# Patient Record
Sex: MALE | Race: WHITE | NOT HISPANIC OR LATINO | URBAN - METROPOLITAN AREA
[De-identification: names, ages, dates, MRNs, and addresses within clinical notes are randomized per-mention and may not be internally consistent; named-entity substitution may affect disease eponyms.]

---

## 2017-01-14 ENCOUNTER — INPATIENT (INPATIENT)
Facility: HOSPITAL | Age: 47
LOS: 2 days | Discharge: HOME HEALTH | End: 2017-01-17
Attending: FAMILY MEDICINE | Admitting: INTERNAL MEDICINE
Payer: MEDICAID

## 2017-01-14 DIAGNOSIS — L02.419 CUTANEOUS ABSCESS OF LIMB, UNSPECIFIED: Chronic | ICD-10-CM

## 2017-01-14 PROCEDURE — 99285 EMERGENCY DEPT VISIT HI MDM: CPT

## 2017-01-14 PROCEDURE — 71010: CPT | Mod: 26

## 2017-01-15 PROCEDURE — 93010 ELECTROCARDIOGRAM REPORT: CPT | Mod: 76

## 2017-01-15 PROCEDURE — 71275 CT ANGIOGRAPHY CHEST: CPT | Mod: 26

## 2017-01-17 PROCEDURE — 93306 TTE W/DOPPLER COMPLETE: CPT | Mod: 26

## 2017-01-17 PROCEDURE — 93018 CV STRESS TEST I&R ONLY: CPT

## 2017-01-17 PROCEDURE — 93016 CV STRESS TEST SUPVJ ONLY: CPT

## 2017-01-17 PROCEDURE — 78452 HT MUSCLE IMAGE SPECT MULT: CPT | Mod: 26

## 2017-01-23 DIAGNOSIS — M54.9 DORSALGIA, UNSPECIFIED: ICD-10-CM

## 2017-01-23 DIAGNOSIS — M06.9 RHEUMATOID ARTHRITIS, UNSPECIFIED: ICD-10-CM

## 2017-01-23 DIAGNOSIS — R07.9 CHEST PAIN, UNSPECIFIED: ICD-10-CM

## 2017-01-23 DIAGNOSIS — F31.9 BIPOLAR DISORDER, UNSPECIFIED: ICD-10-CM

## 2017-01-23 DIAGNOSIS — I25.10 ATHEROSCLEROTIC HEART DISEASE OF NATIVE CORONARY ARTERY WITHOUT ANGINA PECTORIS: ICD-10-CM

## 2017-01-23 DIAGNOSIS — F17.210 NICOTINE DEPENDENCE, CIGARETTES, UNCOMPLICATED: ICD-10-CM

## 2017-01-23 DIAGNOSIS — G62.9 POLYNEUROPATHY, UNSPECIFIED: ICD-10-CM

## 2017-01-23 DIAGNOSIS — E78.5 HYPERLIPIDEMIA, UNSPECIFIED: ICD-10-CM

## 2017-01-23 DIAGNOSIS — Z95.5 PRESENCE OF CORONARY ANGIOPLASTY IMPLANT AND GRAFT: ICD-10-CM

## 2017-01-24 ENCOUNTER — EMERGENCY (EMERGENCY)
Facility: HOSPITAL | Age: 47
LOS: 1 days | Discharge: HOME | End: 2017-01-24
Attending: EMERGENCY MEDICINE | Admitting: EMERGENCY MEDICINE
Payer: MEDICAID

## 2017-01-24 VITALS
RESPIRATION RATE: 20 BRPM | SYSTOLIC BLOOD PRESSURE: 124 MMHG | HEART RATE: 59 BPM | DIASTOLIC BLOOD PRESSURE: 66 MMHG | OXYGEN SATURATION: 98 % | TEMPERATURE: 98 F

## 2017-01-24 VITALS
WEIGHT: 145.06 LBS | HEART RATE: 74 BPM | OXYGEN SATURATION: 98 % | RESPIRATION RATE: 20 BRPM | HEIGHT: 67 IN | SYSTOLIC BLOOD PRESSURE: 118 MMHG | DIASTOLIC BLOOD PRESSURE: 68 MMHG | TEMPERATURE: 98 F

## 2017-01-24 DIAGNOSIS — L02.419 CUTANEOUS ABSCESS OF LIMB, UNSPECIFIED: Chronic | ICD-10-CM

## 2017-01-24 LAB
ALBUMIN SERPL ELPH-MCNC: 4.1 G/DL — SIGNIFICANT CHANGE UP (ref 3.3–5.2)
ALP SERPL-CCNC: 89 U/L — SIGNIFICANT CHANGE UP (ref 40–120)
ALT FLD-CCNC: 158 U/L — HIGH
ANION GAP SERPL CALC-SCNC: 11 MMOL/L — SIGNIFICANT CHANGE UP (ref 5–17)
AST SERPL-CCNC: 110 U/L — HIGH
BASOPHILS # BLD AUTO: 0 K/UL — SIGNIFICANT CHANGE UP (ref 0–0.2)
BASOPHILS NFR BLD AUTO: 0.1 % — SIGNIFICANT CHANGE UP (ref 0–2)
BILIRUB SERPL-MCNC: 0.6 MG/DL — SIGNIFICANT CHANGE UP (ref 0.4–2)
BUN SERPL-MCNC: 7 MG/DL — LOW (ref 8–20)
CALCIUM SERPL-MCNC: 9.4 MG/DL — SIGNIFICANT CHANGE UP (ref 8.6–10.2)
CHLORIDE SERPL-SCNC: 104 MMOL/L — SIGNIFICANT CHANGE UP (ref 98–107)
CO2 SERPL-SCNC: 26 MMOL/L — SIGNIFICANT CHANGE UP (ref 22–29)
CREAT SERPL-MCNC: 0.7 MG/DL — SIGNIFICANT CHANGE UP (ref 0.5–1.3)
D DIMER BLD IA.RAPID-MCNC: <150 NG/ML DDU — SIGNIFICANT CHANGE UP
EOSINOPHIL # BLD AUTO: 0.1 K/UL — SIGNIFICANT CHANGE UP (ref 0–0.5)
EOSINOPHIL NFR BLD AUTO: 1.7 % — SIGNIFICANT CHANGE UP (ref 0–5)
GLUCOSE SERPL-MCNC: 76 MG/DL — SIGNIFICANT CHANGE UP (ref 70–115)
HCT VFR BLD CALC: 42.4 % — SIGNIFICANT CHANGE UP (ref 42–52)
HGB BLD-MCNC: 14.6 G/DL — SIGNIFICANT CHANGE UP (ref 14–18)
LIDOCAIN IGE QN: 17 U/L — LOW (ref 22–51)
LYMPHOCYTES # BLD AUTO: 2.7 K/UL — SIGNIFICANT CHANGE UP (ref 1–4.8)
LYMPHOCYTES # BLD AUTO: 32.3 % — SIGNIFICANT CHANGE UP (ref 20–55)
MCHC RBC-ENTMCNC: 30.7 PG — SIGNIFICANT CHANGE UP (ref 27–31)
MCHC RBC-ENTMCNC: 34.4 G/DL — SIGNIFICANT CHANGE UP (ref 32–36)
MCV RBC AUTO: 89.1 FL — SIGNIFICANT CHANGE UP (ref 80–94)
MONOCYTES # BLD AUTO: 1.1 K/UL — HIGH (ref 0–0.8)
MONOCYTES NFR BLD AUTO: 12.8 % — HIGH (ref 3–10)
NEUTROPHILS # BLD AUTO: 4.5 K/UL — SIGNIFICANT CHANGE UP (ref 1.8–8)
NEUTROPHILS NFR BLD AUTO: 53 % — SIGNIFICANT CHANGE UP (ref 37–73)
NT-PROBNP SERPL-SCNC: 170 PG/ML — SIGNIFICANT CHANGE UP (ref 0–300)
PLATELET # BLD AUTO: 162 K/UL — SIGNIFICANT CHANGE UP (ref 150–400)
POTASSIUM SERPL-MCNC: 4.3 MMOL/L — SIGNIFICANT CHANGE UP (ref 3.5–5.3)
POTASSIUM SERPL-SCNC: 4.3 MMOL/L — SIGNIFICANT CHANGE UP (ref 3.5–5.3)
PROT SERPL-MCNC: 7.7 G/DL — SIGNIFICANT CHANGE UP (ref 6.6–8.7)
RBC # BLD: 4.76 M/UL — SIGNIFICANT CHANGE UP (ref 4.6–6.2)
RBC # FLD: 13.6 % — SIGNIFICANT CHANGE UP (ref 11–15.6)
SODIUM SERPL-SCNC: 141 MMOL/L — SIGNIFICANT CHANGE UP (ref 135–145)
TROPONIN T SERPL-MCNC: <0.01 NG/ML — SIGNIFICANT CHANGE UP (ref 0–0.06)
TROPONIN T SERPL-MCNC: <0.01 NG/ML — SIGNIFICANT CHANGE UP (ref 0–0.06)
WBC # BLD: 8.43 K/UL — SIGNIFICANT CHANGE UP (ref 4.8–10.8)
WBC # FLD AUTO: 8.43 K/UL — SIGNIFICANT CHANGE UP (ref 4.8–10.8)

## 2017-01-24 PROCEDURE — 36415 COLL VENOUS BLD VENIPUNCTURE: CPT

## 2017-01-24 PROCEDURE — 80053 COMPREHEN METABOLIC PANEL: CPT

## 2017-01-24 PROCEDURE — 85379 FIBRIN DEGRADATION QUANT: CPT

## 2017-01-24 PROCEDURE — 85027 COMPLETE CBC AUTOMATED: CPT

## 2017-01-24 PROCEDURE — 71045 X-RAY EXAM CHEST 1 VIEW: CPT

## 2017-01-24 PROCEDURE — 93005 ELECTROCARDIOGRAM TRACING: CPT

## 2017-01-24 PROCEDURE — 83880 ASSAY OF NATRIURETIC PEPTIDE: CPT

## 2017-01-24 PROCEDURE — 93010 ELECTROCARDIOGRAM REPORT: CPT

## 2017-01-24 PROCEDURE — 83690 ASSAY OF LIPASE: CPT

## 2017-01-24 PROCEDURE — 84484 ASSAY OF TROPONIN QUANT: CPT

## 2017-01-24 PROCEDURE — 96374 THER/PROPH/DIAG INJ IV PUSH: CPT

## 2017-01-24 PROCEDURE — 71010: CPT | Mod: 26

## 2017-01-24 PROCEDURE — 99284 EMERGENCY DEPT VISIT MOD MDM: CPT | Mod: 25

## 2017-01-24 PROCEDURE — 99285 EMERGENCY DEPT VISIT HI MDM: CPT

## 2017-01-24 RX ORDER — SODIUM CHLORIDE 9 MG/ML
3 INJECTION INTRAMUSCULAR; INTRAVENOUS; SUBCUTANEOUS ONCE
Qty: 0 | Refills: 0 | Status: COMPLETED | OUTPATIENT
Start: 2017-01-24 | End: 2017-01-24

## 2017-01-24 RX ORDER — ONDANSETRON 8 MG/1
4 TABLET, FILM COATED ORAL ONCE
Qty: 0 | Refills: 0 | Status: COMPLETED | OUTPATIENT
Start: 2017-01-24 | End: 2017-01-24

## 2017-01-24 RX ADMIN — ONDANSETRON 4 MILLIGRAM(S): 8 TABLET, FILM COATED ORAL at 18:51

## 2017-01-24 RX ADMIN — SODIUM CHLORIDE 3 MILLILITER(S): 9 INJECTION INTRAMUSCULAR; INTRAVENOUS; SUBCUTANEOUS at 17:14

## 2017-01-24 NOTE — ED PROVIDER NOTE - PROGRESS NOTE DETAILS
Cardiology Dr Ferrari in ed and saw pt for cardiology consult Dr Sp Ferrari cardiology in ed and saw pt and  rec if trop and ecg x 2 ok d/c and pt will f/u with him for outpt stress test Pt c/o nausea and pain. Pt takes morphine for chronic pain.  Pt refusing to wait for 2nd set Troponin and refusing to sign out AMA.  Pt requesting to have saline lock removed and wants to leave

## 2017-01-24 NOTE — CONSULT NOTE ADULT - SUBJECTIVE AND OBJECTIVE BOX
[facility], THE HEART CENTER                                   [address] [place] [address]                                                      PHONE: [phone]                                                         FAX: [phone]  [email or web address]  ---------------------------------------------------------------------------------------------------------------------------------    HPI:  STAN VEGA is an 46y Male PMH HTn, HLD, CAD s/p prior PCI, hep c, former smoker, copd, MVP,   who presented to [facility] ED     PAST MEDICAL & SURGICAL HISTORY:  Mitral valve disorder  CAD (coronary artery disease)  Pain management  Chronic pain  MI (myocardial infarction)  Chronic back pain  CAD (coronary artery disease)  HTN (hypertension)  Hep C w/o coma, chronic  COPD (chronic obstructive pulmonary disease)  Mitral valve prolapse  Acid reflux  CAD (coronary artery disease)  Back injury  Anxiety  High cholesterol  No significant past surgical history  Abscess of arm  Cardiac catheterization as cause of abnormal reaction of patient, or of later complication, without mention of misadventure at time of procedure: cardiac cath with one stent      Aleve (Unknown)  Haldol (Anaphylaxis)  Motrin (Anaphylaxis)  NSAIDs (Flushing; Other (Moderate))  Stelazine (Unknown)  Thorazine (Other (Moderate))      MEDICATIONS  (STANDING):  sodium chloride 0.9% lock flush 3milliLiter(s) IV Push once    MEDICATIONS  (PRN):      Family History: Pt denies hx of early cad, SCD, or congenital heart disease.      Social History:  Cigarettes:      former              Alchohol:   no              Illicit Drug Abuse:  no    ROS:  Constitutional: No fever, weight loss or fatigue  Eyes: No eye pain, visual disturbances, or discharge  ENMT:  No difficulty hearing, tinnitus, vertigo; No sinus or throat pain  Neck: No pain or stiffness  Respiratory: No cough, wheezing, chills or hemoptysis  Cardiovascular: No chest pain, palpitations, shortness of breath, dizziness or leg swelling  Gastrointestinal: No abdominal or epigastric pain. No nausea, vomiting or hematemesis; No diarrhea or constipation. No melena or hematochezia.  Genitourinary: No dysuria, frequency, hematuria or incontinence  Rectal: No pain, hemorrhoids or incontinence  Neurological: No headaches, memory loss, loss of strength, numbness or tremors  Skin: No itching, burning, rashes or lesions   Lymph Nodes: No enlarged glands  Endocrine: No heat or cold intolerance; No hair loss  Musculoskeletal: No joint pain or swelling; No muscle, back or extremity pain  Psychiatric: No depression, anxiety, mood swings or difficulty sleeping  Heme/Lymph: No easy bruising or bleeding gums  Allergy and Immunologic: No hives or eczema    Vital Signs Last 24 Hrs  T(C): 36.8, Max: 36.8 (01-24 @ 13:52)  T(F): 98.2, Max: 98.2 (01-24 @ 13:52)  HR: 74 (74 - 74)  BP: 118/68 (118/68 - 118/68)  BP(mean): --  RR: 20 (20 - 20)  SpO2: 98% (98% - 98%)  ICU Vital Signs Last 24 Hrs  STAN VEGA  I&O's Detail    I&O's Summary    Drug Dosing Weight  STAN VEGA      PHYSICAL EXAM:  General: Appears well developed, well nourished alert and cooperative.  HEENT: Head; normocephalic, atraumatic.  Eyes: Pupils reactive, cornea wnl.  Neck: Supple, no nodes adenopathy, no NVD or carotid bruit or thyromegaly.  CARDIOVASCULAR: Normal S1 and S2, No murmur, rub, gallop or lift.   LUNGS: No rales, rhonchi or wheeze. Normal breath sounds bilaterally.  ABDOMEN: Soft, nontender without mass or organomegaly. bowel sounds normoactive.  EXTREMITIES: No clubbing, cyanosis or edema. Distal pulses wnl.   SKIN: warm and dry with normal turgor.  NEURO: Alert/oriented x 3/normal motor exam. No pathologic reflexes.    PSYCH: normal affect.        LABS:          STAN VEGA            RADIOLOGY & ADDITIONAL STUDIES:    INTERPRETATION OF TELEMETRY (personally reviewed):    ECG:      Assessment and Plan:  In summary, STAN VEGA is an 46y Male with past medical history significant for       Thank you for allowing [facility] to participate in the care of this patient.  Please feel free to call with any questions or concerns. [facility], THE [facility]ART [facility]ENTER                                   [address] [place] [address]                                                      PHONE: [phone]                                                         FAX: [phone]  [email or web address]  ---------------------------------------------------------------------------------------------------------------------------------    HPI:  STAN VEGA is an 46y Male PMH HTN, HLD, CAD s/p prior PCI, hep c, former smoker, copd, MVP, who presented to [facility] ED complaining of chest discomfort.  pt states that he had an episode of mid sternal 5/10, non radiating, chest pressure with no alleviating/excerbating factors.  He had a similar episode 5 months ago but did not seek medical.  He states that his pain has improved. He denies active chest pain, palps, sob.     PAST MEDICAL & SURGICAL HISTORY:  Mitral valve disorder  CAD (coronary artery disease)  Pain management  Chronic pain  MI (myocardial infarction)  Chronic back pain  CAD (coronary artery disease)  HTN (hypertension)  Hep C w/o coma, chronic  COPD (chronic obstructive pulmonary disease)  Mitral valve prolapse  Acid reflux  CAD (coronary artery disease)  Back injury  Anxiety  High cholesterol  No significant past surgical history  Abscess of arm  Cardiac catheterization as cause of abnormal reaction of patient, or of later complication, without mention of misadventure at time of procedure: cardiac cath with one stent      Aleve (Unknown)  Haldol (Anaphylaxis)  Motrin (Anaphylaxis)  NSAIDs (Flushing; Other (Moderate))  Stelazine (Unknown)  Thorazine (Other (Moderate))      MEDICATIONS  (STANDING):  sodium chloride 0.9% lock flush 3milliLiter(s) IV Push once    MEDICATIONS  (PRN):      Family History: Pt denies hx of early cad, SCD, or congenital heart disease.      Social History:  Cigarettes:      former              Alchohol:   no              Illicit Drug Abuse:  no    ROS:  Constitutional: No fever, weight loss or fatigue  Eyes: No eye pain, visual disturbances, or discharge  ENMT:  No difficulty hearing, tinnitus, vertigo; No sinus or throat pain  Neck: No pain or stiffness  Respiratory: No cough, wheezing, chills or hemoptysis  Cardiovascular: No chest pain, palpitations, shortness of breath, dizziness or leg swelling  Gastrointestinal: No abdominal or epigastric pain. No nausea, vomiting or hematemesis; No diarrhea or constipation. No melena or hematochezia.  Genitourinary: No dysuria, frequency, hematuria or incontinence  Rectal: No pain, hemorrhoids or incontinence  Neurological: No headaches, memory loss, loss of strength, numbness or tremors  Skin: No itching, burning, rashes or lesions   Lymph Nodes: No enlarged glands  Endocrine: No heat or cold intolerance; No hair loss  Musculoskeletal: No joint pain or swelling; No muscle, back or extremity pain  Psychiatric: No depression, anxiety, mood swings or difficulty sleeping  Heme/Lymph: No easy bruising or bleeding gums  Allergy and Immunologic: No hives or eczema    Vital Signs Last 24 Hrs  T(C): 36.8, Max: 36.8 (01-24 @ 13:52)  T(F): 98.2, Max: 98.2 (01-24 @ 13:52)  HR: 74 (74 - 74)  BP: 118/68 (118/68 - 118/68)  BP(mean): --  RR: 20 (20 - 20)  SpO2: 98% (98% - 98%)  ICU Vital Signs Last 24 Hrs  STAN VEGA  I&O's Detail    I&O's Summary    Drug Dosing Weight  STAN VEGA      PHYSICAL EXAM:  General: Appears well developed, well nourished alert and cooperative.  HEENT: Head; normocephalic, atraumatic.  Eyes: Pupils reactive, cornea wnl.  Neck: Supple, no nodes adenopathy, no NVD or carotid bruit or thyromegaly.  CARDIOVASCULAR: Normal S1 and S2, No murmur, rub, gallop or lift.   LUNGS: No rales, rhonchi or wheeze. Normal breath sounds bilaterally.  ABDOMEN: Soft, nontender without mass or organomegaly. bowel sounds normoactive.  EXTREMITIES: No clubbing, cyanosis or edema. Distal pulses wnl.   SKIN: warm and dry with normal turgor.  NEURO: Alert/oriented x 3/normal motor exam. No pathologic reflexes.    PSYCH: normal affect.        LABS:          STAN VEGA      RADIOLOGY & ADDITIONAL STUDIES:    INTERPRETATION OF TELEMETRY (personally reviewed):    ECG:  nsr, no st elevations/depressions      Assessment and Plan:  In summary, STAN EVGA is an 46y Male PMH HTN, HLD, CAD s/p prior PCI, hep c, former smoker, copd, MVP, who presented to [facility] ED complaining of chest discomfort.  pt states that he had an episode of mid sternal 5/10, non radiating, chest pressure with no alleviating/excerbating factors.  He had a similar episode 5 months ago but did not seek medical.  He states that his pain has improved. He denies active chest pain, palps, sob.     atypical chest pain  -pt to have 2 trops/ekgs  -if negative and pt feeling well then he can be d/c'd home   -will arrange for outpt follow up and stress test.  -pt to continue current home medications/dosages    Thank you for allowing [facility] to participate in the care of this patient.  Please feel free to call with any questions or concerns.

## 2017-01-24 NOTE — ED PROVIDER NOTE - PMH
Acid reflux    Anxiety    Back injury    CAD (coronary artery disease)    CAD (coronary artery disease)    CAD (coronary artery disease)    Chronic back pain    Chronic pain    COPD (chronic obstructive pulmonary disease)    Hep C w/o coma, chronic    High cholesterol    HTN (hypertension)    MI (myocardial infarction)    Mitral valve disorder    Mitral valve prolapse    Pain management

## 2017-01-24 NOTE — ED PROVIDER NOTE - OBJECTIVE STATEMENT
45 y/o male states he has a h/o angina and stents and MVP and he says he had last workup at [facility] and does not know the name of any cardiologists that he has seen and he was well until this morning he was lying down and felt scachy pain and it has continued and he took asa x 4 x 81 mg and nitro under his tongue and his cp has improved and he says it is about a 6 now down from an 8 and he denies any sob or fever or cough 45 y/o male states he has a h/o angina and stents and MVP and he says he had last workup at [facility] and does not know the name of any cardiologists that he has seen and he was well until this morning he was lying down and felt achy pain and it has continued and he took asa x 4 x 81 mg and nitro under his tongue and his cp has improved and he says it is about a 6 now down from an 8 and he denies any sob or fever or cough

## 2017-01-24 NOTE — ED ADULT NURSE REASSESSMENT NOTE - NS ED NURSE REASSESS COMMENT FT1
Assuming care from previous RN, pt AOx3, denies SOB, denies difficulty breathing, resp even and unlabored, LS clear and equal bilaterally, skin warm and dry, patent 20G IV in left AC, NSR on monitor, complaining of nausea and pain 8/10, will medicate as per MD orders, will continue to monitor.

## 2017-01-24 NOTE — ED ADULT NURSE NOTE - OBJECTIVE STATEMENT
Patient A&OX3, c/o chest pain that radiated to his left side of his neck, back pain and nause. CM in place. NSR on monitor. VSS. Pt denies SOB and dizziness. clear BBS. abd soft, nondistended and nontender. moving all extremities well. safety maintained. Patient A&OX3, c/o chest pain that radiated to his left side of his neck, back pain and nausea since this morning. CM in place. NSR on monitor. VSS. Pt denies SOB and dizziness. clear BBS. abd soft, nondistended and nontender. moving all extremities well. safety maintained.

## 2017-01-24 NOTE — ED ADULT NURSE REASSESSMENT NOTE - NS ED NURSE REASSESS COMMENT FT1
Pt states "he doesn't want to be here anymore and wants to leave without signing any papers", MD Israel made aware and spoke with pt about leaving AMA. Pt ripped out his own IV and yelled at staff as he walked out. Pt states "he doesn't want to be here anymore and wants to leave without signing any papers", MD Israel made aware and spoke with pt about leaving AMA. Pt pulled out his own IV and yelled at staff as he walked out.

## 2017-01-24 NOTE — ED PROVIDER NOTE - MEDICAL DECISION MAKING DETAILS
chest pain in pt with cad and stents no cardiologist here Dr Ferrari in ed and will see pt for cardiology consult will get labs and cxr and ecg is nondiagnostic

## 2017-01-25 ENCOUNTER — INPATIENT (INPATIENT)
Facility: HOSPITAL | Age: 47
LOS: 4 days | Discharge: HOME | End: 2017-01-30
Attending: INTERNAL MEDICINE | Admitting: INTERNAL MEDICINE
Payer: MEDICAID

## 2017-01-25 DIAGNOSIS — L02.419 CUTANEOUS ABSCESS OF LIMB, UNSPECIFIED: Chronic | ICD-10-CM

## 2017-01-25 PROCEDURE — 71020: CPT | Mod: 26

## 2017-01-25 PROCEDURE — 99285 EMERGENCY DEPT VISIT HI MDM: CPT

## 2017-01-25 PROCEDURE — 93010 ELECTROCARDIOGRAM REPORT: CPT

## 2017-01-26 PROCEDURE — 93010 ELECTROCARDIOGRAM REPORT: CPT

## 2017-01-27 PROCEDURE — 93010 ELECTROCARDIOGRAM REPORT: CPT

## 2017-01-28 VITALS
OXYGEN SATURATION: 97 % | DIASTOLIC BLOOD PRESSURE: 56 MMHG | RESPIRATION RATE: 16 BRPM | HEART RATE: 62 BPM | TEMPERATURE: 98 F | SYSTOLIC BLOOD PRESSURE: 118 MMHG

## 2017-01-28 PROCEDURE — 93010 ELECTROCARDIOGRAM REPORT: CPT

## 2017-01-28 PROCEDURE — 76705 ECHO EXAM OF ABDOMEN: CPT | Mod: 26

## 2017-01-28 RX ORDER — DIVALPROEX SODIUM 500 MG/1
500 TABLET, DELAYED RELEASE ORAL EVERY 12 HOURS
Qty: 0 | Refills: 0 | Status: DISCONTINUED | OUTPATIENT
Start: 2017-01-28 | End: 2017-01-30

## 2017-01-28 RX ORDER — MORPHINE SULFATE 50 MG/1
2 CAPSULE, EXTENDED RELEASE ORAL
Qty: 0 | Refills: 0 | Status: DISCONTINUED | OUTPATIENT
Start: 2017-01-28 | End: 2017-01-30

## 2017-01-28 RX ORDER — NICOTINE POLACRILEX 2 MG
1 GUM BUCCAL DAILY
Qty: 0 | Refills: 0 | Status: DISCONTINUED | OUTPATIENT
Start: 2017-01-28 | End: 2017-01-30

## 2017-01-28 RX ORDER — ASPIRIN/CALCIUM CARB/MAGNESIUM 324 MG
81 TABLET ORAL DAILY
Qty: 0 | Refills: 0 | Status: DISCONTINUED | OUTPATIENT
Start: 2017-01-28 | End: 2017-01-28

## 2017-01-28 RX ORDER — DOCUSATE SODIUM 100 MG
200 CAPSULE ORAL AT BEDTIME
Qty: 0 | Refills: 0 | Status: DISCONTINUED | OUTPATIENT
Start: 2017-01-28 | End: 2017-01-30

## 2017-01-28 RX ORDER — SODIUM CHLORIDE 9 MG/ML
5 INJECTION INTRAMUSCULAR; INTRAVENOUS; SUBCUTANEOUS EVERY 12 HOURS
Qty: 0 | Refills: 0 | Status: DISCONTINUED | OUTPATIENT
Start: 2017-01-28 | End: 2017-01-30

## 2017-01-28 RX ORDER — NITROGLYCERIN 6.5 MG
0.5 CAPSULE, EXTENDED RELEASE ORAL EVERY 6 HOURS
Qty: 0 | Refills: 0 | Status: DISCONTINUED | OUTPATIENT
Start: 2017-01-28 | End: 2017-01-30

## 2017-01-28 RX ORDER — DIVALPROEX SODIUM 500 MG/1
250 TABLET, DELAYED RELEASE ORAL EVERY 12 HOURS
Qty: 0 | Refills: 0 | Status: DISCONTINUED | OUTPATIENT
Start: 2017-01-28 | End: 2017-01-30

## 2017-01-28 RX ORDER — CLOPIDOGREL BISULFATE 75 MG/1
75 TABLET, FILM COATED ORAL DAILY
Qty: 0 | Refills: 0 | Status: DISCONTINUED | OUTPATIENT
Start: 2017-01-28 | End: 2017-01-30

## 2017-01-28 RX ORDER — FAMOTIDINE 10 MG/ML
20 INJECTION INTRAVENOUS DAILY
Qty: 0 | Refills: 0 | Status: DISCONTINUED | OUTPATIENT
Start: 2017-01-28 | End: 2017-01-30

## 2017-01-28 RX ORDER — METHYLPHENIDATE HCL 5 MG
5 TABLET ORAL DAILY
Qty: 0 | Refills: 0 | Status: DISCONTINUED | OUTPATIENT
Start: 2017-01-28 | End: 2017-01-30

## 2017-01-28 RX ORDER — METOPROLOL TARTRATE 50 MG
12.5 TABLET ORAL DAILY
Qty: 0 | Refills: 0 | Status: DISCONTINUED | OUTPATIENT
Start: 2017-01-28 | End: 2017-01-30

## 2017-01-28 RX ORDER — HEPARIN SODIUM 5000 [USP'U]/ML
5000 INJECTION INTRAVENOUS; SUBCUTANEOUS EVERY 8 HOURS
Qty: 0 | Refills: 0 | Status: DISCONTINUED | OUTPATIENT
Start: 2017-01-28 | End: 2017-01-30

## 2017-01-28 RX ORDER — ATORVASTATIN CALCIUM 80 MG/1
40 TABLET, FILM COATED ORAL AT BEDTIME
Qty: 0 | Refills: 0 | Status: DISCONTINUED | OUTPATIENT
Start: 2017-01-28 | End: 2017-01-30

## 2017-01-28 RX ORDER — METOPROLOL TARTRATE 50 MG
12.5 TABLET ORAL DAILY
Qty: 0 | Refills: 0 | Status: DISCONTINUED | OUTPATIENT
Start: 2017-01-28 | End: 2017-01-28

## 2017-01-28 RX ORDER — MORPHINE SULFATE 50 MG/1
15 CAPSULE, EXTENDED RELEASE ORAL EVERY 12 HOURS
Qty: 0 | Refills: 0 | Status: DISCONTINUED | OUTPATIENT
Start: 2017-01-28 | End: 2017-01-30

## 2017-01-28 RX ORDER — QUETIAPINE FUMARATE 200 MG/1
150 TABLET, FILM COATED ORAL AT BEDTIME
Qty: 0 | Refills: 0 | Status: DISCONTINUED | OUTPATIENT
Start: 2017-01-28 | End: 2017-01-30

## 2017-01-28 RX ORDER — ACETAMINOPHEN 500 MG
650 TABLET ORAL EVERY 6 HOURS
Qty: 0 | Refills: 0 | Status: DISCONTINUED | OUTPATIENT
Start: 2017-01-28 | End: 2017-01-30

## 2017-01-28 RX ORDER — SENNA PLUS 8.6 MG/1
2 TABLET ORAL AT BEDTIME
Qty: 0 | Refills: 0 | Status: DISCONTINUED | OUTPATIENT
Start: 2017-01-28 | End: 2017-01-30

## 2017-01-28 RX ORDER — METOCLOPRAMIDE HCL 10 MG
5 TABLET ORAL EVERY 12 HOURS
Qty: 0 | Refills: 0 | Status: DISCONTINUED | OUTPATIENT
Start: 2017-01-28 | End: 2017-01-30

## 2017-01-28 RX ORDER — GABAPENTIN 400 MG/1
800 CAPSULE ORAL EVERY 8 HOURS
Qty: 0 | Refills: 0 | Status: DISCONTINUED | OUTPATIENT
Start: 2017-01-28 | End: 2017-01-30

## 2017-01-28 RX ORDER — ONDANSETRON 8 MG/1
4 TABLET, FILM COATED ORAL EVERY 6 HOURS
Qty: 0 | Refills: 0 | Status: DISCONTINUED | OUTPATIENT
Start: 2017-01-28 | End: 2017-01-30

## 2017-01-28 RX ORDER — ASPIRIN/CALCIUM CARB/MAGNESIUM 324 MG
81 TABLET ORAL DAILY
Qty: 0 | Refills: 0 | Status: DISCONTINUED | OUTPATIENT
Start: 2017-01-28 | End: 2017-01-30

## 2017-01-28 RX ORDER — HYDROMORPHONE HYDROCHLORIDE 2 MG/ML
1 INJECTION INTRAMUSCULAR; INTRAVENOUS; SUBCUTANEOUS EVERY 4 HOURS
Qty: 0 | Refills: 0 | Status: DISCONTINUED | OUTPATIENT
Start: 2017-01-28 | End: 2017-01-30

## 2017-01-28 RX ADMIN — DIVALPROEX SODIUM 500 MILLIGRAM(S): 500 TABLET, DELAYED RELEASE ORAL at 21:49

## 2017-01-28 RX ADMIN — CLOPIDOGREL BISULFATE 75 MILLIGRAM(S): 75 TABLET, FILM COATED ORAL at 14:16

## 2017-01-28 RX ADMIN — Medication 1 PATCH: at 14:21

## 2017-01-28 RX ADMIN — ATORVASTATIN CALCIUM 40 MILLIGRAM(S): 80 TABLET, FILM COATED ORAL at 22:38

## 2017-01-28 RX ADMIN — MORPHINE SULFATE 15 MILLIGRAM(S): 50 CAPSULE, EXTENDED RELEASE ORAL at 21:48

## 2017-01-28 RX ADMIN — HYDROMORPHONE HYDROCHLORIDE 1 MILLIGRAM(S): 2 INJECTION INTRAMUSCULAR; INTRAVENOUS; SUBCUTANEOUS at 14:20

## 2017-01-28 RX ADMIN — Medication 12.5 MILLIGRAM(S): at 14:20

## 2017-01-28 RX ADMIN — GABAPENTIN 800 MILLIGRAM(S): 400 CAPSULE ORAL at 14:29

## 2017-01-28 RX ADMIN — DIVALPROEX SODIUM 250 MILLIGRAM(S): 500 TABLET, DELAYED RELEASE ORAL at 21:49

## 2017-01-28 RX ADMIN — GABAPENTIN 800 MILLIGRAM(S): 400 CAPSULE ORAL at 21:51

## 2017-01-28 RX ADMIN — HYDROMORPHONE HYDROCHLORIDE 1 MILLIGRAM(S): 2 INJECTION INTRAMUSCULAR; INTRAVENOUS; SUBCUTANEOUS at 14:05

## 2017-01-28 RX ADMIN — HYDROMORPHONE HYDROCHLORIDE 1 MILLIGRAM(S): 2 INJECTION INTRAMUSCULAR; INTRAVENOUS; SUBCUTANEOUS at 22:38

## 2017-01-28 RX ADMIN — Medication 10 MILLIGRAM(S): at 14:23

## 2017-01-28 RX ADMIN — MORPHINE SULFATE 15 MILLIGRAM(S): 50 CAPSULE, EXTENDED RELEASE ORAL at 03:59

## 2017-01-28 RX ADMIN — QUETIAPINE FUMARATE 150 MILLIGRAM(S): 200 TABLET, FILM COATED ORAL at 21:52

## 2017-01-28 RX ADMIN — SODIUM CHLORIDE 5 MILLILITER(S): 9 INJECTION INTRAMUSCULAR; INTRAVENOUS; SUBCUTANEOUS at 17:54

## 2017-01-28 RX ADMIN — HYDROMORPHONE HYDROCHLORIDE 1 MILLIGRAM(S): 2 INJECTION INTRAMUSCULAR; INTRAVENOUS; SUBCUTANEOUS at 18:09

## 2017-01-28 RX ADMIN — Medication 5 MILLIGRAM(S): at 17:51

## 2017-01-28 RX ADMIN — Medication 5 MILLIGRAM(S): at 14:19

## 2017-01-28 RX ADMIN — FAMOTIDINE 20 MILLIGRAM(S): 10 INJECTION INTRAVENOUS at 14:18

## 2017-01-28 RX ADMIN — Medication 81 MILLIGRAM(S): at 14:17

## 2017-01-28 NOTE — PROGRESS NOTE ADULT - SUBJECTIVE AND OBJECTIVE BOX
Subjective:   PCP: :: prior PCP - Dr. Javid Brenner   [phone], fax [phone]   reports no PCP will advise [facility] ;   C C: Chief Complaint:: chest pain x 1 day   HPI: :: 46/M with a PMHx CAD on asa/plavix, MVP, fractured spine, MI, bipolar disorder, neuropathy, emphysema, RA, presents with midsternal chest pain since 5am. Woke pt up from sleep, characterized as ache, nonradiating, constant, radiating to the back. +nausea. Denies sob, cough, diaphoresis, leg swelling.   1/26:   still has some residual chest pain     1/28:  pt better  cath negative  could not be discharged yesterday as has no place to go    PMHx: :: as per hpi   PSHx: :: PCI with stents   Family History: :: NC   Social History: :: Smokes 1 pack per day x 25y   Denies Etoh, drug use   works in construction   Allergies:   Allergies   Haldol: Hives; NSAIDS (Non-Steroidal Anti-Inflammatory Drug): Hives; Stelazine: ; Thorazine: Hives   ROS: :: Chest Pain; All other review of systems negative   Objective:   Physical Exam:    Vital Signs Last 24 Hrs  T(C): 36.9, Max: 36.9 (01-28 @ 07:57)  T(F): 98.4, Max: 98.4 (01-28 @ 07:57)  HR: 62 (62 - 62)  BP: 118/56 (118/56 - 118/56)  BP(mean): --  ABP: --  ABP(mean): --  RR: 16 (16 - 16)  SpO2: 97% (97% - 97%)    General: Appears: Comfortable   Neurological: :: A and O x3, Non-focal   HEENT: :: NC/AT, EOMI, PERRLA   Neck: No thyromegaly   Cardiovascular: :: S1S2 Present, Regular Rhythm   Respiratory: Respiratory Rate: 12; Respiratory Effort: Normal; (R,L) Breath sounds: normal; (R,L) No rales   Breast/Chest: Breast exam deferred   Gastrointestinal: Bowel sounds: normal; No abdominal tenderness   Rectal: Rectal Exam Deferred: Yes   Genitourinary: Genitourinary Exam Deferred: Deferred   Musculoskeletal:   General (R,L): No muscle tenderness   Extremities: Edema: No; Pulses: Normal   Skin (R,L): No skin lesions; No rash   Labs:   LABS   EKG: I have personally visualized the EKG and my interpretation is:; :: NSR at 68   Radiology:   Radiology   	  CHEST (PA & LAT)	  *** Begin Of Inserted Radiology Report ***	  Impression:  No acute disease  No significant interval change as compared to 1/14/2017	  Exam Date: 1/25/2017 8:48 AM  History: Chest pain  Technique: Frontal and lateral views of the chest with comparison to  1/14/2017  Findings:  The heart is normal in size. The lungs are grossly clear. The apices  and hemidiaphragms are unremarkable. Degenerative changes of the  visualized osseous structures. 	  	  Performed: 01/25/2017 10:36	  *** End Of Inserted Radiology Report ***	  -   Medications:   Home Medications   Description	SIG	Indication	Special Instructions	Last Taken	  acetaminophen 325 mg tablet	650 MG Oral EVERY 6 HRS AS NEEDED PRN				  Advair Diskus 250 mcg-50 mcg/dose powder for inhalation	1 puff(s) By Inhalation EVERY 12 HOURS				  aspirin 81 mg chewable tablet	1 tablet(s) Orally DAILY				  atorvastatin 40 mg tablet	1 tablet(s) Orally AT BEDTIME				  divalproex  mg tablet,extended release 24 hr	1,000 MG Oral AT BEDTIME				  duloxetine 60 mg capsule,delayed release	1 capsule(s) Orally DAILY				  famotidine 20 mg tablet	1 tablet(s) Orally DAILY				  gabapentin 400 mg capsule	2 capsule(s) Orally EVERY 8 HOURS				  methylphenidate 5 mg tablet	5 MG Oral EVERY 12 HOURS				  metoprolol tartrate 25 mg tablet	0.5 tablet(s) Orally EVERY 12 HOURS				  morphine ER 15 mg tablet,extended release	1 tablet(s) Orally EVERY 12 HOURS				  nicotine 21 mg/24 hr daily transdermal patch	1 patch(es) Transdermally DAILY				  prednisone 10 mg tablet	1 tablet(s) Orally DAILY				  ProAir HFA 90 mcg/actuation aerosol inhaler	2 puff(s) By Inhalation EVERY 4 HOURS PRN	Emphysema, unspecified	FOR SHORTNESS OF BREATH/WHEEZE		  quetiapine 50 mg tablet	1 tablet(s) Orally AT BEDTIME				  Inpatient Medications   Description	Dose	Route	Freq/Rate	Last Given	  					  Active					  DIVALPROEX-ER (DEPAKOTE ER FORM)	500 MG=1 TB24	ORAL	Q12H	01/26/2017 10:29	  + DIVALPROEX-ER (DEPAKOTE ER FORM)	250 MG=1 TB24	ORAL	Q12H	01/26/2017 10:29	  ACETAMINOPHEN (TYLENOL FORM)	650 MG=(2 x 325 MG TAB)	ORAL	Q6HP		  ALUM - MAG HYDROXIDE-SIMETH (MAALOX PLUS FORM)	30 ML SUSP	ORAL	Q6HP		  ASPIRIN*	81 MG=1 CHEW	ORAL	DAILY	01/26/2017 10:29	  ATORVASTATIN CALCIUM (LIPITOR FORM)	40 MG=1 TAB	ORAL	QHS	01/25/2017 21:07	  CLOPIDOGREL BISULFATE (PLAVIX FORM)	75 MG=1 TAB	ORAL	DAILY	01/26/2017 10:29	  DOCUSATE SODIUM (COLACE FORM)	200 MG=(2 x 100 MG CAP)	ORAL	HSP		  FAMOTIDINE (PEPCID FORM)	20 MG=1 TAB	ORAL	DAILY	01/26/2017 10:29	  GABAPENTIN (NEURONTIN FORM)	800 MG=(2 x 400 MG CAP)	ORAL	Q8H	01/26/2017 13:47	  HEPARIN PORCINE (HEPARIN(PORCINE))	5,000 UNIT=1 ML VIAL	SUBCUT	Q8H	01/26/2017 13:47	  METOCLOPRAMIDE HCL (REGLAN FORM)*	5 MG=(0.5 x 10 MG TAB)	ORAL	Q12H	01/26/2017 10:29	  METOPROLOL- XL(TOPROL-XL) (TOPROL XL FORM)	12.5 MG=(0.5 x 25 MG TB24)	ORAL	DAILY	01/26/2017 10:29	  MORPHINE SULFATE (MORPHINE)	2 MG=0.2 ML SOLN (7 Days)	IV	Q3HP	01/26/2017 13:47	  MORPHINE SULFATE (MS CONTIN FORM)	15 MG=1 TBSR (7 Days)	ORAL	Q12H	01/26/2017 10:24	  NITROGLYCERIN 2% OINTMENT (NITRO-BID FORM)*	0.5 INCH OINT	TOPL	Q6H	01/26/2017 12:46	  Ondansetron IV[ZOFRAN] (ZOFRAN FORM)	4 MG=2 ML SOLN	IV	Q6HP		  predniSONE Tablet (DELTASONE FORM)	10 MG=1 TAB	ORAL	DAILY	01/26/2017 12:46	  QUEtiapine - XR[SEROquel - XR] (SEROQUEL XR FORM)	150 MG=(3 x 50 MG TB24)	ORAL	QHS	01/25/2017 21:07	  SENNA TABLETS (SENOKOT FORM)	2 TAB	ORAL	HSP		  SODIUM CHLORIDE FLUSH SOLUTION (NORMAL SALINE LOCK FLUSH)	5 ML SYRG	IV	PRN		  SODIUM CHLORIDE FLUSH SOLUTION (NORMAL SALINE LOCK FLUSH)	5 ML SYRG	IV	Q12H	01/26/2017 10:29	  Assessment and Plan: :: 46/M admitted with   1) Chest Pain: Angina   MI ruled out   transferred to med floor   serial cardiac enz negative   cont asa, plavix BB, statins, NTG, O2, morphine prn   cardio eval appreciated: cardiac cath negative for ongoing chest pain   quit tobacco abuse     2) Tobacco abuse:   advised quitting   on nicotine patch     3) Chronic back pain:   cont morphine     4) Bipolar:   cont home meds   Dispo: Pt was discharged yesterday but could not go as he has no home. he is saying in a motel. social service would be able to get him into that on Monday.   POC discussed with pt,   total time spent 45 min   DVT Prophylaxis: :: Heparin SQ   Visit Information: Time Spent: 45 min   --: I expect patient to be discharged in: 2 days

## 2017-01-29 RX ADMIN — MORPHINE SULFATE 15 MILLIGRAM(S): 50 CAPSULE, EXTENDED RELEASE ORAL at 15:32

## 2017-01-29 RX ADMIN — HYDROMORPHONE HYDROCHLORIDE 1 MILLIGRAM(S): 2 INJECTION INTRAMUSCULAR; INTRAVENOUS; SUBCUTANEOUS at 02:04

## 2017-01-29 RX ADMIN — MORPHINE SULFATE 15 MILLIGRAM(S): 50 CAPSULE, EXTENDED RELEASE ORAL at 18:00

## 2017-01-29 RX ADMIN — MORPHINE SULFATE 15 MILLIGRAM(S): 50 CAPSULE, EXTENDED RELEASE ORAL at 05:12

## 2017-01-29 RX ADMIN — HYDROMORPHONE HYDROCHLORIDE 1 MILLIGRAM(S): 2 INJECTION INTRAMUSCULAR; INTRAVENOUS; SUBCUTANEOUS at 03:02

## 2017-01-29 RX ADMIN — HYDROMORPHONE HYDROCHLORIDE 1 MILLIGRAM(S): 2 INJECTION INTRAMUSCULAR; INTRAVENOUS; SUBCUTANEOUS at 15:39

## 2017-01-29 RX ADMIN — SODIUM CHLORIDE 5 MILLILITER(S): 9 INJECTION INTRAMUSCULAR; INTRAVENOUS; SUBCUTANEOUS at 05:15

## 2017-01-29 RX ADMIN — GABAPENTIN 800 MILLIGRAM(S): 400 CAPSULE ORAL at 22:06

## 2017-01-29 RX ADMIN — CLOPIDOGREL BISULFATE 75 MILLIGRAM(S): 75 TABLET, FILM COATED ORAL at 11:18

## 2017-01-29 RX ADMIN — Medication 12.5 MILLIGRAM(S): at 05:11

## 2017-01-29 RX ADMIN — Medication 81 MILLIGRAM(S): at 11:18

## 2017-01-29 RX ADMIN — HYDROMORPHONE HYDROCHLORIDE 1 MILLIGRAM(S): 2 INJECTION INTRAMUSCULAR; INTRAVENOUS; SUBCUTANEOUS at 07:58

## 2017-01-29 RX ADMIN — DIVALPROEX SODIUM 500 MILLIGRAM(S): 500 TABLET, DELAYED RELEASE ORAL at 05:08

## 2017-01-29 RX ADMIN — HYDROMORPHONE HYDROCHLORIDE 1 MILLIGRAM(S): 2 INJECTION INTRAMUSCULAR; INTRAVENOUS; SUBCUTANEOUS at 12:14

## 2017-01-29 RX ADMIN — HYDROMORPHONE HYDROCHLORIDE 1 MILLIGRAM(S): 2 INJECTION INTRAMUSCULAR; INTRAVENOUS; SUBCUTANEOUS at 08:10

## 2017-01-29 RX ADMIN — Medication 5 MILLIGRAM(S): at 17:59

## 2017-01-29 RX ADMIN — Medication 5 MILLIGRAM(S): at 11:24

## 2017-01-29 RX ADMIN — HYDROMORPHONE HYDROCHLORIDE 1 MILLIGRAM(S): 2 INJECTION INTRAMUSCULAR; INTRAVENOUS; SUBCUTANEOUS at 20:20

## 2017-01-29 RX ADMIN — Medication 1 PATCH: at 11:18

## 2017-01-29 RX ADMIN — DIVALPROEX SODIUM 500 MILLIGRAM(S): 500 TABLET, DELAYED RELEASE ORAL at 17:59

## 2017-01-29 RX ADMIN — FAMOTIDINE 20 MILLIGRAM(S): 10 INJECTION INTRAVENOUS at 11:19

## 2017-01-29 RX ADMIN — Medication 1 PATCH: at 15:32

## 2017-01-29 RX ADMIN — MORPHINE SULFATE 15 MILLIGRAM(S): 50 CAPSULE, EXTENDED RELEASE ORAL at 19:27

## 2017-01-29 RX ADMIN — HYDROMORPHONE HYDROCHLORIDE 1 MILLIGRAM(S): 2 INJECTION INTRAMUSCULAR; INTRAVENOUS; SUBCUTANEOUS at 16:27

## 2017-01-29 RX ADMIN — GABAPENTIN 800 MILLIGRAM(S): 400 CAPSULE ORAL at 15:35

## 2017-01-29 RX ADMIN — DIVALPROEX SODIUM 250 MILLIGRAM(S): 500 TABLET, DELAYED RELEASE ORAL at 05:08

## 2017-01-29 RX ADMIN — Medication 5 MILLIGRAM(S): at 05:10

## 2017-01-29 RX ADMIN — QUETIAPINE FUMARATE 150 MILLIGRAM(S): 200 TABLET, FILM COATED ORAL at 22:06

## 2017-01-29 RX ADMIN — HYDROMORPHONE HYDROCHLORIDE 1 MILLIGRAM(S): 2 INJECTION INTRAMUSCULAR; INTRAVENOUS; SUBCUTANEOUS at 02:43

## 2017-01-29 RX ADMIN — DIVALPROEX SODIUM 250 MILLIGRAM(S): 500 TABLET, DELAYED RELEASE ORAL at 18:00

## 2017-01-29 RX ADMIN — GABAPENTIN 800 MILLIGRAM(S): 400 CAPSULE ORAL at 05:09

## 2017-01-29 RX ADMIN — Medication 10 MILLIGRAM(S): at 05:14

## 2017-01-29 RX ADMIN — SODIUM CHLORIDE 5 MILLILITER(S): 9 INJECTION INTRAMUSCULAR; INTRAVENOUS; SUBCUTANEOUS at 17:32

## 2017-01-29 NOTE — PROGRESS NOTE ADULT - ASSESSMENT
Assessment and Plan: :: 46/M admitted with   1) Chest Pain: Angina   MI ruled out   transferred to med floor   serial cardiac enz negative   cont asa, plavix BB, statins, NTG, O2, morphine prn   cardio eval appreciated: cardiac cath negative for ongoing chest pain   quit tobacco abuse     2) Tobacco abuse:   advised quitting   on nicotine patch     3) Chronic back pain:   cont morphine     4) Bipolar:   cont home meds   Dispo: Pt was discharged yesterday but could not go as he has no home. he is saying in a motel. social service would be able to get him into that on Monday.   POC discussed with pt,   total time spent 45 min   DVT Prophylaxis: :: Heparin SQ   Visit Information: Time Spent: 45 min   --: I expect patient to be discharged in: 1 days

## 2017-01-29 NOTE — PROGRESS NOTE ADULT - SUBJECTIVE AND OBJECTIVE BOX
feels better       Vital Signs Last 24 Hrs  T(C): 36.9, Max: 37.1 (01-28 @ 15:51)  T(F): 98.5, Max: 98.7 (01-28 @ 15:51)  HR: 62 (62 - 79)  BP: 134/62 (130/77 - 134/62)  BP(mean): --  ABP: --  ABP(mean): --  RR: 16 (16 - 16)  SpO2: 99% (97% - 99%)      MEDICATIONS  (STANDING):  atorvastatin 40milliGRAM(s) Oral at bedtime  clopidogrel Tablet 75milliGRAM(s) Oral daily  diVALproex ER 500milliGRAM(s) Oral every 12 hours  diVALproex ER 250milliGRAM(s) Oral every 12 hours  famotidine    Tablet 20milliGRAM(s) Oral daily  gabapentin 800milliGRAM(s) Oral every 8 hours  heparin  Injectable 5000Unit(s) SubCutaneous every 8 hours  methylphenidate 5milliGRAM(s) Oral daily  metoclopramide 5milliGRAM(s) Oral every 12 hours  morphine ER Tablet 15milliGRAM(s) Oral every 12 hours  nicotine - 21 mG/24Hr(s) Patch 1patch Transdermal daily  nitroglycerin    2% Ointment 0.5Inch(s) Transdermal every 6 hours  predniSONE   Tablet 10milliGRAM(s) Oral daily  QUEtiapine XR 150milliGRAM(s) Oral at bedtime  sodium chloride 0.9% lock flush 5milliLiter(s) IV Push every 12 hours  docusate sodium 200milliGRAM(s) Oral at bedtime  aspirin  chewable 81milliGRAM(s) Oral daily  metoprolol 12.5milliGRAM(s) Oral daily    MEDICATIONS  (PRN):  acetaminophen   Tablet. 650milliGRAM(s) Oral every 6 hours PRN Mild Pain (1 - 3)  aluminum hydroxide/magnesium hydroxide/simethicone Suspension 30milliLiter(s) Oral every 6 hours PRN Dyspepsia  morphine  - Injectable 2milliGRAM(s) IV Push every 3 hours PRN Moderate Pain (4 - 6)  ondansetron Injectable 4milliGRAM(s) IV Push every 6 hours PRN Nausea and/or Vomiting  senna 2Tablet(s) Oral at bedtime PRN Constipation  HYDROmorphone  Injectable 1milliGRAM(s) IV Push every 4 hours PRN Severe Pain (7 - 10)

## 2017-01-30 VITALS — WEIGHT: 144.84 LBS

## 2017-01-30 DIAGNOSIS — Z95.5 PRESENCE OF CORONARY ANGIOPLASTY IMPLANT AND GRAFT: ICD-10-CM

## 2017-01-30 RX ORDER — MORPHINE SULFATE 50 MG/1
15 CAPSULE, EXTENDED RELEASE ORAL
Qty: 0 | Refills: 0 | COMMUNITY

## 2017-01-30 RX ORDER — MORPHINE SULFATE 50 MG/1
0 CAPSULE, EXTENDED RELEASE ORAL
Qty: 0 | Refills: 0 | COMMUNITY

## 2017-01-30 RX ORDER — FAMOTIDINE 10 MG/ML
0 INJECTION INTRAVENOUS
Qty: 0 | Refills: 0 | COMMUNITY

## 2017-01-30 RX ORDER — LIDOCAINE 4 G/100G
1 CREAM TOPICAL
Qty: 5 | Refills: 0 | OUTPATIENT
Start: 2017-01-30 | End: 2017-02-04

## 2017-01-30 RX ORDER — FAMOTIDINE 10 MG/ML
1 INJECTION INTRAVENOUS
Qty: 0 | Refills: 0 | COMMUNITY

## 2017-01-30 RX ORDER — METHYLPHENIDATE HCL 5 MG
1 TABLET ORAL
Qty: 0 | Refills: 0 | COMMUNITY
Start: 2017-01-30

## 2017-01-30 RX ORDER — HYDROMORPHONE HYDROCHLORIDE 2 MG/ML
1 INJECTION INTRAMUSCULAR; INTRAVENOUS; SUBCUTANEOUS
Qty: 10 | Refills: 0 | OUTPATIENT
Start: 2017-01-30 | End: 2017-02-04

## 2017-01-30 RX ORDER — QUETIAPINE FUMARATE 200 MG/1
1 TABLET, FILM COATED ORAL
Qty: 0 | Refills: 0 | COMMUNITY
Start: 2017-01-30

## 2017-01-30 RX ORDER — GABAPENTIN 400 MG/1
400 CAPSULE ORAL
Qty: 0 | Refills: 0 | COMMUNITY

## 2017-01-30 RX ORDER — PREDNISOLONE 5 MG
0 TABLET ORAL
Qty: 0 | Refills: 0 | COMMUNITY

## 2017-01-30 RX ORDER — DIVALPROEX SODIUM 500 MG/1
750 TABLET, DELAYED RELEASE ORAL
Qty: 0 | Refills: 0 | COMMUNITY

## 2017-01-30 RX ADMIN — Medication 1 PATCH: at 11:24

## 2017-01-30 RX ADMIN — DIVALPROEX SODIUM 250 MILLIGRAM(S): 500 TABLET, DELAYED RELEASE ORAL at 05:47

## 2017-01-30 RX ADMIN — MORPHINE SULFATE 15 MILLIGRAM(S): 50 CAPSULE, EXTENDED RELEASE ORAL at 05:47

## 2017-01-30 RX ADMIN — Medication 12.5 MILLIGRAM(S): at 05:50

## 2017-01-30 RX ADMIN — Medication 5 MILLIGRAM(S): at 11:30

## 2017-01-30 RX ADMIN — HYDROMORPHONE HYDROCHLORIDE 1 MILLIGRAM(S): 2 INJECTION INTRAMUSCULAR; INTRAVENOUS; SUBCUTANEOUS at 12:57

## 2017-01-30 RX ADMIN — HYDROMORPHONE HYDROCHLORIDE 1 MILLIGRAM(S): 2 INJECTION INTRAMUSCULAR; INTRAVENOUS; SUBCUTANEOUS at 04:18

## 2017-01-30 RX ADMIN — HYDROMORPHONE HYDROCHLORIDE 1 MILLIGRAM(S): 2 INJECTION INTRAMUSCULAR; INTRAVENOUS; SUBCUTANEOUS at 02:07

## 2017-01-30 RX ADMIN — HYDROMORPHONE HYDROCHLORIDE 1 MILLIGRAM(S): 2 INJECTION INTRAMUSCULAR; INTRAVENOUS; SUBCUTANEOUS at 00:17

## 2017-01-30 RX ADMIN — Medication 81 MILLIGRAM(S): at 11:20

## 2017-01-30 RX ADMIN — HYDROMORPHONE HYDROCHLORIDE 1 MILLIGRAM(S): 2 INJECTION INTRAMUSCULAR; INTRAVENOUS; SUBCUTANEOUS at 12:56

## 2017-01-30 RX ADMIN — HYDROMORPHONE HYDROCHLORIDE 1 MILLIGRAM(S): 2 INJECTION INTRAMUSCULAR; INTRAVENOUS; SUBCUTANEOUS at 08:52

## 2017-01-30 RX ADMIN — HYDROMORPHONE HYDROCHLORIDE 1 MILLIGRAM(S): 2 INJECTION INTRAMUSCULAR; INTRAVENOUS; SUBCUTANEOUS at 05:30

## 2017-01-30 RX ADMIN — Medication 1 PATCH: at 11:30

## 2017-01-30 RX ADMIN — DIVALPROEX SODIUM 500 MILLIGRAM(S): 500 TABLET, DELAYED RELEASE ORAL at 05:47

## 2017-01-30 RX ADMIN — GABAPENTIN 800 MILLIGRAM(S): 400 CAPSULE ORAL at 05:47

## 2017-01-30 RX ADMIN — Medication 5 MILLIGRAM(S): at 05:49

## 2017-01-30 RX ADMIN — HYDROMORPHONE HYDROCHLORIDE 1 MILLIGRAM(S): 2 INJECTION INTRAMUSCULAR; INTRAVENOUS; SUBCUTANEOUS at 12:54

## 2017-01-30 RX ADMIN — Medication 10 MILLIGRAM(S): at 05:47

## 2017-01-30 RX ADMIN — SODIUM CHLORIDE 5 MILLILITER(S): 9 INJECTION INTRAMUSCULAR; INTRAVENOUS; SUBCUTANEOUS at 05:40

## 2017-01-30 NOTE — DISCHARGE NOTE ADULT - CARE PLAN
Principal Discharge DX:	Coronary artery disease with other form of angina pectoris  Goal:	negative cath  Instructions for follow-up, activity and diet:	cardiac diet

## 2017-01-30 NOTE — DISCHARGE NOTE ADULT - PATIENT PORTAL LINK FT
“You can access the [facility] Patient Portal, offered by [facility], by registering with the following website: [email or web address]”

## 2017-01-30 NOTE — DISCHARGE NOTE ADULT - MEDICATION SUMMARY - MEDICATIONS TO TAKE
I will START or STAY ON the medications listed below when I get home from the hospital:    predniSONE 10 mg oral tablet  -- 1 tab(s) by mouth once a day  -- Indication: For epmhy    morphine 15 mg oral capsule  --  by mouth 2 times a day  -- Indication: For pain    Dilaudid 2 mg oral tablet  -- 1 tab(s) by mouth 2 times a day -for severe pain MDD:4 mg/day max  -- Caution federal law prohibits the transfer of this drug to any person other  than the person for whom it was prescribed.  May cause drowsiness.  Alcohol may intensify this effect.  Use care when operating dangerous machinery.  This prescription cannot be refilled.  Using more of this medication than prescribed may cause serious breathing problems.    -- Indication: For pain    acetaminophen 325 mg oral tablet  --  by mouth every 6 hours, As Needed  -- Indication: For pain    aspirin 81 mg oral tablet  -- 1 tab(s) by mouth once a day  -- Indication: For Cad    divalproex sodium extended release  -- 750 milligram(s) by mouth 2 times a day  -- Indication: For bipolar    gabapentin 800 mg oral tablet  -- 1 tab(s) by mouth 3 times a day  -- Indication: For bipolar    atorvastatin 40 mg oral tablet  -- 1 tab(s) by mouth once a day (at bedtime)  -- Indication: For hld    Plavix 75 mg oral tablet  -- 1 tab(s) by mouth once a day  -- Indication: For Cad    QUEtiapine 150 mg oral tablet, extended release  -- 1 tab(s) by mouth once a day (at bedtime)  -- Indication: For bipolar    metoprolol tartrate 25 mg oral tablet  -- 1 tab(s) by mouth 2 times a day  -- Indication: For htn    Advair Diskus 250 mcg-50 mcg inhalation powder  -- 1 puff(s) inhaled 2 times a day  -- Indication: For emphy    ProAir HFA 90 mcg/inh inhalation aerosol  -- 2 puff(s) inhaled 4 times a day  -- Indication: For emphy    methylphenidate 5 mg oral tablet  -- 1 tab(s) by mouth once a day  -- Indication: For bipolar    Lidoderm 5% topical film  -- Apply on skin to affected area once a day, As Needed -for severe pain  -- For external use only.  Remove old patch prior to applying a new patch.    -- Indication: For pain    Pepcid 20 mg oral tablet  -- 1 tab(s) by mouth 2 times a day  -- Indication: For gerd    nicotine 21 mg/24 hr transdermal film, extended release  -- 1 each by transdermal patch once a day  -- Indication: For abuse

## 2017-01-31 ENCOUNTER — EMERGENCY (EMERGENCY)
Facility: HOSPITAL | Age: 47
LOS: 1 days | End: 2017-01-31
Admitting: EMERGENCY MEDICINE
Payer: MEDICAID

## 2017-01-31 DIAGNOSIS — L02.419 CUTANEOUS ABSCESS OF LIMB, UNSPECIFIED: Chronic | ICD-10-CM

## 2017-01-31 LAB
ALBUMIN SERPL ELPH-MCNC: 3.4 G/DL — SIGNIFICANT CHANGE UP (ref 3.3–5)
ALP SERPL-CCNC: 102 U/L — SIGNIFICANT CHANGE UP (ref 30–120)
ALT FLD-CCNC: 604 U/L DA — HIGH (ref 10–60)
ANION GAP SERPL CALC-SCNC: 8 MMOL/L — SIGNIFICANT CHANGE UP (ref 5–17)
APTT BLD: 26.5 SEC — LOW (ref 27.5–37.4)
AST SERPL-CCNC: 346 U/L — HIGH (ref 10–40)
BASOPHILS # BLD AUTO: 0.1 K/UL — SIGNIFICANT CHANGE UP (ref 0–0.2)
BASOPHILS NFR BLD AUTO: 1 % — SIGNIFICANT CHANGE UP (ref 0–2)
BILIRUB SERPL-MCNC: 0.6 MG/DL — SIGNIFICANT CHANGE UP (ref 0.2–1.2)
BUN SERPL-MCNC: 12 MG/DL — SIGNIFICANT CHANGE UP (ref 7–23)
CALCIUM SERPL-MCNC: 9 MG/DL — SIGNIFICANT CHANGE UP (ref 8.4–10.5)
CHLORIDE SERPL-SCNC: 107 MMOL/L — SIGNIFICANT CHANGE UP (ref 96–108)
CK MB CFR SERPL CALC: 1.1 NG/ML — SIGNIFICANT CHANGE UP (ref 0–3.6)
CK SERPL-CCNC: 89 U/L — SIGNIFICANT CHANGE UP (ref 39–308)
CO2 SERPL-SCNC: 29 MMOL/L — SIGNIFICANT CHANGE UP (ref 22–31)
CREAT SERPL-MCNC: 0.76 MG/DL — SIGNIFICANT CHANGE UP (ref 0.5–1.3)
EOSINOPHIL # BLD AUTO: 0.3 K/UL — SIGNIFICANT CHANGE UP (ref 0–0.5)
EOSINOPHIL NFR BLD AUTO: 3.3 % — SIGNIFICANT CHANGE UP (ref 0–6)
GLUCOSE SERPL-MCNC: 93 MG/DL — SIGNIFICANT CHANGE UP (ref 70–99)
HCT VFR BLD CALC: 37.8 % — LOW (ref 39–50)
HGB BLD-MCNC: 12.5 G/DL — LOW (ref 13–17)
INR BLD: 1.27 RATIO — HIGH (ref 0.88–1.16)
LYMPHOCYTES # BLD AUTO: 2.4 K/UL — SIGNIFICANT CHANGE UP (ref 1–3.3)
LYMPHOCYTES # BLD AUTO: 28.8 % — SIGNIFICANT CHANGE UP (ref 13–44)
MCHC RBC-ENTMCNC: 31.2 PG — SIGNIFICANT CHANGE UP (ref 27–34)
MCHC RBC-ENTMCNC: 33 GM/DL — SIGNIFICANT CHANGE UP (ref 32–36)
MCV RBC AUTO: 94.4 FL — SIGNIFICANT CHANGE UP (ref 80–100)
MONOCYTES # BLD AUTO: 1.3 K/UL — HIGH (ref 0–0.9)
MONOCYTES NFR BLD AUTO: 16.2 % — HIGH (ref 2–14)
NEUTROPHILS # BLD AUTO: 4.2 K/UL — SIGNIFICANT CHANGE UP (ref 1.8–7.4)
NEUTROPHILS NFR BLD AUTO: 50.6 % — SIGNIFICANT CHANGE UP (ref 43–77)
PLATELET # BLD AUTO: 127 K/UL — LOW (ref 150–400)
POTASSIUM SERPL-MCNC: 4.1 MMOL/L — SIGNIFICANT CHANGE UP (ref 3.5–5.3)
POTASSIUM SERPL-SCNC: 4.1 MMOL/L — SIGNIFICANT CHANGE UP (ref 3.5–5.3)
PROT SERPL-MCNC: 6.9 G/DL — SIGNIFICANT CHANGE UP (ref 6–8.3)
PROTHROM AB SERPL-ACNC: 14.3 SEC — HIGH (ref 10–13.1)
RBC # BLD: 4 M/UL — LOW (ref 4.2–5.8)
RBC # FLD: 12.8 % — SIGNIFICANT CHANGE UP (ref 10.3–14.5)
SODIUM SERPL-SCNC: 144 MMOL/L — SIGNIFICANT CHANGE UP (ref 135–145)
TROPONIN I SERPL-MCNC: 0 NG/ML — LOW (ref 0.02–0.06)
WBC # BLD: 8.3 K/UL — SIGNIFICANT CHANGE UP (ref 3.8–10.5)
WBC # FLD AUTO: 8.3 K/UL — SIGNIFICANT CHANGE UP (ref 3.8–10.5)

## 2017-01-31 PROCEDURE — 82550 ASSAY OF CK (CPK): CPT

## 2017-01-31 PROCEDURE — 99284 EMERGENCY DEPT VISIT MOD MDM: CPT

## 2017-01-31 PROCEDURE — 71020: CPT | Mod: 26

## 2017-01-31 PROCEDURE — 71046 X-RAY EXAM CHEST 2 VIEWS: CPT

## 2017-01-31 PROCEDURE — 85730 THROMBOPLASTIN TIME PARTIAL: CPT

## 2017-01-31 PROCEDURE — 85027 COMPLETE CBC AUTOMATED: CPT

## 2017-01-31 PROCEDURE — 85610 PROTHROMBIN TIME: CPT

## 2017-01-31 PROCEDURE — 93005 ELECTROCARDIOGRAM TRACING: CPT

## 2017-01-31 PROCEDURE — 82553 CREATINE MB FRACTION: CPT

## 2017-01-31 PROCEDURE — 80053 COMPREHEN METABOLIC PANEL: CPT

## 2017-01-31 PROCEDURE — 84484 ASSAY OF TROPONIN QUANT: CPT

## 2017-01-31 PROCEDURE — 96374 THER/PROPH/DIAG INJ IV PUSH: CPT

## 2017-01-31 PROCEDURE — 99284 EMERGENCY DEPT VISIT MOD MDM: CPT | Mod: 25

## 2017-01-31 PROCEDURE — 93010 ELECTROCARDIOGRAM REPORT: CPT

## 2017-02-02 DIAGNOSIS — F31.9 BIPOLAR DISORDER, UNSPECIFIED: ICD-10-CM

## 2017-02-02 DIAGNOSIS — I25.119 ATHEROSCLEROTIC HEART DISEASE OF NATIVE CORONARY ARTERY WITH UNSPECIFIED ANGINA PECTORIS: ICD-10-CM

## 2017-02-02 DIAGNOSIS — I34.1 NONRHEUMATIC MITRAL (VALVE) PROLAPSE: ICD-10-CM

## 2017-02-02 DIAGNOSIS — Z72.0 TOBACCO USE: ICD-10-CM

## 2017-02-02 DIAGNOSIS — G62.9 POLYNEUROPATHY, UNSPECIFIED: ICD-10-CM

## 2017-02-02 DIAGNOSIS — I47.1 SUPRAVENTRICULAR TACHYCARDIA: ICD-10-CM

## 2017-02-02 DIAGNOSIS — M54.9 DORSALGIA, UNSPECIFIED: ICD-10-CM

## 2017-02-07 ENCOUNTER — INPATIENT (INPATIENT)
Facility: HOSPITAL | Age: 47
LOS: 2 days | Discharge: HOME | End: 2017-02-10
Attending: FAMILY MEDICINE | Admitting: FAMILY MEDICINE
Payer: MEDICAID

## 2017-02-07 VITALS
OXYGEN SATURATION: 100 % | SYSTOLIC BLOOD PRESSURE: 135 MMHG | WEIGHT: 169.98 LBS | HEART RATE: 86 BPM | DIASTOLIC BLOOD PRESSURE: 85 MMHG

## 2017-02-07 DIAGNOSIS — G89.4 CHRONIC PAIN SYNDROME: ICD-10-CM

## 2017-02-07 DIAGNOSIS — F31.9 BIPOLAR DISORDER, UNSPECIFIED: ICD-10-CM

## 2017-02-07 DIAGNOSIS — L02.419 CUTANEOUS ABSCESS OF LIMB, UNSPECIFIED: Chronic | ICD-10-CM

## 2017-02-07 DIAGNOSIS — K92.0 HEMATEMESIS: ICD-10-CM

## 2017-02-07 DIAGNOSIS — I25.10 ATHEROSCLEROTIC HEART DISEASE OF NATIVE CORONARY ARTERY WITHOUT ANGINA PECTORIS: ICD-10-CM

## 2017-02-07 DIAGNOSIS — K75.9 INFLAMMATORY LIVER DISEASE, UNSPECIFIED: ICD-10-CM

## 2017-02-07 DIAGNOSIS — J41.0 SIMPLE CHRONIC BRONCHITIS: ICD-10-CM

## 2017-02-07 LAB
ALBUMIN SERPL ELPH-MCNC: 3.9 G/DL — SIGNIFICANT CHANGE UP (ref 3.3–5)
ALP SERPL-CCNC: 281 U/L — HIGH (ref 40–120)
ALT FLD-CCNC: 940 U/L — HIGH (ref 12–78)
ANION GAP SERPL CALC-SCNC: 8 MMOL/L — SIGNIFICANT CHANGE UP (ref 5–17)
ANISOCYTOSIS BLD QL: SLIGHT — SIGNIFICANT CHANGE UP
APPEARANCE UR: CLEAR — SIGNIFICANT CHANGE UP
APTT BLD: 37.6 SEC — HIGH (ref 27.5–37.4)
AST SERPL-CCNC: 616 U/L — HIGH (ref 15–37)
BASOPHILS # BLD AUTO: 0 K/UL — SIGNIFICANT CHANGE UP (ref 0–0.2)
BILIRUB SERPL-MCNC: 3 MG/DL — HIGH (ref 0.2–1.2)
BILIRUB UR-MCNC: NEGATIVE — SIGNIFICANT CHANGE UP
BUN SERPL-MCNC: 10 MG/DL — SIGNIFICANT CHANGE UP (ref 7–23)
CALCIUM SERPL-MCNC: 9.3 MG/DL — SIGNIFICANT CHANGE UP (ref 8.5–10.1)
CHLORIDE SERPL-SCNC: 105 MMOL/L — SIGNIFICANT CHANGE UP (ref 96–108)
CO2 SERPL-SCNC: 28 MMOL/L — SIGNIFICANT CHANGE UP (ref 22–31)
COLOR SPEC: YELLOW — SIGNIFICANT CHANGE UP
CREAT SERPL-MCNC: 0.8 MG/DL — SIGNIFICANT CHANGE UP (ref 0.5–1.3)
DIFF PNL FLD: NEGATIVE — SIGNIFICANT CHANGE UP
EOSINOPHIL # BLD AUTO: 0 K/UL — SIGNIFICANT CHANGE UP (ref 0–0.5)
GLUCOSE SERPL-MCNC: 107 MG/DL — HIGH (ref 70–99)
GLUCOSE UR QL: NEGATIVE MG/DL — SIGNIFICANT CHANGE UP
HCT VFR BLD CALC: 45.1 % — SIGNIFICANT CHANGE UP (ref 39–50)
HGB BLD-MCNC: 14.6 G/DL — SIGNIFICANT CHANGE UP (ref 13–17)
INR BLD: 1.36 RATIO — HIGH (ref 0.88–1.16)
KETONES UR-MCNC: NEGATIVE — SIGNIFICANT CHANGE UP
LEUKOCYTE ESTERASE UR-ACNC: NEGATIVE — SIGNIFICANT CHANGE UP
LG PLATELETS BLD QL AUTO: SLIGHT — SIGNIFICANT CHANGE UP
LYMPHOCYTES # BLD AUTO: 22 % — SIGNIFICANT CHANGE UP (ref 13–44)
LYMPHOCYTES # BLD AUTO: 3.1 K/UL — SIGNIFICANT CHANGE UP (ref 1–3.3)
MANUAL SMEAR VERIFICATION: SIGNIFICANT CHANGE UP
MCHC RBC-ENTMCNC: 30 PG — SIGNIFICANT CHANGE UP (ref 27–34)
MCHC RBC-ENTMCNC: 32.4 GM/DL — SIGNIFICANT CHANGE UP (ref 32–36)
MCV RBC AUTO: 92.4 FL — SIGNIFICANT CHANGE UP (ref 80–100)
MONOCYTES # BLD AUTO: 1.8 K/UL — HIGH (ref 0–0.9)
MONOCYTES NFR BLD AUTO: 21 % — HIGH (ref 2–14)
NEUTROPHILS # BLD AUTO: 4.9 K/UL — SIGNIFICANT CHANGE UP (ref 1.8–7.4)
NEUTROPHILS NFR BLD AUTO: 48 % — SIGNIFICANT CHANGE UP (ref 43–77)
NEUTS BAND # BLD: 1 % — SIGNIFICANT CHANGE UP (ref 0–8)
NITRITE UR-MCNC: NEGATIVE — SIGNIFICANT CHANGE UP
PH UR: 7 — SIGNIFICANT CHANGE UP (ref 4.8–8)
PLAT MORPH BLD: NORMAL — SIGNIFICANT CHANGE UP
PLATELET # BLD AUTO: 145 K/UL — LOW (ref 150–400)
POTASSIUM SERPL-MCNC: 3.5 MMOL/L — SIGNIFICANT CHANGE UP (ref 3.5–5.3)
POTASSIUM SERPL-SCNC: 3.5 MMOL/L — SIGNIFICANT CHANGE UP (ref 3.5–5.3)
PROT SERPL-MCNC: 8.4 GM/DL — HIGH (ref 6–8.3)
PROT UR-MCNC: NEGATIVE MG/DL — SIGNIFICANT CHANGE UP
PROTHROM AB SERPL-ACNC: 15 SEC — HIGH (ref 10–13.1)
RBC # BLD: 4.88 M/UL — SIGNIFICANT CHANGE UP (ref 4.2–5.8)
RBC # FLD: 14 % — SIGNIFICANT CHANGE UP (ref 10.3–14.5)
RBC BLD AUTO: (no result)
SODIUM SERPL-SCNC: 141 MMOL/L — SIGNIFICANT CHANGE UP (ref 135–145)
SP GR SPEC: 1 — LOW (ref 1.01–1.02)
UROBILINOGEN FLD QL: 4 MG/DL
VARIANT LYMPHS # BLD: 8 % — HIGH (ref 0–6)
WBC # BLD: 9.4 K/UL — SIGNIFICANT CHANGE UP (ref 3.8–10.5)
WBC # FLD AUTO: 9.4 K/UL — SIGNIFICANT CHANGE UP (ref 3.8–10.5)

## 2017-02-07 PROCEDURE — 76705 ECHO EXAM OF ABDOMEN: CPT | Mod: 26

## 2017-02-07 PROCEDURE — 99285 EMERGENCY DEPT VISIT HI MDM: CPT

## 2017-02-07 RX ORDER — QUETIAPINE FUMARATE 200 MG/1
150 TABLET, FILM COATED ORAL AT BEDTIME
Qty: 0 | Refills: 0 | Status: DISCONTINUED | OUTPATIENT
Start: 2017-02-07 | End: 2017-02-10

## 2017-02-07 RX ORDER — DULOXETINE HYDROCHLORIDE 30 MG/1
60 CAPSULE, DELAYED RELEASE ORAL DAILY
Qty: 0 | Refills: 0 | Status: DISCONTINUED | OUTPATIENT
Start: 2017-02-07 | End: 2017-02-10

## 2017-02-07 RX ORDER — HYDROMORPHONE HYDROCHLORIDE 2 MG/ML
2 INJECTION INTRAMUSCULAR; INTRAVENOUS; SUBCUTANEOUS
Qty: 0 | Refills: 0 | Status: DISCONTINUED | OUTPATIENT
Start: 2017-02-07 | End: 2017-02-09

## 2017-02-07 RX ORDER — MORPHINE SULFATE 50 MG/1
4 CAPSULE, EXTENDED RELEASE ORAL ONCE
Qty: 0 | Refills: 0 | Status: DISCONTINUED | OUTPATIENT
Start: 2017-02-07 | End: 2017-02-07

## 2017-02-07 RX ORDER — ACETAMINOPHEN 500 MG
0 TABLET ORAL
Qty: 0 | Refills: 0 | COMMUNITY

## 2017-02-07 RX ORDER — SODIUM CHLORIDE 9 MG/ML
1000 INJECTION INTRAMUSCULAR; INTRAVENOUS; SUBCUTANEOUS ONCE
Qty: 0 | Refills: 0 | Status: COMPLETED | OUTPATIENT
Start: 2017-02-07 | End: 2017-02-07

## 2017-02-07 RX ORDER — ALBUTEROL 90 UG/1
2 AEROSOL, METERED ORAL EVERY 6 HOURS
Qty: 0 | Refills: 0 | Status: DISCONTINUED | OUTPATIENT
Start: 2017-02-07 | End: 2017-02-10

## 2017-02-07 RX ORDER — MORPHINE SULFATE 50 MG/1
15 CAPSULE, EXTENDED RELEASE ORAL EVERY 12 HOURS
Qty: 0 | Refills: 0 | Status: DISCONTINUED | OUTPATIENT
Start: 2017-02-07 | End: 2017-02-09

## 2017-02-07 RX ORDER — PANTOPRAZOLE SODIUM 20 MG/1
80 TABLET, DELAYED RELEASE ORAL ONCE
Qty: 0 | Refills: 0 | Status: COMPLETED | OUTPATIENT
Start: 2017-02-07 | End: 2017-02-07

## 2017-02-07 RX ORDER — ONDANSETRON 8 MG/1
4 TABLET, FILM COATED ORAL ONCE
Qty: 0 | Refills: 0 | Status: COMPLETED | OUTPATIENT
Start: 2017-02-07 | End: 2017-02-07

## 2017-02-07 RX ORDER — PANTOPRAZOLE SODIUM 20 MG/1
40 TABLET, DELAYED RELEASE ORAL
Qty: 0 | Refills: 0 | Status: DISCONTINUED | OUTPATIENT
Start: 2017-02-07 | End: 2017-02-08

## 2017-02-07 RX ORDER — METOPROLOL TARTRATE 50 MG
25 TABLET ORAL
Qty: 0 | Refills: 0 | Status: DISCONTINUED | OUTPATIENT
Start: 2017-02-07 | End: 2017-02-10

## 2017-02-07 RX ORDER — SENNA PLUS 8.6 MG/1
2 TABLET ORAL AT BEDTIME
Qty: 0 | Refills: 0 | Status: DISCONTINUED | OUTPATIENT
Start: 2017-02-07 | End: 2017-02-10

## 2017-02-07 RX ORDER — DOCUSATE SODIUM 100 MG
100 CAPSULE ORAL THREE TIMES A DAY
Qty: 0 | Refills: 0 | Status: DISCONTINUED | OUTPATIENT
Start: 2017-02-07 | End: 2017-02-10

## 2017-02-07 RX ORDER — DIVALPROEX SODIUM 500 MG/1
500 TABLET, DELAYED RELEASE ORAL EVERY 12 HOURS
Qty: 0 | Refills: 0 | Status: DISCONTINUED | OUTPATIENT
Start: 2017-02-07 | End: 2017-02-10

## 2017-02-07 RX ORDER — DEXTROSE MONOHYDRATE, SODIUM CHLORIDE, AND POTASSIUM CHLORIDE 50; .745; 4.5 G/1000ML; G/1000ML; G/1000ML
2500 INJECTION, SOLUTION INTRAVENOUS
Qty: 0 | Refills: 0 | Status: DISCONTINUED | OUTPATIENT
Start: 2017-02-07 | End: 2017-02-08

## 2017-02-07 RX ORDER — GABAPENTIN 400 MG/1
800 CAPSULE ORAL THREE TIMES A DAY
Qty: 0 | Refills: 0 | Status: DISCONTINUED | OUTPATIENT
Start: 2017-02-07 | End: 2017-02-10

## 2017-02-07 RX ORDER — ONDANSETRON 8 MG/1
4 TABLET, FILM COATED ORAL EVERY 6 HOURS
Qty: 0 | Refills: 0 | Status: DISCONTINUED | OUTPATIENT
Start: 2017-02-07 | End: 2017-02-10

## 2017-02-07 RX ORDER — FLUTICASONE PROPIONATE AND SALMETEROL 50; 250 UG/1; UG/1
1 POWDER ORAL; RESPIRATORY (INHALATION)
Qty: 0 | Refills: 0 | Status: DISCONTINUED | OUTPATIENT
Start: 2017-02-07 | End: 2017-02-10

## 2017-02-07 RX ORDER — ATORVASTATIN CALCIUM 80 MG/1
20 TABLET, FILM COATED ORAL AT BEDTIME
Qty: 0 | Refills: 0 | Status: DISCONTINUED | OUTPATIENT
Start: 2017-02-07 | End: 2017-02-10

## 2017-02-07 RX ADMIN — SODIUM CHLORIDE 1000 MILLILITER(S): 9 INJECTION INTRAMUSCULAR; INTRAVENOUS; SUBCUTANEOUS at 18:44

## 2017-02-07 RX ADMIN — PANTOPRAZOLE SODIUM 80 MILLIGRAM(S): 20 TABLET, DELAYED RELEASE ORAL at 18:45

## 2017-02-07 RX ADMIN — MORPHINE SULFATE 4 MILLIGRAM(S): 50 CAPSULE, EXTENDED RELEASE ORAL at 21:30

## 2017-02-07 RX ADMIN — ONDANSETRON 4 MILLIGRAM(S): 8 TABLET, FILM COATED ORAL at 18:44

## 2017-02-07 RX ADMIN — MORPHINE SULFATE 4 MILLIGRAM(S): 50 CAPSULE, EXTENDED RELEASE ORAL at 20:04

## 2017-02-07 NOTE — ED ADULT NURSE REASSESSMENT NOTE - NS ED NURSE REASSESS COMMENT FT1
Report given to Adelaide on 2SW. Pending transport.
Report taken at the change of shift. pt currently at [facility]. Will cont to monitor for safety and comfort.
hospitalist at bedside evaluating the pt.
pt back from [facility]. c/o 8/10 generalized abd pain. MD Made aware. Denies cp,sob,ha,dz,n/v/d or urinary sx. Vitals stable. NS Infusing at this time. Afebrile. Agree with previous RN's assessment. Pending [facility] results. Will cont ot monitor for safety and comfort.

## 2017-02-07 NOTE — H&P ADULT - NSHPPHYSICALEXAM_GEN_ALL_CORE
PHYSICAL EXAM:      Constitutional: NAD, well-groomed, well-developed  HEENT: PERRLA, EOMI, Normal Hearing  Neck: No LAD, No JVD  Back: Normal spine flexure, No CVA tenderness  Respiratory: CTABL  Cardiovascular: S1 and S2, RRR, no M/G/R  Gastrointestinal: BS+, soft, NT/ND  Extremities: No peripheral edema  Vascular: 2+ peripheral pulses  Neurological: A/O x 3, no focal deficits  Psychiatric: Normal mood, normal affect  Musculoskeletal: 5/5 strength b/l upper and lower extremities  Skin: No rashes

## 2017-02-07 NOTE — PATIENT PROFILE ADULT. - PRO ARRIVE FROM
homeless; pt states he lives with his wife when she's not in [place]; wife is currently in [place]/other (specify)

## 2017-02-07 NOTE — ED PROVIDER NOTE - OBJECTIVE STATEMENT
46M hx CAD, MVP, 46M hx CAD s/p balloon angio, chronic hep C with acute Hep B, perforated gastric ulcer, bipolar presents with hematemesis described as bright red blood with dark "chunks".  Vomiting 3-6x per day for approx 6 days, first episodes of hematoemesis.  Associated with diffuse abdominal pain.  No recent melena/hematochezia. Denies fever/chills.  Pt also reports 1 episode hematuria that started 3 days ago.  Told of possible "liver infection" approx 2 weeks and was offered hospitalization for treatment but declined.  Has been taking PO morphine for pain control.    PMD - Jordin Gloria ([facility] [place]).    Hepatology

## 2017-02-07 NOTE — ED PROVIDER NOTE - ATTENDING CONTRIBUTION TO CARE
Dr. Clayton: I have personally performed a face to face bedside history and physical examination of this patient. I have discussed the history, examination, review of systems, assessment and plan of management with the resident. I have reviewed the electronic medical record and amended it to reflect my history, review of systems, physical exam, assessment and plan.    The scribe's documentation has been prepared under my direction and personally reviewed by me in its entirety. I confirm that the note above accurately reflects all work, treatment, procedures, and medical decision making performed by me (Dr. Clayton).

## 2017-02-07 NOTE — PATIENT PROFILE ADULT. - NS PRO CONTRA FLU CONTRAIND
Allergy sensitivity to eggs, thimerosal (if included in the vaccine), or any other component of the vaccine (i.e., aminoglycosides)/pt refused

## 2017-02-07 NOTE — ED PROVIDER NOTE - CONSTITUTIONAL, MLM
normal... Mildly ill appearing, adult male, awake, alert, oriented to person, place, time/situation presenting mild distress.

## 2017-02-07 NOTE — H&P ADULT - NSHPREVIEWOFSYSTEMS_GEN_ALL_CORE
REVIEW OF SYSTEMS:    CONSTITUTIONAL: No weakness, fevers or chills  EYES/ENT: No visual changes;  No vertigo or throat pain   NECK: No pain or stiffness  RESPIRATORY: No cough, wheezing, hemoptysis; No shortness of breath  CARDIOVASCULAR: No chest pain or palpitations  GASTROINTESTINAL: Epigastric, RUQ pain.  nausea, vomiting and 1 episode of hematemesis.    GENITOURINARY: No dysuria, frequency or hematuria  NEUROLOGICAL: No numbness or weakness  SKIN: No itching, burning, rashes, or lesions   All other review of systems is negative unless indicated above.

## 2017-02-07 NOTE — ED PROVIDER NOTE - MEDICAL DECISION MAKING DETAILS
Pt currently calm and cooperative with plan to receive labs and further evaluation. Pt currently calm and cooperative with plan to receive labs, sono RUQ and further evaluation.

## 2017-02-07 NOTE — H&P ADULT - NSHPLABSRESULTS_GEN_ALL_CORE
T(C): 37.2, Max: 37.2 ( @ 19:56)  HR: 72 (72 - 86)  BP: 137/83 (135/85 - 137/83)  RR: 16 (16 - 16)  SpO2: 100% (100% - 100%)  Wt(kg): --                              14.6   9.4   )-----------( 145      ( 2017 17:32 )             45.1     2017 17:32    141    |  105    |  10     ----------------------------<  107    3.5     |  28     |  0.80     Ca    9.3        2017 17:32    TPro  8.4    /  Alb  3.9    /  TBili  3.0    /  DBili  x      /  AST  616    /  ALT  940    /  AlkPhos  281    2017 17:32    PT/INR - ( 2017 17:32 )   PT: 15.0 sec;   INR: 1.36 ratio         PTT - ( 2017 17:32 )  PTT:37.6 sec  CAPILLARY BLOOD GLUCOSE    LIVER FUNCTIONS - ( 2017 17:32 )  Alb: 3.9 g/dL / Pro: 8.4 gm/dL / ALK PHOS: 281 U/L / ALT: 940 U/L / AST: 616 U/L / GGT: x           Urinalysis Basic - ( 2017 17:32 )    Color: Yellow / Appearance: Clear / S.005 / pH: x  Gluc: x / Ketone: Negative  / Bili: Negative / Urobili: 4 mg/dL   Blood: x / Protein: Negative mg/dL / Nitrite: Negative   Leuk Esterase: Negative / RBC: x / WBC x   Sq Epi: x / Non Sq Epi: x / Bacteria: x

## 2017-02-07 NOTE — H&P ADULT - NSHPOUTPATIENTPROVIDERS_GEN_ALL_CORE
PMD: Previously .  Currently: No PCP/pain management  GI appointment awaited with Dr Amato at [facility]

## 2017-02-07 NOTE — H&P ADULT - HISTORY OF PRESENT ILLNESS
46 year old homeless male with history of chronic HCV, thrombocytopenia, anemia, rheumatoid arthritis, spinal fracture and peripheral neuropathy, hyperlipidemia, CAD s/p balloon angioplasty in past and recent negative coronary cath, hypertension, bipolar disorder, COPD, tobacco smoking, opioid abuse who was discharged on 1/31/17 from  (admitted for chest pain and had negative cath) then went to [facility] for a scheduled sleep study but c/o nausea and vomiting after not being given dilaudid and morphine, got admitted at [facility], was seen by GI there and was offered methadone and further testing to r/o other causes of hepatitis but refused all treatment and signed out AMA, ran out of his opioid prescription presents to ER with one episode of blood tinged vomitus with brown particles and RUQ and epigastric abdominal pain.  He also c/o constipation and orange urine.     Pt was seen in ER.  Appears comfortable.  Denies having further vomiting or nausea in ER or blood in stool/urine/vomit.  Pt denies fever/chills/diarrhea/SOB/cough/chest pain or skin rash.

## 2017-02-07 NOTE — H&P ADULT - PROBLEM SELECTOR PLAN 4
s/p normal cath a few days ago  aspirin and plavix stopped for today   resume as per GI and cardiology   metoprolol and statin continued

## 2017-02-07 NOTE — H&P ADULT - PROBLEM SELECTOR PLAN 2
Chronic  LFT higher than last admission  Likely due to nausea and vomiting and dehydration   GI consult   Hepatitis serology ordered

## 2017-02-08 DIAGNOSIS — J44.9 CHRONIC OBSTRUCTIVE PULMONARY DISEASE, UNSPECIFIED: ICD-10-CM

## 2017-02-08 DIAGNOSIS — F11.10 OPIOID ABUSE, UNCOMPLICATED: ICD-10-CM

## 2017-02-08 DIAGNOSIS — F31.9 BIPOLAR DISORDER, UNSPECIFIED: ICD-10-CM

## 2017-02-08 LAB
ABO RH CONFIRMATION: SIGNIFICANT CHANGE UP
ALBUMIN SERPL ELPH-MCNC: 2.9 G/DL — LOW (ref 3.3–5)
ALP SERPL-CCNC: 218 U/L — HIGH (ref 40–120)
ALT FLD-CCNC: 726 U/L — HIGH (ref 12–78)
ANION GAP SERPL CALC-SCNC: 7 MMOL/L — SIGNIFICANT CHANGE UP (ref 5–17)
AST SERPL-CCNC: 510 U/L — HIGH (ref 15–37)
BASOPHILS # BLD AUTO: 0 K/UL — SIGNIFICANT CHANGE UP (ref 0–0.2)
BASOPHILS NFR BLD AUTO: 0.5 % — SIGNIFICANT CHANGE UP (ref 0–2)
BILIRUB SERPL-MCNC: 2.4 MG/DL — HIGH (ref 0.2–1.2)
BLD GP AB SCN SERPL QL: SIGNIFICANT CHANGE UP
BUN SERPL-MCNC: 7 MG/DL — SIGNIFICANT CHANGE UP (ref 7–23)
CALCIUM SERPL-MCNC: 8.4 MG/DL — LOW (ref 8.5–10.1)
CERULOPLASMIN SERPL-MCNC: 26 MG/DL — SIGNIFICANT CHANGE UP (ref 20–60)
CHLORIDE SERPL-SCNC: 112 MMOL/L — HIGH (ref 96–108)
CO2 SERPL-SCNC: 26 MMOL/L — SIGNIFICANT CHANGE UP (ref 22–31)
CREAT SERPL-MCNC: 0.78 MG/DL — SIGNIFICANT CHANGE UP (ref 0.5–1.3)
CULTURE RESULTS: SIGNIFICANT CHANGE UP
EOSINOPHIL # BLD AUTO: 0.1 K/UL — SIGNIFICANT CHANGE UP (ref 0–0.5)
EOSINOPHIL NFR BLD AUTO: 2.1 % — SIGNIFICANT CHANGE UP (ref 0–6)
FERRITIN SERPL-MCNC: 485.9 NG/ML — HIGH (ref 30–400)
GLUCOSE SERPL-MCNC: 93 MG/DL — SIGNIFICANT CHANGE UP (ref 70–99)
HAV IGM SER-ACNC: SIGNIFICANT CHANGE UP
HBA1C BLD-MCNC: 5 % — SIGNIFICANT CHANGE UP (ref 4–5.6)
HBV CORE IGM SER-ACNC: REACTIVE
HBV SURFACE AG SER-ACNC: REACTIVE
HCT VFR BLD CALC: 35.6 % — LOW (ref 39–50)
HCV AB S/CO SERPL IA: 15.86 S/CO — SIGNIFICANT CHANGE UP
HCV AB SERPL-IMP: REACTIVE
HGB BLD-MCNC: 11.4 G/DL — LOW (ref 13–17)
HIV 1+2 AB+HIV1 P24 AG SERPL QL IA: SIGNIFICANT CHANGE UP
IRON SATN MFR SERPL: 256 UG/DL — HIGH (ref 45–165)
IRON SATN MFR SERPL: 71 % — HIGH (ref 16–55)
LYMPHOCYTES # BLD AUTO: 2.5 K/UL — SIGNIFICANT CHANGE UP (ref 1–3.3)
LYMPHOCYTES # BLD AUTO: 47.2 % — HIGH (ref 13–44)
MAGNESIUM SERPL-MCNC: 2 MG/DL — SIGNIFICANT CHANGE UP (ref 1.8–2.4)
MCHC RBC-ENTMCNC: 29.7 PG — SIGNIFICANT CHANGE UP (ref 27–34)
MCHC RBC-ENTMCNC: 32.1 GM/DL — SIGNIFICANT CHANGE UP (ref 32–36)
MCV RBC AUTO: 92.6 FL — SIGNIFICANT CHANGE UP (ref 80–100)
MONOCYTES # BLD AUTO: 0.8 K/UL — SIGNIFICANT CHANGE UP (ref 0–0.9)
MONOCYTES NFR BLD AUTO: 15.3 % — HIGH (ref 2–14)
NEUTROPHILS # BLD AUTO: 1.8 K/UL — SIGNIFICANT CHANGE UP (ref 1.8–7.4)
NEUTROPHILS NFR BLD AUTO: 34.8 % — LOW (ref 43–77)
PLATELET # BLD AUTO: 110 K/UL — LOW (ref 150–400)
POTASSIUM SERPL-MCNC: 4 MMOL/L — SIGNIFICANT CHANGE UP (ref 3.5–5.3)
POTASSIUM SERPL-SCNC: 4 MMOL/L — SIGNIFICANT CHANGE UP (ref 3.5–5.3)
PROT SERPL-MCNC: 6.2 GM/DL — SIGNIFICANT CHANGE UP (ref 6–8.3)
RBC # BLD: 3.85 M/UL — LOW (ref 4.2–5.8)
RBC # FLD: 14.1 % — SIGNIFICANT CHANGE UP (ref 10.3–14.5)
SODIUM SERPL-SCNC: 145 MMOL/L — SIGNIFICANT CHANGE UP (ref 135–145)
SPECIMEN SOURCE: SIGNIFICANT CHANGE UP
TIBC SERPL-MCNC: 362 UG/DL — SIGNIFICANT CHANGE UP (ref 220–430)
TYPE + AB SCN PNL BLD: SIGNIFICANT CHANGE UP
UIBC SERPL-MCNC: 106 UG/DL — LOW (ref 110–370)
WBC # BLD: 5.2 K/UL — SIGNIFICANT CHANGE UP (ref 3.8–10.5)
WBC # FLD AUTO: 5.2 K/UL — SIGNIFICANT CHANGE UP (ref 3.8–10.5)

## 2017-02-08 RX ORDER — PANTOPRAZOLE SODIUM 20 MG/1
40 TABLET, DELAYED RELEASE ORAL
Qty: 0 | Refills: 0 | Status: DISCONTINUED | OUTPATIENT
Start: 2017-02-08 | End: 2017-02-10

## 2017-02-08 RX ORDER — NICOTINE POLACRILEX 2 MG
1 GUM BUCCAL DAILY
Qty: 0 | Refills: 0 | Status: DISCONTINUED | OUTPATIENT
Start: 2017-02-08 | End: 2017-02-10

## 2017-02-08 RX ORDER — PANTOPRAZOLE SODIUM 20 MG/1
40 TABLET, DELAYED RELEASE ORAL
Qty: 0 | Refills: 0 | Status: DISCONTINUED | OUTPATIENT
Start: 2017-02-08 | End: 2017-02-08

## 2017-02-08 RX ADMIN — DIVALPROEX SODIUM 500 MILLIGRAM(S): 500 TABLET, DELAYED RELEASE ORAL at 01:28

## 2017-02-08 RX ADMIN — HYDROMORPHONE HYDROCHLORIDE 2 MILLIGRAM(S): 2 INJECTION INTRAMUSCULAR; INTRAVENOUS; SUBCUTANEOUS at 18:29

## 2017-02-08 RX ADMIN — DIVALPROEX SODIUM 500 MILLIGRAM(S): 500 TABLET, DELAYED RELEASE ORAL at 17:42

## 2017-02-08 RX ADMIN — QUETIAPINE FUMARATE 150 MILLIGRAM(S): 200 TABLET, FILM COATED ORAL at 22:06

## 2017-02-08 RX ADMIN — DEXTROSE MONOHYDRATE, SODIUM CHLORIDE, AND POTASSIUM CHLORIDE 125 MILLILITER(S): 50; .745; 4.5 INJECTION, SOLUTION INTRAVENOUS at 00:19

## 2017-02-08 RX ADMIN — PANTOPRAZOLE SODIUM 40 MILLIGRAM(S): 20 TABLET, DELAYED RELEASE ORAL at 00:18

## 2017-02-08 RX ADMIN — Medication 1 PATCH: at 17:41

## 2017-02-08 RX ADMIN — Medication 100 MILLIGRAM(S): at 22:06

## 2017-02-08 RX ADMIN — GABAPENTIN 800 MILLIGRAM(S): 400 CAPSULE ORAL at 01:26

## 2017-02-08 RX ADMIN — GABAPENTIN 800 MILLIGRAM(S): 400 CAPSULE ORAL at 06:09

## 2017-02-08 RX ADMIN — Medication 10 MILLIGRAM(S): at 06:08

## 2017-02-08 RX ADMIN — GABAPENTIN 800 MILLIGRAM(S): 400 CAPSULE ORAL at 22:06

## 2017-02-08 RX ADMIN — ATORVASTATIN CALCIUM 20 MILLIGRAM(S): 80 TABLET, FILM COATED ORAL at 22:06

## 2017-02-08 RX ADMIN — MORPHINE SULFATE 15 MILLIGRAM(S): 50 CAPSULE, EXTENDED RELEASE ORAL at 00:23

## 2017-02-08 RX ADMIN — PANTOPRAZOLE SODIUM 40 MILLIGRAM(S): 20 TABLET, DELAYED RELEASE ORAL at 06:09

## 2017-02-08 RX ADMIN — ONDANSETRON 4 MILLIGRAM(S): 8 TABLET, FILM COATED ORAL at 00:18

## 2017-02-08 RX ADMIN — QUETIAPINE FUMARATE 150 MILLIGRAM(S): 200 TABLET, FILM COATED ORAL at 01:27

## 2017-02-08 RX ADMIN — HYDROMORPHONE HYDROCHLORIDE 2 MILLIGRAM(S): 2 INJECTION INTRAMUSCULAR; INTRAVENOUS; SUBCUTANEOUS at 17:45

## 2017-02-08 RX ADMIN — HYDROMORPHONE HYDROCHLORIDE 2 MILLIGRAM(S): 2 INJECTION INTRAMUSCULAR; INTRAVENOUS; SUBCUTANEOUS at 06:07

## 2017-02-08 RX ADMIN — DULOXETINE HYDROCHLORIDE 60 MILLIGRAM(S): 30 CAPSULE, DELAYED RELEASE ORAL at 16:41

## 2017-02-08 RX ADMIN — DEXTROSE MONOHYDRATE, SODIUM CHLORIDE, AND POTASSIUM CHLORIDE 125 MILLILITER(S): 50; .745; 4.5 INJECTION, SOLUTION INTRAVENOUS at 08:54

## 2017-02-08 RX ADMIN — MORPHINE SULFATE 15 MILLIGRAM(S): 50 CAPSULE, EXTENDED RELEASE ORAL at 04:44

## 2017-02-08 RX ADMIN — MORPHINE SULFATE 15 MILLIGRAM(S): 50 CAPSULE, EXTENDED RELEASE ORAL at 16:41

## 2017-02-08 RX ADMIN — PANTOPRAZOLE SODIUM 40 MILLIGRAM(S): 20 TABLET, DELAYED RELEASE ORAL at 17:45

## 2017-02-08 NOTE — CONSULT NOTE ADULT - SUBJECTIVE AND OBJECTIVE BOX
HPI:  47 yo man admitted with nausea/vomiting and hematemesis. Patient was recently admitted for chest pain at , cath negative. Has seen GI there, but never treated for HCV and has declined evaluations in the past. He has narcotic dependence and developed n/v over the past 5 days. Yesterday, he had blood in the vomitus with black coffee grounds. He has ongoing nausea, but no further bleeding. +epigastric pain. He is on aspirin and plavix. He was diagnosed with chronic HCV in the early 2000s. Believes virus was contracted from tattoo. Recently liver tests have been elevated. He says they are not sure this is due to HCV and planned to look for other etiologies. He has never had endoscopy. Has had dark stools recently, but reports lighter stools now and dark urine.       PAST MEDICAL & SURGICAL HISTORY:  Mitral valve disorder  CAD (coronary artery disease)  Pain management  Chronic pain  MI (myocardial infarction)  Chronic back pain  CAD (coronary artery disease)  HTN (hypertension)  Hep C w/o coma, chronic  COPD (chronic obstructive pulmonary disease)  Mitral valve prolapse  Acid reflux  CAD (coronary artery disease)  Back injury  Anxiety  High cholesterol  No significant past surgical history  Abscess of arm  Cardiac catheterization as cause of abnormal reaction of patient, or of later complication, without mention of misadventure at time of procedure: cardiac cath with one stent      MEDICATIONS  (STANDING):  predniSONE   Tablet 10milliGRAM(s) Oral daily  HYDROmorphone   Tablet 2milliGRAM(s) Oral two times a day  diVALproex ER 500milliGRAM(s) Oral every 12 hours  gabapentin 800milliGRAM(s) Oral three times a day  DULoxetine 60milliGRAM(s) Oral daily  atorvastatin 20milliGRAM(s) Oral at bedtime  QUEtiapine XR 150milliGRAM(s) Oral at bedtime  metoprolol 25milliGRAM(s) Oral two times a day  fluticasone / salmeterol 250-50 MICROgram(s) Diskus 1Dose(s) Inhalation two times a day  dextrose 5% + sodium chloride 0.45% with potassium chloride 20 mEq/L 2500milliLiter(s) IV Continuous <Continuous>  docusate sodium 100milliGRAM(s) Oral three times a day  multivitamin 1Tablet(s) Oral daily  pantoprazole  Injectable 40milliGRAM(s) IV Push two times a day    MEDICATIONS  (PRN):  morphine ER Tablet 15milliGRAM(s) Oral every 12 hours PRN severe pain  ALBUTerol    90 MICROgram(s) HFA Inhaler 2Puff(s) Inhalation every 6 hours PRN Shortness of Breath  ondansetron Injectable 4milliGRAM(s) IV Push every 6 hours PRN Nausea  senna 2Tablet(s) Oral at bedtime PRN Constipation      Allergies    Aleve (Unknown)  Haldol (Anaphylaxis)  Motrin (Anaphylaxis)  NSAIDs (Flushing; Other (Moderate))  Stelazine (Unknown)  Thorazine (Other (Moderate))    Intolerances        SOCIAL HISTORY:  No smoking, social alcohol use, no recreational drug use    FAMILY HISTORY:  No pertinent family history      REVIEW OF SYSTEMS    General: no unexpected weight loss    HEENT: no icterus    Respiratory and Thorax: no SOB  	  Cardiovascular: no CP    Gastrointestinal: as above    : no dysuria    Musculoskeletal: no myalgias    Skin: no jaundice    Neuro: no headaches or dizziness    Vital Signs Last 24 Hrs  T(C): 36.8, Max: 37.3 (02-07 @ 22:53)  T(F): 98.2, Max: 99.1 (02-07 @ 22:53)  HR: 80 (70 - 86)  BP: 99/61 (99/61 - 137/83)  BP(mean): --  RR: 18 (16 - 18)  SpO2: 95% (95% - 100%)    PHYSICAL EXAM:    Constitutional: NAD    Head: NCAT    HEENT: anicteric    Neck: no abnormal lymphadenopathy    Respiratory: CTA BL    Cardiovascular:  RRR    Gastrointestinal: soft ND +BS NTTP    Extremities: no LE edema    Neuro: no focal deficits    Skin: no jaundice      LABS:                        11.4   5.2   )-----------( 110      ( 08 Feb 2017 07:02 )             35.6     08 Feb 2017 07:02    145    |  112    |  7      ----------------------------<  93     4.0     |  26     |  0.78     Ca    8.4        08 Feb 2017 07:02  Mg     2.0       08 Feb 2017 07:02    TPro  6.2    /  Alb  2.9    /  TBili  2.4    /  DBili  x      /  AST  510    /  ALT  726    /  AlkPhos  218    08 Feb 2017 07:02    PT/INR - ( 07 Feb 2017 17:32 )   PT: 15.0 sec;   INR: 1.36 ratio         PTT - ( 07 Feb 2017 17:32 )  PTT:37.6 sec  LIVER FUNCTIONS - ( 08 Feb 2017 07:02 )  Alb: 2.9 g/dL / Pro: 6.2 gm/dL / ALK PHOS: 218 U/L / ALT: 726 U/L / AST: 510 U/L / GGT: x             RADIOLOGY & ADDITIONAL STUDIES:  EXAM:  US ABDOMEN LIMITED                            PROCEDURE DATE:  02/07/2017        INTERPRETATION:  US ABDOMEN LIMITED    HISTORY:  Right upper quadrant pain    COMPARISON: Ultrasound abdomen 1/28/2017    Multiple transverse and longitudinal sonographic images of the right   upper quadrant abdomen were obtained.    LIVER: The liver is diffusely increased in echogenicity, consistent with   fatty infiltration. No focal lesions are identified. Ultrasound   sensitivity is somewhat limited in this setting.    GALLBLADDER: The gallbladder is contracted limiting evaluation. No   gallstones or pericholecystic fluid. Neff sign is absent. Gallbladder   adenomyomatosis is again noted.    BILE DUCTS: The common bile duct measures 4 mm.  No intrahepatic ductal   dilatation.    PANCREAS:  The head and proximal body are normal. The distal body and   tail are obscured.    RIGHT KIDNEY: 11.9 cm in length.  No hydronephrosis or shadowing stone.    AORTA AND INFERIOR VENA CAVA: Normal in caliber.    No ascites.     IMPRESSION:    Contracted gallbladder without gallstones or pericholecystic fluid.   Absent Neff sign. No biliary dilatation. Mild fatty liver.

## 2017-02-08 NOTE — CONSULT NOTE ADULT - ASSESSMENT
45 yo man admitted with abdominal pain, n/v, hematemesis  -ASA, plavix on hold  -PPI gtt for now  -Monitor CBC  -Patient refuses endoscopic evaluation because he does not want to be NPO  -Clear liquids, can advance diet as tolerated if hb is stable  -Elevated liver tests likely due to chronic HCV  -Check genotype and PCR  -Check liver serologies to rule out concomittant liver disease

## 2017-02-08 NOTE — CONSULT NOTE ADULT - SUBJECTIVE AND OBJECTIVE BOX
Patient is a 46y old  Male who presents with a chief complaint of "vomitting, stomach pain" (07 Feb 2017 23:57)      HPI:  46 year old homeless male with history of chronic HCV, thrombocytopenia, anemia, rheumatoid arthritis, spinal fracture and peripheral neuropathy, hyperlipidemia, CAD s/p balloon angioplasty in past and recent negative coronary cath, hypertension, bipolar disorder, COPD, tobacco smoking, opioid abuse who was discharged on 1/31/17 from  (admitted for chest pain and had negative cath) then went to [facility] for a scheduled sleep study but c/o nausea and vomiting after not being given dilaudid and morphine, got admitted at [facility], was seen by GI there and was offered methadone and further testing to r/o other causes of hepatitis but refused all treatment and signed out AMA, ran out of his opioid prescription presents to ER with one episode of blood tinged vomitus with brown particles and RUQ and epigastric abdominal pain.  He also c/o constipation and orange urine.     Pt was seen in ER.  Appears comfortable.  Denies having further vomiting or nausea in ER or blood in stool/urine/vomit.  Pt denies fever/chills/diarrhea/SOB/cough/chest pain or skin rash. (07 Feb 2017 22:39)      PAST MEDICAL & SURGICAL HISTORY:  Mitral valve disorder  CAD (coronary artery disease)  Pain management  Chronic pain  MI (myocardial infarction)  Chronic back pain  CAD (coronary artery disease)  HTN (hypertension)  Hep C w/o coma, chronic  COPD (chronic obstructive pulmonary disease)  Mitral valve prolapse  Acid reflux  CAD (coronary artery disease)  Back injury  Anxiety  High cholesterol  No significant past surgical history  Abscess of arm  Cardiac catheterization as cause of abnormal reaction of patient, or of later complication, without mention of misadventure at time of procedure: cardiac cath with one stent      MEDICATIONS  (STANDING):  predniSONE   Tablet 10milliGRAM(s) Oral daily  HYDROmorphone   Tablet 2milliGRAM(s) Oral two times a day  diVALproex ER 500milliGRAM(s) Oral every 12 hours  gabapentin 800milliGRAM(s) Oral three times a day  DULoxetine 60milliGRAM(s) Oral daily  atorvastatin 20milliGRAM(s) Oral at bedtime  QUEtiapine XR 150milliGRAM(s) Oral at bedtime  metoprolol 25milliGRAM(s) Oral two times a day  fluticasone / salmeterol 250-50 MICROgram(s) Diskus 1Dose(s) Inhalation two times a day  dextrose 5% + sodium chloride 0.45% with potassium chloride 20 mEq/L 2500milliLiter(s) IV Continuous <Continuous>  docusate sodium 100milliGRAM(s) Oral three times a day  multivitamin 1Tablet(s) Oral daily  pantoprazole  Injectable 40milliGRAM(s) IV Push two times a day    MEDICATIONS  (PRN):  morphine ER Tablet 15milliGRAM(s) Oral every 12 hours PRN severe pain  ALBUTerol    90 MICROgram(s) HFA Inhaler 2Puff(s) Inhalation every 6 hours PRN Shortness of Breath  ondansetron Injectable 4milliGRAM(s) IV Push every 6 hours PRN Nausea  senna 2Tablet(s) Oral at bedtime PRN Constipation      Allergies    Aleve (Unknown)  Haldol (Anaphylaxis)  Motrin (Anaphylaxis)  NSAIDs (Flushing; Other (Moderate))  Stelazine (Unknown)  Thorazine (Other (Moderate))    Intolerances        SOCIAL HISTORY:    FAMILY HISTORY:  No pertinent family history      REVIEW OF SYSTEMS:    CONSTITUTIONAL: No weakness, fevers or chills  EYES/ENT: No visual changes;  No vertigo or throat pain   NECK: No pain or stiffness  RESPIRATORY: No cough, wheezing, hemoptysis; No shortness of breath  CARDIOVASCULAR: No chest pain or palpitations  GASTROINTESTINAL: No abdominal or epigastric pain. No nausea, vomiting, or hematemesis; No diarrhea or constipation. No melena or hematochezia.  GENITOURINARY: No dysuria, frequency or hematuria  NEUROLOGICAL: No numbness or weakness  SKIN: No itching, burning, rashes, or lesions   PSYCH: Normal mood and affect  All other review of systems is negative unless indicated above.    Vital Signs Last 24 Hrs  T(C): 36.8, Max: 37.3 (02-07 @ 22:53)  T(F): 98.2, Max: 99.1 (02-07 @ 22:53)  HR: 80 (70 - 86)  BP: 99/61 (99/61 - 137/83)  BP(mean): --  RR: 18 (16 - 18)  SpO2: 95% (95% - 100%)    PHYSICAL EXAM:    Constitutional: NAD, well-developed  HEENT: MMM  Neck: No LAD  Respiratory: CTAB  Cardiovascular: S1 and S2, RRR, no M/G/R  Gastrointestinal: BS+, soft, NT/ND  Extremities: No peripheral edema  Vascular: 2+ peripheral pulses  Neurological: A/O x 3, no focal deficits  Psychiatric: Normal mood, normal affect  Skin: No rashes    LABS:                        11.4   5.2   )-----------( 110      ( 08 Feb 2017 07:02 )             35.6     08 Feb 2017 07:02    145    |  112    |  7      ----------------------------<  93     4.0     |  26     |  0.78     Ca    8.4        08 Feb 2017 07:02  Mg     2.0       08 Feb 2017 07:02    TPro  6.2    /  Alb  2.9    /  TBili  2.4    /  DBili  x      /  AST  510    /  ALT  726    /  AlkPhos  218    08 Feb 2017 07:02    PT/INR - ( 07 Feb 2017 17:32 )   PT: 15.0 sec;   INR: 1.36 ratio         PTT - ( 07 Feb 2017 17:32 )  PTT:37.6 sec  LIVER FUNCTIONS - ( 08 Feb 2017 07:02 )  Alb: 2.9 g/dL / Pro: 6.2 gm/dL / ALK PHOS: 218 U/L / ALT: 726 U/L / AST: 510 U/L / GGT: x             RADIOLOGY & ADDITIONAL STUDIES:

## 2017-02-08 NOTE — PROGRESS NOTE ADULT - SUBJECTIVE AND OBJECTIVE BOX
TD  02/08:  2W.  sleeping.  arrousable.  appropriate.  agreeable remaining NPO for EGD later this PM if he gets a nicotine patch.    Allergies    Aleve (Unknown)  Haldol (Anaphylaxis)  Motrin (Anaphylaxis)  NSAIDs (Flushing; Other (Moderate))  Stelazine (Unknown)  Thorazine (Other (Moderate))    Intolerances      MEDICATIONS  (STANDING):  predniSONE   Tablet 10milliGRAM(s) Oral daily  HYDROmorphone   Tablet 2milliGRAM(s) Oral two times a day  diVALproex ER 500milliGRAM(s) Oral every 12 hours  gabapentin 800milliGRAM(s) Oral three times a day  DULoxetine 60milliGRAM(s) Oral daily  atorvastatin 20milliGRAM(s) Oral at bedtime  QUEtiapine XR 150milliGRAM(s) Oral at bedtime  metoprolol 25milliGRAM(s) Oral two times a day  fluticasone / salmeterol 250-50 MICROgram(s) Diskus 1Dose(s) Inhalation two times a day  dextrose 5% + sodium chloride 0.45% with potassium chloride 20 mEq/L 2500milliLiter(s) IV Continuous <Continuous>  docusate sodium 100milliGRAM(s) Oral three times a day  multivitamin 1Tablet(s) Oral daily  pantoprazole  Injectable 40milliGRAM(s) IV Push two times a day  nicotine - 21 mG/24Hr(s) Patch 1patch Transdermal daily    MEDICATIONS  (PRN):  morphine ER Tablet 15milliGRAM(s) Oral every 12 hours PRN severe pain  ALBUTerol    90 MICROgram(s) HFA Inhaler 2Puff(s) Inhalation every 6 hours PRN Shortness of Breath  ondansetron Injectable 4milliGRAM(s) IV Push every 6 hours PRN Nausea  senna 2Tablet(s) Oral at bedtime PRN Constipation    Vital Signs Last 24 Hrs  T(C): 36.7, Max: 37.3 (02-07 @ 22:53)  T(F): 98.1, Max: 99.1 (02-07 @ 22:53)  HR: 67 (67 - 86)  BP: 98/53 (98/53 - 137/83)  BP(mean): --  RR: 16 (16 - 18)  SpO2: 97% (95% - 100%)                        11.4   5.2   )-----------( 110      ( 08 Feb 2017 07:02 )             35.6     08 Feb 2017 07:02    145    |  112    |  7      ----------------------------<  93     4.0     |  26     |  0.78     Ca    8.4        08 Feb 2017 07:02  Mg     2.0       08 Feb 2017 07:02    TPro  6.2    /  Alb  2.9    /  TBili  2.4    /  DBili  x      /  AST  510    /  ALT  726    /  AlkPhos  218    08 Feb 2017 07:02

## 2017-02-08 NOTE — PROGRESS NOTE ADULT - PROBLEM SELECTOR PLAN 1
no further episodes.  HH stable.  continue with PPI IV q12h.  keep NPO for EGD anticipated at 16:30H today.  GI following.

## 2017-02-08 NOTE — CONSULT NOTE ADULT - PROBLEM SELECTOR RECOMMENDATION 2
Patient with known Hep C. Transaminases elevated. Patient has refused therapy in the past by report.

## 2017-02-09 LAB
ANA PAT FLD IF-IMP: (no result)
ANA TITR SER: (no result)
HAV IGM SER-ACNC: SIGNIFICANT CHANGE UP
HBV CORE IGM SER-ACNC: REACTIVE
HBV SURFACE AG SER-ACNC: REACTIVE
HBV SURFACE AG SER-ACNC: REACTIVE
HCV AB S/CO SERPL IA: 16.18 S/CO — SIGNIFICANT CHANGE UP
HCV AB SERPL-IMP: REACTIVE
HCV RNA FLD QL NAA+PROBE: SIGNIFICANT CHANGE UP
HCV RNA SERPL NAA DL=5-ACNC: HIGH IU/ML
HCV RNA SPEC NAA+PROBE-LOG IU: 5.26 LOGIU/ML — HIGH
HCV RNA SPEC QL PROBE+SIG AMP: DETECTED

## 2017-02-09 RX ORDER — ASPIRIN/CALCIUM CARB/MAGNESIUM 324 MG
325 TABLET ORAL ONCE
Qty: 0 | Refills: 0 | Status: DISCONTINUED | OUTPATIENT
Start: 2017-02-09 | End: 2017-02-09

## 2017-02-09 RX ORDER — NITROGLYCERIN 6.5 MG
0.3 CAPSULE, EXTENDED RELEASE ORAL ONCE
Qty: 0 | Refills: 0 | Status: DISCONTINUED | OUTPATIENT
Start: 2017-02-09 | End: 2017-02-09

## 2017-02-09 RX ORDER — NITROGLYCERIN 6.5 MG
0.4 CAPSULE, EXTENDED RELEASE ORAL ONCE
Qty: 0 | Refills: 0 | Status: COMPLETED | OUTPATIENT
Start: 2017-02-09 | End: 2017-02-10

## 2017-02-09 RX ORDER — MORPHINE SULFATE 50 MG/1
15 CAPSULE, EXTENDED RELEASE ORAL EVERY 12 HOURS
Qty: 0 | Refills: 0 | Status: DISCONTINUED | OUTPATIENT
Start: 2017-02-09 | End: 2017-02-10

## 2017-02-09 RX ORDER — HYDROMORPHONE HYDROCHLORIDE 2 MG/ML
2 INJECTION INTRAMUSCULAR; INTRAVENOUS; SUBCUTANEOUS
Qty: 0 | Refills: 0 | Status: DISCONTINUED | OUTPATIENT
Start: 2017-02-09 | End: 2017-02-10

## 2017-02-09 RX ADMIN — PANTOPRAZOLE SODIUM 40 MILLIGRAM(S): 20 TABLET, DELAYED RELEASE ORAL at 17:59

## 2017-02-09 RX ADMIN — Medication 10 MILLIGRAM(S): at 05:12

## 2017-02-09 RX ADMIN — MORPHINE SULFATE 15 MILLIGRAM(S): 50 CAPSULE, EXTENDED RELEASE ORAL at 17:59

## 2017-02-09 RX ADMIN — Medication 100 MILLIGRAM(S): at 14:41

## 2017-02-09 RX ADMIN — MORPHINE SULFATE 15 MILLIGRAM(S): 50 CAPSULE, EXTENDED RELEASE ORAL at 15:03

## 2017-02-09 RX ADMIN — HYDROMORPHONE HYDROCHLORIDE 2 MILLIGRAM(S): 2 INJECTION INTRAMUSCULAR; INTRAVENOUS; SUBCUTANEOUS at 05:11

## 2017-02-09 RX ADMIN — DIVALPROEX SODIUM 500 MILLIGRAM(S): 500 TABLET, DELAYED RELEASE ORAL at 05:12

## 2017-02-09 RX ADMIN — MORPHINE SULFATE 15 MILLIGRAM(S): 50 CAPSULE, EXTENDED RELEASE ORAL at 08:15

## 2017-02-09 RX ADMIN — PANTOPRAZOLE SODIUM 40 MILLIGRAM(S): 20 TABLET, DELAYED RELEASE ORAL at 07:08

## 2017-02-09 RX ADMIN — DIVALPROEX SODIUM 500 MILLIGRAM(S): 500 TABLET, DELAYED RELEASE ORAL at 17:59

## 2017-02-09 RX ADMIN — DULOXETINE HYDROCHLORIDE 60 MILLIGRAM(S): 30 CAPSULE, DELAYED RELEASE ORAL at 11:13

## 2017-02-09 RX ADMIN — GABAPENTIN 800 MILLIGRAM(S): 400 CAPSULE ORAL at 14:42

## 2017-02-09 RX ADMIN — HYDROMORPHONE HYDROCHLORIDE 2 MILLIGRAM(S): 2 INJECTION INTRAMUSCULAR; INTRAVENOUS; SUBCUTANEOUS at 15:58

## 2017-02-09 RX ADMIN — Medication 25 MILLIGRAM(S): at 05:12

## 2017-02-09 RX ADMIN — Medication 1 TABLET(S): at 11:13

## 2017-02-09 RX ADMIN — GABAPENTIN 800 MILLIGRAM(S): 400 CAPSULE ORAL at 21:30

## 2017-02-09 RX ADMIN — QUETIAPINE FUMARATE 150 MILLIGRAM(S): 200 TABLET, FILM COATED ORAL at 21:30

## 2017-02-09 RX ADMIN — MORPHINE SULFATE 15 MILLIGRAM(S): 50 CAPSULE, EXTENDED RELEASE ORAL at 07:08

## 2017-02-09 RX ADMIN — Medication 1 PATCH: at 11:13

## 2017-02-09 RX ADMIN — GABAPENTIN 800 MILLIGRAM(S): 400 CAPSULE ORAL at 05:12

## 2017-02-09 RX ADMIN — HYDROMORPHONE HYDROCHLORIDE 2 MILLIGRAM(S): 2 INJECTION INTRAMUSCULAR; INTRAVENOUS; SUBCUTANEOUS at 05:45

## 2017-02-09 RX ADMIN — HYDROMORPHONE HYDROCHLORIDE 2 MILLIGRAM(S): 2 INJECTION INTRAMUSCULAR; INTRAVENOUS; SUBCUTANEOUS at 14:42

## 2017-02-09 RX ADMIN — MORPHINE SULFATE 15 MILLIGRAM(S): 50 CAPSULE, EXTENDED RELEASE ORAL at 18:48

## 2017-02-09 RX ADMIN — ATORVASTATIN CALCIUM 20 MILLIGRAM(S): 80 TABLET, FILM COATED ORAL at 21:30

## 2017-02-09 RX ADMIN — Medication 1 PATCH: at 11:15

## 2017-02-09 NOTE — CHART NOTE - NSCHARTNOTEFT_GEN_A_CORE
S: 50 year old male with hx of CAD and recent admission for chest pain that was worked up with a cath that returned negative found comfortable in bed c/o of a 9/10 sub-sternal chest pain that radiate to left rib cage. Pt reports a dull constant pain that is not associated with sob but is reporting some nausea which is an ongoing issue. No abd pain or vomiting, cough or fever.  0: vss, afebrile  gen: comfortable  cvs: s1, s2, rrr  Pulm: cta b/l  abd: soft, nt,nd x 4 quads  ext: neg for edema  EKG:  A/P: 50 year old male with CAD and recent negative cath    - ACS unlikely in setting of recent negative cath  - trop stat  - nitro 03.            discussed with Dr Pepper and

## 2017-02-09 NOTE — PROGRESS NOTE ADULT - SUBJECTIVE AND OBJECTIVE BOX
comfortable.  no new complaints.    Allergies    Aleve (Unknown)  Haldol (Anaphylaxis)  Motrin (Anaphylaxis)  NSAIDs (Flushing; Other (Moderate))  Stelazine (Unknown)  Thorazine (Other (Moderate))    Intolerances      MEDICATIONS  (STANDING):  predniSONE   Tablet 10milliGRAM(s) Oral daily  diVALproex ER 500milliGRAM(s) Oral every 12 hours  gabapentin 800milliGRAM(s) Oral three times a day  DULoxetine 60milliGRAM(s) Oral daily  atorvastatin 20milliGRAM(s) Oral at bedtime  QUEtiapine XR 150milliGRAM(s) Oral at bedtime  metoprolol 25milliGRAM(s) Oral two times a day  fluticasone / salmeterol 250-50 MICROgram(s) Diskus 1Dose(s) Inhalation two times a day  docusate sodium 100milliGRAM(s) Oral three times a day  multivitamin 1Tablet(s) Oral daily  nicotine - 21 mG/24Hr(s) Patch 1patch Transdermal daily  pantoprazole    Tablet 40milliGRAM(s) Oral two times a day before meals  morphine ER Tablet 15milliGRAM(s) Oral every 12 hours    MEDICATIONS  (PRN):  ALBUTerol    90 MICROgram(s) HFA Inhaler 2Puff(s) Inhalation every 6 hours PRN Shortness of Breath  ondansetron Injectable 4milliGRAM(s) IV Push every 6 hours PRN Nausea  senna 2Tablet(s) Oral at bedtime PRN Constipation  HYDROmorphone   Tablet 2milliGRAM(s) Oral two times a day PRN Severe Pain (7 - 10)    Vital Signs Last 24 Hrs  T(C): 36.8, Max: 36.8 (02-09 @ 11:17)  T(F): 98.2, Max: 98.2 (02-09 @ 11:17)  HR: 59 (59 - 70)  BP: 103/65 (103/65 - 116/67)  BP(mean): --  RR: 16 (16 - 16)  SpO2: 97% (97% - 99%)    comfortable.  (-) jautice.  CTA.  RRR.  soft.  NTND.  no edema.                        11.4   5.2   )-----------( 110      ( 08 Feb 2017 07:02 )             35.6     08 Feb 2017 07:02    145    |  112    |  7      ----------------------------<  93     4.0     |  26     |  0.78     Ca    8.4        08 Feb 2017 07:02  Mg     2.0       08 Feb 2017 07:02    TPro  6.2    /  Alb  2.9    /  TBili  2.4    /  DBili  x      /  AST  510    /  ALT  726    /  AlkPhos  218    08 Feb 2017 07:02

## 2017-02-09 NOTE — PROGRESS NOTE ADULT - SUBJECTIVE AND OBJECTIVE BOX
HPI:  47 yo man admitted with nausea/vomiting and hematemesis. Patient was recently admitted for chest pain at , cath negative. Has seen GI there, but never treated for HCV and has declined evaluations in the past. He has narcotic dependence and developed n/v over the past 5 days. Prior to admission, he had blood in the vomitus with black coffee grounds. He has ongoing nausea, but no further bleeding. +epigastric pain. He is on aspirin and plavix. He was diagnosed with chronic HCV in the early 2000s. Believes virus was contracted from tattoo. Recently liver tests have been elevated. He says they are not sure this is due to HCV and planned to look for other etiologies. He has never had endoscopy. Has had dark stools recently, but reports lighter stools now and dark urine.     S/p EGD notable for distal esophagitis and gastritis. Still complaining of some pain and nausea, but tolerating PO.      PAST MEDICAL & SURGICAL HISTORY:  Mitral valve disorder  CAD (coronary artery disease)  Pain management  Chronic pain  MI (myocardial infarction)  Chronic back pain  CAD (coronary artery disease)  HTN (hypertension)  Hep C w/o coma, chronic  COPD (chronic obstructive pulmonary disease)  Mitral valve prolapseAcid reflux  CAD (coronary artery disease)  Back injury  Anxiety  High cholesterol  No significant past surgical history  Abscess of arm  Cardiac catheterization as cause of abnormal reaction of patient, or of later complication, without mention of misadventure at time of procedure: cardiac cath with one stent      MEDICATIONS  (STANDING):  predniSONE   Tablet 10milliGRAM(s) Oral daily  HYDROmorphone   Tablet 2milliGRAM(s) Oral two times a day  diVALproex ER 500milliGRAM(s) Oral every 12 hours  gabapentin 800milliGRAM(s) Oral three times a day  DULoxetine 60milliGRAM(s) Oral daily  atorvastatin 20milliGRAM(s) Oral at bedtime  QUEtiapine XR 150milliGRAM(s) Oral at bedtime  metoprolol 25milliGRAM(s) Oral two times a day  fluticasone / salmeterol 250-50 MICROgram(s) Diskus 1Dose(s) Inhalation two times a day  docusate sodium 100milliGRAM(s) Oral three times a day  multivitamin 1Tablet(s) Oral daily  nicotine - 21 mG/24Hr(s) Patch 1patch Transdermal daily  pantoprazole    Tablet 40milliGRAM(s) Oral two times a day before meals    MEDICATIONS  (PRN):  morphine ER Tablet 15milliGRAM(s) Oral every 12 hours PRN severe pain  ALBUTerol    90 MICROgram(s) HFA Inhaler 2Puff(s) Inhalation every 6 hours PRN Shortness of Breath  ondansetron Injectable 4milliGRAM(s) IV Push every 6 hours PRN Nausea  senna 2Tablet(s) Oral at bedtime PRN Constipation      Allergies    Aleve (Unknown)  Haldol (Anaphylaxis)  Motrin (Anaphylaxis)  NSAIDs (Flushing; Other (Moderate))  Stelazine (Unknown)  Thorazine (Other (Moderate))    Intolerances        REVIEW OF SYSTEMS:    CONSTITUTIONAL: No weakness, fevers or chills  RESPIRATORY: No cough, wheezing, hemoptysis; No shortness of breath  CARDIOVASCULAR: No chest pain or palpitations  GASTROINTESTINAL: as above  All other review of systems is negative unless indicated above.    Vital Signs Last 24 Hrs  T(C): 36.7, Max: 36.7 (02-08 @ 11:34)  T(F): 98, Max: 98.1 (02-08 @ 11:34)  HR: 65 (64 - 70)  BP: 111/53 (98/53 - 116/67)  BP(mean): --  RR: 16 (16 - 16)  SpO2: 97% (97% - 99%)    PHYSICAL EXAM:    Constitutional: NAD, well-developed  Respiratory: CTAB  Cardiovascular: S1 and S2, RRR, no M/G/R  Gastrointestinal: BS+, soft, NT/ND  Extremities: No peripheral edema  Psychiatric: Normal mood, normal affect  Skin: No rashes    LABS:                        11.4   5.2   )-----------( 110      ( 08 Feb 2017 07:02 )             35.6     08 Feb 2017 07:02    145    |  112    |  7      ----------------------------<  93     4.0     |  26     |  0.78     Ca    8.4        08 Feb 2017 07:02  Mg     2.0       08 Feb 2017 07:02    TPro  6.2    /  Alb  2.9    /  TBili  2.4    /  DBili  x      /  AST  510    /  ALT  726    /  AlkPhos  218    08 Feb 2017 07:02    PT/INR - ( 07 Feb 2017 17:32 )   PT: 15.0 sec;   INR: 1.36 ratio         PTT - ( 07 Feb 2017 17:32 )  PTT:37.6 sec  LIVER FUNCTIONS - ( 08 Feb 2017 07:02 )  Alb: 2.9 g/dL / Pro: 6.2 gm/dL / ALK PHOS: 218 U/L / ALT: 726 U/L / AST: 510 U/L / GGT: x             RADIOLOGY & ADDITIONAL STUDIES:

## 2017-02-09 NOTE — PROGRESS NOTE ADULT - PROBLEM SELECTOR PROBLEM 3
Chronic obstructive pulmonary disease, unspecified COPD type
Chronic obstructive pulmonary disease, unspecified COPD type

## 2017-02-09 NOTE — PROGRESS NOTE ADULT - ASSESSMENT
45 yo man admitted with abdominal pain, n/v, hematemesis    -BID PPI  -Monitor CBC  -Patient refuses endoscopic evaluation because he does not want to be NPO  -Regular diet  -Elevated liver tests likely due to chronic HCV  -F/U HBV and HCV serologies  -F/U remainder of liver serologies to rule out concomittant liver disease; will also check HFE gene

## 2017-02-09 NOTE — PROGRESS NOTE ADULT - PROBLEM SELECTOR PLAN 4
MS contin 15mg po q12h + hydromorphone 2mg po q12h PRN breakthrough pain.  main management f/u outpatient.
pain management follow up outpatient.

## 2017-02-09 NOTE — PROGRESS NOTE ADULT - PROBLEM SELECTOR PLAN 2
(+) HBV + HCV.  order genotypes.  according to hx, has deferred treatment in the past.  repeat CMP for AM.  recommend GI f/u outpatient.
(+) HBV + HCV.  order genotypes.  according to hx, has deferred treatment in the past.  recommend GI f/u outpatient.

## 2017-02-10 VITALS — WEIGHT: 169.98 LBS

## 2017-02-10 LAB
ALBUMIN SERPL ELPH-MCNC: 2.7 G/DL — LOW (ref 3.3–5)
ALP SERPL-CCNC: 222 U/L — HIGH (ref 40–120)
ALT FLD-CCNC: 627 U/L — HIGH (ref 12–78)
ANION GAP SERPL CALC-SCNC: 6 MMOL/L — SIGNIFICANT CHANGE UP (ref 5–17)
AST SERPL-CCNC: 373 U/L — HIGH (ref 15–37)
BILIRUB SERPL-MCNC: 1.9 MG/DL — HIGH (ref 0.2–1.2)
BUN SERPL-MCNC: 9 MG/DL — SIGNIFICANT CHANGE UP (ref 7–23)
CALCIUM SERPL-MCNC: 8.2 MG/DL — LOW (ref 8.5–10.1)
CHLORIDE SERPL-SCNC: 110 MMOL/L — HIGH (ref 96–108)
CO2 SERPL-SCNC: 29 MMOL/L — SIGNIFICANT CHANGE UP (ref 22–31)
CREAT SERPL-MCNC: 0.63 MG/DL — SIGNIFICANT CHANGE UP (ref 0.5–1.3)
GLUCOSE SERPL-MCNC: 91 MG/DL — SIGNIFICANT CHANGE UP (ref 70–99)
HBV CORE AB SER-ACNC: REACTIVE
HBV E AB SER-ACNC: NEGATIVE — SIGNIFICANT CHANGE UP
HBV SURFACE AG SER-ACNC: REACTIVE
HCT VFR BLD CALC: 34.6 % — LOW (ref 39–50)
HCT VFR BLD CALC: 36.1 % — LOW (ref 39–50)
HCV GENTYP BLD NAA+PROBE: (no result)
HGB BLD-MCNC: 11.4 G/DL — LOW (ref 13–17)
HGB BLD-MCNC: 11.6 G/DL — LOW (ref 13–17)
LKM AB SER-ACNC: 0.4 UNITS — SIGNIFICANT CHANGE UP (ref 0–20)
MAGNESIUM SERPL-MCNC: 1.7 MG/DL — LOW (ref 1.8–2.4)
MCHC RBC-ENTMCNC: 29.9 PG — SIGNIFICANT CHANGE UP (ref 27–34)
MCHC RBC-ENTMCNC: 30.5 PG — SIGNIFICANT CHANGE UP (ref 27–34)
MCHC RBC-ENTMCNC: 32.2 GM/DL — SIGNIFICANT CHANGE UP (ref 32–36)
MCHC RBC-ENTMCNC: 32.8 GM/DL — SIGNIFICANT CHANGE UP (ref 32–36)
MCV RBC AUTO: 92.8 FL — SIGNIFICANT CHANGE UP (ref 80–100)
MCV RBC AUTO: 92.9 FL — SIGNIFICANT CHANGE UP (ref 80–100)
MITOCHONDRIA AB SER-ACNC: SIGNIFICANT CHANGE UP
PHOSPHATE SERPL-MCNC: 3.2 MG/DL — SIGNIFICANT CHANGE UP (ref 2.5–4.5)
PLATELET # BLD AUTO: 112 K/UL — LOW (ref 150–400)
PLATELET # BLD AUTO: 99 K/UL — LOW (ref 150–400)
POTASSIUM SERPL-MCNC: 3.5 MMOL/L — SIGNIFICANT CHANGE UP (ref 3.5–5.3)
POTASSIUM SERPL-SCNC: 3.5 MMOL/L — SIGNIFICANT CHANGE UP (ref 3.5–5.3)
PROT SERPL-MCNC: 6.1 GM/DL — SIGNIFICANT CHANGE UP (ref 6–8.3)
RBC # BLD: 3.72 M/UL — LOW (ref 4.2–5.8)
RBC # BLD: 3.89 M/UL — LOW (ref 4.2–5.8)
RBC # FLD: 14.5 % — SIGNIFICANT CHANGE UP (ref 10.3–14.5)
RBC # FLD: 14.5 % — SIGNIFICANT CHANGE UP (ref 10.3–14.5)
SMOOTH MUSCLE AB SER-ACNC: (no result)
SODIUM SERPL-SCNC: 145 MMOL/L — SIGNIFICANT CHANGE UP (ref 135–145)
TROPONIN I SERPL-MCNC: <0.015 NG/ML — SIGNIFICANT CHANGE UP (ref 0.01–0.04)
WBC # BLD: 6 K/UL — SIGNIFICANT CHANGE UP (ref 3.8–10.5)
WBC # BLD: 7.5 K/UL — SIGNIFICANT CHANGE UP (ref 3.8–10.5)
WBC # FLD AUTO: 6 K/UL — SIGNIFICANT CHANGE UP (ref 3.8–10.5)
WBC # FLD AUTO: 7.5 K/UL — SIGNIFICANT CHANGE UP (ref 3.8–10.5)

## 2017-02-10 PROCEDURE — 71010: CPT | Mod: 26

## 2017-02-10 RX ORDER — HYDROMORPHONE HYDROCHLORIDE 2 MG/ML
1 INJECTION INTRAMUSCULAR; INTRAVENOUS; SUBCUTANEOUS
Qty: 0 | Refills: 0 | COMMUNITY
Start: 2017-02-10

## 2017-02-10 RX ORDER — METOPROLOL TARTRATE 50 MG
1 TABLET ORAL
Qty: 0 | Refills: 0 | COMMUNITY

## 2017-02-10 RX ORDER — QUETIAPINE FUMARATE 200 MG/1
1 TABLET, FILM COATED ORAL
Qty: 0 | Refills: 0 | COMMUNITY
Start: 2017-02-10

## 2017-02-10 RX ORDER — DIVALPROEX SODIUM 500 MG/1
1 TABLET, DELAYED RELEASE ORAL
Qty: 0 | Refills: 0 | COMMUNITY
Start: 2017-02-10

## 2017-02-10 RX ORDER — MAGNESIUM OXIDE 400 MG ORAL TABLET 241.3 MG
400 TABLET ORAL ONCE
Qty: 0 | Refills: 0 | Status: COMPLETED | OUTPATIENT
Start: 2017-02-10 | End: 2017-02-10

## 2017-02-10 RX ORDER — DULOXETINE HYDROCHLORIDE 30 MG/1
1 CAPSULE, DELAYED RELEASE ORAL
Qty: 0 | Refills: 0 | COMMUNITY

## 2017-02-10 RX ORDER — GABAPENTIN 400 MG/1
2 CAPSULE ORAL
Qty: 0 | Refills: 0 | COMMUNITY
Start: 2017-02-10

## 2017-02-10 RX ORDER — MORPHINE SULFATE 50 MG/1
0 CAPSULE, EXTENDED RELEASE ORAL
Qty: 0 | Refills: 0 | COMMUNITY

## 2017-02-10 RX ORDER — METOPROLOL TARTRATE 50 MG
1 TABLET ORAL
Qty: 0 | Refills: 0 | COMMUNITY
Start: 2017-02-10

## 2017-02-10 RX ORDER — ATORVASTATIN CALCIUM 80 MG/1
1 TABLET, FILM COATED ORAL
Qty: 0 | Refills: 0 | COMMUNITY

## 2017-02-10 RX ORDER — DIVALPROEX SODIUM 500 MG/1
750 TABLET, DELAYED RELEASE ORAL
Qty: 0 | Refills: 0 | COMMUNITY

## 2017-02-10 RX ORDER — MORPHINE SULFATE 50 MG/1
1 CAPSULE, EXTENDED RELEASE ORAL
Qty: 0 | Refills: 0 | COMMUNITY
Start: 2017-02-10

## 2017-02-10 RX ORDER — MAGNESIUM SULFATE 500 MG/ML
1 VIAL (ML) INJECTION ONCE
Qty: 0 | Refills: 0 | Status: COMPLETED | OUTPATIENT
Start: 2017-02-10 | End: 2017-02-10

## 2017-02-10 RX ORDER — ASPIRIN/CALCIUM CARB/MAGNESIUM 324 MG
1 TABLET ORAL
Qty: 0 | Refills: 0 | COMMUNITY

## 2017-02-10 RX ORDER — ATORVASTATIN CALCIUM 80 MG/1
1 TABLET, FILM COATED ORAL
Qty: 0 | Refills: 0 | COMMUNITY
Start: 2017-02-10

## 2017-02-10 RX ORDER — SUCRALFATE 1 G
1 TABLET ORAL
Qty: 0 | Refills: 0 | Status: DISCONTINUED | OUTPATIENT
Start: 2017-02-10 | End: 2017-02-10

## 2017-02-10 RX ORDER — PANTOPRAZOLE SODIUM 20 MG/1
1 TABLET, DELAYED RELEASE ORAL
Qty: 28 | Refills: 0 | OUTPATIENT
Start: 2017-02-10 | End: 2017-02-24

## 2017-02-10 RX ORDER — DULOXETINE HYDROCHLORIDE 30 MG/1
1 CAPSULE, DELAYED RELEASE ORAL
Qty: 0 | Refills: 0 | COMMUNITY
Start: 2017-02-10

## 2017-02-10 RX ADMIN — HYDROMORPHONE HYDROCHLORIDE 2 MILLIGRAM(S): 2 INJECTION INTRAMUSCULAR; INTRAVENOUS; SUBCUTANEOUS at 02:46

## 2017-02-10 RX ADMIN — PANTOPRAZOLE SODIUM 40 MILLIGRAM(S): 20 TABLET, DELAYED RELEASE ORAL at 06:05

## 2017-02-10 RX ADMIN — Medication 1 PATCH: at 11:12

## 2017-02-10 RX ADMIN — DIVALPROEX SODIUM 500 MILLIGRAM(S): 500 TABLET, DELAYED RELEASE ORAL at 06:08

## 2017-02-10 RX ADMIN — HYDROMORPHONE HYDROCHLORIDE 2 MILLIGRAM(S): 2 INJECTION INTRAMUSCULAR; INTRAVENOUS; SUBCUTANEOUS at 05:21

## 2017-02-10 RX ADMIN — GABAPENTIN 800 MILLIGRAM(S): 400 CAPSULE ORAL at 06:06

## 2017-02-10 RX ADMIN — MAGNESIUM OXIDE 400 MG ORAL TABLET 400 MILLIGRAM(S): 241.3 TABLET ORAL at 02:46

## 2017-02-10 RX ADMIN — GABAPENTIN 800 MILLIGRAM(S): 400 CAPSULE ORAL at 13:30

## 2017-02-10 RX ADMIN — Medication 1 PATCH: at 11:15

## 2017-02-10 RX ADMIN — Medication 25 MILLIGRAM(S): at 06:06

## 2017-02-10 RX ADMIN — Medication 30 MILLILITER(S): at 00:57

## 2017-02-10 RX ADMIN — HYDROMORPHONE HYDROCHLORIDE 2 MILLIGRAM(S): 2 INJECTION INTRAMUSCULAR; INTRAVENOUS; SUBCUTANEOUS at 11:12

## 2017-02-10 RX ADMIN — Medication 1 GRAM(S): at 12:30

## 2017-02-10 RX ADMIN — MORPHINE SULFATE 15 MILLIGRAM(S): 50 CAPSULE, EXTENDED RELEASE ORAL at 08:35

## 2017-02-10 RX ADMIN — DULOXETINE HYDROCHLORIDE 60 MILLIGRAM(S): 30 CAPSULE, DELAYED RELEASE ORAL at 11:12

## 2017-02-10 RX ADMIN — Medication 1 TABLET(S): at 11:12

## 2017-02-10 RX ADMIN — Medication 10 MILLIGRAM(S): at 06:05

## 2017-02-10 RX ADMIN — MORPHINE SULFATE 15 MILLIGRAM(S): 50 CAPSULE, EXTENDED RELEASE ORAL at 06:05

## 2017-02-10 RX ADMIN — Medication 0.4 MILLIGRAM(S): at 00:00

## 2017-02-10 RX ADMIN — HYDROMORPHONE HYDROCHLORIDE 2 MILLIGRAM(S): 2 INJECTION INTRAMUSCULAR; INTRAVENOUS; SUBCUTANEOUS at 12:15

## 2017-02-10 RX ADMIN — Medication 100 MILLIGRAM(S): at 06:06

## 2017-02-10 NOTE — DISCHARGE NOTE ADULT - PLAN OF CARE
see below. (+) LOKI.  esophagitis.  GERD.  c/w Protonix.  GI follow up outpatient. continue with medications and Psychiatric outpatient follow. continue with medical management.  Cardiology follow up outpatient. continue inhalors.  tobacco cessation.  Pulmonary follow up outpatient. prescribed opioids recently, 01/17 and 01/30.  failed to schedule outpatient f/u up appointment after two opportunities.  will not renew MS Contin or Dilaudid at current time.

## 2017-02-10 NOTE — DISCHARGE NOTE ADULT - PATIENT PORTAL LINK FT
“You can access the [facility] Patient Portal, offered by [facility], by registering with the following website: [email or web address]”

## 2017-02-10 NOTE — PROGRESS NOTE ADULT - SUBJECTIVE AND OBJECTIVE BOX
HPI:  47 yo man admitted with nausea/vomiting and hematemesis. Patient was recently admitted for chest pain at , cath negative. Has seen GI there, but never treated for HCV and has declined evaluations in the past. He has narcotic dependence and developed n/v over the past 5 days. Prior to admission, he had blood in the vomitus with black coffee grounds. He has ongoing nausea, but no further bleeding. +epigastric pain. He is on aspirin and plavix. He was diagnosed with chronic HCV in the early 2000s. Believes virus was contracted from tattoo. Recently liver tests have been elevated. He says they are not sure this is due to HCV and planned to look for other etiologies. He has never had endoscopy. Has had dark stools recently, but reports lighter stools now and dark urine.     S/p EGD notable for distal esophagitis and gastritis. Had some chest pain last night. Mild nausea, abdominal discomfort. No problems tolerating PO. Some loose stools yesterday.    MEDICATIONS  (STANDING):  predniSONE   Tablet 10milliGRAM(s) Oral daily  diVALproex ER 500milliGRAM(s) Oral every 12 hours  gabapentin 800milliGRAM(s) Oral three times a day  DULoxetine 60milliGRAM(s) Oral daily  atorvastatin 20milliGRAM(s) Oral at bedtime  QUEtiapine XR 150milliGRAM(s) Oral at bedtime  metoprolol 25milliGRAM(s) Oral two times a day  fluticasone / salmeterol 250-50 MICROgram(s) Diskus 1Dose(s) Inhalation two times a day  docusate sodium 100milliGRAM(s) Oral three times a day  multivitamin 1Tablet(s) Oral daily  nicotine - 21 mG/24Hr(s) Patch 1patch Transdermal daily  pantoprazole    Tablet 40milliGRAM(s) Oral two times a day before meals  morphine ER Tablet 15milliGRAM(s) Oral every 12 hours  magnesium sulfate  IVPB 1Gram(s) IV Intermittent once  sucralfate suspension 1Gram(s) Oral Before meals and at bedtime    MEDICATIONS  (PRN):  ALBUTerol    90 MICROgram(s) HFA Inhaler 2Puff(s) Inhalation every 6 hours PRN Shortness of Breath  ondansetron Injectable 4milliGRAM(s) IV Push every 6 hours PRN Nausea  senna 2Tablet(s) Oral at bedtime PRN Constipation  HYDROmorphone   Tablet 2milliGRAM(s) Oral two times a day PRN Severe Pain (7 - 10)      Allergies    Aleve (Unknown)  Haldol (Anaphylaxis)  Motrin (Anaphylaxis)  NSAIDs (Flushing; Other (Moderate))  Stelazine (Unknown)  Thorazine (Other (Moderate))    Intolerances        REVIEW OF SYSTEMS    General: no unexpected weight loss    HEENT: no icterus    Respiratory and Thorax: no SOB  	  Cardiovascular: CP last night    Gastrointestinal: as above    Skin: no jaundice      Vital Signs Last 24 Hrs  T(C): 36.3, Max: 37 (02-09 @ 23:01)  T(F): 97.3, Max: 98.6 (02-09 @ 23:01)  HR: 68 (52 - 73)  BP: 118/63 (103/65 - 154/88)  BP(mean): --  RR: 17 (16 - 17)  SpO2: 96% (96% - 100%)    PHYSICAL EXAM:    Constitutional: NAD    HEENT: anicteric    Respiratory: CTA BL    Cardiovascular:  RRR    Gastrointestinal: soft ND +BS NTTP    Extremities: no LE edema    Neuro: no focal deficits    Skin: no jaundice      LABS:                        11.4   6.0   )-----------( 99       ( 10 Feb 2017 06:58 )             34.6     10 Feb 2017 06:58    145    |  110    |  9      ----------------------------<  91     3.5     |  29     |  0.63     Ca    8.2        10 Feb 2017 06:58  Phos  3.2       09 Feb 2017 23:44  Mg     1.7       09 Feb 2017 23:44    TPro  6.1    /  Alb  2.7    /  TBili  1.9    /  DBili  x      /  AST  373    /  ALT  627    /  AlkPhos  222    10 Feb 2017 06:58      LIVER FUNCTIONS - ( 10 Feb 2017 06:58 )  Alb: 2.7 g/dL / Pro: 6.1 gm/dL / ALK PHOS: 222 U/L / ALT: 627 U/L / AST: 373 U/L / GGT: x             RADIOLOGY & ADDITIONAL STUDIES:

## 2017-02-10 NOTE — PROGRESS NOTE ADULT - ASSESSMENT
47 yo man admitted with GI bleeding likely due to esophagitis. Elevated liver tests, +HBV, HCV. CP may be related to esophagitis.    -BID PPI  -carafate suspension x 2 weeks  -Monitor CBC  -Regular diet  -Elevated liver tests likely due to chronic HCV, HBV  -F/U remainder of autoimmune; will also check HFE gene  -Patient can follow up as outpt for remainder of work up.

## 2017-02-10 NOTE — DISCHARGE NOTE ADULT - CARE PLAN
Principal Discharge DX:	Hepatitis  Goal:	see below.  Instructions for follow-up, activity and diet:	(+) LOKI.  esophagitis.  GERD.  c/w Protonix.  GI follow up outpatient.  Secondary Diagnosis:	Bipolar affective disorder, remission status unspecified  Goal:	see below.  Instructions for follow-up, activity and diet:	continue with medications and Psychiatric outpatient follow.  Secondary Diagnosis:	Coronary artery disease without angina pectoris, unspecified vessel or lesion type, unspecified whether native or transplanted heart  Goal:	see below.  Instructions for follow-up, activity and diet:	continue with medical management.  Cardiology follow up outpatient.  Secondary Diagnosis:	Chronic obstructive pulmonary disease, unspecified COPD type  Goal:	see below.  Instructions for follow-up, activity and diet:	continue inhalors.  tobacco cessation.  Pulmonary follow up outpatient.  Secondary Diagnosis:	Chronic pain syndrome  Goal:	see below.  Instructions for follow-up, activity and diet:	prescribed opioids recently, 01/17 and 01/30.  failed to schedule outpatient f/u up appointment after two opportunities.  will not renew MS Contin or Dilaudid at current time.

## 2017-02-10 NOTE — DISCHARGE NOTE ADULT - MEDICATION SUMMARY - MEDICATIONS TO TAKE
I will START or STAY ON the medications listed below when I get home from the hospital:    predniSONE 10 mg oral tablet  -- 1 tab(s) by mouth once a day  -- Indication: For rheumatoid arthritis.    HYDROmorphone 2 mg oral tablet  -- 1 tab(s) by mouth 2 times a day, As needed, Severe Pain (7 - 10)  -- Indication: For Chronic pain syndrome    MS Contin 15 mg oral tablet, extended release  -- 1 tab(s) by mouth every 12 hours  -- Indication: For Chronic pain syndrome    divalproex sodium 500 mg oral tablet, extended release  -- 1 tab(s) by mouth every 12 hours  -- Indication: For Bipolar affective disorder, remission status unspecified    gabapentin 400 mg oral capsule  -- 2 cap(s) by mouth 3 times a day  -- Indication: For Bipolar affective disorder, remission status unspecified    DULoxetine 60 mg oral delayed release capsule  -- 1 cap(s) by mouth once a day  -- Indication: For Bipolar affective disorder, remission status unspecified    atorvastatin 20 mg oral tablet  -- 1 tab(s) by mouth once a day (at bedtime)  -- Indication: For Coronary artery disease involving native coronary artery of native heart without angina pectoris    Plavix 75 mg oral tablet  -- 1 tab(s) by mouth once a day  -- Indication: For Coronary artery disease involving native coronary artery of native heart without angina pectoris    QUEtiapine 150 mg oral tablet, extended release  -- 1 tab(s) by mouth once a day (at bedtime)  -- Indication: For Bipolar affective disorder, remission status unspecified    metoprolol tartrate 25 mg oral tablet  -- 1 tab(s) by mouth 2 times a day  -- Indication: For Coronary artery disease involving native coronary artery of native heart without angina pectoris    Advair Diskus 250 mcg-50 mcg inhalation powder  -- 1 puff(s) inhaled 2 times a day  -- Indication: For Chronic obstructive pulmonary disease, unspecified COPD type    ProAir HFA 90 mcg/inh inhalation aerosol  -- 2 puff(s) inhaled 4 times a day  -- Indication: For Chronic obstructive pulmonary disease, unspecified COPD type    pantoprazole 40 mg oral delayed release tablet  -- 1 tab(s) by mouth 2 times a day (before meals)  -- Indication: For Hematemesis.  gastritis.  chronic steroids.    nicotine 21 mg/24 hr transdermal film, extended release  -- 1 each by transdermal patch once a day  -- Indication: For Tobacco dependence.    Multiple Vitamins oral tablet  -- 1 tab(s) by mouth once a day  -- Indication: For Supplement.

## 2017-02-10 NOTE — DISCHARGE NOTE ADULT - SECONDARY DIAGNOSIS.
Bipolar affective disorder, remission status unspecified Coronary artery disease without angina pectoris, unspecified vessel or lesion type, unspecified whether native or transplanted heart Chronic obstructive pulmonary disease, unspecified COPD type Chronic pain syndrome

## 2017-02-10 NOTE — DISCHARGE NOTE ADULT - MEDICATION SUMMARY - MEDICATIONS TO STOP TAKING
I will STOP taking the medications listed below when I get home from the hospital:    Pepcid 20 mg oral tablet  -- 1 tab(s) by mouth 2 times a day

## 2017-02-11 ENCOUNTER — INPATIENT (INPATIENT)
Facility: HOSPITAL | Age: 47
LOS: 3 days | Discharge: HOME | End: 2017-02-15
Attending: FAMILY MEDICINE | Admitting: FAMILY MEDICINE
Payer: MEDICAID

## 2017-02-11 VITALS
DIASTOLIC BLOOD PRESSURE: 77 MMHG | RESPIRATION RATE: 16 BRPM | HEART RATE: 83 BPM | OXYGEN SATURATION: 99 % | HEIGHT: 66 IN | TEMPERATURE: 98 F | SYSTOLIC BLOOD PRESSURE: 125 MMHG | WEIGHT: 139.99 LBS

## 2017-02-11 DIAGNOSIS — L02.419 CUTANEOUS ABSCESS OF LIMB, UNSPECIFIED: Chronic | ICD-10-CM

## 2017-02-11 LAB
ALBUMIN SERPL ELPH-MCNC: 3 G/DL — LOW (ref 3.3–5)
ALP SERPL-CCNC: 227 U/L — HIGH (ref 40–120)
ALT FLD-CCNC: 711 U/L — HIGH (ref 12–78)
ANION GAP SERPL CALC-SCNC: 8 MMOL/L — SIGNIFICANT CHANGE UP (ref 5–17)
APTT BLD: 35 SEC — SIGNIFICANT CHANGE UP (ref 27.5–37.4)
AST SERPL-CCNC: 466 U/L — HIGH (ref 15–37)
BASOPHILS # BLD AUTO: 0 K/UL — SIGNIFICANT CHANGE UP (ref 0–0.2)
BASOPHILS NFR BLD AUTO: 0.6 % — SIGNIFICANT CHANGE UP (ref 0–2)
BILIRUB SERPL-MCNC: 2.3 MG/DL — HIGH (ref 0.2–1.2)
BUN SERPL-MCNC: 11 MG/DL — SIGNIFICANT CHANGE UP (ref 7–23)
CALCIUM SERPL-MCNC: 8.4 MG/DL — LOW (ref 8.5–10.1)
CHLORIDE SERPL-SCNC: 107 MMOL/L — SIGNIFICANT CHANGE UP (ref 96–108)
CK SERPL-CCNC: 26 U/L — SIGNIFICANT CHANGE UP (ref 26–308)
CO2 SERPL-SCNC: 31 MMOL/L — SIGNIFICANT CHANGE UP (ref 22–31)
CREAT SERPL-MCNC: 0.65 MG/DL — SIGNIFICANT CHANGE UP (ref 0.5–1.3)
EOSINOPHIL # BLD AUTO: 0.1 K/UL — SIGNIFICANT CHANGE UP (ref 0–0.5)
EOSINOPHIL NFR BLD AUTO: 1.9 % — SIGNIFICANT CHANGE UP (ref 0–6)
GLUCOSE SERPL-MCNC: 86 MG/DL — SIGNIFICANT CHANGE UP (ref 70–99)
HCT VFR BLD CALC: 34.8 % — LOW (ref 39–50)
HGB BLD-MCNC: 11.2 G/DL — LOW (ref 13–17)
IGG SERPL-MCNC: 1560 MG/DL — SIGNIFICANT CHANGE UP (ref 767–1590)
IGG1 SER-MCNC: 778 MG/DL — SIGNIFICANT CHANGE UP (ref 341–894)
IGG2 SER-MCNC: 371 MG/DL — SIGNIFICANT CHANGE UP (ref 171–632)
IGG3 SER-MCNC: 79.7 MG/DL — SIGNIFICANT CHANGE UP (ref 18.4–106)
IGG4 SER-MCNC: 18.8 MG/DL — SIGNIFICANT CHANGE UP (ref 2.4–121)
INR BLD: 1.38 RATIO — HIGH (ref 0.88–1.16)
LACTATE SERPL-SCNC: 0.8 MMOL/L — SIGNIFICANT CHANGE UP (ref 0.7–2)
LIDOCAIN IGE QN: 201 U/L — SIGNIFICANT CHANGE UP (ref 73–393)
LYMPHOCYTES # BLD AUTO: 2.3 K/UL — SIGNIFICANT CHANGE UP (ref 1–3.3)
LYMPHOCYTES # BLD AUTO: 34.9 % — SIGNIFICANT CHANGE UP (ref 13–44)
MCHC RBC-ENTMCNC: 29.4 PG — SIGNIFICANT CHANGE UP (ref 27–34)
MCHC RBC-ENTMCNC: 32.1 GM/DL — SIGNIFICANT CHANGE UP (ref 32–36)
MCV RBC AUTO: 91.7 FL — SIGNIFICANT CHANGE UP (ref 80–100)
MONOCYTES # BLD AUTO: 0.9 K/UL — SIGNIFICANT CHANGE UP (ref 0–0.9)
MONOCYTES NFR BLD AUTO: 13.8 % — SIGNIFICANT CHANGE UP (ref 2–14)
NEUTROPHILS # BLD AUTO: 3.2 K/UL — SIGNIFICANT CHANGE UP (ref 1.8–7.4)
NEUTROPHILS NFR BLD AUTO: 48.8 % — SIGNIFICANT CHANGE UP (ref 43–77)
PLATELET # BLD AUTO: 108 K/UL — LOW (ref 150–400)
POTASSIUM SERPL-MCNC: 3.6 MMOL/L — SIGNIFICANT CHANGE UP (ref 3.5–5.3)
POTASSIUM SERPL-SCNC: 3.6 MMOL/L — SIGNIFICANT CHANGE UP (ref 3.5–5.3)
PROT SERPL-MCNC: 6.5 GM/DL — SIGNIFICANT CHANGE UP (ref 6–8.3)
PROTHROM AB SERPL-ACNC: 15.2 SEC — HIGH (ref 10–13.1)
RBC # BLD: 3.8 M/UL — LOW (ref 4.2–5.8)
RBC # FLD: 15 % — HIGH (ref 10.3–14.5)
SODIUM SERPL-SCNC: 146 MMOL/L — HIGH (ref 135–145)
TROPONIN I SERPL-MCNC: <0.015 NG/ML — SIGNIFICANT CHANGE UP (ref 0.01–0.04)
WBC # BLD: 6.6 K/UL — SIGNIFICANT CHANGE UP (ref 3.8–10.5)
WBC # FLD AUTO: 6.6 K/UL — SIGNIFICANT CHANGE UP (ref 3.8–10.5)

## 2017-02-11 PROCEDURE — 93010 ELECTROCARDIOGRAM REPORT: CPT

## 2017-02-11 PROCEDURE — 99285 EMERGENCY DEPT VISIT HI MDM: CPT

## 2017-02-11 PROCEDURE — 71020: CPT | Mod: 26

## 2017-02-11 RX ORDER — HYDROMORPHONE HYDROCHLORIDE 2 MG/ML
0.5 INJECTION INTRAMUSCULAR; INTRAVENOUS; SUBCUTANEOUS ONCE
Qty: 0 | Refills: 0 | Status: DISCONTINUED | OUTPATIENT
Start: 2017-02-11 | End: 2017-02-11

## 2017-02-11 RX ORDER — FAMOTIDINE 10 MG/ML
20 INJECTION INTRAVENOUS ONCE
Qty: 0 | Refills: 0 | Status: COMPLETED | OUTPATIENT
Start: 2017-02-11 | End: 2017-02-11

## 2017-02-11 RX ORDER — NICOTINE POLACRILEX 2 MG
1 GUM BUCCAL DAILY
Qty: 0 | Refills: 0 | Status: DISCONTINUED | OUTPATIENT
Start: 2017-02-11 | End: 2017-02-15

## 2017-02-11 RX ORDER — HYDROMORPHONE HYDROCHLORIDE 2 MG/ML
1 INJECTION INTRAMUSCULAR; INTRAVENOUS; SUBCUTANEOUS ONCE
Qty: 0 | Refills: 0 | Status: DISCONTINUED | OUTPATIENT
Start: 2017-02-11 | End: 2017-02-11

## 2017-02-11 RX ORDER — ONDANSETRON 8 MG/1
4 TABLET, FILM COATED ORAL ONCE
Qty: 0 | Refills: 0 | Status: DISCONTINUED | OUTPATIENT
Start: 2017-02-11 | End: 2017-02-15

## 2017-02-11 RX ORDER — ONDANSETRON 8 MG/1
4 TABLET, FILM COATED ORAL ONCE
Qty: 0 | Refills: 0 | Status: COMPLETED | OUTPATIENT
Start: 2017-02-11 | End: 2017-02-11

## 2017-02-11 RX ORDER — MORPHINE SULFATE 50 MG/1
4 CAPSULE, EXTENDED RELEASE ORAL ONCE
Qty: 0 | Refills: 0 | Status: DISCONTINUED | OUTPATIENT
Start: 2017-02-11 | End: 2017-02-11

## 2017-02-11 RX ORDER — SODIUM CHLORIDE 9 MG/ML
3 INJECTION INTRAMUSCULAR; INTRAVENOUS; SUBCUTANEOUS ONCE
Qty: 0 | Refills: 0 | Status: COMPLETED | OUTPATIENT
Start: 2017-02-11 | End: 2017-02-11

## 2017-02-11 RX ADMIN — HYDROMORPHONE HYDROCHLORIDE 1 MILLIGRAM(S): 2 INJECTION INTRAMUSCULAR; INTRAVENOUS; SUBCUTANEOUS at 22:00

## 2017-02-11 RX ADMIN — HYDROMORPHONE HYDROCHLORIDE 0.5 MILLIGRAM(S): 2 INJECTION INTRAMUSCULAR; INTRAVENOUS; SUBCUTANEOUS at 16:28

## 2017-02-11 RX ADMIN — ONDANSETRON 4 MILLIGRAM(S): 8 TABLET, FILM COATED ORAL at 16:29

## 2017-02-11 RX ADMIN — FAMOTIDINE 20 MILLIGRAM(S): 10 INJECTION INTRAVENOUS at 13:06

## 2017-02-11 RX ADMIN — SODIUM CHLORIDE 3 MILLILITER(S): 9 INJECTION INTRAMUSCULAR; INTRAVENOUS; SUBCUTANEOUS at 13:18

## 2017-02-11 RX ADMIN — Medication 1 PATCH: at 16:29

## 2017-02-11 RX ADMIN — HYDROMORPHONE HYDROCHLORIDE 1 MILLIGRAM(S): 2 INJECTION INTRAMUSCULAR; INTRAVENOUS; SUBCUTANEOUS at 23:00

## 2017-02-11 RX ADMIN — MORPHINE SULFATE 4 MILLIGRAM(S): 50 CAPSULE, EXTENDED RELEASE ORAL at 15:05

## 2017-02-11 RX ADMIN — ONDANSETRON 4 MILLIGRAM(S): 8 TABLET, FILM COATED ORAL at 13:06

## 2017-02-11 RX ADMIN — MORPHINE SULFATE 4 MILLIGRAM(S): 50 CAPSULE, EXTENDED RELEASE ORAL at 14:35

## 2017-02-11 RX ADMIN — HYDROMORPHONE HYDROCHLORIDE 0.5 MILLIGRAM(S): 2 INJECTION INTRAMUSCULAR; INTRAVENOUS; SUBCUTANEOUS at 17:00

## 2017-02-11 NOTE — ED ADULT NURSE REASSESSMENT NOTE - NS ED NURSE REASSESS COMMENT FT1
pt c/o of nausea and pain came back to 7/10. Dr. Townsend  made aware, no orders given at this time. Will continue to monitor.

## 2017-02-11 NOTE — ED PROVIDER NOTE - OBJECTIVE STATEMENT
46m PMH CAD, COPD presents for hematemesis, vomiting x1 week. Pt was discharged from  ED a few days ago. Pt is still vomiting and reports CP, abdominal pain, milly, auditory hallucinations due to being unable to hold down his meds.

## 2017-02-11 NOTE — ED ADULT NURSE REASSESSMENT NOTE - NS ED NURSE REASSESS COMMENT FT1
Pt's pain know 4/10 in abd and back. Pt resting comfortable and VSS. Pt to be admitted. Awaiting orders and bed. Will continue to monitor

## 2017-02-11 NOTE — ED PROVIDER NOTE - DETAILS:
I, Patt Gardner, performed the initial face to face bedside interview with this patient regarding history of present illness, review of symptoms and relevant past medical, social and family history.  I completed an independent physical examination.  I was the initial provider who evaluated this patient. The history, relevant review of systems, past medical and surgical history, medical decision making, and physical examination was documented by the scribe in my presence and I attest to the accuracy of the documentation.

## 2017-02-12 LAB
APTT BLD: 38.7 SEC — HIGH (ref 27.5–37.4)
CK SERPL-CCNC: 23 U/L — LOW (ref 26–308)
ETHANOL SERPL-MCNC: <10 MG/DL — SIGNIFICANT CHANGE UP (ref 0–10)
HCT VFR BLD CALC: 36.2 % — LOW (ref 39–50)
HCT VFR BLD CALC: 36.6 % — LOW (ref 39–50)
HCT VFR BLD CALC: 37.5 % — LOW (ref 39–50)
HGB BLD-MCNC: 11.6 G/DL — LOW (ref 13–17)
HGB BLD-MCNC: 12.1 G/DL — LOW (ref 13–17)
HGB BLD-MCNC: 12.4 G/DL — LOW (ref 13–17)
INR BLD: 1.44 RATIO — HIGH (ref 0.88–1.16)
MCHC RBC-ENTMCNC: 29.7 PG — SIGNIFICANT CHANGE UP (ref 27–34)
MCHC RBC-ENTMCNC: 30.4 PG — SIGNIFICANT CHANGE UP (ref 27–34)
MCHC RBC-ENTMCNC: 30.5 PG — SIGNIFICANT CHANGE UP (ref 27–34)
MCHC RBC-ENTMCNC: 32 GM/DL — SIGNIFICANT CHANGE UP (ref 32–36)
MCHC RBC-ENTMCNC: 32.9 GM/DL — SIGNIFICANT CHANGE UP (ref 32–36)
MCHC RBC-ENTMCNC: 33.1 GM/DL — SIGNIFICANT CHANGE UP (ref 32–36)
MCV RBC AUTO: 92.1 FL — SIGNIFICANT CHANGE UP (ref 80–100)
MCV RBC AUTO: 92.3 FL — SIGNIFICANT CHANGE UP (ref 80–100)
MCV RBC AUTO: 92.6 FL — SIGNIFICANT CHANGE UP (ref 80–100)
PLATELET # BLD AUTO: 105 K/UL — LOW (ref 150–400)
PLATELET # BLD AUTO: 114 K/UL — LOW (ref 150–400)
PLATELET # BLD AUTO: 128 K/UL — LOW (ref 150–400)
PROTHROM AB SERPL-ACNC: 15.9 SEC — HIGH (ref 10–13.1)
RBC # BLD: 3.91 M/UL — LOW (ref 4.2–5.8)
RBC # BLD: 3.98 M/UL — LOW (ref 4.2–5.8)
RBC # BLD: 4.06 M/UL — LOW (ref 4.2–5.8)
RBC # FLD: 15.1 % — HIGH (ref 10.3–14.5)
RBC # FLD: 15.2 % — HIGH (ref 10.3–14.5)
RBC # FLD: 15.4 % — HIGH (ref 10.3–14.5)
TROPONIN I SERPL-MCNC: <0.015 NG/ML — SIGNIFICANT CHANGE UP (ref 0.01–0.04)
WBC # BLD: 6 K/UL — SIGNIFICANT CHANGE UP (ref 3.8–10.5)
WBC # BLD: 7 K/UL — SIGNIFICANT CHANGE UP (ref 3.8–10.5)
WBC # BLD: 7.5 K/UL — SIGNIFICANT CHANGE UP (ref 3.8–10.5)
WBC # FLD AUTO: 6 K/UL — SIGNIFICANT CHANGE UP (ref 3.8–10.5)
WBC # FLD AUTO: 7 K/UL — SIGNIFICANT CHANGE UP (ref 3.8–10.5)
WBC # FLD AUTO: 7.5 K/UL — SIGNIFICANT CHANGE UP (ref 3.8–10.5)

## 2017-02-12 PROCEDURE — 76700 US EXAM ABDOM COMPLETE: CPT | Mod: 26

## 2017-02-12 PROCEDURE — 93010 ELECTROCARDIOGRAM REPORT: CPT

## 2017-02-12 RX ORDER — HYDROMORPHONE HYDROCHLORIDE 2 MG/ML
2 INJECTION INTRAMUSCULAR; INTRAVENOUS; SUBCUTANEOUS
Qty: 0 | Refills: 0 | Status: DISCONTINUED | OUTPATIENT
Start: 2017-02-12 | End: 2017-02-13

## 2017-02-12 RX ORDER — SODIUM CHLORIDE 9 MG/ML
1000 INJECTION, SOLUTION INTRAVENOUS
Qty: 0 | Refills: 0 | Status: DISCONTINUED | OUTPATIENT
Start: 2017-02-12 | End: 2017-02-13

## 2017-02-12 RX ORDER — PANTOPRAZOLE SODIUM 20 MG/1
40 TABLET, DELAYED RELEASE ORAL
Qty: 0 | Refills: 0 | Status: DISCONTINUED | OUTPATIENT
Start: 2017-02-12 | End: 2017-02-13

## 2017-02-12 RX ORDER — MORPHINE SULFATE 50 MG/1
15 CAPSULE, EXTENDED RELEASE ORAL EVERY 12 HOURS
Qty: 0 | Refills: 0 | Status: DISCONTINUED | OUTPATIENT
Start: 2017-02-12 | End: 2017-02-15

## 2017-02-12 RX ORDER — QUETIAPINE FUMARATE 200 MG/1
150 TABLET, FILM COATED ORAL AT BEDTIME
Qty: 0 | Refills: 0 | Status: DISCONTINUED | OUTPATIENT
Start: 2017-02-12 | End: 2017-02-15

## 2017-02-12 RX ORDER — CLOPIDOGREL BISULFATE 75 MG/1
75 TABLET, FILM COATED ORAL DAILY
Qty: 0 | Refills: 0 | Status: DISCONTINUED | OUTPATIENT
Start: 2017-02-12 | End: 2017-02-15

## 2017-02-12 RX ORDER — DULOXETINE HYDROCHLORIDE 30 MG/1
60 CAPSULE, DELAYED RELEASE ORAL DAILY
Qty: 0 | Refills: 0 | Status: DISCONTINUED | OUTPATIENT
Start: 2017-02-12 | End: 2017-02-15

## 2017-02-12 RX ORDER — ATORVASTATIN CALCIUM 80 MG/1
20 TABLET, FILM COATED ORAL AT BEDTIME
Qty: 0 | Refills: 0 | Status: DISCONTINUED | OUTPATIENT
Start: 2017-02-12 | End: 2017-02-15

## 2017-02-12 RX ORDER — GABAPENTIN 400 MG/1
800 CAPSULE ORAL THREE TIMES A DAY
Qty: 0 | Refills: 0 | Status: DISCONTINUED | OUTPATIENT
Start: 2017-02-12 | End: 2017-02-15

## 2017-02-12 RX ORDER — DIVALPROEX SODIUM 500 MG/1
500 TABLET, DELAYED RELEASE ORAL EVERY 12 HOURS
Qty: 0 | Refills: 0 | Status: DISCONTINUED | OUTPATIENT
Start: 2017-02-12 | End: 2017-02-15

## 2017-02-12 RX ORDER — INFLUENZA VIRUS VACCINE 15; 15; 15; 15 UG/.5ML; UG/.5ML; UG/.5ML; UG/.5ML
0.5 SUSPENSION INTRAMUSCULAR ONCE
Qty: 0 | Refills: 0 | Status: DISCONTINUED | OUTPATIENT
Start: 2017-02-12 | End: 2017-02-15

## 2017-02-12 RX ORDER — FLUTICASONE PROPIONATE AND SALMETEROL 50; 250 UG/1; UG/1
1 POWDER ORAL; RESPIRATORY (INHALATION)
Qty: 0 | Refills: 0 | Status: DISCONTINUED | OUTPATIENT
Start: 2017-02-12 | End: 2017-02-15

## 2017-02-12 RX ORDER — ALBUTEROL 90 UG/1
2 AEROSOL, METERED ORAL EVERY 6 HOURS
Qty: 0 | Refills: 0 | Status: DISCONTINUED | OUTPATIENT
Start: 2017-02-12 | End: 2017-02-15

## 2017-02-12 RX ORDER — SUCRALFATE 1 G
1 TABLET ORAL
Qty: 0 | Refills: 0 | Status: DISCONTINUED | OUTPATIENT
Start: 2017-02-12 | End: 2017-02-15

## 2017-02-12 RX ORDER — METOPROLOL TARTRATE 50 MG
25 TABLET ORAL
Qty: 0 | Refills: 0 | Status: DISCONTINUED | OUTPATIENT
Start: 2017-02-12 | End: 2017-02-13

## 2017-02-12 RX ORDER — ONDANSETRON 8 MG/1
4 TABLET, FILM COATED ORAL EVERY 8 HOURS
Qty: 0 | Refills: 0 | Status: DISCONTINUED | OUTPATIENT
Start: 2017-02-12 | End: 2017-02-15

## 2017-02-12 RX ADMIN — HYDROMORPHONE HYDROCHLORIDE 2 MILLIGRAM(S): 2 INJECTION INTRAMUSCULAR; INTRAVENOUS; SUBCUTANEOUS at 23:50

## 2017-02-12 RX ADMIN — QUETIAPINE FUMARATE 150 MILLIGRAM(S): 200 TABLET, FILM COATED ORAL at 21:46

## 2017-02-12 RX ADMIN — HYDROMORPHONE HYDROCHLORIDE 2 MILLIGRAM(S): 2 INJECTION INTRAMUSCULAR; INTRAVENOUS; SUBCUTANEOUS at 11:55

## 2017-02-12 RX ADMIN — PANTOPRAZOLE SODIUM 40 MILLIGRAM(S): 20 TABLET, DELAYED RELEASE ORAL at 17:56

## 2017-02-12 RX ADMIN — CLOPIDOGREL BISULFATE 75 MILLIGRAM(S): 75 TABLET, FILM COATED ORAL at 11:52

## 2017-02-12 RX ADMIN — Medication 1 GRAM(S): at 06:42

## 2017-02-12 RX ADMIN — Medication 1 GRAM(S): at 17:54

## 2017-02-12 RX ADMIN — Medication 1 PATCH: at 11:47

## 2017-02-12 RX ADMIN — HYDROMORPHONE HYDROCHLORIDE 2 MILLIGRAM(S): 2 INJECTION INTRAMUSCULAR; INTRAVENOUS; SUBCUTANEOUS at 01:13

## 2017-02-12 RX ADMIN — GABAPENTIN 800 MILLIGRAM(S): 400 CAPSULE ORAL at 21:47

## 2017-02-12 RX ADMIN — Medication 1 GRAM(S): at 23:06

## 2017-02-12 RX ADMIN — DULOXETINE HYDROCHLORIDE 60 MILLIGRAM(S): 30 CAPSULE, DELAYED RELEASE ORAL at 11:52

## 2017-02-12 RX ADMIN — ATORVASTATIN CALCIUM 20 MILLIGRAM(S): 80 TABLET, FILM COATED ORAL at 21:46

## 2017-02-12 RX ADMIN — HYDROMORPHONE HYDROCHLORIDE 2 MILLIGRAM(S): 2 INJECTION INTRAMUSCULAR; INTRAVENOUS; SUBCUTANEOUS at 23:06

## 2017-02-12 RX ADMIN — MORPHINE SULFATE 15 MILLIGRAM(S): 50 CAPSULE, EXTENDED RELEASE ORAL at 08:13

## 2017-02-12 RX ADMIN — Medication 1 GRAM(S): at 11:47

## 2017-02-12 RX ADMIN — GABAPENTIN 800 MILLIGRAM(S): 400 CAPSULE ORAL at 06:40

## 2017-02-12 RX ADMIN — MORPHINE SULFATE 15 MILLIGRAM(S): 50 CAPSULE, EXTENDED RELEASE ORAL at 17:54

## 2017-02-12 RX ADMIN — HYDROMORPHONE HYDROCHLORIDE 2 MILLIGRAM(S): 2 INJECTION INTRAMUSCULAR; INTRAVENOUS; SUBCUTANEOUS at 13:43

## 2017-02-12 RX ADMIN — Medication 10 MILLIGRAM(S): at 06:42

## 2017-02-12 RX ADMIN — MORPHINE SULFATE 15 MILLIGRAM(S): 50 CAPSULE, EXTENDED RELEASE ORAL at 06:40

## 2017-02-12 RX ADMIN — Medication 25 MILLIGRAM(S): at 17:54

## 2017-02-12 RX ADMIN — DIVALPROEX SODIUM 500 MILLIGRAM(S): 500 TABLET, DELAYED RELEASE ORAL at 17:54

## 2017-02-12 RX ADMIN — Medication 1 TABLET(S): at 11:47

## 2017-02-12 RX ADMIN — DIVALPROEX SODIUM 500 MILLIGRAM(S): 500 TABLET, DELAYED RELEASE ORAL at 06:39

## 2017-02-12 RX ADMIN — SODIUM CHLORIDE 60 MILLILITER(S): 9 INJECTION, SOLUTION INTRAVENOUS at 05:08

## 2017-02-12 RX ADMIN — GABAPENTIN 800 MILLIGRAM(S): 400 CAPSULE ORAL at 14:49

## 2017-02-12 RX ADMIN — Medication 25 MILLIGRAM(S): at 06:41

## 2017-02-12 RX ADMIN — PANTOPRAZOLE SODIUM 40 MILLIGRAM(S): 20 TABLET, DELAYED RELEASE ORAL at 02:21

## 2017-02-12 NOTE — PROGRESS NOTE ADULT - ASSESSMENT
A/P  Recurrent nausea/vomiting with few streaks of blood  / and chest pain and epigastric pain likely secondary to esophagitis/ gastritis vs   r/o ACS vs  r/o [facility]oryweiss vs acute on chronic hepatitisC/HepatitisB   admit to tele  PARKER panel   bleeding is mild and likely traumatic  Dr Rodriguez to follow    monitor CBC q6 hrs for blood loss,currently H/H at baseline  IV PPI BID  carafate suspension  stool guaiac UA,ux ,  abd US neg  if symptoms worsen or develops fever would consider Ct abd/pelvis (no acute abd at this time ,afebrile,WBC wnl at this time)    NPO except meds  IVF     hx of CAD,HTN,HLD,bipolar,depression,anxiety,chronic pain syndrome ,COPD stable  continue home meds as per med reconcilliation    DVT prophylaxis   IPC  avoid heparin sc due to risk of bleeding      HBV and HCV DW pt  FU with primary gi

## 2017-02-12 NOTE — H&P ADULT - HISTORY OF PRESENT ILLNESS
History of Present Illness: 	  46 year old homeless male with history of chronic HCV, thrombocytopenia, anemia, rheumatoid arthritis, spinal fracture and peripheral neuropathy, hyperlipidemia, CAD s/p balloon angioplasty in past and recent negative coronary cath, hypertension, bipolar disorder, COPD, tobacco smoking, opioid abuse who was discharged on 1/31/17 from  (admitted for chest pain and had negative cath)     then went to [facility] for a scheduled sleep study  where he developed nausea and vomiting.recommended w/u of hepatitis there and signed out AMA    Patient was readmitted at  2/7/17 and discharged 2/10/17 for nausea and abdominal pain  and chest pain and 1 episode of coffee ground emesis  s/p EGD showed distal esophagitis and gastritis  and HepB+,Hep C+ with elevated LFT,patient was advised to continue PPI BID and sacralfate for 2 weeks by GI and f/u hepatitis w/u as outpatient     After discharge from  ,that was 2/10/17 at home began to have nausea and intermittent vomiting and retching with chest pain ,patient had minimal  few streaks of red blood in his last episode of vomiting at home.No further vomiting in ED    Pt is afebrile ,no diarrhea,LBM was 2/11/17 light brown in color, reports urine color is lighter than last admission,no SOB,no dizziness,no palpitations     patient c/o generalised body aches ,back aches ,joint pains which he says are chronic and attributes it to RA    Patient reports he needs stronger pain meds than MScontin and dilaudid which he uses at home.            Past Medical History:  Acid reflux    Anxiety    Back injury    CAD (coronary artery disease)    Chronic back pain    Chronic pain    COPD (chronic obstructive pulmonary disease)    Hep C w/o coma, chronic    High cholesterol    HTN (hypertension)    MI (myocardial infarction)    Mitral valve disorder    Mitral valve prolapse    Pain management.    Past Surgical History:  Abscess of arm        Family History:  No pertinent family history.    Social History:  Social History (marital status, living situation, occupation, tobacco use, alcohol and drug use, and sexual history): Homeless Smokes 1 pack of cig/day Opioid dependent Denies alcohol use

## 2017-02-12 NOTE — PROVIDER CONTACT NOTE (OTHER) - ASSESSMENT
Patient at risk of mild protein calorie malnutrition due to <50% PO inake of nutrient needs > 5 days, persistent nausea, vomiting and hx o HCV, CAD and HTN and 4.7% weight loss in 18 days.

## 2017-02-12 NOTE — CONSULT NOTE ADULT - SUBJECTIVE AND OBJECTIVE BOX
History of Present Illness: 	  46 year old homeless male with history of chronic HCV, thrombocytopenia, anemia, rheumatoid arthritis, spinal fracture and peripheral neuropathy, hyperlipidemia, CAD s/p balloon angioplasty in past and recent negative coronary cath, hypertension, bipolar disorder, COPD, tobacco smoking, opioid abuse who was discharged on 17 from  (admitted for chest pain and had negative cath)     then went to [facility] for a scheduled sleep study  where he developed nausea and vomiting.recommended w/u of hepatitis there and signed out AMA    Patient was readmitted at  17 and discharged 2/10/17 for nausea and abdominal pain  and chest pain and 1 episode of coffee ground emesis  s/p EGD showed distal esophagitis and gastritis  and HepB+,Hep C+ with elevated LFT,patient was advised to continue PPI BID and sacralfate for 2 weeks by GI and f/u hepatitis w/u as outpatient     After discharge from  ,that was 2/10/17 at home began to have nausea and intermittent vomiting and retching with chest pain ,patient had minimal  few streaks of red blood in his last episode of vomiting at home.No further vomiting in ED    Pt is afebrile ,no diarrhea,LBM was 17 light brown in color, reports urine color is lighter than last admission,no SOB,no dizziness,no palpitations     patient c/o generalised body aches ,back aches ,joint pains which he says are chronic and attributes it to RA    Patient reports he needs stronger pain meds than MScontin and dilaudid which he uses at home.    - no active chest complaints.  no vomiting today.  Last cardiac intervention was in .  hasn't had plavix since he was an inpatient and could keep his medication down.         Past Medical History:  Acid reflux    Anxiety    Back injury    CAD (coronary artery disease)    Chronic back pain    Chronic pain    COPD (chronic obstructive pulmonary disease)    Hep C w/o coma, chronic    High cholesterol    HTN (hypertension)    MI (myocardial infarction)    Mitral valve disorder    Mitral valve prolapse    Pain management.    Past Surgical History:  Abscess of arm        Family History:  No pertinent family history.    Social History:  Social History (marital status, living situation, occupation, tobacco use, alcohol and drug use, and sexual history): Homeless Smokes 1 pack of cig/day Opioid dependent Denies alcohol use (2017 02:07)      PAST MEDICAL & SURGICAL HISTORY:  Mitral valve disorder  CAD (coronary artery disease)  Pain management  Chronic pain  MI (myocardial infarction)  Chronic back pain  CAD (coronary artery disease)  HTN (hypertension)  Hep C w/o coma, chronic  COPD (chronic obstructive pulmonary disease)  Mitral valve prolapse  Acid reflux  CAD (coronary artery disease)  Back injury  Anxiety  High cholesterol  No significant past surgical history  Abscess of arm  Cardiac catheterization as cause of abnormal reaction of patient, or of later complication, without mention of misadventure at time of procedure: cardiac cath with one stent    MEDICATIONS  (STANDING):  nicotine - 21 mG/24Hr(s) Patch 1patch Transdermal daily  ondansetron Injectable 4milliGRAM(s) IV Push once  predniSONE   Tablet 10milliGRAM(s) Oral daily  morphine ER Tablet 15milliGRAM(s) Oral every 12 hours  diVALproex ER 500milliGRAM(s) Oral every 12 hours  gabapentin Oral Tab/Cap - Peds 800milliGRAM(s) Oral three times a day  DULoxetine 60milliGRAM(s) Oral daily  atorvastatin 20milliGRAM(s) Oral at bedtime  clopidogrel Tablet 75milliGRAM(s) Oral daily  QUEtiapine XR 150milliGRAM(s) Oral at bedtime  metoprolol 25milliGRAM(s) Oral two times a day  fluticasone / salmeterol 250-50 MICROgram(s) Diskus 1Dose(s) Inhalation two times a day  multivitamin 1Tablet(s) Oral daily  pantoprazole  Injectable 40milliGRAM(s) IV Push two times a day  sucralfate suspension 1Gram(s) Oral four times a day  dextrose 5% + sodium chloride 0.45%. 1000milliLiter(s) IV Continuous <Continuous>  influenza   Vaccine 0.5milliLiter(s) IntraMuscular once    MEDICATIONS  (PRN):  HYDROmorphone   Tablet 2milliGRAM(s) Oral two times a day PRN Severe Pain (7 - 10)  ALBUTerol    90 MICROgram(s) HFA Inhaler 2Puff(s) Inhalation every 6 hours PRN sob,wheezing  ondansetron Injectable 4milliGRAM(s) IV Push every 8 hours PRN nausea,vomiting    Allergies    Aleve (Unknown)  Haldol (Anaphylaxis)  Motrin (Anaphylaxis)  NSAIDs (Flushing; Other (Moderate))  Stelazine (Unknown)  Thorazine (Other (Moderate))    Intolerances      FAMILY HISTORY:  No pertinent family history        REVIEW OF SYSTEMS:    CONSTITUTIONAL: No weakness, fevers or chills  EYES/ENT: No visual changes;  No vertigo or throat pain   NECK: No pain or stiffness  RESPIRATORY: No cough, wheezing, hemoptysis; No shortness of breath  CARDIOVASCULAR: No chest pain or palpitations  GASTROINTESTINAL: No abdominal or epigastric pain. No nausea, vomiting, or hematemesis; No diarrhea or constipation. No melena or hematochezia.  GENITOURINARY: No dysuria, frequency or hematuria  NEUROLOGICAL: No numbness or weakness  SKIN: No itching, burning, rashes, or lesions   All other review of systems is negative unless indicated above      PHYSICAL EXAM:  Daily Height in cm: 167.64 (2017 12:34)    Daily Weight in k.3 (2017 10:25)  Vital Signs Last 24 Hrs  T(C): 36.7, Max: 36.9 ( @ 12:34)  T(F): 98.1, Max: 98.4 ( @ 12:34)  HR: 56 (56 - 83)  BP: 109/64 (109/64 - 140/75)  BP(mean): --  RR: 13 (13 - 18)  SpO2: 96% (96% - 100%)    Constitutional: NAD, awake and alert, well-developed  HEENT: PERR, EOMI, Normal Hearing, MMM  Neck: Soft and supple, No LAD, No JVD  Respiratory: Breath sounds are clear bilaterally, No wheezing, rales or rhonchi  Cardiovascular: S1 and S2, regular rate and rhythm, no Murmurs, gallops or rubs  Gastrointestinal: Bowel Sounds present, soft, nontender, nondistended, no guarding, no rebound  Extremities: No peripheral edema  Vascular: 2+ peripheral pulses  Neurological: A/O x 3, no focal deficits  Musculoskeletal: 5/5 strength b/l upper and lower extremities  Skin: No rashes    LABS: All Labs Reviewed:                        12.1   7.5   )-----------( 114      ( 2017 08:15 )             36.6     2017 13:42    146    |  107    |  11     ----------------------------<  86     3.6     |  31     |  0.65     Ca    8.4        2017 13:42    TPro  6.5    /  Alb  3.0    /  TBili  2.3    /  DBili  x      /  AST  466    /  ALT  711    /  AlkPhos  227    2017 13:42    PT/INR - ( 2017 02:42 )   PT: 15.9 sec;   INR: 1.44 ratio         PTT - ( 2017 02:42 )  PTT:38.7 sec  CARDIAC MARKERS ( 2017 02:42 )  <0.015 ng/mL / x     / 23 U/L / x     / x      CARDIAC MARKERS ( 2017 17:12 )  <0.015 ng/mL / x     / x     / x     / x      CARDIAC MARKERS ( 2017 13:42 )  <0.015 ng/mL / x     / 26 U/L / x     / x          Blood Culture: Organism --  Gram Stain Blood -- Gram Stain --  Specimen Source .Urine None  Culture-Blood --        RADIOLOGY/EKG: NSR, No ischemic changes.    CARDIOLOGY TESTING:  PATIENT: STAN VEGA  : 1970   AGE: 45 (M)   MR#: [id number]  STUDY DATE: 2016  LOCATION: [address] PHYSICIAN(S):  BARRIE MANRIQUEZ MD  FELLOW: PRAVEENA Guerrero  ------------------------------------------------------------------------    TYPE OF TEST: Rest/Stress Pharmacologic  INDICATION: Chest pain, unspecified (R07.9)  ------------------------------------------------------------------------  HISTORY:  CARDIAC HISTORY: 45 years old male with HLD, smoker (20  pack year) presents for ischemic evlauation of chest pain.  OTHER HISTORY: Hep C, Bipolar Disorder, RA, COPD  RISK FACTORS: Smoker, Elevated Cholesterol  MEDICATIONS: Plavix, Lipitor, Klonopin, Neurontin, Imdur,  Lithium, Nicotine, Nitrostat, [id number]  ------------------------------------------------------------------------    BASELINE ELECTROCARDIOGRAM:  Rhythm: Normal Sinus Rhythm - 68 BPM    ------------------------------------------------------------------------    HEMODYNAMIC PARAMETERS:                          HR      BP  Baseline  Pre-Injection             71  137/82  00:00     Inject Regadenoson         0  00:30     Post Injection             0  01:00     Post Injection           100  116/81  02:00     Post Injection           101 126/75  03:00     Post Injection            99  109/61  04:00     Post Injection            90  116/65  05:00     Recovery                  89  122/73  06:00     Recovery                  91  122/74  07:00     Recovery                  91  121/74  ------------------------------------------------------------------------    Agent: Regadenoson 0.4 mg/5 ml NS. injected over 10 sec.  HR: Baseline HR: 71 bpm   Peak HR: 101 bpm (57% of MPHR)  MPHR: 176 bpm   85% of MPHR: 150 bpm  BP: Baseline BP: 137/82 mmHg   Peak BP: 137/82 mmHg   Peak  RPP: 31082 (Rate Pressure Product)  Last Caffeine intake: 12 hrs  Terminated: Completion of protocol  ------------------------------------------------------------------------    SYMPTOMS/FINDINGS:  Symptoms: No Symptom  Chest pain: No chest pain with administration of  Regadenoson  ------------------------------------------------------------------------    ECG ABNORMALITIES DURING/AFTER STRESS:   Abnormalities: There were no diagnostic changes.  ------------------------------------------------------------------------    NEW ARRHYTHMIAS DEVELOPED DURING/AFTER STRESS:  None  ------------------------------------------------------------------------    STRESS TEST IMPRESSIONS:  Inability to exercise due to lethargy.  Chest Pain: No chest pain with administration of  Regadenoson.  Symptom: No Symptom.  HR Response: Appropriate.  BP Response: Appropriate.  Heart Rhythm: Normal Sinus Rhythm - 68 BPM.  ECG Abnormalities: There were no diagnostic changes.  Arrhythmia: None.    ------------------------------------------------------------------------    PROCEDURE:  9.6 mCi of Tc99m Sestamibi were injected at rest.  Approximately 45 minutes later, tomographic images were  obtained in a 180 degree arc from rightanterior oblique  to left anterior oblique with 64 stops. At a separate  time, 30.5 mCi of Tc99m Sestamibi were injected during  stress protocol. Approximately 45 minute(s) later,  tomographic images were obtained in a 180 degree arc from  right anterior oblique to left anterior oblique with 64  stops. The tomographic slices were reconstructed in 3  orthogonal planes (short axis, horizontal long axis and  vertical long axis).  Interpretation was performed both by  visual and quantitative analysis.    ------------------------------------------------------------------------    NUCLEAR FINDINGS:  Review of raw data shows: Diaphragmatic artifact., Chest  wall attenuation is noted.  The left ventricle was mildly dilated LV at baseline.  There is asmall, mild defect in apical wall that is  predominantly fixed, suggestive of no clear evidence of  ischemia or infarct consistent with a prominent apical  slit..There is a small, mild defect in basal lateral wall  that is predominantly fixed, suggestive of no clear  evidence of ischemia or infarct consistent with  attenuation artifact.  ------------------------------------------------------------------------      GATED ANALYSIS:  Global hypokinesis without regional wall motion  abnormalities suggestive of non-ischemic  cardiomyopathy.  Left ventricular dysfunction: 41%.  ------------------------------------------------------------------------    IMPRESSIONS:Abnormal; Non-ischemic study Study  Inability to exercise due to lethargy.  No Symptom.No diagnostic ECG changes.  The left ventricle was mildly dilated LV at baseline. No  ischemia/infarction.  Global hypokinesis without regional wall motion  abnormalities suggestive of non-ischemic  cardiomyopathy.  Left ventricular dysfunction: 41%.  ------------------------------------------------------------------------      ------------------------------------------------------------------------    Confirmed on  2016 - 14:06:24 by Jose Ramos MD   Cardiology Fellow: PRAVEENA Guerrero    [facility]  [address] [address] [place] [address]  [phone]  Cath Lab Report -- Comprehensive Report  Patient: STAN VEGA  Study date: 2015  Account number: [id number]  MR number: [id number]  : 1970  Gender: Male  Race: Unknown  Case Physician(s):  Adis Moreno M.D.  Fellow:  Ramón Abernathy M.D.  Referring Physician:  Arpan Aburto M.D.  INDICATIONS: Cardiac: chest pain.  HISTORY: The patient has a history of coronary artery disease. The patient  has a family history of coronary artery disease.  PROCEDURE:  --  Left coronary angiography.  --  Right coronary angiography.  --  Left heart catheterization with ventriculography.  TECHNIQUE: The risks and alternatives of the procedures and conscious  sedation were explained to the patient and informed consent was obtained.  Cardiac catheterization performed electively.  Right radial artery access. A 6 Fr. X 7cm Prelude Radial Kit was inserted  in the vessel. Left coronary artery angiography. Thevessel was injected  utilizing a 5fr FL 3.5 Expo catheter. Right coronary artery angiography.  The vessel was injected utilizing a 5fr FR 4 Expo catheter. Left heart  catheterization. Ventriculography was performed. A 5fr FR 4 Expo catheter  was utilized. After recording ascending aortic pressure, the catheter was  advanced across the aortic valve and left ventricular pressure was  recorded. RADIATION EXPOSURE: 5.5 min.  CONTRAST GIVEN: 35 ml Optiray.  MEDICATIONS GIVEN: Verapamil (Isoptin, Calan, Covera), 2.5 mg, IA. Heparin,  3000 units, IV. 2% Lidocaine, 3 ml, subcutaneously. 0.9% Normal saline, 12  ml, IV.  VENTRICLES: Global left ventricular function was mildly depressed (global  hypokinesis). EF estimated was 45 %.  CORONARY VESSELS: The coronary circulation is right dominant.  LM:   --  LM: Normal.  LAD:   --  LAD: Normal.  CX:   --  Circumflex: Normal.  RCA:   --  RCA: Normal.  COMPLICATIONS: There were no complications.  DIAGNOSTIC RECOMMENDATIONS: Medical management is recommended.  Prepared and signed by  Adis Moreno M.D.  Signed 2015 11:19:35  HEMODYNAMIC TABLES  Pressures:  Baseline  Pressures:  - HR: 64  Pressures:  - Rhythm:  Pressures:  -- Aortic Pressure (S/D/M): 109/58/79  Pressures:  -- Left Ventricle (s/edp): 107/9/--  Pressures:  Post Angio  Pressures:  - HR: 63  Pressures:  - Rhythm:  Pressures:  -- Aortic Pressure (S/D/M): 111/68/87  Pressures:  -- Left Ventricle (s/edp): 108/17/--  Outputs:  Baseline  Outputs:  -- CALCULATIONS[age]  Outputs:  -- CALCULATIONS: Body Surface Area: 1.97  Outputs:  -- CALCULATIONS: Height in cm: 185.00  Outputs:  -- CALCULATIONS: Sex: Male  Outputs:  -- CALCULATIONS: Weight in k.90  Outputs:  -- OUTPUTS: O2 consumption: 246.12  Outputs:  -- OUTPUTS: Vo2 Indexed: 125.00  Outputs:  Post Angio  Outputs:  -- OUTPUTS: O2 consumption: 246.12  Outputs:  -- OUTPUTS: Vo2 Indexed: 125.00

## 2017-02-12 NOTE — H&P ADULT - ASSESSMENT
History of Present Illness: 	  46 year old homeless male with history of chronic HCV, thrombocytopenia, anemia, rheumatoid arthritis, spinal fracture and peripheral neuropathy, hyperlipidemia, CAD s/p balloon angioplasty in past and recent negative coronary cath, hypertension, bipolar disorder, COPD, tobacco smoking, opioid abuse who was discharged on 1/31/17 from  (admitted for chest pain and had negative cath)     then went to [facility] for a scheduled sleep study  where he developed nausea and vomiting.recommended w/u of hepatitis there and signed out AMA    Patient was readmitted at  2/7/17 and discharged 2/10/17 for nausea and abdominal pain  and chest pain and 1 episode of coffee ground emesis  s/p EGD showed distal esophagitis and gastritis  and HepB+,Hep C+ with elevated LFT,patient was advised to continue PPI BID and sacralfate for 2 weeks by GI and f/u hepatitis w/u as outpatient     After discharge from  ,that was 2/10/17 at home began to have nausea and intermittent vomiting and retching with chest pain ,patient had minimal  few streaks of red blood in his last episode of vomiting at home.No further vomiting in ED    Pt is afebrile ,no diarrhea,LBM was 2/11/17 light brown in color, reports urine color is lighter than last admission,no SOB,no dizziness,no palpitations     patient c/o generalised body aches ,back aches ,joint pains which he says are chronic and attributes it to RA    Patient reports he needs stronger pain meds than MScontin and dilaudid which he uses at home.    In ED EKG NSR,no significant  ST/T changes   cardiac enzymes x2 negative  lactate neg,no leucocytosis    A/P  Recurrent nausea/vomiting with few streaks of blood  / and chest pain and epigastric pain likely secondary to esophagitis/ gastritis vs   r/o ACS vs  r/o malloryweiss vs acute on chronic hepatitisC/HepatitisB   admit to tele  PARKER panel   hold plavix for now due to risk of GI bleed   resume plavix if no drop in h/h after 24 hours or if no Gi procedure planned   monitor CBC q6 hrs for blood loss,currently H/H at baseline  IV PPI BID  carafate suspension  stool guaiac UA,ux ,  abd US r/o gall stones r/o cholecystitis  if symptoms worsen or develops fever would consider Ct abd/pelvis (no acute abd at this time ,afebrile,WBC wnl at this time)  GI consult   NPO except meds  IVF     hx of CAD,HTN,HLD,bipolar,depression,anxiety,chronic pain syndrome ,COPD stable  continue home meds as per med reconcilliation    DVT prophylaxis   IPC  avoid heparin sc due to risk of bleeding

## 2017-02-12 NOTE — DIETITIAN INITIAL EVALUATION ADULT. - PERTINENT LABORATORY DATA
dextrose 5% + NaCl 0.45% at 60mL/hr, albuterol, lipitor, depakote, cymbalta, neurontin, lopressor, MS contin, MVI, zofran, protonix

## 2017-02-12 NOTE — PROGRESS NOTE ADULT - ASSESSMENT
46y old  Male who presents with a chief complaint of vomiting /chest pain  Problem/Plan - 1:  ·  Problem: Hematemesis with nausea.    Plan: no further episodes.    HH stable.    continue with PPI PO q12h.    GI input appreciated.  Advance Diet to Clears     Problem/Plan - 2:  ·  Problem: Hepatitis.    Plan: (+) HBV + HCV.  order genotypes.    according to hx, has deferred treatment in the past.    recommend GI f/u outpatient.     Problem/Plan - 3:  ·  Problem: Chronic obstructive pulmonary disease, unspecified COPD type.    Plan: stable.    c/w LABA/ICS + SUSANNAH neb PRN.     Problem/Plan - 4:  ·  Problem: Chronic pain syndrome.    Plan: MS contin 15mg po q12h + hydromorphone 2mg po q12h PRN breakthrough pain.    pain management f/u outpatient.     Problem/Plan - 5:  ·  Problem: Bipolar affective disorder, remission status unspecified.    Plan: check VA level.  c/w VA + Cymbalta + Seroquel.

## 2017-02-12 NOTE — DIETITIAN INITIAL EVALUATION ADULT. - NS AS NUTRI DX NUTRIENT
Malnutrition/Inadequate protein-energy intake/Patient at risk of mild protein calorie malnutrition due to <50% PO inake of nutrient needs > 5 days, persistent nasuea, vomtiing and hx o HCV, CAD and HTN. Malnutrition/Inadequate protein-energy intake/Patient at risk of mild protein calorie malnutrition due to <50% PO inake of nutrient needs > 5 days, persistent nasuea, vomtiing and hx o HCV, CAD and HTN and 4.7% weight loss in 18 days.

## 2017-02-12 NOTE — PROGRESS NOTE ADULT - SUBJECTIVE AND OBJECTIVE BOX
Patient is a 46y old  Male who presents with a chief complaint of vomiting /chest pain (12 Feb 2017 02:07)      HPI:  History of Present Illness: 	  46 year old homeless male, seen by Dr Rodriguez recently with history of chronic HCV, thrombocytopenia, anemia, rheumatoid arthritis, spinal fracture and peripheral neuropathy, hyperlipidemia, CAD s/p balloon angioplasty in past and recent negative coronary cath, hypertension, bipolar disorder, COPD, tobacco smoking, opioid abuse who was discharged on 1/31/17 from  (admitted for chest pain and had negative cath)     then went to [facility] for a scheduled sleep study  where he developed nausea and vomiting.recommended w/u of hepatitis there and signed out AMA    Patient was readmitted at  2/7/17 and discharged 2/10/17 for nausea and abdominal pain  and chest pain and 1 episode of coffee ground emesis  s/p EGD showed distal esophagitis and gastritis  and HepB+,Hep C+ with elevated LFT,patient was advised to continue PPI BID and sacralfate for 2 weeks by GI and f/u hepatitis w/u as outpatient     After discharge from  ,that was 2/10/17 at home began to have nausea and intermittent vomiting and retching with chest pain ,patient had minimal  few streaks of red blood in his last episode of vomiting at home.No further vomiting in ED  NV now improved and feels hungry  neg cp or sob now    Pt is afebrile ,no diarrhea,LBM was 2/11/17 light brown in color, reports urine color is lighter than last admission,no SOB,no dizziness,no palpitations     patient c/o generalised body aches ,back aches ,joint pains which he says are chronic and attributes it to RA    Patient reports he needs stronger pain meds than MScontin and dilaudid which he uses at home.            Past Medical History:  Acid reflux    Anxiety    Back injury    CAD (coronary artery disease)    Chronic back pain    Chronic pain    COPD (chronic obstructive pulmonary disease)    Hep C w/o coma, chronic    High cholesterol    HTN (hypertension)    MI (myocardial infarction)    Mitral valve disorder    Mitral valve prolapse    Pain management.    Past Surgical History:  Abscess of arm        Family History:  No pertinent family history.    Social History:  Social History (marital status, living situation, occupation, tobacco use, alcohol and drug use, and sexual history): Homeless Smokes 1 pack of cig/day Opioid dependent Denies alcohol use (12 Feb 2017 02:07)      PAST MEDICAL & SURGICAL HISTORY:  Mitral valve disorder  CAD (coronary artery disease)  Pain management  Chronic pain  MI (myocardial infarction)  Chronic back pain  CAD (coronary artery disease)  HTN (hypertension)  Hep C w/o coma, chronic  COPD (chronic obstructive pulmonary disease)  Mitral valve prolapse  Acid reflux  CAD (coronary artery disease)  Back injury  Anxiety  High cholesterol  No significant past surgical history  Abscess of arm  Cardiac catheterization as cause of abnormal reaction of patient, or of later complication, without mention of misadventure at time of procedure: cardiac cath with one stent      MEDICATIONS  (STANDING):  nicotine - 21 mG/24Hr(s) Patch 1patch Transdermal daily  ondansetron Injectable 4milliGRAM(s) IV Push once  predniSONE   Tablet 10milliGRAM(s) Oral daily  morphine ER Tablet 15milliGRAM(s) Oral every 12 hours  diVALproex ER 500milliGRAM(s) Oral every 12 hours  gabapentin Oral Tab/Cap - Peds 800milliGRAM(s) Oral three times a day  DULoxetine 60milliGRAM(s) Oral daily  atorvastatin 20milliGRAM(s) Oral at bedtime  clopidogrel Tablet 75milliGRAM(s) Oral daily  QUEtiapine XR 150milliGRAM(s) Oral at bedtime  metoprolol 25milliGRAM(s) Oral two times a day  fluticasone / salmeterol 250-50 MICROgram(s) Diskus 1Dose(s) Inhalation two times a day  multivitamin 1Tablet(s) Oral daily  pantoprazole  Injectable 40milliGRAM(s) IV Push two times a day  sucralfate suspension 1Gram(s) Oral four times a day  dextrose 5% + sodium chloride 0.45%. 1000milliLiter(s) IV Continuous <Continuous>  influenza   Vaccine 0.5milliLiter(s) IntraMuscular once    MEDICATIONS  (PRN):  HYDROmorphone   Tablet 2milliGRAM(s) Oral two times a day PRN Severe Pain (7 - 10)  ALBUTerol    90 MICROgram(s) HFA Inhaler 2Puff(s) Inhalation every 6 hours PRN sob,wheezing  ondansetron Injectable 4milliGRAM(s) IV Push every 8 hours PRN nausea,vomiting      Allergies    Aleve (Unknown)  Haldol (Anaphylaxis)  Motrin (Anaphylaxis)  NSAIDs (Flushing; Other (Moderate))  Stelazine (Unknown)  Thorazine (Other (Moderate))    Intolerances        SOCIAL HISTORY: multiple tattoo    FAMILY HISTORY:  No pertinent family history      REVIEW OF SYSTEMS:    CONSTITUTIONAL: No weakness, fevers or chills  EYES/ENT: No visual changes;  No vertigo or throat pain   NECK: No pain or stiffness  RESPIRATORY: No cough, wheezing, hemoptysis; No shortness of breath  CARDIOVASCULAR: No chest pain or palpitations  GENITOURINARY: No dysuria, frequency or hematuria  NEUROLOGICAL: No numbness or weakness  SKIN: No itching, burning, rashes, or lesions   All other review of systems is negative unless indicated above.    Vital Signs Last 24 Hrs  T(C): 36.7, Max: 36.9 (02-11 @ 17:23)  T(F): 98.1, Max: 98.4 (02-11 @ 17:23)  HR: 56 (56 - 78)  BP: 109/64 (109/64 - 140/75)  BP(mean): --  RR: 13 (13 - 18)  SpO2: 96% (96% - 100%)    PHYSICAL EXAM:    Constitutional: NAD, well-developed  HEENT: EOMI, throat clear  Neck: No LAD, supple  Respiratory: CTA and P  Cardiovascular: S1 and S2, RRR, no M  Gastrointestinal: BS+, soft, NT/ND, neg HSM,  Extremities: No peripheral edema, neg clubing, cyanosis  Vascular: 2+ peripheral pulses  Neurological: A/O x 3, no focal deficits  Psychiatric: Normal mood, normal affect  Skin: No rashes    LABS:  CBC Full  -  ( 12 Feb 2017 08:15 )  WBC Count : 7.5 K/uL  Hemoglobin : 12.1 g/dL  Hematocrit : 36.6 %  Platelet Count - Automated : 114 K/uL  Mean Cell Volume : 92.1 fl  Mean Cell Hemoglobin : 30.5 pg  Mean Cell Hemoglobin Concentration : 33.1 gm/dL  Auto Neutrophil # : x  Auto Lymphocyte # : x  Auto Monocyte # : x  Auto Eosinophil # : x  Auto Basophil # : x  Auto Neutrophil % : x  Auto Lymphocyte % : x  Auto Monocyte % : x  Auto Eosinophil % : x  Auto Basophil % : x    11 Feb 2017 13:42    146    |  107    |  11     ----------------------------<  86     3.6     |  31     |  0.65     Ca    8.4        11 Feb 2017 13:42    TPro  6.5    /  Alb  3.0    /  TBili  2.3    /  DBili  x      /  AST  466    /  ALT  711    /  AlkPhos  227    11 Feb 2017 13:42    PT/INR - ( 12 Feb 2017 02:42 )   PT: 15.9 sec;   INR: 1.44 ratio         PTT - ( 12 Feb 2017 02:42 )  PTT:38.7 sec    02-09 @ 11:57  Hep A Igm --  Hep A total ab, IgA and M --  Hep B core Ab total Reactive  Hep B core IgM --  Hep B DNA PCR --  Hep BSAg --  Hep BSAb --  Hep BSAb --  HCV Ab --  HCV RNA Log --  HCV RNA interp --            02-08 @ 07:02  Hep A Igm Nonreact  Hep A total ab, IgA and M --  Hep B core Ab total --  Hep B core IgM Reactive  Hep B DNA PCR --  Hep BSAg --  Hep BSAb --  Hep BSAb --  HCV Ab --  HCV RNA Log 5.26  HCV RNA interp --            02-07 @ 17:32  Hep A Igm Nonreact  Hep A total ab, IgA and M --  Hep B core Ab total --  Hep B core IgM Reactive  Hep B DNA PCR --  Hep BSAg --  Hep BSAb --  Hep BSAb --  HCV Ab --  HCV RNA Log --  HCV RNA interp --                RADIOLOGY & ADDITIONAL STUDIES:XAM:  US COMPLETE ABDOMEN SONOGRAM                            PROCEDURE DATE:  02/12/2017        INTERPRETATION:  Abdominal sonogram dated 2/12/2017.    CLINICAL INFORMATION: Stomach pain.    TECHNIQUE: Grayscale and Doppler images of the upper abdomen are   performed.     The study is correlated with prior sonograms from 2/7/2017 and 1/28/2017.    FINDINGS:    The visualized segments of the aorta and IVC are unremarkable.    The liver is mildly enlarged with mild increased echogenicity relative to   the right kidney likely reflective of mild hepatic steatosis. No discrete   hepatic lesion is identified. There is normal vascular flow within the   main portal vein. The gallbladder is contracted with apparent gallbladder   wall thickening. There are no gallstones or gallbladder sludge.   Sonographic Neff's sign is unreliable in the setting of premedication   with pain meds. The common bile duct is not dilated and measures up to 4   mm. There is no evidence of intrahepatic biliary ductal dilatation.    The visualized portions of the pancreas are unremarkable.    The right kidney measures 13 cm in length and the left kidney measures   13.2 cm in length. There is no right or left hydronephrosis.    The spleen is enlarged measuring up to 17.8 cm. There is normal   echotexture of the spleen.    There is no upper abdominal ascites.    IMPRESSION:    Apparent gallbladder wall thickening likely due to contraction of the   gallbladder. No gallstones or gallbladder sludge. Sonographic Neff's   sign is unreliable in the setting of premedication with pain meds.    Mild hepatosplenomegaly and hepatic steatosis.

## 2017-02-12 NOTE — PROGRESS NOTE ADULT - SUBJECTIVE AND OBJECTIVE BOX
Patient is a 46y old  Male who presents with a chief complaint of vomiting /chest pain (12 Feb 2017 02:07)        HPI:  History of Present Illness: 	  46 year old homeless male with history of chronic HCV, thrombocytopenia, anemia, rheumatoid arthritis, spinal fracture and peripheral neuropathy, hyperlipidemia, CAD s/p balloon angioplasty in past and recent negative coronary cath, hypertension, bipolar disorder, COPD, tobacco smoking, opioid abuse who was discharged on 1/31/17 from  (admitted for chest pain and had negative cath)     then went to [facility] for a scheduled sleep study  where he developed nausea and vomiting.recommended w/u of hepatitis there and signed out AMA    Patient was readmitted at  2/7/17 and discharged 2/10/17 for nausea and abdominal pain  and chest pain and 1 episode of coffee ground emesis  s/p EGD showed distal esophagitis and gastritis  and HepB+,Hep C+ with elevated LFT,patient was advised to continue PPI BID and sacralfate for 2 weeks by GI and f/u hepatitis w/u as outpatient     After discharge from  ,that was 2/10/17 at home began to have nausea and intermittent vomiting and retching with chest pain ,patient had minimal  few streaks of red blood in his last episode of vomiting at home.No further vomiting in ED    Pt is afebrile ,no diarrhea,LBM was 2/11/17 light brown in color, reports urine color is lighter than last admission,no SOB,no dizziness,no palpitations     patient c/o generalised body aches ,back aches ,joint pains which he says are chronic and attributes it to RA    Patient reports he needs stronger pain meds than MScontin and dilaudid which he uses at home.      SUBJECTIVE & OBJECTIVE: Pt seen and examined at bedside.   2/12/17 - Patient seen and examined at bedside.  He complains of generalized pain in his knees, "kidneys" and back.  Is also complaining of hunger and states that he wants to eat.  Complains of other non-specific symptoms like, "feeling hot and cold,"  Denies any acute events overnight, denies episodes of diarrhea or vomiting.     PHYSICAL EXAM:  T(C): 36.7, Max: 36.9 (02-11 @ 17:23)  HR: 71 (56 - 78)  BP: 131/83 (109/64 - 140/75)  RR: 13 (13 - 18)  SpO2: 96% (96% - 100%)  Wt(kg): --     GENERAL: NAD, well-groomed, well-developed  HEAD:  Atraumatic, Normocephalic  EYES: EOMI, PERRLA, conjunctiva and sclera clear  ENMT: Moist mucous membranes  NECK: Supple, No JVD  NERVOUS SYSTEM:  Alert & Oriented X3, Motor Strength 5/5 B/L upper and lower extremities; DTRs 2+ intact and symmetric  CHEST/LUNG: Clear to auscultation bilaterally; No rales, rhonchi, wheezing, or rubs  HEART: Regular rate and rhythm; No murmurs, rubs, or gallops  ABDOMEN: Soft, Nontender, Nondistended; Bowel sounds present  EXTREMITIES:  2+ Peripheral Pulses, No clubbing, cyanosis, or edema        MEDICATIONS  (STANDING):  nicotine - 21 mG/24Hr(s) Patch 1patch Transdermal daily  ondansetron Injectable 4milliGRAM(s) IV Push once  predniSONE   Tablet 10milliGRAM(s) Oral daily  morphine ER Tablet 15milliGRAM(s) Oral every 12 hours  diVALproex ER 500milliGRAM(s) Oral every 12 hours  gabapentin Oral Tab/Cap - Peds 800milliGRAM(s) Oral three times a day  DULoxetine 60milliGRAM(s) Oral daily  atorvastatin 20milliGRAM(s) Oral at bedtime  clopidogrel Tablet 75milliGRAM(s) Oral daily  QUEtiapine XR 150milliGRAM(s) Oral at bedtime  metoprolol 25milliGRAM(s) Oral two times a day  fluticasone / salmeterol 250-50 MICROgram(s) Diskus 1Dose(s) Inhalation two times a day  multivitamin 1Tablet(s) Oral daily  pantoprazole  Injectable 40milliGRAM(s) IV Push two times a day  sucralfate suspension 1Gram(s) Oral four times a day  dextrose 5% + sodium chloride 0.45%. 1000milliLiter(s) IV Continuous <Continuous>  influenza   Vaccine 0.5milliLiter(s) IntraMuscular once    MEDICATIONS  (PRN):  HYDROmorphone   Tablet 2milliGRAM(s) Oral two times a day PRN Severe Pain (7 - 10)  ALBUTerol    90 MICROgram(s) HFA Inhaler 2Puff(s) Inhalation every 6 hours PRN sob,wheezing  ondansetron Injectable 4milliGRAM(s) IV Push every 8 hours PRN nausea,vomiting      LABS:                        12.4   6.0   )-----------( 128      ( 12 Feb 2017 13:41 )             37.5     11 Feb 2017 13:42    146    |  107    |  11     ----------------------------<  86     3.6     |  31     |  0.65     Ca    8.4        11 Feb 2017 13:42    TPro  6.5    /  Alb  3.0    /  TBili  2.3    /  DBili  x      /  AST  466    /  ALT  711    /  AlkPhos  227    11 Feb 2017 13:42    PT/INR - ( 12 Feb 2017 02:42 )   PT: 15.9 sec;   INR: 1.44 ratio         PTT - ( 12 Feb 2017 02:42 )  PTT:38.7 sec    CARDIAC MARKERS ( 12 Feb 2017 02:42 )  <0.015 ng/mL / x     / 23 U/L / x     / x      CARDIAC MARKERS ( 11 Feb 2017 17:12 )  <0.015 ng/mL /  CARDIAC MARKERS ( 11 Feb 2017 13:42 )  <0.015 ng/mL /   / 26 U/L /           RECENT CULTURES:

## 2017-02-12 NOTE — DIETITIAN INITIAL EVALUATION ADULT. - PERTINENT MEDS FT
2/11/17: Na+: 146 (H), Ca+: 8.4 (L), Corrected Ca+: 9.2 (WNL), Mg+: 1.7 (L), 2/8/17: HgbA1c: 5% (WNL)

## 2017-02-12 NOTE — H&P ADULT - GASTROINTESTINAL DETAILS
no guarding/no rigidity/bowel sounds normal/no distention/RUQ tenderness ,epigastric tenderness/soft/no rebound tenderness

## 2017-02-12 NOTE — CONSULT NOTE ADULT - ASSESSMENT
46 Year old Man with history of ASHD presents with vomiting. Had red streaks of blood in the emesis.    His last cardiac intervention was prior to 2015.  I would stop the plavix at this time and hold the aspirin.   When he is cleared from a GI perspective, I would restart Aspirin 325 mg daily.   No further cardiac testing is necessary at this time.

## 2017-02-13 DIAGNOSIS — J44.9 CHRONIC OBSTRUCTIVE PULMONARY DISEASE, UNSPECIFIED: ICD-10-CM

## 2017-02-13 DIAGNOSIS — F60.2 ANTISOCIAL PERSONALITY DISORDER: ICD-10-CM

## 2017-02-13 DIAGNOSIS — I25.10 ATHEROSCLEROTIC HEART DISEASE OF NATIVE CORONARY ARTERY WITHOUT ANGINA PECTORIS: ICD-10-CM

## 2017-02-13 DIAGNOSIS — I34.1 NONRHEUMATIC MITRAL (VALVE) PROLAPSE: ICD-10-CM

## 2017-02-13 DIAGNOSIS — G89.29 OTHER CHRONIC PAIN: ICD-10-CM

## 2017-02-13 DIAGNOSIS — B18.2 CHRONIC VIRAL HEPATITIS C: ICD-10-CM

## 2017-02-13 DIAGNOSIS — K92.0 HEMATEMESIS: ICD-10-CM

## 2017-02-13 DIAGNOSIS — F31.9 BIPOLAR DISORDER, UNSPECIFIED: ICD-10-CM

## 2017-02-13 LAB
ANION GAP SERPL CALC-SCNC: 8 MMOL/L — SIGNIFICANT CHANGE UP (ref 5–17)
APPEARANCE UR: CLEAR — SIGNIFICANT CHANGE UP
BILIRUB UR-MCNC: (no result)
BUN SERPL-MCNC: 13 MG/DL — SIGNIFICANT CHANGE UP (ref 7–23)
CALCIUM SERPL-MCNC: 8.5 MG/DL — SIGNIFICANT CHANGE UP (ref 8.5–10.1)
CHLORIDE SERPL-SCNC: 107 MMOL/L — SIGNIFICANT CHANGE UP (ref 96–108)
CO2 SERPL-SCNC: 28 MMOL/L — SIGNIFICANT CHANGE UP (ref 22–31)
COLOR SPEC: YELLOW — SIGNIFICANT CHANGE UP
CREAT SERPL-MCNC: 0.9 MG/DL — SIGNIFICANT CHANGE UP (ref 0.5–1.3)
DIFF PNL FLD: NEGATIVE — SIGNIFICANT CHANGE UP
GLUCOSE SERPL-MCNC: 95 MG/DL — SIGNIFICANT CHANGE UP (ref 70–99)
GLUCOSE UR QL: NEGATIVE MG/DL — SIGNIFICANT CHANGE UP
HBV DNA # SERPL NAA+PROBE: SIGNIFICANT CHANGE UP IU/ML
HBV DNA SERPL NAA+PROBE-LOG#: 7.05 — SIGNIFICANT CHANGE UP
HBV E AG SER-ACNC: POSITIVE
KETONES UR-MCNC: NEGATIVE — SIGNIFICANT CHANGE UP
LEUKOCYTE ESTERASE UR-ACNC: NEGATIVE — SIGNIFICANT CHANGE UP
MAGNESIUM SERPL-MCNC: 2.3 MG/DL — SIGNIFICANT CHANGE UP (ref 1.8–2.4)
NITRITE UR-MCNC: NEGATIVE — SIGNIFICANT CHANGE UP
PH UR: 7 — SIGNIFICANT CHANGE UP (ref 4.8–8)
PHOSPHATE SERPL-MCNC: 3.5 MG/DL — SIGNIFICANT CHANGE UP (ref 2.5–4.5)
POTASSIUM SERPL-MCNC: 4 MMOL/L — SIGNIFICANT CHANGE UP (ref 3.5–5.3)
POTASSIUM SERPL-SCNC: 4 MMOL/L — SIGNIFICANT CHANGE UP (ref 3.5–5.3)
PROT UR-MCNC: NEGATIVE MG/DL — SIGNIFICANT CHANGE UP
SODIUM SERPL-SCNC: 143 MMOL/L — SIGNIFICANT CHANGE UP (ref 135–145)
SP GR SPEC: 1.01 — SIGNIFICANT CHANGE UP (ref 1.01–1.02)
UROBILINOGEN FLD QL: 12 MG/DL

## 2017-02-13 RX ORDER — METOPROLOL TARTRATE 50 MG
12.5 TABLET ORAL
Qty: 0 | Refills: 0 | Status: DISCONTINUED | OUTPATIENT
Start: 2017-02-13 | End: 2017-02-15

## 2017-02-13 RX ORDER — PANTOPRAZOLE SODIUM 20 MG/1
40 TABLET, DELAYED RELEASE ORAL
Qty: 0 | Refills: 0 | Status: DISCONTINUED | OUTPATIENT
Start: 2017-02-13 | End: 2017-02-15

## 2017-02-13 RX ORDER — HYDROMORPHONE HYDROCHLORIDE 2 MG/ML
2 INJECTION INTRAMUSCULAR; INTRAVENOUS; SUBCUTANEOUS
Qty: 0 | Refills: 0 | Status: DISCONTINUED | OUTPATIENT
Start: 2017-02-13 | End: 2017-02-15

## 2017-02-13 RX ADMIN — HYDROMORPHONE HYDROCHLORIDE 2 MILLIGRAM(S): 2 INJECTION INTRAMUSCULAR; INTRAVENOUS; SUBCUTANEOUS at 20:23

## 2017-02-13 RX ADMIN — Medication 1 GRAM(S): at 05:32

## 2017-02-13 RX ADMIN — DIVALPROEX SODIUM 500 MILLIGRAM(S): 500 TABLET, DELAYED RELEASE ORAL at 05:32

## 2017-02-13 RX ADMIN — PANTOPRAZOLE SODIUM 40 MILLIGRAM(S): 20 TABLET, DELAYED RELEASE ORAL at 17:43

## 2017-02-13 RX ADMIN — Medication 1 TABLET(S): at 11:04

## 2017-02-13 RX ADMIN — HYDROMORPHONE HYDROCHLORIDE 2 MILLIGRAM(S): 2 INJECTION INTRAMUSCULAR; INTRAVENOUS; SUBCUTANEOUS at 21:39

## 2017-02-13 RX ADMIN — ATORVASTATIN CALCIUM 20 MILLIGRAM(S): 80 TABLET, FILM COATED ORAL at 21:41

## 2017-02-13 RX ADMIN — MORPHINE SULFATE 15 MILLIGRAM(S): 50 CAPSULE, EXTENDED RELEASE ORAL at 05:34

## 2017-02-13 RX ADMIN — GABAPENTIN 800 MILLIGRAM(S): 400 CAPSULE ORAL at 05:32

## 2017-02-13 RX ADMIN — Medication 1 GRAM(S): at 21:42

## 2017-02-13 RX ADMIN — Medication 1 GRAM(S): at 11:04

## 2017-02-13 RX ADMIN — Medication 10 MILLIGRAM(S): at 05:32

## 2017-02-13 RX ADMIN — MORPHINE SULFATE 15 MILLIGRAM(S): 50 CAPSULE, EXTENDED RELEASE ORAL at 17:41

## 2017-02-13 RX ADMIN — DIVALPROEX SODIUM 500 MILLIGRAM(S): 500 TABLET, DELAYED RELEASE ORAL at 17:41

## 2017-02-13 RX ADMIN — Medication 1 PATCH: at 11:04

## 2017-02-13 RX ADMIN — Medication 1 GRAM(S): at 17:43

## 2017-02-13 RX ADMIN — Medication 1 PATCH: at 11:03

## 2017-02-13 RX ADMIN — QUETIAPINE FUMARATE 150 MILLIGRAM(S): 200 TABLET, FILM COATED ORAL at 21:41

## 2017-02-13 RX ADMIN — GABAPENTIN 800 MILLIGRAM(S): 400 CAPSULE ORAL at 21:41

## 2017-02-13 RX ADMIN — DULOXETINE HYDROCHLORIDE 60 MILLIGRAM(S): 30 CAPSULE, DELAYED RELEASE ORAL at 11:04

## 2017-02-13 RX ADMIN — MORPHINE SULFATE 15 MILLIGRAM(S): 50 CAPSULE, EXTENDED RELEASE ORAL at 06:15

## 2017-02-13 RX ADMIN — CLOPIDOGREL BISULFATE 75 MILLIGRAM(S): 75 TABLET, FILM COATED ORAL at 11:04

## 2017-02-13 RX ADMIN — PANTOPRAZOLE SODIUM 40 MILLIGRAM(S): 20 TABLET, DELAYED RELEASE ORAL at 05:32

## 2017-02-13 NOTE — PROGRESS NOTE ADULT - ASSESSMENT
47 yo man readmitted with nausea and vomiting. Recent admission for GI bleeding likely due to esophagitis. Elevated liver tests, +HBV, HCV.     -BID PPI  -carafate suspension x 2 weeks  -Monitor CBC  -Diet as tolerated  -Elevated liver tests likely due to chronic HCV, HBV  -Liver serologies otherwise unremarkable  -Patient should pursue treatment for HBV, HCV, which would need to be done as outpt  -Consider pain management eval

## 2017-02-13 NOTE — PROGRESS NOTE ADULT - PROBLEM SELECTOR PLAN 1
S/p EGD on 2/8/17- no active bleeding found  GI f/u appreciated  Cont PPI, Carafate  Advance diet as tolerated

## 2017-02-13 NOTE — PROGRESS NOTE ADULT - PROBLEM SELECTOR PLAN 4
Patient previously been given Suboxone by Dr Díaz and DR Barry, apparently discharged from their practice. Currently does not have neither PMD or pain management. Since then, obtaining pain meds from "hospital [facility]". Pt is unclear where he will get his pain meds upon discharge

## 2017-02-13 NOTE — PROGRESS NOTE ADULT - SUBJECTIVE AND OBJECTIVE BOX
HPI:  47 yo man admitted with nausea/vomiting. Just discharged after admission for similar issues and hematemesis. S/P GD notable for distal esophagitis and gastritis. Patient says he still has nausea and periumbilical pain. Issues seem to be chronic in nature. Tolerating PO.    Past Medical History:  Acid reflux    Anxiety    Back injury    CAD (coronary artery disease)    Chronic back pain    Chronic pain    COPD (chronic obstructive pulmonary disease)    Hep C w/o coma, chronic    High cholesterol    HTN (hypertension)    MI (myocardial infarction)    Mitral valve disorder    Mitral valve prolapse    Pain management.    Past Surgical History:  Abscess of arm        Family History:  No pertinent family history.    Social History:  Social History (marital status, living situation, occupation, tobacco use, alcohol and drug use, and sexual history): Homeless Smokes 1 pack of cig/day Opioid dependent Denies alcohol use (12 Feb 2017 02:07)      MEDICATIONS  (STANDING):  nicotine - 21 mG/24Hr(s) Patch 1patch Transdermal daily  ondansetron Injectable 4milliGRAM(s) IV Push once  predniSONE   Tablet 10milliGRAM(s) Oral daily  morphine ER Tablet 15milliGRAM(s) Oral every 12 hours  diVALproex ER 500milliGRAM(s) Oral every 12 hours  gabapentin Oral Tab/Cap - Peds 800milliGRAM(s) Oral three times a day  DULoxetine 60milliGRAM(s) Oral daily  atorvastatin 20milliGRAM(s) Oral at bedtime  clopidogrel Tablet 75milliGRAM(s) Oral daily  QUEtiapine XR 150milliGRAM(s) Oral at bedtime  metoprolol 25milliGRAM(s) Oral two times a day  fluticasone / salmeterol 250-50 MICROgram(s) Diskus 1Dose(s) Inhalation two times a day  multivitamin 1Tablet(s) Oral daily  pantoprazole  Injectable 40milliGRAM(s) IV Push two times a day  sucralfate suspension 1Gram(s) Oral four times a day  dextrose 5% + sodium chloride 0.45%. 1000milliLiter(s) IV Continuous <Continuous>  influenza   Vaccine 0.5milliLiter(s) IntraMuscular once    MEDICATIONS  (PRN):  HYDROmorphone   Tablet 2milliGRAM(s) Oral two times a day PRN Severe Pain (7 - 10)  ALBUTerol    90 MICROgram(s) HFA Inhaler 2Puff(s) Inhalation every 6 hours PRN sob,wheezing  ondansetron Injectable 4milliGRAM(s) IV Push every 8 hours PRN nausea,vomiting      Allergies    Aleve (Unknown)  Haldol (Anaphylaxis)  Motrin (Anaphylaxis)  NSAIDs (Flushing; Other (Moderate))  Stelazine (Unknown)  Thorazine (Other (Moderate))    Intolerances        REVIEW OF SYSTEMS    General: no unexpected weight loss    HEENT: no icterus    Respiratory and Thorax: no SOB  	  Cardiovascular: no CP    Gastrointestinal: as above    Skin: no jaundice      Vital Signs Last 24 Hrs  T(C): 36.7, Max: 37.1 (02-12 @ 19:59)  T(F): 98, Max: 98.7 (02-12 @ 19:59)  HR: 62 (62 - 71)  BP: 98/53 (98/53 - 143/87)  BP(mean): --  RR: 15 (15 - 15)  SpO2: 98% (96% - 98%)    PHYSICAL EXAM:    Constitutional: NAD    HEENT: anicteric    Respiratory: CTA BL    Cardiovascular:  RRR    Gastrointestinal: soft ND +BS NTTP    Extremities: no LE edema    Neuro: no focal deficits    Skin: no jaundice      LABS:                        12.4   6.0   )-----------( 128      ( 12 Feb 2017 13:41 )             37.5     13 Feb 2017 07:39    143    |  107    |  13     ----------------------------<  95     4.0     |  28     |  0.90     Ca    8.5        13 Feb 2017 07:39  Phos  3.5       13 Feb 2017 07:39  Mg     2.3       13 Feb 2017 07:39    TPro  6.5    /  Alb  3.0    /  TBili  2.3    /  DBili  x      /  AST  466    /  ALT  711    /  AlkPhos  227    11 Feb 2017 13:42    PT/INR - ( 12 Feb 2017 02:42 )   PT: 15.9 sec;   INR: 1.44 ratio         PTT - ( 12 Feb 2017 02:42 )  PTT:38.7 sec  LIVER FUNCTIONS - ( 11 Feb 2017 13:42 )  Alb: 3.0 g/dL / Pro: 6.5 gm/dL / ALK PHOS: 227 U/L / ALT: 711 U/L / AST: 466 U/L / GGT: x             RADIOLOGY & ADDITIONAL STUDIES:

## 2017-02-13 NOTE — PROGRESS NOTE ADULT - SUBJECTIVE AND OBJECTIVE BOX
Patient is a 46y old  Male who presents with a chief complaint of vomiting /chest pain (12 Feb 2017 02:07)    HPI:  History of Present Illness: 	  46 year old homeless male with history of chronic HCV, thrombocytopenia, anemia, rheumatoid arthritis, spinal fracture and peripheral neuropathy, hyperlipidemia, CAD s/p balloon angioplasty in past and recent negative coronary cath, hypertension, bipolar disorder, COPD, tobacco smoking, opioid abuse who was discharged on 1/31/17 from  (admitted for chest pain and had negative cath)     then went to [facility] for a scheduled sleep study  where he developed nausea and vomiting.recommended w/u of hepatitis there and signed out AMA    Patient was readmitted at  2/7/17 and discharged 2/10/17 for nausea and abdominal pain  and chest pain and 1 episode of coffee ground emesis  s/p EGD showed distal esophagitis and gastritis  and HepB+,Hep C+ with elevated LFT,patient was advised to continue PPI BID and sacralfate for 2 weeks by GI and f/u hepatitis w/u as outpatient     After discharge from  ,that was 2/10/17 at home began to have nausea and intermittent vomiting and retching with chest pain ,patient had minimal  few streaks of red blood in his last episode of vomiting at home.No further vomiting in ED    Pt is afebrile ,no diarrhea,LBM was 2/11/17 light brown in color, reports urine color is lighter than last admission,no SOB,no dizziness,no palpitations     patient c/o generalised body aches ,back aches ,joint pains which he says are chronic and attributes it to RA    Patient reports he needs stronger pain meds than MScontin and dilaudid which he uses at home.      SUBJECTIVE & OBJECTIVE: Pt seen and examined at bedside.   2/12/17 - Patient seen and examined at bedside.  He complains of generalized pain in his knees, "kidneys" and back.  Is also complaining of hunger and states that he wants to eat.  Complains of other non-specific symptoms like, "feeling hot and cold,"  Denies any acute events overnight, denies episodes of diarrhea or vomiting.   2/13/17 denies active complaints, asking for pain meds    PHYSICAL EXAM:  Vital Signs Last 24 Hrs  T(C): 36.3, Max: 37.1 (02-12 @ 19:59)  T(F): 97.3, Max: 98.7 (02-12 @ 19:59)  HR: 58 (58 - 71)  BP: 99/52 (98/53 - 143/87)  BP(mean): --  RR: 17 (15 - 17)  SpO2: 97% (96% - 98%)    GENERAL: NAD, well-groomed, well-developed  HEAD:  Atraumatic, Normocephalic  EYES: EOMI, PERRLA, conjunctiva and sclera clear  ENMT: Moist mucous membranes  NECK: Supple, No JVD  NERVOUS SYSTEM:  Alert & Oriented X3, Motor Strength 5/5 B/L upper and lower extremities; DTRs 2+ intact and symmetric  CHEST/LUNG: Clear to auscultation bilaterally; No rales, rhonchi, wheezing, or rubs  HEART: Regular rate and rhythm; No murmurs, rubs, or gallops  ABDOMEN: Soft, Nontender, Nondistended; Bowel sounds present  EXTREMITIES:  2+ Peripheral Pulses, No clubbing, cyanosis, or edema      MEDICATIONS  (STANDING):  nicotine - 21 mG/24Hr(s) Patch 1patch Transdermal daily  ondansetron Injectable 4milliGRAM(s) IV Push once  predniSONE   Tablet 10milliGRAM(s) Oral daily  morphine ER Tablet 15milliGRAM(s) Oral every 12 hours  diVALproex ER 500milliGRAM(s) Oral every 12 hours  gabapentin Oral Tab/Cap - Peds 800milliGRAM(s) Oral three times a day  DULoxetine 60milliGRAM(s) Oral daily  atorvastatin 20milliGRAM(s) Oral at bedtime  clopidogrel Tablet 75milliGRAM(s) Oral daily  QUEtiapine XR 150milliGRAM(s) Oral at bedtime  metoprolol 25milliGRAM(s) Oral two times a day  fluticasone / salmeterol 250-50 MICROgram(s) Diskus 1Dose(s) Inhalation two times a day  multivitamin 1Tablet(s) Oral daily  pantoprazole  Injectable 40milliGRAM(s) IV Push two times a day  sucralfate suspension 1Gram(s) Oral four times a day  dextrose 5% + sodium chloride 0.45%. 1000milliLiter(s) IV Continuous <Continuous>  influenza   Vaccine 0.5milliLiter(s) IntraMuscular once    MEDICATIONS  (PRN):  HYDROmorphone   Tablet 2milliGRAM(s) Oral two times a day PRN Severe Pain (7 - 10)  ALBUTerol    90 MICROgram(s) HFA Inhaler 2Puff(s) Inhalation every 6 hours PRN sob,wheezing  ondansetron Injectable 4milliGRAM(s) IV Push every 8 hours PRN nausea,vomiting      LABS:                                   12.4   6.0   )-----------( 128      ( 12 Feb 2017 13:41 )             37.5     13 Feb 2017 07:39    143    |  107    |  13     ----------------------------<  95     4.0     |  28     |  0.90     Ca    8.5        13 Feb 2017 07:39  Phos  3.5       13 Feb 2017 07:39  Mg     2.3       13 Feb 2017 07:39        PT/INR - ( 12 Feb 2017 02:42 )   PT: 15.9 sec;   INR: 1.44 ratio         PTT - ( 12 Feb 2017 02:42 )  PTT:38.7 sec  CAPILLARY BLOOD GLUCOSE    CARDIAC MARKERS ( 12 Feb 2017 02:42 )  <0.015 ng/mL / x     / 23 U/L / x     / x      CARDIAC MARKERS ( 11 Feb 2017 17:12 )  <0.015 ng/mL / x     / x     / x     / x

## 2017-02-14 DIAGNOSIS — Z79.02 LONG TERM (CURRENT) USE OF ANTITHROMBOTICS/ANTIPLATELETS: ICD-10-CM

## 2017-02-14 DIAGNOSIS — F31.9 BIPOLAR DISORDER, UNSPECIFIED: ICD-10-CM

## 2017-02-14 DIAGNOSIS — I25.2 OLD MYOCARDIAL INFARCTION: ICD-10-CM

## 2017-02-14 DIAGNOSIS — K92.0 HEMATEMESIS: ICD-10-CM

## 2017-02-14 DIAGNOSIS — B16.9 ACUTE HEPATITIS B WITHOUT DELTA-AGENT AND WITHOUT HEPATIC COMA: ICD-10-CM

## 2017-02-14 DIAGNOSIS — D64.9 ANEMIA, UNSPECIFIED: ICD-10-CM

## 2017-02-14 DIAGNOSIS — I10 ESSENTIAL (PRIMARY) HYPERTENSION: ICD-10-CM

## 2017-02-14 DIAGNOSIS — J44.9 CHRONIC OBSTRUCTIVE PULMONARY DISEASE, UNSPECIFIED: ICD-10-CM

## 2017-02-14 DIAGNOSIS — I25.10 ATHEROSCLEROTIC HEART DISEASE OF NATIVE CORONARY ARTERY WITHOUT ANGINA PECTORIS: ICD-10-CM

## 2017-02-14 DIAGNOSIS — I34.1 NONRHEUMATIC MITRAL (VALVE) PROLAPSE: ICD-10-CM

## 2017-02-14 DIAGNOSIS — E78.00 PURE HYPERCHOLESTEROLEMIA, UNSPECIFIED: ICD-10-CM

## 2017-02-14 DIAGNOSIS — G89.4 CHRONIC PAIN SYNDROME: ICD-10-CM

## 2017-02-14 DIAGNOSIS — E78.5 HYPERLIPIDEMIA, UNSPECIFIED: ICD-10-CM

## 2017-02-14 DIAGNOSIS — M06.9 RHEUMATOID ARTHRITIS, UNSPECIFIED: ICD-10-CM

## 2017-02-14 DIAGNOSIS — Z79.82 LONG TERM (CURRENT) USE OF ASPIRIN: ICD-10-CM

## 2017-02-14 DIAGNOSIS — Z72.0 TOBACCO USE: ICD-10-CM

## 2017-02-14 DIAGNOSIS — B18.2 CHRONIC VIRAL HEPATITIS C: ICD-10-CM

## 2017-02-14 DIAGNOSIS — G62.9 POLYNEUROPATHY, UNSPECIFIED: ICD-10-CM

## 2017-02-14 DIAGNOSIS — K21.0 GASTRO-ESOPHAGEAL REFLUX DISEASE WITH ESOPHAGITIS: ICD-10-CM

## 2017-02-14 DIAGNOSIS — F41.9 ANXIETY DISORDER, UNSPECIFIED: ICD-10-CM

## 2017-02-14 DIAGNOSIS — D69.6 THROMBOCYTOPENIA, UNSPECIFIED: ICD-10-CM

## 2017-02-14 DIAGNOSIS — Z95.5 PRESENCE OF CORONARY ANGIOPLASTY IMPLANT AND GRAFT: ICD-10-CM

## 2017-02-14 DIAGNOSIS — K59.00 CONSTIPATION, UNSPECIFIED: ICD-10-CM

## 2017-02-14 DIAGNOSIS — F11.10 OPIOID ABUSE, UNCOMPLICATED: ICD-10-CM

## 2017-02-14 DIAGNOSIS — K29.70 GASTRITIS, UNSPECIFIED, WITHOUT BLEEDING: ICD-10-CM

## 2017-02-14 DIAGNOSIS — Z59.0 HOMELESSNESS: ICD-10-CM

## 2017-02-14 LAB
CULTURE RESULTS: SIGNIFICANT CHANGE UP
SPECIMEN SOURCE: SIGNIFICANT CHANGE UP

## 2017-02-14 RX ORDER — NICOTINE POLACRILEX 2 MG
1 GUM BUCCAL
Qty: 0 | Refills: 0 | COMMUNITY

## 2017-02-14 RX ORDER — PANTOPRAZOLE SODIUM 20 MG/1
1 TABLET, DELAYED RELEASE ORAL
Qty: 28 | Refills: 0 | OUTPATIENT
Start: 2017-02-14 | End: 2017-02-28

## 2017-02-14 RX ORDER — HYDROMORPHONE HYDROCHLORIDE 2 MG/ML
1 INJECTION INTRAMUSCULAR; INTRAVENOUS; SUBCUTANEOUS
Qty: 28 | Refills: 0 | OUTPATIENT
Start: 2017-02-14 | End: 2017-02-21

## 2017-02-14 RX ORDER — SUCRALFATE 1 G
10 TABLET ORAL
Qty: 560 | Refills: 0 | OUTPATIENT
Start: 2017-02-14 | End: 2017-02-28

## 2017-02-14 RX ORDER — MORPHINE SULFATE 50 MG/1
1 CAPSULE, EXTENDED RELEASE ORAL
Qty: 14 | Refills: 0 | OUTPATIENT
Start: 2017-02-14 | End: 2017-02-21

## 2017-02-14 RX ADMIN — MORPHINE SULFATE 15 MILLIGRAM(S): 50 CAPSULE, EXTENDED RELEASE ORAL at 06:30

## 2017-02-14 RX ADMIN — Medication 1 PATCH: at 13:44

## 2017-02-14 RX ADMIN — DIVALPROEX SODIUM 500 MILLIGRAM(S): 500 TABLET, DELAYED RELEASE ORAL at 17:16

## 2017-02-14 RX ADMIN — CLOPIDOGREL BISULFATE 75 MILLIGRAM(S): 75 TABLET, FILM COATED ORAL at 13:46

## 2017-02-14 RX ADMIN — QUETIAPINE FUMARATE 150 MILLIGRAM(S): 200 TABLET, FILM COATED ORAL at 20:57

## 2017-02-14 RX ADMIN — GABAPENTIN 800 MILLIGRAM(S): 400 CAPSULE ORAL at 05:38

## 2017-02-14 RX ADMIN — HYDROMORPHONE HYDROCHLORIDE 2 MILLIGRAM(S): 2 INJECTION INTRAMUSCULAR; INTRAVENOUS; SUBCUTANEOUS at 19:46

## 2017-02-14 RX ADMIN — ATORVASTATIN CALCIUM 20 MILLIGRAM(S): 80 TABLET, FILM COATED ORAL at 20:57

## 2017-02-14 RX ADMIN — Medication 1 GRAM(S): at 13:46

## 2017-02-14 RX ADMIN — MORPHINE SULFATE 15 MILLIGRAM(S): 50 CAPSULE, EXTENDED RELEASE ORAL at 05:36

## 2017-02-14 RX ADMIN — GABAPENTIN 800 MILLIGRAM(S): 400 CAPSULE ORAL at 20:57

## 2017-02-14 RX ADMIN — MORPHINE SULFATE 15 MILLIGRAM(S): 50 CAPSULE, EXTENDED RELEASE ORAL at 19:44

## 2017-02-14 RX ADMIN — Medication 1 TABLET(S): at 13:46

## 2017-02-14 RX ADMIN — GABAPENTIN 800 MILLIGRAM(S): 400 CAPSULE ORAL at 13:46

## 2017-02-14 RX ADMIN — HYDROMORPHONE HYDROCHLORIDE 2 MILLIGRAM(S): 2 INJECTION INTRAMUSCULAR; INTRAVENOUS; SUBCUTANEOUS at 13:46

## 2017-02-14 RX ADMIN — PANTOPRAZOLE SODIUM 40 MILLIGRAM(S): 20 TABLET, DELAYED RELEASE ORAL at 05:39

## 2017-02-14 RX ADMIN — Medication 1 GRAM(S): at 05:39

## 2017-02-14 RX ADMIN — Medication 1 GRAM(S): at 19:45

## 2017-02-14 RX ADMIN — DIVALPROEX SODIUM 500 MILLIGRAM(S): 500 TABLET, DELAYED RELEASE ORAL at 05:38

## 2017-02-14 RX ADMIN — HYDROMORPHONE HYDROCHLORIDE 2 MILLIGRAM(S): 2 INJECTION INTRAMUSCULAR; INTRAVENOUS; SUBCUTANEOUS at 19:45

## 2017-02-14 RX ADMIN — MORPHINE SULFATE 15 MILLIGRAM(S): 50 CAPSULE, EXTENDED RELEASE ORAL at 17:16

## 2017-02-14 RX ADMIN — Medication 1 PATCH: at 13:46

## 2017-02-14 RX ADMIN — DULOXETINE HYDROCHLORIDE 60 MILLIGRAM(S): 30 CAPSULE, DELAYED RELEASE ORAL at 13:46

## 2017-02-14 SDOH — ECONOMIC STABILITY - HOUSING INSECURITY: HOMELESSNESS: Z59.0

## 2017-02-14 NOTE — DISCHARGE NOTE ADULT - HOSPITAL COURSE
Patient is a 46y old  Male who presents with a chief complaint of vomiting blood /chest pain (2017 02:07)  HPI:  History of Present Illness: 	  46 year old homeless male with history of chronic HCV, thrombocytopenia, anemia, rheumatoid arthritis, spinal fracture and peripheral neuropathy, hyperlipidemia, CAD s/p balloon angioplasty in past and recent negative coronary cath, hypertension, bipolar disorder, COPD, tobacco smoking, opioid abuse who was discharged on 17 from  (admitted for chest pain and had negative cath)   then went to [facility] for a scheduled sleep study  where he developed nausea and vomiting.recommended w/u of hepatitis there and signed out AMA  Patient was readmitted at  17 and discharged 2/10/17 for nausea and abdominal pain  and chest pain and 1 episode of coffee ground emesis  s/p EGD showed distal esophagitis and gastritis  and HepB+,Hep C+ with elevated LFT,patient was advised to continue PPI BID and sacralfate for 2 weeks by GI and f/u hepatitis w/u as outpatient   After discharge from  ,that was 2/10/17 at home began to have nausea and intermittent vomiting and retching with chest pain ,patient had minimal  few streaks of red blood in his last episode of vomiting at home.No further vomiting in ED  Hospital course without acute events. Diet advanced, stable for discharge home  Review of system- All 10 systems reviewed and is as per HPI otherwise negative.   T(C): 36.6, Max: 36.9 (-14 @ 05:32)  HR: 84 (74 - 84)  BP: 116/53 (112/56 - 120/63)  RR: 18 (16 - 18)  SpO2: 95% (94% - 97%)  Wt(kg): --  LABS:                        12.4   6.0   )-----------( 128      ( 2017 13:41 )             37.5     2017 07:39    143    |  107    |  13     ----------------------------<  95     4.0     |  28     |  0.90     Ca    8.5        2017 07:39  Phos  3.5       2017 07:39  Mg     2.3       2017 07:39  PHYSICAL EXAM:    GENERAL: NAD, well-groomed, well-developed  HEAD:  Atraumatic, Normocephalic  EYES: EOMI, PERRLA, conjunctiva and sclera clear  HEENT: Moist mucous membranes  NECK: Supple, No JVD  NERVOUS SYSTEM:  Alert & Oriented X3, Motor Strength 5/5 B/L upper and lower extremities; DTRs 2+ intact and symmetric  CHEST/LUNG: Clear to auscultation bilaterally; No rales, rhonchi, wheezing, or rubs  HEART: Regular rate and rhythm; No murmurs, rubs, or gallops  ABDOMEN: Soft, Nontender, Nondistended; Bowel sounds present  GENITOURINARY- Voiding, no palpable bladder  EXTREMITIES:  2+ Peripheral Pulses, No clubbing, cyanosis, or edema  MUSCULOSKELTAL- No muscle tenderness, Muscle tone normal, No joint tenderness, no Joint swelling, Joint range of motion-normal  SKIN-no rash, no lesion  CNS- alert, oriented X3, non focal     Daily Weight in k.5 (2017 10:45)  nicotine - 21 mG/24Hr(s) Patch 1patch Transdermal daily  ondansetron Injectable 4milliGRAM(s) IV Push once  morphine ER Tablet 15milliGRAM(s) Oral every 12 hours  diVALproex ER 500milliGRAM(s) Oral every 12 hours  gabapentin Oral Tab/Cap - Peds 800milliGRAM(s) Oral three times a day  DULoxetine 60milliGRAM(s) Oral daily  atorvastatin 20milliGRAM(s) Oral at bedtime  clopidogrel Tablet 75milliGRAM(s) Oral daily  QUEtiapine XR 150milliGRAM(s) Oral at bedtime  fluticasone / salmeterol 250-50 MICROgram(s) Diskus 1Dose(s) Inhalation two times a day  ALBUTerol    90 MICROgram(s) HFA Inhaler 2Puff(s) Inhalation every 6 hours PRN  multivitamin 1Tablet(s) Oral daily  ondansetron Injectable 4milliGRAM(s) IV Push every 8 hours PRN  sucralfate suspension 1Gram(s) Oral four times a day  influenza   Vaccine 0.5milliLiter(s) IntraMuscular once  pantoprazole    Tablet 40milliGRAM(s) Oral two times a day before meals  metoprolol 12.5milliGRAM(s) Oral two times a day  HYDROmorphone   Tablet 2milliGRAM(s) Oral four times a day PRN    Assessment/Plan  #Vomiting with mild hematemesis likely 2 to gastritis  GI f/u appreciated. Cont on Carafate and PPI  #CAD s/p baloon, no stents  Pt states she was on aspirin, not plavix. Can resume  #Bipolar- cont psych meds  #Chr pain- pt instructed to find pain management MD upon discharge  #Dispo- home today. F/u at [facility]

## 2017-02-14 NOTE — DISCHARGE NOTE ADULT - CARE PROVIDER_API CALL
Kelsey Herrera), Family Medicine  [address] [place] [address]  Phone: [phone]  Fax: [phone]    Mary Beth Rodriguez (), Gastroenterology; Internal Medicine  [address] [place] [address]  Phone: [phone]  Fax: [phone]

## 2017-02-14 NOTE — DISCHARGE NOTE ADULT - CARE PROVIDERS DIRECT ADDRESSES
,DirectAddress_Unknown,[email or web address],DirectAddress_Unknown

## 2017-02-14 NOTE — DISCHARGE NOTE ADULT - MEDICATION SUMMARY - MEDICATIONS TO TAKE
I will START or STAY ON the medications listed below when I get home from the hospital:    HYDROmorphone 2 mg oral tablet  -- 1 tab(s) by mouth every 6 hours as needed for pain MDD:4 tabs  -- Indication: For Chronic pain    MS Contin 15 mg oral tablet, extended release  -- 1 tab(s) by mouth every 12 hours MDD:2 tabs  -- Indication: For Chronic pain    Aspirin Enteric Coated 81 mg oral delayed release tablet  -- 1 tab(s) by mouth once a day  -- Indication: For CAD (coronary artery disease)    divalproex sodium 500 mg oral tablet, extended release  -- 1 tab(s) by mouth every 12 hours  -- Indication: For Bipolar 1 disorder    gabapentin 400 mg oral capsule  -- 2 cap(s) by mouth 3 times a day  -- Indication: For Bipolar 1 disorder    DULoxetine 60 mg oral delayed release capsule  -- 1 cap(s) by mouth once a day  -- Indication: For Bipolar 1 disorder    atorvastatin 20 mg oral tablet  -- 1 tab(s) by mouth once a day (at bedtime)  -- Indication: For CAD (coronary artery disease)    QUEtiapine 150 mg oral tablet, extended release  -- 1 tab(s) by mouth once a day (at bedtime)  -- Indication: For Bipolar 1 disorder    metoprolol tartrate 25 mg oral tablet  -- 1 tab(s) by mouth 2 times a day  -- Indication: For CAD (coronary artery disease)    Advair Diskus 250 mcg-50 mcg inhalation powder  -- 1 puff(s) inhaled 2 times a day  -- Indication: For COPD (chronic obstructive pulmonary disease)    ProAir HFA 90 mcg/inh inhalation aerosol  -- 2 puff(s) inhaled 4 times a day  -- Indication: For COPD (chronic obstructive pulmonary disease)    sucralfate 1 g/10 mL oral suspension  -- 10 milliliter(s) by mouth 4 times a day (before meals and at bedtime)  -- Indication: For Hematemesis    pantoprazole 40 mg oral delayed release tablet  -- 1 tab(s) by mouth 2 times a day (before meals)  -- Indication: For Hematemesis

## 2017-02-14 NOTE — DISCHARGE NOTE ADULT - MEDICATION SUMMARY - MEDICATIONS TO STOP TAKING
I will STOP taking the medications listed below when I get home from the hospital:    Plavix 75 mg oral tablet  -- 1 tab(s) by mouth once a day    predniSONE 10 mg oral tablet  -- 1 tab(s) by mouth once a day

## 2017-02-14 NOTE — DISCHARGE NOTE ADULT - CARE PLAN
Principal Discharge DX:	Hematemesis  Goal:	outpatient f/u  Instructions for follow-up, activity and diet:	outpatient f/u  Secondary Diagnosis:	CAD (coronary artery disease)

## 2017-02-14 NOTE — PROGRESS NOTE ADULT - SUBJECTIVE AND OBJECTIVE BOX
HPI:  47 yo man admitted with nausea/vomiting. Just discharged after admission for similar issues and hematemesis. S/P EGD notable for distal esophagitis and gastritis. Patient says he still has nausea and periumbilical pain. Issues seem to be chronic in nature. Tolerating PO.        Past Medical History:  Acid reflux    Anxiety    Back injury    CAD (coronary artery disease)    Chronic back pain    Chronic pain    COPD (chronic obstructive pulmonary disease)    Hep C w/o coma, chronic    High cholesterol    HTN (hypertension)    MI (myocardial infarction)    Mitral valve disorder    Mitral valve prolapse    Pain management.    Past Surgical History:  Abscess of arm        Family History:  No pertinent family history.    Social History:  Social History (marital status, living situation, occupation, tobacco use, alcohol and drug use, and sexual history): Homeless Smokes 1 pack of cig/day Opioid dependent Denies alcohol use (12 Feb 2017 02:07)      MEDICATIONS  (STANDING):  nicotine - 21 mG/24Hr(s) Patch 1patch Transdermal daily  ondansetron Injectable 4milliGRAM(s) IV Push once  morphine ER Tablet 15milliGRAM(s) Oral every 12 hours  diVALproex ER 500milliGRAM(s) Oral every 12 hours  gabapentin Oral Tab/Cap - Peds 800milliGRAM(s) Oral three times a day  DULoxetine 60milliGRAM(s) Oral daily  atorvastatin 20milliGRAM(s) Oral at bedtime  clopidogrel Tablet 75milliGRAM(s) Oral daily  QUEtiapine XR 150milliGRAM(s) Oral at bedtime  fluticasone / salmeterol 250-50 MICROgram(s) Diskus 1Dose(s) Inhalation two times a day  multivitamin 1Tablet(s) Oral daily  sucralfate suspension 1Gram(s) Oral four times a day  influenza   Vaccine 0.5milliLiter(s) IntraMuscular once  pantoprazole    Tablet 40milliGRAM(s) Oral two times a day before meals  metoprolol 12.5milliGRAM(s) Oral two times a day    MEDICATIONS  (PRN):  ALBUTerol    90 MICROgram(s) HFA Inhaler 2Puff(s) Inhalation every 6 hours PRN sob,wheezing  ondansetron Injectable 4milliGRAM(s) IV Push every 8 hours PRN nausea,vomiting  HYDROmorphone   Tablet 2milliGRAM(s) Oral four times a day PRN Severe Pain (7 - 10)      Allergies    Aleve (Unknown)  Haldol (Anaphylaxis)  Motrin (Anaphylaxis)  NSAIDs (Flushing; Other (Moderate))  Stelazine (Unknown)  Thorazine (Other (Moderate))    Intolerances        REVIEW OF SYSTEMS    General: no unexpected weight loss    HEENT: no icterus    Respiratory and Thorax: no SOB  	  Cardiovascular: no CP    Gastrointestinal: as above    Skin: no jaundice      Vital Signs Last 24 Hrs  T(C): 36.6, Max: 36.9 (02-14 @ 05:32)  T(F): 97.8, Max: 98.4 (02-14 @ 05:32)  HR: 84 (74 - 84)  BP: 116/53 (112/56 - 120/63)  BP(mean): --  RR: 18 (16 - 18)  SpO2: 95% (94% - 97%)    PHYSICAL EXAM:    Constitutional: NAD    HEENT: anicteric    Respiratory: CTA BL    Cardiovascular:  RRR    Gastrointestinal: soft ND +BS NTTP    Extremities: no LE edema    Neuro: no focal deficits    Skin: no jaundice      LABS:    13 Feb 2017 07:39    143    |  107    |  13     ----------------------------<  95     4.0     |  28     |  0.90     Ca    8.5        13 Feb 2017 07:39  Phos  3.5       13 Feb 2017 07:39  Mg     2.3       13 Feb 2017 07:39            RADIOLOGY & ADDITIONAL STUDIES:

## 2017-02-14 NOTE — PROGRESS NOTE ADULT - ASSESSMENT
47 yo man readmitted with nausea and vomiting. Recent admission for GI bleeding likely due to esophagitis. Elevated liver tests, +HBV, HCV.     -BID PPI  -carafate suspension x 2 weeks  -Monitor CBC  -Diet as tolerated  -Elevated liver tests likely due to chronic HCV, HBV  -Liver serologies otherwise unremarkable  -Patient should pursue treatment for HBV, HCV, which would need to be done as outpt  -Consider pain management as outpt  -Planned DC today with follow up at [facility]

## 2017-02-15 VITALS — WEIGHT: 159.17 LBS

## 2017-02-15 RX ADMIN — HYDROMORPHONE HYDROCHLORIDE 2 MILLIGRAM(S): 2 INJECTION INTRAMUSCULAR; INTRAVENOUS; SUBCUTANEOUS at 06:28

## 2017-02-15 RX ADMIN — DIVALPROEX SODIUM 500 MILLIGRAM(S): 500 TABLET, DELAYED RELEASE ORAL at 06:23

## 2017-02-15 RX ADMIN — PANTOPRAZOLE SODIUM 40 MILLIGRAM(S): 20 TABLET, DELAYED RELEASE ORAL at 06:27

## 2017-02-15 RX ADMIN — HYDROMORPHONE HYDROCHLORIDE 2 MILLIGRAM(S): 2 INJECTION INTRAMUSCULAR; INTRAVENOUS; SUBCUTANEOUS at 09:25

## 2017-02-15 RX ADMIN — GABAPENTIN 800 MILLIGRAM(S): 400 CAPSULE ORAL at 06:23

## 2017-02-15 RX ADMIN — MORPHINE SULFATE 15 MILLIGRAM(S): 50 CAPSULE, EXTENDED RELEASE ORAL at 06:23

## 2017-02-15 RX ADMIN — Medication 12.5 MILLIGRAM(S): at 06:23

## 2017-02-15 RX ADMIN — Medication 1 GRAM(S): at 06:24

## 2017-02-16 DIAGNOSIS — F17.210 NICOTINE DEPENDENCE, CIGARETTES, UNCOMPLICATED: ICD-10-CM

## 2017-02-16 DIAGNOSIS — K20.9 ESOPHAGITIS, UNSPECIFIED: ICD-10-CM

## 2017-02-16 DIAGNOSIS — J44.9 CHRONIC OBSTRUCTIVE PULMONARY DISEASE, UNSPECIFIED: ICD-10-CM

## 2017-02-16 DIAGNOSIS — K21.9 GASTRO-ESOPHAGEAL REFLUX DISEASE WITHOUT ESOPHAGITIS: ICD-10-CM

## 2017-02-16 DIAGNOSIS — K92.0 HEMATEMESIS: ICD-10-CM

## 2017-02-16 DIAGNOSIS — I25.10 ATHEROSCLEROTIC HEART DISEASE OF NATIVE CORONARY ARTERY WITHOUT ANGINA PECTORIS: ICD-10-CM

## 2017-02-16 DIAGNOSIS — G89.29 OTHER CHRONIC PAIN: ICD-10-CM

## 2017-02-16 DIAGNOSIS — D69.6 THROMBOCYTOPENIA, UNSPECIFIED: ICD-10-CM

## 2017-02-16 DIAGNOSIS — R10.9 UNSPECIFIED ABDOMINAL PAIN: ICD-10-CM

## 2017-02-16 DIAGNOSIS — G62.9 POLYNEUROPATHY, UNSPECIFIED: ICD-10-CM

## 2017-02-16 DIAGNOSIS — D64.9 ANEMIA, UNSPECIFIED: ICD-10-CM

## 2017-02-16 DIAGNOSIS — Z59.0 HOMELESSNESS: ICD-10-CM

## 2017-02-16 DIAGNOSIS — F31.9 BIPOLAR DISORDER, UNSPECIFIED: ICD-10-CM

## 2017-02-16 DIAGNOSIS — B18.1 CHRONIC VIRAL HEPATITIS B WITHOUT DELTA-AGENT: ICD-10-CM

## 2017-02-16 DIAGNOSIS — Z88.8 ALLERGY STATUS TO OTHER DRUGS, MEDICAMENTS AND BIOLOGICAL SUBSTANCES STATUS: ICD-10-CM

## 2017-02-16 DIAGNOSIS — I34.1 NONRHEUMATIC MITRAL (VALVE) PROLAPSE: ICD-10-CM

## 2017-02-16 DIAGNOSIS — I10 ESSENTIAL (PRIMARY) HYPERTENSION: ICD-10-CM

## 2017-02-16 DIAGNOSIS — R07.9 CHEST PAIN, UNSPECIFIED: ICD-10-CM

## 2017-02-16 DIAGNOSIS — I25.2 OLD MYOCARDIAL INFARCTION: ICD-10-CM

## 2017-02-16 DIAGNOSIS — M06.9 RHEUMATOID ARTHRITIS, UNSPECIFIED: ICD-10-CM

## 2017-02-16 DIAGNOSIS — M54.9 DORSALGIA, UNSPECIFIED: ICD-10-CM

## 2017-02-16 DIAGNOSIS — E78.5 HYPERLIPIDEMIA, UNSPECIFIED: ICD-10-CM

## 2017-02-16 DIAGNOSIS — B18.2 CHRONIC VIRAL HEPATITIS C: ICD-10-CM

## 2017-02-16 DIAGNOSIS — F41.9 ANXIETY DISORDER, UNSPECIFIED: ICD-10-CM

## 2017-02-16 DIAGNOSIS — M84.48XA PATHOLOGICAL FRACTURE, OTHER SITE, INITIAL ENCOUNTER FOR FRACTURE: ICD-10-CM

## 2017-02-16 DIAGNOSIS — F11.10 OPIOID ABUSE, UNCOMPLICATED: ICD-10-CM

## 2017-02-16 DIAGNOSIS — K29.70 GASTRITIS, UNSPECIFIED, WITHOUT BLEEDING: ICD-10-CM

## 2017-02-16 SDOH — ECONOMIC STABILITY - HOUSING INSECURITY: HOMELESSNESS: Z59.0

## 2017-02-21 ENCOUNTER — INPATIENT (INPATIENT)
Facility: HOSPITAL | Age: 47
LOS: 19 days | Discharge: HOME | End: 2017-03-13
Attending: PSYCHIATRY & NEUROLOGY | Admitting: PSYCHIATRY & NEUROLOGY
Payer: MEDICAID

## 2017-02-21 VITALS
SYSTOLIC BLOOD PRESSURE: 135 MMHG | WEIGHT: 160.06 LBS | HEIGHT: 73 IN | TEMPERATURE: 98 F | DIASTOLIC BLOOD PRESSURE: 74 MMHG | HEART RATE: 89 BPM | OXYGEN SATURATION: 100 % | RESPIRATION RATE: 18 BRPM

## 2017-02-21 DIAGNOSIS — L02.419 CUTANEOUS ABSCESS OF LIMB, UNSPECIFIED: Chronic | ICD-10-CM

## 2017-02-21 LAB
ALBUMIN SERPL ELPH-MCNC: 3.1 G/DL — LOW (ref 3.3–5)
ALP SERPL-CCNC: 237 U/L — HIGH (ref 40–120)
ALT FLD-CCNC: 753 U/L — HIGH (ref 12–78)
ANION GAP SERPL CALC-SCNC: 9 MMOL/L — SIGNIFICANT CHANGE UP (ref 5–17)
AST SERPL-CCNC: 464 U/L — HIGH (ref 15–37)
BASOPHILS # BLD AUTO: 0.1 K/UL — SIGNIFICANT CHANGE UP (ref 0–0.2)
BASOPHILS NFR BLD AUTO: 1 % — SIGNIFICANT CHANGE UP (ref 0–2)
BILIRUB SERPL-MCNC: 4 MG/DL — HIGH (ref 0.2–1.2)
BUN SERPL-MCNC: 4 MG/DL — LOW (ref 7–23)
CALCIUM SERPL-MCNC: 8.4 MG/DL — LOW (ref 8.5–10.1)
CHLORIDE SERPL-SCNC: 110 MMOL/L — HIGH (ref 96–108)
CO2 SERPL-SCNC: 27 MMOL/L — SIGNIFICANT CHANGE UP (ref 22–31)
CREAT SERPL-MCNC: 0.66 MG/DL — SIGNIFICANT CHANGE UP (ref 0.5–1.3)
EOSINOPHIL # BLD AUTO: 0.1 K/UL — SIGNIFICANT CHANGE UP (ref 0–0.5)
EOSINOPHIL NFR BLD AUTO: 1.3 % — SIGNIFICANT CHANGE UP (ref 0–6)
GLUCOSE SERPL-MCNC: 86 MG/DL — SIGNIFICANT CHANGE UP (ref 70–99)
HCT VFR BLD CALC: 32.3 % — LOW (ref 39–50)
HGB BLD-MCNC: 10.5 G/DL — LOW (ref 13–17)
LYMPHOCYTES # BLD AUTO: 2.4 K/UL — SIGNIFICANT CHANGE UP (ref 1–3.3)
LYMPHOCYTES # BLD AUTO: 28.2 % — SIGNIFICANT CHANGE UP (ref 13–44)
MCHC RBC-ENTMCNC: 29.7 PG — SIGNIFICANT CHANGE UP (ref 27–34)
MCHC RBC-ENTMCNC: 32.4 GM/DL — SIGNIFICANT CHANGE UP (ref 32–36)
MCV RBC AUTO: 91.6 FL — SIGNIFICANT CHANGE UP (ref 80–100)
MONOCYTES # BLD AUTO: 1.1 K/UL — HIGH (ref 0–0.9)
MONOCYTES NFR BLD AUTO: 13.6 % — SIGNIFICANT CHANGE UP (ref 2–14)
NEUTROPHILS # BLD AUTO: 4.7 K/UL — SIGNIFICANT CHANGE UP (ref 1.8–7.4)
NEUTROPHILS NFR BLD AUTO: 55.8 % — SIGNIFICANT CHANGE UP (ref 43–77)
PLATELET # BLD AUTO: 178 K/UL — SIGNIFICANT CHANGE UP (ref 150–400)
POTASSIUM SERPL-MCNC: 3.5 MMOL/L — SIGNIFICANT CHANGE UP (ref 3.5–5.3)
POTASSIUM SERPL-SCNC: 3.5 MMOL/L — SIGNIFICANT CHANGE UP (ref 3.5–5.3)
PROT SERPL-MCNC: 6.5 GM/DL — SIGNIFICANT CHANGE UP (ref 6–8.3)
RBC # BLD: 3.53 M/UL — LOW (ref 4.2–5.8)
RBC # FLD: 16.3 % — HIGH (ref 10.3–14.5)
SODIUM SERPL-SCNC: 146 MMOL/L — HIGH (ref 135–145)
WBC # BLD: 8.4 K/UL — SIGNIFICANT CHANGE UP (ref 3.8–10.5)
WBC # FLD AUTO: 8.4 K/UL — SIGNIFICANT CHANGE UP (ref 3.8–10.5)

## 2017-02-21 PROCEDURE — 99285 EMERGENCY DEPT VISIT HI MDM: CPT

## 2017-02-21 RX ORDER — DIPHENHYDRAMINE HCL 50 MG
25 CAPSULE ORAL ONCE
Qty: 0 | Refills: 0 | Status: COMPLETED | OUTPATIENT
Start: 2017-02-21 | End: 2017-02-21

## 2017-02-21 RX ORDER — METOCLOPRAMIDE HCL 10 MG
10 TABLET ORAL ONCE
Qty: 0 | Refills: 0 | Status: COMPLETED | OUTPATIENT
Start: 2017-02-21 | End: 2017-02-21

## 2017-02-21 RX ORDER — SODIUM CHLORIDE 9 MG/ML
2500 INJECTION INTRAMUSCULAR; INTRAVENOUS; SUBCUTANEOUS ONCE
Qty: 0 | Refills: 0 | Status: COMPLETED | OUTPATIENT
Start: 2017-02-21 | End: 2017-02-21

## 2017-02-21 RX ORDER — ONDANSETRON 8 MG/1
4 TABLET, FILM COATED ORAL ONCE
Qty: 0 | Refills: 0 | Status: COMPLETED | OUTPATIENT
Start: 2017-02-21 | End: 2017-02-21

## 2017-02-21 RX ORDER — HYDROMORPHONE HYDROCHLORIDE 2 MG/ML
1 INJECTION INTRAMUSCULAR; INTRAVENOUS; SUBCUTANEOUS ONCE
Qty: 0 | Refills: 0 | Status: DISCONTINUED | OUTPATIENT
Start: 2017-02-21 | End: 2017-02-21

## 2017-02-21 RX ORDER — PANTOPRAZOLE SODIUM 20 MG/1
40 TABLET, DELAYED RELEASE ORAL ONCE
Qty: 0 | Refills: 0 | Status: COMPLETED | OUTPATIENT
Start: 2017-02-21 | End: 2017-02-21

## 2017-02-21 RX ADMIN — PANTOPRAZOLE SODIUM 40 MILLIGRAM(S): 20 TABLET, DELAYED RELEASE ORAL at 20:22

## 2017-02-21 RX ADMIN — SODIUM CHLORIDE 1666.67 MILLILITER(S): 9 INJECTION INTRAMUSCULAR; INTRAVENOUS; SUBCUTANEOUS at 20:25

## 2017-02-21 RX ADMIN — Medication 25 MILLIGRAM(S): at 20:20

## 2017-02-21 RX ADMIN — HYDROMORPHONE HYDROCHLORIDE 1 MILLIGRAM(S): 2 INJECTION INTRAMUSCULAR; INTRAVENOUS; SUBCUTANEOUS at 23:10

## 2017-02-21 RX ADMIN — Medication 10 MILLIGRAM(S): at 20:50

## 2017-02-21 RX ADMIN — ONDANSETRON 4 MILLIGRAM(S): 8 TABLET, FILM COATED ORAL at 23:11

## 2017-02-21 NOTE — ED PROVIDER NOTE - PROGRESS NOTE DETAILS
pt with negative ctap, pt has ho elevated lfts, pt now c/o having ausditory and visual hallucinations, easily redirected. pt noted running out of room in response to external stimuli, will get psych consult. pt does take seroquel at night Pt medically cleared for psychiatric admission.  Dr. Rincon recommends admission

## 2017-02-21 NOTE — ED PROVIDER NOTE - NS ED MD SCRIBE ATTENDING SCRIBE SECTIONS
PAST MEDICAL/SURGICAL/SOCIAL HISTORY/HISTORY OF PRESENT ILLNESS/PHYSICAL EXAM/RESULTS/REVIEW OF SYSTEMS/DISPOSITION/PROGRESS NOTE/CONSULTATIONS/SHIFT CHANGE

## 2017-02-21 NOTE — ED PROVIDER NOTE - OBJECTIVE STATEMENT
45 y/o M with a PMHx of hepatitis C, CAD, bipolar disorder, anxiety, mitral valve disorder, esophagitis, gastritis, chronic pain, HTN, COPD, GERD presents to ED c/o 3 days of HA and vomiting. States he is cramping in his arms, abdomen and legs. Denies SI, HI. Pt has no other constitutional complaints at this time.

## 2017-02-21 NOTE — ED PROVIDER NOTE - DETAILS:
I, Bret Serna, performed the initial face to face bedside interview with this patient regarding history of present illness, review of symptoms and relevant past medical, social and family history.  I completed an independent physical examination.  I was the initial provider who evaluated this patient.  The history, relevant review of systems, past medical and surgical history, medical decision making, and physical examination was documented by the scribe in my presence and I attest to the accuracy of the documentation. I, Moy Carl, performed the initial face to face bedside interview with this patient regarding history of present illness and determined that the patient should be seen in the main ED.  The history, was documented by the scribe in my presence and I attest to the accuracy of the documentation.

## 2017-02-21 NOTE — ED ADULT NURSE REASSESSMENT NOTE - NS ED NURSE REASSESS COMMENT FT1
pt presents to ED with complaints of migraine,  pt states he has hx of HA and chronic back pain pt states he takes several pain meds, on arrival to ED s/l placed labs drawn by lab due to pt being a difficult stick, pt medicated for HA, IVF bolus infusing, pt states he had a few episodes of vomiting today and one episode of diarrhea after being constipated for a few days, will cont to monitor

## 2017-02-22 DIAGNOSIS — F39 UNSPECIFIED MOOD [AFFECTIVE] DISORDER: ICD-10-CM

## 2017-02-22 DIAGNOSIS — R69 ILLNESS, UNSPECIFIED: ICD-10-CM

## 2017-02-22 DIAGNOSIS — F11.20 OPIOID DEPENDENCE, UNCOMPLICATED: ICD-10-CM

## 2017-02-22 DIAGNOSIS — F31.4 BIPOLAR DISORDER, CURRENT EPISODE DEPRESSED, SEVERE, WITHOUT PSYCHOTIC FEATURES: ICD-10-CM

## 2017-02-22 LAB
AMMONIA BLD-MCNC: 35 UMOL/L — HIGH (ref 11–32)
AMPHET UR-MCNC: NEGATIVE — SIGNIFICANT CHANGE UP
APPEARANCE UR: CLEAR — SIGNIFICANT CHANGE UP
BACTERIA # UR AUTO: (no result)
BARBITURATES UR SCN-MCNC: NEGATIVE — SIGNIFICANT CHANGE UP
BENZODIAZ UR-MCNC: NEGATIVE — SIGNIFICANT CHANGE UP
BILIRUB UR-MCNC: (no result)
C DIFF BY PCR RESULT: SIGNIFICANT CHANGE UP
C DIFF TOX GENS STL QL NAA+PROBE: SIGNIFICANT CHANGE UP
COCAINE METAB.OTHER UR-MCNC: POSITIVE — SIGNIFICANT CHANGE UP
COLOR SPEC: YELLOW — SIGNIFICANT CHANGE UP
DIFF PNL FLD: NEGATIVE — SIGNIFICANT CHANGE UP
EPI CELLS # UR: SIGNIFICANT CHANGE UP
ETHANOL SERPL-MCNC: <10 MG/DL — SIGNIFICANT CHANGE UP (ref 0–10)
GLUCOSE UR QL: NEGATIVE MG/DL — SIGNIFICANT CHANGE UP
KETONES UR-MCNC: NEGATIVE — SIGNIFICANT CHANGE UP
LEUKOCYTE ESTERASE UR-ACNC: (no result)
METHADONE UR-MCNC: NEGATIVE — SIGNIFICANT CHANGE UP
NITRITE UR-MCNC: NEGATIVE — SIGNIFICANT CHANGE UP
OPIATES UR-MCNC: POSITIVE — SIGNIFICANT CHANGE UP
PCP SPEC-MCNC: SIGNIFICANT CHANGE UP
PCP UR-MCNC: NEGATIVE — SIGNIFICANT CHANGE UP
PH UR: 5 — SIGNIFICANT CHANGE UP (ref 4.8–8)
PROT UR-MCNC: 15 MG/DL
RBC CASTS # UR COMP ASSIST: SIGNIFICANT CHANGE UP /HPF (ref 0–4)
SP GR SPEC: 1 — LOW (ref 1.01–1.02)
THC UR QL: NEGATIVE — SIGNIFICANT CHANGE UP
UROBILINOGEN FLD QL: 4 MG/DL
WBC UR QL: (no result)

## 2017-02-22 PROCEDURE — 74177 CT ABD & PELVIS W/CONTRAST: CPT | Mod: 26

## 2017-02-22 RX ORDER — HYDROMORPHONE HYDROCHLORIDE 2 MG/ML
2 INJECTION INTRAMUSCULAR; INTRAVENOUS; SUBCUTANEOUS ONCE
Qty: 0 | Refills: 0 | Status: DISCONTINUED | OUTPATIENT
Start: 2017-02-22 | End: 2017-02-22

## 2017-02-22 RX ORDER — HYDROMORPHONE HYDROCHLORIDE 2 MG/ML
0.5 INJECTION INTRAMUSCULAR; INTRAVENOUS; SUBCUTANEOUS ONCE
Qty: 0 | Refills: 0 | Status: DISCONTINUED | OUTPATIENT
Start: 2017-02-22 | End: 2017-02-22

## 2017-02-22 RX ORDER — QUETIAPINE FUMARATE 200 MG/1
100 TABLET, FILM COATED ORAL ONCE
Qty: 0 | Refills: 0 | Status: COMPLETED | OUTPATIENT
Start: 2017-02-22 | End: 2017-02-22

## 2017-02-22 RX ORDER — HYDROMORPHONE HYDROCHLORIDE 2 MG/ML
1 INJECTION INTRAMUSCULAR; INTRAVENOUS; SUBCUTANEOUS ONCE
Qty: 0 | Refills: 0 | Status: DISCONTINUED | OUTPATIENT
Start: 2017-02-22 | End: 2017-02-22

## 2017-02-22 RX ORDER — QUETIAPINE FUMARATE 200 MG/1
100 TABLET, FILM COATED ORAL AT BEDTIME
Qty: 0 | Refills: 0 | Status: DISCONTINUED | OUTPATIENT
Start: 2017-02-22 | End: 2017-02-23

## 2017-02-22 RX ORDER — QUETIAPINE FUMARATE 200 MG/1
50 TABLET, FILM COATED ORAL DAILY
Qty: 0 | Refills: 0 | Status: DISCONTINUED | OUTPATIENT
Start: 2017-02-22 | End: 2017-03-02

## 2017-02-22 RX ORDER — NICOTINE POLACRILEX 2 MG
1 GUM BUCCAL ONCE
Qty: 0 | Refills: 0 | Status: COMPLETED | OUTPATIENT
Start: 2017-02-22 | End: 2017-02-22

## 2017-02-22 RX ORDER — QUETIAPINE FUMARATE 200 MG/1
50 TABLET, FILM COATED ORAL ONCE
Qty: 0 | Refills: 0 | Status: COMPLETED | OUTPATIENT
Start: 2017-02-22 | End: 2017-02-22

## 2017-02-22 RX ORDER — HYDROXYZINE HCL 10 MG
25 TABLET ORAL EVERY 6 HOURS
Qty: 0 | Refills: 0 | Status: DISCONTINUED | OUTPATIENT
Start: 2017-02-22 | End: 2017-03-13

## 2017-02-22 RX ORDER — ONDANSETRON 8 MG/1
4 TABLET, FILM COATED ORAL ONCE
Qty: 0 | Refills: 0 | Status: COMPLETED | OUTPATIENT
Start: 2017-02-22 | End: 2017-02-22

## 2017-02-22 RX ORDER — GABAPENTIN 400 MG/1
800 CAPSULE ORAL THREE TIMES A DAY
Qty: 0 | Refills: 0 | Status: DISCONTINUED | OUTPATIENT
Start: 2017-02-22 | End: 2017-03-13

## 2017-02-22 RX ORDER — DIVALPROEX SODIUM 500 MG/1
750 TABLET, DELAYED RELEASE ORAL
Qty: 0 | Refills: 0 | Status: DISCONTINUED | OUTPATIENT
Start: 2017-02-22 | End: 2017-02-27

## 2017-02-22 RX ORDER — DULOXETINE HYDROCHLORIDE 30 MG/1
60 CAPSULE, DELAYED RELEASE ORAL DAILY
Qty: 0 | Refills: 0 | Status: DISCONTINUED | OUTPATIENT
Start: 2017-02-22 | End: 2017-03-13

## 2017-02-22 RX ADMIN — HYDROMORPHONE HYDROCHLORIDE 1 MILLIGRAM(S): 2 INJECTION INTRAMUSCULAR; INTRAVENOUS; SUBCUTANEOUS at 00:13

## 2017-02-22 RX ADMIN — HYDROMORPHONE HYDROCHLORIDE 0.5 MILLIGRAM(S): 2 INJECTION INTRAMUSCULAR; INTRAVENOUS; SUBCUTANEOUS at 09:06

## 2017-02-22 RX ADMIN — HYDROMORPHONE HYDROCHLORIDE 2 MILLIGRAM(S): 2 INJECTION INTRAMUSCULAR; INTRAVENOUS; SUBCUTANEOUS at 04:10

## 2017-02-22 RX ADMIN — HYDROMORPHONE HYDROCHLORIDE 0.5 MILLIGRAM(S): 2 INJECTION INTRAMUSCULAR; INTRAVENOUS; SUBCUTANEOUS at 19:37

## 2017-02-22 RX ADMIN — QUETIAPINE FUMARATE 100 MILLIGRAM(S): 200 TABLET, FILM COATED ORAL at 22:00

## 2017-02-22 RX ADMIN — QUETIAPINE FUMARATE 50 MILLIGRAM(S): 200 TABLET, FILM COATED ORAL at 02:08

## 2017-02-22 RX ADMIN — Medication 1 PATCH: at 02:00

## 2017-02-22 RX ADMIN — HYDROMORPHONE HYDROCHLORIDE 2 MILLIGRAM(S): 2 INJECTION INTRAMUSCULAR; INTRAVENOUS; SUBCUTANEOUS at 03:30

## 2017-02-22 RX ADMIN — QUETIAPINE FUMARATE 100 MILLIGRAM(S): 200 TABLET, FILM COATED ORAL at 02:08

## 2017-02-22 RX ADMIN — HYDROMORPHONE HYDROCHLORIDE 0.5 MILLIGRAM(S): 2 INJECTION INTRAMUSCULAR; INTRAVENOUS; SUBCUTANEOUS at 13:18

## 2017-02-22 RX ADMIN — HYDROMORPHONE HYDROCHLORIDE 1 MILLIGRAM(S): 2 INJECTION INTRAMUSCULAR; INTRAVENOUS; SUBCUTANEOUS at 16:00

## 2017-02-22 RX ADMIN — DIVALPROEX SODIUM 750 MILLIGRAM(S): 500 TABLET, DELAYED RELEASE ORAL at 22:01

## 2017-02-22 RX ADMIN — Medication 1 PATCH: at 01:25

## 2017-02-22 RX ADMIN — GABAPENTIN 800 MILLIGRAM(S): 400 CAPSULE ORAL at 22:00

## 2017-02-22 NOTE — PATIENT PROFILE BEHAVIORAL HEALTH - VISION (WITH CORRECTIVE LENSES IF THE PATIENT USUALLY WEARS THEM):
wearing black framed glasses/Normal vision: sees adequately in most situations; can see medication labels, newsprint

## 2017-02-22 NOTE — ED BEHAVIORAL HEALTH ASSESSMENT NOTE - CURRENT MEDICATION
Per pt.   Depakote 750 mh po BID,Gabapentin 800 mg po TID,Cymbalta 60 mg po daily,Seroquel 50 mg po Am and 100 mg po HS      Asa, Lopressor(when my pressure is high", stomach meds(multiple) and morphine 15 mg po BID and Dilaudid 2 mg po prn QID

## 2017-02-22 NOTE — ED ADULT NURSE REASSESSMENT NOTE - NS ED NURSE REASSESS COMMENT FT1
pt expressed his concerns that he was starting to have hallucinations, and increased anxiety denies SI,HI   states "I Just want to get up and run out of here" pt requesting to see psych pt placed on 1:1 for safety and elopement risk pts belongings and valuables collected and locked with security, pt wanded by security, will cont to monitor

## 2017-02-22 NOTE — ED BEHAVIORAL HEALTH ASSESSMENT NOTE - DESCRIPTION
Maintained control, maintained on CO states hx "fx back, RA, tendonitis, emphysema, HepB, Hep C, neuropathy L foot 46 y.o. homeless man with "lawsuit" with DSS, hopes for SSI

## 2017-02-22 NOTE — ED BEHAVIORAL HEALTH ASSESSMENT NOTE - AXIS IV
Housing problems/Occupational problems/Problems with primary support/Problem related to social environment/Economic problems

## 2017-02-22 NOTE — ED BEHAVIORAL HEALTH ASSESSMENT NOTE - HPI (INCLUDE ILLNESS QUALITY, SEVERITY, DURATION, TIMING, CONTEXT, MODIFYING FACTORS, ASSOCIATED SIGNS AND SYMPTOMS)
BIB ambulance after c/o persistent N/V . Discharged from medical service last week after having been readmitted x 2. States due to vomiting is unable to keep any meds down.   CAlm and in control with fair eye contact as he reports that mobile crisis originally recommended psychiatric meds to a psychiatrist (Taylor) who spoke with his PCP(cannot recall name) who ordered the meds with 6 refills. Reports Taylor recommended IOP "and I wasn't going to do that".   States recently has had difficulty "keeping them down " as well as the pain meds so "I don't feel good".   Reports psych hospitalizations began in , multiple, most recently in [place] in 2014.   Admits breaks objects when angry.   Reports past suicide attempts of OD, cutting and hanging attempt.   States recently did cocaine to "go to sleep because I have ADD, but it didn't work". UTOX was +opiates(morphine and dilaudid prescribed ) and cocaine.   Reports wife is in [place](GM recently ) but hasn't lived with wife since .   Reports impaired sleep and appetite.   Describes mood as "depressed, hopeless and anxious"  Disheveled appearance. BIB ambulance after c/o persistent N/V . Discharged from medical service last week after having been readmitted x 2. States due to vomiting is unable to keep any meds down.   CAlm and in control with fair eye contact as he reports that mobile crisis originally recommended psychiatric meds to a psychiatrist (Taylor) who spoke with his PCP(cannot recall name) who ordered the meds with 6 refills. Reports Taylor recommended IOP "and I wasn't going to do that".     Long hx of suboxone treatment (see ISTOP): of note, opiods prescribed while medically discharged 17 ran out yesterday per ISTOP.     States recently has had difficulty "keeping them down " as well as the pain meds so "I don't feel good".   Reports psych hospitalizations began in , multiple, most recently in [place] in .   Admits breaks objects when angry.   Reports past suicide attempts of OD, cutting and hanging attempt.   States recently did cocaine to "go to sleep because I have ADD, but it didn't work". UTOX was +opiates(morphine and dilaudid prescribed ) and cocaine.   Reports [place] is in [place] recently ) but hasn't lived with wife since 2013.   Reports impaired sleep and appetite.   Describes mood as "depressed, hopeless and anxious"  Disheveled appearance.

## 2017-02-22 NOTE — ED BEHAVIORAL HEALTH ASSESSMENT NOTE - SUMMARY
46 y.o. SWM with hx of multiple somatic c/o and medical hospitalizations recently. Inititially reported N/V and then reported Ah, VH and SI. States had said same since arrival, "but no one listened".   Pt. reports "cannot stand the pain". Despite 2 opiates ordered , the "oain goes from an 8 to a 4, never lower than that".   Difficulty elaborating on Ah, VH. States plan to die is by OD currently.   Presents danger to self.   Consulted and seen by Dr. Rincon who recommends voluntary admission to [facility] when medically cleared (r/o C-diff pending) 46 y.o. SWM with hx of multiple somatic c/o and medical hospitalizations recently. Currently on isolation. Inititially reported N/V and then reported Ah, VH and SI. States had said same since arrival, "but no one listened".   Pt. reports "cannot stand the pain". Despite 2 opiates ordered , the "oain goes from an 8 to a 4, never lower than that".   Difficulty elaborating on Ah, VH. States plan to die is by OD currently.   Presents danger to self.   Consulted and seen by Dr. Rincon who recommends voluntary admission to [facility] when medically cleared (r/o C-diff pending)

## 2017-02-22 NOTE — ED BEHAVIORAL HEALTH ASSESSMENT NOTE - DETAILS
28 y.o. refusal, states has told no one how he feels reports last attempt was OD in 2014 breaks objects by pt report states allergies to Haldol, CPZ, Stelazine, Risperdal and NSAIDS N/V frequency of urination generalized pain discussed with [facility] RN staff and Dr. Rincon Daphne SIMS N/V and pain c/o

## 2017-02-22 NOTE — ED BEHAVIORAL HEALTH ASSESSMENT NOTE - SUBSTANCE ISSUES AND PLAN (INCLUDE STANDING AND PRN MEDICATION)
opiates per hospitalist team. Pt. made aware that same or any opiates may not be ordered. Then opioid withdrawal protocol will be necessary

## 2017-02-22 NOTE — ED BEHAVIORAL HEALTH ASSESSMENT NOTE - SUICIDE RISK FACTORS
Mood episode/Hopelessness/Chronic pain or acute medical issue/Global insomnia/Substance abuse/dependence

## 2017-02-23 DIAGNOSIS — G89.4 CHRONIC PAIN SYNDROME: ICD-10-CM

## 2017-02-23 LAB
CULTURE RESULTS: SIGNIFICANT CHANGE UP
SPECIMEN SOURCE: SIGNIFICANT CHANGE UP
VALPROATE SERPL-MCNC: 58 UG/ML — SIGNIFICANT CHANGE UP (ref 50–100)

## 2017-02-23 PROCEDURE — 93010 ELECTROCARDIOGRAM REPORT: CPT

## 2017-02-23 RX ORDER — ALBUTEROL 90 UG/1
2 AEROSOL, METERED ORAL EVERY 6 HOURS
Qty: 0 | Refills: 0 | Status: DISCONTINUED | OUTPATIENT
Start: 2017-02-23 | End: 2017-03-13

## 2017-02-23 RX ORDER — SUCRALFATE 1 G
1 TABLET ORAL
Qty: 0 | Refills: 0 | Status: COMPLETED | OUTPATIENT
Start: 2017-02-23 | End: 2017-03-05

## 2017-02-23 RX ORDER — PANTOPRAZOLE SODIUM 20 MG/1
40 TABLET, DELAYED RELEASE ORAL
Qty: 0 | Refills: 0 | Status: DISCONTINUED | OUTPATIENT
Start: 2017-02-23 | End: 2017-03-13

## 2017-02-23 RX ORDER — NICOTINE POLACRILEX 2 MG
1 GUM BUCCAL DAILY
Qty: 0 | Refills: 0 | Status: DISCONTINUED | OUTPATIENT
Start: 2017-02-23 | End: 2017-03-13

## 2017-02-23 RX ORDER — METOPROLOL TARTRATE 50 MG
25 TABLET ORAL
Qty: 0 | Refills: 0 | Status: DISCONTINUED | OUTPATIENT
Start: 2017-02-23 | End: 2017-03-13

## 2017-02-23 RX ORDER — ASPIRIN/CALCIUM CARB/MAGNESIUM 324 MG
81 TABLET ORAL DAILY
Qty: 0 | Refills: 0 | Status: DISCONTINUED | OUTPATIENT
Start: 2017-02-23 | End: 2017-03-13

## 2017-02-23 RX ORDER — FLUTICASONE PROPIONATE AND SALMETEROL 50; 250 UG/1; UG/1
1 POWDER ORAL; RESPIRATORY (INHALATION)
Qty: 0 | Refills: 0 | Status: DISCONTINUED | OUTPATIENT
Start: 2017-02-23 | End: 2017-03-13

## 2017-02-23 RX ORDER — QUETIAPINE FUMARATE 200 MG/1
200 TABLET, FILM COATED ORAL AT BEDTIME
Qty: 0 | Refills: 0 | Status: DISCONTINUED | OUTPATIENT
Start: 2017-02-23 | End: 2017-03-02

## 2017-02-23 RX ADMIN — QUETIAPINE FUMARATE 50 MILLIGRAM(S): 200 TABLET, FILM COATED ORAL at 13:12

## 2017-02-23 RX ADMIN — DIVALPROEX SODIUM 750 MILLIGRAM(S): 500 TABLET, DELAYED RELEASE ORAL at 21:08

## 2017-02-23 RX ADMIN — PANTOPRAZOLE SODIUM 40 MILLIGRAM(S): 20 TABLET, DELAYED RELEASE ORAL at 21:13

## 2017-02-23 RX ADMIN — DULOXETINE HYDROCHLORIDE 60 MILLIGRAM(S): 30 CAPSULE, DELAYED RELEASE ORAL at 13:12

## 2017-02-23 RX ADMIN — GABAPENTIN 800 MILLIGRAM(S): 400 CAPSULE ORAL at 06:28

## 2017-02-23 RX ADMIN — Medication 81 MILLIGRAM(S): at 21:21

## 2017-02-23 RX ADMIN — Medication 1 PATCH: at 21:06

## 2017-02-23 RX ADMIN — Medication 1 GRAM(S): at 21:12

## 2017-02-23 RX ADMIN — DIVALPROEX SODIUM 750 MILLIGRAM(S): 500 TABLET, DELAYED RELEASE ORAL at 06:29

## 2017-02-23 RX ADMIN — QUETIAPINE FUMARATE 200 MILLIGRAM(S): 200 TABLET, FILM COATED ORAL at 21:09

## 2017-02-23 RX ADMIN — GABAPENTIN 800 MILLIGRAM(S): 400 CAPSULE ORAL at 13:12

## 2017-02-23 RX ADMIN — GABAPENTIN 800 MILLIGRAM(S): 400 CAPSULE ORAL at 21:07

## 2017-02-23 NOTE — CONSULT NOTE ADULT - ASSESSMENT
Assessment and Plan:   · Assessment		  46y old  Male who presents with a chief complaint of vomiting /chest pain/ body aches      # Nausea / vomiting   no further episodes.    Antiemetics prn    continue with PPI PO q12h.        # Hepatitis.     (+) HBV + HCV.      discussed with Dr. Rodriguez recommending outpt f/u for treatment and thorough workup has been completed      #Chronic obstructive pulmonary disease, unspecified COPD type.    stable.    c/w LABA/ICS + SUSANNAH neb PRN.       # Chronic pain syndrome.     No opioids.  discussed with pt. need to stop opioid use.  previously been given Suboxone by Dr Díaz and DR Barry, apparently discharged from their practice. Currently does not have neither PMD or pain management. Since then, obtaining pain meds from "hospital shopping". Pt is unclear where he will get his pain meds upon discharge.   management of withdrawl deferred to psychiatry      #Bipolar affective disorder  - mgmt per psych    # CAD (coronary artery disease)  - Unclear h/o stent as card cath in 2014- showed non-obstructive disease  Cont aspirin, no plavix

## 2017-02-23 NOTE — PROGRESS NOTE BEHAVIORAL HEALTH - NSBHFUPINTERVALHXFT_PSY_A_CORE
Appears despondent due to ongoing pain and at same time dependence on pain meds as well as current situation in life.  Wife is in [place] and reports good relationship.  Unsure if he is truly forthcoming

## 2017-02-23 NOTE — PROGRESS NOTE BEHAVIORAL HEALTH - NSBHFUPINTERVALCCFT_PSY_A_CORE
Complaining of ongoing pain as well as nausea/vomiting 3-6 times per day since late January.  Also reporting paranoia, new, feeling as if others are after him.    MEDICATIONS  (STANDING):  diVALproex DR 750milliGRAM(s) Oral two times a day  DULoxetine 60milliGRAM(s) Oral daily  gabapentin 800milliGRAM(s) Oral three times a day  QUEtiapine 50milliGRAM(s) Oral daily  QUEtiapine 200milliGRAM(s) Oral at bedtime    MEDICATIONS  (PRN):  hydrOXYzine hydrochloride 25milliGRAM(s) Oral every 6 hours PRN Agitation

## 2017-02-23 NOTE — CONSULT NOTE ADULT - SUBJECTIVE AND OBJECTIVE BOX
History of Present Illness: 	  46 year old homeless male with history of chronic HCV, thrombocytopenia, anemia, rheumatoid arthritis, spinal fracture and peripheral neuropathy, hyperlipidemia, CAD s/p balloon angioplasty in past and recent negative coronary cath, hypertension, bipolar disorder, COPD, tobacco smoking, opioid abuse who was discharged on 1/31/17 from  (admitted for chest pain and had negative cath)     then went to [facility] for a scheduled sleep study  where he developed nausea and vomiting.recommended w/u of hepatitis there and signed out AMA    Patient was readmitted at  2/7/17 and discharged 2/10/17 for nausea and abdominal pain  and chest pain and 1 episode of coffee ground emesis  s/p EGD showed distal esophagitis and gastritis  and HepB+,Hep C+ with elevated LFT,patient was advised to continue PPI BID and sacralfate for 2 weeks by GI and f/u hepatitis w/u as outpatient     After discharge from  ,that was 2/10/17 at home began to have nausea and intermittent vomiting and retching with chest pain ,patient had minimal  few streaks of red blood in his last episode of vomiting at home.No further vomiting in ED    Pt is afebrile ,no diarrhea,LBM was 2/11/17 light brown in color, reports urine color is lighter than last admission,no SOB,no dizziness,no palpitations     patient c/o generalised body aches ,back aches ,joint pains which he says are chronic and attributes it to RA    Patient reports he needs stronger pain meds than MScontin and dilaudid which he uses at home.    SUBJECTIVE & OBJECTIVE: Pt seen and examined at bedside.   2/12/17 - Patient seen and examined at bedside.  He complains of generalized pain in his knees, "kidneys" and back.  Is also complaining of hunger and states that he wants to eat.  Complains of other non-specific symptoms like, "feeling hot and cold,"  Denies any acute events overnight, denies episodes of diarrhea or vomiting.   2/13/17 denies hematemesis. Asking for more pain meds    PHYSICAL EXAM:  Vital Signs Last 24 Hrs  T(C): 36.3, Max: 37.1 (02-12 @ 19:59)  T(F): 97.3, Max: 98.7 (02-12 @ 19:59)  HR: 58 (58 - 71)  BP: 99/52 (98/53 - 143/87)  BP(mean): --  RR: 17 (15 - 17)  SpO2: 97% (96% - 98%)    GENERAL: NAD, well-groomed, well-developed  HEAD:  Atraumatic, Normocephalic  EYES: EOMI, PERRLA, conjunctiva and sclera clear  ENMT: Moist mucous membranes  NECK: Supple, No JVD  NERVOUS SYSTEM:  Alert & Oriented X3, Motor Strength 5/5 B/L upper and lower extremities; DTRs 2+ intact and symmetric  CHEST/LUNG: Clear to auscultation bilaterally; No rales, rhonchi, wheezing, or rubs  HEART: Regular rate and rhythm; No murmurs, rubs, or gallops  ABDOMEN: Soft, Nontender, Nondistended; Bowel sounds present  EXTREMITIES:  2+ Peripheral Pulses, No clubbing, cyanosis, or edema        MEDICATIONS  (STANDING):  nicotine - 21 mG/24Hr(s) Patch 1patch Transdermal daily  ondansetron Injectable 4milliGRAM(s) IV Push once  predniSONE   Tablet 10milliGRAM(s) Oral daily  morphine ER Tablet 15milliGRAM(s) Oral every 12 hours  diVALproex ER 500milliGRAM(s) Oral every 12 hours  gabapentin Oral Tab/Cap - Peds 800milliGRAM(s) Oral three times a day  DULoxetine 60milliGRAM(s) Oral daily  atorvastatin 20milliGRAM(s) Oral at bedtime  clopidogrel Tablet 75milliGRAM(s) Oral daily  QUEtiapine XR 150milliGRAM(s) Oral at bedtime  metoprolol 25milliGRAM(s) Oral two times a day  fluticasone / salmeterol 250-50 MICROgram(s) Diskus 1Dose(s) Inhalation two times a day  multivitamin 1Tablet(s) Oral daily  pantoprazole  Injectable 40milliGRAM(s) IV Push two times a day  sucralfate suspension 1Gram(s) Oral four times a day  dextrose 5% + sodium chloride 0.45%. 1000milliLiter(s) IV Continuous <Continuous>  influenza   Vaccine 0.5milliLiter(s) IntraMuscular once    MEDICATIONS  (PRN):  HYDROmorphone   Tablet 2milliGRAM(s) Oral two times a day PRN Severe Pain (7 - 10)  ALBUTerol    90 MICROgram(s) HFA Inhaler 2Puff(s) Inhalation every 6 hours PRN sob,wheezing  ondansetron Injectable 4milliGRAM(s) IV Push every 8 hours PRN nausea,vomiting      LABS:                                   12.4   6.0   )-----------( 128      ( 12 Feb 2017 13:41 )             37.5     13 Feb 2017 07:39    143    |  107    |  13     ----------------------------<  95     4.0     |  28     |  0.90     Ca    8.5        13 Feb 2017 07:39  Phos  3.5       13 Feb 2017 07:39  Mg     2.3       13 Feb 2017 07:39        PT/INR - ( 12 Feb 2017 02:42 )   PT: 15.9 sec;   INR: 1.44 ratio         PTT - ( 12 Feb 2017 02:42 )  PTT:38.7 sec  CAPILLARY BLOOD GLUCOSE    CARDIAC MARKERS ( 12 Feb 2017 02:42 )  <0.015 ng/mL / x     / 23 U/L / x     / x      CARDIAC MARKERS ( 11 Feb 2017 17:12 )  <0.015 ng/mL / x     / x     / x     / x              Assessment and Plan:   · Assessment		  46y old  Male who presents with a chief complaint of vomiting /chest pain   Problem/Plan - 1:  ·  Problem: Hematemesis with nausea.    Plan: no further episodes.    HH stable.    continue with PPI PO q12h.    GI input appreciated.  Advance Diet to Clears      Problem/Plan - 2:  ·  Problem: Hepatitis.    Plan: (+) HBV + HCV.  order genotypes.    according to hx, has deferred treatment in the past.    recommend GI f/u outpatient.      Problem/Plan - 3:  ·  Problem: Chronic obstructive pulmonary disease, unspecified COPD type.    Plan: stable.    c/w LABA/ICS + SUSANNAH neb PRN.      Problem/Plan - 4:  ·  Problem: Chronic pain syndrome.    Plan: MS contin 15mg po q12h + hydromorphone 2mg po q12h PRN breakthrough pain.    pain management f/u outpatient.      Problem/Plan - 5:  ·  Problem: Bipolar affective disorder, remission status unspecified.    Plan: check VA level.  c/w VA + Cymbalta + Seroquel.     Problem/Plan - 1:  ·  Problem: Hematemesis.  Plan: S/p EGD on 2/8/17- no active bleeding found  GI f/u appreciated  Cont PPI, Carafate  Advance diet as tolerated.     Problem/Plan - 2:  ·  Problem: CAD (coronary artery disease).  Plan: Unclear h/o stent as card cath in 2014- showed non-obstructive disease  Cont Plavix.     Problem/Plan - 3:  ·  Problem: Hep C w/o coma, chronic.  Plan: Outpatient f/u.     Problem/Plan - 4:  ·  Problem: Chronic pain.  Plan: Patient previously been given Suboxone by Dr Díaz and DR Barry, apparently discharged from their practice. Currently does not have neither PMD or pain management. Since then, obtaining pain meds from "hospital shopping". Pt is unclear where he will get his pain meds upon discharge.     Problem/Plan - 5:  ·  Problem: Mitral valve prolapse.  Plan: outpatient f/u.     Problem/Plan - 6:  Problem: COPD (chronic obstructive pulmonary disease). Plan: stable.    Problem/Plan - 7:  ·  Problem: Bipolar 1 disorder.  Plan: cont psych meds.     Problem/Plan - 8:  ·  Problem: Antisocial personality disorder in adult.  Plan: stable for now. 46 year old homeless male with history of chronic HCV, thrombocytopenia, anemia, rheumatoid arthritis, spinal fracture and peripheral neuropathy, hyperlipidemia, CAD s/p balloon angioplasty in past and recent negative coronary cath, hypertension, bipolar disorder, COPD, tobacco smoking, opioid abuse who was discharged on 17 from  (admitted for chest pain and had negative cath).  Patient was readmitted at  17 and discharged 2/10/17 for nausea and abdominal pain  and chest pain and 1 episode of coffee ground emesis  s/p EGD showed distal esophagitis and gastritis  and HepB+,Hep C+ with elevated LFT,patient was advised to continue PPI BID and sacralfate for 2 weeks by GI and f/u hepatitis w/u as outpatient.     History of Present Illness: 	  46 year old homeless male with history of chronic HCV, thrombocytopenia, anemia, rheumatoid arthritis, spinal fracture and peripheral neuropathy, hyperlipidemia, CAD s/p balloon angioplasty in past and recent negative coronary cath, hypertension, bipolar disorder, COPD, tobacco smoking, opioid abuse who was discharged on 17 from  (admitted for chest pain and had negative cath)     Presently patient c/o generalised body aches ,back aches ,joint pains which he says are chronic and attributes it to RA    Patient requesting pain meds MScontin and dilaudid which he uses at home.  He cannot have NSAID d/t allergy.  He feels that he will go into withdrawl if he does nto get the medication.      Review of systems is negative except as mentioned in the HPI    Vital Signs Last 24 Hrs  T(C): 36.7, Max: 36.7 (02-23 @ 08:18)  T(F): 98, Max: 98 (02-23 @ 08:18)  HR: 104 (104 - 104)  BP: 104/60 (104/60 - 104/60)  BP(mean): --  RR: 15 (15 - 15)  SpO2: 98% (98% - 98%)      Physical exam  GENERAL: NAD, well-groomed, well-developed  HEAD:  Atraumatic, Normocephalic  EYES: EOMI, PERRLA, conjunctiva and sclera clear  ENMT: Moist mucous membranes  NECK: Supple, No JVD  NERVOUS SYSTEM:  Alert & Oriented X3, Motor Strength 5/5 B/L upper and lower extremities; DTRs 2+ intact and symmetric  CHEST/LUNG: Clear to auscultation bilaterally; No rales, rhonchi, wheezing, or rubs  HEART: Regular rate and rhythm; No murmurs, rubs, or gallops  ABDOMEN: Soft, Nontender, Nondistended; Bowel sounds present  EXTREMITIES:  2+ Peripheral Pulses, No clubbing, cyanosis, or edema      MEDICATIONS  (STANDING):  diVALproex DR 750milliGRAM(s) Oral two times a day  DULoxetine 60milliGRAM(s) Oral daily  gabapentin 800milliGRAM(s) Oral three times a day  QUEtiapine 50milliGRAM(s) Oral daily  QUEtiapine 200milliGRAM(s) Oral at bedtime    MEDICATIONS  (PRN):  hydrOXYzine hydrochloride 25milliGRAM(s) Oral every 6 hours PRN Agitation                                  10.5   8.4   )-----------( 178      ( 2017 20:44 )             32.3     2017 20:44    146    |  110    |  4      ----------------------------<  86     3.5     |  27     |  0.66     Ca    8.4        2017 20:44    TPro  6.5    /  Alb  3.1    /  TBili  4.0    /  DBili  x      /  AST  464    /  ALT  753    /  AlkPhos  237    2017 20:44        LIVER FUNCTIONS - ( 2017 20:44 )  Alb: 3.1 g/dL / Pro: 6.5 gm/dL / ALK PHOS: 237 U/L / ALT: 753 U/L / AST: 464 U/L / GGT: x             Urinalysis Basic - ( 2017 03:29 )    Color: Yellow / Appearance: Clear / S.005 / pH: x  Gluc: x / Ketone: Negative  / Bili: Small / Urobili: 4 mg/dL   Blood: x / Protein: 15 mg/dL / Nitrite: Negative   Leuk Esterase: Trace / RBC: 0-5 /HPF / WBC 6-10   Sq Epi: x / Non Sq Epi: Few / Bacteria: Few

## 2017-02-23 NOTE — PROVIDER CONTACT NOTE (OTHER) - ASSESSMENT
Pt reports losing 50lbs. Pt has documented weight loss 9.6lbs (5.7% over one month sig. wt loss.) Nutrition Focus Physical Exam performed. Pt shows mild signs of muscle wasting in calf, quads, clavicle and deltoid. Pt showed signs of moderate muscle wasting in temples. Pt has mild to moderate fat wasting in ribs and triceps. Pt meets criteria for moderate protein-calorie malnutrition.

## 2017-02-23 NOTE — PROGRESS NOTE BEHAVIORAL HEALTH - NSBHFUPIPCHARTREVFT_PSY_A_CORE
Chart reviewed and case discussed with NP Eva Cortes  Attempted to review old records in [facility], but system is unavailable

## 2017-02-24 LAB
ALBUMIN SERPL ELPH-MCNC: 2.9 G/DL — LOW (ref 3.3–5)
ALP SERPL-CCNC: 246 U/L — HIGH (ref 40–120)
ALT FLD-CCNC: 850 U/L — HIGH (ref 12–78)
ANION GAP SERPL CALC-SCNC: 8 MMOL/L — SIGNIFICANT CHANGE UP (ref 5–17)
AST SERPL-CCNC: 715 U/L — HIGH (ref 15–37)
BILIRUB SERPL-MCNC: 4.2 MG/DL — HIGH (ref 0.2–1.2)
BUN SERPL-MCNC: 10 MG/DL — SIGNIFICANT CHANGE UP (ref 7–23)
CALCIUM SERPL-MCNC: 8.7 MG/DL — SIGNIFICANT CHANGE UP (ref 8.5–10.1)
CHLORIDE SERPL-SCNC: 116 MMOL/L — HIGH (ref 96–108)
CO2 SERPL-SCNC: 23 MMOL/L — SIGNIFICANT CHANGE UP (ref 22–31)
CREAT SERPL-MCNC: 0.72 MG/DL — SIGNIFICANT CHANGE UP (ref 0.5–1.3)
GLUCOSE SERPL-MCNC: 86 MG/DL — SIGNIFICANT CHANGE UP (ref 70–99)
INR BLD: 1.39 RATIO — HIGH (ref 0.88–1.16)
POTASSIUM SERPL-MCNC: 4.3 MMOL/L — SIGNIFICANT CHANGE UP (ref 3.5–5.3)
POTASSIUM SERPL-SCNC: 4.3 MMOL/L — SIGNIFICANT CHANGE UP (ref 3.5–5.3)
PROT SERPL-MCNC: 6.7 GM/DL — SIGNIFICANT CHANGE UP (ref 6–8.3)
PROTHROM AB SERPL-ACNC: 15.3 SEC — HIGH (ref 10–13.1)
SODIUM SERPL-SCNC: 147 MMOL/L — HIGH (ref 135–145)

## 2017-02-24 RX ORDER — METHADONE HYDROCHLORIDE 40 MG/1
5 TABLET ORAL EVERY 6 HOURS
Qty: 0 | Refills: 0 | Status: DISCONTINUED | OUTPATIENT
Start: 2017-02-24 | End: 2017-03-02

## 2017-02-24 RX ADMIN — Medication 81 MILLIGRAM(S): at 09:02

## 2017-02-24 RX ADMIN — Medication 1 GRAM(S): at 12:07

## 2017-02-24 RX ADMIN — Medication 25 MILLIGRAM(S): at 14:16

## 2017-02-24 RX ADMIN — GABAPENTIN 800 MILLIGRAM(S): 400 CAPSULE ORAL at 13:00

## 2017-02-24 RX ADMIN — Medication 1 GRAM(S): at 21:45

## 2017-02-24 RX ADMIN — QUETIAPINE FUMARATE 50 MILLIGRAM(S): 200 TABLET, FILM COATED ORAL at 09:02

## 2017-02-24 RX ADMIN — GABAPENTIN 800 MILLIGRAM(S): 400 CAPSULE ORAL at 06:27

## 2017-02-24 RX ADMIN — DULOXETINE HYDROCHLORIDE 60 MILLIGRAM(S): 30 CAPSULE, DELAYED RELEASE ORAL at 09:02

## 2017-02-24 RX ADMIN — PANTOPRAZOLE SODIUM 40 MILLIGRAM(S): 20 TABLET, DELAYED RELEASE ORAL at 09:02

## 2017-02-24 RX ADMIN — METHADONE HYDROCHLORIDE 5 MILLIGRAM(S): 40 TABLET ORAL at 19:32

## 2017-02-24 RX ADMIN — Medication 1 GRAM(S): at 18:04

## 2017-02-24 RX ADMIN — Medication 1 PATCH: at 09:03

## 2017-02-24 RX ADMIN — DIVALPROEX SODIUM 750 MILLIGRAM(S): 500 TABLET, DELAYED RELEASE ORAL at 06:27

## 2017-02-24 RX ADMIN — Medication 1 GRAM(S): at 09:02

## 2017-02-24 RX ADMIN — DIVALPROEX SODIUM 750 MILLIGRAM(S): 500 TABLET, DELAYED RELEASE ORAL at 21:45

## 2017-02-24 RX ADMIN — QUETIAPINE FUMARATE 200 MILLIGRAM(S): 200 TABLET, FILM COATED ORAL at 21:46

## 2017-02-24 RX ADMIN — METHADONE HYDROCHLORIDE 5 MILLIGRAM(S): 40 TABLET ORAL at 13:00

## 2017-02-25 LAB
CULTURE RESULTS: SIGNIFICANT CHANGE UP
SPECIMEN SOURCE: SIGNIFICANT CHANGE UP

## 2017-02-25 RX ADMIN — Medication 1 GRAM(S): at 10:18

## 2017-02-25 RX ADMIN — DIVALPROEX SODIUM 750 MILLIGRAM(S): 500 TABLET, DELAYED RELEASE ORAL at 21:01

## 2017-02-25 RX ADMIN — Medication 81 MILLIGRAM(S): at 10:20

## 2017-02-25 RX ADMIN — METHADONE HYDROCHLORIDE 5 MILLIGRAM(S): 40 TABLET ORAL at 21:01

## 2017-02-25 RX ADMIN — GABAPENTIN 800 MILLIGRAM(S): 400 CAPSULE ORAL at 21:01

## 2017-02-25 RX ADMIN — GABAPENTIN 800 MILLIGRAM(S): 400 CAPSULE ORAL at 06:31

## 2017-02-25 RX ADMIN — Medication 1 GRAM(S): at 13:03

## 2017-02-25 RX ADMIN — GABAPENTIN 800 MILLIGRAM(S): 400 CAPSULE ORAL at 13:03

## 2017-02-25 RX ADMIN — Medication 1 GRAM(S): at 19:50

## 2017-02-25 RX ADMIN — METHADONE HYDROCHLORIDE 5 MILLIGRAM(S): 40 TABLET ORAL at 07:29

## 2017-02-25 RX ADMIN — Medication 1 PATCH: at 10:17

## 2017-02-25 RX ADMIN — QUETIAPINE FUMARATE 200 MILLIGRAM(S): 200 TABLET, FILM COATED ORAL at 21:01

## 2017-02-25 RX ADMIN — QUETIAPINE FUMARATE 50 MILLIGRAM(S): 200 TABLET, FILM COATED ORAL at 10:18

## 2017-02-25 RX ADMIN — DIVALPROEX SODIUM 750 MILLIGRAM(S): 500 TABLET, DELAYED RELEASE ORAL at 10:17

## 2017-02-25 RX ADMIN — Medication 1 PATCH: at 10:16

## 2017-02-25 RX ADMIN — Medication 1 GRAM(S): at 21:01

## 2017-02-25 RX ADMIN — DULOXETINE HYDROCHLORIDE 60 MILLIGRAM(S): 30 CAPSULE, DELAYED RELEASE ORAL at 10:18

## 2017-02-25 RX ADMIN — PANTOPRAZOLE SODIUM 40 MILLIGRAM(S): 20 TABLET, DELAYED RELEASE ORAL at 06:32

## 2017-02-25 RX ADMIN — METHADONE HYDROCHLORIDE 5 MILLIGRAM(S): 40 TABLET ORAL at 13:30

## 2017-02-25 NOTE — CONSULT NOTE ADULT - ASSESSMENT
Assessment and Plan:   · Assessment		  46y old  Male who presents with a chief complaint of vomiting /chest pain/ body aches secondary to opioid withdrawal      # Nausea / vomiting secondary to opioid withdrawal   no further episodes.    supportive care  Antiemetics prn    continue with PPI PO q12h.    treatment of withdrawal symptoms per psych - on methadone.      # Hepatitis.     (+) HBV + HCV.      discussed with Dr. Rodriguez recommending outpt f/u for treatment and thorough workup has been completed      #Chronic obstructive pulmonary disease, unspecified COPD type.    stable.    c/w LABA/ICS + SUSANNAH neb PRN.       # Chronic pain syndrome.     No opioids.  discussed with pt. need to stop opioid use.  previously been given Suboxone by Dr Díaz and DR Barry, apparently discharged from their practice. Currently does not have neither PMD or pain management. Since then, obtaining pain meds from "hospital shopping". Pt is unclear where he will get his pain meds upon discharge.   -recommend pain management consult given complex pain regimen in setting of hepatitis.      #Bipolar affective disorder  - mgmt per psych    # CAD (coronary artery disease)  - Unclear h/o stent as card cath in 2014- showed non-obstructive disease  Cont aspirin, no plavix     will sign off case.  Pt medically stable.  Please re-consult PRN.

## 2017-02-25 NOTE — PROGRESS NOTE BEHAVIORAL HEALTH - NSBHFUPINTERVALHXFT_PSY_A_CORE
Covering note    Reviewed the chart and spoke with the RN and read interim note. Pt with repeated demand for more opiate based pain medication . Pt not appearing in pain but more irritable "now I have to wait for hours before I get my next dose. "Pt with preoccupation for more pain medication .Vital Signs Last 24 Hrs  T(C): 36.5, Max: 36.5 (02-25 @ 07:32)  T(F): 97.7, Max: 97.7 (02-25 @ 07:32)  HR: 81 (81 - 81)  BP: 108/66 (108/66 - 108/66)  BP(mean): --  RR: 17 (17 - 17)  SpO2: 99% (99% - 99%)  pt with elevated liver enzymes hx of Hep B and Hep C Covering note    Reviewed the chart and spoke with the RN and read interim note. Pt with repeated demand for more opiate based pain medication . Pt not appearing in pain but more irritable "now I have to wait for hours before I get my next dose. "Pt with preoccupation for more pain medication . He is now denying any suicidal ideation or plan.  Denies any hallucination Pt with goal directed speech .  He is sarcastic and verbally aggressive. No nausea or vomiting. Not appearing to be physically distressed.  Vital Signs Last 24 Hrs  T(C): 36.5, Max: 36.5 (02-25 @ 07:32)  T(F): 97.7, Max: 97.7 (02-25 @ 07:32)  HR: 81 (81 - 81)  BP: 108/66 (108/66 - 108/66)  BP(mean): --  RR: 17 (17 - 17)  SpO2: 99% (99% - 99%)  pt with elevated liver enzymes hx of Hep B and Hep C

## 2017-02-25 NOTE — CONSULT NOTE ADULT - SUBJECTIVE AND OBJECTIVE BOX
46 year old homeless male with history of chronic HCV, thrombocytopenia, anemia, rheumatoid arthritis, spinal fracture and peripheral neuropathy, hyperlipidemia, CAD s/p balloon angioplasty in past and recent negative coronary cath, hypertension, bipolar disorder, COPD, tobacco smoking, opioid abuse who was discharged on 17 from  (admitted for chest pain and had negative cath).  Patient was readmitted at  17 and discharged 2/10/17 for nausea and abdominal pain  and chest pain and 1 episode of coffee ground emesis  s/p EGD showed distal esophagitis and gastritis  and HepB+,Hep C+ with elevated LFT,patient was advised to continue PPI BID and sacralfate for 2 weeks by GI and f/u hepatitis w/u as outpatient.     History of Present Illness: 	  46 year old homeless male with history of chronic HCV, thrombocytopenia, anemia, rheumatoid arthritis, spinal fracture and peripheral neuropathy, hyperlipidemia, CAD s/p balloon angioplasty in past and recent negative coronary cath, hypertension, bipolar disorder, COPD, tobacco smoking, opioid abuse who was discharged on 17 from  (admitted for chest pain and had negative cath)     Presently patient c/o generalised body aches ,back aches ,joint pains which he says are chronic and attributes it to RA    Patient requesting pain meds MScontin and dilaudid which he uses at home.  He cannot have NSAID d/t allergy.  He feels that he will go into withdrawl if he does nto get the medication.    :  Pt found sleeping, lying down in no distress.  Upon interviewing pt states he has aches and pains all over body.  He is very concerned because he wants more opioids for pain and so he does not withdraw.  States he has nausea, abd pain.  Otherwise he offers no other significant complaints.    Review of systems is negative except as mentioned in the HPI    Vital Signs Last 24 Hrs  T(C): 36.5, Max: 36.5 ( @ 07:32)  T(F): 97.7, Max: 97.7 ( @ 07:32)  HR: 81 (81 - 81)  BP: 108/66 (108/66 - 108/66)  BP(mean): --  RR: 17 (17 - 17)  SpO2: 99% (99% - 99%)      Physical exam  GENERAL: NAD, well-groomed, well-developed  HEAD:  Atraumatic, Normocephalic  EYES: EOMI, PERRLA, conjunctiva and sclera clear  ENMT: Moist mucous membranes  NECK: Supple, No JVD  NERVOUS SYSTEM:  Alert & Oriented X3, Motor Strength 5/5 B/L upper and lower extremities; DTRs 2+ intact and symmetric  CHEST/LUNG: Clear to auscultation bilaterally; No rales, rhonchi, wheezing, or rubs  HEART: Regular rate and rhythm; No murmurs, rubs, or gallops  ABDOMEN: Soft, Nontender, Nondistended; Bowel sounds present  EXTREMITIES:  2+ Peripheral Pulses, No clubbing, cyanosis, or edema      MEDICATIONS  (STANDING):  diVALproex DR 750milliGRAM(s) Oral two times a day  DULoxetine 60milliGRAM(s) Oral daily  gabapentin 800milliGRAM(s) Oral three times a day  QUEtiapine 50milliGRAM(s) Oral daily  QUEtiapine 200milliGRAM(s) Oral at bedtime  pantoprazole    Tablet 40milliGRAM(s) Oral two times a day before meals  sucralfate 1Gram(s) Oral four times a day  aspirin enteric coated 81milliGRAM(s) Oral daily  metoprolol 25milliGRAM(s) Oral two times a day  fluticasone / salmeterol 250-50 MICROgram(s) Diskus 1Dose(s) Inhalation two times a day  nicotine - 21 mG/24Hr(s) Patch 1patch Transdermal daily    MEDICATIONS  (PRN):  hydrOXYzine hydrochloride 25milliGRAM(s) Oral every 6 hours PRN Agitation  ALBUTerol    90 MICROgram(s) HFA Inhaler 2Puff(s) Inhalation every 6 hours PRN Shortness of Breath  methadone 5milliGRAM(s) Oral every 6 hours PRN opiate withdrawal                                    10.5   8.4   )-----------( 178      ( 2017 20:44 )             32.3     2017 20:44    146    |  110    |  4      ----------------------------<  86     3.5     |  27     |  0.66     Ca    8.4        2017 20:44    TPro  6.5    /  Alb  3.1    /  TBili  4.0    /  DBili  x      /  AST  464    /  ALT  753    /  AlkPhos  237    2017 20:44        LIVER FUNCTIONS - ( 2017 20:44 )  Alb: 3.1 g/dL / Pro: 6.5 gm/dL / ALK PHOS: 237 U/L / ALT: 753 U/L / AST: 464 U/L / GGT: x             Urinalysis Basic - ( 2017 03:29 )    Color: Yellow / Appearance: Clear / S.005 / pH: x  Gluc: x / Ketone: Negative  / Bili: Small / Urobili: 4 mg/dL   Blood: x / Protein: 15 mg/dL / Nitrite: Negative   Leuk Esterase: Trace / RBC: 0-5 /HPF / WBC 6-10   Sq Epi: x / Non Sq Epi: Few / Bacteria: Few

## 2017-02-26 RX ORDER — LIDOCAINE 4 G/100G
1 CREAM TOPICAL DAILY
Qty: 0 | Refills: 0 | Status: DISCONTINUED | OUTPATIENT
Start: 2017-02-26 | End: 2017-02-27

## 2017-02-26 RX ADMIN — Medication 1 PATCH: at 09:51

## 2017-02-26 RX ADMIN — GABAPENTIN 800 MILLIGRAM(S): 400 CAPSULE ORAL at 13:32

## 2017-02-26 RX ADMIN — GABAPENTIN 800 MILLIGRAM(S): 400 CAPSULE ORAL at 22:00

## 2017-02-26 RX ADMIN — DIVALPROEX SODIUM 750 MILLIGRAM(S): 500 TABLET, DELAYED RELEASE ORAL at 22:00

## 2017-02-26 RX ADMIN — DULOXETINE HYDROCHLORIDE 60 MILLIGRAM(S): 30 CAPSULE, DELAYED RELEASE ORAL at 09:54

## 2017-02-26 RX ADMIN — Medication 81 MILLIGRAM(S): at 09:52

## 2017-02-26 RX ADMIN — PANTOPRAZOLE SODIUM 40 MILLIGRAM(S): 20 TABLET, DELAYED RELEASE ORAL at 09:51

## 2017-02-26 RX ADMIN — DIVALPROEX SODIUM 750 MILLIGRAM(S): 500 TABLET, DELAYED RELEASE ORAL at 09:54

## 2017-02-26 RX ADMIN — Medication 1 GRAM(S): at 13:32

## 2017-02-26 RX ADMIN — GABAPENTIN 800 MILLIGRAM(S): 400 CAPSULE ORAL at 05:02

## 2017-02-26 RX ADMIN — QUETIAPINE FUMARATE 50 MILLIGRAM(S): 200 TABLET, FILM COATED ORAL at 09:52

## 2017-02-26 RX ADMIN — Medication 1 PATCH: at 09:53

## 2017-02-26 RX ADMIN — METHADONE HYDROCHLORIDE 5 MILLIGRAM(S): 40 TABLET ORAL at 22:00

## 2017-02-26 RX ADMIN — PANTOPRAZOLE SODIUM 40 MILLIGRAM(S): 20 TABLET, DELAYED RELEASE ORAL at 05:02

## 2017-02-26 RX ADMIN — QUETIAPINE FUMARATE 200 MILLIGRAM(S): 200 TABLET, FILM COATED ORAL at 22:00

## 2017-02-26 RX ADMIN — Medication 1 GRAM(S): at 09:52

## 2017-02-26 RX ADMIN — METHADONE HYDROCHLORIDE 5 MILLIGRAM(S): 40 TABLET ORAL at 09:51

## 2017-02-26 NOTE — PROGRESS NOTE BEHAVIORAL HEALTH - PROBLEM SELECTOR PROBLEM 5
Bipolar affective disorder, remission status unspecified
Bipolar affective disorder, remission status unspecified

## 2017-02-26 NOTE — PROGRESS NOTE BEHAVIORAL HEALTH - NSBHFUPINTERVALHXFT_PSY_A_CORE
Covering note    Reviewed the chart and spoke with the RN and read interim note. Pt with good eye contact Alert Pt claiming that his back is is hurting. Claims he usually takes the opiate pain medication "for the back."  Pt willing to try lidocaine patch  He denies any suicidal ideation ro plan.  Denies any hallucination.

## 2017-02-26 NOTE — PROGRESS NOTE BEHAVIORAL HEALTH - DETAILS
N/V and pain c/o N/V frequency of urination generalized pain pt with hep and the liver enzymes are increasing inpart to medication,  drug use, and hepatitis b and c

## 2017-02-27 DIAGNOSIS — K75.9 INFLAMMATORY LIVER DISEASE, UNSPECIFIED: ICD-10-CM

## 2017-02-27 LAB
ALBUMIN SERPL ELPH-MCNC: 2.5 G/DL — LOW (ref 3.3–5)
ALP SERPL-CCNC: 198 U/L — HIGH (ref 40–120)
ALT FLD-CCNC: 902 U/L — HIGH (ref 12–78)
ANION GAP SERPL CALC-SCNC: 9 MMOL/L — SIGNIFICANT CHANGE UP (ref 5–17)
AST SERPL-CCNC: 899 U/L — HIGH (ref 15–37)
BILIRUB SERPL-MCNC: 4.1 MG/DL — HIGH (ref 0.2–1.2)
BUN SERPL-MCNC: 9 MG/DL — SIGNIFICANT CHANGE UP (ref 7–23)
CALCIUM SERPL-MCNC: 8.6 MG/DL — SIGNIFICANT CHANGE UP (ref 8.5–10.1)
CHLORIDE SERPL-SCNC: 109 MMOL/L — HIGH (ref 96–108)
CO2 SERPL-SCNC: 28 MMOL/L — SIGNIFICANT CHANGE UP (ref 22–31)
CREAT SERPL-MCNC: 0.63 MG/DL — SIGNIFICANT CHANGE UP (ref 0.5–1.3)
GLUCOSE SERPL-MCNC: 157 MG/DL — HIGH (ref 70–99)
HCT VFR BLD CALC: 31.2 % — LOW (ref 39–50)
HGB BLD-MCNC: 10.1 G/DL — LOW (ref 13–17)
MCHC RBC-ENTMCNC: 30.5 PG — SIGNIFICANT CHANGE UP (ref 27–34)
MCHC RBC-ENTMCNC: 32.2 GM/DL — SIGNIFICANT CHANGE UP (ref 32–36)
MCV RBC AUTO: 94.8 FL — SIGNIFICANT CHANGE UP (ref 80–100)
PLATELET # BLD AUTO: 113 K/UL — LOW (ref 150–400)
POTASSIUM SERPL-MCNC: 3.7 MMOL/L — SIGNIFICANT CHANGE UP (ref 3.5–5.3)
POTASSIUM SERPL-SCNC: 3.7 MMOL/L — SIGNIFICANT CHANGE UP (ref 3.5–5.3)
PROT SERPL-MCNC: 5.8 GM/DL — LOW (ref 6–8.3)
RBC # BLD: 3.29 M/UL — LOW (ref 4.2–5.8)
RBC # FLD: 18.5 % — HIGH (ref 10.3–14.5)
SODIUM SERPL-SCNC: 146 MMOL/L — HIGH (ref 135–145)
VALPROATE SERPL-MCNC: 63 UG/ML — SIGNIFICANT CHANGE UP (ref 50–100)
WBC # BLD: 8.4 K/UL — SIGNIFICANT CHANGE UP (ref 3.8–10.5)
WBC # FLD AUTO: 8.4 K/UL — SIGNIFICANT CHANGE UP (ref 3.8–10.5)

## 2017-02-27 PROCEDURE — 74181 MRI ABDOMEN W/O CONTRAST: CPT | Mod: 26

## 2017-02-27 RX ORDER — LIDOCAINE 4 G/100G
1 CREAM TOPICAL DAILY
Qty: 0 | Refills: 0 | Status: DISCONTINUED | OUTPATIENT
Start: 2017-02-27 | End: 2017-03-13

## 2017-02-27 RX ORDER — LIDOCAINE 4 G/100G
1 CREAM TOPICAL DAILY
Qty: 0 | Refills: 0 | Status: DISCONTINUED | OUTPATIENT
Start: 2017-02-27 | End: 2017-02-27

## 2017-02-27 RX ADMIN — PANTOPRAZOLE SODIUM 40 MILLIGRAM(S): 20 TABLET, DELAYED RELEASE ORAL at 05:54

## 2017-02-27 RX ADMIN — PANTOPRAZOLE SODIUM 40 MILLIGRAM(S): 20 TABLET, DELAYED RELEASE ORAL at 09:00

## 2017-02-27 RX ADMIN — Medication 1 GRAM(S): at 09:00

## 2017-02-27 RX ADMIN — GABAPENTIN 800 MILLIGRAM(S): 400 CAPSULE ORAL at 13:16

## 2017-02-27 RX ADMIN — GABAPENTIN 800 MILLIGRAM(S): 400 CAPSULE ORAL at 21:35

## 2017-02-27 RX ADMIN — GABAPENTIN 800 MILLIGRAM(S): 400 CAPSULE ORAL at 05:54

## 2017-02-27 RX ADMIN — METHADONE HYDROCHLORIDE 5 MILLIGRAM(S): 40 TABLET ORAL at 21:34

## 2017-02-27 RX ADMIN — Medication 1 PATCH: at 09:03

## 2017-02-27 RX ADMIN — Medication 1 GRAM(S): at 21:35

## 2017-02-27 RX ADMIN — QUETIAPINE FUMARATE 50 MILLIGRAM(S): 200 TABLET, FILM COATED ORAL at 09:00

## 2017-02-27 RX ADMIN — LIDOCAINE 1 PATCH: 4 CREAM TOPICAL at 21:36

## 2017-02-27 RX ADMIN — LIDOCAINE 1 PATCH: 4 CREAM TOPICAL at 09:01

## 2017-02-27 RX ADMIN — DIVALPROEX SODIUM 750 MILLIGRAM(S): 500 TABLET, DELAYED RELEASE ORAL at 09:03

## 2017-02-27 RX ADMIN — METHADONE HYDROCHLORIDE 5 MILLIGRAM(S): 40 TABLET ORAL at 10:50

## 2017-02-27 RX ADMIN — Medication 1 GRAM(S): at 13:16

## 2017-02-27 RX ADMIN — Medication 1 PATCH: at 09:00

## 2017-02-27 RX ADMIN — DULOXETINE HYDROCHLORIDE 60 MILLIGRAM(S): 30 CAPSULE, DELAYED RELEASE ORAL at 08:59

## 2017-02-27 RX ADMIN — Medication 81 MILLIGRAM(S): at 09:00

## 2017-02-27 RX ADMIN — QUETIAPINE FUMARATE 200 MILLIGRAM(S): 200 TABLET, FILM COATED ORAL at 21:34

## 2017-02-27 NOTE — PROGRESS NOTE BEHAVIORAL HEALTH - NSBHFUPINTERVALCCFT_PSY_A_CORE
Patient reports feeling very ill   Feels as if he is withdrawing but physical illness may be due form severely elevated bilirubin

## 2017-02-27 NOTE — PROGRESS NOTE BEHAVIORAL HEALTH - NSBHFUPINTERVALHXFT_PSY_A_CORE
Staying isolated due to some feelings of paranoia    MEDICATIONS  (STANDING):  DULoxetine 60milliGRAM(s) Oral daily  gabapentin 800milliGRAM(s) Oral three times a day  QUEtiapine 50milliGRAM(s) Oral daily  QUEtiapine 200milliGRAM(s) Oral at bedtime  pantoprazole    Tablet 40milliGRAM(s) Oral two times a day before meals  sucralfate 1Gram(s) Oral four times a day  aspirin enteric coated 81milliGRAM(s) Oral daily  metoprolol 25milliGRAM(s) Oral two times a day  fluticasone / salmeterol 250-50 MICROgram(s) Diskus 1Dose(s) Inhalation two times a day  nicotine - 21 mG/24Hr(s) Patch 1patch Transdermal daily  lidocaine   Patch 1Patch Transdermal daily  lidocaine   Patch 1Patch Transdermal daily  lidocaine   Patch 1Patch Transdermal daily    MEDICATIONS  (PRN):  hydrOXYzine hydrochloride 25milliGRAM(s) Oral every 6 hours PRN Agitation  ALBUTerol    90 MICROgram(s) HFA Inhaler 2Puff(s) Inhalation every 6 hours PRN Shortness of Breath  methadone 5milliGRAM(s) Oral every 6 hours PRN opiate withdrawal

## 2017-02-28 RX ADMIN — LIDOCAINE 1 PATCH: 4 CREAM TOPICAL at 20:21

## 2017-02-28 RX ADMIN — GABAPENTIN 800 MILLIGRAM(S): 400 CAPSULE ORAL at 20:20

## 2017-02-28 RX ADMIN — PANTOPRAZOLE SODIUM 40 MILLIGRAM(S): 20 TABLET, DELAYED RELEASE ORAL at 06:45

## 2017-02-28 RX ADMIN — DULOXETINE HYDROCHLORIDE 60 MILLIGRAM(S): 30 CAPSULE, DELAYED RELEASE ORAL at 08:55

## 2017-02-28 RX ADMIN — GABAPENTIN 800 MILLIGRAM(S): 400 CAPSULE ORAL at 06:44

## 2017-02-28 RX ADMIN — Medication 1 PATCH: at 08:56

## 2017-02-28 RX ADMIN — METHADONE HYDROCHLORIDE 5 MILLIGRAM(S): 40 TABLET ORAL at 20:21

## 2017-02-28 RX ADMIN — Medication 81 MILLIGRAM(S): at 08:55

## 2017-02-28 RX ADMIN — QUETIAPINE FUMARATE 200 MILLIGRAM(S): 200 TABLET, FILM COATED ORAL at 20:21

## 2017-02-28 RX ADMIN — QUETIAPINE FUMARATE 50 MILLIGRAM(S): 200 TABLET, FILM COATED ORAL at 09:03

## 2017-02-28 RX ADMIN — LIDOCAINE 1 PATCH: 4 CREAM TOPICAL at 08:56

## 2017-02-28 RX ADMIN — GABAPENTIN 800 MILLIGRAM(S): 400 CAPSULE ORAL at 12:25

## 2017-02-28 RX ADMIN — METHADONE HYDROCHLORIDE 5 MILLIGRAM(S): 40 TABLET ORAL at 08:53

## 2017-02-28 RX ADMIN — Medication 1 GRAM(S): at 08:55

## 2017-02-28 NOTE — CONSULT NOTE ADULT - SUBJECTIVE AND OBJECTIVE BOX
HPI:  47 yo man currently admitted on 5N due to history of bipolar distorder with depression, paranoia and opiate withdrawal. He has had multiple recent admissions at  for chest pain, negative cath. Readmitted with n/v, hematemesis. EGD notable for esophagitis. Has history of opioid dependence and evaluations at multiple hospitals. Has history of chronic HBV (E antigen pos, ab neg) and HCV. He was seen as outpt at [facility], but did not follow up to discuss treatment. He had additional work up for underlying liver disease. Iron saturation slightly elevated, LKM ab positive. Patient is complaining of nausea, vomiting and abdominal discomfort. Unclear if these symptoms are related to withdrawal or other etiology. Of note, liver tests are arising during admission. INR stable. MRCP performed and notable for normal biliary system, but gallbladder wall thickening and suggestion of hepatitis.     PAST MEDICAL & SURGICAL HISTORY:  Mitral valve disorder  CAD (coronary artery disease)  Pain management  Chronic pain  MI (myocardial infarction)  Chronic back pain  CAD (coronary artery disease)  HTN (hypertension)  Hep C w/o coma, chronic  COPD (chronic obstructive pulmonary disease)  Mitral valve prolapse  Acid reflux  CAD (coronary artery disease)  Back injury  Anxiety  High cholesterol  No significant past surgical history  Abscess of arm  Cardiac catheterization with one stent      MEDICATIONS  (STANDING):  DULoxetine 60milliGRAM(s) Oral daily  gabapentin 800milliGRAM(s) Oral three times a day  QUEtiapine 50milliGRAM(s) Oral daily  QUEtiapine 200milliGRAM(s) Oral at bedtime  pantoprazole    Tablet 40milliGRAM(s) Oral two times a day before meals  sucralfate 1Gram(s) Oral four times a day  aspirin enteric coated 81milliGRAM(s) Oral daily  metoprolol 25milliGRAM(s) Oral two times a day  fluticasone / salmeterol 250-50 MICROgram(s) Diskus 1Dose(s) Inhalation two times a day  nicotine - 21 mG/24Hr(s) Patch 1patch Transdermal daily  lidocaine   Patch 1Patch Transdermal daily  lidocaine   Patch 1Patch Transdermal daily    MEDICATIONS  (PRN):  hydrOXYzine hydrochloride 25milliGRAM(s) Oral every 6 hours PRN Agitation  ALBUTerol    90 MICROgram(s) HFA Inhaler 2Puff(s) Inhalation every 6 hours PRN Shortness of Breath  methadone 5milliGRAM(s) Oral every 6 hours PRN opiate withdrawal      Allergies    Aleve (Unknown)  Haldol (Anaphylaxis)  Motrin (Anaphylaxis)  NSAIDs (Flushing; Other (Moderate))  Risperdal (Short breath; Rash; Hives)  Stelazine (Unknown)  Thorazine (Other (Moderate))  Zyprexa (Rash; Dystonic RXN; Hives)    Intolerances        SOCIAL HISTORY:    FAMILY HISTORY:  No pertinent family history      REVIEW OF SYSTEMS      General: No weight loss    Respiratory and Thorax: No SOB  	  Cardiovascular: No CP    Gastrointestinal:	 as above    Musculoskeletal:  no weakness    Vital Signs Last 24 Hrs  T(C): 36.6, Max: 36.7 (02-27 @ 19:36)  T(F): 97.9, Max: 98 (02-27 @ 19:36)  HR: 91 (91 - 94)  BP: 121/75 (121/75 - 126/66)  BP(mean): --  RR: 14 (14 - 16)  SpO2: 98% (94% - 98%)    PHYSICAL EXAM:      Constitutional: dissheveled    Respiratory: CTA BL    Cardiovascular: RRR    Gastrointestinal: soft ND +BS generalized tenderness    Extremities: no edema    LABS:                        10.1   8.4   )-----------( 113      ( 27 Feb 2017 07:21 )             31.2     27 Feb 2017 07:21    146    |  109    |  9      ----------------------------<  157    3.7     |  28     |  0.63     Ca    8.6        27 Feb 2017 07:21    TPro  5.8    /  Alb  2.5    /  TBili  4.1    /  DBili  x      /  AST  899    /  ALT  902    /  AlkPhos  198    27 Feb 2017 07:21      LIVER FUNCTIONS - ( 27 Feb 2017 07:21 )  Alb: 2.5 g/dL / Pro: 5.8 gm/dL / ALK PHOS: 198 U/L / ALT: 902 U/L / AST: 899 U/L / GGT: x             RADIOLOGY & ADDITIONAL STUDIES:  EXAM:  MRI ABDOMEN WO CONTRAST                            PROCEDURE DATE:  02/27/2017        INTERPRETATION:  Clinical information: Hepatitis. Severely elevated liver   enzymes. Rule out biliary ductal obstruction.    TECHNIQUE: Magnetic resonance imaging of the abdomen was performed   utilizing multiplanar multisequential imaging, including dedicated MRCP   sequences. Intravenous gadolinium was not administered.    COMPARISON: Abdominal ultrasound February 12, 2017. CTA chest dated   January 15, 2017    FINDINGS:    LIVER: Diffusely bright T2 parenchymal signal. No mass, steatosis or   morphologic evidence of cirrhosis identified.  SPLEEN: Moderately enlarged and elongated in shape, measuring 20.4 cm in   craniocaudad dimension. No focal splenic lesion.  PANCREAS: Within normal limits.  GALLBLADDER: Contracted, with marked gallbladder wall edema.  BILE DUCTS: Normal caliber. No intraductal filling defects.  ADRENALS: Within normal limits.  KIDNEYS/URETERS: Within normal limits.    RETROPERITONEUM: No upper abdominal or retroperitoneal  lymphadenopathy.    VESSELS:  Within normal limits.  VISUALIZED BOWEL: The stomach is markedly distended and filled with   debris.  PERITONEUM: No ascites.    LOWER CHEST: Small right pleural effusion.  ABDOMINAL WALL: Within normal limits.  BONES: No acute abnormality    IMPRESSION: Marked gallbladder wall edema and marked T2 hyperintense   signal throughout the liver, findings compatible with hepatitis.    No biliary ductal dilatation or choledocholithiasis.    Markedly dilated stomach filled with debris. Please correlate clinically   for possible gastric outlet obstruction.    Splenomegaly.

## 2017-02-28 NOTE — PROVIDER CONTACT NOTE (OTHER) - REASON
Hallucination, Increased Anxiety
Malnutrition Screen
Pain Management Consult
SI
Bilirubin elevated 4.1 and inflamed gallbladder

## 2017-02-28 NOTE — CONSULT NOTE ADULT - SUBJECTIVE AND OBJECTIVE BOX
CC:Patient is a 46y old  Male who presents with a chief complaint of     Subjective:  Pt seen and examined at bedside. Pt is AAOx3, pt in no acute distress. Pt has chronic c/o epigastric and right upper quadrant pain x "several weeks". Pt also c/o nausea and non bloody emesis x 2 weeks. Pt denied c/o fever, chills, chest pain, SOB, extremity pain or dysfunction, hemoptysis, hematemesis, hematuria, hematochexia, headache, diplopia, vertigo, dizzyness. Pt states (+) void, (+) ambulation, (+) bowel function    ROS:  Abd pain, nausea, vomitting, otherwise negative relevant ROS    Vital Signs Last 24 Hrs  T(C): 36.6, Max: 36.7 (02-27 @ 19:36)  T(F): 97.9, Max: 98 (02-27 @ 19:36)  HR: 91 (91 - 94)  BP: 121/75 (121/75 - 126/66)  BP(mean): --  RR: 14 (14 - 16)  SpO2: 98% (94% - 98%)    Labs:                        10.1   8.4   )-----------( 113      ( 27 Feb 2017 07:21 )             31.2     CBC Full  -  ( 27 Feb 2017 07:21 )  WBC Count : 8.4 K/uL  Hemoglobin : 10.1 g/dL  Hematocrit : 31.2 %  Platelet Count - Automated : 113 K/uL  Mean Cell Volume : 94.8 fl  Mean Cell Hemoglobin : 30.5 pg  Mean Cell Hemoglobin Concentration : 32.2 gm/dL  Auto Neutrophil # : x  Auto Lymphocyte # : x  Auto Monocyte # : x  Auto Eosinophil # : x  Auto Basophil # : x  Auto Neutrophil % : x  Auto Lymphocyte % : x  Auto Monocyte % : x  Auto Eosinophil % : x  Auto Basophil % : x    27 Feb 2017 07:21    146    |  109    |  9      ----------------------------<  157    3.7     |  28     |  0.63     Ca    8.6        27 Feb 2017 07:21    TPro  5.8    /  Alb  2.5    /  TBili  4.1    /  DBili  x      /  AST  899    /  ALT  902    /  AlkPhos  198    27 Feb 2017 07:21    LIVER FUNCTIONS - ( 27 Feb 2017 07:21 )  Alb: 2.5 g/dL / Pro: 5.8 gm/dL / ALK PHOS: 198 U/L / ALT: 902 U/L / AST: 899 U/L / GGT: x           Meds:  DULoxetine 60milliGRAM(s) Oral daily  gabapentin 800milliGRAM(s) Oral three times a day  QUEtiapine 50milliGRAM(s) Oral daily  hydrOXYzine hydrochloride 25milliGRAM(s) Oral every 6 hours PRN  QUEtiapine 200milliGRAM(s) Oral at bedtime  pantoprazole    Tablet 40milliGRAM(s) Oral two times a day before meals  sucralfate 1Gram(s) Oral four times a day  aspirin enteric coated 81milliGRAM(s) Oral daily  metoprolol 25milliGRAM(s) Oral two times a day  fluticasone / salmeterol 250-50 MICROgram(s) Diskus 1Dose(s) Inhalation two times a day  ALBUTerol    90 MICROgram(s) HFA Inhaler 2Puff(s) Inhalation every 6 hours PRN  nicotine - 21 mG/24Hr(s) Patch 1patch Transdermal daily  methadone 5milliGRAM(s) Oral every 6 hours PRN  lidocaine   Patch 1Patch Transdermal daily  lidocaine   Patch 1Patch Transdermal daily      Radiology:  XAM:  MRI ABDOMEN WO CONTRAST                            PROCEDURE DATE:  02/27/2017    IMPRESSION: Marked gallbladder wall edema and marked T2 hyperintense   signal throughout the liver, findings compatible with hepatitis.    No biliary ductal dilatation or choledocholithiasis.    Markedly dilated stomach filled with debris. Please correlate clinically   for possible gastric outlet obstruction.    Splenomegaly.    EXAM:  CT ABDOMEN AND PELVIS OC IC                            PROCEDURE DATE:  02/22/2017    IMPRESSION:     Nonspecific gallbladder wall thickening with mild intrahepatic biliary   duct dilatation may be due to gallbladder or hepatic parenchymal disease.   Consider HIDA scan to evaluate for cystic duct obstruction/acute   cholecystitis. Correlate with LFTs.    Moderate splenomegaly with enlarged main portal vein and splenorenal   shunting suggest portal hypertension. Correlate clinically.    Physical exam:  Pt is AAOx3  Pt in no acute distress  HEENT: Normocephalic, atraumatic, CAPRICE, EOM wnl  Neck: No crepitus, no ecchymosis, no hematoma, to exam, no JVD, no tracheal deviation  Cardiovascular: S1S2 Present  Chest: no rib pathology or tenderness to exam. No sternal pathology or tenderness to exam. No crepitus, no ecchymosis, no hematoma.   Respiratory: Respiratory Effort normal; no wheezes, rales or rhonchi to exam, CTAB  ABD: bowel sounds (+), soft, (+) mild tenderness to exam of right upper quadrant and epigastric region, non distended, no rebound, no guarding, no rigidity, no skin changes to exam. No pelvic instability to exam, no skin changes, negative ferrari's sign to exam  Musculoskeletal: Pt has palpable b/l radial, femoral, dorsalis pedis pulses. All digits are warm and well perfused. No gross long bone pathology or tenderness to exam. Pt demonstrates grossly intact sensoromotor function. Pt has good capillary refill to digits, no calf edema or tenderness to exam.  Skin: (+) mild jaundice and mild icteric sclera to exam b/l, no skin changes to exam otherwise CC:Patient is a 46y old  Male who presents with a chief complaint of     Subjective:  Pt seen and examined at bedside. Pt is AAOx3, pt in no acute distress. Pt has chronic c/o epigastric and right upper quadrant pain x "several weeks". Pt also c/o nausea and non bloody emesis x 2 weeks. Pt denied c/o fever, chills, chest pain, SOB, extremity pain or dysfunction, hemoptysis, hematemesis, hematuria, hematochexia, headache, diplopia, vertigo, dizzyness. Pt states (+) void, (+) ambulation, (+) bowel function    PMH: Chronic hepatitis C, hepatitis B, thrombocytopenia, anemia, RA, peripheral neuropathy, HLD< CAD, HTN, Bipolar disorder, COPD  PSH: denied  Allergies    Aleve (Unknown)  Haldol (Anaphylaxis)  Motrin (Anaphylaxis)  NSAIDs (Flushing; Other (Moderate))  Risperdal (Short breath; Rash; Hives)  Stelazine (Unknown)  Thorazine (Other (Moderate))  Zyprexa (Rash; Dystonic RXN; Hives)  SH: h/o toabcco use, h/o opiod abuse  FH: non contributory at present        ROS:  Abd pain, nausea, vomitting, otherwise negative relevant ROS    Vital Signs Last 24 Hrs  T(C): 36.6, Max: 36.7 (02-27 @ 19:36)  T(F): 97.9, Max: 98 (02-27 @ 19:36)  HR: 91 (91 - 94)  BP: 121/75 (121/75 - 126/66)  BP(mean): --  RR: 14 (14 - 16)  SpO2: 98% (94% - 98%)    Labs:                        10.1   8.4   )-----------( 113      ( 27 Feb 2017 07:21 )             31.2     CBC Full  -  ( 27 Feb 2017 07:21 )  WBC Count : 8.4 K/uL  Hemoglobin : 10.1 g/dL  Hematocrit : 31.2 %  Platelet Count - Automated : 113 K/uL  Mean Cell Volume : 94.8 fl  Mean Cell Hemoglobin : 30.5 pg  Mean Cell Hemoglobin Concentration : 32.2 gm/dL  Auto Neutrophil # : x  Auto Lymphocyte # : x  Auto Monocyte # : x  Auto Eosinophil # : x  Auto Basophil # : x  Auto Neutrophil % : x  Auto Lymphocyte % : x  Auto Monocyte % : x  Auto Eosinophil % : x  Auto Basophil % : x    27 Feb 2017 07:21    146    |  109    |  9      ----------------------------<  157    3.7     |  28     |  0.63     Ca    8.6        27 Feb 2017 07:21    TPro  5.8    /  Alb  2.5    /  TBili  4.1    /  DBili  x      /  AST  899    /  ALT  902    /  AlkPhos  198    27 Feb 2017 07:21    LIVER FUNCTIONS - ( 27 Feb 2017 07:21 )  Alb: 2.5 g/dL / Pro: 5.8 gm/dL / ALK PHOS: 198 U/L / ALT: 902 U/L / AST: 899 U/L / GGT: x           Meds:  DULoxetine 60milliGRAM(s) Oral daily  gabapentin 800milliGRAM(s) Oral three times a day  QUEtiapine 50milliGRAM(s) Oral daily  hydrOXYzine hydrochloride 25milliGRAM(s) Oral every 6 hours PRN  QUEtiapine 200milliGRAM(s) Oral at bedtime  pantoprazole    Tablet 40milliGRAM(s) Oral two times a day before meals  sucralfate 1Gram(s) Oral four times a day  aspirin enteric coated 81milliGRAM(s) Oral daily  metoprolol 25milliGRAM(s) Oral two times a day  fluticasone / salmeterol 250-50 MICROgram(s) Diskus 1Dose(s) Inhalation two times a day  ALBUTerol    90 MICROgram(s) HFA Inhaler 2Puff(s) Inhalation every 6 hours PRN  nicotine - 21 mG/24Hr(s) Patch 1patch Transdermal daily  methadone 5milliGRAM(s) Oral every 6 hours PRN  lidocaine   Patch 1Patch Transdermal daily  lidocaine   Patch 1Patch Transdermal daily      Radiology:  XAM:  MRI ABDOMEN WO CONTRAST                            PROCEDURE DATE:  02/27/2017    IMPRESSION: Marked gallbladder wall edema and marked T2 hyperintense   signal throughout the liver, findings compatible with hepatitis.    No biliary ductal dilatation or choledocholithiasis.    Markedly dilated stomach filled with debris. Please correlate clinically   for possible gastric outlet obstruction.    Splenomegaly.    EXAM:  CT ABDOMEN AND PELVIS OC IC                            PROCEDURE DATE:  02/22/2017    IMPRESSION:     Nonspecific gallbladder wall thickening with mild intrahepatic biliary   duct dilatation may be due to gallbladder or hepatic parenchymal disease.   Consider HIDA scan to evaluate for cystic duct obstruction/acute   cholecystitis. Correlate with LFTs.    Moderate splenomegaly with enlarged main portal vein and splenorenal   shunting suggest portal hypertension. Correlate clinically.    Physical exam:  Pt is AAOx3  Pt in no acute distress  HEENT: Normocephalic, atraumatic, CAPRICE, EOM wnl  Neck: No crepitus, no ecchymosis, no hematoma, to exam, no JVD, no tracheal deviation  Cardiovascular: S1S2 Present  Chest: no rib pathology or tenderness to exam. No sternal pathology or tenderness to exam. No crepitus, no ecchymosis, no hematoma.   Respiratory: Respiratory Effort normal; no wheezes, rales or rhonchi to exam, CTAB  ABD: bowel sounds (+), soft, (+) mild tenderness to exam of right upper quadrant and epigastric region, non distended, no rebound, no guarding, no rigidity, no skin changes to exam. No pelvic instability to exam, no skin changes, negative ferrari's sign to exam  Musculoskeletal: Pt has palpable b/l radial, femoral, dorsalis pedis pulses. All digits are warm and well perfused. No gross long bone pathology or tenderness to exam. Pt demonstrates grossly intact sensoromotor function. Pt has good capillary refill to digits, no calf edema or tenderness to exam.  Skin: (+) mild jaundice and mild icteric sclera to exam b/l, no skin changes to exam otherwise

## 2017-02-28 NOTE — CONSULT NOTE ADULT - ASSESSMENT
A/P:  Hepatitis C and B, r/o acute on chronic  Jaundice, mild  Unlikely acute choelcystitis, radiologic findings likely reactive secondary to liver pathology/hepatitis  GI/DVT prophylaxis  Pain control  GI on consult, per discussion with Dr Rodriguez, liver biopsy advised  Advise IR evaluation for percutaneous liver biopsy  Serial abd exams  F/U labs, lfts  Avoid hepatotoxic medications  Medical comorbidities of chronic Hepatitis C,B, thrombocytopenia, anemia, RA, peripheral neuropathy, HLD, CAD, HTN, bipolar disorder, COPD  Pt may require IR for percutaneous cholecystostomy if clinical indications for cholecystitis develop  Pt will be monitored for signs of evolution/resolution of pathology and intervention as required and warranted  Will follow

## 2017-02-28 NOTE — CONSULT NOTE ADULT - ASSESSMENT
45 yo man with nausea, vomiting, abdominal pain and progressive worsening of LFTs. MR suggestive of cholecystitis    -BID PPI  -Diet as tolerated  -Avoid hepatotoxic meds  -Continue to trend LFTs and INR daily  -Recommend surgical consult to evaluate for cholecystitis, consider HIDA scan  -F/U HFE gene testing  -+LKM ab, but IgG subsets normal  -HBV and/or HCV treatment would need to be considered as outpt.

## 2017-02-28 NOTE — PROGRESS NOTE BEHAVIORAL HEALTH - NSBHFUPINTERVALCCFT_PSY_A_CORE
Feeling very medically ill today.  Abloe to eat but difficulty to focus on anything other than his physical health.

## 2017-02-28 NOTE — PROVIDER CONTACT NOTE (OTHER) - SITUATION
Left voice mail on #[phone] for patient consult.
Spoke to Eva regarding patient consult.
[facility] left message for pain management consultation.
Spoke with Dr. Vasquez's service, message left.

## 2017-03-01 LAB
ALBUMIN SERPL ELPH-MCNC: 2.4 G/DL — LOW (ref 3.3–5)
ALP SERPL-CCNC: 179 U/L — HIGH (ref 40–120)
ALT FLD-CCNC: 821 U/L — HIGH (ref 12–78)
AST SERPL-CCNC: 848 U/L — HIGH (ref 15–37)
BILIRUB DIRECT SERPL-MCNC: 3.1 MG/DL — HIGH (ref 0–0.2)
BILIRUB INDIRECT FLD-MCNC: 0.8 MG/DL — SIGNIFICANT CHANGE UP (ref 0.2–1)
BILIRUB SERPL-MCNC: 3.9 MG/DL — HIGH (ref 0.2–1.2)
INR BLD: 1.3 RATIO — HIGH (ref 0.88–1.16)
PROT SERPL-MCNC: 5.9 GM/DL — LOW (ref 6–8.3)
PROTHROM AB SERPL-ACNC: 14.3 SEC — HIGH (ref 10–13.1)

## 2017-03-01 RX ORDER — LITHIUM CARBONATE 300 MG/1
300 TABLET, EXTENDED RELEASE ORAL
Qty: 0 | Refills: 0 | Status: DISCONTINUED | OUTPATIENT
Start: 2017-03-01 | End: 2017-03-06

## 2017-03-01 RX ADMIN — LIDOCAINE 1 PATCH: 4 CREAM TOPICAL at 21:17

## 2017-03-01 RX ADMIN — DULOXETINE HYDROCHLORIDE 60 MILLIGRAM(S): 30 CAPSULE, DELAYED RELEASE ORAL at 10:11

## 2017-03-01 RX ADMIN — PANTOPRAZOLE SODIUM 40 MILLIGRAM(S): 20 TABLET, DELAYED RELEASE ORAL at 06:31

## 2017-03-01 RX ADMIN — Medication 1 GRAM(S): at 10:11

## 2017-03-01 RX ADMIN — METHADONE HYDROCHLORIDE 5 MILLIGRAM(S): 40 TABLET ORAL at 10:11

## 2017-03-01 RX ADMIN — LIDOCAINE 1 PATCH: 4 CREAM TOPICAL at 10:12

## 2017-03-01 RX ADMIN — HYDROMORPHONE HYDROCHLORIDE 1 MILLIGRAM(S): 2 INJECTION INTRAMUSCULAR; INTRAVENOUS; SUBCUTANEOUS at 21:18

## 2017-03-01 RX ADMIN — Medication 1 PATCH: at 10:17

## 2017-03-01 RX ADMIN — HYDROMORPHONE HYDROCHLORIDE 0.5 MILLIGRAM(S): 2 INJECTION INTRAMUSCULAR; INTRAVENOUS; SUBCUTANEOUS at 21:18

## 2017-03-01 RX ADMIN — QUETIAPINE FUMARATE 50 MILLIGRAM(S): 200 TABLET, FILM COATED ORAL at 10:11

## 2017-03-01 RX ADMIN — QUETIAPINE FUMARATE 200 MILLIGRAM(S): 200 TABLET, FILM COATED ORAL at 21:04

## 2017-03-01 RX ADMIN — Medication 81 MILLIGRAM(S): at 10:10

## 2017-03-01 RX ADMIN — GABAPENTIN 800 MILLIGRAM(S): 400 CAPSULE ORAL at 21:04

## 2017-03-01 RX ADMIN — GABAPENTIN 800 MILLIGRAM(S): 400 CAPSULE ORAL at 06:35

## 2017-03-01 RX ADMIN — Medication 1 PATCH: at 10:18

## 2017-03-01 RX ADMIN — METHADONE HYDROCHLORIDE 5 MILLIGRAM(S): 40 TABLET ORAL at 21:04

## 2017-03-01 RX ADMIN — LITHIUM CARBONATE 300 MILLIGRAM(S): 300 TABLET, EXTENDED RELEASE ORAL at 21:04

## 2017-03-01 NOTE — PROGRESS NOTE ADULT - SUBJECTIVE AND OBJECTIVE BOX
HPI:  45 yo man currently admitted on 5N due to history of bipolar distorder with depression, paranoia and opiate withdrawal. He has had multiple recent admissions at  for chest pain, negative cath. Readmitted with n/v, hematemesis. EGD notable for esophagitis. Has history of opioid dependence and evaluations at multiple hospitals. Has history of chronic HBV (E antigen pos, ab neg) and HCV. He was seen as outpt at [facility], but did not follow up to discuss treatment. He had additional work up for underlying liver disease. Iron saturation slightly elevated, LKM ab positive. Patient is complaining of nausea, vomiting and abdominal discomfort. Unclear if these symptoms are related to withdrawal or other etiology. Of note, liver tests are arising during admission. INR stable. MRCP performed and notable for normal biliary system, but gallbladder wall thickening and suggestion of hepatitis. Patient has ongoing n/v/ruq pain    MEDICATIONS  (STANDING):  DULoxetine 60milliGRAM(s) Oral daily  gabapentin 800milliGRAM(s) Oral three times a day  QUEtiapine 50milliGRAM(s) Oral daily  QUEtiapine 200milliGRAM(s) Oral at bedtime  pantoprazole    Tablet 40milliGRAM(s) Oral two times a day before meals  sucralfate 1Gram(s) Oral four times a day  aspirin enteric coated 81milliGRAM(s) Oral daily  metoprolol 25milliGRAM(s) Oral two times a day  fluticasone / salmeterol 250-50 MICROgram(s) Diskus 1Dose(s) Inhalation two times a day  nicotine - 21 mG/24Hr(s) Patch 1patch Transdermal daily  lidocaine   Patch 1Patch Transdermal daily  lidocaine   Patch 1Patch Transdermal daily  lithium 300milliGRAM(s) Oral two times a day    MEDICATIONS  (PRN):  hydrOXYzine hydrochloride 25milliGRAM(s) Oral every 6 hours PRN Agitation  ALBUTerol    90 MICROgram(s) HFA Inhaler 2Puff(s) Inhalation every 6 hours PRN Shortness of Breath  methadone 5milliGRAM(s) Oral every 6 hours PRN opiate withdrawal      Allergies    Aleve (Unknown)  Haldol (Anaphylaxis)  Motrin (Anaphylaxis)  NSAIDs (Flushing; Other (Moderate))  Risperdal (Short breath; Rash; Hives)  Stelazine (Unknown)  Thorazine (Other (Moderate))  Zyprexa (Rash; Dystonic RXN; Hives)    Intolerances        REVIEW OF SYSTEMS    General: no unexpected weight loss    HEENT:  icterus    Respiratory and Thorax: no SOB  	  Cardiovascular: no CP    Gastrointestinal: as above    Skin: jaundice      Vital Signs Last 24 Hrs  T(C): 37.1, Max: 37.1 (02-28 @ 19:36)  T(F): 98.8, Max: 98.8 (02-28 @ 19:36)  HR: 95 (83 - 95)  BP: 114/75 (114/75 - 128/70)  BP(mean): --  RR: 16 (16 - 16)  SpO2: 99% (97% - 99%)    PHYSICAL EXAM:    Constitutional: NAD    HEENT: icteric    Gastrointestinal: soft ND +BS NTTP    Extremities: no LE edema    Neuro: no focal deficits    Skin:  jaundice      LABS:        TPro  5.9    /  Alb  2.4    /  TBili  3.9    /  DBili  3.1    /  AST  848    /  ALT  821    /  AlkPhos  179    01 Mar 2017 07:15    PT/INR - ( 01 Mar 2017 07:15 )   PT: 14.3 sec;   INR: 1.30 ratio           LIVER FUNCTIONS - ( 01 Mar 2017 07:15 )  Alb: 2.4 g/dL / Pro: 5.9 gm/dL / ALK PHOS: 179 U/L / ALT: 821 U/L / AST: 848 U/L / GGT: x             RADIOLOGY & ADDITIONAL STUDIES:  EXAM:  MRI ABDOMEN WO CONTRAST                            PROCEDURE DATE:  02/27/2017        INTERPRETATION:  Clinical information: Hepatitis. Severely elevated liver   enzymes. Rule out biliary ductal obstruction.    TECHNIQUE: Magnetic resonance imaging of the abdomen was performed   utilizing multiplanar multisequential imaging, including dedicated MRCP   sequences. Intravenous gadolinium was not administered.    COMPARISON: Abdominal ultrasound February 12, 2017. CTA chest dated   January 15, 2017    FINDINGS:    LIVER: Diffusely bright T2 parenchymal signal. No mass, steatosis or   morphologic evidence of cirrhosis identified.  SPLEEN: Moderately enlarged and elongated in shape, measuring 20.4 cm in   craniocaudad dimension. No focal splenic lesion.  PANCREAS: Within normal limits.  GALLBLADDER: Contracted, with marked gallbladder wall edema.  BILE DUCTS: Normal caliber. No intraductal filling defects.  ADRENALS: Within normal limits.  KIDNEYS/URETERS: Within normal limits.    RETROPERITONEUM: No upper abdominal or retroperitoneal  lymphadenopathy.    VESSELS:  Within normal limits.  VISUALIZED BOWEL: The stomach is markedly distended and filled with   debris.  PERITONEUM: No ascites.    LOWER CHEST: Small right pleural effusion.  ABDOMINAL WALL: Within normal limits.  BONES: No acute abnormality    IMPRESSION: Marked gallbladder wall edema and marked T2 hyperintense   signal throughout the liver, findings compatible with hepatitis.    No biliary ductal dilatation or choledocholithiasis.    Markedly dilated stomach filled with debris. Please correlate clinically   for possible gastric outlet obstruction.    Splenomegaly.

## 2017-03-01 NOTE — PROGRESS NOTE ADULT - ASSESSMENT
47 yo man with nausea, vomiting, abdominal pain and progressive worsening of LFTs. MR suggestive of cholecystitis    -BID PPI  -Diet as tolerated  -Avoid hepatotoxic meds  -Continue to trend LFTs and INR daily  -Surgical consult appreciated. Lower suspicion for acute cholecystitis. HIDA may not be accurate due to hepatocellular disease and elevated liver tests.  -F/U HFE gene testing  -+LKM ab, but IgG subsets normal  -HBV and/or HCV treatment would need to be considered as outpt.   -Recommend IR consult for transjugular liver bx for further evaluation

## 2017-03-01 NOTE — PROGRESS NOTE BEHAVIORAL HEALTH - NSBHFUPINTERVALHXFT_PSY_A_CORE
Has been isolative  Still reports feeling paranoid, but somewhat difficult to describe.  Agreeing to try lithium.  for mood stabilization.

## 2017-03-02 LAB
ALBUMIN SERPL ELPH-MCNC: 2.5 G/DL — LOW (ref 3.3–5)
ALP SERPL-CCNC: 182 U/L — HIGH (ref 40–120)
ALT FLD-CCNC: 766 U/L — HIGH (ref 12–78)
AST SERPL-CCNC: 758 U/L — HIGH (ref 15–37)
BILIRUB DIRECT SERPL-MCNC: 2.8 MG/DL — HIGH (ref 0–0.2)
BILIRUB INDIRECT FLD-MCNC: 0.7 MG/DL — SIGNIFICANT CHANGE UP (ref 0.2–1)
BILIRUB SERPL-MCNC: 3.5 MG/DL — HIGH (ref 0.2–1.2)
INR BLD: 1.31 RATIO — HIGH (ref 0.88–1.16)
PROT SERPL-MCNC: 6.1 GM/DL — SIGNIFICANT CHANGE UP (ref 6–8.3)
PROTHROM AB SERPL-ACNC: 14.5 SEC — HIGH (ref 10–13.1)

## 2017-03-02 PROCEDURE — 76942 ECHO GUIDE FOR BIOPSY: CPT | Mod: 26

## 2017-03-02 PROCEDURE — 88307 TISSUE EXAM BY PATHOLOGIST: CPT | Mod: 26

## 2017-03-02 PROCEDURE — 88313 SPECIAL STAINS GROUP 2: CPT | Mod: 26

## 2017-03-02 PROCEDURE — 47000 NEEDLE BIOPSY OF LIVER PERQ: CPT

## 2017-03-02 RX ORDER — METHADONE HYDROCHLORIDE 40 MG/1
5 TABLET ORAL EVERY 6 HOURS
Qty: 0 | Refills: 0 | Status: DISCONTINUED | OUTPATIENT
Start: 2017-03-02 | End: 2017-03-09

## 2017-03-02 RX ORDER — QUETIAPINE FUMARATE 200 MG/1
300 TABLET, FILM COATED ORAL AT BEDTIME
Qty: 0 | Refills: 0 | Status: DISCONTINUED | OUTPATIENT
Start: 2017-03-02 | End: 2017-03-06

## 2017-03-02 RX ADMIN — QUETIAPINE FUMARATE 50 MILLIGRAM(S): 200 TABLET, FILM COATED ORAL at 10:08

## 2017-03-02 RX ADMIN — LITHIUM CARBONATE 300 MILLIGRAM(S): 300 TABLET, EXTENDED RELEASE ORAL at 21:17

## 2017-03-02 RX ADMIN — Medication 1 PATCH: at 10:17

## 2017-03-02 RX ADMIN — LIDOCAINE 1 PATCH: 4 CREAM TOPICAL at 10:10

## 2017-03-02 RX ADMIN — METHADONE HYDROCHLORIDE 5 MILLIGRAM(S): 40 TABLET ORAL at 21:17

## 2017-03-02 RX ADMIN — DULOXETINE HYDROCHLORIDE 60 MILLIGRAM(S): 30 CAPSULE, DELAYED RELEASE ORAL at 10:08

## 2017-03-02 RX ADMIN — Medication 1 PATCH: at 10:09

## 2017-03-02 RX ADMIN — LIDOCAINE 1 PATCH: 4 CREAM TOPICAL at 21:20

## 2017-03-02 RX ADMIN — Medication 81 MILLIGRAM(S): at 10:08

## 2017-03-02 RX ADMIN — GABAPENTIN 800 MILLIGRAM(S): 400 CAPSULE ORAL at 21:20

## 2017-03-02 RX ADMIN — METHADONE HYDROCHLORIDE 5 MILLIGRAM(S): 40 TABLET ORAL at 10:09

## 2017-03-02 RX ADMIN — QUETIAPINE FUMARATE 300 MILLIGRAM(S): 200 TABLET, FILM COATED ORAL at 21:17

## 2017-03-02 RX ADMIN — LITHIUM CARBONATE 300 MILLIGRAM(S): 300 TABLET, EXTENDED RELEASE ORAL at 10:09

## 2017-03-02 NOTE — PROGRESS NOTE ADULT - SUBJECTIVE AND OBJECTIVE BOX
HPI:  45 yo man currently admitted on 5N due to history of bipolar distorder with depression, paranoia and opiate withdrawal. He has had multiple recent admissions at  for chest pain, negative cath. Readmitted with n/v, hematemesis. EGD notable for esophagitis. Has history of opioid dependence and evaluations at multiple hospitals. Has history of chronic HBV (E antigen pos, ab neg) and HCV. He was seen as outpt at [facility], but did not follow up to discuss treatment. He had additional work up for underlying liver disease. Iron saturation slightly elevated, LKM ab positive. Patient is complaining of nausea, vomiting and abdominal discomfort. Unclear if these symptoms are related to withdrawal or other etiology. Of note, liver tests are arising during admission. INR stable. MRCP performed and notable for normal biliary system, but gallbladder wall thickening and suggestion of hepatitis. Patient has ongoing n/v/ruq pain. Liver tests are starting to improve.    MEDICATIONS  (STANDING):  DULoxetine 60milliGRAM(s) Oral daily  gabapentin 800milliGRAM(s) Oral three times a day  QUEtiapine 50milliGRAM(s) Oral daily  QUEtiapine 200milliGRAM(s) Oral at bedtime  pantoprazole    Tablet 40milliGRAM(s) Oral two times a day before meals  sucralfate 1Gram(s) Oral four times a day  aspirin enteric coated 81milliGRAM(s) Oral daily  metoprolol 25milliGRAM(s) Oral two times a day  fluticasone / salmeterol 250-50 MICROgram(s) Diskus 1Dose(s) Inhalation two times a day  nicotine - 21 mG/24Hr(s) Patch 1patch Transdermal daily  lidocaine   Patch 1Patch Transdermal daily  lidocaine   Patch 1Patch Transdermal daily  lithium 300milliGRAM(s) Oral two times a day    MEDICATIONS  (PRN):  hydrOXYzine hydrochloride 25milliGRAM(s) Oral every 6 hours PRN Agitation  ALBUTerol    90 MICROgram(s) HFA Inhaler 2Puff(s) Inhalation every 6 hours PRN Shortness of Breath  methadone 5milliGRAM(s) Oral every 6 hours PRN opiate withdrawal      Allergies    Aleve (Unknown)  Haldol (Anaphylaxis)  Motrin (Anaphylaxis)  NSAIDs (Flushing; Other (Moderate))  Risperdal (Short breath; Rash; Hives)  Stelazine (Unknown)  Thorazine (Other (Moderate))  Zyprexa (Rash; Dystonic RXN; Hives)    Intolerances        REVIEW OF SYSTEMS    General: no unexpected weight loss    HEENT: icterus    Respiratory and Thorax: no SOB  	  Cardiovascular: no CP    Gastrointestinal: as above    Skin: jaundice      Vital Signs Last 24 Hrs  T(C): 36.1, Max: 36.7 (03-01 @ 20:08)  T(F): 97, Max: 98.1 (03-01 @ 20:08)  HR: 81 (81 - 87)  BP: 122/71 (122/71 - 141/83)  BP(mean): --  RR: 12 (12 - 16)  SpO2: 96% (96% - 98%)    PHYSICAL EXAM:    Constitutional: NAD    HEENT: anicteric    Respiratory: CTA BL    Cardiovascular:  RRR    Gastrointestinal: soft ND +BS NTTP    Extremities: no LE edema    Neuro: no focal deficits    Skin: no jaundice      LABS:        TPro  6.1    /  Alb  2.5    /  TBili  3.5    /  DBili  2.8    /  AST  758    /  ALT  766    /  AlkPhos  182    02 Mar 2017 06:48    PT/INR - ( 02 Mar 2017 06:48 )   PT: 14.5 sec;   INR: 1.31 ratio           LIVER FUNCTIONS - ( 02 Mar 2017 06:48 )  Alb: 2.5 g/dL / Pro: 6.1 gm/dL / ALK PHOS: 182 U/L / ALT: 766 U/L / AST: 758 U/L / GGT: x             RADIOLOGY & ADDITIONAL STUDIES:

## 2017-03-02 NOTE — PROGRESS NOTE ADULT - ASSESSMENT
47 yo man with nausea, vomiting, abdominal pain and progressive worsening of LFTs. MR suggestive of cholecystitis    -BID PPI  -NPO for liver bx today  -Avoid hepatotoxic meds  -Continue to trend LFTs and INR daily  -Surgical consult appreciated. Lower suspicion for acute cholecystitis. HIDA may not be accurate due to hepatocellular disease and elevated liver tests.  -F/U HFE gene testing  -+LKM ab, but IgG subsets normal  -HBV and/or HCV treatment would need to be considered as outpt.   -Recommend IR consult for transjugular liver bx for further evaluation, discussed with Dr. Daigle

## 2017-03-02 NOTE — PROGRESS NOTE BEHAVIORAL HEALTH - NSBHFUPINTERVALCCFT_PSY_A_CORE
Had bad dreams with nightmares last night  Also continuing to experience paranoia.  Feeling nauseated but not vomiting

## 2017-03-02 NOTE — PROGRESS NOTE BEHAVIORAL HEALTH - NSBHFUPINTERVALHXFT_PSY_A_CORE
Remains medically unstable- Having difficulty participating in unit activities due to physical illness.    MEDICATIONS  (STANDING):  DULoxetine 60milliGRAM(s) Oral daily  gabapentin 800milliGRAM(s) Oral three times a day  pantoprazole    Tablet 40milliGRAM(s) Oral two times a day before meals  sucralfate 1Gram(s) Oral four times a day  aspirin enteric coated 81milliGRAM(s) Oral daily  metoprolol 25milliGRAM(s) Oral two times a day  fluticasone / salmeterol 250-50 MICROgram(s) Diskus 1Dose(s) Inhalation two times a day  nicotine - 21 mG/24Hr(s) Patch 1patch Transdermal daily  lidocaine   Patch 1Patch Transdermal daily  lidocaine   Patch 1Patch Transdermal daily  lithium 300milliGRAM(s) Oral two times a day  QUEtiapine 300milliGRAM(s) Oral at bedtime    MEDICATIONS  (PRN):  hydrOXYzine hydrochloride 25milliGRAM(s) Oral every 6 hours PRN Agitation  ALBUTerol    90 MICROgram(s) HFA Inhaler 2Puff(s) Inhalation every 6 hours PRN Shortness of Breath  methadone 5milliGRAM(s) Oral every 6 hours PRN opiate withdrawal

## 2017-03-03 LAB
ALBUMIN SERPL ELPH-MCNC: 2.7 G/DL — LOW (ref 3.3–5)
ALP SERPL-CCNC: 189 U/L — HIGH (ref 40–120)
ALT FLD-CCNC: 756 U/L — HIGH (ref 12–78)
AST SERPL-CCNC: 687 U/L — HIGH (ref 15–37)
BILIRUB DIRECT SERPL-MCNC: 2.9 MG/DL — HIGH (ref 0–0.2)
BILIRUB INDIRECT FLD-MCNC: 0.8 MG/DL — SIGNIFICANT CHANGE UP (ref 0.2–1)
BILIRUB SERPL-MCNC: 3.7 MG/DL — HIGH (ref 0.2–1.2)
INR BLD: 1.28 RATIO — HIGH (ref 0.88–1.16)
PROT SERPL-MCNC: 6.8 GM/DL — SIGNIFICANT CHANGE UP (ref 6–8.3)
PROTHROM AB SERPL-ACNC: 14.1 SEC — HIGH (ref 10–13.1)

## 2017-03-03 RX ORDER — SODIUM CHLORIDE 9 MG/ML
1 INJECTION INTRAMUSCULAR; INTRAVENOUS; SUBCUTANEOUS ONCE
Qty: 0 | Refills: 0 | Status: COMPLETED | OUTPATIENT
Start: 2017-03-03 | End: 2017-03-03

## 2017-03-03 RX ADMIN — LIDOCAINE 1 PATCH: 4 CREAM TOPICAL at 10:06

## 2017-03-03 RX ADMIN — Medication 1 PATCH: at 10:08

## 2017-03-03 RX ADMIN — SODIUM CHLORIDE 1 GRAM(S): 9 INJECTION INTRAMUSCULAR; INTRAVENOUS; SUBCUTANEOUS at 03:58

## 2017-03-03 RX ADMIN — DULOXETINE HYDROCHLORIDE 60 MILLIGRAM(S): 30 CAPSULE, DELAYED RELEASE ORAL at 10:02

## 2017-03-03 RX ADMIN — Medication 81 MILLIGRAM(S): at 09:59

## 2017-03-03 RX ADMIN — Medication 1 GRAM(S): at 16:44

## 2017-03-03 RX ADMIN — PANTOPRAZOLE SODIUM 40 MILLIGRAM(S): 20 TABLET, DELAYED RELEASE ORAL at 09:59

## 2017-03-03 RX ADMIN — LIDOCAINE 1 PATCH: 4 CREAM TOPICAL at 22:00

## 2017-03-03 RX ADMIN — QUETIAPINE FUMARATE 300 MILLIGRAM(S): 200 TABLET, FILM COATED ORAL at 20:30

## 2017-03-03 RX ADMIN — LITHIUM CARBONATE 300 MILLIGRAM(S): 300 TABLET, EXTENDED RELEASE ORAL at 20:31

## 2017-03-03 RX ADMIN — METHADONE HYDROCHLORIDE 5 MILLIGRAM(S): 40 TABLET ORAL at 16:44

## 2017-03-03 RX ADMIN — Medication 1 PATCH: at 10:07

## 2017-03-03 RX ADMIN — Medication 1 GRAM(S): at 20:30

## 2017-03-03 RX ADMIN — GABAPENTIN 800 MILLIGRAM(S): 400 CAPSULE ORAL at 10:00

## 2017-03-03 RX ADMIN — GABAPENTIN 800 MILLIGRAM(S): 400 CAPSULE ORAL at 22:01

## 2017-03-03 RX ADMIN — LITHIUM CARBONATE 300 MILLIGRAM(S): 300 TABLET, EXTENDED RELEASE ORAL at 10:02

## 2017-03-03 NOTE — PROGRESS NOTE BEHAVIORAL HEALTH - NSBHFUPINTERVALHXFT_PSY_A_CORE
Patient was noted to be somewhat confused after liver biopsy yesterday.  Ate this Am.  Still reports feeling paranoid and "antisocial." Patient was noted to be somewhat confused after liver biopsy yesterday.  Ate this Am.  Still reports feeling paranoid and "antisocial."  Still spending most of day in bed physically ill.  At times asking for prn methadone despite appearing sedated.    MEDICATIONS  (STANDING):  DULoxetine 60milliGRAM(s) Oral daily  gabapentin 800milliGRAM(s) Oral three times a day  pantoprazole    Tablet 40milliGRAM(s) Oral two times a day before meals  sucralfate 1Gram(s) Oral four times a day  aspirin enteric coated 81milliGRAM(s) Oral daily  metoprolol 25milliGRAM(s) Oral two times a day  fluticasone / salmeterol 250-50 MICROgram(s) Diskus 1Dose(s) Inhalation two times a day  nicotine - 21 mG/24Hr(s) Patch 1patch Transdermal daily  lidocaine   Patch 1Patch Transdermal daily  lidocaine   Patch 1Patch Transdermal daily  lithium 300milliGRAM(s) Oral two times a day  QUEtiapine 300milliGRAM(s) Oral at bedtime    MEDICATIONS  (PRN):  hydrOXYzine hydrochloride 25milliGRAM(s) Oral every 6 hours PRN Agitation  ALBUTerol    90 MICROgram(s) HFA Inhaler 2Puff(s) Inhalation every 6 hours PRN Shortness of Breath  methadone 5milliGRAM(s) Oral every 6 hours PRN opiate withdrawal

## 2017-03-04 LAB
ALBUMIN SERPL ELPH-MCNC: 2.9 G/DL — LOW (ref 3.3–5)
ALP SERPL-CCNC: 201 U/L — HIGH (ref 40–120)
ALT FLD-CCNC: 641 U/L — HIGH (ref 12–78)
AMMONIA BLD-MCNC: 43 UMOL/L — HIGH (ref 11–32)
AMMONIA BLD-MCNC: 54 UMOL/L — HIGH (ref 11–32)
AST SERPL-CCNC: 503 U/L — HIGH (ref 15–37)
BILIRUB DIRECT SERPL-MCNC: 2.4 MG/DL — HIGH (ref 0–0.2)
BILIRUB INDIRECT FLD-MCNC: 0.7 MG/DL — SIGNIFICANT CHANGE UP (ref 0.2–1)
BILIRUB SERPL-MCNC: 3.1 MG/DL — HIGH (ref 0.2–1.2)
INR BLD: 1.2 RATIO — HIGH (ref 0.88–1.16)
PROT SERPL-MCNC: 7 GM/DL — SIGNIFICANT CHANGE UP (ref 6–8.3)
PROTHROM AB SERPL-ACNC: 13.2 SEC — HIGH (ref 10–13.1)

## 2017-03-04 RX ORDER — LIDOCAINE 4 G/100G
15 CREAM TOPICAL ONCE
Qty: 0 | Refills: 0 | Status: COMPLETED | OUTPATIENT
Start: 2017-03-04 | End: 2017-03-04

## 2017-03-04 RX ORDER — LIDOCAINE 4 G/100G
15 CREAM TOPICAL THREE TIMES A DAY
Qty: 0 | Refills: 0 | Status: DISCONTINUED | OUTPATIENT
Start: 2017-03-04 | End: 2017-03-13

## 2017-03-04 RX ORDER — LIDOCAINE 4 G/100G
CREAM TOPICAL
Qty: 0 | Refills: 0 | Status: DISCONTINUED | OUTPATIENT
Start: 2017-03-04 | End: 2017-03-13

## 2017-03-04 RX ADMIN — Medication 81 MILLIGRAM(S): at 08:56

## 2017-03-04 RX ADMIN — QUETIAPINE FUMARATE 300 MILLIGRAM(S): 200 TABLET, FILM COATED ORAL at 20:59

## 2017-03-04 RX ADMIN — LIDOCAINE 15 MILLILITER(S): 4 CREAM TOPICAL at 14:44

## 2017-03-04 RX ADMIN — Medication 1 GRAM(S): at 21:02

## 2017-03-04 RX ADMIN — LIDOCAINE 1 PATCH: 4 CREAM TOPICAL at 08:58

## 2017-03-04 RX ADMIN — LITHIUM CARBONATE 300 MILLIGRAM(S): 300 TABLET, EXTENDED RELEASE ORAL at 08:55

## 2017-03-04 RX ADMIN — PANTOPRAZOLE SODIUM 40 MILLIGRAM(S): 20 TABLET, DELAYED RELEASE ORAL at 05:55

## 2017-03-04 RX ADMIN — Medication 1 PATCH: at 09:04

## 2017-03-04 RX ADMIN — METHADONE HYDROCHLORIDE 5 MILLIGRAM(S): 40 TABLET ORAL at 08:56

## 2017-03-04 RX ADMIN — LIDOCAINE 1 PATCH: 4 CREAM TOPICAL at 21:01

## 2017-03-04 RX ADMIN — Medication 1 GRAM(S): at 14:41

## 2017-03-04 RX ADMIN — LITHIUM CARBONATE 300 MILLIGRAM(S): 300 TABLET, EXTENDED RELEASE ORAL at 20:59

## 2017-03-04 RX ADMIN — DULOXETINE HYDROCHLORIDE 60 MILLIGRAM(S): 30 CAPSULE, DELAYED RELEASE ORAL at 08:56

## 2017-03-04 RX ADMIN — METHADONE HYDROCHLORIDE 5 MILLIGRAM(S): 40 TABLET ORAL at 14:41

## 2017-03-04 RX ADMIN — GABAPENTIN 800 MILLIGRAM(S): 400 CAPSULE ORAL at 05:54

## 2017-03-04 RX ADMIN — GABAPENTIN 800 MILLIGRAM(S): 400 CAPSULE ORAL at 14:42

## 2017-03-04 RX ADMIN — GABAPENTIN 800 MILLIGRAM(S): 400 CAPSULE ORAL at 20:59

## 2017-03-04 RX ADMIN — Medication 1 GRAM(S): at 17:28

## 2017-03-04 NOTE — PROGRESS NOTE BEHAVIORAL HEALTH - NSBHFUPINTERVALHXFT_PSY_A_CORE
MEDICATIONS  (STANDING):  DULoxetine 60milliGRAM(s) Oral daily  gabapentin 800milliGRAM(s) Oral three times a day  pantoprazole    Tablet 40milliGRAM(s) Oral two times a day before meals  sucralfate 1Gram(s) Oral four times a day  aspirin enteric coated 81milliGRAM(s) Oral daily  metoprolol 25milliGRAM(s) Oral two times a day  fluticasone / salmeterol 250-50 MICROgram(s) Diskus 1Dose(s) Inhalation two times a day  nicotine - 21 mG/24Hr(s) Patch 1patch Transdermal daily  lidocaine   Patch 1Patch Transdermal daily  lidocaine   Patch 1Patch Transdermal daily  lithium 300milliGRAM(s) Oral two times a day  QUEtiapine 300milliGRAM(s) Oral at bedtime    MEDICATIONS  (PRN):  hydrOXYzine hydrochloride 25milliGRAM(s) Oral every 6 hours PRN Agitation  ALBUTerol    90 MICROgram(s) HFA Inhaler 2Puff(s) Inhalation every 6 hours PRN Shortness of Breath  methadone 5milliGRAM(s) Oral every 6 hours PRN opiate withdrawal

## 2017-03-04 NOTE — PROGRESS NOTE BEHAVIORAL HEALTH - NSBHFUPINTERVALCCFT_PSY_A_CORE
Covering MD Note ( 3/4/17)  Pt seen in his room. Now more alert and oriented x 3 after recent post liver biopsy sedation and confusion.   Serum ammonia  mildly elevated = 54 ( 11-32)   LFTs and PT /INR  daily   SGOT= 503 ( trending down)  SGPT= 641  AP= 201  PT/INR== 13.2/1.2    VSS T= 99.1  Pt c/o tooth pain. " I was hoping my methadone could be increased."  Writer reviewed treatment plan including recommendation to not increase methadone further.. Pt tolerating Seroquel and Lithium  without side effects and with evident clinical effect.    Plan  1.Viscous lidocaine applied to left upper and lower gum region due to tooth pain.  2.Recheck serum NH3  ( 3/5/17)  3.Daily LFTs and PT / INR

## 2017-03-05 LAB
ALBUMIN SERPL ELPH-MCNC: 2.8 G/DL — LOW (ref 3.3–5)
ALP SERPL-CCNC: 172 U/L — HIGH (ref 40–120)
ALT FLD-CCNC: 478 U/L — HIGH (ref 12–78)
AST SERPL-CCNC: 306 U/L — HIGH (ref 15–37)
BILIRUB DIRECT SERPL-MCNC: 2 MG/DL — HIGH (ref 0–0.2)
BILIRUB INDIRECT FLD-MCNC: 0.4 MG/DL — SIGNIFICANT CHANGE UP (ref 0.2–1)
BILIRUB SERPL-MCNC: 2.4 MG/DL — HIGH (ref 0.2–1.2)
INR BLD: 1.27 RATIO — HIGH (ref 0.88–1.16)
PROT SERPL-MCNC: 6.7 GM/DL — SIGNIFICANT CHANGE UP (ref 6–8.3)
PROTHROM AB SERPL-ACNC: 14 SEC — HIGH (ref 10–13.1)

## 2017-03-05 RX ADMIN — METHADONE HYDROCHLORIDE 5 MILLIGRAM(S): 40 TABLET ORAL at 06:36

## 2017-03-05 RX ADMIN — METHADONE HYDROCHLORIDE 5 MILLIGRAM(S): 40 TABLET ORAL at 13:44

## 2017-03-05 RX ADMIN — Medication 1 PATCH: at 10:18

## 2017-03-05 RX ADMIN — LITHIUM CARBONATE 300 MILLIGRAM(S): 300 TABLET, EXTENDED RELEASE ORAL at 10:16

## 2017-03-05 RX ADMIN — METHADONE HYDROCHLORIDE 5 MILLIGRAM(S): 40 TABLET ORAL at 20:09

## 2017-03-05 RX ADMIN — LIDOCAINE 15 MILLILITER(S): 4 CREAM TOPICAL at 13:44

## 2017-03-05 RX ADMIN — Medication 81 MILLIGRAM(S): at 10:16

## 2017-03-05 RX ADMIN — LIDOCAINE 1 PATCH: 4 CREAM TOPICAL at 10:17

## 2017-03-05 RX ADMIN — LIDOCAINE 1 PATCH: 4 CREAM TOPICAL at 20:10

## 2017-03-05 RX ADMIN — QUETIAPINE FUMARATE 300 MILLIGRAM(S): 200 TABLET, FILM COATED ORAL at 21:27

## 2017-03-05 RX ADMIN — GABAPENTIN 800 MILLIGRAM(S): 400 CAPSULE ORAL at 21:27

## 2017-03-05 RX ADMIN — PANTOPRAZOLE SODIUM 40 MILLIGRAM(S): 20 TABLET, DELAYED RELEASE ORAL at 06:24

## 2017-03-05 RX ADMIN — Medication 1 GRAM(S): at 10:19

## 2017-03-05 RX ADMIN — GABAPENTIN 800 MILLIGRAM(S): 400 CAPSULE ORAL at 13:44

## 2017-03-05 RX ADMIN — DULOXETINE HYDROCHLORIDE 60 MILLIGRAM(S): 30 CAPSULE, DELAYED RELEASE ORAL at 10:16

## 2017-03-05 RX ADMIN — GABAPENTIN 800 MILLIGRAM(S): 400 CAPSULE ORAL at 06:24

## 2017-03-05 RX ADMIN — LITHIUM CARBONATE 300 MILLIGRAM(S): 300 TABLET, EXTENDED RELEASE ORAL at 21:27

## 2017-03-05 RX ADMIN — LIDOCAINE 15 MILLILITER(S): 4 CREAM TOPICAL at 10:18

## 2017-03-05 NOTE — PROGRESS NOTE BEHAVIORAL HEALTH - NSBHFUPINTERVALCCFT_PSY_A_CORE
Covering MD Note ( 3/5/17)  Pt seen . Pt now more visible on the unit . Pt  more  alert and oriented x 3 after recent post liver biopsy sedation and confusion .Pt remains thin, disheveled , poorly groomed and tired looking though pt better able to engage in conversation with  select staff .   Serum ammonia  mildly elevated = 54 (  nl range 11-32)  rechecked = 43  LFTs and PT /INR  daily   SGOT= 503 ( trending down)    = 306  ( 3/5/17)  SGPT= 641                            =  478   ( 3/5/17)  AP= 201                                = 172  PT/INR==                              =   14. /1.27  ( 3/5/17)    VSS T= 99.1  Pt c/o tooth pain.  Pt continuing to c/o pain and spoke longingly of his h/o methadone use. " I was hoping my methadone could be increased."  Writer reviewed treatment plan including recommendation to not increase methadone further.. Pt tolerating Seroquel and Lithium  without side effects and with evident clinical effect.    Plan  1.Viscous lidocaine applied to left upper and lower gum region due to tooth pain.  2.Recheck serum NH3  ( 3/6/17)  3.Daily LFTs and PT / INR  4.Liver biopsy report pending

## 2017-03-06 LAB
ALBUMIN SERPL ELPH-MCNC: 2.9 G/DL — LOW (ref 3.3–5)
ALP SERPL-CCNC: 166 U/L — HIGH (ref 40–120)
ALT FLD-CCNC: 398 U/L — HIGH (ref 12–78)
AMMONIA BLD-MCNC: 59 UMOL/L — HIGH (ref 11–32)
AST SERPL-CCNC: 205 U/L — HIGH (ref 15–37)
BILIRUB DIRECT SERPL-MCNC: 1.7 MG/DL — HIGH (ref 0–0.2)
BILIRUB INDIRECT FLD-MCNC: 0.5 MG/DL — SIGNIFICANT CHANGE UP (ref 0.2–1)
BILIRUB SERPL-MCNC: 2.2 MG/DL — HIGH (ref 0.2–1.2)
HFE GENE MUT ANL BLD/T: SIGNIFICANT CHANGE UP
INR BLD: 1.18 RATIO — HIGH (ref 0.88–1.16)
LITHIUM SERPL-MCNC: 0.4 MMOL/L — LOW (ref 0.6–1.2)
PROT SERPL-MCNC: 6.9 GM/DL — SIGNIFICANT CHANGE UP (ref 6–8.3)
PROTHROM AB SERPL-ACNC: 13 SEC — SIGNIFICANT CHANGE UP (ref 10–13.1)

## 2017-03-06 RX ORDER — NICOTINE POLACRILEX 2 MG
2 GUM BUCCAL
Qty: 0 | Refills: 0 | Status: DISCONTINUED | OUTPATIENT
Start: 2017-03-06 | End: 2017-03-13

## 2017-03-06 RX ORDER — QUETIAPINE FUMARATE 200 MG/1
400 TABLET, FILM COATED ORAL AT BEDTIME
Qty: 0 | Refills: 0 | Status: DISCONTINUED | OUTPATIENT
Start: 2017-03-06 | End: 2017-03-10

## 2017-03-06 RX ORDER — LITHIUM CARBONATE 300 MG/1
600 TABLET, EXTENDED RELEASE ORAL AT BEDTIME
Qty: 0 | Refills: 0 | Status: DISCONTINUED | OUTPATIENT
Start: 2017-03-06 | End: 2017-03-10

## 2017-03-06 RX ORDER — LITHIUM CARBONATE 300 MG/1
300 TABLET, EXTENDED RELEASE ORAL DAILY
Qty: 0 | Refills: 0 | Status: DISCONTINUED | OUTPATIENT
Start: 2017-03-06 | End: 2017-03-10

## 2017-03-06 RX ADMIN — GABAPENTIN 800 MILLIGRAM(S): 400 CAPSULE ORAL at 21:09

## 2017-03-06 RX ADMIN — LIDOCAINE 15 MILLILITER(S): 4 CREAM TOPICAL at 09:30

## 2017-03-06 RX ADMIN — METHADONE HYDROCHLORIDE 5 MILLIGRAM(S): 40 TABLET ORAL at 14:02

## 2017-03-06 RX ADMIN — LIDOCAINE 1 PATCH: 4 CREAM TOPICAL at 09:29

## 2017-03-06 RX ADMIN — DULOXETINE HYDROCHLORIDE 60 MILLIGRAM(S): 30 CAPSULE, DELAYED RELEASE ORAL at 09:30

## 2017-03-06 RX ADMIN — METHADONE HYDROCHLORIDE 5 MILLIGRAM(S): 40 TABLET ORAL at 06:24

## 2017-03-06 RX ADMIN — GABAPENTIN 800 MILLIGRAM(S): 400 CAPSULE ORAL at 06:23

## 2017-03-06 RX ADMIN — PANTOPRAZOLE SODIUM 40 MILLIGRAM(S): 20 TABLET, DELAYED RELEASE ORAL at 06:22

## 2017-03-06 RX ADMIN — LIDOCAINE 1 PATCH: 4 CREAM TOPICAL at 21:12

## 2017-03-06 RX ADMIN — METHADONE HYDROCHLORIDE 5 MILLIGRAM(S): 40 TABLET ORAL at 21:09

## 2017-03-06 RX ADMIN — Medication 2 MILLIGRAM(S): at 21:03

## 2017-03-06 RX ADMIN — Medication 81 MILLIGRAM(S): at 09:30

## 2017-03-06 RX ADMIN — LITHIUM CARBONATE 600 MILLIGRAM(S): 300 TABLET, EXTENDED RELEASE ORAL at 21:01

## 2017-03-06 RX ADMIN — LITHIUM CARBONATE 300 MILLIGRAM(S): 300 TABLET, EXTENDED RELEASE ORAL at 09:30

## 2017-03-06 RX ADMIN — GABAPENTIN 800 MILLIGRAM(S): 400 CAPSULE ORAL at 12:57

## 2017-03-06 RX ADMIN — LIDOCAINE 1 PATCH: 4 CREAM TOPICAL at 21:13

## 2017-03-06 RX ADMIN — QUETIAPINE FUMARATE 400 MILLIGRAM(S): 200 TABLET, FILM COATED ORAL at 20:58

## 2017-03-06 RX ADMIN — Medication 1 PATCH: at 09:29

## 2017-03-06 RX ADMIN — PANTOPRAZOLE SODIUM 40 MILLIGRAM(S): 20 TABLET, DELAYED RELEASE ORAL at 17:08

## 2017-03-06 RX ADMIN — Medication 1 PATCH: at 09:31

## 2017-03-06 RX ADMIN — Medication 2 MILLIGRAM(S): at 17:05

## 2017-03-06 RX ADMIN — LITHIUM CARBONATE 300 MILLIGRAM(S): 300 TABLET, EXTENDED RELEASE ORAL at 20:59

## 2017-03-06 RX ADMIN — LIDOCAINE 15 MILLILITER(S): 4 CREAM TOPICAL at 21:01

## 2017-03-06 NOTE — PROGRESS NOTE BEHAVIORAL HEALTH - NSBHFUPINTERVALHXFT_PSY_A_CORE
Continues to spend a great deal of time in bed due to physical illness.  Limited participation in unit activities.

## 2017-03-07 RX ADMIN — Medication 1 PATCH: at 13:05

## 2017-03-07 RX ADMIN — Medication 81 MILLIGRAM(S): at 09:36

## 2017-03-07 RX ADMIN — QUETIAPINE FUMARATE 400 MILLIGRAM(S): 200 TABLET, FILM COATED ORAL at 20:54

## 2017-03-07 RX ADMIN — LIDOCAINE 1 PATCH: 4 CREAM TOPICAL at 09:35

## 2017-03-07 RX ADMIN — METHADONE HYDROCHLORIDE 5 MILLIGRAM(S): 40 TABLET ORAL at 19:51

## 2017-03-07 RX ADMIN — METHADONE HYDROCHLORIDE 5 MILLIGRAM(S): 40 TABLET ORAL at 13:04

## 2017-03-07 RX ADMIN — GABAPENTIN 800 MILLIGRAM(S): 400 CAPSULE ORAL at 06:02

## 2017-03-07 RX ADMIN — PANTOPRAZOLE SODIUM 40 MILLIGRAM(S): 20 TABLET, DELAYED RELEASE ORAL at 06:02

## 2017-03-07 RX ADMIN — GABAPENTIN 800 MILLIGRAM(S): 400 CAPSULE ORAL at 20:55

## 2017-03-07 RX ADMIN — LIDOCAINE 1 PATCH: 4 CREAM TOPICAL at 19:52

## 2017-03-07 RX ADMIN — METHADONE HYDROCHLORIDE 5 MILLIGRAM(S): 40 TABLET ORAL at 06:02

## 2017-03-07 RX ADMIN — DULOXETINE HYDROCHLORIDE 60 MILLIGRAM(S): 30 CAPSULE, DELAYED RELEASE ORAL at 09:36

## 2017-03-07 RX ADMIN — LIDOCAINE 15 MILLILITER(S): 4 CREAM TOPICAL at 09:36

## 2017-03-07 RX ADMIN — PANTOPRAZOLE SODIUM 40 MILLIGRAM(S): 20 TABLET, DELAYED RELEASE ORAL at 16:56

## 2017-03-07 RX ADMIN — Medication 2 MILLIGRAM(S): at 13:04

## 2017-03-07 RX ADMIN — LITHIUM CARBONATE 600 MILLIGRAM(S): 300 TABLET, EXTENDED RELEASE ORAL at 20:54

## 2017-03-07 RX ADMIN — GABAPENTIN 800 MILLIGRAM(S): 400 CAPSULE ORAL at 13:26

## 2017-03-07 RX ADMIN — LITHIUM CARBONATE 300 MILLIGRAM(S): 300 TABLET, EXTENDED RELEASE ORAL at 09:36

## 2017-03-07 NOTE — PROGRESS NOTE BEHAVIORAL HEALTH - NSBHFUPINTERVALHXFT_PSY_A_CORE
Was out of room more yesterday.  Attended groups.  Having some difficulty organizing thoughts.  Informed he would not be getting ritalin here- Voicing desire to attend outpatient treatment.    MEDICATIONS  (STANDING):  DULoxetine 60milliGRAM(s) Oral daily  gabapentin 800milliGRAM(s) Oral three times a day  pantoprazole    Tablet 40milliGRAM(s) Oral two times a day before meals  sucralfate 1Gram(s) Oral four times a day  aspirin enteric coated 81milliGRAM(s) Oral daily  metoprolol 25milliGRAM(s) Oral two times a day  fluticasone / salmeterol 250-50 MICROgram(s) Diskus 1Dose(s) Inhalation two times a day  nicotine - 21 mG/24Hr(s) Patch 1patch Transdermal daily  lidocaine   Patch 1Patch Transdermal daily  lidocaine   Patch 1Patch Transdermal daily  lidocaine 2% Viscous     lidocaine 2% Viscous 15milliLiter(s) Swish and Spit three times a day  lithium 300milliGRAM(s) Oral daily  lithium 600milliGRAM(s) Oral at bedtime  QUEtiapine 400milliGRAM(s) Oral at bedtime  nicotine  Polacrilex Gum 2milliGRAM(s) Oral every 2 hours    MEDICATIONS  (PRN):  hydrOXYzine hydrochloride 25milliGRAM(s) Oral every 6 hours PRN Agitation  ALBUTerol    90 MICROgram(s) HFA Inhaler 2Puff(s) Inhalation every 6 hours PRN Shortness of Breath  methadone 5milliGRAM(s) Oral every 6 hours PRN opiate withdrawal

## 2017-03-08 LAB
ALBUMIN SERPL ELPH-MCNC: 2.8 G/DL — LOW (ref 3.3–5)
ALP SERPL-CCNC: 144 U/L — HIGH (ref 40–120)
ALT FLD-CCNC: 240 U/L — HIGH (ref 12–78)
AST SERPL-CCNC: 104 U/L — HIGH (ref 15–37)
BILIRUB DIRECT SERPL-MCNC: 1.3 MG/DL — HIGH (ref 0–0.2)
BILIRUB INDIRECT FLD-MCNC: 0.3 MG/DL — SIGNIFICANT CHANGE UP (ref 0.2–1)
BILIRUB SERPL-MCNC: 1.6 MG/DL — HIGH (ref 0.2–1.2)
INR BLD: 1.13 RATIO — SIGNIFICANT CHANGE UP (ref 0.88–1.16)
PROT SERPL-MCNC: 6.4 GM/DL — SIGNIFICANT CHANGE UP (ref 6–8.3)
PROTHROM AB SERPL-ACNC: 12.4 SEC — SIGNIFICANT CHANGE UP (ref 10–13.1)
SURGICAL PATHOLOGY FINAL REPORT - CH: SIGNIFICANT CHANGE UP

## 2017-03-08 RX ADMIN — QUETIAPINE FUMARATE 400 MILLIGRAM(S): 200 TABLET, FILM COATED ORAL at 20:18

## 2017-03-08 RX ADMIN — Medication 2 MILLIGRAM(S): at 06:21

## 2017-03-08 RX ADMIN — LIDOCAINE 1 PATCH: 4 CREAM TOPICAL at 09:01

## 2017-03-08 RX ADMIN — PANTOPRAZOLE SODIUM 40 MILLIGRAM(S): 20 TABLET, DELAYED RELEASE ORAL at 17:06

## 2017-03-08 RX ADMIN — Medication 1 PATCH: at 09:05

## 2017-03-08 RX ADMIN — GABAPENTIN 800 MILLIGRAM(S): 400 CAPSULE ORAL at 06:21

## 2017-03-08 RX ADMIN — LITHIUM CARBONATE 600 MILLIGRAM(S): 300 TABLET, EXTENDED RELEASE ORAL at 20:18

## 2017-03-08 RX ADMIN — Medication 81 MILLIGRAM(S): at 08:52

## 2017-03-08 RX ADMIN — LIDOCAINE 1 PATCH: 4 CREAM TOPICAL at 20:18

## 2017-03-08 RX ADMIN — GABAPENTIN 800 MILLIGRAM(S): 400 CAPSULE ORAL at 12:36

## 2017-03-08 RX ADMIN — Medication 1 PATCH: at 09:04

## 2017-03-08 RX ADMIN — Medication 2 MILLIGRAM(S): at 08:53

## 2017-03-08 RX ADMIN — METHADONE HYDROCHLORIDE 5 MILLIGRAM(S): 40 TABLET ORAL at 12:48

## 2017-03-08 RX ADMIN — METHADONE HYDROCHLORIDE 5 MILLIGRAM(S): 40 TABLET ORAL at 06:20

## 2017-03-08 RX ADMIN — METHADONE HYDROCHLORIDE 5 MILLIGRAM(S): 40 TABLET ORAL at 21:27

## 2017-03-08 RX ADMIN — DULOXETINE HYDROCHLORIDE 60 MILLIGRAM(S): 30 CAPSULE, DELAYED RELEASE ORAL at 08:52

## 2017-03-08 RX ADMIN — LITHIUM CARBONATE 300 MILLIGRAM(S): 300 TABLET, EXTENDED RELEASE ORAL at 08:53

## 2017-03-08 RX ADMIN — PANTOPRAZOLE SODIUM 40 MILLIGRAM(S): 20 TABLET, DELAYED RELEASE ORAL at 06:21

## 2017-03-08 RX ADMIN — GABAPENTIN 800 MILLIGRAM(S): 400 CAPSULE ORAL at 20:18

## 2017-03-08 NOTE — PROGRESS NOTE BEHAVIORAL HEALTH - NSBHFUPINTERVALCCFT_PSY_A_CORE
Covering MD Note ( 3/8/17)  Pt seen. More visible on the unit and  fully alert and oriented x 3.   Continues to c/o pain " I'm not talking about a 5 or a 6, I'm talking about an 8 or a 9" Covering MD Note ( 3/8/17)  Pt seen. More visible on the unit and  fully alert and oriented x 3. Pt tolerating med regimen without side effects . Seroquel and Lithium both slowly increased with good clinical effect. Pt denies SI HI AH. Better eye contact.  Continues to c/o pain " I'm not talking about pain on  a 5 or a 6, level ,  I'm talking about an 8 or a 9!"  Pt  seen by GI  for pt c/o n/v and abdominal pain on 3/8/17 .  GI  consult note  appreciated. Preliminary report from liver biopsy  suggestive of viral hepatitis and cirrhosis.  PT/ INR = 12.4 / 1.13  LFTs ( trending downward0   AP= 144  XPCA=966  XIRM=701  Plan  1.Pain management reconsult requested 3/8/17  2.Pt encouraged to heed treatment recommendations of GI and Psychiatry

## 2017-03-08 NOTE — PROGRESS NOTE BEHAVIORAL HEALTH - NSBHFUPINTERVALHXFT_PSY_A_CORE
Was out of room more yesterday.  Attended groups.  Having some difficulty organizing thoughts.  Informed he would not be getting ritalin here- Voicing desire to attend outpatient treatment.    MEDICATIONS  (STANDING):  DULoxetine 60milliGRAM(s) Oral daily  gabapentin 800milliGRAM(s) Oral three times a day  pantoprazole    Tablet 40milliGRAM(s) Oral two times a day before meals  sucralfate 1Gram(s) Oral four times a day  aspirin enteric coated 81milliGRAM(s) Oral daily  metoprolol 25milliGRAM(s) Oral two times a day  fluticasone / salmeterol 250-50 MICROgram(s) Diskus 1Dose(s) Inhalation two times a day  nicotine - 21 mG/24Hr(s) Patch 1patch Transdermal daily  lidocaine   Patch 1Patch Transdermal daily  lidocaine   Patch 1Patch Transdermal daily  lidocaine 2% Viscous     lidocaine 2% Viscous 15milliLiter(s) Swish and Spit three times a day  lithium 300milliGRAM(s) Oral daily  lithium 600milliGRAM(s) Oral at bedtime  QUEtiapine 400milliGRAM(s) Oral at bedtime  nicotine  Polacrilex Gum 2milliGRAM(s) Oral every 2 hours    MEDICATIONS  (PRN):  hydrOXYzine hydrochloride 25milliGRAM(s) Oral every 6 hours PRN Agitation  ALBUTerol    90 MICROgram(s) HFA Inhaler 2Puff(s) Inhalation every 6 hours PRN Shortness of Breath  methadone 5milliGRAM(s) Oral every 6 hours PRN opiate withdrawal Was out of room more yesterday.  Attended groups.  Having some difficulty organizing thoughts.  Informed he would not be getting ritalin here- Voicing desire to attend outpatient treatment..    MEDICATIONS  (STANDING):  DULoxetine 60milliGRAM(s) Oral daily  gabapentin 800milliGRAM(s) Oral three times a day  pantoprazole    Tablet 40milliGRAM(s) Oral two times a day before meals  sucralfate 1Gram(s) Oral four times a day  aspirin enteric coated 81milliGRAM(s) Oral daily  metoprolol 25milliGRAM(s) Oral two times a day  fluticasone / salmeterol 250-50 MICROgram(s) Diskus 1Dose(s) Inhalation two times a day  nicotine - 21 mG/24Hr(s) Patch 1patch Transdermal daily  lidocaine   Patch 1Patch Transdermal daily  lidocaine   Patch 1Patch Transdermal daily  lidocaine 2% Viscous     lidocaine 2% Viscous 15milliLiter(s) Swish and Spit three times a day  lithium 300milliGRAM(s) Oral daily  lithium 600milliGRAM(s) Oral at bedtime  QUEtiapine 400milliGRAM(s) Oral at bedtime  nicotine  Polacrilex Gum 2milliGRAM(s) Oral every 2 hours    MEDICATIONS  (PRN):  hydrOXYzine hydrochloride 25milliGRAM(s) Oral every 6 hours PRN Agitation  ALBUTerol    90 MICROgram(s) HFA Inhaler 2Puff(s) Inhalation every 6 hours PRN Shortness of Breath  methadone 5milliGRAM(s) Oral every 6 hours PRN opiate withdrawal

## 2017-03-08 NOTE — PROGRESS NOTE ADULT - SUBJECTIVE AND OBJECTIVE BOX
HPI:  45 yo man currently admitted on 5N due to history of bipolar disorder with depression, paranoia and opiate withdrawal. He has had multiple recent admissions at  for chest pain, negative cath. Readmitted with n/v, hematemesis. EGD notable for esophagitis. Has history of opioid dependence and evaluations at multiple hospitals. Has history of chronic HBV (E antigen pos, ab neg) and HCV. He was seen as outpt at [facility], but did not follow up to discuss treatment. He had additional work up for underlying liver disease. Iron saturation slightly elevated, LKM ab positive. Of note, liver tests yadira during admission. INR stable. MRCP performed demonstrating normal biliary system, but gallbladder wall thickening and suggestion of hepatitis. Liver biopsy performed. Liver tests back to baseline.     MEDICATIONS  (STANDING):  DULoxetine 60milliGRAM(s) Oral daily  gabapentin 800milliGRAM(s) Oral three times a day  pantoprazole    Tablet 40milliGRAM(s) Oral two times a day before meals  sucralfate 1Gram(s) Oral four times a day  aspirin enteric coated 81milliGRAM(s) Oral daily  metoprolol 25milliGRAM(s) Oral two times a day  fluticasone / salmeterol 250-50 MICROgram(s) Diskus 1Dose(s) Inhalation two times a day  nicotine - 21 mG/24Hr(s) Patch 1patch Transdermal daily  lidocaine   Patch 1Patch Transdermal daily  lidocaine   Patch 1Patch Transdermal daily  lidocaine 2% Viscous     lidocaine 2% Viscous 15milliLiter(s) Swish and Spit three times a day  lithium 300milliGRAM(s) Oral daily  lithium 600milliGRAM(s) Oral at bedtime  QUEtiapine 400milliGRAM(s) Oral at bedtime  nicotine  Polacrilex Gum 2milliGRAM(s) Oral every 2 hours    MEDICATIONS  (PRN):  hydrOXYzine hydrochloride 25milliGRAM(s) Oral every 6 hours PRN Agitation  ALBUTerol    90 MICROgram(s) HFA Inhaler 2Puff(s) Inhalation every 6 hours PRN Shortness of Breath  methadone 5milliGRAM(s) Oral every 6 hours PRN opiate withdrawal      Allergies    Aleve (Unknown)  Haldol (Anaphylaxis)  Motrin (Anaphylaxis)  NSAIDs (Flushing; Other (Moderate))  Risperdal (Short breath; Rash; Hives)  Stelazine (Unknown)  Thorazine (Other (Moderate))  Zyprexa (Rash; Dystonic RXN; Hives)    Intolerances        REVIEW OF SYSTEMS    General: no unexpected weight loss    HEENT: no icterus    Respiratory and Thorax: no SOB  	  Cardiovascular: no CP    Gastrointestinal: bloating    Skin: no jaundice      Vital Signs Last 24 Hrs  T(C): 36.6, Max: 37.1 (03-07 @ 20:05)  T(F): 97.9, Max: 98.8 (03-07 @ 20:05)  HR: 99 (82 - 99)  BP: 103/59 (102/59 - 103/59)  BP(mean): --  RR: 16 (15 - 16)  SpO2: 100% (100% - 100%)    PHYSICAL EXAM:    Constitutional: NAD    HEENT: anicteric    Respiratory: CTA BL    Cardiovascular:  RRR    Gastrointestinal: soft ND +BS NTTP    Extremities: no LE edema    Neuro: no focal deficits    Skin: no jaundice      LABS:        TPro  6.4    /  Alb  2.8    /  TBili  1.6    /  DBili  1.3    /  AST  104    /  ALT  240    /  AlkPhos  144    08 Mar 2017 07:51      LIVER FUNCTIONS - ( 08 Mar 2017 07:51 )  Alb: 2.8 g/dL / Pro: 6.4 gm/dL / ALK PHOS: 144 U/L / ALT: 240 U/L / AST: 104 U/L / GGT: x             RADIOLOGY & ADDITIONAL STUDIES:

## 2017-03-08 NOTE — PROGRESS NOTE ADULT - ASSESSMENT
45 yo man with nausea, vomiting, abdominal pain and progressive worsening of LFTs. 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   -Liver biopsy prelim report suggestive of viral hepatitis and cirrhosis. Discussed with patient. Will follow up official results.  -Discussed importance of follow up for treatment and the possibility of decompensated liver disease in the future.   -He should follow up with gastroenterology through the [facility] or at [facility], where he had previously been seen.

## 2017-03-09 RX ORDER — METHADONE HYDROCHLORIDE 40 MG/1
10 TABLET ORAL
Qty: 0 | Refills: 0 | Status: DISCONTINUED | OUTPATIENT
Start: 2017-03-09 | End: 2017-03-13

## 2017-03-09 RX ORDER — METHADONE HYDROCHLORIDE 40 MG/1
10 TABLET ORAL DAILY
Qty: 0 | Refills: 0 | Status: DISCONTINUED | OUTPATIENT
Start: 2017-03-09 | End: 2017-03-13

## 2017-03-09 RX ADMIN — METHADONE HYDROCHLORIDE 10 MILLIGRAM(S): 40 TABLET ORAL at 16:28

## 2017-03-09 RX ADMIN — LIDOCAINE 1 PATCH: 4 CREAM TOPICAL at 21:39

## 2017-03-09 RX ADMIN — Medication 1 PATCH: at 09:27

## 2017-03-09 RX ADMIN — Medication 81 MILLIGRAM(S): at 09:26

## 2017-03-09 RX ADMIN — METHADONE HYDROCHLORIDE 5 MILLIGRAM(S): 40 TABLET ORAL at 12:54

## 2017-03-09 RX ADMIN — Medication 2 MILLIGRAM(S): at 09:22

## 2017-03-09 RX ADMIN — LIDOCAINE 1 PATCH: 4 CREAM TOPICAL at 09:25

## 2017-03-09 RX ADMIN — PANTOPRAZOLE SODIUM 40 MILLIGRAM(S): 20 TABLET, DELAYED RELEASE ORAL at 06:01

## 2017-03-09 RX ADMIN — GABAPENTIN 800 MILLIGRAM(S): 400 CAPSULE ORAL at 21:38

## 2017-03-09 RX ADMIN — GABAPENTIN 800 MILLIGRAM(S): 400 CAPSULE ORAL at 12:55

## 2017-03-09 RX ADMIN — METHADONE HYDROCHLORIDE 5 MILLIGRAM(S): 40 TABLET ORAL at 06:01

## 2017-03-09 RX ADMIN — Medication 1 PATCH: at 09:25

## 2017-03-09 RX ADMIN — QUETIAPINE FUMARATE 400 MILLIGRAM(S): 200 TABLET, FILM COATED ORAL at 21:38

## 2017-03-09 RX ADMIN — Medication 2 MILLIGRAM(S): at 13:53

## 2017-03-09 RX ADMIN — LITHIUM CARBONATE 600 MILLIGRAM(S): 300 TABLET, EXTENDED RELEASE ORAL at 21:38

## 2017-03-09 RX ADMIN — DULOXETINE HYDROCHLORIDE 60 MILLIGRAM(S): 30 CAPSULE, DELAYED RELEASE ORAL at 09:26

## 2017-03-09 RX ADMIN — LIDOCAINE 1 PATCH: 4 CREAM TOPICAL at 16:47

## 2017-03-09 RX ADMIN — Medication 2 MILLIGRAM(S): at 12:56

## 2017-03-09 RX ADMIN — Medication 2 MILLIGRAM(S): at 12:55

## 2017-03-09 RX ADMIN — GABAPENTIN 800 MILLIGRAM(S): 400 CAPSULE ORAL at 06:01

## 2017-03-09 RX ADMIN — PANTOPRAZOLE SODIUM 40 MILLIGRAM(S): 20 TABLET, DELAYED RELEASE ORAL at 16:28

## 2017-03-09 RX ADMIN — LITHIUM CARBONATE 300 MILLIGRAM(S): 300 TABLET, EXTENDED RELEASE ORAL at 09:26

## 2017-03-09 NOTE — PROGRESS NOTE BEHAVIORAL HEALTH - NSBHFUPINTERVALHXFT_PSY_A_CORE
patient has made steady improvements as medications have been adjusted and physical health has improved.    MEDICATIONS  (STANDING):  DULoxetine 60milliGRAM(s) Oral daily  gabapentin 800milliGRAM(s) Oral three times a day  pantoprazole    Tablet 40milliGRAM(s) Oral two times a day before meals  sucralfate 1Gram(s) Oral four times a day  aspirin enteric coated 81milliGRAM(s) Oral daily  metoprolol 25milliGRAM(s) Oral two times a day  fluticasone / salmeterol 250-50 MICROgram(s) Diskus 1Dose(s) Inhalation two times a day  nicotine - 21 mG/24Hr(s) Patch 1patch Transdermal daily  lidocaine   Patch 1Patch Transdermal daily  lidocaine   Patch 1Patch Transdermal daily  lidocaine 2% Viscous     lidocaine 2% Viscous 15milliLiter(s) Swish and Spit three times a day  lithium 300milliGRAM(s) Oral daily  lithium 600milliGRAM(s) Oral at bedtime  QUEtiapine 400milliGRAM(s) Oral at bedtime  nicotine  Polacrilex Gum 2milliGRAM(s) Oral every 2 hours    MEDICATIONS  (PRN):  hydrOXYzine hydrochloride 25milliGRAM(s) Oral every 6 hours PRN Agitation  ALBUTerol    90 MICROgram(s) HFA Inhaler 2Puff(s) Inhalation every 6 hours PRN Shortness of Breath  methadone 5milliGRAM(s) Oral every 6 hours PRN opiate withdrawal

## 2017-03-10 LAB — LITHIUM SERPL-MCNC: 0.6 MMOL/L — SIGNIFICANT CHANGE UP (ref 0.6–1.2)

## 2017-03-10 RX ORDER — QUETIAPINE FUMARATE 200 MG/1
300 TABLET, FILM COATED ORAL AT BEDTIME
Qty: 0 | Refills: 0 | Status: DISCONTINUED | OUTPATIENT
Start: 2017-03-10 | End: 2017-03-13

## 2017-03-10 RX ORDER — LITHIUM CARBONATE 300 MG/1
600 TABLET, EXTENDED RELEASE ORAL
Qty: 0 | Refills: 0 | Status: DISCONTINUED | OUTPATIENT
Start: 2017-03-10 | End: 2017-03-13

## 2017-03-10 RX ADMIN — METHADONE HYDROCHLORIDE 10 MILLIGRAM(S): 40 TABLET ORAL at 21:43

## 2017-03-10 RX ADMIN — Medication 2 MILLIGRAM(S): at 14:00

## 2017-03-10 RX ADMIN — METHADONE HYDROCHLORIDE 10 MILLIGRAM(S): 40 TABLET ORAL at 13:01

## 2017-03-10 RX ADMIN — Medication 81 MILLIGRAM(S): at 13:02

## 2017-03-10 RX ADMIN — GABAPENTIN 800 MILLIGRAM(S): 400 CAPSULE ORAL at 06:06

## 2017-03-10 RX ADMIN — PANTOPRAZOLE SODIUM 40 MILLIGRAM(S): 20 TABLET, DELAYED RELEASE ORAL at 06:06

## 2017-03-10 RX ADMIN — LITHIUM CARBONATE 300 MILLIGRAM(S): 300 TABLET, EXTENDED RELEASE ORAL at 13:01

## 2017-03-10 RX ADMIN — DULOXETINE HYDROCHLORIDE 60 MILLIGRAM(S): 30 CAPSULE, DELAYED RELEASE ORAL at 13:01

## 2017-03-10 RX ADMIN — GABAPENTIN 800 MILLIGRAM(S): 400 CAPSULE ORAL at 14:00

## 2017-03-10 RX ADMIN — PANTOPRAZOLE SODIUM 40 MILLIGRAM(S): 20 TABLET, DELAYED RELEASE ORAL at 15:47

## 2017-03-10 NOTE — PROGRESS NOTE BEHAVIORAL HEALTH - NSBHFUPINTERVALCCFT_PSY_A_CORE
Reports feeling very sedated this Am  Urinated on self.  Had not received any other medication this Am so attributing to hs Seroquel

## 2017-03-10 NOTE — DISCHARGE NOTE BEHAVIORAL HEALTH - NSBHDCTHERAPYFT_PSY_A_CORE
patient attended group therapy after hios physical condition stabilized. patient attended group therapy after his physical condition stabilized.

## 2017-03-10 NOTE — DISCHARGE NOTE BEHAVIORAL HEALTH - NSBHDCSUICFCTRMIT_PSY_A_CORE
Mood was stabilized with a medication that can be taken despite his liver diseases.  Paranoia was also alleviated with Seroquel.  Substance abuse mitigated by providing adequate pain control, Importance of outpatient follow-up was reiterated and psychoeducation provided

## 2017-03-10 NOTE — DISCHARGE NOTE BEHAVIORAL HEALTH - NSBHDCADMRISKREASONS_PSY_A_CORE
Co-occur mental health and subst use/Non-adherence with medications/Poor residential stability/High utilizer of Inpateint/ED services/Poor engagement in outpt treatment/Co-occur mental health and medical/Lack of social supports

## 2017-03-10 NOTE — DISCHARGE NOTE BEHAVIORAL HEALTH - HPI (INCLUDE ILLNESS QUALITY, SEVERITY, DURATION, TIMING, CONTEXT, MODIFYING FACTORS, ASSOCIATED SIGNS AND SYMPTOMS)
Reports impaired sleep and appetite.   Describes mood as "depressed, hopeless and anxious"  BIB ambulance after c/o persistent N/V . Discharged from medical service last week after having been readmitted x 2. States due to vomiting is unable to keep any meds down.   CAlm and in control with fair eye contact as he reports that mobile crisis originally recommended psychiatric meds to a psychiatrist (Taylor) who spoke with his PCP(cannot recall name) who ordered the meds with 6 refills. Reports Taylor recommended IOP "and I wasn't going to do that".     Difficulty elaborating on Ah, VH. States plan to die is by OD currently.     States recently has had difficulty "keeping them down " as well as the pain meds so "I don't feel good".   States recently did cocaine to "go to sleep because I have ADD, but it didn't work". UTOX was +opiates(morphine and dilaudid prescribed ) and cocaine.     Current Medication	Per pt.   Depakote 750 mh po BID, Gabapentin 800 mg po TID,Cymbalta 60 mg po daily,Seroquel 50 mg po Am and 100 mg po HS  Asa, Lopressor(when my pressure is high", stomach meds(multiple) and morphine 15 mg po BID and Dilaudid 2 mg po prn QID

## 2017-03-10 NOTE — DISCHARGE NOTE BEHAVIORAL HEALTH - NSBHDCSUBSTHXFT_PSY_A_CORE
Long hx of suboxone treatment (see ISTOP): of note, opiods prescribed while medically discharged 2/14/17 ran out yesterday per ISTOP. Long hx of suboxone treatment (see ISTOP): of note, opioids prescribed while medically discharged 2/14/17 ran out yesterday per ISTOP.

## 2017-03-10 NOTE — DISCHARGE NOTE BEHAVIORAL HEALTH - NSBHDCCRISISPLAN2FT_PSY_A_CORE
Talk to a trusted friend or family member  Call [facility] and speak to 5N staff/Dr. Rincon at [phone]  Call the Suicide Hotline  Call 911 or go to the nearest Emergency Room

## 2017-03-10 NOTE — DISCHARGE NOTE BEHAVIORAL HEALTH - NSBHDCLABSFT_PSY_A_CORE
lithium level to be done at next setting of care lithium level to be done at next setting of care  F/U Liver studies

## 2017-03-10 NOTE — DISCHARGE NOTE BEHAVIORAL HEALTH - NSBHDCDXVALIDYESFT_PSY_A_CORE
Patient's Sweroquel was increasedf due to paranoia.  He was quite physically ill and his liver enzymes were found to be severely elevated and bilirubin was 4.1.  GI was consulted as they had seen him previously and recommended surgical consult. Biopsty was done that confirmed previous hepatitis.  Liver enzymes decreased when Depakote was changed to lithium Level was 0.6 on 900 mg and dose was increased to 600 mg BID.  At 400 mg Seroquel patient ws noted to have enuresis due to sedation so dose was decrewased to 300 mg.  His suicidality resolved immediately after admission.  His mood improved as meds were adjusted and his physical condition improved.

## 2017-03-10 NOTE — DISCHARGE NOTE BEHAVIORAL HEALTH - MEDICATION SUMMARY - MEDICATIONS TO STOP TAKING
I will STOP taking the medications listed below when I get home from the hospital:    Advair Diskus 250 mcg-50 mcg inhalation powder  -- 1 puff(s) inhaled 2 times a day    ProAir HFA 90 mcg/inh inhalation aerosol  -- 2 puff(s) inhaled 4 times a day    divalproex sodium 500 mg oral tablet, extended release  -- 1 tab(s) by mouth every 12 hours    gabapentin 400 mg oral capsule  -- 2 cap(s) by mouth 3 times a day    DULoxetine 60 mg oral delayed release capsule  -- 1 cap(s) by mouth once a day    atorvastatin 20 mg oral tablet  -- 1 tab(s) by mouth once a day (at bedtime)    QUEtiapine 150 mg oral tablet, extended release  -- 1 tab(s) by mouth once a day (at bedtime)    metoprolol tartrate 25 mg oral tablet  -- 1 tab(s) by mouth 2 times a day    HYDROmorphone 2 mg oral tablet  -- 1 tab(s) by mouth every 6 hours as needed for pain MDD:4 tabs    MS Contin 15 mg oral tablet, extended release  -- 1 tab(s) by mouth every 12 hours MDD:2 tabs    sucralfate 1 g/10 mL oral suspension  -- 10 milliliter(s) by mouth 4 times a day (before meals and at bedtime)    pantoprazole 40 mg oral delayed release tablet  -- 1 tab(s) by mouth 2 times a day (before meals)    Aspirin Enteric Coated 81 mg oral delayed release tablet  -- 1 tab(s) by mouth once a day    Metoprolol Tartrate  -- 12.5 milligram(s) by mouth once a day

## 2017-03-10 NOTE — DISCHARGE NOTE BEHAVIORAL HEALTH - PAST PSYCHIATRIC HISTORY
Past meds include Buspar, Atarax, KlonopinReports psych hospitalizations began in 1988, multiple, most recently in [place] in 2014.   Admits breaks objects when angry.   Reports past suicide attempts of OD, cutting and hanging attempt. Past meds include Buspar, Atarax, Klonopin, Reports psych hospitalizations began in 1988, multiple, most recently in [place] in 2014.   Admits breaks objects when angry.   Reports past suicide attempts of OD, cutting and hanging attempt.

## 2017-03-10 NOTE — DISCHARGE NOTE BEHAVIORAL HEALTH - NSBHDCMEDSFT_PSY_A_CORE
MEDICATIONS  (STANDING):  DULoxetine 60milliGRAM(s) Oral daily for depression and pain  Lithium 600 mg BID  Seroquel 300 mg hs

## 2017-03-10 NOTE — DISCHARGE NOTE BEHAVIORAL HEALTH - NSBHDCMEDICALFT_PSY_A_CORE
gabapentin 800milliGRAM(s) Oral three times a day for neuropathic pain  pantoprazole    Tablet 40milliGRAM(s) Oral two times a day before meals for gastritis  sucralfate 1Gram(s) Oral four times a day  aspirin enteric coated 81milliGRAM(s) Oral daily  metoprolol 25milliGRAM(s) Oral two times a day for HTN  fluticasone / salmeterol 250-50 MICROgram(s) Diskus 1Dose(s) Inhalation two times a day for COPD  nicotine - 21 mG/24Hr(s) Patch 1patch Transdermal daily  lidocaine   Patch 1Patch Transdermal daily for back pain  lidocaine   Patch 1Patch Transdermal daily  lidocaine 2% Viscous   for mouth abrasion  lidocaine 2% Viscous 15milliLiter(s) Swish and Spit three times a day  nicotine  Polacrilex Gum 2milliGRAM(s) Oral every 2 hours  methadone 10milliGRAM(s) Oral 9 AM  methadone 10milliGRAM(s) Oral 5 PM gabapentin 800milliGRAM(s) Oral three times a day for neuropathic pain  pantoprazole    Tablet 40milliGRAM(s) Oral two times a day before meals for gastritis  aspirin enteric coated 81milliGRAM(s) Oral daily  metoprolol 25milliGRAM(s) Oral two times a day for HTN  fluticasone / salmeterol 250-50 MICROgram(s) Diskus 1Dose(s) Inhalation two times a day for COPD  nicotine - 21 mG/24Hr(s) Patch 1patch Transdermal daily  lidocaine   Patch 1Patch Transdermal daily for back pain  lidocaine   Patch 1Patch Transdermal daily  methadone 10milliGRAM(s) Oral 9 AM  methadone 10milliGRAM(s) Oral 5 PM

## 2017-03-10 NOTE — DISCHARGE NOTE BEHAVIORAL HEALTH - MEDICATION SUMMARY - MEDICATIONS TO TAKE
I will START or STAY ON the medications listed below when I get home from the hospital:    Aspirin Enteric Coated 81 mg oral delayed release tablet  -- 1 tab(s) by mouth once a day  -- Indication: For Cardiac    methadone 10 mg oral tablet  -- 1 tab(s) by mouth once a day  -- Indication: For pain    methadone 10 mg oral tablet  -- 1 tab(s) by mouth   -- Indication: For pain    methadone 10 mg oral tablet  -- 1 tab(s) by mouth 2 times a day MDD:2 tabs  -- Indication: For pain    gabapentin 400 mg oral capsule  -- 2 cap(s) by mouth 3 times a day  -- Indication: For neuropathic pain    DULoxetine 60 mg oral delayed release capsule  -- 1 cap(s) by mouth once a day  -- Indication: For neuropathic pain    lithium 600 mg oral capsule  -- 1 cap(s) by mouth 2 times a day  -- Indication: For Mood stabilizer    QUEtiapine 300 mg oral tablet  -- 1 tab(s) by mouth once a day (at bedtime)  -- Indication: For psychosis    lithium 600 mg oral capsule  -- 1 cap(s) by mouth 2 times a day  -- Indication: For Mood stabilizer    QUEtiapine 300 mg oral tablet  -- 1 tab(s) by mouth once a day (at bedtime)  -- Indication: For psychosis    metoprolol tartrate 25 mg oral tablet  -- 1 tab(s) by mouth 2 times a day  -- Indication: For HTN    ProAir HFA 90 mcg/inh inhalation aerosol  -- 2 puff(s) inhaled every 4 hours prn SOB  -- Indication: For COPD    Advair Diskus 250 mcg-50 mcg inhalation powder  -- 1 puff(s) inhaled 2 times a day  -- Indication: For COPD    lidocaine 5% topical film  -- Apply on skin to affected area once a day to both affected areas  -- Indication: For pain    lidocaine 5% topical film  -- Apply on skin to affected area once a day  -- Indication: For pain    pantoprazole 40 mg oral delayed release tablet  -- 1 tab(s) by mouth 2 times a day (before meals)  -- Indication: For gastritits

## 2017-03-10 NOTE — DISCHARGE NOTE BEHAVIORAL HEALTH - FAMILY HISTORY OF PSYCHIATRIC ILLNESS
Reports wife is in [place](GM recently ) but hasn't lived with wife since .   Parents live in [place]  he is currently living in [facility] housing

## 2017-03-10 NOTE — DISCHARGE NOTE BEHAVIORAL HEALTH - NSBHDCADMRISKMITFT_PSY_A_CORE
Patient's medical issues were treated and his liver inflammation greatly decreased.  He is referred to [facility] for medical care where he can follow-up with GI service as per Dr. Rodriguez  Adequate pain managemtn was provided and this will limit his tendency to abuse opiates  He was referred back to [facility] for housing  Family can help financially Patient's medical issues were treated and his liver inflammation greatly decreased.  He is referred to [facility] for medical care where he can follow-up with GI service as per Dr. Rodriguez  Adequate pain management was provided and this will limit his tendency to abuse opiates  He was referred back to his parents home where he will stay temporarily  Family can help financially

## 2017-03-10 NOTE — DISCHARGE NOTE BEHAVIORAL HEALTH - NSBHDCHOUSING_PSY_A_CORE
home/pt staying temporarily with family before going to [facility]. Referral made to [facility] as well.

## 2017-03-10 NOTE — DISCHARGE NOTE BEHAVIORAL HEALTH - NSBHDCTESTSFT_PSY_A_CORE
Lithium Level, Serum: 0.6 mmol/L (03.10.17 @ 06:14)  Surgical Pathology Final Report - [facility]:   LIVER, BIOPSY:  - MARKED PORTAL AND LOBULAR INFLAMMATION, GRADE 3,  CONSISTENT WITH CHRONIC ACTIVE VIRAL HEPATITIS  - A TRICHROME STAIN REVEAL STAGE IV FIBROSIS  - AN IRON STAIN IS NEGATIVE    Comment  There is marked portal and lobular inflammation.  There is  bridging fibrosis with nodule formation.  Hepatocytes exhibit  ballooning degeneration The findings are consistent with the  patient's history of viral hepatitis. Lithium Level, Serum: 0.6 mmol/L (03.10.17 @ 06:14)  Surgical Pathology Final Report - [facility]:   LIVER, BIOPSY:  - MARKED PORTAL AND LOBULAR INFLAMMATION, GRADE 3,  CONSISTENT WITH CHRONIC ACTIVE VIRAL HEPATITIS  - A TRICHROME STAIN REVEAL STAGE IV FIBROSIS  - AN IRON STAIN IS NEGATIVE    Comment  There is marked portal and lobular inflammation.  There is  bridging fibrosis with nodule formation.  Hepatocytes exhibit  ballooning degeneration The findings are consistent with the  patient's history of viral hepatitis.    Hepatic Function Panel in AM (03.11.17 @ 06:45)    Indirect Reacting Bilirubin: 0.2 mg/dL    Protein Total, Serum: 7.1 gm/dL    Albumin, Serum: 3.1 g/dL    Bilirubin Total, Serum: 1.4 mg/dL    Bilirubin Direct, Serum: 1.2 mg/dL    Alkaline Phosphatase, Serum: 133 U/L    Aspartate Aminotransferase (AST/SGOT): 52 U/L    Alanine Aminotransferase (ALT/SGPT): 141 U/L

## 2017-03-11 LAB
ALBUMIN SERPL ELPH-MCNC: 3.1 G/DL — LOW (ref 3.3–5)
ALP SERPL-CCNC: 133 U/L — HIGH (ref 40–120)
ALT FLD-CCNC: 141 U/L — HIGH (ref 12–78)
AST SERPL-CCNC: 52 U/L — HIGH (ref 15–37)
BILIRUB DIRECT SERPL-MCNC: 1.2 MG/DL — HIGH (ref 0–0.2)
BILIRUB INDIRECT FLD-MCNC: 0.2 MG/DL — SIGNIFICANT CHANGE UP (ref 0.2–1)
BILIRUB SERPL-MCNC: 1.4 MG/DL — HIGH (ref 0.2–1.2)
PROT SERPL-MCNC: 7.1 GM/DL — SIGNIFICANT CHANGE UP (ref 6–8.3)

## 2017-03-11 RX ADMIN — PANTOPRAZOLE SODIUM 40 MILLIGRAM(S): 20 TABLET, DELAYED RELEASE ORAL at 06:40

## 2017-03-11 RX ADMIN — GABAPENTIN 800 MILLIGRAM(S): 400 CAPSULE ORAL at 14:20

## 2017-03-11 RX ADMIN — Medication 2 MILLIGRAM(S): at 23:20

## 2017-03-11 RX ADMIN — METHADONE HYDROCHLORIDE 10 MILLIGRAM(S): 40 TABLET ORAL at 10:04

## 2017-03-11 RX ADMIN — LITHIUM CARBONATE 600 MILLIGRAM(S): 300 TABLET, EXTENDED RELEASE ORAL at 20:51

## 2017-03-11 RX ADMIN — Medication 2 MILLIGRAM(S): at 06:40

## 2017-03-11 RX ADMIN — Medication 1 PATCH: at 10:01

## 2017-03-11 RX ADMIN — METHADONE HYDROCHLORIDE 10 MILLIGRAM(S): 40 TABLET ORAL at 17:39

## 2017-03-11 RX ADMIN — Medication 81 MILLIGRAM(S): at 09:59

## 2017-03-11 RX ADMIN — GABAPENTIN 800 MILLIGRAM(S): 400 CAPSULE ORAL at 06:40

## 2017-03-11 RX ADMIN — GABAPENTIN 800 MILLIGRAM(S): 400 CAPSULE ORAL at 21:25

## 2017-03-11 RX ADMIN — PANTOPRAZOLE SODIUM 40 MILLIGRAM(S): 20 TABLET, DELAYED RELEASE ORAL at 09:59

## 2017-03-11 RX ADMIN — LITHIUM CARBONATE 600 MILLIGRAM(S): 300 TABLET, EXTENDED RELEASE ORAL at 09:59

## 2017-03-11 RX ADMIN — Medication 2 MILLIGRAM(S): at 16:06

## 2017-03-11 RX ADMIN — DULOXETINE HYDROCHLORIDE 60 MILLIGRAM(S): 30 CAPSULE, DELAYED RELEASE ORAL at 10:00

## 2017-03-11 NOTE — PROGRESS NOTE BEHAVIORAL HEALTH - NSBHFUPINTERVALCCFT_PSY_A_CORE
Covering MD note  Pt med seeking and overinclusive, with increased energy and irritability. He is able to maintain control however.

## 2017-03-12 RX ORDER — PSEUDOEPHEDRINE HCL 30 MG
30 TABLET ORAL
Qty: 0 | Refills: 0 | Status: DISCONTINUED | OUTPATIENT
Start: 2017-03-12 | End: 2017-03-13

## 2017-03-12 RX ORDER — SODIUM CHLORIDE 0.65 %
1 AEROSOL, SPRAY (ML) NASAL THREE TIMES A DAY
Qty: 0 | Refills: 0 | Status: DISCONTINUED | OUTPATIENT
Start: 2017-03-12 | End: 2017-03-13

## 2017-03-12 RX ADMIN — METHADONE HYDROCHLORIDE 10 MILLIGRAM(S): 40 TABLET ORAL at 09:37

## 2017-03-12 RX ADMIN — GABAPENTIN 800 MILLIGRAM(S): 400 CAPSULE ORAL at 13:10

## 2017-03-12 RX ADMIN — DULOXETINE HYDROCHLORIDE 60 MILLIGRAM(S): 30 CAPSULE, DELAYED RELEASE ORAL at 09:37

## 2017-03-12 RX ADMIN — Medication 81 MILLIGRAM(S): at 09:37

## 2017-03-12 RX ADMIN — Medication 2 MILLIGRAM(S): at 13:25

## 2017-03-12 RX ADMIN — GABAPENTIN 800 MILLIGRAM(S): 400 CAPSULE ORAL at 21:33

## 2017-03-12 RX ADMIN — LITHIUM CARBONATE 600 MILLIGRAM(S): 300 TABLET, EXTENDED RELEASE ORAL at 09:37

## 2017-03-12 RX ADMIN — Medication 1 PATCH: at 09:37

## 2017-03-12 RX ADMIN — LITHIUM CARBONATE 600 MILLIGRAM(S): 300 TABLET, EXTENDED RELEASE ORAL at 21:33

## 2017-03-12 RX ADMIN — METHADONE HYDROCHLORIDE 10 MILLIGRAM(S): 40 TABLET ORAL at 19:03

## 2017-03-12 RX ADMIN — PANTOPRAZOLE SODIUM 40 MILLIGRAM(S): 20 TABLET, DELAYED RELEASE ORAL at 05:20

## 2017-03-12 RX ADMIN — Medication 2 MILLIGRAM(S): at 05:20

## 2017-03-12 RX ADMIN — Medication 30 MILLIGRAM(S): at 19:04

## 2017-03-12 RX ADMIN — Medication 1 PATCH: at 09:43

## 2017-03-12 RX ADMIN — GABAPENTIN 800 MILLIGRAM(S): 400 CAPSULE ORAL at 05:20

## 2017-03-12 RX ADMIN — QUETIAPINE FUMARATE 300 MILLIGRAM(S): 200 TABLET, FILM COATED ORAL at 21:33

## 2017-03-12 RX ADMIN — Medication 1 SPRAY(S): at 19:04

## 2017-03-12 RX ADMIN — Medication 2 MILLIGRAM(S): at 21:36

## 2017-03-12 NOTE — PROGRESS NOTE BEHAVIORAL HEALTH - NSBHFUPINTERVALCCFT_PSY_A_CORE
Covering MD note  Pt slightly more in control today. Still with manic sx of increased energy and mood lability. Pt invested in his opiate treatment.

## 2017-03-13 VITALS
TEMPERATURE: 98 F | HEART RATE: 77 BPM | OXYGEN SATURATION: 100 % | SYSTOLIC BLOOD PRESSURE: 91 MMHG | RESPIRATION RATE: 12 BRPM | DIASTOLIC BLOOD PRESSURE: 66 MMHG

## 2017-03-13 RX ORDER — ALBUTEROL 90 UG/1
2 AEROSOL, METERED ORAL
Qty: 0 | Refills: 0 | COMMUNITY

## 2017-03-13 RX ORDER — GABAPENTIN 400 MG/1
2 CAPSULE ORAL
Qty: 84 | Refills: 0
Start: 2017-03-13 | End: 2017-03-27

## 2017-03-13 RX ORDER — METHADONE HYDROCHLORIDE 40 MG/1
1 TABLET ORAL
Qty: 0 | Refills: 0 | DISCHARGE
Start: 2017-03-13

## 2017-03-13 RX ORDER — LIDOCAINE 4 G/100G
1 CREAM TOPICAL
Qty: 0 | Refills: 0 | COMMUNITY
Start: 2017-03-13

## 2017-03-13 RX ORDER — ASPIRIN/CALCIUM CARB/MAGNESIUM 324 MG
1 TABLET ORAL
Qty: 14 | Refills: 0
Start: 2017-03-13 | End: 2017-03-27

## 2017-03-13 RX ORDER — METOPROLOL TARTRATE 50 MG
1 TABLET ORAL
Qty: 28 | Refills: 0
Start: 2017-03-13 | End: 2017-03-27

## 2017-03-13 RX ORDER — LITHIUM CARBONATE 300 MG/1
1 TABLET, EXTENDED RELEASE ORAL
Qty: 28 | Refills: 1
Start: 2017-03-13 | End: 2017-04-09

## 2017-03-13 RX ORDER — FLUTICASONE PROPIONATE AND SALMETEROL 50; 250 UG/1; UG/1
1 POWDER ORAL; RESPIRATORY (INHALATION)
Qty: 0 | Refills: 0 | COMMUNITY

## 2017-03-13 RX ORDER — METOPROLOL TARTRATE 50 MG
12.5 TABLET ORAL
Qty: 0 | Refills: 0 | COMMUNITY

## 2017-03-13 RX ORDER — LIDOCAINE 4 G/100G
1 CREAM TOPICAL
Qty: 1 | Refills: 0
Start: 2017-03-13 | End: 2017-03-27

## 2017-03-13 RX ORDER — QUETIAPINE FUMARATE 200 MG/1
1 TABLET, FILM COATED ORAL
Qty: 0 | Refills: 0 | DISCHARGE
Start: 2017-03-13

## 2017-03-13 RX ORDER — ASPIRIN/CALCIUM CARB/MAGNESIUM 324 MG
1 TABLET ORAL
Qty: 0 | Refills: 0 | COMMUNITY

## 2017-03-13 RX ORDER — QUETIAPINE FUMARATE 200 MG/1
1 TABLET, FILM COATED ORAL
Qty: 14 | Refills: 1
Start: 2017-03-13 | End: 2017-04-09

## 2017-03-13 RX ORDER — PANTOPRAZOLE SODIUM 20 MG/1
1 TABLET, DELAYED RELEASE ORAL
Qty: 28 | Refills: 0
Start: 2017-03-13 | End: 2017-03-27

## 2017-03-13 RX ORDER — PANTOPRAZOLE SODIUM 20 MG/1
1 TABLET, DELAYED RELEASE ORAL
Qty: 28 | Refills: 0 | DISCHARGE
Start: 2017-03-13 | End: 2017-03-27

## 2017-03-13 RX ORDER — FLUTICASONE PROPIONATE AND SALMETEROL 50; 250 UG/1; UG/1
1 POWDER ORAL; RESPIRATORY (INHALATION)
Qty: 1 | Refills: 0
Start: 2017-03-13 | End: 2017-03-27

## 2017-03-13 RX ORDER — LIDOCAINE 4 G/100G
1 CREAM TOPICAL
Qty: 0 | Refills: 0 | DISCHARGE
Start: 2017-03-13

## 2017-03-13 RX ORDER — LITHIUM CARBONATE 300 MG/1
1 TABLET, EXTENDED RELEASE ORAL
Qty: 0 | Refills: 0 | DISCHARGE
Start: 2017-03-13

## 2017-03-13 RX ORDER — ALBUTEROL 90 UG/1
2 AEROSOL, METERED ORAL
Qty: 1 | Refills: 0
Start: 2017-03-13

## 2017-03-13 RX ORDER — METHADONE HYDROCHLORIDE 40 MG/1
1 TABLET ORAL
Qty: 28 | Refills: 0
Start: 2017-03-13 | End: 2017-03-27

## 2017-03-13 RX ORDER — DULOXETINE HYDROCHLORIDE 30 MG/1
1 CAPSULE, DELAYED RELEASE ORAL
Qty: 14 | Refills: 0
Start: 2017-03-13 | End: 2017-03-27

## 2017-03-13 RX ADMIN — METHADONE HYDROCHLORIDE 10 MILLIGRAM(S): 40 TABLET ORAL at 10:22

## 2017-03-13 RX ADMIN — Medication 1 PATCH: at 10:23

## 2017-03-13 RX ADMIN — GABAPENTIN 800 MILLIGRAM(S): 400 CAPSULE ORAL at 10:37

## 2017-03-13 RX ADMIN — DULOXETINE HYDROCHLORIDE 60 MILLIGRAM(S): 30 CAPSULE, DELAYED RELEASE ORAL at 10:37

## 2017-03-13 RX ADMIN — LIDOCAINE 1 PATCH: 4 CREAM TOPICAL at 10:37

## 2017-03-13 RX ADMIN — PANTOPRAZOLE SODIUM 40 MILLIGRAM(S): 20 TABLET, DELAYED RELEASE ORAL at 10:23

## 2017-03-13 RX ADMIN — Medication 81 MILLIGRAM(S): at 10:37

## 2017-03-13 RX ADMIN — LITHIUM CARBONATE 600 MILLIGRAM(S): 300 TABLET, EXTENDED RELEASE ORAL at 10:25

## 2017-03-13 NOTE — PROGRESS NOTE BEHAVIORAL HEALTH - PROBLEM SELECTOR PLAN 3
Pain management- MD reconsulted today
stable.  c/w LABA/ICS + SUSANNAH neb PRN.
Pain management
Pain management
Pain management- MD reconsulted today
Pain management- MD reconsulted today
METHADONE FOR PAIN.
Pain management
Pain management- MD reconsulted today
Pain management
stable.  c/w LABA/ICS + SUSANNAH neb PRN.
Pain management

## 2017-03-13 NOTE — PROGRESS NOTE BEHAVIORAL HEALTH - NS ED BHA MSE GENERAL APPEARANCE
Well developed

## 2017-03-13 NOTE — PROGRESS NOTE BEHAVIORAL HEALTH - SUMMARY
47 yo man with hx of Bipolar Disorder and chronic pain with repeated medical evaluations for nausea and vomiting which may be due to Hepatitis.  He presents now as depressed and paranoid, hopelessness about current medical issues.  Paranoia persists
45 yo man with hx of Bipolar Disorder and chronic pain with repeated medical evaluations for nausea and vomiting which may be due to Hepatitis.  He presents now as depressed and paranoid, hopelessness about current medical issues.  Paranoia persists
46 yr old with legal issues and substance use of cocaine , opiate pain meds .  Pt is homeless was discharged last week after being readmitted x2 Claims that he is depressed "I cant keep anything down" Pt with complaint of feeling hopeless .  Pt however was able to eat and was resting in bed, appeared calm.
47 yo man with hx of Bipolar Disorder and chronic pain with repeated medical evaluations for nausea and vomiting which may be due to Hepatitis.  He presents now as depressed and paranoid, hopelessness about current medical issues.  Paranoia persists  Seroquel increased to 300 mg and lithium at 300 mg BID
47 yo man with hx of Bipolar Disorder and chronic pain with repeated medical evaluations for nausea and vomiting which may be due to Hepatitis.  He presents now as depressed and paranoid, hopelessness about current medical issues.  Paranoia persists  Seroquel increased to 300 mg and lithium at 300 mg BID
47 yo man with hx of Bipolar Disorder and chronic pain with repeated medical evaluations for nausea and vomiting which may be due to Hepatitis.  He presents now as depressed and paranoid, hopelessness about current medical issues.  Paranoia resolving
45 yo man with hx of Bipolar Disorder and chronic pain with repeated medical evaluations for nausea and vomiting which may be due to Hepatitis.  He presents now as depressed and paranoid, hopelessness about current medical issues.  Paranoia resolved.
47 yo man with hx of Bipolar Disorder and chronic pain with repeated medical evaluations for nausea and vomiting which may be due to Hepatitis.  He presents now as depressed and paranoid, hopelessness about current medical issues.
47 yo man with hx of Bipolar Disorder and chronic pain with repeated medical evaluations for nausea and vomiting which may be due to Hepatitis.  He presents now as depressed and paranoid, hopelessness about current medical issues.  Paranoia persists
47 yo man with hx of Bipolar Disorder and chronic pain with repeated medical evaluations for nausea and vomiting which may be due to Hepatitis.  He presents now as depressed and paranoid, hopelessness about current medical issues.  Paranoia resolved.
47 yo man with hx of Bipolar Disorder and chronic pain with repeated medical evaluations for nausea and vomiting which may be due to Hepatitis.  He presents now as depressed and paranoid, hopelessness about current medical issues.
47 yo man with hx of Bipolar Disorder and chronic pain with repeated medical evaluations for nausea and vomiting which may be due to Hepatitis.  He presents now as depressed and paranoid, hopelessness about current medical issues.  Paranoia persists
45 yo man with hx of Bipolar Disorder and chronic indra with repeated medical evaluations for nausea and vomiting which may be due to Hepatitis.  He presents now as depressed and paranoid, hopelessness about current medical issues.
47 yo man with hx of Bipolar Disorder and chronic pain with repeated medical evaluations for nausea and vomiting which may be due to Hepatitis.  He presents now as depressed and paranoid, hopelessness about current medical issues.
47 yo man with hx of Bipolar Disorder and chronic pain with repeated medical evaluations for nausea and vomiting which may be due to Hepatitis.  He presents now as depressed and paranoid, hopelessness about current medical issues.  Paranoia persists  Seroquel increased to 300 mg and lithium at 300 mg BID
46 yr old with legal issues and substance use of cocaine , opiate pain meds .  Pt is homeless was discharged last week after being readmitted x2 Claims that he is depressed "I cant keep anything down" Pt with complaint of feeling hopeless .  Pt however was able to eat and was resting in bed, appeared calm.
45 yo man with hx of Bipolar Disorder and chronic indra with repeated medical evaluations for nausea and vomiting which may be due to Hepatitis.  He presents now as depressed and paranoid, hopelessness about current medical issues.

## 2017-03-13 NOTE — PROGRESS NOTE BEHAVIORAL HEALTH - ESTIMATED INTELLIGENCE
Average

## 2017-03-13 NOTE — PROGRESS NOTE BEHAVIORAL HEALTH - NS ED BHA AXIS I SECONDARY1 CODE FT
F11.20

## 2017-03-13 NOTE — PROGRESS NOTE BEHAVIORAL HEALTH - NS ED BHA AXIS I PRIMARY CODE FT
F31.4

## 2017-03-13 NOTE — PROGRESS NOTE BEHAVIORAL HEALTH - NS ED BHA MSE SPEECH VOLUME
Normal
Soft

## 2017-03-13 NOTE — PROGRESS NOTE BEHAVIORAL HEALTH - PROBLEM SELECTOR PROBLEM 1
Bipolar affective disorder, depressed, severe
Hematemesis with nausea
Bipolar affective disorder, depressed, severe
Hematemesis with nausea
Bipolar affective disorder, depressed, severe

## 2017-03-13 NOTE — PROGRESS NOTE BEHAVIORAL HEALTH - ABNORMAL MOVEMENTS
No abnormal movements

## 2017-03-13 NOTE — PROGRESS NOTE BEHAVIORAL HEALTH - NSBHATTESTSEENBY_PSY_A_CORE
attending Psychiatrist without NP/Trainee

## 2017-03-13 NOTE — PROGRESS NOTE BEHAVIORAL HEALTH - AFFECT RANGE
Constricted
Blunted
Constricted
Blunted

## 2017-03-13 NOTE — PROGRESS NOTE BEHAVIORAL HEALTH - THOUGHT PROCESS
Linear

## 2017-03-13 NOTE — PROGRESS NOTE BEHAVIORAL HEALTH - NSBHADMITIPOBSFT_PSY_A_CORE
no acute risk to self ot others
pt with no distress , denies any suicidal ideation
no acute risk to self ot others
pt with no distress , denies any suicidal ideation
no acute risk to self ot others

## 2017-03-13 NOTE — PROGRESS NOTE BEHAVIORAL HEALTH - ESTIMATED DISCHARGE DATE
01-Mar-2017
15-Mar-2017
01-Mar-2017
13-Mar-2017
15-Mar-2017
01-Mar-2017
01-Mar-2017
13-Mar-2017
28-Feb-2017
15-Mar-2017
01-Mar-2017
01-Mar-2017
28-Feb-2017
01-Mar-2017

## 2017-03-13 NOTE — PROGRESS NOTE BEHAVIORAL HEALTH - LANGUAGE
No abnormalities noted

## 2017-03-13 NOTE — PROGRESS NOTE BEHAVIORAL HEALTH - NSBHFUPVIOL_PSY_A_CORE
none known

## 2017-03-13 NOTE — PROGRESS NOTE BEHAVIORAL HEALTH - NSBHPTASSESSDT_PSY_A_CORE
13-Mar-2017 09:55
01-Mar-2017 11:43
02-Mar-2017 13:13
03-Mar-2017 14:10
06-Mar-2017 14:01
07-Mar-2017 11:09
10-Mar-2017 13:32
11-Mar-2017 14:49
12-Mar-2017 13:42
24-Feb-2017 14:46
26-Feb-2017 19:34
28-Feb-2017 10:18
23-Feb-2017 11:53
27-Feb-2017 11:45
25-Feb-2017 13:16
08-Mar-2017
04-Mar-2017
05-Mar-2017
09-Mar-2017 10:19

## 2017-03-13 NOTE — PROGRESS NOTE BEHAVIORAL HEALTH - NS ED BHA MED ROS GENITOURINARY
No complaints
Yes

## 2017-03-13 NOTE — PROGRESS NOTE BEHAVIORAL HEALTH - NSBHADMITIPOBS_PSY_A_CORE
Routine observation

## 2017-03-13 NOTE — PROGRESS NOTE BEHAVIORAL HEALTH - NSBHFUPTYPE_PSY_A_CORE
Inpatient
Inpatient-On Service Note
Inpatient

## 2017-03-13 NOTE — PROGRESS NOTE BEHAVIORAL HEALTH - PROBLEM SELECTOR PROBLEM 2
Hepatitis
Opioid dependence, continuous
Hepatitis
Opioid dependence, continuous

## 2017-03-13 NOTE — PROGRESS NOTE BEHAVIORAL HEALTH - NSBHLEGALSTATUS_PSY_A_CORE
9.13 (Voluntary)

## 2017-03-13 NOTE — PROGRESS NOTE BEHAVIORAL HEALTH - PROBLEM SELECTOR PLAN 2
(+) HBV + HCV.  order genotypes.  according to hx, has deferred treatment in the past.  repeat CMP for AM.  recommend GI f/u outpatient.
Consider detox/rehab
Will be maintained on methadone
Consider detox/rehab
Consider detox/rehab
Will be maintained on methadone
Consider detox/rehab
(+) HBV + HCV.  order genotypes.  according to hx, has deferred treatment in the past.  repeat CMP for AM.  recommend GI f/u outpatient.
Consider detox/rehab

## 2017-03-13 NOTE — PROGRESS NOTE BEHAVIORAL HEALTH - PERCEPTIONS
No abnormalities

## 2017-03-13 NOTE — PROGRESS NOTE BEHAVIORAL HEALTH - MUSCLE TONE / STRENGTH
Normal muscle tone/strength

## 2017-03-13 NOTE — PROGRESS NOTE BEHAVIORAL HEALTH - NSBHADMITIPREASON_PSY_A_CORE
Danger to self; mental illness expected to respond to inpatient care

## 2017-03-13 NOTE — PROGRESS NOTE BEHAVIORAL HEALTH - NSBHADMITIPDSM_PSY_A_CORE
see above for Axis I, II, III

## 2017-03-13 NOTE — PROGRESS NOTE BEHAVIORAL HEALTH - AFFECT CONGRUENCE
Congruent

## 2017-03-13 NOTE — PROGRESS NOTE BEHAVIORAL HEALTH - RISK ASSESSMENT
homeless  chronic pain  mood disorder/psychosis  past suicide attempts    Will increase Seroquel for paranoia.  Lithium added for mood stabilization  Pain management consulted
homeless  chronic pain  mood disorder/psychosis  past suicide attempts    Will increase Seroquel for paranoia.  Lithioum added for mood stabilization  Will see hospitalist for pain management and ongoing assessment of nausea and vomiting
homeless  chronic pain  mood disorder/psychosis  past suicide attempts    Will increase Seroquel for paranoia.  Lithioum added for mood stabilization  Will see hospitalist for pain management and ongoing assessment of nausea and vomiting
homeless  chronic pain  mood disorder/psychosis  past suicide attempts    Will increase Seroquel for paranoia.  Lithium added for mood stabilization  Pain management consulted
homeless  chronic pain  mood disorder/psychosis  past suicide attempts    Will increase Seroquel for paranoia.  Lithium added for mood stabilization  Pain management consulted
homeless  chronic pain  mood disorder/psychosis  past suicide attempts    Paranoia resolved  mood stabilizing with lithium
homeless  chronic pain  mood disorder/psychosis  past suicide attempts    Will increase Seroquel for paranoia.  Lithioum added for mood stabilization  Will see hospitalist for pain management and ongoing assessment of nausea and vomiting
homeless  chronic pain  mood disorder/psychosis  past suicide attempts    Will treat mood disorder with Depakote and increase Seroquel for paranoia  Will see hospitalist for pain management and ongoing assessment of nausea and vomiting
homeless  chronic pain  mood disorder/psychosis  past suicide attempts    Will increase Seroquel for paranoia.  Lithioum added for mood stabilization  Will see hospitalist for pain management and ongoing assessment of nausea and vomiting
homeless  chronic pain  mood disorder/psychosis  past suicide attempts    Will treat mood disorder with Depakote and increase Seroquel for paranoia  Will see hospitalist for pain management and ongoing assessment of nausea and vomiting
homeless  chronic pain  mood disorder/psychosis  past suicide attempts    Will increase Seroquel for paranoia.  Lithioum added for mood stabilization  Will see hospitalist for pain management and ongoing assessment of nausea and vomiting
1. Depression and suicidal ideation claims to feel depressed ,claimed vague suidie thought but denies plan   2  substance and or alcohol use  cocaine, opiate based medication  3  Barriers to access of mental health treatment  claims financial problems  4  loss of relationship, social support, or financial support  death of grandmother, live by self  6. Physical illness    has Hep b and c , LFT  7 past sucide attempt claimed past suicde attempt with overdose, cutting and hanging
homeless  chronic pain  mood disorder/psychois  past suicide attempts    Will treat mood disorder with Depakote and increase Seroquel for paranoia  Will see hospitalist for pain management and ongoing assessment of nausea and vomiting
1. Depression and suicidal ideation claims to feel depressed ,claimed vague suidie thought but denies plan   2  substance and or alcohol use  cocaine, opiate based medication  3  Barriers to access of mental health treatment  claims financial problems  4  loss of relationship, social support, or financial support  death of grandmother, live by self  6. Physical illness    has Hep b and c , LFT  7 past sucide attempt claimed past suicde attempt with overdose, cutting and hanging

## 2017-03-13 NOTE — PROGRESS NOTE BEHAVIORAL HEALTH - NSBHFUPINTERVALCCFT_PSY_A_CORE
Did not sleep well over the weekend because stopped Seroquel- took it last night with good effect.  Reporting increased pain and asked for more methadone but was dissuaded due to sedation

## 2017-03-13 NOTE — PROGRESS NOTE BEHAVIORAL HEALTH - NS ED BHA MSE SPEECH SPONTANEITY
Normal

## 2017-03-13 NOTE — PROGRESS NOTE BEHAVIORAL HEALTH - NSBHADMITDANGERSELF_PSY_A_CORE
suicidal ideation with plan and means
unable to care for self/claims to be depressed in mood and suicidal
unable to care for self/claims to be depressed in mood and suicidal
suicidal ideation with plan and means

## 2017-03-13 NOTE — PROGRESS NOTE BEHAVIORAL HEALTH - GAIT / STATION
Normal gait / station

## 2017-03-13 NOTE — PROGRESS NOTE BEHAVIORAL HEALTH - NSBHFUPMEDSE_PSY_A_CORE
None known
Yes
Yes
None known

## 2017-03-13 NOTE — PROGRESS NOTE BEHAVIORAL HEALTH - PROBLEM SELECTOR PROBLEM 4
Chronic pain syndrome
Hepatitis
Chronic pain syndrome
Hepatitis

## 2017-03-13 NOTE — PROGRESS NOTE BEHAVIORAL HEALTH - PRIMARY DX
Bipolar affective disorder, depressed, severe

## 2017-03-13 NOTE — PROGRESS NOTE BEHAVIORAL HEALTH - NS ED BHA MED ROS SKIN
No complaints

## 2017-03-13 NOTE — PROGRESS NOTE BEHAVIORAL HEALTH - PROBLEM SELECTOR PLAN 4
Liver biopsy done by interventional radiology.  Awaiting results.  Liver enzymes improving
MS contin 15mg po q12h + hydromorphone 2mg po q12h PRN breakthrough pain.  main management f/u outpatient.
Liver biopsy done by interventional radiology.  Awaiting final results.  Liver enzymes improving
Liver biopsy done by interventional radiology.  Awaiting results.  Check daily LFTs. INR  Check ammonia level.
Liver biopsy done by interventional radiology.  Awaiting results.  Check daily LFTs. INR  Check ammonia level.
Liver biopsy done by interventional radiology.  Awaiting results.  Liver enzymes improving
Discussed with previous MD Dr. Rodriguez   MRCP done- will likely need cholecystectomy
Discussed with previous MD Dr. Rodriguez   MRCP done.  Liver biopsy to be done by interventional radiology.
Liver biopsy done by interventional radiology.   Liver enzymes much improved
Liver biopsy done by interventional radiology.  Awaiting results.  Liver enzymes improving
Discussed with previous MD Dr. Rodriguez   MRCP done- will likely need cholecystectomy
Liver biopsy done by interventional radiology.  Awaiting results.  Check daily LFTs. INR  Check ammonia level.
MS contin 15mg po q12h + hydromorphone 2mg po q12h PRN breakthrough pain.  main management f/u outpatient.
Discussed with previous MD Dr. Rodriguez   Recommended MRCP and stopping hepatotoxic agents Depakote stopped.
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
## 2017-03-13 NOTE — PROGRESS NOTE BEHAVIORAL HEALTH - NSBHCHARTREVIEWVS_PSY_A_CORE FT
Vital Signs Last 24 Hrs  T(C): 36.3, Max: 36.7 (03-02 @ 15:30)  T(F): 97.3, Max: 98 (03-02 @ 15:30)  HR: 76 (76 - 90)  BP: 134/77 (91/51 - 134/77)  BP(mean): --  RR: 14 (11 - 16)  SpO2: 100% (88% - 100%)
Vital Signs Last 24 Hrs  T(C): 36.3, Max: 36.7 (03-02 @ 15:30)  T(F): 97.3, Max: 98 (03-02 @ 15:30)  HR: 76 (76 - 90)  BP: 134/77 (91/51 - 134/77)  BP(mean): --  RR: 14 (11 - 16)  SpO2: 100% (88% - 100%)
Vital Signs Last 24 Hrs  T(C): 36.6, Max: 37.3 (03-08 @ 20:09)  T(F): 97.9, Max: 99.2 (03-08 @ 20:09)  HR: 76 (76 - 92)  BP: 126/73 (120/62 - 126/73)  BP(mean): --  RR: 16 (16 - 16)  SpO2: 100% (100% - 100%)
Vital Signs Last 24 Hrs  T(C): 36.9, Max: 37.1 (03-12 @ 20:38)  T(F): 98.5, Max: 98.8 (03-12 @ 20:38)  HR: 77 (73 - 77)  BP: 91/66 (91/66 - 129/82)  BP(mean): --  RR: 12 (12 - 16)  SpO2: 100% (99% - 100%)
Vital Signs Last 24 Hrs  T(C): 36.9, Max: 37.3 (03-06 @ 20:55)  T(F): 98.5, Max: 99.2 (03-06 @ 20:55)  HR: 99 (88 - 99)  BP: 109/63 (109/63 - 113/72)  BP(mean): --  RR: 16 (16 - 16)  SpO2: 100% (96% - 100%)
Vital Signs Last 24 Hrs  T(C): 36.1, Max: 36.7 (03-01 @ 20:08)  T(F): 97, Max: 98.1 (03-01 @ 20:08)  HR: 81 (81 - 87)  BP: 122/71 (122/71 - 141/83)  BP(mean): --  RR: 12 (12 - 16)  SpO2: 96% (96% - 98%)
Vital Signs Last 24 Hrs  T(C): 36.3, Max: 36.7 (03-02 @ 15:30)  T(F): 97.3, Max: 98 (03-02 @ 15:30)  HR: 76 (76 - 90)  BP: 134/77 (91/51 - 134/77)  BP(mean): --  RR: 14 (11 - 16)  SpO2: 100% (88% - 100%)
Vital Signs Last 24 Hrs  T(C): 36.7, Max: 36.8 (03-05 @ 20:29)  T(F): 98, Max: 98.3 (03-05 @ 20:29)  HR: 75 (75 - 88)  BP: 117/54 (107/55 - 117/54)  BP(mean): --  RR: 16 (16 - 19)  SpO2: 98% (98% - 98%)
Vital Signs Last 24 Hrs  T(C): 36.8, Max: 37.3 (03-10 @ 20:17)  T(F): 98.2, Max: 99.2 (03-10 @ 20:17)  HR: 73 (73 - 84)  BP: 145/80 (117/67 - 145/80)  BP(mean): --  RR: 14 (14 - 17)  SpO2: 98% (95% - 98%)
Vital Signs Last 24 Hrs  T(C): 36.8, Max: 37.4 (03-11 @ 20:53)  T(F): 98.2, Max: 99.4 (03-11 @ 20:53)  HR: 76 (73 - 76)  BP: 139/88 (139/69 - 139/88)  BP(mean): --  RR: 16 (15 - 16)  SpO2: 100% (98% - 100%)
Vital Signs Last 24 Hrs  T(C): 36.9, Max: 37.3 (03-06 @ 20:55)  T(F): 98.5, Max: 99.2 (03-06 @ 20:55)  HR: 99 (88 - 99)  BP: 109/63 (109/63 - 113/72)  BP(mean): --  RR: 16 (16 - 16)  SpO2: 100% (96% - 100%)
Vital Signs Last 24 Hrs  T(C): 36.9, Max: 37.6 (03-09 @ 20:31)  T(F): 98.5, Max: 99.7 (03-09 @ 20:31)  HR: 75 (75 - 89)  BP: 103/61 (103/61 - 126/68)  BP(mean): 83 (83 - 83)  RR: 18 (18 - 18)  SpO2: 97% (97% - 97%)
Vital Signs Last 24 Hrs  T(C): 37.1, Max: 37.1 (02-28 @ 19:36)  T(F): 98.8, Max: 98.8 (02-28 @ 19:36)  HR: 95 (83 - 95)  BP: 114/75 (114/75 - 128/70)  BP(mean): --  RR: 16 (16 - 16)  SpO2: 99% (97% - 99%)
ICU Vital Signs Last 24 Hrs  T(C): 36.9, Max: 36.9 (02-24 @ 07:23)  T(F): 98.5, Max: 98.5 (02-24 @ 07:23)  HR: 93 (93 - 93)  BP: 113/76 (113/76 - 113/76)  BP(mean): --  ABP: --  ABP(mean): --  RR: 16 (16 - 16)  SpO2: 99% (99% - 99%)
ICU Vital Signs Last 24 Hrs  T(C): 37.2, Max: 37.7 (02-26 @ 21:21)  T(F): 99, Max: 99.8 (02-26 @ 21:21)  HR: 88 (88 - 95)  BP: 120/73 (120/73 - 121/69)  BP(mean): --  ABP: --  ABP(mean): --  RR: 14 (14 - 17)  SpO2: 97% (97% - 99%)
Vital Signs Last 24 Hrs  T(C): 36.5, Max: 36.5 (02-25 @ 07:32)  T(F): 97.7, Max: 97.7 (02-25 @ 07:32)  HR: 81 (81 - 81)  BP: 108/66 (108/66 - 108/66)  BP(mean): --  RR: 17 (17 - 17)  SpO2: 99% (99% - 99%)
Vital Signs Last 24 Hrs  T(C): 36.6, Max: 36.7 (02-27 @ 19:36)  T(F): 97.9, Max: 98 (02-27 @ 19:36)  HR: 91 (91 - 94)  BP: 121/75 (121/75 - 126/66)  BP(mean): --  RR: 14 (14 - 16)  SpO2: 98% (94% - 98%)
Vital Signs Last 24 Hrs  T(C): 36.5, Max: 36.5 (02-25 @ 07:32)  T(F): 97.7, Max: 97.7 (02-25 @ 07:32)  HR: 81 (81 - 81)  BP: 108/66 (108/66 - 108/66)  BP(mean): --  RR: 17 (17 - 17)  SpO2: 99% (99% - 99%)
Vital Signs Last 24 Hrs  T(C): 36.7, Max: 37.2 (02-22 @ 17:01)  T(F): 98, Max: 98.9 (02-22 @ 17:01)  HR: 104 (71 - 104)  BP: 104/60 (104/60 - 129/68)  BP(mean): --  RR: 15 (15 - 17)  SpO2: 98% (98% - 98%)

## 2017-03-13 NOTE — PROGRESS NOTE BEHAVIORAL HEALTH - NS ED B[facility] MED ROS PSYCHIATRIC
See HPI

## 2017-03-13 NOTE — PROGRESS NOTE BEHAVIORAL HEALTH - MOOD
Depressed

## 2017-03-13 NOTE — PROGRESS NOTE BEHAVIORAL HEALTH - NSBHCHARTREVIEWLAB_PSY_A_CORE FT
Hepatic Function Panel in AM (03.03.17 @ 07:55)    Indirect Reacting Bilirubin: 0.8 mg/dL    Protein Total, Serum: 6.8 gm/dL    Albumin, Serum: 2.7 g/dL    Bilirubin Total, Serum: 3.7 mg/dL    Bilirubin Direct, Serum: 2.9 mg/dL    Alkaline Phosphatase, Serum: 189 U/L    Aspartate Aminotransferase (AST/SGOT): 687 U/L    Alanine Aminotransferase (ALT/SGPT): 756 U/L    Prothrombin Time and INR, Plasma in AM (03.03.17 @ 07:55)    Prothrombin Time, Plasma: 14.1 sec    INR: 1.28 ratio
Hepatic Function Panel in AM (03.03.17 @ 07:55)    Indirect Reacting Bilirubin: 0.8 mg/dL    Protein Total, Serum: 6.8 gm/dL    Albumin, Serum: 2.7 g/dL    Bilirubin Total, Serum: 3.7 mg/dL    Bilirubin Direct, Serum: 2.9 mg/dL    Alkaline Phosphatase, Serum: 189 U/L    Aspartate Aminotransferase (AST/SGOT): 687 U/L    Alanine Aminotransferase (ALT/SGPT): 756 U/L    Prothrombin Time and INR, Plasma in AM (03.03.17 @ 07:55)    Prothrombin Time, Plasma: 14.1 sec    INR: 1.28 ratio
Hepatic Function Panel in AM (03.08.17 @ 07:51)    Indirect Reacting Bilirubin: 0.3 mg/dL    Protein Total, Serum: 6.4 gm/dL    Albumin, Serum: 2.8 g/dL    Bilirubin Total, Serum: 1.6 mg/dL    Bilirubin Direct, Serum: 1.3 mg/dL    Alkaline Phosphatase, Serum: 144 U/L    Aspartate Aminotransferase (AST/SGOT): 104 U/L    Alanine Aminotransferase (ALT/SGPT): 240 U/L
Hepatic Function Panel in AM (03.11.17 @ 06:45)    Indirect Reacting Bilirubin: 0.2 mg/dL    Protein Total, Serum: 7.1 gm/dL    Albumin, Serum: 3.1 g/dL    Bilirubin Total, Serum: 1.4 mg/dL    Bilirubin Direct, Serum: 1.2 mg/dL    Alkaline Phosphatase, Serum: 133 U/L    Aspartate Aminotransferase (AST/SGOT): 52 U/L    Alanine Aminotransferase (ALT/SGPT): 141 U/L
Ammonia, Serum (03.06.17 @ 08:02)    Ammonia, Serum: 59 umol/L    Hepatic Function Panel in AM (03.06.17 @ 08:02)    Indirect Reacting Bilirubin: 0.5 mg/dL    Protein Total, Serum: 6.9 gm/dL    Albumin, Serum: 2.9 g/dL    Bilirubin Total, Serum: 2.2 mg/dL    Bilirubin Direct, Serum: 1.7 mg/dL    Alkaline Phosphatase, Serum: 166 U/L    Aspartate Aminotransferase (AST/SGOT): 205 U/L    Alanine Aminotransferase (ALT/SGPT): 398 U/L    Li 0.4
Hepatic Function Panel in AM (03.03.17 @ 07:55)    Indirect Reacting Bilirubin: 0.8 mg/dL    Protein Total, Serum: 6.8 gm/dL    Albumin, Serum: 2.7 g/dL    Bilirubin Total, Serum: 3.7 mg/dL    Bilirubin Direct, Serum: 2.9 mg/dL    Alkaline Phosphatase, Serum: 189 U/L    Aspartate Aminotransferase (AST/SGOT): 687 U/L    Alanine Aminotransferase (ALT/SGPT): 756 U/L    Prothrombin Time and INR, Plasma in AM (03.03.17 @ 07:55)    Prothrombin Time, Plasma: 14.1 sec    INR: 1.28 ratio
Li  0.6
Prothrombin Time and INR, Plasma in AM (03.01.17 @ 07:15)    Prothrombin Time, Plasma: 14.3 sec    INR: 1.30 ratio    Hepatic Function Panel in AM (03.01.17 @ 07:15)    Indirect Reacting Bilirubin: 0.8 mg/dL    Protein Total, Serum: 5.9 gm/dL    Albumin, Serum: 2.4 g/dL    Bilirubin Total, Serum: 3.9 mg/dL    Bilirubin Direct, Serum: 3.1 mg/dL    Alkaline Phosphatase, Serum: 179 U/L    Aspartate Aminotransferase (AST/SGOT): 848 U/L    Alanine Aminotransferase (ALT/SGPT): 821 U/L
Prothrombin Time and INR, Plasma in AM (03.02.17 @ 06:48)    Prothrombin Time, Plasma: 14.5 sec    INR: 1.31 ratio      Hepatic Function Panel in AM (03.02.17 @ 06:48)    Indirect Reacting Bilirubin: 0.7 mg/dL    Protein Total, Serum: 6.1 gm/dL    Albumin, Serum: 2.5 g/dL    Bilirubin Total, Serum: 3.5 mg/dL    Bilirubin Direct, Serum: 2.8 mg/dL    Alkaline Phosphatase, Serum: 182 U/L    Aspartate Aminotransferase (AST/SGOT): 758 U/L    Alanine Aminotransferase (ALT/SGPT): 766 U/L
Comprehensive Metabolic Panel in AM (02.27.17 @ 07:21)    Sodium, Serum: 146 mmol/L    Potassium, Serum: 3.7 mmol/L    Chloride, Serum: 109 mmol/L    Carbon Dioxide, Serum: 28 mmol/L    Anion Gap, Serum: 9 mmol/L    Blood Urea Nitrogen, Serum: 9 mg/dL    Creatinine, Serum: 0.63 mg/dL    Glucose, Serum: 157 mg/dL    Calcium, Total Serum: 8.6 mg/dL    Protein Total, Serum: 5.8 gm/dL    Albumin, Serum: 2.5 g/dL    Bilirubin Total, Serum: 4.1 mg/dL    Alkaline Phosphatase, Serum: 198 U/L    Aspartate Aminotransferase (AST/SGOT): 899 U/L    Alanine Aminotransferase (ALT/SGPT): 902 U/L    eGFR if Non : 119: Interpretative comment    Complete Blood Count (02.27.17 @ 07:21)    WBC Count: 8.4 K/uL    RBC Count: 3.29 M/uL    Hemoglobin: 10.1 g/dL    Hematocrit: 31.2 %    Mean Cell Volume: 94.8 fl    Mean Cell Hemoglobin: 30.5 pg    Mean Cell Hemoglobin Conc: 32.2 gm/dL    Red Cell Distrib Width: 18.5 %    Platelet Count - Automated: 113 K/uL    Valproic Acid Level, Serum (02.27.17 @ 07:21)    Valproic Acid Level, Serum: 63 ug/mL
LIVER FUNCTIONS - ( 24 Feb 2017 06:47 )  Alb: 2.9 g/dL / Pro: 6.7 gm/dL / ALK PHOS: 246 U/L / ALT: 850 U/L / AST: 715 U/L / GGT: x           24 Feb 2017 06:47    147    |  116    |  10     ----------------------------<  86     4.3     |  23     |  0.72     Ca    8.7        24 Feb 2017 06:47    TPro  6.7    /  Alb  2.9    /  TBili  4.2    /  DBili  x      /  AST  715    /  ALT  850    /  AlkPhos  246    24 Feb 2017 06:47
LIVER FUNCTIONS - ( 24 Feb 2017 06:47 )  Alb: 2.9 g/dL / Pro: 6.7 gm/dL / ALK PHOS: 246 U/L / ALT: 850 U/L / AST: 715 U/L / GGT: x           24 Feb 2017 06:47    147    |  116    |  10     ----------------------------<  86     4.3     |  23     |  0.72     Ca    8.7        24 Feb 2017 06:47    TPro  6.7    /  Alb  2.9    /  TBili  4.2    /  DBili  x      /  AST  715    /  ALT  850    /  AlkPhos  246    24 Feb 2017 06:47
Valproic Acid Level, Serum: 58 ug/mL (02.23.17 @ 07:14)  Opiate, Urine: Positive (02.22.17 @ 03:29)  Urine Microscopic-Add On (NC) (02.22.17 @ 03:29)    Bacteria: Few    White Blood Cell - Urine: 6-10    Red Blood Cell - Urine: 0-5 /HPF    Epithelial Cells: Few  Cocaine Metabolite, Urine: Positive (02.22.17 @ 03:29)  Comprehensive Metabolic Panel (02.21.17 @ 20:44)    Sodium, Serum: 146 mmol/L    Potassium, Serum: 3.5 mmol/L    Chloride, Serum: 110 mmol/L    Carbon Dioxide, Serum: 27 mmol/L    Anion Gap, Serum: 9 mmol/L    Blood Urea Nitrogen, Serum: 4 mg/dL    Creatinine, Serum: 0.66 mg/dL    Glucose, Serum: 86 mg/dL    Calcium, Total Serum: 8.4 mg/dL    Protein Total, Serum: 6.5 gm/dL    Albumin, Serum: 3.1 g/dL    Bilirubin Total, Serum: 4.0 mg/dL    Alkaline Phosphatase, Serum: 237 U/L    Aspartate Aminotransferase (AST/SGOT): 464 U/L    Alanine Aminotransferase (ALT/SGPT): 753 U/L    eGFR if Non : 117: I  .73M2    eGFR if African American: 135 mL/min/1.73M2  Acute Hepatitis Panel (02.07.17 @ 17:32)    Hepatitis C Virus S/CO Ratio: 15.86 S/CO    Hepatitis C Virus Interpretation: Reactive: Hepatitis C AB  S/CO Ratio                        Interpretation  < 1.0                                     Non-Reactive  1.0 - 4.9                           Weakly-Reactive  > 5.0                                 Reactive  Non-Reactive: A person withReactive: a non-reactive HCV antibody result is  considered uninfected.  No further action is needed unless recent  infection is suspected.  In these cases, consider repeat testing later to  detect seroconversion..  Weakly-Reactive: HCV antibody test is abnormaReactive: l, HCV RNA Qualitative test  will follow.  Reactive: HCV antibody test is abnormal, HCV RNA Qualitative test will  follow.  Note: HCV antibody testing is performed on the Abbott  system.    Hepatitis B Core IgM Antibody: Reactive    Hepatitis B Surface Antigen: Reactive: Test Repeated, Confirmed Positive    Hepatitis A IgM Antibody: Nonreact

## 2017-03-13 NOTE — PROGRESS NOTE BEHAVIORAL HEALTH - BEHAVIOR
Cooperative

## 2017-03-13 NOTE — PROGRESS NOTE BEHAVIORAL HEALTH - NSBHADMITMEDEDU_PSY_A_CORE
yes...

## 2017-03-13 NOTE — PROGRESS NOTE BEHAVIORAL HEALTH - PROBLEM SELECTOR PROBLEM 3
Chronic obstructive pulmonary disease, unspecified COPD type
Chronic pain syndrome
Chronic obstructive pulmonary disease, unspecified COPD type
Chronic pain syndrome

## 2017-03-13 NOTE — PROGRESS NOTE BEHAVIORAL HEALTH - THOUGHT CONTENT
Unremarkable
Preoccupations/Ruminations
Preoccupations/Ruminations
Ruminations/Preoccupations
Unremarkable

## 2017-03-13 NOTE — PROGRESS NOTE BEHAVIORAL HEALTH - BODY HABITUS
Malnourished

## 2017-03-13 NOTE — PROGRESS NOTE BEHAVIORAL HEALTH - SECONDARY DX1
Opioid dependence, continuous

## 2017-03-13 NOTE — PROGRESS NOTE BEHAVIORAL HEALTH - NSBHLOC_PSY_A_CORE
Alert
Lethargic, arousable to verbal stimulus
Alert
Lethargic, arousable to verbal stimulus
Alert

## 2017-03-13 NOTE — PROGRESS NOTE BEHAVIORAL HEALTH - AFFECT QUALITY
Euthymic
Depressed
Euthymic
Depressed

## 2017-03-13 NOTE — PROGRESS NOTE BEHAVIORAL HEALTH - NS ED B[facility] REVIEW OF ED CHART AVAILABLE LABS REVIEWED
Yes
None available
Yes
None available
Yes
None available
None available
Yes

## 2017-03-15 DIAGNOSIS — R44.0 AUDITORY HALLUCINATIONS: ICD-10-CM

## 2017-03-15 DIAGNOSIS — D69.6 THROMBOCYTOPENIA, UNSPECIFIED: ICD-10-CM

## 2017-03-15 DIAGNOSIS — F41.9 ANXIETY DISORDER, UNSPECIFIED: ICD-10-CM

## 2017-03-15 DIAGNOSIS — K08.89 OTHER SPECIFIED DISORDERS OF TEETH AND SUPPORTING STRUCTURES: ICD-10-CM

## 2017-03-15 DIAGNOSIS — B18.1 CHRONIC VIRAL HEPATITIS B WITHOUT DELTA-AGENT: ICD-10-CM

## 2017-03-15 DIAGNOSIS — K20.9 ESOPHAGITIS, UNSPECIFIED: ICD-10-CM

## 2017-03-15 DIAGNOSIS — G62.9 POLYNEUROPATHY, UNSPECIFIED: ICD-10-CM

## 2017-03-15 DIAGNOSIS — R44.1 VISUAL HALLUCINATIONS: ICD-10-CM

## 2017-03-15 DIAGNOSIS — I25.2 OLD MYOCARDIAL INFARCTION: ICD-10-CM

## 2017-03-15 DIAGNOSIS — R45.851 SUICIDAL IDEATIONS: ICD-10-CM

## 2017-03-15 DIAGNOSIS — F11.23 OPIOID DEPENDENCE WITH WITHDRAWAL: ICD-10-CM

## 2017-03-15 DIAGNOSIS — B18.2 CHRONIC VIRAL HEPATITIS C: ICD-10-CM

## 2017-03-15 DIAGNOSIS — R11.2 NAUSEA WITH VOMITING, UNSPECIFIED: ICD-10-CM

## 2017-03-15 DIAGNOSIS — K74.60 UNSPECIFIED CIRRHOSIS OF LIVER: ICD-10-CM

## 2017-03-15 DIAGNOSIS — T43.505A ADVERSE EFFECT OF UNSPECIFIED ANTIPSYCHOTICS AND NEUROLEPTICS, INITIAL ENCOUNTER: ICD-10-CM

## 2017-03-15 DIAGNOSIS — K81.9 CHOLECYSTITIS, UNSPECIFIED: ICD-10-CM

## 2017-03-15 DIAGNOSIS — I10 ESSENTIAL (PRIMARY) HYPERTENSION: ICD-10-CM

## 2017-03-15 DIAGNOSIS — K21.9 GASTRO-ESOPHAGEAL REFLUX DISEASE WITHOUT ESOPHAGITIS: ICD-10-CM

## 2017-03-15 DIAGNOSIS — G89.4 CHRONIC PAIN SYNDROME: ICD-10-CM

## 2017-03-15 DIAGNOSIS — J44.9 CHRONIC OBSTRUCTIVE PULMONARY DISEASE, UNSPECIFIED: ICD-10-CM

## 2017-03-15 DIAGNOSIS — M06.9 RHEUMATOID ARTHRITIS, UNSPECIFIED: ICD-10-CM

## 2017-03-15 DIAGNOSIS — F14.90 COCAINE USE, UNSPECIFIED, UNCOMPLICATED: ICD-10-CM

## 2017-03-15 DIAGNOSIS — F22 DELUSIONAL DISORDERS: ICD-10-CM

## 2017-03-15 DIAGNOSIS — K29.70 GASTRITIS, UNSPECIFIED, WITHOUT BLEEDING: ICD-10-CM

## 2017-03-15 DIAGNOSIS — R32 UNSPECIFIED URINARY INCONTINENCE: ICD-10-CM

## 2017-03-15 DIAGNOSIS — F31.4 BIPOLAR DISORDER, CURRENT EPISODE DEPRESSED, SEVERE, WITHOUT PSYCHOTIC FEATURES: ICD-10-CM

## 2017-03-15 DIAGNOSIS — Z88.8 ALLERGY STATUS TO OTHER DRUGS, MEDICAMENTS AND BIOLOGICAL SUBSTANCES STATUS: ICD-10-CM

## 2017-03-15 DIAGNOSIS — D64.9 ANEMIA, UNSPECIFIED: ICD-10-CM

## 2017-03-15 DIAGNOSIS — E78.00 PURE HYPERCHOLESTEROLEMIA, UNSPECIFIED: ICD-10-CM

## 2017-03-15 DIAGNOSIS — I25.10 ATHEROSCLEROTIC HEART DISEASE OF NATIVE CORONARY ARTERY WITHOUT ANGINA PECTORIS: ICD-10-CM

## 2017-03-21 DIAGNOSIS — F31.4 BIPOLAR DISORDER, CURRENT EPISODE DEPRESSED, SEVERE, WITHOUT PSYCHOTIC FEATURES: ICD-10-CM

## 2017-04-06 ENCOUNTER — EMERGENCY (EMERGENCY)
Facility: HOSPITAL | Age: 47
LOS: 1 days | Discharge: HOME | End: 2017-04-06
Attending: EMERGENCY MEDICINE | Admitting: EMERGENCY MEDICINE
Payer: MEDICAID

## 2017-04-06 ENCOUNTER — OUTPATIENT (OUTPATIENT)
Dept: OUTPATIENT SERVICES | Facility: HOSPITAL | Age: 47
LOS: 1 days | Discharge: HOME | End: 2017-04-06

## 2017-04-06 VITALS
SYSTOLIC BLOOD PRESSURE: 132 MMHG | OXYGEN SATURATION: 100 % | HEART RATE: 80 BPM | DIASTOLIC BLOOD PRESSURE: 81 MMHG | RESPIRATION RATE: 18 BRPM | HEIGHT: 74 IN | WEIGHT: 175.05 LBS | TEMPERATURE: 98 F

## 2017-04-06 VITALS
TEMPERATURE: 98 F | SYSTOLIC BLOOD PRESSURE: 157 MMHG | DIASTOLIC BLOOD PRESSURE: 88 MMHG | HEART RATE: 78 BPM | RESPIRATION RATE: 16 BRPM | OXYGEN SATURATION: 99 %

## 2017-04-06 DIAGNOSIS — L02.419 CUTANEOUS ABSCESS OF LIMB, UNSPECIFIED: Chronic | ICD-10-CM

## 2017-04-06 PROCEDURE — 99284 EMERGENCY DEPT VISIT MOD MDM: CPT

## 2017-04-06 RX ORDER — ONDANSETRON 8 MG/1
4 TABLET, FILM COATED ORAL ONCE
Qty: 0 | Refills: 0 | Status: COMPLETED | OUTPATIENT
Start: 2017-04-06 | End: 2017-04-06

## 2017-04-06 RX ORDER — METHADONE HYDROCHLORIDE 40 MG/1
5 TABLET ORAL ONCE
Qty: 0 | Refills: 0 | Status: DISCONTINUED | OUTPATIENT
Start: 2017-04-06 | End: 2017-04-06

## 2017-04-06 RX ORDER — ONDANSETRON 8 MG/1
8 TABLET, FILM COATED ORAL ONCE
Qty: 0 | Refills: 0 | Status: DISCONTINUED | OUTPATIENT
Start: 2017-04-06 | End: 2017-04-06

## 2017-04-06 RX ADMIN — ONDANSETRON 4 MILLIGRAM(S): 8 TABLET, FILM COATED ORAL at 11:48

## 2017-04-06 RX ADMIN — METHADONE HYDROCHLORIDE 5 MILLIGRAM(S): 40 TABLET ORAL at 12:02

## 2017-04-06 RX ADMIN — Medication 0.1 MILLIGRAM(S): at 11:48

## 2017-04-06 RX ADMIN — METHADONE HYDROCHLORIDE 5 MILLIGRAM(S): 40 TABLET ORAL at 12:01

## 2017-04-06 NOTE — ED PROVIDER NOTE - ATTENDING CONTRIBUTION TO CARE
DR. Beckett, ATTENDING MD-  I performed a face to face bedside interview with patient regarding history of present illness, review of symptoms and past medical history. I completed an independent physical exam.  I have discussed patient's plan of care with the resident.   Documentation as above in the note.     47yo with hx of opiate abuse on methadone, took too much methadone and ran out, has a prescription which startes in 2 days p/w increased anxiety, diarrhea, abd pain. Exam:  well appearing, mildly dialated pupils nad lungs: CTAB Heart: rrr no murmur Abd: soft, NTND, increased bowel sounds Ext: no pedal edema,  Plan: will tx for opiate withdrawl, pt very well appearing, pt has good follow up with his pmd and is getting possibly enrolled at [facility], pt has safe place to go.

## 2017-04-06 NOTE — ED ADULT NURSE NOTE - OBJECTIVE STATEMENT
pt received spot 4. pt A+Ox3 states he is trying to get into a methadone clinic at [facility] and was prescribed enough methadone for one week until his appointment on Saturday. pt states he has been taking double the dose due to pain abd has ran out of his methadone. pt states he last took methadone last night. abd soft non distended no tenderness on palp. c/o n/v. vss. placed on CM. NSR noted. medicated as ordered. will monitor.

## 2017-04-06 NOTE — ED PROVIDER NOTE - OBJECTIVE STATEMENT
46M w/ hx of opiate addiction currently on methadone tx presents today after running out of his home methadone medication. Pt was being followed at [facility] and was recently transitioned to the [facility] methadone clinic. Pt was prescribed methadone from [facility] that was supposed to last until Saturday of this week, but he took double his dosage of methadone and has now run out of medication. Last methadone was taken yesterday. Today he presents with abdominal bloating, nausea, diarrhea, increased generalized pain and some agitation. 46M w/ hx of opiate addiction currently on methadone tx presents today after running out of his home methadone medication. Pt was being followed at [facility] at [facility] and was recently transitioned to the [facility] methadone clinic. Pt was prescribed methadone from [facility] that was supposed to last until Saturday of this week, but he took double his dosage of methadone and has now run out of medication. Last methadone was taken yesterday. Today he presents with abdominal bloating, nausea, diarrhea, increased generalized pain and some agitation. Pt met with [facility] methadone clinic staff and is likely going to be treated chronically through their program moving forward.

## 2017-04-06 NOTE — ED PROVIDER NOTE - MEDICAL DECISION MAKING DETAILS
46M w/ hx of opiate dependence presents today with sx of opiate withdrawal, not severe. Will give dose of methadone in the ED with zofran and clonidine. Will dc home with instructions to f/u with PMD for more.

## 2017-04-06 NOTE — ED ADULT TRIAGE NOTE - CHIEF COMPLAINT QUOTE
pt c/o abdominal pain, anxiety, dry heaving, n/v, lower back pain since this morning. pt reports he is detoxing off of methadone, last taken yesterday because he ran out of it and is unable to get into a clinic to follow up. PMHX: sciatica, chronic back pain, RA, CAD, COPD, neuropathy, Hep B, Hep C, cholecystitis

## 2017-04-07 DIAGNOSIS — F11.20 OPIOID DEPENDENCE, UNCOMPLICATED: ICD-10-CM

## 2017-04-10 LAB
ALBUMIN SERPL ELPH-MCNC: 3.9 G/DL — SIGNIFICANT CHANGE UP (ref 3.3–5)
ALP SERPL-CCNC: 98 U/L — SIGNIFICANT CHANGE UP (ref 40–120)
ALT FLD-CCNC: 57 U/L — HIGH (ref 4–41)
APPEARANCE UR: CLEAR — SIGNIFICANT CHANGE UP
AST SERPL-CCNC: 40 U/L — SIGNIFICANT CHANGE UP (ref 4–40)
BILIRUB SERPL-MCNC: 0.9 MG/DL — SIGNIFICANT CHANGE UP (ref 0.2–1.2)
BILIRUB UR-MCNC: NEGATIVE — SIGNIFICANT CHANGE UP
BLOOD UR QL VISUAL: NEGATIVE — SIGNIFICANT CHANGE UP
BUN SERPL-MCNC: 8 MG/DL — SIGNIFICANT CHANGE UP (ref 7–23)
CALCIUM SERPL-MCNC: 9.3 MG/DL — SIGNIFICANT CHANGE UP (ref 8.4–10.5)
CHLORIDE SERPL-SCNC: 104 MMOL/L — SIGNIFICANT CHANGE UP (ref 98–107)
CHOLEST SERPL-MCNC: 128 MG/DL — SIGNIFICANT CHANGE UP (ref 120–199)
CO2 SERPL-SCNC: 26 MMOL/L — SIGNIFICANT CHANGE UP (ref 22–31)
COLOR SPEC: YELLOW — SIGNIFICANT CHANGE UP
CREAT SERPL-MCNC: 0.69 MG/DL — SIGNIFICANT CHANGE UP (ref 0.5–1.3)
GLUCOSE SERPL-MCNC: 68 MG/DL — LOW (ref 70–99)
GLUCOSE UR-MCNC: NEGATIVE — SIGNIFICANT CHANGE UP
HCT VFR BLD CALC: 35.4 % — LOW (ref 39–50)
HDLC SERPL-MCNC: 32 MG/DL — LOW (ref 35–55)
HGB BLD-MCNC: 11.9 G/DL — LOW (ref 13–17)
KETONES UR-MCNC: NEGATIVE — SIGNIFICANT CHANGE UP
LEUKOCYTE ESTERASE UR-ACNC: NEGATIVE — SIGNIFICANT CHANGE UP
LIPID PNL WITH DIRECT LDL SERPL: 81 MG/DL — SIGNIFICANT CHANGE UP
MCHC RBC-ENTMCNC: 31.8 PG — SIGNIFICANT CHANGE UP (ref 27–34)
MCHC RBC-ENTMCNC: 33.6 % — SIGNIFICANT CHANGE UP (ref 32–36)
MCV RBC AUTO: 94.7 FL — SIGNIFICANT CHANGE UP (ref 80–100)
MUCOUS THREADS # UR AUTO: SIGNIFICANT CHANGE UP
NITRITE UR-MCNC: NEGATIVE — SIGNIFICANT CHANGE UP
PH UR: 6 — SIGNIFICANT CHANGE UP (ref 4.6–8)
PLATELET # BLD AUTO: 137 K/UL — LOW (ref 150–400)
PMV BLD: 11.5 FL — SIGNIFICANT CHANGE UP (ref 7–13)
POTASSIUM SERPL-MCNC: 3.7 MMOL/L — SIGNIFICANT CHANGE UP (ref 3.5–5.3)
POTASSIUM SERPL-SCNC: 3.7 MMOL/L — SIGNIFICANT CHANGE UP (ref 3.5–5.3)
PROT SERPL-MCNC: 6.7 G/DL — SIGNIFICANT CHANGE UP (ref 6–8.3)
PROT UR-MCNC: NEGATIVE — SIGNIFICANT CHANGE UP
RBC # BLD: 3.74 M/UL — LOW (ref 4.2–5.8)
RBC # FLD: 14.5 % — SIGNIFICANT CHANGE UP (ref 10.3–14.5)
RBC CASTS # UR COMP ASSIST: SIGNIFICANT CHANGE UP (ref 0–?)
SODIUM SERPL-SCNC: 143 MMOL/L — SIGNIFICANT CHANGE UP (ref 135–145)
SP GR SPEC: 1.01 — SIGNIFICANT CHANGE UP (ref 1–1.03)
SQUAMOUS # UR AUTO: SIGNIFICANT CHANGE UP
T PALLIDUM AB TITR SER: NEGATIVE — SIGNIFICANT CHANGE UP
TRIGL SERPL-MCNC: 64 MG/DL — SIGNIFICANT CHANGE UP (ref 10–149)
UROBILINOGEN FLD QL: 1 E.U. — SIGNIFICANT CHANGE UP (ref 0.1–0.2)
WBC # BLD: 11.06 K/UL — HIGH (ref 3.8–10.5)
WBC # FLD AUTO: 11.06 K/UL — HIGH (ref 3.8–10.5)
WBC UR QL: SIGNIFICANT CHANGE UP (ref 0–?)

## 2017-05-03 PROBLEM — Z00.00 ENCOUNTER FOR PREVENTIVE HEALTH EXAMINATION: Status: ACTIVE | Noted: 2017-05-03

## 2017-06-13 ENCOUNTER — APPOINTMENT (OUTPATIENT)
Dept: RHEUMATOLOGY | Facility: CLINIC | Age: 47
End: 2017-06-13

## 2017-08-21 ENCOUNTER — EMERGENCY (EMERGENCY)
Facility: HOSPITAL | Age: 47
LOS: 1 days | Discharge: HOME | End: 2017-08-21
Attending: EMERGENCY MEDICINE | Admitting: EMERGENCY MEDICINE
Payer: MEDICAID

## 2017-08-21 VITALS
SYSTOLIC BLOOD PRESSURE: 129 MMHG | HEART RATE: 54 BPM | OXYGEN SATURATION: 100 % | DIASTOLIC BLOOD PRESSURE: 67 MMHG | TEMPERATURE: 98 F | RESPIRATION RATE: 16 BRPM

## 2017-08-21 VITALS
HEART RATE: 61 BPM | RESPIRATION RATE: 16 BRPM | OXYGEN SATURATION: 99 % | DIASTOLIC BLOOD PRESSURE: 72 MMHG | SYSTOLIC BLOOD PRESSURE: 118 MMHG

## 2017-08-21 DIAGNOSIS — L02.419 CUTANEOUS ABSCESS OF LIMB, UNSPECIFIED: Chronic | ICD-10-CM

## 2017-08-21 DIAGNOSIS — F11.20 OPIOID DEPENDENCE, UNCOMPLICATED: ICD-10-CM

## 2017-08-21 DIAGNOSIS — F14.24 COCAINE DEPENDENCE WITH COCAINE-INDUCED MOOD DISORDER: ICD-10-CM

## 2017-08-21 DIAGNOSIS — F39 UNSPECIFIED MOOD [AFFECTIVE] DISORDER: ICD-10-CM

## 2017-08-21 LAB
ALBUMIN SERPL ELPH-MCNC: 4.3 G/DL — SIGNIFICANT CHANGE UP (ref 3.3–5)
ALP SERPL-CCNC: 60 U/L — SIGNIFICANT CHANGE UP (ref 40–120)
ALT FLD-CCNC: 21 U/L — SIGNIFICANT CHANGE UP (ref 4–41)
AMYLASE P1 CFR SERPL: 165 U/L — HIGH (ref 25–125)
APPEARANCE UR: CLEAR — SIGNIFICANT CHANGE UP
APTT BLD: 29.1 SEC — SIGNIFICANT CHANGE UP (ref 27.5–37.4)
AST SERPL-CCNC: 24 U/L — SIGNIFICANT CHANGE UP (ref 4–40)
BACTERIA # UR AUTO: SIGNIFICANT CHANGE UP
BASE EXCESS BLDV CALC-SCNC: 6.4 MMOL/L — SIGNIFICANT CHANGE UP
BASOPHILS # BLD AUTO: 0.02 K/UL — SIGNIFICANT CHANGE UP (ref 0–0.2)
BASOPHILS NFR BLD AUTO: 0.2 % — SIGNIFICANT CHANGE UP (ref 0–2)
BILIRUB SERPL-MCNC: 0.5 MG/DL — SIGNIFICANT CHANGE UP (ref 0.2–1.2)
BILIRUB UR-MCNC: NEGATIVE — SIGNIFICANT CHANGE UP
BLOOD GAS VENOUS - CREATININE: 1.01 MG/DL — SIGNIFICANT CHANGE UP (ref 0.5–1.3)
BLOOD UR QL VISUAL: NEGATIVE — SIGNIFICANT CHANGE UP
BUN SERPL-MCNC: 17 MG/DL — SIGNIFICANT CHANGE UP (ref 7–23)
CALCIUM SERPL-MCNC: 9.8 MG/DL — SIGNIFICANT CHANGE UP (ref 8.4–10.5)
CHLORIDE BLDV-SCNC: 109 MMOL/L — HIGH (ref 96–108)
CHLORIDE SERPL-SCNC: 104 MMOL/L — SIGNIFICANT CHANGE UP (ref 98–107)
CO2 SERPL-SCNC: 31 MMOL/L — SIGNIFICANT CHANGE UP (ref 22–31)
COLOR SPEC: YELLOW — SIGNIFICANT CHANGE UP
CREAT SERPL-MCNC: 1.02 MG/DL — SIGNIFICANT CHANGE UP (ref 0.5–1.3)
EOSINOPHIL # BLD AUTO: 0.13 K/UL — SIGNIFICANT CHANGE UP (ref 0–0.5)
EOSINOPHIL NFR BLD AUTO: 1.3 % — SIGNIFICANT CHANGE UP (ref 0–6)
GAS PNL BLDV: 141 MMOL/L — SIGNIFICANT CHANGE UP (ref 136–146)
GLUCOSE BLDV-MCNC: 86 — SIGNIFICANT CHANGE UP (ref 70–99)
GLUCOSE SERPL-MCNC: 88 MG/DL — SIGNIFICANT CHANGE UP (ref 70–99)
GLUCOSE UR-MCNC: 50 — SIGNIFICANT CHANGE UP
HCO3 BLDV-SCNC: 29 MMOL/L — HIGH (ref 20–27)
HCT VFR BLD CALC: 37.7 % — LOW (ref 39–50)
HCT VFR BLDV CALC: 40.6 % — SIGNIFICANT CHANGE UP (ref 39–51)
HGB BLD-MCNC: 12.8 G/DL — LOW (ref 13–17)
HGB BLDV-MCNC: 13.2 G/DL — SIGNIFICANT CHANGE UP (ref 13–17)
IMM GRANULOCYTES # BLD AUTO: 0.03 # — SIGNIFICANT CHANGE UP
IMM GRANULOCYTES NFR BLD AUTO: 0.3 % — SIGNIFICANT CHANGE UP (ref 0–1.5)
INR BLD: 1.16 — SIGNIFICANT CHANGE UP (ref 0.88–1.17)
KETONES UR-MCNC: NEGATIVE — SIGNIFICANT CHANGE UP
LACTATE BLDV-MCNC: 1.2 MMOL/L — SIGNIFICANT CHANGE UP (ref 0.5–2)
LEUKOCYTE ESTERASE UR-ACNC: HIGH
LIDOCAIN IGE QN: 82.5 U/L — HIGH (ref 7–60)
LYMPHOCYTES # BLD AUTO: 4.27 K/UL — HIGH (ref 1–3.3)
LYMPHOCYTES # BLD AUTO: 41.7 % — SIGNIFICANT CHANGE UP (ref 13–44)
MCHC RBC-ENTMCNC: 30.1 PG — SIGNIFICANT CHANGE UP (ref 27–34)
MCHC RBC-ENTMCNC: 34 % — SIGNIFICANT CHANGE UP (ref 32–36)
MCV RBC AUTO: 88.7 FL — SIGNIFICANT CHANGE UP (ref 80–100)
MONOCYTES # BLD AUTO: 0.91 K/UL — HIGH (ref 0–0.9)
MONOCYTES NFR BLD AUTO: 8.9 % — SIGNIFICANT CHANGE UP (ref 2–14)
MUCOUS THREADS # UR AUTO: SIGNIFICANT CHANGE UP
NEUTROPHILS # BLD AUTO: 4.88 K/UL — SIGNIFICANT CHANGE UP (ref 1.8–7.4)
NEUTROPHILS NFR BLD AUTO: 47.6 % — SIGNIFICANT CHANGE UP (ref 43–77)
NITRITE UR-MCNC: NEGATIVE — SIGNIFICANT CHANGE UP
NRBC # FLD: 0 — SIGNIFICANT CHANGE UP
PCO2 BLDV: 51 MMHG — SIGNIFICANT CHANGE UP (ref 41–51)
PH BLDV: 7.4 PH — SIGNIFICANT CHANGE UP (ref 7.32–7.43)
PH UR: 6 — SIGNIFICANT CHANGE UP (ref 4.6–8)
PLATELET # BLD AUTO: 173 K/UL — SIGNIFICANT CHANGE UP (ref 150–400)
PMV BLD: 11.3 FL — SIGNIFICANT CHANGE UP (ref 7–13)
PO2 BLDV: 53 MMHG — HIGH (ref 35–40)
POTASSIUM BLDV-SCNC: 3.9 MMOL/L — SIGNIFICANT CHANGE UP (ref 3.4–4.5)
POTASSIUM SERPL-MCNC: 4.1 MMOL/L — SIGNIFICANT CHANGE UP (ref 3.5–5.3)
POTASSIUM SERPL-SCNC: 4.1 MMOL/L — SIGNIFICANT CHANGE UP (ref 3.5–5.3)
PROT SERPL-MCNC: 7.3 G/DL — SIGNIFICANT CHANGE UP (ref 6–8.3)
PROT UR-MCNC: 30 — SIGNIFICANT CHANGE UP
PROTHROM AB SERPL-ACNC: 13 SEC — SIGNIFICANT CHANGE UP (ref 9.8–13.1)
RBC # BLD: 4.25 M/UL — SIGNIFICANT CHANGE UP (ref 4.2–5.8)
RBC # FLD: 13 % — SIGNIFICANT CHANGE UP (ref 10.3–14.5)
RBC CASTS # UR COMP ASSIST: SIGNIFICANT CHANGE UP (ref 0–?)
SAO2 % BLDV: 88.6 % — HIGH (ref 60–85)
SODIUM SERPL-SCNC: 145 MMOL/L — SIGNIFICANT CHANGE UP (ref 135–145)
SP GR SPEC: 1.03 — SIGNIFICANT CHANGE UP (ref 1–1.03)
SQUAMOUS # UR AUTO: SIGNIFICANT CHANGE UP
UROBILINOGEN FLD QL: 1 E.U. — SIGNIFICANT CHANGE UP (ref 0.1–0.2)
WBC # BLD: 10.24 K/UL — SIGNIFICANT CHANGE UP (ref 3.8–10.5)
WBC # FLD AUTO: 10.24 K/UL — SIGNIFICANT CHANGE UP (ref 3.8–10.5)
WBC UR QL: HIGH (ref 0–?)

## 2017-08-21 PROCEDURE — 93010 ELECTROCARDIOGRAM REPORT: CPT

## 2017-08-21 PROCEDURE — 90792 PSYCH DIAG EVAL W/MED SRVCS: CPT

## 2017-08-21 PROCEDURE — 99285 EMERGENCY DEPT VISIT HI MDM: CPT | Mod: 25

## 2017-08-21 RX ORDER — PANTOPRAZOLE SODIUM 20 MG/1
40 TABLET, DELAYED RELEASE ORAL ONCE
Qty: 0 | Refills: 0 | Status: COMPLETED | OUTPATIENT
Start: 2017-08-21 | End: 2017-08-21

## 2017-08-21 RX ORDER — FAMOTIDINE 10 MG/ML
20 INJECTION INTRAVENOUS ONCE
Qty: 0 | Refills: 0 | Status: COMPLETED | OUTPATIENT
Start: 2017-08-21 | End: 2017-08-21

## 2017-08-21 RX ORDER — SODIUM CHLORIDE 9 MG/ML
1000 INJECTION INTRAMUSCULAR; INTRAVENOUS; SUBCUTANEOUS ONCE
Qty: 0 | Refills: 0 | Status: COMPLETED | OUTPATIENT
Start: 2017-08-21 | End: 2017-08-21

## 2017-08-21 RX ORDER — ONDANSETRON 8 MG/1
4 TABLET, FILM COATED ORAL ONCE
Qty: 0 | Refills: 0 | Status: COMPLETED | OUTPATIENT
Start: 2017-08-21 | End: 2017-08-21

## 2017-08-21 RX ORDER — ONDANSETRON 8 MG/1
1 TABLET, FILM COATED ORAL
Qty: 42 | Refills: 0
Start: 2017-08-21 | End: 2017-08-28

## 2017-08-21 RX ORDER — KETOROLAC TROMETHAMINE 30 MG/ML
15 SYRINGE (ML) INJECTION ONCE
Qty: 0 | Refills: 0 | Status: DISCONTINUED | OUTPATIENT
Start: 2017-08-21 | End: 2017-08-21

## 2017-08-21 RX ORDER — NICOTINE POLACRILEX 2 MG
1 GUM BUCCAL ONCE
Qty: 0 | Refills: 0 | Status: COMPLETED | OUTPATIENT
Start: 2017-08-21 | End: 2017-08-21

## 2017-08-21 RX ADMIN — Medication 15 MILLIGRAM(S): at 11:40

## 2017-08-21 RX ADMIN — Medication 1 PATCH: at 10:35

## 2017-08-21 RX ADMIN — SODIUM CHLORIDE 1000 MILLILITER(S): 9 INJECTION INTRAMUSCULAR; INTRAVENOUS; SUBCUTANEOUS at 09:50

## 2017-08-21 RX ADMIN — ONDANSETRON 4 MILLIGRAM(S): 8 TABLET, FILM COATED ORAL at 13:46

## 2017-08-21 RX ADMIN — ONDANSETRON 4 MILLIGRAM(S): 8 TABLET, FILM COATED ORAL at 09:58

## 2017-08-21 RX ADMIN — PANTOPRAZOLE SODIUM 40 MILLIGRAM(S): 20 TABLET, DELAYED RELEASE ORAL at 10:39

## 2017-08-21 RX ADMIN — Medication 30 MILLILITER(S): at 09:58

## 2017-08-21 RX ADMIN — FAMOTIDINE 20 MILLIGRAM(S): 10 INJECTION INTRAVENOUS at 09:55

## 2017-08-21 RX ADMIN — Medication 15 MILLIGRAM(S): at 11:25

## 2017-08-21 NOTE — ED PROVIDER NOTE - PROGRESS NOTE DETAILS
Laboratory investigation non-focal. CBC wnl. Electrolytes wnl. LFTs and markers of synthetic liver function wnl. Lipase slightly elevated, but not high enough to be c/w pancreatitis. UA pending. Pt passed PO challenge with bread and fluids. Pt states he feels better, best he's felt in weeks. D/w psychiatry. Medically cleared for psychiatry. Psychiatry cleared patient for discharge. Pt feeling okay with plan for outpatient follow up. Will dc home with GI referral list for abd discomfort.

## 2017-08-21 NOTE — ED BEHAVIORAL HEALTH ASSESSMENT NOTE - MEDICATIONS (PRESCRIPTIONS, DIRECTIONS)
Continue home meds for now.  We discussed attempts may be needed to streamline his complicated psychiatric med regimen

## 2017-08-21 NOTE — PROVIDER CONTACT NOTE (OTHER) - ASSESSMENT
Medical team referred this case as pt has been requesting [facility] card to get home. Writer provided the pt with [facility] card # [phone]. no further SW intervention needed  for this visit.

## 2017-08-21 NOTE — ED PROVIDER NOTE - ENMT, MLM
Airway patent, Nasal mucosa clear. Mouth with poor dentition. Throat has no vesicles, no oropharyngeal exudates and uvula is midline.

## 2017-08-21 NOTE — ED ADULT TRIAGE NOTE - CHIEF COMPLAINT QUOTE
Pt was sent from out patient [facility] for complaints of nausea vomiting and having abdominal pain for weeks. Pt also expressed having suicidal ideation no plan. Pt on Methadone program  and was given this morning  110mg  .

## 2017-08-21 NOTE — ED BEHAVIORAL HEALTH ASSESSMENT NOTE - CURRENT MEDICATION
Depakote 750 mh po BID,Gabapentin 300 mg po TID, Cymbalta 60 mg po daily, Seroquel 200mg qHS, Lamictal 50mg daily, Latuda 60mg QHS, ritalin 20mg daily, methadone 110mg daily, remeron  15mg QHS, wellbutrin 150mg daily

## 2017-08-21 NOTE — ED PROVIDER NOTE - ATTENDING CONTRIBUTION TO CARE
I performed a face-to-face evaluation of the patient and performed a history and physical examination. I agree with the history and physical examination.    Middle-age man, Hep B and C, ? ulcerative colitis, bi-polar disorder, p/w N/V (originally no blood, now small flecks of blood), and abd pain (no diarrhea). Appears well. NAD. Mild abd tenderness. Consider acute hepatitis, medication side effects (multiple psych meds, Methadone). Check labs, give anti-emetic medications.

## 2017-08-21 NOTE — ED BEHAVIORAL HEALTH ASSESSMENT NOTE - DIFFERENTIAL
Would give provisional diagnosis of unspecified bipolar disorder, opiate use do, and cocaine use do.  There also appear to be underlying antisocial personality traits.

## 2017-08-21 NOTE — ED PROVIDER NOTE - MEDICAL DECISION MAKING DETAILS
47M w/ hx of multiple abdominal issues and psychiatric issues presents with 3 weeks of nausea/vomiting/abd pain w/ relatively benign belly exam. VSS. Will obtain CBC/CMP/coags/Lipase/UA to attempt to identify source of abd discomfort. EKG for QTc. Symptomatic relief with pepcid/maalox/zofran. IVF for rehydration. Reassess. Will hold off on imaging initially and will decide based on laboratory findings.

## 2017-08-21 NOTE — ED BEHAVIORAL HEALTH ASSESSMENT NOTE - SAFETY PLAN DETAILS
He will check in daily with oupt providers. He is amenable to return to ED for SI or other safety concerns.

## 2017-08-21 NOTE — ED BEHAVIORAL HEALTH ASSESSMENT NOTE - OTHER PAST PSYCHIATRIC HISTORY (INCLUDE DETAILS REGARDING ONSET, COURSE OF ILLNESS, INPATIENT/OUTPATIENT TREATMENT)
reports multiple admissions since 1988, last 2016 where it was noted he was drug seeking on the unit, minimally engaged in treatment, and submitted a 3 day letter when housing needs were not met.

## 2017-08-21 NOTE — ED BEHAVIORAL HEALTH ASSESSMENT NOTE - RISK ASSESSMENT
Patient does have chronic risk factors for self-harm including history of past gestures, antisocial traits, social isolation, and substance use. However, he has no current or recent active SI.  He is also currently future-oriented about getting into better treatment and possibly going back to work.  He has housing and does have support from ex-wife whom he speaks with daily.  He is in comprehensive outpt treatment and follow-up on a near daily basis. These mitigating factors are all protective and make it such that his acute risk for self harm is not elevated above his chronically elevated risk.

## 2017-08-21 NOTE — ED ADULT NURSE NOTE - CHIEF COMPLAINT QUOTE
Pt was sent from out patient [facility] for complaints of nausea vomiting and having abdominal pain for weeks. Pt also expressed having suicidal ideation no plan. Pt on Methadone program  and was given this morning  110mg  .

## 2017-08-21 NOTE — ED BEHAVIORAL HEALTH ASSESSMENT NOTE - HPI (INCLUDE ILLNESS QUALITY, SEVERITY, DURATION, TIMING, CONTEXT, MODIFYING FACTORS, ASSOCIATED SIGNS AND SYMPTOMS)
Mr. Stewart is a 47-year-old man, domiciled in government housing w/ a roommate, unemployed on disability due to psychiatric and physical illness, multiple inpatient psychiatric hospitalizations w/ a PPH of bipolar 1 disorder w/ severe depression, PTSD, and panic disorder.  Past diagnoses have also included antisocial personality disorder and suspicion for malingering.  He also has a history of polysubstance use and is currently in treatment with the [facility] program. He has multiple comorbid conditions including chronic back pain after a fracture in 2013, hepatitis B and C, and cardiac issues. He presented to the ED today from [facility] with complaints of N/V and recent refractory depression with passive SI without plan or intent.      Patient last admitted to [facility] in 2016. That hospitalization appeared to be triggered by pt's housing situation. He was noted during inpt hospitalization to be demanding and minimally engaged in treatment. He was DC on a 3 day letter.  He has since connected with [facility] program and has gotten subsidized housing.  However, over the past 2-3 weeks, he reports increase in pain and N/V, with daily morning vomiting.   During this period, he has been having periodic passive suicidal ideation, with no intent or plan, thinking "I just want to give up." He denies feelings of guilt, and has chronic problems with concentration. He said that one week ago, he began feeling a bit “manicky”, but that has not progressed. He has been sleeping poorly. Recent [facility] clinic notes indicate that he has been self-medicating using cocaine for sleep and that tox reports have consistently been adonis positive, which he explains today as some sort of "misread" in the toxicology.  These symptoms have not gotten better or worse and he can’t identify anything that would have led to this episode. He will take a few bites of food and then feel like he isn’t hungry anymore, and during these 3 weeks feels as though he has lost a lot of weight; he feels nauseous and vomits daily, and endorses constipation as well. With depressed symptoms and new physical symptoms, patient is unable to complete daily activities, he stays in bed all day and only gets up to go to the clinic. \    Despite reports of depression and passive SI, he denies these currently.  He is primarily concerned about his pain and repeatedly asks for more meds. He also hopes that he does not have acute hepatitis, which he had in February.  He is frustrated with his psych meds not working but says he has been taking them anyways. He reports that he is happy with treatment at clinic.    Called Dr. Hua, awaiting callback.  [facility] notes from recent visits including visit today with Saul Mccall NP were reviewed. Also called pt's wife, with whom he speaks daily, (Charlene Stewart [phone]) but there was no answer and no voicemail.

## 2017-08-21 NOTE — ED BEHAVIORAL HEALTH ASSESSMENT NOTE - DESCRIPTION (FIRST USE, LAST USE, QUANTITY, FREQUENCY, DURATION)
"on holidays" as teen Denies recent use but last 8-9 utoxes at [facility] have been positive for cocaine prescribed Methadone 110mg daily, which he gets on 6x week Klonopin in past

## 2017-08-21 NOTE — ED ADULT NURSE NOTE - OBJECTIVE STATEMENT
pt c/o chronic nausea worsening today, decreased urination, abd stomach pain with back, flank pain and sciatica.  pt c/o sadness and depression, denies suicidal ideation.. pt on methadone. chronic pain on opiates. Hep B and Hep C positive as per pt.

## 2017-08-21 NOTE — ED BEHAVIORAL HEALTH ASSESSMENT NOTE - SUICIDE PROTECTIVE FACTORS
Positive therapeutic relationships/Responsibility to family and others/Fear of death or dying due to pain/suffering/Future oriented

## 2017-08-21 NOTE — ED BEHAVIORAL HEALTH ASSESSMENT NOTE - DETAILS
reports last attempt was OD in 2014 breaks objects by pt report, none recent states allergies to Haldol, CPZ, Stelazine, Risperdal and NSAIDS N/V and pain c/o N/V generalized pain Left 2 messages with Dr. Hua, awaiting callback

## 2017-08-21 NOTE — ED PROVIDER NOTE - OBJECTIVE STATEMENT
47M w/ hx of active Hep B&C, ulcerative colitis, GERD, MDD (on multiple psychiatric medications; daily outpatient psychiatry patient), chronic pain (on methadone), CAD, HTN, HLD, COPD p/w 3 weeks of nausea, vomiting and generalized abdominal pain. Vomitus was NBNB, but is now flecked with blood. Pt also endorsing some bilateral flank pain different from his normal back pain. Pt has had substantially decreased appetite. Pt endorses consitipation, but denies melena, hematochezia. Denies hematuria, dysuria. No hx of kidney stones. Denies fevers, but endorses chills. Pt has been trying to get on [facility] treatment for hep c but there have been issues with insurance coverage. Denies jaundice, skin, or eye changes.

## 2017-08-21 NOTE — ED BEHAVIORAL HEALTH ASSESSMENT NOTE - SUMMARY
Mr. Stewart is a 47 year old WM with a history of mood and anxiety symptoms. He also has significant history of substance abuse that includes being maintained on high-dose methadone and also ongoing cocaine abuse. He has chronic medical problems that are significant for Hep B/C and reported chronic N/V as well as chronic back pain.  [facility]. Today, he was sent from [facility] for N/V as well as passive SI.  In ED, he denies current SI but admits to recent passive SI without plan or intent.  He is calm, cooperative, engaged in treatment.  Per review of records, he has not significantly benefitted from past inpatient treatment and has been disruptive to the therapeutic milieu.  He has comprehensive outpt treatment and makes contact with MH professionals on a near-daily basis and is able to discuss a comprehensive safety plan.  At this time, he does not require inpatient treatment.

## 2017-08-22 LAB — SPECIMEN SOURCE: SIGNIFICANT CHANGE UP

## 2017-08-23 LAB — BACTERIA UR CULT: SIGNIFICANT CHANGE UP

## 2017-11-13 LAB — HAV IGG+IGM SER QL: REACTIVE — SIGNIFICANT CHANGE UP

## 2017-11-15 LAB — HBV SURFACE AB SER-ACNC: NONREACTIVE — SIGNIFICANT CHANGE UP

## 2018-03-02 LAB
CHOLEST SERPL-MCNC: 145 MG/DL — SIGNIFICANT CHANGE UP (ref 120–199)
HDLC SERPL-MCNC: 39 MG/DL — SIGNIFICANT CHANGE UP (ref 35–55)
HIV1 AG SER QL: SIGNIFICANT CHANGE UP
HIV1+2 AB SPEC QL: SIGNIFICANT CHANGE UP
LIPID PNL WITH DIRECT LDL SERPL: 89 MG/DL — SIGNIFICANT CHANGE UP
TRIGL SERPL-MCNC: 76 MG/DL — SIGNIFICANT CHANGE UP (ref 10–149)

## 2018-03-03 LAB
HCV RNA SERPL NAA DL=5-ACNC: HIGH IU/ML
HCV RNA SPEC NAA+PROBE-LOG IU: 6.74 LOGIU/ML — HIGH

## 2018-03-09 ENCOUNTER — APPOINTMENT (OUTPATIENT)
Age: 48
End: 2018-03-09

## 2018-03-26 LAB
ALBUMIN SERPL ELPH-MCNC: 4.4 G/DL — SIGNIFICANT CHANGE UP (ref 3.3–5)
ALP SERPL-CCNC: 69 U/L — SIGNIFICANT CHANGE UP (ref 40–120)
ALT FLD-CCNC: 17 U/L — SIGNIFICANT CHANGE UP (ref 4–41)
AST SERPL-CCNC: 20 U/L — SIGNIFICANT CHANGE UP (ref 4–40)
BILIRUB SERPL-MCNC: 0.6 MG/DL — SIGNIFICANT CHANGE UP (ref 0.2–1.2)
BUN SERPL-MCNC: 11 MG/DL — SIGNIFICANT CHANGE UP (ref 7–23)
CALCIUM SERPL-MCNC: 9.7 MG/DL — SIGNIFICANT CHANGE UP (ref 8.4–10.5)
CHLORIDE SERPL-SCNC: 103 MMOL/L — SIGNIFICANT CHANGE UP (ref 98–107)
CO2 SERPL-SCNC: 27 MMOL/L — SIGNIFICANT CHANGE UP (ref 22–31)
CREAT SERPL-MCNC: 0.78 MG/DL — SIGNIFICANT CHANGE UP (ref 0.5–1.3)
GLUCOSE SERPL-MCNC: 195 MG/DL — HIGH (ref 70–99)
HBA1C BLD-MCNC: 5.1 % — SIGNIFICANT CHANGE UP (ref 4–5.6)
HCT VFR BLD CALC: 43.6 % — SIGNIFICANT CHANGE UP (ref 39–50)
HGB BLD-MCNC: 14.7 G/DL — SIGNIFICANT CHANGE UP (ref 13–17)
MCHC RBC-ENTMCNC: 30 PG — SIGNIFICANT CHANGE UP (ref 27–34)
MCHC RBC-ENTMCNC: 33.7 % — SIGNIFICANT CHANGE UP (ref 32–36)
MCV RBC AUTO: 89 FL — SIGNIFICANT CHANGE UP (ref 80–100)
NRBC # FLD: 0 — SIGNIFICANT CHANGE UP
PLATELET # BLD AUTO: 221 K/UL — SIGNIFICANT CHANGE UP (ref 150–400)
PMV BLD: 11.2 FL — SIGNIFICANT CHANGE UP (ref 7–13)
POTASSIUM SERPL-MCNC: 4.1 MMOL/L — SIGNIFICANT CHANGE UP (ref 3.5–5.3)
POTASSIUM SERPL-SCNC: 4.1 MMOL/L — SIGNIFICANT CHANGE UP (ref 3.5–5.3)
PROT SERPL-MCNC: 7.2 G/DL — SIGNIFICANT CHANGE UP (ref 6–8.3)
RBC # BLD: 4.9 M/UL — SIGNIFICANT CHANGE UP (ref 4.2–5.8)
RBC # FLD: 13 % — SIGNIFICANT CHANGE UP (ref 10.3–14.5)
SODIUM SERPL-SCNC: 143 MMOL/L — SIGNIFICANT CHANGE UP (ref 135–145)
WBC # BLD: 9.72 K/UL — SIGNIFICANT CHANGE UP (ref 3.8–10.5)
WBC # FLD AUTO: 9.72 K/UL — SIGNIFICANT CHANGE UP (ref 3.8–10.5)

## 2018-04-11 NOTE — ED ADULT NURSE NOTE - CHIEF COMPLAINT
Pt arrives to ED after being a first response call from her MD's office in the [facility]. Pt states he had a syncopal episode at the office. Pt thought she maybe had a low blood sugar because she didn't not eat this morning, but after eating a protein bar, she still felt the episode coming on. Pt states she has had syncopal episodes in the past and has been worked up for them. Pt denies any other complaints at this time.   The patient is a 46y Male complaining of migraine.

## 2018-05-09 NOTE — CONSULT NOTE ADULT - CONSULT REQUESTED DATE/TIME
08-Feb-2017 as above as above as above as above leukocytosis, tachycaric, lactic acidosis  c/w IVF  will send blood culture if patient develops fever  HIDA scan  c/w IV zosyn as above as above as above as above

## 2018-05-11 NOTE — PROGRESS NOTE ADULT - SUBJECTIVE AND OBJECTIVE BOX
Patient is a 46y old  Male who presents with a chief complaint of vomiting /chest pain (12 Feb 2017 02:07)    HPI:  History of Present Illness: 	  46 year old homeless male with history of chronic HCV, thrombocytopenia, anemia, rheumatoid arthritis, spinal fracture and peripheral neuropathy, hyperlipidemia, CAD s/p balloon angioplasty in past and recent negative coronary cath, hypertension, bipolar disorder, COPD, tobacco smoking, opioid abuse who was discharged on 1/31/17 from  (admitted for chest pain and had negative cath)     then went to [facility] for a scheduled sleep study  where he developed nausea and vomiting.recommended w/u of hepatitis there and signed out AMA    Patient was readmitted at  2/7/17 and discharged 2/10/17 for nausea and abdominal pain  and chest pain and 1 episode of coffee ground emesis  s/p EGD showed distal esophagitis and gastritis  and HepB+,Hep C+ with elevated LFT,patient was advised to continue PPI BID and sacralfate for 2 weeks by GI and f/u hepatitis w/u as outpatient     After discharge from  ,that was 2/10/17 at home began to have nausea and intermittent vomiting and retching with chest pain ,patient had minimal  few streaks of red blood in his last episode of vomiting at home.No further vomiting in ED    Pt is afebrile ,no diarrhea,LBM was 2/11/17 light brown in color, reports urine color is lighter than last admission,no SOB,no dizziness,no palpitations     patient c/o generalised body aches ,back aches ,joint pains which he says are chronic and attributes it to RA    Patient reports he needs stronger pain meds than MScontin and dilaudid which he uses at home.    SUBJECTIVE & OBJECTIVE: Pt seen and examined at bedside.   2/12/17 - Patient seen and examined at bedside.  He complains of generalized pain in his knees, "kidneys" and back.  Is also complaining of hunger and states that he wants to eat.  Complains of other non-specific symptoms like, "feeling hot and cold,"  Denies any acute events overnight, denies episodes of diarrhea or vomiting.   2/13/17 denies hematemesis. Asking for more pain meds    PHYSICAL EXAM:  Vital Signs Last 24 Hrs  T(C): 36.3, Max: 37.1 (02-12 @ 19:59)  T(F): 97.3, Max: 98.7 (02-12 @ 19:59)  HR: 58 (58 - 71)  BP: 99/52 (98/53 - 143/87)  BP(mean): --  RR: 17 (15 - 17)  SpO2: 97% (96% - 98%)    GENERAL: NAD, well-groomed, well-developed  HEAD:  Atraumatic, Normocephalic  EYES: EOMI, PERRLA, conjunctiva and sclera clear  ENMT: Moist mucous membranes  NECK: Supple, No JVD  NERVOUS SYSTEM:  Alert & Oriented X3, Motor Strength 5/5 B/L upper and lower extremities; DTRs 2+ intact and symmetric  CHEST/LUNG: Clear to auscultation bilaterally; No rales, rhonchi, wheezing, or rubs  HEART: Regular rate and rhythm; No murmurs, rubs, or gallops  ABDOMEN: Soft, Nontender, Nondistended; Bowel sounds present  EXTREMITIES:  2+ Peripheral Pulses, No clubbing, cyanosis, or edema        MEDICATIONS  (STANDING):  nicotine - 21 mG/24Hr(s) Patch 1patch Transdermal daily  ondansetron Injectable 4milliGRAM(s) IV Push once  predniSONE   Tablet 10milliGRAM(s) Oral daily  morphine ER Tablet 15milliGRAM(s) Oral every 12 hours  diVALproex ER 500milliGRAM(s) Oral every 12 hours  gabapentin Oral Tab/Cap - Peds 800milliGRAM(s) Oral three times a day  DULoxetine 60milliGRAM(s) Oral daily  atorvastatin 20milliGRAM(s) Oral at bedtime  clopidogrel Tablet 75milliGRAM(s) Oral daily  QUEtiapine XR 150milliGRAM(s) Oral at bedtime  metoprolol 25milliGRAM(s) Oral two times a day  fluticasone / salmeterol 250-50 MICROgram(s) Diskus 1Dose(s) Inhalation two times a day  multivitamin 1Tablet(s) Oral daily  pantoprazole  Injectable 40milliGRAM(s) IV Push two times a day  sucralfate suspension 1Gram(s) Oral four times a day  dextrose 5% + sodium chloride 0.45%. 1000milliLiter(s) IV Continuous <Continuous>  influenza   Vaccine 0.5milliLiter(s) IntraMuscular once    MEDICATIONS  (PRN):  HYDROmorphone   Tablet 2milliGRAM(s) Oral two times a day PRN Severe Pain (7 - 10)  ALBUTerol    90 MICROgram(s) HFA Inhaler 2Puff(s) Inhalation every 6 hours PRN sob,wheezing  ondansetron Injectable 4milliGRAM(s) IV Push every 8 hours PRN nausea,vomiting      LABS:                                   12.4   6.0   )-----------( 128      ( 12 Feb 2017 13:41 )             37.5     13 Feb 2017 07:39    143    |  107    |  13     ----------------------------<  95     4.0     |  28     |  0.90     Ca    8.5        13 Feb 2017 07:39  Phos  3.5       13 Feb 2017 07:39  Mg     2.3       13 Feb 2017 07:39        PT/INR - ( 12 Feb 2017 02:42 )   PT: 15.9 sec;   INR: 1.44 ratio         PTT - ( 12 Feb 2017 02:42 )  PTT:38.7 sec  CAPILLARY BLOOD GLUCOSE    CARDIAC MARKERS ( 12 Feb 2017 02:42 )  <0.015 ng/mL / x     / 23 U/L / x     / x      CARDIAC MARKERS ( 11 Feb 2017 17:12 )  <0.015 ng/mL / x     / x     / x     / x (0) understands/communicates without difficulty

## 2018-06-11 ENCOUNTER — APPOINTMENT (OUTPATIENT)
Dept: HEPATOLOGY | Facility: CLINIC | Age: 48
End: 2018-06-11

## 2019-02-21 NOTE — PATIENT PROFILE ADULT. - PATIENT REPRESENTATIVE NAME
Cardiopulmonary Rehab Orientation:  Patient is a 68 year old patient of Jae Layo, who presents to rehab for conditioning and strengthening. .   
Patient has a history of  COPD Past medical history significant for: PHTN, ASHD, HTN and hyperlipidemia Smoking history assessed, pt is a former smoker who quitted 6-. Lungs are decreased but clear . No apparent edema, noted. Patient  does not exercises at home. Patient is retired furniture upholstering. Pt immunizations reviewed and up to date. Patient limitations include, RYAN and inactivity. Pt's VS were as follows: BP on R arm -144/80,    L arm 142/79,    
HR 68,  
oxygen saturation 95%  On 2L     . BMI   . Heart rhythm on monitor showed SR      . Psychosocial: pt is a spiritual man and has good family support Pt reported goals are: 1. Return to singing 2. To start a home exercise program 
 3. To return to intimacy COPD Assessment Tool (CAT)     0-5 I never cough/I cough all the time       Score: 2 I have no phlegm in my chest/ My chest is completely full of phlegm  Score: 3 My chest does not feel tight at all/ My chest feels very tight   Score: 1 When I walk up a hill or stairs I am bot breathless/ When I walk up a hill or stairs I am very breathless    Score: 5 I am not limited doing any activities at home/ 
 I am very limited doing activities at home     Score: 4 I am confident leaving my home despite my lung condition/ 
I am not at all confident leaving my home because of my lung condition Score: 3 I sleep soundly/ I don't sleep because of my lung condition   Score: 3 I have lots of energy/ I have no energy at all     Score: 1            Total: 22 
      
 Charlene Stewart

## 2019-07-29 NOTE — PATIENT PROFILE BEHAVIORAL HEALTH - NS PRO CONTRA FLU CONTRAIND
Anesthesia Post Evaluation    Patient: Ronn Camacho    Procedure(s) Performed: Procedure(s) (LRB):  FESS, USING COMPUTER-ASSISTED NAVIGATION (N/A)  SEPTOPLASTY, NOSE (Bilateral)  CAUTERIZATION, MUCOSA, NASAL TURBINATE (Bilateral)    Final Anesthesia Type: general  Patient location during evaluation: PACU  Patient participation: Yes- Able to Participate  Level of consciousness: awake and alert  Post-procedure vital signs: reviewed and stable  Pain management: adequate  Airway patency: patent  PONV status at discharge: No PONV  Anesthetic complications: no      Cardiovascular status: blood pressure returned to baseline  Respiratory status: unassisted  Hydration status: euvolemic  Follow-up not needed.          Vitals Value Taken Time   /87 7/29/2019  2:01 PM   Temp 36.4 °C (97.6 °F) 7/29/2019  1:05 PM   Pulse 98 7/29/2019  2:03 PM   Resp 26 7/29/2019  2:03 PM   SpO2 96 % 7/29/2019  2:03 PM   Vitals shown include unvalidated device data.      No case tracking events are documented in the log.      Pain/Dulce Score: No data recorded       Patient/surrogate refused vaccine

## 2019-09-27 NOTE — PATIENT PROFILE BEHAVIORAL HEALTH - PACKS PER DAY
Please inform patient ultrasound indicates that she has gallstones in possibly inflammation of the gallbladder  Suggest she sees general surgery to have her gallbladder removed  Please place consultation order 1

## 2020-08-09 NOTE — PROVIDER CONTACT NOTE (OTHER) - RECOMMENDATIONS
- increased home amlodipine from 5 mg to 10 mg daily  - BPs as high as 180s/110s  - Started prn hydralazine - possibly worsened headache  - Decreased IVF rate  - BP normalized   Advance diet as medically feasible

## 2020-10-08 NOTE — ED BEHAVIORAL HEALTH ASSESSMENT NOTE - NS ED BHA AXIS II PRIMARY CODE FT
----- Message from Lawrence Garrett sent at 10/8/2020 12:31 PM PDT -----  Regarding: Prescription Question  Contact: [phone]  I'm ready for a refill of Atorvastatin.    Can you please send a prescription to the [facility] at [phone]    Thanks Ed   [id number]

## 2020-10-20 NOTE — PROGRESS NOTE BEHAVIORAL HEALTH - NSBHCONSORIP_PSY_A_CORE
Last ov 1/28/20  Future appt 1/28/21  
Inpatient Admission...

## 2020-12-23 NOTE — PROGRESS NOTE BEHAVIORAL HEALTH - NSBHADMITMEDEDUDETAILS_A_CORE FT
[facility]OUN[facility]  FAMILY PRACTICE OFFICE VISIT    CHIEF COMPLAINT:    Chief Complaint   Patient presents with   • Cough     follow up       SUBJECTIVE:  Lolly Alvarado is a 65 year old female who presented requesting evaluation for     Had covid test positive on 11/17. Seen in UC on 12/17 for 6 days of cough and shortness of breath with exhaustion. She has a history of asthma. Diagnosed with bilateral pneumonia and treated with doxycycline.  Seen in the ED for continued SOB and had a repeat xray which showed clearing symptoms.Still getting easily winded. Improving since ED visit. Seen in ED on 12/2 and had a chest xray, troponin, and BNP. She is improving since that time. She has no chest pain. And no leg swelling. Her shortness of breath and fatigue are improving. She feels better than she felt earlier this week, and is improving. She still has a cough, which is productive.     Using her inhalers and nebulizer.     Is more sleepy than usual. Is about 50% better since being in the ED. With activity, makes her cough more. Can do it for awhile, not for too long.     No fevers. Not taking tessalon perles. Cough keeping her up at night.     Is having green congestion coming out with sputum, from her nose. And sputum is white. Sometimes coughing so hard. No sinus pressure. Just fatigued. Just tired.     Has congestion when laying down.     Still can't taste. Not eating well. Drinking water. Gatorade. Doesn't want to eat. No sense of taste, so doesn't want to eat.     No nausea. Only when coughing a long period of time.     REVIEW OF SYSTEMS:  See HPI    PROBLEM LIST:   Patient Active Problem List   Diagnosis   • Hypercholesterolemia   • Bronchospasm   • Other seborrheic keratosis   • BCC (basal cell carcinoma of skin)   • Floaters   • De Quervain's tenosynovitis, right   • Moderate major depression (CMS/HCC)   • Cherry angioma   • Peripheral edema   • Chronic kidney disease (CKD), stage III (moderate) 
(CMS/[facility])   • Essential hypertension   • Prediabetes   • Stress incontinence, female       PAST HISTORIES:   Current Outpatient Medications   Medication Sig Dispense Refill   • benzonatate (Tessalon Perles) 100 MG capsule Take 1 capsule by mouth 3 times daily as needed for Cough. 20 capsule 0   • albuterol 108 (90 Base) MCG/ACT inhaler Inhale 2 puffs into the lungs every 4 hours as needed for Wheezing. 8.5 g 0   • gabapentin (NEURONTIN) 300 MG capsule Take 1 capsule by mouth 3 times daily. 270 capsule 1   • ipratropium-albuterol (DUONEB) 0.5-2.5 (3) MG/3ML nebulizer solution Take 3 mLs by nebulization every 4 hours as needed for Wheezing or Shortness of Breath (cough). 360 mL 0   • albuterol (ProAir HFA) 108 (90 Base) MCG/ACT inhaler Inhale 2 puffs into the lungs every 4 hours as needed for Shortness of Breath, Wheezing or Other (cough). 1 Inhaler 0   • losartan (COZAAR) 25 MG tablet Take 1 tablet by mouth daily. 90 tablet 0   • lovastatin (MEVACOR) 40 MG tablet Take 1 tablet by mouth nightly. 90 tablet 1   • buPROPion (WELLBUTRIN SR) 150 MG 12 hr tablet Take 1 tablet by mouth 2 times daily. 180 tablet 0   • cholecalciferol (VITAMIN D3) 1000 UNITS tablet Take 1,000 Units by mouth daily.     • fish oil-omega-3 fatty acids 1000 MG CAPS Take  by mouth.       No current facility-administered medications for this visit.      Past Medical History:   Diagnosis Date   • Acute pharyngitis 5/26/2012   • Acute sinusitis 8/4/2011   • Arthritis    • Basal cell carcinoma    • Blood clot associated with vein wall inflammation 1980    right leg   • Bronchospasm 7/6/2012   • Chronic kidney disease     stage 3   • De Quervain's tenosynovitis, right 1/6/2015   • Depression    • Essential (primary) hypertension    • Floaters 12/23/2013   • Fracture     right knee, right hip, and right ankle   • Fracture     right arm   • Hypercholesterolemia 12/21/2011   • Left eye pain 12/21/2011   • Personal history of colonic polyps 03/15/2019    
hyperplastic   • Plantar wart of left foot 12/21/2011   • Pneumonia    • RAD (reactive airway disease)    • Urinary tract infection      Past Surgical History:   Procedure Laterality Date   • Colonoscopy diagnostic  03/15/2019    Dr. Stone HP removed recall due 3/2029   • Hysterectomy  PARTIAL   • Hysteroscopy  08/24/2020   • Occult blood test tube  7/13/10       OBJECTIVE:     PHYSICAL EXAM:    weight is 124.3 kg. Her tympanic temperature is 97.8 °F (36.6 °C). Her blood pressure is 124/82 and her pulse is 80. Her oxygen saturation is 96%.     Physical Exam  Constitutional:       Appearance: Normal appearance.   HENT:      Head: Normocephalic and atraumatic.      Right Ear: Tympanic membrane normal.      Left Ear: Tympanic membrane normal.      Nose: Congestion and rhinorrhea present.   Neck:      Musculoskeletal: Neck supple.   Cardiovascular:      Rate and Rhythm: Normal rate and regular rhythm.      Pulses: Normal pulses.      Heart sounds: No murmur.   Pulmonary:      Effort: Pulmonary effort is normal.      Breath sounds: No wheezing.      Comments: Normal respiratory rate  Tight lung sounds bilaterally   Chest:      Chest wall: No tenderness.   Abdominal:      General: Abdomen is flat.      Palpations: Abdomen is soft.      Tenderness: There is no right CVA tenderness or left CVA tenderness.   Musculoskeletal:      Right lower leg: No edema.      Left lower leg: No edema.      Comments: No leg swelling   Lymphadenopathy:      Cervical: No cervical adenopathy.   Skin:     General: Skin is warm.      Capillary Refill: Capillary refill takes less than 2 seconds.   Neurological:      General: No focal deficit present.      Mental Status: She is alert.   Psychiatric:         Mood and Affect: Mood normal.       CXR - resolving pneumonia     ASSESSMENT/PLAN:  1. Cough: she had covid in November as well as bilateral pneumonia. She does continue to have a cough and fatigue. Suspect likely post viral, but given the 
duration of her symptoms and that she reports only about 50% improvement, will treat with azithromycin. White blood count pending at time of note. She has no chest pain, no leg swelling, no orthopnea. She has a lot of congestion on exam. Symptoms have improved since ED visit, and there are no new symptoms since ED evaluation.  - XR CHEST PA AND LATERAL; Future  - CBC WITH DIFFERENTIAL; Future  - COMPREHENSIVE METABOLIC PANEL; Future  - THYROID STIMULATING HORMONE REFLEX; Future  - azithromycin and prednisone as prescribed. I will call her tomorrow with results    2. Bronchospasm: see above  - nebulizer every 4-6 hours scheduled  - add prednisone, side effects reviewed. She has required flovent in the past, will prescribe this as well as she may need it for 2-3 weeks     3. Abnormal urine odor  - URINALYSIS & REFLEX MICROSCOPY WITH CULTURE IF INDICATED; Future    Follow up pending results and response to above     Greater than 50% of this 25 minute visit was spent on counseling and coordination of care.     The patient indicated understanding of the diagnosis and agreed with the plan of care.     Isabelle Marroquin DO  12/23/20  4:56 PM      
Discussed rationale for dose adjustments
Discussed rationale for increasing Seroquel  use methadone prn to manage withdrawal  Pain management consult resubmitted.
Discussed rationale for dose adjustments
Discussed rationale for dose adjustments
Discussed rationale for increasing Seroquel  use methadone rpn to manage withdrawal  Pain management consult submitted.
Discussed rationale for starting lithium  use methadone prn for pain
Discussed rationale for starting lithium  use methadone prn for pain
Discussed rationale for dose adjustments
Discussed rationale for starting lithium  use methadone prn for pain
Discussed rationale for starting lithium  use methadone prn to manage withdrawal
pt is aware of the use of medication and of potential side effects
Discussed rationale for dose adjustments
Discussed rationale for increasing Seroquel  use methadone prn to manage withdrawal  Pain management consult resubmitted.
Discussed rationale for starting lithium  use methadone prn for pain
pt is aware of the use of medication and of potential side effects
Discussed rationale for increasing Seroquel

## 2021-01-20 NOTE — PROGRESS NOTE BEHAVIORAL HEALTH - PROBLEM SELECTOR PLAN 1
95
Lithium for mood stabilization and Seroquel for paranoia  Increase lithium to 300 mg am and 600 mg hs  Increase Seroquel to 400 mg
no further episodes.  HH stable.  continue with PPI PO q12h.  GI input appreciated.
Lithium for mood stabilization and Seroquel for paranoia
Lithium for mood stabilization and Seroquel for paranoia
Lithium for mood stabilization and Seroquel for paranoia  Increase lithium to 300 mg am and 600 mg hs  Increase Seroquel to 400 mg
Lithium for mood stabilization and Seroquel for paranoia  Increase lithium to 300 mg am and 600 mg hs  Increase Seroquel to 400 mg  Check lithium level
Depakote for mood stabilization and Seroquel for paranoia
Lithium for mood stabilization and Seroquel for paranoia
Lithium for mood stabilization and Seroquel for paranoia  Increase lithium to 600 mg am and 600 mg hs  Decrease Seroquel to 300 mg due to sedation  Check lithium level
Depakote for mood stabilization and Seroquel for paranoia
Lithium for mood stabilization and Seroquel for paranoia  Increase lithoum to 300 mg am and 600 mg hs  Increase Seroquel to 4oo mg
Depakote for mood stabilization and Seroquel for paranoia
Depakote for mood stabilization and Seroquel for paranoia
Lithium for mood stabilization and Seroquel for paranoia
no further episodes.  HH stable.  continue with PPI PO q12h.  GI input appreciated.
Depakote for mood stabilization and Seroquel for paranoia

## 2021-03-25 NOTE — ED ADULT TRIAGE NOTE - CADM TRG TX PRIOR TO ARRIVAL
Medical Record reviewed.  Patient is being discharged from [facility] Program.  Anthony Rosa has not been readmitted to [facility] in 30 days since his previous discharge.    HRTIC Episode of care closed.  Please feel free to call me with any questions.  Frank Muse RN, MSN  Transitional Care RN  [phone]     none

## 2021-04-21 NOTE — PATIENT PROFILE ADULT. - LONGEST PERIOD TOBACCO-FREE
Carac Pregnancy And Lactation Text: This medication is Pregnancy Category X and contraindicated in pregnancy and in women who may become pregnant. It is unknown if this medication is excreted in breast milk. 6 months

## 2021-07-06 ENCOUNTER — OUTPATIENT (OUTPATIENT)
Dept: OUTPATIENT SERVICES | Facility: HOSPITAL | Age: 51
LOS: 1 days | Discharge: HOME | End: 2021-07-06

## 2021-07-06 DIAGNOSIS — L02.419 CUTANEOUS ABSCESS OF LIMB, UNSPECIFIED: Chronic | ICD-10-CM

## 2021-07-07 DIAGNOSIS — F11.20 OPIOID DEPENDENCE, UNCOMPLICATED: ICD-10-CM

## 2021-09-15 NOTE — H&P ADULT - PROBLEM SELECTOR PLAN 1
declines one episode  likely due to recurrent vomiting  r/o esophageal tear, ulcer etc  NPO  IV fluid  Hold aspirin and plavix  PPI  GI consult

## 2021-09-21 NOTE — PROGRESS NOTE BEHAVIORAL HEALTH - NS ED BHA MSE SPEECH RATE
Alberto Blackman(Attending)
Slowed

## 2021-10-07 NOTE — DISCHARGE NOTE ADULT - NS AS DC FU [facility] LV FUNCTION AFTER DELIVERY
DIABETES EDUCATION PROGRESS NOTE    Miles Callahan was seen from 930 to  for diabetes self-management training in an individual setting. The instruction was given to the patient and daughter.    No group education available within 2 months.    Material was presented using verbal, written, demonstration and return demonstration. Food models and nutrition labels were used to demonstrate portion sizes and carbohydrate values.     Highlights of today's visit:  Patient reports that his BG monitor stopped working a week ago. He has not been monitoring his glucose during that time but has continued to take his insulin. Low BG overnight was noted on his karime. Recommend he decrease lantus to 20 units. Lunch dose of humalog was decreased to 5 units which has improved his post-prandial hypoglycemia. He may require a further decrease in insulin, no recent BG log are available to review.     Will observe patient place sensor today. System check was run on karime reader which passed. Patient thought he had a sensor on today, 13 days left was listed on reader. No sensor was found which is likely suggesting that the sensor placement may be the issue.     He is interested in changing to the karime 2 with alarms due to hypoglycemia. Will investigate if his karime 14 day reader is out of warranty. He is also potentially upgrading his phone and could use his cell phone as a reader which would allow for a louder alarm and BG reading to be spoke when scanning.    Follow-up in 1 month.     Blood glucose:  Date Breakfast Lunch Supper Bedtime    135 167 92 160    134 94 148 201    132 94 124 123    126 194 96 74    89 109 220 145    116 150 183 85    92 155       14 day average 132 mg/dl  30 day average 128 mg/dl    Time in target ( mg/dl)  64%   Above 18%  Below 18%    Diabetes Medication:  Current Diabetes Medications:     Humalo units breakfast, 5 units lunch, 6 units supper  Lantus 22 units --> 20  units every morning      Anthropometric Measurements:     Wt Readings from Last 4 Encounters:   09/30/21 95 kg (209 lb 7 oz)   09/28/21 95.9 kg (211 lb 8 oz)   07/20/21 92.8 kg (204 lb 9.6 oz)   07/14/21 93 kg (205 lb)         A1c   Hemoglobin A1C (%)   Date Value   08/04/2021 11.2 (H)       Recommendations/ Plan: Pt will continue to work on their personalized plan, follow the recommendations for self-management of diabetes, and bring blood sugar readings and HgA1c into an acceptable range. Follow up with the educator as needed.    See Patient Instructions and Diabetes Care Plan for further information    The information trained, information reviewed, patient's goal, and the patient's learning needs that will be addressed at subsequent visits are as follows:    Diabetes Care Plan   Diabetes Care Plan    Type of Instruction: Individual Followup               Type of Education: Post program    Length of visit: 30 minutes    Barriers to self care: Injectable med, Physical   ----------------------------------------------------------------------------------------------------------------  1. Diabetes disease process/overview and treatment options  c.  Importance of diabetes control, ongoing education, and possible treatment changes/options: Instructed/competent  2. Monitoring  c. Action for results outside target range: Instructed/competent  d. A1c define, state goal, test schedule: Instructed/competent  3. Diabetes Medications  a. Teaching medications list: Insulin administration (dose, schedule, action, side effects, site selection), Insulin adjustment     Instructed/competent         b. Medication adjustments/supplements, which may include meals, activity changes, travel, surgery: Instructed/competent  4. Physical Activity  5. Psychosocial Strategies  6. Nutrition  b. Understanding of nutrition labels: Instructed/competent  c. Understanding of personalized meal plan: Instructed/competent  7. Acute/Chronic Complications  (prevention, detection, treatment)  a.  Hypoglycemia (risk, causes, signs, symptoms, treatment and prevention): Instructed/competent  c. Problem solving:  Including when to contact physician/diabetes care team/Need for medical identification use: Instructed/competent  f.  Hyperglycemia (risk, causes, signs, symptoms, treatment, prevention): Instructed/competent  i.  Risk reduction strategies for prevention and treatment of nephropathy, retinopathy, neuropathy, frequent infections and cardiovascular disease: Instructed/competent  8. Strategies to Promote Health/Behavioral Changes  a. Problem solving strategies: Instructed/competent  b. Behavioral changes: Instructed/competent  9. Insulin Pump  Insulin Pump: Infusion set insertion & set change, alerts & alarms, site locations & care, setting basal and bolus rates, operating advanced settings, actions for glucose results outside target range: Not applicable  10. Continuous Glucose Monitoring  Continuous Glucose Monitoring: Purpose, correct sensor location, insertion & care, alerts & alarms, calibration, , actions for glucose results outside target range: Able to perform skill/verbalize  11. Diabetes Self-Management Support Plan  PCP/Endocrinology Appointment, Diabetes Educator Contact Number, Family Support  12. Goals  Monitoring 4=% Remote upload farrah for review. Farrah was provided today. WIll continue with sending BG every 2 weeks                 Please contact me with any questions or concerns:   Dottie James, RD, CDE, MS, CD, BC-ADM  Diabetes Education  [phone]    Report sent to referring provider  MD order is located in the patient's Epic chart      yes

## 2022-01-03 NOTE — ED ADULT NURSE NOTE - PT NEEDS ASSIST
Consent: Written consent obtained. Risks include but not limited to lid/brow ptosis, bruising, swelling, diplopia, temporary effect, incomplete chemical denervation. no

## 2022-01-19 NOTE — PROGRESS NOTE ADULT - PROBLEM/PLAN-5
Price (Do Not Change): 0.00
Instructions: This plan will send the code FBSE to the PM system.  DO NOT or CHANGE the price.
Detail Level: Generalized
DISPLAY PLAN FREE TEXT

## 2022-02-10 NOTE — DIETITIAN INITIAL EVALUATION ADULT. - NS FNS SUPPLEMENTS
Per routing comment:    Ok to change to januvia 100 mg every day .     Renato Armijo MD on 2/10/2022 at 11:14 AM    8oz. TID/Ensure Enlive®

## 2022-05-16 NOTE — ED BEHAVIORAL HEALTH ASSESSMENT NOTE - NS ED BHA HOMICIDALITY PRESENT AGGRESSION PROPERTY PAST MONTH
Yes Sarecycline Counseling: Patient advised regarding possible photosensitivity and discoloration of the teeth, skin, lips, tongue and gums.  Patient instructed to avoid sunlight, if possible.  When exposed to sunlight, patients should wear protective clothing, sunglasses, and sunscreen.  The patient was instructed to call the office immediately if the following severe adverse effects occur:  hearing changes, easy bruising/bleeding, severe headache, or vision changes.  The patient verbalized understanding of the proper use and possible adverse effects of sarecycline.  All of the patient's questions and concerns were addressed.

## 2022-09-13 NOTE — ED BEHAVIORAL HEALTH ASSESSMENT NOTE - DESCRIPTION
Maintained control,cooperative.  He has not been maintained on CO in the ED.  Given several medications for N/V and pain. states hx "fx back, RA, tendonitis, emphysema, HepB, Hep C, neuropathy L foot Recieves SSD, has apartment, wife lives in [place]. Isloated from adult child Cyclophosphamide Counseling:  I discussed with the patient the risks of cyclophosphamide including but not limited to hair loss, hormonal abnormalities, decreased fertility, abdominal pain, diarrhea, nausea and vomiting, bone marrow suppression and infection. The patient understands that monitoring is required while taking this medication.

## 2023-06-03 NOTE — H&P ADULT - ASSESSMENT
46 year old homeless male with history of chronic HCV, thrombocytopenia, anemia, rheumatoid arthritis, spinal fracture and peripheral neuropathy, hyperlipidemia, CAD s/p balloon angioplasty in past and recent negative coronary cath, hypertension, bipolar disorder, COPD, tobacco smoking, opioid abuse who was discharged on 1/31/17 from  (admitted for chest pain and had negative cath) then went to [facility] for a scheduled sleep study but c/o nausea and vomiting after not being given dilaudid and morphine, got admitted at [facility], was seen by GI there and was offered methadone and further testing to r/o other causes of hepatitis but refused all treatment and signed out AMA, ran out of his opioid prescription presents to ER with one episode of blood tinged vomitus with brown particles and RUQ and epigastric abdominal pain.  He also c/o constipation and orange urine.     Pt was seen in ER.  Appears comfortable.  Denies having further vomiting or nausea in ER or blood in stool/urine/vomit.  Pt denies fever/chills/diarrhea/SOB/cough/chest pain or skin rash.
8.13

## 2023-12-04 ENCOUNTER — INPATIENT (INPATIENT)
Facility: HOSPITAL | Age: 53
LOS: 29 days | Discharge: NURSING, REHAB OR LONG-TERM CARE | End: 2024-01-03
Attending: INTERNAL MEDICINE | Admitting: INTERNAL MEDICINE
Payer: MEDICAID

## 2023-12-04 VITALS
HEIGHT: 74 IN | SYSTOLIC BLOOD PRESSURE: 105 MMHG | RESPIRATION RATE: 20 BRPM | DIASTOLIC BLOOD PRESSURE: 59 MMHG | OXYGEN SATURATION: 95 % | TEMPERATURE: 100 F | HEART RATE: 87 BPM | WEIGHT: 199.96 LBS

## 2023-12-04 DIAGNOSIS — L02.419 CUTANEOUS ABSCESS OF LIMB, UNSPECIFIED: Chronic | ICD-10-CM

## 2023-12-04 LAB
ALBUMIN SERPL ELPH-MCNC: 1.8 G/DL — LOW (ref 3.3–5)
ALBUMIN SERPL ELPH-MCNC: 1.8 G/DL — LOW (ref 3.3–5)
ALP SERPL-CCNC: 227 U/L — HIGH (ref 40–120)
ALP SERPL-CCNC: 227 U/L — HIGH (ref 40–120)
ALT FLD-CCNC: 44 U/L — SIGNIFICANT CHANGE UP (ref 12–78)
ALT FLD-CCNC: 44 U/L — SIGNIFICANT CHANGE UP (ref 12–78)
ANION GAP SERPL CALC-SCNC: 2 MMOL/L — LOW (ref 5–17)
ANION GAP SERPL CALC-SCNC: 2 MMOL/L — LOW (ref 5–17)
APPEARANCE UR: ABNORMAL
APPEARANCE UR: ABNORMAL
APTT BLD: 33.4 SEC — SIGNIFICANT CHANGE UP (ref 24.5–35.6)
APTT BLD: 33.4 SEC — SIGNIFICANT CHANGE UP (ref 24.5–35.6)
AST SERPL-CCNC: 33 U/L — SIGNIFICANT CHANGE UP (ref 15–37)
AST SERPL-CCNC: 33 U/L — SIGNIFICANT CHANGE UP (ref 15–37)
BACTERIA # UR AUTO: ABNORMAL /HPF
BACTERIA # UR AUTO: ABNORMAL /HPF
BASOPHILS # BLD AUTO: 0.03 K/UL — SIGNIFICANT CHANGE UP (ref 0–0.2)
BASOPHILS # BLD AUTO: 0.03 K/UL — SIGNIFICANT CHANGE UP (ref 0–0.2)
BASOPHILS NFR BLD AUTO: 0.2 % — SIGNIFICANT CHANGE UP (ref 0–2)
BASOPHILS NFR BLD AUTO: 0.2 % — SIGNIFICANT CHANGE UP (ref 0–2)
BILIRUB SERPL-MCNC: 0.4 MG/DL — SIGNIFICANT CHANGE UP (ref 0.2–1.2)
BILIRUB SERPL-MCNC: 0.4 MG/DL — SIGNIFICANT CHANGE UP (ref 0.2–1.2)
BILIRUB UR-MCNC: NEGATIVE — SIGNIFICANT CHANGE UP
BILIRUB UR-MCNC: NEGATIVE — SIGNIFICANT CHANGE UP
BLD GP AB SCN SERPL QL: SIGNIFICANT CHANGE UP
BLD GP AB SCN SERPL QL: SIGNIFICANT CHANGE UP
BUN SERPL-MCNC: 13 MG/DL — SIGNIFICANT CHANGE UP (ref 7–23)
BUN SERPL-MCNC: 13 MG/DL — SIGNIFICANT CHANGE UP (ref 7–23)
CALCIUM SERPL-MCNC: 9.1 MG/DL — SIGNIFICANT CHANGE UP (ref 8.5–10.1)
CALCIUM SERPL-MCNC: 9.1 MG/DL — SIGNIFICANT CHANGE UP (ref 8.5–10.1)
CHLORIDE SERPL-SCNC: 104 MMOL/L — SIGNIFICANT CHANGE UP (ref 96–108)
CHLORIDE SERPL-SCNC: 104 MMOL/L — SIGNIFICANT CHANGE UP (ref 96–108)
CO2 SERPL-SCNC: 34 MMOL/L — HIGH (ref 22–31)
CO2 SERPL-SCNC: 34 MMOL/L — HIGH (ref 22–31)
COLOR SPEC: YELLOW — SIGNIFICANT CHANGE UP
COLOR SPEC: YELLOW — SIGNIFICANT CHANGE UP
CREAT SERPL-MCNC: 0.67 MG/DL — SIGNIFICANT CHANGE UP (ref 0.5–1.3)
CREAT SERPL-MCNC: 0.67 MG/DL — SIGNIFICANT CHANGE UP (ref 0.5–1.3)
CRP SERPL-MCNC: 84 MG/L — HIGH
CRP SERPL-MCNC: 84 MG/L — HIGH
DIFF PNL FLD: ABNORMAL
DIFF PNL FLD: ABNORMAL
EGFR: 112 ML/MIN/1.73M2 — SIGNIFICANT CHANGE UP
EGFR: 112 ML/MIN/1.73M2 — SIGNIFICANT CHANGE UP
EOSINOPHIL # BLD AUTO: 0.16 K/UL — SIGNIFICANT CHANGE UP (ref 0–0.5)
EOSINOPHIL # BLD AUTO: 0.16 K/UL — SIGNIFICANT CHANGE UP (ref 0–0.5)
EOSINOPHIL NFR BLD AUTO: 1 % — SIGNIFICANT CHANGE UP (ref 0–6)
EOSINOPHIL NFR BLD AUTO: 1 % — SIGNIFICANT CHANGE UP (ref 0–6)
EPI CELLS # UR: PRESENT
EPI CELLS # UR: PRESENT
ERYTHROCYTE [SEDIMENTATION RATE] IN BLOOD: 91 MM/HR — SIGNIFICANT CHANGE UP (ref 0–20)
ERYTHROCYTE [SEDIMENTATION RATE] IN BLOOD: 91 MM/HR — SIGNIFICANT CHANGE UP (ref 0–20)
GLUCOSE SERPL-MCNC: 118 MG/DL — HIGH (ref 70–99)
GLUCOSE SERPL-MCNC: 118 MG/DL — HIGH (ref 70–99)
GLUCOSE UR QL: NEGATIVE MG/DL — SIGNIFICANT CHANGE UP
GLUCOSE UR QL: NEGATIVE MG/DL — SIGNIFICANT CHANGE UP
HCT VFR BLD CALC: 35.2 % — LOW (ref 39–50)
HCT VFR BLD CALC: 35.2 % — LOW (ref 39–50)
HGB BLD-MCNC: 10.5 G/DL — LOW (ref 13–17)
HGB BLD-MCNC: 10.5 G/DL — LOW (ref 13–17)
IMM GRANULOCYTES NFR BLD AUTO: 0.4 % — SIGNIFICANT CHANGE UP (ref 0–0.9)
IMM GRANULOCYTES NFR BLD AUTO: 0.4 % — SIGNIFICANT CHANGE UP (ref 0–0.9)
INR BLD: 1.16 RATIO — SIGNIFICANT CHANGE UP (ref 0.85–1.18)
INR BLD: 1.16 RATIO — SIGNIFICANT CHANGE UP (ref 0.85–1.18)
KETONES UR-MCNC: NEGATIVE MG/DL — SIGNIFICANT CHANGE UP
KETONES UR-MCNC: NEGATIVE MG/DL — SIGNIFICANT CHANGE UP
LACTATE SERPL-SCNC: 1.2 MMOL/L — SIGNIFICANT CHANGE UP (ref 0.7–2)
LACTATE SERPL-SCNC: 1.2 MMOL/L — SIGNIFICANT CHANGE UP (ref 0.7–2)
LEUKOCYTE ESTERASE UR-ACNC: ABNORMAL
LEUKOCYTE ESTERASE UR-ACNC: ABNORMAL
LYMPHOCYTES # BLD AUTO: 1.64 K/UL — SIGNIFICANT CHANGE UP (ref 1–3.3)
LYMPHOCYTES # BLD AUTO: 1.64 K/UL — SIGNIFICANT CHANGE UP (ref 1–3.3)
LYMPHOCYTES # BLD AUTO: 10.8 % — LOW (ref 13–44)
LYMPHOCYTES # BLD AUTO: 10.8 % — LOW (ref 13–44)
MCHC RBC-ENTMCNC: 26.7 PG — LOW (ref 27–34)
MCHC RBC-ENTMCNC: 26.7 PG — LOW (ref 27–34)
MCHC RBC-ENTMCNC: 29.8 G/DL — LOW (ref 32–36)
MCHC RBC-ENTMCNC: 29.8 G/DL — LOW (ref 32–36)
MCV RBC AUTO: 89.6 FL — SIGNIFICANT CHANGE UP (ref 80–100)
MCV RBC AUTO: 89.6 FL — SIGNIFICANT CHANGE UP (ref 80–100)
MONOCYTES # BLD AUTO: 1.71 K/UL — HIGH (ref 0–0.9)
MONOCYTES # BLD AUTO: 1.71 K/UL — HIGH (ref 0–0.9)
MONOCYTES NFR BLD AUTO: 11.2 % — SIGNIFICANT CHANGE UP (ref 2–14)
MONOCYTES NFR BLD AUTO: 11.2 % — SIGNIFICANT CHANGE UP (ref 2–14)
NEUTROPHILS # BLD AUTO: 11.65 K/UL — HIGH (ref 1.8–7.4)
NEUTROPHILS # BLD AUTO: 11.65 K/UL — HIGH (ref 1.8–7.4)
NEUTROPHILS NFR BLD AUTO: 76.4 % — SIGNIFICANT CHANGE UP (ref 43–77)
NEUTROPHILS NFR BLD AUTO: 76.4 % — SIGNIFICANT CHANGE UP (ref 43–77)
NITRITE UR-MCNC: POSITIVE
NITRITE UR-MCNC: POSITIVE
NRBC # BLD: 0 /100 WBCS — SIGNIFICANT CHANGE UP (ref 0–0)
NRBC # BLD: 0 /100 WBCS — SIGNIFICANT CHANGE UP (ref 0–0)
PH UR: 6 — SIGNIFICANT CHANGE UP (ref 5–8)
PH UR: 6 — SIGNIFICANT CHANGE UP (ref 5–8)
PLATELET # BLD AUTO: 206 K/UL — SIGNIFICANT CHANGE UP (ref 150–400)
PLATELET # BLD AUTO: 206 K/UL — SIGNIFICANT CHANGE UP (ref 150–400)
POTASSIUM SERPL-MCNC: 4.2 MMOL/L — SIGNIFICANT CHANGE UP (ref 3.5–5.3)
POTASSIUM SERPL-MCNC: 4.2 MMOL/L — SIGNIFICANT CHANGE UP (ref 3.5–5.3)
POTASSIUM SERPL-SCNC: 4.2 MMOL/L — SIGNIFICANT CHANGE UP (ref 3.5–5.3)
POTASSIUM SERPL-SCNC: 4.2 MMOL/L — SIGNIFICANT CHANGE UP (ref 3.5–5.3)
PROT SERPL-MCNC: 8 GM/DL — SIGNIFICANT CHANGE UP (ref 6–8.3)
PROT SERPL-MCNC: 8 GM/DL — SIGNIFICANT CHANGE UP (ref 6–8.3)
PROT UR-MCNC: 30 MG/DL
PROT UR-MCNC: 30 MG/DL
PROTHROM AB SERPL-ACNC: 13.8 SEC — HIGH (ref 9.5–13)
PROTHROM AB SERPL-ACNC: 13.8 SEC — HIGH (ref 9.5–13)
RAPID RVP RESULT: SIGNIFICANT CHANGE UP
RAPID RVP RESULT: SIGNIFICANT CHANGE UP
RBC # BLD: 3.93 M/UL — LOW (ref 4.2–5.8)
RBC # BLD: 3.93 M/UL — LOW (ref 4.2–5.8)
RBC # FLD: 16.6 % — HIGH (ref 10.3–14.5)
RBC # FLD: 16.6 % — HIGH (ref 10.3–14.5)
RBC CASTS # UR COMP ASSIST: >50 /HPF — SIGNIFICANT CHANGE UP (ref 0–4)
RBC CASTS # UR COMP ASSIST: >50 /HPF — SIGNIFICANT CHANGE UP (ref 0–4)
SARS-COV-2 RNA SPEC QL NAA+PROBE: SIGNIFICANT CHANGE UP
SARS-COV-2 RNA SPEC QL NAA+PROBE: SIGNIFICANT CHANGE UP
SODIUM SERPL-SCNC: 140 MMOL/L — SIGNIFICANT CHANGE UP (ref 135–145)
SODIUM SERPL-SCNC: 140 MMOL/L — SIGNIFICANT CHANGE UP (ref 135–145)
SP GR SPEC: 1.02 — SIGNIFICANT CHANGE UP (ref 1–1.03)
SP GR SPEC: 1.02 — SIGNIFICANT CHANGE UP (ref 1–1.03)
UROBILINOGEN FLD QL: 1 MG/DL — SIGNIFICANT CHANGE UP (ref 0.2–1)
UROBILINOGEN FLD QL: 1 MG/DL — SIGNIFICANT CHANGE UP (ref 0.2–1)
WBC # BLD: 15.25 K/UL — HIGH (ref 3.8–10.5)
WBC # BLD: 15.25 K/UL — HIGH (ref 3.8–10.5)
WBC # FLD AUTO: 15.25 K/UL — HIGH (ref 3.8–10.5)
WBC # FLD AUTO: 15.25 K/UL — HIGH (ref 3.8–10.5)
WBC UR QL: SIGNIFICANT CHANGE UP /HPF (ref 0–5)
WBC UR QL: SIGNIFICANT CHANGE UP /HPF (ref 0–5)

## 2023-12-04 PROCEDURE — 73590 X-RAY EXAM OF LOWER LEG: CPT | Mod: 26,50

## 2023-12-04 PROCEDURE — 73701 CT LOWER EXTREMITY W/DYE: CPT | Mod: 26,50,MA

## 2023-12-04 PROCEDURE — 93010 ELECTROCARDIOGRAM REPORT: CPT

## 2023-12-04 PROCEDURE — 73630 X-RAY EXAM OF FOOT: CPT | Mod: 26,50

## 2023-12-04 PROCEDURE — 71045 X-RAY EXAM CHEST 1 VIEW: CPT | Mod: 26

## 2023-12-04 PROCEDURE — 99285 EMERGENCY DEPT VISIT HI MDM: CPT

## 2023-12-04 RX ORDER — SODIUM CHLORIDE 9 MG/ML
2800 INJECTION, SOLUTION INTRAVENOUS ONCE
Refills: 0 | Status: COMPLETED | OUTPATIENT
Start: 2023-12-04 | End: 2023-12-04

## 2023-12-04 RX ORDER — MORPHINE SULFATE 50 MG/1
4 CAPSULE, EXTENDED RELEASE ORAL ONCE
Refills: 0 | Status: DISCONTINUED | OUTPATIENT
Start: 2023-12-04 | End: 2023-12-04

## 2023-12-04 RX ORDER — IPRATROPIUM/ALBUTEROL SULFATE 18-103MCG
3 AEROSOL WITH ADAPTER (GRAM) INHALATION ONCE
Refills: 0 | Status: COMPLETED | OUTPATIENT
Start: 2023-12-04 | End: 2023-12-04

## 2023-12-04 RX ORDER — VANCOMYCIN HCL 1 G
1000 VIAL (EA) INTRAVENOUS ONCE
Refills: 0 | Status: COMPLETED | OUTPATIENT
Start: 2023-12-04 | End: 2023-12-04

## 2023-12-04 RX ORDER — ACETAMINOPHEN 500 MG
1000 TABLET ORAL ONCE
Refills: 0 | Status: COMPLETED | OUTPATIENT
Start: 2023-12-04 | End: 2023-12-04

## 2023-12-04 RX ORDER — PIPERACILLIN AND TAZOBACTAM 4; .5 G/20ML; G/20ML
3.38 INJECTION, POWDER, LYOPHILIZED, FOR SOLUTION INTRAVENOUS ONCE
Refills: 0 | Status: COMPLETED | OUTPATIENT
Start: 2023-12-04 | End: 2023-12-04

## 2023-12-04 RX ORDER — OXYCODONE HYDROCHLORIDE 5 MG/1
5 TABLET ORAL ONCE
Refills: 0 | Status: DISCONTINUED | OUTPATIENT
Start: 2023-12-04 | End: 2023-12-04

## 2023-12-04 RX ADMIN — MORPHINE SULFATE 4 MILLIGRAM(S): 50 CAPSULE, EXTENDED RELEASE ORAL at 23:49

## 2023-12-04 RX ADMIN — Medication 400 MILLIGRAM(S): at 17:34

## 2023-12-04 RX ADMIN — Medication 1000 MILLIGRAM(S): at 18:18

## 2023-12-04 RX ADMIN — Medication 3 MILLILITER(S): at 22:32

## 2023-12-04 RX ADMIN — PIPERACILLIN AND TAZOBACTAM 200 GRAM(S): 4; .5 INJECTION, POWDER, LYOPHILIZED, FOR SOLUTION INTRAVENOUS at 18:03

## 2023-12-04 RX ADMIN — PIPERACILLIN AND TAZOBACTAM 3.38 GRAM(S): 4; .5 INJECTION, POWDER, LYOPHILIZED, FOR SOLUTION INTRAVENOUS at 19:40

## 2023-12-04 RX ADMIN — SODIUM CHLORIDE 2800 MILLILITER(S): 9 INJECTION, SOLUTION INTRAVENOUS at 17:33

## 2023-12-04 RX ADMIN — Medication 1000 MILLIGRAM(S): at 23:49

## 2023-12-04 RX ADMIN — MORPHINE SULFATE 4 MILLIGRAM(S): 50 CAPSULE, EXTENDED RELEASE ORAL at 23:19

## 2023-12-04 RX ADMIN — MORPHINE SULFATE 4 MILLIGRAM(S): 50 CAPSULE, EXTENDED RELEASE ORAL at 20:38

## 2023-12-04 RX ADMIN — Medication 400 MILLIGRAM(S): at 23:19

## 2023-12-04 RX ADMIN — Medication 250 MILLIGRAM(S): at 20:39

## 2023-12-04 RX ADMIN — MORPHINE SULFATE 4 MILLIGRAM(S): 50 CAPSULE, EXTENDED RELEASE ORAL at 22:32

## 2023-12-04 NOTE — ED PROVIDER NOTE - NSICDXPASTMEDICALHX_GEN_ALL_CORE_FT
PAST MEDICAL HISTORY:  Acid reflux     Anxiety     Back injury     CAD (coronary artery disease)     CAD (coronary artery disease)     CAD (coronary artery disease)     Chronic back pain     Chronic pain     COPD (chronic obstructive pulmonary disease)     Hep C w/o coma, chronic     High cholesterol     HTN (hypertension)     MI (myocardial infarction)     Mitral valve disorder     Mitral valve prolapse     Pain management

## 2023-12-04 NOTE — CONSULT NOTE ADULT - NS ATTEND AMEND GEN_ALL_CORE FT
The patient is seen and examiend. 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 No exposed Bone, some slough, mechanical debriedment is done. The left foot is with severe defromity due to pressure and contractures, Wound Rx by podiatrist. The patient syas that he was offered Amputation in past, but he does not want it.  plan--santyl to [facility] hip wound and foot care as per podiatry. The patient is seen and examiend. 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 No exposed Bone, some slough, mechanical debriedment is done. The left foot is with severe defromity due to pressure and contractures, Wound Rx by podiatrist. The patient syas that he was offered Amputation in past, but he does not want it.  plan--santyl to [facility] hip wound and foot care as per podiatry.

## 2023-12-04 NOTE — ED ADULT NURSE NOTE - OBJECTIVE STATEMENT
patient came here from nursing home for wound care/wound getting worse, patient states he gets wound care every day or every other day but last wound care he had was 5 days ago, noted multiple wounds on bilateral lower legs/foot, noted pressure injury to right buttock. patient is contracted on lower legs, bed bound, alert and oriented x4

## 2023-12-04 NOTE — ED PROVIDER NOTE - CLINICAL SUMMARY MEDICAL DECISION MAKING FREE TEXT BOX
pt came in by ems from [facility] for unhealing wounds to the right hip and bilateral feet for months. pt is bed bound with bilateral legs contracted. rectal temp is 101. vascular sx PA and podiatry resident are notified. sepsis work up sent iv antibiotics are ordered. pt's care is signed out to the next team. pt came in by ems from [facility] for unhealing wounds to the right hip and bilateral feet for months. pt is bed bound with bilateral legs contracted. rectal temp is 101. vascular sx PA and podiatry resident are notified. sepsis work up sent iv antibiotics are ordered. pt's care is signed out to the next team.

## 2023-12-04 NOTE — ED PROVIDER NOTE - OBJECTIVE STATEMENT
53 years old male by ems from [facility] c/o infected wound to the right hip. bilateral feet. Pt however is alert and oriented x 3 sts he has been bed bound with bilateral legs contractured after the cervical surgery years ago. Pt has a hx of diabetes. Pt denies headache, dizziness, blurred visions, light sensitivities, focal/distal weakness or numbness, neck/back pain/ calfs pain, cough, sob, chest pain, nausea vomiting, abd pain. Pt came in with west catheter with straw color of urine in the bag. according to transfer papers pt has had unhealed wounds for months. according to the transfer paper the diabetes box is checked. 53 years old male by ems from [facility] c/o infected wound to the right hip. bilateral feet. Pt however is alert and oriented x 3 sts he has been bed bound with bilateral legs contractured after the cervical surgery years ago. Pt has a hx of diabetes. Pt denies headache, dizziness, blurred visions, light sensitivities, focal/distal weakness or numbness, neck/back pain/ calfs pain, cough, sob, chest pain, nausea vomiting, abd pain. Pt came in with west catheter with straw color of urine in the bag. according to transfer papers pt has had unhealed wounds for months. according to the transfer paper the diabetes box is checked.

## 2023-12-04 NOTE — ED PROVIDER NOTE - PHYSICAL EXAMINATION
Skin - one stage 4 decubitus right hip with gangrene and multiple wounds with gangrene and foul smell to bilateral feet

## 2023-12-04 NOTE — ED PROVIDER NOTE - NEUROLOGICAL NECK
need for outpatient follow-up/return to ED if symptoms worsen, persist or questions arise normal/non-tender/no lymphadenopathy/no nuchal rigidity

## 2023-12-04 NOTE — CONSULT NOTE ADULT - ASSESSMENT
53M with right hip wound and bilateral lower extremity foot wounds. Temp 101. 2+peripheral pedal pulses.    Wounds may benefit from debridement  Dr. Tete perrin in AM  Discussed with Dr. Epstein

## 2023-12-04 NOTE — CONSULT NOTE ADULT - SUBJECTIVE AND OBJECTIVE BOX
Patient seen and examined at bedside. Patient is a 53M hx of CAD, COPD, HTN, HLD. anxiety, is bedbound, paraplegic, with bilateral lower extremity wounds, and right hip wound. Patient sent in from nursing home.  S/P cervical surgery multiple years ago. Complaining of bilateral leg pain.      PMH/PSH:PAST MEDICAL & SURGICAL HISTORY:  High cholesterol      Anxiety      Back injury      CAD (coronary artery disease)      Acid reflux      Mitral valve prolapse      COPD (chronic obstructive pulmonary disease)      Hep C w/o coma, chronic      HTN (hypertension)      CAD (coronary artery disease)      Chronic back pain      MI (myocardial infarction)      Chronic pain      Pain management      CAD (coronary artery disease)      Mitral valve disorder      Abscess of arm      No significant past surgical history          Allergies:  NSAIDs (Flushing; Other (Moderate))  Haldol (Anaphylaxis)  Zyprexa (Rash; Dystonic RXN; Hives)  Motrin (Anaphylaxis)  Thorazine (Other (Moderate))  Aleve (Unknown)  Stelazine (Unknown)  Risperdal (Short breath; Rash; Hives)      Medications:  acetaminophen   IVPB .. 1000 milliGRAM(s) IV Intermittent Once  lactated ringers Bolus 2800 milliLiter(s) IV Bolus once  piperacillin/tazobactam IVPB... 3.375 Gram(s) IV Intermittent once  vancomycin  IVPB. 1000 milliGRAM(s) IV Intermittent once      REVIEW OF SYSTEMS:  All other review of systems is negative unless indicated above.    Relevant Family History:   FAMILY HISTORY:  No pertinent family history        Relevant Social History:  Denies ETOH or tobacco history    Physical Exam:    Vital Signs:  Vital Signs Last 24 Hrs  T(C): 38.3 (04 Dec 2023 16:55), Max: 38.3 (04 Dec 2023 16:55)  T(F): 101 (04 Dec 2023 16:55), Max: 101 (04 Dec 2023 16:55)  HR: 87 (04 Dec 2023 16:18) (87 - 87)  BP: 105/59 (04 Dec 2023 16:18) (105/59 - 105/59)  RR: 20 (04 Dec 2023 16:18) (20 - 20)  SpO2: 95% (04 Dec 2023 16:18) (95% - 95%)    Parameters below as of 04 Dec 2023 16:18  Patient On (Oxygen Delivery Method): nasal cannula  O2 Flow (L/min): 3    Daily Height in cm: 187.96 (04 Dec 2023 16:18)    Daily     Constitutional: NAD  HEENT: NC/AT, PERRL, EOMI, anicteric sclera, no nasal discharge  Neck: supple; no JVD or thyromegaly  Respiratory: Good inspiratory effort, no respiratory distress  Cardiac: +S1/S2  Gastrointestinal: soft, NT/ND; no rebound or guarding, colostomy in place with air and stool in the bag.  Extremities: L foot with wound on left dorsal foot, right posterior ankle wound, and multiple wounds of right foot, stage 4  Musculoskeletal:  no joint swelling, tenderness or erythema  Vascular: 2+ radial, femoral, DP/PT pulses B/L  : Holcomb in place with dark yellow cloudy urine  Skin: right hip wound, stage IV  Neurologic: AAOx3

## 2023-12-04 NOTE — ED ADULT NURSE NOTE - NSFALLRISKINTERV_ED_ALL_ED
Assistance OOB with selected safe patient handling equipment if applicable/Assistance with ambulation/Communicate fall risk and risk factors to all staff, patient, and family/Monitor gait and stability/Provide visual cue: yellow wristband, yellow gown, etc/Reinforce activity limits and safety measures with patient and family/Call bell, personal items and telephone in reach/Instruct patient to call for assistance before getting out of bed/chair/stretcher/Non-slip footwear applied when patient is off stretcher/[place] to call system/Physically safe environment - no spills, clutter or unnecessary equipment/Purposeful Proactive Rounding/Room/bathroom lighting operational, light cord in reach Assistance OOB with selected safe patient handling equipment if applicable/Assistance with ambulation/Communicate fall risk and risk factors to all staff, patient, and family/Monitor gait and stability/Provide visual cue: yellow wristband, yellow gown, etc/Reinforce activity limits and safety measures with patient and family/Call bell, personal items and telephone in reach/Instruct patient to call for assistance before getting out of bed/chair/stretcher/Non-slip footwear applied when patient is off stretcher/[place] to call system/Physically safe environment - no spills, clutter or unnecessary equipment/Purposeful Proactive Rounding/Room/bathroom lighting operational, light cord in reach

## 2023-12-04 NOTE — ED ADULT NURSE NOTE - CHIEF COMPLAINT QUOTE
as per EMS " coming from [facility] with muliple wounds, sent by Wound Specialist for wound check" [ bed bound, HTN, Diabetes, GERD, HDL, Emphysema] as per EMS " coming from [facility] with muliple wounds, sent by Wound Specialist for wound check" [ bed bound, HTN, Diabetes, GERD, HDL, Emphysema]

## 2023-12-04 NOTE — CONSULT NOTE ADULT - ASSESSMENT
54 yo M  with R heel wound to bone, L foot with dorsal eschar with extensive periwound erythema  - Pt seen and evaluated.  - Afebrile, WBC 15.25, ESR 91, CRP pending  - R foot heel wound to bone, necrotic soft, mild malodor, minimal serosanguinous drainage. DTI along the medial malleolus, eschar along distal tibia, periwound erythema, L Foot dorsal eschar, periwound extensive erythema, mild malodor, distal dorsal 5th DTI, b/l +3 pitting edema extend from the toes to the level of ankle  - After obtaining verbal consent, using 11 blade, escharectomy of the left foot dorsal eschar, no further tracking or probing, necrotic soft tissue, no purulence noted.  - Right foot X-ray: no gas, Calcaneal erosion (resident's read)  - Left foot wound culture obtained and sent  - [facility] recs appreciated  - ordered L foot xray  - Pod plan: Local wound care pending [facility] recs  - Discussed with Attending  52 yo M  with R heel wound to bone, L foot with dorsal eschar with extensive periwound erythema  - Pt seen and evaluated.  - Afebrile, WBC 15.25, ESR 91, CRP pending  - R foot heel wound to bone, necrotic soft, mild malodor, minimal serosanguinous drainage. DTI along the medial malleolus, eschar along distal tibia, periwound erythema, L Foot dorsal eschar, periwound extensive erythema, mild malodor, distal dorsal 5th DTI, b/l +3 pitting edema extend from the toes to the level of ankle  - After obtaining verbal consent, using 11 blade, escharectomy of the left foot dorsal eschar, no further tracking or probing, necrotic soft tissue, no purulence noted.  - Right foot X-ray: no gas, Calcaneal erosion (resident's read)  - Left foot wound culture obtained and sent  - [facility] recs appreciated  - ordered L foot xray  - Pod plan: Local wound care pending [facility] recs  - Discussed with Attending  54 yo M  with R heel wound to bone, L foot with dorsal eschar with extensive periwound erythema  - Pt seen and evaluated.  - Afebrile, WBC 15.25, ESR 91, CRP pending  - R foot heel wound to bone, necrotic soft, mild malodor, minimal serosanguinous drainage. DTI along the medial malleolus, eschar along distal tibia, periwound erythema, L Foot dorsal eschar, periwound extensive erythema, mild malodor, distal dorsal 5th DTI, b/l +3 pitting edema extend from the toes to the level of ankle  - After obtaining verbal consent, using 11 blade, escharectomy of the left foot dorsal eschar, no further tracking or probing, necrotic soft tissue, no purulence noted.  - Right foot X-ray: no gas, Calcaneal erosion (resident's read)  - Left foot wound culture obtained and sent  - recommended IV Vanco & Cefepime  - vasc recs appreciated  - ordered L foot xray  - Pod plan: Local wound care pending vasc recs  - Discussed with Attending  52 yo M  with R heel wound to bone, L foot with dorsal eschar with extensive periwound erythema  - Pt seen and evaluated.  - Afebrile, WBC 15.25, ESR 91, CRP pending  - R foot heel wound to bone, necrotic soft, mild malodor, minimal serosanguinous drainage. DTI along the medial malleolus, eschar along distal tibia, periwound erythema, L Foot dorsal eschar, periwound extensive erythema, mild malodor, distal dorsal 5th DTI, b/l +3 pitting edema extend from the toes to the level of ankle  - After obtaining verbal consent, using 11 blade, escharectomy of the left foot dorsal eschar, no further tracking or probing, necrotic soft tissue, no purulence noted.  - Right foot X-ray: no gas, Calcaneal erosion (resident's read)  - Left foot wound culture obtained and sent  - recommended IV Vanco & Cefepime  - vasc recs appreciated  - ordered L foot xray  - Pod plan: Local wound care pending vasc recs  - Discussed with Attending

## 2023-12-04 NOTE — ED PROVIDER NOTE - NONTENDER LOCATION
+ west catheter in placed with straw color urine in the bag, + colostomy bag to the right lower abdomen/left upper quadrant/right upper quadrant/left lower quadrant/right lower quadrant/periumbilical/umbilical/suprapubic/left costovertebral angle/right costovertebral angle

## 2023-12-04 NOTE — ED PROVIDER NOTE - CONTACT TIME
PFT PREP  You have been scheduled for a Pulmonary Function Test on 5/22/18 at 9 AM arrive at 8:30 AM [facility].   Nothing my mouth 1 hour prior to test (water only is OK). No smoking or inhalers 4 hours prior to test unless directed differently by the physician. Please PRE-REGISTER at [phone] option 2, option 2,prior to your test date. Also, please remember to arrive 20 to 30 minutes prior to testing unless otherwise instructed. 04-Dec-2023 17:09

## 2023-12-04 NOTE — ED ADULT TRIAGE NOTE - CHIEF COMPLAINT QUOTE
as per EMS " coming from [facility] with muliple wounds, sent by Wound Specialist for wound check" [ bed bound, HTN, Diabetes, GERD, HDL, Emphysema] as per EMS " coming from [facility] with muliple wounds, sent by Wound Specialist for wound check" [ bed bound, HTN, Diabetes, GERD, HDL, Emphysema]

## 2023-12-04 NOTE — ED PROVIDER NOTE - CONSTITUTIONAL, MLM
normal... Well appearing, awake, alert, oriented to person, place, time/situation and in no apparent distress. Speaking in clear full sentences no nasal flaring no shoulders retractions appears comfortable lying on his left side on the stretcher with his bilateral legs contracted.

## 2023-12-04 NOTE — ED ADULT NURSE REASSESSMENT NOTE - NS ED NURSE REASSESS COMMENT FT1
Pt received from Day RN Zakiya. Pt returned from XRAY. Pt placed on the monitor. Pt AOx3 and reports 8 out of 10 pain. Pt states they were moving him around in Xray. MD aware and plan of care continues.

## 2023-12-05 ENCOUNTER — TRANSCRIPTION ENCOUNTER (OUTPATIENT)
Age: 53
End: 2023-12-05

## 2023-12-05 DIAGNOSIS — Z98.890 OTHER SPECIFIED POSTPROCEDURAL STATES: Chronic | ICD-10-CM

## 2023-12-05 DIAGNOSIS — F11.11 OPIOID ABUSE, IN REMISSION: ICD-10-CM

## 2023-12-05 DIAGNOSIS — L08.9 LOCAL INFECTION OF THE SKIN AND SUBCUTANEOUS TISSUE, UNSPECIFIED: ICD-10-CM

## 2023-12-05 DIAGNOSIS — E78.5 HYPERLIPIDEMIA, UNSPECIFIED: ICD-10-CM

## 2023-12-05 DIAGNOSIS — F31.9 BIPOLAR DISORDER, UNSPECIFIED: ICD-10-CM

## 2023-12-05 DIAGNOSIS — Z97.8 PRESENCE OF OTHER SPECIFIED DEVICES: ICD-10-CM

## 2023-12-05 DIAGNOSIS — Z93.2 ILEOSTOMY STATUS: Chronic | ICD-10-CM

## 2023-12-05 DIAGNOSIS — K21.9 GASTRO-ESOPHAGEAL REFLUX DISEASE WITHOUT ESOPHAGITIS: ICD-10-CM

## 2023-12-05 DIAGNOSIS — G89.29 OTHER CHRONIC PAIN: ICD-10-CM

## 2023-12-05 DIAGNOSIS — Z93.2 ILEOSTOMY STATUS: ICD-10-CM

## 2023-12-05 DIAGNOSIS — Z93.3 COLOSTOMY STATUS: Chronic | ICD-10-CM

## 2023-12-05 DIAGNOSIS — I10 ESSENTIAL (PRIMARY) HYPERTENSION: ICD-10-CM

## 2023-12-05 DIAGNOSIS — N31.9 NEUROMUSCULAR DYSFUNCTION OF BLADDER, UNSPECIFIED: ICD-10-CM

## 2023-12-05 DIAGNOSIS — A41.9 SEPSIS, UNSPECIFIED ORGANISM: ICD-10-CM

## 2023-12-05 LAB
GRAM STN FLD: ABNORMAL
GRAM STN FLD: ABNORMAL
SPECIMEN SOURCE: SIGNIFICANT CHANGE UP
SPECIMEN SOURCE: SIGNIFICANT CHANGE UP

## 2023-12-05 PROCEDURE — 12345: CPT | Mod: NC

## 2023-12-05 PROCEDURE — 99223 1ST HOSP IP/OBS HIGH 75: CPT

## 2023-12-05 RX ORDER — ZINC SULFATE TAB 220 MG (50 MG ZINC EQUIVALENT) 220 (50 ZN) MG
220 TAB ORAL DAILY
Refills: 0 | Status: DISCONTINUED | OUTPATIENT
Start: 2023-12-05 | End: 2024-01-03

## 2023-12-05 RX ORDER — QUETIAPINE FUMARATE 200 MG/1
100 TABLET, FILM COATED ORAL
Refills: 0 | Status: DISCONTINUED | OUTPATIENT
Start: 2023-12-05 | End: 2024-01-03

## 2023-12-05 RX ORDER — LANOLIN ALCOHOL/MO/W.PET/CERES
3 CREAM (GRAM) TOPICAL AT BEDTIME
Refills: 0 | Status: DISCONTINUED | OUTPATIENT
Start: 2023-12-05 | End: 2024-01-03

## 2023-12-05 RX ORDER — VANCOMYCIN HCL 1 G
1000 VIAL (EA) INTRAVENOUS EVERY 12 HOURS
Refills: 0 | Status: DISCONTINUED | OUTPATIENT
Start: 2023-12-05 | End: 2023-12-05

## 2023-12-05 RX ORDER — HYDROMORPHONE HYDROCHLORIDE 2 MG/ML
1 INJECTION INTRAMUSCULAR; INTRAVENOUS; SUBCUTANEOUS EVERY 4 HOURS
Refills: 0 | Status: DISCONTINUED | OUTPATIENT
Start: 2023-12-05 | End: 2023-12-12

## 2023-12-05 RX ORDER — LACTOBACILLUS ACIDOPHILUS 100MM CELL
1 CAPSULE ORAL
Refills: 0 | Status: DISCONTINUED | OUTPATIENT
Start: 2023-12-05 | End: 2024-01-03

## 2023-12-05 RX ORDER — SENNA PLUS 8.6 MG/1
2 TABLET ORAL AT BEDTIME
Refills: 0 | Status: DISCONTINUED | OUTPATIENT
Start: 2023-12-05 | End: 2024-01-03

## 2023-12-05 RX ORDER — METHYLPHENIDATE HCL 5 MG
5 TABLET ORAL
Refills: 0 | Status: DISCONTINUED | OUTPATIENT
Start: 2023-12-05 | End: 2023-12-12

## 2023-12-05 RX ORDER — THIAMINE MONONITRATE (VIT B1) 100 MG
100 TABLET ORAL DAILY
Refills: 0 | Status: DISCONTINUED | OUTPATIENT
Start: 2023-12-05 | End: 2024-01-03

## 2023-12-05 RX ORDER — OXYCODONE HYDROCHLORIDE 5 MG/1
5 TABLET ORAL
Refills: 0 | Status: DISCONTINUED | OUTPATIENT
Start: 2023-12-05 | End: 2023-12-12

## 2023-12-05 RX ORDER — GABAPENTIN 400 MG/1
600 CAPSULE ORAL
Refills: 0 | Status: DISCONTINUED | OUTPATIENT
Start: 2023-12-05 | End: 2023-12-14

## 2023-12-05 RX ORDER — AMLODIPINE BESYLATE 2.5 MG/1
2.5 TABLET ORAL DAILY
Refills: 0 | Status: DISCONTINUED | OUTPATIENT
Start: 2023-12-05 | End: 2024-01-03

## 2023-12-05 RX ORDER — ALBUTEROL 90 UG/1
2 AEROSOL, METERED ORAL EVERY 4 HOURS
Refills: 0 | Status: DISCONTINUED | OUTPATIENT
Start: 2023-12-05 | End: 2023-12-20

## 2023-12-05 RX ORDER — METHADONE HYDROCHLORIDE 40 MG/1
110 TABLET ORAL DAILY
Refills: 0 | Status: DISCONTINUED | OUTPATIENT
Start: 2023-12-05 | End: 2023-12-12

## 2023-12-05 RX ORDER — DIVALPROEX SODIUM 500 MG/1
750 TABLET, DELAYED RELEASE ORAL AT BEDTIME
Refills: 0 | Status: DISCONTINUED | OUTPATIENT
Start: 2023-12-05 | End: 2023-12-11

## 2023-12-05 RX ORDER — DULOXETINE HYDROCHLORIDE 30 MG/1
30 CAPSULE, DELAYED RELEASE ORAL DAILY
Refills: 0 | Status: DISCONTINUED | OUTPATIENT
Start: 2023-12-05 | End: 2023-12-15

## 2023-12-05 RX ORDER — LIDOCAINE 4 G/100G
1 CREAM TOPICAL DAILY
Refills: 0 | Status: DISCONTINUED | OUTPATIENT
Start: 2023-12-05 | End: 2024-01-03

## 2023-12-05 RX ORDER — ACETAMINOPHEN 500 MG
650 TABLET ORAL DAILY
Refills: 0 | Status: DISCONTINUED | OUTPATIENT
Start: 2023-12-05 | End: 2024-01-03

## 2023-12-05 RX ORDER — TAMSULOSIN HYDROCHLORIDE 0.4 MG/1
0.4 CAPSULE ORAL AT BEDTIME
Refills: 0 | Status: DISCONTINUED | OUTPATIENT
Start: 2023-12-05 | End: 2024-01-03

## 2023-12-05 RX ORDER — QUETIAPINE FUMARATE 200 MG/1
400 TABLET, FILM COATED ORAL AT BEDTIME
Refills: 0 | Status: DISCONTINUED | OUTPATIENT
Start: 2023-12-05 | End: 2024-01-03

## 2023-12-05 RX ORDER — CYCLOBENZAPRINE HYDROCHLORIDE 10 MG/1
1 TABLET, FILM COATED ORAL
Refills: 0 | DISCHARGE

## 2023-12-05 RX ORDER — ASCORBIC ACID 60 MG
500 TABLET,CHEWABLE ORAL DAILY
Refills: 0 | Status: DISCONTINUED | OUTPATIENT
Start: 2023-12-05 | End: 2024-01-03

## 2023-12-05 RX ORDER — MORPHINE SULFATE 50 MG/1
2 CAPSULE, EXTENDED RELEASE ORAL ONCE
Refills: 0 | Status: DISCONTINUED | OUTPATIENT
Start: 2023-12-05 | End: 2023-12-05

## 2023-12-05 RX ORDER — CEFEPIME 1 G/1
2000 INJECTION, POWDER, FOR SOLUTION INTRAMUSCULAR; INTRAVENOUS EVERY 8 HOURS
Refills: 0 | Status: DISCONTINUED | OUTPATIENT
Start: 2023-12-05 | End: 2023-12-07

## 2023-12-05 RX ORDER — VANCOMYCIN HCL 1 G
1250 VIAL (EA) INTRAVENOUS EVERY 8 HOURS
Refills: 0 | Status: DISCONTINUED | OUTPATIENT
Start: 2023-12-05 | End: 2023-12-06

## 2023-12-05 RX ORDER — COLLAGENASE CLOSTRIDIUM HIST. 250 UNIT/G
1 OINTMENT (GRAM) TOPICAL DAILY
Refills: 0 | Status: DISCONTINUED | OUTPATIENT
Start: 2023-12-05 | End: 2024-01-03

## 2023-12-05 RX ORDER — ONDANSETRON 8 MG/1
4 TABLET, FILM COATED ORAL EVERY 8 HOURS
Refills: 0 | Status: DISCONTINUED | OUTPATIENT
Start: 2023-12-05 | End: 2023-12-14

## 2023-12-05 RX ORDER — MORPHINE SULFATE 50 MG/1
2 CAPSULE, EXTENDED RELEASE ORAL EVERY 6 HOURS
Refills: 0 | Status: DISCONTINUED | OUTPATIENT
Start: 2023-12-05 | End: 2023-12-05

## 2023-12-05 RX ORDER — FINASTERIDE 5 MG/1
5 TABLET, FILM COATED ORAL DAILY
Refills: 0 | Status: DISCONTINUED | OUTPATIENT
Start: 2023-12-05 | End: 2024-01-03

## 2023-12-05 RX ORDER — ATORVASTATIN CALCIUM 80 MG/1
40 TABLET, FILM COATED ORAL AT BEDTIME
Refills: 0 | Status: DISCONTINUED | OUTPATIENT
Start: 2023-12-05 | End: 2024-01-03

## 2023-12-05 RX ORDER — MORPHINE SULFATE 50 MG/1
4 CAPSULE, EXTENDED RELEASE ORAL ONCE
Refills: 0 | Status: DISCONTINUED | OUTPATIENT
Start: 2023-12-05 | End: 2023-12-05

## 2023-12-05 RX ORDER — CYCLOBENZAPRINE HYDROCHLORIDE 10 MG/1
10 TABLET, FILM COATED ORAL
Refills: 0 | Status: DISCONTINUED | OUTPATIENT
Start: 2023-12-05 | End: 2023-12-14

## 2023-12-05 RX ORDER — POLYETHYLENE GLYCOL 3350 17 G/17G
17 POWDER, FOR SOLUTION ORAL DAILY
Refills: 0 | Status: DISCONTINUED | OUTPATIENT
Start: 2023-12-05 | End: 2024-01-03

## 2023-12-05 RX ORDER — PANTOPRAZOLE SODIUM 20 MG/1
40 TABLET, DELAYED RELEASE ORAL
Refills: 0 | Status: DISCONTINUED | OUTPATIENT
Start: 2023-12-05 | End: 2024-01-03

## 2023-12-05 RX ORDER — METHADONE HYDROCHLORIDE 40 MG/1
110 TABLET ORAL DAILY
Refills: 0 | Status: DISCONTINUED | OUTPATIENT
Start: 2023-12-05 | End: 2023-12-05

## 2023-12-05 RX ORDER — ACETAMINOPHEN 500 MG
650 TABLET ORAL EVERY 6 HOURS
Refills: 0 | Status: DISCONTINUED | OUTPATIENT
Start: 2023-12-05 | End: 2024-01-03

## 2023-12-05 RX ADMIN — MORPHINE SULFATE 4 MILLIGRAM(S): 50 CAPSULE, EXTENDED RELEASE ORAL at 02:37

## 2023-12-05 RX ADMIN — Medication 650 MILLIGRAM(S): at 12:44

## 2023-12-05 RX ADMIN — HYDROMORPHONE HYDROCHLORIDE 1 MILLIGRAM(S): 2 INJECTION INTRAMUSCULAR; INTRAVENOUS; SUBCUTANEOUS at 22:05

## 2023-12-05 RX ADMIN — OXYCODONE HYDROCHLORIDE 5 MILLIGRAM(S): 5 TABLET ORAL at 12:43

## 2023-12-05 RX ADMIN — LIDOCAINE 1 PATCH: 4 CREAM TOPICAL at 20:31

## 2023-12-05 RX ADMIN — OXYCODONE HYDROCHLORIDE 5 MILLIGRAM(S): 5 TABLET ORAL at 17:28

## 2023-12-05 RX ADMIN — ATORVASTATIN CALCIUM 40 MILLIGRAM(S): 80 TABLET, FILM COATED ORAL at 22:06

## 2023-12-05 RX ADMIN — Medication 650 MILLIGRAM(S): at 12:39

## 2023-12-05 RX ADMIN — GABAPENTIN 600 MILLIGRAM(S): 400 CAPSULE ORAL at 22:11

## 2023-12-05 RX ADMIN — GABAPENTIN 600 MILLIGRAM(S): 400 CAPSULE ORAL at 14:34

## 2023-12-05 RX ADMIN — Medication 5 MILLIGRAM(S): at 22:49

## 2023-12-05 RX ADMIN — Medication 1 TABLET(S): at 12:39

## 2023-12-05 RX ADMIN — Medication 166.67 MILLIGRAM(S): at 22:08

## 2023-12-05 RX ADMIN — OXYCODONE HYDROCHLORIDE 5 MILLIGRAM(S): 5 TABLET ORAL at 04:41

## 2023-12-05 RX ADMIN — CYCLOBENZAPRINE HYDROCHLORIDE 10 MILLIGRAM(S): 10 TABLET, FILM COATED ORAL at 05:20

## 2023-12-05 RX ADMIN — FINASTERIDE 5 MILLIGRAM(S): 5 TABLET, FILM COATED ORAL at 12:39

## 2023-12-05 RX ADMIN — QUETIAPINE FUMARATE 400 MILLIGRAM(S): 200 TABLET, FILM COATED ORAL at 22:06

## 2023-12-05 RX ADMIN — QUETIAPINE FUMARATE 100 MILLIGRAM(S): 200 TABLET, FILM COATED ORAL at 10:43

## 2023-12-05 RX ADMIN — Medication 166.67 MILLIGRAM(S): at 14:34

## 2023-12-05 RX ADMIN — GABAPENTIN 600 MILLIGRAM(S): 400 CAPSULE ORAL at 05:19

## 2023-12-05 RX ADMIN — SENNA PLUS 2 TABLET(S): 8.6 TABLET ORAL at 22:06

## 2023-12-05 RX ADMIN — DIVALPROEX SODIUM 750 MILLIGRAM(S): 500 TABLET, DELAYED RELEASE ORAL at 22:06

## 2023-12-05 RX ADMIN — CEFEPIME 100 MILLIGRAM(S): 1 INJECTION, POWDER, FOR SOLUTION INTRAMUSCULAR; INTRAVENOUS at 16:06

## 2023-12-05 RX ADMIN — OXYCODONE HYDROCHLORIDE 5 MILLIGRAM(S): 5 TABLET ORAL at 06:44

## 2023-12-05 RX ADMIN — TAMSULOSIN HYDROCHLORIDE 0.4 MILLIGRAM(S): 0.4 CAPSULE ORAL at 22:07

## 2023-12-05 RX ADMIN — Medication 5 MILLIGRAM(S): at 10:43

## 2023-12-05 RX ADMIN — MORPHINE SULFATE 4 MILLIGRAM(S): 50 CAPSULE, EXTENDED RELEASE ORAL at 01:49

## 2023-12-05 RX ADMIN — Medication 500 MILLIGRAM(S): at 12:38

## 2023-12-05 RX ADMIN — HYDROMORPHONE HYDROCHLORIDE 1 MILLIGRAM(S): 2 INJECTION INTRAMUSCULAR; INTRAVENOUS; SUBCUTANEOUS at 22:20

## 2023-12-05 RX ADMIN — Medication 166.67 MILLIGRAM(S): at 05:19

## 2023-12-05 RX ADMIN — LIDOCAINE 1 PATCH: 4 CREAM TOPICAL at 12:39

## 2023-12-05 RX ADMIN — ZINC SULFATE TAB 220 MG (50 MG ZINC EQUIVALENT) 220 MILLIGRAM(S): 220 (50 ZN) TAB at 22:06

## 2023-12-05 RX ADMIN — AMLODIPINE BESYLATE 2.5 MILLIGRAM(S): 2.5 TABLET ORAL at 05:19

## 2023-12-05 RX ADMIN — CYCLOBENZAPRINE HYDROCHLORIDE 10 MILLIGRAM(S): 10 TABLET, FILM COATED ORAL at 17:28

## 2023-12-05 RX ADMIN — CEFEPIME 100 MILLIGRAM(S): 1 INJECTION, POWDER, FOR SOLUTION INTRAMUSCULAR; INTRAVENOUS at 05:19

## 2023-12-05 RX ADMIN — METHADONE HYDROCHLORIDE 110 MILLIGRAM(S): 40 TABLET ORAL at 12:55

## 2023-12-05 RX ADMIN — OXYCODONE HYDROCHLORIDE 5 MILLIGRAM(S): 5 TABLET ORAL at 17:36

## 2023-12-05 RX ADMIN — QUETIAPINE FUMARATE 100 MILLIGRAM(S): 200 TABLET, FILM COATED ORAL at 17:28

## 2023-12-05 RX ADMIN — PANTOPRAZOLE SODIUM 40 MILLIGRAM(S): 20 TABLET, DELAYED RELEASE ORAL at 05:19

## 2023-12-05 RX ADMIN — LIDOCAINE 1 PATCH: 4 CREAM TOPICAL at 23:15

## 2023-12-05 RX ADMIN — MORPHINE SULFATE 2 MILLIGRAM(S): 50 CAPSULE, EXTENDED RELEASE ORAL at 06:57

## 2023-12-05 RX ADMIN — CEFEPIME 100 MILLIGRAM(S): 1 INJECTION, POWDER, FOR SOLUTION INTRAMUSCULAR; INTRAVENOUS at 22:07

## 2023-12-05 RX ADMIN — Medication 100 MILLIGRAM(S): at 12:39

## 2023-12-05 NOTE — H&P ADULT - TIME BILLING
Reviewing current labs and imaging, notes from specialists (podiatry and vascular surgery), history from chart from facility, examining patient, and medical decision making.

## 2023-12-05 NOTE — PATIENT PROFILE ADULT - FALL HARM RISK - RISK INTERVENTIONS
Assistance OOB with selected safe patient handling equipment/Assistance with ambulation/Communicate Fall Risk and Risk Factors to all staff, patient, and family/Discuss with provider need for PT consult/Monitor gait and stability/Provide patient with walking aids - walker, cane, crutches/Reinforce activity limits and safety measures with patient and family/Visual Cue: Yellow wristband/Bed in lowest position, wheels locked, appropriate side rails in place/Call bell, personal items and telephone in reach/Instruct patient to call for assistance before getting out of bed or chair/Non-slip footwear when patient is out of bed/[place] to call system/Physically safe environment - no spills, clutter or unnecessary equipment/Purposeful Proactive Rounding/Room/bathroom lighting operational, light cord in reach Assistance OOB with selected safe patient handling equipment/Assistance with ambulation/Communicate Fall Risk and Risk Factors to all staff, patient, and family/Discuss with provider need for PT consult/Monitor gait and stability/Provide patient with walking aids - walker, cane, crutches/Reinforce activity limits and safety measures with patient and family/Visual Cue: Yellow wristband/Bed in lowest position, wheels locked, appropriate side rails in place/Call bell, personal items and telephone in reach/Instruct patient to call for assistance before getting out of bed or chair/Non-slip footwear when patient is out of bed/[place] to call system/Physically safe environment - no spills, clutter or unnecessary equipment/Purposeful Proactive Rounding/Room/bathroom lighting operational, light cord in reach

## 2023-12-05 NOTE — PATIENT PROFILE ADULT - NURSING HOMES
[facility], [facility], [facility]

## 2023-12-05 NOTE — H&P ADULT - NSICDXPASTMEDICALHX_GEN_ALL_CORE_FT
PAST MEDICAL HISTORY:  Bipolar disorder     CAD (coronary artery disease)     Chronic hepatitis C virus infection     HLD (hyperlipidemia)     HTN (hypertension)     Indwelling Holcomb catheter present     Neurogenic bladder     S/P ileostomy

## 2023-12-05 NOTE — PROGRESS NOTE ADULT - ASSESSMENT
Patient is a 53y Maler ight hip wound and bilateral lower extremity foot wounds, management per podiatry. 2+peripheral pedal pulses.    - No contraindication to any podiatry surgery  - right hip with minimal slough, recommend Collagenase daily  - Continue current management per medicine and podiatry  - No further vascular intervention indicated at this time.  Discussed and seen with Dr. Epstein

## 2023-12-05 NOTE — H&P ADULT - ASSESSMENT
Gonzalez Stewart is a 53 year old male with PMHx of cervical epidural abscess (s/p decompressive laminectomy 3/2021 c/b b/l leg paralysis), hx of pneumoperitoneum (s/p ex lap, total abdominal colectomy and end ileostomy (on 1/12/23) c/b evisceration and sepsis with MRSA bacteremia postoperatively), hx of hepatitis C, hx of R. buttock abscess, hx of L. foot osteomyelitis, neurogenic bladder (with indwelling Holcomb catheter, last exchanged two days ago), HTN, HLD, bipolar disorder, GERD, hx of opioid dependence, and constipation who presented to the ED on 12/4/23 from [facility] for feet infection and admitted for sepsis secondary to bilateral feet infection.    Sepsis secondary to b/l feet infection  Less likely UTI given urine sample was not clean catch in pt with indwelling Holcomb and no urinary symptoms  Complaints of b/l feet infection intermittently over the past year  Previously treated for L. hallux OM with L. heel culture growing Proteus mirabilis ESBL, completed 6-week course of avycaz  Temp 101 and WBC 15.25K on admission  Lactic WNL, ESR 84  U/A (sample obtained from Holcomb catheter) with positive nitrites, moderate leuks, moderate blood, WBC 26-50, RBC > 50, moderate bacteria, squamous epithelial cells present  CT b/l LE with study is severely limited by contracture. Left lower extremity is only partially visualized with exclusion of the left foot. Skin induration and subcutaneous edema in the leg and ankle.  No soft tissue gas  S/p LR 2800 cc bolus, vancomycin, and zosyn in the ED  S/p bedside escharectomy by podiatry  Wound culture +GPC in pairs  F/u blood cultures, urine culture, CRP  Please examine R. buttock as pt with hx of abscess in that area (unable to turn at the time of my examination due to severe pain)  Monitor WBC trend and fever curve  Pending vascular surgery evaluation, VITALY evaluated  Podiatry recommendations appreciated      Chronic medical conditions:  HTN: PTA amlodipine 2.5 mg   HLD: PTA atorvastatin 40 mg  Bipolar disorder: PTA quetiapine 100 mg BID and 400 mg qhs, valproic acid 750 mg ER qhs  Hx of opioid dependence: PTA methadone 110 mg   Chronic pain: PTA lidocaine 4% patch, duloxetine 30 mg DR, gabapentin 600 mg q8, cyclobenzaprine 10 mg BID, oxycodone 5 mg q6  GERD: PTA pantoprazole 40 mg DR  Neurogenic bladder (with indwelling Holcomb catheter): PTA finasteride 5 mg, tamsulosin 0.4 mg  Constipation: PTA senna 8.6 mg 2 tabs qhs     Medication reconciliation completed using [employer] from [facility].  Gonzalez Stewart is a 53 year old male with PMHx of cervical epidural abscess (s/p decompressive laminectomy 3/2021 c/b b/l leg paralysis), hx of pneumoperitoneum (s/p ex lap, total abdominal colectomy and end ileostomy (on 1/12/23) c/b evisceration and sepsis with MRSA bacteremia postoperatively), hx of hepatitis C, hx of R. buttock abscess, hx of L. foot osteomyelitis, neurogenic bladder (with indwelling Holcomb catheter, last exchanged two days ago), HTN, HLD, bipolar disorder, GERD, hx of opioid dependence, and constipation who presented to the ED on 12/4/23 from [facility] for feet infection and admitted for sepsis secondary to bilateral feet infection.    Sepsis secondary to b/l feet infection  Less likely UTI given urine sample was not clean catch in pt with indwelling Holcomb and no urinary symptoms  Complaints of b/l feet infection intermittently over the past year  Previously treated for L. hallux OM with L. heel culture growing Proteus mirabilis ESBL, completed 6-week course of avycaz  Temp 101 and WBC 15.25K on admission  Lactic WNL, ESR 84  U/A (sample obtained from Holcomb catheter) with positive nitrites, moderate leuks, moderate blood, WBC 26-50, RBC > 50, moderate bacteria, squamous epithelial cells present  CT b/l LE with study is severely limited by contracture. Left lower extremity is only partially visualized with exclusion of the left foot. Skin induration and subcutaneous edema in the leg and ankle.  No soft tissue gas  S/p LR 2800 cc bolus, vancomycin, and zosyn in the ED  S/p bedside escharectomy by podiatry  Wound culture +GPC in pairs  F/u blood cultures, urine culture, CRP  Please examine R. buttock as pt with hx of abscess in that area (unable to turn at the time of my examination due to severe pain)  Monitor WBC trend and fever curve  Pending vascular surgery evaluation, VITALY evaluated  Podiatry recommendations appreciated      Chronic medical conditions:  HTN: PTA amlodipine 2.5 mg   HLD: PTA atorvastatin 40 mg  Bipolar disorder: PTA quetiapine 100 mg BID and 400 mg qhs, valproic acid 750 mg ER qhs  Hx of opioid dependence: PTA methadone 110 mg   Chronic pain: PTA lidocaine 4% patch, duloxetine 30 mg DR, gabapentin 600 mg q8, cyclobenzaprine 10 mg BID, oxycodone 5 mg q6  GERD: PTA pantoprazole 40 mg DR  Neurogenic bladder (with indwelling Holcomb catheter): PTA finasteride 5 mg, tamsulosin 0.4 mg  Constipation: PTA senna 8.6 mg 2 tabs qhs     Medication reconciliation completed using [employer] from [facility].  Gonzalez Stewart is a 53 year old male with PMHx of cervical epidural abscess (s/p decompressive laminectomy 3/2021 c/b b/l leg paralysis), hx of pneumoperitoneum (s/p ex lap, total abdominal colectomy and end ileostomy (on 1/12/23) c/b evisceration and sepsis with MRSA bacteremia postoperatively), hx of hepatitis C, hx of R. buttock abscess, hx of L. foot osteomyelitis, neurogenic bladder (with indwelling Holcomb catheter, last exchanged two days ago), HTN, HLD, bipolar disorder, GERD, hx of opioid dependence, and constipation who presented to the ED on 12/4/23 from [facility] for feet infection and admitted for sepsis secondary to bilateral feet infection.    Sepsis secondary to b/l feet infection  Less likely UTI given urine sample was not clean catch in pt with indwelling Holcomb and no urinary symptoms  Complaints of b/l feet infection intermittently over the past year  Previously treated for L. hallux OM with L. heel culture growing Proteus mirabilis ESBL, completed 6-week course of avycaz  Temp 101 and WBC 15.25K on admission  Lactic WNL, ESR 84  U/A (sample obtained from Holcomb catheter) with positive nitrites, moderate leuks, moderate blood, WBC 26-50, RBC > 50, moderate bacteria, squamous epithelial cells present  CT b/l LE with study is severely limited by contracture. Left lower extremity is only partially visualized with exclusion of the left foot. Skin induration and subcutaneous edema in the leg and ankle.  No soft tissue gas  S/p LR 2800 cc bolus, vancomycin, and zosyn in the ED  S/p bedside escharectomy by podiatry  Empirically started on vancomycin and cefepime; can de-escalate pending final culture data  Wound culture +GPC in pairs  F/u blood cultures, urine culture, CRP, MRSA/MSSA PCR  Please examine R. buttock as pt with hx of abscess in that area (unable to turn at the time of my examination due to severe pain)  Monitor WBC trend and fever curve  Pending vascular surgery evaluation, VITALY evaluated  Podiatry recommendations appreciated      Chronic medical conditions:  HTN: PTA amlodipine 2.5 mg   HLD: PTA atorvastatin 40 mg  Bipolar disorder: PTA quetiapine 100 mg BID and 400 mg qhs, valproic acid 750 mg ER qhs  Hx of opioid dependence: PTA methadone 110 mg   Chronic pain: PTA lidocaine 4% patch, duloxetine 30 mg DR, gabapentin 600 mg q8, cyclobenzaprine 10 mg BID, oxycodone 5 mg q6  GERD: PTA pantoprazole 40 mg DR  Neurogenic bladder (with indwelling Holcomb catheter): PTA finasteride 5 mg, tamsulosin 0.4 mg  Constipation: PTA senna 8.6 mg 2 tabs qhs     Medication reconciliation completed using [employer] from [facility].  Gonzalez Stewart is a 53 year old male with PMHx of cervical epidural abscess (s/p decompressive laminectomy 3/2021 c/b b/l leg paralysis), hx of pneumoperitoneum (s/p ex lap, total abdominal colectomy and end ileostomy (on 1/12/23) c/b evisceration and sepsis with MRSA bacteremia postoperatively), hx of hepatitis C, hx of R. buttock abscess, hx of L. foot osteomyelitis, neurogenic bladder (with indwelling Holcomb catheter, last exchanged two days ago), HTN, HLD, bipolar disorder, GERD, hx of opioid dependence, and constipation who presented to the ED on 12/4/23 from [facility] for feet infection and admitted for sepsis secondary to bilateral feet infection.    Sepsis secondary to b/l feet infection  Less likely UTI given urine sample was not clean catch in pt with indwelling Holcomb and no urinary symptoms  Complaints of b/l feet infection intermittently over the past year  Previously treated for L. hallux OM with L. heel culture growing Proteus mirabilis ESBL, completed 6-week course of avycaz  Temp 101 and WBC 15.25K on admission  Lactic WNL, ESR 84  U/A (sample obtained from Holcomb catheter) with positive nitrites, moderate leuks, moderate blood, WBC 26-50, RBC > 50, moderate bacteria, squamous epithelial cells present  CT b/l LE with study is severely limited by contracture. Left lower extremity is only partially visualized with exclusion of the left foot. Skin induration and subcutaneous edema in the leg and ankle.  No soft tissue gas  S/p LR 2800 cc bolus, vancomycin, and zosyn in the ED  S/p bedside escharectomy by podiatry  Empirically started on vancomycin and cefepime; can de-escalate pending final culture data  Wound culture +GPC in pairs  F/u blood cultures, urine culture, CRP, MRSA/MSSA PCR  Please examine R. buttock as pt with hx of abscess in that area (unable to turn at the time of my examination due to severe pain)  Monitor WBC trend and fever curve  Pending vascular surgery evaluation, VITALY evaluated  Podiatry recommendations appreciated      Chronic medical conditions:  HTN: PTA amlodipine 2.5 mg   HLD: PTA atorvastatin 40 mg  Bipolar disorder: PTA quetiapine 100 mg BID and 400 mg qhs, valproic acid 750 mg ER qhs  Hx of opioid dependence: PTA methadone 110 mg   Chronic pain: PTA lidocaine 4% patch, duloxetine 30 mg DR, gabapentin 600 mg q8, cyclobenzaprine 10 mg BID, oxycodone 5 mg q6  GERD: PTA pantoprazole 40 mg DR  Neurogenic bladder (with indwelling Holcomb catheter): PTA finasteride 5 mg, tamsulosin 0.4 mg  Constipation: PTA senna 8.6 mg 2 tabs qhs     Medication reconciliation completed using [employer] from [facility].

## 2023-12-05 NOTE — DISCHARGE NOTE PROVIDER - NSDCFUADDAPPT_GEN_ALL_CORE_FT
Podiatry Discharge Instructions:  Follow up: Please follow up with Dr. Waterhouse within 1 week of discharge from the hospital, please call [phone] for appointment and discuss that you recently were seen in the hospital.  Wound Care: Please apply betadine, 4x4 gauze, ABD pads, and kerlix to bilateral wounds  Weight bearing: Please wear Z-flow boots at all times to bilateral feet  Antibiotics: Please continue as instructed. Podiatry Discharge Instructions:  Follow up: Please follow up with Dr. Waterhouse within 1 week of discharge from the hospital, please call [phone] for appointment and discuss that you recently were seen in the hospital.  Wound Care: Please apply betadine, 4x4 gauze, ABD pads, and kerlix to bilateral wounds  Weight bearing: Please wear Z-flow boots at all times to bilateral feet  Antibiotics: Please continue as instructed.

## 2023-12-05 NOTE — H&P ADULT - NSHPLABSRESULTS_GEN_ALL_CORE
10.5   15.25 )-----------( 206      ( 04 Dec 2023 17:20 )             35.2   12    140  |  104  |  13  ----------------------------<  118<H>  4.2   |  34<H>  |  0.67    Ca    9.1      04 Dec 2023 17:20    TPro  8.0  /  Alb  1.8<L>  /  TBili  0.4  /  DBili  x   /  AST  33  /  ALT  44  /  AlkPhos  227<H>  12    Urinalysis Basic - ( 04 Dec 2023 22:05 )    Color: Yellow / Appearance: Cloudy / S.021 / pH: x  Gluc: x / Ketone: Negative mg/dL  / Bili: Negative / Urobili: 1.0 mg/dL   Blood: x / Protein: 30 mg/dL / Nitrite: Positive   Leuk Esterase: Moderate / RBC: >50 /HPF / WBC 26-50 /HPF   Sq Epi: x / Non Sq Epi: x / Bacteria: Moderate /HPF    CT b/l LE with IV contrast 23  FINDINGS:  Tubes, catheters and devices: Partially visualized urinary catheter.    Bones/joints: Osteopenia.  Soft tissues: Skin induration and subcutaneous edema in the leg and ankle.  No soft tissue gas.    Reproductive: Scrotal edema and bilateral hydroceles.    Other findings: The study is severely limited by contracture. Left lower extremity is only partially visualized with exclusion of the left foot.    IMPRESSION:  The study is severely limited by contracture. Left lower extremity is only partially visualized with exclusion of the left foot.    Skin induration and subcutaneous edema in the leg and ankle.  No soft tissue gas.

## 2023-12-05 NOTE — DISCHARGE NOTE PROVIDER - HOSPITAL COURSE
Gonzalez Stewart is a 53 year old male with PMHx of cervical epidural abscess (s/p decompressive laminectomy 3/2021 c/b b/l leg paralysis), hx of pneumoperitoneum (s/p ex lap, total abdominal colectomy and end ileostomy (on 1/12/23) c/b evisceration and sepsis with MRSA bacteremia postoperatively), hx of hepatitis C, hx of R. buttock abscess, hx of L. foot osteomyelitis, neurogenic bladder (with indwelling Holcomb catheter, last exchanged two days ago), HTN, HLD, bipolar disorder, GERD, hx of opioid dependence, and constipation who presented to the ED on 12/4/23 from [facility] for feet infection and admitted for sepsis secondary to bilateral feet infection.      sepsis 2/2 to b/l cellulitis         Chronic conditions   HTN  HLD   bipolar disorder  GERD   Gonzalez Stewart is a 53 year old male with PMHx of cervical epidural abscess (s/p decompressive laminectomy 3/2021 c/b b/l leg paralysis), hx of pneumoperitoneum (s/p ex lap, total abdominal colectomy and end ileostomy (on 1/12/23) c/b evisceration and sepsis with MRSA bacteremia postoperatively), hx of hepatitis C, hx of R. buttock abscess, hx of L. foot osteomyelitis, neurogenic bladder (with indwelling Holcomb catheter, last exchanged two days ago), HTN, HLD, bipolar disorder, GERD, hx of opioid dependence, and constipation who presented to the ED on 12/4/23 from [facility] for feet infection and admitted for sepsis secondary to bilateral feet infection.      sepsis 2/2 to b/l cellulitis         Chronic conditions   HTN  HLD   bipolar disorder  GERD   Gonzalez Stewart is a 53 year old male with PMHx of cervical epidural abscess (s/p decompressive laminectomy 3/2021 c/b b/l leg paralysis), hx of pneumoperitoneum (s/p ex lap, total abdominal colectomy and end ileostomy (on 1/12/23) c/b evisceration and sepsis with MRSA bacteremia postoperatively), hx of hepatitis C, hx of R. buttock abscess, hx of L. foot osteomyelitis, neurogenic bladder (with indwelling West catheter, last exchanged two days ago), HTN, HLD, bipolar disorder, GERD, hx of opioid dependence, and constipation who presented to the ED on 12/4/23 from [facility] for feet infection and admitted for sepsis secondary to bilateral feet infection. Patient underwent bedside escharectomy of left foot. Wound culture with +GPC in pairs.    IR will place PICC line place 12/11/23 and will receive 6 weeks of antibiotics per ID. Patient is to f/u OP with ID for weekly labs ( cbc with diff, cmp, esr, crp, vancomycin trough). PICC line is to be removed after the course of antibiotics is finished (1/6/24).   Patient needs close follow up with podiatry as outpatient and since  he lives at group home his outpatient labs need to be followed by the primary or ID doctor.            sepsis 2/2 to b/l cellulitis   OM of right heel wound    Chronic conditions:  HTN  HLD   bipolar disorder  GERD  neurogenic bladder (w/ indwelling west cathether)  Hx of opioid dependence   functional quad      Gonzalez Stewart is a 53 year old male with PMHx of cervical epidural abscess (s/p decompressive laminectomy 3/2021 c/b b/l leg paralysis), hx of pneumoperitoneum (s/p ex lap, total abdominal colectomy and end ileostomy (on 1/12/23) c/b evisceration and sepsis with MRSA bacteremia postoperatively), hx of hepatitis C, hx of R. buttock abscess, hx of L. foot osteomyelitis, neurogenic bladder (with indwelling West catheter, last exchanged two days ago), HTN, HLD, bipolar disorder, GERD, hx of opioid dependence, and constipation who presented to the ED on 12/4/23 from [facility] for feet infection and admitted for sepsis secondary to bilateral feet infection. Patient underwent bedside escharectomy of left foot. Wound culture with +GPC in pairs.    IR will place PICC line place 12/11/23 and will receive 6 weeks of antibiotics per ID. Patient is to f/u OP with ID for weekly labs ( cbc with diff, cmp, esr, crp, vancomycin trough). PICC line is to be removed after the course of antibiotics is finished (1/6/24).   Patient needs close follow up with podiatry as outpatient and since  he lives at group home his outpatient labs need to be followed by the primary or ID doctor.            sepsis 2/2 to b/l cellulitis   OM of right heel wound    Chronic conditions:  HTN  HLD   bipolar disorder  GERD  neurogenic bladder (w/ indwelling west cathether)  Hx of opioid dependence   functional quad      Gonzalez Stewart is a 53 year old male with PMHx of cervical epidural abscess (s/p decompressive laminectomy 3/2021 c/b b/l leg paralysis), hx of pneumoperitoneum (s/p ex lap, total abdominal colectomy and end ileostomy (on 1/12/23) c/b evisceration and sepsis with MRSA bacteremia postoperatively), hx of hepatitis C, hx of R. buttock abscess, hx of L. foot osteomyelitis, neurogenic bladder (with indwelling West catheter, last exchanged two days ago), HTN, HLD, bipolar disorder, GERD, hx of opioid dependence, and constipation who presented to the ED on 12/4/23 from [facility] for feet infection and admitted for sepsis secondary to bilateral feet infection. Patient underwent bedside escharectomy of left foot. Wound culture with +GPC in pairs.    IR will place PICC line place 12/11/23 and will receive 6 weeks of antibiotics per ID. Patient is to f/u OP with ID for weekly labs ( cbc with diff, cmp, esr, crp, vancomycin trough). PICC line is to be removed after the course of antibiotics is finished (1/6/24).   Patient needs close follow up with podiatry as outpatient and since  he lives at group home his outpatient labs need to be followed by the primary or ID doctor.    since carbapenems decrease the level of depakote ( cytochorome P450) depakote was d/c. Pt has no history of seizures  ( as per psych)               sepsis 2/2 to b/l cellulitis   OM of right heel wound    Chronic conditions:  HTN  HLD   bipolar disorder  GERD  neurogenic bladder (w/ indwelling west cathether)  Hx of opioid dependence   functional quad      Gonzalez Stewart is a 53 year old male with PMHx of cervical epidural abscess (s/p decompressive laminectomy 3/2021 c/b b/l leg paralysis), hx of pneumoperitoneum (s/p ex lap, total abdominal colectomy and end ileostomy (on 1/12/23) c/b evisceration and sepsis with MRSA bacteremia postoperatively), hx of hepatitis C, hx of R. buttock abscess, hx of L. foot osteomyelitis, neurogenic bladder (with indwelling West catheter, last exchanged two days ago), HTN, HLD, bipolar disorder, GERD, hx of opioid dependence, and constipation who presented to the ED on 12/4/23 from [facility] for feet infection and admitted for sepsis secondary to bilateral feet infection. Patient underwent bedside escharectomy of left foot. Wound culture with +GPC in pairs.    IR will place PICC line place 12/11/23 and will receive 6 weeks of antibiotics per ID. Patient is to f/u OP with ID for weekly labs ( cbc with diff, cmp, esr, crp, vancomycin trough). PICC line is to be removed after the course of antibiotics is finished (1/6/24).   Patient needs close follow up with podiatry as outpatient and since  he lives at group home his outpatient labs need to be followed by the primary or ID doctor.    since carbapenems decrease the level of depakote ( cytochorome P450) depakote was d/c. Pt has no history of seizures  ( as per psych)               sepsis 2/2 to b/l cellulitis   OM of right heel wound    Chronic conditions:  HTN  HLD   bipolar disorder  GERD  neurogenic bladder (w/ indwelling west cathether)  Hx of opioid dependence   functional quad      Gonzalez Stewart is a 53 year old male with PMHx of cervical epidural abscess (s/p decompressive laminectomy 3/2021 c/b b/l leg paralysis), hx of pneumoperitoneum (s/p ex lap, total abdominal colectomy and end ileostomy (on 1/12/23) c/b evisceration and sepsis with MRSA bacteremia postoperatively), hx of hepatitis C, hx of R. buttock abscess, hx of L. foot osteomyelitis, neurogenic bladder (with indwelling West catheter, last exchanged two days ago), HTN, HLD, bipolar disorder, GERD, hx of opioid dependence, and constipation who presented to the ED on 12/4/23 from [facility] for feet infection and admitted for sepsis secondary to bilateral feet infection. Patient underwent bedside escharectomy of left foot. Wound culture with +GPC in pairs.    IR will place PICC line place 12/11/23 and will receive 6 weeks of antibiotics per ID. Patient is to f/u OP with ID for weekly labs ( cbc with diff, cmp, esr, crp, vancomycin trough). PICC line is to be removed after the course of antibiotics is finished (1/3/24).   continue Vancomycin IV 1250 mg q 12 hrs - last dose on January 3rd, 2024  continue ertapenem 1 gram IV daily - last dose on January 3rd, 2024    Patient needs close follow up with podiatry as outpatient and since  he lives at group home his outpatient labs need to be followed by the primary or ID doctor.    since carbapenems decrease the level of depakote ( cytochorome P450) depakote was d/c. Pt has no history of seizures  ( as per psych)               sepsis 2/2 to b/l cellulitis   OM of right heel wound    Chronic conditions:  HTN  HLD   bipolar disorder  GERD  neurogenic bladder (w/ indwelling west cathether)  Hx of opioid dependence   functional quad      Gonzalez Stewart is a 53 year old male with PMHx of cervical epidural abscess (s/p decompressive laminectomy 3/2021 c/b b/l leg paralysis), hx of pneumoperitoneum (s/p ex lap, total abdominal colectomy and end ileostomy (on 1/12/23) c/b evisceration and sepsis with MRSA bacteremia postoperatively), hx of hepatitis C, hx of R. buttock abscess, hx of L. foot osteomyelitis, neurogenic bladder (with indwelling West catheter, last exchanged two days ago), HTN, HLD, bipolar disorder, GERD, hx of opioid dependence, and constipation who presented to the ED on 12/4/23 from [facility] for feet infection and admitted for sepsis secondary to bilateral feet infection. Patient underwent bedside escharectomy of left foot. Wound culture with +GPC in pairs.    IR will place PICC line place 12/11/23 and will receive 6 weeks of antibiotics per ID. Patient is to f/u OP with ID for weekly labs ( cbc with diff, cmp, esr, crp, vancomycin trough). PICC line is to be removed after the course of antibiotics is finished (1/3/24).   continue Vancomycin IV 1250 mg q 12 hrs - last dose on January 3rd, 2024  continue ertapenem 1 gram IV daily - last dose on January 3rd, 2024    Patient needs close follow up with podiatry as outpatient and since  he lives at group home his outpatient labs need to be followed by the primary or ID doctor.    since carbapenems decrease the level of depakote ( cytochorome P450) depakote was d/c. Pt has no history of seizures  ( as per psych)               sepsis 2/2 to b/l cellulitis   OM of right heel wound    Chronic conditions:  HTN  HLD   bipolar disorder  GERD  neurogenic bladder (w/ indwelling west cathether)  Hx of opioid dependence   functional quad      Gonzalez Stewart is a 53 year old male with PMHx of cervical epidural abscess (s/p decompressive laminectomy 3/2021 c/b b/l leg paralysis), hx of pneumoperitoneum (s/p ex lap, total abdominal colectomy and end ileostomy (on 1/12/23) c/b evisceration and sepsis with MRSA bacteremia postoperatively), hx of hepatitis C, hx of R. buttock abscess, hx of L. foot osteomyelitis, neurogenic bladder (with indwelling Holcomb catheter, last exchanged two days ago), HTN, HLD, bipolar disorder, GERD, hx of opioid dependence, and constipation who presented to the ED on 12/4/23 from [facility] for feet infection and admitted for sepsis secondary to bilateral feet infection. Patient underwent bedside escharectomy of left foot.     Course:   Sepsis secondary to b/l feet MRSA and Pseudomonas  and klebsiella  infection (POA)  Previously treated for L. hallux OM with L. heel culture growing Proteus mirabilis ESBL, completed 6-week course of avycaz  CT b/l LE with study is severely limited by contracture. Left lower extremity is only partially visualized with exclusion of the left foot.  S/p bedside escharectomy by podiatry  right foot wound cx- ESBL proteus - resistant to cefepime and corynebecaterium and alcalegenes  left foot wound cx- MRSA and Pseudomonas  and klebsiella   Blood cx- neg x 2  PICC line in place 12/11/23   Continue meropenem and vancomycin until 1/8 per ID.  ECHO EF 55-60%, normal systolic function, and mild mitral regurgitation       COPD  chronic hypoxic respiratory failure   Baseline patient is on 5L O2 NC at home   Had an episode of resp distress from fluid overload from IVF- Placed on lasix drip for some time with improvement  Continue PO lasix with water restriction to 1.5 liters a day  ECHO reviewed.   Pulm consulted    cont airway clearance w/ aerobika. cont current bronchodilators.     CT chest : Emphysematous disease with likely concurrent interstitial fibrosis.   Suspected  chronic microaspiration        HTN. controlled.   continue current management. Monitor.     HLD  continue  atorvastatin 40 mg daily     Bipolar disorder, stable   : PTA quetiapine 100 mg BID and 400 mg qhs,  12/12/2023 valproic qhs been stopped by psych      Hx of opioid dependence  continue methadone maintenance.      Chronic pain  with spasms. cont methadone. Cont oral dilaudid prn and cont constipation ppx.  Discussed about it with palliative care service.     GERD:   continue with PPI     Neurogenic bladder (with indwelling Holcomb catheter)   PTA finasteride 5 mg, tamsulosin 0.4 mg    Constipation:   continue with bowel regimen      functional quad-   B/L contracted   supportive care  , frequent repositioning      Rt hip pressure wound, seen by vascular ,   wound recs in place     Mod PCM  nutrition recommendations appreciated     Normocytic anemia , SARAH   no e/o bleeding or bruising   H/H stable   supplement       fall, aspiration precautions       palliative following, patient is full code    Received insurance auth for [facility] care.       Seen and examined by me today. Vitals stable.   I have discussed all the inpatient radiographic findings with the patient and stressed that patient follows with the PCP for further outpatient care. I have educated patient to use [employer] patient portal (as instructed on the discharge paperwork) to access medical records outside the hospital.   All questions welcomed and answered appropriately. Patient verbalized understanding of post discharge physician's follows up and discharge instructions.   DC time spent by me face to face excluding billable procedures 38 mins               Gonzalez Stewart is a 53 year old male with PMHx of cervical epidural abscess (s/p decompressive laminectomy 3/2021 c/b b/l leg paralysis), hx of pneumoperitoneum (s/p ex lap, total abdominal colectomy and end ileostomy (on 1/12/23) c/b evisceration and sepsis with MRSA bacteremia postoperatively), hx of hepatitis C, hx of R. buttock abscess, hx of L. foot osteomyelitis, neurogenic bladder (with indwelling Holcomb catheter, last exchanged two days ago), HTN, HLD, bipolar disorder, GERD, hx of opioid dependence, and constipation who presented to the ED on 12/4/23 from [facility] for feet infection and admitted for sepsis secondary to bilateral feet infection. Patient underwent bedside escharectomy of left foot.     Course:   Sepsis secondary to b/l feet MRSA and Pseudomonas  and klebsiella  infection (POA)  Previously treated for L. hallux OM with L. heel culture growing Proteus mirabilis ESBL, completed 6-week course of avycaz  CT b/l LE with study is severely limited by contracture. Left lower extremity is only partially visualized with exclusion of the left foot.  S/p bedside escharectomy by podiatry  right foot wound cx- ESBL proteus - resistant to cefepime and corynebecaterium and alcalegenes  left foot wound cx- MRSA and Pseudomonas  and klebsiella   Blood cx- neg x 2  PICC line in place 12/11/23   Continue meropenem and vancomycin until 1/8 per ID.  ECHO EF 55-60%, normal systolic function, and mild mitral regurgitation       COPD  chronic hypoxic respiratory failure   Baseline patient is on 5L O2 NC at home   Had an episode of resp distress from fluid overload from IVF- Placed on lasix drip for some time with improvement  Continue PO lasix with water restriction to 1.5 liters a day  ECHO reviewed.   Pulm consulted    cont airway clearance w/ aerobika. cont current bronchodilators.     CT chest : Emphysematous disease with likely concurrent interstitial fibrosis.   Suspected  chronic microaspiration        HTN. controlled.   continue current management. Monitor.     HLD  continue  atorvastatin 40 mg daily     Bipolar disorder, stable   : PTA quetiapine 100 mg BID and 400 mg qhs,  12/12/2023 valproic qhs been stopped by psych      Hx of opioid dependence  continue methadone maintenance.      Chronic pain  with spasms. cont methadone. Cont oral dilaudid prn and cont constipation ppx.  Discussed about it with palliative care service.     GERD:   continue with PPI     Neurogenic bladder (with indwelling Holcomb catheter)   PTA finasteride 5 mg, tamsulosin 0.4 mg    Constipation:   continue with bowel regimen      functional quad-   B/L contracted   supportive care  , frequent repositioning      Rt hip pressure wound, seen by vascular ,   wound recs in place     Mod PCM  nutrition recommendations appreciated     Normocytic anemia , SARAH   no e/o bleeding or bruising   H/H stable   supplement       fall, aspiration precautions       palliative following, patient is full code    Received insurance auth for [facility] care.       Seen and examined by me today. Vitals stable.   I have discussed all the inpatient radiographic findings with the patient and stressed that patient follows with the PCP for further outpatient care. I have educated patient to use [email or web address] patient portal (as instructed on the discharge paperwork) to access medical records outside the hospital.   All questions welcomed and answered appropriately. Patient verbalized understanding of post discharge physician's follows up and discharge instructions.   DC time spent by me face to face excluding billable procedures 38 mins

## 2023-12-05 NOTE — DISCHARGE NOTE PROVIDER - PROVIDER TOKENS
FREE:[LAST:[PCP],PHONE:[(   )    -],FAX:[(   )    -],FOLLOWUP:[2 weeks]] FREE:[LAST:[PCP],PHONE:[(   )    -],FAX:[(   )    -],FOLLOWUP:[2 weeks]],PROVIDER:[TOKEN:[[id number]],FOLLOWUP:[1 week]] FREE:[LAST:[PCP],PHONE:[(   )    -],FAX:[(   )    -],FOLLOWUP:[2 weeks]],PROVIDER:[TOKEN:[[id number]],FOLLOWUP:[1 week]] PROVIDER:[TOKEN:[[id number]],FOLLOWUP:[1 week]],FREE:[LAST:[PCP],PHONE:[(   )    -],FAX:[(   )    -],FOLLOWUP:[2 weeks]],PROVIDER:[TOKEN:[[id number]],FOLLOWUP:[1 month]] PROVIDER:[TOKEN:[[id number]],FOLLOWUP:[1 week]],FREE:[LAST:[PCP],PHONE:[(   )    -],FAX:[(   )    -],FOLLOWUP:[2 weeks]],PROVIDER:[TOKEN:[[id number]],FOLLOWUP:[1 month]]

## 2023-12-05 NOTE — DISCHARGE NOTE PROVIDER - DETAILS OF MALNUTRITION DIAGNOSIS/DIAGNOSES
This patient has been assessed with a concern for Malnutrition and was treated during this hospitalization for the following Nutrition diagnosis/diagnoses:     -  12/07/2023: Moderate protein-calorie malnutrition

## 2023-12-05 NOTE — PROGRESS NOTE ADULT - ASSESSMENT
54 yo M  with R heel wound to bone, s/p bedside L foot dorsal escharectomy with extensive periwound erythema  - Pt seen and evaluated.  - Afebrile, WBC 15.25 (12/4)  - R foot heel wound to bone, necrotic soft, mild malodor, minimal serosanguinous drainage. DTI along the medial malleolus, eschar along distal tibia, periwound erythema. s/p L Foot dorsal escharectomy of the left foot dorsal eschar, no further tracking or probing, necrotic soft tissue, no purulence noted, extensive periwound erythema, mild malodor, distal dorsal 5th DTI, b/l +3 pitting edema extend from the toes to the level of ankle  - Right foot X-ray: no gas, Calcaneal erosion (resident's read)  - Left foot xray: pending  - Left foot wound culture: pending  - Vas recs appreciated  - Pod plan: Local wound care pending [facility] final recs  - Discussed with Attending    54 yo M  with R heel wound to bone, s/p bedside L foot dorsal escharectomy with extensive periwound erythema  - Pt seen and evaluated.  - Afebrile, WBC 15.25 (12/4)  - R foot heel wound to bone, necrotic soft, mild malodor, minimal serosanguinous drainage. DTI along the medial malleolus, eschar along distal tibia, periwound erythema. s/p L Foot dorsal escharectomy of the left foot dorsal eschar, no further tracking or probing, necrotic soft tissue, no purulence noted, extensive periwound erythema, mild malodor, distal dorsal 5th DTI, b/l +3 pitting edema extend from the toes to the level of ankle  - Right foot X-ray: no gas, Calcaneal erosion (resident's read)  - Left foot xray: pending  - Left foot wound culture: pending  - Vas recs appreciated  - Pod plan: Local wound care pending [facility] final recs  - Discussed with Attending    54 yo M  with R heel wound to bone, s/p bedside L foot dorsal escharectomy with extensive periwound erythema  - Pt seen and evaluated.  - Afebrile, WBC 15.25 (12/4)  - R foot heel wound to bone, necrotic soft, mild malodor, minimal serosanguinous drainage. DTI along the medial malleolus, eschar along distal tibia, periwound erythema. s/p L Foot dorsal escharectomy of the left foot dorsal eschar, no further tracking or probing, necrotic soft tissue, no purulence noted, extensive periwound erythema, mild malodor, distal dorsal 5th DTI, b/l +3 pitting edema extend from the toes to the level of ankle  - Right foot X-ray: no gas, Calcaneal erosion (resident's read)  - Left foot xray: pending  - Left foot wound culture: pending  - [facility] recs appreciated  - Patient contracted and not a good surgical candidate from podiatry standpoint  - No podiatric surgical intervention during this admission  - Pod plan: Local wound care pending [facility] final recs  - Discussed with Attending    52 yo M  with R heel wound to bone, s/p bedside L foot dorsal escharectomy with extensive periwound erythema  - Pt seen and evaluated.  - Afebrile, WBC 15.25 (12/4)  - R foot heel wound to bone, necrotic soft, mild malodor, minimal serosanguinous drainage. DTI along the medial malleolus, eschar along distal tibia, periwound erythema. s/p L Foot dorsal escharectomy of the left foot dorsal eschar, no further tracking or probing, necrotic soft tissue, no purulence noted, extensive periwound erythema, mild malodor, distal dorsal 5th DTI, b/l +3 pitting edema extend from the toes to the level of ankle  - Right foot X-ray: no gas, Calcaneal erosion (resident's read)  - Left foot xray: pending  - Left foot wound culture: pending  - [facility] recs appreciated  - Patient contracted and not a good surgical candidate from podiatry standpoint  - No podiatric surgical intervention during this admission  - Pod plan: Local wound care pending [facility] final recs  - Discussed with Attending    52 yo M  with R heel wound to bone, s/p bedside L foot dorsal escharectomy with extensive periwound erythema  - Pt seen and evaluated.  - Afebrile, WBC 15.25 (12/4)  - R foot heel wound to bone, necrotic soft, mild malodor, minimal serosanguinous drainage. DTI along the medial malleolus, eschar along distal tibia, periwound erythema. s/p L Foot dorsal escharectomy of the left foot dorsal eschar, no further tracking or probing, necrotic soft tissue, no purulence noted, extensive periwound erythema, mild malodor, distal dorsal 5th DTI, b/l +3 pitting edema extend from the toes to the level of ankle  - Right foot X-ray: no gas, Calcaneal erosion (resident's read)  - Left foot xray: pending  - Left foot wound culture: pending  - [facility] recs appreciated  - Recommend wound care consult  - Patient contracted and not a good surgical candidate from podiatry standpoint  - No podiatric surgical intervention during this admission  - Patient stable from podiatry standpoint  - Wound care and follow up information can be found in discharge provider note  - Discussed with Attending    52 yo M  with R heel wound to bone, s/p bedside L foot dorsal escharectomy with extensive periwound erythema  - Pt seen and evaluated.  - Afebrile, WBC 15.25 (12/4)  - R foot heel wound to bone, necrotic soft, mild malodor, minimal serosanguinous drainage. DTI along the medial malleolus, eschar along distal tibia, periwound erythema. s/p L Foot dorsal escharectomy of the left foot dorsal eschar, no further tracking or probing, necrotic soft tissue, no purulence noted, extensive periwound erythema, mild malodor, distal dorsal 5th DTI, b/l +3 pitting edema extend from the toes to the level of ankle  - Right foot X-ray: no gas, Calcaneal erosion (resident's read)  - Left foot xray: pending  - Left foot wound culture: pending  - [facility] recs appreciated  - Recommend wound care consult  - Patient contracted and not a good surgical candidate from podiatry standpoint  - No podiatric surgical intervention during this admission  - Patient stable from podiatry standpoint  - Wound care and follow up information can be found in discharge provider note  - Discussed with Attending    52 yo M  with R heel wound to bone, s/p bedside L foot dorsal escharectomy with extensive periwound erythema  - Pt seen and evaluated.  - Afebrile, WBC 15.25 (12/4)  - R foot heel wound to bone, necrotic soft, mild malodor, minimal serosanguinous drainage. DTI along the medial malleolus, eschar along distal tibia, periwound erythema. s/p L Foot dorsal escharectomy of the left foot dorsal eschar, no further tracking or probing, necrotic soft tissue, no purulence noted, extensive periwound erythema, mild malodor, distal dorsal 5th DTI, b/l +3 pitting edema extend from the toes to the level of ankle  - Right foot X-ray: no gas, Calcaneal erosion (resident's read)  - Left foot xray: pending  - Left foot wound culture: pending  - [facility] recs appreciated  - Recommend wound care consult  - Patient contracted and not a good surgical candidate from podiatry standpoint  - No podiatric surgical intervention during this admission  - Patient stable from podiatry standpoint  - Wound care and follow up information can be found in discharge provider note  - Please reconsult as necessary  - Discussed with Attending    52 yo M  with R heel wound to bone, s/p bedside L foot dorsal escharectomy with extensive periwound erythema  - Pt seen and evaluated.  - Afebrile, WBC 15.25 (12/4)  - R foot heel wound to bone, necrotic soft, mild malodor, minimal serosanguinous drainage. DTI along the medial malleolus, eschar along distal tibia, periwound erythema. s/p L Foot dorsal escharectomy of the left foot dorsal eschar, no further tracking or probing, necrotic soft tissue, no purulence noted, extensive periwound erythema, mild malodor, distal dorsal 5th DTI, b/l +3 pitting edema extend from the toes to the level of ankle  - Right foot X-ray: no gas, Calcaneal erosion (resident's read)  - Left foot xray: pending  - Left foot wound culture: pending  - [facility] recs appreciated  - Recommend wound care consult  - Patient contracted and not a good surgical candidate from podiatry standpoint  - No podiatric surgical intervention during this admission  - Patient stable from podiatry standpoint  - Wound care and follow up information can be found in discharge provider note  - Please reconsult as necessary  - Discussed with Attending

## 2023-12-05 NOTE — H&P ADULT - HISTORY OF PRESENT ILLNESS
Gonzalez Stewart is a 53 year old male with PMHx of cervical epidural abscess (s/p decompressive laminectomy 3/2021 c/b b/l leg paralysis), hx of pneumoperitoneum (s/p ex lap, total abdominal colectomy and end ileostomy (on 1/12/23) c/b evisceration and sepsis with MRSA bacteremia postoperatively), hx of hepatitis C, hx of R. buttock abscess, hx of L. foot osteomyelitis, neurogenic bladder (with indwelling Holcomb catheter, last exchanged two days ago), HTN, HLD, bipolar disorder, GERD, hx of opioid dependence, and constipation who presented to the ED on 12/4/23 from [facility] for feet infection.    Patient reports he has had bilateral foot infections intermittently for the past year. Completed numerous rounds of antibiotics and even got C. difficile due to all the antibiotics. He is supposed to be getting wound care daily or every two days by wound care nurse at his facility but reports they do not come as they should. States the wound care nurse comes every six days. Wound care physician sees him once a week.    Extensive chart review with paperwork from [facility]. In June 2023, found to have L. hallux osteomyelitis. L. heel wound culture with Proteus mirabilis ESBL. Received PICC line and completed 6-week therapy with ceftazidime/avibactam q8. Found to have detectable viral load of hepatitis C. Previously completed sofosbuvir/velpatasvir so thought to be resistant to that and completed sofosbuvir/velpatasvir/voxilaprevir. Follows with ID.    In the ED, VSS except Tmax 101 and BP as low as 105/59. WBC 15.25K, hgb 10.5, bicarb 34, alkphos 227. ESR 84. U/A (sample obtained from Holcomb catheter) with positive nitrites, moderate leuks, moderate blood, WBC 26-50, RBC > 50, moderate bacteria, squamous epithelial cells present. CT b/l LE with study is severely limited by contracture. Left lower extremity is only partially visualized with exclusion of the left foot. Skin induration and subcutaneous edema in the leg and ankle.  No soft tissue gas. Received acetaminophen 1 g IV, morphine 8 mg IV, oxycodone 5 mg, vancomycin 1 g, zosyn 3.375 g, and LR 2800 cc bolus. Evaluated by vascular surgery PA who states attending to evaluate in AM. Evaluated by podiatry, underwent bedside escharectomy of left foot. Wound culture with +GPC in pairs. Gonzalez Stewart is a 53 year old male with PMHx of cervical epidural abscess (s/p decompressive laminectomy 3/2021 c/b b/l leg paralysis), hx of pneumoperitoneum (s/p ex lap, total abdominal colectomy and end ileostomy (on 1/12/23) c/b evisceration and sepsis with MRSA bacteremia postoperatively), hx of hepatitis C, hx of R. buttock abscess, hx of L. foot osteomyelitis, neurogenic bladder (with indwelling Holcomb catheter, last exchanged two days ago), HTN, HLD, bipolar disorder, GERD, hx of opioid dependence, and constipation who presented to the ED on 12/4/23 from [facility] for feet infection.    Patient reports he has had bilateral foot infections intermittently for the past year. Completed numerous rounds of antibiotics and even got C. difficile due to all the antibiotics. He is supposed to be getting wound care daily or every two days by wound care nurse at his facility but reports they do not come as they should. States the wound care nurse comes every six days. Wound care physician sees him once a week.    Extensive chart review with paperwork from [facility]. In June 2023, found to have L. hallux osteomyelitis. L. heel wound culture with Proteus mirabilis ESBL. Received PICC line and completed 6-week therapy with ceftazidime/avibactam q8. Found to have detectable viral load of hepatitis C. Previously completed sofosbuvir/velpatasvir so thought to be resistant to that and completed sofosbuvir/velpatasvir/voxilaprevir. Follows with ID.    In the ED, VSS except Tmax 101 and BP as low as 105/59. WBC 15.25K, hgb 10.5, bicarb 34, alkphos 227. ESR 84. U/A (sample obtained from Holcomb catheter) with positive nitrites, moderate leuks, moderate blood, WBC 26-50, RBC > 50, moderate bacteria, squamous epithelial cells present. CT b/l LE with study is severely limited by contracture. Left lower extremity is only partially visualized with exclusion of the left foot. Skin induration and subcutaneous edema in the leg and ankle.  No soft tissue gas. Received acetaminophen 1 g IV, morphine 8 mg IV, oxycodone 5 mg, vancomycin 1 g, zosyn 3.375 g, and LR 2800 cc bolus. Evaluated by vascular surgery PA who states attending to evaluate in AM. Evaluated by podiatry, underwent bedside escharectomy of left foot. Wound culture with +GPC in pairs.

## 2023-12-05 NOTE — DISCHARGE NOTE PROVIDER - NSDCCPCAREPLAN_GEN_ALL_CORE_FT
PRINCIPAL DISCHARGE DIAGNOSIS  Diagnosis: Cellulitis  Assessment and Plan of Treatment: IR will place PICC line place 12/11/23 and will receive 6 weeks of antibiotics per ID. Patient is to f/u OP with ID for weekly labs ( cbc with diff, cmp, esr, crp, vancomycin trough). PICC line is to be removed after the course of antibiotics is finished (1/6/24).   Patient needs close follow up with podiatry as outpatient and since  he lives at group home his outpatient labs need to be followed by the primary or ID doctor.     PRINCIPAL DISCHARGE DIAGNOSIS  Diagnosis: Cellulitis  Assessment and Plan of Treatment: IR will place PICC line place 12/11/23 and will receive 6 weeks of antibiotics per ID. Patient is to f/u OP with ID for weekly labs ( cbc with diff, cmp, esr, crp, vancomycin trough). PICC line is to be removed after the course of antibiotics is finished (1/6/24).   Patient needs close follow up with podiatry as outpatient and since  he lives at group home his outpatient labs need to be followed by the primary or ID doctor.  Wound Care: Please apply betadine, 4x4 gauze, ABD pads, and kerlix to bilateral wounds  Weight bearing: Please wear Z-flow boots at all times to bilateral feet  Antibiotics: Please continue as instructed.     PRINCIPAL DISCHARGE DIAGNOSIS  Diagnosis: Cellulitis  Assessment and Plan of Treatment: IR will place PICC line place 12/11/23 and will receive 6 weeks of antibiotics per ID. Patient is to f/u OP with ID for weekly labs ( cbc with diff, cmp, esr, crp, vancomycin trough). PICC line is to be removed after the course of antibiotics is finished .Continue Vancomycin IV 1250 mg q 12 hrs - last dose on January 3rd, 2024  continue ertapenem 1 gram IV daily - last dose on January 3rd, 2024  Patient needs close follow up with podiatry as outpatient and since  he lives at group home his outpatient labs need to be followed by the primary or ID doctor.  Wound Care: Please apply betadine, 4x4 gauze, ABD pads, and kerlix to bilateral wounds  Weight bearing: Please wear Z-flow boots at all times to bilateral feet  Antibiotics: Please continue as instructed.     PRINCIPAL DISCHARGE DIAGNOSIS  Diagnosis: Cellulitis  Assessment and Plan of Treatment:

## 2023-12-05 NOTE — H&P ADULT - NSHPPHYSICALEXAM_GEN_ALL_CORE
T(C): 36.8 (12-05-23 @ 02:37), Max: 38.3 (12-04-23 @ 16:55)  HR: 90 (12-05-23 @ 02:37) (87 - 90)  BP: 114/76 (12-05-23 @ 02:37) (105/59 - 114/76)  RR: 18 (12-05-23 @ 02:37) (16 - 20)  SpO2: 93% (12-05-23 @ 02:37) (93% - 95%)    CONSTITUTIONAL: Well groomed  EYES: PERRLA and symmetric  ENMT: Oral mucosa with moist membranes  RESP: No respiratory distress, no use of accessory muscles  CV: RRR  GI: Soft, ND, RLQ ileostomy in place  : Holcomb catheter in place draining yellow urine, R. buttock abscess? (unable to visualize as pt is unable to turn due to severe pain)  MSK: b/l legs contracted  SKIN: R. foot heel with necrosis, minimal serosanguineous drainage, mild malodor, periwound erythema, L. foot dorsal eschar, periwound erythema

## 2023-12-05 NOTE — DISCHARGE NOTE PROVIDER - CARE PROVIDER_API CALL
PCP,   Phone: (   )    -  Fax: (   )    -  Follow Up Time: 2 weeks   PCP,   Phone: (   )    -  Fax: (   )    -  Follow Up Time: 2 weeks    Juan C Granados  Infectious Disease  [address] [place] [address]  Phone: [phone]  Fax: [phone]  Follow Up Time: 1 week   PCP,   Phone: (   )    -  Fax: (   )    -  Follow Up Time: 2 weeks    Juan C Granados  Infectious Disease  [address] [place] [address]  Phone: [phone]  Fax: [phone]  Follow Up Time: 1 week   Juan C Granados  Infectious Disease  [address] [place] [address]  Phone: [phone]  Fax: [phone]  Follow Up Time: 1 week    PCP,   Phone: (   )    -  Fax: (   )    -  Follow Up Time: 2 weeks    Korey Epstein  Vascular Surgery  [address] [facility]  [place] [address]  Phone: [phone]  Fax: [phone]  Follow Up Time: 1 month   Juan C Granados  Infectious Disease  [address] [place] [address]  Phone: [phone]  Fax: [phone]  Follow Up Time: 1 week    PCP,   Phone: (   )    -  Fax: (   )    -  Follow Up Time: 2 weeks    Korey Epstein  Vascular Surgery  [address] [facility]  [place] [address]  Phone: [phone]  Fax: [phone]  Follow Up Time: 1 month

## 2023-12-05 NOTE — DISCHARGE NOTE PROVIDER - NSDCMRMEDTOKEN_GEN_ALL_CORE_FT
acetaminophen 325 mg oral tablet: 2 tab(s) orally once a day  amLODIPine 2.5 mg oral tablet: 1 tab(s) orally once a day  ascorbic acid 500 mg oral tablet: 1 tab(s) orally once a day  atorvastatin 40 mg oral tablet: 1 tab(s) orally once a day (at bedtime)  cyclobenzaprine 10 mg oral tablet: 1 tab(s) orally 2 times a day  Depakote  mg oral tablet, extended release: 3 tab(s) orally once a day (at bedtime)  DULoxetine 30 mg oral delayed release capsule: 1 cap(s) orally once a day  finasteride 5 mg oral tablet: 1 tab(s) orally once a day  gabapentin 300 mg oral tablet: 2 tab(s) orally every 8 hours  lidocaine 4% topical film: Apply topically to affected area once a day  methadone 10 mg/5 mL oral solution: 55 milliliter(s) orally once a day  methylphenidate 5 mg oral tablet: 1 tab(s) orally 2 times a day  Multiple Vitamins oral tablet: 1 tab(s) orally once a day  oxyCODONE 5 mg oral tablet: 1 tab(s) orally every 6 hours  pantoprazole 40 mg oral delayed release tablet: 1 tab(s) orally once a day  ProAir HFA 90 mcg/inh inhalation aerosol: 2 puff(s) inhaled every 4 hours prn SOB  QUEtiapine 100 mg oral tablet: 1 tab(s) orally 2 times a day  QUEtiapine 400 mg oral tablet: 1 tab(s) orally once a day (at bedtime)  senna (sennosides) 8.6 mg oral tablet: 2 tab(s) orally once a day (at bedtime)  tamsulosin 0.4 mg oral capsule: 1 cap(s) orally once a day  thiamine 100 mg oral tablet: 1 tab(s) orally once a day   acetaminophen 325 mg oral tablet: 2 tab(s) orally once a day  amLODIPine 2.5 mg oral tablet: 1 tab(s) orally once a day  ascorbic acid 500 mg oral tablet: 1 tab(s) orally once a day  atorvastatin 40 mg oral tablet: 1 tab(s) orally once a day (at bedtime)  cyclobenzaprine 10 mg oral tablet: 1 tab(s) orally 2 times a day  Depakote  mg oral tablet, extended release: 3 tab(s) orally once a day (at bedtime)  DULoxetine 30 mg oral delayed release capsule: 1 cap(s) orally once a day  finasteride 5 mg oral tablet: 1 tab(s) orally once a day  gabapentin 300 mg oral tablet: 2 tab(s) orally every 8 hours  lidocaine 4% topical film: Apply topically to affected area once a day  methadone 10 mg/5 mL oral solution: 55 milliliter(s) orally once a day  methylphenidate 5 mg oral tablet: 1 tab(s) orally 2 times a day  Multiple Vitamins oral tablet: 1 tab(s) orally once a day  oxyCODONE 5 mg oral tablet: 1 tab(s) orally every 6 hours  pantoprazole 40 mg oral delayed release tablet: 1 tab(s) orally once a day  ProAir HFA 90 mcg/inh inhalation aerosol: 2 puff(s) inhaled every 4 hours prn SOB  QUEtiapine 100 mg oral tablet: 1 tab(s) orally 2 times a day  QUEtiapine 400 mg oral tablet: 1 tab(s) orally once a day (at bedtime)  senna (sennosides) 8.6 mg oral tablet: 2 tab(s) orally once a day (at bedtime)  tamsulosin 0.4 mg oral capsule: 1 cap(s) orally once a day (at bedtime)  thiamine 100 mg oral tablet: 1 tab(s) orally once a day   acetaminophen 325 mg oral tablet: 2 tab(s) orally once a day  amLODIPine 2.5 mg oral tablet: 1 tab(s) orally once a day  ascorbic acid 500 mg oral tablet: 1 tab(s) orally once a day  atorvastatin 40 mg oral tablet: 1 tab(s) orally once a day (at bedtime)  cyclobenzaprine 10 mg oral tablet: 1 tab(s) orally 2 times a day  DULoxetine 30 mg oral delayed release capsule: 1 cap(s) orally once a day  finasteride 5 mg oral tablet: 1 tab(s) orally once a day  gabapentin 300 mg oral tablet: 2 tab(s) orally every 8 hours  lidocaine 4% topical film: Apply topically to affected area once a day  methadone 10 mg/5 mL oral solution: 55 milliliter(s) orally once a day  methylphenidate 5 mg oral tablet: 1 tab(s) orally 2 times a day  Multiple Vitamins oral tablet: 1 tab(s) orally once a day  oxyCODONE 5 mg oral tablet: 1 tab(s) orally every 6 hours  pantoprazole 40 mg oral delayed release tablet: 1 tab(s) orally once a day  ProAir HFA 90 mcg/inh inhalation aerosol: 2 puff(s) inhaled every 4 hours prn SOB  QUEtiapine 100 mg oral tablet: 1 tab(s) orally 2 times a day  QUEtiapine 400 mg oral tablet: 1 tab(s) orally once a day (at bedtime)  senna (sennosides) 8.6 mg oral tablet: 2 tab(s) orally once a day (at bedtime)  tamsulosin 0.4 mg oral capsule: 1 cap(s) orally once a day (at bedtime)  thiamine 100 mg oral tablet: 1 tab(s) orally once a day   acetaminophen 325 mg oral tablet: 2 tab(s) orally once a day  amLODIPine 2.5 mg oral tablet: 1 tab(s) orally once a day  ascorbic acid 500 mg oral tablet: 1 tab(s) orally once a day  atorvastatin 40 mg oral tablet: 1 tab(s) orally once a day (at bedtime)  budesonide-formoterol 160 mcg-4.5 mcg/inh inhalation aerosol: 1 puff(s) inhaled 2 times a day  cyanocobalamin 1000 mcg oral tablet: 1 tab(s) orally once a day  DULoxetine 30 mg oral delayed release capsule: 1 cap(s) orally once a day  ferrous sulfate 325 mg (65 mg elemental iron) oral tablet: 1 tab(s) orally once a day  finasteride 5 mg oral tablet: 1 tab(s) orally once a day  folic acid 1 mg oral tablet: 1 tab(s) orally once a day  furosemide 20 mg oral tablet: 1 tab(s) orally once a day  HYDROmorphone 2 mg oral tablet: 1 tab(s) orally every 4 hours As needed Severe Pain (7 - 10)  lidocaine 4% topical film: Apply topically to affected area once a day  meropenem 1000 mg intravenous injection: 1000 milligram(s) intravenous every 8 hours  methadone 10 mg/5 mL oral solution: 55 milliliter(s) orally once a day  Multiple Vitamins oral tablet: 1 tab(s) orally once a day  pantoprazole 40 mg oral delayed release tablet: 1 tab(s) orally once a day  ProAir HFA 90 mcg/inh inhalation aerosol: 2 puff(s) inhaled every 4 hours prn SOB  QUEtiapine 100 mg oral tablet: 1 tab(s) orally 2 times a day  QUEtiapine 400 mg oral tablet: 1 tab(s) orally once a day (at bedtime)  senna (sennosides) 8.6 mg oral tablet: 2 tab(s) orally once a day (at bedtime)  tamsulosin 0.4 mg oral capsule: 1 cap(s) orally once a day (at bedtime)  vancomycin 1 g intravenous injection: 1 gram(s) intravenous every 12 hours

## 2023-12-05 NOTE — DISCHARGE NOTE PROVIDER - NSDCFUADDINST_GEN_ALL_CORE_FT
Consent (Near Eyelid Margin)/Introductory Paragraph: The rationale for Mohs was explained to the patient and consent was obtained. The risks, benefits and alternatives to therapy were discussed in detail. Specifically, the risks of ectropion or eyelid deformity, infection, scarring, bleeding, prolonged wound healing, incomplete removal, allergy to anesthesia, nerve injury and recurrence were addressed. Prior to the procedure, the treatment site was clearly identified and confirmed by the patient. All components of Universal Protocol/PAUSE Rule completed. 1) It is important to see your primary physician as well as other necessary consultants within next 2-4 weeks to perform a comprehensive medical review.  Call their offices for an appointment as soon as you leave the hospital.  If you do not have a primary physician or unable to reach your PCP, contact the [facility] Physician Referral Service at [phone].  Your medical issues appear to be stable at this time, but if your symptoms recur or worsen, contact your physicians and/or return to the hospital if necessary.  If you encounter any issues or questions with your medication, call your physicians before stopping the medication.    2) Please access [facility] Patient portal (as instructed on the discharge paperwork) to access your medical records at any time after discharge.  1) It is important to see your primary physician as well as other necessary consultants within next 2-4 weeks to perform a comprehensive medical review.  Call their offices for an appointment as soon as you leave the hospital.  If you do not have a primary physician or unable to reach your PCP, contact the [facility] Physician Referral Service at [phone].  Your medical issues appear to be stable at this time, but if your symptoms recur or worsen, contact your physicians and/or return to the hospital if necessary.  If you encounter any issues or questions with your medication, call your physicians before stopping the medication.    2) Please access [facility] Patient portal (as instructed on the discharge paperwork) to access your medical records at any time after discharge.

## 2023-12-05 NOTE — PROVIDER CONTACT NOTE (CRITICAL VALUE NOTIFICATION) - TEST AND RESULT REPORTED:
Abcess culture left wound ordered 12/4 gram stain no polymorpheo nuclear leukocytes seens, & rare gram + cocci in pairs Abcess culture left wound ordered 12/4 gram stain no polymorphonuclear leukocytes seen & rare gram + cocci in pairs Abscess culture left wound ordered 12/4 gram stain: no polymorphonuclear leukocytes seen & rare gram + cocci in pairs

## 2023-12-05 NOTE — DISCHARGE NOTE PROVIDER - NPI NUMBER (FOR SYSADMIN USE ONLY) :
[UNKNOWN] [UNKNOWN],[[id number]] [UNKNOWN],[[id number]] [[id number]],[UNKNOWN],[[id number]] [[id number]],[UNKNOWN],[[id number]]

## 2023-12-05 NOTE — CHART NOTE - NSCHARTNOTEFT_GEN_A_CORE
Called [facility] Methadone clinic ([phone]), patient ID #[id number]. Spoke to DEEPIKA Nagy, confirmed patient daily dose of 110mg, last dose received 11/5 at nursing home. Called [facility] Methadone clinic ([phone]), patient ID #[id number]. Spoke to DEEPIKA Nagy, confirmed patient daily dose of 110mg, last dose received 11/5 at nursing home. Called [facility] Methadone clinic ([phone]), patient ID #[id number]. Spoke to DEEPIKA Nagy, confirmed patient daily dose of 110mg, last dose received 12/4 at nursing home. Called [facility] Methadone clinic ([phone]), patient ID #[id number]. Spoke to DEEPIKA Nagy, confirmed patient daily dose of 110mg, last dose received 12/4 at nursing home.

## 2023-12-05 NOTE — PROGRESS NOTE ADULT - SUBJECTIVE AND OBJECTIVE BOX
Patient is a 53y old  Male who presents with a chief complaint of Sepsis secondary to b/l foot wounds (05 Dec 2023 03:00)       INTERVAL HPI/OVERNIGHT EVENTS:  Patient seen and evaluated at bedside.  Pt is resting comfortable in NAD. Denies N/V/F/C.    Allergies    NSAIDs (Flushing; Other (Moderate))  Haldol (Anaphylaxis)  Zyprexa (Rash; Dystonic RXN; Hives)  Motrin (Anaphylaxis)  Thorazine (Other (Moderate))  Aleve (Unknown)  Stelazine (Unknown)  Risperdal (Short breath; Rash; Hives)    Intolerances        Vital Signs Last 24 Hrs  T(C): 37 (05 Dec 2023 08:29), Max: 38.3 (04 Dec 2023 16:55)  T(F): 98.6 (05 Dec 2023 08:29), Max: 101 (04 Dec 2023 16:55)  HR: 74 (05 Dec 2023 08:29) (74 - 90)  BP: 119/64 (05 Dec 2023 08:29) (105/59 - 121/66)  BP(mean): --  RR: 17 (05 Dec 2023 08:29) (16 - 20)  SpO2: 91% (05 Dec 2023 08:29) (91% - 95%)    Parameters below as of 05 Dec 2023 05:21  Patient On (Oxygen Delivery Method): nasal cannula  O2 Flow (L/min): 2      LABS:                        10.5   15.25 )-----------( 206      ( 04 Dec 2023 17:20 )             35.2     12-04    140  |  104  |  13  ----------------------------<  118<H>  4.2   |  34<H>  |  0.67    Ca    9.1      04 Dec 2023 17:20    TPro  8.0  /  Alb  1.8<L>  /  TBili  0.4  /  DBili  x   /  AST  33  /  ALT  44  /  AlkPhos  227<H>  12-04    PT/INR - ( 04 Dec 2023 17:20 )   PT: 13.8 sec;   INR: 1.16 ratio         PTT - ( 04 Dec 2023 17:20 )  PTT:33.4 sec  Urinalysis Basic - ( 04 Dec 2023 22:05 )    Color: Yellow / Appearance: Cloudy / S.021 / pH: x  Gluc: x / Ketone: Negative mg/dL  / Bili: Negative / Urobili: 1.0 mg/dL   Blood: x / Protein: 30 mg/dL / Nitrite: Positive   Leuk Esterase: Moderate / RBC: >50 /HPF / WBC 26-50 /HPF   Sq Epi: x / Non Sq Epi: x / Bacteria: Moderate /HPF      CAPILLARY BLOOD GLUCOSE          Lower Extremity Physical Exam:  Vascular: DP/PT 2/4, B/L, CFT <3 seconds B/L, Temperature gradient warm to warm , B/L.   Neuro: Epicritic sensation absent to the level of digits, B/L.  Musculoskeletal/Ortho: severe contracted b/l lower extremities  Skin: R foot heel wound to bone, necrotic soft, mild malodor, minimal serosanguinous drainage. DTI along the medial malleolus, eschar along distal tibia, periwound erythema, L Foot dorsal eschar, periwound extensive erythema, mild malodor, distal dorsal 5th DTI, b/l +3 pitting edema extend from the toes to the level of ankle        RADIOLOGY & ADDITIONAL TESTS:  < from: CT Lower Extremity w/ IV Cont, Bilateral (23 @ 23:00) >  ACC: [id number] EXAM:  CT LWR EXT IC BI   ORDERED BY: HIRA MCMILLAN     PROCEDURE DATE:  2023          INTERPRETATION:  CT LOWER EXTREMITY WITH IV CONTRAST BILATERAL    HISTORY: Bilateral wounds. Pain. Infection. Evaluate for gas. Right hip   wound and bilateral lower extremity foot wounds. Paraplegia.    TECHNIQUE: Contiguous axial imaging was performed through the bilateral   lower extremities with 90 cc administered Omnipaque 350 intravenous   contrast. On the right imaging was performedfrom the femoral greater   trochanteric level to the ankle, foot is not included. On the left   imaging was performed through the proximal tibia to the foot. Coronal and   sagittal reformatting was utilized. Patient's contractured state and   positioning causes some limitation.    COMPARISON:  Right foot x-rays dated 2023. Bilateral tibia and   fibula x-rays dated 2023.    FINDINGS:    Right femoral greater trochanteric level to the ankle, foot is not   included.  OSSEOUS STRUCTURES    Fractures:  None.    RIGHT KNEE JOINTS    Joint Space(s):  Maintained with the knee held in flexion. Superior   margin of the patellofemoral joint is not completely included within the   field-of-view.    Effusion:  No sizable joint effusion is seen although the suprapatellar   recess is not completely included within the field-of-view.    RIGHT ANKLE JOINTS    Joint Space(s):  Maintained.    MYOTENDINOUS STRUCTURES  There is moderate to severe generalized muscle atrophy.    NEUROVASCULAR STRUCTURES  Unremarkable within limits of CT.    OTHER SOFT TISSUES  Posterolateral decubitus wound is seen overlying the gluteus jolly at   the level of the right greater trochanter.  Moderate to severe subcutaneous edema is present. No abscess or gas is   seen.    Left proximal tibia to the foot  OSSEOUS STRUCTURES    Fractures:  None.    LEFT ANKLE/FOOT JOINTS    Joint Space(s):  There is widening of the superolateral ankle mortise.   Erosive deformity of the 1st interphalangeal joint is of indeterminate   age. Hammertoe deformities are present.    MYOTENDINOUS STRUCTURES  Moderate to severe generalized muscle atrophy is present.    NEUROVASCULAR STRUCTURES  Unremarkable within limits of CT.    OTHER SOFT TISSUES  Moderate severe subcutaneous edema is present.  Soft tissue wound is present along the plantar margin of the calcaneal   tuberosity. Soft tissue wounds appear to be present along the dorsum of   the midfoot. No gas or abscess is seen.        IMPRESSION:  Patient contractured state and positioning causes limitation.  Preliminary report was provided by [facility].    Right femoral trochanteric region to the ankle, the foot is not included.  1.  Posterolateral decubitus wound is present at the level of the greater   trochanter without appreciated abscess or gas.    Left proximal tibia to the foot  1.  Soft tissue wounds are present along the calcaneus and midfoot   without appreciated abscess or gas.  2.  There is erosive change of the 1st interphalangeal joint of   indeterminate age. Correlation is suggested for signs of infection in   this location.    < end of copied text >   Patient is a 53y old  Male who presents with a chief complaint of Sepsis secondary to b/l foot wounds (05 Dec 2023 03:00)       INTERVAL HPI/OVERNIGHT EVENTS:  Patient seen and evaluated at bedside.  Pt is resting comfortable in NAD. Denies N/V/F/C.    Allergies    NSAIDs (Flushing; Other (Moderate))  Haldol (Anaphylaxis)  Zyprexa (Rash; Dystonic RXN; Hives)  Motrin (Anaphylaxis)  Thorazine (Other (Moderate))  Aleve (Unknown)  Stelazine (Unknown)  Risperdal (Short breath; Rash; Hives)    Intolerances        Vital Signs Last 24 Hrs  T(C): 37 (05 Dec 2023 08:29), Max: 38.3 (04 Dec 2023 16:55)  T(F): 98.6 (05 Dec 2023 08:29), Max: 101 (04 Dec 2023 16:55)  HR: 74 (05 Dec 2023 08:29) (74 - 90)  BP: 119/64 (05 Dec 2023 08:29) (105/59 - 121/66)  BP(mean): --  RR: 17 (05 Dec 2023 08:29) (16 - 20)  SpO2: 91% (05 Dec 2023 08:29) (91% - 95%)    Parameters below as of 05 Dec 2023 05:21  Patient On (Oxygen Delivery Method): nasal cannula  O2 Flow (L/min): 2      LABS:                        10.5   15.25 )-----------( 206      ( 04 Dec 2023 17:20 )             35.2     12-04    140  |  104  |  13  ----------------------------<  118<H>  4.2   |  34<H>  |  0.67    Ca    9.1      04 Dec 2023 17:20    TPro  8.0  /  Alb  1.8<L>  /  TBili  0.4  /  DBili  x   /  AST  33  /  ALT  44  /  AlkPhos  227<H>  12-04    PT/INR - ( 04 Dec 2023 17:20 )   PT: 13.8 sec;   INR: 1.16 ratio         PTT - ( 04 Dec 2023 17:20 )  PTT:33.4 sec  Urinalysis Basic - ( 04 Dec 2023 22:05 )    Color: Yellow / Appearance: Cloudy / S.021 / pH: x  Gluc: x / Ketone: Negative mg/dL  / Bili: Negative / Urobili: 1.0 mg/dL   Blood: x / Protein: 30 mg/dL / Nitrite: Positive   Leuk Esterase: Moderate / RBC: >50 /HPF / WBC 26-50 /HPF   Sq Epi: x / Non Sq Epi: x / Bacteria: Moderate /HPF      CAPILLARY BLOOD GLUCOSE          Lower Extremity Physical Exam:  Vascular: DP/PT 2/4, B/L, CFT <3 seconds B/L, Temperature gradient warm to warm , B/L.   Neuro: Epicritic sensation absent to the level of digits, B/L.  Musculoskeletal/Ortho: severe contracted b/l lower extremities  Skin: R foot heel wound to bone, necrotic soft, mild malodor, minimal serosanguinous drainage. DTI along the medial malleolus, eschar along distal tibia, periwound erythema, L Foot dorsal eschar, periwound extensive erythema, mild malodor, distal dorsal 5th DTI, b/l +3 pitting edema extend from the toes to the level of ankle        RADIOLOGY & ADDITIONAL TESTS:  < from: CT Lower Extremity w/ IV Cont, Bilateral (23 @ 23:00) >  ACC: [id number] EXAM:  CT LWR EXT IC BI   ORDERED BY: HIRA MCMILLAN     PROCEDURE DATE:  2023          INTERPRETATION:  CT LOWER EXTREMITY WITH IV CONTRAST BILATERAL    HISTORY: Bilateral wounds. Pain. Infection. Evaluate for gas. Right hip   wound and bilateral lower extremity foot wounds. Paraplegia.    TECHNIQUE: Contiguous axial imaging was performed through the bilateral   lower extremities with 90 cc administered Omnipaque 350 intravenous   contrast. On the right imaging was performedfrom the femoral greater   trochanteric level to the ankle, foot is not included. On the left   imaging was performed through the proximal tibia to the foot. Coronal and   sagittal reformatting was utilized. Patient's contractured state and   positioning causes some limitation.    COMPARISON:  Right foot x-rays dated 2023. Bilateral tibia and   fibula x-rays dated 2023.    FINDINGS:    Right femoral greater trochanteric level to the ankle, foot is not   included.  OSSEOUS STRUCTURES    Fractures:  None.    RIGHT KNEE JOINTS    Joint Space(s):  Maintained with the knee held in flexion. Superior   margin of the patellofemoral joint is not completely included within the   field-of-view.    Effusion:  No sizable joint effusion is seen although the suprapatellar   recess is not completely included within the field-of-view.    RIGHT ANKLE JOINTS    Joint Space(s):  Maintained.    MYOTENDINOUS STRUCTURES  There is moderate to severe generalized muscle atrophy.    NEUROVASCULAR STRUCTURES  Unremarkable within limits of CT.    OTHER SOFT TISSUES  Posterolateral decubitus wound is seen overlying the gluteus jolly at   the level of the right greater trochanter.  Moderate to severe subcutaneous edema is present. No abscess or gas is   seen.    Left proximal tibia to the foot  OSSEOUS STRUCTURES    Fractures:  None.    LEFT ANKLE/FOOT JOINTS    Joint Space(s):  There is widening of the superolateral ankle mortise.   Erosive deformity of the 1st interphalangeal joint is of indeterminate   age. Hammertoe deformities are present.    MYOTENDINOUS STRUCTURES  Moderate to severe generalized muscle atrophy is present.    NEUROVASCULAR STRUCTURES  Unremarkable within limits of CT.    OTHER SOFT TISSUES  Moderate severe subcutaneous edema is present.  Soft tissue wound is present along the plantar margin of the calcaneal   tuberosity. Soft tissue wounds appear to be present along the dorsum of   the midfoot. No gas or abscess is seen.        IMPRESSION:  Patient contractured state and positioning causes limitation.  Preliminary report was provided by [facility].    Right femoral trochanteric region to the ankle, the foot is not included.  1.  Posterolateral decubitus wound is present at the level of the greater   trochanter without appreciated abscess or gas.    Left proximal tibia to the foot  1.  Soft tissue wounds are present along the calcaneus and midfoot   without appreciated abscess or gas.  2.  There is erosive change of the 1st interphalangeal joint of   indeterminate age. Correlation is suggested for signs of infection in   this location.    < end of copied text >

## 2023-12-05 NOTE — PROGRESS NOTE ADULT - SUBJECTIVE AND OBJECTIVE BOX
Patient seen and examined at bedside with Dr. Epstein, patient without complaints.         MEDICATIONS  (STANDING):  acetaminophen     Tablet .. 650 milliGRAM(s) Oral daily  amLODIPine   Tablet 2.5 milliGRAM(s) Oral daily  ascorbic acid 500 milliGRAM(s) Oral daily  atorvastatin 40 milliGRAM(s) Oral at bedtime  cefepime   IVPB 2000 milliGRAM(s) IV Intermittent every 8 hours  cyclobenzaprine 10 milliGRAM(s) Oral two times a day  divalproex  milliGRAM(s) Oral at bedtime  DULoxetine 30 milliGRAM(s) Oral daily  finasteride 5 milliGRAM(s) Oral daily  gabapentin 600 milliGRAM(s) Oral <User Schedule>  lidocaine   4% Patch 1 Patch Transdermal daily  methadone  Concentrate 110 milliGRAM(s) Oral daily  methylphenidate 5 milliGRAM(s) Oral <User Schedule>  multivitamin 1 Tablet(s) Oral daily  oxyCODONE    IR 5 milliGRAM(s) Oral <User Schedule>  pantoprazole    Tablet 40 milliGRAM(s) Oral before breakfast  QUEtiapine 400 milliGRAM(s) Oral at bedtime  QUEtiapine 100 milliGRAM(s) Oral <User Schedule>  senna 2 Tablet(s) Oral at bedtime  tamsulosin 0.4 milliGRAM(s) Oral at bedtime  thiamine 100 milliGRAM(s) Oral daily  vancomycin  IVPB 1250 milliGRAM(s) IV Intermittent every 8 hours    MEDICATIONS  (PRN):  acetaminophen     Tablet .. 650 milliGRAM(s) Oral every 6 hours PRN Temp greater or equal to 38C (100.4F), Mild Pain (1 - 3)  albuterol    90 MICROgram(s) HFA Inhaler 2 Puff(s) Inhalation every 4 hours PRN Shortness of Breath and/or Wheezing  aluminum hydroxide/magnesium hydroxide/simethicone Suspension 30 milliLiter(s) Oral every 4 hours PRN Dyspepsia  melatonin 3 milliGRAM(s) Oral at bedtime PRN Insomnia  ondansetron Injectable 4 milliGRAM(s) IV Push every 8 hours PRN Nausea and/or Vomiting      Vital Signs Last 24 Hrs  T(C): 37.7 (05 Dec 2023 11:19), Max: 38.3 (04 Dec 2023 16:55)  T(F): 99.8 (05 Dec 2023 11:19), Max: 101 (04 Dec 2023 16:55)  HR: 83 (05 Dec 2023 11:19) (74 - 90)  BP: 110/68 (05 Dec 2023 11:19) (105/59 - 121/66)  BP(mean): 82 (05 Dec 2023 11:19) (82 - 82)  RR: 16 (05 Dec 2023 11:19) (16 - 20)  SpO2: 92% (05 Dec 2023 11:19) (91% - 95%)    Parameters below as of 05 Dec 2023 11:19  Patient On (Oxygen Delivery Method): nasal cannula  O2 Flow (L/min): 4    PHYSICAL EXAM:  General: NAD  Neuro:  Alert & oriented x 3  HEENT: NCAT, EOMI, conjunctiva clear  CV: +S1+S2  Lung: Respirations nonlabored, good inspiratory effort  Abdomen: soft, NTND. colostomy with air in bag  Extremities: bilateral feet wounds evaluated by podiatry, dressing C/D/I. R hip wound with minimal slough.            LABS:                        10.5   15.25 )-----------( 206      ( 04 Dec 2023 17:20 )             35.2     12-04    140  |  104  |  13  ----------------------------<  118<H>  4.2   |  34<H>  |  0.67    Ca    9.1      04 Dec 2023 17:20    TPro  8.0  /  Alb  1.8<L>  /  TBili  0.4  /  DBili  x   /  AST  33  /  ALT  44  /  AlkPhos  227<H>  12-04    PT/INR - ( 04 Dec 2023 17:20 )   PT: 13.8 sec;   INR: 1.16 ratio         PTT - ( 04 Dec 2023 17:20 )  PTT:33.4 sec  Urinalysis Basic - ( 04 Dec 2023 22:05 )    Color: Yellow / Appearance: Cloudy / S.021 / pH: x  Gluc: x / Ketone: Negative mg/dL  / Bili: Negative / Urobili: 1.0 mg/dL   Blood: x / Protein: 30 mg/dL / Nitrite: Positive   Leuk Esterase: Moderate / RBC: >50 /HPF / WBC 26-50 /HPF   Sq Epi: x / Non Sq Epi: x / Bacteria: Moderate /HPF

## 2023-12-05 NOTE — PROGRESS NOTE ADULT - ASSESSMENT
Gonzalez Stewart is a 53 year old male with PMHx of cervical epidural abscess (s/p decompressive laminectomy 3/2021 c/b b/l leg paralysis), hx of pneumoperitoneum (s/p ex lap, total abdominal colectomy and end ileostomy (on 1/12/23) c/b evisceration and sepsis with MRSA bacteremia postoperatively), hx of hepatitis C, hx of R. buttock abscess, hx of L. foot osteomyelitis, neurogenic bladder (with indwelling Holcomb catheter, last exchanged two days ago), HTN, HLD, bipolar disorder, GERD, hx of opioid dependence, and constipation who presented to the ED on 12/4/23 from [facility] for feet infection and admitted for sepsis secondary to bilateral feet infection.    Sepsis secondary to b/l feet infection  Less likely UTI given urine sample was not clean catch in pt with indwelling Holcomb and no urinary symptoms  Complaints of b/l feet infection intermittently over the past year  Previously treated for L. hallux OM with L. heel culture growing Proteus mirabilis ESBL, completed 6-week course of avycaz  Temp 101 and WBC 15.25K on admission  Lactic WNL, ESR 84  U/A (sample obtained from Holcomb catheter) with positive nitrites, moderate leuks, moderate blood, WBC 26-50, RBC > 50, moderate bacteria, squamous epithelial cells present  CT b/l LE with study is severely limited by contracture. Left lower extremity is only partially visualized with exclusion of the left foot. Skin induration and subcutaneous edema in the leg and ankle.  No soft tissue gas  S/p LR 2800 cc bolus, vancomycin, and zosyn in the ED  S/p bedside escharectomy by podiatry  Empirically started on vancomycin and cefepime; can de-escalate pending final culture data  Wound culture +GPC in pairs  F/u blood cultures, urine culture, CRP, MRSA/MSSA PCR  Please examine R. buttock as pt with hx of abscess in that area (unable to turn at the time of my examination due to severe pain)  Monitor WBC trend and fever curve  Pending vascular surgery evaluation, VITALY evaluated  Podiatry recommendations appreciated      Chronic medical conditions:  HTN: PTA amlodipine 2.5 mg   HLD: PTA atorvastatin 40 mg  Bipolar disorder: PTA quetiapine 100 mg BID and 400 mg qhs, valproic acid 750 mg ER qhs  Hx of opioid dependence: PTA methadone 110 mg   Chronic pain: PTA lidocaine 4% patch, duloxetine 30 mg DR, gabapentin 600 mg q8, cyclobenzaprine 10 mg BID, oxycodone 5 mg q6  GERD: PTA pantoprazole 40 mg DR  Neurogenic bladder (with indwelling Holcomb catheter): PTA finasteride 5 mg, tamsulosin 0.4 mg  Constipation: PTA senna 8.6 mg 2 tabs qhs     Medication reconciliation completed using [facility] from [facility].  Gonzalez Stewart is a 53 year old male with PMHx of cervical epidural abscess (s/p decompressive laminectomy 3/2021 c/b b/l leg paralysis), hx of pneumoperitoneum (s/p ex lap, total abdominal colectomy and end ileostomy (on 1/12/23) c/b evisceration and sepsis with MRSA bacteremia postoperatively), hx of hepatitis C, hx of R. buttock abscess, hx of L. foot osteomyelitis, neurogenic bladder (with indwelling Holcomb catheter, last exchanged two days ago), HTN, HLD, bipolar disorder, GERD, hx of opioid dependence, and constipation who presented to the ED on 12/4/23 from [facility] for feet infection and admitted for sepsis secondary to bilateral feet infection.    Sepsis secondary to b/l feet infection  Less likely UTI given urine sample was not clean catch in pt with indwelling Holcomb and no urinary symptoms  Complaints of b/l feet infection intermittently over the past year  Previously treated for L. hallux OM with L. heel culture growing Proteus mirabilis ESBL, completed 6-week course of avycaz  Temp 101 and WBC 15.25K on admission  Lactic WNL, ESR 84  U/A (sample obtained from Holcomb catheter) with positive nitrites, moderate leuks, moderate blood, WBC 26-50, RBC > 50, moderate bacteria, squamous epithelial cells present  CT b/l LE with study is severely limited by contracture. Left lower extremity is only partially visualized with exclusion of the left foot. Skin induration and subcutaneous edema in the leg and ankle.  No soft tissue gas  S/p LR 2800 cc bolus, vancomycin, and zosyn in the ED  S/p bedside escharectomy by podiatry  Empirically started on vancomycin and cefepime; can de-escalate pending final culture data  Wound culture +GPC in pairs  F/u blood cultures, urine culture, CRP, MRSA/MSSA PCR  Please examine R. buttock as pt with hx of abscess in that area (unable to turn at the time of my examination due to severe pain)  Monitor WBC trend and fever curve  Pending vascular surgery evaluation, VITALY evaluated  Podiatry recommendations appreciated      Chronic medical conditions:  HTN: PTA amlodipine 2.5 mg   HLD: PTA atorvastatin 40 mg  Bipolar disorder: PTA quetiapine 100 mg BID and 400 mg qhs, valproic acid 750 mg ER qhs  Hx of opioid dependence: PTA methadone 110 mg   Chronic pain: PTA lidocaine 4% patch, duloxetine 30 mg DR, gabapentin 600 mg q8, cyclobenzaprine 10 mg BID, oxycodone 5 mg q6  GERD: PTA pantoprazole 40 mg DR  Neurogenic bladder (with indwelling Holcomb catheter): PTA finasteride 5 mg, tamsulosin 0.4 mg  Constipation: PTA senna 8.6 mg 2 tabs qhs     Medication reconciliation completed using [facility] from [facility].  Gonzalez Stewart is a 53 year old male with PMHx of cervical epidural abscess (s/p decompressive laminectomy 3/2021 c/b b/l leg paralysis), hx of pneumoperitoneum (s/p ex lap, total abdominal colectomy and end ileostomy (on 1/12/23) c/b evisceration and sepsis with MRSA bacteremia postoperatively), hx of hepatitis C, hx of R. buttock abscess, hx of L. foot osteomyelitis, neurogenic bladder (with indwelling Holcomb catheter, last exchanged two days ago), HTN, HLD, bipolar disorder, GERD, hx of opioid dependence, and constipation who presented to the ED on 12/4/23 from [facility] for feet infection and admitted for sepsis secondary to bilateral feet infection.    Sepsis secondary to b/l feet infection  Less likely UTI given urine sample was not clean catch in pt with indwelling Holcomb and no urinary symptoms  Complaints of b/l feet infection intermittently over the past year  Previously treated for L. hallux OM with L. heel culture growing Proteus mirabilis ESBL, completed 6-week course of avycaz  Temp 101 and WBC 15.25K on admission  Lactic WNL, ESR 84  U/A (sample obtained from Holcomb catheter) with positive nitrites, moderate leuks, moderate blood, WBC 26-50, RBC > 50, moderate bacteria, squamous epithelial cells present  CT b/l LE with study is severely limited by contracture. Left lower extremity is only partially visualized with exclusion of the left foot. Skin induration and subcutaneous edema in the leg and ankle.  No soft tissue gas  S/p LR 2800 cc bolus, vancomycin, and zosyn in the ED  S/p bedside escharectomy by podiatry  Empirically started on vancomycin and cefepime; can de-escalate pending final culture data  Wound culture +GPC in pairs  F/u blood cultures, urine culture, CRP, MRSA/MSSA PCR  Please examine R. buttock as pt with hx of abscess in that area (unable to turn at the time of my examination due to severe pain)  Monitor WBC trend and fever curve  Pending vascular surgery evaluation, VITALY evaluated  Podiatry recommendations appreciated  12/5/2023 wound cx  done by podiatry -- shows rare gpv   blood cx -- pending   id consult will be obtained given his complex medical history  esbl   his contracted state prevents mri       Chronic medical conditions:  HTN: PTA amlodipine 2.5 mg   HLD: PTA atorvastatin 40 mg  Bipolar disorder: PTA quetiapine 100 mg BID and 400 mg qhs, valproic acid 750 mg ER qhs  Hx of opioid dependence: PTA methadone 110 mg   Chronic pain: PTA lidocaine 4% patch, duloxetine 30 mg DR, gabapentin 600 mg q8, cyclobenzaprine 10 mg BID, oxycodone 5 mg q6  GERD: PTA pantoprazole 40 mg DR  Neurogenic bladder (with indwelling Holcomb catheter): PTA finasteride 5 mg, tamsulosin 0.4 mg  Constipation: PTA senna 8.6 mg 2 tabs qhs   .  Gonzalez Stewart is a 53 year old male with PMHx of cervical epidural abscess (s/p decompressive laminectomy 3/2021 c/b b/l leg paralysis), hx of pneumoperitoneum (s/p ex lap, total abdominal colectomy and end ileostomy (on 1/12/23) c/b evisceration and sepsis with MRSA bacteremia postoperatively), hx of hepatitis C, hx of R. buttock abscess, hx of L. foot osteomyelitis, neurogenic bladder (with indwelling Holcomb catheter, last exchanged two days ago), HTN, HLD, bipolar disorder, GERD, hx of opioid dependence, and constipation who presented to the ED on 12/4/23 from [facility] for feet infection and admitted for sepsis secondary to bilateral feet infection.    Sepsis secondary to b/l feet infection  Less likely UTI given urine sample was not clean catch in pt with indwelling Holcomb and no urinary symptoms  Complaints of b/l feet infection intermittently over the past year  Previously treated for L. hallux OM with L. heel culture growing Proteus mirabilis ESBL, completed 6-week course of avycaz  Temp 101 and WBC 15.25K on admission  Lactic WNL, ESR 84  U/A (sample obtained from Holcomb catheter) with positive nitrites, moderate leuks, moderate blood, WBC 26-50, RBC > 50, moderate bacteria, squamous epithelial cells present  CT b/l LE with study is severely limited by contracture. Left lower extremity is only partially visualized with exclusion of the left foot. Skin induration and subcutaneous edema in the leg and ankle.  No soft tissue gas  S/p LR 2800 cc bolus, vancomycin, and zosyn in the ED  S/p bedside escharectomy by podiatry  Empirically started on vancomycin and cefepime; can de-escalate pending final culture data  Wound culture +GPC in pairs  F/u blood cultures, urine culture, CRP, MRSA/MSSA PCR  Please examine R. buttock as pt with hx of abscess in that area (unable to turn at the time of my examination due to severe pain)  Monitor WBC trend and fever curve  Pending vascular surgery evaluation, VITALY evaluated  Podiatry recommendations appreciated  12/5/2023 wound cx  done by podiatry -- shows rare gpv   blood cx -- pending   id consult will be obtained given his complex medical history  esbl   his contracted state prevents mri       Chronic medical conditions:  HTN: PTA amlodipine 2.5 mg   HLD: PTA atorvastatin 40 mg  Bipolar disorder: PTA quetiapine 100 mg BID and 400 mg qhs, valproic acid 750 mg ER qhs  Hx of opioid dependence: PTA methadone 110 mg   Chronic pain: PTA lidocaine 4% patch, duloxetine 30 mg DR, gabapentin 600 mg q8, cyclobenzaprine 10 mg BID, oxycodone 5 mg q6  GERD: PTA pantoprazole 40 mg DR  Neurogenic bladder (with indwelling Holcomb catheter): PTA finasteride 5 mg, tamsulosin 0.4 mg  Constipation: PTA senna 8.6 mg 2 tabs qhs   .  Gonzalez Stewart is a 53 year old male with PMHx of cervical epidural abscess (s/p decompressive laminectomy 3/2021 c/b b/l leg paralysis), hx of pneumoperitoneum (s/p ex lap, total abdominal colectomy and end ileostomy (on 1/12/23) c/b evisceration and sepsis with MRSA bacteremia postoperatively), hx of hepatitis C, hx of R. buttock abscess, hx of L. foot osteomyelitis, neurogenic bladder (with indwelling Holcomb catheter, last exchanged two days ago), HTN, HLD, bipolar disorder, GERD, hx of opioid dependence, and constipation who presented to the ED on 12/4/23 from [facility] for feet infection and admitted for sepsis secondary to bilateral feet infection.    Sepsis secondary to b/l feet infection  Less likely UTI given urine sample was not clean catch in pt with indwelling Holcomb and no urinary symptoms  Complaints of b/l feet infection intermittently over the past year  Previously treated for L. hallux OM with L. heel culture growing Proteus mirabilis ESBL, completed 6-week course of avycaz  Temp 101 and WBC 15.25K on admission  Lactic WNL, ESR 84  U/A (sample obtained from Holcomb catheter) with positive nitrites, moderate leuks, moderate blood, WBC 26-50, RBC > 50, moderate bacteria, squamous epithelial cells present  CT b/l LE with study is severely limited by contracture. Left lower extremity is only partially visualized with exclusion of the left foot. Skin induration and subcutaneous edema in the leg and ankle.  No soft tissue gas  S/p LR 2800 cc bolus, vancomycin, and zosyn in the ED  S/p bedside escharectomy by podiatry  Empirically started on vancomycin and cefepime; can de-escalate pending final culture data  Wound culture +GPC in pairs  F/u blood cultures, urine culture, CRP, MRSA/MSSA PCR  Please examine R. buttock as pt with hx of abscess in that area (unable to turn at the time of my examination due to severe pain)  Monitor WBC trend and fever curve  Pending vascular surgery evaluation, VITALY evaluated  Podiatry recommendations appreciated  12/5/2023 wound cx  done by podiatry -- shows rare gpv   blood cx -- pending   id consult will be obtained given his complex medical history  esbl   his contracted state prevents mri   will start parenteral pain meds as he chronic  oral oxycodone isnt helping. ( he received 4mg morphine in ed and another 2 mg morphine without relief )    Chronic medical conditions:  HTN: PTA amlodipine 2.5 mg   HLD: PTA atorvastatin 40 mg  Bipolar disorder: PTA quetiapine 100 mg BID and 400 mg qhs, valproic acid 750 mg ER qhs  Hx of opioid dependence: PTA methadone 110 mg   Chronic pain: PTA lidocaine 4% patch, duloxetine 30 mg DR, gabapentin 600 mg q8, cyclobenzaprine 10 mg BID, oxycodone 5 mg q6  GERD: PTA pantoprazole 40 mg DR  Neurogenic bladder (with indwelling Holcomb catheter): PTA finasteride 5 mg, tamsulosin 0.4 mg  Constipation: PTA senna 8.6 mg 2 tabs qhs   .  Gonzalez Stewart is a 53 year old male with PMHx of cervical epidural abscess (s/p decompressive laminectomy 3/2021 c/b b/l leg paralysis), hx of pneumoperitoneum (s/p ex lap, total abdominal colectomy and end ileostomy (on 1/12/23) c/b evisceration and sepsis with MRSA bacteremia postoperatively), hx of hepatitis C, hx of R. buttock abscess, hx of L. foot osteomyelitis, neurogenic bladder (with indwelling Holcomb catheter, last exchanged two days ago), HTN, HLD, bipolar disorder, GERD, hx of opioid dependence, and constipation who presented to the ED on 12/4/23 from [facility] for feet infection and admitted for sepsis secondary to bilateral feet infection.    Sepsis secondary to b/l feet infection  Less likely UTI given urine sample was not clean catch in pt with indwelling Holcomb and no urinary symptoms  Complaints of b/l feet infection intermittently over the past year  Previously treated for L. hallux OM with L. heel culture growing Proteus mirabilis ESBL, completed 6-week course of avycaz  Temp 101 and WBC 15.25K on admission  Lactic WNL, ESR 84  U/A (sample obtained from Holcomb catheter) with positive nitrites, moderate leuks, moderate blood, WBC 26-50, RBC > 50, moderate bacteria, squamous epithelial cells present  CT b/l LE with study is severely limited by contracture. Left lower extremity is only partially visualized with exclusion of the left foot. Skin induration and subcutaneous edema in the leg and ankle.  No soft tissue gas  S/p LR 2800 cc bolus, vancomycin, and zosyn in the ED  S/p bedside escharectomy by podiatry  Empirically started on vancomycin and cefepime; can de-escalate pending final culture data  Wound culture +GPC in pairs  F/u blood cultures, urine culture, CRP, MRSA/MSSA PCR  Please examine R. buttock as pt with hx of abscess in that area (unable to turn at the time of my examination due to severe pain)  Monitor WBC trend and fever curve  Pending vascular surgery evaluation, VITALY evaluated  Podiatry recommendations appreciated  12/5/2023 wound cx  done by podiatry -- shows rare gpv   blood cx -- pending   id consult will be obtained given his complex medical history  esbl   his contracted state prevents mri   will start parenteral pain meds as he chronic  oral oxycodone isnt helping. ( he received 4mg morphine in ed and another 2 mg morphine without relief )    Chronic medical conditions:  HTN: PTA amlodipine 2.5 mg   HLD: PTA atorvastatin 40 mg  Bipolar disorder: PTA quetiapine 100 mg BID and 400 mg qhs, valproic acid 750 mg ER qhs  Hx of opioid dependence: PTA methadone 110 mg   Chronic pain: PTA lidocaine 4% patch, duloxetine 30 mg DR, gabapentin 600 mg q8, cyclobenzaprine 10 mg BID, oxycodone 5 mg q6  GERD: PTA pantoprazole 40 mg DR  Neurogenic bladder (with indwelling Holcomb catheter): PTA finasteride 5 mg, tamsulosin 0.4 mg  Constipation: PTA senna 8.6 mg 2 tabs qhs   .

## 2023-12-05 NOTE — PROGRESS NOTE ADULT - SUBJECTIVE AND OBJECTIVE BOX
Patient is a 53y old  Male who presents with a chief complaint of Sepsis secondary to b/l foot wounds (05 Dec 2023 12:34)      OVERNIGHT EVENTS:  just admitted       MEDICATIONS  (STANDING):  acetaminophen     Tablet .. 650 milliGRAM(s) Oral daily  amLODIPine   Tablet 2.5 milliGRAM(s) Oral daily  ascorbic acid 500 milliGRAM(s) Oral daily  atorvastatin 40 milliGRAM(s) Oral at bedtime  cefepime   IVPB 2000 milliGRAM(s) IV Intermittent every 8 hours  collagenase Ointment 1 Application(s) Topical daily  cyclobenzaprine 10 milliGRAM(s) Oral two times a day  divalproex  milliGRAM(s) Oral at bedtime  DULoxetine 30 milliGRAM(s) Oral daily  finasteride 5 milliGRAM(s) Oral daily  gabapentin 600 milliGRAM(s) Oral <User Schedule>  lidocaine   4% Patch 1 Patch Transdermal daily  methadone  Concentrate 110 milliGRAM(s) Oral daily  methylphenidate 5 milliGRAM(s) Oral <User Schedule>  multivitamin 1 Tablet(s) Oral daily  oxyCODONE    IR 5 milliGRAM(s) Oral <User Schedule>  pantoprazole    Tablet 40 milliGRAM(s) Oral before breakfast  QUEtiapine 400 milliGRAM(s) Oral at bedtime  QUEtiapine 100 milliGRAM(s) Oral <User Schedule>  senna 2 Tablet(s) Oral at bedtime  tamsulosin 0.4 milliGRAM(s) Oral at bedtime  thiamine 100 milliGRAM(s) Oral daily  vancomycin  IVPB 1250 milliGRAM(s) IV Intermittent every 8 hours    MEDICATIONS  (PRN):  acetaminophen     Tablet .. 650 milliGRAM(s) Oral every 6 hours PRN Temp greater or equal to 38C (100.4F), Mild Pain (1 - 3)  albuterol    90 MICROgram(s) HFA Inhaler 2 Puff(s) Inhalation every 4 hours PRN Shortness of Breath and/or Wheezing  aluminum hydroxide/magnesium hydroxide/simethicone Suspension 30 milliLiter(s) Oral every 4 hours PRN Dyspepsia  melatonin 3 milliGRAM(s) Oral at bedtime PRN Insomnia  ondansetron Injectable 4 milliGRAM(s) IV Push every 8 hours PRN Nausea and/or Vomiting      Allergies    NSAIDs (Flushing; Other (Moderate))  Haldol (Anaphylaxis)  Zyprexa (Rash; Dystonic RXN; Hives)  Motrin (Anaphylaxis)  Thorazine (Other (Moderate))  Aleve (Unknown)  Stelazine (Unknown)  Risperdal (Short breath; Rash; Hives)    Intolerances        SUBJECTIVE: in bed in NAD, no acute events overnight     T(F): 99.8 (23 @ 11:19), Max: 101 (23 @ 16:55)  HR: 83 (23 @ 11:19) (74 - 90)  BP: 110/68 (23 @ 11:19) (105/59 - 121/66)  RR: 16 (23 @ 11:19) (16 - 20)  SpO2: 92% (23 @ 11:19) (91% - 95%)  Wt(kg): --    PHYSICAL EXAM:  GENERAL: NAD, well-groomed, well-developed  HEAD:  Atraumatic, Normocephalic  EYES: EOMI, PERRLA, conjunctiva and sclera clear  ENMT: No tonsillar erythema, exudates, or enlargement; Moist mucous membranes, Good dentition, No lesions  NECK: Supple,  CHEST/LUNG: Clear to  auscultation bilaterally; No rales, rhonchi, wheezing, or rubs  bilaterally  HEART: Regular rate and rhythm; No murmurs, rubs, or gallops  ABDOMEN: Soft, Nontender, Nondistended; Bowel sounds present  EXTREMITIES:  2+ Peripheral Pulses, No clubbing, cyanosis, or edema BL LE  SKIN: No rashes or lesions  NERVOUS SYSTEM:  Alert & Oriented X3, Good concentration; Motor Strength 5/5 B/L upper and lower extremities;   DTRs 2+ intact and symmetric, sensation intact BL    LABS:                        10.5   15.25 )-----------( 206      ( 04 Dec 2023 17:20 )             35.2     12-04    140  |  104  |  13  ----------------------------<  118<H>  4.2   |  34<H>  |  0.67    Ca    9.1      04 Dec 2023 17:20    TPro  8.0  /  Alb  1.8<L>  /  TBili  0.4  /  DBili  x   /  AST  33  /  ALT  44  /  AlkPhos  227<H>  12-04    PT/INR - ( 04 Dec 2023 17:20 )   PT: 13.8 sec;   INR: 1.16 ratio         PTT - ( 04 Dec 2023 17:20 )  PTT:33.4 sec  Urinalysis Basic - ( 04 Dec 2023 22:05 )    Color: Yellow / Appearance: Cloudy / S.021 / pH: x  Gluc: x / Ketone: Negative mg/dL  / Bili: Negative / Urobili: 1.0 mg/dL   Blood: x / Protein: 30 mg/dL / Nitrite: Positive   Leuk Esterase: Moderate / RBC: >50 /HPF / WBC 26-50 /HPF   Sq Epi: x / Non Sq Epi: x / Bacteria: Moderate /HPF      Cultures;   CAPILLARY BLOOD GLUCOSE        Lipid panel:       Culture - Abscess with Gram Stain (collected 04 Dec 2023 20:00)  Source: .Abscess right foot wound  Gram Stain (05 Dec 2023 02:45):    No polymorphonuclear leukocytes seen per low power field    Rare Gram positive cocci in pairs seen per oil power field    Culture - Abscess with Gram Stain (collected 04 Dec 2023 20:00)  Source: .Abscess left wound culture  Gram Stain (05 Dec 2023 02:45):    No polymorphonuclear leukocytes seen per low power field    Rare Gram positive cocci in pairs seen per oil power field          RADIOLOGY & ADDITIONAL TESTS:      Imaging Personally Reviewed:  [ x] YES      Consultant(s) Notes Reviewed:  [x ] YES     Care Discussed with [x ] Consultants [X ] Patient [x ] Family  [x ]    [x ]  Other; RN Patient is a 53y old  Male who presents with a chief complaint of Sepsis secondary to b/l foot wounds (05 Dec 2023 12:34)      OVERNIGHT EVENTS:  just admitted       MEDICATIONS  (STANDING):  acetaminophen     Tablet .. 650 milliGRAM(s) Oral daily  amLODIPine   Tablet 2.5 milliGRAM(s) Oral daily  ascorbic acid 500 milliGRAM(s) Oral daily  atorvastatin 40 milliGRAM(s) Oral at bedtime  cefepime   IVPB 2000 milliGRAM(s) IV Intermittent every 8 hours  collagenase Ointment 1 Application(s) Topical daily  cyclobenzaprine 10 milliGRAM(s) Oral two times a day  divalproex  milliGRAM(s) Oral at bedtime  DULoxetine 30 milliGRAM(s) Oral daily  finasteride 5 milliGRAM(s) Oral daily  gabapentin 600 milliGRAM(s) Oral <User Schedule>  lidocaine   4% Patch 1 Patch Transdermal daily  methadone  Concentrate 110 milliGRAM(s) Oral daily  methylphenidate 5 milliGRAM(s) Oral <User Schedule>  multivitamin 1 Tablet(s) Oral daily  oxyCODONE    IR 5 milliGRAM(s) Oral <User Schedule>  pantoprazole    Tablet 40 milliGRAM(s) Oral before breakfast  QUEtiapine 400 milliGRAM(s) Oral at bedtime  QUEtiapine 100 milliGRAM(s) Oral <User Schedule>  senna 2 Tablet(s) Oral at bedtime  tamsulosin 0.4 milliGRAM(s) Oral at bedtime  thiamine 100 milliGRAM(s) Oral daily  vancomycin  IVPB 1250 milliGRAM(s) IV Intermittent every 8 hours    MEDICATIONS  (PRN):  acetaminophen     Tablet .. 650 milliGRAM(s) Oral every 6 hours PRN Temp greater or equal to 38C (100.4F), Mild Pain (1 - 3)  albuterol    90 MICROgram(s) HFA Inhaler 2 Puff(s) Inhalation every 4 hours PRN Shortness of Breath and/or Wheezing  aluminum hydroxide/magnesium hydroxide/simethicone Suspension 30 milliLiter(s) Oral every 4 hours PRN Dyspepsia  melatonin 3 milliGRAM(s) Oral at bedtime PRN Insomnia  ondansetron Injectable 4 milliGRAM(s) IV Push every 8 hours PRN Nausea and/or Vomiting      Allergies    NSAIDs (Flushing; Other (Moderate))  Haldol (Anaphylaxis)  Zyprexa (Rash; Dystonic RXN; Hives)  Motrin (Anaphylaxis)  Thorazine (Other (Moderate))  Aleve (Unknown)  Stelazine (Unknown)  Risperdal (Short breath; Rash; Hives)    Intolerances        SUBJECTIVE: in bed in c/o pain and stated current pain med s not helping     T(F): 99.8 (23 @ 11:19), Max: 101 (23 @ 16:55)  HR: 83 (23 @ 11:19) (74 - 90)  BP: 110/68 (23 @ 11:19) (105/59 - 121/66)  RR: 16 (23 @ 11:19) (16 - 20)  SpO2: 92% (23 @ 11:19) (91% - 95%)  Wt(kg): --    PHYSICAL EXAM:  GENERAL: NAD, well-groomed, well-developed  HEAD:  Atraumatic, Normocephalic  EYES: EOMI, PERRLA, conjunctiva and sclera clear  ENMT: No tonsillar erythema, exudates, or enlargement; Moist mucous membranes, Good dentition, No lesions  NECK: Supple,  CHEST/LUNG: Clear to  auscultation bilaterally; No rales, rhonchi, wheezing, or rubs  bilaterally  HEART: Regular rate and rhythm; No murmurs, rubs, or gallops  ABDOMEN: Soft, Nontender, Nondistended; Bowel sounds present  EXTREMITIES:  2+ Peripheral Pulses, No clubbing, cyanosis, + edema BL LE especially left foot, extremely contracted lower extremity   SKIN: stage 3 right hip ulcer, right foot heel necrotic to bone , left foot dorsum had eschar         NERVOUS SYSTEM:  Alert & Oriented X3, Good concentration; functional quad.  LABS:                        10.5   15.25 )-----------( 206      ( 04 Dec 2023 17:20 )             35.2     12-    140  |  104  |  13  ----------------------------<  118<H>  4.2   |  34<H>  |  0.67    Ca    9.1      04 Dec 2023 17:20    TPro  8.0  /  Alb  1.8<L>  /  TBili  0.4  /  DBili  x   /  AST  33  /  ALT  44  /  AlkPhos  227<H>  12-04    PT/INR - ( 04 Dec 2023 17:20 )   PT: 13.8 sec;   INR: 1.16 ratio         PTT - ( 04 Dec 2023 17:20 )  PTT:33.4 sec  Urinalysis Basic - ( 04 Dec 2023 22:05 )    Color: Yellow / Appearance: Cloudy / S.021 / pH: x  Gluc: x / Ketone: Negative mg/dL  / Bili: Negative / Urobili: 1.0 mg/dL   Blood: x / Protein: 30 mg/dL / Nitrite: Positive   Leuk Esterase: Moderate / RBC: >50 /HPF / WBC 26-50 /HPF   Sq Epi: x / Non Sq Epi: x / Bacteria: Moderate /HPF      Cultures;   CAPILLARY BLOOD GLUCOSE        Lipid panel:       Culture - Abscess with Gram Stain (collected 04 Dec 2023 20:00)  Source: .Abscess right foot wound  Gram Stain (05 Dec 2023 02:45):    No polymorphonuclear leukocytes seen per low power field    Rare Gram positive cocci in pairs seen per oil power field    Culture - Abscess with Gram Stain (collected 04 Dec 2023 20:00)  Source: .Abscess left wound culture  Gram Stain (05 Dec 2023 02:45):    No polymorphonuclear leukocytes seen per low power field    Rare Gram positive cocci in pairs seen per oil power field          RADIOLOGY & ADDITIONAL TESTS:      Imaging Personally Reviewed:  [ x] YES      Consultant(s) Notes Reviewed:  [x ] YES     Care Discussed with [x ] Consultants [X ] Patient [x ] Family  [x ]    [x ]  Other; RN

## 2023-12-06 LAB
A1C WITH ESTIMATED AVERAGE GLUCOSE RESULT: 5.3 % — SIGNIFICANT CHANGE UP (ref 4–5.6)
A1C WITH ESTIMATED AVERAGE GLUCOSE RESULT: 5.3 % — SIGNIFICANT CHANGE UP (ref 4–5.6)
ALBUMIN SERPL ELPH-MCNC: 1.6 G/DL — LOW (ref 3.3–5)
ALBUMIN SERPL ELPH-MCNC: 1.6 G/DL — LOW (ref 3.3–5)
ALP SERPL-CCNC: 156 U/L — HIGH (ref 40–120)
ALP SERPL-CCNC: 156 U/L — HIGH (ref 40–120)
ALT FLD-CCNC: 26 U/L — SIGNIFICANT CHANGE UP (ref 12–78)
ALT FLD-CCNC: 26 U/L — SIGNIFICANT CHANGE UP (ref 12–78)
ANION GAP SERPL CALC-SCNC: 2 MMOL/L — LOW (ref 5–17)
ANION GAP SERPL CALC-SCNC: 2 MMOL/L — LOW (ref 5–17)
AST SERPL-CCNC: 17 U/L — SIGNIFICANT CHANGE UP (ref 15–37)
AST SERPL-CCNC: 17 U/L — SIGNIFICANT CHANGE UP (ref 15–37)
BILIRUB SERPL-MCNC: 0.2 MG/DL — SIGNIFICANT CHANGE UP (ref 0.2–1.2)
BILIRUB SERPL-MCNC: 0.2 MG/DL — SIGNIFICANT CHANGE UP (ref 0.2–1.2)
BUN SERPL-MCNC: 15 MG/DL — SIGNIFICANT CHANGE UP (ref 7–23)
BUN SERPL-MCNC: 15 MG/DL — SIGNIFICANT CHANGE UP (ref 7–23)
CALCIUM SERPL-MCNC: 8.7 MG/DL — SIGNIFICANT CHANGE UP (ref 8.5–10.1)
CALCIUM SERPL-MCNC: 8.7 MG/DL — SIGNIFICANT CHANGE UP (ref 8.5–10.1)
CHLORIDE SERPL-SCNC: 110 MMOL/L — HIGH (ref 96–108)
CHLORIDE SERPL-SCNC: 110 MMOL/L — HIGH (ref 96–108)
CO2 SERPL-SCNC: 31 MMOL/L — SIGNIFICANT CHANGE UP (ref 22–31)
CO2 SERPL-SCNC: 31 MMOL/L — SIGNIFICANT CHANGE UP (ref 22–31)
CREAT SERPL-MCNC: 0.89 MG/DL — SIGNIFICANT CHANGE UP (ref 0.5–1.3)
CREAT SERPL-MCNC: 0.89 MG/DL — SIGNIFICANT CHANGE UP (ref 0.5–1.3)
EGFR: 103 ML/MIN/1.73M2 — SIGNIFICANT CHANGE UP
EGFR: 103 ML/MIN/1.73M2 — SIGNIFICANT CHANGE UP
ESTIMATED AVERAGE GLUCOSE: 105 MG/DL — SIGNIFICANT CHANGE UP (ref 68–114)
ESTIMATED AVERAGE GLUCOSE: 105 MG/DL — SIGNIFICANT CHANGE UP (ref 68–114)
GLUCOSE SERPL-MCNC: 83 MG/DL — SIGNIFICANT CHANGE UP (ref 70–99)
GLUCOSE SERPL-MCNC: 83 MG/DL — SIGNIFICANT CHANGE UP (ref 70–99)
HCT VFR BLD CALC: 30.6 % — LOW (ref 39–50)
HCT VFR BLD CALC: 30.6 % — LOW (ref 39–50)
HGB BLD-MCNC: 9.5 G/DL — LOW (ref 13–17)
HGB BLD-MCNC: 9.5 G/DL — LOW (ref 13–17)
MAGNESIUM SERPL-MCNC: 1.8 MG/DL — SIGNIFICANT CHANGE UP (ref 1.6–2.6)
MAGNESIUM SERPL-MCNC: 1.8 MG/DL — SIGNIFICANT CHANGE UP (ref 1.6–2.6)
MCHC RBC-ENTMCNC: 27.4 PG — SIGNIFICANT CHANGE UP (ref 27–34)
MCHC RBC-ENTMCNC: 27.4 PG — SIGNIFICANT CHANGE UP (ref 27–34)
MCHC RBC-ENTMCNC: 31 G/DL — LOW (ref 32–36)
MCHC RBC-ENTMCNC: 31 G/DL — LOW (ref 32–36)
MCV RBC AUTO: 88.2 FL — SIGNIFICANT CHANGE UP (ref 80–100)
MCV RBC AUTO: 88.2 FL — SIGNIFICANT CHANGE UP (ref 80–100)
MRSA PCR RESULT.: SIGNIFICANT CHANGE UP
MRSA PCR RESULT.: SIGNIFICANT CHANGE UP
NRBC # BLD: 0 /100 WBCS — SIGNIFICANT CHANGE UP (ref 0–0)
NRBC # BLD: 0 /100 WBCS — SIGNIFICANT CHANGE UP (ref 0–0)
PHOSPHATE SERPL-MCNC: 4.1 MG/DL — SIGNIFICANT CHANGE UP (ref 2.5–4.5)
PHOSPHATE SERPL-MCNC: 4.1 MG/DL — SIGNIFICANT CHANGE UP (ref 2.5–4.5)
PLATELET # BLD AUTO: 210 K/UL — SIGNIFICANT CHANGE UP (ref 150–400)
PLATELET # BLD AUTO: 210 K/UL — SIGNIFICANT CHANGE UP (ref 150–400)
POTASSIUM SERPL-MCNC: 3.7 MMOL/L — SIGNIFICANT CHANGE UP (ref 3.5–5.3)
POTASSIUM SERPL-MCNC: 3.7 MMOL/L — SIGNIFICANT CHANGE UP (ref 3.5–5.3)
POTASSIUM SERPL-SCNC: 3.7 MMOL/L — SIGNIFICANT CHANGE UP (ref 3.5–5.3)
POTASSIUM SERPL-SCNC: 3.7 MMOL/L — SIGNIFICANT CHANGE UP (ref 3.5–5.3)
PROT SERPL-MCNC: 7.4 GM/DL — SIGNIFICANT CHANGE UP (ref 6–8.3)
PROT SERPL-MCNC: 7.4 GM/DL — SIGNIFICANT CHANGE UP (ref 6–8.3)
RBC # BLD: 3.47 M/UL — LOW (ref 4.2–5.8)
RBC # BLD: 3.47 M/UL — LOW (ref 4.2–5.8)
RBC # FLD: 17.1 % — HIGH (ref 10.3–14.5)
RBC # FLD: 17.1 % — HIGH (ref 10.3–14.5)
S AUREUS DNA NOSE QL NAA+PROBE: DETECTED
S AUREUS DNA NOSE QL NAA+PROBE: DETECTED
SODIUM SERPL-SCNC: 143 MMOL/L — SIGNIFICANT CHANGE UP (ref 135–145)
SODIUM SERPL-SCNC: 143 MMOL/L — SIGNIFICANT CHANGE UP (ref 135–145)
VANCOMYCIN TROUGH SERPL-MCNC: 19.9 UG/ML — SIGNIFICANT CHANGE UP (ref 10–20)
VANCOMYCIN TROUGH SERPL-MCNC: 19.9 UG/ML — SIGNIFICANT CHANGE UP (ref 10–20)
WBC # BLD: 9.95 K/UL — SIGNIFICANT CHANGE UP (ref 3.8–10.5)
WBC # BLD: 9.95 K/UL — SIGNIFICANT CHANGE UP (ref 3.8–10.5)
WBC # FLD AUTO: 9.95 K/UL — SIGNIFICANT CHANGE UP (ref 3.8–10.5)
WBC # FLD AUTO: 9.95 K/UL — SIGNIFICANT CHANGE UP (ref 3.8–10.5)

## 2023-12-06 PROCEDURE — 99232 SBSQ HOSP IP/OBS MODERATE 35: CPT

## 2023-12-06 PROCEDURE — 99222 1ST HOSP IP/OBS MODERATE 55: CPT

## 2023-12-06 RX ORDER — ENOXAPARIN SODIUM 100 MG/ML
40 INJECTION SUBCUTANEOUS EVERY 24 HOURS
Refills: 0 | Status: DISCONTINUED | OUTPATIENT
Start: 2023-12-06 | End: 2024-01-03

## 2023-12-06 RX ORDER — VANCOMYCIN HCL 1 G
1250 VIAL (EA) INTRAVENOUS EVERY 12 HOURS
Refills: 0 | Status: DISCONTINUED | OUTPATIENT
Start: 2023-12-06 | End: 2023-12-08

## 2023-12-06 RX ADMIN — QUETIAPINE FUMARATE 400 MILLIGRAM(S): 200 TABLET, FILM COATED ORAL at 22:46

## 2023-12-06 RX ADMIN — LIDOCAINE 1 PATCH: 4 CREAM TOPICAL at 20:47

## 2023-12-06 RX ADMIN — PANTOPRAZOLE SODIUM 40 MILLIGRAM(S): 20 TABLET, DELAYED RELEASE ORAL at 08:08

## 2023-12-06 RX ADMIN — CEFEPIME 100 MILLIGRAM(S): 1 INJECTION, POWDER, FOR SOLUTION INTRAMUSCULAR; INTRAVENOUS at 13:58

## 2023-12-06 RX ADMIN — Medication 166.67 MILLIGRAM(S): at 08:05

## 2023-12-06 RX ADMIN — ATORVASTATIN CALCIUM 40 MILLIGRAM(S): 80 TABLET, FILM COATED ORAL at 22:47

## 2023-12-06 RX ADMIN — Medication 650 MILLIGRAM(S): at 12:20

## 2023-12-06 RX ADMIN — ENOXAPARIN SODIUM 40 MILLIGRAM(S): 100 INJECTION SUBCUTANEOUS at 22:53

## 2023-12-06 RX ADMIN — HYDROMORPHONE HYDROCHLORIDE 1 MILLIGRAM(S): 2 INJECTION INTRAMUSCULAR; INTRAVENOUS; SUBCUTANEOUS at 11:30

## 2023-12-06 RX ADMIN — OXYCODONE HYDROCHLORIDE 5 MILLIGRAM(S): 5 TABLET ORAL at 12:27

## 2023-12-06 RX ADMIN — CEFEPIME 100 MILLIGRAM(S): 1 INJECTION, POWDER, FOR SOLUTION INTRAMUSCULAR; INTRAVENOUS at 06:30

## 2023-12-06 RX ADMIN — QUETIAPINE FUMARATE 100 MILLIGRAM(S): 200 TABLET, FILM COATED ORAL at 18:40

## 2023-12-06 RX ADMIN — Medication 1 APPLICATION(S): at 12:21

## 2023-12-06 RX ADMIN — Medication 500 MILLIGRAM(S): at 12:22

## 2023-12-06 RX ADMIN — CYCLOBENZAPRINE HYDROCHLORIDE 10 MILLIGRAM(S): 10 TABLET, FILM COATED ORAL at 06:30

## 2023-12-06 RX ADMIN — GABAPENTIN 600 MILLIGRAM(S): 400 CAPSULE ORAL at 14:00

## 2023-12-06 RX ADMIN — Medication 1 TABLET(S): at 12:22

## 2023-12-06 RX ADMIN — OXYCODONE HYDROCHLORIDE 5 MILLIGRAM(S): 5 TABLET ORAL at 06:29

## 2023-12-06 RX ADMIN — Medication 100 MILLIGRAM(S): at 12:22

## 2023-12-06 RX ADMIN — Medication 1 TABLET(S): at 08:08

## 2023-12-06 RX ADMIN — SENNA PLUS 2 TABLET(S): 8.6 TABLET ORAL at 22:46

## 2023-12-06 RX ADMIN — DULOXETINE HYDROCHLORIDE 30 MILLIGRAM(S): 30 CAPSULE, DELAYED RELEASE ORAL at 12:21

## 2023-12-06 RX ADMIN — Medication 5 MILLIGRAM(S): at 22:47

## 2023-12-06 RX ADMIN — ZINC SULFATE TAB 220 MG (50 MG ZINC EQUIVALENT) 220 MILLIGRAM(S): 220 (50 ZN) TAB at 12:23

## 2023-12-06 RX ADMIN — HYDROMORPHONE HYDROCHLORIDE 1 MILLIGRAM(S): 2 INJECTION INTRAMUSCULAR; INTRAVENOUS; SUBCUTANEOUS at 12:30

## 2023-12-06 RX ADMIN — CEFEPIME 100 MILLIGRAM(S): 1 INJECTION, POWDER, FOR SOLUTION INTRAMUSCULAR; INTRAVENOUS at 23:34

## 2023-12-06 RX ADMIN — LIDOCAINE 1 PATCH: 4 CREAM TOPICAL at 12:19

## 2023-12-06 RX ADMIN — DIVALPROEX SODIUM 750 MILLIGRAM(S): 500 TABLET, DELAYED RELEASE ORAL at 22:46

## 2023-12-06 RX ADMIN — OXYCODONE HYDROCHLORIDE 5 MILLIGRAM(S): 5 TABLET ORAL at 07:29

## 2023-12-06 RX ADMIN — POLYETHYLENE GLYCOL 3350 17 GRAM(S): 17 POWDER, FOR SOLUTION ORAL at 12:20

## 2023-12-06 RX ADMIN — OXYCODONE HYDROCHLORIDE 5 MILLIGRAM(S): 5 TABLET ORAL at 17:56

## 2023-12-06 RX ADMIN — CYCLOBENZAPRINE HYDROCHLORIDE 10 MILLIGRAM(S): 10 TABLET, FILM COATED ORAL at 17:56

## 2023-12-06 RX ADMIN — METHADONE HYDROCHLORIDE 110 MILLIGRAM(S): 40 TABLET ORAL at 12:50

## 2023-12-06 RX ADMIN — AMLODIPINE BESYLATE 2.5 MILLIGRAM(S): 2.5 TABLET ORAL at 06:29

## 2023-12-06 RX ADMIN — GABAPENTIN 600 MILLIGRAM(S): 400 CAPSULE ORAL at 06:29

## 2023-12-06 RX ADMIN — QUETIAPINE FUMARATE 100 MILLIGRAM(S): 200 TABLET, FILM COATED ORAL at 09:18

## 2023-12-06 RX ADMIN — Medication 1 TABLET(S): at 17:56

## 2023-12-06 RX ADMIN — Medication 166.67 MILLIGRAM(S): at 22:39

## 2023-12-06 RX ADMIN — Medication 5 MILLIGRAM(S): at 09:17

## 2023-12-06 RX ADMIN — FINASTERIDE 5 MILLIGRAM(S): 5 TABLET, FILM COATED ORAL at 12:22

## 2023-12-06 RX ADMIN — GABAPENTIN 600 MILLIGRAM(S): 400 CAPSULE ORAL at 23:34

## 2023-12-06 RX ADMIN — TAMSULOSIN HYDROCHLORIDE 0.4 MILLIGRAM(S): 0.4 CAPSULE ORAL at 22:47

## 2023-12-06 RX ADMIN — HYDROMORPHONE HYDROCHLORIDE 1 MILLIGRAM(S): 2 INJECTION INTRAMUSCULAR; INTRAVENOUS; SUBCUTANEOUS at 22:51

## 2023-12-06 NOTE — CONSULT NOTE ADULT - ASSESSMENT
A/P-  53 year old male with PMHx of cervical epidural abscess (s/p decompressive laminectomy 3/2021 c/b b/l leg paralysis), hx of pneumoperitoneum (s/p ex lap, total abdominal colectomy and end ileostomy (on 1/12/23) c/b evisceration and sepsis with MRSA bacteremia postoperatively), hx of hepatitis C, hx of R. buttock abscess, hx of L. foot osteomyelitis, neurogenic bladder (with indwelling Holcomb catheter, last exchanged two days ago), HTN, HLD, bipolar disorder, GERD, hx of opioid dependence, and constipation who presented to the ED on 12/4/23 from [facility] for feet infection.    b/l feet infections and overlying cellulitis  Right heel wound infection to bone and as per xray c/w Om as well.  no report of any gas on either xray or Ct.  patient as per podiatry not a good surgical candidate and they recommend local wound care and antibiotics.  patient himself states he will not be able to do MRI.    b/l foot infection  Om of right heel wound  + leukocytosis  HCV  severe contractures of b/l LE  right foot wound cx- rare proteus and corynebecaterium  left foot wound cx- rare pseudomonas and staph  Blood cx- neg x 2  extensive chart search on [facility] and bsed on his latest HCV VL result from 6/2023 detected vl but <1.08  also based on serology from 2017 was positive for hep b sAG and E ag and core IGm ab ( not sure if he was ever treated)  will recheck all these now.  also advise to check HIV ab/ag 4th gen now.    plan-  agree with empiric antibiotics pending cx results sens   advise vanco and cefepime.  keep vanco trough <15.  aggresive wound care needed.  check HCV Vl  and full hep b panel and check HIV ab/ag.      All labs and imaging and chart notes reviewed.     All above discussed with patient and patient verbalizes full understanding of all above and agrees with above plan of care.    Thank you for this consultation.    Kush King MD  Infectious Disease Attending    for any questions please do not hesitate to contact me either via teams or by calling [phone]   A/P-  53 year old male with PMHx of cervical epidural abscess (s/p decompressive laminectomy 3/2021 c/b b/l leg paralysis), hx of pneumoperitoneum (s/p ex lap, total abdominal colectomy and end ileostomy (on 1/12/23) c/b evisceration and sepsis with MRSA bacteremia postoperatively), hx of hepatitis C, hx of R. buttock abscess, hx of L. foot osteomyelitis, neurogenic bladder (with indwelling Holcomb catheter, last exchanged two days ago), HTN, HLD, bipolar disorder, GERD, hx of opioid dependence, and constipation who presented to the ED on 12/4/23 from [facility] for feet infection.    b/l feet infections and overlying cellulitis  Right heel wound infection to bone and as per xray c/w Om as well.  no report of any gas on either xray or Ct.  patient as per podiatry not a good surgical candidate and they recommend local wound care and antibiotics.  patient himself states he will not be able to do MRI.    b/l foot infection  Om of right heel wound  + leukocytosis  HCV  severe contractures of b/l LE  right foot wound cx- rare proteus and corynebecaterium  left foot wound cx- rare pseudomonas and staph  Blood cx- neg x 2  extensive chart search on [facility] and bsed on his latest HCV VL result from 6/2023 detected vl but <1.08  also based on serology from 2017 was positive for hep b sAG and E ag and core IGm ab ( not sure if he was ever treated)  will recheck all these now.  also advise to check HIV ab/ag 4th gen now.    plan-  agree with empiric antibiotics pending cx results sens   advise vanco and cefepime.  keep vanco trough <15.  aggresive wound care needed.  check HCV Vl  and full hep b panel and check HIV ab/ag.      All labs and imaging and chart notes reviewed.     All above discussed with patient and patient verbalizes full understanding of all above and agrees with above plan of care.    Thank you for this consultation.    Kush King MD  Infectious Disease Attending    for any questions please do not hesitate to contact me either via teams or by calling [phone]

## 2023-12-06 NOTE — PROGRESS NOTE ADULT - ASSESSMENT
Gonzalez Stewart is a 53 year old male with PMHx of cervical epidural abscess (s/p decompressive laminectomy 3/2021 c/b b/l leg paralysis), hx of pneumoperitoneum (s/p ex lap, total abdominal colectomy and end ileostomy (on 1/12/23) c/b evisceration and sepsis with MRSA bacteremia postoperatively), hx of hepatitis C, hx of R. buttock abscess, hx of L. foot osteomyelitis, neurogenic bladder (with indwelling Ohlcomb catheter, last exchanged two days ago), HTN, HLD, bipolar disorder, GERD, hx of opioid dependence, and constipation who presented to the ED on 12/4/23 from [facility] for feet infection and admitted for sepsis secondary to bilateral feet infection.    Sepsis secondary to b/l feet infection  Less likely UTI given urine sample was not clean catch in pt with indwelling Holcomb and no urinary symptoms  Complaints of b/l feet infection intermittently over the past year  Previously treated for L. hallux OM with L. heel culture growing Proteus mirabilis ESBL, completed 6-week course of avycaz  Temp 101 and WBC 15.25K on admission  Lactic WNL, ESR 84  U/A (sample obtained from Holcomb catheter) with positive nitrites, moderate leuks, moderate blood, WBC 26-50, RBC > 50, moderate bacteria, squamous epithelial cells present  CT b/l LE with study is severely limited by contracture. Left lower extremity is only partially visualized with exclusion of the left foot. Skin induration and subcutaneous edema in the leg and ankle.  No soft tissue gas  S/p LR 2800 cc bolus, vancomycin, and zosyn in the ED  S/p bedside escharectomy by podiatry  Empirically started on vancomycin and cefepime; can de-escalate pending final culture data  Wound culture +GPC in pairs  F/u blood cultures, urine culture, CRP, MRSA/MSSA PCR  Please examine R. buttock as pt with hx of abscess in that area (unable to turn at the time of my examination due to severe pain)  Monitor WBC trend and fever curve  Pending vascular surgery evaluation, VITALY evaluated  Podiatry recommendations appreciated  12/5/2023 wound cx  done by podiatry -- shows rare gpv   blood cx -- pending   id consult will be obtained given his complex medical history  esbl   his contracted state prevents mri   will start parenteral pain meds as he chronic  oral oxycodone isnt helping. ( he received 4mg morphine in ed and another 2 mg morphine without relief )  12/6/2023- podiatry has reccs for surgical intervention, vascular also provides no reccs for surgical intrevention    Chronic medical conditions:  HTN: PTA amlodipine 2.5 mg   HLD: PTA atorvastatin 40 mg  Bipolar disorder: PTA quetiapine 100 mg BID and 400 mg qhs, valproic acid 750 mg ER qhs  Hx of opioid dependence: PTA methadone 110 mg   Chronic pain: PTA lidocaine 4% patch, duloxetine 30 mg DR, gabapentin 600 mg q8, cyclobenzaprine 10 mg BID, oxycodone 5 mg q6  GERD: PTA pantoprazole 40 mg DR  Neurogenic bladder (with indwelling Holcomb catheter): PTA finasteride 5 mg, tamsulosin 0.4 mg  Constipation: PTA senna 8.6 mg 2 tabs qhs   .  Gonzalez Stewart is a 53 year old male with PMHx of cervical epidural abscess (s/p decompressive laminectomy 3/2021 c/b b/l leg paralysis), hx of pneumoperitoneum (s/p ex lap, total abdominal colectomy and end ileostomy (on 1/12/23) c/b evisceration and sepsis with MRSA bacteremia postoperatively), hx of hepatitis C, hx of R. buttock abscess, hx of L. foot osteomyelitis, neurogenic bladder (with indwelling Holcomb catheter, last exchanged two days ago), HTN, HLD, bipolar disorder, GERD, hx of opioid dependence, and constipation who presented to the ED on 12/4/23 from [facility] for feet infection and admitted for sepsis secondary to bilateral feet infection.    Sepsis secondary to b/l feet infection  Less likely UTI given urine sample was not clean catch in pt with indwelling Holcomb and no urinary symptoms  Complaints of b/l feet infection intermittently over the past year  Previously treated for L. hallux OM with L. heel culture growing Proteus mirabilis ESBL, completed 6-week course of avycaz  Temp 101 and WBC 15.25K on admission  Lactic WNL, ESR 84  U/A (sample obtained from Holcomb catheter) with positive nitrites, moderate leuks, moderate blood, WBC 26-50, RBC > 50, moderate bacteria, squamous epithelial cells present  CT b/l LE with study is severely limited by contracture. Left lower extremity is only partially visualized with exclusion of the left foot. Skin induration and subcutaneous edema in the leg and ankle.  No soft tissue gas  S/p LR 2800 cc bolus, vancomycin, and zosyn in the ED  S/p bedside escharectomy by podiatry  Empirically started on vancomycin and cefepime; can de-escalate pending final culture data  Wound culture +GPC in pairs  F/u blood cultures, urine culture, CRP, MRSA/MSSA PCR  Please examine R. buttock as pt with hx of abscess in that area (unable to turn at the time of my examination due to severe pain)  Monitor WBC trend and fever curve  Pending vascular surgery evaluation, VITALY evaluated  Podiatry recommendations appreciated  12/5/2023 wound cx  done by podiatry -- shows rare gpv   blood cx -- pending   id consult will be obtained given his complex medical history  esbl   his contracted state prevents mri   will start parenteral pain meds as he chronic  oral oxycodone isnt helping. ( he received 4mg morphine in ed and another 2 mg morphine without relief )  12/6/2023- podiatry has reccs for surgical intervention, vascular also provides no reccs for surgical intrevention    Chronic medical conditions:  HTN: PTA amlodipine 2.5 mg   HLD: PTA atorvastatin 40 mg  Bipolar disorder: PTA quetiapine 100 mg BID and 400 mg qhs, valproic acid 750 mg ER qhs  Hx of opioid dependence: PTA methadone 110 mg   Chronic pain: PTA lidocaine 4% patch, duloxetine 30 mg DR, gabapentin 600 mg q8, cyclobenzaprine 10 mg BID, oxycodone 5 mg q6  GERD: PTA pantoprazole 40 mg DR  Neurogenic bladder (with indwelling Holcomb catheter): PTA finasteride 5 mg, tamsulosin 0.4 mg  Constipation: PTA senna 8.6 mg 2 tabs qhs   .

## 2023-12-06 NOTE — PROGRESS NOTE ADULT - SUBJECTIVE AND OBJECTIVE BOX
Patient is a 53y old  Male who presents with a chief complaint of Sepsis secondary to b/l foot wounds (05 Dec 2023 12:34)      OVERNIGHT EVENTS:  none    MEDICATIONS  (STANDING):  acetaminophen     Tablet .. 650 milliGRAM(s) Oral daily  amLODIPine   Tablet 2.5 milliGRAM(s) Oral daily  ascorbic acid 500 milliGRAM(s) Oral daily  atorvastatin 40 milliGRAM(s) Oral at bedtime  cefepime   IVPB 2000 milliGRAM(s) IV Intermittent every 8 hours  collagenase Ointment 1 Application(s) Topical daily  cyclobenzaprine 10 milliGRAM(s) Oral two times a day  divalproex  milliGRAM(s) Oral at bedtime  DULoxetine 30 milliGRAM(s) Oral daily  enoxaparin Injectable 40 milliGRAM(s) SubCutaneous every 24 hours  finasteride 5 milliGRAM(s) Oral daily  gabapentin 600 milliGRAM(s) Oral <User Schedule>  lactobacillus acidophilus 1 Tablet(s) Oral two times a day with meals  lidocaine   4% Patch 1 Patch Transdermal daily  methadone  Concentrate 110 milliGRAM(s) Oral daily  methylphenidate 5 milliGRAM(s) Oral <User Schedule>  multivitamin 1 Tablet(s) Oral daily  oxyCODONE    IR 5 milliGRAM(s) Oral <User Schedule>  pantoprazole    Tablet 40 milliGRAM(s) Oral before breakfast  polyethylene glycol 3350 17 Gram(s) Oral daily  QUEtiapine 400 milliGRAM(s) Oral at bedtime  QUEtiapine 100 milliGRAM(s) Oral <User Schedule>  senna 2 Tablet(s) Oral at bedtime  tamsulosin 0.4 milliGRAM(s) Oral at bedtime  thiamine 100 milliGRAM(s) Oral daily  vancomycin  IVPB 1250 milliGRAM(s) IV Intermittent every 12 hours  zinc sulfate 220 milliGRAM(s) Oral daily    MEDICATIONS  (PRN):  acetaminophen     Tablet .. 650 milliGRAM(s) Oral every 6 hours PRN Temp greater or equal to 38C (100.4F), Mild Pain (1 - 3)  albuterol    90 MICROgram(s) HFA Inhaler 2 Puff(s) Inhalation every 4 hours PRN Shortness of Breath and/or Wheezing  aluminum hydroxide/magnesium hydroxide/simethicone Suspension 30 milliLiter(s) Oral every 4 hours PRN Dyspepsia  HYDROmorphone  Injectable 1 milliGRAM(s) IV Push every 4 hours PRN Severe Pain (7 - 10)  melatonin 3 milliGRAM(s) Oral at bedtime PRN Insomnia  ondansetron Injectable 4 milliGRAM(s) IV Push every 8 hours PRN Nausea and/or Vomiting      Allergies    NSAIDs (Flushing; Other (Moderate))  Haldol (Anaphylaxis)  Zyprexa (Rash; Dystonic RXN; Hives)  Motrin (Anaphylaxis)  Thorazine (Other (Moderate))  Aleve (Unknown)  Stelazine (Unknown)  Risperdal (Short breath; Rash; Hives)    Intolerances        SUBJECTIVE: in bed in c/o pain     Vital Signs Last 24 Hrs  T(C): 36.6 (06 Dec 2023 11:48), Max: 37.1 (05 Dec 2023 23:52)  T(F): 97.9 (06 Dec 2023 11:48), Max: 98.8 (05 Dec 2023 23:52)  HR: 91 (06 Dec 2023 11:48) (81 - 91)  BP: 100/64 (06 Dec 2023 11:48) (100/64 - 136/78)  BP(mean): --  RR: 17 (06 Dec 2023 11:48) (17 - 19)  SpO2: 92% (06 Dec 2023 11:48) (91% - 95%)    Parameters below as of 06 Dec 2023 11:48  Patient On (Oxygen Delivery Method): nasal cannula      PHYSICAL EXAM:  GENERAL: NAD, well-groomed, well-developed  HEAD:  Atraumatic, Normocephalic  EYES: EOMI, PERRLA, conjunctiva and sclera clear  ENMT: No tonsillar erythema, exudates, or enlargement; Moist mucous membranes, Good dentition, No lesions  NECK: Supple,  CHEST/LUNG: Clear to  auscultation bilaterally; No rales, rhonchi, wheezing, or rubs  bilaterally  HEART: Regular rate and rhythm; No murmurs, rubs, or gallops  ABDOMEN: Soft, Nontender, Nondistended; Bowel sounds present  EXTREMITIES:  2+ Peripheral Pulses, No clubbing, cyanosis, + edema BL LE especially left foot, extremely contracted lower extremity   SKIN: stage 3 right hip ulcer, right foot heel necrotic to bone , left foot dorsum had eschar         NERVOUS SYSTEM:  Alert & Oriented X3, Good concentration; functional quad.    LABS:                                   9.5    9.95  )-----------( 210      ( 06 Dec 2023 06:30 )             30.6     12-06    143  |  110<H>  |  15  ----------------------------<  83  3.7   |  31  |  0.89    Ca    8.7      06 Dec 2023 06:30  Phos  4.1     12-  Mg     1.8     12-    TPro  7.4  /  Alb  1.6<L>  /  TBili  0.2  /  DBili  x   /  AST  17  /  ALT  26  /  AlkPhos  156<H>  12-06     PTT - ( 04 Dec 2023 17:20 )  PTT:33.4 sec  Urinalysis Basic - ( 04 Dec 2023 22:05 )    Color: Yellow / Appearance: Cloudy / S.021 / pH: x  Gluc: x / Ketone: Negative mg/dL  / Bili: Negative / Urobili: 1.0 mg/dL   Blood: x / Protein: 30 mg/dL / Nitrite: Positive   Leuk Esterase: Moderate / RBC: >50 /HPF / WBC 26-50 /HPF   Sq Epi: x / Non Sq Epi: x / Bacteria: Moderate /HPF      Cultures;   CAPILLARY BLOOD GLUCOSE        Lipid panel:     Culture - Urine (collected 04 Dec 2023 22:05)  Source: Clean Catch Clean Catch (Midstream)  Preliminary Report (06 Dec 2023 08:38):    >100,000 CFU/ml Gram Negative Rods    Culture - Abscess with Gram Stain (collected 04 Dec 2023 20:00)  Source: .Abscess right foot wound  Gram Stain (05 Dec 2023 02:45):    No polymorphonuclear leukocytes seen per low power field    Rare Gram positive cocci in pairs seen per oil power field    Culture - Abscess with Gram Stain (collected 04 Dec 2023 20:00)  Source: .Abscess left wound culture  Gram Stain (05 Dec 2023 02:45):    No polymorphonuclear leukocytes seen per low power field    Rare Gram positive cocci in pairs seen per oil power field    Culture - Blood (collected 04 Dec 2023 17:35)  Source: .Blood Blood-Peripheral  Preliminary Report (06 Dec 2023 01:02):    No growth at 24 hours    Culture - Blood (collected 04 Dec 2023 17:20)  Source: .Blood Blood-Peripheral  Preliminary Report (06 Dec 2023 01:02):    No growth at 24 hours        Culture - Abscess with Gram Stain (collected 04 Dec 2023 20:00)  Source: .Abscess right foot wound  Gram Stain (05 Dec 2023 02:45):    No polymorphonuclear leukocytes seen per low power field    Rare Gram positive cocci in pairs seen per oil power field    Culture - Abscess with Gram Stain (collected 04 Dec 2023 20:00)  Source: .Abscess left wound culture  Gram Stain (05 Dec 2023 02:45):    No polymorphonuclear leukocytes seen per low power field    Rare Gram positive cocci in pairs seen per oil power field          RADIOLOGY & ADDITIONAL TESTS:      Imaging Personally Reviewed:  [ x] YES      Consultant(s) Notes Reviewed:  [x ] YES     Care Discussed with [x ] Consultants [X ] Patient [x ] Family  [x ]    [x ]  Other; RN

## 2023-12-06 NOTE — CONSULT NOTE ADULT - SUBJECTIVE AND OBJECTIVE BOX
HPI:  Gonzalez Stewart is a 53 year old male with PMHx of cervical epidural abscess (s/p decompressive laminectomy 3/2021 c/b b/l leg paralysis), hx of pneumoperitoneum (s/p ex lap, total abdominal colectomy and end ileostomy (on 23) c/b evisceration and sepsis with MRSA bacteremia postoperatively), hx of hepatitis C, hx of R. buttock abscess, hx of L. foot osteomyelitis, neurogenic bladder (with indwelling West catheter, last exchanged two days ago), HTN, HLD, bipolar disorder, GERD, hx of opioid dependence, and constipation who presented to the ED on 23 from [facility] for feet infection.    Patient reports he has had bilateral foot infections intermittently for the past year. Completed numerous rounds of antibiotics and even got C. difficile due to all the antibiotics. He is supposed to be getting wound care daily or every two days by wound care nurse at his facility but reports they do not come as they should. States the wound care nurse comes every six days. Wound care physician sees him once a week.    Extensive chart review with paperwork from [facility]. In 2023, found to have L. hallux osteomyelitis. L. heel wound culture with Proteus mirabilis ESBL. Received PICC line and completed 6-week therapy with ceftazidime/avibactam q8. Found to have detectable viral load of hepatitis C. Previously completed sofosbuvir/velpatasvir so thought to be resistant to that and completed sofosbuvir/velpatasvir/voxilaprevir. Follows with ID.    In the ED, VSS except Tmax 101 and BP as low as 105/59. WBC 15.25K, hgb 10.5, bicarb 34, alkphos 227. ESR 84. U/A (sample obtained from West catheter) with positive nitrites, moderate leuks, moderate blood, WBC 26-50, RBC > 50, moderate bacteria, squamous epithelial cells present. CT b/l LE with study is severely limited by contracture. Left lower extremity is only partially visualized with exclusion of the left foot. Skin induration and subcutaneous edema in the leg and ankle.  No soft tissue gas. Received acetaminophen 1 g IV, morphine 8 mg IV, oxycodone 5 mg, vancomycin 1 g, zosyn 3.375 g, and LR 2800 cc bolus. Evaluated by vascular surgery PA who states attending to evaluate in AM. Evaluated by podiatry, underwent bedside escharectomy of left foot. Wound culture with +GPC in pairs. (05 Dec 2023 03:00)      PAST MEDICAL & SURGICAL HISTORY:  CAD (coronary artery disease)      HTN (hypertension)      HLD (hyperlipidemia)      Neurogenic bladder      Bipolar disorder      S/P ileostomy      Indwelling West catheter present      Chronic hepatitis C virus infection      S/P laminectomy      S/P ileostomy          Allergies    NSAIDs (Flushing; Other (Moderate))  Haldol (Anaphylaxis)  Zyprexa (Rash; Dystonic RXN; Hives)  Motrin (Anaphylaxis)  Thorazine (Other (Moderate))  Aleve (Unknown)  Stelazine (Unknown)  Risperdal (Short breath; Rash; Hives)    Intolerances        ANTIMICROBIALS:  cefepime   IVPB 2000 every 8 hours  vancomycin  IVPB 1250 every 8 hours      OTHER MEDS:  acetaminophen     Tablet .. 650 milliGRAM(s) Oral daily  acetaminophen     Tablet .. 650 milliGRAM(s) Oral every 6 hours PRN  albuterol    90 MICROgram(s) HFA Inhaler 2 Puff(s) Inhalation every 4 hours PRN  aluminum hydroxide/magnesium hydroxide/simethicone Suspension 30 milliLiter(s) Oral every 4 hours PRN  amLODIPine   Tablet 2.5 milliGRAM(s) Oral daily  ascorbic acid 500 milliGRAM(s) Oral daily  atorvastatin 40 milliGRAM(s) Oral at bedtime  collagenase Ointment 1 Application(s) Topical daily  cyclobenzaprine 10 milliGRAM(s) Oral two times a day  divalproex  milliGRAM(s) Oral at bedtime  DULoxetine 30 milliGRAM(s) Oral daily  finasteride 5 milliGRAM(s) Oral daily  gabapentin 600 milliGRAM(s) Oral <User Schedule>  HYDROmorphone  Injectable 1 milliGRAM(s) IV Push every 4 hours PRN  lactobacillus acidophilus 1 Tablet(s) Oral two times a day with meals  lidocaine   4% Patch 1 Patch Transdermal daily  melatonin 3 milliGRAM(s) Oral at bedtime PRN  methadone  Concentrate 110 milliGRAM(s) Oral daily  methylphenidate 5 milliGRAM(s) Oral <User Schedule>  multivitamin 1 Tablet(s) Oral daily  ondansetron Injectable 4 milliGRAM(s) IV Push every 8 hours PRN  oxyCODONE    IR 5 milliGRAM(s) Oral <User Schedule>  pantoprazole    Tablet 40 milliGRAM(s) Oral before breakfast  polyethylene glycol 3350 17 Gram(s) Oral daily  QUEtiapine 100 milliGRAM(s) Oral <User Schedule>  QUEtiapine 400 milliGRAM(s) Oral at bedtime  senna 2 Tablet(s) Oral at bedtime  tamsulosin 0.4 milliGRAM(s) Oral at bedtime  thiamine 100 milliGRAM(s) Oral daily  zinc sulfate 220 milliGRAM(s) Oral daily      SOCIAL HISTORY:    Marital Status:    Occupation:   Lives with:     Substance Use (street drugs):   Tobacco Usage:    Alcohol Usage: Social EtOH    FAMILY HISTORY:      ROS:  Unobtainable because:   All other systems negative     Constitutional: no fever, no chills, no weight loss, no night sweats  Eye: no eye pain, no redness, no vision changes  ENT:  no sore throat, no rhinorrhea  Cardiovascular:  no chest pain, no palpitation  Respiratory:  no SOB, no cough  GI:  no abd pain, no vomiting, no diarrhea  urinary: no dysuria, no hematuria, no flank pain  : no  discharge or bleeding  musculoskeletal:  no joint pain, no joint swelling  skin:  no rash  neurology:  no headache, no seizure, no change in mental status  psych: no anxiety, no depression     Physical Exam:    General:    NAD, non toxic  Head: atraumatic, normocephalic  Eyes: normal sclera and conjunctiva  ENT:   no oropharyngeal lesions, no LAD, neck supple  Cardio:    regular S1,S2, no murmur  Respiratory:   clear to auscultation b/l, no wheezing  abd:   soft, BS +, not tender, no hepatosplenomegaly  :     no CVAT, no suprapubic tenderness, no west  Musculoskeletal : no joint swelling, no edema  Skin:    no rash  vascular:  normal pulses  Neurologic:     no focal deficits  psych: normal affect, no suicidal ideation      Drug Dosing Weight  Height (cm): 188 (04 Dec 2023 16:18)  Weight (kg): 90.7 (04 Dec 2023 16:18)  BMI (kg/m2): 25.7 (04 Dec 2023 16:18)  BSA (m2): 2.17 (04 Dec 2023 16:18)    Vital Signs Last 24 Hrs  T(F): 98.6 (23 @ 05:06), Max: 101 (23 @ 16:55)    Vital Signs Last 24 Hrs  HR: 81 (23 @ 05:06) (81 - 87)  BP: 106/67 (23 @ 05:06) (106/67 - 136/78)  RR: 18 (23 @ 05:06)  SpO2: 94% (23 @ 05:06) (91% - 95%)  Wt(kg): --                          10.5   15.25 )-----------( 206      ( 04 Dec 2023 17:20 )             35.2           140  |  104  |  13  ----------------------------<  118<H>  4.2   |  34<H>  |  0.67    Ca    9.1      04 Dec 2023 17:20    TPro  8.0  /  Alb  1.8<L>  /  TBili  0.4  /  DBili  x   /  AST  33  /  ALT  44  /  AlkPhos  227<H>        Urinalysis Basic - ( 04 Dec 2023 22:05 )    Color: Yellow / Appearance: Cloudy / S.021 / pH: x  Gluc: x / Ketone: Negative mg/dL  / Bili: Negative / Urobili: 1.0 mg/dL   Blood: x / Protein: 30 mg/dL / Nitrite: Positive   Leuk Esterase: Moderate / RBC: >50 /HPF / WBC 26-50 /HPF   Sq Epi: x / Non Sq Epi: x / Bacteria: Moderate /HPF        MICROBIOLOGY:  Vancomycin Level, Trough: 19.9 ug/mL (23 @ 06:35)  v  Clean Catch Clean Catch (Midstream)  23   >100,000 CFU/ml Gram Negative Rods  --  --      .Abscess left wound culture  23 --  --    No polymorphonuclear leukocytes seen per low power field  Rare Gram positive cocci in pairs seen per oil power field      .Blood Blood-Peripheral  23   No growth at 24 hours  --  --      .Blood Blood-Peripheral  23   No growth at 24 hours  --  --          Rapid RVP Result: NotDetec ( @ 17:20)          RADIOLOGY:       HPI:  Gonzalez Stewart is a 53 year old male with PMHx of cervical epidural abscess (s/p decompressive laminectomy 3/2021 c/b b/l leg paralysis), hx of pneumoperitoneum (s/p ex lap, total abdominal colectomy and end ileostomy (on 23) c/b evisceration and sepsis with MRSA bacteremia postoperatively), hx of hepatitis C, hx of R. buttock abscess, hx of L. foot osteomyelitis, neurogenic bladder (with indwelling West catheter, last exchanged two days ago), HTN, HLD, bipolar disorder, GERD, hx of opioid dependence, and constipation who presented to the ED on 23 from [facility] for feet infection.    Patient reports he has had bilateral foot infections intermittently for the past year. Completed numerous rounds of antibiotics and even got C. difficile due to all the antibiotics. He is supposed to be getting wound care daily or every two days by wound care nurse at his facility but reports they do not come as they should. States the wound care nurse comes every six days. Wound care physician sees him once a week.    Extensive chart review with paperwork from [facility]. In 2023, found to have L. hallux osteomyelitis. L. heel wound culture with Proteus mirabilis ESBL. Received PICC line and completed 6-week therapy with ceftazidime/avibactam q8. Found to have detectable viral load of hepatitis C. Previously completed sofosbuvir/velpatasvir so thought to be resistant to that and completed sofosbuvir/velpatasvir/voxilaprevir. Follows with ID.    In the ED, VSS except Tmax 101 and BP as low as 105/59. WBC 15.25K, hgb 10.5, bicarb 34, alkphos 227. ESR 84. U/A (sample obtained from West catheter) with positive nitrites, moderate leuks, moderate blood, WBC 26-50, RBC > 50, moderate bacteria, squamous epithelial cells present. CT b/l LE with study is severely limited by contracture. Left lower extremity is only partially visualized with exclusion of the left foot. Skin induration and subcutaneous edema in the leg and ankle.  No soft tissue gas. Received acetaminophen 1 g IV, morphine 8 mg IV, oxycodone 5 mg, vancomycin 1 g, zosyn 3.375 g, and LR 2800 cc bolus. Evaluated by vascular surgery PA who states attending to evaluate in AM. Evaluated by podiatry, underwent bedside escharectomy of left foot. Wound culture with +GPC in pairs. (05 Dec 2023 03:00)      PAST MEDICAL & SURGICAL HISTORY:  CAD (coronary artery disease)      HTN (hypertension)      HLD (hyperlipidemia)      Neurogenic bladder      Bipolar disorder      S/P ileostomy      Indwelling West catheter present      Chronic hepatitis C virus infection      S/P laminectomy      S/P ileostomy          Allergies    NSAIDs (Flushing; Other (Moderate))  Haldol (Anaphylaxis)  Zyprexa (Rash; Dystonic RXN; Hives)  Motrin (Anaphylaxis)  Thorazine (Other (Moderate))  Aleve (Unknown)  Stelazine (Unknown)  Risperdal (Short breath; Rash; Hives)    Intolerances        ANTIMICROBIALS:  cefepime   IVPB 2000 every 8 hours  vancomycin  IVPB 1250 every 8 hours      OTHER MEDS:  acetaminophen     Tablet .. 650 milliGRAM(s) Oral daily  acetaminophen     Tablet .. 650 milliGRAM(s) Oral every 6 hours PRN  albuterol    90 MICROgram(s) HFA Inhaler 2 Puff(s) Inhalation every 4 hours PRN  aluminum hydroxide/magnesium hydroxide/simethicone Suspension 30 milliLiter(s) Oral every 4 hours PRN  amLODIPine   Tablet 2.5 milliGRAM(s) Oral daily  ascorbic acid 500 milliGRAM(s) Oral daily  atorvastatin 40 milliGRAM(s) Oral at bedtime  collagenase Ointment 1 Application(s) Topical daily  cyclobenzaprine 10 milliGRAM(s) Oral two times a day  divalproex  milliGRAM(s) Oral at bedtime  DULoxetine 30 milliGRAM(s) Oral daily  finasteride 5 milliGRAM(s) Oral daily  gabapentin 600 milliGRAM(s) Oral <User Schedule>  HYDROmorphone  Injectable 1 milliGRAM(s) IV Push every 4 hours PRN  lactobacillus acidophilus 1 Tablet(s) Oral two times a day with meals  lidocaine   4% Patch 1 Patch Transdermal daily  melatonin 3 milliGRAM(s) Oral at bedtime PRN  methadone  Concentrate 110 milliGRAM(s) Oral daily  methylphenidate 5 milliGRAM(s) Oral <User Schedule>  multivitamin 1 Tablet(s) Oral daily  ondansetron Injectable 4 milliGRAM(s) IV Push every 8 hours PRN  oxyCODONE    IR 5 milliGRAM(s) Oral <User Schedule>  pantoprazole    Tablet 40 milliGRAM(s) Oral before breakfast  polyethylene glycol 3350 17 Gram(s) Oral daily  QUEtiapine 100 milliGRAM(s) Oral <User Schedule>  QUEtiapine 400 milliGRAM(s) Oral at bedtime  senna 2 Tablet(s) Oral at bedtime  tamsulosin 0.4 milliGRAM(s) Oral at bedtime  thiamine 100 milliGRAM(s) Oral daily  zinc sulfate 220 milliGRAM(s) Oral daily      SOCIAL HISTORY:    Marital Status:    Occupation:   Lives with:     Substance Use (street drugs):   Tobacco Usage:    Alcohol Usage: Social EtOH    FAMILY HISTORY:      ROS:  Unobtainable because:   All other systems negative     Constitutional: no fever, no chills, no weight loss, no night sweats  Eye: no eye pain, no redness, no vision changes  ENT:  no sore throat, no rhinorrhea  Cardiovascular:  no chest pain, no palpitation  Respiratory:  no SOB, no cough  GI:  no abd pain, no vomiting, no diarrhea  urinary: no dysuria, no hematuria, no flank pain  : no  discharge or bleeding  musculoskeletal:  no joint pain, no joint swelling  skin:  no rash  neurology:  no headache, no seizure, no change in mental status  psych: no anxiety, no depression     Physical Exam:    General:    NAD, non toxic  Head: atraumatic, normocephalic  Eyes: normal sclera and conjunctiva  ENT:   no oropharyngeal lesions, no LAD, neck supple  Cardio:    regular S1,S2, no murmur  Respiratory:   clear to auscultation b/l, no wheezing  abd:   soft, BS +, not tender, no hepatosplenomegaly  :     no CVAT, no suprapubic tenderness, no west  Musculoskeletal : no joint swelling, no edema  Skin:    no rash  vascular:  normal pulses  Neurologic:     no focal deficits  psych: normal affect, no suicidal ideation      Drug Dosing Weight  Height (cm): 188 (04 Dec 2023 16:18)  Weight (kg): 90.7 (04 Dec 2023 16:18)  BMI (kg/m2): 25.7 (04 Dec 2023 16:18)  BSA (m2): 2.17 (04 Dec 2023 16:18)    Vital Signs Last 24 Hrs  T(F): 98.6 (23 @ 05:06), Max: 101 (23 @ 16:55)    Vital Signs Last 24 Hrs  HR: 81 (23 @ 05:06) (81 - 87)  BP: 106/67 (23 @ 05:06) (106/67 - 136/78)  RR: 18 (23 @ 05:06)  SpO2: 94% (23 @ 05:06) (91% - 95%)  Wt(kg): --                          10.5   15.25 )-----------( 206      ( 04 Dec 2023 17:20 )             35.2           140  |  104  |  13  ----------------------------<  118<H>  4.2   |  34<H>  |  0.67    Ca    9.1      04 Dec 2023 17:20    TPro  8.0  /  Alb  1.8<L>  /  TBili  0.4  /  DBili  x   /  AST  33  /  ALT  44  /  AlkPhos  227<H>        Urinalysis Basic - ( 04 Dec 2023 22:05 )    Color: Yellow / Appearance: Cloudy / S.021 / pH: x  Gluc: x / Ketone: Negative mg/dL  / Bili: Negative / Urobili: 1.0 mg/dL   Blood: x / Protein: 30 mg/dL / Nitrite: Positive   Leuk Esterase: Moderate / RBC: >50 /HPF / WBC 26-50 /HPF   Sq Epi: x / Non Sq Epi: x / Bacteria: Moderate /HPF        MICROBIOLOGY:  Vancomycin Level, Trough: 19.9 ug/mL (23 @ 06:35)  v  Clean Catch Clean Catch (Midstream)  23   >100,000 CFU/ml Gram Negative Rods  --  --      .Abscess left wound culture  23 --  --    No polymorphonuclear leukocytes seen per low power field  Rare Gram positive cocci in pairs seen per oil power field      .Blood Blood-Peripheral  23   No growth at 24 hours  --  --      .Blood Blood-Peripheral  23   No growth at 24 hours  --  --          Rapid RVP Result: NotDetec ( @ 17:20)          RADIOLOGY:       HPI:  Patient  is a 53 year old male with PMHx of cervical epidural abscess (s/p decompressive laminectomy 3/2021 c/b b/l leg paralysis), hx of pneumoperitoneum (s/p ex lap, total abdominal colectomy and end ileostomy (on 23) c/b evisceration and sepsis with MRSA bacteremia postoperatively), hx of hepatitis C, hx of R. buttock abscess, hx of L. foot osteomyelitis, neurogenic bladder (with indwelling Holcomb catheter, last exchanged two days ago), HTN, HLD, bipolar disorder, GERD, hx of opioid dependence, and constipation who presented to the ED on 23 from [facility] for feet infection.    Patient reports he has had bilateral foot infections intermittently for the past 2  years.  Completed numerous rounds of antibiotics and even got C. difficile due to all the antibiotics. He is supposed to be getting wound care daily or every two days by wound care nurse at his facility but reports they do not come as they should.     as per med records apparently he is followed by ID outpatient and has gotten treatment for his HCV.      In the ED, VSS except Tmax 101 and BP as low as 105/59. WBC 15.25K, hgb 10.5, bicarb 34, alkphos 227. ESR 84. U/A (sample obtained from Holcomb catheter) with positive nitrites, moderate leuks, moderate blood, WBC 26-50, RBC > 50, moderate bacteria, squamous epithelial cells present.     CT b/l LE with study is severely limited by contracture. Left lower extremity is only partially visualized with exclusion of the left foot. Skin induration and subcutaneous edema in the leg and ankle.  No soft tissue gas. Received acetaminophen 1 g IV, morphine 8 mg IV, oxycodone 5 mg, vancomycin 1 g, zosyn 3.375 g, and LR 2800 cc bolus.    Evaluated by podiatry, underwent bedside escharectomy of left foot. Wound culture with +GPC in pairs. (05 Dec 2023 03:00)    patient seen along with the podiatry resident this morning and his nurse and PCA ta bedside to help visualize the foot wounds as patient is severely contracted .      PAST MEDICAL & SURGICAL HISTORY:  CAD (coronary artery disease)      HTN (hypertension)      HLD (hyperlipidemia)      Neurogenic bladder      Bipolar disorder      S/P ileostomy      Indwelling Holcomb catheter present      Chronic hepatitis C virus infection      S/P laminectomy      S/P ileostomy          Allergies    NSAIDs (Flushing; Other (Moderate))  Haldol (Anaphylaxis)  Zyprexa (Rash; Dystonic RXN; Hives)  Motrin (Anaphylaxis)  Thorazine (Other (Moderate))  Aleve (Unknown)  Stelazine (Unknown)  Risperdal (Short breath; Rash; Hives)    Intolerances        ANTIMICROBIALS:  cefepime   IVPB 2000 every 8 hours  vancomycin  IVPB 1250 every 8 hours      OTHER MEDS:  acetaminophen     Tablet .. 650 milliGRAM(s) Oral daily  acetaminophen     Tablet .. 650 milliGRAM(s) Oral every 6 hours PRN  albuterol    90 MICROgram(s) HFA Inhaler 2 Puff(s) Inhalation every 4 hours PRN  aluminum hydroxide/magnesium hydroxide/simethicone Suspension 30 milliLiter(s) Oral every 4 hours PRN  amLODIPine   Tablet 2.5 milliGRAM(s) Oral daily  ascorbic acid 500 milliGRAM(s) Oral daily  atorvastatin 40 milliGRAM(s) Oral at bedtime  collagenase Ointment 1 Application(s) Topical daily  cyclobenzaprine 10 milliGRAM(s) Oral two times a day  divalproex  milliGRAM(s) Oral at bedtime  DULoxetine 30 milliGRAM(s) Oral daily  finasteride 5 milliGRAM(s) Oral daily  gabapentin 600 milliGRAM(s) Oral <User Schedule>  HYDROmorphone  Injectable 1 milliGRAM(s) IV Push every 4 hours PRN  lactobacillus acidophilus 1 Tablet(s) Oral two times a day with meals  lidocaine   4% Patch 1 Patch Transdermal daily  melatonin 3 milliGRAM(s) Oral at bedtime PRN  methadone  Concentrate 110 milliGRAM(s) Oral daily  methylphenidate 5 milliGRAM(s) Oral <User Schedule>  multivitamin 1 Tablet(s) Oral daily  ondansetron Injectable 4 milliGRAM(s) IV Push every 8 hours PRN  oxyCODONE    IR 5 milliGRAM(s) Oral <User Schedule>  pantoprazole    Tablet 40 milliGRAM(s) Oral before breakfast  polyethylene glycol 3350 17 Gram(s) Oral daily  QUEtiapine 100 milliGRAM(s) Oral <User Schedule>  QUEtiapine 400 milliGRAM(s) Oral at bedtime  senna 2 Tablet(s) Oral at bedtime  tamsulosin 0.4 milliGRAM(s) Oral at bedtime  thiamine 100 milliGRAM(s) Oral daily  zinc sulfate 220 milliGRAM(s) Oral daily      SOCIAL HISTORY:      Lives with: [facility]      ROS:    Constitutional: had fever but not now , no chills, no weight loss, no night sweats  Eye: no eye pain, no redness, no vision changes  ENT:  no sore throat, no rhinorrhea  Cardiovascular:  no chest pain, no palpitation  Respiratory:  no SOB, no cough  GI:  no abd pain, no vomiting, no diarrhea  urinary: no dysuria, no hematuria, no flank pain  : no  discharge or bleeding  musculoskeletal:  b/l feet chronic wounds and painful   neurology:  no headache, no seizure, no change in mental status    Physical Exam:    General:    NAD, non toxic  Head: atraumatic, normocephalic  Eyes: normal sclera and conjunctiva  ENT:   no oropharyngeal lesions, no LAD, neck supple  Cardio:    regular S1,S2, no murmur  Respiratory:   clear to auscultation b/l, no wheezing  abd:   soft, BS +, not tender, no distention  :     no CVAT, no suprapubic tenderness,   Musculoskeletal : severely contracted LE b/l  right heel with open wound,  probe to bone but has surrounding erythema and edema and bullous like blisters on the peripheral leg and foot and right posterior lower leg with area of eschar /necrotic skin and surrounding erythema  left foot plantar region with large wound around 5 x 3 cm and has no malodor but surrounding erythema and edema  Skin:    no rash  vascular:  normal pulses  Neurologic:     no focal deficits  psych: normal affect      Drug Dosing Weight  Height (cm): 188 (04 Dec 2023 16:18)  Weight (kg): 90.7 (04 Dec 2023 16:18)  BMI (kg/m2): 25.7 (04 Dec 2023 16:18)  BSA (m2): 2.17 (04 Dec 2023 16:18)    Vital Signs Last 24 Hrs  T(F): 98.6 (23 @ 05:06), Max: 101 (23 @ 16:55)    Vital Signs Last 24 Hrs  HR: 81 (23 @ 05:06) (81 - 87)  BP: 106/67 (23 @ 05:06) (106/67 - 136/78)  RR: 18 (23 @ 05:06)  SpO2: 94% (23 @ 05:06) (91% - 95%)  Wt(kg): --                          10.5   15.25 )-----------( 206      ( 04 Dec 2023 17:20 )             35.2           140  |  104  |  13  ----------------------------<  118<H>  4.2   |  34<H>  |  0.67    Ca    9.1      04 Dec 2023 17:20    TPro  8.0  /  Alb  1.8<L>  /  TBili  0.4  /  DBili  x   /  AST  33  /  ALT  44  /  AlkPhos  227<H>        Urinalysis Basic - ( 04 Dec 2023 22:05 )    Color: Yellow / Appearance: Cloudy / S.021 / pH: x  Gluc: x / Ketone: Negative mg/dL  / Bili: Negative / Urobili: 1.0 mg/dL   Blood: x / Protein: 30 mg/dL / Nitrite: Positive   Leuk Esterase: Moderate / RBC: >50 /HPF / WBC 26-50 /HPF   Sq Epi: x / Non Sq Epi: x / Bacteria: Moderate /HPF        MICROBIOLOGY:  Vancomycin Level, Trough: 19.9 ug/mL (23 @ 06:35)  v  Clean Catch Clean Catch (Midstream)  23   >100,000 CFU/ml Gram Negative Rods  --  --      .Abscess left wound culture  23 --  --    No polymorphonuclear leukocytes seen per low power field  Rare Gram positive cocci in pairs seen per oil power field      .Blood Blood-Peripheral  23   No growth at 24 hours  --  --      .Blood Blood-Peripheral  23   No growth at 24 hours  --  --          Rapid RVP Result: NotDetec ( @ 17:20)      RADIOLOGY:    < from: Xray Foot AP + Lateral + Oblique, Bilat (23 @ 20:34) >    ACC: [id number] EXAM:  XR FOOT COMP MIN 3 VIEWS BI   ORDERED BY: RAINER ECKERT     PROCEDURE DATE:  2023          INTERPRETATION:  Bilateral feet radiographs requested, three views of the   right foot submitted.    HISTORY: Infected wounds     Threeviews of the right foot show skin ulceration along the plantar   surface of the calcaneus with mild underlying bone erosion. There is also   bone loss involving the head of the fifth metatarsal bone. Dorsal soft   tissue swelling is present.    IMPRESSION: Right heel ulcer with possible underlying osteomyelitis of   the calcaneus.    Thank you for this referral.    --- End of Report ---            DAVY OQUENDO MD; Attending Interventional Radiologist  This document has been electronically signed. Dec  6 2023 12:46PM    < end of copied text >      < from: Xray Tibia + Fibula 2 Views, Bilateral (23 @ 20:34) >    ACC: [id number] EXAM:  XR TIB FIB AP LAT 2 VIEWS BI   ORDERED BY: HIRA MCMILLAN     PROCEDURE DATE:  2023          INTERPRETATION:  Bilateral lower extremity wounds.    Bilateral tibia-fibula 2 views each side.    IMPRESSION: No acute fracture or dislocation. No focal bone lysis or   unusual periosteal reaction. Diffuse bilateral soft tissue edema. No soft   tissue gas.    If there is concern for osteomyelitis consider MRI for more sensitive   evaluation    --- End of Report ---            ALLIE WALTON MD; Attending Radiologist  This document has been electronically signed. Dec  5 2023  1:11P    < end of copied text >    < from: CT Lower Extremity w/ IV Cont, Bilateral (23 @ 23:00) >  IMPRESSION:  Patient contractured state and positioning causes limitation.  Preliminary report was provided by [facility].    Right femoral trochanteric region to the ankle, the foot is not included.  1.  Posterolateral decubitus wound is present at the level of the greater   trochanter without appreciated abscess or gas.    Left proximal tibia to the foot  1.  Soft tissue wounds are present along the calcaneus and midfoot   without appreciated abscess or gas.  2.  There is erosive change of the 1st interphalangeal joint of   indeterminate age. Correlation is suggested for signs of infection in   this location.    --- End of Report ---            WINSOME DIAZ MD; Attending Radiologist  This document has been electronically signed. Dec  5 2023  8:53AM    < end of copied text >     HPI:  Patient  is a 53 year old male with PMHx of cervical epidural abscess (s/p decompressive laminectomy 3/2021 c/b b/l leg paralysis), hx of pneumoperitoneum (s/p ex lap, total abdominal colectomy and end ileostomy (on 23) c/b evisceration and sepsis with MRSA bacteremia postoperatively), hx of hepatitis C, hx of R. buttock abscess, hx of L. foot osteomyelitis, neurogenic bladder (with indwelling Holcomb catheter, last exchanged two days ago), HTN, HLD, bipolar disorder, GERD, hx of opioid dependence, and constipation who presented to the ED on 23 from [facility] for feet infection.    Patient reports he has had bilateral foot infections intermittently for the past 2  years.  Completed numerous rounds of antibiotics and even got C. difficile due to all the antibiotics. He is supposed to be getting wound care daily or every two days by wound care nurse at his facility but reports they do not come as they should.     as per med records apparently he is followed by ID outpatient and has gotten treatment for his HCV.      In the ED, VSS except Tmax 101 and BP as low as 105/59. WBC 15.25K, hgb 10.5, bicarb 34, alkphos 227. ESR 84. U/A (sample obtained from Holcomb catheter) with positive nitrites, moderate leuks, moderate blood, WBC 26-50, RBC > 50, moderate bacteria, squamous epithelial cells present.     CT b/l LE with study is severely limited by contracture. Left lower extremity is only partially visualized with exclusion of the left foot. Skin induration and subcutaneous edema in the leg and ankle.  No soft tissue gas. Received acetaminophen 1 g IV, morphine 8 mg IV, oxycodone 5 mg, vancomycin 1 g, zosyn 3.375 g, and LR 2800 cc bolus.    Evaluated by podiatry, underwent bedside escharectomy of left foot. Wound culture with +GPC in pairs. (05 Dec 2023 03:00)    patient seen along with the podiatry resident this morning and his nurse and PCA ta bedside to help visualize the foot wounds as patient is severely contracted .      PAST MEDICAL & SURGICAL HISTORY:  CAD (coronary artery disease)      HTN (hypertension)      HLD (hyperlipidemia)      Neurogenic bladder      Bipolar disorder      S/P ileostomy      Indwelling Holcomb catheter present      Chronic hepatitis C virus infection      S/P laminectomy      S/P ileostomy          Allergies    NSAIDs (Flushing; Other (Moderate))  Haldol (Anaphylaxis)  Zyprexa (Rash; Dystonic RXN; Hives)  Motrin (Anaphylaxis)  Thorazine (Other (Moderate))  Aleve (Unknown)  Stelazine (Unknown)  Risperdal (Short breath; Rash; Hives)    Intolerances        ANTIMICROBIALS:  cefepime   IVPB 2000 every 8 hours  vancomycin  IVPB 1250 every 8 hours      OTHER MEDS:  acetaminophen     Tablet .. 650 milliGRAM(s) Oral daily  acetaminophen     Tablet .. 650 milliGRAM(s) Oral every 6 hours PRN  albuterol    90 MICROgram(s) HFA Inhaler 2 Puff(s) Inhalation every 4 hours PRN  aluminum hydroxide/magnesium hydroxide/simethicone Suspension 30 milliLiter(s) Oral every 4 hours PRN  amLODIPine   Tablet 2.5 milliGRAM(s) Oral daily  ascorbic acid 500 milliGRAM(s) Oral daily  atorvastatin 40 milliGRAM(s) Oral at bedtime  collagenase Ointment 1 Application(s) Topical daily  cyclobenzaprine 10 milliGRAM(s) Oral two times a day  divalproex  milliGRAM(s) Oral at bedtime  DULoxetine 30 milliGRAM(s) Oral daily  finasteride 5 milliGRAM(s) Oral daily  gabapentin 600 milliGRAM(s) Oral <User Schedule>  HYDROmorphone  Injectable 1 milliGRAM(s) IV Push every 4 hours PRN  lactobacillus acidophilus 1 Tablet(s) Oral two times a day with meals  lidocaine   4% Patch 1 Patch Transdermal daily  melatonin 3 milliGRAM(s) Oral at bedtime PRN  methadone  Concentrate 110 milliGRAM(s) Oral daily  methylphenidate 5 milliGRAM(s) Oral <User Schedule>  multivitamin 1 Tablet(s) Oral daily  ondansetron Injectable 4 milliGRAM(s) IV Push every 8 hours PRN  oxyCODONE    IR 5 milliGRAM(s) Oral <User Schedule>  pantoprazole    Tablet 40 milliGRAM(s) Oral before breakfast  polyethylene glycol 3350 17 Gram(s) Oral daily  QUEtiapine 100 milliGRAM(s) Oral <User Schedule>  QUEtiapine 400 milliGRAM(s) Oral at bedtime  senna 2 Tablet(s) Oral at bedtime  tamsulosin 0.4 milliGRAM(s) Oral at bedtime  thiamine 100 milliGRAM(s) Oral daily  zinc sulfate 220 milliGRAM(s) Oral daily      SOCIAL HISTORY:      Lives with: [facility]      ROS:    Constitutional: had fever but not now , no chills, no weight loss, no night sweats  Eye: no eye pain, no redness, no vision changes  ENT:  no sore throat, no rhinorrhea  Cardiovascular:  no chest pain, no palpitation  Respiratory:  no SOB, no cough  GI:  no abd pain, no vomiting, no diarrhea  urinary: no dysuria, no hematuria, no flank pain  : no  discharge or bleeding  musculoskeletal:  b/l feet chronic wounds and painful   neurology:  no headache, no seizure, no change in mental status    Physical Exam:    General:    NAD, non toxic  Head: atraumatic, normocephalic  Eyes: normal sclera and conjunctiva  ENT:   no oropharyngeal lesions, no LAD, neck supple  Cardio:    regular S1,S2, no murmur  Respiratory:   clear to auscultation b/l, no wheezing  abd:   soft, BS +, not tender, no distention  :     no CVAT, no suprapubic tenderness,   Musculoskeletal : severely contracted LE b/l  right heel with open wound,  probe to bone but has surrounding erythema and edema and bullous like blisters on the peripheral leg and foot and right posterior lower leg with area of eschar /necrotic skin and surrounding erythema  left foot plantar region with large wound around 5 x 3 cm and has no malodor but surrounding erythema and edema  Skin:    no rash  vascular:  normal pulses  Neurologic:     no focal deficits  psych: normal affect      Drug Dosing Weight  Height (cm): 188 (04 Dec 2023 16:18)  Weight (kg): 90.7 (04 Dec 2023 16:18)  BMI (kg/m2): 25.7 (04 Dec 2023 16:18)  BSA (m2): 2.17 (04 Dec 2023 16:18)    Vital Signs Last 24 Hrs  T(F): 98.6 (23 @ 05:06), Max: 101 (23 @ 16:55)    Vital Signs Last 24 Hrs  HR: 81 (23 @ 05:06) (81 - 87)  BP: 106/67 (23 @ 05:06) (106/67 - 136/78)  RR: 18 (23 @ 05:06)  SpO2: 94% (23 @ 05:06) (91% - 95%)  Wt(kg): --                          10.5   15.25 )-----------( 206      ( 04 Dec 2023 17:20 )             35.2           140  |  104  |  13  ----------------------------<  118<H>  4.2   |  34<H>  |  0.67    Ca    9.1      04 Dec 2023 17:20    TPro  8.0  /  Alb  1.8<L>  /  TBili  0.4  /  DBili  x   /  AST  33  /  ALT  44  /  AlkPhos  227<H>        Urinalysis Basic - ( 04 Dec 2023 22:05 )    Color: Yellow / Appearance: Cloudy / S.021 / pH: x  Gluc: x / Ketone: Negative mg/dL  / Bili: Negative / Urobili: 1.0 mg/dL   Blood: x / Protein: 30 mg/dL / Nitrite: Positive   Leuk Esterase: Moderate / RBC: >50 /HPF / WBC 26-50 /HPF   Sq Epi: x / Non Sq Epi: x / Bacteria: Moderate /HPF        MICROBIOLOGY:  Vancomycin Level, Trough: 19.9 ug/mL (23 @ 06:35)  v  Clean Catch Clean Catch (Midstream)  23   >100,000 CFU/ml Gram Negative Rods  --  --      .Abscess left wound culture  23 --  --    No polymorphonuclear leukocytes seen per low power field  Rare Gram positive cocci in pairs seen per oil power field      .Blood Blood-Peripheral  23   No growth at 24 hours  --  --      .Blood Blood-Peripheral  23   No growth at 24 hours  --  --          Rapid RVP Result: NotDetec ( @ 17:20)      RADIOLOGY:    < from: Xray Foot AP + Lateral + Oblique, Bilat (23 @ 20:34) >    ACC: [id number] EXAM:  XR FOOT COMP MIN 3 VIEWS BI   ORDERED BY: RAINER ECKERT     PROCEDURE DATE:  2023          INTERPRETATION:  Bilateral feet radiographs requested, three views of the   right foot submitted.    HISTORY: Infected wounds     Threeviews of the right foot show skin ulceration along the plantar   surface of the calcaneus with mild underlying bone erosion. There is also   bone loss involving the head of the fifth metatarsal bone. Dorsal soft   tissue swelling is present.    IMPRESSION: Right heel ulcer with possible underlying osteomyelitis of   the calcaneus.    Thank you for this referral.    --- End of Report ---            DAVY OQUENDO MD; Attending Interventional Radiologist  This document has been electronically signed. Dec  6 2023 12:46PM    < end of copied text >      < from: Xray Tibia + Fibula 2 Views, Bilateral (23 @ 20:34) >    ACC: [id number] EXAM:  XR TIB FIB AP LAT 2 VIEWS BI   ORDERED BY: HIRA MCMILLAN     PROCEDURE DATE:  2023          INTERPRETATION:  Bilateral lower extremity wounds.    Bilateral tibia-fibula 2 views each side.    IMPRESSION: No acute fracture or dislocation. No focal bone lysis or   unusual periosteal reaction. Diffuse bilateral soft tissue edema. No soft   tissue gas.    If there is concern for osteomyelitis consider MRI for more sensitive   evaluation    --- End of Report ---            ALLIE WALTON MD; Attending Radiologist  This document has been electronically signed. Dec  5 2023  1:11P    < end of copied text >    < from: CT Lower Extremity w/ IV Cont, Bilateral (23 @ 23:00) >  IMPRESSION:  Patient contractured state and positioning causes limitation.  Preliminary report was provided by [facility].    Right femoral trochanteric region to the ankle, the foot is not included.  1.  Posterolateral decubitus wound is present at the level of the greater   trochanter without appreciated abscess or gas.    Left proximal tibia to the foot  1.  Soft tissue wounds are present along the calcaneus and midfoot   without appreciated abscess or gas.  2.  There is erosive change of the 1st interphalangeal joint of   indeterminate age. Correlation is suggested for signs of infection in   this location.    --- End of Report ---            WINSOME DIAZ MD; Attending Radiologist  This document has been electronically signed. Dec  5 2023  8:53AM    < end of copied text >

## 2023-12-06 NOTE — PROGRESS NOTE ADULT - ASSESSMENT
53M with right heel wound to bone, s/p bedside left foot dorsal escharectomy with extensive periwound erythema.  - Pt seen and evaluated.  - Afebrile, no leukocytosis.  - Right foot heel wound to bone, necrotic soft, mild malodor, minimal serosanguinous drainage. Right foot DTI along the medial malleolus, eschar along distal tibia, periwound erythema. Left foot dorsal eschar, periwound extensive erythema, mild malodor, distal dorsal 5th DTI. BL +3 pitting edema extend from the toes to the level of ankle.  - Right foot X-ray (resident wet read): no gas, Calcaneal erosion.  - Left foot xray: pending.  - Left foot wound culture: pending.  - Vasc recs, appreciated.  - Recommend wound care consult.  - Patient contracted and not a good surgical candidate from podiatry standpoint.  - No podiatric surgical intervention during this admission.  - Patient stable for discharge from podiatry standpoint.  - Wound care instructions and follow up information can be found in discharge provider note.  - Discussed with attending. 53M with right heel wound to bone, s/p bedside left foot dorsal escharectomy with extensive periwound erythema.  - Pt seen and evaluated.  - Afebrile, no leukocytosis.  - Right foot heel wound to bone, necrotic soft, mild malodor, minimal serosanguinous drainage. Right foot DTI along the medial malleolus, eschar along distal tibia, periwound erythema. Left foot dorsal eschar, periwound extensive erythema, mild malodor, distal dorsal 5th DTI. BL +3 pitting edema extend from the toes to the level of ankle.  - Right foot X-ray (resident wet read): no gas, Calcaneal erosion.  - Left foot xray: pending.  - Left foot wound culture: pending.  - Vasc recs, appreciated.  - Recommend wound care consult.  - Patient contracted and not a good surgical candidate from podiatry standpoint.  - No podiatric surgical intervention during this admission.  - No further podiatric intervention needed. Please re-consult as necessary.  - Wound care instructions and follow up information can be found in discharge provider note.  - Discussed with attending.

## 2023-12-06 NOTE — CHART NOTE - NSCHARTNOTEFT_GEN_A_CORE
Discussed with patient podiatry plan moving forward. Explained to patient that he is not a surgical candidate. Patient to have local wound care and IV antibiotics. Patient showed verbal understanding of plan moving forward.

## 2023-12-06 NOTE — PROGRESS NOTE ADULT - SUBJECTIVE AND OBJECTIVE BOX
Patient is a 53y old  Male who presents with a chief complaint of Sepsis secondary to b/l foot wounds (06 Dec 2023 09:47)       INTERVAL HPI/OVERNIGHT EVENTS:  Patient seen and evaluated at bedside.  Pt is resting comfortable in NAD. Denies N/V/F/C.    Allergies    NSAIDs (Flushing; Other (Moderate))  Haldol (Anaphylaxis)  Zyprexa (Rash; Dystonic RXN; Hives)  Motrin (Anaphylaxis)  Thorazine (Other (Moderate))  Aleve (Unknown)  Stelazine (Unknown)  Risperdal (Short breath; Rash; Hives)    Intolerances        Vital Signs Last 24 Hrs  T(C): 37 (06 Dec 2023 05:06), Max: 37.1 (05 Dec 2023 23:52)  T(F): 98.6 (06 Dec 2023 05:06), Max: 98.8 (05 Dec 2023 23:52)  HR: 81 (06 Dec 2023 05:06) (81 - 87)  BP: 106/67 (06 Dec 2023 05:06) (106/67 - 136/78)  BP(mean): --  RR: 18 (06 Dec 2023 05:06) (18 - 19)  SpO2: 94% (06 Dec 2023 05:06) (91% - 95%)    Parameters below as of 06 Dec 2023 05:06  Patient On (Oxygen Delivery Method): nasal cannula        LABS:                        9.5    9.95  )-----------( 210      ( 06 Dec 2023 06:30 )             30.6     12-06    143  |  110<H>  |  15  ----------------------------<  83  3.7   |  31  |  0.89    Ca    8.7      06 Dec 2023 06:30  Phos  4.1     12-06  Mg     1.8     12-06    TPro  7.4  /  Alb  1.6<L>  /  TBili  0.2  /  DBili  x   /  AST  17  /  ALT  26  /  AlkPhos  156<H>  12-06    PT/INR - ( 04 Dec 2023 17:20 )   PT: 13.8 sec;   INR: 1.16 ratio         PTT - ( 04 Dec 2023 17:20 )  PTT:33.4 sec  Urinalysis Basic - ( 06 Dec 2023 06:30 )    Color: x / Appearance: x / SG: x / pH: x  Gluc: 83 mg/dL / Ketone: x  / Bili: x / Urobili: x   Blood: x / Protein: x / Nitrite: x   Leuk Esterase: x / RBC: x / WBC x   Sq Epi: x / Non Sq Epi: x / Bacteria: x      CAPILLARY BLOOD GLUCOSE          Lower Extremity Physical Exam:  scular: DP/PT 2/4, B/L, CFT <3 seconds B/L, Temperature gradient warm to warm , B/L.   Neuro: Epicritic sensation absent to the level of digits, B/L.  Musculoskeletal/Ortho: severe contracted b/l lower extremities  Skin: Right foot heel wound to bone, necrotic soft, mild malodor, minimal serosanguinous drainage. Right foot DTI along the medial malleolus, eschar along distal tibia, periwound erythema. Left foot dorsal eschar, periwound extensive erythema, mild malodor, distal dorsal 5th DTI. BL +3 pitting edema extend from the toes to the level of ankle.    RADIOLOGY & ADDITIONAL TESTS:

## 2023-12-07 LAB
-  AMOXICILLIN/CLAVULANIC ACID: SIGNIFICANT CHANGE UP
-  AMOXICILLIN/CLAVULANIC ACID: SIGNIFICANT CHANGE UP
-  AMPICILLIN/SULBACTAM: SIGNIFICANT CHANGE UP
-  AMPICILLIN: SIGNIFICANT CHANGE UP
-  AMPICILLIN: SIGNIFICANT CHANGE UP
-  AZTREONAM: SIGNIFICANT CHANGE UP
-  CEFAZOLIN: SIGNIFICANT CHANGE UP
-  CEFEPIME: SIGNIFICANT CHANGE UP
-  CEFOXITIN: SIGNIFICANT CHANGE UP
-  CEFOXITIN: SIGNIFICANT CHANGE UP
-  CEFTAZIDIME: SIGNIFICANT CHANGE UP
-  CEFTAZIDIME: SIGNIFICANT CHANGE UP
-  CEFTRIAXONE: SIGNIFICANT CHANGE UP
-  CEFTRIAXONE: SIGNIFICANT CHANGE UP
-  CEFUROXIME: SIGNIFICANT CHANGE UP
-  CEFUROXIME: SIGNIFICANT CHANGE UP
-  CIPROFLOXACIN: SIGNIFICANT CHANGE UP
-  CLINDAMYCIN: SIGNIFICANT CHANGE UP
-  CLINDAMYCIN: SIGNIFICANT CHANGE UP
-  DAPTOMYCIN: SIGNIFICANT CHANGE UP
-  DAPTOMYCIN: SIGNIFICANT CHANGE UP
-  ERTAPENEM: SIGNIFICANT CHANGE UP
-  ERTAPENEM: SIGNIFICANT CHANGE UP
-  ERYTHROMYCIN: SIGNIFICANT CHANGE UP
-  ERYTHROMYCIN: SIGNIFICANT CHANGE UP
-  GENTAMICIN: SIGNIFICANT CHANGE UP
-  IMIPENEM: SIGNIFICANT CHANGE UP
-  LEVOFLOXACIN: SIGNIFICANT CHANGE UP
-  LINEZOLID: SIGNIFICANT CHANGE UP
-  LINEZOLID: SIGNIFICANT CHANGE UP
-  MEROPENEM: SIGNIFICANT CHANGE UP
-  NITROFURANTOIN: SIGNIFICANT CHANGE UP
-  NITROFURANTOIN: SIGNIFICANT CHANGE UP
-  OXACILLIN: SIGNIFICANT CHANGE UP
-  OXACILLIN: SIGNIFICANT CHANGE UP
-  PENICILLIN: SIGNIFICANT CHANGE UP
-  PENICILLIN: SIGNIFICANT CHANGE UP
-  PIPERACILLIN/TAZOBACTAM: SIGNIFICANT CHANGE UP
-  RIFAMPIN: SIGNIFICANT CHANGE UP
-  RIFAMPIN: SIGNIFICANT CHANGE UP
-  TETRACYCLINE: SIGNIFICANT CHANGE UP
-  TETRACYCLINE: SIGNIFICANT CHANGE UP
-  TOBRAMYCIN: SIGNIFICANT CHANGE UP
-  TOBRAMYCIN: SIGNIFICANT CHANGE UP
-  TRIMETHOPRIM/SULFAMETHOXAZOLE: SIGNIFICANT CHANGE UP
-  VANCOMYCIN: SIGNIFICANT CHANGE UP
-  VANCOMYCIN: SIGNIFICANT CHANGE UP
ANION GAP SERPL CALC-SCNC: 3 MMOL/L — LOW (ref 5–17)
ANION GAP SERPL CALC-SCNC: 3 MMOL/L — LOW (ref 5–17)
BUN SERPL-MCNC: 16 MG/DL — SIGNIFICANT CHANGE UP (ref 7–23)
BUN SERPL-MCNC: 16 MG/DL — SIGNIFICANT CHANGE UP (ref 7–23)
CALCIUM SERPL-MCNC: 8.5 MG/DL — SIGNIFICANT CHANGE UP (ref 8.5–10.1)
CALCIUM SERPL-MCNC: 8.5 MG/DL — SIGNIFICANT CHANGE UP (ref 8.5–10.1)
CHLORIDE SERPL-SCNC: 110 MMOL/L — HIGH (ref 96–108)
CHLORIDE SERPL-SCNC: 110 MMOL/L — HIGH (ref 96–108)
CO2 SERPL-SCNC: 33 MMOL/L — HIGH (ref 22–31)
CO2 SERPL-SCNC: 33 MMOL/L — HIGH (ref 22–31)
CREAT SERPL-MCNC: 0.67 MG/DL — SIGNIFICANT CHANGE UP (ref 0.5–1.3)
CREAT SERPL-MCNC: 0.67 MG/DL — SIGNIFICANT CHANGE UP (ref 0.5–1.3)
EGFR: 112 ML/MIN/1.73M2 — SIGNIFICANT CHANGE UP
EGFR: 112 ML/MIN/1.73M2 — SIGNIFICANT CHANGE UP
GLUCOSE SERPL-MCNC: 81 MG/DL — SIGNIFICANT CHANGE UP (ref 70–99)
GLUCOSE SERPL-MCNC: 81 MG/DL — SIGNIFICANT CHANGE UP (ref 70–99)
HBV DNA # SERPL NAA+PROBE: SIGNIFICANT CHANGE UP
HBV DNA # SERPL NAA+PROBE: SIGNIFICANT CHANGE UP
HBV DNA SERPL NAA+PROBE-LOG#: SIGNIFICANT CHANGE UP LOGIU/ML
HBV DNA SERPL NAA+PROBE-LOG#: SIGNIFICANT CHANGE UP LOGIU/ML
HBV E AB SER-ACNC: REACTIVE
HBV E AB SER-ACNC: REACTIVE
HBV E AG SER-ACNC: SIGNIFICANT CHANGE UP
HBV E AG SER-ACNC: SIGNIFICANT CHANGE UP
HCT VFR BLD CALC: 31 % — LOW (ref 39–50)
HCT VFR BLD CALC: 31 % — LOW (ref 39–50)
HCV RNA SPEC NAA+PROBE-LOG IU: SIGNIFICANT CHANGE UP
HCV RNA SPEC NAA+PROBE-LOG IU: SIGNIFICANT CHANGE UP
HCV RNA SPEC NAA+PROBE-LOG IU: SIGNIFICANT CHANGE UP LOGIU/ML
HCV RNA SPEC NAA+PROBE-LOG IU: SIGNIFICANT CHANGE UP LOGIU/ML
HGB BLD-MCNC: 9.5 G/DL — LOW (ref 13–17)
HGB BLD-MCNC: 9.5 G/DL — LOW (ref 13–17)
HIV 1+2 AB+HIV1 P24 AG SERPL QL IA: SIGNIFICANT CHANGE UP
HIV 1+2 AB+HIV1 P24 AG SERPL QL IA: SIGNIFICANT CHANGE UP
MAGNESIUM SERPL-MCNC: 1.7 MG/DL — SIGNIFICANT CHANGE UP (ref 1.6–2.6)
MAGNESIUM SERPL-MCNC: 1.7 MG/DL — SIGNIFICANT CHANGE UP (ref 1.6–2.6)
MCHC RBC-ENTMCNC: 26.8 PG — LOW (ref 27–34)
MCHC RBC-ENTMCNC: 26.8 PG — LOW (ref 27–34)
MCHC RBC-ENTMCNC: 30.6 G/DL — LOW (ref 32–36)
MCHC RBC-ENTMCNC: 30.6 G/DL — LOW (ref 32–36)
MCV RBC AUTO: 87.3 FL — SIGNIFICANT CHANGE UP (ref 80–100)
MCV RBC AUTO: 87.3 FL — SIGNIFICANT CHANGE UP (ref 80–100)
METHOD TYPE: SIGNIFICANT CHANGE UP
NRBC # BLD: 0 /100 WBCS — SIGNIFICANT CHANGE UP (ref 0–0)
NRBC # BLD: 0 /100 WBCS — SIGNIFICANT CHANGE UP (ref 0–0)
PHOSPHATE SERPL-MCNC: 4 MG/DL — SIGNIFICANT CHANGE UP (ref 2.5–4.5)
PHOSPHATE SERPL-MCNC: 4 MG/DL — SIGNIFICANT CHANGE UP (ref 2.5–4.5)
PLATELET # BLD AUTO: 208 K/UL — SIGNIFICANT CHANGE UP (ref 150–400)
PLATELET # BLD AUTO: 208 K/UL — SIGNIFICANT CHANGE UP (ref 150–400)
POTASSIUM SERPL-MCNC: 3.4 MMOL/L — LOW (ref 3.5–5.3)
POTASSIUM SERPL-MCNC: 3.4 MMOL/L — LOW (ref 3.5–5.3)
POTASSIUM SERPL-SCNC: 3.4 MMOL/L — LOW (ref 3.5–5.3)
POTASSIUM SERPL-SCNC: 3.4 MMOL/L — LOW (ref 3.5–5.3)
RBC # BLD: 3.55 M/UL — LOW (ref 4.2–5.8)
RBC # BLD: 3.55 M/UL — LOW (ref 4.2–5.8)
RBC # FLD: 17.1 % — HIGH (ref 10.3–14.5)
RBC # FLD: 17.1 % — HIGH (ref 10.3–14.5)
SODIUM SERPL-SCNC: 146 MMOL/L — HIGH (ref 135–145)
SODIUM SERPL-SCNC: 146 MMOL/L — HIGH (ref 135–145)
WBC # BLD: 9 K/UL — SIGNIFICANT CHANGE UP (ref 3.8–10.5)
WBC # BLD: 9 K/UL — SIGNIFICANT CHANGE UP (ref 3.8–10.5)
WBC # FLD AUTO: 9 K/UL — SIGNIFICANT CHANGE UP (ref 3.8–10.5)
WBC # FLD AUTO: 9 K/UL — SIGNIFICANT CHANGE UP (ref 3.8–10.5)

## 2023-12-07 PROCEDURE — 99232 SBSQ HOSP IP/OBS MODERATE 35: CPT

## 2023-12-07 RX ORDER — SODIUM CHLORIDE 9 MG/ML
1000 INJECTION, SOLUTION INTRAVENOUS
Refills: 0 | Status: DISCONTINUED | OUTPATIENT
Start: 2023-12-07 | End: 2023-12-11

## 2023-12-07 RX ADMIN — ATORVASTATIN CALCIUM 40 MILLIGRAM(S): 80 TABLET, FILM COATED ORAL at 22:30

## 2023-12-07 RX ADMIN — QUETIAPINE FUMARATE 100 MILLIGRAM(S): 200 TABLET, FILM COATED ORAL at 10:14

## 2023-12-07 RX ADMIN — QUETIAPINE FUMARATE 400 MILLIGRAM(S): 200 TABLET, FILM COATED ORAL at 22:30

## 2023-12-07 RX ADMIN — METHADONE HYDROCHLORIDE 110 MILLIGRAM(S): 40 TABLET ORAL at 14:24

## 2023-12-07 RX ADMIN — CYCLOBENZAPRINE HYDROCHLORIDE 10 MILLIGRAM(S): 10 TABLET, FILM COATED ORAL at 06:28

## 2023-12-07 RX ADMIN — HYDROMORPHONE HYDROCHLORIDE 1 MILLIGRAM(S): 2 INJECTION INTRAMUSCULAR; INTRAVENOUS; SUBCUTANEOUS at 17:18

## 2023-12-07 RX ADMIN — OXYCODONE HYDROCHLORIDE 5 MILLIGRAM(S): 5 TABLET ORAL at 01:27

## 2023-12-07 RX ADMIN — Medication 100 MILLIGRAM(S): at 12:24

## 2023-12-07 RX ADMIN — Medication 1 TABLET(S): at 12:24

## 2023-12-07 RX ADMIN — Medication 500 MILLIGRAM(S): at 12:24

## 2023-12-07 RX ADMIN — DIVALPROEX SODIUM 750 MILLIGRAM(S): 500 TABLET, DELAYED RELEASE ORAL at 22:30

## 2023-12-07 RX ADMIN — QUETIAPINE FUMARATE 100 MILLIGRAM(S): 200 TABLET, FILM COATED ORAL at 18:37

## 2023-12-07 RX ADMIN — HYDROMORPHONE HYDROCHLORIDE 1 MILLIGRAM(S): 2 INJECTION INTRAMUSCULAR; INTRAVENOUS; SUBCUTANEOUS at 22:52

## 2023-12-07 RX ADMIN — ZINC SULFATE TAB 220 MG (50 MG ZINC EQUIVALENT) 220 MILLIGRAM(S): 220 (50 ZN) TAB at 12:24

## 2023-12-07 RX ADMIN — OXYCODONE HYDROCHLORIDE 5 MILLIGRAM(S): 5 TABLET ORAL at 12:23

## 2023-12-07 RX ADMIN — Medication 166.67 MILLIGRAM(S): at 10:15

## 2023-12-07 RX ADMIN — OXYCODONE HYDROCHLORIDE 5 MILLIGRAM(S): 5 TABLET ORAL at 00:22

## 2023-12-07 RX ADMIN — CYCLOBENZAPRINE HYDROCHLORIDE 10 MILLIGRAM(S): 10 TABLET, FILM COATED ORAL at 20:57

## 2023-12-07 RX ADMIN — AMLODIPINE BESYLATE 2.5 MILLIGRAM(S): 2.5 TABLET ORAL at 06:28

## 2023-12-07 RX ADMIN — GABAPENTIN 600 MILLIGRAM(S): 400 CAPSULE ORAL at 22:29

## 2023-12-07 RX ADMIN — SENNA PLUS 2 TABLET(S): 8.6 TABLET ORAL at 22:30

## 2023-12-07 RX ADMIN — LIDOCAINE 1 PATCH: 4 CREAM TOPICAL at 00:42

## 2023-12-07 RX ADMIN — Medication 650 MILLIGRAM(S): at 12:54

## 2023-12-07 RX ADMIN — OXYCODONE HYDROCHLORIDE 5 MILLIGRAM(S): 5 TABLET ORAL at 06:28

## 2023-12-07 RX ADMIN — Medication 1 TABLET(S): at 10:14

## 2023-12-07 RX ADMIN — LIDOCAINE 1 PATCH: 4 CREAM TOPICAL at 12:25

## 2023-12-07 RX ADMIN — ENOXAPARIN SODIUM 40 MILLIGRAM(S): 100 INJECTION SUBCUTANEOUS at 22:30

## 2023-12-07 RX ADMIN — SODIUM CHLORIDE 50 MILLILITER(S): 9 INJECTION, SOLUTION INTRAVENOUS at 18:43

## 2023-12-07 RX ADMIN — Medication 1 TABLET(S): at 16:48

## 2023-12-07 RX ADMIN — FINASTERIDE 5 MILLIGRAM(S): 5 TABLET, FILM COATED ORAL at 12:25

## 2023-12-07 RX ADMIN — CEFEPIME 100 MILLIGRAM(S): 1 INJECTION, POWDER, FOR SOLUTION INTRAMUSCULAR; INTRAVENOUS at 06:29

## 2023-12-07 RX ADMIN — OXYCODONE HYDROCHLORIDE 5 MILLIGRAM(S): 5 TABLET ORAL at 18:37

## 2023-12-07 RX ADMIN — GABAPENTIN 600 MILLIGRAM(S): 400 CAPSULE ORAL at 16:48

## 2023-12-07 RX ADMIN — Medication 5 MILLIGRAM(S): at 10:14

## 2023-12-07 RX ADMIN — GABAPENTIN 600 MILLIGRAM(S): 400 CAPSULE ORAL at 06:28

## 2023-12-07 RX ADMIN — Medication 5 MILLIGRAM(S): at 20:57

## 2023-12-07 RX ADMIN — Medication 650 MILLIGRAM(S): at 12:24

## 2023-12-07 RX ADMIN — OXYCODONE HYDROCHLORIDE 5 MILLIGRAM(S): 5 TABLET ORAL at 12:50

## 2023-12-07 RX ADMIN — HYDROMORPHONE HYDROCHLORIDE 1 MILLIGRAM(S): 2 INJECTION INTRAMUSCULAR; INTRAVENOUS; SUBCUTANEOUS at 00:11

## 2023-12-07 RX ADMIN — HYDROMORPHONE HYDROCHLORIDE 1 MILLIGRAM(S): 2 INJECTION INTRAMUSCULAR; INTRAVENOUS; SUBCUTANEOUS at 16:48

## 2023-12-07 RX ADMIN — DULOXETINE HYDROCHLORIDE 30 MILLIGRAM(S): 30 CAPSULE, DELAYED RELEASE ORAL at 12:25

## 2023-12-07 RX ADMIN — PANTOPRAZOLE SODIUM 40 MILLIGRAM(S): 20 TABLET, DELAYED RELEASE ORAL at 06:28

## 2023-12-07 RX ADMIN — LIDOCAINE 1 PATCH: 4 CREAM TOPICAL at 21:07

## 2023-12-07 RX ADMIN — TAMSULOSIN HYDROCHLORIDE 0.4 MILLIGRAM(S): 0.4 CAPSULE ORAL at 22:51

## 2023-12-07 RX ADMIN — Medication 166.67 MILLIGRAM(S): at 20:57

## 2023-12-07 NOTE — PROGRESS NOTE ADULT - SUBJECTIVE AND OBJECTIVE BOX
STAN VEGA  MRN-[id number]    Follow Up:  OM    Interval History: the pt was seen and examined earlier, not in acute distress, awake and alert, appropriate, no fevers, no leukocytosis, pain in his feet is controlled    PAST MEDICAL & SURGICAL HISTORY:  CAD (coronary artery disease)      HTN (hypertension)      HLD (hyperlipidemia)      Neurogenic bladder      Bipolar disorder      S/P ileostomy      Indwelling Holcomb catheter present      Chronic hepatitis C virus infection      S/P laminectomy      S/P ileostomy          ROS:    [ ] Unobtainable because:  [x ] All other systems negative    Constitutional: no fever, no chills  Head: no trauma  Eyes: no vision changes, no eye pain  ENT:  no sore throat, no rhinorrhea  Cardiovascular:  no chest pain, no palpitation  Respiratory:  no SOB, no cough  GI:  no abd pain, no vomiting, no diarrhea  urinary: no dysuria, no hematuria, no flank pain  musculoskeletal:   wounds on his b/l feet   skin:  small blisters on his left shoulder, pruritic   neurology:  no headache, no seizure, no change in mental status  psych: no anxiety, no depression         Allergies  NSAIDs (Flushing; Other (Moderate))  Haldol (Anaphylaxis)  Zyprexa (Rash; Dystonic RXN; Hives)  Motrin (Anaphylaxis)  Thorazine (Other (Moderate))  Aleve (Unknown)  Stelazine (Unknown)  Risperdal (Short breath; Rash; Hives)        ANTIMICROBIALS:  ceftazidime/avibactam IVPB 2.5 every 8 hours  vancomycin  IVPB 1250 every 12 hours      OTHER MEDS:  acetaminophen     Tablet .. 650 milliGRAM(s) Oral daily  acetaminophen     Tablet .. 650 milliGRAM(s) Oral every 6 hours PRN  albuterol    90 MICROgram(s) HFA Inhaler 2 Puff(s) Inhalation every 4 hours PRN  aluminum hydroxide/magnesium hydroxide/simethicone Suspension 30 milliLiter(s) Oral every 4 hours PRN  amLODIPine   Tablet 2.5 milliGRAM(s) Oral daily  ascorbic acid 500 milliGRAM(s) Oral daily  atorvastatin 40 milliGRAM(s) Oral at bedtime  collagenase Ointment 1 Application(s) Topical daily  cyclobenzaprine 10 milliGRAM(s) Oral two times a day  divalproex  milliGRAM(s) Oral at bedtime  DULoxetine 30 milliGRAM(s) Oral daily  enoxaparin Injectable 40 milliGRAM(s) SubCutaneous every 24 hours  finasteride 5 milliGRAM(s) Oral daily  gabapentin 600 milliGRAM(s) Oral <User Schedule>  HYDROmorphone  Injectable 1 milliGRAM(s) IV Push every 4 hours PRN  lactobacillus acidophilus 1 Tablet(s) Oral two times a day with meals  lidocaine   4% Patch 1 Patch Transdermal daily  melatonin 3 milliGRAM(s) Oral at bedtime PRN  methadone  Concentrate 110 milliGRAM(s) Oral daily  methylphenidate 5 milliGRAM(s) Oral <User Schedule>  multivitamin 1 Tablet(s) Oral daily  ondansetron Injectable 4 milliGRAM(s) IV Push every 8 hours PRN  oxyCODONE    IR 5 milliGRAM(s) Oral <User Schedule>  pantoprazole    Tablet 40 milliGRAM(s) Oral before breakfast  polyethylene glycol 3350 17 Gram(s) Oral daily  QUEtiapine 100 milliGRAM(s) Oral <User Schedule>  QUEtiapine 400 milliGRAM(s) Oral at bedtime  senna 2 Tablet(s) Oral at bedtime  tamsulosin 0.4 milliGRAM(s) Oral at bedtime  thiamine 100 milliGRAM(s) Oral daily  zinc sulfate 220 milliGRAM(s) Oral daily      Vital Signs Last 24 Hrs  T(C): 36.3 (07 Dec 2023 11:51), Max: 37.1 (06 Dec 2023 23:57)  T(F): 97.3 (07 Dec 2023 11:51), Max: 98.8 (06 Dec 2023 23:57)  HR: 86 (07 Dec 2023 11:51) (71 - 95)  BP: 109/66 (07 Dec 2023 11:51) (104/69 - 120/78)  BP(mean): --  RR: 17 (07 Dec 2023 11:51) (17 - 19)  SpO2: 95% (07 Dec 2023 11:51) (90% - 95%)    Parameters below as of 07 Dec 2023 11:51  Patient On (Oxygen Delivery Method): nasal cannula        Physical Exam:  General:    NAD, non toxic, poor hygiene   Head: atraumatic, normocephalic  Eyes: normal sclera and conjunctiva  ENT:   no oropharyngeal lesions, poor dentition, no LAD, neck supple  Cardio:    regular S1,S2, no murmur  Respiratory:   clear to auscultation b/l, no wheezing  abd:   soft, BS +, not tender, no distention, midline scar healed, right sided colostomy in place   :     no CVAT, no suprapubic tenderness,   Musculoskeletal : severely contracted LE b/l  right heel with open wound,  probe to bone but has surrounding erythema and edema and bullous like blisters on the peripheral leg and foot and right posterior lower leg with area of eschar /necrotic skin and surrounding erythema  left foot plantar region with large wound around 5 x 3 cm and has no malodor but surrounding erythema and edema  Skin:    left shoulder unclear if new blisters formed or fluid filled vesicles   vascular:  normal pulses  Neurologic:     no focal deficits  psych: normal affect    WBC Count: 9.00 K/uL (12-07 @ 06:23)  WBC Count: 9.95 K/uL (12-06 @ 06:30)  WBC Count: 15.25 K/uL (12-04 @ 17:20)                            9.5    9.00  )-----------( 208      ( 07 Dec 2023 06:23 )             31.0       12-07    146<H>  |  110<H>  |  16  ----------------------------<  81  3.4<L>   |  33<H>  |  0.67    Ca    8.5      07 Dec 2023 06:23  Phos  4.0     12-07  Mg     1.7     12-07    TPro  7.4  /  Alb  1.6<L>  /  TBili  0.2  /  DBili  x   /  AST  17  /  ALT  26  /  AlkPhos  156<H>  12-06      Urinalysis Basic - ( 07 Dec 2023 06:23 )    Color: x / Appearance: x / SG: x / pH: x  Gluc: 81 mg/dL / Ketone: x  / Bili: x / Urobili: x   Blood: x / Protein: x / Nitrite: x   Leuk Esterase: x / RBC: x / WBC x   Sq Epi: x / Non Sq Epi: x / Bacteria: x        Creatinine Trend: 0.67<--, 0.89<--, 0.67<--      MICROBIOLOGY:  v  Clean Catch Clean Catch (Midstream)  12-04-23   >100,000 CFU/ml Klebsiella pneumoniae  50,000 - 99,000 CFU/mL Gram positive organisms  --  Klebsiella pneumoniae      .Abscess left wound culture  12-04-23   Rare Pseudomonas aeruginosa  Moderate Methicillin Resistant Staphylococcus aureus  Rare Klebsiella pneumoniae  Moderate Bacteroides fragilis "Susceptibilities not performed"  --  Pseudomonas aeruginosa  Methicillin resistant Staphylococcus aureus      .Blood Blood-Peripheral  12-04-23   No growth at 48 Hours  --  --      .Blood Blood-Peripheral  12-04-23   No growth at 48 Hours  --  --          Rapid RVP Result: NotDetec (12-04 @ 17:20)        C-Reactive Protein, Serum: 84 (12-04)            SARS-CoV-2: NotDetec (12-04-23 @ 17:20)  Rapid RVP Result: NotDetec (12-04-23 @ 17:20)    SARS-CoV-2: NotDetec (04 Dec 2023 17:20)    RADIOLOGY:     STAN VEGA  MRN-[id number]    Follow Up:  OM    Interval History: the pt was seen and examined earlier, not in acute distress, awake and alert, appropriate, no fevers, no leukocytosis, pain in his feet is controlled    PAST MEDICAL & SURGICAL HISTORY:  CAD (coronary artery disease)      HTN (hypertension)      HLD (hyperlipidemia)      Neurogenic bladder      Bipolar disorder      S/P ileostomy      Indwelling Holcomb catheter present      Chronic hepatitis C virus infection      S/P laminectomy      S/P ileostomy          ROS:    [ ] Unobtainable because:  [x ] All other systems negative    Constitutional: no fever, no chills  Head: no trauma  Eyes: no vision changes, no eye pain  ENT:  no sore throat, no rhinorrhea  Cardiovascular:  no chest pain, no palpitation  Respiratory:  no SOB, no cough  GI:  no abd pain, no vomiting, no diarrhea  urinary: no dysuria, no hematuria, no flank pain  musculoskeletal:   wounds on his b/l feet   skin:  small blisters on his left shoulder, pruritic   neurology:  no headache, no seizure, no change in mental status  psych: no anxiety, no depression         Allergies  NSAIDs (Flushing; Other (Moderate))  Haldol (Anaphylaxis)  Zyprexa (Rash; Dystonic RXN; Hives)  Motrin (Anaphylaxis)  Thorazine (Other (Moderate))  Aleve (Unknown)  Stelazine (Unknown)  Risperdal (Short breath; Rash; Hives)        ANTIMICROBIALS:  ceftazidime/avibactam IVPB 2.5 every 8 hours  vancomycin  IVPB 1250 every 12 hours      OTHER MEDS:  acetaminophen     Tablet .. 650 milliGRAM(s) Oral daily  acetaminophen     Tablet .. 650 milliGRAM(s) Oral every 6 hours PRN  albuterol    90 MICROgram(s) HFA Inhaler 2 Puff(s) Inhalation every 4 hours PRN  aluminum hydroxide/magnesium hydroxide/simethicone Suspension 30 milliLiter(s) Oral every 4 hours PRN  amLODIPine   Tablet 2.5 milliGRAM(s) Oral daily  ascorbic acid 500 milliGRAM(s) Oral daily  atorvastatin 40 milliGRAM(s) Oral at bedtime  collagenase Ointment 1 Application(s) Topical daily  cyclobenzaprine 10 milliGRAM(s) Oral two times a day  divalproex  milliGRAM(s) Oral at bedtime  DULoxetine 30 milliGRAM(s) Oral daily  enoxaparin Injectable 40 milliGRAM(s) SubCutaneous every 24 hours  finasteride 5 milliGRAM(s) Oral daily  gabapentin 600 milliGRAM(s) Oral <User Schedule>  HYDROmorphone  Injectable 1 milliGRAM(s) IV Push every 4 hours PRN  lactobacillus acidophilus 1 Tablet(s) Oral two times a day with meals  lidocaine   4% Patch 1 Patch Transdermal daily  melatonin 3 milliGRAM(s) Oral at bedtime PRN  methadone  Concentrate 110 milliGRAM(s) Oral daily  methylphenidate 5 milliGRAM(s) Oral <User Schedule>  multivitamin 1 Tablet(s) Oral daily  ondansetron Injectable 4 milliGRAM(s) IV Push every 8 hours PRN  oxyCODONE    IR 5 milliGRAM(s) Oral <User Schedule>  pantoprazole    Tablet 40 milliGRAM(s) Oral before breakfast  polyethylene glycol 3350 17 Gram(s) Oral daily  QUEtiapine 100 milliGRAM(s) Oral <User Schedule>  QUEtiapine 400 milliGRAM(s) Oral at bedtime  senna 2 Tablet(s) Oral at bedtime  tamsulosin 0.4 milliGRAM(s) Oral at bedtime  thiamine 100 milliGRAM(s) Oral daily  zinc sulfate 220 milliGRAM(s) Oral daily      Vital Signs Last 24 Hrs  T(C): 36.3 (07 Dec 2023 11:51), Max: 37.1 (06 Dec 2023 23:57)  T(F): 97.3 (07 Dec 2023 11:51), Max: 98.8 (06 Dec 2023 23:57)  HR: 86 (07 Dec 2023 11:51) (71 - 95)  BP: 109/66 (07 Dec 2023 11:51) (104/69 - 120/78)  BP(mean): --  RR: 17 (07 Dec 2023 11:51) (17 - 19)  SpO2: 95% (07 Dec 2023 11:51) (90% - 95%)    Parameters below as of 07 Dec 2023 11:51  Patient On (Oxygen Delivery Method): nasal cannula        Physical Exam:  General:    NAD, non toxic, poor hygiene   Head: atraumatic, normocephalic  Eyes: normal sclera and conjunctiva  ENT:   no oropharyngeal lesions, poor dentition, no LAD, neck supple  Cardio:    regular S1,S2, no murmur  Respiratory:   clear to auscultation b/l, no wheezing  abd:   soft, BS +, not tender, no distention, midline scar healed, right sided colostomy in place   :     no CVAT, no suprapubic tenderness,   Musculoskeletal : severely contracted LE b/l  right heel with open wound,  probe to bone but has surrounding erythema and edema and bullous like blisters on the peripheral leg and foot and right posterior lower leg with area of eschar /necrotic skin and surrounding erythema  left foot plantar region with large wound around 5 x 3 cm and has no malodor but surrounding erythema and edema  Skin:    left shoulder unclear if new blisters formed or fluid filled vesicles   vascular:  normal pulses  Neurologic:     no focal deficits  psych: normal affect    WBC Count: 9.00 K/uL (12-07 @ 06:23)  WBC Count: 9.95 K/uL (12-06 @ 06:30)  WBC Count: 15.25 K/uL (12-04 @ 17:20)                            9.5    9.00  )-----------( 208      ( 07 Dec 2023 06:23 )             31.0       12-07    146<H>  |  110<H>  |  16  ----------------------------<  81  3.4<L>   |  33<H>  |  0.67    Ca    8.5      07 Dec 2023 06:23  Phos  4.0     12-07  Mg     1.7     12-07    TPro  7.4  /  Alb  1.6<L>  /  TBili  0.2  /  DBili  x   /  AST  17  /  ALT  26  /  AlkPhos  156<H>  12-06      Urinalysis Basic - ( 07 Dec 2023 06:23 )    Color: x / Appearance: x / SG: x / pH: x  Gluc: 81 mg/dL / Ketone: x  / Bili: x / Urobili: x   Blood: x / Protein: x / Nitrite: x   Leuk Esterase: x / RBC: x / WBC x   Sq Epi: x / Non Sq Epi: x / Bacteria: x        Creatinine Trend: 0.67<--, 0.89<--, 0.67<--      MICROBIOLOGY:  v  Clean Catch Clean Catch (Midstream)  12-04-23   >100,000 CFU/ml Klebsiella pneumoniae  50,000 - 99,000 CFU/mL Gram positive organisms  --  Klebsiella pneumoniae      .Abscess left wound culture  12-04-23   Rare Pseudomonas aeruginosa  Moderate Methicillin Resistant Staphylococcus aureus  Rare Klebsiella pneumoniae  Moderate Bacteroides fragilis "Susceptibilities not performed"  --  Pseudomonas aeruginosa  Methicillin resistant Staphylococcus aureus      .Blood Blood-Peripheral  12-04-23   No growth at 48 Hours  --  --      .Blood Blood-Peripheral  12-04-23   No growth at 48 Hours  --  --          Rapid RVP Result: NotDetec (12-04 @ 17:20)        C-Reactive Protein, Serum: 84 (12-04)            SARS-CoV-2: NotDetec (12-04-23 @ 17:20)  Rapid RVP Result: NotDetec (12-04-23 @ 17:20)    SARS-CoV-2: NotDetec (04 Dec 2023 17:20)    RADIOLOGY:

## 2023-12-07 NOTE — DIETITIAN INITIAL EVALUATION ADULT - NSFNSGIIOFT_GEN_A_CORE
12-06-23 @ 07:01  -  12-07-23 @ 07:00  --------------------------------------------------------  OUT:    Colostomy (mL): 1 mL  Total OUT: 1 mL    Total NET: -1 mL

## 2023-12-07 NOTE — PROGRESS NOTE ADULT - SUBJECTIVE AND OBJECTIVE BOX
Patient is a 53y old  Male who presents with a chief complaint of Sepsis secondary to b/l foot wounds (05 Dec 2023 12:34)      OVERNIGHT EVENTS:  none  MEDICATIONS  (STANDING):  acetaminophen     Tablet .. 650 milliGRAM(s) Oral daily  amLODIPine   Tablet 2.5 milliGRAM(s) Oral daily  ascorbic acid 500 milliGRAM(s) Oral daily  atorvastatin 40 milliGRAM(s) Oral at bedtime  ceftazidime/avibactam IVPB 2.5 Gram(s) IV Intermittent every 8 hours  collagenase Ointment 1 Application(s) Topical daily  cyclobenzaprine 10 milliGRAM(s) Oral two times a day  divalproex  milliGRAM(s) Oral at bedtime  DULoxetine 30 milliGRAM(s) Oral daily  enoxaparin Injectable 40 milliGRAM(s) SubCutaneous every 24 hours  finasteride 5 milliGRAM(s) Oral daily  gabapentin 600 milliGRAM(s) Oral <User Schedule>  lactobacillus acidophilus 1 Tablet(s) Oral two times a day with meals  lidocaine   4% Patch 1 Patch Transdermal daily  methadone  Concentrate 110 milliGRAM(s) Oral daily  methylphenidate 5 milliGRAM(s) Oral <User Schedule>  multivitamin 1 Tablet(s) Oral daily  oxyCODONE    IR 5 milliGRAM(s) Oral <User Schedule>  pantoprazole    Tablet 40 milliGRAM(s) Oral before breakfast  polyethylene glycol 3350 17 Gram(s) Oral daily  QUEtiapine 400 milliGRAM(s) Oral at bedtime  QUEtiapine 100 milliGRAM(s) Oral <User Schedule>  senna 2 Tablet(s) Oral at bedtime  tamsulosin 0.4 milliGRAM(s) Oral at bedtime  thiamine 100 milliGRAM(s) Oral daily  vancomycin  IVPB 1250 milliGRAM(s) IV Intermittent every 12 hours  zinc sulfate 220 milliGRAM(s) Oral daily    MEDICATIONS  (PRN):  acetaminophen     Tablet .. 650 milliGRAM(s) Oral every 6 hours PRN Temp greater or equal to 38C (100.4F), Mild Pain (1 - 3)  albuterol    90 MICROgram(s) HFA Inhaler 2 Puff(s) Inhalation every 4 hours PRN Shortness of Breath and/or Wheezing  aluminum hydroxide/magnesium hydroxide/simethicone Suspension 30 milliLiter(s) Oral every 4 hours PRN Dyspepsia  HYDROmorphone  Injectable 1 milliGRAM(s) IV Push every 4 hours PRN Severe Pain (7 - 10)  melatonin 3 milliGRAM(s) Oral at bedtime PRN Insomnia  ondansetron Injectable 4 milliGRAM(s) IV Push every 8 hours PRN Nausea and/or Vomiting      Allergies    NSAIDs (Flushing; Other (Moderate))  Haldol (Anaphylaxis)  Zyprexa (Rash; Dystonic RXN; Hives)  Motrin (Anaphylaxis)  Thorazine (Other (Moderate))  Aleve (Unknown)  Stelazine (Unknown)  Risperdal (Short breath; Rash; Hives)    Intolerances        SUBJECTIVE: in bed in nad     Vital Signs Last 24 Hrs  T(C): 36.3 (23 @ 11:51), Max: 37.1 (23 @ 23:57)  T(F): 97.3 (23 @ 11:51), Max: 98.8 (23 @ 23:57)  HR: 86 (23 @ 11:51) (71 - 95)  BP: 109/66 (23 @ 11:51) (104/69 - 120/78)  BP(mean): --  RR: 17 (23 @ 11:51) (17 - 19)  SpO2: 95% (23 @ 11:51) (90% - 95%)        PHYSICAL EXAM:  GENERAL: NAD, well-groomed, well-developed  HEAD:  Atraumatic, Normocephalic  EYES: EOMI, PERRLA, conjunctiva and sclera clear  ENMT: No tonsillar erythema, exudates, or enlargement; Moist mucous membranes, Good dentition, No lesions  NECK: Supple,  CHEST/LUNG: Clear to  auscultation bilaterally; No rales, rhonchi, wheezing, or rubs  bilaterally  HEART: Regular rate and rhythm; No murmurs, rubs, or gallops  ABDOMEN: Soft, Nontender, Nondistended; Bowel sounds present rlq colostomy bag  EXTREMITIES:  2+ Peripheral Pulses, No clubbing, cyanosis, + edema BL LE especially left foot, extremely contracted lower extremity   SKIN: stage 3 right hip ulcer, right foot heel necrotic to bone , left foot dorsum had eschar         NERVOUS SYSTEM:  Alert & Oriented X3, Good concentration; functional quad.    LABS:                      PTT - ( 04 Dec 2023 17:20 )  PTT:33.4 sec  Urinalysis Basic - ( 04 Dec 2023 22:05 )    Color: Yellow / Appearance: Cloudy / S.021 / pH: x  Gluc: x / Ketone: Negative mg/dL  / Bili: Negative / Urobili: 1.0 mg/dL   Blood: x / Protein: 30 mg/dL / Nitrite: Positive   Leuk Esterase: Moderate / RBC: >50 /HPF / WBC 26-50 /HPF   Sq Epi: x / Non Sq Epi: x / Bacteria: Moderate /HPF      Cultures;   CAPILLARY BLOOD GLUCOSE      Culture - Urine (collected 04 Dec 2023 22:05)  Source: Clean Catch Clean Catch (Midstream)  Preliminary Report (07 Dec 2023 12:19):    >100,000 CFU/ml Klebsiella pneumoniae    50,000 - 99,000 CFU/mL Gram positive organisms  Organism: Klebsiella pneumoniae (07 Dec 2023 12:18)  Organism: Klebsiella pneumoniae (07 Dec 2023 12:18)    Culture - Abscess with Gram Stain (collected 04 Dec 2023 20:00)  Source: .Abscess right foot wound  Gram Stain (prelim) (06 Dec 2023 14:12):    No polymorphonuclear leukocytes seen per low power field    Rare Gram positive cocci in pairs seen per oil power field  Preliminary Report (06 Dec 2023 14:12):    Rare Proteus mirabilis    Few Corynebacterium species "Susceptibilities not performed"  Organism: Proteus mirabilis ESBL (07 Dec 2023 10:29)  Organism: Proteus mirabilis ESBL (07 Dec 2023 10:29)      Culture - Urine (collected 04 Dec 2023 22:05)  Source: Clean Catch Clean Catch (Midstream)  Preliminary Report (07 Dec 2023 12:19):    >100,000 CFU/ml Klebsiella pneumoniae    50,000 - 99,000 CFU/mL Gram positive organisms  Organism: Klebsiella pneumoniae (07 Dec 2023 12:18)  Organism: Klebsiella pneumoniae (07 Dec 2023 12:18)    Culture - Abscess with Gram Stain (collected 04 Dec 2023 20:00)  Source: .Abscess right foot wound  Gram Stain (prelim) (06 Dec 2023 14:12):    No polymorphonuclear leukocytes seen per low power field    Rare Gram positive cocci in pairs seen per oil power field  Preliminary Report (06 Dec 2023 14:12):    Rare Proteus mirabilis    Few Corynebacterium species "Susceptibilities not performed"  Organism: Proteus mirabilis ESBL (07 Dec 2023 10:29)  Organism: Proteus mirabilis ESBL (07 Dec 2023 10:29)    Culture - Abscess with Gram Stain (collected 04 Dec 2023 20:00)  Source: .Abscess left wound culture  Gram Stain (prelim) (06 Dec 2023 14:40):    No polymorphonuclear leukocytes seen per low power field    Rare Gram positive cocci in pairs seen per oil power field  Preliminary Report (07 Dec 2023 13:35):    Rare Pseudomonas aeruginosa    Moderate Methicillin Resistant Staphylococcus aureus    Rare Klebsiella pneumoniae    Moderate Bacteroides fragilis #2 "Susceptibilities not performed"  Organism: Pseudomonas aeruginosa  Methicillin resistant Staphylococcus aureus (07 Dec 2023 10:24)  Organism: Methicillin resistant Staphylococcus aureus (07 Dec 2023 10:24)  Organism: Pseudomonas aeruginosa (07 Dec 2023 10:23)    Culture - Blood (collected 04 Dec 2023 17:35)  Source: .Blood Blood-Peripheral  Preliminary Report (07 Dec 2023 01:02):    No growth at 48 Hours    Culture - Blood (collected 04 Dec 2023 17:20)  Source: .Blood Blood-Peripheral  Preliminary Report (07 Dec 2023 01:02):    No growth at 48 Hours        RADIOLOGY & ADDITIONAL TESTS:      Imaging Personally Reviewed:  [ x] YES      Consultant(s) Notes Reviewed:  [x ] YES     Care Discussed with [x ] Consultants [X ] Patient [x ] Family  [x ]    [x ]  Other; RN

## 2023-12-07 NOTE — PROGRESS NOTE ADULT - NS ATTEND AMEND GEN_ALL_CORE FT
All labs and cultures and imaging and pertinent chart notes reviewed by me.    case d/w Np Naty at length and agree with her assessment and plan.  Kush King MD  Infectious Disease Attending    for any questions please do not hesitate to contact me either via teams or by calling [phone] All labs and cultures and imaging and pertinent chart notes reviewed by me.    case d/w Np Naty at length and agree with her assessment and plan.  Kush King MD  Infectious Disease Attending    for any questions please do not hesitate to contact me either via teams or by calling [phone]

## 2023-12-07 NOTE — DIETITIAN INITIAL EVALUATION ADULT - OTHER INFO
Pt from long-term SNF where he was on regular diet + LPS 30 ml x 3/day (provides 300 kcal & 45 g protein).  Pt states mostly good appetite and good PO intake PTA; no known food allergies or restrictions. Continues with good appetite; consuming mostly 100% of meals during admission. Observed pt ate 100% of lunch today(12/07), and requested double portions and evening snack. Also amenable to nutrition supplement for additional nutrition to promote wound healing. Food preferences communicated to kitchen. Denies any chewing or swallowing difficulty. Denies any nausea or vomiting. Pt with ileostomy; with chronic constipation issues (noted on stool softener/laxative).  States ht 6'2" and  lbs. Pt from long-term SNF where he was on regular diet + LPS 30 ml x 3/day (provides 300 kcal & 45 g protein).  Pt states mostly good appetite and good PO intake PTA; no known food allergies or restrictions. Continues with good appetite; consuming mostly 100% of meals during admission. Observed pt ate 100% of lunch today(12/07); pt requested double portions, evening snack, as well as Ensure Plus High Protein supplement with each meal. Food preferences communicated to kitchen. Denies any chewing or swallowing difficulty. Denies any nausea or vomiting. Pt with ileostomy; with chronic constipation issues (noted on stool softener/laxative).  States ht 6'2" and  lbs.

## 2023-12-07 NOTE — DIETITIAN INITIAL EVALUATION ADULT - NSFNSPHYEXAMSKINFT_GEN_A_CORE
stage IV pressure ulcer; R heel as per flow sheets  stage IV pressure ulcer; R hip as per flow sheets  unstageable pressure ulcer; location not specified as per flow sheets

## 2023-12-07 NOTE — PROGRESS NOTE ADULT - ASSESSMENT
A/P-  53 year old male with PMHx of cervical epidural abscess (s/p decompressive laminectomy 3/2021 c/b b/l leg paralysis), hx of pneumoperitoneum (s/p ex lap, total abdominal colectomy and end ileostomy (on 1/12/23) c/b evisceration and sepsis with MRSA bacteremia postoperatively), hx of hepatitis C, hx of R. buttock abscess, hx of L. foot osteomyelitis, neurogenic bladder (with indwelling Holcomb catheter, last exchanged two days ago), HTN, HLD, bipolar disorder, GERD, hx of opioid dependence, and constipation who presented to the ED on 12/4/23 from [facility] for feet infection.    b/l feet infections and overlying cellulitis  Right heel wound infection to bone and as per xray c/w Om as well.  no report of any gas on either xray or Ct.  patient as per podiatry not a good surgical candidate and they recommend local wound care and antibiotics.  patient himself states he will not be able to do MRI.    b/l foot infection  Om of right heel wound  + leukocytosis  HCV  severe contractures of b/l LE  right foot wound cx- ESBL proteus - resistant to cefepime and corynebecaterium  left foot wound cx- MRSA and Pseudomonas   Blood cx- neg x 2  extensive chart search on HIE by the attending and based on his latest HCV VL result from 6/2023 detected vl but <1.08  also based on serology from 2017 was positive for hep b sAG and E ag and core IGm ab ( not sure if he was ever treated)  workup in progress   HIV ab/ag negative     plan-  Continue Vancomycin IV  monitor vancomycin levels, required  keep vanco trough <15  stop cefepime  start Avycaz IV  aggressive wound care needed.  HCV Vl , full hep b panel - pending  left shoulder vesicles vs blisters swabbed for VZV PCR - gave to RN to send to the lab, pt did not have shingles vaccine   eventually, the pt will need a picc line and six weeks of abx (Vancomycin and Avycaz)  pt has a hx of C. diff, the need for prophylax Vanco po is tbd, by attending     when ready for discharge:  place a picc line  continue Vancomycin IV 1250 mg q 12 hrs - last dose 1/17/2023  continue Avycaz IV 2.5 g q 8 hrs - last dose 1/17/2023  weekly lab work: cbc with diff, cmp, esr, crp, vancomycin trough - fax to Dr. King at [phone]  follow up with ID in the office:  [address] [place] [address] [place] [address] [phone]  remove picc line upon completion of the course of abx     discussed with Dr. Grijalva  discussed with the   discussed with RN A/P-  53 year old male with PMHx of cervical epidural abscess (s/p decompressive laminectomy 3/2021 c/b b/l leg paralysis), hx of pneumoperitoneum (s/p ex lap, total abdominal colectomy and end ileostomy (on 1/12/23) c/b evisceration and sepsis with MRSA bacteremia postoperatively), hx of hepatitis C, hx of R. buttock abscess, hx of L. foot osteomyelitis, neurogenic bladder (with indwelling Holcomb catheter, last exchanged two days ago), HTN, HLD, bipolar disorder, GERD, hx of opioid dependence, and constipation who presented to the ED on 12/4/23 from [facility] for feet infection.    b/l feet infections and overlying cellulitis  Right heel wound infection to bone and as per xray c/w Om as well.  no report of any gas on either xray or Ct.  patient as per podiatry not a good surgical candidate and they recommend local wound care and antibiotics.  patient himself states he will not be able to do MRI.    b/l foot infection  Om of right heel wound  + leukocytosis  HCV  severe contractures of b/l LE  right foot wound cx- ESBL proteus - resistant to cefepime and corynebecaterium  left foot wound cx- MRSA and Pseudomonas   Blood cx- neg x 2  extensive chart search on HIE by the attending and based on his latest HCV VL result from 6/2023 detected vl but <1.08  also based on serology from 2017 was positive for hep b sAG and E ag and core IGm ab ( not sure if he was ever treated)  workup in progress   HIV ab/ag negative     plan-  Continue Vancomycin IV  monitor vancomycin levels, required  keep vanco trough <15  stop cefepime  start Avycaz IV  aggressive wound care needed.  HCV Vl , full hep b panel - pending  left shoulder vesicles vs blisters swabbed for VZV PCR - gave to RN to send to the lab, pt did not have shingles vaccine   eventually, the pt will need a picc line and six weeks of abx (Vancomycin and Avycaz)  pt has a hx of C. diff, the need for prophylax Vanco po is tbd, by attending     when ready for discharge:  place a picc line  continue Vancomycin IV 1250 mg q 12 hrs - last dose 1/17/2023  continue Avycaz IV 2.5 g q 8 hrs - last dose 1/17/2023  weekly lab work: cbc with diff, cmp, esr, crp, vancomycin trough - fax to Dr. King at [phone]  follow up with ID in the office:  [address] [place] [address] [place] [address] [phone]  remove picc line upon completion of the course of abx     discussed with Dr. Grijalva  discussed with the   discussed with RN A/P-  53 year old male with PMHx of cervical epidural abscess (s/p decompressive laminectomy 3/2021 c/b b/l leg paralysis), hx of pneumoperitoneum (s/p ex lap, total abdominal colectomy and end ileostomy (on 1/12/23) c/b evisceration and sepsis with MRSA bacteremia postoperatively), hx of hepatitis C, hx of R. buttock abscess, hx of L. foot osteomyelitis, neurogenic bladder (with indwelling Holcomb catheter, last exchanged two days ago), HTN, HLD, bipolar disorder, GERD, hx of opioid dependence, and constipation who presented to the ED on 12/4/23 from [facility] for feet infection.    b/l feet infections and overlying cellulitis  Right heel wound infection to bone and as per xray c/w Om as well.  no report of any gas on either xray or Ct.  patient as per podiatry not a good surgical candidate and they recommend local wound care and antibiotics.  patient himself states he will not be able to do MRI.    b/l foot infection  Om of right heel wound  + leukocytosis  HCV  severe contractures of b/l LE  right foot wound cx- ESBL proteus - resistant to cefepime and corynebecaterium  left foot wound cx- MRSA and Pseudomonas   Blood cx- neg x 2  extensive chart search on HIE by the attending and based on his latest HCV VL result from 6/2023 detected vl but <1.08  also based on serology from 2017 was positive for hep b sAG and E ag and core IGm ab ( not sure if he was ever treated)  workup in progress   HIV ab/ag negative     plan-  Continue Vancomycin IV  monitor vancomycin levels, required  keep vanco trough <15  stop cefepime  start Avycaz IV ( because can't start carbapenem for ESBL  since patient is on depakote)  aggressive wound care needed.  HCV Vl , full hep b panel - pending  left shoulder vesicles vs blisters swabbed for VZV PCR - gave to RN to send to the lab, pt did not have shingles vaccine   eventually, the pt will need a picc line and six weeks of abx (Vancomycin and Avycaz)  pt has a hx of C. diff, the need for prophylax Vanco po is tbd, by attending     when ready for discharge:  place a picc line  continue Vancomycin IV 1250 mg q 12 hrs - last dose 1/17/2023  continue Avycaz IV 2.5 g q 8 hrs - last dose 1/17/2023  weekly lab work: cbc with diff, cmp, esr, crp, vancomycin trough - fax to Dr. King at [phone]  follow up with ID in the office:  [address] [place] [address] [address] [place], [place] [id number] [phone]  remove picc line upon completion of the course of abx     discussed with Dr. King  discussed with the   discussed with RN A/P-  53 year old male with PMHx of cervical epidural abscess (s/p decompressive laminectomy 3/2021 c/b b/l leg paralysis), hx of pneumoperitoneum (s/p ex lap, total abdominal colectomy and end ileostomy (on 1/12/23) c/b evisceration and sepsis with MRSA bacteremia postoperatively), hx of hepatitis C, hx of R. buttock abscess, hx of L. foot osteomyelitis, neurogenic bladder (with indwelling Holcomb catheter, last exchanged two days ago), HTN, HLD, bipolar disorder, GERD, hx of opioid dependence, and constipation who presented to the ED on 12/4/23 from [facility] for feet infection.    b/l feet infections and overlying cellulitis  Right heel wound infection to bone and as per xray c/w Om as well.  no report of any gas on either xray or Ct.  patient as per podiatry not a good surgical candidate and they recommend local wound care and antibiotics.  patient himself states he will not be able to do MRI.    b/l foot infection  Om of right heel wound  + leukocytosis  HCV  severe contractures of b/l LE  right foot wound cx- ESBL proteus - resistant to cefepime and corynebecaterium  left foot wound cx- MRSA and Pseudomonas   Blood cx- neg x 2  extensive chart search on HIE by the attending and based on his latest HCV VL result from 6/2023 detected vl but <1.08  also based on serology from 2017 was positive for hep b sAG and E ag and core IGm ab ( not sure if he was ever treated)  workup in progress   HIV ab/ag negative     plan-  Continue Vancomycin IV  monitor vancomycin levels, required  keep vanco trough <15  stop cefepime  start Avycaz IV ( because can't start carbapenem for ESBL  since patient is on depakote)  aggressive wound care needed.  HCV Vl , full hep b panel - pending  left shoulder vesicles vs blisters swabbed for VZV PCR - gave to RN to send to the lab, pt did not have shingles vaccine   eventually, the pt will need a picc line and six weeks of abx (Vancomycin and Avycaz)  pt has a hx of C. diff, the need for prophylax Vanco po is tbd, by attending     when ready for discharge:  place a picc line  continue Vancomycin IV 1250 mg q 12 hrs - last dose 1/17/2023  continue Avycaz IV 2.5 g q 8 hrs - last dose 1/17/2023  weekly lab work: cbc with diff, cmp, esr, crp, vancomycin trough - fax to Dr. King at [phone]  follow up with ID in the office:  [address] [place] [address] [address] [place], [place] [id number] [phone]  remove picc line upon completion of the course of abx     discussed with Dr. King  discussed with the   discussed with RN

## 2023-12-07 NOTE — DIETITIAN INITIAL EVALUATION ADULT - PERTINENT MEDS FT
MEDICATIONS  (STANDING):  acetaminophen     Tablet .. 650 milliGRAM(s) Oral daily  amLODIPine   Tablet 2.5 milliGRAM(s) Oral daily  ascorbic acid 500 milliGRAM(s) Oral daily  atorvastatin 40 milliGRAM(s) Oral at bedtime  ceftazidime/avibactam IVPB 2.5 Gram(s) IV Intermittent every 8 hours  collagenase Ointment 1 Application(s) Topical daily  cyclobenzaprine 10 milliGRAM(s) Oral two times a day  divalproex  milliGRAM(s) Oral at bedtime  DULoxetine 30 milliGRAM(s) Oral daily  enoxaparin Injectable 40 milliGRAM(s) SubCutaneous every 24 hours  finasteride 5 milliGRAM(s) Oral daily  gabapentin 600 milliGRAM(s) Oral <User Schedule>  lactobacillus acidophilus 1 Tablet(s) Oral two times a day with meals  lidocaine   4% Patch 1 Patch Transdermal daily  methadone  Concentrate 110 milliGRAM(s) Oral daily  methylphenidate 5 milliGRAM(s) Oral <User Schedule>  multivitamin 1 Tablet(s) Oral daily  oxyCODONE    IR 5 milliGRAM(s) Oral <User Schedule>  pantoprazole    Tablet 40 milliGRAM(s) Oral before breakfast  polyethylene glycol 3350 17 Gram(s) Oral daily  QUEtiapine 100 milliGRAM(s) Oral <User Schedule>  QUEtiapine 400 milliGRAM(s) Oral at bedtime  senna 2 Tablet(s) Oral at bedtime  tamsulosin 0.4 milliGRAM(s) Oral at bedtime  thiamine 100 milliGRAM(s) Oral daily  vancomycin  IVPB 1250 milliGRAM(s) IV Intermittent every 12 hours  zinc sulfate 220 milliGRAM(s) Oral daily    MEDICATIONS  (PRN):  acetaminophen     Tablet .. 650 milliGRAM(s) Oral every 6 hours PRN Temp greater or equal to 38C (100.4F), Mild Pain (1 - 3)  albuterol    90 MICROgram(s) HFA Inhaler 2 Puff(s) Inhalation every 4 hours PRN Shortness of Breath and/or Wheezing  aluminum hydroxide/magnesium hydroxide/simethicone Suspension 30 milliLiter(s) Oral every 4 hours PRN Dyspepsia  HYDROmorphone  Injectable 1 milliGRAM(s) IV Push every 4 hours PRN Severe Pain (7 - 10)  melatonin 3 milliGRAM(s) Oral at bedtime PRN Insomnia  ondansetron Injectable 4 milliGRAM(s) IV Push every 8 hours PRN Nausea and/or Vomiting

## 2023-12-07 NOTE — DIETITIAN NUTRITION RISK NOTIFICATION - TREATMENT: THE FOLLOWING DIET HAS BEEN RECOMMENDED
Diet, Regular:   Liquid Protein Supplement     Qty per Day:  1  Supplement Feeding Modality:  Oral  Ensure Plus High Protein Cans or Servings Per Day:  1       Frequency:  Daily (12-07-23 @ 14:48) [Pending Verification By Attending]  Diet, Regular (12-05-23 @ 02:33) [Active]       Regular diet + Ensure Plus High Protein x 3/day (1050 kcal & 60 g protein)

## 2023-12-07 NOTE — DIETITIAN INITIAL EVALUATION ADULT - CONTINUE CURRENT NUTRITION CARE PLAN
+ add Ensure Plus High Protein x 1/day (350 kcal & 20 g protein) + LPS x 1/day (100 kcal & 15 g protein)/yes + add Ensure Plus High Protein x 3/day (1050 kcal & 60 g protein) per pt request/yes

## 2023-12-07 NOTE — DIETITIAN INITIAL EVALUATION ADULT - PERTINENT LABORATORY DATA
12-07    146<H>  |  110<H>  |  16  ----------------------------<  81  3.4<L>   |  33<H>  |  0.67    Ca    8.5      07 Dec 2023 06:23  Phos  4.0     12-07  Mg     1.7     12-07    TPro  7.4  /  Alb  1.6<L>  /  TBili  0.2  /  DBili  x   /  AST  17  /  ALT  26  /  AlkPhos  156<H>  12-06  A1C with Estimated Average Glucose Result: 5.3 % (12-04-23 @ 17:20)

## 2023-12-07 NOTE — PROGRESS NOTE ADULT - ASSESSMENT
Gonzalez Stewart is a 53 year old male with PMHx of cervical epidural abscess (s/p decompressive laminectomy 3/2021 c/b b/l leg paralysis), hx of pneumoperitoneum (s/p ex lap, total abdominal colectomy and end ileostomy (on 1/12/23) c/b evisceration and sepsis with MRSA bacteremia postoperatively), hx of hepatitis C, hx of R. buttock abscess, hx of L. foot osteomyelitis, neurogenic bladder (with indwelling Holcomb catheter, last exchanged two days ago), HTN, HLD, bipolar disorder, GERD, hx of opioid dependence, and constipation who presented to the ED on 12/4/23 from [facility] for feet infection and admitted for sepsis secondary to bilateral feet infection.    Sepsis secondary to b/l feet infection  Less likely UTI given urine sample was not clean catch in pt with indwelling Holcomb and no urinary symptoms  Complaints of b/l feet infection intermittently over the past year  Previously treated for L. hallux OM with L. heel culture growing Proteus mirabilis ESBL, completed 6-week course of avycaz  Temp 101 and WBC 15.25K on admission  Lactic WNL, ESR 84  U/A (sample obtained from Holcomb catheter) with positive nitrites, moderate leuks, moderate blood, WBC 26-50, RBC > 50, moderate bacteria, squamous epithelial cells present  CT b/l LE with study is severely limited by contracture. Left lower extremity is only partially visualized with exclusion of the left foot. Skin induration and subcutaneous edema in the leg and ankle.  No soft tissue gas  S/p LR 2800 cc bolus, vancomycin, and zosyn in the ED  S/p bedside escharectomy by podiatry  Empirically started on vancomycin and cefepime; can de-escalate pending final culture data  Wound culture +GPC in pairs  F/u blood cultures, urine culture, CRP, MRSA/MSSA PCR  Please examine R. buttock as pt with hx of abscess in that area (unable to turn at the time of my examination due to severe pain)  Monitor WBC trend and fever curve  Pending vascular surgery evaluation, VITALY evaluated  Podiatry recommendations appreciated  12/5/2023 wound cx  done by podiatry -- shows rare gpv   blood cx -- pending   id consult will be obtained given his complex medical history  esbl   his contracted state prevents mri   will start parenteral pain meds as he chronic  oral oxycodone isnt helping. ( he received 4mg morphine in ed and another 2 mg morphine without relief )  12/6/2023- podiatry has reccs for no surgical intervention, vascular also provides no reccs for surgical intervention  12/7/2023 await final ID reccs as there appears to be no recommendations from either vascular or podiatry for surgical intervention    wound on feet growing mrsa, pseudomonas on ne cx and proteus on the other   as well Klebsiella in urine       Chronic medical conditions:  HTN: PTA amlodipine 2.5 mg   HLD: PTA atorvastatin 40 mg  Bipolar disorder: PTA quetiapine 100 mg BID and 400 mg qhs, valproic acid 750 mg ER qhs  Hx of opioid dependence: PTA methadone 110 mg   Chronic pain: PTA lidocaine 4% patch, duloxetine 30 mg DR, gabapentin 600 mg q8, cyclobenzaprine 10 mg BID, oxycodone 5 mg q6  GERD: PTA pantoprazole 40 mg DR  Neurogenic bladder (with indwelling Holcomb catheter): PTA finasteride 5 mg, tamsulosin 0.4 mg  Constipation: PTA senna 8.6 mg 2 tabs qhs   functional quad- supportive care    .  Gonzalez Stewart is a 53 year old male with PMHx of cervical epidural abscess (s/p decompressive laminectomy 3/2021 c/b b/l leg paralysis), hx of pneumoperitoneum (s/p ex lap, total abdominal colectomy and end ileostomy (on 1/12/23) c/b evisceration and sepsis with MRSA bacteremia postoperatively), hx of hepatitis C, hx of R. buttock abscess, hx of L. foot osteomyelitis, neurogenic bladder (with indwelling Holcomb catheter, last exchanged two days ago), HTN, HLD, bipolar disorder, GERD, hx of opioid dependence, and constipation who presented to the ED on 12/4/23 from [facility] for feet infection and admitted for sepsis secondary to bilateral feet infection.    Sepsis secondary to b/l feet infection  Less likely UTI given urine sample was not clean catch in pt with indwelling Holcomb and no urinary symptoms  Complaints of b/l feet infection intermittently over the past year  Previously treated for L. hallux OM with L. heel culture growing Proteus mirabilis ESBL, completed 6-week course of avycaz  Temp 101 and WBC 15.25K on admission  Lactic WNL, ESR 84  U/A (sample obtained from Holcomb catheter) with positive nitrites, moderate leuks, moderate blood, WBC 26-50, RBC > 50, moderate bacteria, squamous epithelial cells present  CT b/l LE with study is severely limited by contracture. Left lower extremity is only partially visualized with exclusion of the left foot. Skin induration and subcutaneous edema in the leg and ankle.  No soft tissue gas  S/p LR 2800 cc bolus, vancomycin, and zosyn in the ED  S/p bedside escharectomy by podiatry  Empirically started on vancomycin and cefepime; can de-escalate pending final culture data  Wound culture +GPC in pairs  F/u blood cultures, urine culture, CRP, MRSA/MSSA PCR  Please examine R. buttock as pt with hx of abscess in that area (unable to turn at the time of my examination due to severe pain)  Monitor WBC trend and fever curve  Pending vascular surgery evaluation, VITALY evaluated  Podiatry recommendations appreciated  12/5/2023 wound cx  done by podiatry -- shows rare gpv   blood cx -- pending   id consult will be obtained given his complex medical history  esbl   his contracted state prevents mri   will start parenteral pain meds as he chronic  oral oxycodone isnt helping. ( he received 4mg morphine in ed and another 2 mg morphine without relief )  12/6/2023- podiatry has reccs for no surgical intervention, vascular also provides no reccs for surgical intervention  12/7/2023 await final ID reccs as there appears to be no recommendations from either vascular or podiatry for surgical intervention    wound on feet growing mrsa, pseudomonas on ne cx and proteus on the other   as well Klebsiella in urine       Chronic medical conditions:  HTN: PTA amlodipine 2.5 mg   HLD: PTA atorvastatin 40 mg  Bipolar disorder: PTA quetiapine 100 mg BID and 400 mg qhs, valproic acid 750 mg ER qhs  Hx of opioid dependence: PTA methadone 110 mg   Chronic pain: PTA lidocaine 4% patch, duloxetine 30 mg DR, gabapentin 600 mg q8, cyclobenzaprine 10 mg BID, oxycodone 5 mg q6  GERD: PTA pantoprazole 40 mg DR  Neurogenic bladder (with indwelling Holcomb catheter): PTA finasteride 5 mg, tamsulosin 0.4 mg  Constipation: PTA senna 8.6 mg 2 tabs qhs   functional quad- supportive care    .

## 2023-12-07 NOTE — DIETITIAN INITIAL EVALUATION ADULT - ETIOLOGY
Inadequate protein-energy intake & increased needs r/t hx of back surgery c/b b/l leg paralysis, hx of pneumoperitoneum (s/p ex lap, colectomy, ileostomy) & PU

## 2023-12-08 LAB
-  AMOXICILLIN/CLAVULANIC ACID: SIGNIFICANT CHANGE UP
-  AMOXICILLIN/CLAVULANIC ACID: SIGNIFICANT CHANGE UP
-  AMPICILLIN/SULBACTAM: SIGNIFICANT CHANGE UP
-  AMPICILLIN/SULBACTAM: SIGNIFICANT CHANGE UP
-  AMPICILLIN: SIGNIFICANT CHANGE UP
-  AZTREONAM: SIGNIFICANT CHANGE UP
-  AZTREONAM: SIGNIFICANT CHANGE UP
-  CEFAZOLIN: SIGNIFICANT CHANGE UP
-  CEFAZOLIN: SIGNIFICANT CHANGE UP
-  CEFEPIME: SIGNIFICANT CHANGE UP
-  CEFEPIME: SIGNIFICANT CHANGE UP
-  CEFOXITIN: SIGNIFICANT CHANGE UP
-  CEFOXITIN: SIGNIFICANT CHANGE UP
-  CEFTRIAXONE: SIGNIFICANT CHANGE UP
-  CEFTRIAXONE: SIGNIFICANT CHANGE UP
-  CIPROFLOXACIN: SIGNIFICANT CHANGE UP
-  DAPTOMYCIN: SIGNIFICANT CHANGE UP
-  DAPTOMYCIN: SIGNIFICANT CHANGE UP
-  ERTAPENEM: SIGNIFICANT CHANGE UP
-  ERTAPENEM: SIGNIFICANT CHANGE UP
-  GENTAMICIN: SIGNIFICANT CHANGE UP
-  GENTAMICIN: SIGNIFICANT CHANGE UP
-  IMIPENEM: SIGNIFICANT CHANGE UP
-  IMIPENEM: SIGNIFICANT CHANGE UP
-  LEVOFLOXACIN: SIGNIFICANT CHANGE UP
-  LINEZOLID: SIGNIFICANT CHANGE UP
-  LINEZOLID: SIGNIFICANT CHANGE UP
-  MEROPENEM: SIGNIFICANT CHANGE UP
-  MEROPENEM: SIGNIFICANT CHANGE UP
-  NITROFURANTOIN: SIGNIFICANT CHANGE UP
-  NITROFURANTOIN: SIGNIFICANT CHANGE UP
-  PIPERACILLIN/TAZOBACTAM: SIGNIFICANT CHANGE UP
-  PIPERACILLIN/TAZOBACTAM: SIGNIFICANT CHANGE UP
-  TETRACYCLINE: SIGNIFICANT CHANGE UP
-  TETRACYCLINE: SIGNIFICANT CHANGE UP
-  TOBRAMYCIN: SIGNIFICANT CHANGE UP
-  TOBRAMYCIN: SIGNIFICANT CHANGE UP
-  TRIMETHOPRIM/SULFAMETHOXAZOLE: SIGNIFICANT CHANGE UP
-  TRIMETHOPRIM/SULFAMETHOXAZOLE: SIGNIFICANT CHANGE UP
-  VANCOMYCIN: SIGNIFICANT CHANGE UP
-  VANCOMYCIN: SIGNIFICANT CHANGE UP
ANION GAP SERPL CALC-SCNC: 2 MMOL/L — LOW (ref 5–17)
ANION GAP SERPL CALC-SCNC: 2 MMOL/L — LOW (ref 5–17)
BUN SERPL-MCNC: 13 MG/DL — SIGNIFICANT CHANGE UP (ref 7–23)
BUN SERPL-MCNC: 13 MG/DL — SIGNIFICANT CHANGE UP (ref 7–23)
CALCIUM SERPL-MCNC: 8.5 MG/DL — SIGNIFICANT CHANGE UP (ref 8.5–10.1)
CALCIUM SERPL-MCNC: 8.5 MG/DL — SIGNIFICANT CHANGE UP (ref 8.5–10.1)
CHLORIDE SERPL-SCNC: 108 MMOL/L — SIGNIFICANT CHANGE UP (ref 96–108)
CHLORIDE SERPL-SCNC: 108 MMOL/L — SIGNIFICANT CHANGE UP (ref 96–108)
CO2 SERPL-SCNC: 34 MMOL/L — HIGH (ref 22–31)
CO2 SERPL-SCNC: 34 MMOL/L — HIGH (ref 22–31)
CREAT SERPL-MCNC: 0.63 MG/DL — SIGNIFICANT CHANGE UP (ref 0.5–1.3)
CREAT SERPL-MCNC: 0.63 MG/DL — SIGNIFICANT CHANGE UP (ref 0.5–1.3)
CULTURE RESULTS: ABNORMAL
EGFR: 114 ML/MIN/1.73M2 — SIGNIFICANT CHANGE UP
EGFR: 114 ML/MIN/1.73M2 — SIGNIFICANT CHANGE UP
GLUCOSE SERPL-MCNC: 87 MG/DL — SIGNIFICANT CHANGE UP (ref 70–99)
GLUCOSE SERPL-MCNC: 87 MG/DL — SIGNIFICANT CHANGE UP (ref 70–99)
GRAM STN FLD: ABNORMAL
GRAM STN FLD: ABNORMAL
HBV CORE AB SER-ACNC: REACTIVE
HBV CORE AB SER-ACNC: REACTIVE
HBV CORE IGM SER-ACNC: SIGNIFICANT CHANGE UP
HBV CORE IGM SER-ACNC: SIGNIFICANT CHANGE UP
HBV SURFACE AB SER-ACNC: SIGNIFICANT CHANGE UP
HBV SURFACE AB SER-ACNC: SIGNIFICANT CHANGE UP
HBV SURFACE AG SER-ACNC: SIGNIFICANT CHANGE UP
HBV SURFACE AG SER-ACNC: SIGNIFICANT CHANGE UP
HCT VFR BLD CALC: 30.6 % — LOW (ref 39–50)
HCT VFR BLD CALC: 30.6 % — LOW (ref 39–50)
HGB BLD-MCNC: 9.1 G/DL — LOW (ref 13–17)
HGB BLD-MCNC: 9.1 G/DL — LOW (ref 13–17)
MAGNESIUM SERPL-MCNC: 1.9 MG/DL — SIGNIFICANT CHANGE UP (ref 1.6–2.6)
MAGNESIUM SERPL-MCNC: 1.9 MG/DL — SIGNIFICANT CHANGE UP (ref 1.6–2.6)
MCHC RBC-ENTMCNC: 26.8 PG — LOW (ref 27–34)
MCHC RBC-ENTMCNC: 26.8 PG — LOW (ref 27–34)
MCHC RBC-ENTMCNC: 29.7 G/DL — LOW (ref 32–36)
MCHC RBC-ENTMCNC: 29.7 G/DL — LOW (ref 32–36)
MCV RBC AUTO: 90 FL — SIGNIFICANT CHANGE UP (ref 80–100)
MCV RBC AUTO: 90 FL — SIGNIFICANT CHANGE UP (ref 80–100)
METHOD TYPE: SIGNIFICANT CHANGE UP
NRBC # BLD: 0 /100 WBCS — SIGNIFICANT CHANGE UP (ref 0–0)
NRBC # BLD: 0 /100 WBCS — SIGNIFICANT CHANGE UP (ref 0–0)
ORGANISM # SPEC MICROSCOPIC CNT: ABNORMAL
ORGANISM # SPEC MICROSCOPIC CNT: SIGNIFICANT CHANGE UP
PHOSPHATE SERPL-MCNC: 3.5 MG/DL — SIGNIFICANT CHANGE UP (ref 2.5–4.5)
PHOSPHATE SERPL-MCNC: 3.5 MG/DL — SIGNIFICANT CHANGE UP (ref 2.5–4.5)
PLATELET # BLD AUTO: 176 K/UL — SIGNIFICANT CHANGE UP (ref 150–400)
PLATELET # BLD AUTO: 176 K/UL — SIGNIFICANT CHANGE UP (ref 150–400)
POTASSIUM SERPL-MCNC: 3.7 MMOL/L — SIGNIFICANT CHANGE UP (ref 3.5–5.3)
POTASSIUM SERPL-MCNC: 3.7 MMOL/L — SIGNIFICANT CHANGE UP (ref 3.5–5.3)
POTASSIUM SERPL-SCNC: 3.7 MMOL/L — SIGNIFICANT CHANGE UP (ref 3.5–5.3)
POTASSIUM SERPL-SCNC: 3.7 MMOL/L — SIGNIFICANT CHANGE UP (ref 3.5–5.3)
RBC # BLD: 3.4 M/UL — LOW (ref 4.2–5.8)
RBC # BLD: 3.4 M/UL — LOW (ref 4.2–5.8)
RBC # FLD: 17 % — HIGH (ref 10.3–14.5)
RBC # FLD: 17 % — HIGH (ref 10.3–14.5)
SODIUM SERPL-SCNC: 144 MMOL/L — SIGNIFICANT CHANGE UP (ref 135–145)
SODIUM SERPL-SCNC: 144 MMOL/L — SIGNIFICANT CHANGE UP (ref 135–145)
SPECIMEN SOURCE: SIGNIFICANT CHANGE UP
VANCOMYCIN TROUGH SERPL-MCNC: 20.3 UG/ML — HIGH (ref 10–20)
VANCOMYCIN TROUGH SERPL-MCNC: 20.3 UG/ML — HIGH (ref 10–20)
WBC # BLD: 7.73 K/UL — SIGNIFICANT CHANGE UP (ref 3.8–10.5)
WBC # BLD: 7.73 K/UL — SIGNIFICANT CHANGE UP (ref 3.8–10.5)
WBC # FLD AUTO: 7.73 K/UL — SIGNIFICANT CHANGE UP (ref 3.8–10.5)
WBC # FLD AUTO: 7.73 K/UL — SIGNIFICANT CHANGE UP (ref 3.8–10.5)

## 2023-12-08 PROCEDURE — 93971 EXTREMITY STUDY: CPT | Mod: 26,LT

## 2023-12-08 PROCEDURE — 99232 SBSQ HOSP IP/OBS MODERATE 35: CPT

## 2023-12-08 PROCEDURE — 99222 1ST HOSP IP/OBS MODERATE 55: CPT

## 2023-12-08 RX ORDER — BACITRACIN ZINC 500 UNIT/G
1 OINTMENT IN PACKET (EA) TOPICAL DAILY
Refills: 0 | Status: DISCONTINUED | OUTPATIENT
Start: 2023-12-08 | End: 2024-01-03

## 2023-12-08 RX ORDER — VANCOMYCIN HCL 1 G
1000 VIAL (EA) INTRAVENOUS ONCE
Refills: 0 | Status: COMPLETED | OUTPATIENT
Start: 2023-12-08 | End: 2023-12-08

## 2023-12-08 RX ORDER — VANCOMYCIN HCL 1 G
1000 VIAL (EA) INTRAVENOUS EVERY 12 HOURS
Refills: 0 | Status: COMPLETED | OUTPATIENT
Start: 2023-12-08 | End: 2023-12-15

## 2023-12-08 RX ORDER — VANCOMYCIN HCL 1 G
VIAL (EA) INTRAVENOUS
Refills: 0 | Status: COMPLETED | OUTPATIENT
Start: 2023-12-08 | End: 2023-12-15

## 2023-12-08 RX ORDER — ERTAPENEM SODIUM 1 G/1
1000 INJECTION, POWDER, LYOPHILIZED, FOR SOLUTION INTRAMUSCULAR; INTRAVENOUS EVERY 24 HOURS
Refills: 0 | Status: DISCONTINUED | OUTPATIENT
Start: 2023-12-09 | End: 2023-12-13

## 2023-12-08 RX ADMIN — HYDROMORPHONE HYDROCHLORIDE 1 MILLIGRAM(S): 2 INJECTION INTRAMUSCULAR; INTRAVENOUS; SUBCUTANEOUS at 15:15

## 2023-12-08 RX ADMIN — Medication 100 MILLIGRAM(S): at 11:56

## 2023-12-08 RX ADMIN — HYDROMORPHONE HYDROCHLORIDE 1 MILLIGRAM(S): 2 INJECTION INTRAMUSCULAR; INTRAVENOUS; SUBCUTANEOUS at 15:45

## 2023-12-08 RX ADMIN — Medication 1 APPLICATION(S): at 11:57

## 2023-12-08 RX ADMIN — QUETIAPINE FUMARATE 100 MILLIGRAM(S): 200 TABLET, FILM COATED ORAL at 10:27

## 2023-12-08 RX ADMIN — Medication 1 TABLET(S): at 11:56

## 2023-12-08 RX ADMIN — OXYCODONE HYDROCHLORIDE 5 MILLIGRAM(S): 5 TABLET ORAL at 06:42

## 2023-12-08 RX ADMIN — DULOXETINE HYDROCHLORIDE 30 MILLIGRAM(S): 30 CAPSULE, DELAYED RELEASE ORAL at 11:56

## 2023-12-08 RX ADMIN — Medication 1 TABLET(S): at 08:30

## 2023-12-08 RX ADMIN — Medication 250 MILLIGRAM(S): at 10:22

## 2023-12-08 RX ADMIN — HYDROMORPHONE HYDROCHLORIDE 1 MILLIGRAM(S): 2 INJECTION INTRAMUSCULAR; INTRAVENOUS; SUBCUTANEOUS at 20:54

## 2023-12-08 RX ADMIN — OXYCODONE HYDROCHLORIDE 5 MILLIGRAM(S): 5 TABLET ORAL at 13:00

## 2023-12-08 RX ADMIN — SENNA PLUS 2 TABLET(S): 8.6 TABLET ORAL at 21:51

## 2023-12-08 RX ADMIN — GABAPENTIN 600 MILLIGRAM(S): 400 CAPSULE ORAL at 06:42

## 2023-12-08 RX ADMIN — ATORVASTATIN CALCIUM 40 MILLIGRAM(S): 80 TABLET, FILM COATED ORAL at 21:52

## 2023-12-08 RX ADMIN — QUETIAPINE FUMARATE 100 MILLIGRAM(S): 200 TABLET, FILM COATED ORAL at 17:20

## 2023-12-08 RX ADMIN — Medication 650 MILLIGRAM(S): at 11:55

## 2023-12-08 RX ADMIN — Medication 500 MILLIGRAM(S): at 11:55

## 2023-12-08 RX ADMIN — CYCLOBENZAPRINE HYDROCHLORIDE 10 MILLIGRAM(S): 10 TABLET, FILM COATED ORAL at 06:42

## 2023-12-08 RX ADMIN — PANTOPRAZOLE SODIUM 40 MILLIGRAM(S): 20 TABLET, DELAYED RELEASE ORAL at 08:31

## 2023-12-08 RX ADMIN — GABAPENTIN 600 MILLIGRAM(S): 400 CAPSULE ORAL at 21:52

## 2023-12-08 RX ADMIN — HYDROMORPHONE HYDROCHLORIDE 1 MILLIGRAM(S): 2 INJECTION INTRAMUSCULAR; INTRAVENOUS; SUBCUTANEOUS at 00:03

## 2023-12-08 RX ADMIN — OXYCODONE HYDROCHLORIDE 5 MILLIGRAM(S): 5 TABLET ORAL at 17:19

## 2023-12-08 RX ADMIN — FINASTERIDE 5 MILLIGRAM(S): 5 TABLET, FILM COATED ORAL at 11:56

## 2023-12-08 RX ADMIN — POLYETHYLENE GLYCOL 3350 17 GRAM(S): 17 POWDER, FOR SOLUTION ORAL at 11:57

## 2023-12-08 RX ADMIN — GABAPENTIN 600 MILLIGRAM(S): 400 CAPSULE ORAL at 15:15

## 2023-12-08 RX ADMIN — AMLODIPINE BESYLATE 2.5 MILLIGRAM(S): 2.5 TABLET ORAL at 06:42

## 2023-12-08 RX ADMIN — TAMSULOSIN HYDROCHLORIDE 0.4 MILLIGRAM(S): 0.4 CAPSULE ORAL at 21:59

## 2023-12-08 RX ADMIN — Medication 5 MILLIGRAM(S): at 10:22

## 2023-12-08 RX ADMIN — LIDOCAINE 1 PATCH: 4 CREAM TOPICAL at 00:15

## 2023-12-08 RX ADMIN — OXYCODONE HYDROCHLORIDE 5 MILLIGRAM(S): 5 TABLET ORAL at 00:09

## 2023-12-08 RX ADMIN — ALBUTEROL 2 PUFF(S): 90 AEROSOL, METERED ORAL at 21:07

## 2023-12-08 RX ADMIN — ZINC SULFATE TAB 220 MG (50 MG ZINC EQUIVALENT) 220 MILLIGRAM(S): 220 (50 ZN) TAB at 11:56

## 2023-12-08 RX ADMIN — OXYCODONE HYDROCHLORIDE 5 MILLIGRAM(S): 5 TABLET ORAL at 18:19

## 2023-12-08 RX ADMIN — LIDOCAINE 1 PATCH: 4 CREAM TOPICAL at 11:56

## 2023-12-08 RX ADMIN — Medication 250 MILLIGRAM(S): at 17:21

## 2023-12-08 RX ADMIN — CYCLOBENZAPRINE HYDROCHLORIDE 10 MILLIGRAM(S): 10 TABLET, FILM COATED ORAL at 17:20

## 2023-12-08 RX ADMIN — Medication 1 TABLET(S): at 17:20

## 2023-12-08 RX ADMIN — METHADONE HYDROCHLORIDE 110 MILLIGRAM(S): 40 TABLET ORAL at 11:54

## 2023-12-08 RX ADMIN — OXYCODONE HYDROCHLORIDE 5 MILLIGRAM(S): 5 TABLET ORAL at 11:56

## 2023-12-08 RX ADMIN — DIVALPROEX SODIUM 750 MILLIGRAM(S): 500 TABLET, DELAYED RELEASE ORAL at 21:52

## 2023-12-08 RX ADMIN — Medication 650 MILLIGRAM(S): at 12:55

## 2023-12-08 RX ADMIN — Medication 5 MILLIGRAM(S): at 21:50

## 2023-12-08 RX ADMIN — QUETIAPINE FUMARATE 400 MILLIGRAM(S): 200 TABLET, FILM COATED ORAL at 22:16

## 2023-12-08 NOTE — PROGRESS NOTE ADULT - SUBJECTIVE AND OBJECTIVE BOX
Patient is a 53y old  Male who presents with a chief complaint of Sepsis secondary to b/l foot wounds (05 Dec 2023 12:34)      OVERNIGHT EVENTS:  none      MEDICATIONS  (STANDING):  acetaminophen     Tablet .. 650 milliGRAM(s) Oral daily  amLODIPine   Tablet 2.5 milliGRAM(s) Oral daily  ascorbic acid 500 milliGRAM(s) Oral daily  atorvastatin 40 milliGRAM(s) Oral at bedtime  ceftazidime/avibactam IVPB 2.5 Gram(s) IV Intermittent every 8 hours  collagenase Ointment 1 Application(s) Topical daily  cyclobenzaprine 10 milliGRAM(s) Oral two times a day  dextrose 5% + sodium chloride 0.45%. 1000 milliLiter(s) (50 mL/Hr) IV Continuous <Continuous>  divalproex  milliGRAM(s) Oral at bedtime  DULoxetine 30 milliGRAM(s) Oral daily  enoxaparin Injectable 40 milliGRAM(s) SubCutaneous every 24 hours  finasteride 5 milliGRAM(s) Oral daily  gabapentin 600 milliGRAM(s) Oral <User Schedule>  lactobacillus acidophilus 1 Tablet(s) Oral two times a day with meals  lidocaine   4% Patch 1 Patch Transdermal daily  methadone  Concentrate 110 milliGRAM(s) Oral daily  methylphenidate 5 milliGRAM(s) Oral <User Schedule>  multivitamin 1 Tablet(s) Oral daily  oxyCODONE    IR 5 milliGRAM(s) Oral <User Schedule>  pantoprazole    Tablet 40 milliGRAM(s) Oral before breakfast  polyethylene glycol 3350 17 Gram(s) Oral daily  QUEtiapine 400 milliGRAM(s) Oral at bedtime  QUEtiapine 100 milliGRAM(s) Oral <User Schedule>  senna 2 Tablet(s) Oral at bedtime  tamsulosin 0.4 milliGRAM(s) Oral at bedtime  thiamine 100 milliGRAM(s) Oral daily  vancomycin  IVPB 1250 milliGRAM(s) IV Intermittent every 12 hours  zinc sulfate 220 milliGRAM(s) Oral daily    MEDICATIONS  (PRN):  acetaminophen     Tablet .. 650 milliGRAM(s) Oral every 6 hours PRN Temp greater or equal to 38C (100.4F), Mild Pain (1 - 3)  albuterol    90 MICROgram(s) HFA Inhaler 2 Puff(s) Inhalation every 4 hours PRN Shortness of Breath and/or Wheezing  aluminum hydroxide/magnesium hydroxide/simethicone Suspension 30 milliLiter(s) Oral every 4 hours PRN Dyspepsia  HYDROmorphone  Injectable 1 milliGRAM(s) IV Push every 4 hours PRN Severe Pain (7 - 10)  melatonin 3 milliGRAM(s) Oral at bedtime PRN Insomnia  ondansetron Injectable 4 milliGRAM(s) IV Push every 8 hours PRN Nausea and/or Vomiting      Allergies    NSAIDs (Flushing; Other (Moderate))  Haldol (Anaphylaxis)  Zyprexa (Rash; Dystonic RXN; Hives)  Motrin (Anaphylaxis)  Thorazine (Other (Moderate))  Aleve (Unknown)  Stelazine (Unknown)  Risperdal (Short breath; Rash; Hives)    Intolerances        SUBJECTIVE: in bed in nad   Vital Signs Last 24 Hrs  T(C): 36.8 (08 Dec 2023 05:31), Max: 37.5 (07 Dec 2023 17:25)  T(F): 98.2 (08 Dec 2023 05:31), Max: 99.5 (07 Dec 2023 17:25)  HR: 73 (08 Dec 2023 05:31) (73 - 86)  BP: 107/65 (08 Dec 2023 05:31) (107/53 - 109/66)  BP(mean): --  RR: 18 (08 Dec 2023 05:31) (17 - 18)  SpO2: 94% (08 Dec 2023 05:31) (94% - 95%)    Parameters below as of 07 Dec 2023 11:51  Patient On (Oxygen Delivery Method): nasal cannula          PHYSICAL EXAM:  GENERAL: NAD, well-groomed, well-developed  HEAD:  Atraumatic, Normocephalic  EYES: EOMI, PERRLA, conjunctiva and sclera clear  ENMT: No tonsillar erythema, exudates, or enlargement; Moist mucous membranes, Good dentition, No lesions  NECK: Supple,  CHEST/LUNG: Clear to  auscultation bilaterally; No rales, rhonchi, wheezing, or rubs  bilaterally  HEART: Regular rate and rhythm; No murmurs, rubs, or gallops  ABDOMEN: Soft, Nontender, Nondistended; Bowel sounds present rlq colostomy bag  EXTREMITIES:  2+ Peripheral Pulses, No clubbing, cyanosis, + edema BL LE especially left foot, extremely contracted lower extremity   SKIN: stage 3 right hip ulcer, right foot heel necrotic to bone , left foot dorsum had eschar         NERVOUS SYSTEM:  Alert & Oriented X3, Good concentration; functional quad.    LABS:                      PTT - ( 04 Dec 2023 17:20 )  PTT:33.4 sec  Urinalysis Basic - ( 04 Dec 2023 22:05 )    Color: Yellow / Appearance: Cloudy / S.021 / pH: x  Gluc: x / Ketone: Negative mg/dL  / Bili: Negative / Urobili: 1.0 mg/dL   Blood: x / Protein: 30 mg/dL / Nitrite: Positive   Leuk Esterase: Moderate / RBC: >50 /HPF / WBC 26-50 /HPF   Sq Epi: x / Non Sq Epi: x / Bacteria: Moderate /HPF      Cultures;   CAPILLARY BLOOD GLUCOSE      Culture - Urine (collected 04 Dec 2023 22:05)  Source: Clean Catch Clean Catch (Midstream)  Preliminary Report (07 Dec 2023 12:19):    >100,000 CFU/ml Klebsiella pneumoniae    50,000 - 99,000 CFU/mL Gram positive organisms  Organism: Klebsiella pneumoniae (07 Dec 2023 12:18)  Organism: Klebsiella pneumoniae (07 Dec 2023 12:18)    Culture - Abscess with Gram Stain (collected 04 Dec 2023 20:00)  Source: .Abscess right foot wound  Gram Stain (prelim) (06 Dec 2023 14:12):    No polymorphonuclear leukocytes seen per low power field    Rare Gram positive cocci in pairs seen per oil power field  Preliminary Report (06 Dec 2023 14:12):    Rare Proteus mirabilis    Few Corynebacterium species "Susceptibilities not performed"  Organism: Proteus mirabilis ESBL (07 Dec 2023 10:29)  Organism: Proteus mirabilis ESBL (07 Dec 2023 10:29)      Culture - Urine (collected 04 Dec 2023 22:05)  Source: Clean Catch Clean Catch (Midstream)  Preliminary Report (07 Dec 2023 12:19):    >100,000 CFU/ml Klebsiella pneumoniae    50,000 - 99,000 CFU/mL Gram positive organisms  Organism: Klebsiella pneumoniae (07 Dec 2023 12:18)  Organism: Klebsiella pneumoniae (07 Dec 2023 12:18)    Culture - Abscess with Gram Stain (collected 04 Dec 2023 20:00)  Source: .Abscess right foot wound  Gram Stain (prelim) (06 Dec 2023 14:12):    No polymorphonuclear leukocytes seen per low power field    Rare Gram positive cocci in pairs seen per oil power field  Preliminary Report (06 Dec 2023 14:12):    Rare Proteus mirabilis    Few Corynebacterium species "Susceptibilities not performed"  Organism: Proteus mirabilis ESBL (07 Dec 2023 10:29)  Organism: Proteus mirabilis ESBL (07 Dec 2023 10:29)    Culture - Abscess with Gram Stain (collected 04 Dec 2023 20:00)  Source: .Abscess left wound culture  Gram Stain (prelim) (06 Dec 2023 14:40):    No polymorphonuclear leukocytes seen per low power field    Rare Gram positive cocci in pairs seen per oil power field  Preliminary Report (07 Dec 2023 13:35):    Rare Pseudomonas aeruginosa    Moderate Methicillin Resistant Staphylococcus aureus    Rare Klebsiella pneumoniae    Moderate Bacteroides fragilis #2 "Susceptibilities not performed"  Organism: Pseudomonas aeruginosa  Methicillin resistant Staphylococcus aureus (07 Dec 2023 10:24)  Organism: Methicillin resistant Staphylococcus aureus (07 Dec 2023 10:24)  Organism: Pseudomonas aeruginosa (07 Dec 2023 10:23)    Culture - Blood (collected 04 Dec 2023 17:35)  Source: .Blood Blood-Peripheral  Preliminary Report (07 Dec 2023 01:02):    No growth at 48 Hours    Culture - Blood (collected 04 Dec 2023 17:20)  Source: .Blood Blood-Peripheral  Preliminary Report (07 Dec 2023 01:02):    No growth at 48 Hours        RADIOLOGY & ADDITIONAL TESTS:      Imaging Personally Reviewed:  [ x] YES      Consultant(s) Notes Reviewed:  [x ] YES     Care Discussed with [x ] Consultants [X ] Patient [x ] Family  [x ]    [x ]  Other; RN

## 2023-12-08 NOTE — BH CONSULTATION LIAISON ASSESSMENT NOTE - OTHER PAST PSYCHIATRIC HISTORY (INCLUDE DETAILS REGARDING ONSET, COURSE OF ILLNESS, INPATIENT/OUTPATIENT TREATMENT)
Past meds include Buspar, Atarax, Klonopin, Reports psych hospitalizations began in 1988, multiple, most recently in [place] in 2014. Admits breaks objects when angry. Unclear past suicide attempts (details changed and unknown - OD, cutting and hanging attempt? unclear if made during states of intox). Relevant Past Substance Use History Long hx of suboxone treatment (see ISTOP).  Past meds include Buspar, Atarax, Klonopin, Reports psych hospitalizations began in 1988, multiple, most recently in [place] in 2014. Admits breaks objects when angry. Unclear past suicide attempts (details changed and unknown - OD, cutting and hanging attempt? unclear if made during states of intox). Relevant Past Substance Use History Long hx of suboxone treatment (see ISTOP).

## 2023-12-08 NOTE — BH CONSULTATION LIAISON ASSESSMENT NOTE - CURRENT MEDICATION
MEDICATIONS  (STANDING):  acetaminophen     Tablet .. 650 milliGRAM(s) Oral daily  amLODIPine   Tablet 2.5 milliGRAM(s) Oral daily  ascorbic acid 500 milliGRAM(s) Oral daily  atorvastatin 40 milliGRAM(s) Oral at bedtime  ceftazidime/avibactam IVPB 2.5 Gram(s) IV Intermittent every 8 hours  collagenase Ointment 1 Application(s) Topical daily  cyclobenzaprine 10 milliGRAM(s) Oral two times a day  dextrose 5% + sodium chloride 0.45%. 1000 milliLiter(s) (50 mL/Hr) IV Continuous <Continuous>  divalproex  milliGRAM(s) Oral at bedtime  DULoxetine 30 milliGRAM(s) Oral daily  enoxaparin Injectable 40 milliGRAM(s) SubCutaneous every 24 hours  finasteride 5 milliGRAM(s) Oral daily  gabapentin 600 milliGRAM(s) Oral <User Schedule>  lactobacillus acidophilus 1 Tablet(s) Oral two times a day with meals  lidocaine   4% Patch 1 Patch Transdermal daily  methadone  Concentrate 110 milliGRAM(s) Oral daily  methylphenidate 5 milliGRAM(s) Oral <User Schedule>  multivitamin 1 Tablet(s) Oral daily  oxyCODONE    IR 5 milliGRAM(s) Oral <User Schedule>  pantoprazole    Tablet 40 milliGRAM(s) Oral before breakfast  polyethylene glycol 3350 17 Gram(s) Oral daily  QUEtiapine 400 milliGRAM(s) Oral at bedtime  QUEtiapine 100 milliGRAM(s) Oral <User Schedule>  senna 2 Tablet(s) Oral at bedtime  tamsulosin 0.4 milliGRAM(s) Oral at bedtime  thiamine 100 milliGRAM(s) Oral daily  vancomycin  IVPB      vancomycin  IVPB 1000 milliGRAM(s) IV Intermittent every 12 hours  zinc sulfate 220 milliGRAM(s) Oral daily    MEDICATIONS  (PRN):  acetaminophen     Tablet .. 650 milliGRAM(s) Oral every 6 hours PRN Temp greater or equal to 38C (100.4F), Mild Pain (1 - 3)  albuterol    90 MICROgram(s) HFA Inhaler 2 Puff(s) Inhalation every 4 hours PRN Shortness of Breath and/or Wheezing  aluminum hydroxide/magnesium hydroxide/simethicone Suspension 30 milliLiter(s) Oral every 4 hours PRN Dyspepsia  HYDROmorphone  Injectable 1 milliGRAM(s) IV Push every 4 hours PRN Severe Pain (7 - 10)  melatonin 3 milliGRAM(s) Oral at bedtime PRN Insomnia  ondansetron Injectable 4 milliGRAM(s) IV Push every 8 hours PRN Nausea and/or Vomiting

## 2023-12-08 NOTE — BH CONSULTATION LIAISON ASSESSMENT NOTE - NSBHCHARTREVIEWVS_PSY_A_CORE FT
Vital Signs Last 24 Hrs  T(C): 37.4 (08 Dec 2023 12:01), Max: 37.5 (07 Dec 2023 17:25)  T(F): 99.4 (08 Dec 2023 12:01), Max: 99.5 (07 Dec 2023 17:25)  HR: 84 (08 Dec 2023 12:01) (73 - 86)  BP: 109/71 (08 Dec 2023 12:01) (107/53 - 109/71)  BP(mean): --  RR: 18 (08 Dec 2023 12:01) (18 - 18)  SpO2: 92% (08 Dec 2023 12:01) (92% - 94%)    Parameters below as of 08 Dec 2023 12:01  Patient On (Oxygen Delivery Method): nasal cannula

## 2023-12-08 NOTE — BH CONSULTATION LIAISON ASSESSMENT NOTE - HPI (INCLUDE ILLNESS QUALITY, SEVERITY, DURATION, TIMING, CONTEXT, MODIFYING FACTORS, ASSOCIATED SIGNS AND SYMPTOMS)
54 yo M, single, used to be  / wife  in , noncaregiver, domiciled in a nursing home/SNF, bed bound with bilateral legs contractured after the cervical surgery years ago, hx of Opiate dependence on current agonist therapy, IVDU / Hep B and C positive; + Holcomb, PMH of sciatica, chronic back pain, RA, CAD with CAD s/p balloon angioplasty, right hip ulcer is stage 4, 3x3x2 cms, hx of MI, COPD, neuropathy, Hep B, Hep C, cholecystitis, admitted as per EMS " coming from [facility] with multiple wounds, sent by Wound Specialist for wound check."      ISTOP Reference #: [id number]  2023	oxycodone hcl (ir) 5 mg tablet	120	30	[place], Eric D	[id number]	Cash	[facility]  A	N	Y	S	2023	methylphenidate 5 mg tablet	60	30	Owen, Eric D	[id number]  A	N	N	O	2023	oxycodone hcl (ir) 5 mg tablet	12	3	Owen, Eric D	[id number]	Cash  A	N	N	S	10/16/2023	10/16/2023	methylphenidate 5 mg tablet	60	30	Owen, Eric D	[id number]	N	N	O	10/16/2023	10/16/2023	oxycodone hcl (ir) 5 mg tablet	120	30	Owen, Eric D	[id number]	Cash  A	N	N	S	2023	methylphenidate 5 mg tablet	60	30	[place]ampion, Eric D	[id number]	  A	N	N	O	2023	oxycodone hcl (ir) 5 mg tablet	120	30	Owen, Eric D	[id number]  A	N	N	S	08/15/2023	08/15/2023	methylphenidate 5 mg tablet	60	30	Owen, Eric D	[id number] 54 yo M, single, used to be  / wife  in , noncaregiver, domiciled in a nursing home/SNF, bed bound with bilateral legs contractured after the cervical surgery years ago, hx of Opiate dependence on current agonist therapy, IVDU / Hep B and C positive; + Holcomb, PMH of sciatica, chronic back pain, RA, CAD with CAD s/p balloon angioplasty, right hip ulcer is stage 4, 3x3x2 cms, hx of MI, COPD, neuropathy, Hep B, Hep C, cholecystitis, admitted as per EMS " coming from [facility] with multiple wounds, sent by Wound Specialist for wound check."      ISTOP Reference #: [id number]  2023	oxycodone hcl (ir) 5 mg tablet	120	30	[place], Eric D	[id number]	Cash	[facility]  A	N	Y	S	2023	methylphenidate 5 mg tablet	60	30	Owen, Eric D	[id number]  A	N	N	O	2023	oxycodone hcl (ir) 5 mg tablet	12	3	Owen, Eric D	[id number]	Cash  A	N	N	S	10/16/2023	10/16/2023	methylphenidate 5 mg tablet	60	30	Owen, Eric D	[id number]	N	N	O	10/16/2023	10/16/2023	oxycodone hcl (ir) 5 mg tablet	120	30	Owen, Eric D	[id number]	Cash  A	N	N	S	2023	methylphenidate 5 mg tablet	60	30	[place], Eric D	[id number]	  A	N	N	O	2023	oxycodone hcl (ir) 5 mg tablet	120	30	Owen, Eric D	[id number]  A	N	N	S	08/15/2023	08/15/2023	methylphenidate 5 mg tablet	60	30	Owen, Eric D	[id number] 54 yo M, single, used to be  / wife  in , noncaregiver, domiciled in a nursing home/SNF, bed bound with bilateral legs contractured after the cervical surgery years ago, hx of Opiate dependence on current agonist therapy, IVDU / Hep B and C positive; + Holcomb, PMH of sciatica, chronic back pain, RA, CAD with CAD s/p balloon angioplasty, right hip ulcer is stage 4, 3x3x2 cms, hx of MI, COPD, neuropathy, Hep B, Hep C, cholecystitis, admitted as per EMS " coming from [facility] with multiple wounds, sent by Wound Specialist for wound check."      EXAM: calm, cooperative, extensive LE contractures noted. Patient states that he was making progress and was learning how ot walk until his insurance coverage for PT ran out, had to wait 6 months for it to reactive during which time he did not receive any PT services and he regressed and developed contractures. At this point, he is seeking amputation as his feet continue to get infected and he is in/out of hospitals. Otherwise, endorses stable mood with baseline demoralized feelings given his health/physical debility. Denies and does not manifest any symptoms of hypomania/milly/psychosis/major depression/ anxiety/panic. Denies any active or passive suicidal or homicidal ideation; had fleeing thoughts but no intent or plan. Names protective factors (kala; hope for future).       ISTOP Reference #: [id number]  2023	oxycodone hcl (ir) 5 mg tablet	120	30	[place], Eric SPARKS	[id number]	Cash	[facility]  A	N	Y	S	2023	methylphenidate 5 mg tablet	60	30	[place], Eric SPARKS	[id number]  A	N	N	O	2023	oxycodone hcl (ir) 5 mg tablet	12	3	Owen, Eric SPARKS	[id number]	Cash  A	N	N	S	10/16/2023	10/16/2023	methylphenidate 5 mg tablet	60	30	Owen, Eric SPARKS	[id number]	N	N	O	10/16/2023	10/16/2023	oxycodone hcl (ir) 5 mg tablet	120	30	[place], Eric SPARKS	[id number]	Cash  A	N	N	S	2023	methylphenidate 5 mg tablet	60	30	OwenEric andre	[id number]	  A	N	N	O	2023	oxycodone hcl (ir) 5 mg tablet	120	30	[place]	[id number]  A	N	N	S	08/15/2023	08/15/2023	methylphenidate 5 mg tablet	60	30	OwenEric mcghee	[id number] 54 yo M, single, used to be  / wife  in , noncaregiver, domiciled in a nursing home/SNF, bed bound with bilateral legs contractured after the cervical surgery years ago, hx of Opiate dependence on current agonist therapy, IVDU / Hep B and C positive; + Holcomb, PMH of sciatica, chronic back pain, RA, CAD with CAD s/p balloon angioplasty, right hip ulcer is stage 4, 3x3x2 cms, hx of MI, COPD, neuropathy, Hep B, Hep C, cholecystitis, admitted as per EMS " coming from [facility] with multiple wounds, sent by Wound Specialist for wound check."      EXAM: calm, cooperative, extensive LE contractures noted. Patient states that he was making progress and was learning how ot walk until his insurance coverage for PT ran out, had to wait 6 months for it to reactive during which time he did not receive any PT services and he regressed and developed contractures. At this point, he is seeking amputation as his feet continue to get infected and he is in/out of hospitals. Otherwise, endorses stable mood with baseline demoralized feelings given his health/physical debility. Denies and does not manifest any symptoms of hypomania/milly/psychosis/major depression/ anxiety/panic. Denies any active or passive suicidal or homicidal ideation; had fleeing thoughts but no intent or plan. Names protective factors (kala; hope for future).       ISTOP Reference #: [id number]  2023	oxycodone hcl (ir) 5 mg tablet	120	30	[place], Eric SPARKS	[id number]	Cash	[facility]  A	N	Y	S	2023	methylphenidate 5 mg tablet	60	30	[place], Eric SPARKS	[id number]  A	N	N	O	2023	oxycodone hcl (ir) 5 mg tablet	12	3	Owen, Eric SPARKS	[id number]	Cash  A	N	N	S	10/16/2023	10/16/2023	methylphenidate 5 mg tablet	60	30	Owen, Eric SPARKS	[id number]	N	N	O	10/16/2023	10/16/2023	oxycodone hcl (ir) 5 mg tablet	120	30	[place], Eric SPARKS	[id number]	Cash  A	N	N	S	2023	methylphenidate 5 mg tablet	60	30	OwenEric anrde	[id number]	  A	N	N	O	2023	oxycodone hcl (ir) 5 mg tablet	120	30	[place]	[id number]  A	N	N	S	08/15/2023	08/15/2023	methylphenidate 5 mg tablet	60	30	OwenEric mcghee	[id number]

## 2023-12-08 NOTE — PROGRESS NOTE ADULT - ASSESSMENT
Gonzalez Stewart is a 53 year old male with PMHx of cervical epidural abscess (s/p decompressive laminectomy 3/2021 c/b b/l leg paralysis), hx of pneumoperitoneum (s/p ex lap, total abdominal colectomy and end ileostomy (on 1/12/23) c/b evisceration and sepsis with MRSA bacteremia postoperatively), hx of hepatitis C, hx of R. buttock abscess, hx of L. foot osteomyelitis, neurogenic bladder (with indwelling Holcomb catheter, last exchanged two days ago), HTN, HLD, bipolar disorder, GERD, hx of opioid dependence, and constipation who presented to the ED on 12/4/23 from [facility] for feet infection and admitted for sepsis secondary to bilateral feet infection.    Sepsis secondary to b/l feet infection  Less likely UTI given urine sample was not clean catch in pt with indwelling Holcomb and no urinary symptoms  Complaints of b/l feet infection intermittently over the past year  Previously treated for L. hallux OM with L. heel culture growing Proteus mirabilis ESBL, completed 6-week course of avycaz  Temp 101 and WBC 15.25K on admission  Lactic WNL, ESR 84  U/A (sample obtained from Holcomb catheter) with positive nitrites, moderate leuks, moderate blood, WBC 26-50, RBC > 50, moderate bacteria, squamous epithelial cells present  CT b/l LE with study is severely limited by contracture. Left lower extremity is only partially visualized with exclusion of the left foot. Skin induration and subcutaneous edema in the leg and ankle.  No soft tissue gas  S/p LR 2800 cc bolus, vancomycin, and zosyn in the ED  S/p bedside escharectomy by podiatry  Empirically started on vancomycin and cefepime; can de-escalate pending final culture data  Wound culture +GPC in pairs  F/u blood cultures, urine culture, CRP, MRSA/MSSA PCR  Please examine R. buttock as pt with hx of abscess in that area (unable to turn at the time of my examination due to severe pain)  Monitor WBC trend and fever curve  Pending vascular surgery evaluation, VITALY evaluated  Podiatry recommendations appreciated  12/5/2023 wound cx  done by podiatry -- shows rare gpv   blood cx -- pending   id consult will be obtained given his complex medical history  esbl   his contracted state prevents mri   will start parenteral pain meds as he chronic  oral oxycodone isnt helping. ( he received 4mg morphine in ed and another 2 mg morphine without relief )  12/6/2023- podiatry has reccs for no surgical intervention, vascular also provides no reccs for surgical intervention  12/7/2023 await final ID reccs as there appears to be no recommendations from either vascular or podiatry for surgical intervention    wound on feet growing mrsa, pseudomonas on ne cx and proteus on the other   as well Klebsiella in urine   12/8/2023 d/w ID on 12/7/2023 and she stated she wants to assess pt on today before deciding on PICC line insertion   I will also d/w SW to investigate if pt can return to facility with the antibx as they  are very expensive       Chronic medical conditions:  HTN: PTA amlodipine 2.5 mg   HLD: PTA atorvastatin 40 mg  Bipolar disorder: PTA quetiapine 100 mg BID and 400 mg qhs, valproic acid 750 mg ER qhs  Hx of opioid dependence: PTA methadone 110 mg   Chronic pain: PTA lidocaine 4% patch, duloxetine 30 mg DR, gabapentin 600 mg q8, cyclobenzaprine 10 mg BID, oxycodone 5 mg q6  GERD: PTA pantoprazole 40 mg DR  Neurogenic bladder (with indwelling Holcomb catheter): PTA finasteride 5 mg, tamsulosin 0.4 mg  Constipation: PTA senna 8.6 mg 2 tabs qhs   functional quad- supportive care    .  Gonzalez Stewart is a 53 year old male with PMHx of cervical epidural abscess (s/p decompressive laminectomy 3/2021 c/b b/l leg paralysis), hx of pneumoperitoneum (s/p ex lap, total abdominal colectomy and end ileostomy (on 1/12/23) c/b evisceration and sepsis with MRSA bacteremia postoperatively), hx of hepatitis C, hx of R. buttock abscess, hx of L. foot osteomyelitis, neurogenic bladder (with indwelling Holcomb catheter, last exchanged two days ago), HTN, HLD, bipolar disorder, GERD, hx of opioid dependence, and constipation who presented to the ED on 12/4/23 from [facility] for feet infection and admitted for sepsis secondary to bilateral feet infection.    Sepsis secondary to b/l feet infection  Less likely UTI given urine sample was not clean catch in pt with indwelling Holcomb and no urinary symptoms  Complaints of b/l feet infection intermittently over the past year  Previously treated for L. hallux OM with L. heel culture growing Proteus mirabilis ESBL, completed 6-week course of avycaz  Temp 101 and WBC 15.25K on admission  Lactic WNL, ESR 84  U/A (sample obtained from Holcomb catheter) with positive nitrites, moderate leuks, moderate blood, WBC 26-50, RBC > 50, moderate bacteria, squamous epithelial cells present  CT b/l LE with study is severely limited by contracture. Left lower extremity is only partially visualized with exclusion of the left foot. Skin induration and subcutaneous edema in the leg and ankle.  No soft tissue gas  S/p LR 2800 cc bolus, vancomycin, and zosyn in the ED  S/p bedside escharectomy by podiatry  Empirically started on vancomycin and cefepime; can de-escalate pending final culture data  Wound culture +GPC in pairs  F/u blood cultures, urine culture, CRP, MRSA/MSSA PCR  Please examine R. buttock as pt with hx of abscess in that area (unable to turn at the time of my examination due to severe pain)  Monitor WBC trend and fever curve  Pending vascular surgery evaluation, VITALY evaluated  Podiatry recommendations appreciated  12/5/2023 wound cx  done by podiatry -- shows rare gpv   blood cx -- pending   id consult will be obtained given his complex medical history  esbl   his contracted state prevents mri   will start parenteral pain meds as he chronic  oral oxycodone isnt helping. ( he received 4mg morphine in ed and another 2 mg morphine without relief )  12/6/2023- podiatry has reccs for no surgical intervention, vascular also provides no reccs for surgical intervention  12/7/2023 await final ID reccs as there appears to be no recommendations from either vascular or podiatry for surgical intervention    wound on feet growing mrsa, pseudomonas on ne cx and proteus on the other   as well Klebsiella in urine   12/8/2023 d/w ID on 12/7/2023 and she stated she wants to assess pt on today before deciding on PICC line insertion   I will also d/w SW to investigate if pt can return to facility with the antibx as they  are very expensive       Chronic medical conditions:  HTN: PTA amlodipine 2.5 mg   HLD: PTA atorvastatin 40 mg  Bipolar disorder: PTA quetiapine 100 mg BID and 400 mg qhs, valproic acid 750 mg ER qhs  Hx of opioid dependence: PTA methadone 110 mg   Chronic pain: PTA lidocaine 4% patch, duloxetine 30 mg DR, gabapentin 600 mg q8, cyclobenzaprine 10 mg BID, oxycodone 5 mg q6  GERD: PTA pantoprazole 40 mg DR  Neurogenic bladder (with indwelling Holcomb catheter): PTA finasteride 5 mg, tamsulosin 0.4 mg  Constipation: PTA senna 8.6 mg 2 tabs qhs   functional quad- supportive care    .

## 2023-12-08 NOTE — PROGRESS NOTE ADULT - SUBJECTIVE AND OBJECTIVE BOX
STAN VEGA  MRN-[id number]    Follow Up:  OM, b/l feet wounds    Interval History: the pt was seen and examined earlier, not in acute distress, awake and alert, appropriate, pt refused exam of his wounds, states that his dressings were changed this morning and does not want his feet to be unwrapped at the moment. Pt is afebrile, no leukocytosis.     PAST MEDICAL & SURGICAL HISTORY:  CAD (coronary artery disease)      HTN (hypertension)      HLD (hyperlipidemia)      Neurogenic bladder      Bipolar disorder      S/P ileostomy      Indwelling Holcomb catheter present      Chronic hepatitis C virus infection      S/P laminectomy      S/P ileostomy          ROS:    [ ] Unobtainable because:  [ x] All other systems negative    Constitutional: no fever, no chills  Head: no trauma  Eyes: no vision changes, no eye pain  ENT:  no sore throat, no rhinorrhea  Cardiovascular:  no chest pain, no palpitation  Respiratory:  no SOB, no cough  GI:  no abd pain, no vomiting, no diarrhea  urinary: no dysuria, no hematuria, no flank pain  musculoskeletal:  contracted   skin:  b/l feet wounds   neurology:  no headache, no seizure, no change in mental status  psych: no anxiety, no depression         Allergies  NSAIDs (Flushing; Other (Moderate))  Haldol (Anaphylaxis)  Zyprexa (Rash; Dystonic RXN; Hives)  Motrin (Anaphylaxis)  Thorazine (Other (Moderate))  Aleve (Unknown)  Stelazine (Unknown)  Risperdal (Short breath; Rash; Hives)        ANTIMICROBIALS:  ceftazidime/avibactam IVPB 2.5 every 8 hours  vancomycin  IVPB    vancomycin  IVPB 1000 every 12 hours      OTHER MEDS:  acetaminophen     Tablet .. 650 milliGRAM(s) Oral daily  acetaminophen     Tablet .. 650 milliGRAM(s) Oral every 6 hours PRN  albuterol    90 MICROgram(s) HFA Inhaler 2 Puff(s) Inhalation every 4 hours PRN  aluminum hydroxide/magnesium hydroxide/simethicone Suspension 30 milliLiter(s) Oral every 4 hours PRN  amLODIPine   Tablet 2.5 milliGRAM(s) Oral daily  ascorbic acid 500 milliGRAM(s) Oral daily  atorvastatin 40 milliGRAM(s) Oral at bedtime  collagenase Ointment 1 Application(s) Topical daily  cyclobenzaprine 10 milliGRAM(s) Oral two times a day  dextrose 5% + sodium chloride 0.45%. 1000 milliLiter(s) IV Continuous <Continuous>  divalproex  milliGRAM(s) Oral at bedtime  DULoxetine 30 milliGRAM(s) Oral daily  enoxaparin Injectable 40 milliGRAM(s) SubCutaneous every 24 hours  finasteride 5 milliGRAM(s) Oral daily  gabapentin 600 milliGRAM(s) Oral <User Schedule>  HYDROmorphone  Injectable 1 milliGRAM(s) IV Push every 4 hours PRN  lactobacillus acidophilus 1 Tablet(s) Oral two times a day with meals  lidocaine   4% Patch 1 Patch Transdermal daily  melatonin 3 milliGRAM(s) Oral at bedtime PRN  methadone  Concentrate 110 milliGRAM(s) Oral daily  methylphenidate 5 milliGRAM(s) Oral <User Schedule>  multivitamin 1 Tablet(s) Oral daily  ondansetron Injectable 4 milliGRAM(s) IV Push every 8 hours PRN  oxyCODONE    IR 5 milliGRAM(s) Oral <User Schedule>  pantoprazole    Tablet 40 milliGRAM(s) Oral before breakfast  polyethylene glycol 3350 17 Gram(s) Oral daily  QUEtiapine 100 milliGRAM(s) Oral <User Schedule>  QUEtiapine 400 milliGRAM(s) Oral at bedtime  senna 2 Tablet(s) Oral at bedtime  tamsulosin 0.4 milliGRAM(s) Oral at bedtime  thiamine 100 milliGRAM(s) Oral daily  zinc sulfate 220 milliGRAM(s) Oral daily      Vital Signs Last 24 Hrs  T(C): 37.4 (08 Dec 2023 12:01), Max: 37.5 (07 Dec 2023 17:25)  T(F): 99.4 (08 Dec 2023 12:01), Max: 99.5 (07 Dec 2023 17:25)  HR: 84 (08 Dec 2023 12:01) (73 - 86)  BP: 109/71 (08 Dec 2023 12:01) (107/53 - 109/71)  BP(mean): --  RR: 18 (08 Dec 2023 12:01) (18 - 18)  SpO2: 92% (08 Dec 2023 12:01) (92% - 94%)    Parameters below as of 08 Dec 2023 12:01  Patient On (Oxygen Delivery Method): nasal cannula        Physical Exam:  General:    NAD, non toxic, RA  Head: atraumatic, normocephalic  Eyes: normal sclera and conjunctiva  ENT:   no oropharyngeal lesions, poor dentition, no LAD, neck supple  Cardio:    regular S1,S2, no murmur  Respiratory:   clear to auscultation b/l, no wheezing  abd:   soft, BS +, not tender, no distention, midline scar healed, right sided colostomy in place   :     no CVAT, no suprapubic tenderness,   Musculoskeletal : severely contracted LE b/l, no noted joint swelling   Skin:   b/l feet dressed, c/d/i - pt refused dressing change, was done earlier today,  left shoulder small blisters vs fluid filled vesicles   vascular:  normal pulses  Neurologic:     no focal deficits  psych: normal affect    WBC Count: 7.73 K/uL (12-08 @ 08:20)  WBC Count: 9.00 K/uL (12-07 @ 06:23)  WBC Count: 9.95 K/uL (12-06 @ 06:30)  WBC Count: 15.25 K/uL (12-04 @ 17:20)                            9.1    7.73  )-----------( 176      ( 08 Dec 2023 08:20 )             30.6       12-08    144  |  108  |  13  ----------------------------<  87  3.7   |  34<H>  |  0.63    Ca    8.5      08 Dec 2023 08:20  Phos  3.5     12-08  Mg     1.9     12-08        Urinalysis Basic - ( 08 Dec 2023 08:20 )    Color: x / Appearance: x / SG: x / pH: x  Gluc: 87 mg/dL / Ketone: x  / Bili: x / Urobili: x   Blood: x / Protein: x / Nitrite: x   Leuk Esterase: x / RBC: x / WBC x   Sq Epi: x / Non Sq Epi: x / Bacteria: x        Creatinine Trend: 0.63<--, 0.67<--, 0.89<--, 0.67<--      MICROBIOLOGY:  Vancomycin Level, Trough: 20.3 ug/mL (12-08-23 @ 08:50)  v  Clean Catch Clean Catch (Midstream)  12-04-23   >100,000 CFU/ml Klebsiella pneumoniae  50,000 - 99,000 CFU/mL Enterococcus faecalis (vancomycin resistant)  --  Klebsiella pneumoniae  Enterococcus faecalis (vancomycin resistant)      .Abscess left wound culture  12-04-23   Rare Pseudomonas aeruginosa  Moderate Methicillin Resistant Staphylococcus aureus  Rare Klebsiella pneumoniae  Moderate Bacteroides fragilis "Susceptibilities not performed"  --  Pseudomonas aeruginosa  Methicillin resistant Staphylococcus aureus  Klebsiella pneumoniae      .Blood Blood-Peripheral  12-04-23   No growth at 72 Hours  --  --      .Blood Blood-Peripheral  12-04-23   No growth at 72 Hours  --  --          Rapid RVP Result: NotDetec (12-04 @ 17:20)        C-Reactive Protein, Serum: 84 (12-04)            SARS-CoV-2: NotDetec (12-04-23 @ 17:20)  Rapid RVP Result: NotDetec (12-04-23 @ 17:20)    SARS-CoV-2: NotDetec (04 Dec 2023 17:20)    RADIOLOGY:     STAN VEGA  MRN-[id number]    Follow Up:  OM, b/l feet wounds    Interval History: the pt was seen and examined earlier, not in acute distress, awake and alert, appropriate, pt refused exam of his wounds, states that his dressings were changed this morning and does not want his feet to be unwrapped at the moment. Pt is afebrile, no leukocytosis.     PAST MEDICAL & SURGICAL HISTORY:  CAD (coronary artery disease)      HTN (hypertension)      HLD (hyperlipidemia)      Neurogenic bladder      Bipolar disorder      S/P ileostomy      Indwelling Holcomb catheter present      Chronic hepatitis C virus infection      S/P laminectomy      S/P ileostomy          ROS:    [ ] Unobtainable because:  [ x] All other systems negative    Constitutional: no fever, no chills  Head: no trauma  Eyes: no vision changes, no eye pain  ENT:  no sore throat, no rhinorrhea  Cardiovascular:  no chest pain, no palpitation  Respiratory:  no SOB, no cough  GI:  no abd pain, no vomiting, no diarrhea  urinary: no dysuria, no hematuria, no flank pain  musculoskeletal:  contracted   skin:  b/l feet wounds   neurology:  no headache, no seizure, no change in mental status  psych: no anxiety, no depression         Allergies  NSAIDs (Flushing; Other (Moderate))  Haldol (Anaphylaxis)  Zyprexa (Rash; Dystonic RXN; Hives)  Motrin (Anaphylaxis)  Thorazine (Other (Moderate))  Aleve (Unknown)  Stelazine (Unknown)  Risperdal (Short breath; Rash; Hives)        ANTIMICROBIALS:  ceftazidime/avibactam IVPB 2.5 every 8 hours  vancomycin  IVPB    vancomycin  IVPB 1000 every 12 hours      OTHER MEDS:  acetaminophen     Tablet .. 650 milliGRAM(s) Oral daily  acetaminophen     Tablet .. 650 milliGRAM(s) Oral every 6 hours PRN  albuterol    90 MICROgram(s) HFA Inhaler 2 Puff(s) Inhalation every 4 hours PRN  aluminum hydroxide/magnesium hydroxide/simethicone Suspension 30 milliLiter(s) Oral every 4 hours PRN  amLODIPine   Tablet 2.5 milliGRAM(s) Oral daily  ascorbic acid 500 milliGRAM(s) Oral daily  atorvastatin 40 milliGRAM(s) Oral at bedtime  collagenase Ointment 1 Application(s) Topical daily  cyclobenzaprine 10 milliGRAM(s) Oral two times a day  dextrose 5% + sodium chloride 0.45%. 1000 milliLiter(s) IV Continuous <Continuous>  divalproex  milliGRAM(s) Oral at bedtime  DULoxetine 30 milliGRAM(s) Oral daily  enoxaparin Injectable 40 milliGRAM(s) SubCutaneous every 24 hours  finasteride 5 milliGRAM(s) Oral daily  gabapentin 600 milliGRAM(s) Oral <User Schedule>  HYDROmorphone  Injectable 1 milliGRAM(s) IV Push every 4 hours PRN  lactobacillus acidophilus 1 Tablet(s) Oral two times a day with meals  lidocaine   4% Patch 1 Patch Transdermal daily  melatonin 3 milliGRAM(s) Oral at bedtime PRN  methadone  Concentrate 110 milliGRAM(s) Oral daily  methylphenidate 5 milliGRAM(s) Oral <User Schedule>  multivitamin 1 Tablet(s) Oral daily  ondansetron Injectable 4 milliGRAM(s) IV Push every 8 hours PRN  oxyCODONE    IR 5 milliGRAM(s) Oral <User Schedule>  pantoprazole    Tablet 40 milliGRAM(s) Oral before breakfast  polyethylene glycol 3350 17 Gram(s) Oral daily  QUEtiapine 100 milliGRAM(s) Oral <User Schedule>  QUEtiapine 400 milliGRAM(s) Oral at bedtime  senna 2 Tablet(s) Oral at bedtime  tamsulosin 0.4 milliGRAM(s) Oral at bedtime  thiamine 100 milliGRAM(s) Oral daily  zinc sulfate 220 milliGRAM(s) Oral daily      Vital Signs Last 24 Hrs  T(C): 37.4 (08 Dec 2023 12:01), Max: 37.5 (07 Dec 2023 17:25)  T(F): 99.4 (08 Dec 2023 12:01), Max: 99.5 (07 Dec 2023 17:25)  HR: 84 (08 Dec 2023 12:01) (73 - 86)  BP: 109/71 (08 Dec 2023 12:01) (107/53 - 109/71)  BP(mean): --  RR: 18 (08 Dec 2023 12:01) (18 - 18)  SpO2: 92% (08 Dec 2023 12:01) (92% - 94%)    Parameters below as of 08 Dec 2023 12:01  Patient On (Oxygen Delivery Method): nasal cannula        Physical Exam:  General:    NAD, non toxic, RA  Head: atraumatic, normocephalic  Eyes: normal sclera and conjunctiva  ENT:   no oropharyngeal lesions, poor dentition, no LAD, neck supple  Cardio:    regular S1,S2, no murmur  Respiratory:   clear to auscultation b/l, no wheezing  abd:   soft, BS +, not tender, no distention, midline scar healed, right sided colostomy in place   :     no CVAT, no suprapubic tenderness,   Musculoskeletal : severely contracted LE b/l, no noted joint swelling   Skin:   b/l feet dressed, c/d/i - pt refused dressing change, was done earlier today,  left shoulder small blisters vs fluid filled vesicles   vascular:  normal pulses  Neurologic:     no focal deficits  psych: normal affect    WBC Count: 7.73 K/uL (12-08 @ 08:20)  WBC Count: 9.00 K/uL (12-07 @ 06:23)  WBC Count: 9.95 K/uL (12-06 @ 06:30)  WBC Count: 15.25 K/uL (12-04 @ 17:20)                            9.1    7.73  )-----------( 176      ( 08 Dec 2023 08:20 )             30.6       12-08    144  |  108  |  13  ----------------------------<  87  3.7   |  34<H>  |  0.63    Ca    8.5      08 Dec 2023 08:20  Phos  3.5     12-08  Mg     1.9     12-08        Urinalysis Basic - ( 08 Dec 2023 08:20 )    Color: x / Appearance: x / SG: x / pH: x  Gluc: 87 mg/dL / Ketone: x  / Bili: x / Urobili: x   Blood: x / Protein: x / Nitrite: x   Leuk Esterase: x / RBC: x / WBC x   Sq Epi: x / Non Sq Epi: x / Bacteria: x        Creatinine Trend: 0.63<--, 0.67<--, 0.89<--, 0.67<--      MICROBIOLOGY:  Vancomycin Level, Trough: 20.3 ug/mL (12-08-23 @ 08:50)  v  Clean Catch Clean Catch (Midstream)  12-04-23   >100,000 CFU/ml Klebsiella pneumoniae  50,000 - 99,000 CFU/mL Enterococcus faecalis (vancomycin resistant)  --  Klebsiella pneumoniae  Enterococcus faecalis (vancomycin resistant)      .Abscess left wound culture  12-04-23   Rare Pseudomonas aeruginosa  Moderate Methicillin Resistant Staphylococcus aureus  Rare Klebsiella pneumoniae  Moderate Bacteroides fragilis "Susceptibilities not performed"  --  Pseudomonas aeruginosa  Methicillin resistant Staphylococcus aureus  Klebsiella pneumoniae      .Blood Blood-Peripheral  12-04-23   No growth at 72 Hours  --  --      .Blood Blood-Peripheral  12-04-23   No growth at 72 Hours  --  --          Rapid RVP Result: NotDetec (12-04 @ 17:20)        C-Reactive Protein, Serum: 84 (12-04)            SARS-CoV-2: NotDetec (12-04-23 @ 17:20)  Rapid RVP Result: NotDetec (12-04-23 @ 17:20)    SARS-CoV-2: NotDetec (04 Dec 2023 17:20)    RADIOLOGY:

## 2023-12-08 NOTE — BH CONSULTATION LIAISON ASSESSMENT NOTE - NSBHCHARTREVIEWINVESTIGATE_PSY_A_CORE FT
CT Lower Extremity w/ IV Cont, Bilateral (12.04.23 @ 23:00) Posterolateral decubitus wound is present at the level of the greater trochanter without appreciated abscess or gas. Left proximal tibia to the foot. Soft tissue wounds are present along the calcaneus and midfoot; erosive change of the 1st interphalangeal joint of   indeterminate age.

## 2023-12-08 NOTE — BH CONSULTATION LIAISON ASSESSMENT NOTE - NSBHCONSULTRECOMMENDOTHER_PSY_A_CORE FT
- as per chart review, Bipolar 2 less likely to be present in this case; more likely to be Substance induced mood disorder + personality traits thus depakote is not a indispensable medication in this case. Can reduce to 500mg PO qhs to adjust for drug-drug interaction/P450 system with IV antibiotics   - continue methadone 110mg Po qd as it is Pt's outpatient agonist maintenance therapy   - Patient has capacity to make his medical decision  - as per chart review, Bipolar 2 less likely to be present in this case; more likely to be Substance induced mood disorder + personality traits thus depakote is not a indispensable medication in this case. Can reduce to 500mg PO qhs with goal to taper down/off as it is not clinically indicated and will ease the general systemic pill burden on Patient who is already on numerous agents   - Patient has NO seizure history; depakote was used exclusively by psychiatry in this case and started years prior for mood lability secondary to substance misuse. DOES NOT need it (or any other AEDs)  at this time.   - continue methadone 110mg Po qd as it is Pt's outpatient agonist maintenance therapy   - Patient has capacity to make his medical decision

## 2023-12-08 NOTE — BH CONSULTATION LIAISON ASSESSMENT NOTE - NSBHCHARTREVIEWLAB_PSY_A_CORE FT
12-08    144  |  108  |  13  ----------------------------<  87  3.7   |  34<H>  |  0.63    Ca    8.5      08 Dec 2023 08:20  Phos  3.5     12-08  Mg     1.9     12-08

## 2023-12-08 NOTE — PROGRESS NOTE ADULT - NUTRITIONAL ASSESSMENT
This patient has been assessed with a concern for Malnutrition and has been determined to have a diagnosis/diagnoses of Moderate protein-calorie malnutrition.    This patient is being managed with:   Diet Regular-  Supplement Feeding Modality:  Oral  Ensure Plus High Protein Cans or Servings Per Day:  1       Frequency:  Three Times a day  Entered: Dec  7 2023  3:20PM    Diet Regular-  Liquid Protein Supplement     Qty per Day:  1  Supplement Feeding Modality:  Oral  Ensure Plus High Protein Cans or Servings Per Day:  1       Frequency:  Daily  Entered: Dec  7 2023  2:48PM    Diet Regular-  Entered: Dec  5 2023  2:33AM    The following pending diet order is being considered for treatment of Moderate protein-calorie malnutrition:null

## 2023-12-08 NOTE — PROGRESS NOTE ADULT - NS ATTEND AMEND GEN_ALL_CORE FT
All labs and cultures and imaging and pertinent chart notes reviewed by me.    case d/w Np Naty and made some ammendments as below.    had discussion with  and Dr. Byers psychistrist seeing patient here as per  patient does not have seizure history and the depakote is for mood stabilization and hence patient can be placed on ertapenem for his ESBL rather than avycaz ( which is not covered by his facility).  advised that since carbapenems decrease the level of depakote ( cytochorome P450) if patient needs the depakote would advise to most likeley have dose increased while on the ertapenem.  will defer dose of depakote adjustment to psychiatrist.    since there is no  history of seizure ( as per psych) can start ertapenem for 4 weeks for ESBL for presumed OM of right foot heel treatment along with 4 weeks of vanco for MRSA in the wound cx as well.  d/c avycaz.      also  as for his Hep B and HEp c both viral loads are undetectable.  his hep B serology c/w chronic infection in past not new and VL undetectable .    hep c VL -undetectable ( was treated for this as per his outpatient docs and seems to have cured )    HIV ab/ag- Negative    patient needs close follow up with podiatry as outpatient and since  he lives at group home his outpatient labs need to be followed by the primary or ID doctor that he has there .  patient has ID doctor from before.    Kush King MD  Infectious Disease Attending    PLease note  is covering ID service this weekend and if any questions he can be reached either via teams or by calling [phone] All labs and cultures and imaging and pertinent chart notes reviewed by me.    case d/w Np Naty and made some ammendments as below.    had discussion with  and Dr. Byers psychistrist seeing patient here as per  patient does not have seizure history and the depakote is for mood stabilization and hence patient can be placed on ertapenem for his ESBL rather than avycaz ( which is not covered by his facility).  advised that since carbapenems decrease the level of depakote ( cytochorome P450) if patient needs the depakote would advise to most likeley have dose increased while on the ertapenem.  will defer dose of depakote adjustment to psychiatrist.    since there is no  history of seizure ( as per psych) can start ertapenem for 4 weeks for ESBL for presumed OM of right foot heel treatment along with 4 weeks of vanco for MRSA in the wound cx as well.  d/c avycaz.      also  as for his Hep B and HEp c both viral loads are undetectable.  his hep B serology c/w chronic infection in past not new and VL undetectable .    hep c VL -undetectable ( was treated for this as per his outpatient docs and seems to have cured )    HIV ab/ag- Negative    patient needs close follow up with podiatry as outpatient and since  he lives at group home his outpatient labs need to be followed by the primary or ID doctor that he has there .  patient has ID doctor from before.    Kush King MD  Infectious Disease Attending    PLease note  is covering ID service this weekend and if any questions he can be reached either via teams or by calling [phone]

## 2023-12-08 NOTE — BH CONSULTATION LIAISON ASSESSMENT NOTE - SUMMARY
no acute mood symptoms. main issue continues to be medical issues causing chronic pain, baseline physical debility / loss of independent functioning, impacted quality to life at an early age.

## 2023-12-08 NOTE — BH CONSULTATION LIAISON ASSESSMENT NOTE - NSBHCONSULTFOLLOWAFTERCARE_PSY_A_CORE FT
set to return to [facility]  set to return to [facility]

## 2023-12-08 NOTE — BH CONSULTATION LIAISON ASSESSMENT NOTE - RISK ASSESSMENT
Chronic risk factors: single, male gender, hx of extensive substance abuse with medical sequela, chronic pain, physical debility / impacted quality of life; suspected personality disorder traits. Protective factors: age < 65yrs, lives in supervised residence where his needs are met; no known hx of suicide attempts; no self-injurious behavior; no hx of aggression/violence; no legal issues; motivated for help; articulate; strong family support; access to health services. No acute risk factors identified

## 2023-12-08 NOTE — PROGRESS NOTE ADULT - ASSESSMENT
A/P-  53 year old male with PMHx of cervical epidural abscess (s/p decompressive laminectomy 3/2021 c/b b/l leg paralysis), hx of pneumoperitoneum (s/p ex lap, total abdominal colectomy and end ileostomy (on 1/12/23) c/b evisceration and sepsis with MRSA bacteremia postoperatively), hx of hepatitis C, hx of R. buttock abscess, hx of L. foot osteomyelitis, neurogenic bladder (with indwelling Holcomb catheter, last exchanged two days ago), HTN, HLD, bipolar disorder, GERD, hx of opioid dependence, and constipation who presented to the ED on 12/4/23 from [facility] for feet infection.    b/l feet infections and overlying cellulitis  Right heel wound infection to bone and as per xray c/w Om as well.  no report of any gas on either xray or Ct.  patient as per podiatry not a good surgical candidate and they recommend local wound care and antibiotics.  patient himself states he will not be able to do MRI.    b/l foot infection  Om of right heel wound  + leukocytosis - improved   HCV  severe contractures of b/l LE  right foot wound cx- ESBL proteus - resistant to cefepime and corynebecaterium  left foot wound cx- MRSA and Pseudomonas   Blood cx- neg x 2  extensive chart search on HIE by the attending and based on his latest HCV VL result from 6/2023 detected vl but <1.08  also based on serology from 2017 was positive for hep b sAG and E ag and core IGm ab ( not sure if he was ever treated)  workup in progress   HIV ab/ag negative   UC - VRE, Klebsiella     plan-  Continue Vancomycin IV  monitor vancomycin levels, required - Vanco level is 20.3 this morning, dose adjusted today  keep vanco trough <15  continue Avycaz IV ( because can't start carbapenem for ESBL  since patient is on depakote)  aggressive wound care needed.  HCV Vl , full hep b panel - reviewed, Hep B immune   swab VZV PCR - pending   eventually, the pt will need a picc line and six weeks of abx (Vancomycin and Avycaz)  pt has a hx of C. diff, the need for prophylax Vanco po is tbd, by attending   ok to place a picc line  NOTE: if Avycaz is not covered by insurance, please obtain neuro consult and discuss medication modification for Depakote. Then will change Avycaz to carbapenem according to cx sensitivity.     when ready for discharge:  place a picc line  continue Vancomycin IV 1250 mg q 12 hrs - date of end of the course is tbd  continue Avycaz IV 2.5 g q 8 hrs - date of end of the course is tbd  weekly lab work: cbc with diff, cmp, esr, crp, vancomycin trough - please fax to pt's ID specialist  follow up with ID in the office - pt has ID specialist   remove picc line upon completion of the course of abx     discussed with Dr. King A/P-  53 year old male with PMHx of cervical epidural abscess (s/p decompressive laminectomy 3/2021 c/b b/l leg paralysis), hx of pneumoperitoneum (s/p ex lap, total abdominal colectomy and end ileostomy (on 1/12/23) c/b evisceration and sepsis with MRSA bacteremia postoperatively), hx of hepatitis C, hx of R. buttock abscess, hx of L. foot osteomyelitis, neurogenic bladder (with indwelling Holcomb catheter, last exchanged two days ago), HTN, HLD, bipolar disorder, GERD, hx of opioid dependence, and constipation who presented to the ED on 12/4/23 from [facility] for feet infection.    b/l feet infections and overlying cellulitis  Right heel wound infection to bone and as per xray c/w Om as well.  no report of any gas on either xray or Ct.  patient as per podiatry not a good surgical candidate and they recommend local wound care and antibiotics.  patient himself states he will not be able to do MRI.    b/l foot infection  Om of right heel wound  + leukocytosis - improved   HCV  severe contractures of b/l LE  right foot wound cx- ESBL proteus - resistant to cefepime and corynebecaterium  left foot wound cx- MRSA and Pseudomonas   Blood cx- neg x 2  extensive chart search on HIE by the attending and based on his latest HCV VL result from 6/2023 detected vl but <1.08  also based on serology from 2017 was positive for hep b sAG and E ag and core IGm ab ( not sure if he was ever treated)  workup in progress   HIV ab/ag negative   UC - VRE, Klebsiella     plan-  Continue Vancomycin IV  monitor vancomycin levels, required - Vanco level is 20.3 this morning, dose adjusted today  keep vanco trough <15  continue Avycaz IV ( because can't start carbapenem for ESBL  since patient is on depakote)  aggressive wound care needed.  HCV Vl , full hep b panel - reviewed, Hep B immune   swab VZV PCR - pending   eventually, the pt will need a picc line and six weeks of abx (Vancomycin and Avycaz)  pt has a hx of C. diff, the need for prophylax Vanco po is tbd, by attending   ok to place a picc line  NOTE: if Avycaz is not covered by insurance, please obtain neuro consult and discuss medication modification for Depakote. Then will change Avycaz to carbapenem according to cx sensitivity.     when ready for discharge:  place a picc line  continue Vancomycin IV 1250 mg q 12 hrs - date of end of the course is tbd  continue Avycaz IV 2.5 g q 8 hrs - date of end of the course is tbd  weekly lab work: cbc with diff, cmp, esr, crp, vancomycin trough - please fax to pt's ID specialist  follow up with ID in the office - pt has ID specialist   remove picc line upon completion of the course of abx     discussed with Dr. King

## 2023-12-08 NOTE — PROVIDER CONTACT NOTE (CRITICAL VALUE NOTIFICATION) - TEST AND RESULT REPORTED:
Abscess culture dec 4th 8pm rare pseudomonas aeruginosa ,moderate MRSA, rare Klebsiella Pneumonae and moderate bacteordes fragilis susceptibility not performed

## 2023-12-09 LAB
VANCOMYCIN TROUGH SERPL-MCNC: 12.9 UG/ML — SIGNIFICANT CHANGE UP (ref 10–20)
VANCOMYCIN TROUGH SERPL-MCNC: 12.9 UG/ML — SIGNIFICANT CHANGE UP (ref 10–20)

## 2023-12-09 PROCEDURE — 99231 SBSQ HOSP IP/OBS SF/LOW 25: CPT

## 2023-12-09 RX ADMIN — ENOXAPARIN SODIUM 40 MILLIGRAM(S): 100 INJECTION SUBCUTANEOUS at 22:30

## 2023-12-09 RX ADMIN — GABAPENTIN 600 MILLIGRAM(S): 400 CAPSULE ORAL at 22:29

## 2023-12-09 RX ADMIN — HYDROMORPHONE HYDROCHLORIDE 1 MILLIGRAM(S): 2 INJECTION INTRAMUSCULAR; INTRAVENOUS; SUBCUTANEOUS at 10:47

## 2023-12-09 RX ADMIN — QUETIAPINE FUMARATE 100 MILLIGRAM(S): 200 TABLET, FILM COATED ORAL at 09:08

## 2023-12-09 RX ADMIN — Medication 1 APPLICATION(S): at 12:34

## 2023-12-09 RX ADMIN — HYDROMORPHONE HYDROCHLORIDE 1 MILLIGRAM(S): 2 INJECTION INTRAMUSCULAR; INTRAVENOUS; SUBCUTANEOUS at 19:00

## 2023-12-09 RX ADMIN — HYDROMORPHONE HYDROCHLORIDE 1 MILLIGRAM(S): 2 INJECTION INTRAMUSCULAR; INTRAVENOUS; SUBCUTANEOUS at 19:15

## 2023-12-09 RX ADMIN — OXYCODONE HYDROCHLORIDE 5 MILLIGRAM(S): 5 TABLET ORAL at 14:33

## 2023-12-09 RX ADMIN — DULOXETINE HYDROCHLORIDE 30 MILLIGRAM(S): 30 CAPSULE, DELAYED RELEASE ORAL at 12:33

## 2023-12-09 RX ADMIN — OXYCODONE HYDROCHLORIDE 5 MILLIGRAM(S): 5 TABLET ORAL at 17:13

## 2023-12-09 RX ADMIN — TAMSULOSIN HYDROCHLORIDE 0.4 MILLIGRAM(S): 0.4 CAPSULE ORAL at 22:28

## 2023-12-09 RX ADMIN — OXYCODONE HYDROCHLORIDE 5 MILLIGRAM(S): 5 TABLET ORAL at 00:34

## 2023-12-09 RX ADMIN — Medication 1 TABLET(S): at 17:13

## 2023-12-09 RX ADMIN — Medication 5 MILLIGRAM(S): at 22:27

## 2023-12-09 RX ADMIN — Medication 100 MILLIGRAM(S): at 12:33

## 2023-12-09 RX ADMIN — Medication 250 MILLIGRAM(S): at 06:21

## 2023-12-09 RX ADMIN — OXYCODONE HYDROCHLORIDE 5 MILLIGRAM(S): 5 TABLET ORAL at 14:47

## 2023-12-09 RX ADMIN — Medication 500 MILLIGRAM(S): at 12:33

## 2023-12-09 RX ADMIN — Medication 5 MILLIGRAM(S): at 09:07

## 2023-12-09 RX ADMIN — ERTAPENEM SODIUM 120 MILLIGRAM(S): 1 INJECTION, POWDER, LYOPHILIZED, FOR SOLUTION INTRAMUSCULAR; INTRAVENOUS at 00:33

## 2023-12-09 RX ADMIN — CYCLOBENZAPRINE HYDROCHLORIDE 10 MILLIGRAM(S): 10 TABLET, FILM COATED ORAL at 17:15

## 2023-12-09 RX ADMIN — LIDOCAINE 1 PATCH: 4 CREAM TOPICAL at 14:42

## 2023-12-09 RX ADMIN — Medication 1 TABLET(S): at 12:33

## 2023-12-09 RX ADMIN — PANTOPRAZOLE SODIUM 40 MILLIGRAM(S): 20 TABLET, DELAYED RELEASE ORAL at 09:07

## 2023-12-09 RX ADMIN — METHADONE HYDROCHLORIDE 110 MILLIGRAM(S): 40 TABLET ORAL at 12:34

## 2023-12-09 RX ADMIN — LIDOCAINE 1 PATCH: 4 CREAM TOPICAL at 14:59

## 2023-12-09 RX ADMIN — POLYETHYLENE GLYCOL 3350 17 GRAM(S): 17 POWDER, FOR SOLUTION ORAL at 12:32

## 2023-12-09 RX ADMIN — Medication 650 MILLIGRAM(S): at 12:32

## 2023-12-09 RX ADMIN — SENNA PLUS 2 TABLET(S): 8.6 TABLET ORAL at 22:29

## 2023-12-09 RX ADMIN — GABAPENTIN 600 MILLIGRAM(S): 400 CAPSULE ORAL at 14:50

## 2023-12-09 RX ADMIN — OXYCODONE HYDROCHLORIDE 5 MILLIGRAM(S): 5 TABLET ORAL at 17:00

## 2023-12-09 RX ADMIN — Medication 250 MILLIGRAM(S): at 17:14

## 2023-12-09 RX ADMIN — ZINC SULFATE TAB 220 MG (50 MG ZINC EQUIVALENT) 220 MILLIGRAM(S): 220 (50 ZN) TAB at 12:33

## 2023-12-09 RX ADMIN — AMLODIPINE BESYLATE 2.5 MILLIGRAM(S): 2.5 TABLET ORAL at 06:14

## 2023-12-09 RX ADMIN — CYCLOBENZAPRINE HYDROCHLORIDE 10 MILLIGRAM(S): 10 TABLET, FILM COATED ORAL at 06:15

## 2023-12-09 RX ADMIN — ATORVASTATIN CALCIUM 40 MILLIGRAM(S): 80 TABLET, FILM COATED ORAL at 22:29

## 2023-12-09 RX ADMIN — Medication 1 TABLET(S): at 09:07

## 2023-12-09 RX ADMIN — DIVALPROEX SODIUM 750 MILLIGRAM(S): 500 TABLET, DELAYED RELEASE ORAL at 22:28

## 2023-12-09 RX ADMIN — OXYCODONE HYDROCHLORIDE 5 MILLIGRAM(S): 5 TABLET ORAL at 06:14

## 2023-12-09 RX ADMIN — FINASTERIDE 5 MILLIGRAM(S): 5 TABLET, FILM COATED ORAL at 12:33

## 2023-12-09 RX ADMIN — Medication 1 APPLICATION(S): at 12:43

## 2023-12-09 RX ADMIN — QUETIAPINE FUMARATE 400 MILLIGRAM(S): 200 TABLET, FILM COATED ORAL at 22:31

## 2023-12-09 RX ADMIN — GABAPENTIN 600 MILLIGRAM(S): 400 CAPSULE ORAL at 06:15

## 2023-12-09 RX ADMIN — QUETIAPINE FUMARATE 100 MILLIGRAM(S): 200 TABLET, FILM COATED ORAL at 17:13

## 2023-12-09 NOTE — PROGRESS NOTE ADULT - SUBJECTIVE AND OBJECTIVE BOX
Patient is a 53y old  Male who presents with a chief complaint of Sepsis secondary to b/l foot wounds (05 Dec 2023 12:34)      OVERNIGHT EVENTS:  none    MEDICATIONS  (STANDING):  acetaminophen     Tablet .. 650 milliGRAM(s) Oral daily  amLODIPine   Tablet 2.5 milliGRAM(s) Oral daily  ascorbic acid 500 milliGRAM(s) Oral daily  atorvastatin 40 milliGRAM(s) Oral at bedtime  bacitracin   Ointment 1 Application(s) Topical daily  collagenase Ointment 1 Application(s) Topical daily  cyclobenzaprine 10 milliGRAM(s) Oral two times a day  dextrose 5% + sodium chloride 0.45%. 1000 milliLiter(s) (50 mL/Hr) IV Continuous <Continuous>  divalproex  milliGRAM(s) Oral at bedtime  DULoxetine 30 milliGRAM(s) Oral daily  enoxaparin Injectable 40 milliGRAM(s) SubCutaneous every 24 hours  ertapenem  IVPB 1000 milliGRAM(s) IV Intermittent every 24 hours  finasteride 5 milliGRAM(s) Oral daily  gabapentin 600 milliGRAM(s) Oral <User Schedule>  lactobacillus acidophilus 1 Tablet(s) Oral two times a day with meals  lidocaine   4% Patch 1 Patch Transdermal daily  methadone  Concentrate 110 milliGRAM(s) Oral daily  methylphenidate 5 milliGRAM(s) Oral <User Schedule>  multivitamin 1 Tablet(s) Oral daily  oxyCODONE    IR 5 milliGRAM(s) Oral <User Schedule>  pantoprazole    Tablet 40 milliGRAM(s) Oral before breakfast  polyethylene glycol 3350 17 Gram(s) Oral daily  QUEtiapine 400 milliGRAM(s) Oral at bedtime  QUEtiapine 100 milliGRAM(s) Oral <User Schedule>  senna 2 Tablet(s) Oral at bedtime  tamsulosin 0.4 milliGRAM(s) Oral at bedtime  thiamine 100 milliGRAM(s) Oral daily  vancomycin  IVPB 1000 milliGRAM(s) IV Intermittent every 12 hours  vancomycin  IVPB      zinc sulfate 220 milliGRAM(s) Oral daily    MEDICATIONS  (PRN):  acetaminophen     Tablet .. 650 milliGRAM(s) Oral every 6 hours PRN Temp greater or equal to 38C (100.4F), Mild Pain (1 - 3)  albuterol    90 MICROgram(s) HFA Inhaler 2 Puff(s) Inhalation every 4 hours PRN Shortness of Breath and/or Wheezing  aluminum hydroxide/magnesium hydroxide/simethicone Suspension 30 milliLiter(s) Oral every 4 hours PRN Dyspepsia  HYDROmorphone  Injectable 1 milliGRAM(s) IV Push every 4 hours PRN Severe Pain (7 - 10)  melatonin 3 milliGRAM(s) Oral at bedtime PRN Insomnia  ondansetron Injectable 4 milliGRAM(s) IV Push every 8 hours PRN Nausea and/or Vomiting      Allergies    NSAIDs (Flushing; Other (Moderate))  Haldol (Anaphylaxis)  Zyprexa (Rash; Dystonic RXN; Hives)  Motrin (Anaphylaxis)  Thorazine (Other (Moderate))  Aleve (Unknown)  Stelazine (Unknown)  Risperdal (Short breath; Rash; Hives)    Intolerances        SUBJECTIVE: in bed in nad     Vital Signs Last 24 Hrs  T(C): 37.1 (09 Dec 2023 06:05), Max: 37.9 (08 Dec 2023 17:24)  T(F): 98.7 (09 Dec 2023 06:05), Max: 100.3 (08 Dec 2023 17:24)  HR: 89 (09 Dec 2023 06:05) (82 - 89)  BP: 120/69 (09 Dec 2023 06:05) (109/71 - 120/69)  BP(mean): --  RR: 18 (09 Dec 2023 06:05) (18 - 18)  SpO2: 95% (09 Dec 2023 06:05) (92% - 95%)    Parameters below as of 09 Dec 2023 06:05  Patient On (Oxygen Delivery Method): nasal cannula          PHYSICAL EXAM:  GENERAL: NAD, well-groomed, well-developed  HEAD:  Atraumatic, Normocephalic  EYES: EOMI, PERRLA, conjunctiva and sclera clear  ENMT: No tonsillar erythema, exudates, or enlargement; Moist mucous membranes, Good dentition, No lesions  NECK: Supple,  CHEST/LUNG: Clear to  auscultation bilaterally; No rales, rhonchi, wheezing, or rubs  bilaterally  HEART: Regular rate and rhythm; No murmurs, rubs, or gallops  ABDOMEN: Soft, Nontender, Nondistended; Bowel sounds present rlq colostomy bag  EXTREMITIES:  2+ Peripheral Pulses, No clubbing, cyanosis, + edema BL LE especially left foot, extremely contracted lower extremity   SKIN: stage 3 right hip ulcer, right foot heel necrotic to bone , left foot dorsum had eschar     NERVOUS SYSTEM:  Alert & Oriented X3, Good concentration; functional quad.    LABS:                                 9.1    7.73  )-----------( 176      ( 08 Dec 2023 08:20 )             30.6   12-08    144  |  108  |  13  ----------------------------<  87  3.7   |  34<H>  |  0.63    Ca    8.5      08 Dec 2023 08:20  Phos  3.5     12-08  Mg     1.9     12-08               PTT - ( 04 Dec 2023 17:20 )  PTT:33.4 sec  Urinalysis Basic - ( 04 Dec 2023 22:05 )    Color: Yellow / Appearance: Cloudy / S.021 / pH: x  Gluc: x / Ketone: Negative mg/dL  / Bili: Negative / Urobili: 1.0 mg/dL   Blood: x / Protein: 30 mg/dL / Nitrite: Positive   Leuk Esterase: Moderate / RBC: >50 /HPF / WBC 26-50 /HPF   Sq Epi: x / Non Sq Epi: x / Bacteria: Moderate /HPF      Cultures;   CAPILLARY BLOOD GLUCOSE      Culture - Urine (collected 04 Dec 2023 22:05)  Source: Clean Catch Clean Catch (Midstream)  Preliminary Report (07 Dec 2023 12:19):    >100,000 CFU/ml Klebsiella pneumoniae    50,000 - 99,000 CFU/mL Gram positive organisms  Organism: Klebsiella pneumoniae (07 Dec 2023 12:18)  Organism: Klebsiella pneumoniae (07 Dec 2023 12:18)    Culture - Abscess with Gram Stain (collected 04 Dec 2023 20:00)  Source: .Abscess right foot wound  Gram Stain (prelim) (06 Dec 2023 14:12):    No polymorphonuclear leukocytes seen per low power field    Rare Gram positive cocci in pairs seen per oil power field  Preliminary Report (06 Dec 2023 14:12):    Rare Proteus mirabilis    Few Corynebacterium species "Susceptibilities not performed"  Organism: Proteus mirabilis ESBL (07 Dec 2023 10:29)  Organism: Proteus mirabilis ESBL (07 Dec 2023 10:29)      Culture - Urine (collected 04 Dec 2023 22:05)  Source: Clean Catch Clean Catch (Midstream)  Preliminary Report (07 Dec 2023 12:19):    >100,000 CFU/ml Klebsiella pneumoniae    50,000 - 99,000 CFU/mL Gram positive organisms  Organism: Klebsiella pneumoniae (07 Dec 2023 12:18)  Organism: Klebsiella pneumoniae (07 Dec 2023 12:18)    Culture - Abscess with Gram Stain (collected 04 Dec 2023 20:00)  Source: .Abscess right foot wound  Gram Stain (prelim) (06 Dec 2023 14:12):    No polymorphonuclear leukocytes seen per low power field    Rare Gram positive cocci in pairs seen per oil power field  Preliminary Report (06 Dec 2023 14:12):    Rare Proteus mirabilis    Few Corynebacterium species "Susceptibilities not performed"  Organism: Proteus mirabilis ESBL (07 Dec 2023 10:29)  Organism: Proteus mirabilis ESBL (07 Dec 2023 10:29)    Culture - Abscess with Gram Stain (collected 04 Dec 2023 20:00)  Source: .Abscess left wound culture  Gram Stain (prelim) (06 Dec 2023 14:40):    No polymorphonuclear leukocytes seen per low power field    Rare Gram positive cocci in pairs seen per oil power field  Preliminary Report (07 Dec 2023 13:35):    Rare Pseudomonas aeruginosa    Moderate Methicillin Resistant Staphylococcus aureus    Rare Klebsiella pneumoniae    Moderate Bacteroides fragilis #2 "Susceptibilities not performed"  Organism: Pseudomonas aeruginosa  Methicillin resistant Staphylococcus aureus (07 Dec 2023 10:24)  Organism: Methicillin resistant Staphylococcus aureus (07 Dec 2023 10:24)  Organism: Pseudomonas aeruginosa (07 Dec 2023 10:23)    Culture - Blood (collected 04 Dec 2023 17:35)  Source: .Blood Blood-Peripheral  Preliminary Report (07 Dec 2023 01:02):    No growth at 48 Hours    Culture - Blood (collected 04 Dec 2023 17:20)  Source: .Blood Blood-Peripheral  Preliminary Report (07 Dec 2023 01:02):    No growth at 48 Hours        RADIOLOGY & ADDITIONAL TESTS:      Imaging Personally Reviewed:  [ x] YES      Consultant(s) Notes Reviewed:  [x ] YES     Care Discussed with [x ] Consultants [X ] Patient [x ] Family  [x ]    [x ]  Other; RN

## 2023-12-09 NOTE — PROGRESS NOTE ADULT - NUTRITIONAL ASSESSMENT
This patient has been assessed with a concern for Malnutrition and has been determined to have a diagnosis/diagnoses of Moderate protein-calorie malnutrition.    This patient is being managed with:   Diet Regular-  Supplement Feeding Modality:  Oral  Ensure Plus High Protein Cans or Servings Per Day:  1       Frequency:  Three Times a day  Entered: Dec  7 2023  3:20PM

## 2023-12-09 NOTE — PROGRESS NOTE ADULT - ASSESSMENT
Gonzalez Stewart is a 53 year old male with PMHx of cervical epidural abscess (s/p decompressive laminectomy 3/2021 c/b b/l leg paralysis), hx of pneumoperitoneum (s/p ex lap, total abdominal colectomy and end ileostomy (on 1/12/23) c/b evisceration and sepsis with MRSA bacteremia postoperatively), hx of hepatitis C, hx of R. buttock abscess, hx of L. foot osteomyelitis, neurogenic bladder (with indwelling Holcomb catheter, last exchanged two days ago), HTN, HLD, bipolar disorder, GERD, hx of opioid dependence, and constipation who presented to the ED on 12/4/23 from [facility] for feet infection and admitted for sepsis secondary to bilateral feet infection.    Sepsis secondary to b/l feet infection  Less likely UTI given urine sample was not clean catch in pt with indwelling Holcomb and no urinary symptoms  Complaints of b/l feet infection intermittently over the past year  Previously treated for L. hallux OM with L. heel culture growing Proteus mirabilis ESBL, completed 6-week course of avycaz  Temp 101 and WBC 15.25K on admission  Lactic WNL, ESR 84  U/A (sample obtained from Holcomb catheter) with positive nitrites, moderate leuks, moderate blood, WBC 26-50, RBC > 50, moderate bacteria, squamous epithelial cells present  CT b/l LE with study is severely limited by contracture. Left lower extremity is only partially visualized with exclusion of the left foot. Skin induration and subcutaneous edema in the leg and ankle.  No soft tissue gas  S/p LR 2800 cc bolus, vancomycin, and zosyn in the ED  S/p bedside escharectomy by podiatry  Empirically started on vancomycin and cefepime; can de-escalate pending final culture data  Wound culture +GPC in pairs  F/u blood cultures, urine culture, CRP, MRSA/MSSA PCR  Please examine R. buttock as pt with hx of abscess in that area (unable to turn at the time of my examination due to severe pain)  Monitor WBC trend and fever curve  Pending vascular surgery evaluation, VITALY evaluated  Podiatry recommendations appreciated  12/5/2023 wound cx  done by podiatry -- shows rare gpv   blood cx -- pending   id consult will be obtained given his complex medical history  esbl   his contracted state prevents mri   will start parenteral pain meds as he chronic  oral oxycodone isnt helping. ( he received 4mg morphine in ed and another 2 mg morphine without relief )  12/6/2023- podiatry has reccs for no surgical intervention, vascular also provides no reccs for surgical intervention  12/7/2023 await final ID reccs as there appears to be no recommendations from either vascular or podiatry for surgical intervention    wound on feet growing mrsa, pseudomonas on ne cx and proteus on the other   as well Klebsiella in urine   12/8/2023 d/w ID on 12/7/2023 and she stated she wants to assess pt on today before deciding on PICC line insertion   I will also d/w SW to investigate if pt can return to facility with the antibx as they  are very expensive   12/9/2023 antibx to changed to Ertapenem and his facility will not pay for ayzac-- i d/w dr. queen and the Depakote is being used for mood and NOT seizure- she will mange this medication   . ID is in agreement and ordered the Ertapenem     now he will need a PICC line and start d./c pallning hopefully for Monday/ tuesday      Chronic medical conditions:  HTN: PTA amlodipine 2.5 mg   HLD: PTA atorvastatin 40 mg  Bipolar disorder: PTA quetiapine 100 mg BID and 400 mg qhs, valproic acid-- per kendell  Hx of opioid dependence: PTA methadone 110 mg   Chronic pain: PTA lidocaine 4% patch, duloxetine 30 mg DR, gabapentin 600 mg q8, cyclobenzaprine 10 mg BID, oxycodone 5 mg q6  GERD: PTA pantoprazole 40 mg DR  Neurogenic bladder (with indwelling Holcomb catheter): PTA finasteride 5 mg, tamsulosin 0.4 mg  Constipation: PTA senna 8.6 mg 2 tabs qhs   functional quad- supportive care    .  Gonzalez Stewart is a 53 year old male with PMHx of cervical epidural abscess (s/p decompressive laminectomy 3/2021 c/b b/l leg paralysis), hx of pneumoperitoneum (s/p ex lap, total abdominal colectomy and end ileostomy (on 1/12/23) c/b evisceration and sepsis with MRSA bacteremia postoperatively), hx of hepatitis C, hx of R. buttock abscess, hx of L. foot osteomyelitis, neurogenic bladder (with indwelling Holcomb catheter, last exchanged two days ago), HTN, HLD, bipolar disorder, GERD, hx of opioid dependence, and constipation who presented to the ED on 12/4/23 from [facility] for feet infection and admitted for sepsis secondary to bilateral feet infection.    Sepsis secondary to b/l feet infection  Less likely UTI given urine sample was not clean catch in pt with indwelling Holcomb and no urinary symptoms  Complaints of b/l feet infection intermittently over the past year  Previously treated for L. hallux OM with L. heel culture growing Proteus mirabilis ESBL, completed 6-week course of avycaz  Temp 101 and WBC 15.25K on admission  Lactic WNL, ESR 84  U/A (sample obtained from Holcomb catheter) with positive nitrites, moderate leuks, moderate blood, WBC 26-50, RBC > 50, moderate bacteria, squamous epithelial cells present  CT b/l LE with study is severely limited by contracture. Left lower extremity is only partially visualized with exclusion of the left foot. Skin induration and subcutaneous edema in the leg and ankle.  No soft tissue gas  S/p LR 2800 cc bolus, vancomycin, and zosyn in the ED  S/p bedside escharectomy by podiatry  Empirically started on vancomycin and cefepime; can de-escalate pending final culture data  Wound culture +GPC in pairs  F/u blood cultures, urine culture, CRP, MRSA/MSSA PCR  Please examine R. buttock as pt with hx of abscess in that area (unable to turn at the time of my examination due to severe pain)  Monitor WBC trend and fever curve  Pending vascular surgery evaluation, VITALY evaluated  Podiatry recommendations appreciated  12/5/2023 wound cx  done by podiatry -- shows rare gpv   blood cx -- pending   id consult will be obtained given his complex medical history  esbl   his contracted state prevents mri   will start parenteral pain meds as he chronic  oral oxycodone isnt helping. ( he received 4mg morphine in ed and another 2 mg morphine without relief )  12/6/2023- podiatry has reccs for no surgical intervention, vascular also provides no reccs for surgical intervention  12/7/2023 await final ID reccs as there appears to be no recommendations from either vascular or podiatry for surgical intervention    wound on feet growing mrsa, pseudomonas on ne cx and proteus on the other   as well Klebsiella in urine   12/8/2023 d/w ID on 12/7/2023 and she stated she wants to assess pt on today before deciding on PICC line insertion   I will also d/w SW to investigate if pt can return to facility with the antibx as they  are very expensive   12/9/2023 antibx to changed to Ertapenem and his facility will not pay for ayzac-- i d/w dr. queen and the Depakote is being used for mood and NOT seizure- she will mange this medication   . ID is in agreement and ordered the Ertapenem     now he will need a PICC line and start d./c pallning hopefully for Monday/ tuesday      Chronic medical conditions:  HTN: PTA amlodipine 2.5 mg   HLD: PTA atorvastatin 40 mg  Bipolar disorder: PTA quetiapine 100 mg BID and 400 mg qhs, valproic acid-- per kendell  Hx of opioid dependence: PTA methadone 110 mg   Chronic pain: PTA lidocaine 4% patch, duloxetine 30 mg DR, gabapentin 600 mg q8, cyclobenzaprine 10 mg BID, oxycodone 5 mg q6  GERD: PTA pantoprazole 40 mg DR  Neurogenic bladder (with indwelling Holcomb catheter): PTA finasteride 5 mg, tamsulosin 0.4 mg  Constipation: PTA senna 8.6 mg 2 tabs qhs   functional quad- supportive care    .

## 2023-12-10 LAB
CULTURE RESULTS: SIGNIFICANT CHANGE UP
SPECIMEN SOURCE: SIGNIFICANT CHANGE UP

## 2023-12-10 PROCEDURE — 99231 SBSQ HOSP IP/OBS SF/LOW 25: CPT

## 2023-12-10 RX ADMIN — ATORVASTATIN CALCIUM 40 MILLIGRAM(S): 80 TABLET, FILM COATED ORAL at 22:48

## 2023-12-10 RX ADMIN — OXYCODONE HYDROCHLORIDE 5 MILLIGRAM(S): 5 TABLET ORAL at 11:50

## 2023-12-10 RX ADMIN — SENNA PLUS 2 TABLET(S): 8.6 TABLET ORAL at 22:48

## 2023-12-10 RX ADMIN — AMLODIPINE BESYLATE 2.5 MILLIGRAM(S): 2.5 TABLET ORAL at 05:48

## 2023-12-10 RX ADMIN — QUETIAPINE FUMARATE 100 MILLIGRAM(S): 200 TABLET, FILM COATED ORAL at 18:17

## 2023-12-10 RX ADMIN — GABAPENTIN 600 MILLIGRAM(S): 400 CAPSULE ORAL at 22:49

## 2023-12-10 RX ADMIN — Medication 100 MILLIGRAM(S): at 11:58

## 2023-12-10 RX ADMIN — Medication 1 TABLET(S): at 09:11

## 2023-12-10 RX ADMIN — Medication 500 MILLIGRAM(S): at 11:55

## 2023-12-10 RX ADMIN — DULOXETINE HYDROCHLORIDE 30 MILLIGRAM(S): 30 CAPSULE, DELAYED RELEASE ORAL at 11:55

## 2023-12-10 RX ADMIN — OXYCODONE HYDROCHLORIDE 5 MILLIGRAM(S): 5 TABLET ORAL at 18:17

## 2023-12-10 RX ADMIN — OXYCODONE HYDROCHLORIDE 5 MILLIGRAM(S): 5 TABLET ORAL at 00:34

## 2023-12-10 RX ADMIN — Medication 1 APPLICATION(S): at 14:25

## 2023-12-10 RX ADMIN — Medication 5 MILLIGRAM(S): at 09:11

## 2023-12-10 RX ADMIN — HYDROMORPHONE HYDROCHLORIDE 1 MILLIGRAM(S): 2 INJECTION INTRAMUSCULAR; INTRAVENOUS; SUBCUTANEOUS at 14:24

## 2023-12-10 RX ADMIN — FINASTERIDE 5 MILLIGRAM(S): 5 TABLET, FILM COATED ORAL at 11:56

## 2023-12-10 RX ADMIN — METHADONE HYDROCHLORIDE 110 MILLIGRAM(S): 40 TABLET ORAL at 11:49

## 2023-12-10 RX ADMIN — Medication 650 MILLIGRAM(S): at 11:55

## 2023-12-10 RX ADMIN — CYCLOBENZAPRINE HYDROCHLORIDE 10 MILLIGRAM(S): 10 TABLET, FILM COATED ORAL at 05:48

## 2023-12-10 RX ADMIN — HYDROMORPHONE HYDROCHLORIDE 1 MILLIGRAM(S): 2 INJECTION INTRAMUSCULAR; INTRAVENOUS; SUBCUTANEOUS at 20:08

## 2023-12-10 RX ADMIN — QUETIAPINE FUMARATE 400 MILLIGRAM(S): 200 TABLET, FILM COATED ORAL at 22:48

## 2023-12-10 RX ADMIN — TAMSULOSIN HYDROCHLORIDE 0.4 MILLIGRAM(S): 0.4 CAPSULE ORAL at 22:50

## 2023-12-10 RX ADMIN — GABAPENTIN 600 MILLIGRAM(S): 400 CAPSULE ORAL at 14:14

## 2023-12-10 RX ADMIN — ZINC SULFATE TAB 220 MG (50 MG ZINC EQUIVALENT) 220 MILLIGRAM(S): 220 (50 ZN) TAB at 11:56

## 2023-12-10 RX ADMIN — DIVALPROEX SODIUM 750 MILLIGRAM(S): 500 TABLET, DELAYED RELEASE ORAL at 22:49

## 2023-12-10 RX ADMIN — Medication 250 MILLIGRAM(S): at 09:15

## 2023-12-10 RX ADMIN — OXYCODONE HYDROCHLORIDE 5 MILLIGRAM(S): 5 TABLET ORAL at 05:51

## 2023-12-10 RX ADMIN — GABAPENTIN 600 MILLIGRAM(S): 400 CAPSULE ORAL at 05:47

## 2023-12-10 RX ADMIN — Medication 5 MILLIGRAM(S): at 22:49

## 2023-12-10 RX ADMIN — PANTOPRAZOLE SODIUM 40 MILLIGRAM(S): 20 TABLET, DELAYED RELEASE ORAL at 09:11

## 2023-12-10 RX ADMIN — Medication 1 TABLET(S): at 11:57

## 2023-12-10 RX ADMIN — HYDROMORPHONE HYDROCHLORIDE 1 MILLIGRAM(S): 2 INJECTION INTRAMUSCULAR; INTRAVENOUS; SUBCUTANEOUS at 15:30

## 2023-12-10 RX ADMIN — HYDROMORPHONE HYDROCHLORIDE 1 MILLIGRAM(S): 2 INJECTION INTRAMUSCULAR; INTRAVENOUS; SUBCUTANEOUS at 09:11

## 2023-12-10 RX ADMIN — Medication 650 MILLIGRAM(S): at 13:00

## 2023-12-10 RX ADMIN — LIDOCAINE 1 PATCH: 4 CREAM TOPICAL at 11:52

## 2023-12-10 RX ADMIN — Medication 1 TABLET(S): at 18:17

## 2023-12-10 RX ADMIN — CYCLOBENZAPRINE HYDROCHLORIDE 10 MILLIGRAM(S): 10 TABLET, FILM COATED ORAL at 18:17

## 2023-12-10 RX ADMIN — QUETIAPINE FUMARATE 100 MILLIGRAM(S): 200 TABLET, FILM COATED ORAL at 09:11

## 2023-12-10 RX ADMIN — ERTAPENEM SODIUM 120 MILLIGRAM(S): 1 INJECTION, POWDER, LYOPHILIZED, FOR SOLUTION INTRAMUSCULAR; INTRAVENOUS at 00:34

## 2023-12-10 RX ADMIN — HYDROMORPHONE HYDROCHLORIDE 1 MILLIGRAM(S): 2 INJECTION INTRAMUSCULAR; INTRAVENOUS; SUBCUTANEOUS at 01:00

## 2023-12-10 NOTE — PROGRESS NOTE ADULT - SUBJECTIVE AND OBJECTIVE BOX
Patient is a 53y old  Male who presents with a chief complaint of Sepsis secondary to b/l foot wounds (05 Dec 2023 12:34)      OVERNIGHT EVENTS:  none    MEDICATIONS  (STANDING):  acetaminophen     Tablet .. 650 milliGRAM(s) Oral daily  amLODIPine   Tablet 2.5 milliGRAM(s) Oral daily  ascorbic acid 500 milliGRAM(s) Oral daily  atorvastatin 40 milliGRAM(s) Oral at bedtime  bacitracin   Ointment 1 Application(s) Topical daily  collagenase Ointment 1 Application(s) Topical daily  cyclobenzaprine 10 milliGRAM(s) Oral two times a day  dextrose 5% + sodium chloride 0.45%. 1000 milliLiter(s) (50 mL/Hr) IV Continuous <Continuous>  divalproex  milliGRAM(s) Oral at bedtime  DULoxetine 30 milliGRAM(s) Oral daily  enoxaparin Injectable 40 milliGRAM(s) SubCutaneous every 24 hours  ertapenem  IVPB 1000 milliGRAM(s) IV Intermittent every 24 hours  finasteride 5 milliGRAM(s) Oral daily  gabapentin 600 milliGRAM(s) Oral <User Schedule>  lactobacillus acidophilus 1 Tablet(s) Oral two times a day with meals  lidocaine   4% Patch 1 Patch Transdermal daily  methadone  Concentrate 110 milliGRAM(s) Oral daily  methylphenidate 5 milliGRAM(s) Oral <User Schedule>  multivitamin 1 Tablet(s) Oral daily  oxyCODONE    IR 5 milliGRAM(s) Oral <User Schedule>  pantoprazole    Tablet 40 milliGRAM(s) Oral before breakfast  polyethylene glycol 3350 17 Gram(s) Oral daily  QUEtiapine 100 milliGRAM(s) Oral <User Schedule>  QUEtiapine 400 milliGRAM(s) Oral at bedtime  senna 2 Tablet(s) Oral at bedtime  tamsulosin 0.4 milliGRAM(s) Oral at bedtime  thiamine 100 milliGRAM(s) Oral daily  vancomycin  IVPB      vancomycin  IVPB 1000 milliGRAM(s) IV Intermittent every 12 hours  zinc sulfate 220 milliGRAM(s) Oral daily    MEDICATIONS  (PRN):  acetaminophen     Tablet .. 650 milliGRAM(s) Oral every 6 hours PRN Temp greater or equal to 38C (100.4F), Mild Pain (1 - 3)  albuterol    90 MICROgram(s) HFA Inhaler 2 Puff(s) Inhalation every 4 hours PRN Shortness of Breath and/or Wheezing  aluminum hydroxide/magnesium hydroxide/simethicone Suspension 30 milliLiter(s) Oral every 4 hours PRN Dyspepsia  HYDROmorphone  Injectable 1 milliGRAM(s) IV Push every 4 hours PRN Severe Pain (7 - 10)  melatonin 3 milliGRAM(s) Oral at bedtime PRN Insomnia  ondansetron Injectable 4 milliGRAM(s) IV Push every 8 hours PRN Nausea and/or Vomiting        Allergies    NSAIDs (Flushing; Other (Moderate))  Haldol (Anaphylaxis)  Zyprexa (Rash; Dystonic RXN; Hives)  Motrin (Anaphylaxis)  Thorazine (Other (Moderate))  Aleve (Unknown)  Stelazine (Unknown)  Risperdal (Short breath; Rash; Hives)    Intolerances        SUBJECTIVE: in bed in nad     Vital Signs Last 24 Hrs  T(C): 36.7 (09 Dec 2023 23:49), Max: 36.8 (09 Dec 2023 16:55)  T(F): 98 (09 Dec 2023 23:49), Max: 98.3 (09 Dec 2023 16:55)  HR: 78 (09 Dec 2023 23:49) (73 - 88)  BP: 109/63 (09 Dec 2023 23:49) (97/59 - 112/74)  BP(mean): --  RR: 18 (09 Dec 2023 23:49) (18 - 18)  SpO2: 94% (09 Dec 2023 23:49) (93% - 94%)    Parameters below as of 09 Dec 2023 23:49  Patient On (Oxygen Delivery Method): room air        PHYSICAL EXAM:  GENERAL: NAD, well-groomed, well-developed  HEAD:  Atraumatic, Normocephalic  EYES: EOMI, PERRLA, conjunctiva and sclera clear  ENMT: No tonsillar erythema, exudates, or enlargement; Moist mucous membranes, Good dentition, No lesions  NECK: Supple,  CHEST/LUNG: Clear to  auscultation bilaterally; No rales, rhonchi, wheezing, or rubs  bilaterally  HEART: Regular rate and rhythm; No murmurs, rubs, or gallops  ABDOMEN: Soft, Nontender, Nondistended; Bowel sounds present rlq colostomy bag  EXTREMITIES:  2+ Peripheral Pulses, No clubbing, cyanosis, + edema BL LE especially left foot, extremely contracted lower extremity   SKIN: stage 3 right hip ulcer, right foot heel necrotic to bone , left foot dorsum had eschar     NERVOUS SYSTEM:  Alert & Oriented X3, Good concentration; functional quad.    LABS:                                            PTT - ( 04 Dec 2023 17:20 )  PTT:33.4 sec  Urinalysis Basic - ( 04 Dec 2023 22:05 )    Color: Yellow / Appearance: Cloudy / S.021 / pH: x  Gluc: x / Ketone: Negative mg/dL  / Bili: Negative / Urobili: 1.0 mg/dL   Blood: x / Protein: 30 mg/dL / Nitrite: Positive   Leuk Esterase: Moderate / RBC: >50 /HPF / WBC 26-50 /HPF   Sq Epi: x / Non Sq Epi: x / Bacteria: Moderate /HPF      Cultures;   CAPILLARY BLOOD GLUCOSE      Culture - Urine (collected 04 Dec 2023 22:05)  Source: Clean Catch Clean Catch (Midstream)  Preliminary Report (07 Dec 2023 12:19):    >100,000 CFU/ml Klebsiella pneumoniae    50,000 - 99,000 CFU/mL Gram positive organisms  Organism: Klebsiella pneumoniae (07 Dec 2023 12:18)  Organism: Klebsiella pneumoniae (07 Dec 2023 12:18)    Culture - Abscess with Gram Stain (collected 04 Dec 2023 20:00)  Source: .Abscess right foot wound  Gram Stain (prelim) (06 Dec 2023 14:12):    No polymorphonuclear leukocytes seen per low power field    Rare Gram positive cocci in pairs seen per oil power field  Preliminary Report (06 Dec 2023 14:12):    Rare Proteus mirabilis    Few Corynebacterium species "Susceptibilities not performed"  Organism: Proteus mirabilis ESBL (07 Dec 2023 10:29)  Organism: Proteus mirabilis ESBL (07 Dec 2023 10:29)      Culture - Urine (collected 04 Dec 2023 22:05)  Source: Clean Catch Clean Catch (Midstream)  Preliminary Report (07 Dec 2023 12:19):    >100,000 CFU/ml Klebsiella pneumoniae    50,000 - 99,000 CFU/mL Gram positive organisms  Organism: Klebsiella pneumoniae (07 Dec 2023 12:18)  Organism: Klebsiella pneumoniae (07 Dec 2023 12:18)    Culture - Abscess with Gram Stain (collected 04 Dec 2023 20:00)  Source: .Abscess right foot wound  Gram Stain (prelim) (06 Dec 2023 14:12):    No polymorphonuclear leukocytes seen per low power field    Rare Gram positive cocci in pairs seen per oil power field  Preliminary Report (06 Dec 2023 14:12):    Rare Proteus mirabilis    Few Corynebacterium species "Susceptibilities not performed"  Organism: Proteus mirabilis ESBL (07 Dec 2023 10:29)  Organism: Proteus mirabilis ESBL (07 Dec 2023 10:29)    Culture - Abscess with Gram Stain (collected 04 Dec 2023 20:00)  Source: .Abscess left wound culture  Gram Stain (prelim) (06 Dec 2023 14:40):    No polymorphonuclear leukocytes seen per low power field    Rare Gram positive cocci in pairs seen per oil power field  Preliminary Report (07 Dec 2023 13:35):    Rare Pseudomonas aeruginosa    Moderate Methicillin Resistant Staphylococcus aureus    Rare Klebsiella pneumoniae    Moderate Bacteroides fragilis #2 "Susceptibilities not performed"  Organism: Pseudomonas aeruginosa  Methicillin resistant Staphylococcus aureus (07 Dec 2023 10:24)  Organism: Methicillin resistant Staphylococcus aureus (07 Dec 2023 10:24)  Organism: Pseudomonas aeruginosa (07 Dec 2023 10:23)    Culture - Blood (collected 04 Dec 2023 17:35)  Source: .Blood Blood-Peripheral  Preliminary Report (07 Dec 2023 01:02):    No growth at 48 Hours    Culture - Blood (collected 04 Dec 2023 17:20)  Source: .Blood Blood-Peripheral  Preliminary Report (07 Dec 2023 01:02):    No growth at 48 Hours        RADIOLOGY & ADDITIONAL TESTS:      Imaging Personally Reviewed:  [ x] YES      Consultant(s) Notes Reviewed:  [x ] YES     Care Discussed with [x ] Consultants [X ] Patient [x ] Family  [x ]    [x ]  Other; RN

## 2023-12-10 NOTE — PROGRESS NOTE ADULT - ASSESSMENT
Gonzalez Stewart is a 53 year old male with PMHx of cervical epidural abscess (s/p decompressive laminectomy 3/2021 c/b b/l leg paralysis), hx of pneumoperitoneum (s/p ex lap, total abdominal colectomy and end ileostomy (on 1/12/23) c/b evisceration and sepsis with MRSA bacteremia postoperatively), hx of hepatitis C, hx of R. buttock abscess, hx of L. foot osteomyelitis, neurogenic bladder (with indwelling Holcomb catheter, last exchanged two days ago), HTN, HLD, bipolar disorder, GERD, hx of opioid dependence, and constipation who presented to the ED on 12/4/23 from [facility] for feet infection and admitted for sepsis secondary to bilateral feet infection.    Sepsis secondary to b/l feet infection  Less likely UTI given urine sample was not clean catch in pt with indwelling Holcomb and no urinary symptoms  Complaints of b/l feet infection intermittently over the past year  Previously treated for L. hallux OM with L. heel culture growing Proteus mirabilis ESBL, completed 6-week course of avycaz  Temp 101 and WBC 15.25K on admission  Lactic WNL, ESR 84  U/A (sample obtained from Holcomb catheter) with positive nitrites, moderate leuks, moderate blood, WBC 26-50, RBC > 50, moderate bacteria, squamous epithelial cells present  CT b/l LE with study is severely limited by contracture. Left lower extremity is only partially visualized with exclusion of the left foot. Skin induration and subcutaneous edema in the leg and ankle.  No soft tissue gas  S/p LR 2800 cc bolus, vancomycin, and zosyn in the ED  S/p bedside escharectomy by podiatry  Empirically started on vancomycin and cefepime; can de-escalate pending final culture data  Wound culture +GPC in pairs  F/u blood cultures, urine culture, CRP, MRSA/MSSA PCR  Please examine R. buttock as pt with hx of abscess in that area (unable to turn at the time of my examination due to severe pain)  Monitor WBC trend and fever curve  Pending vascular surgery evaluation, VITALY evaluated  Podiatry recommendations appreciated  12/5/2023 wound cx  done by podiatry -- shows rare gpv   blood cx -- pending   id consult will be obtained given his complex medical history  esbl   his contracted state prevents mri   will start parenteral pain meds as he chronic  oral oxycodone isnt helping. ( he received 4mg morphine in ed and another 2 mg morphine without relief )  12/6/2023- podiatry has reccs for no surgical intervention, vascular also provides no reccs for surgical intervention  12/7/2023 await final ID reccs as there appears to be no recommendations from either vascular or podiatry for surgical intervention    wound on feet growing mrsa, pseudomonas on ne cx and proteus on the other   as well Klebsiella in urine   12/8/2023 d/w ID on 12/7/2023 and she stated she wants to assess pt on today before deciding on PICC line insertion   I will also d/w SW to investigate if pt can return to facility with the antibx as they  are very expensive   12/9/2023 antibx to changed to Ertapenem and his facility will not pay for ayzac-- i d/w dr. queen and the Depakote is being used for mood and NOT seizure- she will mange this medication   . ID is in agreement and ordered the Ertapenem     now he will need a PICC line and start d./c planning hopefully for Monday/ Tuesday      12/10/2023 start d/c planning     Chronic medical conditions:  HTN: PTA amlodipine 2.5 mg   HLD: PTA atorvastatin 40 mg  Bipolar disorder: PTA quetiapine 100 mg BID and 400 mg qhs, valproic acid-- per psych  Hx of opioid dependence: PTA methadone 110 mg   Chronic pain: PTA lidocaine 4% patch, duloxetine 30 mg DR, gabapentin 600 mg q8, cyclobenzaprine 10 mg BID, oxycodone 5 mg q6  GERD: PTA pantoprazole 40 mg DR  Neurogenic bladder (with indwelling Holcomb catheter): PTA finasteride 5 mg, tamsulosin 0.4 mg  Constipation: PTA senna 8.6 mg 2 tabs qhs   functional quad- supportive care    .  Gonzalez Stewart is a 53 year old male with PMHx of cervical epidural abscess (s/p decompressive laminectomy 3/2021 c/b b/l leg paralysis), hx of pneumoperitoneum (s/p ex lap, total abdominal colectomy and end ileostomy (on 1/12/23) c/b evisceration and sepsis with MRSA bacteremia postoperatively), hx of hepatitis C, hx of R. buttock abscess, hx of L. foot osteomyelitis, neurogenic bladder (with indwelling Holcomb catheter, last exchanged two days ago), HTN, HLD, bipolar disorder, GERD, hx of opioid dependence, and constipation who presented to the ED on 12/4/23 from [facility] for feet infection and admitted for sepsis secondary to bilateral feet infection.    Sepsis secondary to b/l feet infection  Less likely UTI given urine sample was not clean catch in pt with indwelling Holcomb and no urinary symptoms  Complaints of b/l feet infection intermittently over the past year  Previously treated for L. hallux OM with L. heel culture growing Proteus mirabilis ESBL, completed 6-week course of avycaz  Temp 101 and WBC 15.25K on admission  Lactic WNL, ESR 84  U/A (sample obtained from Holcomb catheter) with positive nitrites, moderate leuks, moderate blood, WBC 26-50, RBC > 50, moderate bacteria, squamous epithelial cells present  CT b/l LE with study is severely limited by contracture. Left lower extremity is only partially visualized with exclusion of the left foot. Skin induration and subcutaneous edema in the leg and ankle.  No soft tissue gas  S/p LR 2800 cc bolus, vancomycin, and zosyn in the ED  S/p bedside escharectomy by podiatry  Empirically started on vancomycin and cefepime; can de-escalate pending final culture data  Wound culture +GPC in pairs  F/u blood cultures, urine culture, CRP, MRSA/MSSA PCR  Please examine R. buttock as pt with hx of abscess in that area (unable to turn at the time of my examination due to severe pain)  Monitor WBC trend and fever curve  Pending vascular surgery evaluation, VITALY evaluated  Podiatry recommendations appreciated  12/5/2023 wound cx  done by podiatry -- shows rare gpv   blood cx -- pending   id consult will be obtained given his complex medical history  esbl   his contracted state prevents mri   will start parenteral pain meds as he chronic  oral oxycodone isnt helping. ( he received 4mg morphine in ed and another 2 mg morphine without relief )  12/6/2023- podiatry has reccs for no surgical intervention, vascular also provides no reccs for surgical intervention  12/7/2023 await final ID reccs as there appears to be no recommendations from either vascular or podiatry for surgical intervention    wound on feet growing mrsa, pseudomonas on ne cx and proteus on the other   as well Klebsiella in urine   12/8/2023 d/w ID on 12/7/2023 and she stated she wants to assess pt on today before deciding on PICC line insertion   I will also d/w SW to investigate if pt can return to facility with the antibx as they  are very expensive   12/9/2023 antibx to changed to Ertapenem and his facility will not pay for ayzac-- i d/w dr. queen and the Depakote is being used for mood and NOT seizure- she will mange this medication   . ID is in agreement and ordered the Ertapenem     now he will need a PICC line and start d./c planning hopefully for Monday/ Tuesday      12/10/2023 start d/c planning     Chronic medical conditions:  HTN: PTA amlodipine 2.5 mg   HLD: PTA atorvastatin 40 mg  Bipolar disorder: PTA quetiapine 100 mg BID and 400 mg qhs, valproic acid-- per psych  Hx of opioid dependence: PTA methadone 110 mg   Chronic pain: PTA lidocaine 4% patch, duloxetine 30 mg DR, gabapentin 600 mg q8, cyclobenzaprine 10 mg BID, oxycodone 5 mg q6  GERD: PTA pantoprazole 40 mg DR  Neurogenic bladder (with indwelling Holcomb catheter): PTA finasteride 5 mg, tamsulosin 0.4 mg  Constipation: PTA senna 8.6 mg 2 tabs qhs   functional quad- supportive care    .

## 2023-12-11 ENCOUNTER — TRANSCRIPTION ENCOUNTER (OUTPATIENT)
Age: 53
End: 2023-12-11

## 2023-12-11 LAB
ANION GAP SERPL CALC-SCNC: 0 MMOL/L — LOW (ref 5–17)
ANION GAP SERPL CALC-SCNC: 0 MMOL/L — LOW (ref 5–17)
BUN SERPL-MCNC: 16 MG/DL — SIGNIFICANT CHANGE UP (ref 7–23)
BUN SERPL-MCNC: 16 MG/DL — SIGNIFICANT CHANGE UP (ref 7–23)
CALCIUM SERPL-MCNC: 8.7 MG/DL — SIGNIFICANT CHANGE UP (ref 8.5–10.1)
CALCIUM SERPL-MCNC: 8.7 MG/DL — SIGNIFICANT CHANGE UP (ref 8.5–10.1)
CHLORIDE SERPL-SCNC: 107 MMOL/L — SIGNIFICANT CHANGE UP (ref 96–108)
CHLORIDE SERPL-SCNC: 107 MMOL/L — SIGNIFICANT CHANGE UP (ref 96–108)
CO2 SERPL-SCNC: 34 MMOL/L — HIGH (ref 22–31)
CO2 SERPL-SCNC: 34 MMOL/L — HIGH (ref 22–31)
CREAT SERPL-MCNC: 0.6 MG/DL — SIGNIFICANT CHANGE UP (ref 0.5–1.3)
CREAT SERPL-MCNC: 0.6 MG/DL — SIGNIFICANT CHANGE UP (ref 0.5–1.3)
EGFR: 116 ML/MIN/1.73M2 — SIGNIFICANT CHANGE UP
EGFR: 116 ML/MIN/1.73M2 — SIGNIFICANT CHANGE UP
GLUCOSE SERPL-MCNC: 107 MG/DL — HIGH (ref 70–99)
GLUCOSE SERPL-MCNC: 107 MG/DL — HIGH (ref 70–99)
HCT VFR BLD CALC: 32.7 % — LOW (ref 39–50)
HCT VFR BLD CALC: 32.7 % — LOW (ref 39–50)
HGB BLD-MCNC: 9.7 G/DL — LOW (ref 13–17)
HGB BLD-MCNC: 9.7 G/DL — LOW (ref 13–17)
MAGNESIUM SERPL-MCNC: 2 MG/DL — SIGNIFICANT CHANGE UP (ref 1.6–2.6)
MAGNESIUM SERPL-MCNC: 2 MG/DL — SIGNIFICANT CHANGE UP (ref 1.6–2.6)
MCHC RBC-ENTMCNC: 26.9 PG — LOW (ref 27–34)
MCHC RBC-ENTMCNC: 26.9 PG — LOW (ref 27–34)
MCHC RBC-ENTMCNC: 29.7 G/DL — LOW (ref 32–36)
MCHC RBC-ENTMCNC: 29.7 G/DL — LOW (ref 32–36)
MCV RBC AUTO: 90.6 FL — SIGNIFICANT CHANGE UP (ref 80–100)
MCV RBC AUTO: 90.6 FL — SIGNIFICANT CHANGE UP (ref 80–100)
NRBC # BLD: 0 /100 WBCS — SIGNIFICANT CHANGE UP (ref 0–0)
NRBC # BLD: 0 /100 WBCS — SIGNIFICANT CHANGE UP (ref 0–0)
NT-PROBNP SERPL-SCNC: 832 PG/ML — HIGH (ref 0–125)
NT-PROBNP SERPL-SCNC: 832 PG/ML — HIGH (ref 0–125)
PHOSPHATE SERPL-MCNC: 4.1 MG/DL — SIGNIFICANT CHANGE UP (ref 2.5–4.5)
PHOSPHATE SERPL-MCNC: 4.1 MG/DL — SIGNIFICANT CHANGE UP (ref 2.5–4.5)
PLATELET # BLD AUTO: 189 K/UL — SIGNIFICANT CHANGE UP (ref 150–400)
PLATELET # BLD AUTO: 189 K/UL — SIGNIFICANT CHANGE UP (ref 150–400)
POTASSIUM SERPL-MCNC: 4 MMOL/L — SIGNIFICANT CHANGE UP (ref 3.5–5.3)
POTASSIUM SERPL-MCNC: 4 MMOL/L — SIGNIFICANT CHANGE UP (ref 3.5–5.3)
POTASSIUM SERPL-SCNC: 4 MMOL/L — SIGNIFICANT CHANGE UP (ref 3.5–5.3)
POTASSIUM SERPL-SCNC: 4 MMOL/L — SIGNIFICANT CHANGE UP (ref 3.5–5.3)
RBC # BLD: 3.61 M/UL — LOW (ref 4.2–5.8)
RBC # BLD: 3.61 M/UL — LOW (ref 4.2–5.8)
RBC # FLD: 17.4 % — HIGH (ref 10.3–14.5)
RBC # FLD: 17.4 % — HIGH (ref 10.3–14.5)
SODIUM SERPL-SCNC: 141 MMOL/L — SIGNIFICANT CHANGE UP (ref 135–145)
SODIUM SERPL-SCNC: 141 MMOL/L — SIGNIFICANT CHANGE UP (ref 135–145)
VANCOMYCIN TROUGH SERPL-MCNC: 10.6 UG/ML — SIGNIFICANT CHANGE UP (ref 10–20)
VANCOMYCIN TROUGH SERPL-MCNC: 10.6 UG/ML — SIGNIFICANT CHANGE UP (ref 10–20)
WBC # BLD: 9.12 K/UL — SIGNIFICANT CHANGE UP (ref 3.8–10.5)
WBC # BLD: 9.12 K/UL — SIGNIFICANT CHANGE UP (ref 3.8–10.5)
WBC # FLD AUTO: 9.12 K/UL — SIGNIFICANT CHANGE UP (ref 3.8–10.5)
WBC # FLD AUTO: 9.12 K/UL — SIGNIFICANT CHANGE UP (ref 3.8–10.5)

## 2023-12-11 PROCEDURE — 36573 INSJ PICC RS&I 5 YR+: CPT | Mod: LT

## 2023-12-11 PROCEDURE — 71045 X-RAY EXAM CHEST 1 VIEW: CPT | Mod: 26

## 2023-12-11 PROCEDURE — 76937 US GUIDE VASCULAR ACCESS: CPT | Mod: 26,AS

## 2023-12-11 PROCEDURE — 36000 PLACE NEEDLE IN VEIN: CPT | Mod: AS

## 2023-12-11 PROCEDURE — 99231 SBSQ HOSP IP/OBS SF/LOW 25: CPT

## 2023-12-11 PROCEDURE — 99232 SBSQ HOSP IP/OBS MODERATE 35: CPT

## 2023-12-11 RX ORDER — CHLORHEXIDINE GLUCONATE 213 G/1000ML
1 SOLUTION TOPICAL
Refills: 0 | Status: DISCONTINUED | OUTPATIENT
Start: 2023-12-11 | End: 2024-01-03

## 2023-12-11 RX ORDER — AMLODIPINE BESYLATE 2.5 MG/1
1 TABLET ORAL
Refills: 0 | DISCHARGE

## 2023-12-11 RX ORDER — IPRATROPIUM/ALBUTEROL SULFATE 18-103MCG
3 AEROSOL WITH ADAPTER (GRAM) INHALATION EVERY 6 HOURS
Refills: 0 | Status: DISCONTINUED | OUTPATIENT
Start: 2023-12-11 | End: 2023-12-20

## 2023-12-11 RX ORDER — AMLODIPINE BESYLATE 2.5 MG/1
1 TABLET ORAL
Qty: 0 | Refills: 0 | DISCHARGE
Start: 2023-12-11

## 2023-12-11 RX ORDER — SODIUM CHLORIDE 9 MG/ML
4 INJECTION INTRAMUSCULAR; INTRAVENOUS; SUBCUTANEOUS EVERY 6 HOURS
Refills: 0 | Status: DISCONTINUED | OUTPATIENT
Start: 2023-12-11 | End: 2023-12-11

## 2023-12-11 RX ORDER — FINASTERIDE 5 MG/1
1 TABLET, FILM COATED ORAL
Refills: 0 | DISCHARGE

## 2023-12-11 RX ORDER — NYSTATIN CREAM 100000 [USP'U]/G
1 CREAM TOPICAL
Refills: 0 | Status: DISCONTINUED | OUTPATIENT
Start: 2023-12-11 | End: 2024-01-03

## 2023-12-11 RX ORDER — FINASTERIDE 5 MG/1
1 TABLET, FILM COATED ORAL
Qty: 0 | Refills: 0 | DISCHARGE
Start: 2023-12-11

## 2023-12-11 RX ORDER — CHLORHEXIDINE GLUCONATE 213 G/1000ML
1 SOLUTION TOPICAL
Refills: 0 | Status: DISCONTINUED | OUTPATIENT
Start: 2023-12-11 | End: 2023-12-11

## 2023-12-11 RX ORDER — SODIUM CHLORIDE 9 MG/ML
4 INJECTION INTRAMUSCULAR; INTRAVENOUS; SUBCUTANEOUS EVERY 6 HOURS
Refills: 0 | Status: COMPLETED | OUTPATIENT
Start: 2023-12-11 | End: 2023-12-12

## 2023-12-11 RX ORDER — DIVALPROEX SODIUM 500 MG/1
500 TABLET, DELAYED RELEASE ORAL AT BEDTIME
Refills: 0 | Status: COMPLETED | OUTPATIENT
Start: 2023-12-11 | End: 2023-12-11

## 2023-12-11 RX ORDER — ACETYLCYSTEINE 200 MG/ML
4 VIAL (ML) MISCELLANEOUS ONCE
Refills: 0 | Status: COMPLETED | OUTPATIENT
Start: 2023-12-11 | End: 2023-12-11

## 2023-12-11 RX ORDER — TAMSULOSIN HYDROCHLORIDE 0.4 MG/1
1 CAPSULE ORAL
Refills: 0 | DISCHARGE

## 2023-12-11 RX ORDER — DIVALPROEX SODIUM 500 MG/1
3 TABLET, DELAYED RELEASE ORAL
Refills: 0 | DISCHARGE

## 2023-12-11 RX ORDER — SODIUM CHLORIDE 9 MG/ML
10 INJECTION INTRAMUSCULAR; INTRAVENOUS; SUBCUTANEOUS
Refills: 0 | Status: DISCONTINUED | OUTPATIENT
Start: 2023-12-11 | End: 2024-01-03

## 2023-12-11 RX ORDER — TAMSULOSIN HYDROCHLORIDE 0.4 MG/1
1 CAPSULE ORAL
Qty: 0 | Refills: 0 | DISCHARGE
Start: 2023-12-11

## 2023-12-11 RX ADMIN — OXYCODONE HYDROCHLORIDE 5 MILLIGRAM(S): 5 TABLET ORAL at 00:43

## 2023-12-11 RX ADMIN — Medication 1 APPLICATION(S): at 15:27

## 2023-12-11 RX ADMIN — CHLORHEXIDINE GLUCONATE 1 APPLICATION(S): 213 SOLUTION TOPICAL at 13:48

## 2023-12-11 RX ADMIN — Medication 1 TABLET(S): at 09:04

## 2023-12-11 RX ADMIN — Medication 100 MILLIGRAM(S): at 13:45

## 2023-12-11 RX ADMIN — HYDROMORPHONE HYDROCHLORIDE 1 MILLIGRAM(S): 2 INJECTION INTRAMUSCULAR; INTRAVENOUS; SUBCUTANEOUS at 04:01

## 2023-12-11 RX ADMIN — CYCLOBENZAPRINE HYDROCHLORIDE 10 MILLIGRAM(S): 10 TABLET, FILM COATED ORAL at 17:24

## 2023-12-11 RX ADMIN — ZINC SULFATE TAB 220 MG (50 MG ZINC EQUIVALENT) 220 MILLIGRAM(S): 220 (50 ZN) TAB at 13:47

## 2023-12-11 RX ADMIN — Medication 250 MILLIGRAM(S): at 13:26

## 2023-12-11 RX ADMIN — Medication 5 MILLIGRAM(S): at 09:05

## 2023-12-11 RX ADMIN — FINASTERIDE 5 MILLIGRAM(S): 5 TABLET, FILM COATED ORAL at 13:45

## 2023-12-11 RX ADMIN — LIDOCAINE 1 PATCH: 4 CREAM TOPICAL at 13:46

## 2023-12-11 RX ADMIN — ENOXAPARIN SODIUM 40 MILLIGRAM(S): 100 INJECTION SUBCUTANEOUS at 22:01

## 2023-12-11 RX ADMIN — TAMSULOSIN HYDROCHLORIDE 0.4 MILLIGRAM(S): 0.4 CAPSULE ORAL at 21:08

## 2023-12-11 RX ADMIN — Medication 250 MILLIGRAM(S): at 17:24

## 2023-12-11 RX ADMIN — Medication 500 MILLIGRAM(S): at 13:45

## 2023-12-11 RX ADMIN — HYDROMORPHONE HYDROCHLORIDE 1 MILLIGRAM(S): 2 INJECTION INTRAMUSCULAR; INTRAVENOUS; SUBCUTANEOUS at 13:25

## 2023-12-11 RX ADMIN — Medication 1 TABLET(S): at 13:45

## 2023-12-11 RX ADMIN — GABAPENTIN 600 MILLIGRAM(S): 400 CAPSULE ORAL at 15:27

## 2023-12-11 RX ADMIN — QUETIAPINE FUMARATE 100 MILLIGRAM(S): 200 TABLET, FILM COATED ORAL at 09:06

## 2023-12-11 RX ADMIN — Medication 600 MILLIGRAM(S): at 17:25

## 2023-12-11 RX ADMIN — OXYCODONE HYDROCHLORIDE 5 MILLIGRAM(S): 5 TABLET ORAL at 05:48

## 2023-12-11 RX ADMIN — AMLODIPINE BESYLATE 2.5 MILLIGRAM(S): 2.5 TABLET ORAL at 05:49

## 2023-12-11 RX ADMIN — ATORVASTATIN CALCIUM 40 MILLIGRAM(S): 80 TABLET, FILM COATED ORAL at 21:09

## 2023-12-11 RX ADMIN — ERTAPENEM SODIUM 120 MILLIGRAM(S): 1 INJECTION, POWDER, LYOPHILIZED, FOR SOLUTION INTRAMUSCULAR; INTRAVENOUS at 00:42

## 2023-12-11 RX ADMIN — DULOXETINE HYDROCHLORIDE 30 MILLIGRAM(S): 30 CAPSULE, DELAYED RELEASE ORAL at 13:47

## 2023-12-11 RX ADMIN — Medication 1 TABLET(S): at 17:32

## 2023-12-11 RX ADMIN — NYSTATIN CREAM 1 APPLICATION(S): 100000 CREAM TOPICAL at 17:30

## 2023-12-11 RX ADMIN — GABAPENTIN 600 MILLIGRAM(S): 400 CAPSULE ORAL at 21:34

## 2023-12-11 RX ADMIN — HYDROMORPHONE HYDROCHLORIDE 1 MILLIGRAM(S): 2 INJECTION INTRAMUSCULAR; INTRAVENOUS; SUBCUTANEOUS at 15:28

## 2023-12-11 RX ADMIN — CYCLOBENZAPRINE HYDROCHLORIDE 10 MILLIGRAM(S): 10 TABLET, FILM COATED ORAL at 05:49

## 2023-12-11 RX ADMIN — PANTOPRAZOLE SODIUM 40 MILLIGRAM(S): 20 TABLET, DELAYED RELEASE ORAL at 09:04

## 2023-12-11 RX ADMIN — LIDOCAINE 1 PATCH: 4 CREAM TOPICAL at 20:05

## 2023-12-11 RX ADMIN — Medication 250 MILLIGRAM(S): at 00:43

## 2023-12-11 RX ADMIN — QUETIAPINE FUMARATE 400 MILLIGRAM(S): 200 TABLET, FILM COATED ORAL at 21:08

## 2023-12-11 RX ADMIN — HYDROMORPHONE HYDROCHLORIDE 1 MILLIGRAM(S): 2 INJECTION INTRAMUSCULAR; INTRAVENOUS; SUBCUTANEOUS at 22:08

## 2023-12-11 RX ADMIN — POLYETHYLENE GLYCOL 3350 17 GRAM(S): 17 POWDER, FOR SOLUTION ORAL at 13:46

## 2023-12-11 RX ADMIN — QUETIAPINE FUMARATE 100 MILLIGRAM(S): 200 TABLET, FILM COATED ORAL at 17:29

## 2023-12-11 RX ADMIN — Medication 5 MILLIGRAM(S): at 21:34

## 2023-12-11 RX ADMIN — GABAPENTIN 600 MILLIGRAM(S): 400 CAPSULE ORAL at 05:49

## 2023-12-11 RX ADMIN — DIVALPROEX SODIUM 500 MILLIGRAM(S): 500 TABLET, DELAYED RELEASE ORAL at 21:09

## 2023-12-11 RX ADMIN — METHADONE HYDROCHLORIDE 110 MILLIGRAM(S): 40 TABLET ORAL at 13:26

## 2023-12-11 RX ADMIN — Medication 3 MILLILITER(S): at 17:10

## 2023-12-11 RX ADMIN — Medication 650 MILLIGRAM(S): at 13:44

## 2023-12-11 RX ADMIN — HYDROMORPHONE HYDROCHLORIDE 1 MILLIGRAM(S): 2 INJECTION INTRAMUSCULAR; INTRAVENOUS; SUBCUTANEOUS at 21:08

## 2023-12-11 RX ADMIN — SENNA PLUS 2 TABLET(S): 8.6 TABLET ORAL at 21:33

## 2023-12-11 RX ADMIN — Medication 4 MILLILITER(S): at 17:12

## 2023-12-11 RX ADMIN — Medication 650 MILLIGRAM(S): at 16:40

## 2023-12-11 NOTE — PROVIDER CONTACT NOTE (CRITICAL VALUE NOTIFICATION) - TEST AND RESULT REPORTED:
Abcess culture from 12/04 positive, rare proteus mirabitis ESBL, Rare Alcaligene Faecalis, few Corgnebaterium species susesptibilities not performed

## 2023-12-11 NOTE — DISCHARGE NOTE NURSING/CASE MANAGEMENT/SOCIAL WORK - PATIENT PORTAL LINK FT
You can access the FollowMyHealth Patient Portal offered by [facility] by registering at the following website: [email or web address]. By joining [employer]’s FollowMyHealth portal, you will also be able to view your health information using other applications (apps) compatible with our system. You can access the FollowMyHealth Patient Portal offered by [facility] by registering at the following website: [email or web address]. By joining [employer]’s FollowMyHealth portal, you will also be able to view your health information using other applications (apps) compatible with our system.

## 2023-12-11 NOTE — BH CONSULTATION LIAISON PROGRESS NOTE - OTHER
significant LE contractures   intact  concerned  high end of fair  deferred  appropriate to context  weakened (expected)  gaunt / deconditioned

## 2023-12-11 NOTE — CHART NOTE - NSCHARTNOTEFT_GEN_A_CORE
Gonzalez Stewart is a 53 year old male with PMHx of cervical epidural abscess (s/p decompressive laminectomy 3/2021 c/b b/l leg paralysis), hx of pneumoperitoneum (s/p ex lap, total abdominal colectomy and end ileostomy (on 1/12/23) c/b evisceration and sepsis with MRSA bacteremia postoperatively), hx of hepatitis C, hx of R. buttock abscess, hx of L. foot osteomyelitis, neurogenic bladder (with indwelling Holcomb catheter, last exchanged two days ago), HTN, HLD, bipolar disorder, GERD, hx of opioid dependence, and constipation who presented to the ED on 12/4/23 from [facility] for feet infection and admitted for sepsis secondary to bilateral feet infection.    Notified by RN in regards to critical result: positive right and left foot wound cultures from 12/4/23.   Right foot abscess positive for rare pseudomonas aeruginosa, moderate MRSA, rare Klebsiella pneumoniae, and moderate bacteroides fragilis.   Left foot abscess positive for rare proteus mirabilis ESBL, rare alcaligenes faecalis and few corybacterium species.   Patient on Vancomycin and Ertapenem  ID on board   Discussed case with Dr. Jean Herrera - continue with ID recs (continue with current antibiotics) Gonzalez Stewart is a 53 year old male with PMHx of cervical epidural abscess (s/p decompressive laminectomy 3/2021 c/b b/l leg paralysis), hx of pneumoperitoneum (s/p ex lap, total abdominal colectomy and end ileostomy (on 1/12/23) c/b evisceration and sepsis with MRSA bacteremia postoperatively), hx of hepatitis C, hx of R. buttock abscess, hx of L. foot osteomyelitis, neurogenic bladder (with indwelling Holcomb catheter, last exchanged two days ago), HTN, HLD, bipolar disorder, GERD, hx of opioid dependence, and constipation who presented to the ED on 12/4/23 from [facility] for feet infection and admitted for sepsis secondary to bilateral feet infection.    Notified by RN in regards to critical result: positive right and left foot wound cultures from 12/4/23.   Right foot abscess positive for rare pseudomonas aeruginosa, moderate MRSA, rare Klebsiella pneumoniae, and moderate bacteroides fragilis.   Left foot abscess positive for rare proteus mirabilis ESBL, rare alcaligenes faecalis and few corybacterium species.   Patient on Vancomycin and Ertapenem  ID on board   Discussed case with Dr. Jean Herrera - continue with ID recs (continue with current antibiotics) Gonzalez Stewart is a 53 year old male with PMHx of cervical epidural abscess (s/p decompressive laminectomy 3/2021 c/b b/l leg paralysis), hx of pneumoperitoneum (s/p ex lap, total abdominal colectomy and end ileostomy (on 1/12/23) c/b evisceration and sepsis with MRSA bacteremia postoperatively), hx of hepatitis C, hx of R. buttock abscess, hx of L. foot osteomyelitis, neurogenic bladder (with indwelling Holcomb catheter, last exchanged two days ago), HTN, HLD, bipolar disorder, GERD, hx of opioid dependence, and constipation who presented to the ED on 12/4/23 from [facility] for feet infection and admitted for sepsis secondary to bilateral feet infection.    Notified by RN in regards to critical result: positive right and left foot wound cultures from 12/4/23.   Right foot abscess positive for rare pseudomonas aeruginosa, moderate MRSA, rare Klebsiella pneumoniae, and moderate bacteroides fragilis.   Left foot abscess positive for rare proteus mirabilis ESBL, rare alcaligenes faecalis and few corynebacterium species.   Patient on Vancomycin and Ertapenem  ID on board   Discussed case with Dr. Jean Herrera - continue with ID recs (continue with current antibiotics) Gonzalez Stewart is a 53 year old male with PMHx of cervical epidural abscess (s/p decompressive laminectomy 3/2021 c/b b/l leg paralysis), hx of pneumoperitoneum (s/p ex lap, total abdominal colectomy and end ileostomy (on 1/12/23) c/b evisceration and sepsis with MRSA bacteremia postoperatively), hx of hepatitis C, hx of R. buttock abscess, hx of L. foot osteomyelitis, neurogenic bladder (with indwelling Holcomb catheter, last exchanged two days ago), HTN, HLD, bipolar disorder, GERD, hx of opioid dependence, and constipation who presented to the ED on 12/4/23 from [facility] for feet infection and admitted for sepsis secondary to bilateral feet infection.    Notified by RN in regards to critical result: positive right and left foot wound cultures from 12/4/23.   Right foot abscess positive for rare pseudomonas aeruginosa, moderate MRSA, rare Klebsiella pneumoniae, and moderate bacteroides fragilis.   Left foot abscess positive for rare proteus mirabilis ESBL, rare alcaligenes faecalis and few corynebacterium species.   Patient on Vancomycin and Ertapenem  ID on board   Discussed case with Dr. Jean Herrera - continue with ID recs (continue with current antibiotics)

## 2023-12-11 NOTE — PROGRESS NOTE ADULT - ASSESSMENT
Gonzalez Stewart is a 53 year old male with PMHx of cervical epidural abscess (s/p decompressive laminectomy 3/2021 c/b b/l leg paralysis), hx of pneumoperitoneum (s/p ex lap, total abdominal colectomy and end ileostomy (on 1/12/23) c/b evisceration and sepsis with MRSA bacteremia postoperatively), hx of hepatitis C, hx of R. buttock abscess, hx of L. foot osteomyelitis, neurogenic bladder (with indwelling Holcomb catheter, last exchanged two days ago), HTN, HLD, bipolar disorder, GERD, hx of opioid dependence, and constipation who presented to the ED on 12/4/23 from [facility] for feet infection and admitted for sepsis secondary to bilateral feet infection.    Sepsis secondary to b/l feet infection  Less likely UTI given urine sample was not clean catch in pt with indwelling Holcomb and no urinary symptoms  Complaints of b/l feet infection intermittently over the past year  Previously treated for L. hallux OM with L. heel culture growing Proteus mirabilis ESBL, completed 6-week course of avycaz  Temp 101 and WBC 15.25K on admission  Lactic WNL, ESR 84  U/A (sample obtained from Holcomb catheter) with positive nitrites, moderate leuks, moderate blood, WBC 26-50, RBC > 50, moderate bacteria, squamous epithelial cells present  CT b/l LE with study is severely limited by contracture. Left lower extremity is only partially visualized with exclusion of the left foot. Skin induration and subcutaneous edema in the leg and ankle.  No soft tissue gas  S/p LR 2800 cc bolus, vancomycin, and zosyn in the ED  S/p bedside escharectomy by podiatry  Empirically started on vancomycin and cefepime; can de-escalate pending final culture data  Wound culture +GPC in pairs  F/u blood cultures, urine culture, CRP, MRSA/MSSA PCR  Please examine R. buttock as pt with hx of abscess in that area (unable to turn at the time of my examination due to severe pain)  Monitor WBC trend and fever curve  Pending vascular surgery evaluation, VITALY evaluated  Podiatry recommendations appreciated  12/5/2023 wound cx  done by podiatry -- shows rare gpv   blood cx -- pending   id consult will be obtained given his complex medical history  esbl   his contracted state prevents mri   will start parenteral pain meds as he chronic  oral oxycodone isnt helping. ( he received 4mg morphine in ed and another 2 mg morphine without relief )  12/6/2023- podiatry has reccs for no surgical intervention, vascular also provides no reccs for surgical intervention  12/7/2023 await final ID reccs as there appears to be no recommendations from either vascular or podiatry for surgical intervention    wound on feet growing mrsa, pseudomonas on ne cx and proteus on the other   as well Klebsiella in urine   12/8/2023 d/w ID on 12/7/2023 and she stated she wants to assess pt on today before deciding on PICC line insertion   I will also d/w SW to investigate if pt can return to facility with the antibx as they  are very expensive   12/9/2023 antibx to changed to Ertapenem and his facility will not pay for ayzac-- i d/w dr. queen and the Depakote is being used for mood and NOT seizure- she will mange this medication   . ID is in agreement and ordered the Ertapenem     now he will need a PICC line and start d./c planning hopefully for Monday/ Tuesday      12/10/2023 start d/c planning   12/11/2023 ready for dc    Chronic medical conditions:  HTN: PTA amlodipine 2.5 mg   HLD: PTA atorvastatin 40 mg  Bipolar disorder: PTA quetiapine 100 mg BID and 400 mg qhs, valproic acid-- per psych  Hx of opioid dependence: PTA methadone 110 mg   Chronic pain: PTA lidocaine 4% patch, duloxetine 30 mg DR, gabapentin 600 mg q8, cyclobenzaprine 10 mg BID, oxycodone 5 mg q6  GERD: PTA pantoprazole 40 mg DR  Neurogenic bladder (with indwelling Holcomb catheter): PTA finasteride 5 mg, tamsulosin 0.4 mg  Constipation: PTA senna 8.6 mg 2 tabs qhs   functional quad- supportive care    .  Gonzalez Stewart is a 53 year old male with PMHx of cervical epidural abscess (s/p decompressive laminectomy 3/2021 c/b b/l leg paralysis), hx of pneumoperitoneum (s/p ex lap, total abdominal colectomy and end ileostomy (on 1/12/23) c/b evisceration and sepsis with MRSA bacteremia postoperatively), hx of hepatitis C, hx of R. buttock abscess, hx of L. foot osteomyelitis, neurogenic bladder (with indwelling Holcomb catheter, last exchanged two days ago), HTN, HLD, bipolar disorder, GERD, hx of opioid dependence, and constipation who presented to the ED on 12/4/23 from [facility] for feet infection and admitted for sepsis secondary to bilateral feet infection.    Sepsis secondary to b/l feet infection  Less likely UTI given urine sample was not clean catch in pt with indwelling Holcomb and no urinary symptoms  Complaints of b/l feet infection intermittently over the past year  Previously treated for L. hallux OM with L. heel culture growing Proteus mirabilis ESBL, completed 6-week course of avycaz  Temp 101 and WBC 15.25K on admission  Lactic WNL, ESR 84  U/A (sample obtained from Holcomb catheter) with positive nitrites, moderate leuks, moderate blood, WBC 26-50, RBC > 50, moderate bacteria, squamous epithelial cells present  CT b/l LE with study is severely limited by contracture. Left lower extremity is only partially visualized with exclusion of the left foot. Skin induration and subcutaneous edema in the leg and ankle.  No soft tissue gas  S/p LR 2800 cc bolus, vancomycin, and zosyn in the ED  S/p bedside escharectomy by podiatry  Empirically started on vancomycin and cefepime; can de-escalate pending final culture data  Wound culture +GPC in pairs  F/u blood cultures, urine culture, CRP, MRSA/MSSA PCR  Please examine R. buttock as pt with hx of abscess in that area (unable to turn at the time of my examination due to severe pain)  Monitor WBC trend and fever curve  Pending vascular surgery evaluation, VITALY evaluated  Podiatry recommendations appreciated  12/5/2023 wound cx  done by podiatry -- shows rare gpv   blood cx -- pending   id consult will be obtained given his complex medical history  esbl   his contracted state prevents mri   will start parenteral pain meds as he chronic  oral oxycodone isnt helping. ( he received 4mg morphine in ed and another 2 mg morphine without relief )  12/6/2023- podiatry has reccs for no surgical intervention, vascular also provides no reccs for surgical intervention  12/7/2023 await final ID reccs as there appears to be no recommendations from either vascular or podiatry for surgical intervention    wound on feet growing mrsa, pseudomonas on ne cx and proteus on the other   as well Klebsiella in urine   12/8/2023 d/w ID on 12/7/2023 and she stated she wants to assess pt on today before deciding on PICC line insertion   I will also d/w SW to investigate if pt can return to facility with the antibx as they  are very expensive   12/9/2023 antibx to changed to Ertapenem and his facility will not pay for ayzac-- i d/w dr. queen and the Depakote is being used for mood and NOT seizure- she will mange this medication   . ID is in agreement and ordered the Ertapenem     now he will need a PICC line and start d./c planning hopefully for Monday/ Tuesday      12/10/2023 start d/c planning   12/11/2023 ready for dc    Chronic medical conditions:  HTN: PTA amlodipine 2.5 mg   HLD: PTA atorvastatin 40 mg  Bipolar disorder: PTA quetiapine 100 mg BID and 400 mg qhs, valproic acid-- per psych  Hx of opioid dependence: PTA methadone 110 mg   Chronic pain: PTA lidocaine 4% patch, duloxetine 30 mg DR, gabapentin 600 mg q8, cyclobenzaprine 10 mg BID, oxycodone 5 mg q6  GERD: PTA pantoprazole 40 mg DR  Neurogenic bladder (with indwelling Holcomb catheter): PTA finasteride 5 mg, tamsulosin 0.4 mg  Constipation: PTA senna 8.6 mg 2 tabs qhs   functional quad- supportive care    .  Gonzalez Stewart is a 53 year old male with PMHx of cervical epidural abscess (s/p decompressive laminectomy 3/2021 c/b b/l leg paralysis), hx of pneumoperitoneum (s/p ex lap, total abdominal colectomy and end ileostomy (on 1/12/23) c/b evisceration and sepsis with MRSA bacteremia postoperatively), hx of hepatitis C, hx of R. buttock abscess, hx of L. foot osteomyelitis, neurogenic bladder (with indwelling Holcomb catheter, last exchanged two days ago), HTN, HLD, bipolar disorder, GERD, hx of opioid dependence, and constipation who presented to the ED on 12/4/23 from [facility] for feet infection and admitted for sepsis secondary to bilateral feet infection.    Sepsis secondary to b/l feet infection  Less likely UTI given urine sample was not clean catch in pt with indwelling Holcomb and no urinary symptoms  Complaints of b/l feet infection intermittently over the past year  Previously treated for L. hallux OM with L. heel culture growing Proteus mirabilis ESBL, completed 6-week course of avycaz  Temp 101 and WBC 15.25K on admission  Lactic WNL, ESR 84  U/A (sample obtained from Holcomb catheter) with positive nitrites, moderate leuks, moderate blood, WBC 26-50, RBC > 50, moderate bacteria, squamous epithelial cells present  CT b/l LE with study is severely limited by contracture. Left lower extremity is only partially visualized with exclusion of the left foot. Skin induration and subcutaneous edema in the leg and ankle.  No soft tissue gas  S/p LR 2800 cc bolus, vancomycin, and zosyn in the ED  S/p bedside escharectomy by podiatry  Empirically started on vancomycin and cefepime; can de-escalate pending final culture data  Wound culture +GPC in pairs  F/u blood cultures, urine culture, CRP, MRSA/MSSA PCR  Please examine R. buttock as pt with hx of abscess in that area (unable to turn at the time of my examination due to severe pain)  Monitor WBC trend and fever curve  Pending vascular surgery evaluation, VITALY evaluated  Podiatry recommendations appreciated  12/5/2023 wound cx  done by podiatry -- shows rare gpv   blood cx -- pending   id consult will be obtained given his complex medical history  esbl   his contracted state prevents mri   will start parenteral pain meds as he chronic  oral oxycodone isnt helping. ( he received 4mg morphine in ed and another 2 mg morphine without relief )  12/6/2023- podiatry has reccs for no surgical intervention, vascular also provides no reccs for surgical intervention  12/7/2023 await final ID reccs as there appears to be no recommendations from either vascular or podiatry for surgical intervention    wound on feet growing mrsa, pseudomonas on ne cx and proteus on the other   as well Klebsiella in urine   12/8/2023 d/w ID on 12/7/2023 and she stated she wants to assess pt on today before deciding on PICC line insertion   I will also d/w SW to investigate if pt can return to facility with the antibx as they  are very expensive   12/9/2023 antibx to changed to Ertapenem and his facility will not pay for ayzac-- i d/w dr. queen and the Depakote is being used for mood and NOT seizure- she will mange this medication   . ID is in agreement and ordered the Ertapenem     now he will need a PICC line and start d./c planning hopefully for Monday/ Tuesday      12/10/2023 start d/c planning   12/11/2023 ready for dc. end date for ertapenem and vancomycin will be january 6, 2024   remove picc line after completion of antibx    Chronic medical conditions:  HTN: PTA amlodipine 2.5 mg   HLD: PTA atorvastatin 40 mg  Bipolar disorder: PTA quetiapine 100 mg BID and 400 mg qhs, valproic acid-- per psych  Hx of opioid dependence: PTA methadone 110 mg   Chronic pain: PTA lidocaine 4% patch, duloxetine 30 mg DR, gabapentin 600 mg q8, cyclobenzaprine 10 mg BID, oxycodone 5 mg q6  GERD: PTA pantoprazole 40 mg DR  Neurogenic bladder (with indwelling Holcomb catheter): PTA finasteride 5 mg, tamsulosin 0.4 mg  Constipation: PTA senna 8.6 mg 2 tabs qhs   functional quad- supportive care    .  Gonzalez Stewart is a 53 year old male with PMHx of cervical epidural abscess (s/p decompressive laminectomy 3/2021 c/b b/l leg paralysis), hx of pneumoperitoneum (s/p ex lap, total abdominal colectomy and end ileostomy (on 1/12/23) c/b evisceration and sepsis with MRSA bacteremia postoperatively), hx of hepatitis C, hx of R. buttock abscess, hx of L. foot osteomyelitis, neurogenic bladder (with indwelling Holcomb catheter, last exchanged two days ago), HTN, HLD, bipolar disorder, GERD, hx of opioid dependence, and constipation who presented to the ED on 12/4/23 from [facility] for feet infection and admitted for sepsis secondary to bilateral feet infection.    Sepsis secondary to b/l feet infection  Less likely UTI given urine sample was not clean catch in pt with indwelling Holcomb and no urinary symptoms  Complaints of b/l feet infection intermittently over the past year  Previously treated for L. hallux OM with L. heel culture growing Proteus mirabilis ESBL, completed 6-week course of avycaz  Temp 101 and WBC 15.25K on admission  Lactic WNL, ESR 84  U/A (sample obtained from Holcomb catheter) with positive nitrites, moderate leuks, moderate blood, WBC 26-50, RBC > 50, moderate bacteria, squamous epithelial cells present  CT b/l LE with study is severely limited by contracture. Left lower extremity is only partially visualized with exclusion of the left foot. Skin induration and subcutaneous edema in the leg and ankle.  No soft tissue gas  S/p LR 2800 cc bolus, vancomycin, and zosyn in the ED  S/p bedside escharectomy by podiatry  Empirically started on vancomycin and cefepime; can de-escalate pending final culture data  Wound culture +GPC in pairs  F/u blood cultures, urine culture, CRP, MRSA/MSSA PCR  Please examine R. buttock as pt with hx of abscess in that area (unable to turn at the time of my examination due to severe pain)  Monitor WBC trend and fever curve  Pending vascular surgery evaluation, VITALY evaluated  Podiatry recommendations appreciated  12/5/2023 wound cx  done by podiatry -- shows rare gpv   blood cx -- pending   id consult will be obtained given his complex medical history  esbl   his contracted state prevents mri   will start parenteral pain meds as he chronic  oral oxycodone isnt helping. ( he received 4mg morphine in ed and another 2 mg morphine without relief )  12/6/2023- podiatry has reccs for no surgical intervention, vascular also provides no reccs for surgical intervention  12/7/2023 await final ID reccs as there appears to be no recommendations from either vascular or podiatry for surgical intervention    wound on feet growing mrsa, pseudomonas on ne cx and proteus on the other   as well Klebsiella in urine   12/8/2023 d/w ID on 12/7/2023 and she stated she wants to assess pt on today before deciding on PICC line insertion   I will also d/w SW to investigate if pt can return to facility with the antibx as they  are very expensive   12/9/2023 antibx to changed to Ertapenem and his facility will not pay for ayzac-- i d/w dr. queen and the Depakote is being used for mood and NOT seizure- she will mange this medication   . ID is in agreement and ordered the Ertapenem     now he will need a PICC line and start d./c planning hopefully for Monday/ Tuesday      12/10/2023 start d/c planning   12/11/2023 ready for dc. end date for ertapenem and vancomycin will be january 6, 2024   remove picc line after completion of antibx    Chronic medical conditions:  HTN: PTA amlodipine 2.5 mg   HLD: PTA atorvastatin 40 mg  Bipolar disorder: PTA quetiapine 100 mg BID and 400 mg qhs, valproic acid-- per psych  Hx of opioid dependence: PTA methadone 110 mg   Chronic pain: PTA lidocaine 4% patch, duloxetine 30 mg DR, gabapentin 600 mg q8, cyclobenzaprine 10 mg BID, oxycodone 5 mg q6  GERD: PTA pantoprazole 40 mg DR  Neurogenic bladder (with indwelling Holcomb catheter): PTA finasteride 5 mg, tamsulosin 0.4 mg  Constipation: PTA senna 8.6 mg 2 tabs qhs   functional quad- supportive care    .

## 2023-12-11 NOTE — BH CONSULTATION LIAISON PROGRESS NOTE - NSBHFUPINTERVALHXFT_PSY_A_CORE
No significant interval events over the weekend. Pt s/p PICC line placement today. Patient has remained calm, cooperative, medication and treatment compliant. Maintaining the same psychiatric clinical presentation consistent with his baseline.

## 2023-12-11 NOTE — PROGRESS NOTE ADULT - SUBJECTIVE AND OBJECTIVE BOX
Patient is a 53y old  Male who presents with a chief complaint of Sepsis secondary to b/l foot wounds (05 Dec 2023 12:34)      OVERNIGHT EVENTS:  none      MEDICATIONS  (STANDING):  acetaminophen     Tablet .. 650 milliGRAM(s) Oral daily  amLODIPine   Tablet 2.5 milliGRAM(s) Oral daily  ascorbic acid 500 milliGRAM(s) Oral daily  atorvastatin 40 milliGRAM(s) Oral at bedtime  bacitracin   Ointment 1 Application(s) Topical daily  collagenase Ointment 1 Application(s) Topical daily  cyclobenzaprine 10 milliGRAM(s) Oral two times a day  dextrose 5% + sodium chloride 0.45%. 1000 milliLiter(s) (50 mL/Hr) IV Continuous <Continuous>  divalproex  milliGRAM(s) Oral at bedtime  DULoxetine 30 milliGRAM(s) Oral daily  enoxaparin Injectable 40 milliGRAM(s) SubCutaneous every 24 hours  ertapenem  IVPB 1000 milliGRAM(s) IV Intermittent every 24 hours  finasteride 5 milliGRAM(s) Oral daily  gabapentin 600 milliGRAM(s) Oral <User Schedule>  lactobacillus acidophilus 1 Tablet(s) Oral two times a day with meals  lidocaine   4% Patch 1 Patch Transdermal daily  methadone  Concentrate 110 milliGRAM(s) Oral daily  methylphenidate 5 milliGRAM(s) Oral <User Schedule>  multivitamin 1 Tablet(s) Oral daily  oxyCODONE    IR 5 milliGRAM(s) Oral <User Schedule>  pantoprazole    Tablet 40 milliGRAM(s) Oral before breakfast  polyethylene glycol 3350 17 Gram(s) Oral daily  QUEtiapine 100 milliGRAM(s) Oral <User Schedule>  QUEtiapine 400 milliGRAM(s) Oral at bedtime  senna 2 Tablet(s) Oral at bedtime  tamsulosin 0.4 milliGRAM(s) Oral at bedtime  thiamine 100 milliGRAM(s) Oral daily  vancomycin  IVPB 1000 milliGRAM(s) IV Intermittent every 12 hours  vancomycin  IVPB      zinc sulfate 220 milliGRAM(s) Oral daily    MEDICATIONS  (PRN):  acetaminophen     Tablet .. 650 milliGRAM(s) Oral every 6 hours PRN Temp greater or equal to 38C (100.4F), Mild Pain (1 - 3)  albuterol    90 MICROgram(s) HFA Inhaler 2 Puff(s) Inhalation every 4 hours PRN Shortness of Breath and/or Wheezing  aluminum hydroxide/magnesium hydroxide/simethicone Suspension 30 milliLiter(s) Oral every 4 hours PRN Dyspepsia  HYDROmorphone  Injectable 1 milliGRAM(s) IV Push every 4 hours PRN Severe Pain (7 - 10)  melatonin 3 milliGRAM(s) Oral at bedtime PRN Insomnia  ondansetron Injectable 4 milliGRAM(s) IV Push every 8 hours PRN Nausea and/or Vomiting    Allergies    NSAIDs (Flushing; Other (Moderate))  Haldol (Anaphylaxis)  Zyprexa (Rash; Dystonic RXN; Hives)  Motrin (Anaphylaxis)  Thorazine (Other (Moderate))  Aleve (Unknown)  Stelazine (Unknown)  Risperdal (Short breath; Rash; Hives)    Intolerances        SUBJECTIVE: in bed in nad     Vital Signs Last 24 Hrs  T(C): 37.1 (11 Dec 2023 06:20), Max: 37.3 (10 Dec 2023 17:36)  T(F): 98.8 (11 Dec 2023 06:20), Max: 99.1 (10 Dec 2023 17:36)  HR: 90 (11 Dec 2023 06:20) (89 - 94)  BP: 136/75 (11 Dec 2023 06:20) (104/73 - 136/75)  BP(mean): --  RR: 18 (11 Dec 2023 06:20) (17 - 18)  SpO2: 97% (11 Dec 2023 06:20) (94% - 97%)    Parameters below as of 11 Dec 2023 06:20  Patient On (Oxygen Delivery Method): room air          PHYSICAL EXAM:  GENERAL: NAD, well-groomed, well-developed  HEAD:  Atraumatic, Normocephalic  EYES: EOMI, PERRLA, conjunctiva and sclera clear  ENMT: No tonsillar erythema, exudates, or enlargement; Moist mucous membranes, Good dentition, No lesions  NECK: Supple,  CHEST/LUNG: Clear to  auscultation bilaterally; No rales, rhonchi, wheezing, or rubs  bilaterally  HEART: Regular rate and rhythm; No murmurs, rubs, or gallops  ABDOMEN: Soft, Nontender, Nondistended; Bowel sounds present rlq colostomy bag  EXTREMITIES:  2+ Peripheral Pulses, No clubbing, cyanosis, + edema BL LE especially left foot, extremely contracted lower extremity   SKIN: stage 3 right hip ulcer, right foot heel necrotic to bone , left foot dorsum had eschar     NERVOUS SYSTEM:  Alert & Oriented X3, Good concentration; functional quad.    LABS:                                            PTT - ( 04 Dec 2023 17:20 )  PTT:33.4 sec  Urinalysis Basic - ( 04 Dec 2023 22:05 )    Color: Yellow / Appearance: Cloudy / S.021 / pH: x  Gluc: x / Ketone: Negative mg/dL  / Bili: Negative / Urobili: 1.0 mg/dL   Blood: x / Protein: 30 mg/dL / Nitrite: Positive   Leuk Esterase: Moderate / RBC: >50 /HPF / WBC 26-50 /HPF   Sq Epi: x / Non Sq Epi: x / Bacteria: Moderate /HPF      Cultures;   CAPILLARY BLOOD GLUCOSE      Culture - Urine (collected 04 Dec 2023 22:05)  Source: Clean Catch Clean Catch (Midstream)  Preliminary Report (07 Dec 2023 12:19):    >100,000 CFU/ml Klebsiella pneumoniae    50,000 - 99,000 CFU/mL Gram positive organisms  Organism: Klebsiella pneumoniae (07 Dec 2023 12:18)  Organism: Klebsiella pneumoniae (07 Dec 2023 12:18)    Culture - Abscess with Gram Stain (collected 04 Dec 2023 20:00)  Source: .Abscess right foot wound  Gram Stain (prelim) (06 Dec 2023 14:12):    No polymorphonuclear leukocytes seen per low power field    Rare Gram positive cocci in pairs seen per oil power field  Preliminary Report (06 Dec 2023 14:12):    Rare Proteus mirabilis    Few Corynebacterium species "Susceptibilities not performed"  Organism: Proteus mirabilis ESBL (07 Dec 2023 10:29)  Organism: Proteus mirabilis ESBL (07 Dec 2023 10:29)      Culture - Urine (collected 04 Dec 2023 22:05)  Source: Clean Catch Clean Catch (Midstream)  Preliminary Report (07 Dec 2023 12:19):    >100,000 CFU/ml Klebsiella pneumoniae    50,000 - 99,000 CFU/mL Gram positive organisms  Organism: Klebsiella pneumoniae (07 Dec 2023 12:18)  Organism: Klebsiella pneumoniae (07 Dec 2023 12:18)    Culture - Abscess with Gram Stain (collected 04 Dec 2023 20:00)  Source: .Abscess right foot wound  Gram Stain (prelim) (06 Dec 2023 14:12):    No polymorphonuclear leukocytes seen per low power field    Rare Gram positive cocci in pairs seen per oil power field  Preliminary Report (06 Dec 2023 14:12):    Rare Proteus mirabilis    Few Corynebacterium species "Susceptibilities not performed"  Organism: Proteus mirabilis ESBL (07 Dec 2023 10:29)  Organism: Proteus mirabilis ESBL (07 Dec 2023 10:29)    Culture - Abscess with Gram Stain (collected 04 Dec 2023 20:00)  Source: .Abscess left wound culture  Gram Stain (prelim) (06 Dec 2023 14:40):    No polymorphonuclear leukocytes seen per low power field    Rare Gram positive cocci in pairs seen per oil power field  Preliminary Report (07 Dec 2023 13:35):    Rare Pseudomonas aeruginosa    Moderate Methicillin Resistant Staphylococcus aureus    Rare Klebsiella pneumoniae    Moderate Bacteroides fragilis #2 "Susceptibilities not performed"  Organism: Pseudomonas aeruginosa  Methicillin resistant Staphylococcus aureus (07 Dec 2023 10:24)  Organism: Methicillin resistant Staphylococcus aureus (07 Dec 2023 10:24)  Organism: Pseudomonas aeruginosa (07 Dec 2023 10:23)    Culture - Blood (collected 04 Dec 2023 17:35)  Source: .Blood Blood-Peripheral  Preliminary Report (07 Dec 2023 01:02):    No growth at 48 Hours    Culture - Blood (collected 04 Dec 2023 17:20)  Source: .Blood Blood-Peripheral  Preliminary Report (07 Dec 2023 01:02):    No growth at 48 Hours        RADIOLOGY & ADDITIONAL TESTS:      Imaging Personally Reviewed:  [ x] YES      Consultant(s) Notes Reviewed:  [x ] YES     Care Discussed with [x ] Consultants [X ] Patient [x ] Family  [x ]    [x ]  Other; RN

## 2023-12-11 NOTE — BH CONSULTATION LIAISON PROGRESS NOTE - NSBHATTESTBILLING_PSY_A_CORE
14104-[id number] OBS or IP - low complexity OR 25-34 mins [id number]-Favfstfwnv OBS or IP - low complexity OR 25-34 mins

## 2023-12-11 NOTE — CHART NOTE - NSCHARTNOTEFT_GEN_A_CORE
Called to evaluate patient for hypoxia 88%. Transport was going to transfer the patient back to the nursing home when he stated he felt SOB and desat to 88 on 2L. Patient is normally 2L at home. Patient was A/O x3 and not in acute distressed. Patient was examined at bedside and was suctioned. Thick secretions was noted upon oral suctioning.         HPI:  Gonzalez Stewart is a 53 year old male with PMHx of cervical epidural abscess (s/p decompressive laminectomy 3/2021 c/b b/l leg paralysis), hx of pneumoperitoneum (s/p ex lap, total abdominal colectomy and end ileostomy (on 1/12/23) c/b evisceration and sepsis with MRSA bacteremia postoperatively), hx of hepatitis C, hx of R. buttock abscess, hx of L. foot osteomyelitis, neurogenic bladder (with indwelling Holcomb catheter, last exchanged two days ago), HTN, HLD, bipolar disorder, GERD, hx of opioid dependence, and constipation who presented to the ED on 12/4/23 from [facility] for feet infection.    Patient reports he has had bilateral foot infections intermittently for the past year. Completed numerous rounds of antibiotics and even got C. difficile due to all the antibiotics. He is supposed to be getting wound care daily or every two days by wound care nurse at his facility but reports they do not come as they should. States the wound care nurse comes every six days. Wound care physician sees him once a week.    Extensive chart review with paperwork from [facility]. In June 2023, found to have L. hallux osteomyelitis. L. heel wound culture with Proteus mirabilis ESBL. Received PICC line and completed 6-week therapy with ceftazidime/avibactam q8. Found to have detectable viral load of hepatitis C. Previously completed sofosbuvir/velpatasvir so thought to be resistant to that and completed sofosbuvir/velpatasvir/voxilaprevir. Follows with ID.    In the ED, VSS except Tmax 101 and BP as low as 105/59. WBC 15.25K, hgb 10.5, bicarb 34, alkphos 227. ESR 84. U/A (sample obtained from Holcomb catheter) with positive nitrites, moderate leuks, moderate blood, WBC 26-50, RBC > 50, moderate bacteria, squamous epithelial cells present. CT b/l LE with study is severely limited by contracture. Left lower extremity is only partially visualized with exclusion of the left foot. Skin induration and subcutaneous edema in the leg and ankle.  No soft tissue gas. Received acetaminophen 1 g IV, morphine 8 mg IV, oxycodone 5 mg, vancomycin 1 g, zosyn 3.375 g, and LR 2800 cc bolus. Evaluated by vascular surgery PA who states attending to evaluate in AM. Evaluated by podiatry, underwent bedside escharectomy of left foot. Wound culture with +GPC in pairs. (05 Dec 2023 03:00)      acetaminophen     Tablet .. 650 milliGRAM(s) Oral daily  acetaminophen     Tablet .. 650 milliGRAM(s) Oral every 6 hours PRN  albuterol    90 MICROgram(s) HFA Inhaler 2 Puff(s) Inhalation every 4 hours PRN  albuterol/ipratropium for Nebulization 3 milliLiter(s) Nebulizer every 6 hours  aluminum hydroxide/magnesium hydroxide/simethicone Suspension 30 milliLiter(s) Oral every 4 hours PRN  amLODIPine   Tablet 2.5 milliGRAM(s) Oral daily  ascorbic acid 500 milliGRAM(s) Oral daily  atorvastatin 40 milliGRAM(s) Oral at bedtime  bacitracin   Ointment 1 Application(s) Topical daily  chlorhexidine 2% Cloths 1 Application(s) Topical <User Schedule>  collagenase Ointment 1 Application(s) Topical daily  cyclobenzaprine 10 milliGRAM(s) Oral two times a day  divalproex  milliGRAM(s) Oral at bedtime  DULoxetine 30 milliGRAM(s) Oral daily  enoxaparin Injectable 40 milliGRAM(s) SubCutaneous every 24 hours  ertapenem  IVPB 1000 milliGRAM(s) IV Intermittent every 24 hours  finasteride 5 milliGRAM(s) Oral daily  gabapentin 600 milliGRAM(s) Oral <User Schedule>  guaiFENesin  milliGRAM(s) Oral every 12 hours  HYDROmorphone  Injectable 1 milliGRAM(s) IV Push every 4 hours PRN  lactobacillus acidophilus 1 Tablet(s) Oral two times a day with meals  lidocaine   4% Patch 1 Patch Transdermal daily  melatonin 3 milliGRAM(s) Oral at bedtime PRN  methadone  Concentrate 110 milliGRAM(s) Oral daily  methylphenidate 5 milliGRAM(s) Oral <User Schedule>  multivitamin 1 Tablet(s) Oral daily  nystatin Powder 1 Application(s) Topical two times a day  ondansetron Injectable 4 milliGRAM(s) IV Push every 8 hours PRN  oxyCODONE    IR 5 milliGRAM(s) Oral <User Schedule>  pantoprazole    Tablet 40 milliGRAM(s) Oral before breakfast  polyethylene glycol 3350 17 Gram(s) Oral daily  QUEtiapine 400 milliGRAM(s) Oral at bedtime  QUEtiapine 100 milliGRAM(s) Oral <User Schedule>  senna 2 Tablet(s) Oral at bedtime  sodium chloride 0.9% lock flush 10 milliLiter(s) IV Push every 1 hour PRN  sodium chloride 3%  Inhalation 4 milliLiter(s) Inhalation every 6 hours  tamsulosin 0.4 milliGRAM(s) Oral at bedtime  thiamine 100 milliGRAM(s) Oral daily  vancomycin  IVPB      vancomycin  IVPB 1000 milliGRAM(s) IV Intermittent every 12 hours  zinc sulfate 220 milliGRAM(s) Oral daily      T(F): 98.8 (12-11-23 @ 06:20), Max: 99.1 (12-10-23 @ 17:36)  HR: 90 (12-11-23 @ 06:20) (90 - 94)  BP: 136/75 (12-11-23 @ 06:20) (128/60 - 136/75)  RR: 18 (12-11-23 @ 06:20) (17 - 18)  SpO2: 90% (12-11-23 @ 06:20) (94% - 97%) on 2L    PHYSICAL EXAM:  General: NAD, WDWN.   Neuro:  Alert & oriented x 3  HEENT: NCAT, EOMI, conjunctiva clear  CV: +S1+S2 regular rate and rhythm  Lung: clear to ausculation bilaterally, respirations nonlabored, good inspiratory effort  Abdomen: soft, NTND. Normactive BS  Extremities: no pedal edema or calf tenderness noted     LABS:                I&O's Detail    10 Dec 2023 07:01  -  11 Dec 2023 07:00  --------------------------------------------------------  IN:    Oral Fluid: 600 mL  Total IN: 600 mL    OUT:    Indwelling Catheter - Urethral (mL): 900 mL  Total OUT: 900 mL    Total NET: -300 mL      11 Dec 2023 07:01  -  11 Dec 2023 16:54  --------------------------------------------------------  IN:  Total IN: 0 mL    OUT:    Colostomy (mL): 250 mL    Voided (mL): 800 mL  Total OUT: 1050 mL    Total NET: -1050 mL            Assessment:  HPI:  Gonzalez Stewart is a 53 year old male with PMHx of cervical epidural abscess (s/p decompressive laminectomy 3/2021 c/b b/l leg paralysis), hx of pneumoperitoneum (s/p ex lap, total abdominal colectomy and end ileostomy (on 1/12/23) c/b evisceration and sepsis with MRSA bacteremia postoperatively), hx of hepatitis C, hx of R. buttock abscess, hx of L. foot osteomyelitis, neurogenic bladder (with indwelling Holcomb catheter, last exchanged two days ago), HTN, HLD, bipolar disorder, GERD, hx of opioid dependence, and constipation who presented to the ED on 12/4/23 from [facility] for feet infection.      Called to evaluate patient for hypoxia 88%. Transport was going to transfer the patient back to the nursing home when he stated he felt SOB and desat to 88 on 2L. Patient is normally 2L at home. Patient was suction orally at bed side. Respiratory was called for stat dubonebs. Patient oxygen was increased to 5L and now is stats at 92%-93%.  Patient is not in acute distress      Plan:  -Stat chest X-ray  -labs: BMP. BNP   -stat duonebs q6  -mucinex  - Sodium chloride 3% inhalation  -Hypersal inhalation  -d/w Attending   -continue to monitor Called to evaluate patient for hypoxia 88%. Transport was going to transfer the patient back to the nursing home when he stated he felt SOB and desat to 88 on 2L. Patient is normally 2L at home. Patient was A/O x3 and not in acute distressed. Patient was examined at bedside and was suctioned. Thick secretions was noted upon oral suctioning.         HPI:  Gonzalez Stewart is a 53 year old male with PMHx of cervical epidural abscess (s/p decompressive laminectomy 3/2021 c/b b/l leg paralysis), hx of pneumoperitoneum (s/p ex lap, total abdominal colectomy and end ileostomy (on 1/12/23) c/b evisceration and sepsis with MRSA bacteremia postoperatively), hx of hepatitis C, hx of R. buttock abscess, hx of L. foot osteomyelitis, neurogenic bladder (with indwelling Holcomb catheter, last exchanged two days ago), HTN, HLD, bipolar disorder, GERD, hx of opioid dependence, and constipation who presented to the ED on 12/4/23 from [facility] for feet infection.    Patient reports he has had bilateral foot infections intermittently for the past year. Completed numerous rounds of antibiotics and even got C. difficile due to all the antibiotics. He is supposed to be getting wound care daily or every two days by wound care nurse at his facility but reports they do not come as they should. States the wound care nurse comes every six days. Wound care physician sees him once a week.    Extensive chart review with paperwork from [facility]. In June 2023, found to have L. hallux osteomyelitis. L. heel wound culture with Proteus mirabilis ESBL. Received PICC line and completed 6-week therapy with ceftazidime/avibactam q8. Found to have detectable viral load of hepatitis C. Previously completed sofosbuvir/velpatasvir so thought to be resistant to that and completed sofosbuvir/velpatasvir/voxilaprevir. Follows with ID.    In the ED, VSS except Tmax 101 and BP as low as 105/59. WBC 15.25K, hgb 10.5, bicarb 34, alkphos 227. ESR 84. U/A (sample obtained from Holcomb catheter) with positive nitrites, moderate leuks, moderate blood, WBC 26-50, RBC > 50, moderate bacteria, squamous epithelial cells present. CT b/l LE with study is severely limited by contracture. Left lower extremity is only partially visualized with exclusion of the left foot. Skin induration and subcutaneous edema in the leg and ankle.  No soft tissue gas. Received acetaminophen 1 g IV, morphine 8 mg IV, oxycodone 5 mg, vancomycin 1 g, zosyn 3.375 g, and LR 2800 cc bolus. Evaluated by vascular surgery PA who states attending to evaluate in AM. Evaluated by podiatry, underwent bedside escharectomy of left foot. Wound culture with +GPC in pairs. (05 Dec 2023 03:00)      acetaminophen     Tablet .. 650 milliGRAM(s) Oral daily  acetaminophen     Tablet .. 650 milliGRAM(s) Oral every 6 hours PRN  albuterol    90 MICROgram(s) HFA Inhaler 2 Puff(s) Inhalation every 4 hours PRN  albuterol/ipratropium for Nebulization 3 milliLiter(s) Nebulizer every 6 hours  aluminum hydroxide/magnesium hydroxide/simethicone Suspension 30 milliLiter(s) Oral every 4 hours PRN  amLODIPine   Tablet 2.5 milliGRAM(s) Oral daily  ascorbic acid 500 milliGRAM(s) Oral daily  atorvastatin 40 milliGRAM(s) Oral at bedtime  bacitracin   Ointment 1 Application(s) Topical daily  chlorhexidine 2% Cloths 1 Application(s) Topical <User Schedule>  collagenase Ointment 1 Application(s) Topical daily  cyclobenzaprine 10 milliGRAM(s) Oral two times a day  divalproex  milliGRAM(s) Oral at bedtime  DULoxetine 30 milliGRAM(s) Oral daily  enoxaparin Injectable 40 milliGRAM(s) SubCutaneous every 24 hours  ertapenem  IVPB 1000 milliGRAM(s) IV Intermittent every 24 hours  finasteride 5 milliGRAM(s) Oral daily  gabapentin 600 milliGRAM(s) Oral <User Schedule>  guaiFENesin  milliGRAM(s) Oral every 12 hours  HYDROmorphone  Injectable 1 milliGRAM(s) IV Push every 4 hours PRN  lactobacillus acidophilus 1 Tablet(s) Oral two times a day with meals  lidocaine   4% Patch 1 Patch Transdermal daily  melatonin 3 milliGRAM(s) Oral at bedtime PRN  methadone  Concentrate 110 milliGRAM(s) Oral daily  methylphenidate 5 milliGRAM(s) Oral <User Schedule>  multivitamin 1 Tablet(s) Oral daily  nystatin Powder 1 Application(s) Topical two times a day  ondansetron Injectable 4 milliGRAM(s) IV Push every 8 hours PRN  oxyCODONE    IR 5 milliGRAM(s) Oral <User Schedule>  pantoprazole    Tablet 40 milliGRAM(s) Oral before breakfast  polyethylene glycol 3350 17 Gram(s) Oral daily  QUEtiapine 400 milliGRAM(s) Oral at bedtime  QUEtiapine 100 milliGRAM(s) Oral <User Schedule>  senna 2 Tablet(s) Oral at bedtime  sodium chloride 0.9% lock flush 10 milliLiter(s) IV Push every 1 hour PRN  sodium chloride 3%  Inhalation 4 milliLiter(s) Inhalation every 6 hours  tamsulosin 0.4 milliGRAM(s) Oral at bedtime  thiamine 100 milliGRAM(s) Oral daily  vancomycin  IVPB      vancomycin  IVPB 1000 milliGRAM(s) IV Intermittent every 12 hours  zinc sulfate 220 milliGRAM(s) Oral daily      T(F): 98.8 (12-11-23 @ 06:20), Max: 99.1 (12-10-23 @ 17:36)  HR: 90 (12-11-23 @ 06:20) (90 - 94)  BP: 136/75 (12-11-23 @ 06:20) (128/60 - 136/75)  RR: 18 (12-11-23 @ 06:20) (17 - 18)  SpO2: 90% (12-11-23 @ 06:20) (94% - 97%) on 2L    PHYSICAL EXAM:  General: NAD, WDWN.   Neuro:  Alert & oriented x 3  HEENT: NCAT, EOMI, conjunctiva clear  CV: +S1+S2 regular rate and rhythm  Lung: clear to ausculation bilaterally, respirations nonlabored, good inspiratory effort  Abdomen: soft, NTND. Normactive BS  Extremities: no pedal edema or calf tenderness noted     LABS:                I&O's Detail    10 Dec 2023 07:01  -  11 Dec 2023 07:00  --------------------------------------------------------  IN:    Oral Fluid: 600 mL  Total IN: 600 mL    OUT:    Indwelling Catheter - Urethral (mL): 900 mL  Total OUT: 900 mL    Total NET: -300 mL      11 Dec 2023 07:01  -  11 Dec 2023 16:54  --------------------------------------------------------  IN:  Total IN: 0 mL    OUT:    Colostomy (mL): 250 mL    Voided (mL): 800 mL  Total OUT: 1050 mL    Total NET: -1050 mL            Assessment:  HPI:  Gonzalez Stewart is a 53 year old male with PMHx of cervical epidural abscess (s/p decompressive laminectomy 3/2021 c/b b/l leg paralysis), hx of pneumoperitoneum (s/p ex lap, total abdominal colectomy and end ileostomy (on 1/12/23) c/b evisceration and sepsis with MRSA bacteremia postoperatively), hx of hepatitis C, hx of R. buttock abscess, hx of L. foot osteomyelitis, neurogenic bladder (with indwelling Holcomb catheter, last exchanged two days ago), HTN, HLD, bipolar disorder, GERD, hx of opioid dependence, and constipation who presented to the ED on 12/4/23 from [facility] for feet infection.      Called to evaluate patient for hypoxia 88%. Transport was going to transfer the patient back to the nursing home when he stated he felt SOB and desat to 88 on 2L. Patient is normally 2L at home. Patient was suction orally at bed side. Respiratory was called for stat dubonebs. Patient oxygen was increased to 5L and now is stats at 92%-93%.  Patient is not in acute distress      Plan:  -Stat chest X-ray  -labs: BMP. BNP   -stat duonebs q6  -mucinex  - Sodium chloride 3% inhalation  -Hypersal inhalation  -d/w Attending   -continue to monitor Called to evaluate patient for hypoxia 88%. Transport was going to discharged the patient w/ PICC IV abx until 1/3/24 back to the nursing home when he stated he felt SOB and desat to 88 on 2L. Patient is normally 2L at home. Patient was A/O x3 and not in acute distressed. Patient was examined at bedside and was suctioned. Thick secretions was noted upon oral suctioning.         HPI:  Gonzalez Stewart is a 53 year old male with PMHx of cervical epidural abscess (s/p decompressive laminectomy 3/2021 c/b b/l leg paralysis), hx of pneumoperitoneum (s/p ex lap, total abdominal colectomy and end ileostomy (on 1/12/23) c/b evisceration and sepsis with MRSA bacteremia postoperatively), hx of hepatitis C, hx of R. buttock abscess, hx of L. foot osteomyelitis, neurogenic bladder (with indwelling Holcomb catheter, last exchanged two days ago), HTN, HLD, bipolar disorder, GERD, hx of opioid dependence, and constipation who presented to the ED on 12/4/23 from [facility] for feet infection.    Patient reports he has had bilateral foot infections intermittently for the past year. Completed numerous rounds of antibiotics and even got C. difficile due to all the antibiotics. He is supposed to be getting wound care daily or every two days by wound care nurse at his facility but reports they do not come as they should. States the wound care nurse comes every six days. Wound care physician sees him once a week.    Extensive chart review with paperwork from [facility]. In June 2023, found to have L. hallux osteomyelitis. L. heel wound culture with Proteus mirabilis ESBL. Received PICC line and completed 6-week therapy with ceftazidime/avibactam q8. Found to have detectable viral load of hepatitis C. Previously completed sofosbuvir/velpatasvir so thought to be resistant to that and completed sofosbuvir/velpatasvir/voxilaprevir. Follows with ID.    In the ED, VSS except Tmax 101 and BP as low as 105/59. WBC 15.25K, hgb 10.5, bicarb 34, alkphos 227. ESR 84. U/A (sample obtained from Holcomb catheter) with positive nitrites, moderate leuks, moderate blood, WBC 26-50, RBC > 50, moderate bacteria, squamous epithelial cells present. CT b/l LE with study is severely limited by contracture. Left lower extremity is only partially visualized with exclusion of the left foot. Skin induration and subcutaneous edema in the leg and ankle.  No soft tissue gas. Received acetaminophen 1 g IV, morphine 8 mg IV, oxycodone 5 mg, vancomycin 1 g, zosyn 3.375 g, and LR 2800 cc bolus. Evaluated by vascular surgery PA who states attending to evaluate in AM. Evaluated by podiatry, underwent bedside escharectomy of left foot. Wound culture with +GPC in pairs. (05 Dec 2023 03:00)      acetaminophen     Tablet .. 650 milliGRAM(s) Oral daily  acetaminophen     Tablet .. 650 milliGRAM(s) Oral every 6 hours PRN  albuterol    90 MICROgram(s) HFA Inhaler 2 Puff(s) Inhalation every 4 hours PRN  albuterol/ipratropium for Nebulization 3 milliLiter(s) Nebulizer every 6 hours  aluminum hydroxide/magnesium hydroxide/simethicone Suspension 30 milliLiter(s) Oral every 4 hours PRN  amLODIPine   Tablet 2.5 milliGRAM(s) Oral daily  ascorbic acid 500 milliGRAM(s) Oral daily  atorvastatin 40 milliGRAM(s) Oral at bedtime  bacitracin   Ointment 1 Application(s) Topical daily  chlorhexidine 2% Cloths 1 Application(s) Topical <User Schedule>  collagenase Ointment 1 Application(s) Topical daily  cyclobenzaprine 10 milliGRAM(s) Oral two times a day  divalproex  milliGRAM(s) Oral at bedtime  DULoxetine 30 milliGRAM(s) Oral daily  enoxaparin Injectable 40 milliGRAM(s) SubCutaneous every 24 hours  ertapenem  IVPB 1000 milliGRAM(s) IV Intermittent every 24 hours  finasteride 5 milliGRAM(s) Oral daily  gabapentin 600 milliGRAM(s) Oral <User Schedule>  guaiFENesin  milliGRAM(s) Oral every 12 hours  HYDROmorphone  Injectable 1 milliGRAM(s) IV Push every 4 hours PRN  lactobacillus acidophilus 1 Tablet(s) Oral two times a day with meals  lidocaine   4% Patch 1 Patch Transdermal daily  melatonin 3 milliGRAM(s) Oral at bedtime PRN  methadone  Concentrate 110 milliGRAM(s) Oral daily  methylphenidate 5 milliGRAM(s) Oral <User Schedule>  multivitamin 1 Tablet(s) Oral daily  nystatin Powder 1 Application(s) Topical two times a day  ondansetron Injectable 4 milliGRAM(s) IV Push every 8 hours PRN  oxyCODONE    IR 5 milliGRAM(s) Oral <User Schedule>  pantoprazole    Tablet 40 milliGRAM(s) Oral before breakfast  polyethylene glycol 3350 17 Gram(s) Oral daily  QUEtiapine 400 milliGRAM(s) Oral at bedtime  QUEtiapine 100 milliGRAM(s) Oral <User Schedule>  senna 2 Tablet(s) Oral at bedtime  sodium chloride 0.9% lock flush 10 milliLiter(s) IV Push every 1 hour PRN  sodium chloride 3%  Inhalation 4 milliLiter(s) Inhalation every 6 hours  tamsulosin 0.4 milliGRAM(s) Oral at bedtime  thiamine 100 milliGRAM(s) Oral daily  vancomycin  IVPB      vancomycin  IVPB 1000 milliGRAM(s) IV Intermittent every 12 hours  zinc sulfate 220 milliGRAM(s) Oral daily      T(F): 98.8 (12-11-23 @ 06:20), Max: 99.1 (12-10-23 @ 17:36)  HR: 90 (12-11-23 @ 06:20) (90 - 94)  BP: 136/75 (12-11-23 @ 06:20) (128/60 - 136/75)  RR: 18 (12-11-23 @ 06:20) (17 - 18)  SpO2: 90% (12-11-23 @ 06:20) (94% - 97%) on 2L    PHYSICAL EXAM:  General: NAD, WDWN.   Neuro:  Alert & oriented x 3  HEENT: NCAT, EOMI, conjunctiva clear  CV: +S1+S2 regular rate and rhythm  Lung: clear to ausculation bilaterally, respirations nonlabored, good inspiratory effort  Abdomen: soft, NTND. Normactive BS  Extremities: no pedal edema or calf tenderness noted     LABS:                I&O's Detail    10 Dec 2023 07:01  -  11 Dec 2023 07:00  --------------------------------------------------------  IN:    Oral Fluid: 600 mL  Total IN: 600 mL    OUT:    Indwelling Catheter - Urethral (mL): 900 mL  Total OUT: 900 mL    Total NET: -300 mL      11 Dec 2023 07:01  -  11 Dec 2023 16:54  --------------------------------------------------------  IN:  Total IN: 0 mL    OUT:    Colostomy (mL): 250 mL    Voided (mL): 800 mL  Total OUT: 1050 mL    Total NET: -1050 mL            Assessment:  HPI:  Gonzalez Stewart is a 53 year old male with PMHx of cervical epidural abscess (s/p decompressive laminectomy 3/2021 c/b b/l leg paralysis), hx of pneumoperitoneum (s/p ex lap, total abdominal colectomy and end ileostomy (on 1/12/23) c/b evisceration and sepsis with MRSA bacteremia postoperatively), hx of hepatitis C, hx of R. buttock abscess, hx of L. foot osteomyelitis, neurogenic bladder (with indwelling Holcomb catheter, last exchanged two days ago), HTN, HLD, bipolar disorder, GERD, hx of opioid dependence, and constipation who presented to the ED on 12/4/23 from [facility] for feet infection.      Called to evaluate patient for hypoxia 88%. Transport was going to discharged the patient w/ PICC IV abx until 1/3/24 back to the nursing home when he stated he felt SOB and desat to 88 on 2L. Patient is normally 2L at home. Patient was suction orally at bed side. Respiratory was called for stat dubonebs. Patient oxygen was increased to 5L and now is stats at 92%-93%.  Patient is not in acute distress.    addendum 17:50 12/11/23:  Patient stated that he is normally on 5L of oxygen at the nursing home.     Plan:  -Stat chest X-ray  -labs: BMP. BNP   -stat duonebs q6  -mucinex  - Sodium chloride 3% inhalation  -Hypersal inhalation  -d/w Attending   -continue to monitor Called to evaluate patient for hypoxia 88%. Transport was going to discharged the patient w/ PICC IV abx until 1/3/24 back to the nursing home when he stated he felt SOB and desat to 88 on 2L. Patient is normally 2L at home. Patient was A/O x3 and not in acute distressed. Patient was examined at bedside and was suctioned. Thick secretions was noted upon oral suctioning.         HPI:  Gonzalez Stewart is a 53 year old male with PMHx of cervical epidural abscess (s/p decompressive laminectomy 3/2021 c/b b/l leg paralysis), hx of pneumoperitoneum (s/p ex lap, total abdominal colectomy and end ileostomy (on 1/12/23) c/b evisceration and sepsis with MRSA bacteremia postoperatively), hx of hepatitis C, hx of R. buttock abscess, hx of L. foot osteomyelitis, neurogenic bladder (with indwelling Holcomb catheter, last exchanged two days ago), HTN, HLD, bipolar disorder, GERD, hx of opioid dependence, and constipation who presented to the ED on 12/4/23 from [facility] for feet infection.    Patient reports he has had bilateral foot infections intermittently for the past year. Completed numerous rounds of antibiotics and even got C. difficile due to all the antibiotics. He is supposed to be getting wound care daily or every two days by wound care nurse at his facility but reports they do not come as they should. States the wound care nurse comes every six days. Wound care physician sees him once a week.    Extensive chart review with paperwork from [facility]. In June 2023, found to have L. hallux osteomyelitis. L. heel wound culture with Proteus mirabilis ESBL. Received PICC line and completed 6-week therapy with ceftazidime/avibactam q8. Found to have detectable viral load of hepatitis C. Previously completed sofosbuvir/velpatasvir so thought to be resistant to that and completed sofosbuvir/velpatasvir/voxilaprevir. Follows with ID.    In the ED, VSS except Tmax 101 and BP as low as 105/59. WBC 15.25K, hgb 10.5, bicarb 34, alkphos 227. ESR 84. U/A (sample obtained from Holocmb catheter) with positive nitrites, moderate leuks, moderate blood, WBC 26-50, RBC > 50, moderate bacteria, squamous epithelial cells present. CT b/l LE with study is severely limited by contracture. Left lower extremity is only partially visualized with exclusion of the left foot. Skin induration and subcutaneous edema in the leg and ankle.  No soft tissue gas. Received acetaminophen 1 g IV, morphine 8 mg IV, oxycodone 5 mg, vancomycin 1 g, zosyn 3.375 g, and LR 2800 cc bolus. Evaluated by vascular surgery PA who states attending to evaluate in AM. Evaluated by podiatry, underwent bedside escharectomy of left foot. Wound culture with +GPC in pairs. (05 Dec 2023 03:00)      acetaminophen     Tablet .. 650 milliGRAM(s) Oral daily  acetaminophen     Tablet .. 650 milliGRAM(s) Oral every 6 hours PRN  albuterol    90 MICROgram(s) HFA Inhaler 2 Puff(s) Inhalation every 4 hours PRN  albuterol/ipratropium for Nebulization 3 milliLiter(s) Nebulizer every 6 hours  aluminum hydroxide/magnesium hydroxide/simethicone Suspension 30 milliLiter(s) Oral every 4 hours PRN  amLODIPine   Tablet 2.5 milliGRAM(s) Oral daily  ascorbic acid 500 milliGRAM(s) Oral daily  atorvastatin 40 milliGRAM(s) Oral at bedtime  bacitracin   Ointment 1 Application(s) Topical daily  chlorhexidine 2% Cloths 1 Application(s) Topical <User Schedule>  collagenase Ointment 1 Application(s) Topical daily  cyclobenzaprine 10 milliGRAM(s) Oral two times a day  divalproex  milliGRAM(s) Oral at bedtime  DULoxetine 30 milliGRAM(s) Oral daily  enoxaparin Injectable 40 milliGRAM(s) SubCutaneous every 24 hours  ertapenem  IVPB 1000 milliGRAM(s) IV Intermittent every 24 hours  finasteride 5 milliGRAM(s) Oral daily  gabapentin 600 milliGRAM(s) Oral <User Schedule>  guaiFENesin  milliGRAM(s) Oral every 12 hours  HYDROmorphone  Injectable 1 milliGRAM(s) IV Push every 4 hours PRN  lactobacillus acidophilus 1 Tablet(s) Oral two times a day with meals  lidocaine   4% Patch 1 Patch Transdermal daily  melatonin 3 milliGRAM(s) Oral at bedtime PRN  methadone  Concentrate 110 milliGRAM(s) Oral daily  methylphenidate 5 milliGRAM(s) Oral <User Schedule>  multivitamin 1 Tablet(s) Oral daily  nystatin Powder 1 Application(s) Topical two times a day  ondansetron Injectable 4 milliGRAM(s) IV Push every 8 hours PRN  oxyCODONE    IR 5 milliGRAM(s) Oral <User Schedule>  pantoprazole    Tablet 40 milliGRAM(s) Oral before breakfast  polyethylene glycol 3350 17 Gram(s) Oral daily  QUEtiapine 400 milliGRAM(s) Oral at bedtime  QUEtiapine 100 milliGRAM(s) Oral <User Schedule>  senna 2 Tablet(s) Oral at bedtime  sodium chloride 0.9% lock flush 10 milliLiter(s) IV Push every 1 hour PRN  sodium chloride 3%  Inhalation 4 milliLiter(s) Inhalation every 6 hours  tamsulosin 0.4 milliGRAM(s) Oral at bedtime  thiamine 100 milliGRAM(s) Oral daily  vancomycin  IVPB      vancomycin  IVPB 1000 milliGRAM(s) IV Intermittent every 12 hours  zinc sulfate 220 milliGRAM(s) Oral daily      T(F): 98.8 (12-11-23 @ 06:20), Max: 99.1 (12-10-23 @ 17:36)  HR: 90 (12-11-23 @ 06:20) (90 - 94)  BP: 136/75 (12-11-23 @ 06:20) (128/60 - 136/75)  RR: 18 (12-11-23 @ 06:20) (17 - 18)  SpO2: 90% (12-11-23 @ 06:20) (94% - 97%) on 2L    PHYSICAL EXAM:  General: NAD, WDWN.   Neuro:  Alert & oriented x 3  HEENT: NCAT, EOMI, conjunctiva clear  CV: +S1+S2 regular rate and rhythm  Lung: clear to ausculation bilaterally, respirations nonlabored, good inspiratory effort  Abdomen: soft, NTND. Normactive BS  Extremities: no pedal edema or calf tenderness noted     LABS:                I&O's Detail    10 Dec 2023 07:01  -  11 Dec 2023 07:00  --------------------------------------------------------  IN:    Oral Fluid: 600 mL  Total IN: 600 mL    OUT:    Indwelling Catheter - Urethral (mL): 900 mL  Total OUT: 900 mL    Total NET: -300 mL      11 Dec 2023 07:01  -  11 Dec 2023 16:54  --------------------------------------------------------  IN:  Total IN: 0 mL    OUT:    Colostomy (mL): 250 mL    Voided (mL): 800 mL  Total OUT: 1050 mL    Total NET: -1050 mL            Assessment:  HPI:  Gonzalez Stewart is a 53 year old male with PMHx of cervical epidural abscess (s/p decompressive laminectomy 3/2021 c/b b/l leg paralysis), hx of pneumoperitoneum (s/p ex lap, total abdominal colectomy and end ileostomy (on 1/12/23) c/b evisceration and sepsis with MRSA bacteremia postoperatively), hx of hepatitis C, hx of R. buttock abscess, hx of L. foot osteomyelitis, neurogenic bladder (with indwelling Holcomb catheter, last exchanged two days ago), HTN, HLD, bipolar disorder, GERD, hx of opioid dependence, and constipation who presented to the ED on 12/4/23 from [facility] for feet infection.      Called to evaluate patient for hypoxia 88%. Transport was going to discharged the patient w/ PICC IV abx until 1/3/24 back to the nursing home when he stated he felt SOB and desat to 88 on 2L. Patient is normally 2L at home. Patient was suction orally at bed side. Respiratory was called for stat dubonebs. Patient oxygen was increased to 5L and now is stats at 92%-93%.  Patient is not in acute distress.    addendum 17:50 12/11/23:  Patient stated that he is normally on 5L of oxygen at the nursing home.     Plan:  -Stat chest X-ray  -labs: BMP. BNP   -stat duonebs q6  -mucinex  - Sodium chloride 3% inhalation  -Hypersal inhalation  -d/w Attending   -continue to monitor Called to evaluate patient for hypoxia 88%. Transport was going to discharged the patient w/ PICC IV abx until 1/3/24 back to the nursing home when he stated he felt SOB and desat to 88 on 2L. Patient is normally 2L at home. Patient was A/O x3 and not in acute distressed. Patient was examined at bedside and was suctioned. Thick secretions was noted upon oral suctioning.         HPI:  Gonzalez Stewart is a 53 year old male with PMHx of cervical epidural abscess (s/p decompressive laminectomy 3/2021 c/b b/l leg paralysis), hx of pneumoperitoneum (s/p ex lap, total abdominal colectomy and end ileostomy (on 1/12/23) c/b evisceration and sepsis with MRSA bacteremia postoperatively), hx of hepatitis C, hx of R. buttock abscess, hx of L. foot osteomyelitis, neurogenic bladder (with indwelling Holcomb catheter, last exchanged two days ago), HTN, HLD, bipolar disorder, GERD, hx of opioid dependence, and constipation who presented to the ED on 12/4/23 from [facility] for feet infection.    Patient reports he has had bilateral foot infections intermittently for the past year. Completed numerous rounds of antibiotics and even got C. difficile due to all the antibiotics. He is supposed to be getting wound care daily or every two days by wound care nurse at his facility but reports they do not come as they should. States the wound care nurse comes every six days. Wound care physician sees him once a week.    Extensive chart review with paperwork from [facility]. In June 2023, found to have L. hallux osteomyelitis. L. heel wound culture with Proteus mirabilis ESBL. Received PICC line and completed 6-week therapy with ceftazidime/avibactam q8. Found to have detectable viral load of hepatitis C. Previously completed sofosbuvir/velpatasvir so thought to be resistant to that and completed sofosbuvir/velpatasvir/voxilaprevir. Follows with ID.    In the ED, VSS except Tmax 101 and BP as low as 105/59. WBC 15.25K, hgb 10.5, bicarb 34, alkphos 227. ESR 84. U/A (sample obtained from Holcomb catheter) with positive nitrites, moderate leuks, moderate blood, WBC 26-50, RBC > 50, moderate bacteria, squamous epithelial cells present. CT b/l LE with study is severely limited by contracture. Left lower extremity is only partially visualized with exclusion of the left foot. Skin induration and subcutaneous edema in the leg and ankle.  No soft tissue gas. Received acetaminophen 1 g IV, morphine 8 mg IV, oxycodone 5 mg, vancomycin 1 g, zosyn 3.375 g, and LR 2800 cc bolus. Evaluated by vascular surgery PA who states attending to evaluate in AM. Evaluated by podiatry, underwent bedside escharectomy of left foot. Wound culture with +GPC in pairs. (05 Dec 2023 03:00)      acetaminophen     Tablet .. 650 milliGRAM(s) Oral daily  acetaminophen     Tablet .. 650 milliGRAM(s) Oral every 6 hours PRN  albuterol    90 MICROgram(s) HFA Inhaler 2 Puff(s) Inhalation every 4 hours PRN  albuterol/ipratropium for Nebulization 3 milliLiter(s) Nebulizer every 6 hours  aluminum hydroxide/magnesium hydroxide/simethicone Suspension 30 milliLiter(s) Oral every 4 hours PRN  amLODIPine   Tablet 2.5 milliGRAM(s) Oral daily  ascorbic acid 500 milliGRAM(s) Oral daily  atorvastatin 40 milliGRAM(s) Oral at bedtime  bacitracin   Ointment 1 Application(s) Topical daily  chlorhexidine 2% Cloths 1 Application(s) Topical <User Schedule>  collagenase Ointment 1 Application(s) Topical daily  cyclobenzaprine 10 milliGRAM(s) Oral two times a day  divalproex  milliGRAM(s) Oral at bedtime  DULoxetine 30 milliGRAM(s) Oral daily  enoxaparin Injectable 40 milliGRAM(s) SubCutaneous every 24 hours  ertapenem  IVPB 1000 milliGRAM(s) IV Intermittent every 24 hours  finasteride 5 milliGRAM(s) Oral daily  gabapentin 600 milliGRAM(s) Oral <User Schedule>  guaiFENesin  milliGRAM(s) Oral every 12 hours  HYDROmorphone  Injectable 1 milliGRAM(s) IV Push every 4 hours PRN  lactobacillus acidophilus 1 Tablet(s) Oral two times a day with meals  lidocaine   4% Patch 1 Patch Transdermal daily  melatonin 3 milliGRAM(s) Oral at bedtime PRN  methadone  Concentrate 110 milliGRAM(s) Oral daily  methylphenidate 5 milliGRAM(s) Oral <User Schedule>  multivitamin 1 Tablet(s) Oral daily  nystatin Powder 1 Application(s) Topical two times a day  ondansetron Injectable 4 milliGRAM(s) IV Push every 8 hours PRN  oxyCODONE    IR 5 milliGRAM(s) Oral <User Schedule>  pantoprazole    Tablet 40 milliGRAM(s) Oral before breakfast  polyethylene glycol 3350 17 Gram(s) Oral daily  QUEtiapine 400 milliGRAM(s) Oral at bedtime  QUEtiapine 100 milliGRAM(s) Oral <User Schedule>  senna 2 Tablet(s) Oral at bedtime  sodium chloride 0.9% lock flush 10 milliLiter(s) IV Push every 1 hour PRN  sodium chloride 3%  Inhalation 4 milliLiter(s) Inhalation every 6 hours  tamsulosin 0.4 milliGRAM(s) Oral at bedtime  thiamine 100 milliGRAM(s) Oral daily  vancomycin  IVPB      vancomycin  IVPB 1000 milliGRAM(s) IV Intermittent every 12 hours  zinc sulfate 220 milliGRAM(s) Oral daily      T(F): 98.8 (12-11-23 @ 06:20), Max: 99.1 (12-10-23 @ 17:36)  HR: 90 (12-11-23 @ 06:20) (90 - 94)  BP: 136/75 (12-11-23 @ 06:20) (128/60 - 136/75)  RR: 18 (12-11-23 @ 06:20) (17 - 18)  SpO2: 90% (12-11-23 @ 06:20) (94% - 97%) on 2L    PHYSICAL EXAM:  General: NAD, WDWN.   Neuro:  Alert & oriented x 3  HEENT: NCAT, EOMI, conjunctiva clear  CV: +S1+S2 regular rate and rhythm  Lung: clear to ausculation bilaterally, respirations nonlabored, good inspiratory effort  Abdomen: soft, NTND. Normactive BS  Extremities: no pedal edema or calf tenderness noted     LABS:                I&O's Detail    10 Dec 2023 07:01  -  11 Dec 2023 07:00  --------------------------------------------------------  IN:    Oral Fluid: 600 mL  Total IN: 600 mL    OUT:    Indwelling Catheter - Urethral (mL): 900 mL  Total OUT: 900 mL    Total NET: -300 mL      11 Dec 2023 07:01  -  11 Dec 2023 16:54  --------------------------------------------------------  IN:  Total IN: 0 mL    OUT:    Colostomy (mL): 250 mL    Voided (mL): 800 mL  Total OUT: 1050 mL    Total NET: -1050 mL            Assessment:  HPI:  Gonzalez Stewart is a 53 year old male with PMHx of cervical epidural abscess (s/p decompressive laminectomy 3/2021 c/b b/l leg paralysis), hx of pneumoperitoneum (s/p ex lap, total abdominal colectomy and end ileostomy (on 1/12/23) c/b evisceration and sepsis with MRSA bacteremia postoperatively), hx of hepatitis C, hx of R. buttock abscess, hx of L. foot osteomyelitis, neurogenic bladder (with indwelling Holcomb catheter, last exchanged two days ago), HTN, HLD, bipolar disorder, GERD, hx of opioid dependence, and constipation who presented to the ED on 12/4/23 from [facility] for feet infection.      Called to evaluate patient for hypoxia 88%. Transport was going to discharged the patient w/ PICC IV abx until 1/3/24 back to the nursing home when he stated he felt SOB and desat to 88 on 2L. Patient is normally 2L at home. Patient was suction orally at bed side. Respiratory was called for stat dubonebs. Patient oxygen was increased to 5L and now is stats at 92%-93%.  Patient is not in acute distress.    addendum 17:50 12/11/23:  Patient stated that he is normally on 5L of oxygen at the nursing home.     Plan:  -Stat chest X-ray  -labs: BMP. BNP   -stat duonebs q6  -mucinex  - Sodium chloride 3% inhalation  -Hypersal inhalation  -mucomyst   -d/w Attending   -continue to monitor Called to evaluate patient for hypoxia 88%. Transport was going to discharged the patient w/ PICC IV abx until 1/3/24 back to the nursing home when he stated he felt SOB and desat to 88 on 2L. Patient is normally 2L at home. Patient was A/O x3 and not in acute distressed. Patient was examined at bedside and was suctioned. Thick secretions was noted upon oral suctioning.         HPI:  Gonzalez Stewart is a 53 year old male with PMHx of cervical epidural abscess (s/p decompressive laminectomy 3/2021 c/b b/l leg paralysis), hx of pneumoperitoneum (s/p ex lap, total abdominal colectomy and end ileostomy (on 1/12/23) c/b evisceration and sepsis with MRSA bacteremia postoperatively), hx of hepatitis C, hx of R. buttock abscess, hx of L. foot osteomyelitis, neurogenic bladder (with indwelling Holcomb catheter, last exchanged two days ago), HTN, HLD, bipolar disorder, GERD, hx of opioid dependence, and constipation who presented to the ED on 12/4/23 from [facility] for feet infection.    Patient reports he has had bilateral foot infections intermittently for the past year. Completed numerous rounds of antibiotics and even got C. difficile due to all the antibiotics. He is supposed to be getting wound care daily or every two days by wound care nurse at his facility but reports they do not come as they should. States the wound care nurse comes every six days. Wound care physician sees him once a week.    Extensive chart review with paperwork from [facility]. In June 2023, found to have L. hallux osteomyelitis. L. heel wound culture with Proteus mirabilis ESBL. Received PICC line and completed 6-week therapy with ceftazidime/avibactam q8. Found to have detectable viral load of hepatitis C. Previously completed sofosbuvir/velpatasvir so thought to be resistant to that and completed sofosbuvir/velpatasvir/voxilaprevir. Follows with ID.    In the ED, VSS except Tmax 101 and BP as low as 105/59. WBC 15.25K, hgb 10.5, bicarb 34, alkphos 227. ESR 84. U/A (sample obtained from Holcomb catheter) with positive nitrites, moderate leuks, moderate blood, WBC 26-50, RBC > 50, moderate bacteria, squamous epithelial cells present. CT b/l LE with study is severely limited by contracture. Left lower extremity is only partially visualized with exclusion of the left foot. Skin induration and subcutaneous edema in the leg and ankle.  No soft tissue gas. Received acetaminophen 1 g IV, morphine 8 mg IV, oxycodone 5 mg, vancomycin 1 g, zosyn 3.375 g, and LR 2800 cc bolus. Evaluated by vascular surgery PA who states attending to evaluate in AM. Evaluated by podiatry, underwent bedside escharectomy of left foot. Wound culture with +GPC in pairs. (05 Dec 2023 03:00)      acetaminophen     Tablet .. 650 milliGRAM(s) Oral daily  acetaminophen     Tablet .. 650 milliGRAM(s) Oral every 6 hours PRN  albuterol    90 MICROgram(s) HFA Inhaler 2 Puff(s) Inhalation every 4 hours PRN  albuterol/ipratropium for Nebulization 3 milliLiter(s) Nebulizer every 6 hours  aluminum hydroxide/magnesium hydroxide/simethicone Suspension 30 milliLiter(s) Oral every 4 hours PRN  amLODIPine   Tablet 2.5 milliGRAM(s) Oral daily  ascorbic acid 500 milliGRAM(s) Oral daily  atorvastatin 40 milliGRAM(s) Oral at bedtime  bacitracin   Ointment 1 Application(s) Topical daily  chlorhexidine 2% Cloths 1 Application(s) Topical <User Schedule>  collagenase Ointment 1 Application(s) Topical daily  cyclobenzaprine 10 milliGRAM(s) Oral two times a day  divalproex  milliGRAM(s) Oral at bedtime  DULoxetine 30 milliGRAM(s) Oral daily  enoxaparin Injectable 40 milliGRAM(s) SubCutaneous every 24 hours  ertapenem  IVPB 1000 milliGRAM(s) IV Intermittent every 24 hours  finasteride 5 milliGRAM(s) Oral daily  gabapentin 600 milliGRAM(s) Oral <User Schedule>  guaiFENesin  milliGRAM(s) Oral every 12 hours  HYDROmorphone  Injectable 1 milliGRAM(s) IV Push every 4 hours PRN  lactobacillus acidophilus 1 Tablet(s) Oral two times a day with meals  lidocaine   4% Patch 1 Patch Transdermal daily  melatonin 3 milliGRAM(s) Oral at bedtime PRN  methadone  Concentrate 110 milliGRAM(s) Oral daily  methylphenidate 5 milliGRAM(s) Oral <User Schedule>  multivitamin 1 Tablet(s) Oral daily  nystatin Powder 1 Application(s) Topical two times a day  ondansetron Injectable 4 milliGRAM(s) IV Push every 8 hours PRN  oxyCODONE    IR 5 milliGRAM(s) Oral <User Schedule>  pantoprazole    Tablet 40 milliGRAM(s) Oral before breakfast  polyethylene glycol 3350 17 Gram(s) Oral daily  QUEtiapine 400 milliGRAM(s) Oral at bedtime  QUEtiapine 100 milliGRAM(s) Oral <User Schedule>  senna 2 Tablet(s) Oral at bedtime  sodium chloride 0.9% lock flush 10 milliLiter(s) IV Push every 1 hour PRN  sodium chloride 3%  Inhalation 4 milliLiter(s) Inhalation every 6 hours  tamsulosin 0.4 milliGRAM(s) Oral at bedtime  thiamine 100 milliGRAM(s) Oral daily  vancomycin  IVPB      vancomycin  IVPB 1000 milliGRAM(s) IV Intermittent every 12 hours  zinc sulfate 220 milliGRAM(s) Oral daily      T(F): 98.8 (12-11-23 @ 06:20), Max: 99.1 (12-10-23 @ 17:36)  HR: 90 (12-11-23 @ 06:20) (90 - 94)  BP: 136/75 (12-11-23 @ 06:20) (128/60 - 136/75)  RR: 18 (12-11-23 @ 06:20) (17 - 18)  SpO2: 90% (12-11-23 @ 06:20) (94% - 97%) on 2L    PHYSICAL EXAM:  General: NAD, WDWN.   Neuro:  Alert & oriented x 3  HEENT: NCAT, EOMI, conjunctiva clear  CV: +S1+S2 regular rate and rhythm  Lung: clear to ausculation bilaterally, respirations nonlabored, good inspiratory effort  Abdomen: soft, NTND. Normactive BS  Extremities: no pedal edema or calf tenderness noted     LABS:                I&O's Detail    10 Dec 2023 07:01  -  11 Dec 2023 07:00  --------------------------------------------------------  IN:    Oral Fluid: 600 mL  Total IN: 600 mL    OUT:    Indwelling Catheter - Urethral (mL): 900 mL  Total OUT: 900 mL    Total NET: -300 mL      11 Dec 2023 07:01  -  11 Dec 2023 16:54  --------------------------------------------------------  IN:  Total IN: 0 mL    OUT:    Colostomy (mL): 250 mL    Voided (mL): 800 mL  Total OUT: 1050 mL    Total NET: -1050 mL            Assessment:  HPI:  Gonzalez Stewart is a 53 year old male with PMHx of cervical epidural abscess (s/p decompressive laminectomy 3/2021 c/b b/l leg paralysis), hx of pneumoperitoneum (s/p ex lap, total abdominal colectomy and end ileostomy (on 1/12/23) c/b evisceration and sepsis with MRSA bacteremia postoperatively), hx of hepatitis C, hx of R. buttock abscess, hx of L. foot osteomyelitis, neurogenic bladder (with indwelling Holcomb catheter, last exchanged two days ago), HTN, HLD, bipolar disorder, GERD, hx of opioid dependence, and constipation who presented to the ED on 12/4/23 from [facility] for feet infection.      Called to evaluate patient for hypoxia 88%. Transport was going to discharged the patient w/ PICC IV abx until 1/3/24 back to the nursing home when he stated he felt SOB and desat to 88 on 2L. Patient is normally 2L at home. Patient was suction orally at bed side. Respiratory was called for stat dubonebs. Patient oxygen was increased to 5L and now is stats at 92%-93%.  Patient is not in acute distress.    addendum 17:50 12/11/23:  Patient stated that he is normally on 5L of oxygen at the nursing home.     Plan:  -Stat chest X-ray  -labs: BMP. BNP   -stat duonebs q6  -mucinex  - Sodium chloride 3% inhalation  -Hypersal inhalation  -mucomyst   -d/w Attending   -continue to monitor Called to evaluate patient for hypoxia 88%. Transport was going to discharged the patient w/ PICC IV abx until 1/3/24 back to the nursing home when he stated he felt SOB and desat to 88 on 2L. Patient is normally 2L at home. Patient was A/O x3 and not in acute distressed. Patient was examined at bedside and was suctioned. Thick secretions was noted upon oral suctioning.         HPI:  Gonzalez Stewart is a 53 year old male with PMHx of cervical epidural abscess (s/p decompressive laminectomy 3/2021 c/b b/l leg paralysis), hx of pneumoperitoneum (s/p ex lap, total abdominal colectomy and end ileostomy (on 1/12/23) c/b evisceration and sepsis with MRSA bacteremia postoperatively), hx of hepatitis C, hx of R. buttock abscess, hx of L. foot osteomyelitis, neurogenic bladder (with indwelling Holcomb catheter, last exchanged two days ago), HTN, HLD, bipolar disorder, GERD, hx of opioid dependence, and constipation who presented to the ED on 12/4/23 from [facility] for feet infection.    Patient reports he has had bilateral foot infections intermittently for the past year. Completed numerous rounds of antibiotics and even got C. difficile due to all the antibiotics. He is supposed to be getting wound care daily or every two days by wound care nurse at his facility but reports they do not come as they should. States the wound care nurse comes every six days. Wound care physician sees him once a week.    Extensive chart review with paperwork from [facility]. In June 2023, found to have L. hallux osteomyelitis. L. heel wound culture with Proteus mirabilis ESBL. Received PICC line and completed 6-week therapy with ceftazidime/avibactam q8. Found to have detectable viral load of hepatitis C. Previously completed sofosbuvir/velpatasvir so thought to be resistant to that and completed sofosbuvir/velpatasvir/voxilaprevir. Follows with ID.    In the ED, VSS except Tmax 101 and BP as low as 105/59. WBC 15.25K, hgb 10.5, bicarb 34, alkphos 227. ESR 84. U/A (sample obtained from Holcomb catheter) with positive nitrites, moderate leuks, moderate blood, WBC 26-50, RBC > 50, moderate bacteria, squamous epithelial cells present. CT b/l LE with study is severely limited by contracture. Left lower extremity is only partially visualized with exclusion of the left foot. Skin induration and subcutaneous edema in the leg and ankle.  No soft tissue gas. Received acetaminophen 1 g IV, morphine 8 mg IV, oxycodone 5 mg, vancomycin 1 g, zosyn 3.375 g, and LR 2800 cc bolus. Evaluated by vascular surgery PA who states attending to evaluate in AM. Evaluated by podiatry, underwent bedside escharectomy of left foot. Wound culture with +GPC in pairs. (05 Dec 2023 03:00)      acetaminophen     Tablet .. 650 milliGRAM(s) Oral daily  acetaminophen     Tablet .. 650 milliGRAM(s) Oral every 6 hours PRN  albuterol    90 MICROgram(s) HFA Inhaler 2 Puff(s) Inhalation every 4 hours PRN  albuterol/ipratropium for Nebulization 3 milliLiter(s) Nebulizer every 6 hours  aluminum hydroxide/magnesium hydroxide/simethicone Suspension 30 milliLiter(s) Oral every 4 hours PRN  amLODIPine   Tablet 2.5 milliGRAM(s) Oral daily  ascorbic acid 500 milliGRAM(s) Oral daily  atorvastatin 40 milliGRAM(s) Oral at bedtime  bacitracin   Ointment 1 Application(s) Topical daily  chlorhexidine 2% Cloths 1 Application(s) Topical <User Schedule>  collagenase Ointment 1 Application(s) Topical daily  cyclobenzaprine 10 milliGRAM(s) Oral two times a day  divalproex  milliGRAM(s) Oral at bedtime  DULoxetine 30 milliGRAM(s) Oral daily  enoxaparin Injectable 40 milliGRAM(s) SubCutaneous every 24 hours  ertapenem  IVPB 1000 milliGRAM(s) IV Intermittent every 24 hours  finasteride 5 milliGRAM(s) Oral daily  gabapentin 600 milliGRAM(s) Oral <User Schedule>  guaiFENesin  milliGRAM(s) Oral every 12 hours  HYDROmorphone  Injectable 1 milliGRAM(s) IV Push every 4 hours PRN  lactobacillus acidophilus 1 Tablet(s) Oral two times a day with meals  lidocaine   4% Patch 1 Patch Transdermal daily  melatonin 3 milliGRAM(s) Oral at bedtime PRN  methadone  Concentrate 110 milliGRAM(s) Oral daily  methylphenidate 5 milliGRAM(s) Oral <User Schedule>  multivitamin 1 Tablet(s) Oral daily  nystatin Powder 1 Application(s) Topical two times a day  ondansetron Injectable 4 milliGRAM(s) IV Push every 8 hours PRN  oxyCODONE    IR 5 milliGRAM(s) Oral <User Schedule>  pantoprazole    Tablet 40 milliGRAM(s) Oral before breakfast  polyethylene glycol 3350 17 Gram(s) Oral daily  QUEtiapine 400 milliGRAM(s) Oral at bedtime  QUEtiapine 100 milliGRAM(s) Oral <User Schedule>  senna 2 Tablet(s) Oral at bedtime  sodium chloride 0.9% lock flush 10 milliLiter(s) IV Push every 1 hour PRN  sodium chloride 3%  Inhalation 4 milliLiter(s) Inhalation every 6 hours  tamsulosin 0.4 milliGRAM(s) Oral at bedtime  thiamine 100 milliGRAM(s) Oral daily  vancomycin  IVPB      vancomycin  IVPB 1000 milliGRAM(s) IV Intermittent every 12 hours  zinc sulfate 220 milliGRAM(s) Oral daily      T(F): 98.8 (12-11-23 @ 06:20), Max: 99.1 (12-10-23 @ 17:36)  HR: 90 (12-11-23 @ 06:20) (90 - 94)  BP: 136/75 (12-11-23 @ 06:20) (128/60 - 136/75)  RR: 18 (12-11-23 @ 06:20) (17 - 18)  SpO2: 90% (12-11-23 @ 06:20) (94% - 97%) on 2L    PHYSICAL EXAM:  General: NAD,   Neuro:  Alert & oriented x 3  HEENT: NCAT, EOMI, conjunctiva clear  CV: +S1+S2 regular rate and rhythm  Lung: congested to ausculation bilaterally, respirations nonlabored, good inspiratory effort  Abdomen: soft, NTND. Normactive BS  Extremities: no pedal edema or calf tenderness noted     LABS:                I&O's Detail    10 Dec 2023 07:01  -  11 Dec 2023 07:00  --------------------------------------------------------  IN:    Oral Fluid: 600 mL  Total IN: 600 mL    OUT:    Indwelling Catheter - Urethral (mL): 900 mL  Total OUT: 900 mL    Total NET: -300 mL      11 Dec 2023 07:01  -  11 Dec 2023 16:54  --------------------------------------------------------  IN:  Total IN: 0 mL    OUT:    Colostomy (mL): 250 mL    Voided (mL): 800 mL  Total OUT: 1050 mL    Total NET: -1050 mL            Assessment:  HPI:  Gonzalez Stewart is a 53 year old male with PMHx of cervical epidural abscess (s/p decompressive laminectomy 3/2021 c/b b/l leg paralysis), hx of pneumoperitoneum (s/p ex lap, total abdominal colectomy and end ileostomy (on 1/12/23) c/b evisceration and sepsis with MRSA bacteremia postoperatively), hx of hepatitis C, hx of R. buttock abscess, hx of L. foot osteomyelitis, neurogenic bladder (with indwelling Holcomb catheter, last exchanged two days ago), HTN, HLD, bipolar disorder, GERD, hx of opioid dependence, and constipation who presented to the ED on 12/4/23 from [facility] for feet infection.      Called to evaluate patient for hypoxia 88%. Transport was going to discharged the patient w/ PICC IV abx until 1/3/24 back to the nursing home when he stated he felt SOB and desat to 88 on 2L. Patient is normally 2L at home. Patient was suction orally at bed side. Respiratory was called for stat dubonebs. Patient oxygen was increased to 5L and now is stats at 92%-93%.  Patient is not in acute distress.    addendum 17:50 12/11/23:  Patient stated that he is normally on 5L of oxygen at the nursing home.     Plan:  -Stat chest X-ray  -labs: BMP. BNP   -stat duonebs q6  -mucinex  - Sodium chloride 3% inhalation  -Hypersal inhalation  -mucomyst   -d/w Attending   -continue to monitor Called to evaluate patient for hypoxia 88%. Transport was going to discharged the patient w/ PICC IV abx until 1/3/24 back to the nursing home when he stated he felt SOB and desat to 88 on 2L. Patient is normally 2L at home. Patient was A/O x3 and not in acute distressed. Patient was examined at bedside and was suctioned. Thick secretions was noted upon oral suctioning.         HPI:  Gonzalez Stewart is a 53 year old male with PMHx of cervical epidural abscess (s/p decompressive laminectomy 3/2021 c/b b/l leg paralysis), hx of pneumoperitoneum (s/p ex lap, total abdominal colectomy and end ileostomy (on 1/12/23) c/b evisceration and sepsis with MRSA bacteremia postoperatively), hx of hepatitis C, hx of R. buttock abscess, hx of L. foot osteomyelitis, neurogenic bladder (with indwelling Holcomb catheter, last exchanged two days ago), HTN, HLD, bipolar disorder, GERD, hx of opioid dependence, and constipation who presented to the ED on 12/4/23 from [facility] for feet infection.    Patient reports he has had bilateral foot infections intermittently for the past year. Completed numerous rounds of antibiotics and even got C. difficile due to all the antibiotics. He is supposed to be getting wound care daily or every two days by wound care nurse at his facility but reports they do not come as they should. States the wound care nurse comes every six days. Wound care physician sees him once a week.    Extensive chart review with paperwork from [facility]. In June 2023, found to have L. hallux osteomyelitis. L. heel wound culture with Proteus mirabilis ESBL. Received PICC line and completed 6-week therapy with ceftazidime/avibactam q8. Found to have detectable viral load of hepatitis C. Previously completed sofosbuvir/velpatasvir so thought to be resistant to that and completed sofosbuvir/velpatasvir/voxilaprevir. Follows with ID.    In the ED, VSS except Tmax 101 and BP as low as 105/59. WBC 15.25K, hgb 10.5, bicarb 34, alkphos 227. ESR 84. U/A (sample obtained from Holcomb catheter) with positive nitrites, moderate leuks, moderate blood, WBC 26-50, RBC > 50, moderate bacteria, squamous epithelial cells present. CT b/l LE with study is severely limited by contracture. Left lower extremity is only partially visualized with exclusion of the left foot. Skin induration and subcutaneous edema in the leg and ankle.  No soft tissue gas. Received acetaminophen 1 g IV, morphine 8 mg IV, oxycodone 5 mg, vancomycin 1 g, zosyn 3.375 g, and LR 2800 cc bolus. Evaluated by vascular surgery PA who states attending to evaluate in AM. Evaluated by podiatry, underwent bedside escharectomy of left foot. Wound culture with +GPC in pairs. (05 Dec 2023 03:00)      acetaminophen     Tablet .. 650 milliGRAM(s) Oral daily  acetaminophen     Tablet .. 650 milliGRAM(s) Oral every 6 hours PRN  albuterol    90 MICROgram(s) HFA Inhaler 2 Puff(s) Inhalation every 4 hours PRN  albuterol/ipratropium for Nebulization 3 milliLiter(s) Nebulizer every 6 hours  aluminum hydroxide/magnesium hydroxide/simethicone Suspension 30 milliLiter(s) Oral every 4 hours PRN  amLODIPine   Tablet 2.5 milliGRAM(s) Oral daily  ascorbic acid 500 milliGRAM(s) Oral daily  atorvastatin 40 milliGRAM(s) Oral at bedtime  bacitracin   Ointment 1 Application(s) Topical daily  chlorhexidine 2% Cloths 1 Application(s) Topical <User Schedule>  collagenase Ointment 1 Application(s) Topical daily  cyclobenzaprine 10 milliGRAM(s) Oral two times a day  divalproex  milliGRAM(s) Oral at bedtime  DULoxetine 30 milliGRAM(s) Oral daily  enoxaparin Injectable 40 milliGRAM(s) SubCutaneous every 24 hours  ertapenem  IVPB 1000 milliGRAM(s) IV Intermittent every 24 hours  finasteride 5 milliGRAM(s) Oral daily  gabapentin 600 milliGRAM(s) Oral <User Schedule>  guaiFENesin  milliGRAM(s) Oral every 12 hours  HYDROmorphone  Injectable 1 milliGRAM(s) IV Push every 4 hours PRN  lactobacillus acidophilus 1 Tablet(s) Oral two times a day with meals  lidocaine   4% Patch 1 Patch Transdermal daily  melatonin 3 milliGRAM(s) Oral at bedtime PRN  methadone  Concentrate 110 milliGRAM(s) Oral daily  methylphenidate 5 milliGRAM(s) Oral <User Schedule>  multivitamin 1 Tablet(s) Oral daily  nystatin Powder 1 Application(s) Topical two times a day  ondansetron Injectable 4 milliGRAM(s) IV Push every 8 hours PRN  oxyCODONE    IR 5 milliGRAM(s) Oral <User Schedule>  pantoprazole    Tablet 40 milliGRAM(s) Oral before breakfast  polyethylene glycol 3350 17 Gram(s) Oral daily  QUEtiapine 400 milliGRAM(s) Oral at bedtime  QUEtiapine 100 milliGRAM(s) Oral <User Schedule>  senna 2 Tablet(s) Oral at bedtime  sodium chloride 0.9% lock flush 10 milliLiter(s) IV Push every 1 hour PRN  sodium chloride 3%  Inhalation 4 milliLiter(s) Inhalation every 6 hours  tamsulosin 0.4 milliGRAM(s) Oral at bedtime  thiamine 100 milliGRAM(s) Oral daily  vancomycin  IVPB      vancomycin  IVPB 1000 milliGRAM(s) IV Intermittent every 12 hours  zinc sulfate 220 milliGRAM(s) Oral daily      T(F): 98.8 (12-11-23 @ 06:20), Max: 99.1 (12-10-23 @ 17:36)  HR: 90 (12-11-23 @ 06:20) (90 - 94)  BP: 136/75 (12-11-23 @ 06:20) (128/60 - 136/75)  RR: 18 (12-11-23 @ 06:20) (17 - 18)  SpO2: 90% (12-11-23 @ 06:20) (94% - 97%) on 2L    PHYSICAL EXAM:  General: NAD,   Neuro:  Alert & oriented x 3  HEENT: NCAT, EOMI, conjunctiva clear  CV: +S1+S2 regular rate and rhythm  Lung: congested to ausculation bilaterally, respirations nonlabored, good inspiratory effort  Abdomen: soft, NTND. Normactive BS  Extremities: no pedal edema or calf tenderness noted     LABS:                I&O's Detail    10 Dec 2023 07:01  -  11 Dec 2023 07:00  --------------------------------------------------------  IN:    Oral Fluid: 600 mL  Total IN: 600 mL    OUT:    Indwelling Catheter - Urethral (mL): 900 mL  Total OUT: 900 mL    Total NET: -300 mL      11 Dec 2023 07:01  -  11 Dec 2023 16:54  --------------------------------------------------------  IN:  Total IN: 0 mL    OUT:    Colostomy (mL): 250 mL    Voided (mL): 800 mL  Total OUT: 1050 mL    Total NET: -1050 mL            Assessment:  HPI:  Gonzalez Stewart is a 53 year old male with PMHx of cervical epidural abscess (s/p decompressive laminectomy 3/2021 c/b b/l leg paralysis), hx of pneumoperitoneum (s/p ex lap, total abdominal colectomy and end ileostomy (on 1/12/23) c/b evisceration and sepsis with MRSA bacteremia postoperatively), hx of hepatitis C, hx of R. buttock abscess, hx of L. foot osteomyelitis, neurogenic bladder (with indwelling Holcomb catheter, last exchanged two days ago), HTN, HLD, bipolar disorder, GERD, hx of opioid dependence, and constipation who presented to the ED on 12/4/23 from [facility] for feet infection.      Called to evaluate patient for hypoxia 88%. Transport was going to discharged the patient w/ PICC IV abx until 1/3/24 back to the nursing home when he stated he felt SOB and desat to 88 on 2L. Patient is normally 2L at home. Patient was suction orally at bed side. Respiratory was called for stat dubonebs. Patient oxygen was increased to 5L and now is stats at 92%-93%.  Patient is not in acute distress.    addendum 17:50 12/11/23:  Patient stated that he is normally on 5L of oxygen at the nursing home.     Plan:  -Stat chest X-ray  -labs: BMP. BNP   -stat duonebs q6  -mucinex  - Sodium chloride 3% inhalation  -Hypersal inhalation  -mucomyst   -d/w Attending   -continue to monitor Called to evaluate patient for hypoxia 88%. Transport was going to discharged the patient w/ PICC IV abx until 1/3/24 back to the nursing home when he stated he felt SOB and desat to 88 on 2L. Patient is normally 2L at home. Patient was A/O x3 and not in acute distressed. Patient was examined at bedside and was suctioned. Thick secretions was noted upon oral suctioning.         HPI:  Gonzalez Stewart is a 53 year old male with PMHx of cervical epidural abscess (s/p decompressive laminectomy 3/2021 c/b b/l leg paralysis), hx of pneumoperitoneum (s/p ex lap, total abdominal colectomy and end ileostomy (on 1/12/23) c/b evisceration and sepsis with MRSA bacteremia postoperatively), hx of hepatitis C, hx of R. buttock abscess, hx of L. foot osteomyelitis, neurogenic bladder (with indwelling Holcomb catheter, last exchanged two days ago), HTN, HLD, bipolar disorder, GERD, hx of opioid dependence, and constipation who presented to the ED on 12/4/23 from [facility] for feet infection.    Patient reports he has had bilateral foot infections intermittently for the past year. Completed numerous rounds of antibiotics and even got C. difficile due to all the antibiotics. He is supposed to be getting wound care daily or every two days by wound care nurse at his facility but reports they do not come as they should. States the wound care nurse comes every six days. Wound care physician sees him once a week.    Extensive chart review with paperwork from [facility]. In June 2023, found to have L. hallux osteomyelitis. L. heel wound culture with Proteus mirabilis ESBL. Received PICC line and completed 6-week therapy with ceftazidime/avibactam q8. Found to have detectable viral load of hepatitis C. Previously completed sofosbuvir/velpatasvir so thought to be resistant to that and completed sofosbuvir/velpatasvir/voxilaprevir. Follows with ID.    In the ED, VSS except Tmax 101 and BP as low as 105/59. WBC 15.25K, hgb 10.5, bicarb 34, alkphos 227. ESR 84. U/A (sample obtained from Holocmb catheter) with positive nitrites, moderate leuks, moderate blood, WBC 26-50, RBC > 50, moderate bacteria, squamous epithelial cells present. CT b/l LE with study is severely limited by contracture. Left lower extremity is only partially visualized with exclusion of the left foot. Skin induration and subcutaneous edema in the leg and ankle.  No soft tissue gas. Received acetaminophen 1 g IV, morphine 8 mg IV, oxycodone 5 mg, vancomycin 1 g, zosyn 3.375 g, and LR 2800 cc bolus. Evaluated by vascular surgery PA who states attending to evaluate in AM. Evaluated by podiatry, underwent bedside escharectomy of left foot. Wound culture with +GPC in pairs. (05 Dec 2023 03:00)      acetaminophen     Tablet .. 650 milliGRAM(s) Oral daily  acetaminophen     Tablet .. 650 milliGRAM(s) Oral every 6 hours PRN  albuterol    90 MICROgram(s) HFA Inhaler 2 Puff(s) Inhalation every 4 hours PRN  albuterol/ipratropium for Nebulization 3 milliLiter(s) Nebulizer every 6 hours  aluminum hydroxide/magnesium hydroxide/simethicone Suspension 30 milliLiter(s) Oral every 4 hours PRN  amLODIPine   Tablet 2.5 milliGRAM(s) Oral daily  ascorbic acid 500 milliGRAM(s) Oral daily  atorvastatin 40 milliGRAM(s) Oral at bedtime  bacitracin   Ointment 1 Application(s) Topical daily  chlorhexidine 2% Cloths 1 Application(s) Topical <User Schedule>  collagenase Ointment 1 Application(s) Topical daily  cyclobenzaprine 10 milliGRAM(s) Oral two times a day  divalproex  milliGRAM(s) Oral at bedtime  DULoxetine 30 milliGRAM(s) Oral daily  enoxaparin Injectable 40 milliGRAM(s) SubCutaneous every 24 hours  ertapenem  IVPB 1000 milliGRAM(s) IV Intermittent every 24 hours  finasteride 5 milliGRAM(s) Oral daily  gabapentin 600 milliGRAM(s) Oral <User Schedule>  guaiFENesin  milliGRAM(s) Oral every 12 hours  HYDROmorphone  Injectable 1 milliGRAM(s) IV Push every 4 hours PRN  lactobacillus acidophilus 1 Tablet(s) Oral two times a day with meals  lidocaine   4% Patch 1 Patch Transdermal daily  melatonin 3 milliGRAM(s) Oral at bedtime PRN  methadone  Concentrate 110 milliGRAM(s) Oral daily  methylphenidate 5 milliGRAM(s) Oral <User Schedule>  multivitamin 1 Tablet(s) Oral daily  nystatin Powder 1 Application(s) Topical two times a day  ondansetron Injectable 4 milliGRAM(s) IV Push every 8 hours PRN  oxyCODONE    IR 5 milliGRAM(s) Oral <User Schedule>  pantoprazole    Tablet 40 milliGRAM(s) Oral before breakfast  polyethylene glycol 3350 17 Gram(s) Oral daily  QUEtiapine 400 milliGRAM(s) Oral at bedtime  QUEtiapine 100 milliGRAM(s) Oral <User Schedule>  senna 2 Tablet(s) Oral at bedtime  sodium chloride 0.9% lock flush 10 milliLiter(s) IV Push every 1 hour PRN  sodium chloride 3%  Inhalation 4 milliLiter(s) Inhalation every 6 hours  tamsulosin 0.4 milliGRAM(s) Oral at bedtime  thiamine 100 milliGRAM(s) Oral daily  vancomycin  IVPB      vancomycin  IVPB 1000 milliGRAM(s) IV Intermittent every 12 hours  zinc sulfate 220 milliGRAM(s) Oral daily      T(F): 98.8 (12-11-23 @ 06:20), Max: 99.1 (12-10-23 @ 17:36)  HR: 90 (12-11-23 @ 06:20) (90 - 94)  BP: 136/75 (12-11-23 @ 06:20) (128/60 - 136/75)  RR: 18 (12-11-23 @ 06:20) (17 - 18)  SpO2: 90% (12-11-23 @ 06:20) (94% - 97%) on 2L    PHYSICAL EXAM:  General: NAD,   Neuro:  Alert & oriented x 3  HEENT: NCAT, EOMI, conjunctiva clear  CV: +S1+S2 regular rate and rhythm  Lung: +ronchi R>L b/l, respirations nonlabored,   Abdomen: soft, NTND. Normactive BS  Extremities: no pedal edema or calf tenderness noted     LABS:                I&O's Detail    10 Dec 2023 07:01  -  11 Dec 2023 07:00  --------------------------------------------------------  IN:    Oral Fluid: 600 mL  Total IN: 600 mL    OUT:    Indwelling Catheter - Urethral (mL): 900 mL  Total OUT: 900 mL    Total NET: -300 mL      11 Dec 2023 07:01  -  11 Dec 2023 16:54  --------------------------------------------------------  IN:  Total IN: 0 mL    OUT:    Colostomy (mL): 250 mL    Voided (mL): 800 mL  Total OUT: 1050 mL    Total NET: -1050 mL            Assessment:  HPI:  Gonzalez Stewart is a 53 year old male with PMHx of cervical epidural abscess (s/p decompressive laminectomy 3/2021 c/b b/l leg paralysis), hx of pneumoperitoneum (s/p ex lap, total abdominal colectomy and end ileostomy (on 1/12/23) c/b evisceration and sepsis with MRSA bacteremia postoperatively), hx of hepatitis C, hx of R. buttock abscess, hx of L. foot osteomyelitis, neurogenic bladder (with indwelling Holcomb catheter, last exchanged two days ago), HTN, HLD, bipolar disorder, GERD, hx of opioid dependence, and constipation who presented to the ED on 12/4/23 from [facility] for feet infection.      Called to evaluate patient for hypoxia 88%. Transport was going to discharged the patient w/ PICC IV abx until 1/3/24 back to the nursing home when he stated he felt SOB and desat to 88 on 2L. Patient is normally 2L at home. Patient was suction orally at bed side. Respiratory was called for stat dubonebs. Patient oxygen was increased to 5L and now is stats at 92%-93%.  Patient is not in acute distress.    addendum 17:50 12/11/23:  Patient stated that he is normally on 5L of oxygen at the nursing home.     Plan:  -Stat chest X-ray  -labs: BMP. BNP   -stat duonebs q6  -mucinex  - Sodium chloride 3% inhalation  -Hypersal inhalation  -mucomyst   -d/w Attending   -continue to monitor Called to evaluate patient for hypoxia 88%. Transport was going to discharged the patient w/ PICC IV abx until 1/3/24 back to the nursing home when he stated he felt SOB and desat to 88 on 2L. Patient is normally 2L at home. Patient was A/O x3 and not in acute distressed. Patient was examined at bedside and was suctioned. Thick secretions was noted upon oral suctioning.         HPI:  Gonzalez Stewart is a 53 year old male with PMHx of cervical epidural abscess (s/p decompressive laminectomy 3/2021 c/b b/l leg paralysis), hx of pneumoperitoneum (s/p ex lap, total abdominal colectomy and end ileostomy (on 1/12/23) c/b evisceration and sepsis with MRSA bacteremia postoperatively), hx of hepatitis C, hx of R. buttock abscess, hx of L. foot osteomyelitis, neurogenic bladder (with indwelling Holcomb catheter, last exchanged two days ago), HTN, HLD, bipolar disorder, GERD, hx of opioid dependence, and constipation who presented to the ED on 12/4/23 from [facility] for feet infection.    Patient reports he has had bilateral foot infections intermittently for the past year. Completed numerous rounds of antibiotics and even got C. difficile due to all the antibiotics. He is supposed to be getting wound care daily or every two days by wound care nurse at his facility but reports they do not come as they should. States the wound care nurse comes every six days. Wound care physician sees him once a week.    Extensive chart review with paperwork from [facility]. In June 2023, found to have L. hallux osteomyelitis. L. heel wound culture with Proteus mirabilis ESBL. Received PICC line and completed 6-week therapy with ceftazidime/avibactam q8. Found to have detectable viral load of hepatitis C. Previously completed sofosbuvir/velpatasvir so thought to be resistant to that and completed sofosbuvir/velpatasvir/voxilaprevir. Follows with ID.    In the ED, VSS except Tmax 101 and BP as low as 105/59. WBC 15.25K, hgb 10.5, bicarb 34, alkphos 227. ESR 84. U/A (sample obtained from Holcomb catheter) with positive nitrites, moderate leuks, moderate blood, WBC 26-50, RBC > 50, moderate bacteria, squamous epithelial cells present. CT b/l LE with study is severely limited by contracture. Left lower extremity is only partially visualized with exclusion of the left foot. Skin induration and subcutaneous edema in the leg and ankle.  No soft tissue gas. Received acetaminophen 1 g IV, morphine 8 mg IV, oxycodone 5 mg, vancomycin 1 g, zosyn 3.375 g, and LR 2800 cc bolus. Evaluated by vascular surgery PA who states attending to evaluate in AM. Evaluated by podiatry, underwent bedside escharectomy of left foot. Wound culture with +GPC in pairs. (05 Dec 2023 03:00)      acetaminophen     Tablet .. 650 milliGRAM(s) Oral daily  acetaminophen     Tablet .. 650 milliGRAM(s) Oral every 6 hours PRN  albuterol    90 MICROgram(s) HFA Inhaler 2 Puff(s) Inhalation every 4 hours PRN  albuterol/ipratropium for Nebulization 3 milliLiter(s) Nebulizer every 6 hours  aluminum hydroxide/magnesium hydroxide/simethicone Suspension 30 milliLiter(s) Oral every 4 hours PRN  amLODIPine   Tablet 2.5 milliGRAM(s) Oral daily  ascorbic acid 500 milliGRAM(s) Oral daily  atorvastatin 40 milliGRAM(s) Oral at bedtime  bacitracin   Ointment 1 Application(s) Topical daily  chlorhexidine 2% Cloths 1 Application(s) Topical <User Schedule>  collagenase Ointment 1 Application(s) Topical daily  cyclobenzaprine 10 milliGRAM(s) Oral two times a day  divalproex  milliGRAM(s) Oral at bedtime  DULoxetine 30 milliGRAM(s) Oral daily  enoxaparin Injectable 40 milliGRAM(s) SubCutaneous every 24 hours  ertapenem  IVPB 1000 milliGRAM(s) IV Intermittent every 24 hours  finasteride 5 milliGRAM(s) Oral daily  gabapentin 600 milliGRAM(s) Oral <User Schedule>  guaiFENesin  milliGRAM(s) Oral every 12 hours  HYDROmorphone  Injectable 1 milliGRAM(s) IV Push every 4 hours PRN  lactobacillus acidophilus 1 Tablet(s) Oral two times a day with meals  lidocaine   4% Patch 1 Patch Transdermal daily  melatonin 3 milliGRAM(s) Oral at bedtime PRN  methadone  Concentrate 110 milliGRAM(s) Oral daily  methylphenidate 5 milliGRAM(s) Oral <User Schedule>  multivitamin 1 Tablet(s) Oral daily  nystatin Powder 1 Application(s) Topical two times a day  ondansetron Injectable 4 milliGRAM(s) IV Push every 8 hours PRN  oxyCODONE    IR 5 milliGRAM(s) Oral <User Schedule>  pantoprazole    Tablet 40 milliGRAM(s) Oral before breakfast  polyethylene glycol 3350 17 Gram(s) Oral daily  QUEtiapine 400 milliGRAM(s) Oral at bedtime  QUEtiapine 100 milliGRAM(s) Oral <User Schedule>  senna 2 Tablet(s) Oral at bedtime  sodium chloride 0.9% lock flush 10 milliLiter(s) IV Push every 1 hour PRN  sodium chloride 3%  Inhalation 4 milliLiter(s) Inhalation every 6 hours  tamsulosin 0.4 milliGRAM(s) Oral at bedtime  thiamine 100 milliGRAM(s) Oral daily  vancomycin  IVPB      vancomycin  IVPB 1000 milliGRAM(s) IV Intermittent every 12 hours  zinc sulfate 220 milliGRAM(s) Oral daily      T(F): 98.8 (12-11-23 @ 06:20), Max: 99.1 (12-10-23 @ 17:36)  HR: 90 (12-11-23 @ 06:20) (90 - 94)  BP: 136/75 (12-11-23 @ 06:20) (128/60 - 136/75)  RR: 18 (12-11-23 @ 06:20) (17 - 18)  SpO2: 90% (12-11-23 @ 06:20) (94% - 97%) on 2L    PHYSICAL EXAM:  General: NAD,   Neuro:  Alert & oriented x 3  HEENT: NCAT, EOMI, conjunctiva clear  CV: +S1+S2 regular rate and rhythm  Lung: +ronchi R>L b/l, respirations nonlabored,   Abdomen: soft, NTND. Normactive BS  Extremities: no pedal edema or calf tenderness noted     LABS:                I&O's Detail    10 Dec 2023 07:01  -  11 Dec 2023 07:00  --------------------------------------------------------  IN:    Oral Fluid: 600 mL  Total IN: 600 mL    OUT:    Indwelling Catheter - Urethral (mL): 900 mL  Total OUT: 900 mL    Total NET: -300 mL      11 Dec 2023 07:01  -  11 Dec 2023 16:54  --------------------------------------------------------  IN:  Total IN: 0 mL    OUT:    Colostomy (mL): 250 mL    Voided (mL): 800 mL  Total OUT: 1050 mL    Total NET: -1050 mL            Assessment:  HPI:  Gonzalez Stewart is a 53 year old male with PMHx of cervical epidural abscess (s/p decompressive laminectomy 3/2021 c/b b/l leg paralysis), hx of pneumoperitoneum (s/p ex lap, total abdominal colectomy and end ileostomy (on 1/12/23) c/b evisceration and sepsis with MRSA bacteremia postoperatively), hx of hepatitis C, hx of R. buttock abscess, hx of L. foot osteomyelitis, neurogenic bladder (with indwelling Holcomb catheter, last exchanged two days ago), HTN, HLD, bipolar disorder, GERD, hx of opioid dependence, and constipation who presented to the ED on 12/4/23 from [facility] for feet infection.      Called to evaluate patient for hypoxia 88%. Transport was going to discharged the patient w/ PICC IV abx until 1/3/24 back to the nursing home when he stated he felt SOB and desat to 88 on 2L. Patient is normally 2L at home. Patient was suction orally at bed side. Respiratory was called for stat dubonebs. Patient oxygen was increased to 5L and now is stats at 92%-93%.  Patient is not in acute distress.    addendum 17:50 12/11/23:  Patient stated that he is normally on 5L of oxygen at the nursing home.     Plan:  -Stat chest X-ray  -labs: BMP. BNP   -stat duonebs q6  -mucinex  - Sodium chloride 3% inhalation  -Hypersal inhalation  -mucomyst   -d/w Attending   -continue to monitor

## 2023-12-11 NOTE — CONSULT NOTE ADULT - SUBJECTIVE AND OBJECTIVE BOX
Interventional Radiology    Evaluate for Procedure: PICC line placement    HPI: 53 year old male with PMHx of cervical epidural abscess (s/p decompressive laminectomy 3/2021 c/b b/l leg paralysis), hx of pneumoperitoneum (s/p ex lap, total abdominal colectomy and end ileostomy (on 1/12/23) c/b evisceration and sepsis with MRSA bacteremia postoperatively), hx of hepatitis C, hx of R. buttock abscess, hx of L. foot osteomyelitis, neurogenic bladder (with indwelling Holcomb catheter, last exchanged two days ago), HTN, HLD, bipolar disorder, GERD, hx of opioid dependence, and constipation who presented to the ED on 12/4/23 from [facility] for feet infection and admitted for sepsis secondary to bilateral feet infection. IR consulted for PICC line placement    Allergies: NSAIDs (Flushing; Other (Moderate))  Haldol (Anaphylaxis)  Zyprexa (Rash; Dystonic RXN; Hives)  Motrin (Anaphylaxis)  Thorazine (Other (Moderate))  Aleve (Unknown)  Stelazine (Unknown)  Risperdal (Short breath; Rash; Hives)    Medications (Abx/Cardiac/Anticoagulation/Blood Products)  amLODIPine   Tablet: 2.5 milliGRAM(s) Oral (12-11 @ 05:49)  enoxaparin Injectable: 40 milliGRAM(s) SubCutaneous (12-09 @ 22:30)  ertapenem  IVPB: 120 mL/Hr IV Intermittent (12-11 @ 00:42)  vancomycin  IVPB: 250 mL/Hr IV Intermittent (12-11 @ 00:43)    Data:    T(C): 37.1  HR: 90  BP: 136/75  RR: 18  SpO2: 97%    -WBC 7.73 / HgB 9.1 / Hct 30.6 / Plt 176  -Na 144 / Cl 108 / BUN 13 / Glucose 87  -K 3.7 / CO2 34 / Cr 0.63  -ALT -- / Alk Phos -- / T.Bili --  -INR 1.16 / PTT 33.4    Radiology: Reviewed    Assessment/Plan:   -53 year old male with PMHx of cervical epidural abscess (s/p decompressive laminectomy 3/2021 c/b b/l leg paralysis), hx of pneumoperitoneum (s/p ex lap, total abdominal colectomy and end ileostomy (on 1/12/23) c/b evisceration and sepsis with MRSA bacteremia postoperatively), hx of hepatitis C, hx of R. buttock abscess, hx of L. foot osteomyelitis, neurogenic bladder (with indwelling Holcomb catheter, last exchanged two days ago), HTN, HLD, bipolar disorder, GERD, hx of opioid dependence, and constipation who presented to the ED on 12/4/23 from [facility] for feet infection and admitted for sepsis secondary to bilateral feet infection. IR consulted for PICC line placement      - case reviewed and approved for PICC line placement today  - IR PICC ordered  - Labs reviewed and appropriate  - last lovenox >4 hours ago  - does not need to be NPO  - d/w primary team Interventional Radiology    Evaluate for Procedure: PICC line placement    HPI: 53 year old male with PMHx of cervical epidural abscess (s/p decompressive laminectomy 3/2021 c/b b/l leg paralysis), hx of pneumoperitoneum (s/p ex lap, total abdominal colectomy and end ileostomy (on 1/12/23) c/b evisceration and sepsis with MRSA bacteremia postoperatively), hx of hepatitis C, hx of R. buttock abscess, hx of L. foot osteomyelitis, neurogenic bladder (with indwelling Holcomb catheter, last exchanged two days ago), HTN, HLD, bipolar disorder, GERD, hx of opioid dependence, and constipation who presented to the ED on 12/4/23 from [facility] for feet infection and admitted for sepsis secondary to bilateral feet infection. IR consulted for PICC line placement    Allergies: NSAIDs (Flushing; Other (Moderate))  Haldol (Anaphylaxis)  Zyprexa (Rash; Dystonic RXN; Hives)  Motrin (Anaphylaxis)  Thorazine (Other (Moderate))  Aleve (Unknown)  Stelazine (Unknown)  Risperdal (Short breath; Rash; Hives)    Medications (Abx/Cardiac/Anticoagulation/Blood Products)  amLODIPine   Tablet: 2.5 milliGRAM(s) Oral (12-11 @ 05:49)  enoxaparin Injectable: 40 milliGRAM(s) SubCutaneous (12-09 @ 22:30)  ertapenem  IVPB: 120 mL/Hr IV Intermittent (12-11 @ 00:42)  vancomycin  IVPB: 250 mL/Hr IV Intermittent (12-11 @ 00:43)    Data:    T(C): 37.1  HR: 90  BP: 136/75  RR: 18  SpO2: 97%    -WBC 7.73 / HgB 9.1 / Hct 30.6 / Plt 176  -Na 144 / Cl 108 / BUN 13 / Glucose 87  -K 3.7 / CO2 34 / Cr 0.63  -ALT -- / Alk Phos -- / T.Bili --  -INR 1.16 / PTT 33.4    Radiology: Reviewed    Assessment/Plan:   -53 year old male with PMHx of cervical epidural abscess (s/p decompressive laminectomy 3/2021 c/b b/l leg paralysis), hx of pneumoperitoneum (s/p ex lap, total abdominal colectomy and end ileostomy (on 1/12/23) c/b evisceration and sepsis with MRSA bacteremia postoperatively), hx of hepatitis C, hx of R. buttock abscess, hx of L. foot osteomyelitis, neurogenic bladder (with indwelling Holcomb catheter, last exchanged two days ago), HTN, HLD, bipolar disorder, GERD, hx of opioid dependence, and constipation who presented to the ED on 12/4/23 from [facility] for feet infection and admitted for sepsis secondary to bilateral feet infection. IR consulted for PICC line placement      - case reviewed and approved for PICC line placement today  - IR PICC ordered  - Labs reviewed and appropriate  - last lovenox >4 hours ago  - does not need to be NPO  - d/w primary team

## 2023-12-11 NOTE — PROCEDURE NOTE - ADDITIONAL PROCEDURE DETAILS
Successful insertion of 4F single lumen 45cm PICC line via left basilic vein, using ultrasound and fluroscopy guidance. Tip in SVC. Okay to use. No complications. Full report to follow.

## 2023-12-11 NOTE — DISCHARGE NOTE NURSING/CASE MANAGEMENT/SOCIAL WORK - NSDCPEFALRISK_GEN_ALL_CORE
For information on Fall & Injury Prevention, visit: [email or web address] OR  [email or web address] OR  https://www.cdc.gov/steadi/patient.html For information on Fall & Injury Prevention, visit: [email or web address] OR  [email or web address] OR  https://www.cdc.gov/steadi/patient.html

## 2023-12-11 NOTE — CONSULT NOTE ADULT - TIME-BASE BILLING FOR NON-FACE-TO-FACE CONSULT (MINUTES)
Sunscreen Recommendations: - rec SPF Zn or Ti, > 50
Detail Level: Simple
Detail Level: Generalized
Detail Level: Detailed
21-30

## 2023-12-11 NOTE — PROGRESS NOTE ADULT - NS ATTEND AMEND GEN_ALL_CORE FT
I have reviewed all pertinent clinical information and agree with the NP's note.   covering for Dr. King    patient will need 4 weeks antibiotics for bacteremia and wounds  depakote D-D interaction with carbapenem less of an issue since, as per recommendation of psychiatry, can stop depakote after 500mg tonight  will give 4 weeks of vancomycin and 4 weeks of Ertapenem 1000mg every 24hrs     Discussed with Dr. Miquel Granados, DO  Infectious Disease Attending  Reachable via Microsoft Teams or ID office: [phone]  After 5pm/weekends please call [phone] for all inquiries and new consults I have reviewed all pertinent clinical information and agree with the NP's note.   covering for Dr. King    patient will need 4 weeks antibiotics for bacteremia and wounds  depakote D-D interaction with carbapenem less of an issue since, as per recommendation of psychiatry, can stop depakote after 500mg tonight  will give 4 weeks of vancomycin and 4 weeks of Ertapenem 1000mg every 24hrs     Discussed with Dr. Miquel Granados, DO  Infectious Disease Attending  Reachable via Microsoft Teams or ID office: [phone]  After 5pm/weekends please call [phone] for all inquiries and new consults

## 2023-12-11 NOTE — DISCHARGE NOTE NURSING/CASE MANAGEMENT/SOCIAL WORK - NSDCFUADDAPPT_GEN_ALL_CORE_FT
Podiatry Discharge Instructions:  Follow up: Please follow up with Dr. Waterhouse within 1 week of discharge from the hospital, please call [phone] for appointment and discuss that you recently were seen in the hospital.  Wound Care: Please apply betadine, 4x4 gauze, ABD pads, and kerlix to bilateral wounds  Weight bearing: Please wear Z-flow boots at all times to bilateral feet  Antibiotics: Please continue as instructed. Podiatry Discharge Instructions:  Follow up: Please follow up with Dr. Waterhouse within 1 week of discharge from the hospital, please call [phone] for appointment and discuss that you recently were seen in the hospital.  Wound Care: Please apply betadine, 4x4 gauze, ABD pads, and kerlix to bilateral wounds  Weight bearing: Please wear Z-flow boots at all times to bilateral feet  Antibiotics: Please continue as instructed.

## 2023-12-11 NOTE — PROGRESS NOTE ADULT - SUBJECTIVE AND OBJECTIVE BOX
STAN VEGA  MRN-[id number]    Follow Up:  OM, infected foot wound     Interval History: the pt was seen and examined earlier, not in acute distress, awake and alert, somewhat fatigued, no fevers, RA, no new lab work.     PAST MEDICAL & SURGICAL HISTORY:  CAD (coronary artery disease)      HTN (hypertension)      HLD (hyperlipidemia)      Neurogenic bladder      Bipolar disorder      S/P ileostomy      Indwelling Holcomb catheter present      Chronic hepatitis C virus infection      S/P laminectomy      S/P ileostomy          ROS:    [ ] Unobtainable because:  [x] All other systems negative    Constitutional: no fever, no chills, fatigued   Head: no trauma  Eyes: no vision changes, no eye pain  ENT:  no sore throat, no rhinorrhea  Cardiovascular:  no chest pain, no palpitation  Respiratory:  no SOB, no cough  GI:  no abd pain, no vomiting, no diarrhea  urinary: no dysuria, no hematuria, no flank pain  musculoskeletal:  no joint pain, no joint swelling  skin:  no rash  neurology:  no headache, no seizure, no change in mental status  psych: no anxiety, no depression         Allergies  NSAIDs (Flushing; Other (Moderate))  Haldol (Anaphylaxis)  Zyprexa (Rash; Dystonic RXN; Hives)  Motrin (Anaphylaxis)  Thorazine (Other (Moderate))  Aleve (Unknown)  Stelazine (Unknown)  Risperdal (Short breath; Rash; Hives)        ANTIMICROBIALS:  ertapenem  IVPB 1000 every 24 hours  vancomycin  IVPB    vancomycin  IVPB 1000 every 12 hours      OTHER MEDS:  acetaminophen     Tablet .. 650 milliGRAM(s) Oral daily  acetaminophen     Tablet .. 650 milliGRAM(s) Oral every 6 hours PRN  albuterol    90 MICROgram(s) HFA Inhaler 2 Puff(s) Inhalation every 4 hours PRN  aluminum hydroxide/magnesium hydroxide/simethicone Suspension 30 milliLiter(s) Oral every 4 hours PRN  amLODIPine   Tablet 2.5 milliGRAM(s) Oral daily  ascorbic acid 500 milliGRAM(s) Oral daily  atorvastatin 40 milliGRAM(s) Oral at bedtime  bacitracin   Ointment 1 Application(s) Topical daily  chlorhexidine 2% Cloths 1 Application(s) Topical <User Schedule>  collagenase Ointment 1 Application(s) Topical daily  cyclobenzaprine 10 milliGRAM(s) Oral two times a day  dextrose 5% + sodium chloride 0.45%. 1000 milliLiter(s) IV Continuous <Continuous>  divalproex  milliGRAM(s) Oral at bedtime  DULoxetine 30 milliGRAM(s) Oral daily  enoxaparin Injectable 40 milliGRAM(s) SubCutaneous every 24 hours  finasteride 5 milliGRAM(s) Oral daily  gabapentin 600 milliGRAM(s) Oral <User Schedule>  HYDROmorphone  Injectable 1 milliGRAM(s) IV Push every 4 hours PRN  lactobacillus acidophilus 1 Tablet(s) Oral two times a day with meals  lidocaine   4% Patch 1 Patch Transdermal daily  melatonin 3 milliGRAM(s) Oral at bedtime PRN  methadone  Concentrate 110 milliGRAM(s) Oral daily  methylphenidate 5 milliGRAM(s) Oral <User Schedule>  multivitamin 1 Tablet(s) Oral daily  nystatin Powder 1 Application(s) Topical two times a day  ondansetron Injectable 4 milliGRAM(s) IV Push every 8 hours PRN  oxyCODONE    IR 5 milliGRAM(s) Oral <User Schedule>  pantoprazole    Tablet 40 milliGRAM(s) Oral before breakfast  polyethylene glycol 3350 17 Gram(s) Oral daily  QUEtiapine 400 milliGRAM(s) Oral at bedtime  QUEtiapine 100 milliGRAM(s) Oral <User Schedule>  senna 2 Tablet(s) Oral at bedtime  sodium chloride 0.9% lock flush 10 milliLiter(s) IV Push every 1 hour PRN  tamsulosin 0.4 milliGRAM(s) Oral at bedtime  thiamine 100 milliGRAM(s) Oral daily  zinc sulfate 220 milliGRAM(s) Oral daily      Vital Signs Last 24 Hrs  T(C): 37.1 (11 Dec 2023 06:20), Max: 37.3 (10 Dec 2023 17:36)  T(F): 98.8 (11 Dec 2023 06:20), Max: 99.1 (10 Dec 2023 17:36)  HR: 90 (11 Dec 2023 06:20) (90 - 94)  BP: 136/75 (11 Dec 2023 06:20) (128/60 - 136/75)  BP(mean): --  RR: 18 (11 Dec 2023 06:20) (17 - 18)  SpO2: 97% (11 Dec 2023 06:20) (94% - 97%)    Parameters below as of 11 Dec 2023 06:20  Patient On (Oxygen Delivery Method): room air        Physical Exam:  General:    NAD, non toxic, RA  Head: atraumatic, normocephalic  Eyes: normal sclera and conjunctiva  ENT:   no oropharyngeal lesions, poor dentition, no LAD, neck supple  Cardio:    regular S1,S2, no murmur  Respiratory:   clear to auscultation b/l, no wheezing  abd:   soft, BS +, not tender, no distention, midline scar healed, right sided colostomy in place   :     no CVAT, no suprapubic tenderness,   Musculoskeletal : severely contracted LE b/l, no noted joint swelling   Skin:   b/l feet dressed, c/d/i - dressing change was done earlier today,  left shoulder small blisters - improved, left arm new PICC line c/d/i  vascular:  normal pulses  Neurologic:     no focal deficits  psych: normal affect    WBC Count: 7.73 K/uL (12-08 @ 08:20)  WBC Count: 9.00 K/uL (12-07 @ 06:23)  WBC Count: 9.95 K/uL (12-06 @ 06:30)  WBC Count: 15.25 K/uL (12-04 @ 17:20)      Creatinine Trend: 0.63<--, 0.67<--, 0.89<--, 0.67<--      MICROBIOLOGY:  Vancomycin Level, Trough: 10.6 ug/mL (12-11-23 @ 11:30)    v  Clean Catch Clean Catch (Midstream)  12-04-23   >100,000 CFU/ml Klebsiella pneumoniae  50,000 - 99,000 CFU/mL Enterococcus faecalis (vancomycin resistant)  --  Klebsiella pneumoniae  Enterococcus faecalis (vancomycin resistant)      .Abscess left wound culture  12-04-23   Rare Pseudomonas aeruginosa  Moderate Methicillin Resistant Staphylococcus aureus  Rare Klebsiella pneumoniae  Moderate Bacteroides fragilis "Susceptibilities not performed"  --  Pseudomonas aeruginosa  Methicillin resistant Staphylococcus aureus  Klebsiella pneumoniae      .Blood Blood-Peripheral  12-04-23   No growth at 5 days  --  --      .Blood Blood-Peripheral  12-04-23   No growth at 5 days  --  --    Rapid RVP Result: NotDetec (12-04 @ 17:20)    C-Reactive Protein, Serum: 84 (12-04)    SARS-CoV-2: NotDetec (04 Dec 2023 17:20)    RADIOLOGY:     STAN VEGA  MRN-[id number]    Follow Up:  OM, infected foot wound     Interval History: the pt was seen and examined earlier, not in acute distress, awake and alert, somewhat fatigued, no fevers, RA, no new lab work.     PAST MEDICAL & SURGICAL HISTORY:  CAD (coronary artery disease)      HTN (hypertension)      HLD (hyperlipidemia)      Neurogenic bladder      Bipolar disorder      S/P ileostomy      Indwelling Holcomb catheter present      Chronic hepatitis C virus infection      S/P laminectomy      S/P ileostomy          ROS:    [ ] Unobtainable because:  [x] All other systems negative    Constitutional: no fever, no chills, fatigued   Head: no trauma  Eyes: no vision changes, no eye pain  ENT:  no sore throat, no rhinorrhea  Cardiovascular:  no chest pain, no palpitation  Respiratory:  no SOB, no cough  GI:  no abd pain, no vomiting, no diarrhea  urinary: no dysuria, no hematuria, no flank pain  musculoskeletal:  no joint pain, no joint swelling  skin:  no rash  neurology:  no headache, no seizure, no change in mental status  psych: no anxiety, no depression         Allergies  NSAIDs (Flushing; Other (Moderate))  Haldol (Anaphylaxis)  Zyprexa (Rash; Dystonic RXN; Hives)  Motrin (Anaphylaxis)  Thorazine (Other (Moderate))  Aleve (Unknown)  Stelazine (Unknown)  Risperdal (Short breath; Rash; Hives)        ANTIMICROBIALS:  ertapenem  IVPB 1000 every 24 hours  vancomycin  IVPB    vancomycin  IVPB 1000 every 12 hours      OTHER MEDS:  acetaminophen     Tablet .. 650 milliGRAM(s) Oral daily  acetaminophen     Tablet .. 650 milliGRAM(s) Oral every 6 hours PRN  albuterol    90 MICROgram(s) HFA Inhaler 2 Puff(s) Inhalation every 4 hours PRN  aluminum hydroxide/magnesium hydroxide/simethicone Suspension 30 milliLiter(s) Oral every 4 hours PRN  amLODIPine   Tablet 2.5 milliGRAM(s) Oral daily  ascorbic acid 500 milliGRAM(s) Oral daily  atorvastatin 40 milliGRAM(s) Oral at bedtime  bacitracin   Ointment 1 Application(s) Topical daily  chlorhexidine 2% Cloths 1 Application(s) Topical <User Schedule>  collagenase Ointment 1 Application(s) Topical daily  cyclobenzaprine 10 milliGRAM(s) Oral two times a day  dextrose 5% + sodium chloride 0.45%. 1000 milliLiter(s) IV Continuous <Continuous>  divalproex  milliGRAM(s) Oral at bedtime  DULoxetine 30 milliGRAM(s) Oral daily  enoxaparin Injectable 40 milliGRAM(s) SubCutaneous every 24 hours  finasteride 5 milliGRAM(s) Oral daily  gabapentin 600 milliGRAM(s) Oral <User Schedule>  HYDROmorphone  Injectable 1 milliGRAM(s) IV Push every 4 hours PRN  lactobacillus acidophilus 1 Tablet(s) Oral two times a day with meals  lidocaine   4% Patch 1 Patch Transdermal daily  melatonin 3 milliGRAM(s) Oral at bedtime PRN  methadone  Concentrate 110 milliGRAM(s) Oral daily  methylphenidate 5 milliGRAM(s) Oral <User Schedule>  multivitamin 1 Tablet(s) Oral daily  nystatin Powder 1 Application(s) Topical two times a day  ondansetron Injectable 4 milliGRAM(s) IV Push every 8 hours PRN  oxyCODONE    IR 5 milliGRAM(s) Oral <User Schedule>  pantoprazole    Tablet 40 milliGRAM(s) Oral before breakfast  polyethylene glycol 3350 17 Gram(s) Oral daily  QUEtiapine 400 milliGRAM(s) Oral at bedtime  QUEtiapine 100 milliGRAM(s) Oral <User Schedule>  senna 2 Tablet(s) Oral at bedtime  sodium chloride 0.9% lock flush 10 milliLiter(s) IV Push every 1 hour PRN  tamsulosin 0.4 milliGRAM(s) Oral at bedtime  thiamine 100 milliGRAM(s) Oral daily  zinc sulfate 220 milliGRAM(s) Oral daily      Vital Signs Last 24 Hrs  T(C): 37.1 (11 Dec 2023 06:20), Max: 37.3 (10 Dec 2023 17:36)  T(F): 98.8 (11 Dec 2023 06:20), Max: 99.1 (10 Dec 2023 17:36)  HR: 90 (11 Dec 2023 06:20) (90 - 94)  BP: 136/75 (11 Dec 2023 06:20) (128/60 - 136/75)  BP(mean): --  RR: 18 (11 Dec 2023 06:20) (17 - 18)  SpO2: 97% (11 Dec 2023 06:20) (94% - 97%)    Parameters below as of 11 Dec 2023 06:20  Patient On (Oxygen Delivery Method): room air        Physical Exam:  General:    NAD, non toxic, RA  Head: atraumatic, normocephalic  Eyes: normal sclera and conjunctiva  ENT:   no oropharyngeal lesions, poor dentition, no LAD, neck supple  Cardio:    regular S1,S2, no murmur  Respiratory:   clear to auscultation b/l, no wheezing  abd:   soft, BS +, not tender, no distention, midline scar healed, right sided colostomy in place   :     no CVAT, no suprapubic tenderness,   Musculoskeletal : severely contracted LE b/l, no noted joint swelling   Skin:   b/l feet dressed, c/d/i - dressing change was done earlier today,  left shoulder small blisters - improved, left arm new PICC line c/d/i  vascular:  normal pulses  Neurologic:     no focal deficits  psych: normal affect    WBC Count: 7.73 K/uL (12-08 @ 08:20)  WBC Count: 9.00 K/uL (12-07 @ 06:23)  WBC Count: 9.95 K/uL (12-06 @ 06:30)  WBC Count: 15.25 K/uL (12-04 @ 17:20)      Creatinine Trend: 0.63<--, 0.67<--, 0.89<--, 0.67<--      MICROBIOLOGY:  Vancomycin Level, Trough: 10.6 ug/mL (12-11-23 @ 11:30)    v  Clean Catch Clean Catch (Midstream)  12-04-23   >100,000 CFU/ml Klebsiella pneumoniae  50,000 - 99,000 CFU/mL Enterococcus faecalis (vancomycin resistant)  --  Klebsiella pneumoniae  Enterococcus faecalis (vancomycin resistant)      .Abscess left wound culture  12-04-23   Rare Pseudomonas aeruginosa  Moderate Methicillin Resistant Staphylococcus aureus  Rare Klebsiella pneumoniae  Moderate Bacteroides fragilis "Susceptibilities not performed"  --  Pseudomonas aeruginosa  Methicillin resistant Staphylococcus aureus  Klebsiella pneumoniae      .Blood Blood-Peripheral  12-04-23   No growth at 5 days  --  --      .Blood Blood-Peripheral  12-04-23   No growth at 5 days  --  --    Rapid RVP Result: NotDetec (12-04 @ 17:20)    C-Reactive Protein, Serum: 84 (12-04)    SARS-CoV-2: NotDetec (04 Dec 2023 17:20)    RADIOLOGY:

## 2023-12-11 NOTE — PROGRESS NOTE ADULT - ASSESSMENT
A/P-  53 year old male with PMHx of cervical epidural abscess (s/p decompressive laminectomy 3/2021 c/b b/l leg paralysis), hx of pneumoperitoneum (s/p ex lap, total abdominal colectomy and end ileostomy (on 1/12/23) c/b evisceration and sepsis with MRSA bacteremia postoperatively), hx of hepatitis C, hx of R. buttock abscess, hx of L. foot osteomyelitis, neurogenic bladder (with indwelling Holcomb catheter, last exchanged two days ago), HTN, HLD, bipolar disorder, GERD, hx of opioid dependence, and constipation who presented to the ED on 12/4/23 from [facility] for feet infection.    b/l feet infections and overlying cellulitis  Right heel wound infection to bone and as per xray c/w Om as well.  no report of any gas on either xray or Ct.  patient as per podiatry not a good surgical candidate and they recommend local wound care and antibiotics.  patient himself states he will not be able to do MRI.    b/l foot infection  Om of right heel wound  + leukocytosis - improved   HCV  severe contractures of b/l LE  right foot wound cx- ESBL proteus - resistant to cefepime and corynebecaterium  left foot wound cx- MRSA and Pseudomonas   Blood cx- neg x 2  extensive chart search on [facility]IE by Dr. King and based on his latest HCV VL result from 6/2023 detected vl but <1.08  also based on serology from 2017 was positive for hep b sAG and E ag and core IGm ab ( not sure if he was ever treated)  workup in progress   HIV ab/ag negative   UC - VRE, Klebsiella   Hep B and HEp c both viral loads are undetectable.  his hep B serology c/w chronic infection in past not new and VL undetectable .  hep c VL -undetectable ( was treated for this as per his outpatient docs and seems to have cured )    HIV ab/ag- Negative    plan-  Continue Vancomycin IV  monitor vancomycin levels, required  keep vanco trough <15  Avycaz IV stopped on 12/8  Ertapenem IV started on 12/8 evening (day #3)  aggressive wound care needed.  HCV Vl , full hep b panel - reviewed, Hep B immune   eventually, the pt will need a picc line and 4 weeks of abx, as per attending   Depakote management per medicine       when ready for discharge:  picc line placed on 12/11  continue Vancomycin IV 1250 mg q 12 hrs - last dose on January 3rd, 2024  continue ertapenem 1 gram IV daily - last dose on January 3rd, 2024  weekly lab work: cbc with diff, cmp, esr, crp, vancomycin trough - please fax to pt's ID specialist, as per Dr. King   follow up with ID in the office   remove picc line upon completion of the course of abx       Discussed with Dr. Wakefield      A/P-  53 year old male with PMHx of cervical epidural abscess (s/p decompressive laminectomy 3/2021 c/b b/l leg paralysis), hx of pneumoperitoneum (s/p ex lap, total abdominal colectomy and end ileostomy (on 1/12/23) c/b evisceration and sepsis with MRSA bacteremia postoperatively), hx of hepatitis C, hx of R. buttock abscess, hx of L. foot osteomyelitis, neurogenic bladder (with indwelling Holcomb catheter, last exchanged two days ago), HTN, HLD, bipolar disorder, GERD, hx of opioid dependence, and constipation who presented to the ED on 12/4/23 from [facility] for feet infection.    b/l feet infections and overlying cellulitis  Right heel wound infection to bone and as per xray c/w Om as well.  no report of any gas on either xray or Ct.  patient as per podiatry not a good surgical candidate and they recommend local wound care and antibiotics.  patient himself states he will not be able to do MRI.    b/l foot infection  Om of right heel wound  + leukocytosis - improved   HCV  severe contractures of b/l LE  right foot wound cx- ESBL proteus - resistant to cefepime and corynebecaterium  left foot wound cx- MRSA and Pseudomonas   Blood cx- neg x 2  extensive chart search on [facility]IE by Dr. King and based on his latest HCV VL result from 6/2023 detected vl but <1.08  also based on serology from 2017 was positive for hep b sAG and E ag and core IGm ab ( not sure if he was ever treated)  workup in progress   HIV ab/ag negative   UC - VRE, Klebsiella   Hep B and HEp c both viral loads are undetectable.  his hep B serology c/w chronic infection in past not new and VL undetectable .  hep c VL -undetectable ( was treated for this as per his outpatient docs and seems to have cured )    HIV ab/ag- Negative    plan-  Continue Vancomycin IV  monitor vancomycin levels, required  keep vanco trough <15  Avycaz IV stopped on 12/8  Ertapenem IV started on 12/8 evening (day #3)  aggressive wound care needed.  HCV Vl , full hep b panel - reviewed, Hep B immune   eventually, the pt will need a picc line and 4 weeks of abx, as per attending   Depakote management per medicine       when ready for discharge:  picc line placed on 12/11  continue Vancomycin IV 1250 mg q 12 hrs - last dose on January 3rd, 2024  continue ertapenem 1 gram IV daily - last dose on January 3rd, 2024  weekly lab work: cbc with diff, cmp, esr, crp, vancomycin trough - please fax to pt's ID specialist, as per Dr. King   follow up with ID in the office   remove picc line upon completion of the course of abx       Discussed with Dr. Wakefield      A/P-  53 year old male with PMHx of cervical epidural abscess (s/p decompressive laminectomy 3/2021 c/b b/l leg paralysis), hx of pneumoperitoneum (s/p ex lap, total abdominal colectomy and end ileostomy (on 1/12/23) c/b evisceration and sepsis with MRSA bacteremia postoperatively), hx of hepatitis C, hx of R. buttock abscess, hx of L. foot osteomyelitis, neurogenic bladder (with indwelling Holcomb catheter, last exchanged two days ago), HTN, HLD, bipolar disorder, GERD, hx of opioid dependence, and constipation who presented to the ED on 12/4/23 from [facility] for feet infection.    b/l feet infections and overlying cellulitis  Right heel wound infection to bone and as per xray c/w Om as well.  no report of any gas on either xray or Ct.  patient as per podiatry not a good surgical candidate and they recommend local wound care and antibiotics.  patient himself states he will not be able to do MRI.    b/l foot infection  Om of right heel wound  + leukocytosis - improved   HCV  severe contractures of b/l LE  right foot wound cx- ESBL proteus - resistant to cefepime and corynebecaterium  left foot wound cx- MRSA and Pseudomonas   Blood cx- neg x 2  extensive chart search on [facility]IE by Dr. King and based on his latest HCV VL result from 6/2023 detected vl but <1.08  also based on serology from 2017 was positive for hep b sAG and E ag and core IGm ab ( not sure if he was ever treated)  workup in progress   HIV ab/ag negative   UC - VRE, Klebsiella   Hep B and HEp c both viral loads are undetectable.  his hep B serology c/w chronic infection in past not new and VL undetectable .  hep c VL -undetectable ( was treated for this as per his outpatient docs and seems to have cured )    HIV ab/ag- Negative    plan-  Continue Vancomycin IV  monitor vancomycin levels, required  keep vanco trough <15  Avycaz IV stopped on 12/8  Ertapenem IV started on 12/8 evening (day #3)  aggressive wound care needed.  HCV Vl , full hep b panel - reviewed, Hep B immune   eventually, the pt will need a picc line and 4 weeks of abx, as per attending   Discussed with psych and medicine, last dose depakote tonight then stop       when ready for discharge:  picc line placed on 12/11  continue Vancomycin IV 1250 mg q 12 hrs - last dose on January 3rd, 2024  continue ertapenem 1 gram IV daily - last dose on January 3rd, 2024  weekly lab work: cbc with diff, cmp, esr, crp, vancomycin trough - please fax to pt's ID specialist, as per Dr. King   follow up with ID in the office   remove picc line upon completion of the course of abx       Discussed with Dr. Granados      A/P-  53 year old male with PMHx of cervical epidural abscess (s/p decompressive laminectomy 3/2021 c/b b/l leg paralysis), hx of pneumoperitoneum (s/p ex lap, total abdominal colectomy and end ileostomy (on 1/12/23) c/b evisceration and sepsis with MRSA bacteremia postoperatively), hx of hepatitis C, hx of R. buttock abscess, hx of L. foot osteomyelitis, neurogenic bladder (with indwelling Holcomb catheter, last exchanged two days ago), HTN, HLD, bipolar disorder, GERD, hx of opioid dependence, and constipation who presented to the ED on 12/4/23 from [facility] for feet infection.    b/l feet infections and overlying cellulitis  Right heel wound infection to bone and as per xray c/w Om as well.  no report of any gas on either xray or Ct.  patient as per podiatry not a good surgical candidate and they recommend local wound care and antibiotics.  patient himself states he will not be able to do MRI.    b/l foot infection  Om of right heel wound  + leukocytosis - improved   HCV  severe contractures of b/l LE  right foot wound cx- ESBL proteus - resistant to cefepime and corynebecaterium  left foot wound cx- MRSA and Pseudomonas   Blood cx- neg x 2  extensive chart search on [facility]IE by Dr. King and based on his latest HCV VL result from 6/2023 detected vl but <1.08  also based on serology from 2017 was positive for hep b sAG and E ag and core IGm ab ( not sure if he was ever treated)  workup in progress   HIV ab/ag negative   UC - VRE, Klebsiella   Hep B and HEp c both viral loads are undetectable.  his hep B serology c/w chronic infection in past not new and VL undetectable .  hep c VL -undetectable ( was treated for this as per his outpatient docs and seems to have cured )    HIV ab/ag- Negative    plan-  Continue Vancomycin IV  monitor vancomycin levels, required  keep vanco trough <15  Avycaz IV stopped on 12/8  Ertapenem IV started on 12/8 evening (day #3)  aggressive wound care needed.  HCV Vl , full hep b panel - reviewed, Hep B immune   eventually, the pt will need a picc line and 4 weeks of abx, as per attending   Discussed with psych and medicine, last dose depakote tonight then stop       when ready for discharge:  picc line placed on 12/11  continue Vancomycin IV 1250 mg q 12 hrs - last dose on January 3rd, 2024  continue ertapenem 1 gram IV daily - last dose on January 3rd, 2024  weekly lab work: cbc with diff, cmp, esr, crp, vancomycin trough - please fax to pt's ID specialist, as per Dr. King   follow up with ID in the office   remove picc line upon completion of the course of abx       Discussed with Dr. Granados

## 2023-12-12 LAB
ANION GAP SERPL CALC-SCNC: 0 MMOL/L — LOW (ref 5–17)
ANION GAP SERPL CALC-SCNC: 0 MMOL/L — LOW (ref 5–17)
BUN SERPL-MCNC: 17 MG/DL — SIGNIFICANT CHANGE UP (ref 7–23)
BUN SERPL-MCNC: 17 MG/DL — SIGNIFICANT CHANGE UP (ref 7–23)
CALCIUM SERPL-MCNC: 8.4 MG/DL — LOW (ref 8.5–10.1)
CALCIUM SERPL-MCNC: 8.4 MG/DL — LOW (ref 8.5–10.1)
CHLORIDE SERPL-SCNC: 107 MMOL/L — SIGNIFICANT CHANGE UP (ref 96–108)
CHLORIDE SERPL-SCNC: 107 MMOL/L — SIGNIFICANT CHANGE UP (ref 96–108)
CO2 SERPL-SCNC: 35 MMOL/L — HIGH (ref 22–31)
CO2 SERPL-SCNC: 35 MMOL/L — HIGH (ref 22–31)
CREAT SERPL-MCNC: 0.57 MG/DL — SIGNIFICANT CHANGE UP (ref 0.5–1.3)
CREAT SERPL-MCNC: 0.57 MG/DL — SIGNIFICANT CHANGE UP (ref 0.5–1.3)
EGFR: 118 ML/MIN/1.73M2 — SIGNIFICANT CHANGE UP
EGFR: 118 ML/MIN/1.73M2 — SIGNIFICANT CHANGE UP
GLUCOSE SERPL-MCNC: 86 MG/DL — SIGNIFICANT CHANGE UP (ref 70–99)
GLUCOSE SERPL-MCNC: 86 MG/DL — SIGNIFICANT CHANGE UP (ref 70–99)
HCT VFR BLD CALC: 29.8 % — LOW (ref 39–50)
HCT VFR BLD CALC: 29.8 % — LOW (ref 39–50)
HGB BLD-MCNC: 8.9 G/DL — LOW (ref 13–17)
HGB BLD-MCNC: 8.9 G/DL — LOW (ref 13–17)
MCHC RBC-ENTMCNC: 27.1 PG — SIGNIFICANT CHANGE UP (ref 27–34)
MCHC RBC-ENTMCNC: 27.1 PG — SIGNIFICANT CHANGE UP (ref 27–34)
MCHC RBC-ENTMCNC: 29.9 G/DL — LOW (ref 32–36)
MCHC RBC-ENTMCNC: 29.9 G/DL — LOW (ref 32–36)
MCV RBC AUTO: 90.6 FL — SIGNIFICANT CHANGE UP (ref 80–100)
MCV RBC AUTO: 90.6 FL — SIGNIFICANT CHANGE UP (ref 80–100)
NRBC # BLD: 0 /100 WBCS — SIGNIFICANT CHANGE UP (ref 0–0)
NRBC # BLD: 0 /100 WBCS — SIGNIFICANT CHANGE UP (ref 0–0)
PLATELET # BLD AUTO: 164 K/UL — SIGNIFICANT CHANGE UP (ref 150–400)
PLATELET # BLD AUTO: 164 K/UL — SIGNIFICANT CHANGE UP (ref 150–400)
POTASSIUM SERPL-MCNC: 3.8 MMOL/L — SIGNIFICANT CHANGE UP (ref 3.5–5.3)
POTASSIUM SERPL-MCNC: 3.8 MMOL/L — SIGNIFICANT CHANGE UP (ref 3.5–5.3)
POTASSIUM SERPL-SCNC: 3.8 MMOL/L — SIGNIFICANT CHANGE UP (ref 3.5–5.3)
POTASSIUM SERPL-SCNC: 3.8 MMOL/L — SIGNIFICANT CHANGE UP (ref 3.5–5.3)
RAPID RVP RESULT: SIGNIFICANT CHANGE UP
RAPID RVP RESULT: SIGNIFICANT CHANGE UP
RBC # BLD: 3.29 M/UL — LOW (ref 4.2–5.8)
RBC # BLD: 3.29 M/UL — LOW (ref 4.2–5.8)
RBC # FLD: 17.6 % — HIGH (ref 10.3–14.5)
RBC # FLD: 17.6 % — HIGH (ref 10.3–14.5)
SARS-COV-2 RNA SPEC QL NAA+PROBE: SIGNIFICANT CHANGE UP
SARS-COV-2 RNA SPEC QL NAA+PROBE: SIGNIFICANT CHANGE UP
SODIUM SERPL-SCNC: 142 MMOL/L — SIGNIFICANT CHANGE UP (ref 135–145)
SODIUM SERPL-SCNC: 142 MMOL/L — SIGNIFICANT CHANGE UP (ref 135–145)
WBC # BLD: 7.32 K/UL — SIGNIFICANT CHANGE UP (ref 3.8–10.5)
WBC # BLD: 7.32 K/UL — SIGNIFICANT CHANGE UP (ref 3.8–10.5)
WBC # FLD AUTO: 7.32 K/UL — SIGNIFICANT CHANGE UP (ref 3.8–10.5)
WBC # FLD AUTO: 7.32 K/UL — SIGNIFICANT CHANGE UP (ref 3.8–10.5)

## 2023-12-12 PROCEDURE — 99232 SBSQ HOSP IP/OBS MODERATE 35: CPT

## 2023-12-12 PROCEDURE — 99233 SBSQ HOSP IP/OBS HIGH 50: CPT

## 2023-12-12 PROCEDURE — 99231 SBSQ HOSP IP/OBS SF/LOW 25: CPT

## 2023-12-12 RX ORDER — METHYLPHENIDATE HCL 5 MG
5 TABLET ORAL
Refills: 0 | Status: DISCONTINUED | OUTPATIENT
Start: 2023-12-13 | End: 2023-12-20

## 2023-12-12 RX ORDER — FUROSEMIDE 40 MG
40 TABLET ORAL EVERY 12 HOURS
Refills: 0 | Status: DISCONTINUED | OUTPATIENT
Start: 2023-12-12 | End: 2023-12-14

## 2023-12-12 RX ORDER — HYDROMORPHONE HYDROCHLORIDE 2 MG/ML
2 INJECTION INTRAMUSCULAR; INTRAVENOUS; SUBCUTANEOUS EVERY 4 HOURS
Refills: 0 | Status: DISCONTINUED | OUTPATIENT
Start: 2023-12-12 | End: 2023-12-19

## 2023-12-12 RX ORDER — METHADONE HYDROCHLORIDE 40 MG/1
110 TABLET ORAL DAILY
Refills: 0 | Status: DISCONTINUED | OUTPATIENT
Start: 2023-12-13 | End: 2023-12-14

## 2023-12-12 RX ADMIN — NYSTATIN CREAM 1 APPLICATION(S): 100000 CREAM TOPICAL at 18:26

## 2023-12-12 RX ADMIN — POLYETHYLENE GLYCOL 3350 17 GRAM(S): 17 POWDER, FOR SOLUTION ORAL at 12:34

## 2023-12-12 RX ADMIN — Medication 650 MILLIGRAM(S): at 18:25

## 2023-12-12 RX ADMIN — Medication 1 TABLET(S): at 12:33

## 2023-12-12 RX ADMIN — PANTOPRAZOLE SODIUM 40 MILLIGRAM(S): 20 TABLET, DELAYED RELEASE ORAL at 06:44

## 2023-12-12 RX ADMIN — NYSTATIN CREAM 1 APPLICATION(S): 100000 CREAM TOPICAL at 06:45

## 2023-12-12 RX ADMIN — Medication 1 APPLICATION(S): at 17:55

## 2023-12-12 RX ADMIN — DULOXETINE HYDROCHLORIDE 30 MILLIGRAM(S): 30 CAPSULE, DELAYED RELEASE ORAL at 12:31

## 2023-12-12 RX ADMIN — Medication 5 MILLIGRAM(S): at 08:33

## 2023-12-12 RX ADMIN — AMLODIPINE BESYLATE 2.5 MILLIGRAM(S): 2.5 TABLET ORAL at 06:44

## 2023-12-12 RX ADMIN — ZINC SULFATE TAB 220 MG (50 MG ZINC EQUIVALENT) 220 MILLIGRAM(S): 220 (50 ZN) TAB at 12:30

## 2023-12-12 RX ADMIN — SENNA PLUS 2 TABLET(S): 8.6 TABLET ORAL at 21:14

## 2023-12-12 RX ADMIN — OXYCODONE HYDROCHLORIDE 5 MILLIGRAM(S): 5 TABLET ORAL at 06:43

## 2023-12-12 RX ADMIN — SODIUM CHLORIDE 4 MILLILITER(S): 9 INJECTION INTRAMUSCULAR; INTRAVENOUS; SUBCUTANEOUS at 00:45

## 2023-12-12 RX ADMIN — LIDOCAINE 1 PATCH: 4 CREAM TOPICAL at 18:25

## 2023-12-12 RX ADMIN — GABAPENTIN 600 MILLIGRAM(S): 400 CAPSULE ORAL at 06:44

## 2023-12-12 RX ADMIN — QUETIAPINE FUMARATE 100 MILLIGRAM(S): 200 TABLET, FILM COATED ORAL at 18:12

## 2023-12-12 RX ADMIN — METHADONE HYDROCHLORIDE 110 MILLIGRAM(S): 40 TABLET ORAL at 12:30

## 2023-12-12 RX ADMIN — FINASTERIDE 5 MILLIGRAM(S): 5 TABLET, FILM COATED ORAL at 12:34

## 2023-12-12 RX ADMIN — QUETIAPINE FUMARATE 100 MILLIGRAM(S): 200 TABLET, FILM COATED ORAL at 12:31

## 2023-12-12 RX ADMIN — Medication 3 MILLILITER(S): at 05:11

## 2023-12-12 RX ADMIN — CHLORHEXIDINE GLUCONATE 1 APPLICATION(S): 213 SOLUTION TOPICAL at 06:44

## 2023-12-12 RX ADMIN — ATORVASTATIN CALCIUM 40 MILLIGRAM(S): 80 TABLET, FILM COATED ORAL at 21:14

## 2023-12-12 RX ADMIN — Medication 3 MILLILITER(S): at 11:04

## 2023-12-12 RX ADMIN — Medication 3 MILLILITER(S): at 00:44

## 2023-12-12 RX ADMIN — ERTAPENEM SODIUM 120 MILLIGRAM(S): 1 INJECTION, POWDER, LYOPHILIZED, FOR SOLUTION INTRAMUSCULAR; INTRAVENOUS at 23:58

## 2023-12-12 RX ADMIN — OXYCODONE HYDROCHLORIDE 5 MILLIGRAM(S): 5 TABLET ORAL at 18:11

## 2023-12-12 RX ADMIN — Medication 3 MILLILITER(S): at 23:16

## 2023-12-12 RX ADMIN — Medication 1 TABLET(S): at 08:33

## 2023-12-12 RX ADMIN — Medication 5 MILLIGRAM(S): at 21:13

## 2023-12-12 RX ADMIN — LIDOCAINE 1 PATCH: 4 CREAM TOPICAL at 12:31

## 2023-12-12 RX ADMIN — Medication 40 MILLIGRAM(S): at 18:12

## 2023-12-12 RX ADMIN — Medication 250 MILLIGRAM(S): at 18:12

## 2023-12-12 RX ADMIN — OXYCODONE HYDROCHLORIDE 5 MILLIGRAM(S): 5 TABLET ORAL at 18:26

## 2023-12-12 RX ADMIN — HYDROMORPHONE HYDROCHLORIDE 1 MILLIGRAM(S): 2 INJECTION INTRAMUSCULAR; INTRAVENOUS; SUBCUTANEOUS at 16:26

## 2023-12-12 RX ADMIN — CYCLOBENZAPRINE HYDROCHLORIDE 10 MILLIGRAM(S): 10 TABLET, FILM COATED ORAL at 18:11

## 2023-12-12 RX ADMIN — OXYCODONE HYDROCHLORIDE 5 MILLIGRAM(S): 5 TABLET ORAL at 01:02

## 2023-12-12 RX ADMIN — OXYCODONE HYDROCHLORIDE 5 MILLIGRAM(S): 5 TABLET ORAL at 02:02

## 2023-12-12 RX ADMIN — CYCLOBENZAPRINE HYDROCHLORIDE 10 MILLIGRAM(S): 10 TABLET, FILM COATED ORAL at 06:43

## 2023-12-12 RX ADMIN — LIDOCAINE 1 PATCH: 4 CREAM TOPICAL at 01:03

## 2023-12-12 RX ADMIN — GABAPENTIN 600 MILLIGRAM(S): 400 CAPSULE ORAL at 16:27

## 2023-12-12 RX ADMIN — Medication 600 MILLIGRAM(S): at 06:44

## 2023-12-12 RX ADMIN — Medication 650 MILLIGRAM(S): at 12:32

## 2023-12-12 RX ADMIN — QUETIAPINE FUMARATE 400 MILLIGRAM(S): 200 TABLET, FILM COATED ORAL at 21:12

## 2023-12-12 RX ADMIN — HYDROMORPHONE HYDROCHLORIDE 2 MILLIGRAM(S): 2 INJECTION INTRAMUSCULAR; INTRAVENOUS; SUBCUTANEOUS at 21:15

## 2023-12-12 RX ADMIN — GABAPENTIN 600 MILLIGRAM(S): 400 CAPSULE ORAL at 21:13

## 2023-12-12 RX ADMIN — ENOXAPARIN SODIUM 40 MILLIGRAM(S): 100 INJECTION SUBCUTANEOUS at 21:50

## 2023-12-12 RX ADMIN — OXYCODONE HYDROCHLORIDE 5 MILLIGRAM(S): 5 TABLET ORAL at 07:23

## 2023-12-12 RX ADMIN — Medication 3 MILLILITER(S): at 17:21

## 2023-12-12 RX ADMIN — Medication 250 MILLIGRAM(S): at 06:43

## 2023-12-12 RX ADMIN — Medication 1 TABLET(S): at 18:11

## 2023-12-12 RX ADMIN — Medication 600 MILLIGRAM(S): at 18:10

## 2023-12-12 RX ADMIN — TAMSULOSIN HYDROCHLORIDE 0.4 MILLIGRAM(S): 0.4 CAPSULE ORAL at 21:13

## 2023-12-12 RX ADMIN — ERTAPENEM SODIUM 120 MILLIGRAM(S): 1 INJECTION, POWDER, LYOPHILIZED, FOR SOLUTION INTRAMUSCULAR; INTRAVENOUS at 01:02

## 2023-12-12 RX ADMIN — Medication 500 MILLIGRAM(S): at 12:33

## 2023-12-12 RX ADMIN — OXYCODONE HYDROCHLORIDE 5 MILLIGRAM(S): 5 TABLET ORAL at 12:31

## 2023-12-12 RX ADMIN — SODIUM CHLORIDE 4 MILLILITER(S): 9 INJECTION INTRAMUSCULAR; INTRAVENOUS; SUBCUTANEOUS at 05:12

## 2023-12-12 RX ADMIN — HYDROMORPHONE HYDROCHLORIDE 1 MILLIGRAM(S): 2 INJECTION INTRAMUSCULAR; INTRAVENOUS; SUBCUTANEOUS at 18:25

## 2023-12-12 RX ADMIN — Medication 40 MILLIGRAM(S): at 09:53

## 2023-12-12 RX ADMIN — Medication 100 MILLIGRAM(S): at 12:33

## 2023-12-12 RX ADMIN — Medication 1 APPLICATION(S): at 16:27

## 2023-12-12 RX ADMIN — HYDROMORPHONE HYDROCHLORIDE 2 MILLIGRAM(S): 2 INJECTION INTRAMUSCULAR; INTRAVENOUS; SUBCUTANEOUS at 21:45

## 2023-12-12 NOTE — PROGRESS NOTE ADULT - SUBJECTIVE AND OBJECTIVE BOX
Patient is a 53y old  Male who presents with a chief complaint of Sepsis secondary to b/l foot wounds (05 Dec 2023 12:34)      OVERNIGHT EVENTS:  none  MEDICATIONS  (STANDING):  acetaminophen     Tablet .. 650 milliGRAM(s) Oral daily  albuterol/ipratropium for Nebulization 3 milliLiter(s) Nebulizer every 6 hours  amLODIPine   Tablet 2.5 milliGRAM(s) Oral daily  ascorbic acid 500 milliGRAM(s) Oral daily  atorvastatin 40 milliGRAM(s) Oral at bedtime  bacitracin   Ointment 1 Application(s) Topical daily  chlorhexidine 2% Cloths 1 Application(s) Topical <User Schedule>  collagenase Ointment 1 Application(s) Topical daily  cyclobenzaprine 10 milliGRAM(s) Oral two times a day  DULoxetine 30 milliGRAM(s) Oral daily  enoxaparin Injectable 40 milliGRAM(s) SubCutaneous every 24 hours  ertapenem  IVPB 1000 milliGRAM(s) IV Intermittent every 24 hours  finasteride 5 milliGRAM(s) Oral daily  furosemide   Injectable 40 milliGRAM(s) IV Push every 12 hours  gabapentin 600 milliGRAM(s) Oral <User Schedule>  guaiFENesin  milliGRAM(s) Oral every 12 hours  lactobacillus acidophilus 1 Tablet(s) Oral two times a day with meals  lidocaine   4% Patch 1 Patch Transdermal daily  methadone  Concentrate 110 milliGRAM(s) Oral daily  methylphenidate 5 milliGRAM(s) Oral <User Schedule>  multivitamin 1 Tablet(s) Oral daily  nystatin Powder 1 Application(s) Topical two times a day  oxyCODONE    IR 5 milliGRAM(s) Oral <User Schedule>  pantoprazole    Tablet 40 milliGRAM(s) Oral before breakfast  polyethylene glycol 3350 17 Gram(s) Oral daily  QUEtiapine 100 milliGRAM(s) Oral <User Schedule>  QUEtiapine 400 milliGRAM(s) Oral at bedtime  senna 2 Tablet(s) Oral at bedtime  tamsulosin 0.4 milliGRAM(s) Oral at bedtime  thiamine 100 milliGRAM(s) Oral daily  vancomycin  IVPB      vancomycin  IVPB 1000 milliGRAM(s) IV Intermittent every 12 hours  zinc sulfate 220 milliGRAM(s) Oral daily    MEDICATIONS  (PRN):  acetaminophen     Tablet .. 650 milliGRAM(s) Oral every 6 hours PRN Temp greater or equal to 38C (100.4F), Mild Pain (1 - 3)  albuterol    90 MICROgram(s) HFA Inhaler 2 Puff(s) Inhalation every 4 hours PRN Shortness of Breath and/or Wheezing  aluminum hydroxide/magnesium hydroxide/simethicone Suspension 30 milliLiter(s) Oral every 4 hours PRN Dyspepsia  HYDROmorphone  Injectable 1 milliGRAM(s) IV Push every 4 hours PRN Severe Pain (7 - 10)  melatonin 3 milliGRAM(s) Oral at bedtime PRN Insomnia  ondansetron Injectable 4 milliGRAM(s) IV Push every 8 hours PRN Nausea and/or Vomiting  sodium chloride 0.9% lock flush 10 milliLiter(s) IV Push every 1 hour PRN Pre/post blood products, medications, blood draw, and to maintain line patency    Allergies    NSAIDs (Flushing; Other (Moderate))  Haldol (Anaphylaxis)  Zyprexa (Rash; Dystonic RXN; Hives)  Motrin (Anaphylaxis)  Thorazine (Other (Moderate))  Aleve (Unknown)  Stelazine (Unknown)  Risperdal (Short breath; Rash; Hives)    Intolerances        SUBJECTIVE: in bed in nad     Vital Signs Last 24 Hrs  T(C): 37.1 (11 Dec 2023 06:20), Max: 37.3 (10 Dec 2023 17:36)  T(F): 98.8 (11 Dec 2023 06:20), Max: 99.1 (10 Dec 2023 17:36)  HR: 90 (11 Dec 2023 06:20) (89 - 94)  BP: 136/75 (11 Dec 2023 06:20) (104/73 - 136/75)  BP(mean): --  RR: 18 (11 Dec 2023 06:20) (17 - 18)  SpO2: 97% (11 Dec 2023 06:20) (94% - 97%)    Parameters below as of 11 Dec 2023 06:20  Patient On (Oxygen Delivery Method): room air          PHYSICAL EXAM:  GENERAL: NAD, well-groomed, well-developed  HEAD:  Atraumatic, Normocephalic  EYES: EOMI, PERRLA, conjunctiva and sclera clear  ENMT: No tonsillar erythema, exudates, or enlargement; Moist mucous membranes, Good dentition, No lesions  NECK: Supple,  CHEST/LUNG: Clear to  auscultation bilaterally; No rales, rhonchi, wheezing, or rubs  bilaterally  HEART: Regular rate and rhythm; No murmurs, rubs, or gallops  ABDOMEN: Soft, Nontender, Nondistended; Bowel sounds present rlq colostomy bag  EXTREMITIES:  2+ Peripheral Pulses, No clubbing, cyanosis, + edema BL LE especially left foot, extremely contracted lower extremity   SKIN: stage 3 right hip ulcer, right foot heel necrotic to bone , left foot dorsum had eschar     NERVOUS SYSTEM:  Alert & Oriented X3, Good concentration; functional quad.    LABS:                                            PTT - ( 04 Dec 2023 17:20 )  PTT:33.4 sec  Urinalysis Basic - ( 04 Dec 2023 22:05 )    Color: Yellow / Appearance: Cloudy / S.021 / pH: x  Gluc: x / Ketone: Negative mg/dL  / Bili: Negative / Urobili: 1.0 mg/dL   Blood: x / Protein: 30 mg/dL / Nitrite: Positive   Leuk Esterase: Moderate / RBC: >50 /HPF / WBC 26-50 /HPF   Sq Epi: x / Non Sq Epi: x / Bacteria: Moderate /HPF      Cultures;   CAPILLARY BLOOD GLUCOSE      Culture - Urine (collected 04 Dec 2023 22:05)  Source: Clean Catch Clean Catch (Midstream)  Preliminary Report (07 Dec 2023 12:19):    >100,000 CFU/ml Klebsiella pneumoniae    50,000 - 99,000 CFU/mL Gram positive organisms  Organism: Klebsiella pneumoniae (07 Dec 2023 12:18)  Organism: Klebsiella pneumoniae (07 Dec 2023 12:18)    Culture - Abscess with Gram Stain (collected 04 Dec 2023 20:00)  Source: .Abscess right foot wound  Gram Stain (prelim) (06 Dec 2023 14:12):    No polymorphonuclear leukocytes seen per low power field    Rare Gram positive cocci in pairs seen per oil power field  Preliminary Report (06 Dec 2023 14:12):    Rare Proteus mirabilis    Few Corynebacterium species "Susceptibilities not performed"  Organism: Proteus mirabilis ESBL (07 Dec 2023 10:29)  Organism: Proteus mirabilis ESBL (07 Dec 2023 10:29)      Culture - Urine (collected 04 Dec 2023 22:05)  Source: Clean Catch Clean Catch (Midstream)  Preliminary Report (07 Dec 2023 12:19):    >100,000 CFU/ml Klebsiella pneumoniae    50,000 - 99,000 CFU/mL Gram positive organisms  Organism: Klebsiella pneumoniae (07 Dec 2023 12:18)  Organism: Klebsiella pneumoniae (07 Dec 2023 12:18)    Culture - Abscess with Gram Stain (collected 04 Dec 2023 20:00)  Source: .Abscess right foot wound  Gram Stain (prelim) (06 Dec 2023 14:12):    No polymorphonuclear leukocytes seen per low power field    Rare Gram positive cocci in pairs seen per oil power field  Preliminary Report (06 Dec 2023 14:12):    Rare Proteus mirabilis    Few Corynebacterium species "Susceptibilities not performed"  Organism: Proteus mirabilis ESBL (07 Dec 2023 10:29)  Organism: Proteus mirabilis ESBL (07 Dec 2023 10:29)    Culture - Abscess with Gram Stain (collected 04 Dec 2023 20:00)  Source: .Abscess left wound culture  Gram Stain (prelim) (06 Dec 2023 14:40):    No polymorphonuclear leukocytes seen per low power field    Rare Gram positive cocci in pairs seen per oil power field  Preliminary Report (07 Dec 2023 13:35):    Rare Pseudomonas aeruginosa    Moderate Methicillin Resistant Staphylococcus aureus    Rare Klebsiella pneumoniae    Moderate Bacteroides fragilis #2 "Susceptibilities not performed"  Organism: Pseudomonas aeruginosa  Methicillin resistant Staphylococcus aureus (07 Dec 2023 10:24)  Organism: Methicillin resistant Staphylococcus aureus (07 Dec 2023 10:24)  Organism: Pseudomonas aeruginosa (07 Dec 2023 10:23)    Culture - Blood (collected 04 Dec 2023 17:35)  Source: .Blood Blood-Peripheral  Preliminary Report (07 Dec 2023 01:02):    No growth at 48 Hours    Culture - Blood (collected 04 Dec 2023 17:20)  Source: .Blood Blood-Peripheral  Preliminary Report (07 Dec 2023 01:02):    No growth at 48 Hours        RADIOLOGY & ADDITIONAL TESTS:      Imaging Personally Reviewed:  [ x] YES      Consultant(s) Notes Reviewed:  [x ] YES     Care Discussed with [x ] Consultants [X ] Patient [x ] Family  [x ]    [x ]  Other; RN Patient is a 53y old  Male who presents with a chief complaint of Sepsis secondary to b/l foot wounds (05 Dec 2023 12:34)      OVERNIGHT EVENTS:  acute Respiratory failure--with worsening hypoxia     MEDICATIONS  (STANDING):  acetaminophen     Tablet .. 650 milliGRAM(s) Oral daily  albuterol/ipratropium for Nebulization 3 milliLiter(s) Nebulizer every 6 hours  amLODIPine   Tablet 2.5 milliGRAM(s) Oral daily  ascorbic acid 500 milliGRAM(s) Oral daily  atorvastatin 40 milliGRAM(s) Oral at bedtime  bacitracin   Ointment 1 Application(s) Topical daily  chlorhexidine 2% Cloths 1 Application(s) Topical <User Schedule>  collagenase Ointment 1 Application(s) Topical daily  cyclobenzaprine 10 milliGRAM(s) Oral two times a day  DULoxetine 30 milliGRAM(s) Oral daily  enoxaparin Injectable 40 milliGRAM(s) SubCutaneous every 24 hours  ertapenem  IVPB 1000 milliGRAM(s) IV Intermittent every 24 hours  finasteride 5 milliGRAM(s) Oral daily  furosemide   Injectable 40 milliGRAM(s) IV Push every 12 hours  gabapentin 600 milliGRAM(s) Oral <User Schedule>  guaiFENesin  milliGRAM(s) Oral every 12 hours  lactobacillus acidophilus 1 Tablet(s) Oral two times a day with meals  lidocaine   4% Patch 1 Patch Transdermal daily  methadone  Concentrate 110 milliGRAM(s) Oral daily  methylphenidate 5 milliGRAM(s) Oral <User Schedule>  multivitamin 1 Tablet(s) Oral daily  nystatin Powder 1 Application(s) Topical two times a day  oxyCODONE    IR 5 milliGRAM(s) Oral <User Schedule>  pantoprazole    Tablet 40 milliGRAM(s) Oral before breakfast  polyethylene glycol 3350 17 Gram(s) Oral daily  QUEtiapine 100 milliGRAM(s) Oral <User Schedule>  QUEtiapine 400 milliGRAM(s) Oral at bedtime  senna 2 Tablet(s) Oral at bedtime  tamsulosin 0.4 milliGRAM(s) Oral at bedtime  thiamine 100 milliGRAM(s) Oral daily  vancomycin  IVPB      vancomycin  IVPB 1000 milliGRAM(s) IV Intermittent every 12 hours  zinc sulfate 220 milliGRAM(s) Oral daily    MEDICATIONS  (PRN):  acetaminophen     Tablet .. 650 milliGRAM(s) Oral every 6 hours PRN Temp greater or equal to 38C (100.4F), Mild Pain (1 - 3)  albuterol    90 MICROgram(s) HFA Inhaler 2 Puff(s) Inhalation every 4 hours PRN Shortness of Breath and/or Wheezing  aluminum hydroxide/magnesium hydroxide/simethicone Suspension 30 milliLiter(s) Oral every 4 hours PRN Dyspepsia  HYDROmorphone  Injectable 1 milliGRAM(s) IV Push every 4 hours PRN Severe Pain (7 - 10)  melatonin 3 milliGRAM(s) Oral at bedtime PRN Insomnia  ondansetron Injectable 4 milliGRAM(s) IV Push every 8 hours PRN Nausea and/or Vomiting  sodium chloride 0.9% lock flush 10 milliLiter(s) IV Push every 1 hour PRN Pre/post blood products, medications, blood draw, and to maintain line patency    Allergies    NSAIDs (Flushing; Other (Moderate))  Haldol (Anaphylaxis)  Zyprexa (Rash; Dystonic RXN; Hives)  Motrin (Anaphylaxis)  Thorazine (Other (Moderate))  Aleve (Unknown)  Stelazine (Unknown)  Risperdal (Short breath; Rash; Hives)    Intolerances        SUBJECTIVE: in bed in nad     Vital Signs Last 24 Hrs  T(C): 37.1 (11 Dec 2023 06:20), Max: 37.3 (10 Dec 2023 17:36)  T(F): 98.8 (11 Dec 2023 06:20), Max: 99.1 (10 Dec 2023 17:36)  HR: 90 (11 Dec 2023 06:20) (89 - 94)  BP: 136/75 (11 Dec 2023 06:20) (104/73 - 136/75)  BP(mean): --  RR: 18 (11 Dec 2023 06:20) (17 - 18)  SpO2: 97% (11 Dec 2023 06:20) (94% - 97%)    Parameters below as of 11 Dec 2023 06:20  Patient On (Oxygen Delivery Method): room air          PHYSICAL EXAM:  GENERAL: NAD, well-groomed, well-developed  HEAD:  Atraumatic, Normocephalic  EYES: EOMI, PERRLA, conjunctiva and sclera clear  ENMT: No tonsillar erythema, exudates, or enlargement; Moist mucous membranes, Good dentition, No lesions  NECK: Supple,  CHEST/LUNG: Clear to  auscultation bilaterally; No rales, rhonchi, wheezing, or rubs  bilaterally  HEART: Regular rate and rhythm; No murmurs, rubs, or gallops  ABDOMEN: Soft, Nontender, Nondistended; Bowel sounds present rlq colostomy bag  EXTREMITIES:  2+ Peripheral Pulses, No clubbing, cyanosis, + edema BL LE especially left foot, extremely contracted lower extremity   SKIN: stage 3 right hip ulcer, right foot heel necrotic to bone , left foot dorsum had eschar     NERVOUS SYSTEM:  Alert & Oriented X3, Good concentration; functional quad.    LABS:                                            PTT - ( 04 Dec 2023 17:20 )  PTT:33.4 sec  Urinalysis Basic - ( 04 Dec 2023 22:05 )    Color: Yellow / Appearance: Cloudy / S.021 / pH: x  Gluc: x / Ketone: Negative mg/dL  / Bili: Negative / Urobili: 1.0 mg/dL   Blood: x / Protein: 30 mg/dL / Nitrite: Positive   Leuk Esterase: Moderate / RBC: >50 /HPF / WBC 26-50 /HPF   Sq Epi: x / Non Sq Epi: x / Bacteria: Moderate /HPF      Cultures;   CAPILLARY BLOOD GLUCOSE      Culture - Urine (collected 04 Dec 2023 22:05)  Source: Clean Catch Clean Catch (Midstream)  Preliminary Report (07 Dec 2023 12:19):    >100,000 CFU/ml Klebsiella pneumoniae    50,000 - 99,000 CFU/mL Gram positive organisms  Organism: Klebsiella pneumoniae (07 Dec 2023 12:18)  Organism: Klebsiella pneumoniae (07 Dec 2023 12:18)    Culture - Abscess with Gram Stain (collected 04 Dec 2023 20:00)  Source: .Abscess right foot wound  Gram Stain (prelim) (06 Dec 2023 14:12):    No polymorphonuclear leukocytes seen per low power field    Rare Gram positive cocci in pairs seen per oil power field  Preliminary Report (06 Dec 2023 14:12):    Rare Proteus mirabilis    Few Corynebacterium species "Susceptibilities not performed"  Organism: Proteus mirabilis ESBL (07 Dec 2023 10:29)  Organism: Proteus mirabilis ESBL (07 Dec 2023 10:29)      Culture - Urine (collected 04 Dec 2023 22:05)  Source: Clean Catch Clean Catch (Midstream)  Preliminary Report (07 Dec 2023 12:19):    >100,000 CFU/ml Klebsiella pneumoniae    50,000 - 99,000 CFU/mL Gram positive organisms  Organism: Klebsiella pneumoniae (07 Dec 2023 12:18)  Organism: Klebsiella pneumoniae (07 Dec 2023 12:18)    Culture - Abscess with Gram Stain (collected 04 Dec 2023 20:00)  Source: .Abscess right foot wound  Gram Stain (prelim) (06 Dec 2023 14:12):    No polymorphonuclear leukocytes seen per low power field    Rare Gram positive cocci in pairs seen per oil power field  Preliminary Report (06 Dec 2023 14:12):    Rare Proteus mirabilis    Few Corynebacterium species "Susceptibilities not performed"  Organism: Proteus mirabilis ESBL (07 Dec 2023 10:29)  Organism: Proteus mirabilis ESBL (07 Dec 2023 10:29)    Culture - Abscess with Gram Stain (collected 04 Dec 2023 20:00)  Source: .Abscess left wound culture  Gram Stain (prelim) (06 Dec 2023 14:40):    No polymorphonuclear leukocytes seen per low power field    Rare Gram positive cocci in pairs seen per oil power field  Preliminary Report (07 Dec 2023 13:35):    Rare Pseudomonas aeruginosa    Moderate Methicillin Resistant Staphylococcus aureus    Rare Klebsiella pneumoniae    Moderate Bacteroides fragilis #2 "Susceptibilities not performed"  Organism: Pseudomonas aeruginosa  Methicillin resistant Staphylococcus aureus (07 Dec 2023 10:24)  Organism: Methicillin resistant Staphylococcus aureus (07 Dec 2023 10:24)  Organism: Pseudomonas aeruginosa (07 Dec 2023 10:23)    Culture - Blood (collected 04 Dec 2023 17:35)  Source: .Blood Blood-Peripheral  Preliminary Report (07 Dec 2023 01:02):    No growth at 48 Hours    Culture - Blood (collected 04 Dec 2023 17:20)  Source: .Blood Blood-Peripheral  Preliminary Report (07 Dec 2023 01:02):    No growth at 48 Hours        RADIOLOGY & ADDITIONAL TESTS:      Imaging Personally Reviewed:  [ x] YES      Consultant(s) Notes Reviewed:  [x ] YES     Care Discussed with [x ] Consultants [X ] Patient [x ] Family  [x ]    [x ]  Other; RN

## 2023-12-12 NOTE — BH CONSULTATION LIAISON PROGRESS NOTE - NSBHFUPINTERVALHXFT_PSY_A_CORE
+ right and left foot wound cultures from 12/4/23 (Right foot abscess positive for rare pseudomonas aeruginosa, moderate MRSA, rare Klebsiella pneumoniae, and moderate bacteroides fragilis; Left foot abscess positive for rare proteus mirabilis ESBL, rare alcaligenes faecalis and few corynebacterium species). Now off of depakote without any issues. Pt s/p PICC line placement yesterday. Patient has remained calm, cooperative, medication and treatment compliant. Maintaining the same psychiatric clinical presentation consistent with his baseline.

## 2023-12-12 NOTE — BH CONSULTATION LIAISON PROGRESS NOTE - NSBHATTESTBILLING_PSY_A_CORE
[id number] OBS or IP - low complexity OR 25-34 mins [id number] OBS or IP - low complexity OR 25-34 mins

## 2023-12-12 NOTE — PROGRESS NOTE ADULT - SUBJECTIVE AND OBJECTIVE BOX
STAN VEGA  MRN-[id number]    Follow Up:  OM    Interval History: the pt was seen and examined earlier, not in acute distress, no new complaints. Pt had an episode of hypoxemia yesterday, on NC, pt is on O2 NC at home as well, no fevers, no wbc.     PAST MEDICAL & SURGICAL HISTORY:  CAD (coronary artery disease)      HTN (hypertension)      HLD (hyperlipidemia)      Neurogenic bladder      Bipolar disorder      S/P ileostomy      Indwelling Holcomb catheter present      Chronic hepatitis C virus infection      S/P laminectomy      S/P ileostomy          ROS:    [ ] Unobtainable because:  [x ] All other systems negative    Constitutional: no fever, no chills  Head: no trauma  Eyes: no vision changes, no eye pain  ENT:  no sore throat, no rhinorrhea  Cardiovascular:  no chest pain, no palpitation  Respiratory:  no SOB with supplemental O2, mild cough  GI:  no abd pain, no vomiting, no diarrhea  urinary: no dysuria, no hematuria, no flank pain  musculoskeletal:  contracted  skin: no rash   neurology:  no headache, no seizure, no change in mental status  psych: no anxiety, no depression         Allergies  NSAIDs (Flushing; Other (Moderate))  Haldol (Anaphylaxis)  Zyprexa (Rash; Dystonic RXN; Hives)  Motrin (Anaphylaxis)  Thorazine (Other (Moderate))  Aleve (Unknown)  Stelazine (Unknown)  Risperdal (Short breath; Rash; Hives)        ANTIMICROBIALS:  ertapenem  IVPB 1000 every 24 hours  vancomycin  IVPB 1000 every 12 hours  vancomycin  IVPB        OTHER MEDS:  acetaminophen     Tablet .. 650 milliGRAM(s) Oral daily  acetaminophen     Tablet .. 650 milliGRAM(s) Oral every 6 hours PRN  albuterol    90 MICROgram(s) HFA Inhaler 2 Puff(s) Inhalation every 4 hours PRN  albuterol/ipratropium for Nebulization 3 milliLiter(s) Nebulizer every 6 hours  aluminum hydroxide/magnesium hydroxide/simethicone Suspension 30 milliLiter(s) Oral every 4 hours PRN  amLODIPine   Tablet 2.5 milliGRAM(s) Oral daily  ascorbic acid 500 milliGRAM(s) Oral daily  atorvastatin 40 milliGRAM(s) Oral at bedtime  bacitracin   Ointment 1 Application(s) Topical daily  chlorhexidine 2% Cloths 1 Application(s) Topical <User Schedule>  collagenase Ointment 1 Application(s) Topical daily  cyclobenzaprine 10 milliGRAM(s) Oral two times a day  DULoxetine 30 milliGRAM(s) Oral daily  enoxaparin Injectable 40 milliGRAM(s) SubCutaneous every 24 hours  finasteride 5 milliGRAM(s) Oral daily  furosemide   Injectable 40 milliGRAM(s) IV Push every 12 hours  gabapentin 600 milliGRAM(s) Oral <User Schedule>  guaiFENesin  milliGRAM(s) Oral every 12 hours  HYDROmorphone  Injectable 1 milliGRAM(s) IV Push every 4 hours PRN  lactobacillus acidophilus 1 Tablet(s) Oral two times a day with meals  lidocaine   4% Patch 1 Patch Transdermal daily  melatonin 3 milliGRAM(s) Oral at bedtime PRN  methylphenidate 5 milliGRAM(s) Oral <User Schedule>  multivitamin 1 Tablet(s) Oral daily  nystatin Powder 1 Application(s) Topical two times a day  ondansetron Injectable 4 milliGRAM(s) IV Push every 8 hours PRN  oxyCODONE    IR 5 milliGRAM(s) Oral <User Schedule>  pantoprazole    Tablet 40 milliGRAM(s) Oral before breakfast  polyethylene glycol 3350 17 Gram(s) Oral daily  QUEtiapine 400 milliGRAM(s) Oral at bedtime  QUEtiapine 100 milliGRAM(s) Oral <User Schedule>  senna 2 Tablet(s) Oral at bedtime  sodium chloride 0.9% lock flush 10 milliLiter(s) IV Push every 1 hour PRN  tamsulosin 0.4 milliGRAM(s) Oral at bedtime  thiamine 100 milliGRAM(s) Oral daily  zinc sulfate 220 milliGRAM(s) Oral daily      Vital Signs Last 24 Hrs  T(C): 37.2 (12 Dec 2023 12:07), Max: 37.2 (12 Dec 2023 12:07)  T(F): 99 (12 Dec 2023 12:07), Max: 99 (12 Dec 2023 12:07)  HR: 105 (12 Dec 2023 12:07) (78 - 105)  BP: 96/61 (12 Dec 2023 12:07) (96/61 - 129/85)  BP(mean): --  RR: 20 (12 Dec 2023 12:07) (18 - 20)  SpO2: 93% (12 Dec 2023 12:07) (88% - 96%)    Parameters below as of 12 Dec 2023 12:07  Patient On (Oxygen Delivery Method): nasal cannula  O2 Flow (L/min): 5      Physical Exam:  General:    NAD, non toxic, NC  Head: atraumatic, normocephalic  Eyes: normal sclera and conjunctiva  ENT:   no oropharyngeal lesions, poor dentition, no LAD, neck supple  Cardio:   tachycardic, S1,S2, no murmur  Respiratory:   somewhat diminished to auscultation b/l, no wheezing, no rhonchi   abd:   soft, BS +, not tender, no distention, midline scar healed, right sided colostomy in place   :     no CVAT, no suprapubic tenderness, Holcomb   Musculoskeletal : severely contracted LE b/l, no noted joint swelling   Skin:   b/l feet dressed, c/d/i - dressing change by nursing, awaiting for call to evaluate,  left shoulder small blisters - improved, left arm PICC line c/d/i  vascular:  normal pulses  Neurologic:     no focal deficits  psych: normal affect    WBC Count: 7.32 K/uL (12-12 @ 05:20)  WBC Count: 9.12 K/uL (12-11 @ 17:40)  WBC Count: 7.73 K/uL (12-08 @ 08:20)  WBC Count: 9.00 K/uL (12-07 @ 06:23)  WBC Count: 9.95 K/uL (12-06 @ 06:30)                            8.9    7.32  )-----------( 164      ( 12 Dec 2023 05:20 )             29.8       12-12    142  |  107  |  17  ----------------------------<  86  3.8   |  35<H>  |  0.57    Ca    8.4<L>      12 Dec 2023 05:20  Phos  4.1     12-11  Mg     2.0     12-11        Urinalysis Basic - ( 12 Dec 2023 05:20 )    Color: x / Appearance: x / SG: x / pH: x  Gluc: 86 mg/dL / Ketone: x  / Bili: x / Urobili: x   Blood: x / Protein: x / Nitrite: x   Leuk Esterase: x / RBC: x / WBC x   Sq Epi: x / Non Sq Epi: x / Bacteria: x        Creatinine Trend: 0.57<--, 0.60<--, 0.63<--, 0.67<--, 0.89<--, 0.67<--      MICROBIOLOGY:  v  Clean Catch Clean Catch (Midstream)  12-04-23   >100,000 CFU/ml Klebsiella pneumoniae  50,000 - 99,000 CFU/mL Enterococcus faecalis (vancomycin resistant)  --  Klebsiella pneumoniae  Enterococcus faecalis (vancomycin resistant)      .Abscess left wound culture  12-04-23   Rare Pseudomonas aeruginosa  Moderate Methicillin Resistant Staphylococcus aureus  Rare Klebsiella pneumoniae  Moderate Bacteroides fragilis "Susceptibilities not performed"  --  Pseudomonas aeruginosa  Methicillin resistant Staphylococcus aureus  Klebsiella pneumoniae      .Blood Blood-Peripheral  12-04-23   No growth at 5 days  --  --      .Blood Blood-Peripheral  12-04-23   No growth at 5 days  --  --          Rapid RVP Result: NotDetec (12-11 @ 20:55)        C-Reactive Protein, Serum: 84 (12-04)            SARS-CoV-2: NotDetec (12-11-23 @ 20:55)  Rapid RVP Result: NotDetec (12-11-23 @ 20:55)    SARS-CoV-2: NotDetec (11 Dec 2023 20:55)  SARS-CoV-2: NotDetec (04 Dec 2023 17:20)    RADIOLOGY:     STAN VEGA  MRN-[id number]    Follow Up:  OM    Interval History: the pt was seen and examined earlier, not in acute distress, no new complaints. Pt had an episode of hypoxemia yesterday, on NC, pt is on O2 NC at home as well, no fevers, no wbc.     PAST MEDICAL & SURGICAL HISTORY:  CAD (coronary artery disease)      HTN (hypertension)      HLD (hyperlipidemia)      Neurogenic bladder      Bipolar disorder      S/P ileostomy      Indwelling Holcomb catheter present      Chronic hepatitis C virus infection      S/P laminectomy      S/P ileostomy          ROS:    [ ] Unobtainable because:  [x ] All other systems negative    Constitutional: no fever, no chills  Head: no trauma  Eyes: no vision changes, no eye pain  ENT:  no sore throat, no rhinorrhea  Cardiovascular:  no chest pain, no palpitation  Respiratory:  no SOB with supplemental O2, mild cough  GI:  no abd pain, no vomiting, no diarrhea  urinary: no dysuria, no hematuria, no flank pain  musculoskeletal:  contracted  skin: no rash   neurology:  no headache, no seizure, no change in mental status  psych: no anxiety, no depression         Allergies  NSAIDs (Flushing; Other (Moderate))  Haldol (Anaphylaxis)  Zyprexa (Rash; Dystonic RXN; Hives)  Motrin (Anaphylaxis)  Thorazine (Other (Moderate))  Aleve (Unknown)  Stelazine (Unknown)  Risperdal (Short breath; Rash; Hives)        ANTIMICROBIALS:  ertapenem  IVPB 1000 every 24 hours  vancomycin  IVPB 1000 every 12 hours  vancomycin  IVPB        OTHER MEDS:  acetaminophen     Tablet .. 650 milliGRAM(s) Oral daily  acetaminophen     Tablet .. 650 milliGRAM(s) Oral every 6 hours PRN  albuterol    90 MICROgram(s) HFA Inhaler 2 Puff(s) Inhalation every 4 hours PRN  albuterol/ipratropium for Nebulization 3 milliLiter(s) Nebulizer every 6 hours  aluminum hydroxide/magnesium hydroxide/simethicone Suspension 30 milliLiter(s) Oral every 4 hours PRN  amLODIPine   Tablet 2.5 milliGRAM(s) Oral daily  ascorbic acid 500 milliGRAM(s) Oral daily  atorvastatin 40 milliGRAM(s) Oral at bedtime  bacitracin   Ointment 1 Application(s) Topical daily  chlorhexidine 2% Cloths 1 Application(s) Topical <User Schedule>  collagenase Ointment 1 Application(s) Topical daily  cyclobenzaprine 10 milliGRAM(s) Oral two times a day  DULoxetine 30 milliGRAM(s) Oral daily  enoxaparin Injectable 40 milliGRAM(s) SubCutaneous every 24 hours  finasteride 5 milliGRAM(s) Oral daily  furosemide   Injectable 40 milliGRAM(s) IV Push every 12 hours  gabapentin 600 milliGRAM(s) Oral <User Schedule>  guaiFENesin  milliGRAM(s) Oral every 12 hours  HYDROmorphone  Injectable 1 milliGRAM(s) IV Push every 4 hours PRN  lactobacillus acidophilus 1 Tablet(s) Oral two times a day with meals  lidocaine   4% Patch 1 Patch Transdermal daily  melatonin 3 milliGRAM(s) Oral at bedtime PRN  methylphenidate 5 milliGRAM(s) Oral <User Schedule>  multivitamin 1 Tablet(s) Oral daily  nystatin Powder 1 Application(s) Topical two times a day  ondansetron Injectable 4 milliGRAM(s) IV Push every 8 hours PRN  oxyCODONE    IR 5 milliGRAM(s) Oral <User Schedule>  pantoprazole    Tablet 40 milliGRAM(s) Oral before breakfast  polyethylene glycol 3350 17 Gram(s) Oral daily  QUEtiapine 400 milliGRAM(s) Oral at bedtime  QUEtiapine 100 milliGRAM(s) Oral <User Schedule>  senna 2 Tablet(s) Oral at bedtime  sodium chloride 0.9% lock flush 10 milliLiter(s) IV Push every 1 hour PRN  tamsulosin 0.4 milliGRAM(s) Oral at bedtime  thiamine 100 milliGRAM(s) Oral daily  zinc sulfate 220 milliGRAM(s) Oral daily      Vital Signs Last 24 Hrs  T(C): 37.2 (12 Dec 2023 12:07), Max: 37.2 (12 Dec 2023 12:07)  T(F): 99 (12 Dec 2023 12:07), Max: 99 (12 Dec 2023 12:07)  HR: 105 (12 Dec 2023 12:07) (78 - 105)  BP: 96/61 (12 Dec 2023 12:07) (96/61 - 129/85)  BP(mean): --  RR: 20 (12 Dec 2023 12:07) (18 - 20)  SpO2: 93% (12 Dec 2023 12:07) (88% - 96%)    Parameters below as of 12 Dec 2023 12:07  Patient On (Oxygen Delivery Method): nasal cannula  O2 Flow (L/min): 5      Physical Exam:  General:    NAD, non toxic, NC  Head: atraumatic, normocephalic  Eyes: normal sclera and conjunctiva  ENT:   no oropharyngeal lesions, poor dentition, no LAD, neck supple  Cardio:   tachycardic, S1,S2, no murmur  Respiratory:   somewhat diminished to auscultation b/l, no wheezing, no rhonchi   abd:   soft, BS +, not tender, no distention, midline scar healed, right sided colostomy in place   :     no CVAT, no suprapubic tenderness, Holcomb   Musculoskeletal : severely contracted LE b/l, no noted joint swelling   Skin:   b/l feet dressed, c/d/i - dressing change by nursing, awaiting for call to evaluate,  left shoulder small blisters - improved, left arm PICC line c/d/i  vascular:  normal pulses  Neurologic:     no focal deficits  psych: normal affect    WBC Count: 7.32 K/uL (12-12 @ 05:20)  WBC Count: 9.12 K/uL (12-11 @ 17:40)  WBC Count: 7.73 K/uL (12-08 @ 08:20)  WBC Count: 9.00 K/uL (12-07 @ 06:23)  WBC Count: 9.95 K/uL (12-06 @ 06:30)                            8.9    7.32  )-----------( 164      ( 12 Dec 2023 05:20 )             29.8       12-12    142  |  107  |  17  ----------------------------<  86  3.8   |  35<H>  |  0.57    Ca    8.4<L>      12 Dec 2023 05:20  Phos  4.1     12-11  Mg     2.0     12-11        Urinalysis Basic - ( 12 Dec 2023 05:20 )    Color: x / Appearance: x / SG: x / pH: x  Gluc: 86 mg/dL / Ketone: x  / Bili: x / Urobili: x   Blood: x / Protein: x / Nitrite: x   Leuk Esterase: x / RBC: x / WBC x   Sq Epi: x / Non Sq Epi: x / Bacteria: x        Creatinine Trend: 0.57<--, 0.60<--, 0.63<--, 0.67<--, 0.89<--, 0.67<--      MICROBIOLOGY:  v  Clean Catch Clean Catch (Midstream)  12-04-23   >100,000 CFU/ml Klebsiella pneumoniae  50,000 - 99,000 CFU/mL Enterococcus faecalis (vancomycin resistant)  --  Klebsiella pneumoniae  Enterococcus faecalis (vancomycin resistant)      .Abscess left wound culture  12-04-23   Rare Pseudomonas aeruginosa  Moderate Methicillin Resistant Staphylococcus aureus  Rare Klebsiella pneumoniae  Moderate Bacteroides fragilis "Susceptibilities not performed"  --  Pseudomonas aeruginosa  Methicillin resistant Staphylococcus aureus  Klebsiella pneumoniae      .Blood Blood-Peripheral  12-04-23   No growth at 5 days  --  --      .Blood Blood-Peripheral  12-04-23   No growth at 5 days  --  --          Rapid RVP Result: NotDetec (12-11 @ 20:55)        C-Reactive Protein, Serum: 84 (12-04)            SARS-CoV-2: NotDetec (12-11-23 @ 20:55)  Rapid RVP Result: NotDetec (12-11-23 @ 20:55)    SARS-CoV-2: NotDetec (11 Dec 2023 20:55)  SARS-CoV-2: NotDetec (04 Dec 2023 17:20)    RADIOLOGY:

## 2023-12-12 NOTE — PROGRESS NOTE ADULT - ASSESSMENT
A/P-  53 year old male with PMHx of cervical epidural abscess (s/p decompressive laminectomy 3/2021 c/b b/l leg paralysis), hx of pneumoperitoneum (s/p ex lap, total abdominal colectomy and end ileostomy (on 1/12/23) c/b evisceration and sepsis with MRSA bacteremia postoperatively), hx of hepatitis C, hx of R. buttock abscess, hx of L. foot osteomyelitis, neurogenic bladder (with indwelling Holcomb catheter, last exchanged two days ago), HTN, HLD, bipolar disorder, GERD, hx of opioid dependence, and constipation who presented to the ED on 12/4/23 from [facility] for feet infection.    b/l feet infections and overlying cellulitis  Right heel wound infection to bone and as per xray c/w Om as well.  no report of any gas on either xray or Ct.  patient as per podiatry not a good surgical candidate and they recommend local wound care and antibiotics.  patient himself states he will not be able to do MRI.    b/l foot infection  Om of right heel wound  + leukocytosis - improved   HCV  severe contractures of b/l LE  right foot wound cx- ESBL proteus - resistant to cefepime and corynebecaterium  left foot wound cx- MRSA and Pseudomonas   Blood cx- neg x 2  extensive chart search on [facility]IE by Dr. King and based on his latest HCV VL result from 6/2023 detected vl but <1.08  also based on serology from 2017 was positive for hep b sAG and E ag and core IGm ab ( not sure if he was ever treated)  workup in progress   HIV ab/ag negative   UC - VRE, Klebsiella   Hep B and HEp c both viral loads are undetectable.  his hep B serology c/w chronic infection in past not new and VL undetectable .  hep c VL -undetectable ( was treated for this as per his outpatient docs and seems to have cured )    HIV ab/ag- Negative    plan-  Continue Vancomycin IV  monitor vancomycin levels, required  keep vanco trough <15  Avycaz IV stopped on 12/8  Ertapenem IV started on 12/8 evening (day #4)  aggressive wound care   HCV Vl , full hep b panel - reviewed, Hep B immune   depakote stopped yesterday 12/11      when ready for discharge:  picc line placed on 12/11  continue Vancomycin IV 1250 mg q 12 hrs - last dose on January 3rd, 2024  continue ertapenem 1 gram IV daily - last dose on January 3rd, 2024  weekly lab work: cbc with diff, cmp, esr, crp, vancomycin trough - pt has his ID specialist and pcp, please fax to pt's ID specialist  follow up with pt's ID and PCP in the office   remove picc line upon completion of the course of abx       Discussed with Dr. King  discussed with RN     A/P-  53 year old male with PMHx of cervical epidural abscess (s/p decompressive laminectomy 3/2021 c/b b/l leg paralysis), hx of pneumoperitoneum (s/p ex lap, total abdominal colectomy and end ileostomy (on 1/12/23) c/b evisceration and sepsis with MRSA bacteremia postoperatively), hx of hepatitis C, hx of R. buttock abscess, hx of L. foot osteomyelitis, neurogenic bladder (with indwelling Holcomb catheter, last exchanged two days ago), HTN, HLD, bipolar disorder, GERD, hx of opioid dependence, and constipation who presented to the ED on 12/4/23 from [facility] for feet infection.    b/l feet infections and overlying cellulitis  Right heel wound infection to bone and as per xray c/w Om as well.  no report of any gas on either xray or Ct.  patient as per podiatry not a good surgical candidate and they recommend local wound care and antibiotics.  patient himself states he will not be able to do MRI.    b/l foot infection  Om of right heel wound  + leukocytosis - improved   HCV  severe contractures of b/l LE  right foot wound cx- ESBL proteus - resistant to cefepime and corynebecaterium  left foot wound cx- MRSA and Pseudomonas   Blood cx- neg x 2  extensive chart search on [facility]IE by Dr. King and based on his latest HCV VL result from 6/2023 detected vl but <1.08  also based on serology from 2017 was positive for hep b sAG and E ag and core IGm ab ( not sure if he was ever treated)  workup in progress   HIV ab/ag negative   UC - VRE, Klebsiella   Hep B and HEp c both viral loads are undetectable.  his hep B serology c/w chronic infection in past not new and VL undetectable .  hep c VL -undetectable ( was treated for this as per his outpatient docs and seems to have cured )    HIV ab/ag- Negative    plan-  Continue Vancomycin IV  monitor vancomycin levels, required  keep vanco trough <15  Avycaz IV stopped on 12/8  Ertapenem IV started on 12/8 evening (day #4)  aggressive wound care   HCV Vl , full hep b panel - reviewed, Hep B immune   depakote stopped yesterday 12/11      when ready for discharge:  picc line placed on 12/11  continue Vancomycin IV 1250 mg q 12 hrs - last dose on January 3rd, 2024  continue ertapenem 1 gram IV daily - last dose on January 3rd, 2024  weekly lab work: cbc with diff, cmp, esr, crp, vancomycin trough - pt has his ID specialist and pcp, please fax to pt's ID specialist  follow up with pt's ID and PCP in the office   remove picc line upon completion of the course of abx       Discussed with Dr. King  discussed with RN     A/P-  53 year old male with PMHx of cervical epidural abscess (s/p decompressive laminectomy 3/2021 c/b b/l leg paralysis), hx of pneumoperitoneum (s/p ex lap, total abdominal colectomy and end ileostomy (on 1/12/23) c/b evisceration and sepsis with MRSA bacteremia postoperatively), hx of hepatitis C, hx of R. buttock abscess, hx of L. foot osteomyelitis, neurogenic bladder (with indwelling Holcomb catheter, last exchanged two days ago), HTN, HLD, bipolar disorder, GERD, hx of opioid dependence, and constipation who presented to the ED on 12/4/23 from [facility] for feet infection.    b/l feet infections and overlying cellulitis  Right heel wound infection to bone and as per xray c/w Om as well.  no report of any gas on either xray or Ct.  patient as per podiatry not a good surgical candidate and they recommend local wound care and antibiotics.  patient himself states he will not be able to do MRI.    b/l foot infection  Om of right heel wound  + leukocytosis - improved   HCV  severe contractures of b/l LE  right foot wound cx- ESBL proteus - resistant to cefepime and corynebecaterium and alcalegenes  left foot wound cx- MRSA and Pseudomonas  and klebsiella   Blood cx- neg x 2  extensive chart search on [facility]IE by Dr. King and based on his latest HCV VL result from 6/2023 detected vl but <1.08  also based on serology from 2017 was positive for hep b sAG and E ag and core IGm ab ( not sure if he was ever treated)  workup in progress   HIV ab/ag negative   UC - VRE, Klebsiella   Hep B and HEp c both viral loads are undetectable.  his hep B serology c/w chronic infection in past not new and VL undetectable .  hep c VL -undetectable ( was treated for this as per his outpatient docs and seems to have cured )    HIV ab/ag- Negative    plan-  Continue Vancomycin IV  monitor vancomycin levels, required  keep vanco trough <15  Avycaz IV stopped on 12/8  Ertapenem IV started on 12/8 evening (day #4)  aggressive wound care   HCV Vl , full hep b panel - reviewed, Hep B immune   depakote stopped yesterday 12/11      when ready for discharge:  picc line placed on 12/11  continue Vancomycin IV 1250 mg q 12 hrs - last dose on January 8, 2024  will need to switch ertapenem to meropnem as the pseudomonas and alcagenes reported in foot wound cx not covered by erta but meropnem will cover it and will cover the esbl.  Hence start meropnem will give q12 dosing instead of q8 . last dose on January 8rd, 2024  weekly lab work: cbc with diff, cmp, esr, crp, vancomycin trough - pt has his own ID specialist from before  and pcp, please fax to pt's own  ID specialist  follow up with pt's ID and PCP in the office   remove picc line upon completion of the course of abx       Discussed with Dr. King  discussed with RN     A/P-  53 year old male with PMHx of cervical epidural abscess (s/p decompressive laminectomy 3/2021 c/b b/l leg paralysis), hx of pneumoperitoneum (s/p ex lap, total abdominal colectomy and end ileostomy (on 1/12/23) c/b evisceration and sepsis with MRSA bacteremia postoperatively), hx of hepatitis C, hx of R. buttock abscess, hx of L. foot osteomyelitis, neurogenic bladder (with indwelling Holcomb catheter, last exchanged two days ago), HTN, HLD, bipolar disorder, GERD, hx of opioid dependence, and constipation who presented to the ED on 12/4/23 from [facility] for feet infection.    b/l feet infections and overlying cellulitis  Right heel wound infection to bone and as per xray c/w Om as well.  no report of any gas on either xray or Ct.  patient as per podiatry not a good surgical candidate and they recommend local wound care and antibiotics.  patient himself states he will not be able to do MRI.    b/l foot infection  Om of right heel wound  + leukocytosis - improved   HCV  severe contractures of b/l LE  right foot wound cx- ESBL proteus - resistant to cefepime and corynebecaterium and alcalegenes  left foot wound cx- MRSA and Pseudomonas  and klebsiella   Blood cx- neg x 2  extensive chart search on [facility]IE by Dr. King and based on his latest HCV VL result from 6/2023 detected vl but <1.08  also based on serology from 2017 was positive for hep b sAG and E ag and core IGm ab ( not sure if he was ever treated)  workup in progress   HIV ab/ag negative   UC - VRE, Klebsiella   Hep B and HEp c both viral loads are undetectable.  his hep B serology c/w chronic infection in past not new and VL undetectable .  hep c VL -undetectable ( was treated for this as per his outpatient docs and seems to have cured )    HIV ab/ag- Negative    plan-  Continue Vancomycin IV  monitor vancomycin levels, required  keep vanco trough <15  Avycaz IV stopped on 12/8  Ertapenem IV started on 12/8 evening (day #4)  aggressive wound care   HCV Vl , full hep b panel - reviewed, Hep B immune   depakote stopped yesterday 12/11      when ready for discharge:  picc line placed on 12/11  continue Vancomycin IV 1250 mg q 12 hrs - last dose on January 8, 2024  will need to switch ertapenem to meropnem as the pseudomonas and alcagenes reported in foot wound cx not covered by erta but meropnem will cover it and will cover the esbl.  Hence start meropnem will give q12 dosing instead of q8 . last dose on January 8rd, 2024  weekly lab work: cbc with diff, cmp, esr, crp, vancomycin trough - pt has his own ID specialist from before  and pcp, please fax to pt's own  ID specialist  follow up with pt's ID and PCP in the office   remove picc line upon completion of the course of abx       Discussed with Dr. King  discussed with RN     A/P-  53 year old male with PMHx of cervical epidural abscess (s/p decompressive laminectomy 3/2021 c/b b/l leg paralysis), hx of pneumoperitoneum (s/p ex lap, total abdominal colectomy and end ileostomy (on 1/12/23) c/b evisceration and sepsis with MRSA bacteremia postoperatively), hx of hepatitis C, hx of R. buttock abscess, hx of L. foot osteomyelitis, neurogenic bladder (with indwelling Holcomb catheter, last exchanged two days ago), HTN, HLD, bipolar disorder, GERD, hx of opioid dependence, and constipation who presented to the ED on 12/4/23 from [facility] for feet infection.    b/l feet infections and overlying cellulitis  Right heel wound infection to bone and as per xray c/w Om as well.  no report of any gas on either xray or Ct.  patient as per podiatry not a good surgical candidate and they recommend local wound care and antibiotics.  patient himself states he will not be able to do MRI.    b/l foot infection  Om of right heel wound  + leukocytosis - improved   HCV  severe contractures of b/l LE  right foot wound cx- ESBL proteus - resistant to cefepime and corynebecaterium and alcalegenes  left foot wound cx- MRSA and Pseudomonas  and klebsiella   Blood cx- neg x 2  extensive chart search on HIE by Dr. King and based on his latest HCV VL result from 6/2023 detected vl but <1.08  also based on serology from 2017 was positive for hep b sAG and E ag and core IGm ab ( not sure if he was ever treated)  workup in progress   HIV ab/ag negative   UC - VRE, Klebsiella   Hep B and HEp c both viral loads are undetectable.  his hep B serology c/w chronic infection in past not new and VL undetectable .  hep c VL -undetectable ( was treated for this as per his outpatient docs and seems to have cured )    HIV ab/ag- Negative    plan-  Continue Vancomycin IV  monitor vancomycin levels, required  keep vanco trough <15  Avycaz IV stopped on 12/8  Ertapenem IV started on 12/8 evening (day #4)  aggressive wound care   HCV Vl , full hep b panel - reviewed, Hep B immune   depakote stopped yesterday 12/11      when ready for discharge:  picc line placed on 12/11  continue Vancomycin IV 1000 mg q 12 hrs - last dose on January 8, 2024  will need to switch ertapenem to meropenem as the pseudomonas and alcagenes reported in foot wound cx not covered by erta but meropnem will cover it and will cover the esbl.  Hence start meropnem will give 1 gram IV q12 dosing instead of q8 . last dose on January 8rd, 2024  weekly lab work: cbc with diff, cmp, esr, crp, vancomycin trough - pt has his own ID specialist from before  and pcp, please fax to pt's own  ID specialist  follow up with pt's ID and PCP in the office   remove picc line upon completion of the course of abx       Discussed with Dr. King  discussed with RN     A/P-  53 year old male with PMHx of cervical epidural abscess (s/p decompressive laminectomy 3/2021 c/b b/l leg paralysis), hx of pneumoperitoneum (s/p ex lap, total abdominal colectomy and end ileostomy (on 1/12/23) c/b evisceration and sepsis with MRSA bacteremia postoperatively), hx of hepatitis C, hx of R. buttock abscess, hx of L. foot osteomyelitis, neurogenic bladder (with indwelling Holcomb catheter, last exchanged two days ago), HTN, HLD, bipolar disorder, GERD, hx of opioid dependence, and constipation who presented to the ED on 12/4/23 from [facility] for feet infection.    b/l feet infections and overlying cellulitis  Right heel wound infection to bone and as per xray c/w Om as well.  no report of any gas on either xray or Ct.  patient as per podiatry not a good surgical candidate and they recommend local wound care and antibiotics.  patient himself states he will not be able to do MRI.    b/l foot infection  Om of right heel wound  + leukocytosis - improved   HCV  severe contractures of b/l LE  right foot wound cx- ESBL proteus - resistant to cefepime and corynebecaterium and alcalegenes  left foot wound cx- MRSA and Pseudomonas  and klebsiella   Blood cx- neg x 2  extensive chart search on HIE by Dr. King and based on his latest HCV VL result from 6/2023 detected vl but <1.08  also based on serology from 2017 was positive for hep b sAG and E ag and core IGm ab ( not sure if he was ever treated)  workup in progress   HIV ab/ag negative   UC - VRE, Klebsiella   Hep B and HEp c both viral loads are undetectable.  his hep B serology c/w chronic infection in past not new and VL undetectable .  hep c VL -undetectable ( was treated for this as per his outpatient docs and seems to have cured )    HIV ab/ag- Negative    plan-  Continue Vancomycin IV  monitor vancomycin levels, required  keep vanco trough <15  Avycaz IV stopped on 12/8  Ertapenem IV started on 12/8 evening (day #4)  aggressive wound care   HCV Vl , full hep b panel - reviewed, Hep B immune   depakote stopped yesterday 12/11      when ready for discharge:  picc line placed on 12/11  continue Vancomycin IV 1000 mg q 12 hrs - last dose on January 8, 2024  will need to switch ertapenem to meropenem as the pseudomonas and alcagenes reported in foot wound cx not covered by erta but meropnem will cover it and will cover the esbl.  Hence start meropnem will give 1 gram IV q12 dosing instead of q8 . last dose on January 8rd, 2024  weekly lab work: cbc with diff, cmp, esr, crp, vancomycin trough - pt has his own ID specialist from before  and pcp, please fax to pt's own  ID specialist  follow up with pt's ID and PCP in the office   remove picc line upon completion of the course of abx       Discussed with Dr. King  discussed with RN

## 2023-12-12 NOTE — PROGRESS NOTE ADULT - NS ATTEND AMEND GEN_ALL_CORE FT
All labs and cultures and imaging and pertinent chart notes reviewed by me.    case d/w Np Naty at length and agree with her assessment and plan.  Kush King MD  Infectious Disease Attending    for any questions please do not hesitate to contact me either via teams or by calling [phone] All labs and cultures and imaging and pertinent chart notes reviewed by me.    case d/w Np Naty at length and agree with her assessment and plan.  Kush King MD  Infectious Disease Attending    for any questions please do not hesitate to contact me either via teams or by calling [phone]

## 2023-12-12 NOTE — BH CONSULTATION LIAISON PROGRESS NOTE - OTHER
intact  concerned  deferred  gaunt / deconditioned  high end of fair  weakened (expected)  significant LE contractures   appropriate to context

## 2023-12-12 NOTE — PROGRESS NOTE ADULT - ASSESSMENT
Gonzalez Stewart is a 53 year old male with PMHx of cervical epidural abscess (s/p decompressive laminectomy 3/2021 c/b b/l leg paralysis), hx of pneumoperitoneum (s/p ex lap, total abdominal colectomy and end ileostomy (on 1/12/23) c/b evisceration and sepsis with MRSA bacteremia postoperatively), hx of hepatitis C, hx of R. buttock abscess, hx of L. foot osteomyelitis, neurogenic bladder (with indwelling Holcomb catheter, last exchanged two days ago), HTN, HLD, bipolar disorder, GERD, hx of opioid dependence, and constipation who presented to the ED on 12/4/23 from [facility] for feet infection and admitted for sepsis secondary to bilateral feet infection.    Sepsis secondary to b/l feet infection  Less likely UTI given urine sample was not clean catch in pt with indwelling Holcomb and no urinary symptoms  Complaints of b/l feet infection intermittently over the past year  Previously treated for L. hallux OM with L. heel culture growing Proteus mirabilis ESBL, completed 6-week course of avycaz  Temp 101 and WBC 15.25K on admission  Lactic WNL, ESR 84  U/A (sample obtained from Holcomb catheter) with positive nitrites, moderate leuks, moderate blood, WBC 26-50, RBC > 50, moderate bacteria, squamous epithelial cells present  CT b/l LE with study is severely limited by contracture. Left lower extremity is only partially visualized with exclusion of the left foot. Skin induration and subcutaneous edema in the leg and ankle.  No soft tissue gas  S/p LR 2800 cc bolus, vancomycin, and zosyn in the ED  S/p bedside escharectomy by podiatry  Empirically started on vancomycin and cefepime; can de-escalate pending final culture data  Wound culture +GPC in pairs  F/u blood cultures, urine culture, CRP, MRSA/MSSA PCR  Please examine R. buttock as pt with hx of abscess in that area (unable to turn at the time of my examination due to severe pain)  Monitor WBC trend and fever curve  Pending vascular surgery evaluation, VITALY evaluated  Podiatry recommendations appreciated  12/5/2023 wound cx  done by podiatry -- shows rare gpv   blood cx -- pending   id consult will be obtained given his complex medical history  esbl   his contracted state prevents mri   will start parenteral pain meds as he chronic  oral oxycodone isnt helping. ( he received 4mg morphine in ed and another 2 mg morphine without relief )  12/6/2023- podiatry has reccs for no surgical intervention, vascular also provides no reccs for surgical intervention  12/7/2023 await final ID reccs as there appears to be no recommendations from either vascular or podiatry for surgical intervention    wound on feet growing mrsa, pseudomonas on ne cx and proteus on the other   as well Klebsiella in urine   12/8/2023 d/w ID on 12/7/2023 and she stated she wants to assess pt on today before deciding on PICC line insertion   I will also d/w SW to investigate if pt can return to facility with the antibx as they  are very expensive   12/9/2023 antibx to changed to Ertapenem and his facility will not pay for ayzac-- i d/w dr. queen and the Depakote is being used for mood and NOT seizure- she will mange this medication   . ID is in agreement and ordered the Ertapenem     now he will need a PICC line and start d./c planning hopefully for Monday/ Tuesday      12/10/2023 start d/c planning   12/11/2023 ready for dc. end date for ertapenem and vancomycin will be january 6, 2024   remove picc line after completion of antibx    Chronic medical conditions:  HTN: PTA amlodipine 2.5 mg   HLD: PTA atorvastatin 40 mg  Bipolar disorder: PTA quetiapine 100 mg BID and 400 mg qhs, valproic acid-- per psych  Hx of opioid dependence: PTA methadone 110 mg   Chronic pain: PTA lidocaine 4% patch, duloxetine 30 mg DR, gabapentin 600 mg q8, cyclobenzaprine 10 mg BID, oxycodone 5 mg q6  GERD: PTA pantoprazole 40 mg DR  Neurogenic bladder (with indwelling Holcomb catheter): PTA finasteride 5 mg, tamsulosin 0.4 mg  Constipation: PTA senna 8.6 mg 2 tabs qhs   functional quad- supportive care    .  Gonzalez Stewart is a 53 year old male with PMHx of cervical epidural abscess (s/p decompressive laminectomy 3/2021 c/b b/l leg paralysis), hx of pneumoperitoneum (s/p ex lap, total abdominal colectomy and end ileostomy (on 1/12/23) c/b evisceration and sepsis with MRSA bacteremia postoperatively), hx of hepatitis C, hx of R. buttock abscess, hx of L. foot osteomyelitis, neurogenic bladder (with indwelling Holcomb catheter, last exchanged two days ago), HTN, HLD, bipolar disorder, GERD, hx of opioid dependence, and constipation who presented to the ED on 12/4/23 from [facility] for feet infection and admitted for sepsis secondary to bilateral feet infection.    Sepsis secondary to b/l feet infection  Less likely UTI given urine sample was not clean catch in pt with indwelling Holcomb and no urinary symptoms  Complaints of b/l feet infection intermittently over the past year  Previously treated for L. hallux OM with L. heel culture growing Proteus mirabilis ESBL, completed 6-week course of avycaz  Temp 101 and WBC 15.25K on admission  Lactic WNL, ESR 84  U/A (sample obtained from Holcomb catheter) with positive nitrites, moderate leuks, moderate blood, WBC 26-50, RBC > 50, moderate bacteria, squamous epithelial cells present  CT b/l LE with study is severely limited by contracture. Left lower extremity is only partially visualized with exclusion of the left foot. Skin induration and subcutaneous edema in the leg and ankle.  No soft tissue gas  S/p LR 2800 cc bolus, vancomycin, and zosyn in the ED  S/p bedside escharectomy by podiatry  Empirically started on vancomycin and cefepime; can de-escalate pending final culture data  Wound culture +GPC in pairs  F/u blood cultures, urine culture, CRP, MRSA/MSSA PCR  Please examine R. buttock as pt with hx of abscess in that area (unable to turn at the time of my examination due to severe pain)  Monitor WBC trend and fever curve  Pending vascular surgery evaluation, VITALY evaluated  Podiatry recommendations appreciated  12/5/2023 wound cx  done by podiatry -- shows rare gpv   blood cx -- pending   id consult will be obtained given his complex medical history  esbl   his contracted state prevents mri   will start parenteral pain meds as he chronic  oral oxycodone isnt helping. ( he received 4mg morphine in ed and another 2 mg morphine without relief )  12/6/2023- podiatry has reccs for no surgical intervention, vascular also provides no reccs for surgical intervention  12/7/2023 await final ID reccs as there appears to be no recommendations from either vascular or podiatry for surgical intervention    wound on feet growing mrsa, pseudomonas on ne cx and proteus on the other   as well Klebsiella in urine   12/8/2023 d/w ID on 12/7/2023 and she stated she wants to assess pt on today before deciding on PICC line insertion   I will also d/w SW to investigate if pt can return to facility with the antibx as they  are very expensive   12/9/2023 antibx to changed to Ertapenem and his facility will not pay for ayzac-- i d/w dr. queen and the Depakote is being used for mood and NOT seizure- she will mange this medication   . ID is in agreement and ordered the Ertapenem     now he will need a PICC line and start d./c planning hopefully for Monday/ Tuesday      12/10/2023 start d/c planning   12/11/2023 ready for dc. end date for ertapenem and vancomycin will be january 6, 2024   remove picc line after completion of antibx    Chronic medical conditions:  HTN: PTA amlodipine 2.5 mg   HLD: PTA atorvastatin 40 mg  Bipolar disorder: PTA quetiapine 100 mg BID and 400 mg qhs, valproic acid-- per psych  Hx of opioid dependence: PTA methadone 110 mg   Chronic pain: PTA lidocaine 4% patch, duloxetine 30 mg DR, gabapentin 600 mg q8, cyclobenzaprine 10 mg BID, oxycodone 5 mg q6  GERD: PTA pantoprazole 40 mg DR  Neurogenic bladder (with indwelling Holcomb catheter): PTA finasteride 5 mg, tamsulosin 0.4 mg  Constipation: PTA senna 8.6 mg 2 tabs qhs   functional quad- supportive care    .  Gonzalez Stewart is a 53 year old male with PMHx of cervical epidural abscess (s/p decompressive laminectomy 3/2021 c/b b/l leg paralysis), hx of pneumoperitoneum (s/p ex lap, total abdominal colectomy and end ileostomy (on 1/12/23) c/b evisceration and sepsis with MRSA bacteremia postoperatively), hx of hepatitis C, hx of R. buttock abscess, hx of L. foot osteomyelitis, neurogenic bladder (with indwelling Holcomb catheter, last exchanged two days ago), HTN, HLD, bipolar disorder, GERD, hx of opioid dependence, and constipation who presented to the ED on 12/4/23 from [facility] for feet infection and admitted for sepsis secondary to bilateral feet infection.    Sepsis secondary to b/l feet infection  Less likely UTI given urine sample was not clean catch in pt with indwelling Holcomb and no urinary symptoms  Complaints of b/l feet infection intermittently over the past year  Previously treated for L. hallux OM with L. heel culture growing Proteus mirabilis ESBL, completed 6-week course of avycaz  Temp 101 and WBC 15.25K on admission  Lactic WNL, ESR 84  U/A (sample obtained from Holcomb catheter) with positive nitrites, moderate leuks, moderate blood, WBC 26-50, RBC > 50, moderate bacteria, squamous epithelial cells present  CT b/l LE with study is severely limited by contracture. Left lower extremity is only partially visualized with exclusion of the left foot. Skin induration and subcutaneous edema in the leg and ankle.  No soft tissue gas  S/p LR 2800 cc bolus, vancomycin, and zosyn in the ED  S/p bedside escharectomy by podiatry  Empirically started on vancomycin and cefepime; can de-escalate pending final culture data  Wound culture +GPC in pairs  F/u blood cultures, urine culture, CRP, MRSA/MSSA PCR  Please examine R. buttock as pt with hx of abscess in that area (unable to turn at the time of my examination due to severe pain)  Monitor WBC trend and fever curve  Pending vascular surgery evaluation, VITALY evaluated  Podiatry recommendations appreciated  12/5/2023 wound cx  done by podiatry -- shows rare gpv   blood cx -- pending   id consult will be obtained given his complex medical history  esbl   his contracted state prevents mri   will start parenteral pain meds as he chronic  oral oxycodone isnt helping. ( he received 4mg morphine in ed and another 2 mg morphine without relief )  12/6/2023- podiatry has reccs for no surgical intervention, vascular also provides no reccs for surgical intervention  12/7/2023 await final ID reccs as there appears to be no recommendations from either vascular or podiatry for surgical intervention    wound on feet growing mrsa, pseudomonas on one cx and proteus on the other   as well Klebsiella in urine   12/8/2023 d/w ID on 12/7/2023 and she stated she wants to assess pt on today before deciding on PICC line insertion   I will also d/w SW to investigate if pt can return to facility with the antibx as they  are very expensive   12/9/2023 antibx to changed to Ertapenem and his facility will not pay for ayzac-- i d/w dr. queen and the Depakote is being used for mood and NOT seizure- she will mange this medication   . ID is in agreement and ordered the Ertapenem     now he will need a PICC line and start d./c planning hopefully for Monday/ Tuesday      12/10/2023 start d/c planning   12/11/2023 ready for dc. end date for ertapenem and vancomycin will be january 6, 2024   remove picc line after completion of antibx  12/12/2023- acute resp failure- presumed secondary to CHF and or pna- already on antibx, started lasix. usually  on oxygen at around  2lpm.  once stable can d/w to rehab to complete IV antbx   continue with kimberly    Chronic medical conditions:  HTN: PTA amlodipine 2.5 mg - bp stable  HLD: PTA atorvastatin 40 mg  Bipolar disorder: PTA quetiapine 100 mg BID and 400 mg qhs, valproic acid-- per psych  12/12/2023 valproic ahs been stopped by kendell    Hx of opioid dependence: PTA methadone 110 mg     Chronic pain: PTA lidocaine 4% patch, duloxetine 30 mg DR, gabapentin 600 mg q8, cyclobenzaprine 10 mg BID, oxycodone 5 mg q6  GERD: PTA pantoprazole 40 mg DR  Neurogenic bladder (with indwelling Holcomb catheter): PTA finasteride 5 mg, tamsulosin 0.4 mg  Constipation: PTA senna 8.6 mg 2 tabs qhs   functional quad- supportive care    .  Gonzalez Stewart is a 53 year old male with PMHx of cervical epidural abscess (s/p decompressive laminectomy 3/2021 c/b b/l leg paralysis), hx of pneumoperitoneum (s/p ex lap, total abdominal colectomy and end ileostomy (on 1/12/23) c/b evisceration and sepsis with MRSA bacteremia postoperatively), hx of hepatitis C, hx of R. buttock abscess, hx of L. foot osteomyelitis, neurogenic bladder (with indwelling Holcomb catheter, last exchanged two days ago), HTN, HLD, bipolar disorder, GERD, hx of opioid dependence, and constipation who presented to the ED on 12/4/23 from [facility] for feet infection and admitted for sepsis secondary to bilateral feet infection.    Sepsis secondary to b/l feet infection  Less likely UTI given urine sample was not clean catch in pt with indwelling Holcomb and no urinary symptoms  Complaints of b/l feet infection intermittently over the past year  Previously treated for L. hallux OM with L. heel culture growing Proteus mirabilis ESBL, completed 6-week course of avycaz  Temp 101 and WBC 15.25K on admission  Lactic WNL, ESR 84  U/A (sample obtained from Holcomb catheter) with positive nitrites, moderate leuks, moderate blood, WBC 26-50, RBC > 50, moderate bacteria, squamous epithelial cells present  CT b/l LE with study is severely limited by contracture. Left lower extremity is only partially visualized with exclusion of the left foot. Skin induration and subcutaneous edema in the leg and ankle.  No soft tissue gas  S/p LR 2800 cc bolus, vancomycin, and zosyn in the ED  S/p bedside escharectomy by podiatry  Empirically started on vancomycin and cefepime; can de-escalate pending final culture data  Wound culture +GPC in pairs  F/u blood cultures, urine culture, CRP, MRSA/MSSA PCR  Please examine R. buttock as pt with hx of abscess in that area (unable to turn at the time of my examination due to severe pain)  Monitor WBC trend and fever curve  Pending vascular surgery evaluation, VITALY evaluated  Podiatry recommendations appreciated  12/5/2023 wound cx  done by podiatry -- shows rare gpv   blood cx -- pending   id consult will be obtained given his complex medical history  esbl   his contracted state prevents mri   will start parenteral pain meds as he chronic  oral oxycodone isnt helping. ( he received 4mg morphine in ed and another 2 mg morphine without relief )  12/6/2023- podiatry has reccs for no surgical intervention, vascular also provides no reccs for surgical intervention  12/7/2023 await final ID reccs as there appears to be no recommendations from either vascular or podiatry for surgical intervention    wound on feet growing mrsa, pseudomonas on one cx and proteus on the other   as well Klebsiella in urine   12/8/2023 d/w ID on 12/7/2023 and she stated she wants to assess pt on today before deciding on PICC line insertion   I will also d/w SW to investigate if pt can return to facility with the antibx as they  are very expensive   12/9/2023 antibx to changed to Ertapenem and his facility will not pay for ayzac-- i d/w dr. queen and the Depakote is being used for mood and NOT seizure- she will mange this medication   . ID is in agreement and ordered the Ertapenem     now he will need a PICC line and start d./c planning hopefully for Monday/ Tuesday      12/10/2023 start d/c planning   12/11/2023 ready for dc. end date for ertapenem and vancomycin will be january 6, 2024   remove picc line after completion of antibx  12/12/2023- acute resp failure- presumed secondary to CHF and or pna- already on antibx, started lasix. usually  on oxygen at around  2lpm.  once stable can d/w to rehab to complete IV antbx   continue with kimberly    Chronic medical conditions:  HTN: PTA amlodipine 2.5 mg - bp stable  HLD: PTA atorvastatin 40 mg  Bipolar disorder: PTA quetiapine 100 mg BID and 400 mg qhs, valproic acid-- per psych  12/12/2023 valproic ahs been stopped by kendell    Hx of opioid dependence: PTA methadone 110 mg     Chronic pain: PTA lidocaine 4% patch, duloxetine 30 mg DR, gabapentin 600 mg q8, cyclobenzaprine 10 mg BID, oxycodone 5 mg q6  GERD: PTA pantoprazole 40 mg DR  Neurogenic bladder (with indwelling Holcomb catheter): PTA finasteride 5 mg, tamsulosin 0.4 mg  Constipation: PTA senna 8.6 mg 2 tabs qhs   functional quad- supportive care    .

## 2023-12-13 LAB
ANION GAP SERPL CALC-SCNC: 1 MMOL/L — LOW (ref 5–17)
ANION GAP SERPL CALC-SCNC: 1 MMOL/L — LOW (ref 5–17)
BUN SERPL-MCNC: 13 MG/DL — SIGNIFICANT CHANGE UP (ref 7–23)
BUN SERPL-MCNC: 13 MG/DL — SIGNIFICANT CHANGE UP (ref 7–23)
CALCIUM SERPL-MCNC: 8.7 MG/DL — SIGNIFICANT CHANGE UP (ref 8.5–10.1)
CALCIUM SERPL-MCNC: 8.7 MG/DL — SIGNIFICANT CHANGE UP (ref 8.5–10.1)
CHLORIDE SERPL-SCNC: 104 MMOL/L — SIGNIFICANT CHANGE UP (ref 96–108)
CHLORIDE SERPL-SCNC: 104 MMOL/L — SIGNIFICANT CHANGE UP (ref 96–108)
CO2 SERPL-SCNC: 35 MMOL/L — HIGH (ref 22–31)
CO2 SERPL-SCNC: 35 MMOL/L — HIGH (ref 22–31)
CREAT SERPL-MCNC: 0.61 MG/DL — SIGNIFICANT CHANGE UP (ref 0.5–1.3)
CREAT SERPL-MCNC: 0.61 MG/DL — SIGNIFICANT CHANGE UP (ref 0.5–1.3)
EGFR: 116 ML/MIN/1.73M2 — SIGNIFICANT CHANGE UP
EGFR: 116 ML/MIN/1.73M2 — SIGNIFICANT CHANGE UP
GLUCOSE SERPL-MCNC: 88 MG/DL — SIGNIFICANT CHANGE UP (ref 70–99)
GLUCOSE SERPL-MCNC: 88 MG/DL — SIGNIFICANT CHANGE UP (ref 70–99)
HCT VFR BLD CALC: 31.1 % — LOW (ref 39–50)
HCT VFR BLD CALC: 31.1 % — LOW (ref 39–50)
HGB BLD-MCNC: 9.2 G/DL — LOW (ref 13–17)
HGB BLD-MCNC: 9.2 G/DL — LOW (ref 13–17)
MAGNESIUM SERPL-MCNC: 2 MG/DL — SIGNIFICANT CHANGE UP (ref 1.6–2.6)
MAGNESIUM SERPL-MCNC: 2 MG/DL — SIGNIFICANT CHANGE UP (ref 1.6–2.6)
MCHC RBC-ENTMCNC: 26.8 PG — LOW (ref 27–34)
MCHC RBC-ENTMCNC: 26.8 PG — LOW (ref 27–34)
MCHC RBC-ENTMCNC: 29.6 G/DL — LOW (ref 32–36)
MCHC RBC-ENTMCNC: 29.6 G/DL — LOW (ref 32–36)
MCV RBC AUTO: 90.7 FL — SIGNIFICANT CHANGE UP (ref 80–100)
MCV RBC AUTO: 90.7 FL — SIGNIFICANT CHANGE UP (ref 80–100)
NRBC # BLD: 0 /100 WBCS — SIGNIFICANT CHANGE UP (ref 0–0)
NRBC # BLD: 0 /100 WBCS — SIGNIFICANT CHANGE UP (ref 0–0)
PHOSPHATE SERPL-MCNC: 4.7 MG/DL — HIGH (ref 2.5–4.5)
PHOSPHATE SERPL-MCNC: 4.7 MG/DL — HIGH (ref 2.5–4.5)
PLATELET # BLD AUTO: 185 K/UL — SIGNIFICANT CHANGE UP (ref 150–400)
PLATELET # BLD AUTO: 185 K/UL — SIGNIFICANT CHANGE UP (ref 150–400)
POTASSIUM SERPL-MCNC: 3.5 MMOL/L — SIGNIFICANT CHANGE UP (ref 3.5–5.3)
POTASSIUM SERPL-MCNC: 3.5 MMOL/L — SIGNIFICANT CHANGE UP (ref 3.5–5.3)
POTASSIUM SERPL-SCNC: 3.5 MMOL/L — SIGNIFICANT CHANGE UP (ref 3.5–5.3)
POTASSIUM SERPL-SCNC: 3.5 MMOL/L — SIGNIFICANT CHANGE UP (ref 3.5–5.3)
RBC # BLD: 3.43 M/UL — LOW (ref 4.2–5.8)
RBC # BLD: 3.43 M/UL — LOW (ref 4.2–5.8)
RBC # FLD: 17.8 % — HIGH (ref 10.3–14.5)
RBC # FLD: 17.8 % — HIGH (ref 10.3–14.5)
SODIUM SERPL-SCNC: 140 MMOL/L — SIGNIFICANT CHANGE UP (ref 135–145)
SODIUM SERPL-SCNC: 140 MMOL/L — SIGNIFICANT CHANGE UP (ref 135–145)
WBC # BLD: 10.75 K/UL — HIGH (ref 3.8–10.5)
WBC # BLD: 10.75 K/UL — HIGH (ref 3.8–10.5)
WBC # FLD AUTO: 10.75 K/UL — HIGH (ref 3.8–10.5)
WBC # FLD AUTO: 10.75 K/UL — HIGH (ref 3.8–10.5)

## 2023-12-13 PROCEDURE — 99231 SBSQ HOSP IP/OBS SF/LOW 25: CPT

## 2023-12-13 PROCEDURE — 71045 X-RAY EXAM CHEST 1 VIEW: CPT | Mod: 26

## 2023-12-13 PROCEDURE — 99233 SBSQ HOSP IP/OBS HIGH 50: CPT

## 2023-12-13 RX ORDER — MEROPENEM 1 G/30ML
1000 INJECTION INTRAVENOUS EVERY 12 HOURS
Refills: 0 | Status: DISCONTINUED | OUTPATIENT
Start: 2023-12-13 | End: 2023-12-14

## 2023-12-13 RX ORDER — SODIUM CHLORIDE 9 MG/ML
4 INJECTION INTRAMUSCULAR; INTRAVENOUS; SUBCUTANEOUS EVERY 12 HOURS
Refills: 0 | Status: DISCONTINUED | OUTPATIENT
Start: 2023-12-13 | End: 2023-12-13

## 2023-12-13 RX ADMIN — Medication 1 APPLICATION(S): at 16:14

## 2023-12-13 RX ADMIN — NYSTATIN CREAM 1 APPLICATION(S): 100000 CREAM TOPICAL at 19:13

## 2023-12-13 RX ADMIN — GABAPENTIN 600 MILLIGRAM(S): 400 CAPSULE ORAL at 17:02

## 2023-12-13 RX ADMIN — HYDROMORPHONE HYDROCHLORIDE 2 MILLIGRAM(S): 2 INJECTION INTRAMUSCULAR; INTRAVENOUS; SUBCUTANEOUS at 19:13

## 2023-12-13 RX ADMIN — Medication 1 TABLET(S): at 09:15

## 2023-12-13 RX ADMIN — PANTOPRAZOLE SODIUM 40 MILLIGRAM(S): 20 TABLET, DELAYED RELEASE ORAL at 09:15

## 2023-12-13 RX ADMIN — GABAPENTIN 600 MILLIGRAM(S): 400 CAPSULE ORAL at 21:40

## 2023-12-13 RX ADMIN — HYDROMORPHONE HYDROCHLORIDE 2 MILLIGRAM(S): 2 INJECTION INTRAMUSCULAR; INTRAVENOUS; SUBCUTANEOUS at 22:40

## 2023-12-13 RX ADMIN — METHADONE HYDROCHLORIDE 110 MILLIGRAM(S): 40 TABLET ORAL at 12:35

## 2023-12-13 RX ADMIN — GABAPENTIN 600 MILLIGRAM(S): 400 CAPSULE ORAL at 05:21

## 2023-12-13 RX ADMIN — Medication 40 MILLIGRAM(S): at 17:02

## 2023-12-13 RX ADMIN — CHLORHEXIDINE GLUCONATE 1 APPLICATION(S): 213 SOLUTION TOPICAL at 05:23

## 2023-12-13 RX ADMIN — Medication 500 MILLIGRAM(S): at 12:35

## 2023-12-13 RX ADMIN — Medication 600 MILLIGRAM(S): at 05:22

## 2023-12-13 RX ADMIN — POLYETHYLENE GLYCOL 3350 17 GRAM(S): 17 POWDER, FOR SOLUTION ORAL at 12:35

## 2023-12-13 RX ADMIN — CYCLOBENZAPRINE HYDROCHLORIDE 10 MILLIGRAM(S): 10 TABLET, FILM COATED ORAL at 05:22

## 2023-12-13 RX ADMIN — NYSTATIN CREAM 1 APPLICATION(S): 100000 CREAM TOPICAL at 05:34

## 2023-12-13 RX ADMIN — Medication 250 MILLIGRAM(S): at 17:06

## 2023-12-13 RX ADMIN — HYDROMORPHONE HYDROCHLORIDE 2 MILLIGRAM(S): 2 INJECTION INTRAMUSCULAR; INTRAVENOUS; SUBCUTANEOUS at 05:22

## 2023-12-13 RX ADMIN — ZINC SULFATE TAB 220 MG (50 MG ZINC EQUIVALENT) 220 MILLIGRAM(S): 220 (50 ZN) TAB at 17:02

## 2023-12-13 RX ADMIN — Medication 5 MILLIGRAM(S): at 09:16

## 2023-12-13 RX ADMIN — HYDROMORPHONE HYDROCHLORIDE 2 MILLIGRAM(S): 2 INJECTION INTRAMUSCULAR; INTRAVENOUS; SUBCUTANEOUS at 09:15

## 2023-12-13 RX ADMIN — Medication 3 MILLILITER(S): at 17:44

## 2023-12-13 RX ADMIN — MEROPENEM 100 MILLIGRAM(S): 1 INJECTION INTRAVENOUS at 19:04

## 2023-12-13 RX ADMIN — QUETIAPINE FUMARATE 100 MILLIGRAM(S): 200 TABLET, FILM COATED ORAL at 17:47

## 2023-12-13 RX ADMIN — Medication 250 MILLIGRAM(S): at 05:23

## 2023-12-13 RX ADMIN — LIDOCAINE 1 PATCH: 4 CREAM TOPICAL at 12:34

## 2023-12-13 RX ADMIN — Medication 3 MILLILITER(S): at 11:15

## 2023-12-13 RX ADMIN — QUETIAPINE FUMARATE 100 MILLIGRAM(S): 200 TABLET, FILM COATED ORAL at 10:34

## 2023-12-13 RX ADMIN — Medication 40 MILLIGRAM(S): at 05:21

## 2023-12-13 RX ADMIN — AMLODIPINE BESYLATE 2.5 MILLIGRAM(S): 2.5 TABLET ORAL at 05:22

## 2023-12-13 RX ADMIN — Medication 3 MILLILITER(S): at 23:17

## 2023-12-13 RX ADMIN — Medication 650 MILLIGRAM(S): at 16:52

## 2023-12-13 RX ADMIN — LIDOCAINE 1 PATCH: 4 CREAM TOPICAL at 00:31

## 2023-12-13 RX ADMIN — Medication 100 MILLIGRAM(S): at 12:38

## 2023-12-13 RX ADMIN — Medication 1 TABLET(S): at 17:05

## 2023-12-13 RX ADMIN — Medication 600 MILLIGRAM(S): at 17:02

## 2023-12-13 RX ADMIN — HYDROMORPHONE HYDROCHLORIDE 2 MILLIGRAM(S): 2 INJECTION INTRAMUSCULAR; INTRAVENOUS; SUBCUTANEOUS at 21:40

## 2023-12-13 RX ADMIN — Medication 5 MILLIGRAM(S): at 22:37

## 2023-12-13 RX ADMIN — HYDROMORPHONE HYDROCHLORIDE 2 MILLIGRAM(S): 2 INJECTION INTRAMUSCULAR; INTRAVENOUS; SUBCUTANEOUS at 17:12

## 2023-12-13 RX ADMIN — DULOXETINE HYDROCHLORIDE 30 MILLIGRAM(S): 30 CAPSULE, DELAYED RELEASE ORAL at 12:37

## 2023-12-13 RX ADMIN — HYDROMORPHONE HYDROCHLORIDE 2 MILLIGRAM(S): 2 INJECTION INTRAMUSCULAR; INTRAVENOUS; SUBCUTANEOUS at 05:52

## 2023-12-13 RX ADMIN — Medication 650 MILLIGRAM(S): at 12:40

## 2023-12-13 RX ADMIN — CYCLOBENZAPRINE HYDROCHLORIDE 10 MILLIGRAM(S): 10 TABLET, FILM COATED ORAL at 17:01

## 2023-12-13 RX ADMIN — ENOXAPARIN SODIUM 40 MILLIGRAM(S): 100 INJECTION SUBCUTANEOUS at 22:38

## 2023-12-13 RX ADMIN — Medication 3 MILLILITER(S): at 05:07

## 2023-12-13 RX ADMIN — TAMSULOSIN HYDROCHLORIDE 0.4 MILLIGRAM(S): 0.4 CAPSULE ORAL at 21:34

## 2023-12-13 RX ADMIN — Medication 1 TABLET(S): at 12:35

## 2023-12-13 RX ADMIN — ALBUTEROL 2 PUFF(S): 90 AEROSOL, METERED ORAL at 05:21

## 2023-12-13 RX ADMIN — QUETIAPINE FUMARATE 400 MILLIGRAM(S): 200 TABLET, FILM COATED ORAL at 21:34

## 2023-12-13 RX ADMIN — FINASTERIDE 5 MILLIGRAM(S): 5 TABLET, FILM COATED ORAL at 12:38

## 2023-12-13 RX ADMIN — ATORVASTATIN CALCIUM 40 MILLIGRAM(S): 80 TABLET, FILM COATED ORAL at 21:33

## 2023-12-13 NOTE — BH CONSULTATION LIAISON PROGRESS NOTE - OTHER
intact  significant LE contractures   high end of fair  appropriate to context  weakened (expected)  concerned  deferred  gaunt / deconditioned

## 2023-12-13 NOTE — CHART NOTE - NSCHARTNOTEFT_GEN_A_CORE
Pt seen today in consult Note to follow in AM once better history can be obtained. T/C to mother Mariay Sandoval  at pt request who pt states is his HCP to discuss history Pt seen today in consult Note to follow in AM once better history can be obtained. T/C to mother Mariya Sandoval  at pt request who pt states is his HCP to discuss history

## 2023-12-13 NOTE — PROGRESS NOTE ADULT - NUTRITIONAL ASSESSMENT
This patient has been assessed with a concern for Malnutrition and has been determined to have a diagnosis/diagnoses of Moderate protein-calorie malnutrition.    This patient is being managed with:   Diet Regular-  1000mL Fluid Restriction ([id number])  Liquid Protein Supplement     Qty per Day:  1  Supplement Feeding Modality:  Oral  Ensure Plus High Protein Cans or Servings Per Day:  1       Frequency:  Two Times a day  Entered: Dec 13 2023  2:13PM    Diet Regular-  1000mL Fluid Restriction ([id number])  Supplement Feeding Modality:  Oral  Ensure Plus High Protein Cans or Servings Per Day:  1       Frequency:  Three Times a day  Entered: Dec 12 2023  2:53PM    The following pending diet order is being considered for treatment of Moderate protein-calorie malnutrition:null This patient has been assessed with a concern for Malnutrition and has been determined to have a diagnosis/diagnoses of Moderate protein-calorie malnutrition.    This patient is being managed with:   Diet Regular-  1000mL Fluid Restriction ([id number])  Liquid Protein Supplement     Qty per Day:  1  Supplement Feeding Modality:  Oral  Ensure Plus High Protein Cans or Servings Per Day:  1       Frequency:  Two Times a day  Entered: Dec 13 2023  2:13PM    Diet Regular-  1000mL Fluid Restriction ([id number])  Supplement Feeding Modality:  Oral  Ensure Plus High Protein Cans or Servings Per Day:  1       Frequency:  Three Times a day  Entered: Dec 12 2023  2:53PM    The following pending diet order is being considered for treatment of Moderate protein-calorie malnutrition:null

## 2023-12-13 NOTE — PROGRESS NOTE ADULT - SUBJECTIVE AND OBJECTIVE BOX
Patient is a 53y old  Male who presents with a chief complaint of Sepsis secondary to b/l foot wounds (05 Dec 2023 12:34)      OVERNIGHT EVENTS:  Patient seen and examined bedside.   no overnight events   rechecked O2 sats with a newer machine and O2 sat 97% on 5L NC   patient appears comfortable, speaking in full sentences , no increased WOB   remains afebrile.   +dry cough     REVIEW OF SYSTEMS: remaining ROS negative     MEDICATIONS  (STANDING):  acetaminophen     Tablet .. 650 milliGRAM(s) Oral daily  albuterol/ipratropium for Nebulization 3 milliLiter(s) Nebulizer every 6 hours  amLODIPine   Tablet 2.5 milliGRAM(s) Oral daily  ascorbic acid 500 milliGRAM(s) Oral daily  atorvastatin 40 milliGRAM(s) Oral at bedtime  bacitracin   Ointment 1 Application(s) Topical daily  chlorhexidine 2% Cloths 1 Application(s) Topical <User Schedule>  collagenase Ointment 1 Application(s) Topical daily  cyclobenzaprine 10 milliGRAM(s) Oral two times a day  DULoxetine 30 milliGRAM(s) Oral daily  enoxaparin Injectable 40 milliGRAM(s) SubCutaneous every 24 hours  ertapenem  IVPB 1000 milliGRAM(s) IV Intermittent every 24 hours  finasteride 5 milliGRAM(s) Oral daily  furosemide   Injectable 40 milliGRAM(s) IV Push every 12 hours  gabapentin 600 milliGRAM(s) Oral <User Schedule>  guaiFENesin  milliGRAM(s) Oral every 12 hours  lactobacillus acidophilus 1 Tablet(s) Oral two times a day with meals  lidocaine   4% Patch 1 Patch Transdermal daily  methadone  Concentrate 110 milliGRAM(s) Oral daily  methylphenidate 5 milliGRAM(s) Oral <User Schedule>  multivitamin 1 Tablet(s) Oral daily  nystatin Powder 1 Application(s) Topical two times a day  oxyCODONE    IR 5 milliGRAM(s) Oral <User Schedule>  pantoprazole    Tablet 40 milliGRAM(s) Oral before breakfast  polyethylene glycol 3350 17 Gram(s) Oral daily  QUEtiapine 100 milliGRAM(s) Oral <User Schedule>  QUEtiapine 400 milliGRAM(s) Oral at bedtime  senna 2 Tablet(s) Oral at bedtime  tamsulosin 0.4 milliGRAM(s) Oral at bedtime  thiamine 100 milliGRAM(s) Oral daily  vancomycin  IVPB      vancomycin  IVPB 1000 milliGRAM(s) IV Intermittent every 12 hours  zinc sulfate 220 milliGRAM(s) Oral daily    MEDICATIONS  (PRN):  acetaminophen     Tablet .. 650 milliGRAM(s) Oral every 6 hours PRN Temp greater or equal to 38C (100.4F), Mild Pain (1 - 3)  albuterol    90 MICROgram(s) HFA Inhaler 2 Puff(s) Inhalation every 4 hours PRN Shortness of Breath and/or Wheezing  aluminum hydroxide/magnesium hydroxide/simethicone Suspension 30 milliLiter(s) Oral every 4 hours PRN Dyspepsia  HYDROmorphone  Injectable 1 milliGRAM(s) IV Push every 4 hours PRN Severe Pain (7 - 10)  melatonin 3 milliGRAM(s) Oral at bedtime PRN Insomnia  ondansetron Injectable 4 milliGRAM(s) IV Push every 8 hours PRN Nausea and/or Vomiting  sodium chloride 0.9% lock flush 10 milliLiter(s) IV Push every 1 hour PRN Pre/post blood products, medications, blood draw, and to maintain line patency    Allergies    NSAIDs (Flushing; Other (Moderate))  Haldol (Anaphylaxis)  Zyprexa (Rash; Dystonic RXN; Hives)  Motrin (Anaphylaxis)  Thorazine (Other (Moderate))  Aleve (Unknown)  Stelazine (Unknown)  Risperdal (Short breath; Rash; Hives)    Intolerances      Vital Signs Last 24 Hrs  T(C): 37.1 (11 Dec 2023 06:20), Max: 37.3 (10 Dec 2023 17:36)  T(F): 98.8 (11 Dec 2023 06:20), Max: 99.1 (10 Dec 2023 17:36)  HR: 90 (11 Dec 2023 06:20) (89 - 94)  BP: 136/75 (11 Dec 2023 06:20) (104/73 - 136/75)  BP(mean): --  RR: 18 (11 Dec 2023 06:20) (17 - 18)  SpO2: 97% (11 Dec 2023 06:20) (94% - 97%)    Parameters below as of 11 Dec 2023 06:20  Patient On (Oxygen Delivery Method): room air          PHYSICAL EXAM:  GENERAL: NAD , thin and pale appearing,  no increased WOB, speaking in full sentences, not using accessory muscles.   HEAD:  Atraumatic, Normocephalic  EYES: EOMI, PERRLA, conjunctiva and sclera clear  ENMT: No tonsillar erythema, exudates, or enlargement; Moist mucous membranes   NECK: Supple, No JVD   CHEST/LUNG: CTAB;  No rales, rhonchi, wheezing, or rubs  HEART: Regular rate and rhythm; No murmurs, rubs, or gallops  ABDOMEN: Soft, Nontender, Nondistended; Bowel sounds present  EXTREMITIES: contracted b/l LE   SKIN: stage 3 right hip ulcer, right foot heel necrotic to bone , left foot dorsum had eschar   NERVOUS SYSTEM:  Alert & Oriented X3, Good concentration; functional quad. moving b/l upper extremities. able to eat independently     LABS:                                            PTT - ( 04 Dec 2023 17:20 )  PTT:33.4 sec  Urinalysis Basic - ( 04 Dec 2023 22:05 )    Color: Yellow / Appearance: Cloudy / S.021 / pH: x  Gluc: x / Ketone: Negative mg/dL  / Bili: Negative / Urobili: 1.0 mg/dL   Blood: x / Protein: 30 mg/dL / Nitrite: Positive   Leuk Esterase: Moderate / RBC: >50 /HPF / WBC 26-50 /HPF   Sq Epi: x / Non Sq Epi: x / Bacteria: Moderate /HPF      Cultures;   CAPILLARY BLOOD GLUCOSE      Culture - Urine (collected 04 Dec 2023 22:05)  Source: Clean Catch Clean Catch (Midstream)  Preliminary Report (07 Dec 2023 12:19):    >100,000 CFU/ml Klebsiella pneumoniae    50,000 - 99,000 CFU/mL Gram positive organisms  Organism: Klebsiella pneumoniae (07 Dec 2023 12:18)  Organism: Klebsiella pneumoniae (07 Dec 2023 12:18)    Culture - Abscess with Gram Stain (collected 04 Dec 2023 20:00)  Source: .Abscess right foot wound  Gram Stain (prelim) (06 Dec 2023 14:12):    No polymorphonuclear leukocytes seen per low power field    Rare Gram positive cocci in pairs seen per oil power field  Preliminary Report (06 Dec 2023 14:12):    Rare Proteus mirabilis    Few Corynebacterium species "Susceptibilities not performed"  Organism: Proteus mirabilis ESBL (07 Dec 2023 10:29)  Organism: Proteus mirabilis ESBL (07 Dec 2023 10:29)      Culture - Urine (collected 04 Dec 2023 22:05)  Source: Clean Catch Clean Catch (Midstream)  Preliminary Report (07 Dec 2023 12:19):    >100,000 CFU/ml Klebsiella pneumoniae    50,000 - 99,000 CFU/mL Gram positive organisms  Organism: Klebsiella pneumoniae (07 Dec 2023 12:18)  Organism: Klebsiella pneumoniae (07 Dec 2023 12:18)    Culture - Abscess with Gram Stain (collected 04 Dec 2023 20:00)  Source: .Abscess right foot wound  Gram Stain (prelim) (06 Dec 2023 14:12):    No polymorphonuclear leukocytes seen per low power field    Rare Gram positive cocci in pairs seen per oil power field  Preliminary Report (06 Dec 2023 14:12):    Rare Proteus mirabilis    Few Corynebacterium species "Susceptibilities not performed"  Organism: Proteus mirabilis ESBL (07 Dec 2023 10:29)  Organism: Proteus mirabilis ESBL (07 Dec 2023 10:29)    Culture - Abscess with Gram Stain (collected 04 Dec 2023 20:00)  Source: .Abscess left wound culture  Gram Stain (prelim) (06 Dec 2023 14:40):    No polymorphonuclear leukocytes seen per low power field    Rare Gram positive cocci in pairs seen per oil power field  Preliminary Report (07 Dec 2023 13:35):    Rare Pseudomonas aeruginosa    Moderate Methicillin Resistant Staphylococcus aureus    Rare Klebsiella pneumoniae    Moderate Bacteroides fragilis #2 "Susceptibilities not performed"  Organism: Pseudomonas aeruginosa  Methicillin resistant Staphylococcus aureus (07 Dec 2023 10:24)  Organism: Methicillin resistant Staphylococcus aureus (07 Dec 2023 10:24)  Organism: Pseudomonas aeruginosa (07 Dec 2023 10:23)    Culture - Blood (collected 04 Dec 2023 17:35)  Source: .Blood Blood-Peripheral  Preliminary Report (07 Dec 2023 01:02):    No growth at 48 Hours    Culture - Blood (collected 04 Dec 2023 17:20)  Source: .Blood Blood-Peripheral  Preliminary Report (07 Dec 2023 01:02):    No growth at 48 Hours        RADIOLOGY & ADDITIONAL TESTS:      Consultant(s) Notes Reveiwed [ x] Yes     Care Discussed with [x ] Consultants  [x ] Patient  [ ] Family  [ x] /   [x ] Other; RN

## 2023-12-13 NOTE — BH CONSULTATION LIAISON PROGRESS NOTE - NSBHFUPINTERVALHXFT_PSY_A_CORE
No significant interval events. Continues on IV antibiotics for + wound cultures (Right foot abscess positive for rare pseudomonas aeruginosa, moderate MRSA, rare Klebsiella pneumoniae, and moderate bacteroides fragilis; Left foot abscess positive for rare proteus mirabilis ESBL, rare alcaligenes faecalis and few corynebacterium species). Now off of depakote x 2 days without any issues. No evidence of a mood episode. Pt s/p PICC line placement x 2 days. Patient has remained calm, cooperative, medication and treatment compliant. Maintaining the same psychiatric clinical presentation consistent with his baseline.

## 2023-12-13 NOTE — CHART NOTE - NSCHARTNOTEFT_GEN_A_CORE
Pt from long-term SNF with PMH of HTN, HLD, bipolar disorder, hx of opioid dependency, GERD, cervical epidural abscess (s/p decompressive laminectomy 03/2021 c/b b/l leg paralysis), hx of pneumoperitoneum (s/p ex lap, total abdominal colectomy and end ileostomy 01/2023), hx of hepatitis C, hx of R buttocks abscess, hx of L foot OM, neurogenic bladder with indwelling west catheter, & chronic constipation.  Pt admitted with sepsis 2/2 b/l feet infection. on abx. s/p PICC line 12/11. ID following.  Pt was scheduled to return to long-term SNF, however with acute respiratory failure presumed 2/2 CHF and/or pna; started on Lasix, on O2. Fluid restriction added to diet rx noted. Ensure Plus High Protein nutrition supplements are highly concentrated and do not contribute too much to fluid intake, however will reduce to twice daily, and will add 30 ml liquid protein supplement once daily for additional kcal & protein.    Factors impacting intake: [ ] none [ ] nausea  [ ] vomiting [ ] diarrhea [ ] constipation  [ ]chewing problems [ ] swallowing issues  [x] other: acute illness    Diet Prescription: Diet, Regular:   1000mL Fluid Restriction ([id number])  Supplement Feeding Modality:  Oral  Ensure Plus High Protein Cans or Servings Per Day:  1       Frequency:  Three Times a day (12-12-23 @ 14:55)    Intake: % of documented meals as per flow sheets. Pt drinking and enjoying Ensure supplements. Food preferences communicated to kitchen.    Current Weight: No recent weights to trend  % Weight Change: N/A    2+ edema b/l legs    Pertinent Medications: MEDICATIONS  (STANDING):  acetaminophen     Tablet .. 650 milliGRAM(s) Oral daily  albuterol/ipratropium for Nebulization 3 milliLiter(s) Nebulizer every 6 hours  amLODIPine   Tablet 2.5 milliGRAM(s) Oral daily  ascorbic acid 500 milliGRAM(s) Oral daily  atorvastatin 40 milliGRAM(s) Oral at bedtime  bacitracin   Ointment 1 Application(s) Topical daily  chlorhexidine 2% Cloths 1 Application(s) Topical <User Schedule>  collagenase Ointment 1 Application(s) Topical daily  cyclobenzaprine 10 milliGRAM(s) Oral two times a day  DULoxetine 30 milliGRAM(s) Oral daily  enoxaparin Injectable 40 milliGRAM(s) SubCutaneous every 24 hours  ertapenem  IVPB 1000 milliGRAM(s) IV Intermittent every 24 hours  finasteride 5 milliGRAM(s) Oral daily  furosemide   Injectable 40 milliGRAM(s) IV Push every 12 hours  gabapentin 600 milliGRAM(s) Oral <User Schedule>  guaiFENesin  milliGRAM(s) Oral every 12 hours  lactobacillus acidophilus 1 Tablet(s) Oral two times a day with meals  lidocaine   4% Patch 1 Patch Transdermal daily  methadone  Concentrate 110 milliGRAM(s) Oral daily  methylphenidate 5 milliGRAM(s) Oral <User Schedule>  multivitamin 1 Tablet(s) Oral daily  nystatin Powder 1 Application(s) Topical two times a day  pantoprazole    Tablet 40 milliGRAM(s) Oral before breakfast  polyethylene glycol 3350 17 Gram(s) Oral daily  QUEtiapine 400 milliGRAM(s) Oral at bedtime  QUEtiapine 100 milliGRAM(s) Oral <User Schedule>  senna 2 Tablet(s) Oral at bedtime  tamsulosin 0.4 milliGRAM(s) Oral at bedtime  thiamine 100 milliGRAM(s) Oral daily  vancomycin  IVPB 1000 milliGRAM(s) IV Intermittent every 12 hours  vancomycin  IVPB      zinc sulfate 220 milliGRAM(s) Oral daily    MEDICATIONS  (PRN):  acetaminophen     Tablet .. 650 milliGRAM(s) Oral every 6 hours PRN Temp greater or equal to 38C (100.4F), Mild Pain (1 - 3)  albuterol    90 MICROgram(s) HFA Inhaler 2 Puff(s) Inhalation every 4 hours PRN Shortness of Breath and/or Wheezing  aluminum hydroxide/magnesium hydroxide/simethicone Suspension 30 milliLiter(s) Oral every 4 hours PRN Dyspepsia  HYDROmorphone  Injectable 2 milliGRAM(s) IV Push every 4 hours PRN Severe Pain (7 - 10)  melatonin 3 milliGRAM(s) Oral at bedtime PRN Insomnia  ondansetron Injectable 4 milliGRAM(s) IV Push every 8 hours PRN Nausea and/or Vomiting  sodium chloride 0.9% lock flush 10 milliLiter(s) IV Push every 1 hour PRN Pre/post blood products, medications, blood draw, and to maintain line patency    Pertinent Labs: 12-13 Na140 mmol/L Glu 88 mg/dL K+ 3.5 mmol/L Cr  0.61 mg/dL BUN 13 mg/dL 12-13 Phos 4.7 mg/dL<H>  12-04 HgbA1c 5.3%    Skin: as per flow sheets, pt with wound x 3, & pressure ulcers as follows:  stage IV pressure ulcer; R heel as per flow sheets  stage IV pressure ulcer; R hip as per flow sheets  unstageable pressure ulcer; location not clear as per flow sheets    Estimated Needs:   [x] no change since previous assessment on 12/07  [ ] recalculated:     Previous Nutrition Diagnosis:   [x] Moderate malnutrition in chronic setting  Etiology: Inadequate protein-energy intake & increased needs r/t hx of back surgery c/b b/l leg paralysis, hx of pneumoperitoneum (s/p ex lap, colectomy, ileostomy) & PU  Signs/Symptoms: Physical signs of mild-moderate muscle wasting and fat depletion as noted    Goal: Pt to meet >75% of protein-energy needs via meals/supplement - not consistently met    Nutrition Diagnosis is [x] ongoing  [ ] resolved [ ] not applicable     New Nutrition Diagnosis: [x] not applicable      Interventions:   Recommend  [ ] Change Diet To:   [x] Nutrition Supplement: Reduce Ensure Plus High Protein to 2x/day (700 kcal & 40 g protein) + add 30 ml LPS 1x/day (100 kcal & 15 g protein)  [ ] Nutrition Support  [ ] Other:     Monitoring and Evaluation:   [ x ] PO intake [ x ] Tolerance to diet prescription [ x ] weights [ x ] labs[ x ] follow up per protocol  [ ] other: Pt from long-term SNF with PMH of HTN, HLD, bipolar disorder, hx of opioid dependency, GERD, cervical epidural abscess (s/p decompressive laminectomy 03/2021 c/b b/l leg paralysis), hx of pneumoperitoneum (s/p ex lap, total abdominal colectomy and end ileostomy 01/2023), hx of hepatitis C, hx of R buttocks abscess, hx of L foot OM, neurogenic bladder with indwelling west catheter, & chronic constipation.  Pt admitted with sepsis 2/2 b/l feet infection. on abx. s/p PICC line 12/11. ID following.  Pt was scheduled to return to long-term SNF, however with acute respiratory failure presumed 2/2 CHF and/or pna; started on Lasix, on O2. Fluid restriction added to diet rx noted. Ensure Plus High Protein nutrition supplements are highly concentrated and do not contribute too much to fluid intake, however will reduce to twice daily, and will add 30 ml liquid protein supplement once daily for additional kcal & protein.    Factors impacting intake: [ ] none [ ] nausea  [ ] vomiting [ ] diarrhea [ ] constipation  [ ]chewing problems [ ] swallowing issues  [x] other: acute illness    Diet Prescription: Diet, Regular:   1000mL Fluid Restriction ([id number])  Supplement Feeding Modality:  Oral  Ensure Plus High Protein Cans or Servings Per Day:  1       Frequency:  Three Times a day (12-12-23 @ 14:55)    Intake: % of documented meals as per flow sheets. Pt drinking and enjoying Ensure supplements. Food preferences communicated to kitchen.    Current Weight: No recent weights to trend  % Weight Change: N/A    2+ edema b/l legs    Pertinent Medications: MEDICATIONS  (STANDING):  acetaminophen     Tablet .. 650 milliGRAM(s) Oral daily  albuterol/ipratropium for Nebulization 3 milliLiter(s) Nebulizer every 6 hours  amLODIPine   Tablet 2.5 milliGRAM(s) Oral daily  ascorbic acid 500 milliGRAM(s) Oral daily  atorvastatin 40 milliGRAM(s) Oral at bedtime  bacitracin   Ointment 1 Application(s) Topical daily  chlorhexidine 2% Cloths 1 Application(s) Topical <User Schedule>  collagenase Ointment 1 Application(s) Topical daily  cyclobenzaprine 10 milliGRAM(s) Oral two times a day  DULoxetine 30 milliGRAM(s) Oral daily  enoxaparin Injectable 40 milliGRAM(s) SubCutaneous every 24 hours  ertapenem  IVPB 1000 milliGRAM(s) IV Intermittent every 24 hours  finasteride 5 milliGRAM(s) Oral daily  furosemide   Injectable 40 milliGRAM(s) IV Push every 12 hours  gabapentin 600 milliGRAM(s) Oral <User Schedule>  guaiFENesin  milliGRAM(s) Oral every 12 hours  lactobacillus acidophilus 1 Tablet(s) Oral two times a day with meals  lidocaine   4% Patch 1 Patch Transdermal daily  methadone  Concentrate 110 milliGRAM(s) Oral daily  methylphenidate 5 milliGRAM(s) Oral <User Schedule>  multivitamin 1 Tablet(s) Oral daily  nystatin Powder 1 Application(s) Topical two times a day  pantoprazole    Tablet 40 milliGRAM(s) Oral before breakfast  polyethylene glycol 3350 17 Gram(s) Oral daily  QUEtiapine 400 milliGRAM(s) Oral at bedtime  QUEtiapine 100 milliGRAM(s) Oral <User Schedule>  senna 2 Tablet(s) Oral at bedtime  tamsulosin 0.4 milliGRAM(s) Oral at bedtime  thiamine 100 milliGRAM(s) Oral daily  vancomycin  IVPB 1000 milliGRAM(s) IV Intermittent every 12 hours  vancomycin  IVPB      zinc sulfate 220 milliGRAM(s) Oral daily    MEDICATIONS  (PRN):  acetaminophen     Tablet .. 650 milliGRAM(s) Oral every 6 hours PRN Temp greater or equal to 38C (100.4F), Mild Pain (1 - 3)  albuterol    90 MICROgram(s) HFA Inhaler 2 Puff(s) Inhalation every 4 hours PRN Shortness of Breath and/or Wheezing  aluminum hydroxide/magnesium hydroxide/simethicone Suspension 30 milliLiter(s) Oral every 4 hours PRN Dyspepsia  HYDROmorphone  Injectable 2 milliGRAM(s) IV Push every 4 hours PRN Severe Pain (7 - 10)  melatonin 3 milliGRAM(s) Oral at bedtime PRN Insomnia  ondansetron Injectable 4 milliGRAM(s) IV Push every 8 hours PRN Nausea and/or Vomiting  sodium chloride 0.9% lock flush 10 milliLiter(s) IV Push every 1 hour PRN Pre/post blood products, medications, blood draw, and to maintain line patency    Pertinent Labs: 12-13 Na140 mmol/L Glu 88 mg/dL K+ 3.5 mmol/L Cr  0.61 mg/dL BUN 13 mg/dL 12-13 Phos 4.7 mg/dL<H>  12-04 HgbA1c 5.3%    Skin: as per flow sheets, pt with wound x 3, & pressure ulcers as follows:  stage IV pressure ulcer; R heel as per flow sheets  stage IV pressure ulcer; R hip as per flow sheets  unstageable pressure ulcer; location not clear as per flow sheets    Estimated Needs:   [x] no change since previous assessment on 12/07  [ ] recalculated:     Previous Nutrition Diagnosis:   [x] Moderate malnutrition in chronic setting  Etiology: Inadequate protein-energy intake & increased needs r/t hx of back surgery c/b b/l leg paralysis, hx of pneumoperitoneum (s/p ex lap, colectomy, ileostomy) & PU  Signs/Symptoms: Physical signs of mild-moderate muscle wasting and fat depletion as noted    Goal: Pt to meet >75% of protein-energy needs via meals/supplement - not consistently met    Nutrition Diagnosis is [x] ongoing  [ ] resolved [ ] not applicable     New Nutrition Diagnosis: [x] not applicable      Interventions:   Recommend  [ ] Change Diet To:   [x] Nutrition Supplement: Reduce Ensure Plus High Protein to 2x/day (700 kcal & 40 g protein) + add 30 ml LPS 1x/day (100 kcal & 15 g protein)  [ ] Nutrition Support  [ ] Other:     Monitoring and Evaluation:   [ x ] PO intake [ x ] Tolerance to diet prescription [ x ] weights [ x ] labs[ x ] follow up per protocol  [ ] other:

## 2023-12-13 NOTE — PROGRESS NOTE ADULT - ASSESSMENT
Gonzalez Stewart is a 53 year old male with PMHx of cervical epidural abscess (s/p decompressive laminectomy 3/2021 c/b b/l leg paralysis), hx of pneumoperitoneum (s/p ex lap, total abdominal colectomy and end ileostomy (on 1/12/23) c/b evisceration and sepsis with MRSA bacteremia postoperatively), hx of hepatitis C, hx of R. buttock abscess, hx of L. foot osteomyelitis, neurogenic bladder (with indwelling Holcomb catheter, last exchanged two days ago), HTN, HLD, bipolar disorder, GERD, hx of opioid dependence, and constipation who presented to the ED on 12/4/23 from [facility] for feet infection and admitted for sepsis secondary to bilateral feet infection.    Sepsis secondary to b/l feet infection  Less likely UTI given urine sample was not clean catch in pt with indwelling Holcomb and no urinary symptoms  Complaints of b/l feet infection intermittently over the past year  Previously treated for L. hallux OM with L. heel culture growing Proteus mirabilis ESBL, completed 6-week course of avycaz  Temp 101 and WBC 15.25K on admission  Lactic WNL, ESR 84  U/A (sample obtained from Holcomb catheter) with positive nitrites, moderate leuks, moderate blood, WBC 26-50, RBC > 50, moderate bacteria, squamous epithelial cells present  CT b/l LE with study is severely limited by contracture. Left lower extremity is only partially visualized with exclusion of the left foot. Skin induration and subcutaneous edema in the leg and ankle.  No soft tissue gas  S/p LR 2800 cc bolus, vancomycin, and zosyn in the ED  S/p bedside escharectomy by podiatry  Empirically started on vancomycin and cefepime   Wound culture +GPC in pairs  12/5/2023 wound cx  done by podiatry -- shows rare gpv   blood cx -- pending   id consult will be obtained given his complex medical history  esbl   his contracted state prevents mri   will start parenteral pain meds as he chronic  oral oxycodone isnt helping. ( he received 4mg morphine in ed and another 2 mg morphine without relief )  12/6/2023- podiatry has reccs for no surgical intervention, vascular also provides no reccs for surgical intervention  12/7/2023 await final ID reccs as there appears to be no recommendations from either vascular or podiatry for surgical intervention    wound on feet growing mrsa, pseudomonas on one cx and proteus on the other   as well Klebsiella in urine   12/8/2023 d/w ID on 12/7/2023 and she stated she wants to assess pt on today before deciding on PICC line insertion   I will also d/w SW to investigate if pt can return to facility with the antibx as they  are very expensive   12/9/2023 antibx to changed to Ertapenem and his facility will not pay for ayzac-- i d/w dr. queen and the Depakote is being used for mood and NOT seizure- she will mange this medication   . ID is in agreement and ordered the Ertapenem  12/11/2023 ready for dc. PICC line inserted . end date for ertapenem and vancomycin will be january 6, 2024   remove picc line after completion of antibx  12/12/2023- acute resp failure- presumed secondary to CHF and or pna- already on antibx, started lasix. usually  on oxygen at around  2lpm. (per pt he is on 5L at the NH)   once stable can d/w to rehab to complete IV antbx   continue with kimberly  12/13 patient doing well, 5L NC >>97% O2 sat, discussed with RN to titrate down but  to maintain O2 sat >94%   RVP neg , repeat CXR appears to be with chronic changes, overall unchanged from prior   ertapenem changed to meropenem  by ID >>unclear reasons   stopped lasix   ECHO ordered     Chronic medical conditions:  HTN: PTA amlodipine 2.5 mg - bp stable  HLD: PTA atorvastatin 40 mg  Bipolar disorder: PTA quetiapine 100 mg BID and 400 mg qhs, valproic acid-- per psych  12/12/2023 valproic ahs been stopped by kendell    Hx of opioid dependence: PTA methadone 110 mg     Chronic pain: PTA lidocaine 4% patch, duloxetine 30 mg DR, gabapentin 600 mg q8, cyclobenzaprine 10 mg BID, oxycodone 5 mg q6  GERD: PTA pantoprazole 40 mg DR  Neurogenic bladder (with indwelling Holcomb catheter): PTA finasteride 5 mg, tamsulosin 0.4 mg  Constipation: PTA senna 8.6 mg 2 tabs qhs   functional quad- supportive care  , frequent repositioning    Rt hip pressure wound, seen by vascular , wound recs in place   b/l feet ulcers wound recs in place , being followed by podiatry     Preventative Measures    lovenox SQ-dvt ppx  fall, aspiration precautions   HOBE   .  Gonzalez Stewart is a 53 year old male with PMHx of cervical epidural abscess (s/p decompressive laminectomy 3/2021 c/b b/l leg paralysis), hx of pneumoperitoneum (s/p ex lap, total abdominal colectomy and end ileostomy (on 1/12/23) c/b evisceration and sepsis with MRSA bacteremia postoperatively), hx of hepatitis C, hx of R. buttock abscess, hx of L. foot osteomyelitis, neurogenic bladder (with indwelling Holcomb catheter, last exchanged two days ago), HTN, HLD, bipolar disorder, GERD, hx of opioid dependence, and constipation who presented to the ED on 12/4/23 from [facility] for feet infection and admitted for sepsis secondary to bilateral feet infection.    Sepsis secondary to b/l feet infection  Less likely UTI given urine sample was not clean catch in pt with indwelling Holcomb and no urinary symptoms  Complaints of b/l feet infection intermittently over the past year  Previously treated for L. hallux OM with L. heel culture growing Proteus mirabilis ESBL, completed 6-week course of avycaz  Temp 101 and WBC 15.25K on admission  Lactic WNL, ESR 84  U/A (sample obtained from Holcomb catheter) with positive nitrites, moderate leuks, moderate blood, WBC 26-50, RBC > 50, moderate bacteria, squamous epithelial cells present  CT b/l LE with study is severely limited by contracture. Left lower extremity is only partially visualized with exclusion of the left foot. Skin induration and subcutaneous edema in the leg and ankle.  No soft tissue gas  S/p LR 2800 cc bolus, vancomycin, and zosyn in the ED  S/p bedside escharectomy by podiatry  Empirically started on vancomycin and cefepime   Wound culture +GPC in pairs  12/5/2023 wound cx  done by podiatry -- shows rare gpv   blood cx -- pending   id consult will be obtained given his complex medical history  esbl   his contracted state prevents mri   will start parenteral pain meds as he chronic  oral oxycodone isnt helping. ( he received 4mg morphine in ed and another 2 mg morphine without relief )  12/6/2023- podiatry has reccs for no surgical intervention, vascular also provides no reccs for surgical intervention  12/7/2023 await final ID reccs as there appears to be no recommendations from either vascular or podiatry for surgical intervention    wound on feet growing mrsa, pseudomonas on one cx and proteus on the other   as well Klebsiella in urine   12/8/2023 d/w ID on 12/7/2023 and she stated she wants to assess pt on today before deciding on PICC line insertion   I will also d/w SW to investigate if pt can return to facility with the antibx as they  are very expensive   12/9/2023 antibx to changed to Ertapenem and his facility will not pay for ayzac-- i d/w dr. queen and the Depakote is being used for mood and NOT seizure- she will mange this medication   . ID is in agreement and ordered the Ertapenem  12/11/2023 ready for dc. PICC line inserted . end date for ertapenem and vancomycin will be january 6, 2024   remove picc line after completion of antibx  12/12/2023- acute resp failure- presumed secondary to CHF and or pna- already on antibx, started lasix. usually  on oxygen at around  2lpm. (per pt he is on 5L at the NH)   once stable can d/w to rehab to complete IV antbx   continue with kimberly  12/13 patient doing well, 5L NC >>97% O2 sat, discussed with RN to titrate down but  to maintain O2 sat >94%   RVP neg , repeat CXR appears to be with chronic changes, overall unchanged from prior   ertapenem changed to meropenem  by ID >>unclear reasons   stopped lasix   ECHO ordered     Chronic medical conditions:  HTN: PTA amlodipine 2.5 mg - bp stable  HLD: PTA atorvastatin 40 mg  Bipolar disorder: PTA quetiapine 100 mg BID and 400 mg qhs, valproic acid-- per psych  12/12/2023 valproic ahs been stopped by kendell    Hx of opioid dependence: PTA methadone 110 mg     Chronic pain: PTA lidocaine 4% patch, duloxetine 30 mg DR, gabapentin 600 mg q8, cyclobenzaprine 10 mg BID, oxycodone 5 mg q6  GERD: PTA pantoprazole 40 mg DR  Neurogenic bladder (with indwelling Holcomb catheter): PTA finasteride 5 mg, tamsulosin 0.4 mg  Constipation: PTA senna 8.6 mg 2 tabs qhs   functional quad- supportive care  , frequent repositioning    Rt hip pressure wound, seen by vascular , wound recs in place   b/l feet ulcers wound recs in place , being followed by podiatry     Preventative Measures    lovenox SQ-dvt ppx  fall, aspiration precautions   HOBE   .

## 2023-12-13 NOTE — BH CONSULTATION LIAISON PROGRESS NOTE - NSBHATTESTBILLING_PSY_A_CORE
39417-Abtexsstrs OBS or IP - low complexity OR 25-34 mins 63316-Gedharjtxl OBS or IP - low complexity OR 25-34 mins

## 2023-12-14 DIAGNOSIS — Z51.5 ENCOUNTER FOR PALLIATIVE CARE: ICD-10-CM

## 2023-12-14 LAB
ANION GAP SERPL CALC-SCNC: 2 MMOL/L — LOW (ref 5–17)
ANION GAP SERPL CALC-SCNC: 2 MMOL/L — LOW (ref 5–17)
BUN SERPL-MCNC: 17 MG/DL — SIGNIFICANT CHANGE UP (ref 7–23)
BUN SERPL-MCNC: 17 MG/DL — SIGNIFICANT CHANGE UP (ref 7–23)
CALCIUM SERPL-MCNC: 8.6 MG/DL — SIGNIFICANT CHANGE UP (ref 8.5–10.1)
CALCIUM SERPL-MCNC: 8.6 MG/DL — SIGNIFICANT CHANGE UP (ref 8.5–10.1)
CHLORIDE SERPL-SCNC: 104 MMOL/L — SIGNIFICANT CHANGE UP (ref 96–108)
CHLORIDE SERPL-SCNC: 104 MMOL/L — SIGNIFICANT CHANGE UP (ref 96–108)
CO2 SERPL-SCNC: 36 MMOL/L — HIGH (ref 22–31)
CO2 SERPL-SCNC: 36 MMOL/L — HIGH (ref 22–31)
CREAT SERPL-MCNC: 0.67 MG/DL — SIGNIFICANT CHANGE UP (ref 0.5–1.3)
CREAT SERPL-MCNC: 0.67 MG/DL — SIGNIFICANT CHANGE UP (ref 0.5–1.3)
EGFR: 112 ML/MIN/1.73M2 — SIGNIFICANT CHANGE UP
EGFR: 112 ML/MIN/1.73M2 — SIGNIFICANT CHANGE UP
GLUCOSE SERPL-MCNC: 144 MG/DL — HIGH (ref 70–99)
GLUCOSE SERPL-MCNC: 144 MG/DL — HIGH (ref 70–99)
HCT VFR BLD CALC: 33 % — LOW (ref 39–50)
HCT VFR BLD CALC: 33 % — LOW (ref 39–50)
HGB BLD-MCNC: 10.1 G/DL — LOW (ref 13–17)
HGB BLD-MCNC: 10.1 G/DL — LOW (ref 13–17)
MCHC RBC-ENTMCNC: 27.4 PG — SIGNIFICANT CHANGE UP (ref 27–34)
MCHC RBC-ENTMCNC: 27.4 PG — SIGNIFICANT CHANGE UP (ref 27–34)
MCHC RBC-ENTMCNC: 30.6 G/DL — LOW (ref 32–36)
MCHC RBC-ENTMCNC: 30.6 G/DL — LOW (ref 32–36)
MCV RBC AUTO: 89.4 FL — SIGNIFICANT CHANGE UP (ref 80–100)
MCV RBC AUTO: 89.4 FL — SIGNIFICANT CHANGE UP (ref 80–100)
NRBC # BLD: 0 /100 WBCS — SIGNIFICANT CHANGE UP (ref 0–0)
NRBC # BLD: 0 /100 WBCS — SIGNIFICANT CHANGE UP (ref 0–0)
PLATELET # BLD AUTO: 194 K/UL — SIGNIFICANT CHANGE UP (ref 150–400)
PLATELET # BLD AUTO: 194 K/UL — SIGNIFICANT CHANGE UP (ref 150–400)
POTASSIUM SERPL-MCNC: 3.4 MMOL/L — LOW (ref 3.5–5.3)
POTASSIUM SERPL-MCNC: 3.4 MMOL/L — LOW (ref 3.5–5.3)
POTASSIUM SERPL-SCNC: 3.4 MMOL/L — LOW (ref 3.5–5.3)
POTASSIUM SERPL-SCNC: 3.4 MMOL/L — LOW (ref 3.5–5.3)
RBC # BLD: 3.69 M/UL — LOW (ref 4.2–5.8)
RBC # BLD: 3.69 M/UL — LOW (ref 4.2–5.8)
RBC # FLD: 17.6 % — HIGH (ref 10.3–14.5)
RBC # FLD: 17.6 % — HIGH (ref 10.3–14.5)
SODIUM SERPL-SCNC: 142 MMOL/L — SIGNIFICANT CHANGE UP (ref 135–145)
SODIUM SERPL-SCNC: 142 MMOL/L — SIGNIFICANT CHANGE UP (ref 135–145)
WBC # BLD: 8.74 K/UL — SIGNIFICANT CHANGE UP (ref 3.8–10.5)
WBC # BLD: 8.74 K/UL — SIGNIFICANT CHANGE UP (ref 3.8–10.5)
WBC # FLD AUTO: 8.74 K/UL — SIGNIFICANT CHANGE UP (ref 3.8–10.5)
WBC # FLD AUTO: 8.74 K/UL — SIGNIFICANT CHANGE UP (ref 3.8–10.5)

## 2023-12-14 PROCEDURE — 99232 SBSQ HOSP IP/OBS MODERATE 35: CPT

## 2023-12-14 PROCEDURE — 99223 1ST HOSP IP/OBS HIGH 75: CPT

## 2023-12-14 PROCEDURE — 99233 SBSQ HOSP IP/OBS HIGH 50: CPT

## 2023-12-14 PROCEDURE — 99497 ADVNCD CARE PLAN 30 MIN: CPT | Mod: 25

## 2023-12-14 RX ORDER — GABAPENTIN 400 MG/1
800 CAPSULE ORAL EVERY 8 HOURS
Refills: 0 | Status: DISCONTINUED | OUTPATIENT
Start: 2023-12-14 | End: 2024-01-03

## 2023-12-14 RX ORDER — METHADONE HYDROCHLORIDE 40 MG/1
60 TABLET ORAL EVERY 12 HOURS
Refills: 0 | Status: DISCONTINUED | OUTPATIENT
Start: 2023-12-14 | End: 2023-12-14

## 2023-12-14 RX ORDER — CYCLOBENZAPRINE HYDROCHLORIDE 10 MG/1
10 TABLET, FILM COATED ORAL THREE TIMES A DAY
Refills: 0 | Status: DISCONTINUED | OUTPATIENT
Start: 2023-12-14 | End: 2024-01-03

## 2023-12-14 RX ORDER — MEROPENEM 1 G/30ML
1000 INJECTION INTRAVENOUS EVERY 8 HOURS
Refills: 0 | Status: DISCONTINUED | OUTPATIENT
Start: 2023-12-14 | End: 2024-01-03

## 2023-12-14 RX ORDER — METHADONE HYDROCHLORIDE 40 MG/1
110 TABLET ORAL
Refills: 0 | Status: DISCONTINUED | OUTPATIENT
Start: 2023-12-15 | End: 2023-12-15

## 2023-12-14 RX ADMIN — Medication 40 MILLIGRAM(S): at 05:27

## 2023-12-14 RX ADMIN — Medication 1 APPLICATION(S): at 13:54

## 2023-12-14 RX ADMIN — Medication 600 MILLIGRAM(S): at 05:26

## 2023-12-14 RX ADMIN — Medication 3 MILLILITER(S): at 11:14

## 2023-12-14 RX ADMIN — GABAPENTIN 800 MILLIGRAM(S): 400 CAPSULE ORAL at 21:38

## 2023-12-14 RX ADMIN — HYDROMORPHONE HYDROCHLORIDE 2 MILLIGRAM(S): 2 INJECTION INTRAMUSCULAR; INTRAVENOUS; SUBCUTANEOUS at 09:49

## 2023-12-14 RX ADMIN — NYSTATIN CREAM 1 APPLICATION(S): 100000 CREAM TOPICAL at 18:04

## 2023-12-14 RX ADMIN — Medication 250 MILLIGRAM(S): at 18:04

## 2023-12-14 RX ADMIN — AMLODIPINE BESYLATE 2.5 MILLIGRAM(S): 2.5 TABLET ORAL at 05:27

## 2023-12-14 RX ADMIN — PANTOPRAZOLE SODIUM 40 MILLIGRAM(S): 20 TABLET, DELAYED RELEASE ORAL at 09:17

## 2023-12-14 RX ADMIN — DULOXETINE HYDROCHLORIDE 30 MILLIGRAM(S): 30 CAPSULE, DELAYED RELEASE ORAL at 13:54

## 2023-12-14 RX ADMIN — HYDROMORPHONE HYDROCHLORIDE 2 MILLIGRAM(S): 2 INJECTION INTRAMUSCULAR; INTRAVENOUS; SUBCUTANEOUS at 18:30

## 2023-12-14 RX ADMIN — Medication 650 MILLIGRAM(S): at 14:50

## 2023-12-14 RX ADMIN — HYDROMORPHONE HYDROCHLORIDE 2 MILLIGRAM(S): 2 INJECTION INTRAMUSCULAR; INTRAVENOUS; SUBCUTANEOUS at 13:53

## 2023-12-14 RX ADMIN — Medication 500 MILLIGRAM(S): at 13:54

## 2023-12-14 RX ADMIN — Medication 100 MILLIGRAM(S): at 13:56

## 2023-12-14 RX ADMIN — QUETIAPINE FUMARATE 400 MILLIGRAM(S): 200 TABLET, FILM COATED ORAL at 21:37

## 2023-12-14 RX ADMIN — GABAPENTIN 600 MILLIGRAM(S): 400 CAPSULE ORAL at 05:27

## 2023-12-14 RX ADMIN — Medication 1 APPLICATION(S): at 13:53

## 2023-12-14 RX ADMIN — POLYETHYLENE GLYCOL 3350 17 GRAM(S): 17 POWDER, FOR SOLUTION ORAL at 13:56

## 2023-12-14 RX ADMIN — Medication 650 MILLIGRAM(S): at 13:54

## 2023-12-14 RX ADMIN — GABAPENTIN 600 MILLIGRAM(S): 400 CAPSULE ORAL at 13:54

## 2023-12-14 RX ADMIN — HYDROMORPHONE HYDROCHLORIDE 2 MILLIGRAM(S): 2 INJECTION INTRAMUSCULAR; INTRAVENOUS; SUBCUTANEOUS at 14:50

## 2023-12-14 RX ADMIN — MEROPENEM 100 MILLIGRAM(S): 1 INJECTION INTRAVENOUS at 06:00

## 2023-12-14 RX ADMIN — TAMSULOSIN HYDROCHLORIDE 0.4 MILLIGRAM(S): 0.4 CAPSULE ORAL at 21:38

## 2023-12-14 RX ADMIN — Medication 3 MILLILITER(S): at 23:12

## 2023-12-14 RX ADMIN — NYSTATIN CREAM 1 APPLICATION(S): 100000 CREAM TOPICAL at 05:29

## 2023-12-14 RX ADMIN — HYDROMORPHONE HYDROCHLORIDE 2 MILLIGRAM(S): 2 INJECTION INTRAMUSCULAR; INTRAVENOUS; SUBCUTANEOUS at 05:19

## 2023-12-14 RX ADMIN — HYDROMORPHONE HYDROCHLORIDE 2 MILLIGRAM(S): 2 INJECTION INTRAMUSCULAR; INTRAVENOUS; SUBCUTANEOUS at 10:10

## 2023-12-14 RX ADMIN — HYDROMORPHONE HYDROCHLORIDE 2 MILLIGRAM(S): 2 INJECTION INTRAMUSCULAR; INTRAVENOUS; SUBCUTANEOUS at 18:13

## 2023-12-14 RX ADMIN — Medication 5 MILLIGRAM(S): at 21:37

## 2023-12-14 RX ADMIN — ATORVASTATIN CALCIUM 40 MILLIGRAM(S): 80 TABLET, FILM COATED ORAL at 21:37

## 2023-12-14 RX ADMIN — CYCLOBENZAPRINE HYDROCHLORIDE 10 MILLIGRAM(S): 10 TABLET, FILM COATED ORAL at 05:27

## 2023-12-14 RX ADMIN — Medication 600 MILLIGRAM(S): at 18:04

## 2023-12-14 RX ADMIN — Medication 1 TABLET(S): at 09:17

## 2023-12-14 RX ADMIN — QUETIAPINE FUMARATE 100 MILLIGRAM(S): 200 TABLET, FILM COATED ORAL at 18:04

## 2023-12-14 RX ADMIN — MEROPENEM 100 MILLIGRAM(S): 1 INJECTION INTRAVENOUS at 13:52

## 2023-12-14 RX ADMIN — QUETIAPINE FUMARATE 100 MILLIGRAM(S): 200 TABLET, FILM COATED ORAL at 09:17

## 2023-12-14 RX ADMIN — Medication 250 MILLIGRAM(S): at 05:29

## 2023-12-14 RX ADMIN — Medication 3 MILLILITER(S): at 17:07

## 2023-12-14 RX ADMIN — ZINC SULFATE TAB 220 MG (50 MG ZINC EQUIVALENT) 220 MILLIGRAM(S): 220 (50 ZN) TAB at 13:55

## 2023-12-14 RX ADMIN — ENOXAPARIN SODIUM 40 MILLIGRAM(S): 100 INJECTION SUBCUTANEOUS at 22:33

## 2023-12-14 RX ADMIN — Medication 3 MILLILITER(S): at 05:10

## 2023-12-14 RX ADMIN — Medication 5 MILLIGRAM(S): at 09:17

## 2023-12-14 RX ADMIN — CHLORHEXIDINE GLUCONATE 1 APPLICATION(S): 213 SOLUTION TOPICAL at 05:26

## 2023-12-14 RX ADMIN — METHADONE HYDROCHLORIDE 110 MILLIGRAM(S): 40 TABLET ORAL at 13:53

## 2023-12-14 RX ADMIN — LIDOCAINE 1 PATCH: 4 CREAM TOPICAL at 19:04

## 2023-12-14 RX ADMIN — MEROPENEM 100 MILLIGRAM(S): 1 INJECTION INTRAVENOUS at 21:36

## 2023-12-14 RX ADMIN — LIDOCAINE 1 PATCH: 4 CREAM TOPICAL at 13:57

## 2023-12-14 RX ADMIN — HYDROMORPHONE HYDROCHLORIDE 2 MILLIGRAM(S): 2 INJECTION INTRAMUSCULAR; INTRAVENOUS; SUBCUTANEOUS at 06:19

## 2023-12-14 RX ADMIN — Medication 1 TABLET(S): at 18:04

## 2023-12-14 RX ADMIN — FINASTERIDE 5 MILLIGRAM(S): 5 TABLET, FILM COATED ORAL at 13:55

## 2023-12-14 RX ADMIN — CYCLOBENZAPRINE HYDROCHLORIDE 10 MILLIGRAM(S): 10 TABLET, FILM COATED ORAL at 21:38

## 2023-12-14 RX ADMIN — Medication 1 TABLET(S): at 13:54

## 2023-12-14 RX ADMIN — HYDROMORPHONE HYDROCHLORIDE 2 MILLIGRAM(S): 2 INJECTION INTRAMUSCULAR; INTRAVENOUS; SUBCUTANEOUS at 22:37

## 2023-12-14 NOTE — PROGRESS NOTE ADULT - SUBJECTIVE AND OBJECTIVE BOX
INTERVAL HPI/OVERNIGHT EVENTS:  Pt seen and examined at bedside.     Allergies/Intolerance: NSAIDs (Flushing; Other (Moderate))  Haldol (Anaphylaxis)  Zyprexa (Rash; Dystonic RXN; Hives)  Motrin (Anaphylaxis)  Thorazine (Other (Moderate))  Aleve (Unknown)  Stelazine (Unknown)  Risperdal (Short breath; Rash; Hives)      MEDICATIONS  (STANDING):  acetaminophen     Tablet .. 650 milliGRAM(s) Oral daily  albuterol/ipratropium for Nebulization 3 milliLiter(s) Nebulizer every 6 hours  amLODIPine   Tablet 2.5 milliGRAM(s) Oral daily  ascorbic acid 500 milliGRAM(s) Oral daily  atorvastatin 40 milliGRAM(s) Oral at bedtime  bacitracin   Ointment 1 Application(s) Topical daily  chlorhexidine 2% Cloths 1 Application(s) Topical <User Schedule>  collagenase Ointment 1 Application(s) Topical daily  cyclobenzaprine 10 milliGRAM(s) Oral two times a day  DULoxetine 30 milliGRAM(s) Oral daily  enoxaparin Injectable 40 milliGRAM(s) SubCutaneous every 24 hours  finasteride 5 milliGRAM(s) Oral daily  gabapentin 600 milliGRAM(s) Oral <User Schedule>  guaiFENesin  milliGRAM(s) Oral every 12 hours  lactobacillus acidophilus 1 Tablet(s) Oral two times a day with meals  lidocaine   4% Patch 1 Patch Transdermal daily  meropenem  IVPB 1000 milliGRAM(s) IV Intermittent every 12 hours  methadone  Concentrate 110 milliGRAM(s) Oral daily  methylphenidate 5 milliGRAM(s) Oral <User Schedule>  multivitamin 1 Tablet(s) Oral daily  nystatin Powder 1 Application(s) Topical two times a day  pantoprazole    Tablet 40 milliGRAM(s) Oral before breakfast  polyethylene glycol 3350 17 Gram(s) Oral daily  QUEtiapine 100 milliGRAM(s) Oral <User Schedule>  QUEtiapine 400 milliGRAM(s) Oral at bedtime  senna 2 Tablet(s) Oral at bedtime  tamsulosin 0.4 milliGRAM(s) Oral at bedtime  thiamine 100 milliGRAM(s) Oral daily  vancomycin  IVPB 1000 milliGRAM(s) IV Intermittent every 12 hours  vancomycin  IVPB      zinc sulfate 220 milliGRAM(s) Oral daily    MEDICATIONS  (PRN):  acetaminophen     Tablet .. 650 milliGRAM(s) Oral every 6 hours PRN Temp greater or equal to 38C (100.4F), Mild Pain (1 - 3)  albuterol    90 MICROgram(s) HFA Inhaler 2 Puff(s) Inhalation every 4 hours PRN Shortness of Breath and/or Wheezing  aluminum hydroxide/magnesium hydroxide/simethicone Suspension 30 milliLiter(s) Oral every 4 hours PRN Dyspepsia  HYDROmorphone  Injectable 2 milliGRAM(s) IV Push every 4 hours PRN Severe Pain (7 - 10)  melatonin 3 milliGRAM(s) Oral at bedtime PRN Insomnia  ondansetron Injectable 4 milliGRAM(s) IV Push every 8 hours PRN Nausea and/or Vomiting  sodium chloride 0.9% lock flush 10 milliLiter(s) IV Push every 1 hour PRN Pre/post blood products, medications, blood draw, and to maintain line patency        ROS: all systems reviewed and wnl      PHYSICAL EXAMINATION:  Vital Signs Last 24 Hrs  T(C): 37 (13 Dec 2023 23:46), Max: 37 (13 Dec 2023 23:46)  T(F): 98.6 (13 Dec 2023 23:46), Max: 98.6 (13 Dec 2023 23:46)  HR: 94 (14 Dec 2023 01:54) (72 - 97)  BP: 112/60 (13 Dec 2023 23:46) (97/59 - 112/60)  BP(mean): --  RR: 18 (13 Dec 2023 23:46) (18 - 18)  SpO2: 96% (14 Dec 2023 01:54) (93% - 97%)    Parameters below as of 14 Dec 2023 01:54  Patient On (Oxygen Delivery Method): nasal cannula      CAPILLARY BLOOD GLUCOSE          12-13 @ 07:01  -  12-14 @ 07:00  --------------------------------------------------------  IN: 0 mL / OUT: 2200 mL / NET: -2200 mL    12-14 @ 07:01  -  12-14 @ 10:15  --------------------------------------------------------  IN: 0 mL / OUT: 1800 mL / NET: -1800 mL        GENERAL:   NECK: supple, No JVD  CHEST/LUNG: clear to auscultation bilaterally; no rales, rhonchi, or wheezing b/l  HEART: normal S1, S2  ABDOMEN: BS+, soft, ND, NT   EXTREMITIES:  pulses palpable; no clubbing, cyanosis, or edema b/l LEs  SKIN: no rashes or lesions      LABS:                        9.2    10.75 )-----------( 185      ( 13 Dec 2023 05:40 )             31.1     12-13    140  |  104  |  13  ----------------------------<  88  3.5   |  35<H>  |  0.61    Ca    8.7      13 Dec 2023 05:40  Phos  4.7     12-13  Mg     2.0     12-13        Urinalysis Basic - ( 13 Dec 2023 05:40 )    Color: x / Appearance: x / SG: x / pH: x  Gluc: 88 mg/dL / Ketone: x  / Bili: x / Urobili: x   Blood: x / Protein: x / Nitrite: x   Leuk Esterase: x / RBC: x / WBC x   Sq Epi: x / Non Sq Epi: x / Bacteria: x             INTERVAL HPI/OVERNIGHT EVENTS:  Pt seen and examined at bedside.     Allergies/Intolerance: NSAIDs (Flushing; Other (Moderate))  Haldol (Anaphylaxis)  Zyprexa (Rash; Dystonic RXN; Hives)  Motrin (Anaphylaxis)  Thorazine (Other (Moderate))  Aleve (Unknown)  Stelazine (Unknown)  Risperdal (Short breath; Rash; Hives)      MEDICATIONS  (STANDING):  acetaminophen     Tablet .. 650 milliGRAM(s) Oral daily  albuterol/ipratropium for Nebulization 3 milliLiter(s) Nebulizer every 6 hours  amLODIPine   Tablet 2.5 milliGRAM(s) Oral daily  ascorbic acid 500 milliGRAM(s) Oral daily  atorvastatin 40 milliGRAM(s) Oral at bedtime  bacitracin   Ointment 1 Application(s) Topical daily  chlorhexidine 2% Cloths 1 Application(s) Topical <User Schedule>  collagenase Ointment 1 Application(s) Topical daily  cyclobenzaprine 10 milliGRAM(s) Oral two times a day  DULoxetine 30 milliGRAM(s) Oral daily  enoxaparin Injectable 40 milliGRAM(s) SubCutaneous every 24 hours  finasteride 5 milliGRAM(s) Oral daily  gabapentin 600 milliGRAM(s) Oral <User Schedule>  guaiFENesin  milliGRAM(s) Oral every 12 hours  lactobacillus acidophilus 1 Tablet(s) Oral two times a day with meals  lidocaine   4% Patch 1 Patch Transdermal daily  meropenem  IVPB 1000 milliGRAM(s) IV Intermittent every 12 hours  methadone  Concentrate 110 milliGRAM(s) Oral daily  methylphenidate 5 milliGRAM(s) Oral <User Schedule>  multivitamin 1 Tablet(s) Oral daily  nystatin Powder 1 Application(s) Topical two times a day  pantoprazole    Tablet 40 milliGRAM(s) Oral before breakfast  polyethylene glycol 3350 17 Gram(s) Oral daily  QUEtiapine 100 milliGRAM(s) Oral <User Schedule>  QUEtiapine 400 milliGRAM(s) Oral at bedtime  senna 2 Tablet(s) Oral at bedtime  tamsulosin 0.4 milliGRAM(s) Oral at bedtime  thiamine 100 milliGRAM(s) Oral daily  vancomycin  IVPB 1000 milliGRAM(s) IV Intermittent every 12 hours  vancomycin  IVPB      zinc sulfate 220 milliGRAM(s) Oral daily    MEDICATIONS  (PRN):  acetaminophen     Tablet .. 650 milliGRAM(s) Oral every 6 hours PRN Temp greater or equal to 38C (100.4F), Mild Pain (1 - 3)  albuterol    90 MICROgram(s) HFA Inhaler 2 Puff(s) Inhalation every 4 hours PRN Shortness of Breath and/or Wheezing  aluminum hydroxide/magnesium hydroxide/simethicone Suspension 30 milliLiter(s) Oral every 4 hours PRN Dyspepsia  HYDROmorphone  Injectable 2 milliGRAM(s) IV Push every 4 hours PRN Severe Pain (7 - 10)  melatonin 3 milliGRAM(s) Oral at bedtime PRN Insomnia  ondansetron Injectable 4 milliGRAM(s) IV Push every 8 hours PRN Nausea and/or Vomiting  sodium chloride 0.9% lock flush 10 milliLiter(s) IV Push every 1 hour PRN Pre/post blood products, medications, blood draw, and to maintain line patency        ROS: all systems reviewed and wnl      PHYSICAL EXAMINATION:  Vital Signs Last 24 Hrs  T(C): 37 (13 Dec 2023 23:46), Max: 37 (13 Dec 2023 23:46)  T(F): 98.6 (13 Dec 2023 23:46), Max: 98.6 (13 Dec 2023 23:46)  HR: 94 (14 Dec 2023 01:54) (72 - 97)  BP: 112/60 (13 Dec 2023 23:46) (97/59 - 112/60)  BP(mean): --  RR: 18 (13 Dec 2023 23:46) (18 - 18)  SpO2: 96% (14 Dec 2023 01:54) (93% - 97%)    Parameters below as of 14 Dec 2023 01:54  Patient On (Oxygen Delivery Method): nasal cannula      CAPILLARY BLOOD GLUCOSE          12-13 @ 07:01  -  12-14 @ 07:00  --------------------------------------------------------  IN: 0 mL / OUT: 2200 mL / NET: -2200 mL    12-14 @ 07:01  -  12-14 @ 10:15  --------------------------------------------------------  IN: 0 mL / OUT: 1800 mL / NET: -1800 mL        GENERAL: stable, no CP or fevers.   NECK: supple, No JVD  CHEST/LUNG: clear to auscultation bilaterally; no rales, rhonchi, or wheezing b/l  HEART: normal S1, S2  ABDOMEN: BS+, soft, ND, NT   EXTREMITIES:  pulses palpable; no clubbing, cyanosis, or edema b/l LEs    LABS:                        9.2    10.75 )-----------( 185      ( 13 Dec 2023 05:40 )             31.1     12-13    140  |  104  |  13  ----------------------------<  88  3.5   |  35<H>  |  0.61    Ca    8.7      13 Dec 2023 05:40  Phos  4.7     12-13  Mg     2.0     12-13        Urinalysis Basic - ( 13 Dec 2023 05:40 )    Color: x / Appearance: x / SG: x / pH: x  Gluc: 88 mg/dL / Ketone: x  / Bili: x / Urobili: x   Blood: x / Protein: x / Nitrite: x   Leuk Esterase: x / RBC: x / WBC x   Sq Epi: x / Non Sq Epi: x / Bacteria: x             INTERVAL HPI/OVERNIGHT EVENTS:  Pt seen and examined at bedside.     Allergies/Intolerance: NSAIDs (Flushing; Other (Moderate))  Haldol (Anaphylaxis)  Zyprexa (Rash; Dystonic RXN; Hives)  Motrin (Anaphylaxis)  Thorazine (Other (Moderate))  Aleve (Unknown)  Stelazine (Unknown)  Risperdal (Short breath; Rash; Hives)      MEDICATIONS  (STANDING):  acetaminophen     Tablet .. 650 milliGRAM(s) Oral daily  albuterol/ipratropium for Nebulization 3 milliLiter(s) Nebulizer every 6 hours  amLODIPine   Tablet 2.5 milliGRAM(s) Oral daily  ascorbic acid 500 milliGRAM(s) Oral daily  atorvastatin 40 milliGRAM(s) Oral at bedtime  bacitracin   Ointment 1 Application(s) Topical daily  chlorhexidine 2% Cloths 1 Application(s) Topical <User Schedule>  collagenase Ointment 1 Application(s) Topical daily  cyclobenzaprine 10 milliGRAM(s) Oral two times a day  DULoxetine 30 milliGRAM(s) Oral daily  enoxaparin Injectable 40 milliGRAM(s) SubCutaneous every 24 hours  finasteride 5 milliGRAM(s) Oral daily  gabapentin 600 milliGRAM(s) Oral <User Schedule>  guaiFENesin  milliGRAM(s) Oral every 12 hours  lactobacillus acidophilus 1 Tablet(s) Oral two times a day with meals  lidocaine   4% Patch 1 Patch Transdermal daily  meropenem  IVPB 1000 milliGRAM(s) IV Intermittent every 12 hours  methadone  Concentrate 110 milliGRAM(s) Oral daily  methylphenidate 5 milliGRAM(s) Oral <User Schedule>  multivitamin 1 Tablet(s) Oral daily  nystatin Powder 1 Application(s) Topical two times a day  pantoprazole    Tablet 40 milliGRAM(s) Oral before breakfast  polyethylene glycol 3350 17 Gram(s) Oral daily  QUEtiapine 100 milliGRAM(s) Oral <User Schedule>  QUEtiapine 400 milliGRAM(s) Oral at bedtime  senna 2 Tablet(s) Oral at bedtime  tamsulosin 0.4 milliGRAM(s) Oral at bedtime  thiamine 100 milliGRAM(s) Oral daily  vancomycin  IVPB 1000 milliGRAM(s) IV Intermittent every 12 hours  vancomycin  IVPB      zinc sulfate 220 milliGRAM(s) Oral daily    MEDICATIONS  (PRN):  acetaminophen     Tablet .. 650 milliGRAM(s) Oral every 6 hours PRN Temp greater or equal to 38C (100.4F), Mild Pain (1 - 3)  albuterol    90 MICROgram(s) HFA Inhaler 2 Puff(s) Inhalation every 4 hours PRN Shortness of Breath and/or Wheezing  aluminum hydroxide/magnesium hydroxide/simethicone Suspension 30 milliLiter(s) Oral every 4 hours PRN Dyspepsia  HYDROmorphone  Injectable 2 milliGRAM(s) IV Push every 4 hours PRN Severe Pain (7 - 10)  melatonin 3 milliGRAM(s) Oral at bedtime PRN Insomnia  ondansetron Injectable 4 milliGRAM(s) IV Push every 8 hours PRN Nausea and/or Vomiting  sodium chloride 0.9% lock flush 10 milliLiter(s) IV Push every 1 hour PRN Pre/post blood products, medications, blood draw, and to maintain line patency        ROS: all systems reviewed and wnl      PHYSICAL EXAMINATION:  Vital Signs Last 24 Hrs  T(C): 37 (13 Dec 2023 23:46), Max: 37 (13 Dec 2023 23:46)  T(F): 98.6 (13 Dec 2023 23:46), Max: 98.6 (13 Dec 2023 23:46)  HR: 94 (14 Dec 2023 01:54) (72 - 97)  BP: 112/60 (13 Dec 2023 23:46) (97/59 - 112/60)  BP(mean): --  RR: 18 (13 Dec 2023 23:46) (18 - 18)  SpO2: 96% (14 Dec 2023 01:54) (93% - 97%)    Parameters below as of 14 Dec 2023 01:54  Patient On (Oxygen Delivery Method): nasal cannula      CAPILLARY BLOOD GLUCOSE          12-13 @ 07:01  -  12-14 @ 07:00  --------------------------------------------------------  IN: 0 mL / OUT: 2200 mL / NET: -2200 mL    12-14 @ 07:01  -  12-14 @ 10:15  --------------------------------------------------------  IN: 0 mL / OUT: 1800 mL / NET: -1800 mL        GENERAL: stable, no CP or fevers. west and colostomy in place, bilateral contractures.   NECK: supple, No JVD  CHEST/LUNG: clear to auscultation bilaterally; no rales, rhonchi, or wheezing b/l  HEART: normal S1, S2  ABDOMEN: BS+, soft, ND, NT   EXTREMITIES:  pulses palpable; no clubbing, cyanosis, or edema b/l LEs    LABS:                        9.2    10.75 )-----------( 185      ( 13 Dec 2023 05:40 )             31.1     12-13    140  |  104  |  13  ----------------------------<  88  3.5   |  35<H>  |  0.61    Ca    8.7      13 Dec 2023 05:40  Phos  4.7     12-13  Mg     2.0     12-13        Urinalysis Basic - ( 13 Dec 2023 05:40 )    Color: x / Appearance: x / SG: x / pH: x  Gluc: 88 mg/dL / Ketone: x  / Bili: x / Urobili: x   Blood: x / Protein: x / Nitrite: x   Leuk Esterase: x / RBC: x / WBC x   Sq Epi: x / Non Sq Epi: x / Bacteria: x

## 2023-12-14 NOTE — PHARMACY COMMUNICATION NOTE - COMMENTS
Pt on Methadone 110mg daily (confirmed from clinic)  NP wants to change dose to 60mg q12h because of bilateral leg contractures , neuropathic pain and multiple infections. She feels this will give him longer active relief. Given that he is followed and dosed controlled by a clinic it is recommended that the clinic be notified and approve of changed to pts regimen. Changed back to original dose for now. NP aware

## 2023-12-14 NOTE — CONSULT NOTE ADULT - ASSESSMENT
53 year old male with PMHx of cervical epidural abscess (s/p decompressive laminectomy 3/2021 c/b b/l leg paralysis), hx of pneumoperitoneum (s/p ex lap, total abdominal colectomy and end ileostomy (on 1/12/23) c/b evisceration and sepsis with MRSA bacteremia postoperatively), hx of hepatitis C, hx of R. buttock abscess, hx of L. of the left foot. Skin induration and subcutaneous edema in the leg and ankle.  No soft tissue gas     53 year old male with PMHx of cervical epidural abscess (s/p decompressive laminectomy 3/2021 c/b b/l leg paralysis), hx of pneumoperitoneum (s/p ex lap, total abdominal colectomy and end ileostomy (on 1/12/23) c/b evisceration and sepsis with MRSA bacteremia postoperatively), hx of hepatitis C, hx of R. buttock abscess, hx of L. of the left foot. Skin induration and subcutaneous edema in the leg and ankle.  No soft tissue gas

## 2023-12-14 NOTE — CONSULT NOTE ADULT - NS ATTEND AMEND GEN_ALL_CORE FT
53 year old male with PMHx of cervical epidural abscess (s/p decompressive laminectomy 3/2021 c/b b/l leg paralysis), hx of pneumoperitoneum (s/p ex lap, total abdominal colectomy and end ileostomy (on 1/12/23) c/b evisceration and sepsis with MRSA bacteremia postoperatively), hx of hepatitis C, hx of R. buttock abscess, hx of L. hallux osteo, recurrent foot infections, adm w sepsis, foot wounds. On antibiotics, ID, vascular, podiatry following. For poss L AKA once medically cleared. Pain control recommendations as above, agree w division of methadone to better control neuropathic symptoms, continue dilaudid prn breakthrough, adjunct pain medications. Has designated mother as HCP. Palliative will follow.

## 2023-12-14 NOTE — PROGRESS NOTE ADULT - ASSESSMENT
Gonzalez Stewart is a 53 year old male with PMHx of cervical epidural abscess (s/p decompressive laminectomy 3/2021 c/b b/l leg paralysis), hx of pneumoperitoneum (s/p ex lap, total abdominal colectomy and end ileostomy (on 1/12/23) c/b evisceration and sepsis with MRSA bacteremia postoperatively), hx of hepatitis C, hx of R. buttock abscess, hx of L. foot osteomyelitis, neurogenic bladder (with indwelling Holcomb catheter, last exchanged two days ago), HTN, HLD, bipolar disorder, GERD, hx of opioid dependence, and constipation who presented to the ED on 12/4/23 from [facility] for feet infection and admitted for sepsis secondary to bilateral feet infection.    Sepsis secondary to b/l feet infection  Less likely UTI given urine sample was not clean catch in pt with indwelling Holcomb and no urinary symptoms  Complaints of b/l feet infection intermittently over the past year  Previously treated for L. hallux OM with L. heel culture growing Proteus mirabilis ESBL, completed 6-week course of avycaz  Temp 101 and WBC 15.25K on admission  Lactic WNL, ESR 84  U/A (sample obtained from Holcomb catheter) with positive nitrites, moderate leuks, moderate blood, WBC 26-50, RBC > 50, moderate bacteria, squamous epithelial cells present  CT b/l LE with study is severely limited by contracture. Left lower extremity is only partially visualized with exclusion of the left foot. Skin induration and subcutaneous edema in the leg and ankle.  No soft tissue gas  S/p LR 2800 cc bolus, vancomycin, and zosyn in the ED  S/p bedside escharectomy by podiatry  Empirically started on vancomycin and cefepime   Wound culture +GPC in pairs  12/5/2023 wound cx  done by podiatry -- shows rare gpv   blood cx -- pending   id consult will be obtained given his complex medical history  esbl   his contracted state prevents mri   will start parenteral pain meds as he chronic  oral oxycodone isnt helping. ( he received 4mg morphine in ed and another 2 mg morphine without relief )  12/6/2023- podiatry has reccs for no surgical intervention, vascular also provides no reccs for surgical intervention  12/7/2023 await final ID reccs as there appears to be no recommendations from either vascular or podiatry for surgical intervention    wound on feet growing mrsa, pseudomonas on one cx and proteus on the other   as well Klebsiella in urine   12/8/2023 d/w ID on 12/7/2023 and she stated she wants to assess pt on today before deciding on PICC line insertion   I will also d/w SW to investigate if pt can return to facility with the antibx as they  are very expensive   12/9/2023 antibx to changed to Ertapenem and his facility will not pay for ayzac-- i d/w dr. queen and the Depakote is being used for mood and NOT seizure- she will mange this medication   . ID is in agreement and ordered the Ertapenem  12/11/2023 ready for dc. PICC line inserted . end date for ertapenem and vancomycin will be january 6, 2024   remove picc line after completion of antibx  12/12/2023- acute resp failure- presumed secondary to CHF and or pna- already on antibx, started lasix. usually  on oxygen at around  2lpm. (per pt he is on 5L at the NH)   once stable can d/w to rehab to complete IV antbx   continue with kimberly  12/13 patient doing well, 5L NC >>97% O2 sat, discussed with RN to titrate down but  to maintain O2 sat >94%   RVP neg , repeat CXR appears to be with chronic changes, overall unchanged from prior   ertapenem changed to meropenem  by ID >>unclear reasons   stopped lasix   ECHO ordered     Chronic medical conditions:  HTN: PTA amlodipine 2.5 mg - bp stable  HLD: PTA atorvastatin 40 mg  Bipolar disorder: PTA quetiapine 100 mg BID and 400 mg qhs, valproic acid-- per psych  12/12/2023 valproic ahs been stopped by kendell    Hx of opioid dependence: PTA methadone 110 mg     Chronic pain: PTA lidocaine 4% patch, duloxetine 30 mg DR, gabapentin 600 mg q8, cyclobenzaprine 10 mg BID, oxycodone 5 mg q6  GERD: PTA pantoprazole 40 mg DR  Neurogenic bladder (with indwelling Holcomb catheter): PTA finasteride 5 mg, tamsulosin 0.4 mg  Constipation: PTA senna 8.6 mg 2 tabs qhs   functional quad- supportive care  , frequent repositioning    Rt hip pressure wound, seen by vascular , wound recs in place   b/l feet ulcers wound recs in place , being followed by podiatry     Preventative Measures    lovenox SQ-dvt ppx  fall, aspiration precautions   HOBE   .  Gonzalez Stewart is a 53 year old male with PMHx of cervical epidural abscess (s/p decompressive laminectomy 3/2021 c/b b/l leg paralysis), hx of pneumoperitoneum (s/p ex lap, total abdominal colectomy and end ileostomy (on 1/12/23) c/b evisceration and sepsis with MRSA bacteremia postoperatively), hx of hepatitis C, hx of R. buttock abscess, hx of L. foot osteomyelitis, neurogenic bladder (with indwelling Holcomb catheter, last exchanged two days ago), HTN, HLD, bipolar disorder, GERD, hx of opioid dependence, and constipation who presented to the ED on 12/4/23 from [facility] for feet infection and admitted for sepsis secondary to bilateral feet infection.    Sepsis secondary to b/l feet infection  Less likely UTI given urine sample was not clean catch in pt with indwelling Holcomb and no urinary symptoms  Complaints of b/l feet infection intermittently over the past year  Previously treated for L. hallux OM with L. heel culture growing Proteus mirabilis ESBL, completed 6-week course of avycaz  Temp 101 and WBC 15.25K on admission  Lactic WNL, ESR 84  U/A (sample obtained from Holcomb catheter) with positive nitrites, moderate leuks, moderate blood, WBC 26-50, RBC > 50, moderate bacteria, squamous epithelial cells present  CT b/l LE with study is severely limited by contracture. Left lower extremity is only partially visualized with exclusion of the left foot. Skin induration and subcutaneous edema in the leg and ankle.  No soft tissue gas  S/p LR 2800 cc bolus, vancomycin, and zosyn in the ED  S/p bedside escharectomy by podiatry  Empirically started on vancomycin and cefepime   Wound culture +GPC in pairs  12/5/2023 wound cx  done by podiatry -- shows rare gpv   blood cx -- pending   id consult will be obtained given his complex medical history  esbl   his contracted state prevents mri   will start parenteral pain meds as he chronic  oral oxycodone isnt helping. ( he received 4mg morphine in ed and another 2 mg morphine without relief )  12/6/2023- podiatry has reccs for no surgical intervention, vascular also provides no reccs for surgical intervention  12/7/2023 await final ID reccs as there appears to be no recommendations from either vascular or podiatry for surgical intervention    wound on feet growing mrsa, pseudomonas on one cx and proteus on the other   as well Klebsiella in urine   12/8/2023 d/w ID on 12/7/2023 and she stated she wants to assess pt on today before deciding on PICC line insertion   I will also d/w SW to investigate if pt can return to facility with the antibx as they  are very expensive   12/9/2023 antibx to changed to Ertapenem and his facility will not pay for ayzac-- i d/w dr. queen and the Depakote is being used for mood and NOT seizure- she will mange this medication   . ID is in agreement and ordered the Ertapenem  12/11/2023 ready for dc. PICC line inserted . end date for ertapenem and vancomycin will be january 6, 2024   remove picc line after completion of antibx  12/12/2023- acute resp failure- presumed secondary to CHF and or pna- already on antibx, started lasix. usually  on oxygen at around  2lpm. (per pt he is on 5L at the NH)   once stable can d/w to rehab to complete IV antbx   continue with kimberly  12/13 patient doing well, 5L NC >>97% O2 sat, discussed with RN to titrate down but  to maintain O2 sat >94%   RVP neg , repeat CXR appears to be with chronic changes, overall unchanged from prior   ertapenem changed to meropenem  by ID >>unclear reasons   stopped lasix   ECHO ordered     Chronic medical conditions:  HTN: PTA amlodipine 2.5 mg - bp stable  HLD: PTA atorvastatin 40 mg  Bipolar disorder: PTA quetiapine 100 mg BID and 400 mg qhs, valproic acid-- per psych  12/12/2023 valproic ahs been stopped by kendell    Hx of opioid dependence: PTA methadone 110 mg     Chronic pain: PTA lidocaine 4% patch, duloxetine 30 mg DR, gabapentin 600 mg q8, cyclobenzaprine 10 mg BID, oxycodone 5 mg q6  GERD: PTA pantoprazole 40 mg DR  Neurogenic bladder (with indwelling Holcomb catheter): PTA finasteride 5 mg, tamsulosin 0.4 mg  Constipation: PTA senna 8.6 mg 2 tabs qhs   functional quad- supportive care  , frequent repositioning    Rt hip pressure wound, seen by vascular , wound recs in place   b/l feet ulcers wound recs in place , being followed by podiatry     Preventative Measures    lovenox SQ-dvt ppx  fall, aspiration precautions   HOBE   .  Gonzalez Stewart is a 53 year old male with PMHx of cervical epidural abscess (s/p decompressive laminectomy 3/2021 c/b b/l leg paralysis), hx of pneumoperitoneum (s/p ex lap, total abdominal colectomy and end ileostomy (on 1/12/23) c/b evisceration and sepsis with MRSA bacteremia postoperatively), hx of hepatitis C, hx of R. buttock abscess, hx of L. foot osteomyelitis, neurogenic bladder (with indwelling Holcomb catheter, last exchanged two days ago), HTN, HLD, bipolar disorder, GERD, hx of opioid dependence, and constipation who presented to the ED on 12/4/23 from [facility] for feet infection and admitted for sepsis secondary to bilateral feet infection.      Sepsis secondary to b/l feet infection  Less likely UTI given urine sample was not clean catch in pt with indwelling Holcomb and no urinary symptoms  Complaints of b/l feet infection intermittently over the past year  Previously treated for L. hallux OM with L. heel culture growing Proteus mirabilis ESBL, completed 6-week course of avycaz  Temp 101 and WBC 15.25K on admission  Lactic WNL, ESR 84  U/A (sample obtained from Holcomb catheter) with positive nitrites, moderate leuks, moderate blood, WBC 26-50, RBC > 50, moderate bacteria, squamous epithelial cells present  CT b/l LE with study is severely limited by contracture. Left lower extremity is only partially visualized with exclusion of the left foot. Skin induration and subcutaneous edema in the leg and ankle.  No soft tissue gas  S/p LR 2800 cc bolus, vancomycin, and zosyn in the ED  S/p bedside escharectomy by podiatry  Empirically started on vancomycin and cefepime   Wound culture +GPC in pairs  12/5/2023 wound cx  done by podiatry -- shows rare gpv   blood cx -- pending   id consult will be obtained given his complex medical history  esbl   his contracted state prevents mri   will start parenteral pain meds as he chronic  oral oxycodone isnt helping. ( he received 4mg morphine in ed and another 2 mg morphine without relief )  12/6/2023- podiatry has reccs for no surgical intervention, vascular also provides no reccs for surgical intervention  12/7/2023 await final ID reccs as there appears to be no recommendations from either vascular or podiatry for surgical intervention    wound on feet growing mrsa, pseudomonas on one cx and proteus on the other   as well Klebsiella in urine   12/8/2023 d/w ID on 12/7/2023 and she stated she wants to assess pt on today before deciding on PICC line insertion   I will also d/w SW to investigate if pt can return to facility with the antibx as they  are very expensive   12/9/2023 antibx to changed to Ertapenem and his facility will not pay for ayzac-- i d/w dr. queen and the Depakote is being used for mood and NOT seizure- she will mange this medication   . ID is in agreement and ordered the Ertapenem  12/11/2023 ready for dc. PICC line inserted . end date for ertapenem and vancomycin will be january 6, 2024   remove picc line after completion of antibx  12/12/2023- acute resp failure- presumed secondary to CHF and or pna- already on antibx, started lasix. usually  on oxygen at around  2lpm. (per pt he is on 5L at the NH)   once stable can d/w to rehab to complete IV antbx   continue with kimberly  12/13 patient doing well, 5L NC >>97% O2 sat, discussed with RN to titrate down but  to maintain O2 sat >94%   RVP neg , repeat CXR appears to be with chronic changes, overall unchanged from prior   ertapenem changed to meropenem  by ID >>unclear reasons   stopped lasix   ECHO ordered     Chronic medical conditions:  HTN: PTA amlodipine 2.5 mg - bp stable  HLD: PTA atorvastatin 40 mg  Bipolar disorder: PTA quetiapine 100 mg BID and 400 mg qhs, valproic acid-- per psych  12/12/2023 valproic ahs been stopped by kendell    Hx of opioid dependence: PTA methadone 110 mg     Chronic pain: PTA lidocaine 4% patch, duloxetine 30 mg DR, gabapentin 600 mg q8, cyclobenzaprine 10 mg BID, oxycodone 5 mg q6  GERD: PTA pantoprazole 40 mg DR  Neurogenic bladder (with indwelling Holcomb catheter): PTA finasteride 5 mg, tamsulosin 0.4 mg  Constipation: PTA senna 8.6 mg 2 tabs qhs   functional quad- supportive care  , frequent repositioning    Rt hip pressure wound, seen by vascular , wound recs in place   b/l feet ulcers wound recs in place , being followed by podiatry     Plan: Continue Vanco and Meropemen. Vascular requests Cardio clearance prior to left AKA.   Will consult cardio in AM.   Continue all present meds and Methadone changed to BID.  Gonzalez Stewart is a 53 year old male with PMHx of cervical epidural abscess (s/p decompressive laminectomy 3/2021 c/b b/l leg paralysis), hx of pneumoperitoneum (s/p ex lap, total abdominal colectomy and end ileostomy (on 1/12/23) c/b evisceration and sepsis with MRSA bacteremia postoperatively), hx of hepatitis C, hx of R. buttock abscess, hx of L. foot osteomyelitis, neurogenic bladder (with indwelling Holcomb catheter, last exchanged two days ago), HTN, HLD, bipolar disorder, GERD, hx of opioid dependence, and constipation who presented to the ED on 12/4/23 from [facility] for feet infection and admitted for sepsis secondary to bilateral feet infection.      Sepsis secondary to b/l feet infection  Less likely UTI given urine sample was not clean catch in pt with indwelling Holcomb and no urinary symptoms  Complaints of b/l feet infection intermittently over the past year  Previously treated for L. hallux OM with L. heel culture growing Proteus mirabilis ESBL, completed 6-week course of avycaz  Temp 101 and WBC 15.25K on admission  Lactic WNL, ESR 84  U/A (sample obtained from Holcomb catheter) with positive nitrites, moderate leuks, moderate blood, WBC 26-50, RBC > 50, moderate bacteria, squamous epithelial cells present  CT b/l LE with study is severely limited by contracture. Left lower extremity is only partially visualized with exclusion of the left foot. Skin induration and subcutaneous edema in the leg and ankle.  No soft tissue gas  S/p LR 2800 cc bolus, vancomycin, and zosyn in the ED  S/p bedside escharectomy by podiatry  Empirically started on vancomycin and cefepime   Wound culture +GPC in pairs  12/5/2023 wound cx  done by podiatry -- shows rare gpv   blood cx -- pending   id consult will be obtained given his complex medical history  esbl   his contracted state prevents mri   will start parenteral pain meds as he chronic  oral oxycodone isnt helping. ( he received 4mg morphine in ed and another 2 mg morphine without relief )  12/6/2023- podiatry has reccs for no surgical intervention, vascular also provides no reccs for surgical intervention  12/7/2023 await final ID reccs as there appears to be no recommendations from either vascular or podiatry for surgical intervention    wound on feet growing mrsa, pseudomonas on one cx and proteus on the other   as well Klebsiella in urine   12/8/2023 d/w ID on 12/7/2023 and she stated she wants to assess pt on today before deciding on PICC line insertion   I will also d/w SW to investigate if pt can return to facility with the antibx as they  are very expensive   12/9/2023 antibx to changed to Ertapenem and his facility will not pay for ayzac-- i d/w dr. queen and the Depakote is being used for mood and NOT seizure- she will mange this medication   . ID is in agreement and ordered the Ertapenem  12/11/2023 ready for dc. PICC line inserted . end date for ertapenem and vancomycin will be january 6, 2024   remove picc line after completion of antibx  12/12/2023- acute resp failure- presumed secondary to CHF and or pna- already on antibx, started lasix. usually  on oxygen at around  2lpm. (per pt he is on 5L at the NH)   once stable can d/w to rehab to complete IV antbx   continue with kimberly  12/13 patient doing well, 5L NC >>97% O2 sat, discussed with RN to titrate down but  to maintain O2 sat >94%   RVP neg , repeat CXR appears to be with chronic changes, overall unchanged from prior   ertapenem changed to meropenem  by ID >>unclear reasons   stopped lasix   ECHO ordered     Chronic medical conditions:  HTN: PTA amlodipine 2.5 mg - bp stable  HLD: PTA atorvastatin 40 mg  Bipolar disorder: PTA quetiapine 100 mg BID and 400 mg qhs, valproic acid-- per psych  12/12/2023 valproic ahs been stopped by kendell    Hx of opioid dependence: PTA methadone 110 mg     Chronic pain: PTA lidocaine 4% patch, duloxetine 30 mg DR, gabapentin 600 mg q8, cyclobenzaprine 10 mg BID, oxycodone 5 mg q6  GERD: PTA pantoprazole 40 mg DR  Neurogenic bladder (with indwelling Holcomb catheter): PTA finasteride 5 mg, tamsulosin 0.4 mg  Constipation: PTA senna 8.6 mg 2 tabs qhs   functional quad- supportive care  , frequent repositioning    Rt hip pressure wound, seen by vascular , wound recs in place   b/l feet ulcers wound recs in place , being followed by podiatry     Plan: Continue Vanco and Meropemen. Vascular requests Cardio clearance prior to left AKA.   Will consult cardio in AM.   Continue all present meds and Methadone changed to BID.  Gonzalez Stewart is a 53 year old male with PMHx of cervical epidural abscess (s/p decompressive laminectomy 3/2021 c/b b/l leg paralysis), hx of pneumoperitoneum (s/p ex lap, total abdominal colectomy and end ileostomy (on 1/12/23) c/b evisceration and sepsis with MRSA bacteremia postoperatively), hx of hepatitis C, hx of R. buttock abscess, hx of L. foot osteomyelitis, neurogenic bladder (with indwelling Holcomb catheter, last exchanged two days ago), HTN, HLD, bipolar disorder, GERD, hx of opioid dependence, and constipation who presented to the ED on 12/4/23 from [facility] for feet infection and admitted for sepsis secondary to bilateral feet infection.      Sepsis secondary to b/l feet infection  Less likely UTI given urine sample was not clean catch in pt with indwelling Holcomb and no urinary symptoms  Complaints of b/l feet infection intermittently over the past year  Previously treated for L. hallux OM with L. heel culture growing Proteus mirabilis ESBL, completed 6-week course of avycaz  Temp 101 and WBC 15.25K on admission  Lactic WNL, ESR 84  U/A (sample obtained from Holcomb catheter) with positive nitrites, moderate leuks, moderate blood, WBC 26-50, RBC > 50, moderate bacteria, squamous epithelial cells present  CT b/l LE with study is severely limited by contracture. Left lower extremity is only partially visualized with exclusion of the left foot. Skin induration and subcutaneous edema in the leg and ankle.  No soft tissue gas  S/p LR 2800 cc bolus, vancomycin, and zosyn in the ED  S/p bedside escharectomy by podiatry  Empirically started on vancomycin and cefepime   Wound culture +GPC in pairs  12/5/2023 wound cx  done by podiatry -- shows rare gpv   blood cx -- pending   id consult will be obtained given his complex medical history  esbl   his contracted state prevents mri   will start parenteral pain meds as he chronic  oral oxycodone isnt helping. ( he received 4mg morphine in ed and another 2 mg morphine without relief )  12/6/2023- podiatry has reccs for no surgical intervention, vascular also provides no reccs for surgical intervention  12/7/2023 await final ID reccs as there appears to be no recommendations from either vascular or podiatry for surgical intervention    wound on feet growing mrsa, pseudomonas on one cx and proteus on the other   as well Klebsiella in urine   12/8/2023 d/w ID on 12/7/2023 and she stated she wants to assess pt on today before deciding on PICC line insertion   I will also d/w SW to investigate if pt can return to facility with the antibx as they  are very expensive   12/9/2023 antibx to changed to Ertapenem and his facility will not pay for ayzac-- i d/w dr. queen and the Depakote is being used for mood and NOT seizure- she will mange this medication   . ID is in agreement and ordered the Ertapenem  12/11/2023 ready for dc. PICC line inserted . end date for ertapenem and vancomycin will be january 6, 2024   remove picc line after completion of antibx  12/12/2023- acute resp failure- presumed secondary to CHF and or pna- already on antibx, started lasix. usually  on oxygen at around  2lpm. (per pt he is on 5L at the NH)   once stable can d/w to rehab to complete IV antbx   continue with kimberly  12/13 patient doing well, 5L NC >>97% O2 sat, discussed with RN to titrate down but  to maintain O2 sat >94%   RVP neg , repeat CXR appears to be with chronic changes, overall unchanged from prior   ertapenem changed to meropenem  by ID >>unclear reasons   stopped lasix   ECHO ordered     Chronic medical conditions:  HTN: PTA amlodipine 2.5 mg - bp stable  HLD: PTA atorvastatin 40 mg  Bipolar disorder: PTA quetiapine 100 mg BID and 400 mg qhs, valproic acid-- per psych  12/12/2023 valproic ahs been stopped by kendell    Hx of opioid dependence: PTA methadone 110 mg     Chronic pain: PTA lidocaine 4% patch, duloxetine 30 mg DR, gabapentin 600 mg q8, cyclobenzaprine 10 mg BID, oxycodone 5 mg q6  GERD: PTA pantoprazole 40 mg DR  Neurogenic bladder (with indwelling Holcomb catheter): PTA finasteride 5 mg, tamsulosin 0.4 mg  Constipation: PTA senna 8.6 mg 2 tabs qhs   functional quad- supportive care  , frequent repositioning    Rt hip pressure wound, seen by vascular , wound recs in place   b/l feet ulcers wound recs in place , being followed by podiatry     Plan: Continue Vanco and Meropemen. Vascular requests Cardio clearance prior to left AKA.     Will consult cardio in AM. Continue all present meds and Methadone changed to BID.  Gonzalez Stewart is a 53 year old male with PMHx of cervical epidural abscess (s/p decompressive laminectomy 3/2021 c/b b/l leg paralysis), hx of pneumoperitoneum (s/p ex lap, total abdominal colectomy and end ileostomy (on 1/12/23) c/b evisceration and sepsis with MRSA bacteremia postoperatively), hx of hepatitis C, hx of R. buttock abscess, hx of L. foot osteomyelitis, neurogenic bladder (with indwelling Holcomb catheter, last exchanged two days ago), HTN, HLD, bipolar disorder, GERD, hx of opioid dependence, and constipation who presented to the ED on 12/4/23 from [facility] for feet infection and admitted for sepsis secondary to bilateral feet infection.      Sepsis secondary to b/l feet infection  Less likely UTI given urine sample was not clean catch in pt with indwelling Holcomb and no urinary symptoms  Complaints of b/l feet infection intermittently over the past year  Previously treated for L. hallux OM with L. heel culture growing Proteus mirabilis ESBL, completed 6-week course of avycaz  Temp 101 and WBC 15.25K on admission  Lactic WNL, ESR 84  U/A (sample obtained from Holcomb catheter) with positive nitrites, moderate leuks, moderate blood, WBC 26-50, RBC > 50, moderate bacteria, squamous epithelial cells present  CT b/l LE with study is severely limited by contracture. Left lower extremity is only partially visualized with exclusion of the left foot. Skin induration and subcutaneous edema in the leg and ankle.  No soft tissue gas  S/p LR 2800 cc bolus, vancomycin, and zosyn in the ED  S/p bedside escharectomy by podiatry  Empirically started on vancomycin and cefepime   Wound culture +GPC in pairs  12/5/2023 wound cx  done by podiatry -- shows rare gpv   blood cx -- pending   id consult will be obtained given his complex medical history  esbl   his contracted state prevents mri   will start parenteral pain meds as he chronic  oral oxycodone isnt helping. ( he received 4mg morphine in ed and another 2 mg morphine without relief )  12/6/2023- podiatry has reccs for no surgical intervention, vascular also provides no reccs for surgical intervention  12/7/2023 await final ID reccs as there appears to be no recommendations from either vascular or podiatry for surgical intervention    wound on feet growing mrsa, pseudomonas on one cx and proteus on the other   as well Klebsiella in urine   12/8/2023 d/w ID on 12/7/2023 and she stated she wants to assess pt on today before deciding on PICC line insertion   I will also d/w SW to investigate if pt can return to facility with the antibx as they  are very expensive   12/9/2023 antibx to changed to Ertapenem and his facility will not pay for ayzac-- i d/w dr. queen and the Depakote is being used for mood and NOT seizure- she will mange this medication   . ID is in agreement and ordered the Ertapenem  12/11/2023 ready for dc. PICC line inserted . end date for ertapenem and vancomycin will be january 6, 2024   remove picc line after completion of antibx  12/12/2023- acute resp failure- presumed secondary to CHF and or pna- already on antibx, started lasix. usually  on oxygen at around  2lpm. (per pt he is on 5L at the NH)   once stable can d/w to rehab to complete IV antbx   continue with kimberly  12/13 patient doing well, 5L NC >>97% O2 sat, discussed with RN to titrate down but  to maintain O2 sat >94%   RVP neg , repeat CXR appears to be with chronic changes, overall unchanged from prior   ertapenem changed to meropenem  by ID >>unclear reasons   stopped lasix   ECHO ordered     Chronic medical conditions:  HTN: PTA amlodipine 2.5 mg - bp stable  HLD: PTA atorvastatin 40 mg  Bipolar disorder: PTA quetiapine 100 mg BID and 400 mg qhs, valproic acid-- per psych  12/12/2023 valproic ahs been stopped by kendell    Hx of opioid dependence: PTA methadone 110 mg     Chronic pain: PTA lidocaine 4% patch, duloxetine 30 mg DR, gabapentin 600 mg q8, cyclobenzaprine 10 mg BID, oxycodone 5 mg q6  GERD: PTA pantoprazole 40 mg DR  Neurogenic bladder (with indwelling Holcomb catheter): PTA finasteride 5 mg, tamsulosin 0.4 mg  Constipation: PTA senna 8.6 mg 2 tabs qhs   functional quad- supportive care  , frequent repositioning    Rt hip pressure wound, seen by vascular , wound recs in place   b/l feet ulcers wound recs in place , being followed by podiatry     Plan: Continue Vanco and Meropemen. Vascular requests Cardio clearance prior to left AKA.     Will consult cardio in AM. Continue all present meds and Methadone changed to BID.  Gonzalez Stewart is a 53 year old male with PMHx of cervical epidural abscess (s/p decompressive laminectomy 3/2021 c/b b/l leg paralysis), hx of pneumoperitoneum (s/p ex lap, total abdominal colectomy and end ileostomy (on 1/12/23) c/b evisceration and sepsis with MRSA bacteremia postoperatively), hx of hepatitis C, hx of R. buttock abscess, hx of L. foot osteomyelitis, neurogenic bladder (with indwelling Holcomb catheter, last exchanged two days ago), HTN, HLD, bipolar disorder, GERD, hx of opioid dependence, and constipation who presented to the ED on 12/4/23 from [facility] for feet infection and admitted for sepsis secondary to bilateral feet infection.      Sepsis secondary to b/l feet infection  Less likely UTI given urine sample was not clean catch in pt with indwelling Holcomb and no urinary symptoms  Complaints of b/l feet infection intermittently over the past year  Previously treated for L. hallux OM with L. heel culture growing Proteus mirabilis ESBL, completed 6-week course of avycaz  Temp 101 and WBC 15.25K on admission  Lactic WNL, ESR 84  U/A (sample obtained from Holcomb catheter) with positive nitrites, moderate leuks, moderate blood, WBC 26-50, RBC > 50, moderate bacteria, squamous epithelial cells present  CT b/l LE with study is severely limited by contracture. Left lower extremity is only partially visualized with exclusion of the left foot. Skin induration and subcutaneous edema in the leg and ankle.  No soft tissue gas  S/p LR 2800 cc bolus, vancomycin, and zosyn in the ED  S/p bedside escharectomy by podiatry  Empirically started on vancomycin and cefepime   Wound culture +GPC in pairs  12/5/2023 wound cx  done by podiatry -- shows rare gpv   blood cx -- pending   id consult will be obtained given his complex medical history  esbl   his contracted state prevents mri   will start parenteral pain meds as he chronic  oral oxycodone isnt helping. ( he received 4mg morphine in ed and another 2 mg morphine without relief )  12/6/2023- podiatry has reccs for no surgical intervention, vascular also provides no reccs for surgical intervention  12/7/2023 await final ID reccs as there appears to be no recommendations from either vascular or podiatry for surgical intervention    wound on feet growing mrsa, pseudomonas on one cx and proteus on the other   as well Klebsiella in urine   12/8/2023 d/w ID on 12/7/2023 and she stated she wants to assess pt on today before deciding on PICC line insertion   I will also d/w SW to investigate if pt can return to facility with the antibx as they  are very expensive   12/9/2023 antibx to changed to Ertapenem and his facility will not pay for ayzac-- i d/w dr. queen and the Depakote is being used for mood and NOT seizure- she will mange this medication   . ID is in agreement and ordered the Ertapenem  12/11/2023 ready for dc. PICC line inserted . end date for ertapenem and vancomycin will be january 6, 2024   remove picc line after completion of antibx  12/12/2023- acute resp failure- presumed secondary to CHF and or pna- already on antibx, started lasix. usually  on oxygen at around  2lpm. (per pt he is on 5L at the NH)   once stable can d/w to rehab to complete IV antbx   continue with kimberly  12/13 patient doing well, 5L NC >>97% O2 sat, discussed with RN to titrate down but  to maintain O2 sat >94%   RVP neg , repeat CXR appears to be with chronic changes, overall unchanged from prior   ertapenem changed to meropenem  by ID >>unclear reasons   stopped lasix   ECHO ordered     Chronic medical conditions:  HTN: PTA amlodipine 2.5 mg - bp stable  HLD: PTA atorvastatin 40 mg  Bipolar disorder: PTA quetiapine 100 mg BID and 400 mg qhs, valproic acid-- per psych  12/12/2023 valproic ahs been stopped by kendell    Hx of opioid dependence: PTA methadone 110 mg     Chronic pain: PTA lidocaine 4% patch, duloxetine 30 mg DR, gabapentin 600 mg q8, cyclobenzaprine 10 mg BID, oxycodone 5 mg q6  GERD: PTA pantoprazole 40 mg DR  Neurogenic bladder (with indwelling Holcomb catheter): PTA finasteride 5 mg, tamsulosin 0.4 mg  Constipation: PTA senna 8.6 mg 2 tabs qhs   functional quad- supportive care  , frequent repositioning    Rt hip pressure wound, seen by vascular , wound recs in place   b/l feet ulcers wound recs in place , being followed by podiatry     Plan: Continue Vanco and Meropemen. Vascular requests Cardio clearance prior to left AKA.     Cardio was consulted for clearance Friday AM. Continue all present meds and Methadone changed to BID.  Gonzalez Stewart is a 53 year old male with PMHx of cervical epidural abscess (s/p decompressive laminectomy 3/2021 c/b b/l leg paralysis), hx of pneumoperitoneum (s/p ex lap, total abdominal colectomy and end ileostomy (on 1/12/23) c/b evisceration and sepsis with MRSA bacteremia postoperatively), hx of hepatitis C, hx of R. buttock abscess, hx of L. foot osteomyelitis, neurogenic bladder (with indwelling Holcomb catheter, last exchanged two days ago), HTN, HLD, bipolar disorder, GERD, hx of opioid dependence, and constipation who presented to the ED on 12/4/23 from [facility] for feet infection and admitted for sepsis secondary to bilateral feet infection.      Sepsis secondary to b/l feet infection  Less likely UTI given urine sample was not clean catch in pt with indwelling Holcomb and no urinary symptoms  Complaints of b/l feet infection intermittently over the past year  Previously treated for L. hallux OM with L. heel culture growing Proteus mirabilis ESBL, completed 6-week course of avycaz  Temp 101 and WBC 15.25K on admission  Lactic WNL, ESR 84  U/A (sample obtained from Holcomb catheter) with positive nitrites, moderate leuks, moderate blood, WBC 26-50, RBC > 50, moderate bacteria, squamous epithelial cells present  CT b/l LE with study is severely limited by contracture. Left lower extremity is only partially visualized with exclusion of the left foot. Skin induration and subcutaneous edema in the leg and ankle.  No soft tissue gas  S/p LR 2800 cc bolus, vancomycin, and zosyn in the ED  S/p bedside escharectomy by podiatry  Empirically started on vancomycin and cefepime   Wound culture +GPC in pairs  12/5/2023 wound cx  done by podiatry -- shows rare gpv   blood cx -- pending   id consult will be obtained given his complex medical history  esbl   his contracted state prevents mri   will start parenteral pain meds as he chronic  oral oxycodone isnt helping. ( he received 4mg morphine in ed and another 2 mg morphine without relief )  12/6/2023- podiatry has reccs for no surgical intervention, vascular also provides no reccs for surgical intervention  12/7/2023 await final ID reccs as there appears to be no recommendations from either vascular or podiatry for surgical intervention    wound on feet growing mrsa, pseudomonas on one cx and proteus on the other   as well Klebsiella in urine   12/8/2023 d/w ID on 12/7/2023 and she stated she wants to assess pt on today before deciding on PICC line insertion   I will also d/w SW to investigate if pt can return to facility with the antibx as they  are very expensive   12/9/2023 antibx to changed to Ertapenem and his facility will not pay for ayzac-- i d/w dr. queen and the Depakote is being used for mood and NOT seizure- she will mange this medication   . ID is in agreement and ordered the Ertapenem  12/11/2023 ready for dc. PICC line inserted . end date for ertapenem and vancomycin will be january 6, 2024   remove picc line after completion of antibx  12/12/2023- acute resp failure- presumed secondary to CHF and or pna- already on antibx, started lasix. usually  on oxygen at around  2lpm. (per pt he is on 5L at the NH)   once stable can d/w to rehab to complete IV antbx   continue with kimberly  12/13 patient doing well, 5L NC >>97% O2 sat, discussed with RN to titrate down but  to maintain O2 sat >94%   RVP neg , repeat CXR appears to be with chronic changes, overall unchanged from prior   ertapenem changed to meropenem  by ID >>unclear reasons   stopped lasix   ECHO ordered     Chronic medical conditions:  HTN: PTA amlodipine 2.5 mg - bp stable  HLD: PTA atorvastatin 40 mg  Bipolar disorder: PTA quetiapine 100 mg BID and 400 mg qhs, valproic acid-- per psych  12/12/2023 valproic ahs been stopped by kendell    Hx of opioid dependence: PTA methadone 110 mg     Chronic pain: PTA lidocaine 4% patch, duloxetine 30 mg DR, gabapentin 600 mg q8, cyclobenzaprine 10 mg BID, oxycodone 5 mg q6  GERD: PTA pantoprazole 40 mg DR  Neurogenic bladder (with indwelling Holcomb catheter): PTA finasteride 5 mg, tamsulosin 0.4 mg  Constipation: PTA senna 8.6 mg 2 tabs qhs   functional quad- supportive care  , frequent repositioning    Rt hip pressure wound, seen by vascular , wound recs in place   b/l feet ulcers wound recs in place , being followed by podiatry     Plan: Continue Vanco and Meropemen. Vascular requests Cardio clearance prior to left AKA.     Cardio was consulted for clearance Friday AM. Continue all present meds and Methadone changed to BID.  Gonzalez Stewart is a 53 year old male with PMHx of cervical epidural abscess (s/p decompressive laminectomy 3/2021 c/b b/l leg paralysis), hx of pneumoperitoneum (s/p ex lap, total abdominal colectomy and end ileostomy (on 1/12/23) c/b evisceration and sepsis with MRSA bacteremia postoperatively), hx of hepatitis C, hx of R. buttock abscess, hx of L. foot osteomyelitis, neurogenic bladder (with indwelling Holcomb catheter, last exchanged two days ago), HTN, HLD, bipolar disorder, GERD, hx of opioid dependence, and constipation who presented to the ED on 12/4/23 from [facility] for feet infection and admitted for sepsis secondary to bilateral feet infection.      Sepsis secondary to b/l feet infection  Less likely UTI given urine sample was not clean catch in pt with indwelling Holcomb and no urinary symptoms  Complaints of b/l feet infection intermittently over the past year  Previously treated for L. hallux OM with L. heel culture growing Proteus mirabilis ESBL, completed 6-week course of avycaz  Temp 101 and WBC 15.25K on admission  Lactic WNL, ESR 84  U/A (sample obtained from Holcomb catheter) with positive nitrites, moderate leuks, moderate blood, WBC 26-50, RBC > 50, moderate bacteria, squamous epithelial cells present  CT b/l LE with study is severely limited by contracture. Left lower extremity is only partially visualized with exclusion of the left foot. Skin induration and subcutaneous edema in the leg and ankle.  No soft tissue gas  S/p LR 2800 cc bolus, vancomycin, and zosyn in the ED  S/p bedside escharectomy by podiatry  Empirically started on vancomycin and cefepime   Wound culture +GPC in pairs  12/5/2023 wound cx  done by podiatry -- shows rare gpv   blood cx -- pending   id consult will be obtained given his complex medical history  esbl   his contracted state prevents mri   will start parenteral pain meds as he chronic  oral oxycodone isnt helping. ( he received 4mg morphine in ed and another 2 mg morphine without relief )  12/6/2023- podiatry has reccs for no surgical intervention, vascular also provides no reccs for surgical intervention  12/7/2023 await final ID reccs as there appears to be no recommendations from either vascular or podiatry for surgical intervention    wound on feet growing mrsa, pseudomonas on one cx and proteus on the other   as well Klebsiella in urine   12/8/2023 d/w ID on 12/7/2023 and she stated she wants to assess pt on today before deciding on PICC line insertion   I will also d/w SW to investigate if pt can return to facility with the antibx as they  are very expensive   12/9/2023 antibx to changed to Ertapenem and his facility will not pay for ayzac-- i d/w dr. queen and the Depakote is being used for mood and NOT seizure- she will mange this medication   . ID is in agreement and ordered the Ertapenem  12/11/2023 ready for dc. PICC line inserted . end date for ertapenem and vancomycin will be january 6, 2024   remove picc line after completion of antibx  12/12/2023- acute resp failure- presumed secondary to CHF and or pna- already on antibx, started lasix. usually  on oxygen at around  2lpm. (per pt he is on 5L at the NH)   once stable can d/w to rehab to complete IV antbx   continue with kimberly  12/13 patient doing well, 5L NC >>97% O2 sat, discussed with RN to titrate down but  to maintain O2 sat >94%   RVP neg , repeat CXR appears to be with chronic changes, overall unchanged from prior   ertapenem changed to meropenem  by ID >>unclear reasons   stopped lasix   ECHO ordered     Chronic medical conditions:  HTN: PTA amlodipine 2.5 mg - bp stable  HLD: PTA atorvastatin 40 mg  Bipolar disorder: PTA quetiapine 100 mg BID and 400 mg qhs, valproic acid-- per psych  12/12/2023 valproic ahs been stopped by kendell    Hx of opioid dependence: PTA methadone 110 mg     Chronic pain: PTA lidocaine 4% patch, duloxetine 30 mg DR, gabapentin 600 mg q8, cyclobenzaprine 10 mg BID, oxycodone 5 mg q6  GERD: PTA pantoprazole 40 mg DR  Neurogenic bladder (with indwelling Holcomb catheter): PTA finasteride 5 mg, tamsulosin 0.4 mg  Constipation: PTA senna 8.6 mg 2 tabs qhs   functional quad- supportive care  , frequent repositioning    Rt hip pressure wound, seen by vascular , wound recs in place   b/l feet ulcers wound recs in place , being followed by podiatry     Plan: Continue Vanco and Meropemen. Vascular requests Cardio clearance prior to left AKA.     Needs medical clearance for AKA on Monday. Continue all present meds and Methadone changed to BID. Doubt need for additional cardiac clearance.  Gonzalez Stewart is a 53 year old male with PMHx of cervical epidural abscess (s/p decompressive laminectomy 3/2021 c/b b/l leg paralysis), hx of pneumoperitoneum (s/p ex lap, total abdominal colectomy and end ileostomy (on 1/12/23) c/b evisceration and sepsis with MRSA bacteremia postoperatively), hx of hepatitis C, hx of R. buttock abscess, hx of L. foot osteomyelitis, neurogenic bladder (with indwelling Holcomb catheter, last exchanged two days ago), HTN, HLD, bipolar disorder, GERD, hx of opioid dependence, and constipation who presented to the ED on 12/4/23 from [facility] for feet infection and admitted for sepsis secondary to bilateral feet infection.      Sepsis secondary to b/l feet infection  Less likely UTI given urine sample was not clean catch in pt with indwelling Holcomb and no urinary symptoms  Complaints of b/l feet infection intermittently over the past year  Previously treated for L. hallux OM with L. heel culture growing Proteus mirabilis ESBL, completed 6-week course of avycaz  Temp 101 and WBC 15.25K on admission  Lactic WNL, ESR 84  U/A (sample obtained from Holcomb catheter) with positive nitrites, moderate leuks, moderate blood, WBC 26-50, RBC > 50, moderate bacteria, squamous epithelial cells present  CT b/l LE with study is severely limited by contracture. Left lower extremity is only partially visualized with exclusion of the left foot. Skin induration and subcutaneous edema in the leg and ankle.  No soft tissue gas  S/p LR 2800 cc bolus, vancomycin, and zosyn in the ED  S/p bedside escharectomy by podiatry  Empirically started on vancomycin and cefepime   Wound culture +GPC in pairs  12/5/2023 wound cx  done by podiatry -- shows rare gpv   blood cx -- pending   id consult will be obtained given his complex medical history  esbl   his contracted state prevents mri   will start parenteral pain meds as he chronic  oral oxycodone isnt helping. ( he received 4mg morphine in ed and another 2 mg morphine without relief )  12/6/2023- podiatry has reccs for no surgical intervention, vascular also provides no reccs for surgical intervention  12/7/2023 await final ID reccs as there appears to be no recommendations from either vascular or podiatry for surgical intervention    wound on feet growing mrsa, pseudomonas on one cx and proteus on the other   as well Klebsiella in urine   12/8/2023 d/w ID on 12/7/2023 and she stated she wants to assess pt on today before deciding on PICC line insertion   I will also d/w SW to investigate if pt can return to facility with the antibx as they  are very expensive   12/9/2023 antibx to changed to Ertapenem and his facility will not pay for ayzac-- i d/w dr. queen and the Depakote is being used for mood and NOT seizure- she will mange this medication   . ID is in agreement and ordered the Ertapenem  12/11/2023 ready for dc. PICC line inserted . end date for ertapenem and vancomycin will be january 6, 2024   remove picc line after completion of antibx  12/12/2023- acute resp failure- presumed secondary to CHF and or pna- already on antibx, started lasix. usually  on oxygen at around  2lpm. (per pt he is on 5L at the NH)   once stable can d/w to rehab to complete IV antbx   continue with kimberly  12/13 patient doing well, 5L NC >>97% O2 sat, discussed with RN to titrate down but  to maintain O2 sat >94%   RVP neg , repeat CXR appears to be with chronic changes, overall unchanged from prior   ertapenem changed to meropenem  by ID >>unclear reasons   stopped lasix   ECHO ordered     Chronic medical conditions:  HTN: PTA amlodipine 2.5 mg - bp stable  HLD: PTA atorvastatin 40 mg  Bipolar disorder: PTA quetiapine 100 mg BID and 400 mg qhs, valproic acid-- per psych  12/12/2023 valproic ahs been stopped by kendell    Hx of opioid dependence: PTA methadone 110 mg     Chronic pain: PTA lidocaine 4% patch, duloxetine 30 mg DR, gabapentin 600 mg q8, cyclobenzaprine 10 mg BID, oxycodone 5 mg q6  GERD: PTA pantoprazole 40 mg DR  Neurogenic bladder (with indwelling Holcomb catheter): PTA finasteride 5 mg, tamsulosin 0.4 mg  Constipation: PTA senna 8.6 mg 2 tabs qhs   functional quad- supportive care  , frequent repositioning    Rt hip pressure wound, seen by vascular , wound recs in place   b/l feet ulcers wound recs in place , being followed by podiatry     Plan: Continue Vanco and Meropemen. Vascular requests Cardio clearance prior to left AKA.     Needs medical clearance for AKA on Monday. Continue all present meds and Methadone changed to BID. Doubt need for additional cardiac clearance.

## 2023-12-14 NOTE — CONSULT NOTE ADULT - PROBLEM SELECTOR RECOMMENDATION 4
ongoing discussion with support to pt and family. Referral to [facility] made by DONNIE for stellar wound care, abt therapy and ongoing titration of pain meds ongoing discussion with support to pt and family. Referral to [facility] made by DONNIE for stellar wound care, abt therapy and ongoing titration of pain meds

## 2023-12-14 NOTE — PROGRESS NOTE ADULT - SUBJECTIVE AND OBJECTIVE BOX
Patient seen and examined at bedside with Dr. Epstein, patient complaining of left foot pain      MEDICATIONS  (STANDING):  acetaminophen     Tablet .. 650 milliGRAM(s) Oral daily  albuterol/ipratropium for Nebulization 3 milliLiter(s) Nebulizer every 6 hours  amLODIPine   Tablet 2.5 milliGRAM(s) Oral daily  ascorbic acid 500 milliGRAM(s) Oral daily  atorvastatin 40 milliGRAM(s) Oral at bedtime  bacitracin   Ointment 1 Application(s) Topical daily  chlorhexidine 2% Cloths 1 Application(s) Topical <User Schedule>  collagenase Ointment 1 Application(s) Topical daily  cyclobenzaprine 10 milliGRAM(s) Oral two times a day  cyclobenzaprine 10 milliGRAM(s) Oral three times a day  DULoxetine 30 milliGRAM(s) Oral daily  enoxaparin Injectable 40 milliGRAM(s) SubCutaneous every 24 hours  finasteride 5 milliGRAM(s) Oral daily  gabapentin 800 milliGRAM(s) Oral every 8 hours  guaiFENesin  milliGRAM(s) Oral every 12 hours  lactobacillus acidophilus 1 Tablet(s) Oral two times a day with meals  lidocaine   4% Patch 1 Patch Transdermal daily  meropenem  IVPB 1000 milliGRAM(s) IV Intermittent every 8 hours  methadone  Concentrate 60 milliGRAM(s) Oral every 12 hours  methylphenidate 5 milliGRAM(s) Oral <User Schedule>  multivitamin 1 Tablet(s) Oral daily  nystatin Powder 1 Application(s) Topical two times a day  pantoprazole    Tablet 40 milliGRAM(s) Oral before breakfast  polyethylene glycol 3350 17 Gram(s) Oral daily  QUEtiapine 100 milliGRAM(s) Oral <User Schedule>  QUEtiapine 400 milliGRAM(s) Oral at bedtime  senna 2 Tablet(s) Oral at bedtime  tamsulosin 0.4 milliGRAM(s) Oral at bedtime  thiamine 100 milliGRAM(s) Oral daily  vancomycin  IVPB 1000 milliGRAM(s) IV Intermittent every 12 hours  vancomycin  IVPB      zinc sulfate 220 milliGRAM(s) Oral daily    MEDICATIONS  (PRN):  acetaminophen     Tablet .. 650 milliGRAM(s) Oral every 6 hours PRN Temp greater or equal to 38C (100.4F), Mild Pain (1 - 3)  albuterol    90 MICROgram(s) HFA Inhaler 2 Puff(s) Inhalation every 4 hours PRN Shortness of Breath and/or Wheezing  aluminum hydroxide/magnesium hydroxide/simethicone Suspension 30 milliLiter(s) Oral every 4 hours PRN Dyspepsia  HYDROmorphone  Injectable 2 milliGRAM(s) IV Push every 4 hours PRN Severe Pain (7 - 10)  melatonin 3 milliGRAM(s) Oral at bedtime PRN Insomnia  ondansetron Injectable 4 milliGRAM(s) IV Push every 8 hours PRN Nausea and/or Vomiting  sodium chloride 0.9% lock flush 10 milliLiter(s) IV Push every 1 hour PRN Pre/post blood products, medications, blood draw, and to maintain line patency      Vital Signs Last 24 Hrs  T(C): 36.9 (14 Dec 2023 12:34), Max: 37 (13 Dec 2023 23:46)  T(F): 98.5 (14 Dec 2023 12:34), Max: 98.6 (13 Dec 2023 23:46)  HR: 104 (14 Dec 2023 12:34) (79 - 104)  BP: 110/66 (14 Dec 2023 12:34) (104/67 - 112/60)  RR: 18 (14 Dec 2023 15:00) (17 - 18)  SpO2: 93% (14 Dec 2023 15:00) (85% - 96%)    Parameters below as of 14 Dec 2023 15:00  Patient On (Oxygen Delivery Method): nasal cannula  O2 Flow (L/min): 5    PHYSICAL EXAM:  General: NAD  Neuro:  Alert & oriented x 3  HEENT: NCAT, EOMI, conjunctiva clear  CV: +S1+S2  Lung: on NC, no respiratory distress  Abdomen: soft, NTND  Extremities: bilateral lower extremities contracted. 4+pitting edema, wounds on bilateral feet      LABS:                        10.1   8.74  )-----------( 194      ( 14 Dec 2023 09:52 )             33.0     12-14    142  |  104  |  17  ----------------------------<  144<H>  3.4<L>   |  36<H>  |  0.67    Ca    8.6      14 Dec 2023 09:52  Phos  4.7     12-13  Mg     2.0     12-13        Urinalysis Basic - ( 14 Dec 2023 09:52 )    Color: x / Appearance: x / SG: x / pH: x  Gluc: 144 mg/dL / Ketone: x  / Bili: x / Urobili: x   Blood: x / Protein: x / Nitrite: x   Leuk Esterase: x / RBC: x / WBC x   Sq Epi: x / Non Sq Epi: x / Bacteria: x

## 2023-12-14 NOTE — PROGRESS NOTE ADULT - ASSESSMENT
Patient is a 53y Male right hip wound and bilateral lower extremity foot wounds, management per podiatry. 2+peripheral pedal pulses.    - OR planning for left AKA when patient is medically optimized  - Please obtain medical and cardiac clearances  - right hip with minimal slough, recommend Collagenase daily  - Left dorsal foot wound, recommend collagenase ddaily  - Continue current management per medicine and podiatry  Discussed and seen with Dr. Epstein

## 2023-12-14 NOTE — CONSULT NOTE ADULT - PROBLEM SELECTOR RECOMMENDATION 2
pain poorly managed at this time. pain appears to be mostly neuropathic and spasmatic in nature.   Pt is presently on Methadone maintenance dosing at 110 ng daily for hx of substance abuse, Consider breaking dose in half, dosing at q 12 hrs as Methadone is a  good long acting agent  to aide in neuropathic pain. Continue to monitor for elongated QTc  d/c Oxycodone as there is no benefit of use at this time  Increase Neurontin to 800 mg po q 8 hrs, can continue to titrate up to max of 3600 mg daily q 3 days  Increase Flexeril to 10 mg po TID  Although Cymbalta was initially ordered for depression, it can have good adjuvant effect with neuropathic pain   continue Dilaudid prn  continue Mirilax daily with Senna at HS to prevent OIC

## 2023-12-14 NOTE — CONSULT NOTE ADULT - CONVERSATION DETAILS
Met with pt at bedside with his mother Mariya Stewart connected via telephone. Discussed pt medical history over past years to include multiple surgeries and continuing infections.    Pt states that he does not feel that he has received optimal care and is having difficulty accepting that he is no longer able to walk. He is hoping that his pain could be better managed, with some improvement in his QOL. He was hoping to have B/L LE amputations with the hope that he might be able to sit in a wheelchair "and get out of this bed" His severe contractures have prevented him from doing so. He is disappointed that he was told that he was not a surgical candidate given his infections and poor functional status.     Pt states that he would like for his mother to be his HCP. Paperwork done and copy placed on pt chart with additional copy scanned to his mother. Pt was very overwhelmed and having difficulty participating in any discussion surrounding code status or wishes for his care as he declines. He will need further discussion in near future to assure he understands benefits vs burden to include but not limited to CPR/intubation and feeding tube placement. He will remain full code  at present time.     Discussed options for [facility] for best wound care management and optimal pain management Both patient and his mother are open to transfer. Met with pt at bedside with his mother Mariya Stewart connected via telephone. Discussed pt medical history over past years to include multiple surgeries and continuing infections.    Pt states that he does not feel that he has received optimal care and is having difficulty accepting that he is no longer able to walk. He is hoping that his pain could be better managed, with some improvement in his QOL. He was hoping to have B/L LE amputations with the hope that he might be able to sit in a wheelchair "and get out of this bed" His severe contractures have prevented him from doing so. He is disappointed that he was told that he was not a surgical candidate given his infections and poor functional status.     Pt states that he would like for his mother to be his HCP. Paperwork done and copy placed on pt chart with additional copy scanned to his mother. Pt was very overwhelmed and having difficulty participating in any discussion surrounding code status or wishes for his care as he declines. He will need further discussion in near future to assure he understands benefits vs burden to include but not limited to CPR/intubation and feeding tube placement. He will remain full code  at present time.     Discussed options for [facility] for best wound care management and optimal pain management Both patient and his mother are open to transfer. Met with pt at bedside with his mother Mariya Stewart connected via telephone. Discussed pt medical history over past years to include multiple surgeries and continuing infections.    Pt states that he does not feel that he has received optimal care and is having difficulty accepting that he is no longer able to walk. He is hoping that his pain could be better managed, with some improvement in his QOL. He was hoping to have B/L LE amputations with the hope that he might be able to sit in a wheelchair "and get out of this bed" His severe contractures, multiple wounds and frequent infections  have prevented him from doing so. He is disappointed that he was told that he was not a surgical candidate given his infections and poor functional status.     Pt states that he would like for his mother to be his HCP. Paperwork done and copy placed on pt chart with additional copy scanned to his mother. Pt was very overwhelmed and having difficulty participating in any discussion surrounding code status or wishes for his care as he declines. He will need further discussion in near future to assure he understands benefits vs burden to include but not limited to CPR/intubation and feeding tube placement. He will remain full code  at present time.     Discussed options for [facility] for best wound care management and optimal pain management Both patient and his mother are open to transfer. Pt is hoping that if wounds can be managed, infection could decrease and this may allow for surgery at a later time. Met with pt at bedside with his mother Mariya Stewart connected via telephone. Discussed pt medical history over past years to include multiple surgeries and continuing infections.    Pt states that he does not feel that he has received optimal care and is having difficulty accepting that he is no longer able to walk. He is hoping that his pain could be better managed, with some improvement in his QOL. He was hoping to have B/L LE amputations with the hope that he might be able to sit in a wheelchair "and get out of this bed" His severe contractures, multiple wounds and frequent infections  have prevented him from doing so. He is disappointed that he was told that he was not a surgical candidate given his infections and poor functional status.     Pt states that he would like for his mother to be his HCP. Paperwork done and copy placed on pt chart with additional copy scanned to his mother. Pt was very overwhelmed and having difficulty participating in any discussion surrounding code status or wishes for his care as he declines. He will need further discussion in near future to assure he understands benefits vs burden to include but not limited to CPR/intubation and feeding tube placement. He will remain full code  at present time.     Discussed options for [facility] for best wound care management and optimal pain management Both patient and his mother are open to transfer. Pt is hoping that if wounds can be managed, infection could decrease and this may allow for surgery at a later time.

## 2023-12-14 NOTE — PHARMACOTHERAPY INTERVENTION NOTE - NSPHARMCOMMASP
ASP - Dose optimization/Non-Renal dose adjustment
ASP - Dose optimization/Non-Renal dose adjustment
ASP - Renal dose adjustment

## 2023-12-14 NOTE — PROGRESS NOTE ADULT - NS ATTEND AMEND GEN_ALL_CORE FT
patient is seen and examiend, for eval of left leg amputation, due to severe contracture and left foot wounds and severe deformities of toes, foot and ankle, with non healing necrotic wounds on the left foot dorsum.  patient with severe Bilateral, Hip, knees and ankles and feet contractures.  patient also with medical issues ,   imp--The patient's  wounds on the right heel and left foot may have issues with healing. Also amputation if considered would have to be Above the knee and ?/Bilateral. There may be healing issues as well. The patient would not be a candidate for prosthesis ,which was explained to the patient.  The patient will think about it. The procedure has complications ,high rate and non healing of stump.

## 2023-12-14 NOTE — CONSULT NOTE ADULT - SUBJECTIVE AND OBJECTIVE BOX
GONZALEZ STEWART          MRN-[id number]           : 1970    HPI:  Gonzalez Stewart is a 53 year old male with PMHx of cervical epidural abscess (s/p decompressive laminectomy 3/2021 c/b b/l leg paralysis), hx of pneumoperitoneum (s/p ex lap, total abdominal colectomy and end ileostomy (on 23) c/b evisceration and sepsis with MRSA bacteremia postoperatively), hx of hepatitis C, hx of R. buttock abscess, hx of L. foot osteomyelitis, neurogenic bladder (with indwelling West catheter, last exchanged two days ago), HTN, HLD, bipolar disorder, GERD, hx of opioid dependence, and constipation who presented to the ED on 23 from [facility] for feet infection.    Patient reports he has had bilateral foot infections intermittently for the past year. Completed numerous rounds of antibiotics and even got C. difficile due to all the antibiotics.     Extensive chart review with paperwork from [facility]. In 2023, found to have L. hallux osteomyelitis. L. heel wound culture with Proteus mirabilis ESBL. Received PICC line and completed 6-week therapy with ceftazidime/avibactam q8. Found to have detectable viral load of hepatitis C. Previously completed sofosbuvir/velpatasvir so thought to be resistant to that and completed sofosbuvir/velpatasvir/voxilaprevir. Follows with ID.    In the ED, VSS except Tmax 101 and BP as low as 105/59. WBC 15.25K, hgb 10.5, bicarb 34, alkphos 227. ESR 84. U/A (sample obtained from West catheter) with positive nitrites, moderate leuks, moderate blood, WBC 26-50, RBC > 50, moderate bacteria, squamous epithelial cells present. CT b/l LE with study is severely limited by contracture. Left lower extremity is only partially visualized with exclusion of the left foot. Skin induration and subcutaneous edema in the leg and ankle.  No soft tissue gas. Received acetaminophen 1 g IV, morphine 8 mg IV, oxycodone 5 mg, vancomycin 1 g, zosyn 3.375 g, and LR 2800 cc bolus. Evaluated by vascular surgery PA who states attending to evaluate in AM. Evaluated by podiatry, underwent bedside escharectomy of left foot. Wound culture with +GPC in pairs. (05 Dec 2023 03:00)    Palliative Medicine consulted to assist wit GOC in setting of multiple chronic comorbidities and to assist with pain management       PAST MEDICAL & SURGICAL HISTORY:  CAD (coronary artery disease)  HTN (hypertension)  HLD (hyperlipidemia)  Neurogenic bladder  Bipolar disorder  S/P ileostomy  Indwelling West catheter present  Chronic hepatitis C virus infection  S/P laminectomy  S/P ileostomy    FAMILY HISTORY:  Reviewed and found non contributory in mother or father    SOCIAL HISTORY: , no children, has girlfriend but unaware as to where she is at the present time  living at [facility] for the past 2 years, was     ROS:                      Dyspnea (Berta 0-10):  0                      N/V (Y/N): N                            Secretions (Y/N) : N              Agitation(Y/N): N  Pain (Y/N):  Y pt with c/o burning, lancinating pain originating in lower back and radiating down B/L legs. Pain is spontaneous and is relieveded with rest and medications He further c/o constant spasms to his lower back and hips. Pt states pain 7/10 at present, elevates to 9/10 at worst, relieved down to 3-4 with use of meds.      General:  Denied  HEENT:    Denied  Neck:  Denied  CVS:  Denied  Resp:  Denied  GI:  Denied  :  Denied  Musc:  Denied  Neuro:  Denied  Psych:  Denied  Skin:  Denied  Lymph:  Denied    Allergies    NSAIDs (Flushing; Other (Moderate))  Haldol (Anaphylaxis)  Zyprexa (Rash; Dystonic RXN; Hives)  Motrin (Anaphylaxis)  Thorazine (Other (Moderate))  Aleve (Unknown)  Stelazine (Unknown)  Risperdal (Short breath; Rash; Hives)    Intolerances    Opiate Naive (Y/N): N  -iStop reviewed (Y/N): Yes. | Reference #: [id number]       PDI	First Name	Last Name	Birth Date	Gender	Street Address	[facility] Code  A	Gonzalez Stewart	1970	Male	[address] [facility]	[address]  B	Gonzalez Stewart	1970	Male	[address]	[facility]	[id number]    Prescription Information      PDI Filter:    PDI	My Rx	Current Rx	Drug Type	Rx Written	Rx Dispensed	Drug	Quantity	Days Supply	Prescriber Name	Prescriber CLAY #  A	N	Y	O	2023	oxycodone hcl (ir) 5 mg tablet	120	30	Owen, Eric D	[id number]  Payment Method Cash  [facility]  A	N	Y	S	2023	methylphenidate 5 mg tablet	60	30	Owen, Eric D	[id number]  Payment Method Medicaid  [facility]  A	N	N	O	2023	oxycodone hcl (ir) 5 mg tablet	12	3	Owen, Eric D	[id number]  Payment Method Cash  [facility]  A	N	N	S	10/16/2023	10/16/2023	methylphenidate 5 mg tablet	60	30	Homer, Eric D	[id number]  Payment Method Medicaid  [facility]  A	N	N	O	10/16/2023	10/16/2023	oxycodone hcl (ir) 5 mg tablet	120	30	Homer, Eric D	[id number]  Payment Method Cash  [facility]  A	N	N	S	2023	methylphenidate 5 mg tablet	60	30	Owen, Eric D	[id number]  Payment Method Medicaid  [facility]  A	N	N	O	2023	oxycodone hcl (ir) 5 mg tablet	120	30	Owen, Eric D	[id number]  Payment Method Medicaid  [facility]  A	N	N	S	08/15/2023	08/15/2023	methylphenidate 5 mg tablet	60	30	Owen, Eric D	[id number]  Payment Method Medicaid  [facility]  A	N	N	O	2023	oxycodone hcl (ir) 5 mg tablet	120	30	Homer, Eric D	[id number]  Payment Method Medicaid  [facility]  A	N	N	S	2023	methylphenidate 5 mg tablet	60	30	Owen, Eric D	[id number]  Payment Method Medicaid  [facility]  A	N	N	S	2023	methylphenidate 5 mg tablet	60	30	Homer, Eric D	[id number]  Payment Method Medicaid  [facility]  A	N	N	O	2023	oxycodone hcl (ir) 5 mg tablet	120	30	Nick Weaverin BRIDGER	[id number]  Payment Method Medicaid  [facility]  B	N	N	O	2023	oxycodone-acetaminophen 5-325 mg tablet	8	2	[facility] Goodwin	[id number]  Payment Method Medicaid  [facility]             Medications:      MEDICATIONS  (STANDING):  acetaminophen     Tablet .. 650 milliGRAM(s) Oral daily  albuterol/ipratropium for Nebulization 3 milliLiter(s) Nebulizer every 6 hours  amLODIPine   Tablet 2.5 milliGRAM(s) Oral daily  ascorbic acid 500 milliGRAM(s) Oral daily  atorvastatin 40 milliGRAM(s) Oral at bedtime  bacitracin   Ointment 1 Application(s) Topical daily  chlorhexidine 2% Cloths 1 Application(s) Topical <User Schedule>  collagenase Ointment 1 Application(s) Topical daily  cyclobenzaprine 10 milliGRAM(s) Oral two times a day  DULoxetine 30 milliGRAM(s) Oral daily  enoxaparin Injectable 40 milliGRAM(s) SubCutaneous every 24 hours  finasteride 5 milliGRAM(s) Oral daily  gabapentin 600 milliGRAM(s) Oral <User Schedule>  guaiFENesin  milliGRAM(s) Oral every 12 hours  lactobacillus acidophilus 1 Tablet(s) Oral two times a day with meals  lidocaine   4% Patch 1 Patch Transdermal daily  meropenem  IVPB 1000 milliGRAM(s) IV Intermittent every 8 hours  methadone  Concentrate 110 milliGRAM(s) Oral daily  methylphenidate 5 milliGRAM(s) Oral <User Schedule>  multivitamin 1 Tablet(s) Oral daily  nystatin Powder 1 Application(s) Topical two times a day  pantoprazole    Tablet 40 milliGRAM(s) Oral before breakfast  polyethylene glycol 3350 17 Gram(s) Oral daily  QUEtiapine 100 milliGRAM(s) Oral <User Schedule>  QUEtiapine 400 milliGRAM(s) Oral at bedtime  senna 2 Tablet(s) Oral at bedtime  tamsulosin 0.4 milliGRAM(s) Oral at bedtime  thiamine 100 milliGRAM(s) Oral daily  vancomycin  IVPB 1000 milliGRAM(s) IV Intermittent every 12 hours  vancomycin  IVPB      zinc sulfate 220 milliGRAM(s) Oral daily    MEDICATIONS  (PRN):  acetaminophen     Tablet .. 650 milliGRAM(s) Oral every 6 hours PRN Temp greater or equal to 38C (100.4F), Mild Pain (1 - 3)  albuterol    90 MICROgram(s) HFA Inhaler 2 Puff(s) Inhalation every 4 hours PRN Shortness of Breath and/or Wheezing  aluminum hydroxide/magnesium hydroxide/simethicone Suspension 30 milliLiter(s) Oral every 4 hours PRN Dyspepsia  HYDROmorphone  Injectable 2 milliGRAM(s) IV Push every 4 hours PRN Severe Pain (7 - 10)  melatonin 3 milliGRAM(s) Oral at bedtime PRN Insomnia  ondansetron Injectable 4 milliGRAM(s) IV Push every 8 hours PRN Nausea and/or Vomiting  sodium chloride 0.9% lock flush 10 milliLiter(s) IV Push every 1 hour PRN Pre/post blood products, medications, blood draw, and to maintain line patency    Labs:    CBC:                        10.1   8.74  )-----------( 194      ( 14 Dec 2023 09:52 )             33.0     CMP:    12-14    142  |  104  |  17  ----------------------------<  144<H>  3.4<L>   |  36<H>  |  0.67    Ca    8.6      14 Dec 2023 09:52  Phos  4.7     12-13  Mg     2.0     12-13     Urinalysis Basic - ( 14 Dec 2023 09:52 )    Color: x / Appearance: x / SG: x / pH: x  Gluc: 144 mg/dL / Ketone: x  / Bili: x / Urobili: x   Blood: x / Protein: x / Nitrite: x   Leuk Esterase: x / RBC: x / WBC x   Sq Epi: x / Non Sq Epi: x / Bacteria: x    Imaging:   < from: CT Lower Extremity w/ IV Cont, Bilateral (23 @ 23:00) >  ACC: [id number] EXAM:  CT LWR EXT IC BI   ORDERED BY: HIRA MCMILLAN     PROCEDURE DATE:  2023      INTERPRETATION:  CT LOWER EXTREMITY WITH IV CONTRAST BILATERAL    HISTORY: Bilateral wounds. Pain. Infection. Evaluate for gas. Right hip   wound and bilateral lower extremity foot wounds. Paraplegia.      COMPARISON:  Right foot x-rays dated 2023. Bilateral tibia and   fibula x-rays dated 2023.      IMPRESSION:  Patient contractured state and positioning causes limitation.  Preliminary report was provided by [facility].    Right femoral trochanteric region to the ankle, the foot is not included.  1.  Posterolateral decubitus wound is present at the level of the greater   trochanter without appreciated abscess or gas.    Left proximal tibia to the foot  1.  Soft tissue wounds are present along the calcaneus and midfoot   without appreciated abscess or gas.  2.  There is erosive change of the 1st interphalangeal joint of   indeterminate age. Correlation is suggested for signs of infection in   this location.    < end of copied text >    ACC: [id number] EXAM:  XR CHEST PORTABLE IMMED 1V   ORDERED BY: SINA HEAD     PROCEDURE DATE:  2023        INTERPRETATION:  Portable chest radiograph    CLINICAL INFORMATION: Dyspnea, shortness of breath.    TECHNIQUE:  Portable  AP chest radiograph.    COMPARISON: 2023 chest x-ray .    IMPRESSION: Cardiomegaly.  LEFT greater than RIGHT and a perihilar mild diffuse airspace disease   concerning for either  pulmonary edema of cardiac or noncardiac origin   versus infectious process  .  ACC: [id number] EXAM:  US DPLX UPR EXT VEINS LTD LT   ORDERED BY: PHONG CHÁVEZ     PROCEDURE DATE:  2023      INTERPRETATION:  CLINICAL INFORMATION: Left forearm redness    COMPARISON: None available.    IMPRESSION:  Superficial thrombophlebitis involving the left cephalic vein in the   forearm.    No evidence of left upper extremity deep venous thrombosis.    ACC: [id number] EXAM:  XR FOOT COMP MIN 3 VIEWS BI   ORDERED BY: RAINER ECKERT     PROCEDURE DATE:  2023      IMPRESSION: Right heel ulcer with possible underlying osteomyelitis of   the calcaneus.    ACC: [id number] EXAM:  XR TIB FIB AP LAT 2 VIEWS BI   ORDERED BY: HIRA MCMILLAN     PROCEDURE DATE:  2023        INTERPRETATION:  Bilateral lower extremity wounds.    Bilateral tibia-fibula 2 views each side.    IMPRESSION: No acute fracture or dislocation. No focal bone lysis or   unusual periosteal reaction. Diffuse bilateral soft tissue edema. No soft tissue gas. If there is concern for osteomyelitis consider MRI for more sensitive   evaluation    PEx:  T(C): 36.9 (12-14-23 @ 12:34), Max: 37 (23 @ 23:46)  HR: 104 (23 @ 12:34) (79 - 104)  BP: 110/66 (23 @ 12:34) (104/67 - 112/60)  RR: 17 (23 @ 12:34) (17 - 18)  SpO2: 94% (23 @ 12:34) (94% - 96%)  Wt(kg): --90.7     General: ill appearing male in be with c/o pain  HEENT: normocephalic, atraumatic, poor dentition   Neck: supple, no JVD   CVS: S1 S2 RRR, tachycardic, no MRG   Resp: CTAB, no RRW  GI: soft, NT, nor R/G, + colostomy  : indwelling west in situ   Musc: severe BLE contractures    Neuro: oriented x 4, non focal, paraplegic  Psych: situationally depressed    Skin:   Posterolateral decubitus wound  at the level of the greater trochanter, Soft tissue wounds are  along the calcaneus and mid foot r heel osteo    Preadmit Karnofsky: 40 %  Current Karnofsky:  40 %  http://www.npcrc.org/files/news/karnofsky_performance_scale.pdf   http://www.npcrc.org/files/news/palliative_performance_scale_PPSv2.pdf  Cachexia (Y/N): N  BMI:25.7    Advanced Directives:     Full Code     HCP      Decision maker: Gonzalez Stewart  Legal surrogate: Mariya Stewart (mother)    GOALS OF CARE DISCUSSION       Palliative care info/counseling provided	           Advanced Directives addressed please see Advance Care 	            Planning Note        GONZALEZ STEWART          MRN-[id number]           : 1970    HPI:  Gonzalez Stewart is a 53 year old male with PMHx of cervical epidural abscess (s/p decompressive laminectomy 3/2021 c/b b/l leg paralysis), hx of pneumoperitoneum (s/p ex lap, total abdominal colectomy and end ileostomy (on 23) c/b evisceration and sepsis with MRSA bacteremia postoperatively), hx of hepatitis C, hx of R. buttock abscess, hx of L. foot osteomyelitis, neurogenic bladder (with indwelling West catheter, last exchanged two days ago), HTN, HLD, bipolar disorder, GERD, hx of opioid dependence, and constipation who presented to the ED on 23 from [facility] for feet infection.    Patient reports he has had bilateral foot infections intermittently for the past year. Completed numerous rounds of antibiotics and even got C. difficile due to all the antibiotics.     Extensive chart review with paperwork from [facility]. In 2023, found to have L. hallux osteomyelitis. L. heel wound culture with Proteus mirabilis ESBL. Received PICC line and completed 6-week therapy with ceftazidime/avibactam q8. Found to have detectable viral load of hepatitis C. Previously completed sofosbuvir/velpatasvir so thought to be resistant to that and completed sofosbuvir/velpatasvir/voxilaprevir. Follows with ID.    In the ED, VSS except Tmax 101 and BP as low as 105/59. WBC 15.25K, hgb 10.5, bicarb 34, alkphos 227. ESR 84. U/A (sample obtained from West catheter) with positive nitrites, moderate leuks, moderate blood, WBC 26-50, RBC > 50, moderate bacteria, squamous epithelial cells present. CT b/l LE with study is severely limited by contracture. Left lower extremity is only partially visualized with exclusion of the left foot. Skin induration and subcutaneous edema in the leg and ankle.  No soft tissue gas. Received acetaminophen 1 g IV, morphine 8 mg IV, oxycodone 5 mg, vancomycin 1 g, zosyn 3.375 g, and LR 2800 cc bolus. Evaluated by vascular surgery PA who states attending to evaluate in AM. Evaluated by podiatry, underwent bedside escharectomy of left foot. Wound culture with +GPC in pairs. (05 Dec 2023 03:00)    Palliative Medicine consulted to assist wit GOC in setting of multiple chronic comorbidities and to assist with pain management       PAST MEDICAL & SURGICAL HISTORY:  CAD (coronary artery disease)  HTN (hypertension)  HLD (hyperlipidemia)  Neurogenic bladder  Bipolar disorder  S/P ileostomy  Indwelling West catheter present  Chronic hepatitis C virus infection  S/P laminectomy  S/P ileostomy    FAMILY HISTORY:  Reviewed and found non contributory in mother or father    SOCIAL HISTORY: , no children, has girlfriend but unaware as to where she is at the present time  living at [facility] for the past 2 years, was     ROS:                      Dyspnea (Berta 0-10):  0                      N/V (Y/N): N                            Secretions (Y/N) : N              Agitation(Y/N): N  Pain (Y/N):  Y pt with c/o burning, lancinating pain originating in lower back and radiating down B/L legs. Pain is spontaneous and is relieveded with rest and medications He further c/o constant spasms to his lower back and hips. Pt states pain 7/10 at present, elevates to 9/10 at worst, relieved down to 3-4 with use of meds.      General:  Denied  HEENT:    Denied  Neck:  Denied  CVS:  Denied  Resp:  Denied  GI:  Denied  :  Denied  Musc:  Denied  Neuro:  Denied  Psych:  Denied  Skin:  Denied  Lymph:  Denied    Allergies    NSAIDs (Flushing; Other (Moderate))  Haldol (Anaphylaxis)  Zyprexa (Rash; Dystonic RXN; Hives)  Motrin (Anaphylaxis)  Thorazine (Other (Moderate))  Aleve (Unknown)  Stelazine (Unknown)  Risperdal (Short breath; Rash; Hives)    Intolerances    Opiate Naive (Y/N): N  -iStop reviewed (Y/N): Yes. | Reference #: [id number]       PDI	First Name	Last Name	Birth Date	Gender	Street Address	[facility] Code  A	Gonzalez Stewart	1970	Male	[address] [facility]	[address]  B	Gonzalez Stewart	1970	Male	[address]	[facility]	[address]    Prescription Information      PDI Filter:    PDI	My Rx	Current Rx	Drug Type	Rx Written	Rx Dispensed	Drug	Quantity	Days Supply	Prescriber Name	Prescriber CLAY #  A	N	Y	O	2023	oxycodone hcl (ir) 5 mg tablet	120	30	Owen, Eric D	[id number]  Payment Method Cash  [facility]  A	N	Y	S	2023	methylphenidate 5 mg tablet	60	30	Owen, Eric D	[id number]  Payment Method Medicaid  [facility]  A	N	N	O	2023	oxycodone hcl (ir) 5 mg tablet	12	3	Owen, Eric D	[id number]  Payment Method Cash  [facility]  A	N	N	S	10/16/2023	10/16/2023	methylphenidate 5 mg tablet	60	30	Thompsons, Eric D	[id number]  Payment Method Medicaid  [facility]  A	N	N	O	10/16/2023	10/16/2023	oxycodone hcl (ir) 5 mg tablet	120	30	Thompsons, Eric D	[id number]  Payment Method Cash  [facility]  A	N	N	S	2023	methylphenidate 5 mg tablet	60	30	Owen, Eric D	[id number]  Payment Method Medicaid  [facility]  A	N	N	O	2023	oxycodone hcl (ir) 5 mg tablet	120	30	Owen, Eric D	[id number]  Payment Method Medicaid  [facility]  A	N	N	S	08/15/2023	08/15/2023	methylphenidate 5 mg tablet	60	30	Owen, Eric D	[id number]  Payment Method Medicaid  [facility]  A	N	N	O	2023	oxycodone hcl (ir) 5 mg tablet	120	30	Thompsons, Eric D	[id number]  Payment Method Medicaid  [facility]  A	N	N	S	2023	methylphenidate 5 mg tablet	60	30	Owen, Eric D	[id number]  Payment Method Medicaid  [facility]  A	N	N	S	2023	methylphenidate 5 mg tablet	60	30	Thompsons, Eric D	[id number]  Payment Method Medicaid  [facility]  A	N	N	O	2023	oxycodone hcl (ir) 5 mg tablet	120	30	Nick Weaverin BRIDGER	[id number]  Payment Method Medicaid  [facility]  B	N	N	O	2023	oxycodone-acetaminophen 5-325 mg tablet	8	2	[facility]	[id number]  Payment Method Medicaid  [facility]             Medications:      MEDICATIONS  (STANDING):  acetaminophen     Tablet .. 650 milliGRAM(s) Oral daily  albuterol/ipratropium for Nebulization 3 milliLiter(s) Nebulizer every 6 hours  amLODIPine   Tablet 2.5 milliGRAM(s) Oral daily  ascorbic acid 500 milliGRAM(s) Oral daily  atorvastatin 40 milliGRAM(s) Oral at bedtime  bacitracin   Ointment 1 Application(s) Topical daily  chlorhexidine 2% Cloths 1 Application(s) Topical <User Schedule>  collagenase Ointment 1 Application(s) Topical daily  cyclobenzaprine 10 milliGRAM(s) Oral two times a day  DULoxetine 30 milliGRAM(s) Oral daily  enoxaparin Injectable 40 milliGRAM(s) SubCutaneous every 24 hours  finasteride 5 milliGRAM(s) Oral daily  gabapentin 600 milliGRAM(s) Oral <User Schedule>  guaiFENesin  milliGRAM(s) Oral every 12 hours  lactobacillus acidophilus 1 Tablet(s) Oral two times a day with meals  lidocaine   4% Patch 1 Patch Transdermal daily  meropenem  IVPB 1000 milliGRAM(s) IV Intermittent every 8 hours  methadone  Concentrate 110 milliGRAM(s) Oral daily  methylphenidate 5 milliGRAM(s) Oral <User Schedule>  multivitamin 1 Tablet(s) Oral daily  nystatin Powder 1 Application(s) Topical two times a day  pantoprazole    Tablet 40 milliGRAM(s) Oral before breakfast  polyethylene glycol 3350 17 Gram(s) Oral daily  QUEtiapine 100 milliGRAM(s) Oral <User Schedule>  QUEtiapine 400 milliGRAM(s) Oral at bedtime  senna 2 Tablet(s) Oral at bedtime  tamsulosin 0.4 milliGRAM(s) Oral at bedtime  thiamine 100 milliGRAM(s) Oral daily  vancomycin  IVPB 1000 milliGRAM(s) IV Intermittent every 12 hours  vancomycin  IVPB      zinc sulfate 220 milliGRAM(s) Oral daily    MEDICATIONS  (PRN):  acetaminophen     Tablet .. 650 milliGRAM(s) Oral every 6 hours PRN Temp greater or equal to 38C (100.4F), Mild Pain (1 - 3)  albuterol    90 MICROgram(s) HFA Inhaler 2 Puff(s) Inhalation every 4 hours PRN Shortness of Breath and/or Wheezing  aluminum hydroxide/magnesium hydroxide/simethicone Suspension 30 milliLiter(s) Oral every 4 hours PRN Dyspepsia  HYDROmorphone  Injectable 2 milliGRAM(s) IV Push every 4 hours PRN Severe Pain (7 - 10)  melatonin 3 milliGRAM(s) Oral at bedtime PRN Insomnia  ondansetron Injectable 4 milliGRAM(s) IV Push every 8 hours PRN Nausea and/or Vomiting  sodium chloride 0.9% lock flush 10 milliLiter(s) IV Push every 1 hour PRN Pre/post blood products, medications, blood draw, and to maintain line patency    Labs:    CBC:                        10.1   8.74  )-----------( 194      ( 14 Dec 2023 09:52 )             33.0     CMP:    12-14    142  |  104  |  17  ----------------------------<  144<H>  3.4<L>   |  36<H>  |  0.67    Ca    8.6      14 Dec 2023 09:52  Phos  4.7     12-13  Mg     2.0     12-13     Urinalysis Basic - ( 14 Dec 2023 09:52 )    Color: x / Appearance: x / SG: x / pH: x  Gluc: 144 mg/dL / Ketone: x  / Bili: x / Urobili: x   Blood: x / Protein: x / Nitrite: x   Leuk Esterase: x / RBC: x / WBC x   Sq Epi: x / Non Sq Epi: x / Bacteria: x    Imaging:   < from: CT Lower Extremity w/ IV Cont, Bilateral (23 @ 23:00) >  ACC: [id number] EXAM:  CT LWR EXT IC BI   ORDERED BY: HIRA MCMILLAN     PROCEDURE DATE:  2023      INTERPRETATION:  CT LOWER EXTREMITY WITH IV CONTRAST BILATERAL    HISTORY: Bilateral wounds. Pain. Infection. Evaluate for gas. Right hip   wound and bilateral lower extremity foot wounds. Paraplegia.      COMPARISON:  Right foot x-rays dated 2023. Bilateral tibia and   fibula x-rays dated 2023.      IMPRESSION:  Patient contractured state and positioning causes limitation.  Preliminary report was provided by [facility].    Right femoral trochanteric region to the ankle, the foot is not included.  1.  Posterolateral decubitus wound is present at the level of the greater   trochanter without appreciated abscess or gas.    Left proximal tibia to the foot  1.  Soft tissue wounds are present along the calcaneus and midfoot   without appreciated abscess or gas.  2.  There is erosive change of the 1st interphalangeal joint of   indeterminate age. Correlation is suggested for signs of infection in   this location.    < end of copied text >    ACC: [id number] EXAM:  XR CHEST PORTABLE IMMED 1V   ORDERED BY: SINA HEAD     PROCEDURE DATE:  2023        INTERPRETATION:  Portable chest radiograph    CLINICAL INFORMATION: Dyspnea, shortness of breath.    TECHNIQUE:  Portable  AP chest radiograph.    COMPARISON: 2023 chest x-ray .    IMPRESSION: Cardiomegaly.  LEFT greater than RIGHT and a perihilar mild diffuse airspace disease   concerning for either  pulmonary edema of cardiac or noncardiac origin   versus infectious process  .  ACC: [id number] EXAM:  US DPLX UPR EXT VEINS LTD LT   ORDERED BY: PHONG CHÁVEZ     PROCEDURE DATE:  2023      INTERPRETATION:  CLINICAL INFORMATION: Left forearm redness    COMPARISON: None available.    IMPRESSION:  Superficial thrombophlebitis involving the left cephalic vein in the   forearm.    No evidence of left upper extremity deep venous thrombosis.    ACC: [id number] EXAM:  XR FOOT COMP MIN 3 VIEWS BI   ORDERED BY: RAINER ECKERT     PROCEDURE DATE:  2023      IMPRESSION: Right heel ulcer with possible underlying osteomyelitis of   the calcaneus.    ACC: [id number] EXAM:  XR TIB FIB AP LAT 2 VIEWS BI   ORDERED BY: HIRA MCMILLAN     PROCEDURE DATE:  2023        INTERPRETATION:  Bilateral lower extremity wounds.    Bilateral tibia-fibula 2 views each side.    IMPRESSION: No acute fracture or dislocation. No focal bone lysis or   unusual periosteal reaction. Diffuse bilateral soft tissue edema. No soft tissue gas. If there is concern for osteomyelitis consider MRI for more sensitive   evaluation    PEx:  T(C): 36.9 (12-14-23 @ 12:34), Max: 37 (23 @ 23:46)  HR: 104 (23 @ 12:34) (79 - 104)  BP: 110/66 (23 @ 12:34) (104/67 - 112/60)  RR: 17 (23 @ 12:34) (17 - 18)  SpO2: 94% (23 @ 12:34) (94% - 96%)  Wt(kg): --90.7     General: ill appearing male in be with c/o pain  HEENT: normocephalic, atraumatic, poor dentition   Neck: supple, no JVD   CVS: S1 S2 RRR, tachycardic, no MRG   Resp: CTAB, no RRW  GI: soft, NT, nor R/G, + colostomy  : indwelling west in situ   Musc: severe BLE contractures    Neuro: oriented x 4, non focal, paraplegic  Psych: situationally depressed    Skin:   Posterolateral decubitus wound  at the level of the greater trochanter, Soft tissue wounds are  along the calcaneus and mid foot r heel osteo    Preadmit Karnofsky: 40 %  Current Karnofsky:  40 %  http://www.npcrc.org/files/news/karnofsky_performance_scale.pdf   http://www.npcrc.org/files/news/palliative_performance_scale_PPSv2.pdf  Cachexia (Y/N): N  BMI:25.7    Advanced Directives:     Full Code     HCP      Decision maker: Gonzalez Stewart  Legal surrogate: Mariya Stewart (mother)    GOALS OF CARE DISCUSSION       Palliative care info/counseling provided	           Advanced Directives addressed please see Advance Care 	            Planning Note

## 2023-12-14 NOTE — PHARMACOTHERAPY INTERVENTION NOTE - COMMENTS
Recommended to change meropenem frequency to q8h (CrCl 160).
Patient on vancomycin 1250mg q8h with trough of 19.9 and calculated AUC of 642. Therapeutic AUC range is 400-600. Recommended to adjust the vancomycin to 1250mg q12h to target an estimated AUC of 523.
Recommended to adjust vancomycin dose to 1000mg q12h to target estimated AUC of 495.

## 2023-12-14 NOTE — CONSULT NOTE ADULT - PROBLEM SELECTOR RECOMMENDATION 9
b/l feet infection intermittently over the past year  Previously treated for L. hallux OM with L. heel culture growing Proteus mirabilis ESBL, completed 6 week course of avycaz  CT b/l LE with study is severely limited by contracture. Left lower extremity is only partially visualized with exclusion of the left foot. Skin induration and subcutaneous edema in the leg and ankle.  No soft tissue gas  continue Course of Vancomycin to completion

## 2023-12-15 DIAGNOSIS — J96.01 ACUTE RESPIRATORY FAILURE WITH HYPOXIA: ICD-10-CM

## 2023-12-15 DIAGNOSIS — R53.81 OTHER MALAISE: ICD-10-CM

## 2023-12-15 LAB
VANCOMYCIN TROUGH SERPL-MCNC: 17.4 UG/ML — SIGNIFICANT CHANGE UP (ref 10–20)
VANCOMYCIN TROUGH SERPL-MCNC: 17.4 UG/ML — SIGNIFICANT CHANGE UP (ref 10–20)

## 2023-12-15 PROCEDURE — 93306 TTE W/DOPPLER COMPLETE: CPT | Mod: 26

## 2023-12-15 PROCEDURE — 99231 SBSQ HOSP IP/OBS SF/LOW 25: CPT

## 2023-12-15 PROCEDURE — 99233 SBSQ HOSP IP/OBS HIGH 50: CPT

## 2023-12-15 PROCEDURE — 99232 SBSQ HOSP IP/OBS MODERATE 35: CPT

## 2023-12-15 RX ORDER — VANCOMYCIN HCL 1 G
1000 VIAL (EA) INTRAVENOUS EVERY 12 HOURS
Refills: 0 | Status: DISCONTINUED | OUTPATIENT
Start: 2023-12-15 | End: 2024-01-03

## 2023-12-15 RX ORDER — METHADONE HYDROCHLORIDE 40 MG/1
60 TABLET ORAL
Refills: 0 | Status: DISCONTINUED | OUTPATIENT
Start: 2023-12-16 | End: 2023-12-23

## 2023-12-15 RX ADMIN — Medication 250 MILLIGRAM(S): at 05:24

## 2023-12-15 RX ADMIN — HYDROMORPHONE HYDROCHLORIDE 2 MILLIGRAM(S): 2 INJECTION INTRAMUSCULAR; INTRAVENOUS; SUBCUTANEOUS at 17:28

## 2023-12-15 RX ADMIN — Medication 600 MILLIGRAM(S): at 18:18

## 2023-12-15 RX ADMIN — FINASTERIDE 5 MILLIGRAM(S): 5 TABLET, FILM COATED ORAL at 11:52

## 2023-12-15 RX ADMIN — Medication 100 MILLIGRAM(S): at 11:51

## 2023-12-15 RX ADMIN — METHADONE HYDROCHLORIDE 110 MILLIGRAM(S): 40 TABLET ORAL at 13:38

## 2023-12-15 RX ADMIN — Medication 3 MILLILITER(S): at 23:38

## 2023-12-15 RX ADMIN — PANTOPRAZOLE SODIUM 40 MILLIGRAM(S): 20 TABLET, DELAYED RELEASE ORAL at 08:55

## 2023-12-15 RX ADMIN — CHLORHEXIDINE GLUCONATE 1 APPLICATION(S): 213 SOLUTION TOPICAL at 05:23

## 2023-12-15 RX ADMIN — GABAPENTIN 800 MILLIGRAM(S): 400 CAPSULE ORAL at 05:24

## 2023-12-15 RX ADMIN — NYSTATIN CREAM 1 APPLICATION(S): 100000 CREAM TOPICAL at 06:22

## 2023-12-15 RX ADMIN — MEROPENEM 100 MILLIGRAM(S): 1 INJECTION INTRAVENOUS at 05:23

## 2023-12-15 RX ADMIN — TAMSULOSIN HYDROCHLORIDE 0.4 MILLIGRAM(S): 0.4 CAPSULE ORAL at 22:19

## 2023-12-15 RX ADMIN — CYCLOBENZAPRINE HYDROCHLORIDE 10 MILLIGRAM(S): 10 TABLET, FILM COATED ORAL at 22:18

## 2023-12-15 RX ADMIN — HYDROMORPHONE HYDROCHLORIDE 2 MILLIGRAM(S): 2 INJECTION INTRAMUSCULAR; INTRAVENOUS; SUBCUTANEOUS at 11:48

## 2023-12-15 RX ADMIN — QUETIAPINE FUMARATE 100 MILLIGRAM(S): 200 TABLET, FILM COATED ORAL at 09:23

## 2023-12-15 RX ADMIN — Medication 600 MILLIGRAM(S): at 05:24

## 2023-12-15 RX ADMIN — HYDROMORPHONE HYDROCHLORIDE 2 MILLIGRAM(S): 2 INJECTION INTRAMUSCULAR; INTRAVENOUS; SUBCUTANEOUS at 08:12

## 2023-12-15 RX ADMIN — HYDROMORPHONE HYDROCHLORIDE 2 MILLIGRAM(S): 2 INJECTION INTRAMUSCULAR; INTRAVENOUS; SUBCUTANEOUS at 16:33

## 2023-12-15 RX ADMIN — GABAPENTIN 800 MILLIGRAM(S): 400 CAPSULE ORAL at 22:18

## 2023-12-15 RX ADMIN — Medication 1 APPLICATION(S): at 13:38

## 2023-12-15 RX ADMIN — HYDROMORPHONE HYDROCHLORIDE 2 MILLIGRAM(S): 2 INJECTION INTRAMUSCULAR; INTRAVENOUS; SUBCUTANEOUS at 12:28

## 2023-12-15 RX ADMIN — Medication 1 TABLET(S): at 16:33

## 2023-12-15 RX ADMIN — AMLODIPINE BESYLATE 2.5 MILLIGRAM(S): 2.5 TABLET ORAL at 05:24

## 2023-12-15 RX ADMIN — ATORVASTATIN CALCIUM 40 MILLIGRAM(S): 80 TABLET, FILM COATED ORAL at 22:19

## 2023-12-15 RX ADMIN — Medication 1 TABLET(S): at 11:52

## 2023-12-15 RX ADMIN — LIDOCAINE 1 PATCH: 4 CREAM TOPICAL at 19:50

## 2023-12-15 RX ADMIN — QUETIAPINE FUMARATE 100 MILLIGRAM(S): 200 TABLET, FILM COATED ORAL at 18:23

## 2023-12-15 RX ADMIN — DULOXETINE HYDROCHLORIDE 30 MILLIGRAM(S): 30 CAPSULE, DELAYED RELEASE ORAL at 11:51

## 2023-12-15 RX ADMIN — Medication 500 MILLIGRAM(S): at 11:50

## 2023-12-15 RX ADMIN — LIDOCAINE 1 PATCH: 4 CREAM TOPICAL at 11:49

## 2023-12-15 RX ADMIN — Medication 3 MILLILITER(S): at 17:02

## 2023-12-15 RX ADMIN — Medication 650 MILLIGRAM(S): at 12:50

## 2023-12-15 RX ADMIN — NYSTATIN CREAM 1 APPLICATION(S): 100000 CREAM TOPICAL at 18:19

## 2023-12-15 RX ADMIN — MEROPENEM 100 MILLIGRAM(S): 1 INJECTION INTRAVENOUS at 22:18

## 2023-12-15 RX ADMIN — MEROPENEM 100 MILLIGRAM(S): 1 INJECTION INTRAVENOUS at 13:41

## 2023-12-15 RX ADMIN — Medication 250 MILLIGRAM(S): at 18:18

## 2023-12-15 RX ADMIN — Medication 5 MILLIGRAM(S): at 21:16

## 2023-12-15 RX ADMIN — Medication 5 MILLIGRAM(S): at 09:20

## 2023-12-15 RX ADMIN — HYDROMORPHONE HYDROCHLORIDE 2 MILLIGRAM(S): 2 INJECTION INTRAMUSCULAR; INTRAVENOUS; SUBCUTANEOUS at 06:20

## 2023-12-15 RX ADMIN — Medication 1 APPLICATION(S): at 11:48

## 2023-12-15 RX ADMIN — ENOXAPARIN SODIUM 40 MILLIGRAM(S): 100 INJECTION SUBCUTANEOUS at 22:19

## 2023-12-15 RX ADMIN — ZINC SULFATE TAB 220 MG (50 MG ZINC EQUIVALENT) 220 MILLIGRAM(S): 220 (50 ZN) TAB at 11:51

## 2023-12-15 RX ADMIN — GABAPENTIN 800 MILLIGRAM(S): 400 CAPSULE ORAL at 13:39

## 2023-12-15 RX ADMIN — CYCLOBENZAPRINE HYDROCHLORIDE 10 MILLIGRAM(S): 10 TABLET, FILM COATED ORAL at 05:24

## 2023-12-15 RX ADMIN — Medication 3 MILLILITER(S): at 05:06

## 2023-12-15 RX ADMIN — QUETIAPINE FUMARATE 400 MILLIGRAM(S): 200 TABLET, FILM COATED ORAL at 22:18

## 2023-12-15 RX ADMIN — CYCLOBENZAPRINE HYDROCHLORIDE 10 MILLIGRAM(S): 10 TABLET, FILM COATED ORAL at 13:38

## 2023-12-15 RX ADMIN — Medication 1 TABLET(S): at 09:20

## 2023-12-15 RX ADMIN — Medication 3 MILLILITER(S): at 11:09

## 2023-12-15 RX ADMIN — Medication 650 MILLIGRAM(S): at 11:50

## 2023-12-15 NOTE — PROGRESS NOTE ADULT - PROBLEM SELECTOR PLAN 6
Pt alert, mother Mariya is HCP. Remains full code at this time. Palliative will follow for ongoing symptom management, GOC discussions pending clinical course.

## 2023-12-15 NOTE — PROGRESS NOTE ADULT - ASSESSMENT
53 year old male with PMHx of cervical epidural abscess (s/p decompressive laminectomy 3/2021 c/b b/l leg paralysis), contractures,  hx of pneumoperitoneum (s/p ex lap, total abdominal colectomy and end ileostomy (on 1/12/23) c/b evisceration and sepsis with MRSA bacteremia postoperatively), hx of hepatitis C, hx of R. buttock abscess, hx of L. hallux osteo, adm w sepsis 2/2 foot wounds, course c/b hypoxic resp failure, fluid overload, s/p diuresis. Palliative consulted for GOC, pain management.

## 2023-12-15 NOTE — BH CONSULTATION LIAISON PROGRESS NOTE - NSBHCONSULTFOLLOW_PSY_ALL_CORE
No, psychiatric follow up needed - call with questions...

## 2023-12-15 NOTE — PROGRESS NOTE ADULT - ASSESSMENT
Gonzalez Stewart is a 53 year old male with PMHx of cervical epidural abscess (s/p decompressive laminectomy 3/2021 c/b b/l leg paralysis), hx of pneumoperitoneum (s/p ex lap, total abdominal colectomy and end ileostomy (on 1/12/23) c/b evisceration and sepsis with MRSA bacteremia postoperatively), hx of hepatitis C, hx of R. buttock abscess, hx of L. foot osteomyelitis, neurogenic bladder (with indwelling Holcomb catheter, last exchanged two days ago), HTN, HLD, bipolar disorder, GERD, hx of opioid dependence, and constipation who presented to the ED on 12/4/23 from [facility] for feet infection and admitted for sepsis secondary to bilateral feet infection.      Sepsis secondary to b/l feet infection  Previously treated for L. hallux OM with L. heel culture growing Proteus mirabilis ESBL, completed 6-week course of avycaz  U/A (sample obtained from Holcomb catheter) with positive nitrites, moderate leuks, moderate blood, WBC 26-50, RBC > 50, moderate bacteria, squamous epithelial cells present  CT b/l LE with study is severely limited by contracture. Left lower extremity is only partially visualized with exclusion of the left foot. Skin induration and subcutaneous edema in the leg and ankle.  No soft tissue gas  S/p bedside escharectomy by podiatry  Empirically started on vancomycin and cefepime   right foot wound cx- ESBL proteus - resistant to cefepime and corynebecaterium and alcalegenes  left foot wound cx- MRSA and Pseudomonas  and klebsiella   Blood cx- neg x 2  PICC line in place ertapenem switched to meropenem   Will continue meropenem and vancomycin until 1/8   ECHO pending results     COPD  Baseline patient is on 5L O2 NC at home   Desaturated a couple of days ago, seemed to be from fluid overload from IVF   Placed on lasix drip with improvement, transitioned to PO lasix   Echo ordered, pending results     HTN  PTA amlodipine 2.5 mg - bp stable    HLD  PTA atorvastatin 40 mg    Bipolar disorder  PTA quetiapine 100 mg BID and 400 mg qhs, valproic acid stopped   Psych following, appreciate recs     Hx of opioid dependence  PTA methadone 110 mg     Chronic pain: PTA lidocaine 4% patch, duloxetine 30 mg DR, gabapentin 600 mg q8, cyclobenzaprine 10 mg BID, oxycodone 5 mg q6    GERD:   PTA pantoprazole 40 mg     Neurogenic bladder (with indwelling Holcomb catheter):   PTA finasteride 5 mg, tamsulosin 0.4 mg    Constipation:   PTA senna 8.6 mg 2 tabs qhs     functional quad-   supportive care  , frequent repositioning    Rt hip pressure wound, seen by vascular ,   wound recs in place     Vascular requests Cardio clearance prior to left AKA. Reached out to cardio, Dr. Berry, does not think this patient warrants cardiology clearance. Will await results of echo, and reconsult if abnormal.  Patient has has been under anesthesia in the past without any problems or adverse reactions.   Denies h/o MI, TIA, CVA.  Gonzalez Stewart is a 53 year old male with PMHx of cervical epidural abscess (s/p decompressive laminectomy 3/2021 c/b b/l leg paralysis), hx of pneumoperitoneum (s/p ex lap, total abdominal colectomy and end ileostomy (on 1/12/23) c/b evisceration and sepsis with MRSA bacteremia postoperatively), hx of hepatitis C, hx of R. buttock abscess, hx of L. foot osteomyelitis, neurogenic bladder (with indwelling Holcomb catheter, last exchanged two days ago), HTN, HLD, bipolar disorder, GERD, hx of opioid dependence, and constipation who presented to the ED on 12/4/23 from [facility] for feet infection and admitted for sepsis secondary to bilateral feet infection.      Sepsis secondary to b/l feet infection  Previously treated for L. hallux OM with L. heel culture growing Proteus mirabilis ESBL, completed 6-week course of avycaz  U/A (sample obtained from Holcomb catheter) with positive nitrites, moderate leuks, moderate blood, WBC 26-50, RBC > 50, moderate bacteria, squamous epithelial cells present  CT b/l LE with study is severely limited by contracture. Left lower extremity is only partially visualized with exclusion of the left foot. Skin induration and subcutaneous edema in the leg and ankle.  No soft tissue gas  S/p bedside escharectomy by podiatry  Empirically started on vancomycin and cefepime   right foot wound cx- ESBL proteus - resistant to cefepime and corynebecaterium and alcalegenes  left foot wound cx- MRSA and Pseudomonas  and klebsiella   Blood cx- neg x 2  PICC line in place ertapenem switched to meropenem   Will continue meropenem and vancomycin until 1/8   ECHO pending results     COPD  Baseline patient is on 5L O2 NC at home   Desaturated a couple of days ago, seemed to be from fluid overload from IVF   Placed on lasix drip with improvement, transitioned to PO lasix   Echo ordered, pending results     HTN  PTA amlodipine 2.5 mg - bp stable    HLD  PTA atorvastatin 40 mg    Bipolar disorder  PTA quetiapine 100 mg BID and 400 mg qhs, valproic acid stopped   Psych following, appreciate recs     Hx of opioid dependence  PTA methadone 110 mg     Chronic pain: PTA lidocaine 4% patch, duloxetine 30 mg DR, gabapentin 600 mg q8, cyclobenzaprine 10 mg BID, oxycodone 5 mg q6    GERD:   PTA pantoprazole 40 mg     Neurogenic bladder (with indwelling Holcomb catheter):   PTA finasteride 5 mg, tamsulosin 0.4 mg    Constipation:   PTA senna 8.6 mg 2 tabs qhs     functional quad-   supportive care  , frequent repositioning    Rt hip pressure wound, seen by vascular ,   wound recs in place     Vascular requests Cardio clearance prior to left AKA. Reached out to cardio, Dr. Berry, does not think this patient warrants cardiology clearance. Will await results of echo, and reconsult if abnormal.  Patient has has been under anesthesia in the past without any problems or adverse reactions.   Denies h/o MI, TIA, CVA.

## 2023-12-15 NOTE — PROGRESS NOTE ADULT - NUTRITIONAL ASSESSMENT
This patient has been assessed with a concern for Malnutrition and has been determined to have a diagnosis/diagnoses of Moderate protein-calorie malnutrition.    This patient is being managed with:   Diet Regular-  1000mL Fluid Restriction ([id number])  Liquid Protein Supplement     Qty per Day:  1  Supplement Feeding Modality:  Oral  Ensure Plus High Protein Cans or Servings Per Day:  1       Frequency:  Two Times a day  Entered: Dec 13 2023  2:13PM    This patient has been assessed with a concern for Malnutrition and has been determined to have a diagnosis/diagnoses of Moderate protein-calorie malnutrition.    This patient is being managed with:   Diet Regular-  1000mL Fluid Restriction ([id number])  Liquid Protein Supplement     Qty per Day:  1  Supplement Feeding Modality:  Oral  Ensure Plus High Protein Cans or Servings Per Day:  1       Frequency:  Two Times a day  Entered: Dec 13 2023  2:13PM   This patient has been assessed with a concern for Malnutrition and has been determined to have a diagnosis/diagnoses of Moderate protein-calorie malnutrition.    This patient is being managed with:   Diet Regular-  1000mL Fluid Restriction ([id number])  Liquid Protein Supplement     Qty per Day:  1  Supplement Feeding Modality:  Oral  Ensure Plus High Protein Cans or Servings Per Day:  1       Frequency:  Two Times a day  Entered: Dec 13 2023  2:13PM    This patient has been assessed with a concern for Malnutrition and has been determined to have a diagnosis/diagnoses of Moderate protein-calorie malnutrition.    This patient is being managed with:   Diet Regular-  1000mL Fluid Restriction ([id number])  Liquid Protein Supplement     Qty per Day:  1  Supplement Feeding Modality:  Oral  Ensure Plus High Protein Cans or Servings Per Day:  1       Frequency:  Two Times a day  Entered: Dec 13 2023  2:13PM

## 2023-12-15 NOTE — PROGRESS NOTE ADULT - SUBJECTIVE AND OBJECTIVE BOX
Patient is a 53y old  Male who presents with a chief complaint of Sepsis secondary to b/l foot wounds (15 Dec 2023 13:21)      INTERVAL HPI/OVERNIGHT EVENTS: Overnight no acute events. C/o lower extremity pain.     MEDICATIONS  (STANDING):  acetaminophen     Tablet .. 650 milliGRAM(s) Oral daily  albuterol/ipratropium for Nebulization 3 milliLiter(s) Nebulizer every 6 hours  amLODIPine   Tablet 2.5 milliGRAM(s) Oral daily  ascorbic acid 500 milliGRAM(s) Oral daily  atorvastatin 40 milliGRAM(s) Oral at bedtime  bacitracin   Ointment 1 Application(s) Topical daily  chlorhexidine 2% Cloths 1 Application(s) Topical <User Schedule>  collagenase Ointment 1 Application(s) Topical daily  cyclobenzaprine 10 milliGRAM(s) Oral three times a day  DULoxetine 30 milliGRAM(s) Oral daily  enoxaparin Injectable 40 milliGRAM(s) SubCutaneous every 24 hours  finasteride 5 milliGRAM(s) Oral daily  gabapentin 800 milliGRAM(s) Oral every 8 hours  guaiFENesin  milliGRAM(s) Oral every 12 hours  lactobacillus acidophilus 1 Tablet(s) Oral two times a day with meals  lidocaine   4% Patch 1 Patch Transdermal daily  meropenem  IVPB 1000 milliGRAM(s) IV Intermittent every 8 hours  methadone  Concentrate 110 milliGRAM(s) Oral <User Schedule>  methylphenidate 5 milliGRAM(s) Oral <User Schedule>  multivitamin 1 Tablet(s) Oral daily  nystatin Powder 1 Application(s) Topical two times a day  pantoprazole    Tablet 40 milliGRAM(s) Oral before breakfast  polyethylene glycol 3350 17 Gram(s) Oral daily  QUEtiapine 400 milliGRAM(s) Oral at bedtime  QUEtiapine 100 milliGRAM(s) Oral <User Schedule>  senna 2 Tablet(s) Oral at bedtime  tamsulosin 0.4 milliGRAM(s) Oral at bedtime  thiamine 100 milliGRAM(s) Oral daily  vancomycin  IVPB 1000 milliGRAM(s) IV Intermittent every 12 hours  zinc sulfate 220 milliGRAM(s) Oral daily    MEDICATIONS  (PRN):  acetaminophen     Tablet .. 650 milliGRAM(s) Oral every 6 hours PRN Temp greater or equal to 38C (100.4F), Mild Pain (1 - 3)  albuterol    90 MICROgram(s) HFA Inhaler 2 Puff(s) Inhalation every 4 hours PRN Shortness of Breath and/or Wheezing  aluminum hydroxide/magnesium hydroxide/simethicone Suspension 30 milliLiter(s) Oral every 4 hours PRN Dyspepsia  HYDROmorphone  Injectable 2 milliGRAM(s) IV Push every 4 hours PRN Severe Pain (7 - 10)  melatonin 3 milliGRAM(s) Oral at bedtime PRN Insomnia  sodium chloride 0.9% lock flush 10 milliLiter(s) IV Push every 1 hour PRN Pre/post blood products, medications, blood draw, and to maintain line patency      Allergies    NSAIDs (Flushing; Other (Moderate))  Haldol (Anaphylaxis)  Zyprexa (Rash; Dystonic RXN; Hives)  Motrin (Anaphylaxis)  Thorazine (Other (Moderate))  Aleve (Unknown)  Stelazine (Unknown)  Risperdal (Short breath; Rash; Hives)    Intolerances        REVIEW OF SYSTEMS:  10 point ROS negative, unless stated otherwise.    Vital Signs Last 24 Hrs  T(C): 37 (15 Dec 2023 11:14), Max: 37.1 (15 Dec 2023 05:04)  T(F): 98.6 (15 Dec 2023 11:14), Max: 98.7 (15 Dec 2023 05:04)  HR: 90 (15 Dec 2023 11:26) (78 - 92)  BP: 104/65 (15 Dec 2023 11:14) (104/65 - 114/68)  BP(mean): --  RR: 18 (15 Dec 2023 11:14) (18 - 18)  SpO2: 93% (15 Dec 2023 11:26) (85% - 100%)    Parameters below as of 15 Dec 2023 11:26  Patient On (Oxygen Delivery Method): nasal cannula w/ humidification        PHYSICAL EXAM:  GENERAL: NAD  HEAD:  Atraumatic, Normocephalic  EYES: EOMI, sclera anicteric  ENMT: Moist mucous membranes  NECK: Supple, No JVD  NERVOUS SYSTEM:  Alert & Oriented, No FND appreciated   CHEST/LUNG: CTAB   HEART: RRR;   ABDOMEN: Soft, Nontender, midline incisional scar from previous surgery   EXTREMITIES: Contracted, pitting edema, wounds bilaterally     LABS:                        10.1   8.74  )-----------( 194      ( 14 Dec 2023 09:52 )             33.0     12-14    142  |  104  |  17  ----------------------------<  144<H>  3.4<L>   |  36<H>  |  0.67    Ca    8.6      14 Dec 2023 09:52        Urinalysis Basic - ( 14 Dec 2023 09:52 )    Color: x / Appearance: x / SG: x / pH: x  Gluc: 144 mg/dL / Ketone: x  / Bili: x / Urobili: x   Blood: x / Protein: x / Nitrite: x   Leuk Esterase: x / RBC: x / WBC x   Sq Epi: x / Non Sq Epi: x / Bacteria: x      CAPILLARY BLOOD GLUCOSE          RADIOLOGY & ADDITIONAL TESTS:    Imaging Personally Reviewed:  [ X] YES  [ ] NO    Consultant(s) Notes Reviewed:  [ X] YES  [ ] NO    Care Discussed with Consultants/Other Providers [X ] YES  [ ] NO

## 2023-12-15 NOTE — BH CONSULTATION LIAISON PROGRESS NOTE - OTHER
high end of fair  deferred  appropriate to context  intact  gaunt / deconditioned  weakened (expected)  significant LE contractures   concerned

## 2023-12-15 NOTE — PROGRESS NOTE ADULT - PROBLEM SELECTOR PLAN 1
Recurrent foot infections, previously treated for L. hallux OM with L. heel culture growing Proteus mirabilis ESBL, completed 6 week course of avycaz  CT b/l LE  severely limited by contracture. Left lower extremity is only partially visualized with exclusion of the left foot. Skin induration and subcutaneous edema in the leg and ankle.  No soft tissue gas  -on Vanc, meropenem, ID, podiatry and vascular following - recommended for amputation once medically stable

## 2023-12-15 NOTE — BH CONSULTATION LIAISON PROGRESS NOTE - NSBHATTESTBILLING_PSY_A_CORE
63722-Tvcipzelxl OBS or IP - low complexity OR 25-34 mins 37124-Ikskqosgby OBS or IP - low complexity OR 25-34 mins

## 2023-12-15 NOTE — PROGRESS NOTE ADULT - NS ATTEND AMEND GEN_ALL_CORE FT
All labs and cultures and imaging and pertinent chart notes reviewed by me.    case d/w Np Naty at length and agree with her assessment and plan.  Kush King MD  Infectious Disease Attending    for any questions please do not hesitate to contact me either via teams or by calling [phone] All labs and cultures and imaging and pertinent chart notes reviewed by me.    case d/w Np Naty at length and agree with her assessment and plan.  Kush King MD  Infectious Disease Attending    for any questions please do not hesitate to contact me either via teams or by calling [phone] All labs and cultures and imaging and pertinent chart notes reviewed by me.    case d/w Np Naty at length and agree with her assessment and plan.    Kush King MD  Infectious Disease Attending    for any questions please do not hesitate to contact me either via teams or by calling [phone] All labs and cultures and imaging and pertinent chart notes reviewed by me.    case d/w Np Naty at length and agree with her assessment and plan.    Kush King MD  Infectious Disease Attending    for any questions please do not hesitate to contact me either via teams or by calling [phone]

## 2023-12-15 NOTE — PROGRESS NOTE ADULT - PROBLEM SELECTOR PLAN 2
pain poorly managed at this time. pain appears to be mostly neuropathic and spasmatic in nature.    on Methadone maintenance dosing at 110 mg daily for hx of substance abuse, confirmed by primary team with [facility] . Recommend  dividing dose q 12 hrs as Methadone is a  good long acting agent  to aide in neuropathic pain. Spoke w Rosie at methadone clinic today, no objection to divided methadone dose. Continue to monitor for elongated QTc    Neurontin to 800 mg po q 8 hrs, can continue to titrate up to max of 3600 mg daily q 3 days   Flexeril to 10 mg po TID  Although Cymbalta was initially ordered for depression, it can have good adjuvant effect with neuropathic pain   continue Dilaudid prn  Bowel regimen w miralax, senna pain poorly managed at this time. pain appears to be mostly neuropathic and spasmatic in nature.    on Methadone maintenance dosing at 110 mg daily for hx of substance abuse, confirmed by primary team with [facility] Methadone clinic . Recommend  dividing dose q 12 hrs as Methadone is a  good long acting agent  to aide in neuropathic pain. Spoke w Rosie at methadone clinic today, no objection to divided methadone dose. Continue to monitor for elongated QTc    Neurontin to 800 mg po q 8 hrs, can continue to titrate up to max of 3600 mg daily q 3 days   Flexeril to 10 mg po TID  Although Cymbalta was initially ordered for depression, it can have good adjuvant effect with neuropathic pain   continue Dilaudid prn  Bowel regimen w miralax, senna

## 2023-12-15 NOTE — BH CONSULTATION LIAISON PROGRESS NOTE - NSICDXBHPRIMARYDX_PSY_ALL_CORE
Opioid dependence on agonist therapy   F11.20  

## 2023-12-15 NOTE — BH CONSULTATION LIAISON PROGRESS NOTE - CURRENT MEDICATION
MEDICATIONS  (STANDING):  acetaminophen     Tablet .. 650 milliGRAM(s) Oral daily  albuterol/ipratropium for Nebulization 3 milliLiter(s) Nebulizer every 6 hours  amLODIPine   Tablet 2.5 milliGRAM(s) Oral daily  ascorbic acid 500 milliGRAM(s) Oral daily  atorvastatin 40 milliGRAM(s) Oral at bedtime  bacitracin   Ointment 1 Application(s) Topical daily  chlorhexidine 2% Cloths 1 Application(s) Topical <User Schedule>  collagenase Ointment 1 Application(s) Topical daily  cyclobenzaprine 10 milliGRAM(s) Oral three times a day  DULoxetine 30 milliGRAM(s) Oral daily  enoxaparin Injectable 40 milliGRAM(s) SubCutaneous every 24 hours  finasteride 5 milliGRAM(s) Oral daily  gabapentin 800 milliGRAM(s) Oral every 8 hours  guaiFENesin  milliGRAM(s) Oral every 12 hours  lactobacillus acidophilus 1 Tablet(s) Oral two times a day with meals  lidocaine   4% Patch 1 Patch Transdermal daily  meropenem  IVPB 1000 milliGRAM(s) IV Intermittent every 8 hours  methylphenidate 5 milliGRAM(s) Oral <User Schedule>  multivitamin 1 Tablet(s) Oral daily  nystatin Powder 1 Application(s) Topical two times a day  pantoprazole    Tablet 40 milliGRAM(s) Oral before breakfast  polyethylene glycol 3350 17 Gram(s) Oral daily  QUEtiapine 400 milliGRAM(s) Oral at bedtime  QUEtiapine 100 milliGRAM(s) Oral <User Schedule>  senna 2 Tablet(s) Oral at bedtime  tamsulosin 0.4 milliGRAM(s) Oral at bedtime  thiamine 100 milliGRAM(s) Oral daily  vancomycin  IVPB 1000 milliGRAM(s) IV Intermittent every 12 hours  zinc sulfate 220 milliGRAM(s) Oral daily    MEDICATIONS  (PRN):  acetaminophen     Tablet .. 650 milliGRAM(s) Oral every 6 hours PRN Temp greater or equal to 38C (100.4F), Mild Pain (1 - 3)  albuterol    90 MICROgram(s) HFA Inhaler 2 Puff(s) Inhalation every 4 hours PRN Shortness of Breath and/or Wheezing  aluminum hydroxide/magnesium hydroxide/simethicone Suspension 30 milliLiter(s) Oral every 4 hours PRN Dyspepsia  HYDROmorphone  Injectable 2 milliGRAM(s) IV Push every 4 hours PRN Severe Pain (7 - 10)  melatonin 3 milliGRAM(s) Oral at bedtime PRN Insomnia  sodium chloride 0.9% lock flush 10 milliLiter(s) IV Push every 1 hour PRN Pre/post blood products, medications, blood draw, and to maintain line patency  
MEDICATIONS  (STANDING):  acetaminophen     Tablet .. 650 milliGRAM(s) Oral daily  albuterol/ipratropium for Nebulization 3 milliLiter(s) Nebulizer every 6 hours  amLODIPine   Tablet 2.5 milliGRAM(s) Oral daily  ascorbic acid 500 milliGRAM(s) Oral daily  atorvastatin 40 milliGRAM(s) Oral at bedtime  bacitracin   Ointment 1 Application(s) Topical daily  chlorhexidine 2% Cloths 1 Application(s) Topical <User Schedule>  collagenase Ointment 1 Application(s) Topical daily  cyclobenzaprine 10 milliGRAM(s) Oral two times a day  DULoxetine 30 milliGRAM(s) Oral daily  enoxaparin Injectable 40 milliGRAM(s) SubCutaneous every 24 hours  ertapenem  IVPB 1000 milliGRAM(s) IV Intermittent every 24 hours  finasteride 5 milliGRAM(s) Oral daily  furosemide   Injectable 40 milliGRAM(s) IV Push every 12 hours  gabapentin 600 milliGRAM(s) Oral <User Schedule>  guaiFENesin  milliGRAM(s) Oral every 12 hours  lactobacillus acidophilus 1 Tablet(s) Oral two times a day with meals  lidocaine   4% Patch 1 Patch Transdermal daily  methylphenidate 5 milliGRAM(s) Oral <User Schedule>  multivitamin 1 Tablet(s) Oral daily  nystatin Powder 1 Application(s) Topical two times a day  oxyCODONE    IR 5 milliGRAM(s) Oral <User Schedule>  pantoprazole    Tablet 40 milliGRAM(s) Oral before breakfast  polyethylene glycol 3350 17 Gram(s) Oral daily  QUEtiapine 100 milliGRAM(s) Oral <User Schedule>  QUEtiapine 400 milliGRAM(s) Oral at bedtime  senna 2 Tablet(s) Oral at bedtime  tamsulosin 0.4 milliGRAM(s) Oral at bedtime  thiamine 100 milliGRAM(s) Oral daily  vancomycin  IVPB      vancomycin  IVPB 1000 milliGRAM(s) IV Intermittent every 12 hours  zinc sulfate 220 milliGRAM(s) Oral daily    MEDICATIONS  (PRN):  acetaminophen     Tablet .. 650 milliGRAM(s) Oral every 6 hours PRN Temp greater or equal to 38C (100.4F), Mild Pain (1 - 3)  albuterol    90 MICROgram(s) HFA Inhaler 2 Puff(s) Inhalation every 4 hours PRN Shortness of Breath and/or Wheezing  aluminum hydroxide/magnesium hydroxide/simethicone Suspension 30 milliLiter(s) Oral every 4 hours PRN Dyspepsia  HYDROmorphone  Injectable 1 milliGRAM(s) IV Push every 4 hours PRN Severe Pain (7 - 10)  melatonin 3 milliGRAM(s) Oral at bedtime PRN Insomnia  ondansetron Injectable 4 milliGRAM(s) IV Push every 8 hours PRN Nausea and/or Vomiting  sodium chloride 0.9% lock flush 10 milliLiter(s) IV Push every 1 hour PRN Pre/post blood products, medications, blood draw, and to maintain line patency  
MEDICATIONS  (STANDING):  acetaminophen     Tablet .. 650 milliGRAM(s) Oral daily  amLODIPine   Tablet 2.5 milliGRAM(s) Oral daily  ascorbic acid 500 milliGRAM(s) Oral daily  atorvastatin 40 milliGRAM(s) Oral at bedtime  bacitracin   Ointment 1 Application(s) Topical daily  chlorhexidine 2% Cloths 1 Application(s) Topical <User Schedule>  collagenase Ointment 1 Application(s) Topical daily  cyclobenzaprine 10 milliGRAM(s) Oral two times a day  dextrose 5% + sodium chloride 0.45%. 1000 milliLiter(s) (50 mL/Hr) IV Continuous <Continuous>  divalproex  milliGRAM(s) Oral at bedtime  DULoxetine 30 milliGRAM(s) Oral daily  enoxaparin Injectable 40 milliGRAM(s) SubCutaneous every 24 hours  ertapenem  IVPB 1000 milliGRAM(s) IV Intermittent every 24 hours  finasteride 5 milliGRAM(s) Oral daily  gabapentin 600 milliGRAM(s) Oral <User Schedule>  lactobacillus acidophilus 1 Tablet(s) Oral two times a day with meals  lidocaine   4% Patch 1 Patch Transdermal daily  methadone  Concentrate 110 milliGRAM(s) Oral daily  methylphenidate 5 milliGRAM(s) Oral <User Schedule>  multivitamin 1 Tablet(s) Oral daily  nystatin Powder 1 Application(s) Topical two times a day  oxyCODONE    IR 5 milliGRAM(s) Oral <User Schedule>  pantoprazole    Tablet 40 milliGRAM(s) Oral before breakfast  polyethylene glycol 3350 17 Gram(s) Oral daily  QUEtiapine 400 milliGRAM(s) Oral at bedtime  QUEtiapine 100 milliGRAM(s) Oral <User Schedule>  senna 2 Tablet(s) Oral at bedtime  tamsulosin 0.4 milliGRAM(s) Oral at bedtime  thiamine 100 milliGRAM(s) Oral daily  vancomycin  IVPB      vancomycin  IVPB 1000 milliGRAM(s) IV Intermittent every 12 hours  zinc sulfate 220 milliGRAM(s) Oral daily    MEDICATIONS  (PRN):  acetaminophen     Tablet .. 650 milliGRAM(s) Oral every 6 hours PRN Temp greater or equal to 38C (100.4F), Mild Pain (1 - 3)  albuterol    90 MICROgram(s) HFA Inhaler 2 Puff(s) Inhalation every 4 hours PRN Shortness of Breath and/or Wheezing  aluminum hydroxide/magnesium hydroxide/simethicone Suspension 30 milliLiter(s) Oral every 4 hours PRN Dyspepsia  HYDROmorphone  Injectable 1 milliGRAM(s) IV Push every 4 hours PRN Severe Pain (7 - 10)  melatonin 3 milliGRAM(s) Oral at bedtime PRN Insomnia  ondansetron Injectable 4 milliGRAM(s) IV Push every 8 hours PRN Nausea and/or Vomiting  sodium chloride 0.9% lock flush 10 milliLiter(s) IV Push every 1 hour PRN Pre/post blood products, medications, blood draw, and to maintain line patency  
MEDICATIONS  (STANDING):  acetaminophen     Tablet .. 650 milliGRAM(s) Oral daily  albuterol/ipratropium for Nebulization 3 milliLiter(s) Nebulizer every 6 hours  amLODIPine   Tablet 2.5 milliGRAM(s) Oral daily  ascorbic acid 500 milliGRAM(s) Oral daily  atorvastatin 40 milliGRAM(s) Oral at bedtime  bacitracin   Ointment 1 Application(s) Topical daily  chlorhexidine 2% Cloths 1 Application(s) Topical <User Schedule>  collagenase Ointment 1 Application(s) Topical daily  cyclobenzaprine 10 milliGRAM(s) Oral two times a day  DULoxetine 30 milliGRAM(s) Oral daily  enoxaparin Injectable 40 milliGRAM(s) SubCutaneous every 24 hours  ertapenem  IVPB 1000 milliGRAM(s) IV Intermittent every 24 hours  finasteride 5 milliGRAM(s) Oral daily  furosemide   Injectable 40 milliGRAM(s) IV Push every 12 hours  gabapentin 600 milliGRAM(s) Oral <User Schedule>  guaiFENesin  milliGRAM(s) Oral every 12 hours  lactobacillus acidophilus 1 Tablet(s) Oral two times a day with meals  lidocaine   4% Patch 1 Patch Transdermal daily  methadone  Concentrate 110 milliGRAM(s) Oral daily  methylphenidate 5 milliGRAM(s) Oral <User Schedule>  multivitamin 1 Tablet(s) Oral daily  nystatin Powder 1 Application(s) Topical two times a day  pantoprazole    Tablet 40 milliGRAM(s) Oral before breakfast  polyethylene glycol 3350 17 Gram(s) Oral daily  QUEtiapine 400 milliGRAM(s) Oral at bedtime  QUEtiapine 100 milliGRAM(s) Oral <User Schedule>  senna 2 Tablet(s) Oral at bedtime  sodium chloride 3%  Inhalation 4 milliLiter(s) Inhalation every 12 hours  tamsulosin 0.4 milliGRAM(s) Oral at bedtime  thiamine 100 milliGRAM(s) Oral daily  vancomycin  IVPB 1000 milliGRAM(s) IV Intermittent every 12 hours  vancomycin  IVPB      zinc sulfate 220 milliGRAM(s) Oral daily    MEDICATIONS  (PRN):  acetaminophen     Tablet .. 650 milliGRAM(s) Oral every 6 hours PRN Temp greater or equal to 38C (100.4F), Mild Pain (1 - 3)  albuterol    90 MICROgram(s) HFA Inhaler 2 Puff(s) Inhalation every 4 hours PRN Shortness of Breath and/or Wheezing  aluminum hydroxide/magnesium hydroxide/simethicone Suspension 30 milliLiter(s) Oral every 4 hours PRN Dyspepsia  HYDROmorphone  Injectable 2 milliGRAM(s) IV Push every 4 hours PRN Severe Pain (7 - 10)  melatonin 3 milliGRAM(s) Oral at bedtime PRN Insomnia  ondansetron Injectable 4 milliGRAM(s) IV Push every 8 hours PRN Nausea and/or Vomiting  sodium chloride 0.9% lock flush 10 milliLiter(s) IV Push every 1 hour PRN Pre/post blood products, medications, blood draw, and to maintain line patency

## 2023-12-15 NOTE — BH CONSULTATION LIAISON PROGRESS NOTE - NSBHCHARTREVIEWVS_PSY_A_CORE FT
Vital Signs Last 24 Hrs  T(C): 37 (15 Dec 2023 11:14), Max: 37.1 (15 Dec 2023 05:04)  T(F): 98.6 (15 Dec 2023 11:14), Max: 98.7 (15 Dec 2023 05:04)  HR: 90 (15 Dec 2023 11:26) (78 - 92)  BP: 104/65 (15 Dec 2023 11:14) (104/65 - 114/68)  BP(mean): --  RR: 18 (15 Dec 2023 11:14) (18 - 18)  SpO2: 93% (15 Dec 2023 11:26) (93% - 100%)    Parameters below as of 15 Dec 2023 11:26  Patient On (Oxygen Delivery Method): nasal cannula w/ humidification    
Vital Signs Last 24 Hrs  T(C): 37.1 (11 Dec 2023 06:20), Max: 37.3 (10 Dec 2023 17:36)  T(F): 98.8 (11 Dec 2023 06:20), Max: 99.1 (10 Dec 2023 17:36)  HR: 90 (11 Dec 2023 06:20) (90 - 94)  BP: 136/75 (11 Dec 2023 06:20) (128/60 - 136/75)  BP(mean): --  RR: 18 (11 Dec 2023 06:20) (17 - 18)  SpO2: 97% (11 Dec 2023 06:20) (94% - 97%)    Parameters below as of 11 Dec 2023 06:20  Patient On (Oxygen Delivery Method): room air    
Vital Signs Last 24 Hrs  T(C): 37.2 (12 Dec 2023 12:07), Max: 37.2 (12 Dec 2023 12:07)  T(F): 99 (12 Dec 2023 12:07), Max: 99 (12 Dec 2023 12:07)  HR: 105 (12 Dec 2023 12:07) (78 - 105)  BP: 96/61 (12 Dec 2023 12:07) (96/61 - 129/85)  BP(mean): --  RR: 20 (12 Dec 2023 12:07) (18 - 20)  SpO2: 93% (12 Dec 2023 12:07) (88% - 96%)    Parameters below as of 12 Dec 2023 12:07  Patient On (Oxygen Delivery Method): nasal cannula  O2 Flow (L/min): 5  
Vital Signs Last 24 Hrs  T(C): 37 (13 Dec 2023 05:21), Max: 37.2 (12 Dec 2023 17:50)  T(F): 98.6 (13 Dec 2023 05:21), Max: 98.9 (12 Dec 2023 17:50)  HR: 72 (13 Dec 2023 11:15) (72 - 97)  BP: 105/70 (13 Dec 2023 05:21) (105/70 - 111/68)  BP(mean): --  RR: 18 (13 Dec 2023 05:21) (18 - 19)  SpO2: 97% (13 Dec 2023 11:15) (91% - 97%)    Parameters below as of 13 Dec 2023 11:15  Patient On (Oxygen Delivery Method): nasal cannula

## 2023-12-15 NOTE — BH CONSULTATION LIAISON PROGRESS NOTE - NSBHFUPINTERVALHXFT_PSY_A_CORE
Much appreciate Vascular reconsultation -  OR planning for left AKA. Much appreciate GOC conversation - Patient designated his mother as his HCP. Otherwise no significant interval event otherwise. Pt continues on IV antibiotics for + wound cultures (Right foot abscess positive for rare pseudomonas aeruginosa, moderate MRSA, rare Klebsiella pneumoniae, and moderate bacteroides fragilis; Left foot abscess positive for rare proteus mirabilis ESBL, rare alcaligenes faecalis and few corynebacterium species). Off of depakote this week without any issues. No evidence of a mood episode. Patient has remained calm, cooperative, medication and treatment compliant. Maintaining the same psychiatric clinical presentation consistent with his baseline.  Much appreciate Vascular reconsultation -  OR planning for left AKA. Much appreciate GOC conversation - Patient designated his mother as his HCP. Otherwise no significant interval event otherwise. Pt continues on IV antibiotics for + wound cultures (Right foot abscess positive for rare pseudomonas aeruginosa, moderate MRSA, rare Klebsiella pneumoniae, and moderate bacteroides fragilis; Left foot abscess positive for rare proteus mirabilis ESBL, rare alcaligenes faecalis and few corynebacterium species). Off of depakote this week without any issues. No evidence of a mood episode. Patient has remained calm, cooperative, medication and treatment compliant. Maintaining the same psychiatric clinical presentation consistent with his baseline. EXAM: calm, cooperative, wants the AKA. Pt requested to come off the Cymbalta as it 'does nothing" and asked for his Ritalin to be increased to outpt dosing which Pt says was 20mg bid. ISTOP was checked and Ritalin was given at current ordered dose of 5mg PO bid this will not be further raised at this time as there is no clinical indication for a higher dose.

## 2023-12-15 NOTE — BH CONSULTATION LIAISON PROGRESS NOTE - NSBHCONSULTRECOMMENDOTHER_PSY_A_CORE FT
12/8/23: as per chart review, Bipolar 2 less likely to be present in this case; more likely to be Substance induced mood disorder + personality traits thus depakote is not a indispensable medication in this case. Can reduce to 500mg PO qhs with goal to taper down/off as it is not clinically indicated and will ease the general systemic pill burden on Patient who is already on numerous agents   - Patient has NO seizure history; depakote was used exclusively by psychiatry in this case and started years prior for mood lability secondary to substance misuse. DOES NOT need it (or any other AEDs)  at this time.   - continue methadone 110mg Po qd as it is Pt's outpatient agonist maintenance therapy   - Patient has capacity to make his medical decision   12/11/23: reduce depakote to 500mg Po x1 dose then stop as it is not clinically indicated and will ease the general systemic pill burden on Patient who is already on numerous agents   - continue methadone 110mg Po qd as it is Pt's outpatient agonist maintenance therapy   - Patient has capacity to make his medical decision   12/12/23: + wound cultures; now off of depakote 
12/8/23: as per chart review, Bipolar 2 less likely to be present in this case; more likely to be Substance induced mood disorder + personality traits thus depakote is not a indispensable medication in this case. Can reduce to 500mg PO qhs with goal to taper down/off as it is not clinically indicated and will ease the general systemic pill burden on Patient who is already on numerous agents   - Patient has NO seizure history; depakote was used exclusively by psychiatry in this case and started years prior for mood lability secondary to substance misuse. DOES NOT need it (or any other AEDs)  at this time.   - continue methadone 110mg Po qd as it is Pt's outpatient agonist maintenance therapy   - Patient has capacity to make his medical decision   12/11/23: reduce depakote to 500mg Po x1 dose then stop as it is not clinically indicated and will ease the general systemic pill burden on Patient who is already on numerous agents   - continue methadone 110mg Po qd as it is Pt's outpatient agonist maintenance therapy   - Patient has capacity to make his medical decision   12/12/23: + wound cultures; now off of depakote   12/13/23: successfully tapered off of depakote; no evidence of mood sxs   - continue methadone 110mg Po qd as it is Pt's outpatient agonist maintenance therapy   - Patient has capacity to make his medical decision   - [facility] signing off   12/15/23: tentative AKA  - Patient has capacity to consent to surgery/make his medical decisions   - [facility] signing off 
12/8/23: as per chart review, Bipolar 2 less likely to be present in this case; more likely to be Substance induced mood disorder + personality traits thus depakote is not a indispensable medication in this case. Can reduce to 500mg PO qhs with goal to taper down/off as it is not clinically indicated and will ease the general systemic pill burden on Patient who is already on numerous agents   - Patient has NO seizure history; depakote was used exclusively by psychiatry in this case and started years prior for mood lability secondary to substance misuse. DOES NOT need it (or any other AEDs)  at this time.   - continue methadone 110mg Po qd as it is Pt's outpatient agonist maintenance therapy   - Patient has capacity to make his medical decision   12/11/23: reduce depakote to 500mg Po x1 dose then stop as it is not clinically indicated and will ease the general systemic pill burden on Patient who is already on numerous agents   - continue methadone 110mg Po qd as it is Pt's outpatient agonist maintenance therapy   - Patient has capacity to make his medical decision   12/12/23: + wound cultures; now off of depakote   12/13/23: successfully tapered off of depakote; no evidence of mood sxs   - continue methadone 110mg Po qd as it is Pt's outpatient agonist maintenance therapy   - Patient has capacity to make his medical decision   - [facility] signing off 
12/8/23: as per chart review, Bipolar 2 less likely to be present in this case; more likely to be Substance induced mood disorder + personality traits thus depakote is not a indispensable medication in this case. Can reduce to 500mg PO qhs with goal to taper down/off as it is not clinically indicated and will ease the general systemic pill burden on Patient who is already on numerous agents   - Patient has NO seizure history; depakote was used exclusively by psychiatry in this case and started years prior for mood lability secondary to substance misuse. DOES NOT need it (or any other AEDs)  at this time.   - continue methadone 110mg Po qd as it is Pt's outpatient agonist maintenance therapy   - Patient has capacity to make his medical decision   12/11/23: reduce depakote to 500mg Po x1 dose then stop as it is not clinically indicated and will ease the general systemic pill burden on Patient who is already on numerous agents   - continue methadone 110mg Po qd as it is Pt's outpatient agonist maintenance therapy   - Patient has capacity to make his medical decision

## 2023-12-15 NOTE — PROGRESS NOTE ADULT - SUBJECTIVE AND OBJECTIVE BOX
STAN VEGA  MRN-[id number]    Follow Up:  OM, wounds     Interval History: the pt was seen and examined earlier, not in acute distress, awake and alert, appropriate. Pt is afebrile, NC, WBC normalized.     PAST MEDICAL & SURGICAL HISTORY:  CAD (coronary artery disease)      HTN (hypertension)      HLD (hyperlipidemia)      Neurogenic bladder      Bipolar disorder      S/P ileostomy      Indwelling Holcomb catheter present      Chronic hepatitis C virus infection      S/P laminectomy      S/P ileostomy          ROS:    [ ] Unobtainable because:  [x ] All other systems negative    Constitutional: no fever, no chills  Head: no trauma  Eyes: no vision changes, no eye pain  ENT:  no sore throat, no rhinorrhea  Cardiovascular:  no chest pain, no palpitation  Respiratory:  no SOB, no cough  GI:  no abd pain, no vomiting, no diarrhea  urinary: no dysuria, no hematuria, no flank pain  musculoskeletal:  contracted   skin:  no rash  neurology:  no headache, no seizure, no change in mental status  psych: no anxiety, no depression         Allergies  NSAIDs (Flushing; Other (Moderate))  Haldol (Anaphylaxis)  Zyprexa (Rash; Dystonic RXN; Hives)  Motrin (Anaphylaxis)  Thorazine (Other (Moderate))  Aleve (Unknown)  Stelazine (Unknown)  Risperdal (Short breath; Rash; Hives)        ANTIMICROBIALS:  meropenem  IVPB 1000 every 8 hours  vancomycin  IVPB 1000 every 12 hours      OTHER MEDS:  acetaminophen     Tablet .. 650 milliGRAM(s) Oral daily  acetaminophen     Tablet .. 650 milliGRAM(s) Oral every 6 hours PRN  albuterol    90 MICROgram(s) HFA Inhaler 2 Puff(s) Inhalation every 4 hours PRN  albuterol/ipratropium for Nebulization 3 milliLiter(s) Nebulizer every 6 hours  aluminum hydroxide/magnesium hydroxide/simethicone Suspension 30 milliLiter(s) Oral every 4 hours PRN  amLODIPine   Tablet 2.5 milliGRAM(s) Oral daily  ascorbic acid 500 milliGRAM(s) Oral daily  atorvastatin 40 milliGRAM(s) Oral at bedtime  bacitracin   Ointment 1 Application(s) Topical daily  chlorhexidine 2% Cloths 1 Application(s) Topical <User Schedule>  collagenase Ointment 1 Application(s) Topical daily  cyclobenzaprine 10 milliGRAM(s) Oral three times a day  DULoxetine 30 milliGRAM(s) Oral daily  enoxaparin Injectable 40 milliGRAM(s) SubCutaneous every 24 hours  finasteride 5 milliGRAM(s) Oral daily  gabapentin 800 milliGRAM(s) Oral every 8 hours  guaiFENesin  milliGRAM(s) Oral every 12 hours  HYDROmorphone  Injectable 2 milliGRAM(s) IV Push every 4 hours PRN  lactobacillus acidophilus 1 Tablet(s) Oral two times a day with meals  lidocaine   4% Patch 1 Patch Transdermal daily  melatonin 3 milliGRAM(s) Oral at bedtime PRN  methadone  Concentrate 110 milliGRAM(s) Oral <User Schedule>  methylphenidate 5 milliGRAM(s) Oral <User Schedule>  multivitamin 1 Tablet(s) Oral daily  nystatin Powder 1 Application(s) Topical two times a day  pantoprazole    Tablet 40 milliGRAM(s) Oral before breakfast  polyethylene glycol 3350 17 Gram(s) Oral daily  QUEtiapine 100 milliGRAM(s) Oral <User Schedule>  QUEtiapine 400 milliGRAM(s) Oral at bedtime  senna 2 Tablet(s) Oral at bedtime  sodium chloride 0.9% lock flush 10 milliLiter(s) IV Push every 1 hour PRN  tamsulosin 0.4 milliGRAM(s) Oral at bedtime  thiamine 100 milliGRAM(s) Oral daily  zinc sulfate 220 milliGRAM(s) Oral daily      Vital Signs Last 24 Hrs  T(C): 37 (15 Dec 2023 11:14), Max: 37.1 (15 Dec 2023 05:04)  T(F): 98.6 (15 Dec 2023 11:14), Max: 98.7 (15 Dec 2023 05:04)  HR: 90 (15 Dec 2023 11:26) (78 - 92)  BP: 104/65 (15 Dec 2023 11:14) (104/65 - 114/68)  BP(mean): --  RR: 18 (15 Dec 2023 11:14) (18 - 18)  SpO2: 93% (15 Dec 2023 11:26) (85% - 100%)    Parameters below as of 15 Dec 2023 11:26  Patient On (Oxygen Delivery Method): nasal cannula w/ humidification        Physical Exam:  General:    NAD, non toxic, NC  Head: atraumatic, normocephalic  Eyes: normal sclera and conjunctiva  ENT:   no oropharyngeal lesions, poor dentition, no LAD, neck supple  Cardio:   tachycardic, S1,S2, no murmur  Respiratory:   somewhat diminished to auscultation b/l, no wheezing, no rhonchi   abd:   soft, BS +, not tender, no distention, midline scar healed, right sided colostomy in place, bag is full - discussed with RN  :     no CVAT, no suprapubic tenderness, Holcomb - bag is full, discussed with RN  Musculoskeletal : severely contracted LE b/l, no noted joint swelling   Skin:   b/l feet dressed, c/d/i, pictures of pt's wounds were evaluated, provided by RN, left arm PICC line c/d/i  vascular:  normal pulses  Neurologic:     no focal deficits  psych: normal affect    WBC Count: 8.74 K/uL (12-14 @ 09:52)  WBC Count: 10.75 K/uL (12-13 @ 05:40)  WBC Count: 7.32 K/uL (12-12 @ 05:20)  WBC Count: 9.12 K/uL (12-11 @ 17:40)                            10.1   8.74  )-----------( 194      ( 14 Dec 2023 09:52 )             33.0       12-14    142  |  104  |  17  ----------------------------<  144<H>  3.4<L>   |  36<H>  |  0.67    Ca    8.6      14 Dec 2023 09:52        Urinalysis Basic - ( 14 Dec 2023 09:52 )    Color: x / Appearance: x / SG: x / pH: x  Gluc: 144 mg/dL / Ketone: x  / Bili: x / Urobili: x   Blood: x / Protein: x / Nitrite: x   Leuk Esterase: x / RBC: x / WBC x   Sq Epi: x / Non Sq Epi: x / Bacteria: x        Creatinine Trend: 0.67<--, 0.61<--, 0.57<--, 0.60<--, 0.63<--, 0.67<--      MICROBIOLOGY:  v  Clean Catch Clean Catch (Midstream)  12-04-23   >100,000 CFU/ml Klebsiella pneumoniae  50,000 - 99,000 CFU/mL Enterococcus faecalis (vancomycin resistant)  --  Klebsiella pneumoniae  Enterococcus faecalis (vancomycin resistant)      .Abscess left wound culture  12-04-23   Rare Pseudomonas aeruginosa  Moderate Methicillin Resistant Staphylococcus aureus  Rare Klebsiella pneumoniae  Moderate Bacteroides fragilis "Susceptibilities not performed"  --  Pseudomonas aeruginosa  Methicillin resistant Staphylococcus aureus  Klebsiella pneumoniae      .Blood Blood-Peripheral  12-04-23   No growth at 5 days  --  --      .Blood Blood-Peripheral  12-04-23   No growth at 5 days  --  --          Rapid RVP Result: NotDetec (12-11 @ 20:55)        C-Reactive Protein, Serum: 84 (12-04)            SARS-CoV-2: NotDetec (12-11-23 @ 20:55)  Rapid RVP Result: NotDetec (12-11-23 @ 20:55)    SARS-CoV-2: NotDetec (11 Dec 2023 20:55)  SARS-CoV-2: NotDetec (04 Dec 2023 17:20)    RADIOLOGY:     STAN VEGA  MRN-[id number]    Follow Up:  OM, wounds     Interval History: the pt was seen and examined earlier, not in acute distress, awake and alert, appropriate. Pt is afebrile, NC, WBC normalized.     PAST MEDICAL & SURGICAL HISTORY:  CAD (coronary artery disease)      HTN (hypertension)      HLD (hyperlipidemia)      Neurogenic bladder      Bipolar disorder      S/P ileostomy      Indwelling Holcomb catheter present      Chronic hepatitis C virus infection      S/P laminectomy      S/P ileostomy          ROS:    [ ] Unobtainable because:  [x ] All other systems negative    Constitutional: no fever, no chills  Head: no trauma  Eyes: no vision changes, no eye pain  ENT:  no sore throat, no rhinorrhea  Cardiovascular:  no chest pain, no palpitation  Respiratory:  no SOB, no cough  GI:  no abd pain, no vomiting, no diarrhea  urinary: no dysuria, no hematuria, no flank pain  musculoskeletal:  contracted   skin:  no rash  neurology:  no headache, no seizure, no change in mental status  psych: no anxiety, no depression         Allergies  NSAIDs (Flushing; Other (Moderate))  Haldol (Anaphylaxis)  Zyprexa (Rash; Dystonic RXN; Hives)  Motrin (Anaphylaxis)  Thorazine (Other (Moderate))  Aleve (Unknown)  Stelazine (Unknown)  Risperdal (Short breath; Rash; Hives)        ANTIMICROBIALS:  meropenem  IVPB 1000 every 8 hours  vancomycin  IVPB 1000 every 12 hours      OTHER MEDS:  acetaminophen     Tablet .. 650 milliGRAM(s) Oral daily  acetaminophen     Tablet .. 650 milliGRAM(s) Oral every 6 hours PRN  albuterol    90 MICROgram(s) HFA Inhaler 2 Puff(s) Inhalation every 4 hours PRN  albuterol/ipratropium for Nebulization 3 milliLiter(s) Nebulizer every 6 hours  aluminum hydroxide/magnesium hydroxide/simethicone Suspension 30 milliLiter(s) Oral every 4 hours PRN  amLODIPine   Tablet 2.5 milliGRAM(s) Oral daily  ascorbic acid 500 milliGRAM(s) Oral daily  atorvastatin 40 milliGRAM(s) Oral at bedtime  bacitracin   Ointment 1 Application(s) Topical daily  chlorhexidine 2% Cloths 1 Application(s) Topical <User Schedule>  collagenase Ointment 1 Application(s) Topical daily  cyclobenzaprine 10 milliGRAM(s) Oral three times a day  DULoxetine 30 milliGRAM(s) Oral daily  enoxaparin Injectable 40 milliGRAM(s) SubCutaneous every 24 hours  finasteride 5 milliGRAM(s) Oral daily  gabapentin 800 milliGRAM(s) Oral every 8 hours  guaiFENesin  milliGRAM(s) Oral every 12 hours  HYDROmorphone  Injectable 2 milliGRAM(s) IV Push every 4 hours PRN  lactobacillus acidophilus 1 Tablet(s) Oral two times a day with meals  lidocaine   4% Patch 1 Patch Transdermal daily  melatonin 3 milliGRAM(s) Oral at bedtime PRN  methadone  Concentrate 110 milliGRAM(s) Oral <User Schedule>  methylphenidate 5 milliGRAM(s) Oral <User Schedule>  multivitamin 1 Tablet(s) Oral daily  nystatin Powder 1 Application(s) Topical two times a day  pantoprazole    Tablet 40 milliGRAM(s) Oral before breakfast  polyethylene glycol 3350 17 Gram(s) Oral daily  QUEtiapine 100 milliGRAM(s) Oral <User Schedule>  QUEtiapine 400 milliGRAM(s) Oral at bedtime  senna 2 Tablet(s) Oral at bedtime  sodium chloride 0.9% lock flush 10 milliLiter(s) IV Push every 1 hour PRN  tamsulosin 0.4 milliGRAM(s) Oral at bedtime  thiamine 100 milliGRAM(s) Oral daily  zinc sulfate 220 milliGRAM(s) Oral daily      Vital Signs Last 24 Hrs  T(C): 37 (15 Dec 2023 11:14), Max: 37.1 (15 Dec 2023 05:04)  T(F): 98.6 (15 Dec 2023 11:14), Max: 98.7 (15 Dec 2023 05:04)  HR: 90 (15 Dec 2023 11:26) (78 - 92)  BP: 104/65 (15 Dec 2023 11:14) (104/65 - 114/68)  BP(mean): --  RR: 18 (15 Dec 2023 11:14) (18 - 18)  SpO2: 93% (15 Dec 2023 11:26) (85% - 100%)    Parameters below as of 15 Dec 2023 11:26  Patient On (Oxygen Delivery Method): nasal cannula w/ humidification        Physical Exam:  General:    NAD, non toxic, NC  Head: atraumatic, normocephalic  Eyes: normal sclera and conjunctiva  ENT:   no oropharyngeal lesions, poor dentition, no LAD, neck supple  Cardio:   tachycardic, S1,S2, no murmur  Respiratory:   somewhat diminished to auscultation b/l, no wheezing, no rhonchi   abd:   soft, BS +, not tender, no distention, midline scar healed, right sided colostomy in place, bag is full - discussed with RN  :     no CVAT, no suprapubic tenderness, Holcomb - bag is full, discussed with RN  Musculoskeletal : severely contracted LE b/l, no noted joint swelling   Skin:   b/l feet dressed, c/d/i, pictures of pt's wounds were evaluated, provided by RN, left arm PICC line c/d/i  vascular:  normal pulses  Neurologic:     no focal deficits  psych: normal affect    WBC Count: 8.74 K/uL (12-14 @ 09:52)  WBC Count: 10.75 K/uL (12-13 @ 05:40)  WBC Count: 7.32 K/uL (12-12 @ 05:20)  WBC Count: 9.12 K/uL (12-11 @ 17:40)                            10.1   8.74  )-----------( 194      ( 14 Dec 2023 09:52 )             33.0       12-14    142  |  104  |  17  ----------------------------<  144<H>  3.4<L>   |  36<H>  |  0.67    Ca    8.6      14 Dec 2023 09:52        Urinalysis Basic - ( 14 Dec 2023 09:52 )    Color: x / Appearance: x / SG: x / pH: x  Gluc: 144 mg/dL / Ketone: x  / Bili: x / Urobili: x   Blood: x / Protein: x / Nitrite: x   Leuk Esterase: x / RBC: x / WBC x   Sq Epi: x / Non Sq Epi: x / Bacteria: x        Creatinine Trend: 0.67<--, 0.61<--, 0.57<--, 0.60<--, 0.63<--, 0.67<--      MICROBIOLOGY:  v  Clean Catch Clean Catch (Midstream)  12-04-23   >100,000 CFU/ml Klebsiella pneumoniae  50,000 - 99,000 CFU/mL Enterococcus faecalis (vancomycin resistant)  --  Klebsiella pneumoniae  Enterococcus faecalis (vancomycin resistant)      .Abscess left wound culture  12-04-23   Rare Pseudomonas aeruginosa  Moderate Methicillin Resistant Staphylococcus aureus  Rare Klebsiella pneumoniae  Moderate Bacteroides fragilis "Susceptibilities not performed"  --  Pseudomonas aeruginosa  Methicillin resistant Staphylococcus aureus  Klebsiella pneumoniae      .Blood Blood-Peripheral  12-04-23   No growth at 5 days  --  --      .Blood Blood-Peripheral  12-04-23   No growth at 5 days  --  --          Rapid RVP Result: NotDetec (12-11 @ 20:55)        C-Reactive Protein, Serum: 84 (12-04)            SARS-CoV-2: NotDetec (12-11-23 @ 20:55)  Rapid RVP Result: NotDetec (12-11-23 @ 20:55)    SARS-CoV-2: NotDetec (11 Dec 2023 20:55)  SARS-CoV-2: NotDetec (04 Dec 2023 17:20)    RADIOLOGY:

## 2023-12-15 NOTE — PROGRESS NOTE ADULT - SUBJECTIVE AND OBJECTIVE BOX
follow up on:  complex medical decision making related to goals of care      OVERNIGHT EVENTS: no new events, pain about the same, some alleviation w pain meds    Review of systems:     Pain:  [x ] yes [ ] no  QOL impact -   Location -   legs/feet                Aggravating factors -  Quality - throbbing, burning  Radiation -  Timing-  Severity (0-10 scale):  Minimal acceptable level (0-10 scale):     Dyspnea:      denies                        Anxiety:         denies                      Depression:   denies  Fatigue:      denies                         Nausea:      denies                         Loss of appetite:    denies            Constipation:     denies             Diarrhea:     denies    All other systems reviewed and negative       MEDICATIONS  (STANDING):  acetaminophen     Tablet .. 650 milliGRAM(s) Oral daily  albuterol/ipratropium for Nebulization 3 milliLiter(s) Nebulizer every 6 hours  amLODIPine   Tablet 2.5 milliGRAM(s) Oral daily  ascorbic acid 500 milliGRAM(s) Oral daily  atorvastatin 40 milliGRAM(s) Oral at bedtime  bacitracin   Ointment 1 Application(s) Topical daily  chlorhexidine 2% Cloths 1 Application(s) Topical <User Schedule>  collagenase Ointment 1 Application(s) Topical daily  cyclobenzaprine 10 milliGRAM(s) Oral three times a day  DULoxetine 30 milliGRAM(s) Oral daily  enoxaparin Injectable 40 milliGRAM(s) SubCutaneous every 24 hours  finasteride 5 milliGRAM(s) Oral daily  gabapentin 800 milliGRAM(s) Oral every 8 hours  guaiFENesin  milliGRAM(s) Oral every 12 hours  lactobacillus acidophilus 1 Tablet(s) Oral two times a day with meals  lidocaine   4% Patch 1 Patch Transdermal daily  meropenem  IVPB 1000 milliGRAM(s) IV Intermittent every 8 hours  methylphenidate 5 milliGRAM(s) Oral <User Schedule>  multivitamin 1 Tablet(s) Oral daily  nystatin Powder 1 Application(s) Topical two times a day  pantoprazole    Tablet 40 milliGRAM(s) Oral before breakfast  polyethylene glycol 3350 17 Gram(s) Oral daily  QUEtiapine 400 milliGRAM(s) Oral at bedtime  QUEtiapine 100 milliGRAM(s) Oral <User Schedule>  senna 2 Tablet(s) Oral at bedtime  tamsulosin 0.4 milliGRAM(s) Oral at bedtime  thiamine 100 milliGRAM(s) Oral daily  vancomycin  IVPB 1000 milliGRAM(s) IV Intermittent every 12 hours  zinc sulfate 220 milliGRAM(s) Oral daily    MEDICATIONS  (PRN):  acetaminophen     Tablet .. 650 milliGRAM(s) Oral every 6 hours PRN Temp greater or equal to 38C (100.4F), Mild Pain (1 - 3)  albuterol    90 MICROgram(s) HFA Inhaler 2 Puff(s) Inhalation every 4 hours PRN Shortness of Breath and/or Wheezing  aluminum hydroxide/magnesium hydroxide/simethicone Suspension 30 milliLiter(s) Oral every 4 hours PRN Dyspepsia  HYDROmorphone  Injectable 2 milliGRAM(s) IV Push every 4 hours PRN Severe Pain (7 - 10)  melatonin 3 milliGRAM(s) Oral at bedtime PRN Insomnia  sodium chloride 0.9% lock flush 10 milliLiter(s) IV Push every 1 hour PRN Pre/post blood products, medications, blood draw, and to maintain line patency      PHYSICAL EXAM:  Vital Signs Last 24 Hrs  T(C): 37 (15 Dec 2023 11:14), Max: 37.1 (15 Dec 2023 05:04)  T(F): 98.6 (15 Dec 2023 11:14), Max: 98.7 (15 Dec 2023 05:04)  HR: 90 (15 Dec 2023 11:26) (78 - 92)  BP: 104/65 (15 Dec 2023 11:14) (104/65 - 114/68)  BP(mean): --  RR: 18 (15 Dec 2023 11:14) (18 - 18)  SpO2: 93% (15 Dec 2023 11:26) (85% - 100%)    Parameters below as of 15 Dec 2023 11:26  Patient On (Oxygen Delivery Method): nasal cannula w/ humidification          Palliative Performance Scale/Karnofsky Score: 30      GENERAL: alert, NAD  HEENT: Atraumatic, oropharynx clear, neck supple  CHEST/LUNG: unlabored  HEART: Regular rate and rhythm    ABDOMEN: Soft, Nontender, Nondistended, colostomy  : west w yellow urine  MUSCULOSKELETAL:  No  edema, contracted, bedbound  NERVOUS SYSTEM:  Alert & Oriented X3,  follows commands  SKIN: St 4 R hip Decub noted  Oral intake: fair    LABS:                          10.1   8.74  )-----------( 194      ( 14 Dec 2023 09:52 )             33.0     12-14    142  |  104  |  17  ----------------------------<  144<H>  3.4<L>   |  36<H>  |  0.67    Ca    8.6      14 Dec 2023 09:52      Urinalysis Basic - ( 14 Dec 2023 09:52 )    Color: x / Appearance: x / SG: x / pH: x  Gluc: 144 mg/dL / Ketone: x  / Bili: x / Urobili: x   Blood: x / Protein: x / Nitrite: x   Leuk Esterase: x / RBC: x / WBC x   Sq Epi: x / Non Sq Epi: x / Bacteria: x        RADIOLOGY & ADDITIONAL STUDIES:

## 2023-12-16 LAB
ANION GAP SERPL CALC-SCNC: 1 MMOL/L — LOW (ref 5–17)
ANION GAP SERPL CALC-SCNC: 1 MMOL/L — LOW (ref 5–17)
BUN SERPL-MCNC: 18 MG/DL — SIGNIFICANT CHANGE UP (ref 7–23)
BUN SERPL-MCNC: 18 MG/DL — SIGNIFICANT CHANGE UP (ref 7–23)
CALCIUM SERPL-MCNC: 8.9 MG/DL — SIGNIFICANT CHANGE UP (ref 8.5–10.1)
CALCIUM SERPL-MCNC: 8.9 MG/DL — SIGNIFICANT CHANGE UP (ref 8.5–10.1)
CHLORIDE SERPL-SCNC: 109 MMOL/L — HIGH (ref 96–108)
CHLORIDE SERPL-SCNC: 109 MMOL/L — HIGH (ref 96–108)
CO2 SERPL-SCNC: 34 MMOL/L — HIGH (ref 22–31)
CO2 SERPL-SCNC: 34 MMOL/L — HIGH (ref 22–31)
CREAT SERPL-MCNC: 0.63 MG/DL — SIGNIFICANT CHANGE UP (ref 0.5–1.3)
CREAT SERPL-MCNC: 0.63 MG/DL — SIGNIFICANT CHANGE UP (ref 0.5–1.3)
EGFR: 114 ML/MIN/1.73M2 — SIGNIFICANT CHANGE UP
EGFR: 114 ML/MIN/1.73M2 — SIGNIFICANT CHANGE UP
GLUCOSE SERPL-MCNC: 88 MG/DL — SIGNIFICANT CHANGE UP (ref 70–99)
GLUCOSE SERPL-MCNC: 88 MG/DL — SIGNIFICANT CHANGE UP (ref 70–99)
HCT VFR BLD CALC: 32.2 % — LOW (ref 39–50)
HCT VFR BLD CALC: 32.2 % — LOW (ref 39–50)
HGB BLD-MCNC: 9.5 G/DL — LOW (ref 13–17)
HGB BLD-MCNC: 9.5 G/DL — LOW (ref 13–17)
MCHC RBC-ENTMCNC: 27.2 PG — SIGNIFICANT CHANGE UP (ref 27–34)
MCHC RBC-ENTMCNC: 27.2 PG — SIGNIFICANT CHANGE UP (ref 27–34)
MCHC RBC-ENTMCNC: 29.5 G/DL — LOW (ref 32–36)
MCHC RBC-ENTMCNC: 29.5 G/DL — LOW (ref 32–36)
MCV RBC AUTO: 92.3 FL — SIGNIFICANT CHANGE UP (ref 80–100)
MCV RBC AUTO: 92.3 FL — SIGNIFICANT CHANGE UP (ref 80–100)
NRBC # BLD: 0 /100 WBCS — SIGNIFICANT CHANGE UP (ref 0–0)
NRBC # BLD: 0 /100 WBCS — SIGNIFICANT CHANGE UP (ref 0–0)
PLATELET # BLD AUTO: 207 K/UL — SIGNIFICANT CHANGE UP (ref 150–400)
PLATELET # BLD AUTO: 207 K/UL — SIGNIFICANT CHANGE UP (ref 150–400)
POTASSIUM SERPL-MCNC: 4.1 MMOL/L — SIGNIFICANT CHANGE UP (ref 3.5–5.3)
POTASSIUM SERPL-MCNC: 4.1 MMOL/L — SIGNIFICANT CHANGE UP (ref 3.5–5.3)
POTASSIUM SERPL-SCNC: 4.1 MMOL/L — SIGNIFICANT CHANGE UP (ref 3.5–5.3)
POTASSIUM SERPL-SCNC: 4.1 MMOL/L — SIGNIFICANT CHANGE UP (ref 3.5–5.3)
RBC # BLD: 3.49 M/UL — LOW (ref 4.2–5.8)
RBC # BLD: 3.49 M/UL — LOW (ref 4.2–5.8)
RBC # FLD: 17.3 % — HIGH (ref 10.3–14.5)
RBC # FLD: 17.3 % — HIGH (ref 10.3–14.5)
SODIUM SERPL-SCNC: 144 MMOL/L — SIGNIFICANT CHANGE UP (ref 135–145)
SODIUM SERPL-SCNC: 144 MMOL/L — SIGNIFICANT CHANGE UP (ref 135–145)
WBC # BLD: 7.29 K/UL — SIGNIFICANT CHANGE UP (ref 3.8–10.5)
WBC # BLD: 7.29 K/UL — SIGNIFICANT CHANGE UP (ref 3.8–10.5)
WBC # FLD AUTO: 7.29 K/UL — SIGNIFICANT CHANGE UP (ref 3.8–10.5)
WBC # FLD AUTO: 7.29 K/UL — SIGNIFICANT CHANGE UP (ref 3.8–10.5)

## 2023-12-16 PROCEDURE — 99232 SBSQ HOSP IP/OBS MODERATE 35: CPT

## 2023-12-16 RX ADMIN — QUETIAPINE FUMARATE 400 MILLIGRAM(S): 200 TABLET, FILM COATED ORAL at 21:12

## 2023-12-16 RX ADMIN — ENOXAPARIN SODIUM 40 MILLIGRAM(S): 100 INJECTION SUBCUTANEOUS at 22:11

## 2023-12-16 RX ADMIN — Medication 5 MILLIGRAM(S): at 21:11

## 2023-12-16 RX ADMIN — TAMSULOSIN HYDROCHLORIDE 0.4 MILLIGRAM(S): 0.4 CAPSULE ORAL at 21:12

## 2023-12-16 RX ADMIN — Medication 1 TABLET(S): at 08:22

## 2023-12-16 RX ADMIN — MEROPENEM 100 MILLIGRAM(S): 1 INJECTION INTRAVENOUS at 06:16

## 2023-12-16 RX ADMIN — HYDROMORPHONE HYDROCHLORIDE 2 MILLIGRAM(S): 2 INJECTION INTRAMUSCULAR; INTRAVENOUS; SUBCUTANEOUS at 13:59

## 2023-12-16 RX ADMIN — GABAPENTIN 800 MILLIGRAM(S): 400 CAPSULE ORAL at 13:07

## 2023-12-16 RX ADMIN — METHADONE HYDROCHLORIDE 60 MILLIGRAM(S): 40 TABLET ORAL at 06:12

## 2023-12-16 RX ADMIN — HYDROMORPHONE HYDROCHLORIDE 2 MILLIGRAM(S): 2 INJECTION INTRAMUSCULAR; INTRAVENOUS; SUBCUTANEOUS at 22:39

## 2023-12-16 RX ADMIN — HYDROMORPHONE HYDROCHLORIDE 2 MILLIGRAM(S): 2 INJECTION INTRAMUSCULAR; INTRAVENOUS; SUBCUTANEOUS at 08:45

## 2023-12-16 RX ADMIN — PANTOPRAZOLE SODIUM 40 MILLIGRAM(S): 20 TABLET, DELAYED RELEASE ORAL at 08:22

## 2023-12-16 RX ADMIN — QUETIAPINE FUMARATE 100 MILLIGRAM(S): 200 TABLET, FILM COATED ORAL at 09:05

## 2023-12-16 RX ADMIN — GABAPENTIN 800 MILLIGRAM(S): 400 CAPSULE ORAL at 21:12

## 2023-12-16 RX ADMIN — QUETIAPINE FUMARATE 100 MILLIGRAM(S): 200 TABLET, FILM COATED ORAL at 17:19

## 2023-12-16 RX ADMIN — Medication 100 MILLIGRAM(S): at 11:11

## 2023-12-16 RX ADMIN — CYCLOBENZAPRINE HYDROCHLORIDE 10 MILLIGRAM(S): 10 TABLET, FILM COATED ORAL at 06:13

## 2023-12-16 RX ADMIN — CHLORHEXIDINE GLUCONATE 1 APPLICATION(S): 213 SOLUTION TOPICAL at 06:14

## 2023-12-16 RX ADMIN — LIDOCAINE 1 PATCH: 4 CREAM TOPICAL at 19:45

## 2023-12-16 RX ADMIN — CYCLOBENZAPRINE HYDROCHLORIDE 10 MILLIGRAM(S): 10 TABLET, FILM COATED ORAL at 13:07

## 2023-12-16 RX ADMIN — MEROPENEM 100 MILLIGRAM(S): 1 INJECTION INTRAVENOUS at 13:08

## 2023-12-16 RX ADMIN — Medication 650 MILLIGRAM(S): at 12:11

## 2023-12-16 RX ADMIN — AMLODIPINE BESYLATE 2.5 MILLIGRAM(S): 2.5 TABLET ORAL at 06:13

## 2023-12-16 RX ADMIN — Medication 3 MILLILITER(S): at 05:10

## 2023-12-16 RX ADMIN — Medication 3 MILLILITER(S): at 23:31

## 2023-12-16 RX ADMIN — CYCLOBENZAPRINE HYDROCHLORIDE 10 MILLIGRAM(S): 10 TABLET, FILM COATED ORAL at 21:12

## 2023-12-16 RX ADMIN — Medication 1 TABLET(S): at 11:11

## 2023-12-16 RX ADMIN — Medication 650 MILLIGRAM(S): at 11:11

## 2023-12-16 RX ADMIN — Medication 3 MILLILITER(S): at 17:02

## 2023-12-16 RX ADMIN — Medication 3 MILLILITER(S): at 11:16

## 2023-12-16 RX ADMIN — FINASTERIDE 5 MILLIGRAM(S): 5 TABLET, FILM COATED ORAL at 11:11

## 2023-12-16 RX ADMIN — ATORVASTATIN CALCIUM 40 MILLIGRAM(S): 80 TABLET, FILM COATED ORAL at 21:12

## 2023-12-16 RX ADMIN — LIDOCAINE 1 PATCH: 4 CREAM TOPICAL at 23:57

## 2023-12-16 RX ADMIN — NYSTATIN CREAM 1 APPLICATION(S): 100000 CREAM TOPICAL at 06:14

## 2023-12-16 RX ADMIN — Medication 600 MILLIGRAM(S): at 06:13

## 2023-12-16 RX ADMIN — METHADONE HYDROCHLORIDE 60 MILLIGRAM(S): 40 TABLET ORAL at 17:20

## 2023-12-16 RX ADMIN — ZINC SULFATE TAB 220 MG (50 MG ZINC EQUIVALENT) 220 MILLIGRAM(S): 220 (50 ZN) TAB at 11:11

## 2023-12-16 RX ADMIN — GABAPENTIN 800 MILLIGRAM(S): 400 CAPSULE ORAL at 06:13

## 2023-12-16 RX ADMIN — Medication 1 TABLET(S): at 17:19

## 2023-12-16 RX ADMIN — Medication 500 MILLIGRAM(S): at 11:11

## 2023-12-16 RX ADMIN — HYDROMORPHONE HYDROCHLORIDE 2 MILLIGRAM(S): 2 INJECTION INTRAMUSCULAR; INTRAVENOUS; SUBCUTANEOUS at 14:30

## 2023-12-16 RX ADMIN — POLYETHYLENE GLYCOL 3350 17 GRAM(S): 17 POWDER, FOR SOLUTION ORAL at 11:11

## 2023-12-16 RX ADMIN — Medication 600 MILLIGRAM(S): at 17:19

## 2023-12-16 RX ADMIN — Medication 250 MILLIGRAM(S): at 17:19

## 2023-12-16 RX ADMIN — Medication 250 MILLIGRAM(S): at 06:13

## 2023-12-16 RX ADMIN — HYDROMORPHONE HYDROCHLORIDE 2 MILLIGRAM(S): 2 INJECTION INTRAMUSCULAR; INTRAVENOUS; SUBCUTANEOUS at 08:22

## 2023-12-16 RX ADMIN — Medication 5 MILLIGRAM(S): at 09:05

## 2023-12-16 RX ADMIN — MEROPENEM 100 MILLIGRAM(S): 1 INJECTION INTRAVENOUS at 21:11

## 2023-12-16 RX ADMIN — HYDROMORPHONE HYDROCHLORIDE 2 MILLIGRAM(S): 2 INJECTION INTRAMUSCULAR; INTRAVENOUS; SUBCUTANEOUS at 23:39

## 2023-12-16 RX ADMIN — Medication 1 APPLICATION(S): at 11:11

## 2023-12-16 RX ADMIN — LIDOCAINE 1 PATCH: 4 CREAM TOPICAL at 11:12

## 2023-12-16 NOTE — PROGRESS NOTE ADULT - SUBJECTIVE AND OBJECTIVE BOX
Patient is a 53y old  Male who presents with a chief complaint of Sepsis secondary to b/l foot wounds (16 Dec 2023 10:09)      INTERVAL HPI/OVERNIGHT EVENTS: Overnight no acute events. Patient resting comfortably in bed this AM. Spoke to pt mother over the phone.     MEDICATIONS  (STANDING):  acetaminophen     Tablet .. 650 milliGRAM(s) Oral daily  albuterol/ipratropium for Nebulization 3 milliLiter(s) Nebulizer every 6 hours  amLODIPine   Tablet 2.5 milliGRAM(s) Oral daily  ascorbic acid 500 milliGRAM(s) Oral daily  atorvastatin 40 milliGRAM(s) Oral at bedtime  bacitracin   Ointment 1 Application(s) Topical daily  chlorhexidine 2% Cloths 1 Application(s) Topical <User Schedule>  collagenase Ointment 1 Application(s) Topical daily  cyclobenzaprine 10 milliGRAM(s) Oral three times a day  enoxaparin Injectable 40 milliGRAM(s) SubCutaneous every 24 hours  finasteride 5 milliGRAM(s) Oral daily  gabapentin 800 milliGRAM(s) Oral every 8 hours  guaiFENesin  milliGRAM(s) Oral every 12 hours  lactobacillus acidophilus 1 Tablet(s) Oral two times a day with meals  lidocaine   4% Patch 1 Patch Transdermal daily  meropenem  IVPB 1000 milliGRAM(s) IV Intermittent every 8 hours  methadone  Concentrate 60 milliGRAM(s) Oral two times a day  methylphenidate 5 milliGRAM(s) Oral <User Schedule>  multivitamin 1 Tablet(s) Oral daily  nystatin Powder 1 Application(s) Topical two times a day  pantoprazole    Tablet 40 milliGRAM(s) Oral before breakfast  polyethylene glycol 3350 17 Gram(s) Oral daily  QUEtiapine 400 milliGRAM(s) Oral at bedtime  QUEtiapine 100 milliGRAM(s) Oral <User Schedule>  senna 2 Tablet(s) Oral at bedtime  tamsulosin 0.4 milliGRAM(s) Oral at bedtime  thiamine 100 milliGRAM(s) Oral daily  vancomycin  IVPB 1000 milliGRAM(s) IV Intermittent every 12 hours  zinc sulfate 220 milliGRAM(s) Oral daily    MEDICATIONS  (PRN):  acetaminophen     Tablet .. 650 milliGRAM(s) Oral every 6 hours PRN Temp greater or equal to 38C (100.4F), Mild Pain (1 - 3)  albuterol    90 MICROgram(s) HFA Inhaler 2 Puff(s) Inhalation every 4 hours PRN Shortness of Breath and/or Wheezing  aluminum hydroxide/magnesium hydroxide/simethicone Suspension 30 milliLiter(s) Oral every 4 hours PRN Dyspepsia  HYDROmorphone  Injectable 2 milliGRAM(s) IV Push every 4 hours PRN Severe Pain (7 - 10)  melatonin 3 milliGRAM(s) Oral at bedtime PRN Insomnia  sodium chloride 0.9% lock flush 10 milliLiter(s) IV Push every 1 hour PRN Pre/post blood products, medications, blood draw, and to maintain line patency      Allergies    NSAIDs (Flushing; Other (Moderate))  Haldol (Anaphylaxis)  Zyprexa (Rash; Dystonic RXN; Hives)  Motrin (Anaphylaxis)  Thorazine (Other (Moderate))  Aleve (Unknown)  Stelazine (Unknown)  Risperdal (Short breath; Rash; Hives)    Intolerances        REVIEW OF SYSTEMS:  10 point ROS negative, unless stated otherwise.    Vital Signs Last 24 Hrs  T(C): 36.6 (16 Dec 2023 11:51), Max: 36.7 (15 Dec 2023 23:15)  T(F): 97.9 (16 Dec 2023 11:51), Max: 98 (15 Dec 2023 23:15)  HR: 93 (16 Dec 2023 11:51) (78 - 96)  BP: 118/68 (16 Dec 2023 11:51) (110/74 - 118/68)  BP(mean): --  RR: 18 (16 Dec 2023 11:51) (18 - 18)  SpO2: 96% (16 Dec 2023 11:51) (95% - 97%)    Parameters below as of 16 Dec 2023 11:51  Patient On (Oxygen Delivery Method): nasal cannula        PHYSICAL EXAM:  GENERAL: NAD  HEAD:  Atraumatic, Normocephalic  EYES: EOMI, sclera anicteric  ENMT: Moist mucous membranes  NECK: Supple, No JVD  NERVOUS SYSTEM:  Alert & Oriented, No FND appreciated   CHEST/LUNG: CTAB   HEART: RRR;   ABDOMEN: Soft, Nontender, midline incisional scar from previous surgery   EXTREMITIES: Contracted, pitting edema, wounds bilaterally     LABS:                        9.5    7.29  )-----------( 207      ( 16 Dec 2023 07:30 )             32.2     12-16    144  |  109<H>  |  18  ----------------------------<  88  4.1   |  34<H>  |  0.63    Ca    8.9      16 Dec 2023 07:30        Urinalysis Basic - ( 16 Dec 2023 07:30 )    Color: x / Appearance: x / SG: x / pH: x  Gluc: 88 mg/dL / Ketone: x  / Bili: x / Urobili: x   Blood: x / Protein: x / Nitrite: x   Leuk Esterase: x / RBC: x / WBC x   Sq Epi: x / Non Sq Epi: x / Bacteria: x      CAPILLARY BLOOD GLUCOSE          RADIOLOGY & ADDITIONAL TESTS:    Imaging Personally Reviewed:  [ X] YES  [ ] NO    Consultant(s) Notes Reviewed:  [ X] YES  [ ] NO    Care Discussed with Consultants/Other Providers [X ] YES  [ ] NO

## 2023-12-16 NOTE — PROGRESS NOTE ADULT - NUTRITIONAL ASSESSMENT
This patient has been assessed with a concern for Malnutrition and has been determined to have a diagnosis/diagnoses of Moderate protein-calorie malnutrition.    This patient is being managed with:   Diet Regular-  1000mL Fluid Restriction ([id number])  Liquid Protein Supplement     Qty per Day:  1  Supplement Feeding Modality:  Oral  Ensure Plus High Protein Cans or Servings Per Day:  1       Frequency:  Two Times a day  Entered: Dec 13 2023  2:13PM   This patient has been assessed with a concern for Malnutrition and has been determined to have a diagnosis/diagnoses of Moderate protein-calorie malnutrition.    This patient is being managed with:   Diet Regular-  1000mL Fluid Restriction ([id number])  Liquid Protein Supplement     Qty per Day:  1  Supplement Feeding Modality:  Oral  Ensure Plus High Protein Cans or Servings Per Day:  1       Frequency:  Two Times a day  Entered: Dec 13 2023  2:13PM

## 2023-12-16 NOTE — PROGRESS NOTE ADULT - ASSESSMENT
Gonzalez Stewart is a 53 year old male with PMHx of cervical epidural abscess (s/p decompressive laminectomy 3/2021 c/b b/l leg paralysis), hx of pneumoperitoneum (s/p ex lap, total abdominal colectomy and end ileostomy (on 1/12/23) c/b evisceration and sepsis with MRSA bacteremia postoperatively), hx of hepatitis C, hx of R. buttock abscess, hx of L. foot osteomyelitis, neurogenic bladder (with indwelling Holcomb catheter, last exchanged two days ago), HTN, HLD, bipolar disorder, GERD, hx of opioid dependence, and constipation who presented to the ED on 12/4/23 from [facility] for feet infection and admitted for sepsis secondary to bilateral feet infection.      Sepsis secondary to b/l feet infection  Previously treated for L. hallux OM with L. heel culture growing Proteus mirabilis ESBL, completed 6-week course of avycaz  U/A (sample obtained from Holcomb catheter) with positive nitrites, moderate leuks, moderate blood, WBC 26-50, RBC > 50, moderate bacteria, squamous epithelial cells present  CT b/l LE with study is severely limited by contracture. Left lower extremity is only partially visualized with exclusion of the left foot. Skin induration and subcutaneous edema in the leg and ankle.  No soft tissue gas  S/p bedside escharectomy by podiatry  Empirically started on vancomycin and cefepime   right foot wound cx- ESBL proteus - resistant to cefepime and corynebecaterium and alcalegenes  left foot wound cx- MRSA and Pseudomonas  and klebsiella   Blood cx- neg x 2  PICC line in place ertapenem switched to meropenem   Will continue meropenem and vancomycin until 1/8   ECHO unremarkable     COPD  Baseline patient is on 5L O2 NC at home   Desaturated a couple of days ago, seemed to be from fluid overload from IVF   Placed on lasix drip with improvement, transitioned to PO lasix   Echo WNL    HTN  PTA amlodipine 2.5 mg - bp stable    HLD  PTA atorvastatin 40 mg    Bipolar disorder  PTA quetiapine 100 mg BID and 400 mg qhs, valproic acid stopped   Psych following, appreciate recs     Hx of opioid dependence  PTA methadone 110 mg     Chronic pain: PTA lidocaine 4% patch, duloxetine 30 mg DR, gabapentin 600 mg q8, cyclobenzaprine 10 mg BID, oxycodone 5 mg q6    GERD:   PTA pantoprazole 40 mg     Neurogenic bladder (with indwelling Holcomb catheter):   PTA finasteride 5 mg, tamsulosin 0.4 mg    Constipation:   PTA senna 8.6 mg 2 tabs qhs     functional quad-   supportive care  , frequent repositioning    Rt hip pressure wound, seen by vascular ,   wound recs in place    Gonzalez Stewart is a 53 year old male with PMHx of cervical epidural abscess (s/p decompressive laminectomy 3/2021 c/b b/l leg paralysis), hx of pneumoperitoneum (s/p ex lap, total abdominal colectomy and end ileostomy (on 1/12/23) c/b evisceration and sepsis with MRSA bacteremia postoperatively), hx of hepatitis C, hx of R. buttock abscess, hx of L. foot osteomyelitis, neurogenic bladder (with indwelling Holcomb catheter, last exchanged two days ago), HTN, HLD, bipolar disorder, GERD, hx of opioid dependence, and constipation who presented to the ED on 12/4/23 from [facility] for feet infection and admitted for sepsis secondary to bilateral feet infection.      Sepsis secondary to b/l feet infection  Previously treated for L. hallux OM with L. heel culture growing Proteus mirabilis ESBL, completed 6-week course of avycaz  U/A (sample obtained from Holcomb catheter) with positive nitrites, moderate leuks, moderate blood, WBC 26-50, RBC > 50, moderate bacteria, squamous epithelial cells present  CT b/l LE with study is severely limited by contracture. Left lower extremity is only partially visualized with exclusion of the left foot. Skin induration and subcutaneous edema in the leg and ankle.  No soft tissue gas  S/p bedside escharectomy by podiatry  Empirically started on vancomycin and cefepime   right foot wound cx- ESBL proteus - resistant to cefepime and corynebecaterium and alcalegenes  left foot wound cx- MRSA and Pseudomonas  and klebsiella   Blood cx- neg x 2  PICC line in place ertapenem switched to meropenem   Will continue meropenem and vancomycin until 1/8   ECHO unremarkable     COPD  Baseline patient is on 5L O2 NC at home   Desaturated a couple of days ago, seemed to be from fluid overload from IVF   Placed on lasix drip with improvement, transitioned to PO lasix   Echo WNL    HTN  PTA amlodipine 2.5 mg - bp stable    HLD  PTA atorvastatin 40 mg    Bipolar disorder  PTA quetiapine 100 mg BID and 400 mg qhs, valproic acid stopped   Psych following, appreciate recs     Hx of opioid dependence  PTA methadone 110 mg     Chronic pain: PTA lidocaine 4% patch, duloxetine 30 mg DR, gabapentin 600 mg q8, cyclobenzaprine 10 mg BID, oxycodone 5 mg q6    GERD:   PTA pantoprazole 40 mg     Neurogenic bladder (with indwelling Holcomb catheter):   PTA finasteride 5 mg, tamsulosin 0.4 mg    Constipation:   PTA senna 8.6 mg 2 tabs qhs     functional quad-   supportive care  , frequent repositioning    Rt hip pressure wound, seen by vascular ,   wound recs in place    Gonzalez Stewart is a 53 year old male with PMHx of cervical epidural abscess (s/p decompressive laminectomy 3/2021 c/b b/l leg paralysis), hx of pneumoperitoneum (s/p ex lap, total abdominal colectomy and end ileostomy (on 1/12/23) c/b evisceration and sepsis with MRSA bacteremia postoperatively), hx of hepatitis C, hx of R. buttock abscess, hx of L. foot osteomyelitis, neurogenic bladder (with indwelling Holcomb catheter, last exchanged two days ago), HTN, HLD, bipolar disorder, GERD, hx of opioid dependence, and constipation who presented to the ED on 12/4/23 from [facility] for feet infection and admitted for sepsis secondary to bilateral feet infection.      Sepsis secondary to b/l feet infection  Previously treated for L. hallux OM with L. heel culture growing Proteus mirabilis ESBL, completed 6-week course of avycaz  U/A (sample obtained from Holcomb catheter) with positive nitrites, moderate leuks, moderate blood, WBC 26-50, RBC > 50, moderate bacteria, squamous epithelial cells present  CT b/l LE with study is severely limited by contracture. Left lower extremity is only partially visualized with exclusion of the left foot. Skin induration and subcutaneous edema in the leg and ankle.  No soft tissue gas  S/p bedside escharectomy by podiatry  Empirically started on vancomycin and cefepime   right foot wound cx- ESBL proteus - resistant to cefepime and corynebecaterium and alcalegenes  left foot wound cx- MRSA and Pseudomonas  and klebsiella   Blood cx- neg x 2  PICC line in place ertapenem switched to meropenem   Will continue meropenem and vancomycin until 1/8   ECHO unremarkable   Vascular following for possible AKA   COPD  Baseline patient is on 5L O2 NC at home   Desaturated a couple of days ago, seemed to be from fluid overload from IVF   Placed on lasix drip with improvement, transitioned to PO lasix   Echo WNL    HTN  PTA amlodipine 2.5 mg - bp stable    HLD  PTA atorvastatin 40 mg    Bipolar disorder  PTA quetiapine 100 mg BID and 400 mg qhs, valproic acid stopped   Psych following, appreciate recs     Hx of opioid dependence  PTA methadone 110 mg     Chronic pain: PTA lidocaine 4% patch, duloxetine 30 mg DR, gabapentin 600 mg q8, cyclobenzaprine 10 mg BID, oxycodone 5 mg q6    GERD:   PTA pantoprazole 40 mg     Neurogenic bladder (with indwelling Holcomb catheter):   PTA finasteride 5 mg, tamsulosin 0.4 mg    Constipation:   PTA senna 8.6 mg 2 tabs qhs     functional quad-   supportive care  , frequent repositioning    Rt hip pressure wound, seen by vascular ,   wound recs in place    Gonzalez Stewart is a 53 year old male with PMHx of cervical epidural abscess (s/p decompressive laminectomy 3/2021 c/b b/l leg paralysis), hx of pneumoperitoneum (s/p ex lap, total abdominal colectomy and end ileostomy (on 1/12/23) c/b evisceration and sepsis with MRSA bacteremia postoperatively), hx of hepatitis C, hx of R. buttock abscess, hx of L. foot osteomyelitis, neurogenic bladder (with indwelling Holcomb catheter, last exchanged two days ago), HTN, HLD, bipolar disorder, GERD, hx of opioid dependence, and constipation who presented to the ED on 12/4/23 from [facility] for feet infection and admitted for sepsis secondary to bilateral feet infection.      Sepsis secondary to b/l feet infection  Previously treated for L. hallux OM with L. heel culture growing Proteus mirabilis ESBL, completed 6-week course of avycaz  U/A (sample obtained from Holcomb catheter) with positive nitrites, moderate leuks, moderate blood, WBC 26-50, RBC > 50, moderate bacteria, squamous epithelial cells present  CT b/l LE with study is severely limited by contracture. Left lower extremity is only partially visualized with exclusion of the left foot. Skin induration and subcutaneous edema in the leg and ankle.  No soft tissue gas  S/p bedside escharectomy by podiatry  Empirically started on vancomycin and cefepime   right foot wound cx- ESBL proteus - resistant to cefepime and corynebecaterium and alcalegenes  left foot wound cx- MRSA and Pseudomonas  and klebsiella   Blood cx- neg x 2  PICC line in place ertapenem switched to meropenem   Will continue meropenem and vancomycin until 1/8   ECHO unremarkable   Vascular following for possible AKA   COPD  Baseline patient is on 5L O2 NC at home   Desaturated a couple of days ago, seemed to be from fluid overload from IVF   Placed on lasix drip with improvement, transitioned to PO lasix   Echo WNL    HTN  PTA amlodipine 2.5 mg - bp stable    HLD  PTA atorvastatin 40 mg    Bipolar disorder  PTA quetiapine 100 mg BID and 400 mg qhs, valproic acid stopped   Psych following, appreciate recs     Hx of opioid dependence  PTA methadone 110 mg     Chronic pain: PTA lidocaine 4% patch, duloxetine 30 mg DR, gabapentin 600 mg q8, cyclobenzaprine 10 mg BID, oxycodone 5 mg q6    GERD:   PTA pantoprazole 40 mg     Neurogenic bladder (with indwelling Holcomb catheter):   PTA finasteride 5 mg, tamsulosin 0.4 mg    Constipation:   PTA senna 8.6 mg 2 tabs qhs     functional quad-   supportive care  , frequent repositioning    Rt hip pressure wound, seen by vascular ,   wound recs in place

## 2023-12-17 LAB
ANION GAP SERPL CALC-SCNC: 3 MMOL/L — LOW (ref 5–17)
ANION GAP SERPL CALC-SCNC: 3 MMOL/L — LOW (ref 5–17)
BUN SERPL-MCNC: 16 MG/DL — SIGNIFICANT CHANGE UP (ref 7–23)
BUN SERPL-MCNC: 16 MG/DL — SIGNIFICANT CHANGE UP (ref 7–23)
CALCIUM SERPL-MCNC: 9.2 MG/DL — SIGNIFICANT CHANGE UP (ref 8.5–10.1)
CALCIUM SERPL-MCNC: 9.2 MG/DL — SIGNIFICANT CHANGE UP (ref 8.5–10.1)
CHLORIDE SERPL-SCNC: 107 MMOL/L — SIGNIFICANT CHANGE UP (ref 96–108)
CHLORIDE SERPL-SCNC: 107 MMOL/L — SIGNIFICANT CHANGE UP (ref 96–108)
CO2 SERPL-SCNC: 31 MMOL/L — SIGNIFICANT CHANGE UP (ref 22–31)
CO2 SERPL-SCNC: 31 MMOL/L — SIGNIFICANT CHANGE UP (ref 22–31)
CREAT SERPL-MCNC: 0.6 MG/DL — SIGNIFICANT CHANGE UP (ref 0.5–1.3)
CREAT SERPL-MCNC: 0.6 MG/DL — SIGNIFICANT CHANGE UP (ref 0.5–1.3)
EGFR: 116 ML/MIN/1.73M2 — SIGNIFICANT CHANGE UP
EGFR: 116 ML/MIN/1.73M2 — SIGNIFICANT CHANGE UP
GLUCOSE SERPL-MCNC: 89 MG/DL — SIGNIFICANT CHANGE UP (ref 70–99)
GLUCOSE SERPL-MCNC: 89 MG/DL — SIGNIFICANT CHANGE UP (ref 70–99)
HCT VFR BLD CALC: 33.1 % — LOW (ref 39–50)
HCT VFR BLD CALC: 33.1 % — LOW (ref 39–50)
HGB BLD-MCNC: 9.6 G/DL — LOW (ref 13–17)
HGB BLD-MCNC: 9.6 G/DL — LOW (ref 13–17)
MCHC RBC-ENTMCNC: 26.7 PG — LOW (ref 27–34)
MCHC RBC-ENTMCNC: 26.7 PG — LOW (ref 27–34)
MCHC RBC-ENTMCNC: 29 G/DL — LOW (ref 32–36)
MCHC RBC-ENTMCNC: 29 G/DL — LOW (ref 32–36)
MCV RBC AUTO: 91.9 FL — SIGNIFICANT CHANGE UP (ref 80–100)
MCV RBC AUTO: 91.9 FL — SIGNIFICANT CHANGE UP (ref 80–100)
NRBC # BLD: 0 /100 WBCS — SIGNIFICANT CHANGE UP (ref 0–0)
NRBC # BLD: 0 /100 WBCS — SIGNIFICANT CHANGE UP (ref 0–0)
PLATELET # BLD AUTO: 195 K/UL — SIGNIFICANT CHANGE UP (ref 150–400)
PLATELET # BLD AUTO: 195 K/UL — SIGNIFICANT CHANGE UP (ref 150–400)
POTASSIUM SERPL-MCNC: 4.2 MMOL/L — SIGNIFICANT CHANGE UP (ref 3.5–5.3)
POTASSIUM SERPL-MCNC: 4.2 MMOL/L — SIGNIFICANT CHANGE UP (ref 3.5–5.3)
POTASSIUM SERPL-SCNC: 4.2 MMOL/L — SIGNIFICANT CHANGE UP (ref 3.5–5.3)
POTASSIUM SERPL-SCNC: 4.2 MMOL/L — SIGNIFICANT CHANGE UP (ref 3.5–5.3)
RBC # BLD: 3.6 M/UL — LOW (ref 4.2–5.8)
RBC # BLD: 3.6 M/UL — LOW (ref 4.2–5.8)
RBC # FLD: 17.2 % — HIGH (ref 10.3–14.5)
RBC # FLD: 17.2 % — HIGH (ref 10.3–14.5)
SODIUM SERPL-SCNC: 141 MMOL/L — SIGNIFICANT CHANGE UP (ref 135–145)
SODIUM SERPL-SCNC: 141 MMOL/L — SIGNIFICANT CHANGE UP (ref 135–145)
VANCOMYCIN TROUGH SERPL-MCNC: 13 UG/ML — SIGNIFICANT CHANGE UP (ref 10–20)
VANCOMYCIN TROUGH SERPL-MCNC: 13 UG/ML — SIGNIFICANT CHANGE UP (ref 10–20)
WBC # BLD: 9.58 K/UL — SIGNIFICANT CHANGE UP (ref 3.8–10.5)
WBC # BLD: 9.58 K/UL — SIGNIFICANT CHANGE UP (ref 3.8–10.5)
WBC # FLD AUTO: 9.58 K/UL — SIGNIFICANT CHANGE UP (ref 3.8–10.5)
WBC # FLD AUTO: 9.58 K/UL — SIGNIFICANT CHANGE UP (ref 3.8–10.5)

## 2023-12-17 PROCEDURE — 99232 SBSQ HOSP IP/OBS MODERATE 35: CPT

## 2023-12-17 RX ADMIN — Medication 650 MILLIGRAM(S): at 11:13

## 2023-12-17 RX ADMIN — GABAPENTIN 800 MILLIGRAM(S): 400 CAPSULE ORAL at 05:28

## 2023-12-17 RX ADMIN — MEROPENEM 100 MILLIGRAM(S): 1 INJECTION INTRAVENOUS at 05:27

## 2023-12-17 RX ADMIN — NYSTATIN CREAM 1 APPLICATION(S): 100000 CREAM TOPICAL at 05:30

## 2023-12-17 RX ADMIN — QUETIAPINE FUMARATE 400 MILLIGRAM(S): 200 TABLET, FILM COATED ORAL at 21:01

## 2023-12-17 RX ADMIN — AMLODIPINE BESYLATE 2.5 MILLIGRAM(S): 2.5 TABLET ORAL at 05:28

## 2023-12-17 RX ADMIN — QUETIAPINE FUMARATE 100 MILLIGRAM(S): 200 TABLET, FILM COATED ORAL at 17:37

## 2023-12-17 RX ADMIN — GABAPENTIN 800 MILLIGRAM(S): 400 CAPSULE ORAL at 21:01

## 2023-12-17 RX ADMIN — Medication 1 TABLET(S): at 11:25

## 2023-12-17 RX ADMIN — MEROPENEM 100 MILLIGRAM(S): 1 INJECTION INTRAVENOUS at 14:08

## 2023-12-17 RX ADMIN — LIDOCAINE 1 PATCH: 4 CREAM TOPICAL at 23:13

## 2023-12-17 RX ADMIN — Medication 600 MILLIGRAM(S): at 05:27

## 2023-12-17 RX ADMIN — Medication 3 MILLILITER(S): at 17:03

## 2023-12-17 RX ADMIN — NYSTATIN CREAM 1 APPLICATION(S): 100000 CREAM TOPICAL at 18:29

## 2023-12-17 RX ADMIN — MEROPENEM 100 MILLIGRAM(S): 1 INJECTION INTRAVENOUS at 21:01

## 2023-12-17 RX ADMIN — HYDROMORPHONE HYDROCHLORIDE 2 MILLIGRAM(S): 2 INJECTION INTRAMUSCULAR; INTRAVENOUS; SUBCUTANEOUS at 21:00

## 2023-12-17 RX ADMIN — Medication 3 MILLILITER(S): at 05:40

## 2023-12-17 RX ADMIN — HYDROMORPHONE HYDROCHLORIDE 2 MILLIGRAM(S): 2 INJECTION INTRAMUSCULAR; INTRAVENOUS; SUBCUTANEOUS at 22:00

## 2023-12-17 RX ADMIN — LIDOCAINE 1 PATCH: 4 CREAM TOPICAL at 11:14

## 2023-12-17 RX ADMIN — Medication 100 MILLIGRAM(S): at 11:14

## 2023-12-17 RX ADMIN — Medication 250 MILLIGRAM(S): at 17:37

## 2023-12-17 RX ADMIN — Medication 3 MILLILITER(S): at 23:39

## 2023-12-17 RX ADMIN — POLYETHYLENE GLYCOL 3350 17 GRAM(S): 17 POWDER, FOR SOLUTION ORAL at 11:25

## 2023-12-17 RX ADMIN — CYCLOBENZAPRINE HYDROCHLORIDE 10 MILLIGRAM(S): 10 TABLET, FILM COATED ORAL at 21:01

## 2023-12-17 RX ADMIN — CYCLOBENZAPRINE HYDROCHLORIDE 10 MILLIGRAM(S): 10 TABLET, FILM COATED ORAL at 05:28

## 2023-12-17 RX ADMIN — Medication 5 MILLIGRAM(S): at 21:14

## 2023-12-17 RX ADMIN — CHLORHEXIDINE GLUCONATE 1 APPLICATION(S): 213 SOLUTION TOPICAL at 05:31

## 2023-12-17 RX ADMIN — QUETIAPINE FUMARATE 100 MILLIGRAM(S): 200 TABLET, FILM COATED ORAL at 09:06

## 2023-12-17 RX ADMIN — Medication 650 MILLIGRAM(S): at 12:13

## 2023-12-17 RX ADMIN — ZINC SULFATE TAB 220 MG (50 MG ZINC EQUIVALENT) 220 MILLIGRAM(S): 220 (50 ZN) TAB at 11:14

## 2023-12-17 RX ADMIN — Medication 3 MILLILITER(S): at 11:06

## 2023-12-17 RX ADMIN — Medication 1 APPLICATION(S): at 11:14

## 2023-12-17 RX ADMIN — PANTOPRAZOLE SODIUM 40 MILLIGRAM(S): 20 TABLET, DELAYED RELEASE ORAL at 05:29

## 2023-12-17 RX ADMIN — FINASTERIDE 5 MILLIGRAM(S): 5 TABLET, FILM COATED ORAL at 11:14

## 2023-12-17 RX ADMIN — Medication 500 MILLIGRAM(S): at 11:14

## 2023-12-17 RX ADMIN — CYCLOBENZAPRINE HYDROCHLORIDE 10 MILLIGRAM(S): 10 TABLET, FILM COATED ORAL at 14:06

## 2023-12-17 RX ADMIN — METHADONE HYDROCHLORIDE 60 MILLIGRAM(S): 40 TABLET ORAL at 17:37

## 2023-12-17 RX ADMIN — Medication 1 TABLET(S): at 09:06

## 2023-12-17 RX ADMIN — METHADONE HYDROCHLORIDE 60 MILLIGRAM(S): 40 TABLET ORAL at 05:26

## 2023-12-17 RX ADMIN — LIDOCAINE 1 PATCH: 4 CREAM TOPICAL at 19:21

## 2023-12-17 RX ADMIN — SENNA PLUS 2 TABLET(S): 8.6 TABLET ORAL at 21:14

## 2023-12-17 RX ADMIN — HYDROMORPHONE HYDROCHLORIDE 2 MILLIGRAM(S): 2 INJECTION INTRAMUSCULAR; INTRAVENOUS; SUBCUTANEOUS at 11:14

## 2023-12-17 RX ADMIN — GABAPENTIN 800 MILLIGRAM(S): 400 CAPSULE ORAL at 14:06

## 2023-12-17 RX ADMIN — Medication 5 MILLIGRAM(S): at 09:03

## 2023-12-17 RX ADMIN — TAMSULOSIN HYDROCHLORIDE 0.4 MILLIGRAM(S): 0.4 CAPSULE ORAL at 21:01

## 2023-12-17 RX ADMIN — Medication 1 TABLET(S): at 18:25

## 2023-12-17 RX ADMIN — ATORVASTATIN CALCIUM 40 MILLIGRAM(S): 80 TABLET, FILM COATED ORAL at 21:01

## 2023-12-17 RX ADMIN — Medication 600 MILLIGRAM(S): at 17:37

## 2023-12-17 RX ADMIN — Medication 250 MILLIGRAM(S): at 06:43

## 2023-12-17 RX ADMIN — HYDROMORPHONE HYDROCHLORIDE 2 MILLIGRAM(S): 2 INJECTION INTRAMUSCULAR; INTRAVENOUS; SUBCUTANEOUS at 11:45

## 2023-12-17 NOTE — PROGRESS NOTE ADULT - NUTRITIONAL ASSESSMENT
This patient has been assessed with a concern for Malnutrition and has been determined to have a diagnosis/diagnoses of Moderate protein-calorie malnutrition.    This patient is being managed with:   Diet Regular-  1000mL Fluid Restriction ([id number])  Liquid Protein Supplement     Qty per Day:  1  Supplement Feeding Modality:  Oral  Ensure Plus High Protein Cans or Servings Per Day:  1       Frequency:  Two Times a day  Entered: Dec 13 2023  2:13PM   This patient has been assessed with a concern for Malnutrition and has been determined to have a diagnosis/diagnoses of Moderate protein-calorie malnutrition.    This patient is being managed with:   Diet Regular-  1000mL Fluid Restriction ([id number])  Liquid Protein Supplement     Qty per Day:  1  Supplement Feeding Modality:  Oral  Ensure Plus High Protein Cans or Servings Per Day:  1       Frequency:  Two Times a day  Entered: Dec 13 2023  2:13PM

## 2023-12-17 NOTE — PROGRESS NOTE ADULT - ASSESSMENT
Gonzalez Stewart is a 53 year old male with PMHx of cervical epidural abscess (s/p decompressive laminectomy 3/2021 c/b b/l leg paralysis), hx of pneumoperitoneum (s/p ex lap, total abdominal colectomy and end ileostomy (on 1/12/23) c/b evisceration and sepsis with MRSA bacteremia postoperatively), hx of hepatitis C, hx of R. buttock abscess, hx of L. foot osteomyelitis, neurogenic bladder (with indwelling Holcomb catheter, last exchanged two days ago), HTN, HLD, bipolar disorder, GERD, hx of opioid dependence, and constipation who presented to the ED on 12/4/23 from [facility] for feet infection and admitted for sepsis secondary to bilateral feet infection.      Sepsis secondary to b/l feet infection  Previously treated for L. hallux OM with L. heel culture growing Proteus mirabilis ESBL, completed 6-week course of avycaz  U/A (sample obtained from Holcomb catheter) with positive nitrites, moderate leuks, moderate blood, WBC 26-50, RBC > 50, moderate bacteria, squamous epithelial cells present  CT b/l LE with study is severely limited by contracture. Left lower extremity is only partially visualized with exclusion of the left foot. Skin induration and subcutaneous edema in the leg and ankle.  No soft tissue gas  S/p bedside escharectomy by podiatry  Empirically started on vancomycin and cefepime   right foot wound cx- ESBL proteus - resistant to cefepime and corynebecaterium and alcalegenes  left foot wound cx- MRSA and Pseudomonas  and klebsiella   Blood cx- neg x 2  PICC line in place ertapenem switched to meropenem   Will continue meropenem and vancomycin until 1/8   ECHO EF 55-60%, normal systolic function, and mild mitral regurgitation   Most recent CXR with interstitial lung disease, however nothing acute.  Vascular following- possible AKA     COPD  Baseline patient is on 5L O2 NC at home   Had an episode of resp distress from fluid overload from IVF- Placed on lasix drip for some time with improvement  Continue PO lasix   Echo WNL    HTN  PTA amlodipine 2.5 mg - bp stable    HLD  PTA atorvastatin 40 mg    Bipolar disorder  PTA quetiapine 100 mg BID and 400 mg qhs, valproic acid stopped   Psych following, appreciate recs     Hx of opioid dependence  PTA methadone 110 mg     Chronic pain: PTA lidocaine 4% patch, duloxetine 30 mg DR, gabapentin 600 mg q8, cyclobenzaprine 10 mg BID, oxycodone 5 mg q6    GERD:   PTA pantoprazole 40 mg DR    Neurogenic bladder (with indwelling Holcomb catheter):   PTA finasteride 5 mg, tamsulosin 0.4 mg    Constipation:   PTA senna 8.6 mg 2 tabs qhs     functional quad-   supportive care  , frequent repositioning    Rt hip pressure wound, seen by vascular ,   wound recs in place     RCRI 0 Points: Class 1 Risk 3.9% - 30 day risk of death, MI, or cardiac arrest.   No history of MI, CVA, TIA. Echo with normal systolic function, mild mitral regurgitation.   Patient with COPD, however he is at his baseline, currently using 5L NC, which is what he uses at home as well. Most recent chest XR without any acute abnormalities.   Reached out to cardiology for cardiac clearance, Dr. Berry. He stated that he didn't think patient warranted cardiac clearance.   No absolute contraindications to proceed with procedure under anesthesia . He has had surgeries in the past and never had an adverse reaction to general anesthesia.   Given his chronic medical conditions, patient is medically optimized for planned procedure.      Gonzalez Stewart is a 53 year old male with PMHx of cervical epidural abscess (s/p decompressive laminectomy 3/2021 c/b b/l leg paralysis), hx of pneumoperitoneum (s/p ex lap, total abdominal colectomy and end ileostomy (on 1/12/23) c/b evisceration and sepsis with MRSA bacteremia postoperatively), hx of hepatitis C, hx of R. buttock abscess, hx of L. foot osteomyelitis, neurogenic bladder (with indwelling Holcomb catheter, last exchanged two days ago), HTN, HLD, bipolar disorder, GERD, hx of opioid dependence, and constipation who presented to the ED on 12/4/23 from [facility] for feet infection and admitted for sepsis secondary to bilateral feet infection.      Sepsis secondary to b/l feet infection  Previously treated for L. hallux OM with L. heel culture growing Proteus mirabilis ESBL, completed 6-week course of avycaz  U/A (sample obtained from Holcomb catheter) with positive nitrites, moderate leuks, moderate blood, WBC 26-50, RBC > 50, moderate bacteria, squamous epithelial cells present  CT b/l LE with study is severely limited by contracture. Left lower extremity is only partially visualized with exclusion of the left foot. Skin induration and subcutaneous edema in the leg and ankle.  No soft tissue gas  S/p bedside escharectomy by podiatry  Empirically started on vancomycin and cefepime   right foot wound cx- ESBL proteus - resistant to cefepime and corynebecaterium and alcalegenes  left foot wound cx- MRSA and Pseudomonas  and klebsiella   Blood cx- neg x 2  PICC line in place ertapenem switched to meropenem   Will continue meropenem and vancomycin until 1/8   ECHO EF 55-60%, normal systolic function, and mild mitral regurgitation   Most recent CXR with interstitial lung disease, however nothing acute.  Vascular following- possible AKA     COPD  Baseline patient is on 5L O2 NC at home   Had an episode of resp distress from fluid overload from IVF- Placed on lasix drip for some time with improvement  Continue PO lasix   Echo WNL    HTN  PTA amlodipine 2.5 mg - bp stable    HLD  PTA atorvastatin 40 mg    Bipolar disorder  PTA quetiapine 100 mg BID and 400 mg qhs, valproic acid stopped   Psych following, appreciate recs     Hx of opioid dependence  PTA methadone 110 mg     Chronic pain: PTA lidocaine 4% patch, duloxetine 30 mg DR, gabapentin 600 mg q8, cyclobenzaprine 10 mg BID, oxycodone 5 mg q6    GERD:   PTA pantoprazole 40 mg DR    Neurogenic bladder (with indwelling Holcomb catheter):   PTA finasteride 5 mg, tamsulosin 0.4 mg    Constipation:   PTA senna 8.6 mg 2 tabs qhs     functional quad-   supportive care  , frequent repositioning    Rt hip pressure wound, seen by vascular ,   wound recs in place     RCRI 0 Points: Class 1 Risk 3.9% - 30 day risk of death, MI, or cardiac arrest.   No history of MI, CVA, TIA. Echo with normal systolic function, mild mitral regurgitation.   Patient with COPD, however he is at his baseline, currently using 5L NC, which is what he uses at home as well. Most recent chest XR without any acute abnormalities.   Reached out to cardiology for cardiac clearance, Dr. Berry. He stated that he didn't think patient warranted cardiac clearance.   No absolute contraindications to proceed with procedure under anesthesia . He has had surgeries in the past and never had an adverse reaction to general anesthesia.   Given his chronic medical conditions, patient is medically optimized for planned procedure.

## 2023-12-17 NOTE — PROGRESS NOTE ADULT - SUBJECTIVE AND OBJECTIVE BOX
Patient is a 53y old  Male who presents with a chief complaint of Sepsis secondary to b/l foot wounds (17 Dec 2023 12:52)      INTERVAL HPI/OVERNIGHT EVENTS: Overnight no acute events. No complaints this AM. Eating breakfast.     MEDICATIONS  (STANDING):  acetaminophen     Tablet .. 650 milliGRAM(s) Oral daily  albuterol/ipratropium for Nebulization 3 milliLiter(s) Nebulizer every 6 hours  amLODIPine   Tablet 2.5 milliGRAM(s) Oral daily  ascorbic acid 500 milliGRAM(s) Oral daily  atorvastatin 40 milliGRAM(s) Oral at bedtime  bacitracin   Ointment 1 Application(s) Topical daily  chlorhexidine 2% Cloths 1 Application(s) Topical <User Schedule>  collagenase Ointment 1 Application(s) Topical daily  cyclobenzaprine 10 milliGRAM(s) Oral three times a day  enoxaparin Injectable 40 milliGRAM(s) SubCutaneous every 24 hours  finasteride 5 milliGRAM(s) Oral daily  gabapentin 800 milliGRAM(s) Oral every 8 hours  guaiFENesin  milliGRAM(s) Oral every 12 hours  lactobacillus acidophilus 1 Tablet(s) Oral two times a day with meals  lidocaine   4% Patch 1 Patch Transdermal daily  meropenem  IVPB 1000 milliGRAM(s) IV Intermittent every 8 hours  methadone  Concentrate 60 milliGRAM(s) Oral two times a day  methylphenidate 5 milliGRAM(s) Oral <User Schedule>  multivitamin 1 Tablet(s) Oral daily  nystatin Powder 1 Application(s) Topical two times a day  pantoprazole    Tablet 40 milliGRAM(s) Oral before breakfast  polyethylene glycol 3350 17 Gram(s) Oral daily  QUEtiapine 400 milliGRAM(s) Oral at bedtime  QUEtiapine 100 milliGRAM(s) Oral <User Schedule>  senna 2 Tablet(s) Oral at bedtime  tamsulosin 0.4 milliGRAM(s) Oral at bedtime  thiamine 100 milliGRAM(s) Oral daily  vancomycin  IVPB 1000 milliGRAM(s) IV Intermittent every 12 hours  zinc sulfate 220 milliGRAM(s) Oral daily    MEDICATIONS  (PRN):  acetaminophen     Tablet .. 650 milliGRAM(s) Oral every 6 hours PRN Temp greater or equal to 38C (100.4F), Mild Pain (1 - 3)  albuterol    90 MICROgram(s) HFA Inhaler 2 Puff(s) Inhalation every 4 hours PRN Shortness of Breath and/or Wheezing  aluminum hydroxide/magnesium hydroxide/simethicone Suspension 30 milliLiter(s) Oral every 4 hours PRN Dyspepsia  HYDROmorphone  Injectable 2 milliGRAM(s) IV Push every 4 hours PRN Severe Pain (7 - 10)  melatonin 3 milliGRAM(s) Oral at bedtime PRN Insomnia  sodium chloride 0.9% lock flush 10 milliLiter(s) IV Push every 1 hour PRN Pre/post blood products, medications, blood draw, and to maintain line patency      Allergies    NSAIDs (Flushing; Other (Moderate))  Haldol (Anaphylaxis)  Zyprexa (Rash; Dystonic RXN; Hives)  Motrin (Anaphylaxis)  Thorazine (Other (Moderate))  Aleve (Unknown)  Stelazine (Unknown)  Risperdal (Short breath; Rash; Hives)    Intolerances        REVIEW OF SYSTEMS:  10 point ROS negative, unless stated otherwise.    Vital Signs Last 24 Hrs  T(C): 36.8 (17 Dec 2023 11:13), Max: 37 (16 Dec 2023 23:30)  T(F): 98.2 (17 Dec 2023 11:13), Max: 98.6 (16 Dec 2023 23:30)  HR: 95 (17 Dec 2023 11:13) (62 - 99)  BP: 116/67 (17 Dec 2023 11:13) (113/71 - 128/72)  BP(mean): --  RR: 16 (17 Dec 2023 11:13) (16 - 18)  SpO2: 92% (17 Dec 2023 11:13) (92% - 98%)    Parameters below as of 17 Dec 2023 11:13  Patient On (Oxygen Delivery Method): nasal cannula        PHYSICAL EXAM:    GENERAL: NAD  HEAD:  Atraumatic, Normocephalic  EYES: EOMI, sclera anicteric  ENMT: Moist mucous membranes  NECK: Supple, No JVD  NERVOUS SYSTEM:  Alert & Oriented, No FND appreciated   CHEST/LUNG: CTAB   HEART: RRR  ABDOMEN: Soft, Nontender, midline incisional scar from previous surgery , colostomy bad in place  EXTREMITIES: Contracted, pitting edema, wounds bilaterally       LABS:                        9.6    9.58  )-----------( 195      ( 17 Dec 2023 05:30 )             33.1     12-17    141  |  107  |  16  ----------------------------<  89  4.2   |  31  |  0.60    Ca    9.2      17 Dec 2023 05:30        Urinalysis Basic - ( 17 Dec 2023 05:30 )    Color: x / Appearance: x / SG: x / pH: x  Gluc: 89 mg/dL / Ketone: x  / Bili: x / Urobili: x   Blood: x / Protein: x / Nitrite: x   Leuk Esterase: x / RBC: x / WBC x   Sq Epi: x / Non Sq Epi: x / Bacteria: x      CAPILLARY BLOOD GLUCOSE          RADIOLOGY & ADDITIONAL TESTS:    Imaging Personally Reviewed:  [ X] YES  [ ] NO    Consultant(s) Notes Reviewed:  [ X] YES  [ ] NO    Care Discussed with Consultants/Other Providers [X ] YES  [ ] NO

## 2023-12-18 DIAGNOSIS — R53.2 FUNCTIONAL QUADRIPLEGIA: ICD-10-CM

## 2023-12-18 LAB
ANION GAP SERPL CALC-SCNC: 2 MMOL/L — LOW (ref 5–17)
ANION GAP SERPL CALC-SCNC: 2 MMOL/L — LOW (ref 5–17)
BUN SERPL-MCNC: 15 MG/DL — SIGNIFICANT CHANGE UP (ref 7–23)
BUN SERPL-MCNC: 15 MG/DL — SIGNIFICANT CHANGE UP (ref 7–23)
CALCIUM SERPL-MCNC: 8.8 MG/DL — SIGNIFICANT CHANGE UP (ref 8.5–10.1)
CALCIUM SERPL-MCNC: 8.8 MG/DL — SIGNIFICANT CHANGE UP (ref 8.5–10.1)
CHLORIDE SERPL-SCNC: 108 MMOL/L — SIGNIFICANT CHANGE UP (ref 96–108)
CHLORIDE SERPL-SCNC: 108 MMOL/L — SIGNIFICANT CHANGE UP (ref 96–108)
CO2 SERPL-SCNC: 33 MMOL/L — HIGH (ref 22–31)
CO2 SERPL-SCNC: 33 MMOL/L — HIGH (ref 22–31)
CREAT SERPL-MCNC: 0.46 MG/DL — LOW (ref 0.5–1.3)
CREAT SERPL-MCNC: 0.46 MG/DL — LOW (ref 0.5–1.3)
EGFR: 126 ML/MIN/1.73M2 — SIGNIFICANT CHANGE UP
EGFR: 126 ML/MIN/1.73M2 — SIGNIFICANT CHANGE UP
GLUCOSE SERPL-MCNC: 91 MG/DL — SIGNIFICANT CHANGE UP (ref 70–99)
GLUCOSE SERPL-MCNC: 91 MG/DL — SIGNIFICANT CHANGE UP (ref 70–99)
HCT VFR BLD CALC: 32.6 % — LOW (ref 39–50)
HCT VFR BLD CALC: 32.6 % — LOW (ref 39–50)
HGB BLD-MCNC: 9.6 G/DL — LOW (ref 13–17)
HGB BLD-MCNC: 9.6 G/DL — LOW (ref 13–17)
MCHC RBC-ENTMCNC: 27.4 PG — SIGNIFICANT CHANGE UP (ref 27–34)
MCHC RBC-ENTMCNC: 27.4 PG — SIGNIFICANT CHANGE UP (ref 27–34)
MCHC RBC-ENTMCNC: 29.4 G/DL — LOW (ref 32–36)
MCHC RBC-ENTMCNC: 29.4 G/DL — LOW (ref 32–36)
MCV RBC AUTO: 93.1 FL — SIGNIFICANT CHANGE UP (ref 80–100)
MCV RBC AUTO: 93.1 FL — SIGNIFICANT CHANGE UP (ref 80–100)
NRBC # BLD: 0 /100 WBCS — SIGNIFICANT CHANGE UP (ref 0–0)
NRBC # BLD: 0 /100 WBCS — SIGNIFICANT CHANGE UP (ref 0–0)
PLATELET # BLD AUTO: 189 K/UL — SIGNIFICANT CHANGE UP (ref 150–400)
PLATELET # BLD AUTO: 189 K/UL — SIGNIFICANT CHANGE UP (ref 150–400)
POTASSIUM SERPL-MCNC: 4.2 MMOL/L — SIGNIFICANT CHANGE UP (ref 3.5–5.3)
POTASSIUM SERPL-MCNC: 4.2 MMOL/L — SIGNIFICANT CHANGE UP (ref 3.5–5.3)
POTASSIUM SERPL-SCNC: 4.2 MMOL/L — SIGNIFICANT CHANGE UP (ref 3.5–5.3)
POTASSIUM SERPL-SCNC: 4.2 MMOL/L — SIGNIFICANT CHANGE UP (ref 3.5–5.3)
RBC # BLD: 3.5 M/UL — LOW (ref 4.2–5.8)
RBC # BLD: 3.5 M/UL — LOW (ref 4.2–5.8)
RBC # FLD: 17.3 % — HIGH (ref 10.3–14.5)
RBC # FLD: 17.3 % — HIGH (ref 10.3–14.5)
SODIUM SERPL-SCNC: 143 MMOL/L — SIGNIFICANT CHANGE UP (ref 135–145)
SODIUM SERPL-SCNC: 143 MMOL/L — SIGNIFICANT CHANGE UP (ref 135–145)
WBC # BLD: 8.64 K/UL — SIGNIFICANT CHANGE UP (ref 3.8–10.5)
WBC # BLD: 8.64 K/UL — SIGNIFICANT CHANGE UP (ref 3.8–10.5)
WBC # FLD AUTO: 8.64 K/UL — SIGNIFICANT CHANGE UP (ref 3.8–10.5)
WBC # FLD AUTO: 8.64 K/UL — SIGNIFICANT CHANGE UP (ref 3.8–10.5)

## 2023-12-18 PROCEDURE — 99232 SBSQ HOSP IP/OBS MODERATE 35: CPT

## 2023-12-18 PROCEDURE — 99223 1ST HOSP IP/OBS HIGH 75: CPT

## 2023-12-18 PROCEDURE — 99233 SBSQ HOSP IP/OBS HIGH 50: CPT

## 2023-12-18 RX ADMIN — SENNA PLUS 2 TABLET(S): 8.6 TABLET ORAL at 22:55

## 2023-12-18 RX ADMIN — METHADONE HYDROCHLORIDE 60 MILLIGRAM(S): 40 TABLET ORAL at 18:16

## 2023-12-18 RX ADMIN — HYDROMORPHONE HYDROCHLORIDE 2 MILLIGRAM(S): 2 INJECTION INTRAMUSCULAR; INTRAVENOUS; SUBCUTANEOUS at 23:10

## 2023-12-18 RX ADMIN — Medication 3 MILLILITER(S): at 13:08

## 2023-12-18 RX ADMIN — CYCLOBENZAPRINE HYDROCHLORIDE 10 MILLIGRAM(S): 10 TABLET, FILM COATED ORAL at 13:06

## 2023-12-18 RX ADMIN — HYDROMORPHONE HYDROCHLORIDE 2 MILLIGRAM(S): 2 INJECTION INTRAMUSCULAR; INTRAVENOUS; SUBCUTANEOUS at 08:40

## 2023-12-18 RX ADMIN — Medication 3 MILLILITER(S): at 05:37

## 2023-12-18 RX ADMIN — MEROPENEM 100 MILLIGRAM(S): 1 INJECTION INTRAVENOUS at 05:38

## 2023-12-18 RX ADMIN — AMLODIPINE BESYLATE 2.5 MILLIGRAM(S): 2.5 TABLET ORAL at 05:39

## 2023-12-18 RX ADMIN — Medication 1 TABLET(S): at 13:07

## 2023-12-18 RX ADMIN — Medication 1 TABLET(S): at 08:21

## 2023-12-18 RX ADMIN — HYDROMORPHONE HYDROCHLORIDE 2 MILLIGRAM(S): 2 INJECTION INTRAMUSCULAR; INTRAVENOUS; SUBCUTANEOUS at 13:06

## 2023-12-18 RX ADMIN — HYDROMORPHONE HYDROCHLORIDE 2 MILLIGRAM(S): 2 INJECTION INTRAMUSCULAR; INTRAVENOUS; SUBCUTANEOUS at 18:47

## 2023-12-18 RX ADMIN — HYDROMORPHONE HYDROCHLORIDE 2 MILLIGRAM(S): 2 INJECTION INTRAMUSCULAR; INTRAVENOUS; SUBCUTANEOUS at 22:53

## 2023-12-18 RX ADMIN — MEROPENEM 100 MILLIGRAM(S): 1 INJECTION INTRAVENOUS at 13:06

## 2023-12-18 RX ADMIN — Medication 5 MILLIGRAM(S): at 09:32

## 2023-12-18 RX ADMIN — QUETIAPINE FUMARATE 100 MILLIGRAM(S): 200 TABLET, FILM COATED ORAL at 18:16

## 2023-12-18 RX ADMIN — TAMSULOSIN HYDROCHLORIDE 0.4 MILLIGRAM(S): 0.4 CAPSULE ORAL at 22:52

## 2023-12-18 RX ADMIN — GABAPENTIN 800 MILLIGRAM(S): 400 CAPSULE ORAL at 13:07

## 2023-12-18 RX ADMIN — PANTOPRAZOLE SODIUM 40 MILLIGRAM(S): 20 TABLET, DELAYED RELEASE ORAL at 05:39

## 2023-12-18 RX ADMIN — Medication 500 MILLIGRAM(S): at 13:07

## 2023-12-18 RX ADMIN — Medication 250 MILLIGRAM(S): at 18:15

## 2023-12-18 RX ADMIN — ENOXAPARIN SODIUM 40 MILLIGRAM(S): 100 INJECTION SUBCUTANEOUS at 22:57

## 2023-12-18 RX ADMIN — GABAPENTIN 800 MILLIGRAM(S): 400 CAPSULE ORAL at 22:53

## 2023-12-18 RX ADMIN — CHLORHEXIDINE GLUCONATE 1 APPLICATION(S): 213 SOLUTION TOPICAL at 05:39

## 2023-12-18 RX ADMIN — ATORVASTATIN CALCIUM 40 MILLIGRAM(S): 80 TABLET, FILM COATED ORAL at 22:53

## 2023-12-18 RX ADMIN — CYCLOBENZAPRINE HYDROCHLORIDE 10 MILLIGRAM(S): 10 TABLET, FILM COATED ORAL at 22:53

## 2023-12-18 RX ADMIN — Medication 250 MILLIGRAM(S): at 05:40

## 2023-12-18 RX ADMIN — Medication 3 MILLILITER(S): at 17:06

## 2023-12-18 RX ADMIN — NYSTATIN CREAM 1 APPLICATION(S): 100000 CREAM TOPICAL at 05:39

## 2023-12-18 RX ADMIN — HYDROMORPHONE HYDROCHLORIDE 2 MILLIGRAM(S): 2 INJECTION INTRAMUSCULAR; INTRAVENOUS; SUBCUTANEOUS at 08:21

## 2023-12-18 RX ADMIN — MEROPENEM 100 MILLIGRAM(S): 1 INJECTION INTRAVENOUS at 22:54

## 2023-12-18 RX ADMIN — FINASTERIDE 5 MILLIGRAM(S): 5 TABLET, FILM COATED ORAL at 13:07

## 2023-12-18 RX ADMIN — CYCLOBENZAPRINE HYDROCHLORIDE 10 MILLIGRAM(S): 10 TABLET, FILM COATED ORAL at 05:38

## 2023-12-18 RX ADMIN — Medication 650 MILLIGRAM(S): at 13:07

## 2023-12-18 RX ADMIN — Medication 100 MILLIGRAM(S): at 13:07

## 2023-12-18 RX ADMIN — QUETIAPINE FUMARATE 100 MILLIGRAM(S): 200 TABLET, FILM COATED ORAL at 09:31

## 2023-12-18 RX ADMIN — HYDROMORPHONE HYDROCHLORIDE 2 MILLIGRAM(S): 2 INJECTION INTRAMUSCULAR; INTRAVENOUS; SUBCUTANEOUS at 18:16

## 2023-12-18 RX ADMIN — GABAPENTIN 800 MILLIGRAM(S): 400 CAPSULE ORAL at 05:38

## 2023-12-18 RX ADMIN — QUETIAPINE FUMARATE 400 MILLIGRAM(S): 200 TABLET, FILM COATED ORAL at 22:52

## 2023-12-18 RX ADMIN — Medication 600 MILLIGRAM(S): at 18:16

## 2023-12-18 RX ADMIN — ZINC SULFATE TAB 220 MG (50 MG ZINC EQUIVALENT) 220 MILLIGRAM(S): 220 (50 ZN) TAB at 13:07

## 2023-12-18 RX ADMIN — Medication 1 TABLET(S): at 18:16

## 2023-12-18 RX ADMIN — HYDROMORPHONE HYDROCHLORIDE 2 MILLIGRAM(S): 2 INJECTION INTRAMUSCULAR; INTRAVENOUS; SUBCUTANEOUS at 14:00

## 2023-12-18 RX ADMIN — METHADONE HYDROCHLORIDE 60 MILLIGRAM(S): 40 TABLET ORAL at 05:45

## 2023-12-18 RX ADMIN — Medication 5 MILLIGRAM(S): at 23:56

## 2023-12-18 RX ADMIN — Medication 600 MILLIGRAM(S): at 05:39

## 2023-12-18 RX ADMIN — Medication 650 MILLIGRAM(S): at 14:00

## 2023-12-18 RX ADMIN — NYSTATIN CREAM 1 APPLICATION(S): 100000 CREAM TOPICAL at 18:18

## 2023-12-18 NOTE — CONSULT NOTE ADULT - CONSULT REQUESTED DATE/TIME
06-Dec-2023 09:47
18-Dec-2023 15:09
14-Dec-2023 13:30
04-Dec-2023 21:24
04-Dec-2023 17:33
11-Dec-2023 10:55

## 2023-12-18 NOTE — PROGRESS NOTE ADULT - PROBLEM SELECTOR PLAN 6
Palliative Medicine continues to follow for   ongoing symptom management, GOC discussions pending clinical course. Pending medical clearance for possible L AKA

## 2023-12-18 NOTE — CONSULT NOTE ADULT - TIME BILLING
review of laboratory data, radiology results, discussion with primary team\patient, and monitoring for potential decompensation. Interventions were performed as documented above
patient exam, chart review, coordination of care w team, family/pt

## 2023-12-18 NOTE — PROGRESS NOTE ADULT - PROBLEM SELECTOR PLAN 5
due to paraplegia. contracted and bedbound  SNF resident, requires full care with bathing, dressing and wound care

## 2023-12-18 NOTE — PROGRESS NOTE ADULT - PROBLEM SELECTOR PLAN 4
on chronic O2   per pt, worsening resp status on 12/12, with evidence of voume overload, s/p diuresis   Currently stable on O2 N/C

## 2023-12-18 NOTE — PROGRESS NOTE ADULT - SUBJECTIVE AND OBJECTIVE BOX
follow up on:  complex medical decision making related to goals of care      OVERNIGHT EVENTS:  Pt states spasms are slightly less,  pain remains with minor decrease  used 3 B/T Dilaudid doses in past  hrs.    Review of systems:     Pain:  [ x] yes [ ] no Pt states that his pain is presently 7/10 on pain scale. Received last dose DIlaudid 3.5 hrs ago Pain decreases to 4-5/10 with use of Dilaudid which is his acceptable level of pain.    Dyspnea:      denies                        Anxiety:         denies                      Depression:   denies  Fatigue:      denies                         Nausea:      denies                         Loss of appetite:    denies            Constipation:     denies             Diarrhea:     denies    All other systems reviewed and negative    MEDICATIONS  (STANDING):  acetaminophen     Tablet .. 650 milliGRAM(s) Oral daily  albuterol/ipratropium for Nebulization 3 milliLiter(s) Nebulizer every 6 hours  amLODIPine   Tablet 2.5 milliGRAM(s) Oral daily  ascorbic acid 500 milliGRAM(s) Oral daily  atorvastatin 40 milliGRAM(s) Oral at bedtime  bacitracin   Ointment 1 Application(s) Topical daily  chlorhexidine 2% Cloths 1 Application(s) Topical <User Schedule>  collagenase Ointment 1 Application(s) Topical daily  cyclobenzaprine 10 milliGRAM(s) Oral three times a day  enoxaparin Injectable 40 milliGRAM(s) SubCutaneous every 24 hours  finasteride 5 milliGRAM(s) Oral daily  gabapentin 800 milliGRAM(s) Oral every 8 hours  guaiFENesin  milliGRAM(s) Oral every 12 hours  lactobacillus acidophilus 1 Tablet(s) Oral two times a day with meals  lidocaine   4% Patch 1 Patch Transdermal daily  meropenem  IVPB 1000 milliGRAM(s) IV Intermittent every 8 hours  methadone  Concentrate 60 milliGRAM(s) Oral two times a day  methylphenidate 5 milliGRAM(s) Oral <User Schedule>  multivitamin 1 Tablet(s) Oral daily  nystatin Powder 1 Application(s) Topical two times a day  pantoprazole    Tablet 40 milliGRAM(s) Oral before breakfast  polyethylene glycol 3350 17 Gram(s) Oral daily  QUEtiapine 400 milliGRAM(s) Oral at bedtime  QUEtiapine 100 milliGRAM(s) Oral <User Schedule>  senna 2 Tablet(s) Oral at bedtime  tamsulosin 0.4 milliGRAM(s) Oral at bedtime  thiamine 100 milliGRAM(s) Oral daily  vancomycin  IVPB 1000 milliGRAM(s) IV Intermittent every 12 hours  zinc sulfate 220 milliGRAM(s) Oral daily    MEDICATIONS  (PRN):  acetaminophen     Tablet .. 650 milliGRAM(s) Oral every 6 hours PRN Temp greater or equal to 38C (100.4F), Mild Pain (1 - 3)  albuterol    90 MICROgram(s) HFA Inhaler 2 Puff(s) Inhalation every 4 hours PRN Shortness of Breath and/or Wheezing  aluminum hydroxide/magnesium hydroxide/simethicone Suspension 30 milliLiter(s) Oral every 4 hours PRN Dyspepsia  HYDROmorphone  Injectable 2 milliGRAM(s) IV Push every 4 hours PRN Severe Pain (7 - 10)  melatonin 3 milliGRAM(s) Oral at bedtime PRN Insomnia  sodium chloride 0.9% lock flush 10 milliLiter(s) IV Push every 1 hour PRN Pre/post blood products, medications, blood draw, and to maintain line patency    PHYSICAL EXAM:  Vital Signs Last 24 Hrs  T(C): 36.7 (18 Dec 2023 11:50), Max: 37.2 (18 Dec 2023 05:09)  T(F): 98 (18 Dec 2023 11:50), Max: 98.9 (18 Dec 2023 05:09)  HR: 91 (18 Dec 2023 11:50) (68 - 112)  BP: 111/66 (18 Dec 2023 11:50) (111/66 - 128/75)  BP(mean): --  RR: 18 (18 Dec 2023 11:50) (17 - 18)  SpO2: 92% (18 Dec 2023 11:50) (89% - 95%)    Parameters below as of 18 Dec 2023 11:50  Patient On (Oxygen Delivery Method): nasal cannula     Palliative Performance Scale/Karnofsky Score: 40%     General: ill appearing male in be with c/o pain  HEENT: normocephalic, atraumatic, poor dentition   Neck: supple, no JVD   CVS: S1 S2 RRR, tachycardic, no MRG   Resp: CTAB, no RRW  GI: soft, NT, nor R/G, + colostomy  : indwelling west in situ   Musc: severe BLE contractures    Neuro: oriented x 4, non focal, paraplegic  Psych: situationally depressed    Skin:   Posterolateral decubitus wound  at the level of the greater trochanter, Soft tissue wounds are  along the calcaneus and mid foot r heel osteo  Oral intake: excellent    LABS:                        9.6    8.64  )-----------( 189      ( 18 Dec 2023 08:14 )             32.6     12-18    143  |  108  |  15  ----------------------------<  91  4.2   |  33<H>  |  0.46<L>    Ca    8.8      18 Dec 2023 08:14      Urinalysis Basic - ( 18 Dec 2023 08:14 )    Color: x / Appearance: x / SG: x / pH: x  Gluc: 91 mg/dL / Ketone: x  / Bili: x / Urobili: x   Blood: x / Protein: x / Nitrite: x   Leuk Esterase: x / RBC: x / WBC x   Sq Epi: x / Non Sq Epi: x / Bacteria: x        RADIOLOGY & ADDITIONAL STUDIES: follow up on:  complex medical decision making related to goals of care      OVERNIGHT EVENTS:  Pt states spasms are slightly less,  pain remains with minor decrease  used 3 B/T Dilaudid doses in past  hrs.    Review of systems:     Pain:  [ x] yes [ ] no Pt states that his pain is presently 7/10 on pain scale. Received last dose DIlaudid 3.5 hrs ago Pain decreases to 4-5/10 with use of Dilaudid which is his acceptable level of pain.    Dyspnea:      denies                        Anxiety:         denies                      Depression:   denies  Fatigue:      denies                         Nausea:      denies                         Loss of appetite:    denies            Constipation:     denies             Diarrhea:     denies    All other systems reviewed and negative    MEDICATIONS  (STANDING):  acetaminophen     Tablet .. 650 milliGRAM(s) Oral daily  albuterol/ipratropium for Nebulization 3 milliLiter(s) Nebulizer every 6 hours  amLODIPine   Tablet 2.5 milliGRAM(s) Oral daily  ascorbic acid 500 milliGRAM(s) Oral daily  atorvastatin 40 milliGRAM(s) Oral at bedtime  bacitracin   Ointment 1 Application(s) Topical daily  chlorhexidine 2% Cloths 1 Application(s) Topical <User Schedule>  collagenase Ointment 1 Application(s) Topical daily  cyclobenzaprine 10 milliGRAM(s) Oral three times a day  enoxaparin Injectable 40 milliGRAM(s) SubCutaneous every 24 hours  finasteride 5 milliGRAM(s) Oral daily  gabapentin 800 milliGRAM(s) Oral every 8 hours  guaiFENesin  milliGRAM(s) Oral every 12 hours  lactobacillus acidophilus 1 Tablet(s) Oral two times a day with meals  lidocaine   4% Patch 1 Patch Transdermal daily  meropenem  IVPB 1000 milliGRAM(s) IV Intermittent every 8 hours  methadone  Concentrate 60 milliGRAM(s) Oral two times a day  methylphenidate 5 milliGRAM(s) Oral <User Schedule>  multivitamin 1 Tablet(s) Oral daily  nystatin Powder 1 Application(s) Topical two times a day  pantoprazole    Tablet 40 milliGRAM(s) Oral before breakfast  polyethylene glycol 3350 17 Gram(s) Oral daily  QUEtiapine 400 milliGRAM(s) Oral at bedtime  QUEtiapine 100 milliGRAM(s) Oral <User Schedule>  senna 2 Tablet(s) Oral at bedtime  tamsulosin 0.4 milliGRAM(s) Oral at bedtime  thiamine 100 milliGRAM(s) Oral daily  vancomycin  IVPB 1000 milliGRAM(s) IV Intermittent every 12 hours  zinc sulfate 220 milliGRAM(s) Oral daily    MEDICATIONS  (PRN):  acetaminophen     Tablet .. 650 milliGRAM(s) Oral every 6 hours PRN Temp greater or equal to 38C (100.4F), Mild Pain (1 - 3)  albuterol    90 MICROgram(s) HFA Inhaler 2 Puff(s) Inhalation every 4 hours PRN Shortness of Breath and/or Wheezing  aluminum hydroxide/magnesium hydroxide/simethicone Suspension 30 milliLiter(s) Oral every 4 hours PRN Dyspepsia  HYDROmorphone  Injectable 2 milliGRAM(s) IV Push every 4 hours PRN Severe Pain (7 - 10)  melatonin 3 milliGRAM(s) Oral at bedtime PRN Insomnia  sodium chloride 0.9% lock flush 10 milliLiter(s) IV Push every 1 hour PRN Pre/post blood products, medications, blood draw, and to maintain line patency    PHYSICAL EXAM:  Vital Signs Last 24 Hrs  T(C): 36.7 (18 Dec 2023 11:50), Max: 37.2 (18 Dec 2023 05:09)  T(F): 98 (18 Dec 2023 11:50), Max: 98.9 (18 Dec 2023 05:09)  HR: 91 (18 Dec 2023 11:50) (68 - 112)  BP: 111/66 (18 Dec 2023 11:50) (111/66 - 128/75)  BP(mean): --  RR: 18 (18 Dec 2023 11:50) (17 - 18)  SpO2: 92% (18 Dec 2023 11:50) (89% - 95%)    Parameters below as of 18 Dec 2023 11:50  Patient On (Oxygen Delivery Method): nasal cannula     Palliative Performance Scale/Karnofsky Score: 40%     General: ill appearing male in be with c/o pain  HEENT: normocephalic, atraumatic, poor dentition   Neck: supple, no JVD   CVS: S1 S2 RRR, tachycardic, no MRG   Resp: CTAB, no RRW  GI: soft, NT, nor R/G, + colostomy  : indwelling west in situ   Musc: severe BLE contractures    Neuro: oriented x 4, non focal, paraplegic  Psych: situationally depressed    Skin:   Posterolateral decubitus wound  at the level of the greater trochanter, Soft tissue wounds are  along the calcaneus and mid foot r heel osteo  Oral intake: excellent    LABS:                        9.6    8.64  )-----------( 189      ( 18 Dec 2023 08:14 )             32.6     12-18    143  |  108  |  15  ----------------------------<  91  4.2   |  33<H>  |  0.46<L>    Ca    8.8      18 Dec 2023 08:14      Urinalysis Basic - ( 18 Dec 2023 08:14 )    Color: x / Appearance: x / SG: x / pH: x  Gluc: 91 mg/dL / Ketone: x  / Bili: x / Urobili: x   Blood: x / Protein: x / Nitrite: x   Leuk Esterase: x / RBC: x / WBC x   Sq Epi: x / Non Sq Epi: x / Bacteria: x        RADIOLOGY & ADDITIONAL STUDIES:  ACC: [id number] EXAM:  ECHO TTE WO CON COMP W DOPP                          PROCEDURE DATE:  12/15/2023          INTERPRETATION:  TRANSTHORACIC ECHOCARDIOGRAM REPORT        Patient Name:   STAN VEGA Patient Location: [facility] Rec #:  [id number]    Accession #:      [id number]  Account #:      [id number]    Height:           74.0 in 188.0 cm  YOB: 1970     Weight:           200.6 lb 91.00 kg  Patient Age:    53 years      BSA:              2.18 m²  Patient Gender: M             BP:               114/68 mmHg      Date of Exam:        12/15/2023 9:46:14 AM  Sonographer:         NATALYA  Referring Physician: PHONG CHÁVEZ    Procedure:     2D Echo/Doppler/Color Doppler Complete.  Indications:   R60.9 - Edema, unspecified    Summary:   1. Left ventricular ejection fraction, by visual estimation, is 55 to   60%.   2. Normal global left ventricular systolic function.   3. Mild mitral valve regurgitation.     follow up on:  complex medical decision making related to goals of care      OVERNIGHT EVENTS:  Pt states spasms are slightly less,  pain remains with minor decrease  used 3 B/T Dilaudid doses in past  hrs.    Review of systems:     Pain:  [ x] yes [ ] no Pt states that his pain is presently 7/10 on pain scale. Received last dose DIlaudid 3.5 hrs ago Pain decreases to 4-5/10 with use of Dilaudid which is his acceptable level of pain.    Dyspnea:      denies                        Anxiety:         denies                      Depression:   denies  Fatigue:      denies                         Nausea:      denies                         Loss of appetite:    denies            Constipation:     denies             Diarrhea:     denies    All other systems reviewed and negative    MEDICATIONS  (STANDING):  acetaminophen     Tablet .. 650 milliGRAM(s) Oral daily  albuterol/ipratropium for Nebulization 3 milliLiter(s) Nebulizer every 6 hours  amLODIPine   Tablet 2.5 milliGRAM(s) Oral daily  ascorbic acid 500 milliGRAM(s) Oral daily  atorvastatin 40 milliGRAM(s) Oral at bedtime  bacitracin   Ointment 1 Application(s) Topical daily  chlorhexidine 2% Cloths 1 Application(s) Topical <User Schedule>  collagenase Ointment 1 Application(s) Topical daily  cyclobenzaprine 10 milliGRAM(s) Oral three times a day  enoxaparin Injectable 40 milliGRAM(s) SubCutaneous every 24 hours  finasteride 5 milliGRAM(s) Oral daily  gabapentin 800 milliGRAM(s) Oral every 8 hours  guaiFENesin  milliGRAM(s) Oral every 12 hours  lactobacillus acidophilus 1 Tablet(s) Oral two times a day with meals  lidocaine   4% Patch 1 Patch Transdermal daily  meropenem  IVPB 1000 milliGRAM(s) IV Intermittent every 8 hours  methadone  Concentrate 60 milliGRAM(s) Oral two times a day  methylphenidate 5 milliGRAM(s) Oral <User Schedule>  multivitamin 1 Tablet(s) Oral daily  nystatin Powder 1 Application(s) Topical two times a day  pantoprazole    Tablet 40 milliGRAM(s) Oral before breakfast  polyethylene glycol 3350 17 Gram(s) Oral daily  QUEtiapine 400 milliGRAM(s) Oral at bedtime  QUEtiapine 100 milliGRAM(s) Oral <User Schedule>  senna 2 Tablet(s) Oral at bedtime  tamsulosin 0.4 milliGRAM(s) Oral at bedtime  thiamine 100 milliGRAM(s) Oral daily  vancomycin  IVPB 1000 milliGRAM(s) IV Intermittent every 12 hours  zinc sulfate 220 milliGRAM(s) Oral daily    MEDICATIONS  (PRN):  acetaminophen     Tablet .. 650 milliGRAM(s) Oral every 6 hours PRN Temp greater or equal to 38C (100.4F), Mild Pain (1 - 3)  albuterol    90 MICROgram(s) HFA Inhaler 2 Puff(s) Inhalation every 4 hours PRN Shortness of Breath and/or Wheezing  aluminum hydroxide/magnesium hydroxide/simethicone Suspension 30 milliLiter(s) Oral every 4 hours PRN Dyspepsia  HYDROmorphone  Injectable 2 milliGRAM(s) IV Push every 4 hours PRN Severe Pain (7 - 10)  melatonin 3 milliGRAM(s) Oral at bedtime PRN Insomnia  sodium chloride 0.9% lock flush 10 milliLiter(s) IV Push every 1 hour PRN Pre/post blood products, medications, blood draw, and to maintain line patency    PHYSICAL EXAM:  Vital Signs Last 24 Hrs  T(C): 36.7 (18 Dec 2023 11:50), Max: 37.2 (18 Dec 2023 05:09)  T(F): 98 (18 Dec 2023 11:50), Max: 98.9 (18 Dec 2023 05:09)  HR: 91 (18 Dec 2023 11:50) (68 - 112)  BP: 111/66 (18 Dec 2023 11:50) (111/66 - 128/75)  BP(mean): --  RR: 18 (18 Dec 2023 11:50) (17 - 18)  SpO2: 92% (18 Dec 2023 11:50) (89% - 95%)    Parameters below as of 18 Dec 2023 11:50  Patient On (Oxygen Delivery Method): nasal cannula     Palliative Performance Scale/Karnofsky Score: 40%     General: ill appearing male in be with c/o pain  HEENT: normocephalic, atraumatic, poor dentition   Neck: supple, no JVD   CVS: S1 S2 RRR, tachycardic, no MRG   Resp: CTAB, no RRW  GI: soft, NT, nor R/G, + colostomy  : indwelling west in situ   Musc: severe BLE contractures    Neuro: oriented x 4, non focal, paraplegic  Psych: situationally depressed    Skin:   Posterolateral decubitus wound  at the level of the greater trochanter, Soft tissue wounds are  along the calcaneus and mid foot r heel osteo  Oral intake: excellent    LABS:                        9.6    8.64  )-----------( 189      ( 18 Dec 2023 08:14 )             32.6     12-18    143  |  108  |  15  ----------------------------<  91  4.2   |  33<H>  |  0.46<L>    Ca    8.8      18 Dec 2023 08:14      Urinalysis Basic - ( 18 Dec 2023 08:14 )    Color: x / Appearance: x / SG: x / pH: x  Gluc: 91 mg/dL / Ketone: x  / Bili: x / Urobili: x   Blood: x / Protein: x / Nitrite: x   Leuk Esterase: x / RBC: x / WBC x   Sq Epi: x / Non Sq Epi: x / Bacteria: x        RADIOLOGY & ADDITIONAL STUDIES:  ACC: [id number] EXAM:  ECHO TTE WO CON COMP W DOPP                          PROCEDURE DATE:  12/15/2023          INTERPRETATION:  TRANSTHORACIC ECHOCARDIOGRAM REPORT        Patient Name:   STAN VEGA Patient Location: [facility] Rec #:  [id number]    Accession #:      [id number]  Account #:      [id number]    Height:           74.0 in 188.0 cm  YOB: 1970     Weight:           200.6 lb 91.00 kg  Patient Age:    53 years      BSA:              2.18 m²  Patient Gender: M             BP:               114/68 mmHg      Date of Exam:        12/15/2023 9:46:14 AM  Sonographer:         NATALYA  Referring Physician: PHONG CHÁVEZ    Procedure:     2D Echo/Doppler/Color Doppler Complete.  Indications:   R60.9 - Edema, unspecified    Summary:   1. Left ventricular ejection fraction, by visual estimation, is 55 to   60%.   2. Normal global left ventricular systolic function.   3. Mild mitral valve regurgitation.

## 2023-12-18 NOTE — PROGRESS NOTE ADULT - ASSESSMENT
Gonzalez Stewart is a 53 year old male with PMHx of cervical epidural abscess (s/p decompressive laminectomy 3/2021 c/b b/l leg paralysis), hx of pneumoperitoneum (s/p ex lap, total abdominal colectomy and end ileostomy (on 1/12/23) c/b evisceration and sepsis with MRSA bacteremia postoperatively), hx of hepatitis C, hx of R. buttock abscess, hx of L. foot osteomyelitis, neurogenic bladder (with indwelling Holcomb catheter, last exchanged two days ago), HTN, HLD, bipolar disorder, GERD, hx of opioid dependence, and constipation who presented to the ED on 12/4/23 from [facility] for feet infection and admitted for sepsis secondary to bilateral feet infection.      Sepsis secondary to b/l feet infection  Previously treated for L. hallux OM with L. heel culture growing Proteus mirabilis ESBL, completed 6-week course of avycaz  U/A (sample obtained from Holcomb catheter) with positive nitrites, moderate leuks, moderate blood, WBC 26-50, RBC > 50, moderate bacteria, squamous epithelial cells present  CT b/l LE with study is severely limited by contracture. Left lower extremity is only partially visualized with exclusion of the left foot. Skin induration and subcutaneous edema in the leg and ankle.  No soft tissue gas  S/p bedside escharectomy by podiatry  Empirically started on vancomycin and cefepime   right foot wound cx- ESBL proteus - resistant to cefepime and corynebecaterium and alcalegenes  left foot wound cx- MRSA and Pseudomonas  and klebsiella   Blood cx- neg x 2  PICC line in place ertapenem switched to meropenem   Will continue meropenem and vancomycin until 1/8   ECHO EF 55-60%, normal systolic function, and mild mitral regurgitation   Most recent CXR with interstitial lung disease, however nothing acute.  Vascular following- possible AKA     COPD  Baseline patient is on 5L O2 NC at home   Had an episode of resp distress from fluid overload from IVF- Placed on lasix drip for some time with improvement  Continue PO lasix   Echo WNL    HTN  PTA amlodipine 2.5 mg - bp stable    HLD  PTA atorvastatin 40 mg    Bipolar disorder  PTA quetiapine 100 mg BID and 400 mg qhs, valproic acid stopped   Psych following, appreciate recs     Hx of opioid dependence  PTA methadone 110 mg     Chronic pain: PTA lidocaine 4% patch, duloxetine 30 mg DR, gabapentin 600 mg q8, cyclobenzaprine 10 mg BID, oxycodone 5 mg q6    GERD:   PTA pantoprazole 40 mg DR    Neurogenic bladder (with indwelling Holcomb catheter):   PTA finasteride 5 mg, tamsulosin 0.4 mg    Constipation:   PTA senna 8.6 mg 2 tabs qhs     functional quad-   supportive care  , frequent repositioning    Rt hip pressure wound, seen by vascular ,   wound recs in place     RCRI 0 Points: Class 1 Risk 3.9% - 30 day risk of death, MI, or cardiac arrest.   No history of MI, CVA, TIA. Echo with normal systolic function, mild mitral regurgitation.   Patient with COPD, however he is at his baseline, currently using 5L NC, which is what he uses at home as well. Most recent chest XR without any acute abnormalities.   Reached out to cardiology for cardiac clearance, Dr. Berry. He stated that he didn't think patient warranted cardiac clearance.   No absolute contraindications to proceed with procedure under anesthesia . He has had surgeries in the past and never had an adverse reaction to general anesthesia.   Given his chronic medical conditions, patient is medically optimized for planned procedure.      Gonzalez Stewart is a 53 year old male with PMHx of cervical epidural abscess (s/p decompressive laminectomy 3/2021 c/b b/l leg paralysis), hx of pneumoperitoneum (s/p ex lap, total abdominal colectomy and end ileostomy (on 1/12/23) c/b evisceration and sepsis with MRSA bacteremia postoperatively), hx of hepatitis C, hx of R. buttock abscess, hx of L. foot osteomyelitis, neurogenic bladder (with indwelling Holcomb catheter, last exchanged two days ago), HTN, HLD, bipolar disorder, GERD, hx of opioid dependence, and constipation who presented to the ED on 12/4/23 from [facility] for feet infection and admitted for sepsis secondary to bilateral feet infection.      Sepsis secondary to b/l feet infection  Previously treated for L. hallux OM with L. heel culture growing Proteus mirabilis ESBL, completed 6-week course of avycaz  U/A (sample obtained from Holcomb catheter) with positive nitrites, moderate leuks, moderate blood, WBC 26-50, RBC > 50, moderate bacteria, squamous epithelial cells present  CT b/l LE with study is severely limited by contracture. Left lower extremity is only partially visualized with exclusion of the left foot. Skin induration and subcutaneous edema in the leg and ankle.  No soft tissue gas  S/p bedside escharectomy by podiatry  Empirically started on vancomycin and cefepime   right foot wound cx- ESBL proteus - resistant to cefepime and corynebecaterium and alcalegenes  left foot wound cx- MRSA and Pseudomonas  and klebsiella   Blood cx- neg x 2  PICC line in place ertapenem switched to meropenem   Will continue meropenem and vancomycin until 1/8   ECHO EF 55-60%, normal systolic function, and mild mitral regurgitation   Most recent CXR with interstitial lung disease, however nothing acute.  Vascular following- possible AKA     COPD  Baseline patient is on 5L O2 NC at home   Had an episode of resp distress from fluid overload from IVF- Placed on lasix drip for some time with improvement  Continue PO lasix   Echo WNL  Consult pulm for pulm clearance for AKA    HTN  PTA amlodipine 2.5 mg - bp stable    HLD  PTA atorvastatin 40 mg    Bipolar disorder  PTA quetiapine 100 mg BID and 400 mg qhs, valproic acid stopped   Psych following, appreciate recs     Hx of opioid dependence  PTA methadone 110 mg     Chronic pain: PTA lidocaine 4% patch, duloxetine 30 mg DR, gabapentin 600 mg q8, cyclobenzaprine 10 mg BID, oxycodone 5 mg q6    GERD:   PTA pantoprazole 40 mg DR    Neurogenic bladder (with indwelling Holcomb catheter):   PTA finasteride 5 mg, tamsulosin 0.4 mg    Constipation:   PTA senna 8.6 mg 2 tabs qhs     functional quad-   supportive care  , frequent repositioning    Rt hip pressure wound, seen by vascular ,   wound recs in place     RCRI 0 Points: Class 1 Risk 3.9% - 30 day risk of death, MI, or cardiac arrest.   No history of MI, CVA, TIA. Echo with normal systolic function, mild mitral regurgitation.   Patient with COPD, however he is at his baseline, currently using 5L NC, which is what he uses at home as well. Most recent chest XR without any acute abnormalities.   Reached out to cardiology for cardiac clearance, Dr. Berry. He stated that he didn't think patient warranted cardiac clearance.   No absolute contraindications to proceed with procedure under anesthesia . He has had surgeries in the past and never had an adverse reaction to general anesthesia.   Given his chronic medical conditions, patient is medically optimized for planned procedure.      Gonzalez Stewart is a 53 year old male with PMHx of cervical epidural abscess (s/p decompressive laminectomy 3/2021 c/b b/l leg paralysis), hx of pneumoperitoneum (s/p ex lap, total abdominal colectomy and end ileostomy (on 1/12/23) c/b evisceration and sepsis with MRSA bacteremia postoperatively), hx of hepatitis C, hx of R. buttock abscess, hx of L. foot osteomyelitis, neurogenic bladder (with indwelling Holcomb catheter, last exchanged two days ago), HTN, HLD, bipolar disorder, GERD, hx of opioid dependence, and constipation who presented to the ED on 12/4/23 from [facility] for feet infection and admitted for sepsis secondary to bilateral feet infection.      Sepsis secondary to b/l feet infection  Previously treated for L. hallux OM with L. heel culture growing Proteus mirabilis ESBL, completed 6-week course of avycaz  U/A (sample obtained from Holcomb catheter) with positive nitrites, moderate leuks, moderate blood, WBC 26-50, RBC > 50, moderate bacteria, squamous epithelial cells present  CT b/l LE with study is severely limited by contracture. Left lower extremity is only partially visualized with exclusion of the left foot. Skin induration and subcutaneous edema in the leg and ankle.  No soft tissue gas  S/p bedside escharectomy by podiatry  Empirically started on vancomycin and cefepime   right foot wound cx- ESBL proteus - resistant to cefepime and corynebecaterium and alcalegenes  left foot wound cx- MRSA and Pseudomonas  and klebsiella   Blood cx- neg x 2  PICC line in place ertapenem switched to meropenem   Will continue meropenem and vancomycin until 1/8   ECHO EF 55-60%, normal systolic function, and mild mitral regurgitation   Most recent CXR with interstitial lung disease, however nothing acute.  Vascular following- possible AKA     COPD  Baseline patient is on 5L O2 NC at home   Had an episode of resp distress from fluid overload from IVF- Placed on lasix drip for some time with improvement  Continue PO lasix   Echo WNL  Consult pulm for pulm clearance for AKA    HTN  PTA amlodipine 2.5 mg - bp stable    HLD  PTA atorvastatin 40 mg    Bipolar disorder  PTA quetiapine 100 mg BID and 400 mg qhs, valproic acid stopped   Psych following, appreciate recs     Hx of opioid dependence  PTA methadone 110 mg     Chronic pain: PTA lidocaine 4% patch, duloxetine 30 mg DR, gabapentin 600 mg q8, cyclobenzaprine 10 mg BID, oxycodone 5 mg q6    GERD:   PTA pantoprazole 40 mg DR    Neurogenic bladder (with indwelling Holcomb catheter):   PTA finasteride 5 mg, tamsulosin 0.4 mg    Constipation:   PTA senna 8.6 mg 2 tabs qhs     functional quad-   supportive care  , frequent repositioning    Rt hip pressure wound, seen by vascular ,   wound recs in place     RCRI 0 Points: Class 1 Risk 3.9% - 30 day risk of death, MI, or cardiac arrest.   No history of MI, CVA, TIA. Echo with normal systolic function, mild mitral regurgitation.   Patient with COPD, however he is at his baseline, currently using 5L NC, which is what he uses at home as well. Most recent chest XR without any acute abnormalities.   Reached out to cardiology for cardiac clearance, Dr. Berry. He stated that he didn't think patient warranted cardiac clearance.   No absolute contraindications to proceed with procedure under anesthesia . He has had surgeries in the past and never had an adverse reaction to general anesthesia.   Given his chronic medical conditions, patient is medically optimized for planned procedure.

## 2023-12-18 NOTE — PROGRESS NOTE ADULT - NS ATTEND AMEND GEN_ALL_CORE FT
All labs and cultures and imaging and pertinent chart notes reviewed by me.    case d/w Np Ntay at length and agree with her assessment and plan.  Kush King MD  Infectious Disease Attending    for any questions please do not hesitate to contact me either via teams or by calling [phone] All labs and cultures and imaging and pertinent chart notes reviewed by me.    case d/w Np Naty at length and agree with her assessment and plan.  Kush King MD  Infectious Disease Attending    for any questions please do not hesitate to contact me either via teams or by calling [phone]

## 2023-12-18 NOTE — CONSULT NOTE ADULT - SUBJECTIVE AND OBJECTIVE BOX
HPI:  Gonzalez Stewart is a 53 year old male with PMHx of cervical epidural abscess (s/p decompressive laminectomy 3/2021 c/b b/l leg paralysis), hx of pneumoperitoneum (s/p ex lap, total abdominal colectomy and end ileostomy (on 23) c/b evisceration and sepsis with MRSA bacteremia postoperatively), hx of hepatitis C, hx of R. buttock abscess, hx of L. foot osteomyelitis, neurogenic bladder (with indwelling Holcomb catheter, last exchanged two days ago), HTN, HLD, bipolar disorder, GERD, hx of opioid dependence, and constipation who presented to the ED on 23 from [facility] for feet infection.    Patient reports he has had bilateral foot infections intermittently for the past year. Completed numerous rounds of antibiotics and even got C. difficile due to all the antibiotics. He is supposed to be getting wound care daily or every two days by wound care nurse at his facility but reports they do not come as they should. States the wound care nurse comes every six days. Wound care physician sees him once a week.    Extensive chart review with paperwork from [facility]. In 2023, found to have L. hallux osteomyelitis. L. heel wound culture with Proteus mirabilis ESBL. Received PICC line and completed 6-week therapy with ceftazidime/avibactam q8. Found to have detectable viral load of hepatitis C. Previously completed sofosbuvir/velpatasvir so thought to be resistant to that and completed sofosbuvir/velpatasvir/voxilaprevir. Follows with ID.    In the ED, VSS except Tmax 101 and BP as low as 105/59. WBC 15.25K, hgb 10.5, bicarb 34, alkphos 227. ESR 84. U/A (sample obtained from Holcomb catheter) with positive nitrites, moderate leuks, moderate blood, WBC 26-50, RBC > 50, moderate bacteria, squamous epithelial cells present. CT b/l LE with study is severely limited by contracture. Left lower extremity is only partially visualized with exclusion of the left foot. Skin induration and subcutaneous edema in the leg and ankle.  No soft tissue gas. Received acetaminophen 1 g IV, morphine 8 mg IV, oxycodone 5 mg, vancomycin 1 g, zosyn 3.375 g, and LR 2800 cc bolus. Evaluated by vascular surgery PA who states attending to evaluate in AM. Evaluated by podiatry, underwent bedside escharectomy of left foot. Wound culture with +GPC in pairs. (05 Dec 2023 03:00)      Pt reports chronic o2 use, usually 4-5L NC. Requirements have not worsened.   He has small amount of greyish phlegm which is chronic, maybe better than usual  No active SOB  No wheezing  No fevers    30py smoking hx quit since moved to Nursing Home 3 yrs ago    ROS:  denies fever  denies HA  denies CP  Denies abd pain  LE deficits chronic      ## Labs:  CBC:                        9.6    8.64  )-----------( 189      ( 18 Dec 2023 08:14 )             32.6     Chem:  1218    143  |  108  |  15  ----------------------------<  91  4.2   |  33<H>  |  0.46<L>    Ca    8.8      18 Dec 2023 08:14        ## Medications:  meropenem  IVPB 1000 milliGRAM(s) IV Intermittent every 8 hours  vancomycin  IVPB 1000 milliGRAM(s) IV Intermittent every 12 hours    amLODIPine   Tablet 2.5 milliGRAM(s) Oral daily    albuterol    90 MICROgram(s) HFA Inhaler 2 Puff(s) Inhalation every 4 hours PRN  albuterol/ipratropium for Nebulization 3 milliLiter(s) Nebulizer every 6 hours  guaiFENesin  milliGRAM(s) Oral every 12 hours    atorvastatin 40 milliGRAM(s) Oral at bedtime  finasteride 5 milliGRAM(s) Oral daily    enoxaparin Injectable 40 milliGRAM(s) SubCutaneous every 24 hours    aluminum hydroxide/magnesium hydroxide/simethicone Suspension 30 milliLiter(s) Oral every 4 hours PRN  pantoprazole    Tablet 40 milliGRAM(s) Oral before breakfast  polyethylene glycol 3350 17 Gram(s) Oral daily  senna 2 Tablet(s) Oral at bedtime    acetaminophen     Tablet .. 650 milliGRAM(s) Oral daily  acetaminophen     Tablet .. 650 milliGRAM(s) Oral every 6 hours PRN  cyclobenzaprine 10 milliGRAM(s) Oral three times a day  gabapentin 800 milliGRAM(s) Oral every 8 hours  HYDROmorphone  Injectable 2 milliGRAM(s) IV Push every 4 hours PRN  melatonin 3 milliGRAM(s) Oral at bedtime PRN  methadone  Concentrate 60 milliGRAM(s) Oral two times a day  methylphenidate 5 milliGRAM(s) Oral <User Schedule>  QUEtiapine 400 milliGRAM(s) Oral at bedtime  QUEtiapine 100 milliGRAM(s) Oral <User Schedule>      ## Vitals:  T(C): 36.7 (23 @ 11:50), Max: 37.2 (23 @ 05:09)  HR: 92 (23 @ 13:08) (68 - 112)  BP: 111/66 (23 @ 11:50) (111/66 - 128/75)  BP(mean): --  RR: 18 (23 @ 11:50) (17 - 18)  SpO2: 94% (23 @ 13:08) (89% - 95%)  Wt(kg): --  Vent:   AB-17 @ 07:01  -   @ 07:00  --------------------------------------------------------  IN: 1095 mL / OUT: 2100 mL / NET: -1005 mL          ## P/E:  Gen: lying comfortably in bed in no apparent distress, 5L NC  Mouth: mmm  Neck: no accessory muscle use  Lungs: b/l cta  Heart: s1s2 reg no murmur  Abd: midline scar, bs soft nontender  Ext: no edema  Neuro: b/l LE plegic, wound on feet     < from: Xray Chest 1 View-PORTABLE IMMEDIATE (Xray Chest 1 View-PORTABLE IMMEDIATE .) (23 @ 17:16) >    ACC: [id number] EXAM:  XR CHEST PORTABLE IMMED 1V   ORDERED BY: SINA HEAD     PROCEDURE DATE:  2023          INTERPRETATION:  Portable chest radiograph    CLINICAL INFORMATION: Dyspnea, shortness of breath.    TECHNIQUE:  Portable  AP chest radiograph.    COMPARISON: 2023 chest x-ray .    FINDINGS:  CATHETERS AND TUBES: LEFT PICC line catheter tip in distal SVC.    PULMONARY: LEFT greater than RIGHT and a perihilar mild diffuse airspace   disease concerning for either  pulmonary edema of cardiac or noncardiac   origin versus infectious process..   No pneumothorax.    HEART/VASCULAR: The  heart is enlarged in transverse diameter. .    BONES: Visualized osseous thorax intact.    IMPRESSION: Cardiomegaly.  LEFT greater than RIGHTand a perihilar mild diffuse airspace disease   concerning for either  pulmonary edema of cardiac or noncardiac origin   versus infectious process  .    --- End of Report ---            WALKER MCGARRY MD; Attending Radiologist  This document has been electronically signed. Dec 12 2023  7:50AM    < end of copied text >   HPI:  Gonzalez Stewart is a 53 year old male with PMHx of cervical epidural abscess (s/p decompressive laminectomy 3/2021 c/b b/l leg paralysis), hx of pneumoperitoneum (s/p ex lap, total abdominal colectomy and end ileostomy (on 23) c/b evisceration and sepsis with MRSA bacteremia postoperatively), hx of hepatitis C, hx of R. buttock abscess, hx of L. foot osteomyelitis, neurogenic bladder (with indwelling Holcomb catheter, last exchanged two days ago), HTN, HLD, bipolar disorder, GERD, hx of opioid dependence, and constipation who presented to the ED on 23 from [facility] for feet infection.    Patient reports he has had bilateral foot infections intermittently for the past year. Completed numerous rounds of antibiotics and even got C. difficile due to all the antibiotics. He is supposed to be getting wound care daily or every two days by wound care nurse at his facility but reports they do not come as they should. States the wound care nurse comes every six days. Wound care physician sees him once a week.    Extensive chart review with paperwork from [facility]. In 2023, found to have L. hallux osteomyelitis. L. heel wound culture with Proteus mirabilis ESBL. Received PICC line and completed 6-week therapy with ceftazidime/avibactam q8. Found to have detectable viral load of hepatitis C. Previously completed sofosbuvir/velpatasvir so thought to be resistant to that and completed sofosbuvir/velpatasvir/voxilaprevir. Follows with ID.    In the ED, VSS except Tmax 101 and BP as low as 105/59. WBC 15.25K, hgb 10.5, bicarb 34, alkphos 227. ESR 84. U/A (sample obtained from Holcomb catheter) with positive nitrites, moderate leuks, moderate blood, WBC 26-50, RBC > 50, moderate bacteria, squamous epithelial cells present. CT b/l LE with study is severely limited by contracture. Left lower extremity is only partially visualized with exclusion of the left foot. Skin induration and subcutaneous edema in the leg and ankle.  No soft tissue gas. Received acetaminophen 1 g IV, morphine 8 mg IV, oxycodone 5 mg, vancomycin 1 g, zosyn 3.375 g, and LR 2800 cc bolus. Evaluated by vascular surgery PA who states attending to evaluate in AM. Evaluated by podiatry, underwent bedside escharectomy of left foot. Wound culture with +GPC in pairs. (05 Dec 2023 03:00)      Pt reports chronic o2 use, usually 4-5L NC. Requirements have not worsened.   He has small amount of greyish phlegm which is chronic, maybe better than usual  No active SOB  No wheezing  No fevers    30py smoking hx quit since moved to Nursing Home 3 yrs ago    ROS:  denies fever  denies HA  denies CP  Denies abd pain  LE deficits chronic      ## Labs:  CBC:                        9.6    8.64  )-----------( 189      ( 18 Dec 2023 08:14 )             32.6     Chem:  1218    143  |  108  |  15  ----------------------------<  91  4.2   |  33<H>  |  0.46<L>    Ca    8.8      18 Dec 2023 08:14        ## Medications:  meropenem  IVPB 1000 milliGRAM(s) IV Intermittent every 8 hours  vancomycin  IVPB 1000 milliGRAM(s) IV Intermittent every 12 hours    amLODIPine   Tablet 2.5 milliGRAM(s) Oral daily    albuterol    90 MICROgram(s) HFA Inhaler 2 Puff(s) Inhalation every 4 hours PRN  albuterol/ipratropium for Nebulization 3 milliLiter(s) Nebulizer every 6 hours  guaiFENesin  milliGRAM(s) Oral every 12 hours    atorvastatin 40 milliGRAM(s) Oral at bedtime  finasteride 5 milliGRAM(s) Oral daily    enoxaparin Injectable 40 milliGRAM(s) SubCutaneous every 24 hours    aluminum hydroxide/magnesium hydroxide/simethicone Suspension 30 milliLiter(s) Oral every 4 hours PRN  pantoprazole    Tablet 40 milliGRAM(s) Oral before breakfast  polyethylene glycol 3350 17 Gram(s) Oral daily  senna 2 Tablet(s) Oral at bedtime    acetaminophen     Tablet .. 650 milliGRAM(s) Oral daily  acetaminophen     Tablet .. 650 milliGRAM(s) Oral every 6 hours PRN  cyclobenzaprine 10 milliGRAM(s) Oral three times a day  gabapentin 800 milliGRAM(s) Oral every 8 hours  HYDROmorphone  Injectable 2 milliGRAM(s) IV Push every 4 hours PRN  melatonin 3 milliGRAM(s) Oral at bedtime PRN  methadone  Concentrate 60 milliGRAM(s) Oral two times a day  methylphenidate 5 milliGRAM(s) Oral <User Schedule>  QUEtiapine 400 milliGRAM(s) Oral at bedtime  QUEtiapine 100 milliGRAM(s) Oral <User Schedule>      ## Vitals:  T(C): 36.7 (23 @ 11:50), Max: 37.2 (23 @ 05:09)  HR: 92 (23 @ 13:08) (68 - 112)  BP: 111/66 (23 @ 11:50) (111/66 - 128/75)  BP(mean): --  RR: 18 (23 @ 11:50) (17 - 18)  SpO2: 94% (23 @ 13:08) (89% - 95%)  Wt(kg): --  Vent:   AB-17 @ 07:01  -   @ 07:00  --------------------------------------------------------  IN: 1095 mL / OUT: 2100 mL / NET: -1005 mL          ## P/E:  Gen: lying comfortably in bed in no apparent distress, 5L NC  Mouth: mmm  Neck: no accessory muscle use  Lungs: b/l cta  Heart: s1s2 reg no murmur  Abd: midline scar, bs soft nontender  Ext: no edema  Neuro: b/l LE plegic, wound on feet     < from: Xray Chest 1 View-PORTABLE IMMEDIATE (Xray Chest 1 View-PORTABLE IMMEDIATE .) (23 @ 17:16) >    ACC: [id number] EXAM:  XR CHEST PORTABLE IMMED 1V   ORDERED BY: SINA HEAD     PROCEDURE DATE:  2023          INTERPRETATION:  Portable chest radiograph    CLINICAL INFORMATION: Dyspnea, shortness of breath.    TECHNIQUE:  Portable  AP chest radiograph.    COMPARISON: 2023 chest x-ray .    FINDINGS:  CATHETERS AND TUBES: LEFT PICC line catheter tip in distal SVC.    PULMONARY: LEFT greater than RIGHT and a perihilar mild diffuse airspace   disease concerning for either  pulmonary edema of cardiac or noncardiac   origin versus infectious process..   No pneumothorax.    HEART/VASCULAR: The  heart is enlarged in transverse diameter. .    BONES: Visualized osseous thorax intact.    IMPRESSION: Cardiomegaly.  LEFT greater than RIGHTand a perihilar mild diffuse airspace disease   concerning for either  pulmonary edema of cardiac or noncardiac origin   versus infectious process  .    --- End of Report ---            WALKER MCGARRY MD; Attending Radiologist  This document has been electronically signed. Dec 12 2023  7:50AM    < end of copied text >

## 2023-12-18 NOTE — PROGRESS NOTE ADULT - NUTRITIONAL ASSESSMENT
This patient has been assessed with a concern for Malnutrition and has been determined to have a diagnosis/diagnoses of Moderate protein-calorie malnutrition.    This patient is being managed with:   Diet Regular-  1000mL Fluid Restriction ([id number])  Liquid Protein Supplement     Qty per Day:  1  Supplement Feeding Modality:  Oral  Ensure Plus High Protein Cans or Servings Per Day:  1       Frequency:  Two Times a day  Entered: Dec 13 2023  2:13PM   This patient has been assessed with a concern for Malnutrition and has been determined to have a diagnosis/diagnoses of Moderate protein-calorie malnutrition.    This patient is being managed with:   Diet Regular-  1000mL Fluid Restriction ([id number])  Liquid Protein Supplement     Qty per Day:  1  Supplement Feeding Modality:  Oral  Ensure Plus High Protein Cans or Servings Per Day:  1       Frequency:  Two Times a day  Entered: Dec 13 2023  2:13PM

## 2023-12-18 NOTE — CONSULT NOTE ADULT - REASON FOR ADMISSION
Sepsis secondary to b/l foot wounds

## 2023-12-18 NOTE — PROGRESS NOTE ADULT - SUBJECTIVE AND OBJECTIVE BOX
STAN VEGA  MRN-[id number]    Follow Up:  b/l feet OM, wound infections     Interval History: the pt was seen and examined earlier, not in acute distress, awake and alert, appropriate, complains of pain in his b/l feet L>R. Pt is afebrile, NC, no leukocytosis. No surgical plan made yet, pt states that his mother would like for the surgery to take place after Mateo, pending discussion.      PAST MEDICAL & SURGICAL HISTORY:  CAD (coronary artery disease)      HTN (hypertension)      HLD (hyperlipidemia)      Neurogenic bladder      Bipolar disorder      S/P ileostomy      Indwelling Holcomb catheter present      Chronic hepatitis C virus infection      S/P laminectomy      S/P ileostomy          ROS:    [ ] Unobtainable because:  [x ] All other systems negative    Constitutional: no fever, no chills  Head: no trauma  Eyes: no vision changes, no eye pain  ENT:  no sore throat, no rhinorrhea  Cardiovascular:  no chest pain, no palpitation  Respiratory:  no SOB, no cough  GI:  no abd pain, no vomiting, no diarrhea  urinary: no dysuria, no hematuria, no flank pain  musculoskeletal:  b/l feet pain   skin:  no rash  neurology:  no headache, no seizure, no change in mental status  psych: no anxiety, no depression         Allergies  NSAIDs (Flushing; Other (Moderate))  Haldol (Anaphylaxis)  Zyprexa (Rash; Dystonic RXN; Hives)  Motrin (Anaphylaxis)  Thorazine (Other (Moderate))  Aleve (Unknown)  Stelazine (Unknown)  Risperdal (Short breath; Rash; Hives)        ANTIMICROBIALS:  meropenem  IVPB 1000 every 8 hours  vancomycin  IVPB 1000 every 12 hours      OTHER MEDS:  acetaminophen     Tablet .. 650 milliGRAM(s) Oral daily  acetaminophen     Tablet .. 650 milliGRAM(s) Oral every 6 hours PRN  albuterol    90 MICROgram(s) HFA Inhaler 2 Puff(s) Inhalation every 4 hours PRN  albuterol/ipratropium for Nebulization 3 milliLiter(s) Nebulizer every 6 hours  aluminum hydroxide/magnesium hydroxide/simethicone Suspension 30 milliLiter(s) Oral every 4 hours PRN  amLODIPine   Tablet 2.5 milliGRAM(s) Oral daily  ascorbic acid 500 milliGRAM(s) Oral daily  atorvastatin 40 milliGRAM(s) Oral at bedtime  bacitracin   Ointment 1 Application(s) Topical daily  chlorhexidine 2% Cloths 1 Application(s) Topical <User Schedule>  collagenase Ointment 1 Application(s) Topical daily  cyclobenzaprine 10 milliGRAM(s) Oral three times a day  enoxaparin Injectable 40 milliGRAM(s) SubCutaneous every 24 hours  finasteride 5 milliGRAM(s) Oral daily  gabapentin 800 milliGRAM(s) Oral every 8 hours  guaiFENesin  milliGRAM(s) Oral every 12 hours  HYDROmorphone  Injectable 2 milliGRAM(s) IV Push every 4 hours PRN  lactobacillus acidophilus 1 Tablet(s) Oral two times a day with meals  lidocaine   4% Patch 1 Patch Transdermal daily  melatonin 3 milliGRAM(s) Oral at bedtime PRN  methadone  Concentrate 60 milliGRAM(s) Oral two times a day  methylphenidate 5 milliGRAM(s) Oral <User Schedule>  multivitamin 1 Tablet(s) Oral daily  nystatin Powder 1 Application(s) Topical two times a day  pantoprazole    Tablet 40 milliGRAM(s) Oral before breakfast  polyethylene glycol 3350 17 Gram(s) Oral daily  QUEtiapine 100 milliGRAM(s) Oral <User Schedule>  QUEtiapine 400 milliGRAM(s) Oral at bedtime  senna 2 Tablet(s) Oral at bedtime  sodium chloride 0.9% lock flush 10 milliLiter(s) IV Push every 1 hour PRN  tamsulosin 0.4 milliGRAM(s) Oral at bedtime  thiamine 100 milliGRAM(s) Oral daily  zinc sulfate 220 milliGRAM(s) Oral daily      Vital Signs Last 24 Hrs  T(C): 36.7 (18 Dec 2023 11:50), Max: 37.2 (18 Dec 2023 05:09)  T(F): 98 (18 Dec 2023 11:50), Max: 98.9 (18 Dec 2023 05:09)  HR: 92 (18 Dec 2023 13:08) (68 - 112)  BP: 111/66 (18 Dec 2023 11:50) (111/66 - 128/75)  BP(mean): --  RR: 18 (18 Dec 2023 11:50) (17 - 18)  SpO2: 94% (18 Dec 2023 13:08) (89% - 95%)    Parameters below as of 18 Dec 2023 13:08  Patient On (Oxygen Delivery Method): nasal cannula        Physical Exam:  General:    NAD, non toxic, NC  Head: atraumatic, normocephalic  Eyes: normal sclera and conjunctiva  ENT:   no oropharyngeal lesions, poor dentition, no LAD, neck supple  Cardio:  regular , S1,S2, no murmur  Respiratory:   somewhat diminished to auscultation b/l, no wheezing, no rhonchi   abd:   soft, BS +, not tender, no distention, midline scar healed, right sided colostomy in place  :     no CVAT, no suprapubic tenderness, Holcomb  Musculoskeletal : severely contracted LE b/l, no noted joint swelling   Skin:   b/l feet dressed, c/d/i, + edema, left arm PICC line c/d/i  vascular:  normal pulses  Neurologic:     no focal deficits  psych: appropriate, calm behavior     WBC Count: 8.64 K/uL (12-18 @ 08:14)  WBC Count: 9.58 K/uL (12-17 @ 05:30)  WBC Count: 7.29 K/uL (12-16 @ 07:30)  WBC Count: 8.74 K/uL (12-14 @ 09:52)  WBC Count: 10.75 K/uL (12-13 @ 05:40)  WBC Count: 7.32 K/uL (12-12 @ 05:20)  WBC Count: 9.12 K/uL (12-11 @ 17:40)                            9.6    8.64  )-----------( 189      ( 18 Dec 2023 08:14 )             32.6       12-18    143  |  108  |  15  ----------------------------<  91  4.2   |  33<H>  |  0.46<L>    Ca    8.8      18 Dec 2023 08:14        Urinalysis Basic - ( 18 Dec 2023 08:14 )    Color: x / Appearance: x / SG: x / pH: x  Gluc: 91 mg/dL / Ketone: x  / Bili: x / Urobili: x   Blood: x / Protein: x / Nitrite: x   Leuk Esterase: x / RBC: x / WBC x   Sq Epi: x / Non Sq Epi: x / Bacteria: x        Creatinine Trend: 0.46<--, 0.60<--, 0.63<--, 0.67<--, 0.61<--, 0.57<--      MICROBIOLOGY:  v  Clean Catch Clean Catch (Midstream)  12-04-23   >100,000 CFU/ml Klebsiella pneumoniae  50,000 - 99,000 CFU/mL Enterococcus faecalis (vancomycin resistant)  --  Klebsiella pneumoniae  Enterococcus faecalis (vancomycin resistant)      .Abscess left wound culture  12-04-23   Rare Pseudomonas aeruginosa  Moderate Methicillin Resistant Staphylococcus aureus  Rare Klebsiella pneumoniae  Moderate Bacteroides fragilis "Susceptibilities not performed"  --  Pseudomonas aeruginosa  Methicillin resistant Staphylococcus aureus  Klebsiella pneumoniae      .Blood Blood-Peripheral  12-04-23   No growth at 5 days  --  --      .Blood Blood-Peripheral  12-04-23   No growth at 5 days  --  --    Rapid RVP Result: NotDetec (12-11 @ 20:55)    C-Reactive Protein, Serum: 84 (12-04)    SARS-CoV-2: NotDetec (11 Dec 2023 20:55)  SARS-CoV-2: NotDetec (04 Dec 2023 17:20)    RADIOLOGY:     STAN VEGA  MRN-[id number]    Follow Up:  b/l feet OM, wound infections     Interval History: the pt was seen and examined earlier, not in acute distress, awake and alert, appropriate, complains of pain in his b/l feet L>R. Pt is afebrile, NC, no leukocytosis. No surgical plan made yet, pt states that his mother would like for the surgery to take place after Mateo, pending discussion.      PAST MEDICAL & SURGICAL HISTORY:  CAD (coronary artery disease)      HTN (hypertension)      HLD (hyperlipidemia)      Neurogenic bladder      Bipolar disorder      S/P ileostomy      Indwelling Holcomb catheter present      Chronic hepatitis C virus infection      S/P laminectomy      S/P ileostomy          ROS:    [ ] Unobtainable because:  [x ] All other systems negative    Constitutional: no fever, no chills  Head: no trauma  Eyes: no vision changes, no eye pain  ENT:  no sore throat, no rhinorrhea  Cardiovascular:  no chest pain, no palpitation  Respiratory:  no SOB, no cough  GI:  no abd pain, no vomiting, no diarrhea  urinary: no dysuria, no hematuria, no flank pain  musculoskeletal:  b/l feet pain   skin:  no rash  neurology:  no headache, no seizure, no change in mental status  psych: no anxiety, no depression         Allergies  NSAIDs (Flushing; Other (Moderate))  Haldol (Anaphylaxis)  Zyprexa (Rash; Dystonic RXN; Hives)  Motrin (Anaphylaxis)  Thorazine (Other (Moderate))  Aleve (Unknown)  Stelazine (Unknown)  Risperdal (Short breath; Rash; Hives)        ANTIMICROBIALS:  meropenem  IVPB 1000 every 8 hours  vancomycin  IVPB 1000 every 12 hours      OTHER MEDS:  acetaminophen     Tablet .. 650 milliGRAM(s) Oral daily  acetaminophen     Tablet .. 650 milliGRAM(s) Oral every 6 hours PRN  albuterol    90 MICROgram(s) HFA Inhaler 2 Puff(s) Inhalation every 4 hours PRN  albuterol/ipratropium for Nebulization 3 milliLiter(s) Nebulizer every 6 hours  aluminum hydroxide/magnesium hydroxide/simethicone Suspension 30 milliLiter(s) Oral every 4 hours PRN  amLODIPine   Tablet 2.5 milliGRAM(s) Oral daily  ascorbic acid 500 milliGRAM(s) Oral daily  atorvastatin 40 milliGRAM(s) Oral at bedtime  bacitracin   Ointment 1 Application(s) Topical daily  chlorhexidine 2% Cloths 1 Application(s) Topical <User Schedule>  collagenase Ointment 1 Application(s) Topical daily  cyclobenzaprine 10 milliGRAM(s) Oral three times a day  enoxaparin Injectable 40 milliGRAM(s) SubCutaneous every 24 hours  finasteride 5 milliGRAM(s) Oral daily  gabapentin 800 milliGRAM(s) Oral every 8 hours  guaiFENesin  milliGRAM(s) Oral every 12 hours  HYDROmorphone  Injectable 2 milliGRAM(s) IV Push every 4 hours PRN  lactobacillus acidophilus 1 Tablet(s) Oral two times a day with meals  lidocaine   4% Patch 1 Patch Transdermal daily  melatonin 3 milliGRAM(s) Oral at bedtime PRN  methadone  Concentrate 60 milliGRAM(s) Oral two times a day  methylphenidate 5 milliGRAM(s) Oral <User Schedule>  multivitamin 1 Tablet(s) Oral daily  nystatin Powder 1 Application(s) Topical two times a day  pantoprazole    Tablet 40 milliGRAM(s) Oral before breakfast  polyethylene glycol 3350 17 Gram(s) Oral daily  QUEtiapine 100 milliGRAM(s) Oral <User Schedule>  QUEtiapine 400 milliGRAM(s) Oral at bedtime  senna 2 Tablet(s) Oral at bedtime  sodium chloride 0.9% lock flush 10 milliLiter(s) IV Push every 1 hour PRN  tamsulosin 0.4 milliGRAM(s) Oral at bedtime  thiamine 100 milliGRAM(s) Oral daily  zinc sulfate 220 milliGRAM(s) Oral daily      Vital Signs Last 24 Hrs  T(C): 36.7 (18 Dec 2023 11:50), Max: 37.2 (18 Dec 2023 05:09)  T(F): 98 (18 Dec 2023 11:50), Max: 98.9 (18 Dec 2023 05:09)  HR: 92 (18 Dec 2023 13:08) (68 - 112)  BP: 111/66 (18 Dec 2023 11:50) (111/66 - 128/75)  BP(mean): --  RR: 18 (18 Dec 2023 11:50) (17 - 18)  SpO2: 94% (18 Dec 2023 13:08) (89% - 95%)    Parameters below as of 18 Dec 2023 13:08  Patient On (Oxygen Delivery Method): nasal cannula        Physical Exam:  General:    NAD, non toxic, NC  Head: atraumatic, normocephalic  Eyes: normal sclera and conjunctiva  ENT:   no oropharyngeal lesions, poor dentition, no LAD, neck supple  Cardio:  regular , S1,S2, no murmur  Respiratory:   somewhat diminished to auscultation b/l, no wheezing, no rhonchi   abd:   soft, BS +, not tender, no distention, midline scar healed, right sided colostomy in place  :     no CVAT, no suprapubic tenderness, Holcomb  Musculoskeletal : severely contracted LE b/l, no noted joint swelling   Skin:   b/l feet dressed, c/d/i, + edema, left arm PICC line c/d/i  vascular:  normal pulses  Neurologic:     no focal deficits  psych: appropriate, calm behavior     WBC Count: 8.64 K/uL (12-18 @ 08:14)  WBC Count: 9.58 K/uL (12-17 @ 05:30)  WBC Count: 7.29 K/uL (12-16 @ 07:30)  WBC Count: 8.74 K/uL (12-14 @ 09:52)  WBC Count: 10.75 K/uL (12-13 @ 05:40)  WBC Count: 7.32 K/uL (12-12 @ 05:20)  WBC Count: 9.12 K/uL (12-11 @ 17:40)                            9.6    8.64  )-----------( 189      ( 18 Dec 2023 08:14 )             32.6       12-18    143  |  108  |  15  ----------------------------<  91  4.2   |  33<H>  |  0.46<L>    Ca    8.8      18 Dec 2023 08:14        Urinalysis Basic - ( 18 Dec 2023 08:14 )    Color: x / Appearance: x / SG: x / pH: x  Gluc: 91 mg/dL / Ketone: x  / Bili: x / Urobili: x   Blood: x / Protein: x / Nitrite: x   Leuk Esterase: x / RBC: x / WBC x   Sq Epi: x / Non Sq Epi: x / Bacteria: x        Creatinine Trend: 0.46<--, 0.60<--, 0.63<--, 0.67<--, 0.61<--, 0.57<--      MICROBIOLOGY:  v  Clean Catch Clean Catch (Midstream)  12-04-23   >100,000 CFU/ml Klebsiella pneumoniae  50,000 - 99,000 CFU/mL Enterococcus faecalis (vancomycin resistant)  --  Klebsiella pneumoniae  Enterococcus faecalis (vancomycin resistant)      .Abscess left wound culture  12-04-23   Rare Pseudomonas aeruginosa  Moderate Methicillin Resistant Staphylococcus aureus  Rare Klebsiella pneumoniae  Moderate Bacteroides fragilis "Susceptibilities not performed"  --  Pseudomonas aeruginosa  Methicillin resistant Staphylococcus aureus  Klebsiella pneumoniae      .Blood Blood-Peripheral  12-04-23   No growth at 5 days  --  --      .Blood Blood-Peripheral  12-04-23   No growth at 5 days  --  --    Rapid RVP Result: NotDetec (12-11 @ 20:55)    C-Reactive Protein, Serum: 84 (12-04)    SARS-CoV-2: NotDetec (11 Dec 2023 20:55)  SARS-CoV-2: NotDetec (04 Dec 2023 17:20)    RADIOLOGY:

## 2023-12-18 NOTE — PROGRESS NOTE ADULT - SUBJECTIVE AND OBJECTIVE BOX
Patient is a 53y old  Male who presents with a chief complaint of Sepsis secondary to b/l foot wounds (17 Dec 2023 12:52)      INTERVAL HPI/OVERNIGHT EVENTS:    MEDICATIONS  (STANDING):  acetaminophen     Tablet .. 650 milliGRAM(s) Oral daily  albuterol/ipratropium for Nebulization 3 milliLiter(s) Nebulizer every 6 hours  amLODIPine   Tablet 2.5 milliGRAM(s) Oral daily  ascorbic acid 500 milliGRAM(s) Oral daily  atorvastatin 40 milliGRAM(s) Oral at bedtime  bacitracin   Ointment 1 Application(s) Topical daily  chlorhexidine 2% Cloths 1 Application(s) Topical <User Schedule>  collagenase Ointment 1 Application(s) Topical daily  cyclobenzaprine 10 milliGRAM(s) Oral three times a day  enoxaparin Injectable 40 milliGRAM(s) SubCutaneous every 24 hours  finasteride 5 milliGRAM(s) Oral daily  gabapentin 800 milliGRAM(s) Oral every 8 hours  guaiFENesin  milliGRAM(s) Oral every 12 hours  lactobacillus acidophilus 1 Tablet(s) Oral two times a day with meals  lidocaine   4% Patch 1 Patch Transdermal daily  meropenem  IVPB 1000 milliGRAM(s) IV Intermittent every 8 hours  methadone  Concentrate 60 milliGRAM(s) Oral two times a day  methylphenidate 5 milliGRAM(s) Oral <User Schedule>  multivitamin 1 Tablet(s) Oral daily  nystatin Powder 1 Application(s) Topical two times a day  pantoprazole    Tablet 40 milliGRAM(s) Oral before breakfast  polyethylene glycol 3350 17 Gram(s) Oral daily  QUEtiapine 100 milliGRAM(s) Oral <User Schedule>  QUEtiapine 400 milliGRAM(s) Oral at bedtime  senna 2 Tablet(s) Oral at bedtime  tamsulosin 0.4 milliGRAM(s) Oral at bedtime  thiamine 100 milliGRAM(s) Oral daily  vancomycin  IVPB 1000 milliGRAM(s) IV Intermittent every 12 hours  zinc sulfate 220 milliGRAM(s) Oral daily    MEDICATIONS  (PRN):  acetaminophen     Tablet .. 650 milliGRAM(s) Oral every 6 hours PRN Temp greater or equal to 38C (100.4F), Mild Pain (1 - 3)  albuterol    90 MICROgram(s) HFA Inhaler 2 Puff(s) Inhalation every 4 hours PRN Shortness of Breath and/or Wheezing  aluminum hydroxide/magnesium hydroxide/simethicone Suspension 30 milliLiter(s) Oral every 4 hours PRN Dyspepsia  HYDROmorphone  Injectable 2 milliGRAM(s) IV Push every 4 hours PRN Severe Pain (7 - 10)  melatonin 3 milliGRAM(s) Oral at bedtime PRN Insomnia  sodium chloride 0.9% lock flush 10 milliLiter(s) IV Push every 1 hour PRN Pre/post blood products, medications, blood draw, and to maintain line patency      Allergies    NSAIDs (Flushing; Other (Moderate))  Haldol (Anaphylaxis)  Zyprexa (Rash; Dystonic RXN; Hives)  Motrin (Anaphylaxis)  Thorazine (Other (Moderate))  Aleve (Unknown)  Stelazine (Unknown)  Risperdal (Short breath; Rash; Hives)    Intolerances        REVIEW OF SYSTEMS:  CONSTITUTIONAL: No fever, weight loss, or fatigue  EYES: No eye pain, visual disturbances, or discharge  ENMT:  No difficulty hearing, tinnitus, vertigo; No sinus or throat pain  NECK: No pain or stiffness  BREASTS: No pain, masses, or nipple discharge  RESPIRATORY: No cough, wheezing, chills or hemoptysis; No shortness of breath  CARDIOVASCULAR: No chest pain, palpitations, dizziness, or leg swelling  GASTROINTESTINAL: No abdominal or epigastric pain. No nausea, vomiting, or hematemesis; No diarrhea or constipation. No melena or hematochezia.  GENITOURINARY: No dysuria, frequency, hematuria, or incontinence  NEUROLOGICAL: No headaches, memory loss, loss of strength, numbness, or tremors  SKIN: No itching, burning, rashes, or lesions   LYMPH NODES: No enlarged glands  ENDOCRINE: No heat or cold intolerance; No hair loss  MUSCULOSKELETAL: No joint pain or swelling; No muscle, back, or extremity pain  PSYCHIATRIC: No depression, anxiety, mood swings, or difficulty sleeping  HEME/LYMPH: No easy bruising, or bleeding gums  ALLERGY AND IMMUNOLOGIC: No hives or eczema    Vital Signs Last 24 Hrs  T(C): 37.2 (18 Dec 2023 05:09), Max: 37.2 (18 Dec 2023 05:09)  T(F): 98.9 (18 Dec 2023 05:09), Max: 98.9 (18 Dec 2023 05:09)  HR: 112 (18 Dec 2023 05:41) (68 - 112)  BP: 114/72 (18 Dec 2023 05:09) (114/72 - 128/75)  BP(mean): --  RR: 18 (18 Dec 2023 05:09) (16 - 18)  SpO2: 93% (18 Dec 2023 05:41) (89% - 95%)    Parameters below as of 18 Dec 2023 05:41  Patient On (Oxygen Delivery Method): nasal cannula        PHYSICAL EXAM:  GENERAL: NAD, well-groomed, well-developed  HEAD:  Atraumatic, Normocephalic  EYES: EOMI, PERRLA, conjunctiva and sclera clear  ENMT: No tonsillar erythema, exudates, or enlargement; Moist mucous membranes, Good dentition, No lesions  NECK: Supple, No JVD, Normal thyroid  NERVOUS SYSTEM:  Alert & Oriented X3, Good concentration; Motor Strength 5/5 B/L upper and lower extremities; DTRs 2+ intact and symmetric  CHEST/LUNG: Clear to percussion bilaterally; No rales, rhonchi, wheezing, or rubs  HEART: Regular rate and rhythm; No murmurs, rubs, or gallops  ABDOMEN: Soft, Nontender, Nondistended; Bowel sounds present  EXTREMITIES:  2+ Peripheral Pulses, No clubbing, cyanosis, or edema  LYMPH: No lymphadenopathy noted  SKIN: No rashes or lesions    LABS:                        9.6    8.64  )-----------( 189      ( 18 Dec 2023 08:14 )             32.6     12-17    141  |  107  |  16  ----------------------------<  89  4.2   |  31  |  0.60    Ca    9.2      17 Dec 2023 05:30        Urinalysis Basic - ( 17 Dec 2023 05:30 )    Color: x / Appearance: x / SG: x / pH: x  Gluc: 89 mg/dL / Ketone: x  / Bili: x / Urobili: x   Blood: x / Protein: x / Nitrite: x   Leuk Esterase: x / RBC: x / WBC x   Sq Epi: x / Non Sq Epi: x / Bacteria: x      CAPILLARY BLOOD GLUCOSE          RADIOLOGY & ADDITIONAL TESTS:    Imaging Personally Reviewed:  [ X] YES  [ ] NO    Consultant(s) Notes Reviewed:  [ X] YES  [ ] NO    Care Discussed with Consultants/Other Providers [X ] YES  [ ] NO Patient is a 53y old  Male who presents with a chief complaint of Sepsis secondary to b/l foot wounds (17 Dec 2023 12:52)      INTERVAL HPI/OVERNIGHT EVENTS: Overnight no acute events. Keeps taking off NC, but no respiratory distress.     MEDICATIONS  (STANDING):  acetaminophen     Tablet .. 650 milliGRAM(s) Oral daily  albuterol/ipratropium for Nebulization 3 milliLiter(s) Nebulizer every 6 hours  amLODIPine   Tablet 2.5 milliGRAM(s) Oral daily  ascorbic acid 500 milliGRAM(s) Oral daily  atorvastatin 40 milliGRAM(s) Oral at bedtime  bacitracin   Ointment 1 Application(s) Topical daily  chlorhexidine 2% Cloths 1 Application(s) Topical <User Schedule>  collagenase Ointment 1 Application(s) Topical daily  cyclobenzaprine 10 milliGRAM(s) Oral three times a day  enoxaparin Injectable 40 milliGRAM(s) SubCutaneous every 24 hours  finasteride 5 milliGRAM(s) Oral daily  gabapentin 800 milliGRAM(s) Oral every 8 hours  guaiFENesin  milliGRAM(s) Oral every 12 hours  lactobacillus acidophilus 1 Tablet(s) Oral two times a day with meals  lidocaine   4% Patch 1 Patch Transdermal daily  meropenem  IVPB 1000 milliGRAM(s) IV Intermittent every 8 hours  methadone  Concentrate 60 milliGRAM(s) Oral two times a day  methylphenidate 5 milliGRAM(s) Oral <User Schedule>  multivitamin 1 Tablet(s) Oral daily  nystatin Powder 1 Application(s) Topical two times a day  pantoprazole    Tablet 40 milliGRAM(s) Oral before breakfast  polyethylene glycol 3350 17 Gram(s) Oral daily  QUEtiapine 100 milliGRAM(s) Oral <User Schedule>  QUEtiapine 400 milliGRAM(s) Oral at bedtime  senna 2 Tablet(s) Oral at bedtime  tamsulosin 0.4 milliGRAM(s) Oral at bedtime  thiamine 100 milliGRAM(s) Oral daily  vancomycin  IVPB 1000 milliGRAM(s) IV Intermittent every 12 hours  zinc sulfate 220 milliGRAM(s) Oral daily    MEDICATIONS  (PRN):  acetaminophen     Tablet .. 650 milliGRAM(s) Oral every 6 hours PRN Temp greater or equal to 38C (100.4F), Mild Pain (1 - 3)  albuterol    90 MICROgram(s) HFA Inhaler 2 Puff(s) Inhalation every 4 hours PRN Shortness of Breath and/or Wheezing  aluminum hydroxide/magnesium hydroxide/simethicone Suspension 30 milliLiter(s) Oral every 4 hours PRN Dyspepsia  HYDROmorphone  Injectable 2 milliGRAM(s) IV Push every 4 hours PRN Severe Pain (7 - 10)  melatonin 3 milliGRAM(s) Oral at bedtime PRN Insomnia  sodium chloride 0.9% lock flush 10 milliLiter(s) IV Push every 1 hour PRN Pre/post blood products, medications, blood draw, and to maintain line patency      Allergies    NSAIDs (Flushing; Other (Moderate))  Haldol (Anaphylaxis)  Zyprexa (Rash; Dystonic RXN; Hives)  Motrin (Anaphylaxis)  Thorazine (Other (Moderate))  Aleve (Unknown)  Stelazine (Unknown)  Risperdal (Short breath; Rash; Hives)    Intolerances        REVIEW OF SYSTEMS:  10 point ROS negative, unless stated otherwise.    Vital Signs Last 24 Hrs  T(C): 37.2 (18 Dec 2023 05:09), Max: 37.2 (18 Dec 2023 05:09)  T(F): 98.9 (18 Dec 2023 05:09), Max: 98.9 (18 Dec 2023 05:09)  HR: 112 (18 Dec 2023 05:41) (68 - 112)  BP: 114/72 (18 Dec 2023 05:09) (114/72 - 128/75)  BP(mean): --  RR: 18 (18 Dec 2023 05:09) (16 - 18)  SpO2: 93% (18 Dec 2023 05:41) (89% - 95%)    Parameters below as of 18 Dec 2023 05:41  Patient On (Oxygen Delivery Method): nasal cannula        PHYSICAL EXAM:  GENERAL: NAD  HEAD:  Atraumatic, Normocephalic  EYES: EOMI, sclera anicteric  ENMT: Moist mucous membranes  NECK: Supple, No JVD  NERVOUS SYSTEM:  Alert & Oriented, No FND appreciated   CHEST/LUNG: CTAB   HEART: RRR  ABDOMEN: Soft, Nontender, midline incisional scar from previous surgery , colostomy bag in place  EXTREMITIES: Contracted, pitting edema, wounds bilaterally     LABS:                        9.6    8.64  )-----------( 189      ( 18 Dec 2023 08:14 )             32.6     12-17    141  |  107  |  16  ----------------------------<  89  4.2   |  31  |  0.60    Ca    9.2      17 Dec 2023 05:30        Urinalysis Basic - ( 17 Dec 2023 05:30 )    Color: x / Appearance: x / SG: x / pH: x  Gluc: 89 mg/dL / Ketone: x  / Bili: x / Urobili: x   Blood: x / Protein: x / Nitrite: x   Leuk Esterase: x / RBC: x / WBC x   Sq Epi: x / Non Sq Epi: x / Bacteria: x      CAPILLARY BLOOD GLUCOSE          RADIOLOGY & ADDITIONAL TESTS:    Imaging Personally Reviewed:  [ X] YES  [ ] NO    Consultant(s) Notes Reviewed:  [ X] YES  [ ] NO    Care Discussed with Consultants/Other Providers [X ] YES  [ ] NO

## 2023-12-18 NOTE — PROGRESS NOTE ADULT - PROBLEM SELECTOR PLAN 1
Recurrent foot infections, pt was  previously treated for L. hallux OM with L. heel culture growing Proteus mirabilis ESBL, completed 6 week course of avycaz  CT b/l LE  severely limited by contracture. Left lower extremity is only partially visualized with exclusion of the left foot. Skin induration and subcutaneous edema in the leg and ankle.  No soft tissue gas  continue course of  Vanc,/ meropenem, ID, podiatry and vascular following - recommended for L AKA once medically stable.

## 2023-12-18 NOTE — PROGRESS NOTE ADULT - NS ATTEND AMEND GEN_ALL_CORE FT
Continue current pain regimen with prns for breakthrough pain. For possible BKA. Palliative will follow.

## 2023-12-18 NOTE — PROGRESS NOTE ADULT - CONVERSATION DETAILS
Spoke with patient miroslava AM who reports that he is hopeful that he will be cleared for surgery shortly He looks forward to being able to sit in a chair, to breath the fresh air and to "be able to get OOB to a WC once again" He is excited to think that this will help to minimize his pain, minimize infection and increase his QOL. He states that he was beginning to feel hopeless that this would never occur again     He is able to state that he understands risks of surgery, wishes to take "those risks" in hope of "a better life"

## 2023-12-18 NOTE — CONSULT NOTE ADULT - ASSESSMENT
53 year old male with PMHx of cervical epidural abscess (s/p decompressive laminectomy 3/2021 c/b b/l leg paralysis), pneumoperitoneum (s/p ex lap, total abdominal colectomy and end ileostomy (on 1/12/23), hepatitis C, hx of R. buttock abscess, hx of L. foot osteomyelitis, neurogenic bladder (with indwelling Holcomb catheter, last exchanged two days ago), HTN, HLD, bipolar disorder now admitted w/ OM of foot and planned for AKA    Pt reports that he tolerated his prior emergent abdominal surgery without pulmonary complications. He appears to be at his baseline status from a respiratory standpoint.   Pt is at least intermediate risk for pulmonary complications given his baseline o2 requirements. Can proceed with vascular surgery from pulm standpoint    - cont airway clearance w/ aerobika  - cont BD tx  - cont symbicort inh bid  - cont o2 to maintain sat 90-95%  - would check non contrast CT chest  - dvt ppx

## 2023-12-18 NOTE — PROGRESS NOTE ADULT - ASSESSMENT
53 year old male with PMHx of cervical epidural abscess (s/p decompressive laminectomy 3/2021 c/b b/l leg paralysis), contractures,  hx of pneumoperitoneum (s/p ex lap, total abdominal colectomy and end ileostomy (on 1/12/23) c/b evisceration and sepsis with MRSA bacteremia postoperatively), hx of hepatitis C, hx of R. buttock abscess, hx of L. hallux osteo, adm w sepsis 2/2 foot wounds, course c/b hypoxic resp failure, fluid overload, s/p diuresis. Palliative consulted for GOC, pain management.

## 2023-12-18 NOTE — PROGRESS NOTE ADULT - ASSESSMENT
A/P-  53 year old male with PMHx of cervical epidural abscess (s/p decompressive laminectomy 3/2021 c/b b/l leg paralysis), hx of pneumoperitoneum (s/p ex lap, total abdominal colectomy and end ileostomy (on 1/12/23) c/b evisceration and sepsis with MRSA bacteremia postoperatively), hx of hepatitis C, hx of R. buttock abscess, hx of L. foot osteomyelitis, neurogenic bladder (with indwelling Holcomb catheter, last exchanged two days ago), HTN, HLD, bipolar disorder, GERD, hx of opioid dependence, and constipation who presented to the ED on 12/4/23 from [facility] for feet infection.    b/l feet infections and overlying cellulitis  Right heel wound infection to bone and as per xray c/w Om as well.  no report of any gas on either xray or CT  possible OR planning for left AKA when patient is medically optimized - pending scheduling     b/l foot infection  Om of right heel wound  + leukocytosis - improved   HCV  severe contractures of b/l LE  right foot wound cx- ESBL proteus - resistant to cefepime and corynebecaterium and alcalegenes  left foot wound cx- MRSA and Pseudomonas  and klebsiella   Blood cx- neg x 2  extensive chart search on [facility] by Dr. King and based on pt's latest HCV VL result from 6/2023 detected vl but <1.08  also based on serology from 2017 was positive for hep b sAG and E ag and core IGm ab ( not sure if he was ever treated)  HIV ab/ag negative   UC - VRE, Klebsiella   Hep B and HEp c both viral loads are undetectable.  his hep B serology c/w chronic infection in past not new and VL undetectable .  hep c VL -undetectable ( was treated for this as per his outpatient docs and seems to have cured )    HIV ab/ag- Negative    plan-  Continue Vancomycin IV  monitor vancomycin levels, required  keep vanco trough <15  Avycaz IV stopped on 12/8  Ertapenem IV started on 12/8 and as per attending, was changed to meropenem as the pseudomonas and Alcalgenes reported in foot wound cx not covered by ertapenem but meropenem will cover it and will cover the esbl   Continue Meropenem 1 gram IV q 8 hrs - day #5  aggressive wound care   HCV Vl , full hep b panel - reviewed, Hep B immune   Depakote stopped  12/11  possible left AKA - pending surgical scheduling and medical optimization       when ready for discharge:  treating right possible OM  picc line placed on 12/11  continue Vancomycin IV 1000 mg q 12 hrs - last dose on January 8, 2024  as per attending, pt will be eventually discharged on Meropenem 1 gram IV q12 dosing instead of q 8 - last dose on January 8rd, 2024  weekly lab work: cbc with diff, cmp, esr, crp, vancomycin trough - pt has his own ID specialist from before and pcp, please fax to pt's own  ID specialist  follow up with pt's ID and PCP in the office   remove picc line upon completion of the course of abx       will discuss with Dr. King  discussed with the pt     A/P-  53 year old male with PMHx of cervical epidural abscess (s/p decompressive laminectomy 3/2021 c/b b/l leg paralysis), hx of pneumoperitoneum (s/p ex lap, total abdominal colectomy and end ileostomy (on 1/12/23) c/b evisceration and sepsis with MRSA bacteremia postoperatively), hx of hepatitis C, hx of R. buttock abscess, hx of L. foot osteomyelitis, neurogenic bladder (with indwelling Holcomb catheter, last exchanged two days ago), HTN, HLD, bipolar disorder, GERD, hx of opioid dependence, and constipation who presented to the ED on 12/4/23 from [facility] for feet infection.    b/l feet infections and overlying cellulitis  Right heel wound infection to bone and as per xray c/w Om as well.  no report of any gas on either xray or CT  possible OR planning for left AKA when patient is medically optimized - pending scheduling     b/l foot infection  Om of right heel wound  + leukocytosis - improved   HCV  severe contractures of b/l LE  right foot wound cx- ESBL proteus - resistant to cefepime and corynebecaterium and alcalegenes  left foot wound cx- MRSA and Pseudomonas  and klebsiella   Blood cx- neg x 2  extensive chart search on [facility] by Dr. King and based on pt's latest HCV VL result from 6/2023 detected vl but <1.08  also based on serology from 2017 was positive for hep b sAG and E ag and core IGm ab ( not sure if he was ever treated)  HIV ab/ag negative   UC - VRE, Klebsiella   Hep B and HEp c both viral loads are undetectable.  his hep B serology c/w chronic infection in past not new and VL undetectable .  hep c VL -undetectable ( was treated for this as per his outpatient docs and seems to have cured )    HIV ab/ag- Negative    plan-  Continue Vancomycin IV  monitor vancomycin levels, required  keep vanco trough <15  Avycaz IV stopped on 12/8  Ertapenem IV started on 12/8 and as per attending, was changed to meropenem as the pseudomonas and Alcalgenes reported in foot wound cx not covered by ertapenem but meropenem will cover it and will cover the esbl   Continue Meropenem 1 gram IV q 8 hrs - day #5  aggressive wound care   HCV Vl , full hep b panel - reviewed, Hep B immune   Depakote stopped  12/11  possible left AKA - pending surgical scheduling and medical optimization       when ready for discharge:  treating right possible OM  picc line placed on 12/11  continue Vancomycin IV 1000 mg q 12 hrs - last dose on January 8, 2024  as per attending, pt will be eventually discharged on Meropenem 1 gram IV q12 dosing instead of q 8 - last dose on January 8rd, 2024  weekly lab work: cbc with diff, cmp, esr, crp, vancomycin trough - pt has his own ID specialist from before and pcp, please fax to pt's own  ID specialist  follow up with pt's ID and PCP in the office   remove picc line upon completion of the course of abx       will discuss with Dr. King  discussed with the pt     A/P-  53 year old male with PMHx of cervical epidural abscess (s/p decompressive laminectomy 3/2021 c/b b/l leg paralysis), hx of pneumoperitoneum (s/p ex lap, total abdominal colectomy and end ileostomy (on 1/12/23) c/b evisceration and sepsis with MRSA bacteremia postoperatively), hx of hepatitis C, hx of R. buttock abscess, hx of L. foot osteomyelitis, neurogenic bladder (with indwelling Holcomb catheter, last exchanged two days ago), HTN, HLD, bipolar disorder, GERD, hx of opioid dependence, and constipation who presented to the ED on 12/4/23 from [facility] for feet infection.    b/l feet infections and overlying cellulitis  Right heel wound infection to bone and as per xray c/w Om as well.  no report of any gas on either xray or CT  possible OR planning for left AKA when patient is medically optimized - pending scheduling     b/l foot infection  Om of right heel wound  + leukocytosis - improved   HCV  severe contractures of b/l LE  right foot wound cx- ESBL proteus - resistant to cefepime and corynebecaterium and alcalegenes  left foot wound cx- MRSA and Pseudomonas  and klebsiella   Blood cx- neg x 2  extensive chart search on [facility] by Dr. King and based on pt's latest HCV VL result from 6/2023 detected vl but <1.08  also based on serology from 2017 was positive for hep b sAG and E ag and core IGm ab ( not sure if he was ever treated)  HIV ab/ag negative   UC - VRE, Klebsiella   Hep B and HEp c both viral loads are undetectable.  his hep B serology c/w chronic infection in past not new and VL undetectable .  hep c VL -undetectable ( was treated for this as per his outpatient docs and seems to have cured )    HIV ab/ag- Negative    plan-  Continue Vancomycin IV  monitor vancomycin levels, required  keep vanco trough <15  Ertapenem IV started on 12/8 and as per attending, was changed to meropenem as the pseudomonas and Alcalgenes reported in foot wound cx not covered by ertapenem but meropenem will cover it and will cover the esbl   Continue Meropenem 1 gram IV q 8 hrs - day #5  aggressive wound care   HCV Vl , full hep b panel - reviewed, Hep B immune   Depakote stopped  12/11  possible left AKA - pending surgical scheduling and medical optimization       when ready for discharge:  treating right possible OM  picc line placed on 12/11  continue Vancomycin IV 1000 mg q 12 hrs - last dose on January 8, 2024  as per attending, pt will be eventually discharged on Meropenem 1 gram IV q12 dosing instead of q 8 - last dose on January 8rd, 2024  weekly lab work: cbc with diff, cmp, esr, crp, vancomycin trough - pt has his own ID specialist from before and pcp, please fax to pt's own  ID specialist  follow up with pt's ID and PCP in the office   remove picc line upon completion of the course of abx       will discuss with Dr. King  discussed with the pt     A/P-  53 year old male with PMHx of cervical epidural abscess (s/p decompressive laminectomy 3/2021 c/b b/l leg paralysis), hx of pneumoperitoneum (s/p ex lap, total abdominal colectomy and end ileostomy (on 1/12/23) c/b evisceration and sepsis with MRSA bacteremia postoperatively), hx of hepatitis C, hx of R. buttock abscess, hx of L. foot osteomyelitis, neurogenic bladder (with indwelling Holcomb catheter, last exchanged two days ago), HTN, HLD, bipolar disorder, GERD, hx of opioid dependence, and constipation who presented to the ED on 12/4/23 from [facility] for feet infection.    b/l feet infections and overlying cellulitis  Right heel wound infection to bone and as per xray c/w Om as well.  no report of any gas on either xray or CT  possible OR planning for left AKA when patient is medically optimized - pending scheduling     b/l foot infection  Om of right heel wound  + leukocytosis - improved   HCV  severe contractures of b/l LE  right foot wound cx- ESBL proteus - resistant to cefepime and corynebecaterium and alcalegenes  left foot wound cx- MRSA and Pseudomonas  and klebsiella   Blood cx- neg x 2  extensive chart search on [facility] by Dr. King and based on pt's latest HCV VL result from 6/2023 detected vl but <1.08  also based on serology from 2017 was positive for hep b sAG and E ag and core IGm ab ( not sure if he was ever treated)  HIV ab/ag negative   UC - VRE, Klebsiella   Hep B and HEp c both viral loads are undetectable.  his hep B serology c/w chronic infection in past not new and VL undetectable .  hep c VL -undetectable ( was treated for this as per his outpatient docs and seems to have cured )    HIV ab/ag- Negative    plan-  Continue Vancomycin IV  monitor vancomycin levels, required  keep vanco trough <15  Ertapenem IV started on 12/8 and as per attending, was changed to meropenem as the pseudomonas and Alcalgenes reported in foot wound cx not covered by ertapenem but meropenem will cover it and will cover the esbl   Continue Meropenem 1 gram IV q 8 hrs - day #5  aggressive wound care   HCV Vl , full hep b panel - reviewed, Hep B immune   Depakote stopped  12/11  possible left AKA - pending surgical scheduling and medical optimization       when ready for discharge:  treating right possible OM  picc line placed on 12/11  continue Vancomycin IV 1000 mg q 12 hrs - last dose on January 8, 2024  as per attending, pt will be eventually discharged on Meropenem 1 gram IV q12 dosing instead of q 8 - last dose on January 8rd, 2024  weekly lab work: cbc with diff, cmp, esr, crp, vancomycin trough - pt has his own ID specialist from before and pcp, please fax to pt's own  ID specialist  follow up with pt's ID and PCP in the office   remove picc line upon completion of the course of abx       will discuss with Dr. King  discussed with the pt

## 2023-12-18 NOTE — PROGRESS NOTE ADULT - PROBLEM SELECTOR PLAN 2
pt reports less spasms at this time. at this time. Pain continues to require opiods but use has decreased over weekend  appears to be mostly neuropathic and spasmatic in nature.   Continue Methadone concentrate 60 mg po q 12 hr. Discussed with [facility] . who offer , no objection to divided methadone dose. Continue to monitor for elongated QTc   Continue Neurontin to 800 mg po q 8 hrs, can continue to titrate up to max of 3600 mg daily q 3 days  Continue  Flexeril to 10 mg po TID  Although Cymbalta was initially ordered for depression, it can have good adjuvant effect with neuropathic pain   continue Dilaudid prn  Bowel regimen w miralax, senna. pt reports less spasms at this time. at this time. Pain continues to require opiods but use has decreased over weekend  appears to be mostly neuropathic and spasmatic in nature.   Continue Methadone concentrate 60 mg po q 12 hr. Discussed with [facility] . who offer , no objection to divided methadone dose. Continue to monitor for elongated QTc   Continue Neurontin to 800 mg po q 8 hrs, can continue to titrate up to max of 3600 mg daily q 3 days  Continue  Flexeril to 10 mg po TID  Although Cymbalta was initially ordered for depression, it can have good adjuvant effect with neuropathic pain   continue Dilaudid prn  Bowel regimen w miralax, senna. pt reports less spasms at this time. at this time. Pain continues to require opioids but use has decreased over weekend  appears to be mostly neuropathic and spasmatic in nature.   Continue Methadone concentrate 60 mg po q 12 hr. Discussed with [facility] . who offer , no objection to divided methadone dose. Continue to monitor for elongated QTc   Continue Neurontin to 800 mg po q 8 hrs, can continue to titrate up to max of 3600 mg daily q 3 days  Continue  Flexeril to 10 mg po TID  Although Cymbalta was initially ordered for depression, it can have good adjuvant effect with neuropathic pain   continue Dilaudid prn  Bowel regimen w miralax, senna. pt reports less spasms at this time. at this time. Pain continues to require opioids but use has decreased over weekend  appears to be mostly neuropathic and spasmatic in nature.   Continue Methadone concentrate 60 mg po q 12 hr. Discussed with [facility] . who offer , no objection to divided methadone dose. Continue to monitor for elongated QTc   Continue Neurontin to 800 mg po q 8 hrs, can continue to titrate up to max of 3600 mg daily q 3 days  Continue  Flexeril to 10 mg po TID  Although Cymbalta was initially ordered for depression, it can have good adjuvant effect with neuropathic pain   continue Dilaudid prn  Bowel regimen w miralax, senna.

## 2023-12-18 NOTE — CONSULT NOTE ADULT - CONSULT REASON
PICC line
Evaluate circulation, infected right hip wound and bilateral feet wounds
B/L Foot wounds
Preop eval
b/l foot infections
GOC in the setting of chronic infection

## 2023-12-19 LAB
ANION GAP SERPL CALC-SCNC: 1 MMOL/L — LOW (ref 5–17)
ANION GAP SERPL CALC-SCNC: 1 MMOL/L — LOW (ref 5–17)
BUN SERPL-MCNC: 15 MG/DL — SIGNIFICANT CHANGE UP (ref 7–23)
BUN SERPL-MCNC: 15 MG/DL — SIGNIFICANT CHANGE UP (ref 7–23)
CALCIUM SERPL-MCNC: 8.8 MG/DL — SIGNIFICANT CHANGE UP (ref 8.5–10.1)
CALCIUM SERPL-MCNC: 8.8 MG/DL — SIGNIFICANT CHANGE UP (ref 8.5–10.1)
CHLORIDE SERPL-SCNC: 106 MMOL/L — SIGNIFICANT CHANGE UP (ref 96–108)
CHLORIDE SERPL-SCNC: 106 MMOL/L — SIGNIFICANT CHANGE UP (ref 96–108)
CO2 SERPL-SCNC: 34 MMOL/L — HIGH (ref 22–31)
CO2 SERPL-SCNC: 34 MMOL/L — HIGH (ref 22–31)
CREAT SERPL-MCNC: 0.6 MG/DL — SIGNIFICANT CHANGE UP (ref 0.5–1.3)
CREAT SERPL-MCNC: 0.6 MG/DL — SIGNIFICANT CHANGE UP (ref 0.5–1.3)
EGFR: 116 ML/MIN/1.73M2 — SIGNIFICANT CHANGE UP
EGFR: 116 ML/MIN/1.73M2 — SIGNIFICANT CHANGE UP
GLUCOSE SERPL-MCNC: 96 MG/DL — SIGNIFICANT CHANGE UP (ref 70–99)
GLUCOSE SERPL-MCNC: 96 MG/DL — SIGNIFICANT CHANGE UP (ref 70–99)
HCT VFR BLD CALC: 33.3 % — LOW (ref 39–50)
HCT VFR BLD CALC: 33.3 % — LOW (ref 39–50)
HGB BLD-MCNC: 9.9 G/DL — LOW (ref 13–17)
HGB BLD-MCNC: 9.9 G/DL — LOW (ref 13–17)
MCHC RBC-ENTMCNC: 26.6 PG — LOW (ref 27–34)
MCHC RBC-ENTMCNC: 26.6 PG — LOW (ref 27–34)
MCHC RBC-ENTMCNC: 29.7 G/DL — LOW (ref 32–36)
MCHC RBC-ENTMCNC: 29.7 G/DL — LOW (ref 32–36)
MCV RBC AUTO: 89.5 FL — SIGNIFICANT CHANGE UP (ref 80–100)
MCV RBC AUTO: 89.5 FL — SIGNIFICANT CHANGE UP (ref 80–100)
NRBC # BLD: 0 /100 WBCS — SIGNIFICANT CHANGE UP (ref 0–0)
NRBC # BLD: 0 /100 WBCS — SIGNIFICANT CHANGE UP (ref 0–0)
PLATELET # BLD AUTO: 199 K/UL — SIGNIFICANT CHANGE UP (ref 150–400)
PLATELET # BLD AUTO: 199 K/UL — SIGNIFICANT CHANGE UP (ref 150–400)
POTASSIUM SERPL-MCNC: 4.1 MMOL/L — SIGNIFICANT CHANGE UP (ref 3.5–5.3)
POTASSIUM SERPL-MCNC: 4.1 MMOL/L — SIGNIFICANT CHANGE UP (ref 3.5–5.3)
POTASSIUM SERPL-SCNC: 4.1 MMOL/L — SIGNIFICANT CHANGE UP (ref 3.5–5.3)
POTASSIUM SERPL-SCNC: 4.1 MMOL/L — SIGNIFICANT CHANGE UP (ref 3.5–5.3)
RBC # BLD: 3.72 M/UL — LOW (ref 4.2–5.8)
RBC # BLD: 3.72 M/UL — LOW (ref 4.2–5.8)
RBC # FLD: 17.1 % — HIGH (ref 10.3–14.5)
RBC # FLD: 17.1 % — HIGH (ref 10.3–14.5)
SODIUM SERPL-SCNC: 141 MMOL/L — SIGNIFICANT CHANGE UP (ref 135–145)
SODIUM SERPL-SCNC: 141 MMOL/L — SIGNIFICANT CHANGE UP (ref 135–145)
WBC # BLD: 8.19 K/UL — SIGNIFICANT CHANGE UP (ref 3.8–10.5)
WBC # BLD: 8.19 K/UL — SIGNIFICANT CHANGE UP (ref 3.8–10.5)
WBC # FLD AUTO: 8.19 K/UL — SIGNIFICANT CHANGE UP (ref 3.8–10.5)
WBC # FLD AUTO: 8.19 K/UL — SIGNIFICANT CHANGE UP (ref 3.8–10.5)

## 2023-12-19 PROCEDURE — 99232 SBSQ HOSP IP/OBS MODERATE 35: CPT

## 2023-12-19 RX ORDER — HYDROMORPHONE HYDROCHLORIDE 2 MG/ML
2 INJECTION INTRAMUSCULAR; INTRAVENOUS; SUBCUTANEOUS
Refills: 0 | Status: DISCONTINUED | OUTPATIENT
Start: 2023-12-19 | End: 2023-12-20

## 2023-12-19 RX ADMIN — Medication 1 APPLICATION(S): at 13:44

## 2023-12-19 RX ADMIN — Medication 3 MILLILITER(S): at 23:07

## 2023-12-19 RX ADMIN — HYDROMORPHONE HYDROCHLORIDE 2 MILLIGRAM(S): 2 INJECTION INTRAMUSCULAR; INTRAVENOUS; SUBCUTANEOUS at 05:27

## 2023-12-19 RX ADMIN — Medication 650 MILLIGRAM(S): at 12:26

## 2023-12-19 RX ADMIN — HYDROMORPHONE HYDROCHLORIDE 2 MILLIGRAM(S): 2 INJECTION INTRAMUSCULAR; INTRAVENOUS; SUBCUTANEOUS at 09:40

## 2023-12-19 RX ADMIN — FINASTERIDE 5 MILLIGRAM(S): 5 TABLET, FILM COATED ORAL at 12:27

## 2023-12-19 RX ADMIN — Medication 250 MILLIGRAM(S): at 05:30

## 2023-12-19 RX ADMIN — GABAPENTIN 800 MILLIGRAM(S): 400 CAPSULE ORAL at 22:58

## 2023-12-19 RX ADMIN — HYDROMORPHONE HYDROCHLORIDE 2 MILLIGRAM(S): 2 INJECTION INTRAMUSCULAR; INTRAVENOUS; SUBCUTANEOUS at 05:45

## 2023-12-19 RX ADMIN — ATORVASTATIN CALCIUM 40 MILLIGRAM(S): 80 TABLET, FILM COATED ORAL at 22:58

## 2023-12-19 RX ADMIN — HYDROMORPHONE HYDROCHLORIDE 2 MILLIGRAM(S): 2 INJECTION INTRAMUSCULAR; INTRAVENOUS; SUBCUTANEOUS at 10:00

## 2023-12-19 RX ADMIN — HYDROMORPHONE HYDROCHLORIDE 2 MILLIGRAM(S): 2 INJECTION INTRAMUSCULAR; INTRAVENOUS; SUBCUTANEOUS at 13:44

## 2023-12-19 RX ADMIN — CYCLOBENZAPRINE HYDROCHLORIDE 10 MILLIGRAM(S): 10 TABLET, FILM COATED ORAL at 05:28

## 2023-12-19 RX ADMIN — QUETIAPINE FUMARATE 100 MILLIGRAM(S): 200 TABLET, FILM COATED ORAL at 09:40

## 2023-12-19 RX ADMIN — SENNA PLUS 2 TABLET(S): 8.6 TABLET ORAL at 21:39

## 2023-12-19 RX ADMIN — METHADONE HYDROCHLORIDE 60 MILLIGRAM(S): 40 TABLET ORAL at 18:35

## 2023-12-19 RX ADMIN — Medication 5 MILLIGRAM(S): at 09:40

## 2023-12-19 RX ADMIN — Medication 250 MILLIGRAM(S): at 18:37

## 2023-12-19 RX ADMIN — NYSTATIN CREAM 1 APPLICATION(S): 100000 CREAM TOPICAL at 05:33

## 2023-12-19 RX ADMIN — METHADONE HYDROCHLORIDE 60 MILLIGRAM(S): 40 TABLET ORAL at 06:40

## 2023-12-19 RX ADMIN — Medication 3 MILLILITER(S): at 17:50

## 2023-12-19 RX ADMIN — Medication 3 MILLILITER(S): at 11:24

## 2023-12-19 RX ADMIN — TAMSULOSIN HYDROCHLORIDE 0.4 MILLIGRAM(S): 0.4 CAPSULE ORAL at 22:59

## 2023-12-19 RX ADMIN — Medication 100 MILLIGRAM(S): at 12:27

## 2023-12-19 RX ADMIN — Medication 1 APPLICATION(S): at 13:45

## 2023-12-19 RX ADMIN — Medication 600 MILLIGRAM(S): at 05:28

## 2023-12-19 RX ADMIN — QUETIAPINE FUMARATE 400 MILLIGRAM(S): 200 TABLET, FILM COATED ORAL at 22:58

## 2023-12-19 RX ADMIN — MEROPENEM 100 MILLIGRAM(S): 1 INJECTION INTRAVENOUS at 05:30

## 2023-12-19 RX ADMIN — AMLODIPINE BESYLATE 2.5 MILLIGRAM(S): 2.5 TABLET ORAL at 05:28

## 2023-12-19 RX ADMIN — ENOXAPARIN SODIUM 40 MILLIGRAM(S): 100 INJECTION SUBCUTANEOUS at 22:59

## 2023-12-19 RX ADMIN — CHLORHEXIDINE GLUCONATE 1 APPLICATION(S): 213 SOLUTION TOPICAL at 05:33

## 2023-12-19 RX ADMIN — Medication 650 MILLIGRAM(S): at 13:17

## 2023-12-19 RX ADMIN — CYCLOBENZAPRINE HYDROCHLORIDE 10 MILLIGRAM(S): 10 TABLET, FILM COATED ORAL at 13:45

## 2023-12-19 RX ADMIN — Medication 1 TABLET(S): at 12:28

## 2023-12-19 RX ADMIN — Medication 3 MILLILITER(S): at 00:17

## 2023-12-19 RX ADMIN — Medication 1 TABLET(S): at 18:35

## 2023-12-19 RX ADMIN — Medication 5 MILLIGRAM(S): at 21:38

## 2023-12-19 RX ADMIN — Medication 600 MILLIGRAM(S): at 18:35

## 2023-12-19 RX ADMIN — QUETIAPINE FUMARATE 100 MILLIGRAM(S): 200 TABLET, FILM COATED ORAL at 18:35

## 2023-12-19 RX ADMIN — MEROPENEM 100 MILLIGRAM(S): 1 INJECTION INTRAVENOUS at 21:39

## 2023-12-19 RX ADMIN — HYDROMORPHONE HYDROCHLORIDE 2 MILLIGRAM(S): 2 INJECTION INTRAMUSCULAR; INTRAVENOUS; SUBCUTANEOUS at 19:00

## 2023-12-19 RX ADMIN — HYDROMORPHONE HYDROCHLORIDE 2 MILLIGRAM(S): 2 INJECTION INTRAMUSCULAR; INTRAVENOUS; SUBCUTANEOUS at 23:15

## 2023-12-19 RX ADMIN — NYSTATIN CREAM 1 APPLICATION(S): 100000 CREAM TOPICAL at 18:34

## 2023-12-19 RX ADMIN — HYDROMORPHONE HYDROCHLORIDE 2 MILLIGRAM(S): 2 INJECTION INTRAMUSCULAR; INTRAVENOUS; SUBCUTANEOUS at 22:58

## 2023-12-19 RX ADMIN — GABAPENTIN 800 MILLIGRAM(S): 400 CAPSULE ORAL at 13:45

## 2023-12-19 RX ADMIN — Medication 1 TABLET(S): at 08:08

## 2023-12-19 RX ADMIN — ZINC SULFATE TAB 220 MG (50 MG ZINC EQUIVALENT) 220 MILLIGRAM(S): 220 (50 ZN) TAB at 12:26

## 2023-12-19 RX ADMIN — HYDROMORPHONE HYDROCHLORIDE 2 MILLIGRAM(S): 2 INJECTION INTRAMUSCULAR; INTRAVENOUS; SUBCUTANEOUS at 18:35

## 2023-12-19 RX ADMIN — Medication 3 MILLILITER(S): at 05:40

## 2023-12-19 RX ADMIN — Medication 500 MILLIGRAM(S): at 12:27

## 2023-12-19 RX ADMIN — CYCLOBENZAPRINE HYDROCHLORIDE 10 MILLIGRAM(S): 10 TABLET, FILM COATED ORAL at 22:59

## 2023-12-19 RX ADMIN — GABAPENTIN 800 MILLIGRAM(S): 400 CAPSULE ORAL at 05:28

## 2023-12-19 RX ADMIN — MEROPENEM 100 MILLIGRAM(S): 1 INJECTION INTRAVENOUS at 13:45

## 2023-12-19 RX ADMIN — HYDROMORPHONE HYDROCHLORIDE 2 MILLIGRAM(S): 2 INJECTION INTRAMUSCULAR; INTRAVENOUS; SUBCUTANEOUS at 14:10

## 2023-12-19 RX ADMIN — PANTOPRAZOLE SODIUM 40 MILLIGRAM(S): 20 TABLET, DELAYED RELEASE ORAL at 08:08

## 2023-12-19 NOTE — PROGRESS NOTE ADULT - NUTRITIONAL ASSESSMENT
This patient has been assessed with a concern for Malnutrition and has been determined to have a diagnosis/diagnoses of Moderate protein-calorie malnutrition.    This patient is being managed with:   Diet Regular-  1000mL Fluid Restriction ([id number])  Liquid Protein Supplement     Qty per Day:  1  Supplement Feeding Modality:  Oral  Ensure Plus High Protein Cans or Servings Per Day:  1       Frequency:  Two Times a day  Entered: Dec 13 2023  2:13PM   This patient has been assessed with a concern for Malnutrition and has been determined to have a diagnosis/diagnoses of Moderate protein-calorie malnutrition.    This patient is being managed with:   Diet Regular-  1000mL Fluid Restriction ([id number])  Liquid Protein Supplement     Qty per Day:  1  Supplement Feeding Modality:  Oral  Ensure Plus High Protein Cans or Servings Per Day:  1       Frequency:  Two Times a day  Entered: Dec 13 2023  2:13PM

## 2023-12-19 NOTE — PROGRESS NOTE ADULT - PROBLEM SELECTOR PLAN 6
PAlliative Medicine continues to follow for  ongoing symptom management, GOC discussions pending clinical course. Pending medical clearance for possible L AKA    Mother Mariya Stewart updated today on pt condition and plan for Left AKA

## 2023-12-19 NOTE — PROGRESS NOTE ADULT - PROBLEM SELECTOR PLAN 1
continues with complaints of leg pain and spasms   reports that he falls asleep after Dilaudid and awakens in approximately 3 hours in pain  Increase DIlaudid to 2 mg IV q 3 hrs for end dose failure   appears to be mostly neuropathic and spasmatic in nature.   Continue Methadone concentrate 60 mg po q 12 hr. Discussed with [facility] . who offer no objection to divided methadone dose. Continue to monitor for elongated QTc   Continue Neurontin to 800 mg po q 8 hrs  Continue  Flexeril to 10 mg po TID continues with complaints of leg pain and spasms   reports that he falls asleep after Dilaudid and awakens in approximately 3 hours in pain  Increase DIlaudid to 2 mg IV q 3 hrs for end dose failure   appears to be mostly neuropathic and spasmatic in nature.   Continue Methadone concentrate 60 mg po q 12 hr. Discussed with [facility] . who offer no objection to divided methadone dose. Continue to monitor for elongated QTc   Continue Neurontin to 800 mg po q 8 hrs  Continue  Flexeril to 10 mg po TID

## 2023-12-19 NOTE — PROGRESS NOTE ADULT - SUBJECTIVE AND OBJECTIVE BOX
Patient is a 53y old  Male who presents with a chief complaint of Sepsis secondary to b/l foot wounds (18 Dec 2023 15:08)      INTERVAL HPI/OVERNIGHT EVENTS: Overnight no acute events. C/o pain despite Dilaudid     MEDICATIONS  (STANDING):  acetaminophen     Tablet .. 650 milliGRAM(s) Oral daily  albuterol/ipratropium for Nebulization 3 milliLiter(s) Nebulizer every 6 hours  amLODIPine   Tablet 2.5 milliGRAM(s) Oral daily  ascorbic acid 500 milliGRAM(s) Oral daily  atorvastatin 40 milliGRAM(s) Oral at bedtime  bacitracin   Ointment 1 Application(s) Topical daily  chlorhexidine 2% Cloths 1 Application(s) Topical <User Schedule>  collagenase Ointment 1 Application(s) Topical daily  cyclobenzaprine 10 milliGRAM(s) Oral three times a day  enoxaparin Injectable 40 milliGRAM(s) SubCutaneous every 24 hours  finasteride 5 milliGRAM(s) Oral daily  gabapentin 800 milliGRAM(s) Oral every 8 hours  guaiFENesin  milliGRAM(s) Oral every 12 hours  lactobacillus acidophilus 1 Tablet(s) Oral two times a day with meals  lidocaine   4% Patch 1 Patch Transdermal daily  meropenem  IVPB 1000 milliGRAM(s) IV Intermittent every 8 hours  methadone  Concentrate 60 milliGRAM(s) Oral two times a day  methylphenidate 5 milliGRAM(s) Oral <User Schedule>  multivitamin 1 Tablet(s) Oral daily  nystatin Powder 1 Application(s) Topical two times a day  pantoprazole    Tablet 40 milliGRAM(s) Oral before breakfast  polyethylene glycol 3350 17 Gram(s) Oral daily  QUEtiapine 100 milliGRAM(s) Oral <User Schedule>  QUEtiapine 400 milliGRAM(s) Oral at bedtime  senna 2 Tablet(s) Oral at bedtime  tamsulosin 0.4 milliGRAM(s) Oral at bedtime  thiamine 100 milliGRAM(s) Oral daily  vancomycin  IVPB 1000 milliGRAM(s) IV Intermittent every 12 hours  zinc sulfate 220 milliGRAM(s) Oral daily    MEDICATIONS  (PRN):  acetaminophen     Tablet .. 650 milliGRAM(s) Oral every 6 hours PRN Temp greater or equal to 38C (100.4F), Mild Pain (1 - 3)  albuterol    90 MICROgram(s) HFA Inhaler 2 Puff(s) Inhalation every 4 hours PRN Shortness of Breath and/or Wheezing  aluminum hydroxide/magnesium hydroxide/simethicone Suspension 30 milliLiter(s) Oral every 4 hours PRN Dyspepsia  HYDROmorphone  Injectable 2 milliGRAM(s) IV Push every 4 hours PRN Severe Pain (7 - 10)  melatonin 3 milliGRAM(s) Oral at bedtime PRN Insomnia  sodium chloride 0.9% lock flush 10 milliLiter(s) IV Push every 1 hour PRN Pre/post blood products, medications, blood draw, and to maintain line patency      Allergies    NSAIDs (Flushing; Other (Moderate))  Haldol (Anaphylaxis)  Zyprexa (Rash; Dystonic RXN; Hives)  Motrin (Anaphylaxis)  Thorazine (Other (Moderate))  Aleve (Unknown)  Stelazine (Unknown)  Risperdal (Short breath; Rash; Hives)    Intolerances        REVIEW OF SYSTEMS:  10 point ROS negative, unless stated otherwise.    Vital Signs Last 24 Hrs  T(C): 36.9 (19 Dec 2023 11:22), Max: 36.9 (19 Dec 2023 11:22)  T(F): 98.5 (19 Dec 2023 11:22), Max: 98.5 (19 Dec 2023 11:22)  HR: 103 (19 Dec 2023 11:22) (82 - 103)  BP: 105/66 (19 Dec 2023 11:22) (105/66 - 117/92)  BP(mean): --  RR: 18 (19 Dec 2023 11:22) (18 - 18)  SpO2: 100% (19 Dec 2023 11:22) (91% - 100%)    Parameters below as of 19 Dec 2023 11:22  Patient On (Oxygen Delivery Method): nasal cannula        PHYSICAL EXAM:  GENERAL: NAD  HEAD:  Atraumatic, Normocephalic  EYES: EOMI, sclera anicteric  ENMT: Moist mucous membranes  NECK: Supple, No JVD  NERVOUS SYSTEM:  Alert & Oriented, No FND appreciated   CHEST/LUNG: CTAB   HEART: RRR  ABDOMEN: Soft, Nontender, midline incisional scar from previous surgery , colostomy bag in place  EXTREMITIES: Contracted, pitting edema, wounds bilaterally     LABS:                        9.9    8.19  )-----------( 199      ( 19 Dec 2023 06:20 )             33.3     12-19    141  |  106  |  15  ----------------------------<  96  4.1   |  34<H>  |  0.60    Ca    8.8      19 Dec 2023 06:20        Urinalysis Basic - ( 19 Dec 2023 06:20 )    Color: x / Appearance: x / SG: x / pH: x  Gluc: 96 mg/dL / Ketone: x  / Bili: x / Urobili: x   Blood: x / Protein: x / Nitrite: x   Leuk Esterase: x / RBC: x / WBC x   Sq Epi: x / Non Sq Epi: x / Bacteria: x      CAPILLARY BLOOD GLUCOSE          RADIOLOGY & ADDITIONAL TESTS:    Imaging Personally Reviewed:  [ X] YES  [ ] NO    Consultant(s) Notes Reviewed:  [ X] YES  [ ] NO    Care Discussed with Consultants/Other Providers [X ] YES  [ ] NO

## 2023-12-19 NOTE — PROGRESS NOTE ADULT - SUBJECTIVE AND OBJECTIVE BOX
follow up on:  complex medical decision making related to goals of care      OVERNIGHT EVENTS:  pt states end dose failure on Dilaudid at 3 hrs  Continues with spasms to legs and chest lasting ~ 30 seconds    Review of systems:     Pain:  [x] yes [ ] no  QOL impact -   Location -                    Aggravating factors -  Quality -  Radiation -  Timing-  Severity (0-10 scale):  Minimal acceptable level (0-10 scale):     Dyspnea:      denies                        Anxiety:         denies                      Depression:   denies  Fatigue:      present                       Nausea:      denies                         Loss of appetite:    denies            Constipation:     denies             Diarrhea:     denies    All other systems reviewed and negative    MEDICATIONS  (STANDING):  acetaminophen     Tablet .. 650 milliGRAM(s) Oral daily  albuterol/ipratropium for Nebulization 3 milliLiter(s) Nebulizer every 6 hours  amLODIPine   Tablet 2.5 milliGRAM(s) Oral daily  ascorbic acid 500 milliGRAM(s) Oral daily  atorvastatin 40 milliGRAM(s) Oral at bedtime  bacitracin   Ointment 1 Application(s) Topical daily  chlorhexidine 2% Cloths 1 Application(s) Topical <User Schedule>  collagenase Ointment 1 Application(s) Topical daily  cyclobenzaprine 10 milliGRAM(s) Oral three times a day  enoxaparin Injectable 40 milliGRAM(s) SubCutaneous every 24 hours  finasteride 5 milliGRAM(s) Oral daily  gabapentin 800 milliGRAM(s) Oral every 8 hours  guaiFENesin  milliGRAM(s) Oral every 12 hours  lactobacillus acidophilus 1 Tablet(s) Oral two times a day with meals  lidocaine   4% Patch 1 Patch Transdermal daily  meropenem  IVPB 1000 milliGRAM(s) IV Intermittent every 8 hours  methadone  Concentrate 60 milliGRAM(s) Oral two times a day  methylphenidate 5 milliGRAM(s) Oral <User Schedule>  multivitamin 1 Tablet(s) Oral daily  nystatin Powder 1 Application(s) Topical two times a day  pantoprazole    Tablet 40 milliGRAM(s) Oral before breakfast  polyethylene glycol 3350 17 Gram(s) Oral daily  QUEtiapine 400 milliGRAM(s) Oral at bedtime  QUEtiapine 100 milliGRAM(s) Oral <User Schedule>  senna 2 Tablet(s) Oral at bedtime  tamsulosin 0.4 milliGRAM(s) Oral at bedtime  thiamine 100 milliGRAM(s) Oral daily  vancomycin  IVPB 1000 milliGRAM(s) IV Intermittent every 12 hours  zinc sulfate 220 milliGRAM(s) Oral daily    MEDICATIONS  (PRN):  acetaminophen     Tablet .. 650 milliGRAM(s) Oral every 6 hours PRN Temp greater or equal to 38C (100.4F), Mild Pain (1 - 3)  albuterol    90 MICROgram(s) HFA Inhaler 2 Puff(s) Inhalation every 4 hours PRN Shortness of Breath and/or Wheezing  aluminum hydroxide/magnesium hydroxide/simethicone Suspension 30 milliLiter(s) Oral every 4 hours PRN Dyspepsia  HYDROmorphone  Injectable 2 milliGRAM(s) IV Push every 3 hours PRN Severe Pain (7 - 10)  melatonin 3 milliGRAM(s) Oral at bedtime PRN Insomnia  sodium chloride 0.9% lock flush 10 milliLiter(s) IV Push every 1 hour PRN Pre/post blood products, medications, blood draw, and to maintain line patency  PHYSICAL EXAM:  Vital Signs Last 24 Hrs  T(C): 37 (19 Dec 2023 16:49), Max: 37 (19 Dec 2023 16:49)  T(F): 98.6 (19 Dec 2023 16:49), Max: 98.6 (19 Dec 2023 16:49)  HR: 92 (19 Dec 2023 16:49) (82 - 103)  BP: 120/71 (19 Dec 2023 16:49) (105/66 - 120/71)  BP(mean): --  RR: 19 (19 Dec 2023 16:49) (18 - 19)  SpO2: 97% (19 Dec 2023 16:49) (91% - 100%)     Palliative Performance Scale/Karnofsky Score: 40%     General: ill appearing male in be with c/o pain  HEENT: normocephalic, atraumatic, poor dentition   Neck: supple, no JVD   CVS: S1 S2 RRR, tachycardic, no MRG   Resp: CTAB, no RRW  GI: soft, NT, nor R/G, + colostomy  : indwelling west in situ   Musc: severe BLE contractures    Neuro: oriented x 4, non focal, paraplegic  Psych: situationally depressed    Skin:   Posterolateral decubitus wound  at the level of the greater trochanter, Soft tissue wounds are  along the calcaneus and mid foot r heel osteo  Oral intake: excellent    LABS:                       9.9    8.19  )-----------( 199      ( 19 Dec 2023 06:20 )             33.3     12-19    141  |  106  |  15  ----------------------------<  96  4.1   |  34<H>  |  0.60    Ca    8.8      19 Dec 2023 06:20    Urinalysis Basic - ( 19 Dec 2023 06:20 )    Color: x / Appearance: x / SG: x / pH: x  Gluc: 96 mg/dL / Ketone: x  / Bili: x / Urobili: x   Blood: x / Protein: x / Nitrite: x   Leuk Esterase: x / RBC: x / WBC x   Sq Epi: x / Non Sq Epi: x / Bacteria: x    RADIOLOGY & ADDITIONAL STUDIES: no additional studies

## 2023-12-19 NOTE — PROGRESS NOTE ADULT - PROBLEM SELECTOR PLAN 4
requires  chronic O2   per pt, worsening resp status on 12/12, with evidence of volume overload, s/p diuresis   Currently stable on O2 N/C.

## 2023-12-19 NOTE — PROGRESS NOTE ADULT - ASSESSMENT
Gonzalez Stewart is a 53 year old male with PMHx of cervical epidural abscess (s/p decompressive laminectomy 3/2021 c/b b/l leg paralysis), hx of pneumoperitoneum (s/p ex lap, total abdominal colectomy and end ileostomy (on 1/12/23) c/b evisceration and sepsis with MRSA bacteremia postoperatively), hx of hepatitis C, hx of R. buttock abscess, hx of L. foot osteomyelitis, neurogenic bladder (with indwelling Holcomb catheter, last exchanged two days ago), HTN, HLD, bipolar disorder, GERD, hx of opioid dependence, and constipation who presented to the ED on 12/4/23 from [facility] for feet infection and admitted for sepsis secondary to bilateral feet infection.      Sepsis secondary to b/l feet infection  Previously treated for L. hallux OM with L. heel culture growing Proteus mirabilis ESBL, completed 6-week course of avycaz  U/A (sample obtained from Holcomb catheter) with positive nitrites, moderate leuks, moderate blood, WBC 26-50, RBC > 50, moderate bacteria, squamous epithelial cells present  CT b/l LE with study is severely limited by contracture. Left lower extremity is only partially visualized with exclusion of the left foot. Skin induration and subcutaneous edema in the leg and ankle.  No soft tissue gas  S/p bedside escharectomy by podiatry  Empirically started on vancomycin and cefepime   right foot wound cx- ESBL proteus - resistant to cefepime and corynebecaterium and alcalegenes  left foot wound cx- MRSA and Pseudomonas  and klebsiella   Blood cx- neg x 2  PICC line in place ertapenem switched to meropenem   Will continue meropenem and vancomycin until 1/8   ECHO EF 55-60%, normal systolic function, and mild mitral regurgitation   Most recent CXR with interstitial lung disease, however nothing acute.  Vascular following- pending scheduling for AKA     COPD  Baseline patient is on 5L O2 NC at home   Had an episode of resp distress from fluid overload from IVF- Placed on lasix drip for some time with improvement  Continue PO lasix   Echo WNL  Consult pulm for pulm clearance for AKA-completed    HTN  PTA amlodipine 2.5 mg - bp stable    HLD  PTA atorvastatin 40 mg    Bipolar disorder  PTA quetiapine 100 mg BID and 400 mg qhs, valproic acid stopped   Psych following, appreciate recs     Hx of opioid dependence  PTA methadone 110 mg     Chronic pain: PTA lidocaine 4% patch, duloxetine 30 mg DR, gabapentin 600 mg q8, cyclobenzaprine 10 mg BID, oxycodone 5 mg q6    GERD:   PTA pantoprazole 40 mg DR    Neurogenic bladder (with indwelling Holcomb catheter):   PTA finasteride 5 mg, tamsulosin 0.4 mg    Constipation:   PTA senna 8.6 mg 2 tabs qhs     functional quad-   supportive care  , frequent repositioning    Rt hip pressure wound, seen by vascular ,   wound recs in place     RCRI 0 Points: Class 1 Risk 3.9% - 30 day risk of death, MI, or cardiac arrest.   No history of MI, CVA, TIA. Echo with normal systolic function, mild mitral regurgitation.   Patient with COPD, however he is at his baseline, currently using 5L NC, which is what he uses at home as well. Most recent chest XR without any acute abnormalities.   Reached out to cardiology for cardiac clearance, Dr. Berry. He stated that he didn't think patient warranted cardiac clearance.   No absolute contraindications to proceed with procedure under anesthesia . He has had surgeries in the past and never had an adverse reaction to general anesthesia.   Given his chronic medical conditions, patient is medically optimized for planned procedure.      Gonzalez Stewart is a 53 year old male with PMHx of cervical epidural abscess (s/p decompressive laminectomy 3/2021 c/b b/l leg paralysis), hx of pneumoperitoneum (s/p ex lap, total abdominal colectomy and end ileostomy (on 1/12/23) c/b evisceration and sepsis with MRSA bacteremia postoperatively), hx of hepatitis C, hx of R. buttock abscess, hx of L. foot osteomyelitis, neurogenic bladder (with indwelling Holcomb catheter, last exchanged two days ago), HTN, HLD, bipolar disorder, GERD, hx of opioid dependence, and constipation who presented to the ED on 12/4/23 from [facility] for feet infection and admitted for sepsis secondary to bilateral feet infection.      Sepsis secondary to b/l feet infection  Previously treated for L. hallux OM with L. heel culture growing Proteus mirabilis ESBL, completed 6-week course of avycaz  U/A (sample obtained from Holcomb catheter) with positive nitrites, moderate leuks, moderate blood, WBC 26-50, RBC > 50, moderate bacteria, squamous epithelial cells present  CT b/l LE with study is severely limited by contracture. Left lower extremity is only partially visualized with exclusion of the left foot. Skin induration and subcutaneous edema in the leg and ankle.  No soft tissue gas  S/p bedside escharectomy by podiatry  Empirically started on vancomycin and cefepime   right foot wound cx- ESBL proteus - resistant to cefepime and corynebecaterium and alcalegenes  left foot wound cx- MRSA and Pseudomonas  and klebsiella   Blood cx- neg x 2  PICC line in place ertapenem switched to meropenem   Will continue meropenem and vancomycin until 1/8   ECHO EF 55-60%, normal systolic function, and mild mitral regurgitation   Most recent CXR with interstitial lung disease, however nothing acute.  Vascular following- pending scheduling for AKA     COPD  Baseline patient is on 5L O2 NC at home   Had an episode of resp distress from fluid overload from IVF- Placed on lasix drip for some time with improvement  Continue PO lasix   Echo WNL  Consult pulm for pulm clearance for AKA-completed    HTN  PTA amlodipine 2.5 mg - bp stable    HLD  PTA atorvastatin 40 mg    Bipolar disorder  PTA quetiapine 100 mg BID and 400 mg qhs, valproic acid stopped   Psych following, appreciate recs     Hx of opioid dependence  PTA methadone 110 mg     Chronic pain: PTA lidocaine 4% patch, duloxetine 30 mg DR, gabapentin 600 mg q8, cyclobenzaprine 10 mg BID, oxycodone 5 mg q6    GERD:   PTA pantoprazole 40 mg DR    Neurogenic bladder (with indwelling Holcomb catheter):   PTA finasteride 5 mg, tamsulosin 0.4 mg    Constipation:   PTA senna 8.6 mg 2 tabs qhs     functional quad-   supportive care  , frequent repositioning    Rt hip pressure wound, seen by vascular ,   wound recs in place     RCRI 0 Points: Class 1 Risk 3.9% - 30 day risk of death, MI, or cardiac arrest.   No history of MI, CVA, TIA. Echo with normal systolic function, mild mitral regurgitation.   Patient with COPD, however he is at his baseline, currently using 5L NC, which is what he uses at home as well. Most recent chest XR without any acute abnormalities.   Reached out to cardiology for cardiac clearance, Dr. Berry. He stated that he didn't think patient warranted cardiac clearance.   No absolute contraindications to proceed with procedure under anesthesia . He has had surgeries in the past and never had an adverse reaction to general anesthesia.   Given his chronic medical conditions, patient is medically optimized for planned procedure.

## 2023-12-19 NOTE — PROGRESS NOTE ADULT - NS ATTEND AMEND GEN_ALL_CORE FT
pt alert, NAD. Agree w pain recommendations as above. For L AKA if medically stable. Vascular, podiatry following. Mother aware. Palliative will follow.

## 2023-12-19 NOTE — PROGRESS NOTE ADULT - PROBLEM SELECTOR PLAN 5
due to paraplegia. contracted and bedbound  SNF resident, requires full care with bathing, dressing and wound care.

## 2023-12-19 NOTE — PROGRESS NOTE ADULT - PROBLEM SELECTOR PLAN 2
Recurrent foot infections, pt was  previously treated for L. hallux OM with L. heel culture growing Proteus mirabilis ESBL, completed 6 week course of avycaz  CT b/l LE  severely limited by contracture. Left lower extremity is only partially visualized with exclusion of the left foot. Skin induration and subcutaneous edema in the leg and ankle.  No soft tissue gas  continue course of  Vanc,/ meropenem, ID, podiatry and vascular following - recommended for L AKA pending medical clearance

## 2023-12-20 LAB
ANION GAP SERPL CALC-SCNC: 3 MMOL/L — LOW (ref 5–17)
ANION GAP SERPL CALC-SCNC: 3 MMOL/L — LOW (ref 5–17)
BUN SERPL-MCNC: 18 MG/DL — SIGNIFICANT CHANGE UP (ref 7–23)
BUN SERPL-MCNC: 18 MG/DL — SIGNIFICANT CHANGE UP (ref 7–23)
CALCIUM SERPL-MCNC: 9 MG/DL — SIGNIFICANT CHANGE UP (ref 8.5–10.1)
CALCIUM SERPL-MCNC: 9 MG/DL — SIGNIFICANT CHANGE UP (ref 8.5–10.1)
CHLORIDE SERPL-SCNC: 108 MMOL/L — SIGNIFICANT CHANGE UP (ref 96–108)
CHLORIDE SERPL-SCNC: 108 MMOL/L — SIGNIFICANT CHANGE UP (ref 96–108)
CO2 SERPL-SCNC: 30 MMOL/L — SIGNIFICANT CHANGE UP (ref 22–31)
CO2 SERPL-SCNC: 30 MMOL/L — SIGNIFICANT CHANGE UP (ref 22–31)
CREAT SERPL-MCNC: 0.52 MG/DL — SIGNIFICANT CHANGE UP (ref 0.5–1.3)
CREAT SERPL-MCNC: 0.52 MG/DL — SIGNIFICANT CHANGE UP (ref 0.5–1.3)
EGFR: 121 ML/MIN/1.73M2 — SIGNIFICANT CHANGE UP
EGFR: 121 ML/MIN/1.73M2 — SIGNIFICANT CHANGE UP
GLUCOSE SERPL-MCNC: 119 MG/DL — HIGH (ref 70–99)
GLUCOSE SERPL-MCNC: 119 MG/DL — HIGH (ref 70–99)
HCT VFR BLD CALC: 32.5 % — LOW (ref 39–50)
HCT VFR BLD CALC: 32.5 % — LOW (ref 39–50)
HGB BLD-MCNC: 9.7 G/DL — LOW (ref 13–17)
HGB BLD-MCNC: 9.7 G/DL — LOW (ref 13–17)
MCHC RBC-ENTMCNC: 26.9 PG — LOW (ref 27–34)
MCHC RBC-ENTMCNC: 26.9 PG — LOW (ref 27–34)
MCHC RBC-ENTMCNC: 29.8 G/DL — LOW (ref 32–36)
MCHC RBC-ENTMCNC: 29.8 G/DL — LOW (ref 32–36)
MCV RBC AUTO: 90 FL — SIGNIFICANT CHANGE UP (ref 80–100)
MCV RBC AUTO: 90 FL — SIGNIFICANT CHANGE UP (ref 80–100)
NRBC # BLD: 0 /100 WBCS — SIGNIFICANT CHANGE UP (ref 0–0)
NRBC # BLD: 0 /100 WBCS — SIGNIFICANT CHANGE UP (ref 0–0)
PLATELET # BLD AUTO: 189 K/UL — SIGNIFICANT CHANGE UP (ref 150–400)
PLATELET # BLD AUTO: 189 K/UL — SIGNIFICANT CHANGE UP (ref 150–400)
POTASSIUM SERPL-MCNC: 3.9 MMOL/L — SIGNIFICANT CHANGE UP (ref 3.5–5.3)
POTASSIUM SERPL-MCNC: 3.9 MMOL/L — SIGNIFICANT CHANGE UP (ref 3.5–5.3)
POTASSIUM SERPL-SCNC: 3.9 MMOL/L — SIGNIFICANT CHANGE UP (ref 3.5–5.3)
POTASSIUM SERPL-SCNC: 3.9 MMOL/L — SIGNIFICANT CHANGE UP (ref 3.5–5.3)
RBC # BLD: 3.61 M/UL — LOW (ref 4.2–5.8)
RBC # BLD: 3.61 M/UL — LOW (ref 4.2–5.8)
RBC # FLD: 17.1 % — HIGH (ref 10.3–14.5)
RBC # FLD: 17.1 % — HIGH (ref 10.3–14.5)
SODIUM SERPL-SCNC: 141 MMOL/L — SIGNIFICANT CHANGE UP (ref 135–145)
SODIUM SERPL-SCNC: 141 MMOL/L — SIGNIFICANT CHANGE UP (ref 135–145)
WBC # BLD: 7.37 K/UL — SIGNIFICANT CHANGE UP (ref 3.8–10.5)
WBC # BLD: 7.37 K/UL — SIGNIFICANT CHANGE UP (ref 3.8–10.5)
WBC # FLD AUTO: 7.37 K/UL — SIGNIFICANT CHANGE UP (ref 3.8–10.5)
WBC # FLD AUTO: 7.37 K/UL — SIGNIFICANT CHANGE UP (ref 3.8–10.5)

## 2023-12-20 PROCEDURE — 93010 ELECTROCARDIOGRAM REPORT: CPT

## 2023-12-20 PROCEDURE — 99232 SBSQ HOSP IP/OBS MODERATE 35: CPT

## 2023-12-20 RX ORDER — HYDROMORPHONE HYDROCHLORIDE 2 MG/ML
2 INJECTION INTRAMUSCULAR; INTRAVENOUS; SUBCUTANEOUS EVERY 4 HOURS
Refills: 0 | Status: DISCONTINUED | OUTPATIENT
Start: 2023-12-20 | End: 2023-12-27

## 2023-12-20 RX ORDER — BUDESONIDE AND FORMOTEROL FUMARATE DIHYDRATE 160; 4.5 UG/1; UG/1
2 AEROSOL RESPIRATORY (INHALATION)
Refills: 0 | Status: DISCONTINUED | OUTPATIENT
Start: 2023-12-20 | End: 2024-01-03

## 2023-12-20 RX ORDER — IPRATROPIUM/ALBUTEROL SULFATE 18-103MCG
3 AEROSOL WITH ADAPTER (GRAM) INHALATION EVERY 8 HOURS
Refills: 0 | Status: DISCONTINUED | OUTPATIENT
Start: 2023-12-20 | End: 2023-12-27

## 2023-12-20 RX ADMIN — NYSTATIN CREAM 1 APPLICATION(S): 100000 CREAM TOPICAL at 17:58

## 2023-12-20 RX ADMIN — Medication 250 MILLIGRAM(S): at 17:56

## 2023-12-20 RX ADMIN — AMLODIPINE BESYLATE 2.5 MILLIGRAM(S): 2.5 TABLET ORAL at 06:09

## 2023-12-20 RX ADMIN — Medication 1 TABLET(S): at 17:57

## 2023-12-20 RX ADMIN — MEROPENEM 100 MILLIGRAM(S): 1 INJECTION INTRAVENOUS at 22:11

## 2023-12-20 RX ADMIN — MEROPENEM 100 MILLIGRAM(S): 1 INJECTION INTRAVENOUS at 13:36

## 2023-12-20 RX ADMIN — CYCLOBENZAPRINE HYDROCHLORIDE 10 MILLIGRAM(S): 10 TABLET, FILM COATED ORAL at 22:10

## 2023-12-20 RX ADMIN — GABAPENTIN 800 MILLIGRAM(S): 400 CAPSULE ORAL at 06:08

## 2023-12-20 RX ADMIN — Medication 1 TABLET(S): at 08:04

## 2023-12-20 RX ADMIN — SENNA PLUS 2 TABLET(S): 8.6 TABLET ORAL at 22:10

## 2023-12-20 RX ADMIN — HYDROMORPHONE HYDROCHLORIDE 2 MILLIGRAM(S): 2 INJECTION INTRAMUSCULAR; INTRAVENOUS; SUBCUTANEOUS at 22:49

## 2023-12-20 RX ADMIN — GABAPENTIN 800 MILLIGRAM(S): 400 CAPSULE ORAL at 13:36

## 2023-12-20 RX ADMIN — ZINC SULFATE TAB 220 MG (50 MG ZINC EQUIVALENT) 220 MILLIGRAM(S): 220 (50 ZN) TAB at 12:13

## 2023-12-20 RX ADMIN — Medication 100 MILLIGRAM(S): at 12:13

## 2023-12-20 RX ADMIN — HYDROMORPHONE HYDROCHLORIDE 2 MILLIGRAM(S): 2 INJECTION INTRAMUSCULAR; INTRAVENOUS; SUBCUTANEOUS at 06:07

## 2023-12-20 RX ADMIN — GABAPENTIN 800 MILLIGRAM(S): 400 CAPSULE ORAL at 22:10

## 2023-12-20 RX ADMIN — QUETIAPINE FUMARATE 100 MILLIGRAM(S): 200 TABLET, FILM COATED ORAL at 17:57

## 2023-12-20 RX ADMIN — Medication 650 MILLIGRAM(S): at 13:10

## 2023-12-20 RX ADMIN — TAMSULOSIN HYDROCHLORIDE 0.4 MILLIGRAM(S): 0.4 CAPSULE ORAL at 22:10

## 2023-12-20 RX ADMIN — Medication 3 MILLILITER(S): at 17:04

## 2023-12-20 RX ADMIN — METHADONE HYDROCHLORIDE 60 MILLIGRAM(S): 40 TABLET ORAL at 06:24

## 2023-12-20 RX ADMIN — Medication 250 MILLIGRAM(S): at 06:38

## 2023-12-20 RX ADMIN — Medication 3 MILLILITER(S): at 21:38

## 2023-12-20 RX ADMIN — HYDROMORPHONE HYDROCHLORIDE 2 MILLIGRAM(S): 2 INJECTION INTRAMUSCULAR; INTRAVENOUS; SUBCUTANEOUS at 06:25

## 2023-12-20 RX ADMIN — Medication 1 TABLET(S): at 12:13

## 2023-12-20 RX ADMIN — Medication 500 MILLIGRAM(S): at 12:13

## 2023-12-20 RX ADMIN — NYSTATIN CREAM 1 APPLICATION(S): 100000 CREAM TOPICAL at 06:14

## 2023-12-20 RX ADMIN — ATORVASTATIN CALCIUM 40 MILLIGRAM(S): 80 TABLET, FILM COATED ORAL at 22:10

## 2023-12-20 RX ADMIN — CYCLOBENZAPRINE HYDROCHLORIDE 10 MILLIGRAM(S): 10 TABLET, FILM COATED ORAL at 13:36

## 2023-12-20 RX ADMIN — Medication 3 MILLILITER(S): at 05:19

## 2023-12-20 RX ADMIN — FINASTERIDE 5 MILLIGRAM(S): 5 TABLET, FILM COATED ORAL at 12:13

## 2023-12-20 RX ADMIN — CYCLOBENZAPRINE HYDROCHLORIDE 10 MILLIGRAM(S): 10 TABLET, FILM COATED ORAL at 06:08

## 2023-12-20 RX ADMIN — CHLORHEXIDINE GLUCONATE 1 APPLICATION(S): 213 SOLUTION TOPICAL at 06:14

## 2023-12-20 RX ADMIN — QUETIAPINE FUMARATE 400 MILLIGRAM(S): 200 TABLET, FILM COATED ORAL at 22:10

## 2023-12-20 RX ADMIN — METHADONE HYDROCHLORIDE 60 MILLIGRAM(S): 40 TABLET ORAL at 17:57

## 2023-12-20 RX ADMIN — MEROPENEM 100 MILLIGRAM(S): 1 INJECTION INTRAVENOUS at 06:07

## 2023-12-20 RX ADMIN — Medication 600 MILLIGRAM(S): at 17:57

## 2023-12-20 RX ADMIN — PANTOPRAZOLE SODIUM 40 MILLIGRAM(S): 20 TABLET, DELAYED RELEASE ORAL at 08:05

## 2023-12-20 RX ADMIN — Medication 5 MILLIGRAM(S): at 22:10

## 2023-12-20 RX ADMIN — Medication 5 MILLIGRAM(S): at 10:43

## 2023-12-20 RX ADMIN — Medication 600 MILLIGRAM(S): at 06:10

## 2023-12-20 RX ADMIN — HYDROMORPHONE HYDROCHLORIDE 2 MILLIGRAM(S): 2 INJECTION INTRAMUSCULAR; INTRAVENOUS; SUBCUTANEOUS at 23:49

## 2023-12-20 RX ADMIN — BUDESONIDE AND FORMOTEROL FUMARATE DIHYDRATE 2 PUFF(S): 160; 4.5 AEROSOL RESPIRATORY (INHALATION) at 17:57

## 2023-12-20 RX ADMIN — QUETIAPINE FUMARATE 100 MILLIGRAM(S): 200 TABLET, FILM COATED ORAL at 10:43

## 2023-12-20 RX ADMIN — Medication 650 MILLIGRAM(S): at 12:14

## 2023-12-20 NOTE — CHART NOTE - NSCHARTNOTEFT_GEN_A_CORE
Pt from long-term SNF with PMH of HTN, HLD, bipolar disorder, hx of opioid dependency, GERD, cervical epidural abscess (s/p decompressive laminectomy 03/2021 c/b b/l leg paralysis), hx of pneumoperitoneum (s/p ex lap, total abdominal colectomy and end ileostomy 01/2023), hx of hepatitis C, hx of R buttocks abscess, hx of L foot OM, neurogenic bladder with indwelling west catheter, & chronic constipation.  Pt admitted with sepsis 2/2 b/l feet infection. on abx. s/p PICC line 12/11. ID following. Vascular following; pending scheduling for AKA.  Pt was scheduled to return to long-term SNF, however was having acute respiratory failure presumed 2/2 COPD; was started on Lasix, on O2, on fluid restriction.    Factors impacting intake: [x] none [ ] nausea  [ ] vomiting [ ] diarrhea [ ] constipation  [ ]chewing problems [ ] swallowing issues  [ ] other:     Diet Prescription: Diet, Regular:   1000mL Fluid Restriction ([id number])  Liquid Protein Supplement     Qty per Day:  1  Supplement Feeding Modality:  Oral  Ensure Plus High Protein Cans or Servings Per Day:  1       Frequency:  Two Times a day (12-13-23 @ 14:13)    Intake: % of meals; drinking 100% of Ensure Plus High Protein nutrition supplements. Observed pt ate >75% of breakfast this AM (12/20). Pt requesting to discontinue LPS supplement as he is not taking them; will continue with Ensure Plus High Protein supplements as ordered    Current Weight: No recent weights to trend; request current wt  % Weight Change: N/A    No edema noted as per flow sheets    Pertinent Medications: MEDICATIONS  (STANDING):  acetaminophen     Tablet .. 650 milliGRAM(s) Oral daily  albuterol/ipratropium for Nebulization 3 milliLiter(s) Nebulizer every 6 hours  amLODIPine   Tablet 2.5 milliGRAM(s) Oral daily  ascorbic acid 500 milliGRAM(s) Oral daily  atorvastatin 40 milliGRAM(s) Oral at bedtime  bacitracin   Ointment 1 Application(s) Topical daily  budesonide 160 MICROgram(s)/formoterol 4.5 MICROgram(s) Inhaler 2 Puff(s) Inhalation two times a day  chlorhexidine 2% Cloths 1 Application(s) Topical <User Schedule>  collagenase Ointment 1 Application(s) Topical daily  cyclobenzaprine 10 milliGRAM(s) Oral three times a day  enoxaparin Injectable 40 milliGRAM(s) SubCutaneous every 24 hours  finasteride 5 milliGRAM(s) Oral daily  gabapentin 800 milliGRAM(s) Oral every 8 hours  guaiFENesin  milliGRAM(s) Oral every 12 hours  lactobacillus acidophilus 1 Tablet(s) Oral two times a day with meals  lidocaine   4% Patch 1 Patch Transdermal daily  meropenem  IVPB 1000 milliGRAM(s) IV Intermittent every 8 hours  methadone  Concentrate 60 milliGRAM(s) Oral two times a day  methylphenidate 5 milliGRAM(s) Oral <User Schedule>  multivitamin 1 Tablet(s) Oral daily  nystatin Powder 1 Application(s) Topical two times a day  pantoprazole    Tablet 40 milliGRAM(s) Oral before breakfast  polyethylene glycol 3350 17 Gram(s) Oral daily  QUEtiapine 100 milliGRAM(s) Oral <User Schedule>  QUEtiapine 400 milliGRAM(s) Oral at bedtime  senna 2 Tablet(s) Oral at bedtime  tamsulosin 0.4 milliGRAM(s) Oral at bedtime  thiamine 100 milliGRAM(s) Oral daily  vancomycin  IVPB 1000 milliGRAM(s) IV Intermittent every 12 hours  zinc sulfate 220 milliGRAM(s) Oral daily    MEDICATIONS  (PRN):  acetaminophen     Tablet .. 650 milliGRAM(s) Oral every 6 hours PRN Temp greater or equal to 38C (100.4F), Mild Pain (1 - 3)  albuterol    90 MICROgram(s) HFA Inhaler 2 Puff(s) Inhalation every 4 hours PRN Shortness of Breath and/or Wheezing  aluminum hydroxide/magnesium hydroxide/simethicone Suspension 30 milliLiter(s) Oral every 4 hours PRN Dyspepsia  HYDROmorphone  Injectable 2 milliGRAM(s) IV Push every 3 hours PRN Severe Pain (7 - 10)  melatonin 3 milliGRAM(s) Oral at bedtime PRN Insomnia  sodium chloride 0.9% lock flush 10 milliLiter(s) IV Push every 1 hour PRN Pre/post blood products, medications, blood draw, and to maintain line patency    Pertinent Labs: 12-20 Na141 mmol/L Glu 119 mg/dL<H> K+ 3.9 mmol/L Cr  0.52 mg/dL BUN 18 mg/dL  12-04 HgbA1c 5.3%    Skin: As per flow sheets, pt with wound x 3, & pressure ulcers as follows:  stage IV pressure ulcer; R heel as per flow sheets  stage IV pressure ulcer; R hip as per flow sheets  unstageable pressure ulcer; location not clear as per flow sheets    Estimated Needs:   [x] no change since previous assessment on 12/07  [ ] recalculated:     Previous Nutrition Diagnosis:   [x] Moderate malnutrition in chronic setting  Etiology: Inadequate protein-energy intake & increased needs r/t hx of back surgery c/b b/l leg paralysis, hx of pneumoperitoneum (s/p ex lap, colectomy, ileostomy) & PU  Signs/Symptoms: Physical signs of mild-moderate muscle wasting and fat depletion as noted    Goal: Pt to meet >75% of protein-energy needs via meals/supplement - not consistently met    Nutrition Diagnosis is [x] ongoing  [ ] resolved [ ] not applicable     New Nutrition Diagnosis: [x] not applicable       Interventions:   Recommend  [ ] Change Diet To:  [x] Nutrition Supplement: discontinue LPS supplement  [ ] Nutrition Support  [x] Other: consider liberalizing fluid restriction    Monitoring and Evaluation:   [ x ] PO intake [ x ] Tolerance to diet prescription [ x ] weights [ x ] labs[ x ] follow up per protocol  [ ] other: Pt from long-term SNF with PMH of HTN, HLD, bipolar disorder, hx of opioid dependency, GERD, cervical epidural abscess (s/p decompressive laminectomy 03/2021 c/b b/l leg paralysis), hx of pneumoperitoneum (s/p ex lap, total abdominal colectomy and end ileostomy 01/2023), hx of hepatitis C, hx of R buttocks abscess, hx of L foot OM, neurogenic bladder with indwelling west catheter, & chronic constipation.  Pt admitted with sepsis 2/2 b/l feet infection. on abx. s/p PICC line 12/11. ID following. Vascular following; pending scheduling for AKA.  Pt was scheduled to return to long-term SNF, however was having acute respiratory failure presumed 2/2 COPD; was started on Lasix, on O2, on fluid restriction.    Factors impacting intake: [x] none [ ] nausea  [ ] vomiting [ ] diarrhea [ ] constipation  [ ]chewing problems [ ] swallowing issues  [ ] other:     Diet Prescription: Diet, Regular:   1000mL Fluid Restriction ([id number])  Liquid Protein Supplement     Qty per Day:  1  Supplement Feeding Modality:  Oral  Ensure Plus High Protein Cans or Servings Per Day:  1       Frequency:  Two Times a day (12-13-23 @ 14:13)    Intake: % of meals; drinking 100% of Ensure Plus High Protein nutrition supplements. Observed pt ate >75% of breakfast this AM (12/20). Pt requesting to discontinue LPS supplement as he is not taking them; will continue with Ensure Plus High Protein supplements as ordered    Current Weight: No recent weights to trend; request current wt  % Weight Change: N/A    No edema noted as per flow sheets    Pertinent Medications: MEDICATIONS  (STANDING):  acetaminophen     Tablet .. 650 milliGRAM(s) Oral daily  albuterol/ipratropium for Nebulization 3 milliLiter(s) Nebulizer every 6 hours  amLODIPine   Tablet 2.5 milliGRAM(s) Oral daily  ascorbic acid 500 milliGRAM(s) Oral daily  atorvastatin 40 milliGRAM(s) Oral at bedtime  bacitracin   Ointment 1 Application(s) Topical daily  budesonide 160 MICROgram(s)/formoterol 4.5 MICROgram(s) Inhaler 2 Puff(s) Inhalation two times a day  chlorhexidine 2% Cloths 1 Application(s) Topical <User Schedule>  collagenase Ointment 1 Application(s) Topical daily  cyclobenzaprine 10 milliGRAM(s) Oral three times a day  enoxaparin Injectable 40 milliGRAM(s) SubCutaneous every 24 hours  finasteride 5 milliGRAM(s) Oral daily  gabapentin 800 milliGRAM(s) Oral every 8 hours  guaiFENesin  milliGRAM(s) Oral every 12 hours  lactobacillus acidophilus 1 Tablet(s) Oral two times a day with meals  lidocaine   4% Patch 1 Patch Transdermal daily  meropenem  IVPB 1000 milliGRAM(s) IV Intermittent every 8 hours  methadone  Concentrate 60 milliGRAM(s) Oral two times a day  methylphenidate 5 milliGRAM(s) Oral <User Schedule>  multivitamin 1 Tablet(s) Oral daily  nystatin Powder 1 Application(s) Topical two times a day  pantoprazole    Tablet 40 milliGRAM(s) Oral before breakfast  polyethylene glycol 3350 17 Gram(s) Oral daily  QUEtiapine 100 milliGRAM(s) Oral <User Schedule>  QUEtiapine 400 milliGRAM(s) Oral at bedtime  senna 2 Tablet(s) Oral at bedtime  tamsulosin 0.4 milliGRAM(s) Oral at bedtime  thiamine 100 milliGRAM(s) Oral daily  vancomycin  IVPB 1000 milliGRAM(s) IV Intermittent every 12 hours  zinc sulfate 220 milliGRAM(s) Oral daily    MEDICATIONS  (PRN):  acetaminophen     Tablet .. 650 milliGRAM(s) Oral every 6 hours PRN Temp greater or equal to 38C (100.4F), Mild Pain (1 - 3)  albuterol    90 MICROgram(s) HFA Inhaler 2 Puff(s) Inhalation every 4 hours PRN Shortness of Breath and/or Wheezing  aluminum hydroxide/magnesium hydroxide/simethicone Suspension 30 milliLiter(s) Oral every 4 hours PRN Dyspepsia  HYDROmorphone  Injectable 2 milliGRAM(s) IV Push every 3 hours PRN Severe Pain (7 - 10)  melatonin 3 milliGRAM(s) Oral at bedtime PRN Insomnia  sodium chloride 0.9% lock flush 10 milliLiter(s) IV Push every 1 hour PRN Pre/post blood products, medications, blood draw, and to maintain line patency    Pertinent Labs: 12-20 Na141 mmol/L Glu 119 mg/dL<H> K+ 3.9 mmol/L Cr  0.52 mg/dL BUN 18 mg/dL  12-04 HgbA1c 5.3%    Skin: As per flow sheets, pt with wound x 3, & pressure ulcers as follows:  stage IV pressure ulcer; R heel as per flow sheets  stage IV pressure ulcer; R hip as per flow sheets  unstageable pressure ulcer; location not clear as per flow sheets    Estimated Needs:   [x] no change since previous assessment on 12/07  [ ] recalculated:     Previous Nutrition Diagnosis:   [x] Moderate malnutrition in chronic setting  Etiology: Inadequate protein-energy intake & increased needs r/t hx of back surgery c/b b/l leg paralysis, hx of pneumoperitoneum (s/p ex lap, colectomy, ileostomy) & PU  Signs/Symptoms: Physical signs of mild-moderate muscle wasting and fat depletion as noted    Goal: Pt to meet >75% of protein-energy needs via meals/supplement - not consistently met    Nutrition Diagnosis is [x] ongoing  [ ] resolved [ ] not applicable     New Nutrition Diagnosis: [x] not applicable       Interventions:   Recommend  [ ] Change Diet To:  [x] Nutrition Supplement: discontinue LPS supplement  [ ] Nutrition Support  [x] Other: consider liberalizing fluid restriction    Monitoring and Evaluation:   [ x ] PO intake [ x ] Tolerance to diet prescription [ x ] weights [ x ] labs[ x ] follow up per protocol  [ ] other:

## 2023-12-20 NOTE — PROGRESS NOTE ADULT - ASSESSMENT
Gonzalez Stewart is a 53 year old male with PMHx of cervical epidural abscess (s/p decompressive laminectomy 3/2021 c/b b/l leg paralysis), hx of pneumoperitoneum (s/p ex lap, total abdominal colectomy and end ileostomy (on 1/12/23) c/b evisceration and sepsis with MRSA bacteremia postoperatively), hx of hepatitis C, hx of R. buttock abscess, hx of L. foot osteomyelitis, neurogenic bladder (with indwelling Holcomb catheter, last exchanged two days ago), HTN, HLD, bipolar disorder, GERD, hx of opioid dependence, and constipation who presented to the ED on 12/4/23 from [facility] for feet infection and admitted for sepsis secondary to bilateral feet infection.      Sepsis secondary to b/l feet infection  Previously treated for L. hallux OM with L. heel culture growing Proteus mirabilis ESBL, completed 6-week course of avycaz  CT b/l LE with study is severely limited by contracture. Left lower extremity is only partially visualized with exclusion of the left foot. Skin induration and subcutaneous edema in the leg and ankle.  No soft tissue gas  S/p bedside escharectomy by podiatry  right foot wound cx- ESBL proteus - resistant to cefepime and corynebecaterium and alcalegenes  left foot wound cx- MRSA and Pseudomonas  and klebsiella   Blood cx- neg x 2  PICC line in place 12/11/23   Will continue meropenem and vancomycin until 1/8   ECHO EF 55-60%, normal systolic function, and mild mitral regurgitation   Most recent CXR with interstitial lung disease, however nothing acute.  Vascular following- pending scheduling for AKA     COPD  Baseline patient is on 5L O2 NC at home   Had an episode of resp distress from fluid overload from IVF- Placed on lasix drip for some time with improvement  Continue PO lasix   ECHO as above   Pulm consulted    cont airway clearance w/ aerobika  - cont BD tx  - cont symbicort inh bid  - cont o2 to maintain sat 90-95%  -follow CT chest       HTN  continue  amlodipine 2.5 mg daily     HLD  continue  atorvastatin 40 mg daily     Bipolar disorder, stable   : PTA quetiapine 100 mg BID and 400 mg qhs, valproic acid-- per psych  12/12/2023 valproic ahs been stopped by kendell      Hx of opioid dependence  continue methadone maintenance 60mg bid     Chronic pain: PTA lidocaine 4% patch, duloxetine 30 mg DR, gabapentin 600 mg q8, cyclobenzaprine 10 mg BID, oxycodone 5 mg q6    GERD:   continue with PPI     Neurogenic bladder (with indwelling Holcomb catheter)   PTA finasteride 5 mg, tamsulosin 0.4 mg    Constipation:   continue with bowel regimen      functional quad-   supportive care  , frequent repositioning  supportive care     Rt hip pressure wound, seen by vascular ,   wound recs in place     Preventative Measures  lovenox SQ-dvt ppx  fall, aspiration precautions   HOBE     palliative following, patient is full code      Gonzalez Stewart is a 53 year old male with PMHx of cervical epidural abscess (s/p decompressive laminectomy 3/2021 c/b b/l leg paralysis), hx of pneumoperitoneum (s/p ex lap, total abdominal colectomy and end ileostomy (on 1/12/23) c/b evisceration and sepsis with MRSA bacteremia postoperatively), hx of hepatitis C, hx of R. buttock abscess, hx of L. foot osteomyelitis, neurogenic bladder (with indwelling Holcomb catheter, last exchanged two days ago), HTN, HLD, bipolar disorder, GERD, hx of opioid dependence, and constipation who presented to the ED on 12/4/23 from [facility] for feet infection and admitted for sepsis secondary to bilateral feet infection.      Sepsis secondary to b/l feet infection  Previously treated for L. hallux OM with L. heel culture growing Proteus mirabilis ESBL, completed 6-week course of avycaz  CT b/l LE with study is severely limited by contracture. Left lower extremity is only partially visualized with exclusion of the left foot. Skin induration and subcutaneous edema in the leg and ankle.  No soft tissue gas  S/p bedside escharectomy by podiatry  right foot wound cx- ESBL proteus - resistant to cefepime and corynebecaterium and alcalegenes  left foot wound cx- MRSA and Pseudomonas  and klebsiella   Blood cx- neg x 2  PICC line in place 12/11/23   Will continue meropenem and vancomycin until 1/8   ECHO EF 55-60%, normal systolic function, and mild mitral regurgitation   Most recent CXR with interstitial lung disease, however nothing acute.  Vascular following- pending scheduling for AKA     COPD  Baseline patient is on 5L O2 NC at home   Had an episode of resp distress from fluid overload from IVF- Placed on lasix drip for some time with improvement  Continue PO lasix   ECHO as above   Pulm consulted    cont airway clearance w/ aerobika  - cont BD tx  - cont symbicort inh bid  - cont o2 to maintain sat 90-95%  -follow CT chest       HTN  continue  amlodipine 2.5 mg daily     HLD  continue  atorvastatin 40 mg daily     Bipolar disorder, stable   : PTA quetiapine 100 mg BID and 400 mg qhs, valproic acid-- per psych  12/12/2023 valproic ahs been stopped by kendell      Hx of opioid dependence  continue methadone maintenance 60mg bid     Chronic pain: PTA lidocaine 4% patch, duloxetine 30 mg DR, gabapentin 600 mg q8, cyclobenzaprine 10 mg BID, oxycodone 5 mg q6    GERD:   continue with PPI     Neurogenic bladder (with indwelling Holcomb catheter)   PTA finasteride 5 mg, tamsulosin 0.4 mg    Constipation:   continue with bowel regimen      functional quad-   supportive care  , frequent repositioning  supportive care     Rt hip pressure wound, seen by vascular ,   wound recs in place     Preventative Measures  lovenox SQ-dvt ppx  fall, aspiration precautions   HOBE     palliative following, patient is full code

## 2023-12-20 NOTE — PROGRESS NOTE ADULT - PROBLEM SELECTOR PLAN 1
continues with complaints of leg pain and spasms    appears to be mostly neuropathic and spasmatic in nature.   Continue Methadone concentrate 60 mg po q 12 hr. Discussed with [facility] . who offer no objection to divided methadone dose. Continue to monitor for elongated QTc   Decrease DIlaudid to 2 mg IV q 4 hrs in anticipation of surgery  Continue Neurontin to 800 mg po q 8 hrs  Continue  Flexeril to 10 mg po TID. continues with complaints of leg pain and spasms    appears to be mostly neuropathic and spasmatic in nature.   Continue Methadone concentrate 60 mg po q 12 hr. Discussed with [facility] . who offer no objection to divided methadone dose. Continue to monitor for elongated QTc   Decrease DIlaudid to 2 mg IV q 4 hrs in anticipation of surgery  Continue Neurontin to 800 mg po q 8 hrs  Continue  Flexeril to 10 mg po TID.

## 2023-12-20 NOTE — PROGRESS NOTE ADULT - NUTRITIONAL ASSESSMENT
This patient has been assessed with a concern for Malnutrition and has been determined to have a diagnosis/diagnoses of Moderate protein-calorie malnutrition.    This patient is being managed with:   Diet Regular-  1000mL Fluid Restriction ([id number])  Supplement Feeding Modality:  Oral  Ensure Plus High Protein Cans or Servings Per Day:  1       Frequency:  Two Times a day  Entered: Dec 20 2023 10:26AM    Diet Regular-  1000mL Fluid Restriction ([id number])  Liquid Protein Supplement     Qty per Day:  1  Supplement Feeding Modality:  Oral  Ensure Plus High Protein Cans or Servings Per Day:  1       Frequency:  Two Times a day  Entered: Dec 13 2023  2:13PM    The following pending diet order is being considered for treatment of Moderate protein-calorie malnutrition:null This patient has been assessed with a concern for Malnutrition and has been determined to have a diagnosis/diagnoses of Moderate protein-calorie malnutrition.    This patient is being managed with:   Diet Regular-  1000mL Fluid Restriction ([id number])  Supplement Feeding Modality:  Oral  Ensure Plus High Protein Cans or Servings Per Day:  1       Frequency:  Two Times a day  Entered: Dec 20 2023 10:26AM    Diet Regular-  1000mL Fluid Restriction ([id number])  Liquid Protein Supplement     Qty per Day:  1  Supplement Feeding Modality:  Oral  Ensure Plus High Protein Cans or Servings Per Day:  1       Frequency:  Two Times a day  Entered: Dec 13 2023  2:13PM    The following pending diet order is being considered for treatment of Moderate protein-calorie malnutrition:null

## 2023-12-20 NOTE — PROGRESS NOTE ADULT - SUBJECTIVE AND OBJECTIVE BOX
Patient is a 53y old  Male who presents with a chief complaint of Sepsis secondary to b/l foot wounds (18 Dec 2023 15:08)      INTERVAL HPI/OVERNIGHT EVENTS:   Patient seen and examined bedside.   no overnight events     REVIEW OF SYSTEMS: remaining ROS negative     MEDICATIONS  (STANDING):  acetaminophen     Tablet .. 650 milliGRAM(s) Oral daily  albuterol/ipratropium for Nebulization 3 milliLiter(s) Nebulizer every 6 hours  amLODIPine   Tablet 2.5 milliGRAM(s) Oral daily  ascorbic acid 500 milliGRAM(s) Oral daily  atorvastatin 40 milliGRAM(s) Oral at bedtime  bacitracin   Ointment 1 Application(s) Topical daily  chlorhexidine 2% Cloths 1 Application(s) Topical <User Schedule>  collagenase Ointment 1 Application(s) Topical daily  cyclobenzaprine 10 milliGRAM(s) Oral three times a day  enoxaparin Injectable 40 milliGRAM(s) SubCutaneous every 24 hours  finasteride 5 milliGRAM(s) Oral daily  gabapentin 800 milliGRAM(s) Oral every 8 hours  guaiFENesin  milliGRAM(s) Oral every 12 hours  lactobacillus acidophilus 1 Tablet(s) Oral two times a day with meals  lidocaine   4% Patch 1 Patch Transdermal daily  meropenem  IVPB 1000 milliGRAM(s) IV Intermittent every 8 hours  methadone  Concentrate 60 milliGRAM(s) Oral two times a day  methylphenidate 5 milliGRAM(s) Oral <User Schedule>  multivitamin 1 Tablet(s) Oral daily  nystatin Powder 1 Application(s) Topical two times a day  pantoprazole    Tablet 40 milliGRAM(s) Oral before breakfast  polyethylene glycol 3350 17 Gram(s) Oral daily  QUEtiapine 100 milliGRAM(s) Oral <User Schedule>  QUEtiapine 400 milliGRAM(s) Oral at bedtime  senna 2 Tablet(s) Oral at bedtime  tamsulosin 0.4 milliGRAM(s) Oral at bedtime  thiamine 100 milliGRAM(s) Oral daily  vancomycin  IVPB 1000 milliGRAM(s) IV Intermittent every 12 hours  zinc sulfate 220 milliGRAM(s) Oral daily    MEDICATIONS  (PRN):  acetaminophen     Tablet .. 650 milliGRAM(s) Oral every 6 hours PRN Temp greater or equal to 38C (100.4F), Mild Pain (1 - 3)  albuterol    90 MICROgram(s) HFA Inhaler 2 Puff(s) Inhalation every 4 hours PRN Shortness of Breath and/or Wheezing  aluminum hydroxide/magnesium hydroxide/simethicone Suspension 30 milliLiter(s) Oral every 4 hours PRN Dyspepsia  HYDROmorphone  Injectable 2 milliGRAM(s) IV Push every 4 hours PRN Severe Pain (7 - 10)  melatonin 3 milliGRAM(s) Oral at bedtime PRN Insomnia  sodium chloride 0.9% lock flush 10 milliLiter(s) IV Push every 1 hour PRN Pre/post blood products, medications, blood draw, and to maintain line patency      Allergies    NSAIDs (Flushing; Other (Moderate))  Haldol (Anaphylaxis)  Zyprexa (Rash; Dystonic RXN; Hives)  Motrin (Anaphylaxis)  Thorazine (Other (Moderate))  Aleve (Unknown)  Stelazine (Unknown)  Risperdal (Short breath; Rash; Hives)    Intolerances        Vital Signs Last 24 Hrs  T(C): 36.7 (20 Dec 2023 11:47), Max: 37 (19 Dec 2023 16:49)  T(F): 98 (20 Dec 2023 11:47), Max: 98.6 (19 Dec 2023 16:49)  HR: 76 (20 Dec 2023 11:47) (76 - 92)  BP: 112/66 (20 Dec 2023 11:47) (112/66 - 128/70)  BP(mean): --  RR: 19 (20 Dec 2023 11:47) (18 - 19)  SpO2: 94% (20 Dec 2023 11:47) (94% - 97%)    Parameters below as of 20 Dec 2023 11:47  Patient On (Oxygen Delivery Method): nasal cannula            PHYSICAL EXAM:  GENERAL: NAD , thin and pale appearing,  no increased WOB, speaking in full sentences, not using accessory muscles.   HEAD:  Atraumatic, Normocephalic  EYES: EOMI, PERRLA, conjunctiva and sclera clear  ENMT: No tonsillar erythema, exudates, or enlargement; Moist mucous membranes   NECK: Supple, No JVD   CHEST/LUNG: CTAB;  No rales, rhonchi, wheezing, or rubs  HEART: Regular rate and rhythm; No murmurs, rubs, or gallops  ABDOMEN: Soft, Nontender, Nondistended; Bowel sounds present  EXTREMITIES: contracted b/l LE   SKIN: stage 3 right hip ulcer, right foot heel necrotic to bone , left foot dorsum had eschar   NERVOUS SYSTEM:  Alert & Oriented X3, Good concentration; functional quad. moving b/l upper extremities. able to eat independently     LABS:                        9.7    7.37  )-----------( 189      ( 20 Dec 2023 06:00 )             32.5   12-20    141  |  108  |  18  ----------------------------<  119<H>  3.9   |  30  |  0.52    Ca    9.0      20 Dec 2023 06:00            Urinalysis Basic - ( 19 Dec 2023 06:20 )    Color: x / Appearance: x / SG: x / pH: x  Gluc: 96 mg/dL / Ketone: x  / Bili: x / Urobili: x   Blood: x / Protein: x / Nitrite: x   Leuk Esterase: x / RBC: x / WBC x   Sq Epi: x / Non Sq Epi: x / Bacteria: x      CAPILLARY BLOOD GLUCOSE          RADIOLOGY & ADDITIONAL TESTS:      Consultant(s) Notes Reveiwed [ x] Yes     Care Discussed with [x ] Consultants  [x ] Patient  [ ] Family  [ x] /   [x ] Other; RN

## 2023-12-20 NOTE — PROGRESS NOTE ADULT - PROBLEM SELECTOR PLAN 2
Recurrent foot infections, pt was  previously treated for L. hallux OM with L. heel culture growing Proteus mirabilis ESBL, completed 6 week course of avycaz  CT b/l LE  severely limited by contracture. Left lower extremity is only partially visualized with exclusion of the left foot. Skin induration and subcutaneous edema in the leg and ankle.  No soft tissue gas  continue course of  Vanc,/ meropenem, ID, podiatry and vascular following -   pending CT of Chest today for pending clearance  for L AKA

## 2023-12-20 NOTE — PROGRESS NOTE ADULT - SUBJECTIVE AND OBJECTIVE BOX
follow up on:  complex medical decision making related to goals of care    OVERNIGHT EVENTS:  pt more sleepy today  pain unchanged  used 4 doses of B/T Dilaudid in past 24 hrs      Review of systems:     Pain:  [x ] yes [ ] no presently 6/10 on scale     Dyspnea:      denies                        Anxiety:         denies                      Depression:   denies  Fatigue:      present                        Nausea:      denies                         Loss of appetite:    denies            Constipation:     denies             Diarrhea:     denies    All other systems reviewed and negative  Unable to obtain/Limited due to poor mental status    MEDICATIONS  (STANDING):  acetaminophen     Tablet .. 650 milliGRAM(s) Oral daily  albuterol/ipratropium for Nebulization 3 milliLiter(s) Nebulizer every 8 hours  amLODIPine   Tablet 2.5 milliGRAM(s) Oral daily  ascorbic acid 500 milliGRAM(s) Oral daily  atorvastatin 40 milliGRAM(s) Oral at bedtime  bacitracin   Ointment 1 Application(s) Topical daily  budesonide 160 MICROgram(s)/formoterol 4.5 MICROgram(s) Inhaler 2 Puff(s) Inhalation two times a day  chlorhexidine 2% Cloths 1 Application(s) Topical <User Schedule>  collagenase Ointment 1 Application(s) Topical daily  cyclobenzaprine 10 milliGRAM(s) Oral three times a day  enoxaparin Injectable 40 milliGRAM(s) SubCutaneous every 24 hours  finasteride 5 milliGRAM(s) Oral daily  gabapentin 800 milliGRAM(s) Oral every 8 hours  guaiFENesin  milliGRAM(s) Oral every 12 hours  lactobacillus acidophilus 1 Tablet(s) Oral two times a day with meals  lidocaine   4% Patch 1 Patch Transdermal daily  meropenem  IVPB 1000 milliGRAM(s) IV Intermittent every 8 hours  methadone  Concentrate 60 milliGRAM(s) Oral two times a day  methylphenidate 5 milliGRAM(s) Oral <User Schedule>  multivitamin 1 Tablet(s) Oral daily  nystatin Powder 1 Application(s) Topical two times a day  pantoprazole    Tablet 40 milliGRAM(s) Oral before breakfast  polyethylene glycol 3350 17 Gram(s) Oral daily  QUEtiapine 100 milliGRAM(s) Oral <User Schedule>  QUEtiapine 400 milliGRAM(s) Oral at bedtime  senna 2 Tablet(s) Oral at bedtime  tamsulosin 0.4 milliGRAM(s) Oral at bedtime  thiamine 100 milliGRAM(s) Oral daily  vancomycin  IVPB 1000 milliGRAM(s) IV Intermittent every 12 hours  zinc sulfate 220 milliGRAM(s) Oral daily    MEDICATIONS  (PRN):  acetaminophen     Tablet .. 650 milliGRAM(s) Oral every 6 hours PRN Temp greater or equal to 38C (100.4F), Mild Pain (1 - 3)  albuterol    90 MICROgram(s) HFA Inhaler 2 Puff(s) Inhalation every 4 hours PRN Shortness of Breath and/or Wheezing  aluminum hydroxide/magnesium hydroxide/simethicone Suspension 30 milliLiter(s) Oral every 4 hours PRN Dyspepsia  HYDROmorphone  Injectable 2 milliGRAM(s) IV Push every 3 hours PRN Severe Pain (7 - 10)  melatonin 3 milliGRAM(s) Oral at bedtime PRN Insomnia  sodium chloride 0.9% lock flush 10 milliLiter(s) IV Push every 1 hour PRN Pre/post blood products, medications, blood draw, and to maintain line patency      PHYSICAL EXAM:  Vital Signs Last 24 Hrs  T(C): 36.7 (20 Dec 2023 11:47), Max: 37 (19 Dec 2023 16:49)  T(F): 98 (20 Dec 2023 11:47), Max: 98.6 (19 Dec 2023 16:49)  HR: 76 (20 Dec 2023 11:47) (76 - 92)  BP: 112/66 (20 Dec 2023 11:47) (112/66 - 128/70)  BP(mean): --  RR: 19 (20 Dec 2023 11:47) (18 - 19)  SpO2: 94% (20 Dec 2023 11:47) (94% - 97%)    Parameters below as of 20 Dec 2023 11:47  Patient On (Oxygen Delivery Method): nasal cannula    Palliative Performance Scale/Karnofsky Score:  ECOG Performance:     General: ill appearing male in be with c/o pain  HEENT: normocephalic, atraumatic, poor dentition   Neck: supple, no JVD   CVS: S1 S2 RRR, tachycardic, no MRG   Resp: CTAB, no RRW  GI: soft, NT, nor R/G, + ileostomy  : indwelling west in situ   Musc: severe BLE contractures    Neuro: oriented x 4, non focal, paraplegic  Psych: situationally depressed    Skin: Stage IV pressure ulcer; R heel as per flow sheets  stage IV pressure ulcer; R hip as per flow sheets  Oral intake: excellent    LABS:                         9.7    7.37  )-----------( 189      ( 20 Dec 2023 06:00 )             32.5     12-20    141  |  108  |  18  ----------------------------<  119<H>  3.9   |  30  |  0.52    Ca    9.0      20 Dec 2023 06:00      Urinalysis Basic - ( 20 Dec 2023 06:00 )    Color: x / Appearance: x / SG: x / pH: x  Gluc: 119 mg/dL / Ketone: x  / Bili: x / Urobili: x   Blood: x / Protein: x / Nitrite: x   Leuk Esterase: x / RBC: x / WBC x   Sq Epi: x / Non Sq Epi: x / Bacteria: x    RADIOLOGY & ADDITIONAL STUDIES: pending CT Chest today follow up on:  complex medical decision making related to goals of care    OVERNIGHT EVENTS:  pt more sleepy today  pain unchanged  used 4 doses of B/T Dilaudid in past 24 hrs      Review of systems:     Pain:  [x ] yes [ ] no presently 6/10 on scale     Dyspnea:      denies                        Anxiety:         denies                      Depression:   denies  Fatigue:      present                        Nausea:      denies                         Loss of appetite:    denies            Constipation:     denies             Diarrhea:     denies    All other systems reviewed and negative  Unable to obtain/Limited due to poor mental status    MEDICATIONS  (STANDING):  acetaminophen     Tablet .. 650 milliGRAM(s) Oral daily  albuterol/ipratropium for Nebulization 3 milliLiter(s) Nebulizer every 8 hours  amLODIPine   Tablet 2.5 milliGRAM(s) Oral daily  ascorbic acid 500 milliGRAM(s) Oral daily  atorvastatin 40 milliGRAM(s) Oral at bedtime  bacitracin   Ointment 1 Application(s) Topical daily  budesonide 160 MICROgram(s)/formoterol 4.5 MICROgram(s) Inhaler 2 Puff(s) Inhalation two times a day  chlorhexidine 2% Cloths 1 Application(s) Topical <User Schedule>  collagenase Ointment 1 Application(s) Topical daily  cyclobenzaprine 10 milliGRAM(s) Oral three times a day  enoxaparin Injectable 40 milliGRAM(s) SubCutaneous every 24 hours  finasteride 5 milliGRAM(s) Oral daily  gabapentin 800 milliGRAM(s) Oral every 8 hours  guaiFENesin  milliGRAM(s) Oral every 12 hours  lactobacillus acidophilus 1 Tablet(s) Oral two times a day with meals  lidocaine   4% Patch 1 Patch Transdermal daily  meropenem  IVPB 1000 milliGRAM(s) IV Intermittent every 8 hours  methadone  Concentrate 60 milliGRAM(s) Oral two times a day  methylphenidate 5 milliGRAM(s) Oral <User Schedule>  multivitamin 1 Tablet(s) Oral daily  nystatin Powder 1 Application(s) Topical two times a day  pantoprazole    Tablet 40 milliGRAM(s) Oral before breakfast  polyethylene glycol 3350 17 Gram(s) Oral daily  QUEtiapine 100 milliGRAM(s) Oral <User Schedule>  QUEtiapine 400 milliGRAM(s) Oral at bedtime  senna 2 Tablet(s) Oral at bedtime  tamsulosin 0.4 milliGRAM(s) Oral at bedtime  thiamine 100 milliGRAM(s) Oral daily  vancomycin  IVPB 1000 milliGRAM(s) IV Intermittent every 12 hours  zinc sulfate 220 milliGRAM(s) Oral daily    MEDICATIONS  (PRN):  acetaminophen     Tablet .. 650 milliGRAM(s) Oral every 6 hours PRN Temp greater or equal to 38C (100.4F), Mild Pain (1 - 3)  albuterol    90 MICROgram(s) HFA Inhaler 2 Puff(s) Inhalation every 4 hours PRN Shortness of Breath and/or Wheezing  aluminum hydroxide/magnesium hydroxide/simethicone Suspension 30 milliLiter(s) Oral every 4 hours PRN Dyspepsia  HYDROmorphone  Injectable 2 milliGRAM(s) IV Push every 3 hours PRN Severe Pain (7 - 10)  melatonin 3 milliGRAM(s) Oral at bedtime PRN Insomnia  sodium chloride 0.9% lock flush 10 milliLiter(s) IV Push every 1 hour PRN Pre/post blood products, medications, blood draw, and to maintain line patency      PHYSICAL EXAM:  Vital Signs Last 24 Hrs  T(C): 36.7 (20 Dec 2023 11:47), Max: 37 (19 Dec 2023 16:49)  T(F): 98 (20 Dec 2023 11:47), Max: 98.6 (19 Dec 2023 16:49)  HR: 76 (20 Dec 2023 11:47) (76 - 92)  BP: 112/66 (20 Dec 2023 11:47) (112/66 - 128/70)  BP(mean): --  RR: 19 (20 Dec 2023 11:47) (18 - 19)  SpO2: 94% (20 Dec 2023 11:47) (94% - 97%)    Parameters below as of 20 Dec 2023 11:47  Patient On (Oxygen Delivery Method): nasal cannula    Palliative Performance Scale/Karnofsky Score:%       General: ill appearing male in be with c/o pain  HEENT: normocephalic, atraumatic, poor dentition   Neck: supple, no JVD   CVS: S1 S2 RRR, tachycardic, no MRG   Resp: CTAB, no RRW  GI: soft, NT, nor R/G, + ileostomy  : indwelling west in situ   Musc: severe BLE contractures    Neuro: oriented x 4, non focal, paraplegic  Psych: situationally depressed    Skin: Stage IV pressure ulcer; R heel as per flow sheets  stage IV pressure ulcer; R hip as per flow sheets  Oral intake: excellent    LABS:                         9.7    7.37  )-----------( 189      ( 20 Dec 2023 06:00 )             32.5     12-20    141  |  108  |  18  ----------------------------<  119<H>  3.9   |  30  |  0.52    Ca    9.0      20 Dec 2023 06:00      Urinalysis Basic - ( 20 Dec 2023 06:00 )    Color: x / Appearance: x / SG: x / pH: x  Gluc: 119 mg/dL / Ketone: x  / Bili: x / Urobili: x   Blood: x / Protein: x / Nitrite: x   Leuk Esterase: x / RBC: x / WBC x   Sq Epi: x / Non Sq Epi: x / Bacteria: x    RADIOLOGY & ADDITIONAL STUDIES: pending CT Chest today

## 2023-12-20 NOTE — PROGRESS NOTE ADULT - PROBLEM SELECTOR PLAN 5
ue to paraplegia. contracted and bedbound  SNF resident, requires full care with bathing, dressing and wound care. due to paraplegia. contracted and bedbound  SNF resident, requires full care with bathing, dressing and wound care.

## 2023-12-20 NOTE — PROGRESS NOTE ADULT - PROBLEM SELECTOR PLAN 6
ongoing supportive care to pt and mother. updated mother this AM  Mother voices desire to have pt moved to SNF in [place] so she is able to present.  Referred to SW for assist ongoing supportive care to pt and mother. updated mother this AM  Mother voices desire to have pt moved to SNF in [place] so she is able to present.  Referred to SW for assist

## 2023-12-20 NOTE — PROGRESS NOTE ADULT - PROBLEM SELECTOR PLAN 4
requires  chronic O2   per pt, worsening resp status on 12/12, with evidence of volume overload, s/p diuresis  Currently stable on O2 N/C. requires  chronic O2   worsening resp status on 12/12, with evidence of volume overload, s/p diuresis  Currently stable on O2 N/C.  Pulm following

## 2023-12-21 PROCEDURE — 71250 CT THORAX DX C-: CPT | Mod: 26

## 2023-12-21 PROCEDURE — 99232 SBSQ HOSP IP/OBS MODERATE 35: CPT

## 2023-12-21 RX ORDER — METHYLPHENIDATE HCL 5 MG
5 TABLET ORAL
Refills: 0 | Status: DISCONTINUED | OUTPATIENT
Start: 2023-12-21 | End: 2023-12-28

## 2023-12-21 RX ADMIN — CYCLOBENZAPRINE HYDROCHLORIDE 10 MILLIGRAM(S): 10 TABLET, FILM COATED ORAL at 05:54

## 2023-12-21 RX ADMIN — GABAPENTIN 800 MILLIGRAM(S): 400 CAPSULE ORAL at 13:16

## 2023-12-21 RX ADMIN — Medication 3 MILLILITER(S): at 21:35

## 2023-12-21 RX ADMIN — Medication 250 MILLIGRAM(S): at 05:56

## 2023-12-21 RX ADMIN — Medication 1 TABLET(S): at 11:46

## 2023-12-21 RX ADMIN — BUDESONIDE AND FORMOTEROL FUMARATE DIHYDRATE 2 PUFF(S): 160; 4.5 AEROSOL RESPIRATORY (INHALATION) at 17:46

## 2023-12-21 RX ADMIN — Medication 1 TABLET(S): at 09:05

## 2023-12-21 RX ADMIN — Medication 600 MILLIGRAM(S): at 17:47

## 2023-12-21 RX ADMIN — PANTOPRAZOLE SODIUM 40 MILLIGRAM(S): 20 TABLET, DELAYED RELEASE ORAL at 05:58

## 2023-12-21 RX ADMIN — NYSTATIN CREAM 1 APPLICATION(S): 100000 CREAM TOPICAL at 17:58

## 2023-12-21 RX ADMIN — Medication 1 TABLET(S): at 17:45

## 2023-12-21 RX ADMIN — Medication 600 MILLIGRAM(S): at 05:54

## 2023-12-21 RX ADMIN — Medication 650 MILLIGRAM(S): at 13:14

## 2023-12-21 RX ADMIN — QUETIAPINE FUMARATE 100 MILLIGRAM(S): 200 TABLET, FILM COATED ORAL at 17:48

## 2023-12-21 RX ADMIN — BUDESONIDE AND FORMOTEROL FUMARATE DIHYDRATE 2 PUFF(S): 160; 4.5 AEROSOL RESPIRATORY (INHALATION) at 06:02

## 2023-12-21 RX ADMIN — MEROPENEM 100 MILLIGRAM(S): 1 INJECTION INTRAVENOUS at 22:09

## 2023-12-21 RX ADMIN — HYDROMORPHONE HYDROCHLORIDE 2 MILLIGRAM(S): 2 INJECTION INTRAMUSCULAR; INTRAVENOUS; SUBCUTANEOUS at 12:15

## 2023-12-21 RX ADMIN — ATORVASTATIN CALCIUM 40 MILLIGRAM(S): 80 TABLET, FILM COATED ORAL at 22:10

## 2023-12-21 RX ADMIN — METHADONE HYDROCHLORIDE 60 MILLIGRAM(S): 40 TABLET ORAL at 17:43

## 2023-12-21 RX ADMIN — Medication 5 MILLIGRAM(S): at 22:09

## 2023-12-21 RX ADMIN — GABAPENTIN 800 MILLIGRAM(S): 400 CAPSULE ORAL at 05:53

## 2023-12-21 RX ADMIN — NYSTATIN CREAM 1 APPLICATION(S): 100000 CREAM TOPICAL at 06:01

## 2023-12-21 RX ADMIN — LIDOCAINE 1 PATCH: 4 CREAM TOPICAL at 11:45

## 2023-12-21 RX ADMIN — LIDOCAINE 1 PATCH: 4 CREAM TOPICAL at 19:40

## 2023-12-21 RX ADMIN — CYCLOBENZAPRINE HYDROCHLORIDE 10 MILLIGRAM(S): 10 TABLET, FILM COATED ORAL at 13:16

## 2023-12-21 RX ADMIN — Medication 1 APPLICATION(S): at 11:49

## 2023-12-21 RX ADMIN — Medication 5 MILLIGRAM(S): at 11:43

## 2023-12-21 RX ADMIN — HYDROMORPHONE HYDROCHLORIDE 2 MILLIGRAM(S): 2 INJECTION INTRAMUSCULAR; INTRAVENOUS; SUBCUTANEOUS at 23:23

## 2023-12-21 RX ADMIN — Medication 1 APPLICATION(S): at 13:15

## 2023-12-21 RX ADMIN — CYCLOBENZAPRINE HYDROCHLORIDE 10 MILLIGRAM(S): 10 TABLET, FILM COATED ORAL at 22:09

## 2023-12-21 RX ADMIN — Medication 3 MILLILITER(S): at 13:20

## 2023-12-21 RX ADMIN — QUETIAPINE FUMARATE 400 MILLIGRAM(S): 200 TABLET, FILM COATED ORAL at 22:10

## 2023-12-21 RX ADMIN — Medication 250 MILLIGRAM(S): at 17:54

## 2023-12-21 RX ADMIN — CHLORHEXIDINE GLUCONATE 1 APPLICATION(S): 213 SOLUTION TOPICAL at 05:55

## 2023-12-21 RX ADMIN — Medication 500 MILLIGRAM(S): at 11:46

## 2023-12-21 RX ADMIN — HYDROMORPHONE HYDROCHLORIDE 2 MILLIGRAM(S): 2 INJECTION INTRAMUSCULAR; INTRAVENOUS; SUBCUTANEOUS at 11:43

## 2023-12-21 RX ADMIN — QUETIAPINE FUMARATE 100 MILLIGRAM(S): 200 TABLET, FILM COATED ORAL at 09:06

## 2023-12-21 RX ADMIN — TAMSULOSIN HYDROCHLORIDE 0.4 MILLIGRAM(S): 0.4 CAPSULE ORAL at 22:10

## 2023-12-21 RX ADMIN — GABAPENTIN 800 MILLIGRAM(S): 400 CAPSULE ORAL at 22:09

## 2023-12-21 RX ADMIN — Medication 650 MILLIGRAM(S): at 13:59

## 2023-12-21 RX ADMIN — SENNA PLUS 2 TABLET(S): 8.6 TABLET ORAL at 22:10

## 2023-12-21 RX ADMIN — POLYETHYLENE GLYCOL 3350 17 GRAM(S): 17 POWDER, FOR SOLUTION ORAL at 11:47

## 2023-12-21 RX ADMIN — AMLODIPINE BESYLATE 2.5 MILLIGRAM(S): 2.5 TABLET ORAL at 05:58

## 2023-12-21 RX ADMIN — ZINC SULFATE TAB 220 MG (50 MG ZINC EQUIVALENT) 220 MILLIGRAM(S): 220 (50 ZN) TAB at 13:15

## 2023-12-21 RX ADMIN — FINASTERIDE 5 MILLIGRAM(S): 5 TABLET, FILM COATED ORAL at 11:46

## 2023-12-21 RX ADMIN — MEROPENEM 100 MILLIGRAM(S): 1 INJECTION INTRAVENOUS at 05:53

## 2023-12-21 RX ADMIN — HYDROMORPHONE HYDROCHLORIDE 2 MILLIGRAM(S): 2 INJECTION INTRAMUSCULAR; INTRAVENOUS; SUBCUTANEOUS at 22:23

## 2023-12-21 RX ADMIN — Medication 100 MILLIGRAM(S): at 11:47

## 2023-12-21 RX ADMIN — MEROPENEM 100 MILLIGRAM(S): 1 INJECTION INTRAVENOUS at 13:44

## 2023-12-21 RX ADMIN — METHADONE HYDROCHLORIDE 60 MILLIGRAM(S): 40 TABLET ORAL at 05:53

## 2023-12-21 RX ADMIN — Medication 3 MILLILITER(S): at 05:08

## 2023-12-21 NOTE — PROGRESS NOTE ADULT - SUBJECTIVE AND OBJECTIVE BOX
Patient is a 53y old  Male who presents with a chief complaint of Sepsis secondary to b/l foot wounds (18 Dec 2023 15:08)      INTERVAL HPI/OVERNIGHT EVENTS:   Patient seen and examined bedside.   no overnight events     REVIEW OF SYSTEMS: remaining ROS negative     MEDICATIONS  (STANDING):  acetaminophen     Tablet .. 650 milliGRAM(s) Oral daily  albuterol/ipratropium for Nebulization 3 milliLiter(s) Nebulizer every 6 hours  amLODIPine   Tablet 2.5 milliGRAM(s) Oral daily  ascorbic acid 500 milliGRAM(s) Oral daily  atorvastatin 40 milliGRAM(s) Oral at bedtime  bacitracin   Ointment 1 Application(s) Topical daily  chlorhexidine 2% Cloths 1 Application(s) Topical <User Schedule>  collagenase Ointment 1 Application(s) Topical daily  cyclobenzaprine 10 milliGRAM(s) Oral three times a day  enoxaparin Injectable 40 milliGRAM(s) SubCutaneous every 24 hours  finasteride 5 milliGRAM(s) Oral daily  gabapentin 800 milliGRAM(s) Oral every 8 hours  guaiFENesin  milliGRAM(s) Oral every 12 hours  lactobacillus acidophilus 1 Tablet(s) Oral two times a day with meals  lidocaine   4% Patch 1 Patch Transdermal daily  meropenem  IVPB 1000 milliGRAM(s) IV Intermittent every 8 hours  methadone  Concentrate 60 milliGRAM(s) Oral two times a day  methylphenidate 5 milliGRAM(s) Oral <User Schedule>  multivitamin 1 Tablet(s) Oral daily  nystatin Powder 1 Application(s) Topical two times a day  pantoprazole    Tablet 40 milliGRAM(s) Oral before breakfast  polyethylene glycol 3350 17 Gram(s) Oral daily  QUEtiapine 100 milliGRAM(s) Oral <User Schedule>  QUEtiapine 400 milliGRAM(s) Oral at bedtime  senna 2 Tablet(s) Oral at bedtime  tamsulosin 0.4 milliGRAM(s) Oral at bedtime  thiamine 100 milliGRAM(s) Oral daily  vancomycin  IVPB 1000 milliGRAM(s) IV Intermittent every 12 hours  zinc sulfate 220 milliGRAM(s) Oral daily    MEDICATIONS  (PRN):  acetaminophen     Tablet .. 650 milliGRAM(s) Oral every 6 hours PRN Temp greater or equal to 38C (100.4F), Mild Pain (1 - 3)  albuterol    90 MICROgram(s) HFA Inhaler 2 Puff(s) Inhalation every 4 hours PRN Shortness of Breath and/or Wheezing  aluminum hydroxide/magnesium hydroxide/simethicone Suspension 30 milliLiter(s) Oral every 4 hours PRN Dyspepsia  HYDROmorphone  Injectable 2 milliGRAM(s) IV Push every 4 hours PRN Severe Pain (7 - 10)  melatonin 3 milliGRAM(s) Oral at bedtime PRN Insomnia  sodium chloride 0.9% lock flush 10 milliLiter(s) IV Push every 1 hour PRN Pre/post blood products, medications, blood draw, and to maintain line patency      Allergies    NSAIDs (Flushing; Other (Moderate))  Haldol (Anaphylaxis)  Zyprexa (Rash; Dystonic RXN; Hives)  Motrin (Anaphylaxis)  Thorazine (Other (Moderate))  Aleve (Unknown)  Stelazine (Unknown)  Risperdal (Short breath; Rash; Hives)    Intolerances        Vital Signs Last 24 Hrs  T(C): 36.7 (21 Dec 2023 11:48), Max: 36.8 (20 Dec 2023 22:44)  T(F): 98.1 (21 Dec 2023 11:48), Max: 98.3 (21 Dec 2023 05:00)  HR: 74 (21 Dec 2023 11:48) (74 - 99)  BP: 113/76 (21 Dec 2023 05:00) (113/76 - 128/85)  BP(mean): --  RR: 18 (21 Dec 2023 11:48) (18 - 18)  SpO2: 94% (21 Dec 2023 11:48) (92% - 97%)    Parameters below as of 21 Dec 2023 11:48  Patient On (Oxygen Delivery Method): nasal cannula          PHYSICAL EXAM:  GENERAL: NAD , thin and pale appearing,  no increased WOB, speaking in full sentences, not using accessory muscles.   HEAD:  Atraumatic, Normocephalic  EYES: EOMI, PERRLA, conjunctiva and sclera clear  ENMT: No tonsillar erythema, exudates, or enlargement; Moist mucous membranes   NECK: Supple, No JVD   CHEST/LUNG: CTAB;  No rales, rhonchi, wheezing, or rubs  HEART: Regular rate and rhythm; No murmurs, rubs, or gallops  ABDOMEN: Soft, Nontender, Nondistended; Bowel sounds present  EXTREMITIES: contracted b/l LE   SKIN: stage 3 right hip ulcer, right foot heel necrotic to bone , left foot dorsum had eschar   NERVOUS SYSTEM:  Alert & Oriented X3, Good concentration; functional quad. moving b/l upper extremities. able to eat independently     LABS:                                   9.7    7.37  )-----------( 189      ( 20 Dec 2023 06:00 )             32.5   12-20    141  |  108  |  18  ----------------------------<  119<H>  3.9   |  30  |  0.52    Ca    9.0      20 Dec 2023 06:00          Urinalysis Basic - ( 19 Dec 2023 06:20 )    Color: x / Appearance: x / SG: x / pH: x  Gluc: 96 mg/dL / Ketone: x  / Bili: x / Urobili: x   Blood: x / Protein: x / Nitrite: x   Leuk Esterase: x / RBC: x / WBC x   Sq Epi: x / Non Sq Epi: x / Bacteria: x      CAPILLARY BLOOD GLUCOSE          RADIOLOGY & ADDITIONAL TESTS:      Consultant(s) Notes Reviewed [ x] Yes     Care Discussed with [x ] Consultants  [x ] Patient  [ ] Family  [ x] /   [x ] Other; RN

## 2023-12-21 NOTE — PROGRESS NOTE ADULT - ASSESSMENT
Gonzalez Stewart is a 53 year old male with PMHx of cervical epidural abscess (s/p decompressive laminectomy 3/2021 c/b b/l leg paralysis), hx of pneumoperitoneum (s/p ex lap, total abdominal colectomy and end ileostomy (on 1/12/23) c/b evisceration and sepsis with MRSA bacteremia postoperatively), hx of hepatitis C, hx of R. buttock abscess, hx of L. foot osteomyelitis, neurogenic bladder (with indwelling Holcomb catheter, last exchanged two days ago), HTN, HLD, bipolar disorder, GERD, hx of opioid dependence, and constipation who presented to the ED on 12/4/23 from [facility] for feet infection and admitted for sepsis secondary to bilateral feet infection.      Sepsis secondary to b/l feet infection  Previously treated for L. hallux OM with L. heel culture growing Proteus mirabilis ESBL, completed 6-week course of avycaz  CT b/l LE with study is severely limited by contracture. Left lower extremity is only partially visualized with exclusion of the left foot. Skin induration and subcutaneous edema in the leg and ankle.  No soft tissue gas  S/p bedside escharectomy by podiatry  right foot wound cx- ESBL proteus - resistant to cefepime and corynebecaterium and alcalegenes  left foot wound cx- MRSA and Pseudomonas  and klebsiella   Blood cx- neg x 2  PICC line in place 12/11/23   Will continue meropenem and vancomycin until 1/8   ECHO EF 55-60%, normal systolic function, and mild mitral regurgitation   Most recent CXR with interstitial lung disease, however nothing acute.  Vascular following- pending scheduling for AKA     COPD  Baseline patient is on 5L O2 NC at home   Had an episode of resp distress from fluid overload from IVF- Placed on lasix drip for some time with improvement  Continue PO lasix   ECHO as above   Pulm consulted    cont airway clearance w/ aerobika  - cont BD tx  - cont symbicort inh bid  - cont o2 to maintain sat 90-95%  -follow CT chest       HTN  continue  amlodipine 2.5 mg daily     HLD  continue  atorvastatin 40 mg daily     Bipolar disorder, stable   : PTA quetiapine 100 mg BID and 400 mg qhs, valproic acid-- per psych  12/12/2023 valproic ahs been stopped by kendell      Hx of opioid dependence  continue methadone maintenance 60mg bid     Chronic pain: PTA lidocaine 4% patch, duloxetine 30 mg DR, gabapentin 600 mg q8, cyclobenzaprine 10 mg BID, oxycodone 5 mg q6    GERD:   continue with PPI     Neurogenic bladder (with indwelling Holcomb catheter)   PTA finasteride 5 mg, tamsulosin 0.4 mg    Constipation:   continue with bowel regimen      functional quad-   supportive care  , frequent repositioning  supportive care     Rt hip pressure wound, seen by vascular ,   wound recs in place     Mod PCM  nutrition recommendations appreciated     Preventative Measures  lovenox SQ-dvt ppx  fall, aspiration precautions   HOBE     palliative following, patient is full code      Gonzalez Stewart is a 53 year old male with PMHx of cervical epidural abscess (s/p decompressive laminectomy 3/2021 c/b b/l leg paralysis), hx of pneumoperitoneum (s/p ex lap, total abdominal colectomy and end ileostomy (on 1/12/23) c/b evisceration and sepsis with MRSA bacteremia postoperatively), hx of hepatitis C, hx of R. buttock abscess, hx of L. foot osteomyelitis, neurogenic bladder (with indwelling Holcomb catheter, last exchanged two days ago), HTN, HLD, bipolar disorder, GERD, hx of opioid dependence, and constipation who presented to the ED on 12/4/23 from [facility] for feet infection and admitted for sepsis secondary to bilateral feet infection.      Sepsis secondary to b/l feet infection  Previously treated for L. hallux OM with L. heel culture growing Proteus mirabilis ESBL, completed 6-week course of avycaz  CT b/l LE with study is severely limited by contracture. Left lower extremity is only partially visualized with exclusion of the left foot. Skin induration and subcutaneous edema in the leg and ankle.  No soft tissue gas  S/p bedside escharectomy by podiatry  right foot wound cx- ESBL proteus - resistant to cefepime and corynebecaterium and alcalegenes  left foot wound cx- MRSA and Pseudomonas  and klebsiella   Blood cx- neg x 2  PICC line in place 12/11/23   Will continue meropenem and vancomycin until 1/8   ECHO EF 55-60%, normal systolic function, and mild mitral regurgitation   Most recent CXR with interstitial lung disease, however nothing acute.  Vascular following- pending scheduling for AKA     COPD  Baseline patient is on 5L O2 NC at home   Had an episode of resp distress from fluid overload from IVF- Placed on lasix drip for some time with improvement  Continue PO lasix   ECHO as above   Pulm consulted    cont airway clearance w/ aerobika  - cont BD tx  - cont symbicort inh bid  - cont o2 to maintain sat 90-95%  -follow CT chest       HTN  continue  amlodipine 2.5 mg daily     HLD  continue  atorvastatin 40 mg daily     Bipolar disorder, stable   : PTA quetiapine 100 mg BID and 400 mg qhs, valproic acid-- per psych  12/12/2023 valproic ahs been stopped by kendell      Hx of opioid dependence  continue methadone maintenance 60mg bid     Chronic pain: PTA lidocaine 4% patch, duloxetine 30 mg DR, gabapentin 600 mg q8, cyclobenzaprine 10 mg BID, oxycodone 5 mg q6    GERD:   continue with PPI     Neurogenic bladder (with indwelling Holcomb catheter)   PTA finasteride 5 mg, tamsulosin 0.4 mg    Constipation:   continue with bowel regimen      functional quad-   supportive care  , frequent repositioning  supportive care     Rt hip pressure wound, seen by vascular ,   wound recs in place     Mod PCM  nutrition recommendations appreciated     Preventative Measures  lovenox SQ-dvt ppx  fall, aspiration precautions   HOBE     palliative following, patient is full code

## 2023-12-21 NOTE — PROGRESS NOTE ADULT - NUTRITIONAL ASSESSMENT
This patient has been assessed with a concern for Malnutrition and has been determined to have a diagnosis/diagnoses of Moderate protein-calorie malnutrition.    This patient is being managed with:   Diet Regular-  1000mL Fluid Restriction ([id number])  Supplement Feeding Modality:  Oral  Ensure Plus High Protein Cans or Servings Per Day:  1       Frequency:  Two Times a day  Entered: Dec 20 2023 10:26AM   This patient has been assessed with a concern for Malnutrition and has been determined to have a diagnosis/diagnoses of Moderate protein-calorie malnutrition.    This patient is being managed with:   Diet Regular-  1000mL Fluid Restriction ([id number])  Supplement Feeding Modality:  Oral  Ensure Plus High Protein Cans or Servings Per Day:  1       Frequency:  Two Times a day  Entered: Dec 20 2023 10:26AM

## 2023-12-22 PROCEDURE — 99232 SBSQ HOSP IP/OBS MODERATE 35: CPT

## 2023-12-22 RX ADMIN — Medication 250 MILLIGRAM(S): at 18:22

## 2023-12-22 RX ADMIN — GABAPENTIN 800 MILLIGRAM(S): 400 CAPSULE ORAL at 22:17

## 2023-12-22 RX ADMIN — BUDESONIDE AND FORMOTEROL FUMARATE DIHYDRATE 2 PUFF(S): 160; 4.5 AEROSOL RESPIRATORY (INHALATION) at 06:40

## 2023-12-22 RX ADMIN — FINASTERIDE 5 MILLIGRAM(S): 5 TABLET, FILM COATED ORAL at 12:31

## 2023-12-22 RX ADMIN — Medication 1 APPLICATION(S): at 12:32

## 2023-12-22 RX ADMIN — MEROPENEM 100 MILLIGRAM(S): 1 INJECTION INTRAVENOUS at 06:36

## 2023-12-22 RX ADMIN — Medication 1 TABLET(S): at 12:32

## 2023-12-22 RX ADMIN — HYDROMORPHONE HYDROCHLORIDE 2 MILLIGRAM(S): 2 INJECTION INTRAMUSCULAR; INTRAVENOUS; SUBCUTANEOUS at 22:16

## 2023-12-22 RX ADMIN — Medication 3 MILLILITER(S): at 06:09

## 2023-12-22 RX ADMIN — HYDROMORPHONE HYDROCHLORIDE 2 MILLIGRAM(S): 2 INJECTION INTRAMUSCULAR; INTRAVENOUS; SUBCUTANEOUS at 11:26

## 2023-12-22 RX ADMIN — QUETIAPINE FUMARATE 100 MILLIGRAM(S): 200 TABLET, FILM COATED ORAL at 18:19

## 2023-12-22 RX ADMIN — QUETIAPINE FUMARATE 400 MILLIGRAM(S): 200 TABLET, FILM COATED ORAL at 22:18

## 2023-12-22 RX ADMIN — GABAPENTIN 800 MILLIGRAM(S): 400 CAPSULE ORAL at 06:36

## 2023-12-22 RX ADMIN — BUDESONIDE AND FORMOTEROL FUMARATE DIHYDRATE 2 PUFF(S): 160; 4.5 AEROSOL RESPIRATORY (INHALATION) at 19:41

## 2023-12-22 RX ADMIN — CYCLOBENZAPRINE HYDROCHLORIDE 10 MILLIGRAM(S): 10 TABLET, FILM COATED ORAL at 22:18

## 2023-12-22 RX ADMIN — MEROPENEM 100 MILLIGRAM(S): 1 INJECTION INTRAVENOUS at 15:00

## 2023-12-22 RX ADMIN — HYDROMORPHONE HYDROCHLORIDE 2 MILLIGRAM(S): 2 INJECTION INTRAMUSCULAR; INTRAVENOUS; SUBCUTANEOUS at 17:39

## 2023-12-22 RX ADMIN — HYDROMORPHONE HYDROCHLORIDE 2 MILLIGRAM(S): 2 INJECTION INTRAMUSCULAR; INTRAVENOUS; SUBCUTANEOUS at 02:36

## 2023-12-22 RX ADMIN — HYDROMORPHONE HYDROCHLORIDE 2 MILLIGRAM(S): 2 INJECTION INTRAMUSCULAR; INTRAVENOUS; SUBCUTANEOUS at 03:36

## 2023-12-22 RX ADMIN — METHADONE HYDROCHLORIDE 60 MILLIGRAM(S): 40 TABLET ORAL at 18:19

## 2023-12-22 RX ADMIN — Medication 1 TABLET(S): at 08:36

## 2023-12-22 RX ADMIN — QUETIAPINE FUMARATE 100 MILLIGRAM(S): 200 TABLET, FILM COATED ORAL at 10:37

## 2023-12-22 RX ADMIN — Medication 100 MILLIGRAM(S): at 12:32

## 2023-12-22 RX ADMIN — CYCLOBENZAPRINE HYDROCHLORIDE 10 MILLIGRAM(S): 10 TABLET, FILM COATED ORAL at 15:00

## 2023-12-22 RX ADMIN — Medication 250 MILLIGRAM(S): at 06:37

## 2023-12-22 RX ADMIN — CYCLOBENZAPRINE HYDROCHLORIDE 10 MILLIGRAM(S): 10 TABLET, FILM COATED ORAL at 06:36

## 2023-12-22 RX ADMIN — ATORVASTATIN CALCIUM 40 MILLIGRAM(S): 80 TABLET, FILM COATED ORAL at 22:18

## 2023-12-22 RX ADMIN — METHADONE HYDROCHLORIDE 60 MILLIGRAM(S): 40 TABLET ORAL at 06:35

## 2023-12-22 RX ADMIN — NYSTATIN CREAM 1 APPLICATION(S): 100000 CREAM TOPICAL at 06:40

## 2023-12-22 RX ADMIN — NYSTATIN CREAM 1 APPLICATION(S): 100000 CREAM TOPICAL at 18:43

## 2023-12-22 RX ADMIN — HYDROMORPHONE HYDROCHLORIDE 2 MILLIGRAM(S): 2 INJECTION INTRAMUSCULAR; INTRAVENOUS; SUBCUTANEOUS at 12:41

## 2023-12-22 RX ADMIN — Medication 500 MILLIGRAM(S): at 12:31

## 2023-12-22 RX ADMIN — GABAPENTIN 800 MILLIGRAM(S): 400 CAPSULE ORAL at 16:00

## 2023-12-22 RX ADMIN — LIDOCAINE 1 PATCH: 4 CREAM TOPICAL at 00:29

## 2023-12-22 RX ADMIN — Medication 650 MILLIGRAM(S): at 12:31

## 2023-12-22 RX ADMIN — Medication 3 MILLILITER(S): at 13:28

## 2023-12-22 RX ADMIN — PANTOPRAZOLE SODIUM 40 MILLIGRAM(S): 20 TABLET, DELAYED RELEASE ORAL at 06:36

## 2023-12-22 RX ADMIN — Medication 600 MILLIGRAM(S): at 06:36

## 2023-12-22 RX ADMIN — Medication 600 MILLIGRAM(S): at 18:18

## 2023-12-22 RX ADMIN — Medication 650 MILLIGRAM(S): at 13:00

## 2023-12-22 RX ADMIN — Medication 5 MILLIGRAM(S): at 22:16

## 2023-12-22 RX ADMIN — Medication 3 MILLILITER(S): at 21:32

## 2023-12-22 RX ADMIN — ZINC SULFATE TAB 220 MG (50 MG ZINC EQUIVALENT) 220 MILLIGRAM(S): 220 (50 ZN) TAB at 12:31

## 2023-12-22 RX ADMIN — CHLORHEXIDINE GLUCONATE 1 APPLICATION(S): 213 SOLUTION TOPICAL at 06:37

## 2023-12-22 RX ADMIN — Medication 5 MILLIGRAM(S): at 08:36

## 2023-12-22 RX ADMIN — Medication 1 TABLET(S): at 18:19

## 2023-12-22 RX ADMIN — AMLODIPINE BESYLATE 2.5 MILLIGRAM(S): 2.5 TABLET ORAL at 06:35

## 2023-12-22 RX ADMIN — TAMSULOSIN HYDROCHLORIDE 0.4 MILLIGRAM(S): 0.4 CAPSULE ORAL at 22:17

## 2023-12-22 RX ADMIN — MEROPENEM 100 MILLIGRAM(S): 1 INJECTION INTRAVENOUS at 22:19

## 2023-12-22 RX ADMIN — HYDROMORPHONE HYDROCHLORIDE 2 MILLIGRAM(S): 2 INJECTION INTRAMUSCULAR; INTRAVENOUS; SUBCUTANEOUS at 19:41

## 2023-12-22 NOTE — PROGRESS NOTE ADULT - NS ATTEND AMEND GEN_ALL_CORE FT
All labs and cultures and imaging and pertinent chart notes reviewed by me.    case d/w Np Naty at length and agree with her assessment and plan.  Kush King MD  Infectious Disease Attending    for any questions please do not hesitate to contact me either via teams or by calling [phone] All labs and cultures and imaging and pertinent chart notes reviewed by me.    case d/w Np Naty at length and agree with her assessment and plan.  Kush King MD  Infectious Disease Attending    for any questions please do not hesitate to contact me either via teams or by calling [phone] All labs and cultures and imaging and pertinent chart notes reviewed by me.    case d/w Np Naty at length and agree with her assessment and plan.    Kush King MD  Infectious Disease Attending     is covering the ID service for next 4 days and if any questions please contact him either via teams or by calling [phone] All labs and cultures and imaging and pertinent chart notes reviewed by me.    case d/w Np Naty at length and agree with her assessment and plan.    Kush King MD  Infectious Disease Attending     is covering the ID service for next 4 days and if any questions please contact him either via teams or by calling [phone]

## 2023-12-22 NOTE — PROGRESS NOTE ADULT - ASSESSMENT
A/P-  53 year old male with PMHx of cervical epidural abscess (s/p decompressive laminectomy 3/2021 c/b b/l leg paralysis), hx of pneumoperitoneum (s/p ex lap, total abdominal colectomy and end ileostomy (on 1/12/23) c/b evisceration and sepsis with MRSA bacteremia postoperatively), hx of hepatitis C, hx of R. buttock abscess, hx of L. foot osteomyelitis, neurogenic bladder (with indwelling Holcomb catheter, last exchanged two days ago), HTN, HLD, bipolar disorder, GERD, hx of opioid dependence, and constipation who presented to the ED on 12/4/23 from [facility] for feet infection.    b/l feet infections and overlying cellulitis  Right heel wound infection to bone and as per xray c/w Om as well.  no report of any gas on either xray or CT  possible OR planning for left AKA when patient is medically optimized - pending scheduling     b/l foot infection  Om of right heel wound  + leukocytosis - improved   HCV  severe contractures of b/l LE  right foot wound cx- ESBL proteus - resistant to cefepime and corynebecaterium and alcalegenes  left foot wound cx- MRSA and Pseudomonas  and klebsiella   Blood cx- neg x 2  extensive chart search on [facility]IE by Dr. King and based on pt's latest HCV VL result from 6/2023 detected vl but <1.08  also based on serology from 2017 was positive for hep b sAG and E ag and core IGm ab ( not sure if he was ever treated)  HIV ab/ag negative   UC - VRE, Klebsiella   Hep B and HEp c both viral loads are undetectable.  his hep B serology c/w chronic infection in past not new and VL undetectable .  hep c VL -undetectable ( was treated for this as per his outpatient docs and seems to have cured )    HIV ab/ag- Negative    plan-  Continue Vancomycin IV  monitor vancomycin levels, required  keep vanco trough <15  Ertapenem IV started on 12/8 and as per attending, was changed to meropenem as the pseudomonas and Alcalgenes reported in foot wound cx not covered by ertapenem but meropenem will cover it and will cover the esbl   Continue Meropenem 1 gram IV q 8 hrs - day #5  aggressive wound care   HCV Vl , full hep b panel - reviewed, Hep B immune   Depakote stopped  12/11  possible left AKA - pending surgical scheduling and medical optimization       when ready for discharge:  treating right possible OM  picc line placed on 12/11  continue Vancomycin IV 1000 mg q 12 hrs - last dose on January 8, 2024  as per attending, pt will be eventually discharged on Meropenem 1 gram IV q12 dosing instead of q 8 - last dose on January 8rd, 2024  weekly lab work: cbc with diff, cmp, esr, crp, vancomycin trough - pt has his own ID specialist from before and pcp, please fax to pt's own  ID specialist  follow up with pt's ID and PCP in the office   remove picc line upon completion of the course of abx       will discuss with Dr. King  discussed with the pt     A/P-  53 year old male with PMHx of cervical epidural abscess (s/p decompressive laminectomy 3/2021 c/b b/l leg paralysis), hx of pneumoperitoneum (s/p ex lap, total abdominal colectomy and end ileostomy (on 1/12/23) c/b evisceration and sepsis with MRSA bacteremia postoperatively), hx of hepatitis C, hx of R. buttock abscess, hx of L. foot osteomyelitis, neurogenic bladder (with indwelling Holcomb catheter, last exchanged two days ago), HTN, HLD, bipolar disorder, GERD, hx of opioid dependence, and constipation who presented to the ED on 12/4/23 from [facility] for feet infection.    b/l feet infections and overlying cellulitis  Right heel wound infection to bone and as per xray c/w Om as well.  no report of any gas on either xray or CT  possible OR planning for left AKA when patient is medically optimized - pending scheduling     b/l foot infection  Om of right heel wound  + leukocytosis - improved   HCV  severe contractures of b/l LE  right foot wound cx- ESBL proteus - resistant to cefepime and corynebecaterium and alcalegenes  left foot wound cx- MRSA and Pseudomonas  and klebsiella   Blood cx- neg x 2  extensive chart search on [facility]IE by Dr. King and based on pt's latest HCV VL result from 6/2023 detected vl but <1.08  also based on serology from 2017 was positive for hep b sAG and E ag and core IGm ab ( not sure if he was ever treated)  HIV ab/ag negative   UC - VRE, Klebsiella   Hep B and HEp c both viral loads are undetectable.  his hep B serology c/w chronic infection in past not new and VL undetectable .  hep c VL -undetectable ( was treated for this as per his outpatient docs and seems to have cured )    HIV ab/ag- Negative    plan-  Continue Vancomycin IV  monitor vancomycin levels, required  keep vanco trough <15  Ertapenem IV started on 12/8 and as per attending, was changed to meropenem as the pseudomonas and Alcalgenes reported in foot wound cx not covered by ertapenem but meropenem will cover it and will cover the esbl   Continue Meropenem 1 gram IV q 8 hrs - day #5  aggressive wound care   HCV Vl , full hep b panel - reviewed, Hep B immune   Depakote stopped  12/11  possible left AKA - pending surgical scheduling and medical optimization       when ready for discharge:  treating right possible OM  picc line placed on 12/11  continue Vancomycin IV 1000 mg q 12 hrs - last dose on January 8, 2024  as per attending, pt will be eventually discharged on Meropenem 1 gram IV q12 dosing instead of q 8 - last dose on January 8rd, 2024  weekly lab work: cbc with diff, cmp, esr, crp, vancomycin trough - pt has his own ID specialist from before and pcp, please fax to pt's own  ID specialist  follow up with pt's ID and PCP in the office   remove picc line upon completion of the course of abx       will discuss with Dr. King  discussed with the pt     A/P-  53 year old male with PMHx of cervical epidural abscess (s/p decompressive laminectomy 3/2021 c/b b/l leg paralysis), hx of pneumoperitoneum (s/p ex lap, total abdominal colectomy and end ileostomy (on 1/12/23) c/b evisceration and sepsis with MRSA bacteremia postoperatively), hx of hepatitis C, hx of R. buttock abscess, hx of L. foot osteomyelitis, neurogenic bladder (with indwelling Holcomb catheter, last exchanged two days ago), HTN, HLD, bipolar disorder, GERD, hx of opioid dependence, and constipation who presented to the ED on 12/4/23 from [facility] for feet infection.    b/l feet infections and overlying cellulitis  Right heel wound infection to bone and as per xray c/w Om as well.  no report of any gas on either xray or CT  possible OR planning for left AKA when patient is medically optimized - pending scheduling     b/l foot infection  Om of right heel wound  + leukocytosis - improved   HCV  severe contractures of b/l LE  right foot wound cx- ESBL proteus - resistant to cefepime and corynebecaterium and alcalegenes  left foot wound cx- MRSA and Pseudomonas  and klebsiella   Blood cx- neg x 2  extensive chart search on [facility]IE by Dr. King and based on pt's latest HCV VL result from 6/2023 detected vl but <1.08  also based on serology from 2017 was positive for hep b sAG and E ag and core IGm ab ( not sure if he was ever treated)  HIV ab/ag negative   UC - VRE, Klebsiella   Hep B and HEp c both viral loads are undetectable.  his hep B serology c/w chronic infection in past not new and VL undetectable .  hep c VL -undetectable ( was treated for this as per his outpatient docs and seems to have cured )    HIV ab/ag- Negative    plan-  Continue Vancomycin IV day #18  monitor vancomycin levels, required (will obtain vanco level tomorrow prior morning dose)  keep vanco trough <15  Ertapenem IV started on 12/8 and as per attending, was changed to meropenem as the pseudomonas and Alcalgenes reported in foot wound cx not covered by ertapenem but meropenem will cover it and will cover the esbl   Continue Meropenem 1 gram IV q 8 hrs - day #9  aggressive wound care   HCV Vl , full hep b panel - reviewed, Hep B immune   Depakote stopped  12/11  pending surgical follow up  wound care       when ready for discharge:  treating right possible OM  picc line placed on 12/11  continue Vancomycin IV 1000 mg q 12 hrs - last dose on January 8, 2024  as per attending, pt will be eventually discharged on Meropenem 1 gram IV q12 dosing instead of q 8 - last dose on January 8rd, 2024  weekly lab work: cbc with diff, cmp, esr, crp, vancomycin trough - pt has his own ID specialist from before and pcp, please fax to pt's own  ID specialist  follow up with pt's ID and PCP in the office   remove picc line upon completion of the course of abx       discussed with Dr. King  discussed with Dr. García      A/P-  53 year old male with PMHx of cervical epidural abscess (s/p decompressive laminectomy 3/2021 c/b b/l leg paralysis), hx of pneumoperitoneum (s/p ex lap, total abdominal colectomy and end ileostomy (on 1/12/23) c/b evisceration and sepsis with MRSA bacteremia postoperatively), hx of hepatitis C, hx of R. buttock abscess, hx of L. foot osteomyelitis, neurogenic bladder (with indwelling Holcomb catheter, last exchanged two days ago), HTN, HLD, bipolar disorder, GERD, hx of opioid dependence, and constipation who presented to the ED on 12/4/23 from [facility] for feet infection.    b/l feet infections and overlying cellulitis  Right heel wound infection to bone and as per xray c/w Om as well.  no report of any gas on either xray or CT  possible OR planning for left AKA when patient is medically optimized - pending scheduling     b/l foot infection  Om of right heel wound  + leukocytosis - improved   HCV  severe contractures of b/l LE  right foot wound cx- ESBL proteus - resistant to cefepime and corynebecaterium and alcalegenes  left foot wound cx- MRSA and Pseudomonas  and klebsiella   Blood cx- neg x 2  extensive chart search on [facility]IE by Dr. King and based on pt's latest HCV VL result from 6/2023 detected vl but <1.08  also based on serology from 2017 was positive for hep b sAG and E ag and core IGm ab ( not sure if he was ever treated)  HIV ab/ag negative   UC - VRE, Klebsiella   Hep B and HEp c both viral loads are undetectable.  his hep B serology c/w chronic infection in past not new and VL undetectable .  hep c VL -undetectable ( was treated for this as per his outpatient docs and seems to have cured )    HIV ab/ag- Negative    plan-  Continue Vancomycin IV day #18  monitor vancomycin levels, required (will obtain vanco level tomorrow prior morning dose)  keep vanco trough <15  Ertapenem IV started on 12/8 and as per attending, was changed to meropenem as the pseudomonas and Alcalgenes reported in foot wound cx not covered by ertapenem but meropenem will cover it and will cover the esbl   Continue Meropenem 1 gram IV q 8 hrs - day #9  aggressive wound care   HCV Vl , full hep b panel - reviewed, Hep B immune   Depakote stopped  12/11  pending surgical follow up  wound care       when ready for discharge:  treating right possible OM  picc line placed on 12/11  continue Vancomycin IV 1000 mg q 12 hrs - last dose on January 8, 2024  as per attending, pt will be eventually discharged on Meropenem 1 gram IV q12 dosing instead of q 8 - last dose on January 8rd, 2024  weekly lab work: cbc with diff, cmp, esr, crp, vancomycin trough - pt has his own ID specialist from before and pcp, please fax to pt's own  ID specialist  follow up with pt's ID and PCP in the office   remove picc line upon completion of the course of abx       discussed with Dr. King  discussed with Dr. García

## 2023-12-22 NOTE — PROGRESS NOTE ADULT - PROBLEM SELECTOR PLAN 2
Recurrent foot infections, pt was  previously treated for L. hallux OM with L. heel culture growing Proteus mirabilis ESBL, completed 6 week course of avycaz  CT b/l LE  severely limited by contracture. Left lower extremity is only partially visualized with exclusion of the left foot. Skin induration and subcutaneous edema in the leg and ankle.  No soft tissue gas  continue course of  Vanc,/ meropenem, ID, podiatry and vascular following -   pending clearance for possible L AKA

## 2023-12-22 NOTE — PROGRESS NOTE ADULT - PROBLEM SELECTOR PLAN 4
requires  chronic O2  CT Chest 12/20 with Emphysematous disease with likely concurrent interstitial fibrosis.  and patchy airspace consolidations right upper middle lobe ??  pneumonia.  Pulm following.

## 2023-12-22 NOTE — PROGRESS NOTE ADULT - SUBJECTIVE AND OBJECTIVE BOX
follow up on: symptom management     OVERNIGHT EVENTS: Pt continues  on green with SOB at rest    Review of systems:     Pain:  [ ] yes [ ] no  QOL impact -   Location -                    Aggravating factors -  Quality -  Radiation -  Timing-  Severity (0-10 scale):  Minimal acceptable level (0-10 scale):     Dyspnea:      denies                        Anxiety:         denies                      Depression:   denies  Fatigue:      denies                         Nausea:      denies                         Loss of appetite:    denies            Constipation:     denies             Diarrhea:     denies    All other systems reviewed and negative  Unable to obtain/Limited due to poor mental status    MEDICATIONS  (STANDING):  acetaminophen     Tablet .. 650 milliGRAM(s) Oral daily  albuterol/ipratropium for Nebulization 3 milliLiter(s) Nebulizer every 8 hours  amLODIPine   Tablet 2.5 milliGRAM(s) Oral daily  ascorbic acid 500 milliGRAM(s) Oral daily  atorvastatin 40 milliGRAM(s) Oral at bedtime  bacitracin   Ointment 1 Application(s) Topical daily  budesonide 160 MICROgram(s)/formoterol 4.5 MICROgram(s) Inhaler 2 Puff(s) Inhalation two times a day  chlorhexidine 2% Cloths 1 Application(s) Topical <User Schedule>  collagenase Ointment 1 Application(s) Topical daily  cyclobenzaprine 10 milliGRAM(s) Oral three times a day  enoxaparin Injectable 40 milliGRAM(s) SubCutaneous every 24 hours  finasteride 5 milliGRAM(s) Oral daily  gabapentin 800 milliGRAM(s) Oral every 8 hours  guaiFENesin  milliGRAM(s) Oral every 12 hours  lactobacillus acidophilus 1 Tablet(s) Oral two times a day with meals  lidocaine   4% Patch 1 Patch Transdermal daily  meropenem  IVPB 1000 milliGRAM(s) IV Intermittent every 8 hours  methadone  Concentrate 60 milliGRAM(s) Oral two times a day  methylphenidate 5 milliGRAM(s) Oral <User Schedule>  multivitamin 1 Tablet(s) Oral daily  nystatin Powder 1 Application(s) Topical two times a day  pantoprazole    Tablet 40 milliGRAM(s) Oral before breakfast  polyethylene glycol 3350 17 Gram(s) Oral daily  QUEtiapine 400 milliGRAM(s) Oral at bedtime  QUEtiapine 100 milliGRAM(s) Oral <User Schedule>  senna 2 Tablet(s) Oral at bedtime  tamsulosin 0.4 milliGRAM(s) Oral at bedtime  thiamine 100 milliGRAM(s) Oral daily  vancomycin  IVPB 1000 milliGRAM(s) IV Intermittent every 12 hours  zinc sulfate 220 milliGRAM(s) Oral daily    MEDICATIONS  (PRN):  acetaminophen     Tablet .. 650 milliGRAM(s) Oral every 6 hours PRN Temp greater or equal to 38C (100.4F), Mild Pain (1 - 3)  aluminum hydroxide/magnesium hydroxide/simethicone Suspension 30 milliLiter(s) Oral every 4 hours PRN Dyspepsia  HYDROmorphone  Injectable 2 milliGRAM(s) IV Push every 4 hours PRN Severe Pain (7 - 10)  melatonin 3 milliGRAM(s) Oral at bedtime PRN Insomnia  sodium chloride 0.9% lock flush 10 milliLiter(s) IV Push every 1 hour PRN Pre/post blood products, medications, blood draw, and to maintain line patency      PHYSICAL EXAM:  Vital Signs Last 24 Hrs  T(C): 37.1 (22 Dec 2023 11:18), Max: 37.2 (21 Dec 2023 17:03)  T(F): 98.7 (22 Dec 2023 11:18), Max: 98.9 (21 Dec 2023 17:03)  HR: 85 (22 Dec 2023 11:18) (81 - 98)  BP: 120/71 (22 Dec 2023 11:18) (114/78 - 121/76)  BP(mean): --  RR: 18 (22 Dec 2023 11:18) (18 - 18)  SpO2: 91% (22 Dec 2023 11:18) (90% - 98%)    Parameters below as of 22 Dec 2023 11:18  Patient On (Oxygen Delivery Method): nasal cannula          Palliative Performance Scale/Karnofsky Score: 40            5       General: ill appearing male in be with c/o pain  HEENT: normocephalic, atraumatic, poor dentition   Neck: supple, no JVD   CVS: S1 S2 RRR, tachycardic, no MRG   Resp: CTAB, no RRW  GI: soft, NT, nor R/G, + ileostomy  : indwelling west in situ   Musc: severe BLE contractures    Neuro: oriented x 4, non focal, paraplegic  Psych: situationally depressed    Skin: Stage IV pressure ulcer; R heel as per flow sheets  stage IV pressure ulcer; R hip as per flow sheets  Oral intake: excellent    LABS:    RADIOLOGY & ADDITIONAL STUDIES: follow up on: symptom management     OVERNIGHT EVENTS:   Pt continues with c/o pain to legs  received 3 doses B/T Dilaudid over past 24 hrs.  spasms intermittently     Review of systems:   Dyspnea:      denies                        Anxiety:         denies                      Depression:   denies  Fatigue:      denies                         Nausea:      denies                         Loss of appetite:    denies            Constipation:     denies             Diarrhea:     denies  Pain:  x[ ] yes [ ] no  QOL impact -   Location -                    Aggravating factors -  Quality -  Radiation -  Timing-  Severity (0-10 scale):  Minimal acceptable level (0-10 scale):   All other systems reviewed and negative    MEDICATIONS  (STANDING):  acetaminophen     Tablet .. 650 milliGRAM(s) Oral daily  albuterol/ipratropium for Nebulization 3 milliLiter(s) Nebulizer every 8 hours  amLODIPine   Tablet 2.5 milliGRAM(s) Oral daily  ascorbic acid 500 milliGRAM(s) Oral daily  atorvastatin 40 milliGRAM(s) Oral at bedtime  bacitracin   Ointment 1 Application(s) Topical daily  budesonide 160 MICROgram(s)/formoterol 4.5 MICROgram(s) Inhaler 2 Puff(s) Inhalation two times a day  chlorhexidine 2% Cloths 1 Application(s) Topical <User Schedule>  collagenase Ointment 1 Application(s) Topical daily  cyclobenzaprine 10 milliGRAM(s) Oral three times a day  enoxaparin Injectable 40 milliGRAM(s) SubCutaneous every 24 hours  finasteride 5 milliGRAM(s) Oral daily  gabapentin 800 milliGRAM(s) Oral every 8 hours  guaiFENesin  milliGRAM(s) Oral every 12 hours  lactobacillus acidophilus 1 Tablet(s) Oral two times a day with meals  lidocaine   4% Patch 1 Patch Transdermal daily  meropenem  IVPB 1000 milliGRAM(s) IV Intermittent every 8 hours  methadone  Concentrate 60 milliGRAM(s) Oral two times a day  methylphenidate 5 milliGRAM(s) Oral <User Schedule>  multivitamin 1 Tablet(s) Oral daily  nystatin Powder 1 Application(s) Topical two times a day  pantoprazole    Tablet 40 milliGRAM(s) Oral before breakfast  polyethylene glycol 3350 17 Gram(s) Oral daily  QUEtiapine 400 milliGRAM(s) Oral at bedtime  QUEtiapine 100 milliGRAM(s) Oral <User Schedule>  senna 2 Tablet(s) Oral at bedtime  tamsulosin 0.4 milliGRAM(s) Oral at bedtime  thiamine 100 milliGRAM(s) Oral daily  vancomycin  IVPB 1000 milliGRAM(s) IV Intermittent every 12 hours  zinc sulfate 220 milliGRAM(s) Oral daily    MEDICATIONS  (PRN):  acetaminophen     Tablet .. 650 milliGRAM(s) Oral every 6 hours PRN Temp greater or equal to 38C (100.4F), Mild Pain (1 - 3)  aluminum hydroxide/magnesium hydroxide/simethicone Suspension 30 milliLiter(s) Oral every 4 hours PRN Dyspepsia  HYDROmorphone  Injectable 2 milliGRAM(s) IV Push every 4 hours PRN Severe Pain (7 - 10)  melatonin 3 milliGRAM(s) Oral at bedtime PRN Insomnia  sodium chloride 0.9% lock flush 10 milliLiter(s) IV Push every 1 hour PRN Pre/post blood products, medications, blood draw, and to maintain line patency    PHYSICAL EXAM:  Vital Signs Last 24 Hrs  T(C): 37.1 (22 Dec 2023 11:18), Max: 37.2 (21 Dec 2023 17:03)  T(F): 98.7 (22 Dec 2023 11:18), Max: 98.9 (21 Dec 2023 17:03)  HR: 85 (22 Dec 2023 11:18) (81 - 98)  BP: 120/71 (22 Dec 2023 11:18) (114/78 - 121/76)  BP(mean): --  RR: 18 (22 Dec 2023 11:18) (18 - 18)  SpO2: 91% (22 Dec 2023 11:18) (90% - 98%)    Parameters below as of 22 Dec 2023 11:18  Patient On (Oxygen Delivery Method): nasal cannula     Palliative Performance Scale/Karnofsky Score: 40            5     General: ill appearing male in be with c/o pain  HEENT: normocephalic, atraumatic, poor dentition   Neck: supple, no JVD   CVS: S1 S2 RRR, tachycardic, no MRG   Resp: CTAB, no RRW  GI: soft, NT, nor R/G, + ileostomy draining moderate amts  : indwelling west in situ   Musc: severe BLE contractures, + L PICC line    Neuro: oriented x 4, non focal, paraplegic  Psych: situationally depressed    Skin: Stage IV pressure ulcer; R heel as per flow sheets  stage IV pressure ulcer; R hip as per flow sheets  Oral intake: excellent    LABS: last labs documented 12/20/2023    RADIOLOGY & ADDITIONAL STUDIES:   ACC: [id number] EXAM:  CT CHEST   ORDERED BY: FADIA MASCORRO     PROCEDURE DATE:  12/21/2023      INTERPRETATION:  CLINICAL INFORMATION:  Shortness of breath.  Patient admitted with sepsis from bilateral feet infections.    COMPARISON: Chest x-ray 12/13/2023 and CT angiography of chest 9/26/2014.    IMPRESSION:    Emphysematous disease with likely concurrent interstitial fibrosis.    Patchy airspace consolidations right upper middle lobes, may reflect   pneumonia.    High attenuation intraluminal contents within the distal esophagus,   correlate clinically for gastroesophageal reflux or esophageal motility   disorder.    < from: 12 Lead ECG (12.20.23 @ 14:02) >  Ventricular Rate 87 BPM    Atrial Rate 87 BPM    P-R Interval 198 ms    QRS Duration 92 ms    Q-T Interval 404 ms    QTC Calculation(Bazett) 486 ms    P Axis 32 degrees    R Axis 11 degrees    T Axis 37 degrees    Diagnosis Line Sinus rhythm with occasional premature ventricular complexes  Prolonged QT  Abnormal ECG  When compared with ECG of 20-DEC-2023 14:02,  No significant change was found  Confirmed by TRENA GRAVES MD ([id number]) on 12/21/2023 5:12:48   follow up on: symptom management     OVERNIGHT EVENTS:   Pt continues with c/o pain to legs  received 3 doses B/T Dilaudid over past 24 hrs.  spasms intermittently     Review of systems:   Dyspnea:      denies                        Anxiety:         denies                      Depression:   denies  Fatigue:      denies                         Nausea:      denies                         Loss of appetite:    denies            Constipation:     denies             Diarrhea:     denies  Pain:  x[ ] yes [ ] no  QOL impact -   Location -                    Aggravating factors -  Quality -  Radiation -  Timing-  Severity (0-10 scale):  Minimal acceptable level (0-10 scale):   All other systems reviewed and negative    MEDICATIONS  (STANDING):  acetaminophen     Tablet .. 650 milliGRAM(s) Oral daily  albuterol/ipratropium for Nebulization 3 milliLiter(s) Nebulizer every 8 hours  amLODIPine   Tablet 2.5 milliGRAM(s) Oral daily  ascorbic acid 500 milliGRAM(s) Oral daily  atorvastatin 40 milliGRAM(s) Oral at bedtime  bacitracin   Ointment 1 Application(s) Topical daily  budesonide 160 MICROgram(s)/formoterol 4.5 MICROgram(s) Inhaler 2 Puff(s) Inhalation two times a day  chlorhexidine 2% Cloths 1 Application(s) Topical <User Schedule>  collagenase Ointment 1 Application(s) Topical daily  cyclobenzaprine 10 milliGRAM(s) Oral three times a day  enoxaparin Injectable 40 milliGRAM(s) SubCutaneous every 24 hours  finasteride 5 milliGRAM(s) Oral daily  gabapentin 800 milliGRAM(s) Oral every 8 hours  guaiFENesin  milliGRAM(s) Oral every 12 hours  lactobacillus acidophilus 1 Tablet(s) Oral two times a day with meals  lidocaine   4% Patch 1 Patch Transdermal daily  meropenem  IVPB 1000 milliGRAM(s) IV Intermittent every 8 hours  methadone  Concentrate 60 milliGRAM(s) Oral two times a day  methylphenidate 5 milliGRAM(s) Oral <User Schedule>  multivitamin 1 Tablet(s) Oral daily  nystatin Powder 1 Application(s) Topical two times a day  pantoprazole    Tablet 40 milliGRAM(s) Oral before breakfast  polyethylene glycol 3350 17 Gram(s) Oral daily  QUEtiapine 400 milliGRAM(s) Oral at bedtime  QUEtiapine 100 milliGRAM(s) Oral <User Schedule>  senna 2 Tablet(s) Oral at bedtime  tamsulosin 0.4 milliGRAM(s) Oral at bedtime  thiamine 100 milliGRAM(s) Oral daily  vancomycin  IVPB 1000 milliGRAM(s) IV Intermittent every 12 hours  zinc sulfate 220 milliGRAM(s) Oral daily    MEDICATIONS  (PRN):  acetaminophen     Tablet .. 650 milliGRAM(s) Oral every 6 hours PRN Temp greater or equal to 38C (100.4F), Mild Pain (1 - 3)  aluminum hydroxide/magnesium hydroxide/simethicone Suspension 30 milliLiter(s) Oral every 4 hours PRN Dyspepsia  HYDROmorphone  Injectable 2 milliGRAM(s) IV Push every 4 hours PRN Severe Pain (7 - 10)  melatonin 3 milliGRAM(s) Oral at bedtime PRN Insomnia  sodium chloride 0.9% lock flush 10 milliLiter(s) IV Push every 1 hour PRN Pre/post blood products, medications, blood draw, and to maintain line patency    PHYSICAL EXAM:  Vital Signs Last 24 Hrs  T(C): 37.1 (22 Dec 2023 11:18), Max: 37.2 (21 Dec 2023 17:03)  T(F): 98.7 (22 Dec 2023 11:18), Max: 98.9 (21 Dec 2023 17:03)  HR: 85 (22 Dec 2023 11:18) (81 - 98)  BP: 120/71 (22 Dec 2023 11:18) (114/78 - 121/76)  BP(mean): --  RR: 18 (22 Dec 2023 11:18) (18 - 18)  SpO2: 91% (22 Dec 2023 11:18) (90% - 98%)    Parameters below as of 22 Dec 2023 11:18  Patient On (Oxygen Delivery Method): nasal cannula     Palliative Performance Scale/Karnofsky Score: 40            5     General: ill appearing male in be with c/o pain  HEENT: normocephalic, atraumatic, poor dentition   Neck: supple, no JVD   CVS: S1 S2 RRR, tachycardic, no MRG   Resp: CTAB, no RRW  GI: soft, NT, nor R/G, + ileostomy draining moderate amts  : indwelling west in situ   Musc: severe BLE contractures, + L PICC line    Neuro: oriented x 4, non focal, paraplegic  Psych: situationally depressed    Skin: Stage IV pressure ulcer; R heel as per flow sheets  stage IV pressure ulcer; R hip as per flow sheets  Oral intake: excellent    LABS: last labs documented 12/20/2023    RADIOLOGY & ADDITIONAL STUDIES:   ACC: [id number] EXAM:  CT CHEST   ORDERED BY: FADIA MASCORRO     PROCEDURE DATE:  12/21/2023      INTERPRETATION:  CLINICAL INFORMATION:  Shortness of breath.  Patient admitted with sepsis from bilateral feet infections.    COMPARISON: Chest x-ray 12/13/2023 and CT angiography of chest 9/26/2014.    IMPRESSION:    Emphysematous disease with likely concurrent interstitial fibrosis.    Patchy airspace consolidations right upper middle lobes, may reflect   pneumonia.    High attenuation intraluminal contents within the distal esophagus,   correlate clinically for gastroesophageal reflux or esophageal motility   disorder.    < from: 12 Lead ECG (12.20.23 @ 14:02) >  Ventricular Rate 87 BPM    Atrial Rate 87 BPM    P-R Interval 198 ms    QRS Duration 92 ms    Q-T Interval 404 ms    QTC Calculation(Bazett) 486 ms    P Axis 32 degrees    R Axis 11 degrees    T Axis 37 degrees    Diagnosis Line Sinus rhythm with occasional premature ventricular complexes  Prolonged QT  Abnormal ECG  When compared with ECG of 20-DEC-2023 14:02,  No significant change was found  Confirmed by TRENA GRAVES MD ([id number]) on 12/21/2023 5:12:48

## 2023-12-22 NOTE — PROGRESS NOTE ADULT - NUTRITIONAL ASSESSMENT
This patient has been assessed with a concern for Malnutrition and has been determined to have a diagnosis/diagnoses of Moderate protein-calorie malnutrition.    This patient is being managed with:   Diet Regular-  1000mL Fluid Restriction ([id number])  Supplement Feeding Modality:  Oral  Ensure Plus High Protein Cans or Servings Per Day:  1       Frequency:  Two Times a day  Entered: Dec 20 2023 10:26AM   This patient has been assessed with a concern for Malnutrition and has been determined to have a diagnosis/diagnoses of Moderate protein-calorie malnutrition.    This patient is being managed with:   Diet Regular-  1000mL Fluid Restriction ([id number])  Supplement Feeding Modality:  Oral  Ensure Plus High Protein Cans or Servings Per Day:  1       Frequency:  Two Times a day  Entered: Dec 20 2023 10:26AM

## 2023-12-22 NOTE — PROGRESS NOTE ADULT - SUBJECTIVE AND OBJECTIVE BOX
STAN VEGA  MRN-[id number]    Follow Up:  wounds, OM    Interval History: the pt was seen and examined earlier, not in acute distress, awake and alert, no new complaints. Pt is afebrile, NC, no new lab work.     PAST MEDICAL & SURGICAL HISTORY:  CAD (coronary artery disease)      HTN (hypertension)      HLD (hyperlipidemia)      Neurogenic bladder      Bipolar disorder      S/P ileostomy      Indwelling Holcomb catheter present      Chronic hepatitis C virus infection      S/P laminectomy      S/P ileostomy          ROS:    [ ] Unobtainable because:  [x ] All other systems negative    Constitutional: no fever, no chills  Head: no trauma  Eyes: no vision changes, no eye pain  ENT:  no sore throat, no rhinorrhea  Cardiovascular:  no chest pain, no palpitation  Respiratory:  no SOB, no cough  GI:  no abd pain, no vomiting, no diarrhea  urinary: no dysuria, no hematuria, no flank pain  musculoskeletal:  no joint pain, no joint swelling  skin:  no rash  neurology:  no headache, no seizure, no change in mental status  psych: no anxiety, no depression         Allergies  NSAIDs (Flushing; Other (Moderate))  Haldol (Anaphylaxis)  Zyprexa (Rash; Dystonic RXN; Hives)  Motrin (Anaphylaxis)  Thorazine (Other (Moderate))  Aleve (Unknown)  Stelazine (Unknown)  Risperdal (Short breath; Rash; Hives)        ANTIMICROBIALS:  meropenem  IVPB 1000 every 8 hours  vancomycin  IVPB 1000 every 12 hours      OTHER MEDS:  acetaminophen     Tablet .. 650 milliGRAM(s) Oral daily  acetaminophen     Tablet .. 650 milliGRAM(s) Oral every 6 hours PRN  albuterol/ipratropium for Nebulization 3 milliLiter(s) Nebulizer every 8 hours  aluminum hydroxide/magnesium hydroxide/simethicone Suspension 30 milliLiter(s) Oral every 4 hours PRN  amLODIPine   Tablet 2.5 milliGRAM(s) Oral daily  ascorbic acid 500 milliGRAM(s) Oral daily  atorvastatin 40 milliGRAM(s) Oral at bedtime  bacitracin   Ointment 1 Application(s) Topical daily  budesonide 160 MICROgram(s)/formoterol 4.5 MICROgram(s) Inhaler 2 Puff(s) Inhalation two times a day  chlorhexidine 2% Cloths 1 Application(s) Topical <User Schedule>  collagenase Ointment 1 Application(s) Topical daily  cyclobenzaprine 10 milliGRAM(s) Oral three times a day  enoxaparin Injectable 40 milliGRAM(s) SubCutaneous every 24 hours  finasteride 5 milliGRAM(s) Oral daily  gabapentin 800 milliGRAM(s) Oral every 8 hours  guaiFENesin  milliGRAM(s) Oral every 12 hours  HYDROmorphone  Injectable 2 milliGRAM(s) IV Push every 4 hours PRN  lactobacillus acidophilus 1 Tablet(s) Oral two times a day with meals  lidocaine   4% Patch 1 Patch Transdermal daily  melatonin 3 milliGRAM(s) Oral at bedtime PRN  methadone  Concentrate 60 milliGRAM(s) Oral two times a day  methylphenidate 5 milliGRAM(s) Oral <User Schedule>  multivitamin 1 Tablet(s) Oral daily  nystatin Powder 1 Application(s) Topical two times a day  pantoprazole    Tablet 40 milliGRAM(s) Oral before breakfast  polyethylene glycol 3350 17 Gram(s) Oral daily  QUEtiapine 100 milliGRAM(s) Oral <User Schedule>  QUEtiapine 400 milliGRAM(s) Oral at bedtime  senna 2 Tablet(s) Oral at bedtime  sodium chloride 0.9% lock flush 10 milliLiter(s) IV Push every 1 hour PRN  tamsulosin 0.4 milliGRAM(s) Oral at bedtime  thiamine 100 milliGRAM(s) Oral daily  zinc sulfate 220 milliGRAM(s) Oral daily      Vital Signs Last 24 Hrs  T(C): 37.1 (22 Dec 2023 11:18), Max: 37.2 (21 Dec 2023 17:03)  T(F): 98.7 (22 Dec 2023 11:18), Max: 98.9 (21 Dec 2023 17:03)  HR: 85 (22 Dec 2023 11:18) (85 - 98)  BP: 120/71 (22 Dec 2023 11:18) (114/78 - 121/76)  BP(mean): --  RR: 18 (22 Dec 2023 11:18) (18 - 18)  SpO2: 91% (22 Dec 2023 11:18) (90% - 97%)    Parameters below as of 22 Dec 2023 11:18  Patient On (Oxygen Delivery Method): nasal cannula        Physical Exam:  General:    NAD, non toxic, NC  Head: atraumatic, normocephalic  Eyes: normal sclera and conjunctiva  ENT:   no oropharyngeal lesions, poor dentition, no LAD, neck supple  Cardio:  regular , S1,S2, no murmur  Respiratory:   somewhat diminished to auscultation b/l, no wheezing, no rhonchi   abd:   soft, BS +, not tender, no distention, midline scar healed, right sided colostomy in place  :     no CVAT, no suprapubic tenderness, Holcomb  Musculoskeletal : severely contracted LE b/l, no noted joint swelling   Skin:   b/l feet dressed, c/d/i, + edema, left arm PICC line c/d/i  vascular:  normal pulses  Neurologic:     no focal deficits  psych: appropriate, calm behavior     WBC Count: 7.37 K/uL (12-20 @ 06:00)  WBC Count: 8.19 K/uL (12-19 @ 06:20)  WBC Count: 8.64 K/uL (12-18 @ 08:14)  WBC Count: 9.58 K/uL (12-17 @ 05:30)  WBC Count: 7.29 K/uL (12-16 @ 07:30)    Creatinine Trend: 0.52<--, 0.60<--, 0.46<--, 0.60<--, 0.63<--, 0.67<--      MICROBIOLOGY:  v  Clean Catch Clean Catch (Midstream)  12-04-23   >100,000 CFU/ml Klebsiella pneumoniae  50,000 - 99,000 CFU/mL Enterococcus faecalis (vancomycin resistant)  --  Klebsiella pneumoniae  Enterococcus faecalis (vancomycin resistant)      .Abscess left wound culture  12-04-23   Rare Pseudomonas aeruginosa  Moderate Methicillin Resistant Staphylococcus aureus  Rare Klebsiella pneumoniae  Moderate Bacteroides fragilis "Susceptibilities not performed"  --  Pseudomonas aeruginosa  Methicillin resistant Staphylococcus aureus  Klebsiella pneumoniae      .Blood Blood-Peripheral  12-04-23   No growth at 5 days  --  --      .Blood Blood-Peripheral  12-04-23   No growth at 5 days  --  --                            SARS-CoV-2: NotDetec (11 Dec 2023 20:55)  SARS-CoV-2: NotDetec (04 Dec 2023 17:20)    RADIOLOGY:     STAN VEGA  MRN-[id number]    Follow Up:  wounds, OM    Interval History: the pt was seen and examined earlier, not in acute distress, awake and alert, no new complaints. Pt is afebrile, NC, no new lab work.     PAST MEDICAL & SURGICAL HISTORY:  CAD (coronary artery disease)      HTN (hypertension)      HLD (hyperlipidemia)      Neurogenic bladder      Bipolar disorder      S/P ileostomy      Indwelling Holcomb catheter present      Chronic hepatitis C virus infection      S/P laminectomy      S/P ileostomy          ROS:    [ ] Unobtainable because:  [x ] All other systems negative    Constitutional: no fever, no chills  Head: no trauma  Eyes: no vision changes, no eye pain  ENT:  no sore throat, no rhinorrhea  Cardiovascular:  no chest pain, no palpitation  Respiratory:  no SOB, no cough  GI:  no abd pain, no vomiting, no diarrhea  urinary: no dysuria, no hematuria, no flank pain  musculoskeletal:  no joint pain, no joint swelling  skin:  no rash  neurology:  no headache, no seizure, no change in mental status  psych: no anxiety, no depression         Allergies  NSAIDs (Flushing; Other (Moderate))  Haldol (Anaphylaxis)  Zyprexa (Rash; Dystonic RXN; Hives)  Motrin (Anaphylaxis)  Thorazine (Other (Moderate))  Aleve (Unknown)  Stelazine (Unknown)  Risperdal (Short breath; Rash; Hives)        ANTIMICROBIALS:  meropenem  IVPB 1000 every 8 hours  vancomycin  IVPB 1000 every 12 hours      OTHER MEDS:  acetaminophen     Tablet .. 650 milliGRAM(s) Oral daily  acetaminophen     Tablet .. 650 milliGRAM(s) Oral every 6 hours PRN  albuterol/ipratropium for Nebulization 3 milliLiter(s) Nebulizer every 8 hours  aluminum hydroxide/magnesium hydroxide/simethicone Suspension 30 milliLiter(s) Oral every 4 hours PRN  amLODIPine   Tablet 2.5 milliGRAM(s) Oral daily  ascorbic acid 500 milliGRAM(s) Oral daily  atorvastatin 40 milliGRAM(s) Oral at bedtime  bacitracin   Ointment 1 Application(s) Topical daily  budesonide 160 MICROgram(s)/formoterol 4.5 MICROgram(s) Inhaler 2 Puff(s) Inhalation two times a day  chlorhexidine 2% Cloths 1 Application(s) Topical <User Schedule>  collagenase Ointment 1 Application(s) Topical daily  cyclobenzaprine 10 milliGRAM(s) Oral three times a day  enoxaparin Injectable 40 milliGRAM(s) SubCutaneous every 24 hours  finasteride 5 milliGRAM(s) Oral daily  gabapentin 800 milliGRAM(s) Oral every 8 hours  guaiFENesin  milliGRAM(s) Oral every 12 hours  HYDROmorphone  Injectable 2 milliGRAM(s) IV Push every 4 hours PRN  lactobacillus acidophilus 1 Tablet(s) Oral two times a day with meals  lidocaine   4% Patch 1 Patch Transdermal daily  melatonin 3 milliGRAM(s) Oral at bedtime PRN  methadone  Concentrate 60 milliGRAM(s) Oral two times a day  methylphenidate 5 milliGRAM(s) Oral <User Schedule>  multivitamin 1 Tablet(s) Oral daily  nystatin Powder 1 Application(s) Topical two times a day  pantoprazole    Tablet 40 milliGRAM(s) Oral before breakfast  polyethylene glycol 3350 17 Gram(s) Oral daily  QUEtiapine 100 milliGRAM(s) Oral <User Schedule>  QUEtiapine 400 milliGRAM(s) Oral at bedtime  senna 2 Tablet(s) Oral at bedtime  sodium chloride 0.9% lock flush 10 milliLiter(s) IV Push every 1 hour PRN  tamsulosin 0.4 milliGRAM(s) Oral at bedtime  thiamine 100 milliGRAM(s) Oral daily  zinc sulfate 220 milliGRAM(s) Oral daily      Vital Signs Last 24 Hrs  T(C): 37.1 (22 Dec 2023 11:18), Max: 37.2 (21 Dec 2023 17:03)  T(F): 98.7 (22 Dec 2023 11:18), Max: 98.9 (21 Dec 2023 17:03)  HR: 85 (22 Dec 2023 11:18) (85 - 98)  BP: 120/71 (22 Dec 2023 11:18) (114/78 - 121/76)  BP(mean): --  RR: 18 (22 Dec 2023 11:18) (18 - 18)  SpO2: 91% (22 Dec 2023 11:18) (90% - 97%)    Parameters below as of 22 Dec 2023 11:18  Patient On (Oxygen Delivery Method): nasal cannula        Physical Exam:  General:    NAD, non toxic, NC  Head: atraumatic, normocephalic  Eyes: normal sclera and conjunctiva  ENT:   no oropharyngeal lesions, poor dentition, no LAD, neck supple  Cardio:  regular , S1,S2, no murmur  Respiratory:   somewhat diminished to auscultation b/l, no wheezing, no rhonchi   abd:   soft, BS +, not tender, no distention, midline scar healed, right sided colostomy in place  :     no CVAT, no suprapubic tenderness, Holcomb  Musculoskeletal : severely contracted LE b/l, no noted joint swelling   Skin:   b/l feet dressed, c/d/i, + edema, left arm PICC line c/d/i  vascular:  normal pulses  Neurologic:     no focal deficits  psych: appropriate, calm behavior     WBC Count: 7.37 K/uL (12-20 @ 06:00)  WBC Count: 8.19 K/uL (12-19 @ 06:20)  WBC Count: 8.64 K/uL (12-18 @ 08:14)  WBC Count: 9.58 K/uL (12-17 @ 05:30)  WBC Count: 7.29 K/uL (12-16 @ 07:30)    Creatinine Trend: 0.52<--, 0.60<--, 0.46<--, 0.60<--, 0.63<--, 0.67<--      MICROBIOLOGY:  v  Clean Catch Clean Catch (Midstream)  12-04-23   >100,000 CFU/ml Klebsiella pneumoniae  50,000 - 99,000 CFU/mL Enterococcus faecalis (vancomycin resistant)  --  Klebsiella pneumoniae  Enterococcus faecalis (vancomycin resistant)      .Abscess left wound culture  12-04-23   Rare Pseudomonas aeruginosa  Moderate Methicillin Resistant Staphylococcus aureus  Rare Klebsiella pneumoniae  Moderate Bacteroides fragilis "Susceptibilities not performed"  --  Pseudomonas aeruginosa  Methicillin resistant Staphylococcus aureus  Klebsiella pneumoniae      .Blood Blood-Peripheral  12-04-23   No growth at 5 days  --  --      .Blood Blood-Peripheral  12-04-23   No growth at 5 days  --  --                            SARS-CoV-2: NotDetec (11 Dec 2023 20:55)  SARS-CoV-2: NotDetec (04 Dec 2023 17:20)    RADIOLOGY:     STAN VEGA  MRN-[id number]    Follow Up:  wounds, OM    Interval History: the pt was seen and examined earlier, not in acute distress, awake and alert, no new complaints. Pt is afebrile, NC, no new lab work.     PAST MEDICAL & SURGICAL HISTORY:  CAD (coronary artery disease)      HTN (hypertension)      HLD (hyperlipidemia)      Neurogenic bladder      Bipolar disorder      S/P ileostomy      Indwelling Holcomb catheter present      Chronic hepatitis C virus infection      S/P laminectomy      S/P ileostomy          ROS:    [ ] Unobtainable because:  [x ] All other systems negative    Constitutional: no fever, no chills  Head: no trauma  Eyes: no vision changes, no eye pain  ENT:  no sore throat, no rhinorrhea  Cardiovascular:  no chest pain, no palpitation  Respiratory:  no SOB, no cough  GI:  no abd pain, no vomiting, no diarrhea  urinary: no dysuria, no hematuria, no flank pain  musculoskeletal:  no joint pain, no joint swelling  skin:  no rash  neurology:  no headache, no seizure, no change in mental status  psych: no anxiety, no depression         Allergies  NSAIDs (Flushing; Other (Moderate))  Haldol (Anaphylaxis)  Zyprexa (Rash; Dystonic RXN; Hives)  Motrin (Anaphylaxis)  Thorazine (Other (Moderate))  Aleve (Unknown)  Stelazine (Unknown)  Risperdal (Short breath; Rash; Hives)        ANTIMICROBIALS:  meropenem  IVPB 1000 every 8 hours  vancomycin  IVPB 1000 every 12 hours      OTHER MEDS:  acetaminophen     Tablet .. 650 milliGRAM(s) Oral daily  acetaminophen     Tablet .. 650 milliGRAM(s) Oral every 6 hours PRN  albuterol/ipratropium for Nebulization 3 milliLiter(s) Nebulizer every 8 hours  aluminum hydroxide/magnesium hydroxide/simethicone Suspension 30 milliLiter(s) Oral every 4 hours PRN  amLODIPine   Tablet 2.5 milliGRAM(s) Oral daily  ascorbic acid 500 milliGRAM(s) Oral daily  atorvastatin 40 milliGRAM(s) Oral at bedtime  bacitracin   Ointment 1 Application(s) Topical daily  budesonide 160 MICROgram(s)/formoterol 4.5 MICROgram(s) Inhaler 2 Puff(s) Inhalation two times a day  chlorhexidine 2% Cloths 1 Application(s) Topical <User Schedule>  collagenase Ointment 1 Application(s) Topical daily  cyclobenzaprine 10 milliGRAM(s) Oral three times a day  enoxaparin Injectable 40 milliGRAM(s) SubCutaneous every 24 hours  finasteride 5 milliGRAM(s) Oral daily  gabapentin 800 milliGRAM(s) Oral every 8 hours  guaiFENesin  milliGRAM(s) Oral every 12 hours  HYDROmorphone  Injectable 2 milliGRAM(s) IV Push every 4 hours PRN  lactobacillus acidophilus 1 Tablet(s) Oral two times a day with meals  lidocaine   4% Patch 1 Patch Transdermal daily  melatonin 3 milliGRAM(s) Oral at bedtime PRN  methadone  Concentrate 60 milliGRAM(s) Oral two times a day  methylphenidate 5 milliGRAM(s) Oral <User Schedule>  multivitamin 1 Tablet(s) Oral daily  nystatin Powder 1 Application(s) Topical two times a day  pantoprazole    Tablet 40 milliGRAM(s) Oral before breakfast  polyethylene glycol 3350 17 Gram(s) Oral daily  QUEtiapine 100 milliGRAM(s) Oral <User Schedule>  QUEtiapine 400 milliGRAM(s) Oral at bedtime  senna 2 Tablet(s) Oral at bedtime  sodium chloride 0.9% lock flush 10 milliLiter(s) IV Push every 1 hour PRN  tamsulosin 0.4 milliGRAM(s) Oral at bedtime  thiamine 100 milliGRAM(s) Oral daily  zinc sulfate 220 milliGRAM(s) Oral daily      Vital Signs Last 24 Hrs  T(C): 37.1 (22 Dec 2023 11:18), Max: 37.2 (21 Dec 2023 17:03)  T(F): 98.7 (22 Dec 2023 11:18), Max: 98.9 (21 Dec 2023 17:03)  HR: 85 (22 Dec 2023 11:18) (85 - 98)  BP: 120/71 (22 Dec 2023 11:18) (114/78 - 121/76)  BP(mean): --  RR: 18 (22 Dec 2023 11:18) (18 - 18)  SpO2: 91% (22 Dec 2023 11:18) (90% - 97%)    Parameters below as of 22 Dec 2023 11:18  Patient On (Oxygen Delivery Method): nasal cannula        Physical Exam:  General:    NAD, non toxic, NC  Head: atraumatic, normocephalic  Eyes: normal sclera and conjunctiva  ENT:  no oropharyngeal lesions, poor dentition, no LAD, neck supple  Cardio:  regular , S1,S2, no murmur  Respiratory:   somewhat diminished to auscultation b/l, no wheezing, no rhonchi   abd:   soft, BS +, not tender, no distention, midline scar healed, right sided colostomy in place  :     no CVAT, no suprapubic tenderness, Holcomb  Musculoskeletal : severely contracted LE b/l, no noted joint swelling   Skin:   b/l feet wounds re-dressed, no significant erythema of surrounding tissues present, + swelling, mildly tender, no pus  vascular:  normal pulses  Neurologic:     no focal deficits  psych: appropriate, calm behavior     WBC Count: 7.37 K/uL (12-20 @ 06:00)  WBC Count: 8.19 K/uL (12-19 @ 06:20)  WBC Count: 8.64 K/uL (12-18 @ 08:14)  WBC Count: 9.58 K/uL (12-17 @ 05:30)  WBC Count: 7.29 K/uL (12-16 @ 07:30)    Creatinine Trend: 0.52<--, 0.60<--, 0.46<--, 0.60<--, 0.63<--, 0.67<--      MICROBIOLOGY:  v  Clean Catch Clean Catch (Midstream)  12-04-23   >100,000 CFU/ml Klebsiella pneumoniae  50,000 - 99,000 CFU/mL Enterococcus faecalis (vancomycin resistant)  --  Klebsiella pneumoniae  Enterococcus faecalis (vancomycin resistant)      .Abscess left wound culture  12-04-23   Rare Pseudomonas aeruginosa  Moderate Methicillin Resistant Staphylococcus aureus  Rare Klebsiella pneumoniae  Moderate Bacteroides fragilis "Susceptibilities not performed"  --  Pseudomonas aeruginosa  Methicillin resistant Staphylococcus aureus  Klebsiella pneumoniae      .Blood Blood-Peripheral  12-04-23   No growth at 5 days  --  --      .Blood Blood-Peripheral  12-04-23   No growth at 5 days  --  --    SARS-CoV-2: NotDetec (11 Dec 2023 20:55)  SARS-CoV-2: NotDetec (04 Dec 2023 17:20)    RADIOLOGY:     STAN VEGA  MRN-[id number]    Follow Up:  wounds, OM    Interval History: the pt was seen and examined earlier, not in acute distress, awake and alert, no new complaints. Pt is afebrile, NC, no new lab work.     PAST MEDICAL & SURGICAL HISTORY:  CAD (coronary artery disease)      HTN (hypertension)      HLD (hyperlipidemia)      Neurogenic bladder      Bipolar disorder      S/P ileostomy      Indwelling Holcomb catheter present      Chronic hepatitis C virus infection      S/P laminectomy      S/P ileostomy          ROS:    [ ] Unobtainable because:  [x ] All other systems negative    Constitutional: no fever, no chills  Head: no trauma  Eyes: no vision changes, no eye pain  ENT:  no sore throat, no rhinorrhea  Cardiovascular:  no chest pain, no palpitation  Respiratory:  no SOB, no cough  GI:  no abd pain, no vomiting, no diarrhea  urinary: no dysuria, no hematuria, no flank pain  musculoskeletal:  no joint pain, no joint swelling  skin:  no rash  neurology:  no headache, no seizure, no change in mental status  psych: no anxiety, no depression         Allergies  NSAIDs (Flushing; Other (Moderate))  Haldol (Anaphylaxis)  Zyprexa (Rash; Dystonic RXN; Hives)  Motrin (Anaphylaxis)  Thorazine (Other (Moderate))  Aleve (Unknown)  Stelazine (Unknown)  Risperdal (Short breath; Rash; Hives)        ANTIMICROBIALS:  meropenem  IVPB 1000 every 8 hours  vancomycin  IVPB 1000 every 12 hours      OTHER MEDS:  acetaminophen     Tablet .. 650 milliGRAM(s) Oral daily  acetaminophen     Tablet .. 650 milliGRAM(s) Oral every 6 hours PRN  albuterol/ipratropium for Nebulization 3 milliLiter(s) Nebulizer every 8 hours  aluminum hydroxide/magnesium hydroxide/simethicone Suspension 30 milliLiter(s) Oral every 4 hours PRN  amLODIPine   Tablet 2.5 milliGRAM(s) Oral daily  ascorbic acid 500 milliGRAM(s) Oral daily  atorvastatin 40 milliGRAM(s) Oral at bedtime  bacitracin   Ointment 1 Application(s) Topical daily  budesonide 160 MICROgram(s)/formoterol 4.5 MICROgram(s) Inhaler 2 Puff(s) Inhalation two times a day  chlorhexidine 2% Cloths 1 Application(s) Topical <User Schedule>  collagenase Ointment 1 Application(s) Topical daily  cyclobenzaprine 10 milliGRAM(s) Oral three times a day  enoxaparin Injectable 40 milliGRAM(s) SubCutaneous every 24 hours  finasteride 5 milliGRAM(s) Oral daily  gabapentin 800 milliGRAM(s) Oral every 8 hours  guaiFENesin  milliGRAM(s) Oral every 12 hours  HYDROmorphone  Injectable 2 milliGRAM(s) IV Push every 4 hours PRN  lactobacillus acidophilus 1 Tablet(s) Oral two times a day with meals  lidocaine   4% Patch 1 Patch Transdermal daily  melatonin 3 milliGRAM(s) Oral at bedtime PRN  methadone  Concentrate 60 milliGRAM(s) Oral two times a day  methylphenidate 5 milliGRAM(s) Oral <User Schedule>  multivitamin 1 Tablet(s) Oral daily  nystatin Powder 1 Application(s) Topical two times a day  pantoprazole    Tablet 40 milliGRAM(s) Oral before breakfast  polyethylene glycol 3350 17 Gram(s) Oral daily  QUEtiapine 100 milliGRAM(s) Oral <User Schedule>  QUEtiapine 400 milliGRAM(s) Oral at bedtime  senna 2 Tablet(s) Oral at bedtime  sodium chloride 0.9% lock flush 10 milliLiter(s) IV Push every 1 hour PRN  tamsulosin 0.4 milliGRAM(s) Oral at bedtime  thiamine 100 milliGRAM(s) Oral daily  zinc sulfate 220 milliGRAM(s) Oral daily      Vital Signs Last 24 Hrs  T(C): 37.1 (22 Dec 2023 11:18), Max: 37.2 (21 Dec 2023 17:03)  T(F): 98.7 (22 Dec 2023 11:18), Max: 98.9 (21 Dec 2023 17:03)  HR: 85 (22 Dec 2023 11:18) (85 - 98)  BP: 120/71 (22 Dec 2023 11:18) (114/78 - 121/76)  BP(mean): --  RR: 18 (22 Dec 2023 11:18) (18 - 18)  SpO2: 91% (22 Dec 2023 11:18) (90% - 97%)    Parameters below as of 22 Dec 2023 11:18  Patient On (Oxygen Delivery Method): nasal cannula        Physical Exam:  General:    NAD, non toxic, NC  Head: atraumatic, normocephalic  Eyes: normal sclera and conjunctiva  ENT:  no oropharyngeal lesions, poor dentition, no LAD, neck supple  Cardio:  regular , S1,S2, no murmur  Respiratory:   somewhat diminished to auscultation b/l, no wheezing, no rhonchi   abd:   soft, BS +, not tender, no distention, midline scar healed, right sided colostomy in place  :     no CVAT, no suprapubic tenderness, Holcomb  Musculoskeletal : severely contracted LE b/l, no noted joint swelling   Skin:   b/l feet wounds re-dressed, no significant erythema of surrounding tissues present, + swelling, mildly tender, no pus  vascular:  normal pulses  Neurologic:     no focal deficits  psych: appropriate, calm behavior     WBC Count: 7.37 K/uL (12-20 @ 06:00)  WBC Count: 8.19 K/uL (12-19 @ 06:20)  WBC Count: 8.64 K/uL (12-18 @ 08:14)  WBC Count: 9.58 K/uL (12-17 @ 05:30)  WBC Count: 7.29 K/uL (12-16 @ 07:30)    Creatinine Trend: 0.52<--, 0.60<--, 0.46<--, 0.60<--, 0.63<--, 0.67<--      MICROBIOLOGY:  v  Clean Catch Clean Catch (Midstream)  12-04-23   >100,000 CFU/ml Klebsiella pneumoniae  50,000 - 99,000 CFU/mL Enterococcus faecalis (vancomycin resistant)  --  Klebsiella pneumoniae  Enterococcus faecalis (vancomycin resistant)      .Abscess left wound culture  12-04-23   Rare Pseudomonas aeruginosa  Moderate Methicillin Resistant Staphylococcus aureus  Rare Klebsiella pneumoniae  Moderate Bacteroides fragilis "Susceptibilities not performed"  --  Pseudomonas aeruginosa  Methicillin resistant Staphylococcus aureus  Klebsiella pneumoniae      .Blood Blood-Peripheral  12-04-23   No growth at 5 days  --  --      .Blood Blood-Peripheral  12-04-23   No growth at 5 days  --  --    SARS-CoV-2: NotDetec (11 Dec 2023 20:55)  SARS-CoV-2: NotDetec (04 Dec 2023 17:20)    RADIOLOGY:

## 2023-12-22 NOTE — PROGRESS NOTE ADULT - PROBLEM SELECTOR PLAN 1
continues with complaints of leg pain and spasms   appears to be mostly neuropathic and spasmatic in nature.   pt states " I believe I am building up tolerance"  Observed RN give Dilaudid dose with decrease of pain from 8/10 to 5/10 witin minutes  Continue Methadone concentrate 60 mg po q 12 hr. Discussed with [facility] . who offer no objection to divided methadone dose.   QTc 486, MD following for weekly levels  Decrease DIlaudid to 2 mg IV q 4 hrs in anticipation of surgery  Continue Neurontin to 800 mg po q 8 hrs  Continue  Flexeril to 10 mg po TID. continues with complaints of leg pain and spasms   appears to be mostly neuropathic and spasmatic in nature.   pt states " I believe I am building up tolerance"  Observed RN give Dilaudid dose with decrease of pain from 8/10 to 5/10 witin minutes  Continue Methadone concentrate 60 mg po q 12 hr. Discussed with [facility] clinic . who offer no objection to divided methadone dose.   QTc 486, MD following for weekly levels  Decrease DIlaudid to 2 mg IV q 4 hrs in anticipation of surgery  Continue Neurontin to 800 mg po q 8 hrs  Continue  Flexeril to 10 mg po TID.

## 2023-12-22 NOTE — PROGRESS NOTE ADULT - ASSESSMENT
Gonzalez Stewart is a 53 year old male with PMHx of cervical epidural abscess (s/p decompressive laminectomy 3/2021 c/b b/l leg paralysis), hx of pneumoperitoneum (s/p ex lap, total abdominal colectomy and end ileostomy (on 1/12/23) c/b evisceration and sepsis with MRSA bacteremia postoperatively), hx of hepatitis C, hx of R. buttock abscess, hx of L. foot osteomyelitis, neurogenic bladder (with indwelling Holcomb catheter, last exchanged two days ago), HTN, HLD, bipolar disorder, GERD, hx of opioid dependence, and constipation who presented to the ED on 12/4/23 from [facility] for feet infection and admitted for sepsis secondary to bilateral feet infection.      Sepsis secondary to b/l feet infection  Previously treated for L. hallux OM with L. heel culture growing Proteus mirabilis ESBL, completed 6-week course of avycaz  CT b/l LE with study is severely limited by contracture. Left lower extremity is only partially visualized with exclusion of the left foot. Skin induration and subcutaneous edema in the leg and ankle.  No soft tissue gas  S/p bedside escharectomy by podiatry  right foot wound cx- ESBL proteus - resistant to cefepime and corynebecaterium and alcalegenes  left foot wound cx- MRSA and Pseudomonas  and klebsiella   Blood cx- neg x 2  PICC line in place 12/11/23   Will continue meropenem and vancomycin until 1/8   ECHO EF 55-60%, normal systolic function, and mild mitral regurgitation   Most recent CXR with interstitial lung disease, however nothing acute.  Vascular following- pending scheduling /decision for AKA     COPD  chronic respiratory failure   Baseline patient is on 5L O2 NC at home   Had an episode of resp distress from fluid overload from IVF- Placed on lasix drip for some time with improvement  Continue PO lasix   ECHO as above   Pulm consulted    cont airway clearance w/ aerobika  - cont BD tx  - cont symbicort inh bid  - cont o2 to maintain sat 90-95%    CT chest : Emphysematous disease with likely concurrent interstitial fibrosis.   Suspected  chronic microaspiration         HTN  continue  amlodipine 2.5 mg daily     HLD  continue  atorvastatin 40 mg daily     Bipolar disorder, stable   : PTA quetiapine 100 mg BID and 400 mg qhs, valproic acid-- per psych  12/12/2023 valproic ahs been stopped by kendell      Hx of opioid dependence  continue methadone maintenance 60mg bid     Chronic pain continue with current pain regimen     GERD:   continue with PPI     Neurogenic bladder (with indwelling Holcomb catheter)   PTA finasteride 5 mg, tamsulosin 0.4 mg    Constipation:   continue with bowel regimen      functional quad-   B/L contracted   supportive care  , frequent repositioning      Rt hip pressure wound, seen by vascular ,   wound recs in place     Mod PCM  nutrition recommendations appreciated     Preventative Measures  lovenox SQ-dvt ppx  fall, aspiration precautions   HOBE     palliative following, patient is full code      Gonzalez Stewart is a 53 year old male with PMHx of cervical epidural abscess (s/p decompressive laminectomy 3/2021 c/b b/l leg paralysis), hx of pneumoperitoneum (s/p ex lap, total abdominal colectomy and end ileostomy (on 1/12/23) c/b evisceration and sepsis with MRSA bacteremia postoperatively), hx of hepatitis C, hx of R. buttock abscess, hx of L. foot osteomyelitis, neurogenic bladder (with indwelling Holcmob catheter, last exchanged two days ago), HTN, HLD, bipolar disorder, GERD, hx of opioid dependence, and constipation who presented to the ED on 12/4/23 from [facility] for feet infection and admitted for sepsis secondary to bilateral feet infection.      Sepsis secondary to b/l feet infection  Previously treated for L. hallux OM with L. heel culture growing Proteus mirabilis ESBL, completed 6-week course of avycaz  CT b/l LE with study is severely limited by contracture. Left lower extremity is only partially visualized with exclusion of the left foot. Skin induration and subcutaneous edema in the leg and ankle.  No soft tissue gas  S/p bedside escharectomy by podiatry  right foot wound cx- ESBL proteus - resistant to cefepime and corynebecaterium and alcalegenes  left foot wound cx- MRSA and Pseudomonas  and klebsiella   Blood cx- neg x 2  PICC line in place 12/11/23   Will continue meropenem and vancomycin until 1/8   ECHO EF 55-60%, normal systolic function, and mild mitral regurgitation   Most recent CXR with interstitial lung disease, however nothing acute.  Vascular following- pending scheduling /decision for AKA     COPD  chronic respiratory failure   Baseline patient is on 5L O2 NC at home   Had an episode of resp distress from fluid overload from IVF- Placed on lasix drip for some time with improvement  Continue PO lasix   ECHO as above   Pulm consulted    cont airway clearance w/ aerobika  - cont BD tx  - cont symbicort inh bid  - cont o2 to maintain sat 90-95%    CT chest : Emphysematous disease with likely concurrent interstitial fibrosis.   Suspected  chronic microaspiration         HTN  continue  amlodipine 2.5 mg daily     HLD  continue  atorvastatin 40 mg daily     Bipolar disorder, stable   : PTA quetiapine 100 mg BID and 400 mg qhs, valproic acid-- per psych  12/12/2023 valproic ahs been stopped by kendell      Hx of opioid dependence  continue methadone maintenance 60mg bid     Chronic pain continue with current pain regimen     GERD:   continue with PPI     Neurogenic bladder (with indwelling Holcomb catheter)   PTA finasteride 5 mg, tamsulosin 0.4 mg    Constipation:   continue with bowel regimen      functional quad-   B/L contracted   supportive care  , frequent repositioning      Rt hip pressure wound, seen by vascular ,   wound recs in place     Mod PCM  nutrition recommendations appreciated     Preventative Measures  lovenox SQ-dvt ppx  fall, aspiration precautions   HOBE     palliative following, patient is full code

## 2023-12-22 NOTE — PROGRESS NOTE ADULT - SUBJECTIVE AND OBJECTIVE BOX
Patient is a 53y old  Male who presents with a chief complaint of Sepsis secondary to b/l foot wounds (18 Dec 2023 15:08)      INTERVAL HPI/OVERNIGHT EVENTS:   Patient seen and examined bedside.   no overnight events     REVIEW OF SYSTEMS: remaining ROS negative     MEDICATIONS  (STANDING):  acetaminophen     Tablet .. 650 milliGRAM(s) Oral daily  albuterol/ipratropium for Nebulization 3 milliLiter(s) Nebulizer every 6 hours  amLODIPine   Tablet 2.5 milliGRAM(s) Oral daily  ascorbic acid 500 milliGRAM(s) Oral daily  atorvastatin 40 milliGRAM(s) Oral at bedtime  bacitracin   Ointment 1 Application(s) Topical daily  chlorhexidine 2% Cloths 1 Application(s) Topical <User Schedule>  collagenase Ointment 1 Application(s) Topical daily  cyclobenzaprine 10 milliGRAM(s) Oral three times a day  enoxaparin Injectable 40 milliGRAM(s) SubCutaneous every 24 hours  finasteride 5 milliGRAM(s) Oral daily  gabapentin 800 milliGRAM(s) Oral every 8 hours  guaiFENesin  milliGRAM(s) Oral every 12 hours  lactobacillus acidophilus 1 Tablet(s) Oral two times a day with meals  lidocaine   4% Patch 1 Patch Transdermal daily  meropenem  IVPB 1000 milliGRAM(s) IV Intermittent every 8 hours  methadone  Concentrate 60 milliGRAM(s) Oral two times a day  methylphenidate 5 milliGRAM(s) Oral <User Schedule>  multivitamin 1 Tablet(s) Oral daily  nystatin Powder 1 Application(s) Topical two times a day  pantoprazole    Tablet 40 milliGRAM(s) Oral before breakfast  polyethylene glycol 3350 17 Gram(s) Oral daily  QUEtiapine 100 milliGRAM(s) Oral <User Schedule>  QUEtiapine 400 milliGRAM(s) Oral at bedtime  senna 2 Tablet(s) Oral at bedtime  tamsulosin 0.4 milliGRAM(s) Oral at bedtime  thiamine 100 milliGRAM(s) Oral daily  vancomycin  IVPB 1000 milliGRAM(s) IV Intermittent every 12 hours  zinc sulfate 220 milliGRAM(s) Oral daily    MEDICATIONS  (PRN):  acetaminophen     Tablet .. 650 milliGRAM(s) Oral every 6 hours PRN Temp greater or equal to 38C (100.4F), Mild Pain (1 - 3)  albuterol    90 MICROgram(s) HFA Inhaler 2 Puff(s) Inhalation every 4 hours PRN Shortness of Breath and/or Wheezing  aluminum hydroxide/magnesium hydroxide/simethicone Suspension 30 milliLiter(s) Oral every 4 hours PRN Dyspepsia  HYDROmorphone  Injectable 2 milliGRAM(s) IV Push every 4 hours PRN Severe Pain (7 - 10)  melatonin 3 milliGRAM(s) Oral at bedtime PRN Insomnia  sodium chloride 0.9% lock flush 10 milliLiter(s) IV Push every 1 hour PRN Pre/post blood products, medications, blood draw, and to maintain line patency      Allergies    NSAIDs (Flushing; Other (Moderate))  Haldol (Anaphylaxis)  Zyprexa (Rash; Dystonic RXN; Hives)  Motrin (Anaphylaxis)  Thorazine (Other (Moderate))  Aleve (Unknown)  Stelazine (Unknown)  Risperdal (Short breath; Rash; Hives)    Intolerances        Vital Signs Last 24 Hrs  T(C): 36.9 (22 Dec 2023 17:24), Max: 37.1 (22 Dec 2023 11:18)  T(F): 98.5 (22 Dec 2023 17:24), Max: 98.7 (22 Dec 2023 11:18)  HR: 88 (22 Dec 2023 17:24) (85 - 98)  BP: 133/85 (22 Dec 2023 17:24) (114/78 - 133/85)  BP(mean): --  RR: 18 (22 Dec 2023 17:24) (18 - 18)  SpO2: 93% (22 Dec 2023 17:24) (90% - 97%)    Parameters below as of 22 Dec 2023 13:28  Patient On (Oxygen Delivery Method): nasal cannula            PHYSICAL EXAM:  GENERAL: NAD , thin and pale appearing,  no increased WOB, speaking in full sentences, not using accessory muscles.   HEAD:  Atraumatic, Normocephalic  EYES: EOMI, PERRLA, conjunctiva and sclera clear  ENMT: No tonsillar erythema, exudates, or enlargement; Moist mucous membranes   NECK: Supple, No JVD   CHEST/LUNG: CTAB;  No rales, rhonchi, wheezing, or rubs  HEART: Regular rate and rhythm; No murmurs, rubs, or gallops  ABDOMEN: Soft, Nontender, Nondistended; Bowel sounds present  EXTREMITIES: contracted b/l LE   SKIN: stage 3 right hip ulcer, right foot heel necrotic to bone , left foot dorsum had eschar   NERVOUS SYSTEM:  Alert & Oriented X3, Good concentration; functional quad. moving b/l upper extremities. able to eat independently     LABS:             no new labs         Urinalysis Basic - ( 19 Dec 2023 06:20 )    Color: x / Appearance: x / SG: x / pH: x  Gluc: 96 mg/dL / Ketone: x  / Bili: x / Urobili: x   Blood: x / Protein: x / Nitrite: x   Leuk Esterase: x / RBC: x / WBC x   Sq Epi: x / Non Sq Epi: x / Bacteria: x      CAPILLARY BLOOD GLUCOSE          RADIOLOGY & ADDITIONAL TESTS:      Consultant(s) Notes Reviewed [ x] Yes     Care Discussed with [x ] Consultants  [x ] Patient  [ ] Family  [ x] /   [x ] Other; RN

## 2023-12-22 NOTE — PROGRESS NOTE ADULT - ASSESSMENT
Assessment and Plan:   · Assessment	  53 year old male with PMHx of cervical epidural abscess (s/p decompressive laminectomy 3/2021 c/b b/l leg paralysis), contractures,  hx of pneumoperitoneum (s/p ex lap, total abdominal colectomy and end ileostomy (on 1/12/23) c/b evisceration and sepsis with MRSA bacteremia postoperatively), hx of hepatitis C, hx of R. buttock abscess, hx of L. hallux osteo, adm w sepsis 2/2 foot wounds, course c/b hypoxic resp failure, fluid overload, s/p diuresis. Palliative consulted for GOC, pain management.                              COVID-19 Negative Lab Result:  · COVID-19 Negative Lab Result	COVID-19 ruled out     Problem/Plan - 1:  ·  Problem: Chronic pain.   ·  Plan: continues with complaints of leg pain and spasms    appears to be mostly neuropathic and spasmatic in nature.   Continue Methadone concentrate 60 mg po q 12 hr. Discussed with [facility] . who offer no objection to divided methadone dose. Continue to monitor for elongated QTc   Decrease DIlaudid to 2 mg IV q 4 hrs in anticipation of surgery  Continue Neurontin to 800 mg po q 8 hrs  Continue  Flexeril to 10 mg po TID.     Problem/Plan - 2:  ·  Problem: Sepsis.   ·  Plan: Recurrent foot infections, pt was  previously treated for L. hallux OM with L. heel culture growing Proteus mirabilis ESBL, completed 6 week course of avycaz  CT b/l LE  severely limited by contracture. Left lower extremity is only partially visualized with exclusion of the left foot. Skin induration and subcutaneous edema in the leg and ankle.  No soft tissue gas  continue course of  Vanc,/ meropenem, ID, podiatry and vascular following -   pending CT of Chest today for pending clearance  for L AKA.     Problem/Plan - 3:  ·  Problem: Bipolar disorder.   ·  Plan: continue quetiapine 100 mg BID and 400 mg qhs.     Problem/Plan - 4:  ·  Problem: Acute respiratory failure with hypoxia.   ·  Plan: requires  chronic O2   worsening resp status on 12/12, with evidence of volume overload, s/p diuresis  Currently stable on O2 N/C.  Pulm following.     Problem/Plan - 5:  ·  Problem: Functional quadriplegia.   ·  Plan: due to paraplegia. contracted and bedbound  SNF resident, requires full care with bathing, dressing and wound care.     Problem/Plan - 6:  ·  Problem: Palliative care encounter.   ·  Plan: ongoing supportive care to pt and mother. updated mother this AM  Mother voices desire to have pt moved to SNF in [place] so she is able to present.  Referred to SW for assist.   Assessment and Plan:   · Assessment	  53 year old male with PMHx of cervical epidural abscess (s/p decompressive laminectomy 3/2021 c/b b/l leg paralysis), contractures,  hx of pneumoperitoneum (s/p ex lap, total abdominal colectomy and end ileostomy (on 1/12/23) c/b evisceration and sepsis with MRSA bacteremia postoperatively), hx of hepatitis C, hx of R. buttock abscess, hx of L. hallux osteo, adm w sepsis 2/2 foot wounds, course c/b hypoxic resp failure, fluid overload, s/p diuresis. Palliative consulted for GOC, pain management.                              COVID-19 Negative Lab Result:  · COVID-19 Negative Lab Result	COVID-19 ruled out     Problem/Plan - 1:  ·  Problem: Chronic pain.   ·  Plan: continues with complaints of leg pain and spasms    appears to be mostly neuropathic and spasmatic in nature.   Continue Methadone concentrate 60 mg po q 12 hr. Discussed with [facility] clinic . who offer no objection to divided methadone dose. Continue to monitor for elongated QTc   Decrease DIlaudid to 2 mg IV q 4 hrs in anticipation of surgery  Continue Neurontin to 800 mg po q 8 hrs  Continue  Flexeril to 10 mg po TID.     Problem/Plan - 2:  ·  Problem: Sepsis.   ·  Plan: Recurrent foot infections, pt was  previously treated for L. hallux OM with L. heel culture growing Proteus mirabilis ESBL, completed 6 week course of avycaz  CT b/l LE  severely limited by contracture. Left lower extremity is only partially visualized with exclusion of the left foot. Skin induration and subcutaneous edema in the leg and ankle.  No soft tissue gas  continue course of  Vanc,/ meropenem, ID, podiatry and vascular following -   pending CT of Chest today for pending clearance  for L AKA.     Problem/Plan - 3:  ·  Problem: Bipolar disorder.   ·  Plan: continue quetiapine 100 mg BID and 400 mg qhs.     Problem/Plan - 4:  ·  Problem: Acute respiratory failure with hypoxia.   ·  Plan: requires  chronic O2   worsening resp status on 12/12, with evidence of volume overload, s/p diuresis  Currently stable on O2 N/C.  Pulm following.     Problem/Plan - 5:  ·  Problem: Functional quadriplegia.   ·  Plan: due to paraplegia. contracted and bedbound  SNF resident, requires full care with bathing, dressing and wound care.     Problem/Plan - 6:  ·  Problem: Palliative care encounter.   ·  Plan: ongoing supportive care to pt and mother. updated mother this AM  Mother voices desire to have pt moved to SNF in [place] so she is able to present.  Referred to SW for assist.   53 year old male with PMHx of cervical epidural abscess (s/p decompressive laminectomy 3/2021 c/b b/l leg paralysis), contractures,  hx of pneumoperitoneum (s/p ex lap, total abdominal colectomy and end ileostomy (on 1/12/23) c/b evisceration and sepsis with MRSA bacteremia postoperatively), hx of hepatitis C, hx of R. buttock abscess, hx of L. hallux osteo, adm w sepsis 2/2 foot wounds, course c/b hypoxic resp failure, fluid overload, s/p diuresis. Palliative consulted for GOC, pain management.

## 2023-12-23 LAB
FERRITIN SERPL-MCNC: 81 NG/ML — SIGNIFICANT CHANGE UP (ref 30–400)
FERRITIN SERPL-MCNC: 81 NG/ML — SIGNIFICANT CHANGE UP (ref 30–400)
FOLATE SERPL-MCNC: 15.2 NG/ML — SIGNIFICANT CHANGE UP
FOLATE SERPL-MCNC: 15.2 NG/ML — SIGNIFICANT CHANGE UP
IRON SATN MFR SERPL: 14 % — LOW (ref 16–55)
IRON SATN MFR SERPL: 14 % — LOW (ref 16–55)
IRON SATN MFR SERPL: 41 UG/DL — LOW (ref 45–165)
IRON SATN MFR SERPL: 41 UG/DL — LOW (ref 45–165)
TIBC SERPL-MCNC: 292 UG/DL — SIGNIFICANT CHANGE UP (ref 220–430)
TIBC SERPL-MCNC: 292 UG/DL — SIGNIFICANT CHANGE UP (ref 220–430)
UIBC SERPL-MCNC: 250 UG/DL — SIGNIFICANT CHANGE UP (ref 110–370)
UIBC SERPL-MCNC: 250 UG/DL — SIGNIFICANT CHANGE UP (ref 110–370)
VANCOMYCIN TROUGH SERPL-MCNC: 16.6 UG/ML — SIGNIFICANT CHANGE UP (ref 10–20)
VANCOMYCIN TROUGH SERPL-MCNC: 16.6 UG/ML — SIGNIFICANT CHANGE UP (ref 10–20)
VIT B12 SERPL-MCNC: 729 PG/ML — SIGNIFICANT CHANGE UP (ref 232–1245)
VIT B12 SERPL-MCNC: 729 PG/ML — SIGNIFICANT CHANGE UP (ref 232–1245)

## 2023-12-23 PROCEDURE — 99232 SBSQ HOSP IP/OBS MODERATE 35: CPT

## 2023-12-23 RX ORDER — PREGABALIN 225 MG/1
1000 CAPSULE ORAL DAILY
Refills: 0 | Status: DISCONTINUED | OUTPATIENT
Start: 2023-12-23 | End: 2024-01-03

## 2023-12-23 RX ORDER — FERROUS SULFATE 325(65) MG
325 TABLET ORAL
Refills: 0 | Status: DISCONTINUED | OUTPATIENT
Start: 2023-12-23 | End: 2024-01-03

## 2023-12-23 RX ORDER — FOLIC ACID 0.8 MG
1 TABLET ORAL DAILY
Refills: 0 | Status: DISCONTINUED | OUTPATIENT
Start: 2023-12-23 | End: 2024-01-03

## 2023-12-23 RX ADMIN — Medication 5 MILLIGRAM(S): at 08:57

## 2023-12-23 RX ADMIN — AMLODIPINE BESYLATE 2.5 MILLIGRAM(S): 2.5 TABLET ORAL at 06:05

## 2023-12-23 RX ADMIN — CYCLOBENZAPRINE HYDROCHLORIDE 10 MILLIGRAM(S): 10 TABLET, FILM COATED ORAL at 15:25

## 2023-12-23 RX ADMIN — METHADONE HYDROCHLORIDE 60 MILLIGRAM(S): 40 TABLET ORAL at 06:28

## 2023-12-23 RX ADMIN — GABAPENTIN 800 MILLIGRAM(S): 400 CAPSULE ORAL at 15:21

## 2023-12-23 RX ADMIN — QUETIAPINE FUMARATE 100 MILLIGRAM(S): 200 TABLET, FILM COATED ORAL at 09:53

## 2023-12-23 RX ADMIN — TAMSULOSIN HYDROCHLORIDE 0.4 MILLIGRAM(S): 0.4 CAPSULE ORAL at 23:14

## 2023-12-23 RX ADMIN — NYSTATIN CREAM 1 APPLICATION(S): 100000 CREAM TOPICAL at 06:15

## 2023-12-23 RX ADMIN — Medication 250 MILLIGRAM(S): at 06:06

## 2023-12-23 RX ADMIN — CYCLOBENZAPRINE HYDROCHLORIDE 10 MILLIGRAM(S): 10 TABLET, FILM COATED ORAL at 23:13

## 2023-12-23 RX ADMIN — METHADONE HYDROCHLORIDE 60 MILLIGRAM(S): 40 TABLET ORAL at 18:19

## 2023-12-23 RX ADMIN — HYDROMORPHONE HYDROCHLORIDE 2 MILLIGRAM(S): 2 INJECTION INTRAMUSCULAR; INTRAVENOUS; SUBCUTANEOUS at 09:49

## 2023-12-23 RX ADMIN — PANTOPRAZOLE SODIUM 40 MILLIGRAM(S): 20 TABLET, DELAYED RELEASE ORAL at 06:05

## 2023-12-23 RX ADMIN — CHLORHEXIDINE GLUCONATE 1 APPLICATION(S): 213 SOLUTION TOPICAL at 06:43

## 2023-12-23 RX ADMIN — Medication 1 TABLET(S): at 18:20

## 2023-12-23 RX ADMIN — BUDESONIDE AND FORMOTEROL FUMARATE DIHYDRATE 2 PUFF(S): 160; 4.5 AEROSOL RESPIRATORY (INHALATION) at 18:37

## 2023-12-23 RX ADMIN — Medication 1 APPLICATION(S): at 12:34

## 2023-12-23 RX ADMIN — Medication 250 MILLIGRAM(S): at 18:20

## 2023-12-23 RX ADMIN — HYDROMORPHONE HYDROCHLORIDE 2 MILLIGRAM(S): 2 INJECTION INTRAMUSCULAR; INTRAVENOUS; SUBCUTANEOUS at 15:46

## 2023-12-23 RX ADMIN — BUDESONIDE AND FORMOTEROL FUMARATE DIHYDRATE 2 PUFF(S): 160; 4.5 AEROSOL RESPIRATORY (INHALATION) at 06:33

## 2023-12-23 RX ADMIN — Medication 500 MILLIGRAM(S): at 12:27

## 2023-12-23 RX ADMIN — MEROPENEM 100 MILLIGRAM(S): 1 INJECTION INTRAVENOUS at 06:06

## 2023-12-23 RX ADMIN — Medication 600 MILLIGRAM(S): at 06:05

## 2023-12-23 RX ADMIN — GABAPENTIN 800 MILLIGRAM(S): 400 CAPSULE ORAL at 06:05

## 2023-12-23 RX ADMIN — Medication 3 MILLILITER(S): at 21:20

## 2023-12-23 RX ADMIN — Medication 650 MILLIGRAM(S): at 12:27

## 2023-12-23 RX ADMIN — FINASTERIDE 5 MILLIGRAM(S): 5 TABLET, FILM COATED ORAL at 12:27

## 2023-12-23 RX ADMIN — HYDROMORPHONE HYDROCHLORIDE 2 MILLIGRAM(S): 2 INJECTION INTRAMUSCULAR; INTRAVENOUS; SUBCUTANEOUS at 02:25

## 2023-12-23 RX ADMIN — Medication 1 TABLET(S): at 12:27

## 2023-12-23 RX ADMIN — Medication 3 MILLILITER(S): at 15:42

## 2023-12-23 RX ADMIN — MEROPENEM 100 MILLIGRAM(S): 1 INJECTION INTRAVENOUS at 23:14

## 2023-12-23 RX ADMIN — Medication 1 TABLET(S): at 08:57

## 2023-12-23 RX ADMIN — Medication 100 MILLIGRAM(S): at 12:26

## 2023-12-23 RX ADMIN — QUETIAPINE FUMARATE 100 MILLIGRAM(S): 200 TABLET, FILM COATED ORAL at 18:20

## 2023-12-23 RX ADMIN — NYSTATIN CREAM 1 APPLICATION(S): 100000 CREAM TOPICAL at 18:37

## 2023-12-23 RX ADMIN — Medication 600 MILLIGRAM(S): at 18:20

## 2023-12-23 RX ADMIN — ATORVASTATIN CALCIUM 40 MILLIGRAM(S): 80 TABLET, FILM COATED ORAL at 23:14

## 2023-12-23 RX ADMIN — HYDROMORPHONE HYDROCHLORIDE 2 MILLIGRAM(S): 2 INJECTION INTRAMUSCULAR; INTRAVENOUS; SUBCUTANEOUS at 23:55

## 2023-12-23 RX ADMIN — Medication 650 MILLIGRAM(S): at 18:10

## 2023-12-23 RX ADMIN — Medication 3 MILLILITER(S): at 05:29

## 2023-12-23 RX ADMIN — HYDROMORPHONE HYDROCHLORIDE 2 MILLIGRAM(S): 2 INJECTION INTRAMUSCULAR; INTRAVENOUS; SUBCUTANEOUS at 23:15

## 2023-12-23 RX ADMIN — QUETIAPINE FUMARATE 400 MILLIGRAM(S): 200 TABLET, FILM COATED ORAL at 23:14

## 2023-12-23 RX ADMIN — GABAPENTIN 800 MILLIGRAM(S): 400 CAPSULE ORAL at 23:13

## 2023-12-23 RX ADMIN — CYCLOBENZAPRINE HYDROCHLORIDE 10 MILLIGRAM(S): 10 TABLET, FILM COATED ORAL at 06:05

## 2023-12-23 RX ADMIN — Medication 5 MILLIGRAM(S): at 23:13

## 2023-12-23 RX ADMIN — MEROPENEM 100 MILLIGRAM(S): 1 INJECTION INTRAVENOUS at 15:20

## 2023-12-23 RX ADMIN — ZINC SULFATE TAB 220 MG (50 MG ZINC EQUIVALENT) 220 MILLIGRAM(S): 220 (50 ZN) TAB at 12:26

## 2023-12-23 NOTE — PROGRESS NOTE ADULT - NUTRITIONAL ASSESSMENT
This patient has been assessed with a concern for Malnutrition and has been determined to have a diagnosis/diagnoses of Moderate protein-calorie malnutrition.    This patient is being managed with:   Diet Regular-  1000mL Fluid Restriction ([id number])  Supplement Feeding Modality:  Oral  Ensure Plus High Protein Cans or Servings Per Day:  1       Frequency:  Two Times a day  Entered: Dec 20 2023 10:26AM   This patient has been assessed with a concern for Malnutrition and has been determined to have a diagnosis/diagnoses of Moderate protein-calorie malnutrition.    This patient is being managed with:   Diet Regular-  1000mL Fluid Restriction ([id number])  Supplement Feeding Modality:  Oral  Ensure Plus High Protein Cans or Servings Per Day:  1       Frequency:  Two Times a day  Entered: Dec 20 2023 10:26AM

## 2023-12-23 NOTE — PROGRESS NOTE ADULT - ASSESSMENT
Gonzalez Stewart is a 53 year old male with PMHx of cervical epidural abscess (s/p decompressive laminectomy 3/2021 c/b b/l leg paralysis), hx of pneumoperitoneum (s/p ex lap, total abdominal colectomy and end ileostomy (on 1/12/23) c/b evisceration and sepsis with MRSA bacteremia postoperatively), hx of hepatitis C, hx of R. buttock abscess, hx of L. foot osteomyelitis, neurogenic bladder (with indwelling Holcomb catheter, last exchanged two days ago), HTN, HLD, bipolar disorder, GERD, hx of opioid dependence, and constipation who presented to the ED on 12/4/23 from [facility] for feet infection and admitted for sepsis secondary to bilateral feet infection.      Sepsis secondary to b/l feet infection  Previously treated for L. hallux OM with L. heel culture growing Proteus mirabilis ESBL, completed 6-week course of avycaz  CT b/l LE with study is severely limited by contracture. Left lower extremity is only partially visualized with exclusion of the left foot. Skin induration and subcutaneous edema in the leg and ankle.  No soft tissue gas  S/p bedside escharectomy by podiatry  right foot wound cx- ESBL proteus - resistant to cefepime and corynebecaterium and alcalegenes  left foot wound cx- MRSA and Pseudomonas  and klebsiella   Blood cx- neg x 2  PICC line in place 12/11/23   Will continue meropenem and vancomycin until 1/8   ECHO EF 55-60%, normal systolic function, and mild mitral regurgitation   Most recent CXR with interstitial lung disease, however nothing acute.  Vascular following- pending scheduling /decision for AKA     COPD  chronic respiratory failure   Baseline patient is on 5L O2 NC at home   Had an episode of resp distress from fluid overload from IVF- Placed on lasix drip for some time with improvement  Continue PO lasix   ECHO as above   Pulm consulted    cont airway clearance w/ aerobika  - cont BD tx  - cont symbicort inh bid  - cont o2 to maintain sat 90-95%    CT chest : Emphysematous disease with likely concurrent interstitial fibrosis.   Suspected  chronic microaspiration         HTN  continue  amlodipine 2.5 mg daily     HLD  continue  atorvastatin 40 mg daily     Bipolar disorder, stable   : PTA quetiapine 100 mg BID and 400 mg qhs, valproic acid-- per psych  12/12/2023 valproic ahs been stopped by kendell      Hx of opioid dependence  continue methadone maintenance 60mg bid     Chronic pain continue with current pain regimen     GERD:   continue with PPI     Neurogenic bladder (with indwelling Holcomb catheter)   PTA finasteride 5 mg, tamsulosin 0.4 mg    Constipation:   continue with bowel regimen      functional quad-   B/L contracted   supportive care  , frequent repositioning      Rt hip pressure wound, seen by vascular ,   wound recs in place     Mod PCM  nutrition recommendations appreciated     Preventative Measures  lovenox SQ-dvt ppx  fall, aspiration precautions   HOBE     palliative following, patient is full code      Gonzalez Stewart is a 53 year old male with PMHx of cervical epidural abscess (s/p decompressive laminectomy 3/2021 c/b b/l leg paralysis), hx of pneumoperitoneum (s/p ex lap, total abdominal colectomy and end ileostomy (on 1/12/23) c/b evisceration and sepsis with MRSA bacteremia postoperatively), hx of hepatitis C, hx of R. buttock abscess, hx of L. foot osteomyelitis, neurogenic bladder (with indwelling Holcomb catheter, last exchanged two days ago), HTN, HLD, bipolar disorder, GERD, hx of opioid dependence, and constipation who presented to the ED on 12/4/23 from [facility] for feet infection and admitted for sepsis secondary to bilateral feet infection.      Sepsis secondary to b/l feet infection  Previously treated for L. hallux OM with L. heel culture growing Proteus mirabilis ESBL, completed 6-week course of avycaz  CT b/l LE with study is severely limited by contracture. Left lower extremity is only partially visualized with exclusion of the left foot. Skin induration and subcutaneous edema in the leg and ankle.  No soft tissue gas  S/p bedside escharectomy by podiatry  right foot wound cx- ESBL proteus - resistant to cefepime and corynebecaterium and alcalegenes  left foot wound cx- MRSA and Pseudomonas  and klebsiella   Blood cx- neg x 2  PICC line in place 12/11/23   Will continue meropenem and vancomycin until 1/8   ECHO EF 55-60%, normal systolic function, and mild mitral regurgitation   Most recent CXR with interstitial lung disease, however nothing acute.  Vascular following- pending scheduling /decision for AKA     COPD  chronic respiratory failure   Baseline patient is on 5L O2 NC at home   Had an episode of resp distress from fluid overload from IVF- Placed on lasix drip for some time with improvement  Continue PO lasix   ECHO as above   Pulm consulted    cont airway clearance w/ aerobika  - cont BD tx  - cont symbicort inh bid  - cont o2 to maintain sat 90-95%    CT chest : Emphysematous disease with likely concurrent interstitial fibrosis.   Suspected  chronic microaspiration         HTN  continue  amlodipine 2.5 mg daily     HLD  continue  atorvastatin 40 mg daily     Bipolar disorder, stable   : PTA quetiapine 100 mg BID and 400 mg qhs, valproic acid-- per psych  12/12/2023 valproic ahs been stopped by kendell      Hx of opioid dependence  continue methadone maintenance 60mg bid     Chronic pain continue with current pain regimen     GERD:   continue with PPI     Neurogenic bladder (with indwelling Holcomb catheter)   PTA finasteride 5 mg, tamsulosin 0.4 mg    Constipation:   continue with bowel regimen      functional quad-   B/L contracted   supportive care  , frequent repositioning      Rt hip pressure wound, seen by vascular ,   wound recs in place     Mod PCM  nutrition recommendations appreciated     Preventative Measures  lovenox SQ-dvt ppx  fall, aspiration precautions   HOBE     palliative following, patient is full code

## 2023-12-23 NOTE — PROGRESS NOTE ADULT - SUBJECTIVE AND OBJECTIVE BOX
Patient is a 53y old  Male who presents with a chief complaint of Sepsis secondary to b/l foot wounds (18 Dec 2023 15:08)      INTERVAL HPI/OVERNIGHT EVENTS:   Patient seen and examined bedside.   no overnight events     REVIEW OF SYSTEMS: remaining ROS negative     MEDICATIONS  (STANDING):  acetaminophen     Tablet .. 650 milliGRAM(s) Oral daily  albuterol/ipratropium for Nebulization 3 milliLiter(s) Nebulizer every 6 hours  amLODIPine   Tablet 2.5 milliGRAM(s) Oral daily  ascorbic acid 500 milliGRAM(s) Oral daily  atorvastatin 40 milliGRAM(s) Oral at bedtime  bacitracin   Ointment 1 Application(s) Topical daily  chlorhexidine 2% Cloths 1 Application(s) Topical <User Schedule>  collagenase Ointment 1 Application(s) Topical daily  cyclobenzaprine 10 milliGRAM(s) Oral three times a day  enoxaparin Injectable 40 milliGRAM(s) SubCutaneous every 24 hours  finasteride 5 milliGRAM(s) Oral daily  gabapentin 800 milliGRAM(s) Oral every 8 hours  guaiFENesin  milliGRAM(s) Oral every 12 hours  lactobacillus acidophilus 1 Tablet(s) Oral two times a day with meals  lidocaine   4% Patch 1 Patch Transdermal daily  meropenem  IVPB 1000 milliGRAM(s) IV Intermittent every 8 hours  methadone  Concentrate 60 milliGRAM(s) Oral two times a day  methylphenidate 5 milliGRAM(s) Oral <User Schedule>  multivitamin 1 Tablet(s) Oral daily  nystatin Powder 1 Application(s) Topical two times a day  pantoprazole    Tablet 40 milliGRAM(s) Oral before breakfast  polyethylene glycol 3350 17 Gram(s) Oral daily  QUEtiapine 100 milliGRAM(s) Oral <User Schedule>  QUEtiapine 400 milliGRAM(s) Oral at bedtime  senna 2 Tablet(s) Oral at bedtime  tamsulosin 0.4 milliGRAM(s) Oral at bedtime  thiamine 100 milliGRAM(s) Oral daily  vancomycin  IVPB 1000 milliGRAM(s) IV Intermittent every 12 hours  zinc sulfate 220 milliGRAM(s) Oral daily    MEDICATIONS  (PRN):  acetaminophen     Tablet .. 650 milliGRAM(s) Oral every 6 hours PRN Temp greater or equal to 38C (100.4F), Mild Pain (1 - 3)  albuterol    90 MICROgram(s) HFA Inhaler 2 Puff(s) Inhalation every 4 hours PRN Shortness of Breath and/or Wheezing  aluminum hydroxide/magnesium hydroxide/simethicone Suspension 30 milliLiter(s) Oral every 4 hours PRN Dyspepsia  HYDROmorphone  Injectable 2 milliGRAM(s) IV Push every 4 hours PRN Severe Pain (7 - 10)  melatonin 3 milliGRAM(s) Oral at bedtime PRN Insomnia  sodium chloride 0.9% lock flush 10 milliLiter(s) IV Push every 1 hour PRN Pre/post blood products, medications, blood draw, and to maintain line patency      Allergies    NSAIDs (Flushing; Other (Moderate))  Haldol (Anaphylaxis)  Zyprexa (Rash; Dystonic RXN; Hives)  Motrin (Anaphylaxis)  Thorazine (Other (Moderate))  Aleve (Unknown)  Stelazine (Unknown)  Risperdal (Short breath; Rash; Hives)    Intolerances        Vital Signs Last 24 Hrs  T(C): 36.7 (23 Dec 2023 11:24), Max: 36.9 (22 Dec 2023 17:24)  T(F): 98.1 (23 Dec 2023 11:24), Max: 98.5 (22 Dec 2023 17:24)  HR: 85 (23 Dec 2023 11:24) (80 - 93)  BP: 130/77 (23 Dec 2023 11:24) (128/82 - 133/85)  BP(mean): --  RR: 18 (23 Dec 2023 11:24) (18 - 18)  SpO2: 94% (23 Dec 2023 11:24) (93% - 95%)    Parameters below as of 23 Dec 2023 11:24  Patient On (Oxygen Delivery Method): nasal cannula                PHYSICAL EXAM:  GENERAL: NAD , thin and pale appearing,  no increased WOB, speaking in full sentences, not using accessory muscles.   HEAD:  Atraumatic, Normocephalic  EYES: EOMI, PERRLA, conjunctiva and sclera clear  ENMT: No tonsillar erythema, exudates, or enlargement; Moist mucous membranes   NECK: Supple, No JVD   CHEST/LUNG: CTAB;  No rales, rhonchi, wheezing, or rubs  HEART: Regular rate and rhythm; No murmurs, rubs, or gallops  ABDOMEN: Soft, Nontender, Nondistended; Bowel sounds present  EXTREMITIES: contracted b/l LE   SKIN: stage 3 right hip ulcer, right foot heel necrotic to bone , left foot dorsum had eschar   NERVOUS SYSTEM:  Alert & Oriented X3, Good concentration; functional quad. moving b/l upper extremities. able to eat independently     LABS:             no new labs         Urinalysis Basic - ( 19 Dec 2023 06:20 )    Color: x / Appearance: x / SG: x / pH: x  Gluc: 96 mg/dL / Ketone: x  / Bili: x / Urobili: x   Blood: x / Protein: x / Nitrite: x   Leuk Esterase: x / RBC: x / WBC x   Sq Epi: x / Non Sq Epi: x / Bacteria: x      CAPILLARY BLOOD GLUCOSE          RADIOLOGY & ADDITIONAL TESTS:      Consultant(s) Notes Reviewed [ x] Yes     Care Discussed with [x ] Consultants  [x ] Patient  [ ] Family  [ x] /   [x ] Other; RN

## 2023-12-24 PROCEDURE — 99232 SBSQ HOSP IP/OBS MODERATE 35: CPT

## 2023-12-24 RX ORDER — METHADONE HYDROCHLORIDE 40 MG/1
60 TABLET ORAL
Refills: 0 | Status: DISCONTINUED | OUTPATIENT
Start: 2023-12-24 | End: 2023-12-27

## 2023-12-24 RX ORDER — IRON SUCROSE 20 MG/ML
200 INJECTION, SOLUTION INTRAVENOUS ONCE
Refills: 0 | Status: COMPLETED | OUTPATIENT
Start: 2023-12-24 | End: 2023-12-24

## 2023-12-24 RX ORDER — METHADONE HYDROCHLORIDE 40 MG/1
60 TABLET ORAL ONCE
Refills: 0 | Status: DISCONTINUED | OUTPATIENT
Start: 2023-12-24 | End: 2023-12-24

## 2023-12-24 RX ADMIN — Medication 1 TABLET(S): at 18:23

## 2023-12-24 RX ADMIN — AMLODIPINE BESYLATE 2.5 MILLIGRAM(S): 2.5 TABLET ORAL at 05:28

## 2023-12-24 RX ADMIN — Medication 3 MILLILITER(S): at 21:50

## 2023-12-24 RX ADMIN — ATORVASTATIN CALCIUM 40 MILLIGRAM(S): 80 TABLET, FILM COATED ORAL at 21:35

## 2023-12-24 RX ADMIN — MEROPENEM 100 MILLIGRAM(S): 1 INJECTION INTRAVENOUS at 14:32

## 2023-12-24 RX ADMIN — Medication 600 MILLIGRAM(S): at 18:23

## 2023-12-24 RX ADMIN — PANTOPRAZOLE SODIUM 40 MILLIGRAM(S): 20 TABLET, DELAYED RELEASE ORAL at 05:30

## 2023-12-24 RX ADMIN — SENNA PLUS 2 TABLET(S): 8.6 TABLET ORAL at 21:36

## 2023-12-24 RX ADMIN — Medication 250 MILLIGRAM(S): at 18:23

## 2023-12-24 RX ADMIN — Medication 5 MILLIGRAM(S): at 08:49

## 2023-12-24 RX ADMIN — Medication 1 TABLET(S): at 08:43

## 2023-12-24 RX ADMIN — Medication 100 MILLIGRAM(S): at 12:29

## 2023-12-24 RX ADMIN — MEROPENEM 100 MILLIGRAM(S): 1 INJECTION INTRAVENOUS at 21:37

## 2023-12-24 RX ADMIN — Medication 5 MILLIGRAM(S): at 21:35

## 2023-12-24 RX ADMIN — GABAPENTIN 800 MILLIGRAM(S): 400 CAPSULE ORAL at 05:27

## 2023-12-24 RX ADMIN — IRON SUCROSE 110 MILLIGRAM(S): 20 INJECTION, SOLUTION INTRAVENOUS at 17:09

## 2023-12-24 RX ADMIN — FINASTERIDE 5 MILLIGRAM(S): 5 TABLET, FILM COATED ORAL at 12:29

## 2023-12-24 RX ADMIN — Medication 250 MILLIGRAM(S): at 06:45

## 2023-12-24 RX ADMIN — Medication 650 MILLIGRAM(S): at 12:29

## 2023-12-24 RX ADMIN — Medication 500 MILLIGRAM(S): at 12:30

## 2023-12-24 RX ADMIN — GABAPENTIN 800 MILLIGRAM(S): 400 CAPSULE ORAL at 21:34

## 2023-12-24 RX ADMIN — HYDROMORPHONE HYDROCHLORIDE 2 MILLIGRAM(S): 2 INJECTION INTRAMUSCULAR; INTRAVENOUS; SUBCUTANEOUS at 06:45

## 2023-12-24 RX ADMIN — PREGABALIN 1000 MICROGRAM(S): 225 CAPSULE ORAL at 12:29

## 2023-12-24 RX ADMIN — Medication 650 MILLIGRAM(S): at 13:14

## 2023-12-24 RX ADMIN — Medication 1 APPLICATION(S): at 12:40

## 2023-12-24 RX ADMIN — NYSTATIN CREAM 1 APPLICATION(S): 100000 CREAM TOPICAL at 05:48

## 2023-12-24 RX ADMIN — METHADONE HYDROCHLORIDE 60 MILLIGRAM(S): 40 TABLET ORAL at 18:36

## 2023-12-24 RX ADMIN — Medication 1 TABLET(S): at 12:29

## 2023-12-24 RX ADMIN — CYCLOBENZAPRINE HYDROCHLORIDE 10 MILLIGRAM(S): 10 TABLET, FILM COATED ORAL at 05:27

## 2023-12-24 RX ADMIN — QUETIAPINE FUMARATE 100 MILLIGRAM(S): 200 TABLET, FILM COATED ORAL at 12:31

## 2023-12-24 RX ADMIN — HYDROMORPHONE HYDROCHLORIDE 2 MILLIGRAM(S): 2 INJECTION INTRAMUSCULAR; INTRAVENOUS; SUBCUTANEOUS at 16:32

## 2023-12-24 RX ADMIN — Medication 3 MILLILITER(S): at 06:01

## 2023-12-24 RX ADMIN — GABAPENTIN 800 MILLIGRAM(S): 400 CAPSULE ORAL at 16:32

## 2023-12-24 RX ADMIN — NYSTATIN CREAM 1 APPLICATION(S): 100000 CREAM TOPICAL at 19:17

## 2023-12-24 RX ADMIN — CYCLOBENZAPRINE HYDROCHLORIDE 10 MILLIGRAM(S): 10 TABLET, FILM COATED ORAL at 14:32

## 2023-12-24 RX ADMIN — CYCLOBENZAPRINE HYDROCHLORIDE 10 MILLIGRAM(S): 10 TABLET, FILM COATED ORAL at 21:34

## 2023-12-24 RX ADMIN — ENOXAPARIN SODIUM 40 MILLIGRAM(S): 100 INJECTION SUBCUTANEOUS at 21:16

## 2023-12-24 RX ADMIN — QUETIAPINE FUMARATE 400 MILLIGRAM(S): 200 TABLET, FILM COATED ORAL at 21:36

## 2023-12-24 RX ADMIN — Medication 1 MILLIGRAM(S): at 12:28

## 2023-12-24 RX ADMIN — HYDROMORPHONE HYDROCHLORIDE 2 MILLIGRAM(S): 2 INJECTION INTRAMUSCULAR; INTRAVENOUS; SUBCUTANEOUS at 16:14

## 2023-12-24 RX ADMIN — Medication 600 MILLIGRAM(S): at 05:28

## 2023-12-24 RX ADMIN — QUETIAPINE FUMARATE 100 MILLIGRAM(S): 200 TABLET, FILM COATED ORAL at 18:23

## 2023-12-24 RX ADMIN — BUDESONIDE AND FORMOTEROL FUMARATE DIHYDRATE 2 PUFF(S): 160; 4.5 AEROSOL RESPIRATORY (INHALATION) at 05:48

## 2023-12-24 RX ADMIN — ZINC SULFATE TAB 220 MG (50 MG ZINC EQUIVALENT) 220 MILLIGRAM(S): 220 (50 ZN) TAB at 12:29

## 2023-12-24 RX ADMIN — MEROPENEM 100 MILLIGRAM(S): 1 INJECTION INTRAVENOUS at 05:28

## 2023-12-24 RX ADMIN — METHADONE HYDROCHLORIDE 60 MILLIGRAM(S): 40 TABLET ORAL at 08:43

## 2023-12-24 RX ADMIN — TAMSULOSIN HYDROCHLORIDE 0.4 MILLIGRAM(S): 0.4 CAPSULE ORAL at 21:35

## 2023-12-24 NOTE — PROGRESS NOTE ADULT - ASSESSMENT
Gonzalez Stewart is a 53 year old male with PMHx of cervical epidural abscess (s/p decompressive laminectomy 3/2021 c/b b/l leg paralysis), hx of pneumoperitoneum (s/p ex lap, total abdominal colectomy and end ileostomy (on 1/12/23) c/b evisceration and sepsis with MRSA bacteremia postoperatively), hx of hepatitis C, hx of R. buttock abscess, hx of L. foot osteomyelitis, neurogenic bladder (with indwelling Holcomb catheter, last exchanged two days ago), HTN, HLD, bipolar disorder, GERD, hx of opioid dependence, and constipation who presented to the ED on 12/4/23 from [facility] for feet infection and admitted for sepsis secondary to bilateral feet infection.      Sepsis secondary to b/l feet infection  Previously treated for L. hallux OM with L. heel culture growing Proteus mirabilis ESBL, completed 6-week course of avycaz  CT b/l LE with study is severely limited by contracture. Left lower extremity is only partially visualized with exclusion of the left foot. Skin induration and subcutaneous edema in the leg and ankle.  No soft tissue gas  S/p bedside escharectomy by podiatry  right foot wound cx- ESBL proteus - resistant to cefepime and corynebecaterium and alcalegenes  left foot wound cx- MRSA and Pseudomonas  and klebsiella   Blood cx- neg x 2  PICC line in place 12/11/23   Will continue meropenem and vancomycin until 1/8   ECHO EF 55-60%, normal systolic function, and mild mitral regurgitation   Most recent CXR with interstitial lung disease, however nothing acute.  Vascular following- pending scheduling /decision for AKA     COPD  chronic respiratory failure   Baseline patient is on 5L O2 NC at home   Had an episode of resp distress from fluid overload from IVF- Placed on lasix drip for some time with improvement  Continue PO lasix   ECHO as above   Pulm consulted    cont airway clearance w/ aerobika  - cont BD tx  - cont symbicort inh bid  - cont o2 to maintain sat 90-95%    CT chest : Emphysematous disease with likely concurrent interstitial fibrosis.   Suspected  chronic microaspiration         HTN  continue  amlodipine 2.5 mg daily     HLD  continue  atorvastatin 40 mg daily     Bipolar disorder, stable   : PTA quetiapine 100 mg BID and 400 mg qhs, valproic acid-- per psych  12/12/2023 valproic ahs been stopped by kendell      Hx of opioid dependence  continue methadone maintenance 60mg bid   --next EKG for QTc monitoring 12/27/23    Chronic pain continue with current pain regimen     GERD:   continue with PPI     Neurogenic bladder (with indwelling Holcomb catheter)   PTA finasteride 5 mg, tamsulosin 0.4 mg    Constipation:   continue with bowel regimen      functional quad-   B/L contracted   supportive care  , frequent repositioning      Rt hip pressure wound, seen by vascular ,   wound recs in place     Mod PCM  nutrition recommendations appreciated     Preventative Measures  lovenox SQ-dvt ppx  fall, aspiration precautions   HOBE     palliative following, patient is full code      Gonzalez Stewart is a 53 year old male with PMHx of cervical epidural abscess (s/p decompressive laminectomy 3/2021 c/b b/l leg paralysis), hx of pneumoperitoneum (s/p ex lap, total abdominal colectomy and end ileostomy (on 1/12/23) c/b evisceration and sepsis with MRSA bacteremia postoperatively), hx of hepatitis C, hx of R. buttock abscess, hx of L. foot osteomyelitis, neurogenic bladder (with indwelling Holcomb catheter, last exchanged two days ago), HTN, HLD, bipolar disorder, GERD, hx of opioid dependence, and constipation who presented to the ED on 12/4/23 from [facility] for feet infection and admitted for sepsis secondary to bilateral feet infection.      Sepsis secondary to b/l feet infection  Previously treated for L. hallux OM with L. heel culture growing Proteus mirabilis ESBL, completed 6-week course of avycaz  CT b/l LE with study is severely limited by contracture. Left lower extremity is only partially visualized with exclusion of the left foot. Skin induration and subcutaneous edema in the leg and ankle.  No soft tissue gas  S/p bedside escharectomy by podiatry  right foot wound cx- ESBL proteus - resistant to cefepime and corynebecaterium and alcalegenes  left foot wound cx- MRSA and Pseudomonas  and klebsiella   Blood cx- neg x 2  PICC line in place 12/11/23   Will continue meropenem and vancomycin until 1/8   ECHO EF 55-60%, normal systolic function, and mild mitral regurgitation   Most recent CXR with interstitial lung disease, however nothing acute.  Vascular following- pending scheduling /decision for AKA     COPD  chronic respiratory failure   Baseline patient is on 5L O2 NC at home   Had an episode of resp distress from fluid overload from IVF- Placed on lasix drip for some time with improvement  Continue PO lasix   ECHO as above   Pulm consulted    cont airway clearance w/ aerobika  - cont BD tx  - cont symbicort inh bid  - cont o2 to maintain sat 90-95%    CT chest : Emphysematous disease with likely concurrent interstitial fibrosis.   Suspected  chronic microaspiration         HTN  continue  amlodipine 2.5 mg daily     HLD  continue  atorvastatin 40 mg daily     Bipolar disorder, stable   : PTA quetiapine 100 mg BID and 400 mg qhs, valproic acid-- per psych  12/12/2023 valproic ahs been stopped by kendell      Hx of opioid dependence  continue methadone maintenance 60mg bid   --next EKG for QTc monitoring 12/27/23    Chronic pain continue with current pain regimen     GERD:   continue with PPI     Neurogenic bladder (with indwelling Holcomb catheter)   PTA finasteride 5 mg, tamsulosin 0.4 mg    Constipation:   continue with bowel regimen      functional quad-   B/L contracted   supportive care  , frequent repositioning      Rt hip pressure wound, seen by vascular ,   wound recs in place     Mod PCM  nutrition recommendations appreciated     Preventative Measures  lovenox SQ-dvt ppx  fall, aspiration precautions   HOBE     palliative following, patient is full code      Gonzalez Stewart is a 53 year old male with PMHx of cervical epidural abscess (s/p decompressive laminectomy 3/2021 c/b b/l leg paralysis), hx of pneumoperitoneum (s/p ex lap, total abdominal colectomy and end ileostomy (on 1/12/23) c/b evisceration and sepsis with MRSA bacteremia postoperatively), hx of hepatitis C, hx of R. buttock abscess, hx of L. foot osteomyelitis, neurogenic bladder (with indwelling Holcomb catheter, last exchanged two days ago), HTN, HLD, bipolar disorder, GERD, hx of opioid dependence, and constipation who presented to the ED on 12/4/23 from [facility] for feet infection and admitted for sepsis secondary to bilateral feet infection.      Sepsis secondary to b/l feet infection  Previously treated for L. hallux OM with L. heel culture growing Proteus mirabilis ESBL, completed 6-week course of avycaz  CT b/l LE with study is severely limited by contracture. Left lower extremity is only partially visualized with exclusion of the left foot. Skin induration and subcutaneous edema in the leg and ankle.  No soft tissue gas  S/p bedside escharectomy by podiatry  right foot wound cx- ESBL proteus - resistant to cefepime and corynebecaterium and alcalegenes  left foot wound cx- MRSA and Pseudomonas  and klebsiella   Blood cx- neg x 2  PICC line in place 12/11/23   Will continue meropenem and vancomycin until 1/8   ECHO EF 55-60%, normal systolic function, and mild mitral regurgitation   Most recent CXR with interstitial lung disease, however nothing acute.  Vascular following- pending scheduling /decision for AKA     COPD  chronic respiratory failure   Baseline patient is on 5L O2 NC at home   Had an episode of resp distress from fluid overload from IVF- Placed on lasix drip for some time with improvement  Continue PO lasix   ECHO as above   Pulm consulted    cont airway clearance w/ aerobika  - cont BD tx  - cont symbicort inh bid  - cont o2 to maintain sat 90-95%    CT chest : Emphysematous disease with likely concurrent interstitial fibrosis.   Suspected  chronic microaspiration         HTN  continue  amlodipine 2.5 mg daily     HLD  continue  atorvastatin 40 mg daily     Bipolar disorder, stable   : PTA quetiapine 100 mg BID and 400 mg qhs, valproic acid-- per psych  12/12/2023 valproic ahs been stopped by kendell      Hx of opioid dependence  continue methadone maintenance 60mg bid   --next EKG for QTc monitoring 12/27/23    Chronic pain continue with current pain regimen     GERD:   continue with PPI     Neurogenic bladder (with indwelling Holcomb catheter)   PTA finasteride 5 mg, tamsulosin 0.4 mg    Constipation:   continue with bowel regimen      functional quad-   B/L contracted   supportive care  , frequent repositioning      Rt hip pressure wound, seen by vascular ,   wound recs in place     Mod PCM  nutrition recommendations appreciated     Normocytic anemia , SARAH   no e/o bleeding or bruising   H/H stable   supplement     Preventative Measures  lovenox SQ-dvt ppx  fall, aspiration precautions   HOBE     palliative following, patient is full code      Gonzalez Stewart is a 53 year old male with PMHx of cervical epidural abscess (s/p decompressive laminectomy 3/2021 c/b b/l leg paralysis), hx of pneumoperitoneum (s/p ex lap, total abdominal colectomy and end ileostomy (on 1/12/23) c/b evisceration and sepsis with MRSA bacteremia postoperatively), hx of hepatitis C, hx of R. buttock abscess, hx of L. foot osteomyelitis, neurogenic bladder (with indwelling Holcomb catheter, last exchanged two days ago), HTN, HLD, bipolar disorder, GERD, hx of opioid dependence, and constipation who presented to the ED on 12/4/23 from [facility] for feet infection and admitted for sepsis secondary to bilateral feet infection.      Sepsis secondary to b/l feet infection  Previously treated for L. hallux OM with L. heel culture growing Proteus mirabilis ESBL, completed 6-week course of avycaz  CT b/l LE with study is severely limited by contracture. Left lower extremity is only partially visualized with exclusion of the left foot. Skin induration and subcutaneous edema in the leg and ankle.  No soft tissue gas  S/p bedside escharectomy by podiatry  right foot wound cx- ESBL proteus - resistant to cefepime and corynebecaterium and alcalegenes  left foot wound cx- MRSA and Pseudomonas  and klebsiella   Blood cx- neg x 2  PICC line in place 12/11/23   Will continue meropenem and vancomycin until 1/8   ECHO EF 55-60%, normal systolic function, and mild mitral regurgitation   Most recent CXR with interstitial lung disease, however nothing acute.  Vascular following- pending scheduling /decision for AKA     COPD  chronic respiratory failure   Baseline patient is on 5L O2 NC at home   Had an episode of resp distress from fluid overload from IVF- Placed on lasix drip for some time with improvement  Continue PO lasix   ECHO as above   Pulm consulted    cont airway clearance w/ aerobika  - cont BD tx  - cont symbicort inh bid  - cont o2 to maintain sat 90-95%    CT chest : Emphysematous disease with likely concurrent interstitial fibrosis.   Suspected  chronic microaspiration         HTN  continue  amlodipine 2.5 mg daily     HLD  continue  atorvastatin 40 mg daily     Bipolar disorder, stable   : PTA quetiapine 100 mg BID and 400 mg qhs, valproic acid-- per psych  12/12/2023 valproic ahs been stopped by kendell      Hx of opioid dependence  continue methadone maintenance 60mg bid   --next EKG for QTc monitoring 12/27/23    Chronic pain continue with current pain regimen     GERD:   continue with PPI     Neurogenic bladder (with indwelling Holcomb catheter)   PTA finasteride 5 mg, tamsulosin 0.4 mg    Constipation:   continue with bowel regimen      functional quad-   B/L contracted   supportive care  , frequent repositioning      Rt hip pressure wound, seen by vascular ,   wound recs in place     Mod PCM  nutrition recommendations appreciated     Normocytic anemia , SARAH   no e/o bleeding or bruising   H/H stable   supplement     Preventative Measures  lovenox SQ-dvt ppx  fall, aspiration precautions   HOBE     palliative following, patient is full code

## 2023-12-24 NOTE — PROGRESS NOTE ADULT - NUTRITIONAL ASSESSMENT
This patient has been assessed with a concern for Malnutrition and has been determined to have a diagnosis/diagnoses of Moderate protein-calorie malnutrition.    This patient is being managed with:   Diet Regular-  1000mL Fluid Restriction ([id number])  Supplement Feeding Modality:  Oral  Ensure Plus High Protein Cans or Servings Per Day:  1       Frequency:  Two Times a day  Entered: Dec 20 2023 10:26AM   This patient has been assessed with a concern for Malnutrition and has been determined to have a diagnosis/diagnoses of Moderate protein-calorie malnutrition.    This patient is being managed with:   Diet Regular-  1000mL Fluid Restriction ([id number])  Supplement Feeding Modality:  Oral  Ensure Plus High Protein Cans or Servings Per Day:  1       Frequency:  Two Times a day  Entered: Dec 20 2023 10:26AM

## 2023-12-24 NOTE — PROGRESS NOTE ADULT - SUBJECTIVE AND OBJECTIVE BOX
Patient is a 53y old  Male who presents with a chief complaint of Sepsis secondary to b/l foot wounds (18 Dec 2023 15:08)      INTERVAL HPI/OVERNIGHT EVENTS:   Patient seen and examined bedside.   no overnight events     REVIEW OF SYSTEMS: remaining ROS negative     MEDICATIONS  (STANDING):  acetaminophen     Tablet .. 650 milliGRAM(s) Oral daily  albuterol/ipratropium for Nebulization 3 milliLiter(s) Nebulizer every 6 hours  amLODIPine   Tablet 2.5 milliGRAM(s) Oral daily  ascorbic acid 500 milliGRAM(s) Oral daily  atorvastatin 40 milliGRAM(s) Oral at bedtime  bacitracin   Ointment 1 Application(s) Topical daily  chlorhexidine 2% Cloths 1 Application(s) Topical <User Schedule>  collagenase Ointment 1 Application(s) Topical daily  cyclobenzaprine 10 milliGRAM(s) Oral three times a day  enoxaparin Injectable 40 milliGRAM(s) SubCutaneous every 24 hours  finasteride 5 milliGRAM(s) Oral daily  gabapentin 800 milliGRAM(s) Oral every 8 hours  guaiFENesin  milliGRAM(s) Oral every 12 hours  lactobacillus acidophilus 1 Tablet(s) Oral two times a day with meals  lidocaine   4% Patch 1 Patch Transdermal daily  meropenem  IVPB 1000 milliGRAM(s) IV Intermittent every 8 hours  methadone  Concentrate 60 milliGRAM(s) Oral two times a day  methylphenidate 5 milliGRAM(s) Oral <User Schedule>  multivitamin 1 Tablet(s) Oral daily  nystatin Powder 1 Application(s) Topical two times a day  pantoprazole    Tablet 40 milliGRAM(s) Oral before breakfast  polyethylene glycol 3350 17 Gram(s) Oral daily  QUEtiapine 100 milliGRAM(s) Oral <User Schedule>  QUEtiapine 400 milliGRAM(s) Oral at bedtime  senna 2 Tablet(s) Oral at bedtime  tamsulosin 0.4 milliGRAM(s) Oral at bedtime  thiamine 100 milliGRAM(s) Oral daily  vancomycin  IVPB 1000 milliGRAM(s) IV Intermittent every 12 hours  zinc sulfate 220 milliGRAM(s) Oral daily    MEDICATIONS  (PRN):  acetaminophen     Tablet .. 650 milliGRAM(s) Oral every 6 hours PRN Temp greater or equal to 38C (100.4F), Mild Pain (1 - 3)  albuterol    90 MICROgram(s) HFA Inhaler 2 Puff(s) Inhalation every 4 hours PRN Shortness of Breath and/or Wheezing  aluminum hydroxide/magnesium hydroxide/simethicone Suspension 30 milliLiter(s) Oral every 4 hours PRN Dyspepsia  HYDROmorphone  Injectable 2 milliGRAM(s) IV Push every 4 hours PRN Severe Pain (7 - 10)  melatonin 3 milliGRAM(s) Oral at bedtime PRN Insomnia  sodium chloride 0.9% lock flush 10 milliLiter(s) IV Push every 1 hour PRN Pre/post blood products, medications, blood draw, and to maintain line patency      Allergies    NSAIDs (Flushing; Other (Moderate))  Haldol (Anaphylaxis)  Zyprexa (Rash; Dystonic RXN; Hives)  Motrin (Anaphylaxis)  Thorazine (Other (Moderate))  Aleve (Unknown)  Stelazine (Unknown)  Risperdal (Short breath; Rash; Hives)    Intolerances      Vital Signs Last 24 Hrs  T(C): 36.9 (24 Dec 2023 06:10), Max: 36.9 (24 Dec 2023 06:10)  T(F): 98.5 (24 Dec 2023 06:10), Max: 98.5 (24 Dec 2023 06:10)  HR: 94 (24 Dec 2023 06:12) (55 - 95)  BP: 123/74 (24 Dec 2023 06:10) (113/79 - 123/74)  BP(mean): --  RR: 18 (24 Dec 2023 06:10) (18 - 18)  SpO2: 94% (24 Dec 2023 06:12) (94% - 95%)    Parameters below as of 24 Dec 2023 06:12  Patient On (Oxygen Delivery Method): nasal cannula w/ humidification          PHYSICAL EXAM:  GENERAL: NAD , thin and pale appearing,  no increased WOB, speaking in full sentences, not using accessory muscles.   HEAD:  Atraumatic, Normocephalic  EYES: EOMI, PERRLA, conjunctiva and sclera clear  ENMT: No tonsillar erythema, exudates, or enlargement; Moist mucous membranes   NECK: Supple, No JVD   CHEST/LUNG: CTAB;  No rales, rhonchi, wheezing, or rubs  HEART: Regular rate and rhythm; No murmurs, rubs, or gallops  ABDOMEN: Soft, Nontender, Nondistended; Bowel sounds present  EXTREMITIES: contracted b/l LE   SKIN: stage 3 right hip ulcer, right foot heel necrotic to bone , left foot dorsum had eschar   NERVOUS SYSTEM:  Alert & Oriented X3, Good concentration; functional quad. moving b/l upper extremities. able to eat independently     LABS:             no new labs         Urinalysis Basic - ( 19 Dec 2023 06:20 )    Color: x / Appearance: x / SG: x / pH: x  Gluc: 96 mg/dL / Ketone: x  / Bili: x / Urobili: x   Blood: x / Protein: x / Nitrite: x   Leuk Esterase: x / RBC: x / WBC x   Sq Epi: x / Non Sq Epi: x / Bacteria: x      CAPILLARY BLOOD GLUCOSE          RADIOLOGY & ADDITIONAL TESTS:      Consultant(s) Notes Reviewed [ x] Yes     Care Discussed with [x ] Consultants  [x ] Patient  [ ] Family  [ x] /   [x ] Other; RN

## 2023-12-25 LAB
VANCOMYCIN TROUGH SERPL-MCNC: 19 UG/ML — SIGNIFICANT CHANGE UP (ref 10–20)
VANCOMYCIN TROUGH SERPL-MCNC: 19 UG/ML — SIGNIFICANT CHANGE UP (ref 10–20)

## 2023-12-25 PROCEDURE — 99232 SBSQ HOSP IP/OBS MODERATE 35: CPT

## 2023-12-25 RX ADMIN — Medication 1 MILLIGRAM(S): at 11:31

## 2023-12-25 RX ADMIN — Medication 3 MILLILITER(S): at 22:56

## 2023-12-25 RX ADMIN — QUETIAPINE FUMARATE 100 MILLIGRAM(S): 200 TABLET, FILM COATED ORAL at 09:22

## 2023-12-25 RX ADMIN — Medication 5 MILLIGRAM(S): at 21:35

## 2023-12-25 RX ADMIN — GABAPENTIN 800 MILLIGRAM(S): 400 CAPSULE ORAL at 05:28

## 2023-12-25 RX ADMIN — BUDESONIDE AND FORMOTEROL FUMARATE DIHYDRATE 2 PUFF(S): 160; 4.5 AEROSOL RESPIRATORY (INHALATION) at 18:05

## 2023-12-25 RX ADMIN — AMLODIPINE BESYLATE 2.5 MILLIGRAM(S): 2.5 TABLET ORAL at 05:24

## 2023-12-25 RX ADMIN — ATORVASTATIN CALCIUM 40 MILLIGRAM(S): 80 TABLET, FILM COATED ORAL at 21:38

## 2023-12-25 RX ADMIN — CYCLOBENZAPRINE HYDROCHLORIDE 10 MILLIGRAM(S): 10 TABLET, FILM COATED ORAL at 13:12

## 2023-12-25 RX ADMIN — Medication 500 MILLIGRAM(S): at 11:15

## 2023-12-25 RX ADMIN — POLYETHYLENE GLYCOL 3350 17 GRAM(S): 17 POWDER, FOR SOLUTION ORAL at 11:21

## 2023-12-25 RX ADMIN — Medication 1 TABLET(S): at 11:15

## 2023-12-25 RX ADMIN — QUETIAPINE FUMARATE 400 MILLIGRAM(S): 200 TABLET, FILM COATED ORAL at 21:36

## 2023-12-25 RX ADMIN — MEROPENEM 100 MILLIGRAM(S): 1 INJECTION INTRAVENOUS at 13:12

## 2023-12-25 RX ADMIN — PREGABALIN 1000 MICROGRAM(S): 225 CAPSULE ORAL at 11:15

## 2023-12-25 RX ADMIN — Medication 1 TABLET(S): at 18:04

## 2023-12-25 RX ADMIN — CHLORHEXIDINE GLUCONATE 1 APPLICATION(S): 213 SOLUTION TOPICAL at 05:24

## 2023-12-25 RX ADMIN — NYSTATIN CREAM 1 APPLICATION(S): 100000 CREAM TOPICAL at 18:07

## 2023-12-25 RX ADMIN — CYCLOBENZAPRINE HYDROCHLORIDE 10 MILLIGRAM(S): 10 TABLET, FILM COATED ORAL at 21:37

## 2023-12-25 RX ADMIN — HYDROMORPHONE HYDROCHLORIDE 2 MILLIGRAM(S): 2 INJECTION INTRAMUSCULAR; INTRAVENOUS; SUBCUTANEOUS at 07:00

## 2023-12-25 RX ADMIN — MEROPENEM 100 MILLIGRAM(S): 1 INJECTION INTRAVENOUS at 05:20

## 2023-12-25 RX ADMIN — HYDROMORPHONE HYDROCHLORIDE 2 MILLIGRAM(S): 2 INJECTION INTRAMUSCULAR; INTRAVENOUS; SUBCUTANEOUS at 11:27

## 2023-12-25 RX ADMIN — Medication 650 MILLIGRAM(S): at 11:16

## 2023-12-25 RX ADMIN — Medication 1 APPLICATION(S): at 11:16

## 2023-12-25 RX ADMIN — BUDESONIDE AND FORMOTEROL FUMARATE DIHYDRATE 2 PUFF(S): 160; 4.5 AEROSOL RESPIRATORY (INHALATION) at 05:57

## 2023-12-25 RX ADMIN — Medication 600 MILLIGRAM(S): at 05:30

## 2023-12-25 RX ADMIN — Medication 100 MILLIGRAM(S): at 11:15

## 2023-12-25 RX ADMIN — METHADONE HYDROCHLORIDE 60 MILLIGRAM(S): 40 TABLET ORAL at 18:04

## 2023-12-25 RX ADMIN — Medication 325 MILLIGRAM(S): at 11:15

## 2023-12-25 RX ADMIN — NYSTATIN CREAM 1 APPLICATION(S): 100000 CREAM TOPICAL at 06:39

## 2023-12-25 RX ADMIN — Medication 250 MILLIGRAM(S): at 18:05

## 2023-12-25 RX ADMIN — Medication 3 MILLILITER(S): at 06:09

## 2023-12-25 RX ADMIN — GABAPENTIN 800 MILLIGRAM(S): 400 CAPSULE ORAL at 21:35

## 2023-12-25 RX ADMIN — TAMSULOSIN HYDROCHLORIDE 0.4 MILLIGRAM(S): 0.4 CAPSULE ORAL at 21:38

## 2023-12-25 RX ADMIN — ENOXAPARIN SODIUM 40 MILLIGRAM(S): 100 INJECTION SUBCUTANEOUS at 21:54

## 2023-12-25 RX ADMIN — Medication 650 MILLIGRAM(S): at 12:16

## 2023-12-25 RX ADMIN — HYDROMORPHONE HYDROCHLORIDE 2 MILLIGRAM(S): 2 INJECTION INTRAMUSCULAR; INTRAVENOUS; SUBCUTANEOUS at 06:42

## 2023-12-25 RX ADMIN — HYDROMORPHONE HYDROCHLORIDE 2 MILLIGRAM(S): 2 INJECTION INTRAMUSCULAR; INTRAVENOUS; SUBCUTANEOUS at 02:17

## 2023-12-25 RX ADMIN — HYDROMORPHONE HYDROCHLORIDE 2 MILLIGRAM(S): 2 INJECTION INTRAMUSCULAR; INTRAVENOUS; SUBCUTANEOUS at 12:00

## 2023-12-25 RX ADMIN — ZINC SULFATE TAB 220 MG (50 MG ZINC EQUIVALENT) 220 MILLIGRAM(S): 220 (50 ZN) TAB at 11:15

## 2023-12-25 RX ADMIN — Medication 1 APPLICATION(S): at 11:29

## 2023-12-25 RX ADMIN — Medication 5 MILLIGRAM(S): at 09:23

## 2023-12-25 RX ADMIN — MEROPENEM 100 MILLIGRAM(S): 1 INJECTION INTRAVENOUS at 21:37

## 2023-12-25 RX ADMIN — QUETIAPINE FUMARATE 100 MILLIGRAM(S): 200 TABLET, FILM COATED ORAL at 18:08

## 2023-12-25 RX ADMIN — CYCLOBENZAPRINE HYDROCHLORIDE 10 MILLIGRAM(S): 10 TABLET, FILM COATED ORAL at 05:46

## 2023-12-25 RX ADMIN — Medication 1 TABLET(S): at 07:45

## 2023-12-25 RX ADMIN — LIDOCAINE 1 PATCH: 4 CREAM TOPICAL at 11:21

## 2023-12-25 RX ADMIN — Medication 250 MILLIGRAM(S): at 05:23

## 2023-12-25 RX ADMIN — PANTOPRAZOLE SODIUM 40 MILLIGRAM(S): 20 TABLET, DELAYED RELEASE ORAL at 06:43

## 2023-12-25 RX ADMIN — GABAPENTIN 800 MILLIGRAM(S): 400 CAPSULE ORAL at 13:11

## 2023-12-25 RX ADMIN — HYDROMORPHONE HYDROCHLORIDE 2 MILLIGRAM(S): 2 INJECTION INTRAMUSCULAR; INTRAVENOUS; SUBCUTANEOUS at 01:47

## 2023-12-25 RX ADMIN — HYDROMORPHONE HYDROCHLORIDE 2 MILLIGRAM(S): 2 INJECTION INTRAMUSCULAR; INTRAVENOUS; SUBCUTANEOUS at 23:55

## 2023-12-25 RX ADMIN — Medication 3 MILLILITER(S): at 14:54

## 2023-12-25 RX ADMIN — FINASTERIDE 5 MILLIGRAM(S): 5 TABLET, FILM COATED ORAL at 11:15

## 2023-12-25 RX ADMIN — SENNA PLUS 2 TABLET(S): 8.6 TABLET ORAL at 21:38

## 2023-12-25 RX ADMIN — METHADONE HYDROCHLORIDE 60 MILLIGRAM(S): 40 TABLET ORAL at 05:29

## 2023-12-25 RX ADMIN — Medication 600 MILLIGRAM(S): at 18:04

## 2023-12-25 NOTE — PROGRESS NOTE ADULT - ASSESSMENT
Gonzalez Stewart is a 53 year old male with PMHx of cervical epidural abscess (s/p decompressive laminectomy 3/2021 c/b b/l leg paralysis), hx of pneumoperitoneum (s/p ex lap, total abdominal colectomy and end ileostomy (on 1/12/23) c/b evisceration and sepsis with MRSA bacteremia postoperatively), hx of hepatitis C, hx of R. buttock abscess, hx of L. foot osteomyelitis, neurogenic bladder (with indwelling Holcomb catheter, last exchanged two days ago), HTN, HLD, bipolar disorder, GERD, hx of opioid dependence, and constipation who presented to the ED on 12/4/23 from [facility] for feet infection and admitted for sepsis secondary to bilateral feet infection.      Sepsis secondary to b/l feet infection  Previously treated for L. hallux OM with L. heel culture growing Proteus mirabilis ESBL, completed 6-week course of avycaz  CT b/l LE with study is severely limited by contracture. Left lower extremity is only partially visualized with exclusion of the left foot. Skin induration and subcutaneous edema in the leg and ankle.  No soft tissue gas  S/p bedside escharectomy by podiatry  right foot wound cx- ESBL proteus - resistant to cefepime and corynebecaterium and alcalegenes  left foot wound cx- MRSA and Pseudomonas  and klebsiella   Blood cx- neg x 2  PICC line in place 12/11/23   Will continue meropenem and vancomycin until 1/8   ECHO EF 55-60%, normal systolic function, and mild mitral regurgitation   Most recent CXR with interstitial lung disease, however nothing acute.  Vascular following- pending scheduling /decision for AKA     COPD  chronic respiratory failure   Baseline patient is on 5L O2 NC at home   Had an episode of resp distress from fluid overload from IVF- Placed on lasix drip for some time with improvement  Continue PO lasix   ECHO as above   Pulm consulted    cont airway clearance w/ aerobika  - cont BD tx  - cont symbicort inh bid  - cont o2 to maintain sat 90-95%    CT chest : Emphysematous disease with likely concurrent interstitial fibrosis.   Suspected  chronic microaspiration         HTN  continue  amlodipine 2.5 mg daily     HLD  continue  atorvastatin 40 mg daily     Bipolar disorder, stable   : PTA quetiapine 100 mg BID and 400 mg qhs, valproic acid-- per psych  12/12/2023 valproic qhs been stopped by kendell      Hx of opioid dependence  continue methadone maintenance 60mg bid   --next EKG for QTc monitoring 12/27/23    Chronic pain continue with current pain regimen     GERD:   continue with PPI     Neurogenic bladder (with indwelling Holcomb catheter)   PTA finasteride 5 mg, tamsulosin 0.4 mg    Constipation:   continue with bowel regimen      functional quad-   B/L contracted   supportive care  , frequent repositioning      Rt hip pressure wound, seen by vascular ,   wound recs in place     Mod PCM  nutrition recommendations appreciated     Normocytic anemia , SARAH   no e/o bleeding or bruising   H/H stable   supplement     Preventative Measures  lovenox SQ-dvt ppx  fall, aspiration precautions   HOBE     palliative following, patient is full code      Gonzalez Stewart is a 53 year old male with PMHx of cervical epidural abscess (s/p decompressive laminectomy 3/2021 c/b b/l leg paralysis), hx of pneumoperitoneum (s/p ex lap, total abdominal colectomy and end ileostomy (on 1/12/23) c/b evisceration and sepsis with MRSA bacteremia postoperatively), hx of hepatitis C, hx of R. buttock abscess, hx of L. foot osteomyelitis, neurogenic bladder (with indwelling Holcomb catheter, last exchanged two days ago), HTN, HLD, bipolar disorder, GERD, hx of opioid dependence, and constipation who presented to the ED on 12/4/23 from [facility] for feet infection and admitted for sepsis secondary to bilateral feet infection.      Sepsis secondary to b/l feet infection  Previously treated for L. hallux OM with L. heel culture growing Proteus mirabilis ESBL, completed 6-week course of avycaz  CT b/l LE with study is severely limited by contracture. Left lower extremity is only partially visualized with exclusion of the left foot. Skin induration and subcutaneous edema in the leg and ankle.  No soft tissue gas  S/p bedside escharectomy by podiatry  right foot wound cx- ESBL proteus - resistant to cefepime and corynebecaterium and alcalegenes  left foot wound cx- MRSA and Pseudomonas  and klebsiella   Blood cx- neg x 2  PICC line in place 12/11/23   Will continue meropenem and vancomycin until 1/8   ECHO EF 55-60%, normal systolic function, and mild mitral regurgitation   Most recent CXR with interstitial lung disease, however nothing acute.  Vascular following- pending scheduling /decision for AKA     COPD  chronic respiratory failure   Baseline patient is on 5L O2 NC at home   Had an episode of resp distress from fluid overload from IVF- Placed on lasix drip for some time with improvement  Continue PO lasix   ECHO as above   Pulm consulted    cont airway clearance w/ aerobika  - cont BD tx  - cont symbicort inh bid  - cont o2 to maintain sat 90-95%    CT chest : Emphysematous disease with likely concurrent interstitial fibrosis.   Suspected  chronic microaspiration         HTN  continue  amlodipine 2.5 mg daily     HLD  continue  atorvastatin 40 mg daily     Bipolar disorder, stable   : PTA quetiapine 100 mg BID and 400 mg qhs, valproic acid-- per psych  12/12/2023 valproic qhs been stopped by kendell      Hx of opioid dependence  continue methadone maintenance 60mg bid   --next EKG for QTc monitoring 12/27/23    Chronic pain continue with current pain regimen     GERD:   continue with PPI     Neurogenic bladder (with indwelling Holcomb catheter)   PTA finasteride 5 mg, tamsulosin 0.4 mg    Constipation:   continue with bowel regimen      functional quad-   B/L contracted   supportive care  , frequent repositioning      Rt hip pressure wound, seen by vascular ,   wound recs in place     Mod PCM  nutrition recommendations appreciated     Normocytic anemia , SARAH   no e/o bleeding or bruising   H/H stable   supplement     Preventative Measures  lovenox SQ-dvt ppx  fall, aspiration precautions   HOBE     palliative following, patient is full code

## 2023-12-25 NOTE — PROGRESS NOTE ADULT - NUTRITIONAL ASSESSMENT
This patient has been assessed with a concern for Malnutrition and has been determined to have a diagnosis/diagnoses of Moderate protein-calorie malnutrition.    This patient is being managed with:   Diet Regular-  1000mL Fluid Restriction ([id number])  Supplement Feeding Modality:  Oral  Ensure Plus High Protein Cans or Servings Per Day:  1       Frequency:  Two Times a day  Entered: Dec 20 2023 10:26AM   This patient has been assessed with a concern for Malnutrition and has been determined to have a diagnosis/diagnoses of Moderate protein-calorie malnutrition.    This patient is being managed with:   Diet Regular-  1000mL Fluid Restriction ([id number])  Supplement Feeding Modality:  Oral  Ensure Plus High Protein Cans or Servings Per Day:  1       Frequency:  Two Times a day  Entered: Dec 20 2023 10:26AM

## 2023-12-25 NOTE — PROGRESS NOTE ADULT - SUBJECTIVE AND OBJECTIVE BOX
Patient is a 53y old  Male who presents with a chief complaint of Sepsis secondary to b/l foot wounds (18 Dec 2023 15:08)      INTERVAL HPI/OVERNIGHT EVENTS:   Patient seen and examined bedside.   no overnight events     REVIEW OF SYSTEMS: remaining ROS negative     MEDICATIONS  (STANDING):  acetaminophen     Tablet .. 650 milliGRAM(s) Oral daily  albuterol/ipratropium for Nebulization 3 milliLiter(s) Nebulizer every 6 hours  amLODIPine   Tablet 2.5 milliGRAM(s) Oral daily  ascorbic acid 500 milliGRAM(s) Oral daily  atorvastatin 40 milliGRAM(s) Oral at bedtime  bacitracin   Ointment 1 Application(s) Topical daily  chlorhexidine 2% Cloths 1 Application(s) Topical <User Schedule>  collagenase Ointment 1 Application(s) Topical daily  cyclobenzaprine 10 milliGRAM(s) Oral three times a day  enoxaparin Injectable 40 milliGRAM(s) SubCutaneous every 24 hours  finasteride 5 milliGRAM(s) Oral daily  gabapentin 800 milliGRAM(s) Oral every 8 hours  guaiFENesin  milliGRAM(s) Oral every 12 hours  lactobacillus acidophilus 1 Tablet(s) Oral two times a day with meals  lidocaine   4% Patch 1 Patch Transdermal daily  meropenem  IVPB 1000 milliGRAM(s) IV Intermittent every 8 hours  methadone  Concentrate 60 milliGRAM(s) Oral two times a day  methylphenidate 5 milliGRAM(s) Oral <User Schedule>  multivitamin 1 Tablet(s) Oral daily  nystatin Powder 1 Application(s) Topical two times a day  pantoprazole    Tablet 40 milliGRAM(s) Oral before breakfast  polyethylene glycol 3350 17 Gram(s) Oral daily  QUEtiapine 100 milliGRAM(s) Oral <User Schedule>  QUEtiapine 400 milliGRAM(s) Oral at bedtime  senna 2 Tablet(s) Oral at bedtime  tamsulosin 0.4 milliGRAM(s) Oral at bedtime  thiamine 100 milliGRAM(s) Oral daily  vancomycin  IVPB 1000 milliGRAM(s) IV Intermittent every 12 hours  zinc sulfate 220 milliGRAM(s) Oral daily    MEDICATIONS  (PRN):  acetaminophen     Tablet .. 650 milliGRAM(s) Oral every 6 hours PRN Temp greater or equal to 38C (100.4F), Mild Pain (1 - 3)  albuterol    90 MICROgram(s) HFA Inhaler 2 Puff(s) Inhalation every 4 hours PRN Shortness of Breath and/or Wheezing  aluminum hydroxide/magnesium hydroxide/simethicone Suspension 30 milliLiter(s) Oral every 4 hours PRN Dyspepsia  HYDROmorphone  Injectable 2 milliGRAM(s) IV Push every 4 hours PRN Severe Pain (7 - 10)  melatonin 3 milliGRAM(s) Oral at bedtime PRN Insomnia  sodium chloride 0.9% lock flush 10 milliLiter(s) IV Push every 1 hour PRN Pre/post blood products, medications, blood draw, and to maintain line patency      Allergies    NSAIDs (Flushing; Other (Moderate))  Haldol (Anaphylaxis)  Zyprexa (Rash; Dystonic RXN; Hives)  Motrin (Anaphylaxis)  Thorazine (Other (Moderate))  Aleve (Unknown)  Stelazine (Unknown)  Risperdal (Short breath; Rash; Hives)    Intolerances      Vital Signs Last 24 Hrs  T(C): 36.8 (25 Dec 2023 05:27), Max: 36.9 (24 Dec 2023 23:22)  T(F): 98.3 (25 Dec 2023 05:27), Max: 98.5 (24 Dec 2023 23:22)  HR: 93 (25 Dec 2023 06:09) (84 - 95)  BP: 118/76 (25 Dec 2023 05:27) (112/78 - 118/91)  BP(mean): --  RR: 18 (25 Dec 2023 05:27) (17 - 18)  SpO2: 96% (25 Dec 2023 06:09) (91% - 97%)    Parameters below as of 25 Dec 2023 06:09  Patient On (Oxygen Delivery Method): nasal cannula              PHYSICAL EXAM:  GENERAL: NAD , thin and pale appearing,  no increased WOB, speaking in full sentences, not using accessory muscles.   HEAD:  Atraumatic, Normocephalic  EYES: EOMI, PERRLA, conjunctiva and sclera clear  ENMT: No tonsillar erythema, exudates, or enlargement; Moist mucous membranes   NECK: Supple, No JVD   CHEST/LUNG: CTAB;  No rales, rhonchi, wheezing, or rubs  HEART: Regular rate and rhythm; No murmurs, rubs, or gallops  ABDOMEN: Soft, Nontender, Nondistended; Bowel sounds present  EXTREMITIES: contracted b/l LE   SKIN: stage 3 right hip ulcer, right foot heel necrotic to bone , left foot dorsum had eschar   NERVOUS SYSTEM:  Alert & Oriented X3, Good concentration; functional quad. moving b/l upper extremities. able to eat independently     LABS:             no new labs         Urinalysis Basic - ( 19 Dec 2023 06:20 )    Color: x / Appearance: x / SG: x / pH: x  Gluc: 96 mg/dL / Ketone: x  / Bili: x / Urobili: x   Blood: x / Protein: x / Nitrite: x   Leuk Esterase: x / RBC: x / WBC x   Sq Epi: x / Non Sq Epi: x / Bacteria: x      CAPILLARY BLOOD GLUCOSE          RADIOLOGY & ADDITIONAL TESTS:      Consultant(s) Notes Reviewed [ x] Yes     Care Discussed with [x ] Consultants  [x ] Patient  [ ] Family  [ x] /   [x ] Other; RN

## 2023-12-26 DIAGNOSIS — E44.0 MODERATE PROTEIN-CALORIE MALNUTRITION: ICD-10-CM

## 2023-12-26 LAB
ALBUMIN SERPL ELPH-MCNC: 2 G/DL — LOW (ref 3.3–5)
ALBUMIN SERPL ELPH-MCNC: 2 G/DL — LOW (ref 3.3–5)
ALP SERPL-CCNC: 226 U/L — HIGH (ref 40–120)
ALP SERPL-CCNC: 226 U/L — HIGH (ref 40–120)
ALT FLD-CCNC: 47 U/L — SIGNIFICANT CHANGE UP (ref 12–78)
ALT FLD-CCNC: 47 U/L — SIGNIFICANT CHANGE UP (ref 12–78)
ANION GAP SERPL CALC-SCNC: 3 MMOL/L — LOW (ref 5–17)
ANION GAP SERPL CALC-SCNC: 3 MMOL/L — LOW (ref 5–17)
AST SERPL-CCNC: 39 U/L — HIGH (ref 15–37)
AST SERPL-CCNC: 39 U/L — HIGH (ref 15–37)
BILIRUB SERPL-MCNC: 0.6 MG/DL — SIGNIFICANT CHANGE UP (ref 0.2–1.2)
BILIRUB SERPL-MCNC: 0.6 MG/DL — SIGNIFICANT CHANGE UP (ref 0.2–1.2)
BUN SERPL-MCNC: 18 MG/DL — SIGNIFICANT CHANGE UP (ref 7–23)
BUN SERPL-MCNC: 18 MG/DL — SIGNIFICANT CHANGE UP (ref 7–23)
CALCIUM SERPL-MCNC: 8.8 MG/DL — SIGNIFICANT CHANGE UP (ref 8.5–10.1)
CALCIUM SERPL-MCNC: 8.8 MG/DL — SIGNIFICANT CHANGE UP (ref 8.5–10.1)
CHLORIDE SERPL-SCNC: 111 MMOL/L — HIGH (ref 96–108)
CHLORIDE SERPL-SCNC: 111 MMOL/L — HIGH (ref 96–108)
CO2 SERPL-SCNC: 27 MMOL/L — SIGNIFICANT CHANGE UP (ref 22–31)
CO2 SERPL-SCNC: 27 MMOL/L — SIGNIFICANT CHANGE UP (ref 22–31)
CREAT SERPL-MCNC: 0.62 MG/DL — SIGNIFICANT CHANGE UP (ref 0.5–1.3)
CREAT SERPL-MCNC: 0.62 MG/DL — SIGNIFICANT CHANGE UP (ref 0.5–1.3)
EGFR: 115 ML/MIN/1.73M2 — SIGNIFICANT CHANGE UP
EGFR: 115 ML/MIN/1.73M2 — SIGNIFICANT CHANGE UP
GLUCOSE SERPL-MCNC: 140 MG/DL — HIGH (ref 70–99)
GLUCOSE SERPL-MCNC: 140 MG/DL — HIGH (ref 70–99)
HCT VFR BLD CALC: 34.3 % — LOW (ref 39–50)
HCT VFR BLD CALC: 34.3 % — LOW (ref 39–50)
HGB BLD-MCNC: 10.3 G/DL — LOW (ref 13–17)
HGB BLD-MCNC: 10.3 G/DL — LOW (ref 13–17)
MCHC RBC-ENTMCNC: 27 PG — SIGNIFICANT CHANGE UP (ref 27–34)
MCHC RBC-ENTMCNC: 27 PG — SIGNIFICANT CHANGE UP (ref 27–34)
MCHC RBC-ENTMCNC: 30 G/DL — LOW (ref 32–36)
MCHC RBC-ENTMCNC: 30 G/DL — LOW (ref 32–36)
MCV RBC AUTO: 89.8 FL — SIGNIFICANT CHANGE UP (ref 80–100)
MCV RBC AUTO: 89.8 FL — SIGNIFICANT CHANGE UP (ref 80–100)
NRBC # BLD: 0 /100 WBCS — SIGNIFICANT CHANGE UP (ref 0–0)
NRBC # BLD: 0 /100 WBCS — SIGNIFICANT CHANGE UP (ref 0–0)
PLATELET # BLD AUTO: 169 K/UL — SIGNIFICANT CHANGE UP (ref 150–400)
PLATELET # BLD AUTO: 169 K/UL — SIGNIFICANT CHANGE UP (ref 150–400)
POTASSIUM SERPL-MCNC: 4 MMOL/L — SIGNIFICANT CHANGE UP (ref 3.5–5.3)
POTASSIUM SERPL-MCNC: 4 MMOL/L — SIGNIFICANT CHANGE UP (ref 3.5–5.3)
POTASSIUM SERPL-SCNC: 4 MMOL/L — SIGNIFICANT CHANGE UP (ref 3.5–5.3)
POTASSIUM SERPL-SCNC: 4 MMOL/L — SIGNIFICANT CHANGE UP (ref 3.5–5.3)
PROT SERPL-MCNC: 7.9 GM/DL — SIGNIFICANT CHANGE UP (ref 6–8.3)
PROT SERPL-MCNC: 7.9 GM/DL — SIGNIFICANT CHANGE UP (ref 6–8.3)
RBC # BLD: 3.82 M/UL — LOW (ref 4.2–5.8)
RBC # BLD: 3.82 M/UL — LOW (ref 4.2–5.8)
RBC # FLD: 16.6 % — HIGH (ref 10.3–14.5)
RBC # FLD: 16.6 % — HIGH (ref 10.3–14.5)
SODIUM SERPL-SCNC: 141 MMOL/L — SIGNIFICANT CHANGE UP (ref 135–145)
SODIUM SERPL-SCNC: 141 MMOL/L — SIGNIFICANT CHANGE UP (ref 135–145)
WBC # BLD: 8.32 K/UL — SIGNIFICANT CHANGE UP (ref 3.8–10.5)
WBC # BLD: 8.32 K/UL — SIGNIFICANT CHANGE UP (ref 3.8–10.5)
WBC # FLD AUTO: 8.32 K/UL — SIGNIFICANT CHANGE UP (ref 3.8–10.5)
WBC # FLD AUTO: 8.32 K/UL — SIGNIFICANT CHANGE UP (ref 3.8–10.5)

## 2023-12-26 PROCEDURE — 93010 ELECTROCARDIOGRAM REPORT: CPT

## 2023-12-26 PROCEDURE — 99232 SBSQ HOSP IP/OBS MODERATE 35: CPT

## 2023-12-26 PROCEDURE — 99233 SBSQ HOSP IP/OBS HIGH 50: CPT

## 2023-12-26 RX ADMIN — PANTOPRAZOLE SODIUM 40 MILLIGRAM(S): 20 TABLET, DELAYED RELEASE ORAL at 08:10

## 2023-12-26 RX ADMIN — ATORVASTATIN CALCIUM 40 MILLIGRAM(S): 80 TABLET, FILM COATED ORAL at 21:29

## 2023-12-26 RX ADMIN — Medication 1 TABLET(S): at 17:19

## 2023-12-26 RX ADMIN — HYDROMORPHONE HYDROCHLORIDE 2 MILLIGRAM(S): 2 INJECTION INTRAMUSCULAR; INTRAVENOUS; SUBCUTANEOUS at 12:03

## 2023-12-26 RX ADMIN — GABAPENTIN 800 MILLIGRAM(S): 400 CAPSULE ORAL at 21:29

## 2023-12-26 RX ADMIN — ZINC SULFATE TAB 220 MG (50 MG ZINC EQUIVALENT) 220 MILLIGRAM(S): 220 (50 ZN) TAB at 11:23

## 2023-12-26 RX ADMIN — HYDROMORPHONE HYDROCHLORIDE 2 MILLIGRAM(S): 2 INJECTION INTRAMUSCULAR; INTRAVENOUS; SUBCUTANEOUS at 22:49

## 2023-12-26 RX ADMIN — SENNA PLUS 2 TABLET(S): 8.6 TABLET ORAL at 21:29

## 2023-12-26 RX ADMIN — CYCLOBENZAPRINE HYDROCHLORIDE 10 MILLIGRAM(S): 10 TABLET, FILM COATED ORAL at 21:36

## 2023-12-26 RX ADMIN — CYCLOBENZAPRINE HYDROCHLORIDE 10 MILLIGRAM(S): 10 TABLET, FILM COATED ORAL at 14:34

## 2023-12-26 RX ADMIN — MEROPENEM 100 MILLIGRAM(S): 1 INJECTION INTRAVENOUS at 14:35

## 2023-12-26 RX ADMIN — TAMSULOSIN HYDROCHLORIDE 0.4 MILLIGRAM(S): 0.4 CAPSULE ORAL at 21:29

## 2023-12-26 RX ADMIN — FINASTERIDE 5 MILLIGRAM(S): 5 TABLET, FILM COATED ORAL at 11:24

## 2023-12-26 RX ADMIN — HYDROMORPHONE HYDROCHLORIDE 2 MILLIGRAM(S): 2 INJECTION INTRAMUSCULAR; INTRAVENOUS; SUBCUTANEOUS at 05:51

## 2023-12-26 RX ADMIN — BUDESONIDE AND FORMOTEROL FUMARATE DIHYDRATE 2 PUFF(S): 160; 4.5 AEROSOL RESPIRATORY (INHALATION) at 17:50

## 2023-12-26 RX ADMIN — QUETIAPINE FUMARATE 100 MILLIGRAM(S): 200 TABLET, FILM COATED ORAL at 11:23

## 2023-12-26 RX ADMIN — MEROPENEM 100 MILLIGRAM(S): 1 INJECTION INTRAVENOUS at 05:31

## 2023-12-26 RX ADMIN — AMLODIPINE BESYLATE 2.5 MILLIGRAM(S): 2.5 TABLET ORAL at 05:30

## 2023-12-26 RX ADMIN — Medication 3 MILLILITER(S): at 06:08

## 2023-12-26 RX ADMIN — Medication 650 MILLIGRAM(S): at 11:23

## 2023-12-26 RX ADMIN — Medication 3 MILLILITER(S): at 21:13

## 2023-12-26 RX ADMIN — NYSTATIN CREAM 1 APPLICATION(S): 100000 CREAM TOPICAL at 17:49

## 2023-12-26 RX ADMIN — Medication 100 MILLIGRAM(S): at 11:23

## 2023-12-26 RX ADMIN — Medication 1 MILLIGRAM(S): at 11:38

## 2023-12-26 RX ADMIN — MEROPENEM 100 MILLIGRAM(S): 1 INJECTION INTRAVENOUS at 21:30

## 2023-12-26 RX ADMIN — Medication 650 MILLIGRAM(S): at 12:23

## 2023-12-26 RX ADMIN — METHADONE HYDROCHLORIDE 60 MILLIGRAM(S): 40 TABLET ORAL at 17:20

## 2023-12-26 RX ADMIN — Medication 500 MILLIGRAM(S): at 11:23

## 2023-12-26 RX ADMIN — Medication 1 TABLET(S): at 08:08

## 2023-12-26 RX ADMIN — METHADONE HYDROCHLORIDE 60 MILLIGRAM(S): 40 TABLET ORAL at 05:32

## 2023-12-26 RX ADMIN — Medication 3 MILLILITER(S): at 13:40

## 2023-12-26 RX ADMIN — CYCLOBENZAPRINE HYDROCHLORIDE 10 MILLIGRAM(S): 10 TABLET, FILM COATED ORAL at 05:39

## 2023-12-26 RX ADMIN — Medication 250 MILLIGRAM(S): at 05:31

## 2023-12-26 RX ADMIN — CHLORHEXIDINE GLUCONATE 1 APPLICATION(S): 213 SOLUTION TOPICAL at 05:31

## 2023-12-26 RX ADMIN — HYDROMORPHONE HYDROCHLORIDE 2 MILLIGRAM(S): 2 INJECTION INTRAMUSCULAR; INTRAVENOUS; SUBCUTANEOUS at 23:49

## 2023-12-26 RX ADMIN — Medication 250 MILLIGRAM(S): at 17:22

## 2023-12-26 RX ADMIN — Medication 1 APPLICATION(S): at 11:25

## 2023-12-26 RX ADMIN — Medication 5 MILLIGRAM(S): at 08:25

## 2023-12-26 RX ADMIN — GABAPENTIN 800 MILLIGRAM(S): 400 CAPSULE ORAL at 14:35

## 2023-12-26 RX ADMIN — GABAPENTIN 800 MILLIGRAM(S): 400 CAPSULE ORAL at 05:27

## 2023-12-26 RX ADMIN — NYSTATIN CREAM 1 APPLICATION(S): 100000 CREAM TOPICAL at 05:32

## 2023-12-26 RX ADMIN — Medication 1 TABLET(S): at 11:24

## 2023-12-26 RX ADMIN — QUETIAPINE FUMARATE 100 MILLIGRAM(S): 200 TABLET, FILM COATED ORAL at 17:19

## 2023-12-26 RX ADMIN — HYDROMORPHONE HYDROCHLORIDE 2 MILLIGRAM(S): 2 INJECTION INTRAMUSCULAR; INTRAVENOUS; SUBCUTANEOUS at 18:25

## 2023-12-26 RX ADMIN — QUETIAPINE FUMARATE 400 MILLIGRAM(S): 200 TABLET, FILM COATED ORAL at 21:29

## 2023-12-26 RX ADMIN — Medication 600 MILLIGRAM(S): at 17:19

## 2023-12-26 RX ADMIN — HYDROMORPHONE HYDROCHLORIDE 2 MILLIGRAM(S): 2 INJECTION INTRAMUSCULAR; INTRAVENOUS; SUBCUTANEOUS at 17:48

## 2023-12-26 RX ADMIN — Medication 5 MILLIGRAM(S): at 21:29

## 2023-12-26 RX ADMIN — Medication 600 MILLIGRAM(S): at 05:31

## 2023-12-26 RX ADMIN — PREGABALIN 1000 MICROGRAM(S): 225 CAPSULE ORAL at 11:22

## 2023-12-26 RX ADMIN — HYDROMORPHONE HYDROCHLORIDE 2 MILLIGRAM(S): 2 INJECTION INTRAMUSCULAR; INTRAVENOUS; SUBCUTANEOUS at 12:33

## 2023-12-26 RX ADMIN — BUDESONIDE AND FORMOTEROL FUMARATE DIHYDRATE 2 PUFF(S): 160; 4.5 AEROSOL RESPIRATORY (INHALATION) at 05:45

## 2023-12-26 NOTE — PROGRESS NOTE ADULT - SUBJECTIVE AND OBJECTIVE BOX
Patient is a 53y old  Male who presents with a chief complaint of Sepsis secondary to b/l foot wounds (26 Dec 2023 13:11)      INTERVAL HPI/OVERNIGHT EVENTS:  Pt was seen and examined, no acute events.    MEDICATIONS  (STANDING):  acetaminophen     Tablet .. 650 milliGRAM(s) Oral daily  albuterol/ipratropium for Nebulization 3 milliLiter(s) Nebulizer every 8 hours  amLODIPine   Tablet 2.5 milliGRAM(s) Oral daily  ascorbic acid 500 milliGRAM(s) Oral daily  atorvastatin 40 milliGRAM(s) Oral at bedtime  bacitracin   Ointment 1 Application(s) Topical daily  budesonide 160 MICROgram(s)/formoterol 4.5 MICROgram(s) Inhaler 2 Puff(s) Inhalation two times a day  chlorhexidine 2% Cloths 1 Application(s) Topical <User Schedule>  collagenase Ointment 1 Application(s) Topical daily  cyanocobalamin 1000 MICROGram(s) Oral daily  cyclobenzaprine 10 milliGRAM(s) Oral three times a day  enoxaparin Injectable 40 milliGRAM(s) SubCutaneous every 24 hours  ferrous    sulfate 325 milliGRAM(s) Oral <User Schedule>  finasteride 5 milliGRAM(s) Oral daily  folic acid 1 milliGRAM(s) Oral daily  gabapentin 800 milliGRAM(s) Oral every 8 hours  guaiFENesin  milliGRAM(s) Oral every 12 hours  lactobacillus acidophilus 1 Tablet(s) Oral two times a day with meals  lidocaine   4% Patch 1 Patch Transdermal daily  meropenem  IVPB 1000 milliGRAM(s) IV Intermittent every 8 hours  methadone  Concentrate 60 milliGRAM(s) Oral two times a day  methylphenidate 5 milliGRAM(s) Oral <User Schedule>  multivitamin 1 Tablet(s) Oral daily  nystatin Powder 1 Application(s) Topical two times a day  pantoprazole    Tablet 40 milliGRAM(s) Oral before breakfast  polyethylene glycol 3350 17 Gram(s) Oral daily  QUEtiapine 400 milliGRAM(s) Oral at bedtime  QUEtiapine 100 milliGRAM(s) Oral <User Schedule>  senna 2 Tablet(s) Oral at bedtime  tamsulosin 0.4 milliGRAM(s) Oral at bedtime  thiamine 100 milliGRAM(s) Oral daily  vancomycin  IVPB 1000 milliGRAM(s) IV Intermittent every 12 hours  zinc sulfate 220 milliGRAM(s) Oral daily    MEDICATIONS  (PRN):  acetaminophen     Tablet .. 650 milliGRAM(s) Oral every 6 hours PRN Temp greater or equal to 38C (100.4F), Mild Pain (1 - 3)  aluminum hydroxide/magnesium hydroxide/simethicone Suspension 30 milliLiter(s) Oral every 4 hours PRN Dyspepsia  HYDROmorphone  Injectable 2 milliGRAM(s) IV Push every 4 hours PRN Severe Pain (7 - 10)  melatonin 3 milliGRAM(s) Oral at bedtime PRN Insomnia  sodium chloride 0.9% lock flush 10 milliLiter(s) IV Push every 1 hour PRN Pre/post blood products, medications, blood draw, and to maintain line patency      Allergies    NSAIDs (Flushing; Other (Moderate))  Haldol (Anaphylaxis)  Zyprexa (Rash; Dystonic RXN; Hives)  Motrin (Anaphylaxis)  Thorazine (Other (Moderate))  Aleve (Unknown)  Stelazine (Unknown)  Risperdal (Short breath; Rash; Hives)    Intolerances          Vital Signs Last 24 Hrs  T(C): 37.2 (26 Dec 2023 12:00), Max: 37.2 (26 Dec 2023 12:00)  T(F): 99 (26 Dec 2023 12:00), Max: 99 (26 Dec 2023 12:00)  HR: 94 (26 Dec 2023 13:47) (89 - 101)  BP: 138/88 (26 Dec 2023 12:00) (112/72 - 138/88)  BP(mean): --  RR: 18 (26 Dec 2023 12:00) (18 - 18)  SpO2: 94% (26 Dec 2023 13:47) (91% - 95%)    Parameters below as of 26 Dec 2023 13:47  Patient On (Oxygen Delivery Method): nasal cannula        PHYSICAL EXAM:  GENERAL: NAD  HEAD:  Atraumatic, Normocephalic  EYES: EOMI, PERRLA, conjunctiva and sclera clear  ENMT: No tonsillar erythema, exudates, or enlargement; Moist mucous membranes   NECK: Supple, No JVD   CHEST/LUNG: CTAB;  No rales, rhonchi, wheezing, or rubs  HEART: Regular rate and rhythm; No murmurs, rubs, or gallops  ABDOMEN: Soft, Nontender, Nondistended; Bowel sounds present  EXTREMITIES: contracted b/l LE   SKIN: stage 3 right hip ulcer, right foot heel necrotic to bone , left foot dorsum had eschar   NERVOUS SYSTEM:  Alert & Oriented X3, Good concentration; functional quad.         LABS:                        10.3   8.32  )-----------( 169      ( 26 Dec 2023 12:20 )             34.3     12-26    141  |  111<H>  |  18  ----------------------------<  140<H>  4.0   |  27  |  0.62    Ca    8.8      26 Dec 2023 12:20    TPro  7.9  /  Alb  2.0<L>  /  TBili  0.6  /  DBili  x   /  AST  39<H>  /  ALT  47  /  AlkPhos  226<H>  12-26      Urinalysis Basic - ( 26 Dec 2023 12:20 )    Color: x / Appearance: x / SG: x / pH: x  Gluc: 140 mg/dL / Ketone: x  / Bili: x / Urobili: x   Blood: x / Protein: x / Nitrite: x   Leuk Esterase: x / RBC: x / WBC x   Sq Epi: x / Non Sq Epi: x / Bacteria: x      CAPILLARY BLOOD GLUCOSE        RADIOLOGY & ADDITIONAL TESTS:    Imaging Personally Reviewed:  [ ] YES  [ ] NO    Consultant(s) Notes Reviewed:  [ ] YES  [ ] NO    Care Discussed with Consultants/Other Providers [ ] YES  [ ] NO

## 2023-12-26 NOTE — PROGRESS NOTE PEDS - PROBLEM SELECTOR PLAN 2
ecurrent foot infections, pt was  previously treated for L. hallux OM with L. heel culture growing Proteus mirabilis ESBL, completed 6 week course of avycaz  CT b/l LE  severely limited by contracture. Left lower extremity is only partially visualized with exclusion of the left foot. Skin induration and subcutaneous edema in the leg and ankle.  No soft tissue gas  continue course of  Vanc,/ meropenem, ID, podiatry and vascular following - Recurrent foot infections, pt was  previously treated for L. hallux OM with L. heel culture growing Proteus mirabilis ESBL, completed 6 week course of avycaz  CT b/l LE  severely limited by contracture. Left lower extremity is only partially visualized with exclusion of the left foot. Skin induration and subcutaneous edema in the leg and ankle.  No soft tissue gas  continue course of  Vanc,/ meropenem, ID, podiatry and vascular following -

## 2023-12-26 NOTE — PROGRESS NOTE ADULT - NS ATTEND AMEND GEN_ALL_CORE FT
I have seen and examined the patient. Both feet examined, the right Hip ulcer is  examined.  I discussed with patient at length and also with palliative care and the patient's mother.  the patient has improving wounds on both feet and right ankle. No cellulitis and No gangrene and adequate circulation.  The patient has Severe Bilateral LE hips and knee contractures and ankle a contractures and paraplegic. The wounds are much improved in the hospital with local care and ABX.  The amputation is discussed with all. Foot or BKA is not a option, BILT AKA seems Exccessive surgery for the medical issues and with high complication rates.  At this stage I do not think, Bilateral AKA are justified and indicated with improving , feet wounds and in evidence of NO gangrene and no arterial ischemia. The complications and non healing of AKA is high . Also due to Hip contractures, the dehiscence of AKA   Orthopedic consult for release of knee contractures can be considered, not sure if indicated and ??outcomes.   As per discussion with Palliative care [place] seems to be a good start and option. I have seen and examined the patient. Both feet examined, the right Hip ulcer is  examined.  I discussed with patient at length and also with palliative care and the patient's mother.  the patient has improving wounds on both feet and right ankle. No cellulitis and No gangrene and adequate circulation.  The patient has Severe Bilateral LE hips and knee contractures and ankle a contractures and paraplegic. The wounds are much improved in the hospital with local care and ABX.  The amputation is discussed with all. Foot or BKA is not a option, BILT AKA seems Exccessive surgery for the medical issues and with high complication rates.  At this stage I do not think, Bilateral AKA are justified and indicated with improving , feet wounds and in evidence of NO gangrene and no arterial ischemia. The complications and non healing of AKA is high . Also due to Hip contractures, the dehiscence of AKA   Orthopedic consult for release of knee contractures can be considered, not sure if indicated and ??outcomes.   As per discussion with Palliative care [place] seems to be a good start and option.

## 2023-12-26 NOTE — PROGRESS NOTE PEDS - PROBLEM SELECTOR PLAN 6
despite excellent appetite, BLE muscle wasting and contractures  maintained on Regular diet   1000mL Fluid Restriction   Ensure supplements BID   !2/6 Alb 1.6

## 2023-12-26 NOTE — PROGRESS NOTE PEDS - PROBLEM SELECTOR PLAN 8
Spoke with surgery Dr Epstein who does not feel pt is a good candidate for B/L AKA (please see his note for details)  agrees to focus on good wound care.   MD will discuss concerns with both pt and his mother   Palliative Meid[facility] to follow up after such discussion  ongoing support to pt and family Spoke with surgery Dr Epstein who does not feel pt is a good candidate for B/L AKA (please see his note for details)  agrees to focus on good wound care.   MD will discuss concerns with both pt and his mother   Palliative Medicine to follow up after such discussion  ongoing support to pt and family

## 2023-12-26 NOTE — PROGRESS NOTE ADULT - SUBJECTIVE AND OBJECTIVE BOX
Patient seen and examined at bedside, patient without complaints.       MEDICATIONS  (STANDING):  acetaminophen     Tablet .. 650 milliGRAM(s) Oral daily  albuterol/ipratropium for Nebulization 3 milliLiter(s) Nebulizer every 8 hours  amLODIPine   Tablet 2.5 milliGRAM(s) Oral daily  ascorbic acid 500 milliGRAM(s) Oral daily  atorvastatin 40 milliGRAM(s) Oral at bedtime  bacitracin   Ointment 1 Application(s) Topical daily  budesonide 160 MICROgram(s)/formoterol 4.5 MICROgram(s) Inhaler 2 Puff(s) Inhalation two times a day  chlorhexidine 2% Cloths 1 Application(s) Topical <User Schedule>  collagenase Ointment 1 Application(s) Topical daily  cyanocobalamin 1000 MICROGram(s) Oral daily  cyclobenzaprine 10 milliGRAM(s) Oral three times a day  enoxaparin Injectable 40 milliGRAM(s) SubCutaneous every 24 hours  ferrous    sulfate 325 milliGRAM(s) Oral <User Schedule>  finasteride 5 milliGRAM(s) Oral daily  folic acid 1 milliGRAM(s) Oral daily  gabapentin 800 milliGRAM(s) Oral every 8 hours  guaiFENesin  milliGRAM(s) Oral every 12 hours  lactobacillus acidophilus 1 Tablet(s) Oral two times a day with meals  lidocaine   4% Patch 1 Patch Transdermal daily  meropenem  IVPB 1000 milliGRAM(s) IV Intermittent every 8 hours  methadone  Concentrate 60 milliGRAM(s) Oral two times a day  methylphenidate 5 milliGRAM(s) Oral <User Schedule>  multivitamin 1 Tablet(s) Oral daily  nystatin Powder 1 Application(s) Topical two times a day  pantoprazole    Tablet 40 milliGRAM(s) Oral before breakfast  polyethylene glycol 3350 17 Gram(s) Oral daily  QUEtiapine 400 milliGRAM(s) Oral at bedtime  QUEtiapine 100 milliGRAM(s) Oral <User Schedule>  senna 2 Tablet(s) Oral at bedtime  tamsulosin 0.4 milliGRAM(s) Oral at bedtime  thiamine 100 milliGRAM(s) Oral daily  vancomycin  IVPB 1000 milliGRAM(s) IV Intermittent every 12 hours  zinc sulfate 220 milliGRAM(s) Oral daily    MEDICATIONS  (PRN):  acetaminophen     Tablet .. 650 milliGRAM(s) Oral every 6 hours PRN Temp greater or equal to 38C (100.4F), Mild Pain (1 - 3)  aluminum hydroxide/magnesium hydroxide/simethicone Suspension 30 milliLiter(s) Oral every 4 hours PRN Dyspepsia  HYDROmorphone  Injectable 2 milliGRAM(s) IV Push every 4 hours PRN Severe Pain (7 - 10)  melatonin 3 milliGRAM(s) Oral at bedtime PRN Insomnia  sodium chloride 0.9% lock flush 10 milliLiter(s) IV Push every 1 hour PRN Pre/post blood products, medications, blood draw, and to maintain line patency      Vital Signs Last 24 Hrs  T(C): 36.8 (26 Dec 2023 05:48), Max: 36.8 (26 Dec 2023 05:48)  T(F): 98.2 (26 Dec 2023 05:48), Max: 98.2 (26 Dec 2023 05:48)  HR: 89 (26 Dec 2023 05:48) (89 - 96)  BP: 116/70 (26 Dec 2023 05:48) (112/72 - 117/70)  RR: 18 (26 Dec 2023 05:48) (18 - 18)  SpO2: 93% (26 Dec 2023 05:48) (93% - 95%)    Parameters below as of 26 Dec 2023 05:48  Patient On (Oxygen Delivery Method): nasal cannula      PHYSICAL EXAM:  General: NAD  Neuro:  Alert & oriented x 3  HEENT: NCAT, EOMI, conjunctiva clear  CV: +S1+S2  Lung: Respirations nonlabored, good inspiratory effort  Abdomen: soft, NTND  Extremities: bilateral lower extremities contracted. Pitting edema, wounds on bilateral feet, with granulation tissue, wounds healing well, minimal eschar          LABS:                        10.3   8.32  )-----------( 169      ( 26 Dec 2023 12:20 )             34.3     12-26    141  |  111<H>  |  18  ----------------------------<  140<H>  4.0   |  27  |  0.62    Ca    8.8      26 Dec 2023 12:20    TPro  7.9  /  Alb  2.0<L>  /  TBili  0.6  /  DBili  x   /  AST  39<H>  /  ALT  47  /  AlkPhos  226<H>  12-26      Urinalysis Basic - ( 26 Dec 2023 12:20 )    Color: x / Appearance: x / SG: x / pH: x  Gluc: 140 mg/dL / Ketone: x  / Bili: x / Urobili: x   Blood: x / Protein: x / Nitrite: x   Leuk Esterase: x / RBC: x / WBC x   Sq Epi: x / Non Sq Epi: x / Bacteria: x

## 2023-12-26 NOTE — PROGRESS NOTE ADULT - ASSESSMENT
Patient is a 53y Male right hip wound and bilateral lower extremity foot wounds, management per podiatry. 2+peripheral pedal pulses. Wounds healing appropriately    - No AKA at this time, wounds healing appropriately  - Continue collagenase to bilateral foot wounds and right hip  - Continue current management per medicine and podiatry  - Discussed with ID  Discussed and seen with Dr. Epstein

## 2023-12-26 NOTE — PROGRESS NOTE PEDS - PROBLEM SELECTOR PLAN 1
continues with complaints of intermittent  leg pain and spasms   appears to be mostly neuropathic and spasmatic in nature.  reports pain 8/10 down to 5-6/10 with Dilaudid dosing    Continue Methadone concentrate 60 mg po q 12 hr.   QTc 486, MD following for weekly levels, next level to be drawn 12/27  Will evaluate for possible increase in Methadone once QTc is evaluated   Continue DIlaudid to 2 mg IV q 4 hrs in anticipation of surgery  Continue Neurontin to 800 mg po q 8 hrs  Continue  Flexeril to 10 mg po TID.

## 2023-12-26 NOTE — PROGRESS NOTE ADULT - ASSESSMENT
Gonzalez Stewart is a 53 year old male with PMHx of cervical epidural abscess (s/p decompressive laminectomy 3/2021 c/b b/l leg paralysis), hx of pneumoperitoneum (s/p ex lap, total abdominal colectomy and end ileostomy (on 1/12/23) c/b evisceration and sepsis with MRSA bacteremia postoperatively), hx of hepatitis C, hx of R. buttock abscess, hx of L. foot osteomyelitis, neurogenic bladder (with indwelling Holcomb catheter, last exchanged two days ago), HTN, HLD, bipolar disorder, GERD, hx of opioid dependence, and constipation who presented to the ED on 12/4/23 from [facility] for feet infection and admitted for sepsis secondary to bilateral feet infection.      Sepsis secondary to b/l feet infection  Previously treated for L. hallux OM with L. heel culture growing Proteus mirabilis ESBL, completed 6-week course of avycaz  CT b/l LE with study is severely limited by contracture. Left lower extremity is only partially visualized with exclusion of the left foot. Skin induration and subcutaneous edema in the leg and ankle.  No soft tissue gas  S/p bedside escharectomy by podiatry  right foot wound cx- ESBL proteus - resistant to cefepime and corynebecaterium and alcalegenes  left foot wound cx- MRSA and Pseudomonas  and klebsiella   Blood cx- neg x 2  PICC line in place 12/11/23   Will continue meropenem and vancomycin until 1/8   ECHO EF 55-60%, normal systolic function, and mild mitral regurgitation   Most recent CXR with interstitial lung disease, however nothing acute.  Discussed with vascular no plan fop    COPD  chronic hypoxic respiratory failure   Baseline patient is on 5L O2 NC at home   Had an episode of resp distress from fluid overload from IVF- Placed on lasix drip for some time with improvement  Continue PO lasix   ECHO as above   Pulm consulted    cont airway clearance w/ aerobika  - cont BD tx  - cont symbicort inh bid  - cont o2 to maintain sat 90-95%    CT chest : Emphysematous disease with likely concurrent interstitial fibrosis.   Suspected  chronic microaspiration         HTN  continue  amlodipine 2.5 mg daily     HLD  continue  atorvastatin 40 mg daily     Bipolar disorder, stable   : PTA quetiapine 100 mg BID and 400 mg qhs, valproic acid-- per psych  12/12/2023 valproic qhs been stopped by psych      Hx of opioid dependence  continue methadone maintenance 60mg bid   follow up EKG on 12/27    Chronic pain continue with current pain regimen     GERD:   continue with PPI     Neurogenic bladder (with indwelling Holcomb catheter)   PTA finasteride 5 mg, tamsulosin 0.4 mg    Constipation:   continue with bowel regimen      functional quad-   B/L contracted   supportive care  , frequent repositioning      Rt hip pressure wound, seen by vascular ,   wound recs in place     Mod PCM  nutrition recommendations appreciated     Normocytic anemia , SARAH   no e/o bleeding or bruising   H/H stable   supplement     Preventative Measures  lovenox SQ-dvt ppx  fall, aspiration precautions   HOBE     palliative following, patient is full code         Prepare to dc back to NH Gonzalez Stewart is a 53 year old male with PMHx of cervical epidural abscess (s/p decompressive laminectomy 3/2021 c/b b/l leg paralysis), hx of pneumoperitoneum (s/p ex lap, total abdominal colectomy and end ileostomy (on 1/12/23) c/b evisceration and sepsis with MRSA bacteremia postoperatively), hx of hepatitis C, hx of R. buttock abscess, hx of L. foot osteomyelitis, neurogenic bladder (with indwelling Holcomb catheter, last exchanged two days ago), HTN, HLD, bipolar disorder, GERD, hx of opioid dependence, and constipation who presented to the ED on 12/4/23 from [facility] for feet infection and admitted for sepsis secondary to bilateral feet infection.      Sepsis secondary to b/l feet infection  Previously treated for L. hallux OM with L. heel culture growing Proteus mirabilis ESBL, completed 6-week course of avycaz  CT b/l LE with study is severely limited by contracture. Left lower extremity is only partially visualized with exclusion of the left foot. Skin induration and subcutaneous edema in the leg and ankle.  No soft tissue gas  S/p bedside escharectomy by podiatry  right foot wound cx- ESBL proteus - resistant to cefepime and corynebecaterium and alcalegenes  left foot wound cx- MRSA and Pseudomonas  and klebsiella   Blood cx- neg x 2  PICC line in place 12/11/23   Will continue meropenem and vancomycin until 1/8   ECHO EF 55-60%, normal systolic function, and mild mitral regurgitation   Most recent CXR with interstitial lung disease, however nothing acute.  Discussed with vascular no plan fop    COPD  chronic hypoxic respiratory failure   Baseline patient is on 5L O2 NC at home   Had an episode of resp distress from fluid overload from IVF- Placed on lasix drip for some time with improvement  Continue PO lasix   ECHO as above   Pulm consulted    cont airway clearance w/ aerobika  - cont BD tx  - cont symbicort inh bid  - cont o2 to maintain sat 90-95%    CT chest : Emphysematous disease with likely concurrent interstitial fibrosis.   Suspected  chronic microaspiration         HTN  continue  amlodipine 2.5 mg daily     HLD  continue  atorvastatin 40 mg daily     Bipolar disorder, stable   : PTA quetiapine 100 mg BID and 400 mg qhs, valproic acid-- per psych  12/12/2023 valproic qhs been stopped by psych      Hx of opioid dependence  continue methadone maintenance 60mg bid   follow up EKG on 12/27    Chronic pain continue with current pain regimen     GERD:   continue with PPI     Neurogenic bladder (with indwelling Holcomb catheter)   PTA finasteride 5 mg, tamsulosin 0.4 mg    Constipation:   continue with bowel regimen      functional quad-   B/L contracted   supportive care  , frequent repositioning      Rt hip pressure wound, seen by vascular ,   wound recs in place     Mod PCM  nutrition recommendations appreciated     Normocytic anemia , SARAH   no e/o bleeding or bruising   H/H stable   supplement     Preventative Measures  lovenox SQ-dvt ppx  fall, aspiration precautions   HOBE     palliative following, patient is full code         Prepare to dc back to NH

## 2023-12-26 NOTE — PROGRESS NOTE PEDS - NS ATTEND AMEND GEN_ALL_CORE FT
Vascular eval noted, no current plan for amputation as wounds healing appropriately, continue local wound care, pain regimen, transition to po meds as tolerated, consider increase in methadone dose if qtc allows to optimize pain control. Palliative will follow. DC planning per primary team.

## 2023-12-26 NOTE — PROGRESS NOTE PEDS - ASSESSMENT
53 year old male with PMHx of cervical epidural abscess (s/p decompressive laminectomy 3/2021 c/b b/l leg paralysis), contractures,  hx of pneumoperitoneum (s/p ex lap, total abdominal colectomy and end ileostomy (on 1/12/23) c/b evisceration and sepsis with MRSA bacteremia postoperatively), hx of hepatitis C, hx of R. buttock abscess, hx of L. hallux osteo, adm w sepsis 2/2 foot wounds, course c/b hypoxic resp failure, fluid overload, s/p diuresis. Palliative consulted for GOC, pain management.                                      Problem/Plan - 1:  ·  Problem: Chronic pain.   ·  Plan: continues with complaints of leg pain and spasms   appears to be mostly neuropathic and spasmatic in nature.   pt states " I believe I am building up tolerance"  Observed RN give Dilaudid dose with decrease of pain from 8/10 to 5/10 witin minutes  Continue Methadone concentrate 60 mg po q 12 hr. Discussed with [facility] . who offer no objection to divided methadone dose.   QTc 486, MD following for weekly levels  Decrease DIlaudid to 2 mg IV q 4 hrs in anticipation of surgery  Continue Neurontin to 800 mg po q 8 hrs  Continue  Flexeril to 10 mg po TID.     Problem/Plan - 2:  ·  Problem: Sepsis.   ·  Plan: Recurrent foot infections, pt was  previously treated for L. hallux OM with L. heel culture growing Proteus mirabilis ESBL, completed 6 week course of avycaz  CT b/l LE  severely limited by contracture. Left lower extremity is only partially visualized with exclusion of the left foot. Skin induration and subcutaneous edema in the leg and ankle.  No soft tissue gas  continue course of  Vanc,/ meropenem, ID, podiatry and vascular following -   pending clearance for possible L AKA.     Problem/Plan - 3:  ·  Problem: Bipolar disorder.   ·  Plan: continue quetiapine 100 mg BID and 400 mg qhs.     Problem/Plan - 4:  ·  Problem: Acute respiratory failure with hypoxia.   ·  Plan: requires  chronic O2  CT Chest 12/20 with Emphysematous disease with likely concurrent interstitial fibrosis.  and patchy airspace consolidations right upper middle lobe ??  pneumonia.  Pulm following.     Problem/Plan - 5:  ·  Problem: Functional quadriplegia.   ·  Plan: due to paraplegia. contracted and bedbound  SNF resident, requires full care with bathing, dressing and wound care.     Problem/Plan - 6:  ·  Problem: Palliative care encounter.   ·  Plan: ongoing supportive care to pt and mother. 53 year old male with PMHx of cervical epidural abscess (s/p decompressive laminectomy 3/2021 c/b b/l leg paralysis), contractures,  hx of pneumoperitoneum (s/p ex lap, total abdominal colectomy and end ileostomy (on 1/12/23) c/b evisceration and sepsis with MRSA bacteremia postoperatively), hx of hepatitis C, hx of R. buttock abscess, hx of L. hallux osteo, adm w sepsis 2/2 foot wounds, course c/b hypoxic resp failure, fluid overload, s/p diuresis. Palliative consulted for GOC, pain management.                                      Problem/Plan - 1:  ·  Problem: Chronic pain.   ·  Plan: continues with complaints of leg pain and spasms   appears to be mostly neuropathic and spasmatic in nature.   pt states " I believe I am building up tolerance"  Observed RN give Dilaudid dose with decrease of pain from 8/10 to 5/10 witin minutes  Continue Methadone concentrate 60 mg po q 12 hr. Discussed with [facility] Methadone clinic . who offer no objection to divided methadone dose.   QTc 486, MD following for weekly levels  Decrease DIlaudid to 2 mg IV q 4 hrs in anticipation of surgery  Continue Neurontin to 800 mg po q 8 hrs  Continue  Flexeril to 10 mg po TID.     Problem/Plan - 2:  ·  Problem: Sepsis.   ·  Plan: Recurrent foot infections, pt was  previously treated for L. hallux OM with L. heel culture growing Proteus mirabilis ESBL, completed 6 week course of avycaz  CT b/l LE  severely limited by contracture. Left lower extremity is only partially visualized with exclusion of the left foot. Skin induration and subcutaneous edema in the leg and ankle.  No soft tissue gas  continue course of  Vanc,/ meropenem, ID, podiatry and vascular following -   pending clearance for possible L AKA.     Problem/Plan - 3:  ·  Problem: Bipolar disorder.   ·  Plan: continue quetiapine 100 mg BID and 400 mg qhs.     Problem/Plan - 4:  ·  Problem: Acute respiratory failure with hypoxia.   ·  Plan: requires  chronic O2  CT Chest 12/20 with Emphysematous disease with likely concurrent interstitial fibrosis.  and patchy airspace consolidations right upper middle lobe ??  pneumonia.  Pulm following.     Problem/Plan - 5:  ·  Problem: Functional quadriplegia.   ·  Plan: due to paraplegia. contracted and bedbound  SNF resident, requires full care with bathing, dressing and wound care.     Problem/Plan - 6:  ·  Problem: Palliative care encounter.   ·  Plan: ongoing supportive care to pt and mother. 53 year old male with PMHx of cervical epidural abscess (s/p decompressive laminectomy 3/2021 c/b b/l leg paralysis), contractures,  hx of pneumoperitoneum (s/p ex lap, total abdominal colectomy and end ileostomy (on 1/12/23) c/b evisceration and sepsis with MRSA bacteremia postoperatively), hx of hepatitis C, hx of R. buttock abscess, hx of L. hallux osteo, adm w sepsis 2/2 foot wounds, course c/b hypoxic resp failure, fluid overload, s/p diuresis. Palliative consulted for GOC, pain management.

## 2023-12-26 NOTE — PROGRESS NOTE PEDS - PROBLEM SELECTOR PLAN 3
5 L O2 NC at home    CT chest : Emphysematous disease with likely concurrent interstitial fibrosis. Suspected  chronic microaspiration   pulm following Pulm consulted    cont airway clearance w/ aerobika   cont symbicort inhaler  bid

## 2023-12-26 NOTE — PROGRESS NOTE PEDS - SUBJECTIVE AND OBJECTIVE BOX
follow up on:  complex medical decision making related to goals of care    OVERNIGHT EVENTS:   Pt continues with c/o pain to legs  received 4 doses B/T Dilaudid over past 24 hrs.  spasms  continue intermittently  pt reports loose stool x 2 days over weekend    Review of systems:     Pain:  [x ] yes [ ] no  QOL impact -   Location -                    Aggravating factors -  Quality -  Radiation -  Timing-  Severity (0-10 scale):  Minimal acceptable level (0-10 scale):     Dyspnea:      denies                        Anxiety:         denies                      Depression:   denies  Fatigue:     present                         Nausea:      denies                         Loss of appetite:    denies            Constipation:     denies             Diarrhea:     denies    All other systems reviewed and negative      MEDICATIONS  (STANDING):  acetaminophen     Tablet .. 650 milliGRAM(s) Oral daily  albuterol/ipratropium for Nebulization 3 milliLiter(s) Nebulizer every 8 hours  amLODIPine   Tablet 2.5 milliGRAM(s) Oral daily  ascorbic acid 500 milliGRAM(s) Oral daily  atorvastatin 40 milliGRAM(s) Oral at bedtime  bacitracin   Ointment 1 Application(s) Topical daily  budesonide 160 MICROgram(s)/formoterol 4.5 MICROgram(s) Inhaler 2 Puff(s) Inhalation two times a day  chlorhexidine 2% Cloths 1 Application(s) Topical <User Schedule>  collagenase Ointment 1 Application(s) Topical daily  cyanocobalamin 1000 MICROGram(s) Oral daily  cyclobenzaprine 10 milliGRAM(s) Oral three times a day  enoxaparin Injectable 40 milliGRAM(s) SubCutaneous every 24 hours  ferrous    sulfate 325 milliGRAM(s) Oral <User Schedule>  finasteride 5 milliGRAM(s) Oral daily  folic acid 1 milliGRAM(s) Oral daily  gabapentin 800 milliGRAM(s) Oral every 8 hours  guaiFENesin  milliGRAM(s) Oral every 12 hours  lactobacillus acidophilus 1 Tablet(s) Oral two times a day with meals  lidocaine   4% Patch 1 Patch Transdermal daily  meropenem  IVPB 1000 milliGRAM(s) IV Intermittent every 8 hours  methadone  Concentrate 60 milliGRAM(s) Oral two times a day  methylphenidate 5 milliGRAM(s) Oral <User Schedule>  multivitamin 1 Tablet(s) Oral daily  nystatin Powder 1 Application(s) Topical two times a day  pantoprazole    Tablet 40 milliGRAM(s) Oral before breakfast  polyethylene glycol 3350 17 Gram(s) Oral daily  QUEtiapine 400 milliGRAM(s) Oral at bedtime  QUEtiapine 100 milliGRAM(s) Oral <User Schedule>  senna 2 Tablet(s) Oral at bedtime  tamsulosin 0.4 milliGRAM(s) Oral at bedtime  thiamine 100 milliGRAM(s) Oral daily  vancomycin  IVPB 1000 milliGRAM(s) IV Intermittent every 12 hours  zinc sulfate 220 milliGRAM(s) Oral daily    MEDICATIONS  (PRN):  acetaminophen     Tablet .. 650 milliGRAM(s) Oral every 6 hours PRN Temp greater or equal to 38C (100.4F), Mild Pain (1 - 3)  aluminum hydroxide/magnesium hydroxide/simethicone Suspension 30 milliLiter(s) Oral every 4 hours PRN Dyspepsia  HYDROmorphone  Injectable 2 milliGRAM(s) IV Push every 4 hours PRN Severe Pain (7 - 10)  melatonin 3 milliGRAM(s) Oral at bedtime PRN Insomnia  sodium chloride 0.9% lock flush 10 milliLiter(s) IV Push every 1 hour PRN Pre/post blood products, medications, blood draw, and to maintain line patency    PHYSICAL EXAM:  Vital Signs Last 24 Hrs  T(C): 36.8 (26 Dec 2023 05:48), Max: 36.8 (26 Dec 2023 05:48)  T(F): 98.2 (26 Dec 2023 05:48), Max: 98.2 (26 Dec 2023 05:48)  HR: 89 (26 Dec 2023 05:48) (89 - 96)  BP: 116/70 (26 Dec 2023 05:48) (112/72 - 117/70)  BP(mean): --  RR: 18 (26 Dec 2023 05:48) (18 - 18)  SpO2: 93% (26 Dec 2023 05:48) (93% - 95%)    General: ill appearing male in be with c/o pain to legs  HEENT: normocephalic, atraumatic, poor dentition   Neck: supple, no JVD   CVS: S1 S2 RRR, tachycardic, no MRG   Resp: CTAB, no RRW  GI: soft, NT, nor R/G, + ileostomy draining moderate amts pasty stool  : indwelling west in situ   Musc: severe BLE contractures, + L PICC line    Neuro: oriented x 4, non focal, paraplegic  Psych: situationally depressed    Skin: Stage IV pressure ulcer; R heel as per flow sheets  stage IV pressure ulcer; R hip as per flow sheets  Oral intake: excellent    Parameters below as of 26 Dec 2023 05:48  Patient On (Oxygen Delivery Method): nasal cannula    Palliative Performance Scale/Karnofsky Score: 40%     LABS: no labs since 12/20    RADIOLOGY & ADDITIONAL STUDIES: no new radiological studies

## 2023-12-26 NOTE — PROGRESS NOTE ADULT - NUTRITIONAL ASSESSMENT
This patient has been assessed with a concern for Malnutrition and has been determined to have a diagnosis/diagnoses of Moderate protein-calorie malnutrition.    This patient is being managed with:   Diet Regular-  1000mL Fluid Restriction ([id number])  Supplement Feeding Modality:  Oral  Ensure Plus High Protein Cans or Servings Per Day:  1       Frequency:  Two Times a day  Entered: Dec 20 2023 10:26AM    This patient has been assessed with a concern for Malnutrition and has been determined to have a diagnosis/diagnoses of Moderate protein-calorie malnutrition.    This patient is being managed with:   Diet Regular-  1000mL Fluid Restriction ([id number])  Supplement Feeding Modality:  Oral  Ensure Plus High Protein Cans or Servings Per Day:  1       Frequency:  Two Times a day  Entered: Dec 20 2023 10:26AM   This patient has been assessed with a concern for Malnutrition and has been determined to have a diagnosis/diagnoses of Moderate protein-calorie malnutrition.    This patient is being managed with:   Diet Regular-  1000mL Fluid Restriction ([id number])  Supplement Feeding Modality:  Oral  Ensure Plus High Protein Cans or Servings Per Day:  1       Frequency:  Two Times a day  Entered: Dec 20 2023 10:26AM    This patient has been assessed with a concern for Malnutrition and has been determined to have a diagnosis/diagnoses of Moderate protein-calorie malnutrition.    This patient is being managed with:   Diet Regular-  1000mL Fluid Restriction ([id number])  Supplement Feeding Modality:  Oral  Ensure Plus High Protein Cans or Servings Per Day:  1       Frequency:  Two Times a day  Entered: Dec 20 2023 10:26AM

## 2023-12-27 LAB
ANION GAP SERPL CALC-SCNC: 2 MMOL/L — LOW (ref 5–17)
ANION GAP SERPL CALC-SCNC: 2 MMOL/L — LOW (ref 5–17)
BUN SERPL-MCNC: 18 MG/DL — SIGNIFICANT CHANGE UP (ref 7–23)
BUN SERPL-MCNC: 18 MG/DL — SIGNIFICANT CHANGE UP (ref 7–23)
CALCIUM SERPL-MCNC: 8.9 MG/DL — SIGNIFICANT CHANGE UP (ref 8.5–10.1)
CALCIUM SERPL-MCNC: 8.9 MG/DL — SIGNIFICANT CHANGE UP (ref 8.5–10.1)
CHLORIDE SERPL-SCNC: 108 MMOL/L — SIGNIFICANT CHANGE UP (ref 96–108)
CHLORIDE SERPL-SCNC: 108 MMOL/L — SIGNIFICANT CHANGE UP (ref 96–108)
CO2 SERPL-SCNC: 31 MMOL/L — SIGNIFICANT CHANGE UP (ref 22–31)
CO2 SERPL-SCNC: 31 MMOL/L — SIGNIFICANT CHANGE UP (ref 22–31)
CREAT SERPL-MCNC: 0.5 MG/DL — SIGNIFICANT CHANGE UP (ref 0.5–1.3)
CREAT SERPL-MCNC: 0.5 MG/DL — SIGNIFICANT CHANGE UP (ref 0.5–1.3)
EGFR: 123 ML/MIN/1.73M2 — SIGNIFICANT CHANGE UP
EGFR: 123 ML/MIN/1.73M2 — SIGNIFICANT CHANGE UP
GLUCOSE SERPL-MCNC: 87 MG/DL — SIGNIFICANT CHANGE UP (ref 70–99)
GLUCOSE SERPL-MCNC: 87 MG/DL — SIGNIFICANT CHANGE UP (ref 70–99)
HCT VFR BLD CALC: 33.8 % — LOW (ref 39–50)
HCT VFR BLD CALC: 33.8 % — LOW (ref 39–50)
HGB BLD-MCNC: 10.2 G/DL — LOW (ref 13–17)
HGB BLD-MCNC: 10.2 G/DL — LOW (ref 13–17)
MAGNESIUM SERPL-MCNC: 1.9 MG/DL — SIGNIFICANT CHANGE UP (ref 1.6–2.6)
MAGNESIUM SERPL-MCNC: 1.9 MG/DL — SIGNIFICANT CHANGE UP (ref 1.6–2.6)
MCHC RBC-ENTMCNC: 27.1 PG — SIGNIFICANT CHANGE UP (ref 27–34)
MCHC RBC-ENTMCNC: 27.1 PG — SIGNIFICANT CHANGE UP (ref 27–34)
MCHC RBC-ENTMCNC: 30.2 G/DL — LOW (ref 32–36)
MCHC RBC-ENTMCNC: 30.2 G/DL — LOW (ref 32–36)
MCV RBC AUTO: 89.9 FL — SIGNIFICANT CHANGE UP (ref 80–100)
MCV RBC AUTO: 89.9 FL — SIGNIFICANT CHANGE UP (ref 80–100)
NRBC # BLD: 0 /100 WBCS — SIGNIFICANT CHANGE UP (ref 0–0)
NRBC # BLD: 0 /100 WBCS — SIGNIFICANT CHANGE UP (ref 0–0)
PHOSPHATE SERPL-MCNC: 3.5 MG/DL — SIGNIFICANT CHANGE UP (ref 2.5–4.5)
PHOSPHATE SERPL-MCNC: 3.5 MG/DL — SIGNIFICANT CHANGE UP (ref 2.5–4.5)
PLATELET # BLD AUTO: 173 K/UL — SIGNIFICANT CHANGE UP (ref 150–400)
PLATELET # BLD AUTO: 173 K/UL — SIGNIFICANT CHANGE UP (ref 150–400)
POTASSIUM SERPL-MCNC: 4.2 MMOL/L — SIGNIFICANT CHANGE UP (ref 3.5–5.3)
POTASSIUM SERPL-MCNC: 4.2 MMOL/L — SIGNIFICANT CHANGE UP (ref 3.5–5.3)
POTASSIUM SERPL-SCNC: 4.2 MMOL/L — SIGNIFICANT CHANGE UP (ref 3.5–5.3)
POTASSIUM SERPL-SCNC: 4.2 MMOL/L — SIGNIFICANT CHANGE UP (ref 3.5–5.3)
RBC # BLD: 3.76 M/UL — LOW (ref 4.2–5.8)
RBC # BLD: 3.76 M/UL — LOW (ref 4.2–5.8)
RBC # FLD: 16.3 % — HIGH (ref 10.3–14.5)
RBC # FLD: 16.3 % — HIGH (ref 10.3–14.5)
SODIUM SERPL-SCNC: 141 MMOL/L — SIGNIFICANT CHANGE UP (ref 135–145)
SODIUM SERPL-SCNC: 141 MMOL/L — SIGNIFICANT CHANGE UP (ref 135–145)
VANCOMYCIN TROUGH SERPL-MCNC: 13.3 UG/ML — SIGNIFICANT CHANGE UP (ref 10–20)
VANCOMYCIN TROUGH SERPL-MCNC: 13.3 UG/ML — SIGNIFICANT CHANGE UP (ref 10–20)
WBC # BLD: 7.24 K/UL — SIGNIFICANT CHANGE UP (ref 3.8–10.5)
WBC # BLD: 7.24 K/UL — SIGNIFICANT CHANGE UP (ref 3.8–10.5)
WBC # FLD AUTO: 7.24 K/UL — SIGNIFICANT CHANGE UP (ref 3.8–10.5)
WBC # FLD AUTO: 7.24 K/UL — SIGNIFICANT CHANGE UP (ref 3.8–10.5)

## 2023-12-27 PROCEDURE — 99233 SBSQ HOSP IP/OBS HIGH 50: CPT

## 2023-12-27 PROCEDURE — 99232 SBSQ HOSP IP/OBS MODERATE 35: CPT

## 2023-12-27 RX ORDER — IPRATROPIUM/ALBUTEROL SULFATE 18-103MCG
3 AEROSOL WITH ADAPTER (GRAM) INHALATION EVERY 8 HOURS
Refills: 0 | Status: DISCONTINUED | OUTPATIENT
Start: 2023-12-27 | End: 2024-01-03

## 2023-12-27 RX ORDER — METHADONE HYDROCHLORIDE 40 MG/1
60 TABLET ORAL
Refills: 0 | Status: COMPLETED | OUTPATIENT
Start: 2023-12-27 | End: 2024-01-03

## 2023-12-27 RX ADMIN — QUETIAPINE FUMARATE 100 MILLIGRAM(S): 200 TABLET, FILM COATED ORAL at 17:30

## 2023-12-27 RX ADMIN — HYDROMORPHONE HYDROCHLORIDE 2 MILLIGRAM(S): 2 INJECTION INTRAMUSCULAR; INTRAVENOUS; SUBCUTANEOUS at 22:56

## 2023-12-27 RX ADMIN — HYDROMORPHONE HYDROCHLORIDE 2 MILLIGRAM(S): 2 INJECTION INTRAMUSCULAR; INTRAVENOUS; SUBCUTANEOUS at 04:37

## 2023-12-27 RX ADMIN — GABAPENTIN 800 MILLIGRAM(S): 400 CAPSULE ORAL at 14:57

## 2023-12-27 RX ADMIN — Medication 1 TABLET(S): at 11:43

## 2023-12-27 RX ADMIN — Medication 5 MILLIGRAM(S): at 23:23

## 2023-12-27 RX ADMIN — Medication 5 MILLIGRAM(S): at 09:43

## 2023-12-27 RX ADMIN — GABAPENTIN 800 MILLIGRAM(S): 400 CAPSULE ORAL at 05:24

## 2023-12-27 RX ADMIN — FINASTERIDE 5 MILLIGRAM(S): 5 TABLET, FILM COATED ORAL at 11:43

## 2023-12-27 RX ADMIN — HYDROMORPHONE HYDROCHLORIDE 2 MILLIGRAM(S): 2 INJECTION INTRAMUSCULAR; INTRAVENOUS; SUBCUTANEOUS at 12:31

## 2023-12-27 RX ADMIN — CHLORHEXIDINE GLUCONATE 1 APPLICATION(S): 213 SOLUTION TOPICAL at 05:25

## 2023-12-27 RX ADMIN — QUETIAPINE FUMARATE 400 MILLIGRAM(S): 200 TABLET, FILM COATED ORAL at 22:56

## 2023-12-27 RX ADMIN — HYDROMORPHONE HYDROCHLORIDE 2 MILLIGRAM(S): 2 INJECTION INTRAMUSCULAR; INTRAVENOUS; SUBCUTANEOUS at 13:16

## 2023-12-27 RX ADMIN — PREGABALIN 1000 MICROGRAM(S): 225 CAPSULE ORAL at 11:45

## 2023-12-27 RX ADMIN — METHADONE HYDROCHLORIDE 60 MILLIGRAM(S): 40 TABLET ORAL at 06:39

## 2023-12-27 RX ADMIN — HYDROMORPHONE HYDROCHLORIDE 2 MILLIGRAM(S): 2 INJECTION INTRAMUSCULAR; INTRAVENOUS; SUBCUTANEOUS at 18:40

## 2023-12-27 RX ADMIN — AMLODIPINE BESYLATE 2.5 MILLIGRAM(S): 2.5 TABLET ORAL at 05:24

## 2023-12-27 RX ADMIN — HYDROMORPHONE HYDROCHLORIDE 2 MILLIGRAM(S): 2 INJECTION INTRAMUSCULAR; INTRAVENOUS; SUBCUTANEOUS at 19:10

## 2023-12-27 RX ADMIN — MEROPENEM 100 MILLIGRAM(S): 1 INJECTION INTRAVENOUS at 05:24

## 2023-12-27 RX ADMIN — Medication 3 MILLILITER(S): at 05:38

## 2023-12-27 RX ADMIN — NYSTATIN CREAM 1 APPLICATION(S): 100000 CREAM TOPICAL at 05:24

## 2023-12-27 RX ADMIN — Medication 650 MILLIGRAM(S): at 12:12

## 2023-12-27 RX ADMIN — Medication 600 MILLIGRAM(S): at 17:30

## 2023-12-27 RX ADMIN — PANTOPRAZOLE SODIUM 40 MILLIGRAM(S): 20 TABLET, DELAYED RELEASE ORAL at 05:24

## 2023-12-27 RX ADMIN — MEROPENEM 100 MILLIGRAM(S): 1 INJECTION INTRAVENOUS at 23:23

## 2023-12-27 RX ADMIN — HYDROMORPHONE HYDROCHLORIDE 2 MILLIGRAM(S): 2 INJECTION INTRAMUSCULAR; INTRAVENOUS; SUBCUTANEOUS at 03:37

## 2023-12-27 RX ADMIN — ZINC SULFATE TAB 220 MG (50 MG ZINC EQUIVALENT) 220 MILLIGRAM(S): 220 (50 ZN) TAB at 11:43

## 2023-12-27 RX ADMIN — Medication 1 TABLET(S): at 17:30

## 2023-12-27 RX ADMIN — Medication 100 MILLIGRAM(S): at 11:43

## 2023-12-27 RX ADMIN — Medication 1 MILLIGRAM(S): at 11:57

## 2023-12-27 RX ADMIN — CYCLOBENZAPRINE HYDROCHLORIDE 10 MILLIGRAM(S): 10 TABLET, FILM COATED ORAL at 14:57

## 2023-12-27 RX ADMIN — ATORVASTATIN CALCIUM 40 MILLIGRAM(S): 80 TABLET, FILM COATED ORAL at 22:55

## 2023-12-27 RX ADMIN — QUETIAPINE FUMARATE 100 MILLIGRAM(S): 200 TABLET, FILM COATED ORAL at 10:44

## 2023-12-27 RX ADMIN — CYCLOBENZAPRINE HYDROCHLORIDE 10 MILLIGRAM(S): 10 TABLET, FILM COATED ORAL at 22:56

## 2023-12-27 RX ADMIN — Medication 250 MILLIGRAM(S): at 17:29

## 2023-12-27 RX ADMIN — MEROPENEM 100 MILLIGRAM(S): 1 INJECTION INTRAVENOUS at 14:55

## 2023-12-27 RX ADMIN — Medication 500 MILLIGRAM(S): at 11:45

## 2023-12-27 RX ADMIN — Medication 650 MILLIGRAM(S): at 11:50

## 2023-12-27 RX ADMIN — TAMSULOSIN HYDROCHLORIDE 0.4 MILLIGRAM(S): 0.4 CAPSULE ORAL at 22:55

## 2023-12-27 RX ADMIN — CYCLOBENZAPRINE HYDROCHLORIDE 10 MILLIGRAM(S): 10 TABLET, FILM COATED ORAL at 05:24

## 2023-12-27 RX ADMIN — GABAPENTIN 800 MILLIGRAM(S): 400 CAPSULE ORAL at 22:55

## 2023-12-27 RX ADMIN — Medication 250 MILLIGRAM(S): at 06:39

## 2023-12-27 RX ADMIN — BUDESONIDE AND FORMOTEROL FUMARATE DIHYDRATE 2 PUFF(S): 160; 4.5 AEROSOL RESPIRATORY (INHALATION) at 05:25

## 2023-12-27 RX ADMIN — METHADONE HYDROCHLORIDE 60 MILLIGRAM(S): 40 TABLET ORAL at 17:29

## 2023-12-27 RX ADMIN — Medication 3 MILLILITER(S): at 13:42

## 2023-12-27 RX ADMIN — Medication 600 MILLIGRAM(S): at 05:24

## 2023-12-27 RX ADMIN — Medication 325 MILLIGRAM(S): at 11:48

## 2023-12-27 NOTE — PROGRESS NOTE ADULT - SUBJECTIVE AND OBJECTIVE BOX
VASCULAR SURGERY PA NOTE ON BEHALF OF DR. Epstein:    S: Patient seen and examined at bedside.   no acute events overnight.  Patient has no new complaints this am.      MEDICATIONS:  acetaminophen     Tablet .. 650 milliGRAM(s) Oral daily  acetaminophen     Tablet .. 650 milliGRAM(s) Oral every 6 hours PRN  albuterol/ipratropium for Nebulization 3 milliLiter(s) Nebulizer every 8 hours  aluminum hydroxide/magnesium hydroxide/simethicone Suspension 30 milliLiter(s) Oral every 4 hours PRN  amLODIPine   Tablet 2.5 milliGRAM(s) Oral daily  ascorbic acid 500 milliGRAM(s) Oral daily  atorvastatin 40 milliGRAM(s) Oral at bedtime  bacitracin   Ointment 1 Application(s) Topical daily  budesonide 160 MICROgram(s)/formoterol 4.5 MICROgram(s) Inhaler 2 Puff(s) Inhalation two times a day  chlorhexidine 2% Cloths 1 Application(s) Topical <User Schedule>  collagenase Ointment 1 Application(s) Topical daily  cyanocobalamin 1000 MICROGram(s) Oral daily  cyclobenzaprine 10 milliGRAM(s) Oral three times a day  enoxaparin Injectable 40 milliGRAM(s) SubCutaneous every 24 hours  ferrous    sulfate 325 milliGRAM(s) Oral <User Schedule>  finasteride 5 milliGRAM(s) Oral daily  folic acid 1 milliGRAM(s) Oral daily  gabapentin 800 milliGRAM(s) Oral every 8 hours  guaiFENesin  milliGRAM(s) Oral every 12 hours  HYDROmorphone  Injectable 2 milliGRAM(s) IV Push every 4 hours PRN  lactobacillus acidophilus 1 Tablet(s) Oral two times a day with meals  lidocaine   4% Patch 1 Patch Transdermal daily  melatonin 3 milliGRAM(s) Oral at bedtime PRN  meropenem  IVPB 1000 milliGRAM(s) IV Intermittent every 8 hours  methadone    Tablet 60 milliGRAM(s) Oral two times a day  methylphenidate 5 milliGRAM(s) Oral <User Schedule>  multivitamin 1 Tablet(s) Oral daily  nystatin Powder 1 Application(s) Topical two times a day  pantoprazole    Tablet 40 milliGRAM(s) Oral before breakfast  polyethylene glycol 3350 17 Gram(s) Oral daily  QUEtiapine 100 milliGRAM(s) Oral <User Schedule>  QUEtiapine 400 milliGRAM(s) Oral at bedtime  senna 2 Tablet(s) Oral at bedtime  sodium chloride 0.9% lock flush 10 milliLiter(s) IV Push every 1 hour PRN  tamsulosin 0.4 milliGRAM(s) Oral at bedtime  thiamine 100 milliGRAM(s) Oral daily  vancomycin  IVPB 1000 milliGRAM(s) IV Intermittent every 12 hours  zinc sulfate 220 milliGRAM(s) Oral daily      O:  Vital Signs Last 24 Hrs  T(C): 37.3 (27 Dec 2023 11:08), Max: 37.3 (27 Dec 2023 05:00)  T(F): 99.1 (27 Dec 2023 11:08), Max: 99.1 (27 Dec 2023 05:00)  HR: 80 (27 Dec 2023 11:08) (80 - 97)  BP: 120/73 (27 Dec 2023 11:08) (109/74 - 120/73)  BP(mean): --  RR: 18 (27 Dec 2023 11:08) (18 - 18)  SpO2: 93% (27 Dec 2023 11:08) (92% - 95%)    Parameters below as of 27 Dec 2023 12:36  Patient On (Oxygen Delivery Method): nasal cannula        I&O SUMMARY:    12-26-23 @ 07:01  -  12-27-23 @ 07:00  --------------------------------------------------------  IN: 710 mL / OUT: 800 mL / NET: -90 mL        PHYSICAL EXAM:  Lungs: CTA bilat without W/R/R  Card: S1S2  Abd: Soft, NT, ND.  +BS x 4.  No rebound/guarding.    Ext: Calves soft, NT.  2+ LE edema B/L.  Palpable DP pulses bilaterally.      LABS:                        10.2   7.24  )-----------( 173      ( 27 Dec 2023 05:18 )             33.8     12-27    141  |  108  |  18  ----------------------------<  87  4.2   |  31  |  0.50    Ca    8.9      27 Dec 2023 05:18  Phos  3.5     12-27  Mg     1.9     12-27    TPro  7.9  /  Alb  2.0<L>  /  TBili  0.6  /  DBili  x   /  AST  39<H>  /  ALT  47  /  AlkPhos  226<H>  12-26            Assessment:  53y Male right hip wound and bilateral lower extremity foot wounds, management per podiatry. 2+peripheral pedal pulses. Wounds healing appropriately    Plan:  - No AKA at this time, wounds healing appropriately  - Continue collagenase to bilateral foot wounds and right hip  - Continue current management per medicine and podiatry  - Discussed with ID  Discussed and seen with Dr. Epstein

## 2023-12-27 NOTE — PROGRESS NOTE ADULT - SUBJECTIVE AND OBJECTIVE BOX
follow up on: symptom management     OVERNIGHT EVENTS:   Pt required 5 doses B/T Dilaudid over past 24 hrs.   No longer with plan for B/L AKA  sleeping more over past 24 hrs    Review of systems:     Pain:  [x ] yes [ ] no  QOL impact -   Location -                    Aggravating factors -  Quality -  Radiation -  Timing-  Severity (0-10 scale):  Minimal acceptable level (0-10 scale):     Dyspnea:      denies                        Anxiety:         denies                      Depression:   denies  Fatigue:     present                     Nausea:      denies                         Loss of appetite:    denies            Constipation:     denies             Diarrhea:     denies    All other systems reviewed and negative  Unable to obtain/Limited due to poor mental status    MEDICATIONS  (STANDING):  acetaminophen     Tablet .. 650 milliGRAM(s) Oral daily  albuterol/ipratropium for Nebulization 3 milliLiter(s) Nebulizer every 8 hours  amLODIPine   Tablet 2.5 milliGRAM(s) Oral daily  ascorbic acid 500 milliGRAM(s) Oral daily  atorvastatin 40 milliGRAM(s) Oral at bedtime  bacitracin   Ointment 1 Application(s) Topical daily  budesonide 160 MICROgram(s)/formoterol 4.5 MICROgram(s) Inhaler 2 Puff(s) Inhalation two times a day  chlorhexidine 2% Cloths 1 Application(s) Topical <User Schedule>  collagenase Ointment 1 Application(s) Topical daily  cyanocobalamin 1000 MICROGram(s) Oral daily  cyclobenzaprine 10 milliGRAM(s) Oral three times a day  enoxaparin Injectable 40 milliGRAM(s) SubCutaneous every 24 hours  ferrous    sulfate 325 milliGRAM(s) Oral <User Schedule>  finasteride 5 milliGRAM(s) Oral daily  folic acid 1 milliGRAM(s) Oral daily  gabapentin 800 milliGRAM(s) Oral every 8 hours  guaiFENesin  milliGRAM(s) Oral every 12 hours  lactobacillus acidophilus 1 Tablet(s) Oral two times a day with meals  lidocaine   4% Patch 1 Patch Transdermal daily  meropenem  IVPB 1000 milliGRAM(s) IV Intermittent every 8 hours  methadone    Tablet 60 milliGRAM(s) Oral two times a day  methylphenidate 5 milliGRAM(s) Oral <User Schedule>  multivitamin 1 Tablet(s) Oral daily  nystatin Powder 1 Application(s) Topical two times a day  pantoprazole    Tablet 40 milliGRAM(s) Oral before breakfast  polyethylene glycol 3350 17 Gram(s) Oral daily  QUEtiapine 400 milliGRAM(s) Oral at bedtime  QUEtiapine 100 milliGRAM(s) Oral <User Schedule>  senna 2 Tablet(s) Oral at bedtime  tamsulosin 0.4 milliGRAM(s) Oral at bedtime  thiamine 100 milliGRAM(s) Oral daily  vancomycin  IVPB 1000 milliGRAM(s) IV Intermittent every 12 hours  zinc sulfate 220 milliGRAM(s) Oral daily    MEDICATIONS  (PRN):  acetaminophen     Tablet .. 650 milliGRAM(s) Oral every 6 hours PRN Temp greater or equal to 38C (100.4F), Mild Pain (1 - 3)  aluminum hydroxide/magnesium hydroxide/simethicone Suspension 30 milliLiter(s) Oral every 4 hours PRN Dyspepsia  HYDROmorphone  Injectable 2 milliGRAM(s) IV Push every 4 hours PRN Severe Pain (7 - 10)  melatonin 3 milliGRAM(s) Oral at bedtime PRN Insomnia  sodium chloride 0.9% lock flush 10 milliLiter(s) IV Push every 1 hour PRN Pre/post blood products, medications, blood draw, and to maintain line patency    PHYSICAL EXAM:  Vital Signs Last 24 Hrs  T(C): 37.3 (27 Dec 2023 11:08), Max: 37.3 (27 Dec 2023 05:00)  T(F): 99.1 (27 Dec 2023 11:08), Max: 99.1 (27 Dec 2023 05:00)  HR: 80 (27 Dec 2023 11:08) (80 - 97)  BP: 120/73 (27 Dec 2023 11:08) (109/74 - 120/73)  BP(mean): --  RR: 18 (27 Dec 2023 11:08) (18 - 18)  SpO2: 93% (27 Dec 2023 11:08) (92% - 95%)    Parameters below as of 27 Dec 2023 11:08  Patient On (Oxygen Delivery Method): nasal cannula     Palliative Performance Scale/Karnofsky Score:  40%      General: ill appearing male in be with c/o pain to legs  HEENT: normocephalic, atraumatic, poor dentition   Neck: supple, no JVD   CVS: S1 S2 RRR, tachycardic, no MRG   Resp: CTAB, no RRW  GI: soft, NT, nor R/G, + ileostomy draining moderate amts pasty stool  : indwelling west in situ   Musc: severe BLE contractures, + L PICC line    Neuro: oriented x 4, non focal, paraplegic  Psych: situationally depressed    Skin: Stage IV pressure ulcer; R heel as per flow sheets  stage IV pressure ulcer; R hip as per flow sheets  Oral intake: excellent    LABS:                          10.2   7.24  )-----------( 173      ( 27 Dec 2023 05:18 )             33.8     12-27    141  |  108  |  18  ----------------------------<  87  4.2   |  31  |  0.50    Ca    8.9      27 Dec 2023 05:18  Phos  3.5     12-27  Mg     1.9     12-27    TPro  7.9  /  Alb  2.0<L>  /  TBili  0.6  /  DBili  x   /  AST  39<H>  /  ALT  47  /  AlkPhos  226<H>  12-26    Urinalysis Basic - ( 27 Dec 2023 05:18 )    Color: x / Appearance: x / SG: x / pH: x  Gluc: 87 mg/dL / Ketone: x  / Bili: x / Urobili: x   Blood: x / Protein: x / Nitrite: x   Leuk Esterase: x / RBC: x / WBC x   Sq Epi: x / Non Sq Epi: x / Bacteria: x    RADIOLOGY & ADDITIONAL STUDIES: no new radiology studies

## 2023-12-27 NOTE — PROGRESS NOTE ADULT - NS ATTEND AMEND GEN_ALL_CORE FT
Continue pain control, no plans for amputation at this time per vascular, continue local wound care. Transition dilaudid to po as tolerated, to consider adjustment of methadone dose pending QTC. Palliative will follow.

## 2023-12-27 NOTE — PROGRESS NOTE ADULT - PROBLEM SELECTOR PLAN 2
Recurrent foot infections, pt was  previously treated for L. hallux OM with L. heel culture growing Proteus mirabilis ESBL, completed 6 week course of avycaz  CT b/l LE  severely limited by contracture. Left lower extremity is only partially visualized with exclusion of the left foot. Skin induration and subcutaneous edema in the leg and ankle.  No soft tissue gas  continue course of  Vanc,/ meropenem, ID, podiatry and vascular following -.

## 2023-12-27 NOTE — PROGRESS NOTE ADULT - SUBJECTIVE AND OBJECTIVE BOX
CHIEF COMPLAINT: FU of sepsis with foot infection  no fever  no diarrhea       PHYSICAL EXAM:    GENERAL: Moderately built, no acute distress   CHEST/LUNG:  No wheezing, no crackles   HEART: Regular rate and rhythm; No murmurs  ABDOMEN: Soft, Nontender, Nondistended; Bowel sounds present. + west, ileostomy, PICC line.   EXTREMITIES:  No clubbing, cyanosis, or edema. + dressing bilateral feet. + pressure wound right buttock  NERVOUS SYSTEM:  Grossly non focal.  Psychiatry: AAO x 3, mood is appropriate       OBJECTIVE DATA:   Vital Signs Last 24 Hrs  T(C): 37.3 (27 Dec 2023 11:08), Max: 37.3 (27 Dec 2023 05:00)  T(F): 99.1 (27 Dec 2023 11:08), Max: 99.1 (27 Dec 2023 05:00)  HR: 91 (27 Dec 2023 13:42) (80 - 96)  BP: 120/73 (27 Dec 2023 11:08) (109/74 - 120/73)  BP(mean): --  RR: 18 (27 Dec 2023 11:08) (18 - 18)  SpO2: 96% (27 Dec 2023 13:42) (92% - 96%)    Parameters below as of 27 Dec 2023 13:42  Patient On (Oxygen Delivery Method): nasal cannula    LABS:                        10.2   7.24  )-----------( 173      ( 27 Dec 2023 05:18 )             33.8             12-27    141  |  108  |  18  ----------------------------<  87  4.2   |  31  |  0.50    Ca    8.9      27 Dec 2023 05:18  Phos  3.5     12-27  Mg     1.9     12-27    TPro  7.9  /  Alb  2.0<L>  /  TBili  0.6  /  DBili  x   /  AST  39<H>  /  ALT  47  /  AlkPhos  226<H>  12-26                Urinalysis Basic - ( 27 Dec 2023 05:18 )    Color: x / Appearance: x / SG: x / pH: x  Gluc: 87 mg/dL / Ketone: x  / Bili: x / Urobili: x   Blood: x / Protein: x / Nitrite: x   Leuk Esterase: x / RBC: x / WBC x   Sq Epi: x / Non Sq Epi: x / Bacteria: x            CAPILLARY BLOOD GLUCOSE             Interval Radiology studies: reviewed by me    MEDICATIONS  (STANDING):  acetaminophen     Tablet .. 650 milliGRAM(s) Oral daily  amLODIPine   Tablet 2.5 milliGRAM(s) Oral daily  ascorbic acid 500 milliGRAM(s) Oral daily  atorvastatin 40 milliGRAM(s) Oral at bedtime  bacitracin   Ointment 1 Application(s) Topical daily  budesonide 160 MICROgram(s)/formoterol 4.5 MICROgram(s) Inhaler 2 Puff(s) Inhalation two times a day  chlorhexidine 2% Cloths 1 Application(s) Topical <User Schedule>  collagenase Ointment 1 Application(s) Topical daily  cyanocobalamin 1000 MICROGram(s) Oral daily  cyclobenzaprine 10 milliGRAM(s) Oral three times a day  enoxaparin Injectable 40 milliGRAM(s) SubCutaneous every 24 hours  ferrous    sulfate 325 milliGRAM(s) Oral <User Schedule>  finasteride 5 milliGRAM(s) Oral daily  folic acid 1 milliGRAM(s) Oral daily  gabapentin 800 milliGRAM(s) Oral every 8 hours  guaiFENesin  milliGRAM(s) Oral every 12 hours  lactobacillus acidophilus 1 Tablet(s) Oral two times a day with meals  lidocaine   4% Patch 1 Patch Transdermal daily  meropenem  IVPB 1000 milliGRAM(s) IV Intermittent every 8 hours  methadone    Tablet 60 milliGRAM(s) Oral two times a day  methylphenidate 5 milliGRAM(s) Oral <User Schedule>  multivitamin 1 Tablet(s) Oral daily  nystatin Powder 1 Application(s) Topical two times a day  pantoprazole    Tablet 40 milliGRAM(s) Oral before breakfast  polyethylene glycol 3350 17 Gram(s) Oral daily  QUEtiapine 400 milliGRAM(s) Oral at bedtime  QUEtiapine 100 milliGRAM(s) Oral <User Schedule>  senna 2 Tablet(s) Oral at bedtime  tamsulosin 0.4 milliGRAM(s) Oral at bedtime  thiamine 100 milliGRAM(s) Oral daily  vancomycin  IVPB 1000 milliGRAM(s) IV Intermittent every 12 hours  zinc sulfate 220 milliGRAM(s) Oral daily    MEDICATIONS  (PRN):  acetaminophen     Tablet .. 650 milliGRAM(s) Oral every 6 hours PRN Temp greater or equal to 38C (100.4F), Mild Pain (1 - 3)  albuterol/ipratropium for Nebulization 3 milliLiter(s) Nebulizer every 8 hours PRN Shortness of Breath and/or Wheezing  aluminum hydroxide/magnesium hydroxide/simethicone Suspension 30 milliLiter(s) Oral every 4 hours PRN Dyspepsia  HYDROmorphone  Injectable 2 milliGRAM(s) IV Push every 4 hours PRN Severe Pain (7 - 10)  melatonin 3 milliGRAM(s) Oral at bedtime PRN Insomnia  sodium chloride 0.9% lock flush 10 milliLiter(s) IV Push every 1 hour PRN Pre/post blood products, medications, blood draw, and to maintain line patency

## 2023-12-27 NOTE — PROGRESS NOTE ADULT - PROBLEM SELECTOR PLAN 1
continues with complaints of intermittent  leg pain and spasms   appears to be mostly neuropathic and spasmatic in nature.  reports pain 8/10 down to 5-6/10 with Dilaudid dosing    Continue Methadone concentrate 60 mg po q 12 hr.   QTc 486, MD following for weekly levels, next level to be drawn 12/27  Will evaluate for possible increase in Methadone, QTc pending today  Continue DIlaudid to 2 mg IV q 4 hrs prn   Continue Neurontin to 800 mg po q 8 hrs  Continue  Flexeril to 10 mg po TID. continues with complaints of intermittent  leg pain and spasms   appears to be mostly neuropathic and spasmatic in nature.  reports pain 8/10 down to 5-6/10 with Dilaudid dosing    Continue Methadone concentrate 60 mg po q 12 hr.   QTc 486, MD following for weekly levels, to be checked 12/27  Will evaluate for possible increase in Methadone, QTc pending today  Continue Dilaudid   2 mg IV q 4 hrs prn   Continue Neurontin  800 mg po q 8 hrs  Continue  Flexeril to 10 mg po TID.

## 2023-12-27 NOTE — PROGRESS NOTE ADULT - PROBLEM SELECTOR PLAN 8
Spoke with pt this AM He is disappointed that surgery is no longer an option, agreeable to initial plan for [facility] for wound care if accepted. SW working on referral. Otherwise attempts will be made to transfer pt to SNF in [place]  Will reach out to pt mother this afternoon to discuss  Ongoing support to both pt and his mother Spoke with pt this AM He is disappointed that surgery is no longer an option, agreeable to initial plan for [facility] for wound care if accepted. SW working on referral. Otherwise attempts will be made to transfer pt to SNF in [place]  Will reach out to pt mother this afternoon to discuss  Ongoing support to both pt and his mother

## 2023-12-27 NOTE — CHART NOTE - NSCHARTNOTEFT_GEN_A_CORE
Pt from long-term SNF with PMH of HTN, HLD, bipolar disorder, hx of opioid dependency, GERD, cervical epidural abscess (s/p decompressive laminectomy 03/2021 c/b b/l leg paralysis), hx of pneumoperitoneum (s/p ex lap, total abdominal colectomy and end ileostomy 01/2023), hx of hepatitis C, hx of R buttocks abscess, hx of L foot OM, neurogenic bladder with indwelling west catheter, & chronic constipation (on bowel regimen).  Pt admitted with sepsis 2/2 b/l feet infection. s/p PICC line 12/11. to continue abx until 01/08. ID following. Vascular following; no AKA at this time, wound healing appropriately.  Pt was scheduled to return to long-term SNF, however was having acute respiratory failure presumed 2/2 COPD; was started on Lasix, on O2, on fluid restriction.  Palliative following. Pt is full code.  Plan is for pt to return back to long-term SNF once medically ready.    Factors impacting intake: [x] none [ ] nausea  [ ] vomiting [ ] diarrhea [ ] constipation  [ ]chewing problems [ ] swallowing issues  [ ] other:     Diet Prescription: Diet, Regular:   1000mL Fluid Restriction ([id number])  Supplement Feeding Modality:  Oral  Ensure Plus High Protein Cans or Servings Per Day:  1       Frequency:  Two Times a day (12-20-23 @ 10:27)    Intake: Mostly % of meals; pt drinking 100% of Ensure supplements    Current Weight: No recent weights to trend; request current wt  % Weight Change: N/A    3+ edema b/l feet & ankles (12/26)    Pertinent Medications: MEDICATIONS  (STANDING):  acetaminophen     Tablet .. 650 milliGRAM(s) Oral daily  albuterol/ipratropium for Nebulization 3 milliLiter(s) Nebulizer every 8 hours  amLODIPine   Tablet 2.5 milliGRAM(s) Oral daily  ascorbic acid 500 milliGRAM(s) Oral daily  atorvastatin 40 milliGRAM(s) Oral at bedtime  bacitracin   Ointment 1 Application(s) Topical daily  budesonide 160 MICROgram(s)/formoterol 4.5 MICROgram(s) Inhaler 2 Puff(s) Inhalation two times a day  chlorhexidine 2% Cloths 1 Application(s) Topical <User Schedule>  collagenase Ointment 1 Application(s) Topical daily  cyanocobalamin 1000 MICROGram(s) Oral daily  cyclobenzaprine 10 milliGRAM(s) Oral three times a day  enoxaparin Injectable 40 milliGRAM(s) SubCutaneous every 24 hours  ferrous    sulfate 325 milliGRAM(s) Oral <User Schedule>  finasteride 5 milliGRAM(s) Oral daily  folic acid 1 milliGRAM(s) Oral daily  gabapentin 800 milliGRAM(s) Oral every 8 hours  guaiFENesin  milliGRAM(s) Oral every 12 hours  lactobacillus acidophilus 1 Tablet(s) Oral two times a day with meals  lidocaine   4% Patch 1 Patch Transdermal daily  meropenem  IVPB 1000 milliGRAM(s) IV Intermittent every 8 hours  methadone    Tablet 60 milliGRAM(s) Oral two times a day  methylphenidate 5 milliGRAM(s) Oral <User Schedule>  multivitamin 1 Tablet(s) Oral daily  nystatin Powder 1 Application(s) Topical two times a day  pantoprazole    Tablet 40 milliGRAM(s) Oral before breakfast  polyethylene glycol 3350 17 Gram(s) Oral daily  QUEtiapine 100 milliGRAM(s) Oral <User Schedule>  QUEtiapine 400 milliGRAM(s) Oral at bedtime  senna 2 Tablet(s) Oral at bedtime  tamsulosin 0.4 milliGRAM(s) Oral at bedtime  thiamine 100 milliGRAM(s) Oral daily  vancomycin  IVPB 1000 milliGRAM(s) IV Intermittent every 12 hours  zinc sulfate 220 milliGRAM(s) Oral daily    MEDICATIONS  (PRN):  acetaminophen     Tablet .. 650 milliGRAM(s) Oral every 6 hours PRN Temp greater or equal to 38C (100.4F), Mild Pain (1 - 3)  aluminum hydroxide/magnesium hydroxide/simethicone Suspension 30 milliLiter(s) Oral every 4 hours PRN Dyspepsia  HYDROmorphone  Injectable 2 milliGRAM(s) IV Push every 4 hours PRN Severe Pain (7 - 10)  melatonin 3 milliGRAM(s) Oral at bedtime PRN Insomnia  sodium chloride 0.9% lock flush 10 milliLiter(s) IV Push every 1 hour PRN Pre/post blood products, medications, blood draw, and to maintain line patency    Pertinent Labs: 12-27 Na141 mmol/L Glu 87 mg/dL K+ 4.2 mmol/L Cr  0.50 mg/dL BUN 18 mg/dL 12-27 Phos 3.5 mg/dL 12-26 Alb 2.0 g/dL<L>  12-04 HgbA1c 5.3%    Skin: As per flow sheets, pt with wound x 3, & pressure ulcers as follows:  stage IV pressure ulcer; R heel as per flow sheets  stage IV pressure ulcer; R hip as per flow sheets  unstageable pressure ulcer; location not clear as per flow sheets    Estimated Needs:   [x] no change since previous assessment on 12/07  [ ] recalculated:     Previous Nutrition Diagnosis:   [x] Moderate malnutrition in chronic setting  Etiology: Inadequate protein-energy intake & increased needs r/t hx of back surgery c/b b/l leg paralysis, hx of pneumoperitoneum (s/p ex lap, colectomy, ileostomy) & PU  Signs/Symptoms: Physical signs of mild-moderate muscle wasting and fat depletion as noted    Goal: Pt to meet >75% of protein-energy needs via meals/supplement - met    Nutrition Diagnosis is [x] ongoing  [ ] resolved [ ] not applicable     New Nutrition Diagnosis: [x] not applicable       Interventions:   Recommend  [x] Continue with current diet rx as ordered  [ ] Change Diet To:  [ ] Nutrition Supplement  [ ] Nutrition Support  [ ] Other:     Monitoring and Evaluation:   [ x ] PO intake [ x ] Tolerance to diet prescription [ x ] weights [ x ] labs[ x ] follow up per protocol  [ ] other: Pt from long-term SNF with PMH of HTN, HLD, bipolar disorder, hx of opioid dependency, GERD, cervical epidural abscess (s/p decompressive laminectomy 03/2021 c/b b/l leg paralysis), hx of pneumoperitoneum (s/p ex lap, total abdominal colectomy and end ileostomy 01/2023), hx of hepatitis C, hx of R buttocks abscess, hx of L foot OM, neurogenic bladder with indwelling west catheter, & chronic constipation (on bowel regimen).  Pt admitted with sepsis 2/2 b/l feet infection. s/p PICC line 12/11. to continue abx until 01/08. ID following. Vascular following; no AKA at this time, wound healing appropriately.  Pt was scheduled to return to long-term SNF, however was having acute respiratory failure presumed 2/2 COPD; was started on Lasix, on O2, on fluid restriction.  Palliative following. Pt is full code.  Plan is for pt to return back to long-term SNF once medically ready.    Factors impacting intake: [x] none [ ] nausea  [ ] vomiting [ ] diarrhea [ ] constipation  [ ]chewing problems [ ] swallowing issues  [ ] other:     Diet Prescription: Diet, Regular:   1000mL Fluid Restriction ([id number])  Supplement Feeding Modality:  Oral  Ensure Plus High Protein Cans or Servings Per Day:  1       Frequency:  Two Times a day (12-20-23 @ 10:27)    Intake: Mostly % of meals; pt drinking 100% of Ensure supplements    Current Weight: No recent weights to trend; request current wt  % Weight Change: N/A    3+ edema b/l feet & ankles (12/26)    Pertinent Medications: MEDICATIONS  (STANDING):  acetaminophen     Tablet .. 650 milliGRAM(s) Oral daily  albuterol/ipratropium for Nebulization 3 milliLiter(s) Nebulizer every 8 hours  amLODIPine   Tablet 2.5 milliGRAM(s) Oral daily  ascorbic acid 500 milliGRAM(s) Oral daily  atorvastatin 40 milliGRAM(s) Oral at bedtime  bacitracin   Ointment 1 Application(s) Topical daily  budesonide 160 MICROgram(s)/formoterol 4.5 MICROgram(s) Inhaler 2 Puff(s) Inhalation two times a day  chlorhexidine 2% Cloths 1 Application(s) Topical <User Schedule>  collagenase Ointment 1 Application(s) Topical daily  cyanocobalamin 1000 MICROGram(s) Oral daily  cyclobenzaprine 10 milliGRAM(s) Oral three times a day  enoxaparin Injectable 40 milliGRAM(s) SubCutaneous every 24 hours  ferrous    sulfate 325 milliGRAM(s) Oral <User Schedule>  finasteride 5 milliGRAM(s) Oral daily  folic acid 1 milliGRAM(s) Oral daily  gabapentin 800 milliGRAM(s) Oral every 8 hours  guaiFENesin  milliGRAM(s) Oral every 12 hours  lactobacillus acidophilus 1 Tablet(s) Oral two times a day with meals  lidocaine   4% Patch 1 Patch Transdermal daily  meropenem  IVPB 1000 milliGRAM(s) IV Intermittent every 8 hours  methadone    Tablet 60 milliGRAM(s) Oral two times a day  methylphenidate 5 milliGRAM(s) Oral <User Schedule>  multivitamin 1 Tablet(s) Oral daily  nystatin Powder 1 Application(s) Topical two times a day  pantoprazole    Tablet 40 milliGRAM(s) Oral before breakfast  polyethylene glycol 3350 17 Gram(s) Oral daily  QUEtiapine 100 milliGRAM(s) Oral <User Schedule>  QUEtiapine 400 milliGRAM(s) Oral at bedtime  senna 2 Tablet(s) Oral at bedtime  tamsulosin 0.4 milliGRAM(s) Oral at bedtime  thiamine 100 milliGRAM(s) Oral daily  vancomycin  IVPB 1000 milliGRAM(s) IV Intermittent every 12 hours  zinc sulfate 220 milliGRAM(s) Oral daily    MEDICATIONS  (PRN):  acetaminophen     Tablet .. 650 milliGRAM(s) Oral every 6 hours PRN Temp greater or equal to 38C (100.4F), Mild Pain (1 - 3)  aluminum hydroxide/magnesium hydroxide/simethicone Suspension 30 milliLiter(s) Oral every 4 hours PRN Dyspepsia  HYDROmorphone  Injectable 2 milliGRAM(s) IV Push every 4 hours PRN Severe Pain (7 - 10)  melatonin 3 milliGRAM(s) Oral at bedtime PRN Insomnia  sodium chloride 0.9% lock flush 10 milliLiter(s) IV Push every 1 hour PRN Pre/post blood products, medications, blood draw, and to maintain line patency    Pertinent Labs: 12-27 Na141 mmol/L Glu 87 mg/dL K+ 4.2 mmol/L Cr  0.50 mg/dL BUN 18 mg/dL 12-27 Phos 3.5 mg/dL 12-26 Alb 2.0 g/dL<L>  12-04 HgbA1c 5.3%    Skin: As per flow sheets, pt with wound x 3, & pressure ulcers as follows:  stage IV pressure ulcer; R heel as per flow sheets  stage IV pressure ulcer; R hip as per flow sheets  unstageable pressure ulcer; location not clear as per flow sheets    Estimated Needs:   [x] no change since previous assessment on 12/07  [ ] recalculated:     Previous Nutrition Diagnosis:   [x] Moderate malnutrition in chronic setting  Etiology: Inadequate protein-energy intake & increased needs r/t hx of back surgery c/b b/l leg paralysis, hx of pneumoperitoneum (s/p ex lap, colectomy, ileostomy) & PU  Signs/Symptoms: Physical signs of mild-moderate muscle wasting and fat depletion as noted    Goal: Pt to meet >75% of protein-energy needs via meals/supplement - met    Nutrition Diagnosis is [x] ongoing  [ ] resolved [ ] not applicable     New Nutrition Diagnosis: [x] not applicable       Interventions:   Recommend  [x] Continue with current diet rx as ordered  [ ] Change Diet To:  [ ] Nutrition Supplement  [ ] Nutrition Support  [ ] Other:     Monitoring and Evaluation:   [ x ] PO intake [ x ] Tolerance to diet prescription [ x ] weights [ x ] labs[ x ] follow up per protocol  [ ] other: Pt from long-term SNF with PMH of HTN, HLD, bipolar disorder, hx of opioid dependency, GERD, cervical epidural abscess (s/p decompressive laminectomy 03/2021 c/b b/l leg paralysis), hx of pneumoperitoneum (s/p ex lap, total abdominal colectomy and end ileostomy 01/2023), hx of hepatitis C, hx of R buttocks abscess, hx of L foot OM, neurogenic bladder with indwelling west catheter, & chronic constipation (on bowel regimen).  Pt admitted with sepsis 2/2 b/l feet infection. s/p PICC line 12/11. to continue abx until 01/08. ID following. Vascular following; no AKA at this time, wound healing appropriately.  Pt was scheduled to return to long-term SNF, however was having acute respiratory failure presumed 2/2 COPD; was started on Lasix, on 5L NC, on fluid restriction.  Palliative following. Pt is full code.  Plan is for pt to return back to long-term SNF once medically ready.    Factors impacting intake: [x] none [ ] nausea  [ ] vomiting [ ] diarrhea [ ] constipation  [ ]chewing problems [ ] swallowing issues  [ ] other:     Diet Prescription: Diet, Regular:   1000mL Fluid Restriction ([id number])  Supplement Feeding Modality:  Oral  Ensure Plus High Protein Cans or Servings Per Day:  1       Frequency:  Two Times a day (12-20-23 @ 10:27)    Intake: Mostly % of meals; pt drinking 100% of Ensure supplements    Current Weight: No recent weights to trend; request current wt  % Weight Change: N/A    3+ edema b/l feet & ankles (12/26)    Pertinent Medications: MEDICATIONS  (STANDING):  acetaminophen     Tablet .. 650 milliGRAM(s) Oral daily  albuterol/ipratropium for Nebulization 3 milliLiter(s) Nebulizer every 8 hours  amLODIPine   Tablet 2.5 milliGRAM(s) Oral daily  ascorbic acid 500 milliGRAM(s) Oral daily  atorvastatin 40 milliGRAM(s) Oral at bedtime  bacitracin   Ointment 1 Application(s) Topical daily  budesonide 160 MICROgram(s)/formoterol 4.5 MICROgram(s) Inhaler 2 Puff(s) Inhalation two times a day  chlorhexidine 2% Cloths 1 Application(s) Topical <User Schedule>  collagenase Ointment 1 Application(s) Topical daily  cyanocobalamin 1000 MICROGram(s) Oral daily  cyclobenzaprine 10 milliGRAM(s) Oral three times a day  enoxaparin Injectable 40 milliGRAM(s) SubCutaneous every 24 hours  ferrous    sulfate 325 milliGRAM(s) Oral <User Schedule>  finasteride 5 milliGRAM(s) Oral daily  folic acid 1 milliGRAM(s) Oral daily  gabapentin 800 milliGRAM(s) Oral every 8 hours  guaiFENesin  milliGRAM(s) Oral every 12 hours  lactobacillus acidophilus 1 Tablet(s) Oral two times a day with meals  lidocaine   4% Patch 1 Patch Transdermal daily  meropenem  IVPB 1000 milliGRAM(s) IV Intermittent every 8 hours  methadone    Tablet 60 milliGRAM(s) Oral two times a day  methylphenidate 5 milliGRAM(s) Oral <User Schedule>  multivitamin 1 Tablet(s) Oral daily  nystatin Powder 1 Application(s) Topical two times a day  pantoprazole    Tablet 40 milliGRAM(s) Oral before breakfast  polyethylene glycol 3350 17 Gram(s) Oral daily  QUEtiapine 100 milliGRAM(s) Oral <User Schedule>  QUEtiapine 400 milliGRAM(s) Oral at bedtime  senna 2 Tablet(s) Oral at bedtime  tamsulosin 0.4 milliGRAM(s) Oral at bedtime  thiamine 100 milliGRAM(s) Oral daily  vancomycin  IVPB 1000 milliGRAM(s) IV Intermittent every 12 hours  zinc sulfate 220 milliGRAM(s) Oral daily    MEDICATIONS  (PRN):  acetaminophen     Tablet .. 650 milliGRAM(s) Oral every 6 hours PRN Temp greater or equal to 38C (100.4F), Mild Pain (1 - 3)  aluminum hydroxide/magnesium hydroxide/simethicone Suspension 30 milliLiter(s) Oral every 4 hours PRN Dyspepsia  HYDROmorphone  Injectable 2 milliGRAM(s) IV Push every 4 hours PRN Severe Pain (7 - 10)  melatonin 3 milliGRAM(s) Oral at bedtime PRN Insomnia  sodium chloride 0.9% lock flush 10 milliLiter(s) IV Push every 1 hour PRN Pre/post blood products, medications, blood draw, and to maintain line patency    Pertinent Labs: 12-27 Na141 mmol/L Glu 87 mg/dL K+ 4.2 mmol/L Cr  0.50 mg/dL BUN 18 mg/dL 12-27 Phos 3.5 mg/dL 12-26 Alb 2.0 g/dL<L>  12-04 HgbA1c 5.3%    Skin: As per flow sheets, pt with wound x 3, & pressure ulcers as follows:  stage IV pressure ulcer; R heel as per flow sheets  stage IV pressure ulcer; R hip as per flow sheets  unstageable pressure ulcer; location not clear as per flow sheets    Estimated Needs:   [x] no change since previous assessment on 12/07  [ ] recalculated:     Previous Nutrition Diagnosis:   [x] Moderate malnutrition in chronic setting  Etiology: Inadequate protein-energy intake & increased needs r/t hx of back surgery c/b b/l leg paralysis, hx of pneumoperitoneum (s/p ex lap, colectomy, ileostomy) & PU  Signs/Symptoms: Physical signs of mild-moderate muscle wasting and fat depletion as noted    Goal: Pt to meet >75% of protein-energy needs via meals/supplement - met    Nutrition Diagnosis is [x] ongoing  [ ] resolved [ ] not applicable     New Nutrition Diagnosis: [x] not applicable       Interventions:   Recommend  [x] Continue with current diet rx as ordered  [ ] Change Diet To:  [ ] Nutrition Supplement  [ ] Nutrition Support  [ ] Other:     Monitoring and Evaluation:   [ x ] PO intake [ x ] Tolerance to diet prescription [ x ] weights [ x ] labs[ x ] follow up per protocol  [ ] other: Pt from long-term SNF with PMH of HTN, HLD, bipolar disorder, hx of opioid dependency, GERD, cervical epidural abscess (s/p decompressive laminectomy 03/2021 c/b b/l leg paralysis), hx of pneumoperitoneum (s/p ex lap, total abdominal colectomy and end ileostomy 01/2023), hx of hepatitis C, hx of R buttocks abscess, hx of L foot OM, neurogenic bladder with indwelling west catheter, & chronic constipation (on bowel regimen).  Pt admitted with sepsis 2/2 b/l feet infection. s/p PICC line 12/11. to continue abx until 01/08. ID following. Vascular following; no AKA at this time, wound healing appropriately.  Pt was scheduled to return to long-term SNF, however was having acute respiratory failure presumed 2/2 COPD; was started on Lasix, on 5L NC, on fluid restriction.  Palliative following. Pt is full code.  Plan is for pt to return back to long-term SNF once medically ready.    Factors impacting intake: [x] none [ ] nausea  [ ] vomiting [ ] diarrhea [ ] constipation  [ ]chewing problems [ ] swallowing issues  [ ] other:     Diet Prescription: Diet, Regular:   1000mL Fluid Restriction ([id number])  Supplement Feeding Modality:  Oral  Ensure Plus High Protein Cans or Servings Per Day:  1       Frequency:  Two Times a day (12-20-23 @ 10:27)    Intake: Mostly % of meals; pt drinking 100% of Ensure supplements    Current Weight: No recent weights to trend; request current wt  % Weight Change: N/A    3+ edema b/l feet & ankles (12/26)    Pertinent Medications: MEDICATIONS  (STANDING):  acetaminophen     Tablet .. 650 milliGRAM(s) Oral daily  albuterol/ipratropium for Nebulization 3 milliLiter(s) Nebulizer every 8 hours  amLODIPine   Tablet 2.5 milliGRAM(s) Oral daily  ascorbic acid 500 milliGRAM(s) Oral daily  atorvastatin 40 milliGRAM(s) Oral at bedtime  bacitracin   Ointment 1 Application(s) Topical daily  budesonide 160 MICROgram(s)/formoterol 4.5 MICROgram(s) Inhaler 2 Puff(s) Inhalation two times a day  chlorhexidine 2% Cloths 1 Application(s) Topical <User Schedule>  collagenase Ointment 1 Application(s) Topical daily  cyanocobalamin 1000 MICROGram(s) Oral daily  cyclobenzaprine 10 milliGRAM(s) Oral three times a day  enoxaparin Injectable 40 milliGRAM(s) SubCutaneous every 24 hours  ferrous    sulfate 325 milliGRAM(s) Oral <User Schedule>  finasteride 5 milliGRAM(s) Oral daily  folic acid 1 milliGRAM(s) Oral daily  gabapentin 800 milliGRAM(s) Oral every 8 hours  guaiFENesin  milliGRAM(s) Oral every 12 hours  lactobacillus acidophilus 1 Tablet(s) Oral two times a day with meals  lidocaine   4% Patch 1 Patch Transdermal daily  meropenem  IVPB 1000 milliGRAM(s) IV Intermittent every 8 hours  methadone    Tablet 60 milliGRAM(s) Oral two times a day  methylphenidate 5 milliGRAM(s) Oral <User Schedule>  multivitamin 1 Tablet(s) Oral daily  nystatin Powder 1 Application(s) Topical two times a day  pantoprazole    Tablet 40 milliGRAM(s) Oral before breakfast  polyethylene glycol 3350 17 Gram(s) Oral daily  QUEtiapine 100 milliGRAM(s) Oral <User Schedule>  QUEtiapine 400 milliGRAM(s) Oral at bedtime  senna 2 Tablet(s) Oral at bedtime  tamsulosin 0.4 milliGRAM(s) Oral at bedtime  thiamine 100 milliGRAM(s) Oral daily  vancomycin  IVPB 1000 milliGRAM(s) IV Intermittent every 12 hours  zinc sulfate 220 milliGRAM(s) Oral daily    MEDICATIONS  (PRN):  acetaminophen     Tablet .. 650 milliGRAM(s) Oral every 6 hours PRN Temp greater or equal to 38C (100.4F), Mild Pain (1 - 3)  aluminum hydroxide/magnesium hydroxide/simethicone Suspension 30 milliLiter(s) Oral every 4 hours PRN Dyspepsia  HYDROmorphone  Injectable 2 milliGRAM(s) IV Push every 4 hours PRN Severe Pain (7 - 10)  melatonin 3 milliGRAM(s) Oral at bedtime PRN Insomnia  sodium chloride 0.9% lock flush 10 milliLiter(s) IV Push every 1 hour PRN Pre/post blood products, medications, blood draw, and to maintain line patency    Pertinent Labs: 12-27 Na141 mmol/L Glu 87 mg/dL K+ 4.2 mmol/L Cr  0.50 mg/dL BUN 18 mg/dL 12-27 Phos 3.5 mg/dL 12-26 Alb 2.0 g/dL<L>  12-04 HgbA1c 5.3%    Skin: As per flow sheets, pt with wound x 3, & pressure ulcers as follows:  stage IV pressure ulcer; R heel as per flow sheets  stage IV pressure ulcer; R hip as per flow sheets  unstageable pressure ulcer; location not clear as per flow sheets    Estimated Needs:   [x] no change since previous assessment on 12/07  [ ] recalculated:     Previous Nutrition Diagnosis:   [x] Moderate malnutrition in chronic setting  Etiology: Inadequate protein-energy intake & increased needs r/t hx of back surgery c/b b/l leg paralysis, hx of pneumoperitoneum (s/p ex lap, colectomy, ileostomy) & PU  Signs/Symptoms: Physical signs of mild-moderate muscle wasting and fat depletion as noted    Goal: Pt to meet >75% of protein-energy needs via meals/supplement - met    Nutrition Diagnosis is [x] ongoing  [ ] resolved [ ] not applicable     New Nutrition Diagnosis: [x] not applicable       Interventions:   Recommend  [x] Continue with current diet rx as ordered  [ ] Change Diet To:  [ ] Nutrition Supplement  [ ] Nutrition Support  [ ] Other:     Monitoring and Evaluation:   [ x ] PO intake [ x ] Tolerance to diet prescription [ x ] weights [ x ] labs[ x ] follow up per protocol  [ ] other:

## 2023-12-27 NOTE — PROGRESS NOTE ADULT - ASSESSMENT
Gonzalez Stewart is a 53 year old male with PMHx of cervical epidural abscess (s/p decompressive laminectomy 3/2021 c/b b/l leg paralysis), hx of pneumoperitoneum (s/p ex lap, total abdominal colectomy and end ileostomy (on 1/12/23) c/b evisceration and sepsis with MRSA bacteremia postoperatively), hx of hepatitis C, hx of R. buttock abscess, hx of L. foot osteomyelitis, neurogenic bladder (with indwelling Holcomb catheter, last exchanged two days ago), HTN, HLD, bipolar disorder, GERD, hx of opioid dependence, and constipation who presented to the ED on 12/4/23 from [facility] for feet infection and admitted for sepsis secondary to bilateral feet infection.      Sepsis secondary to b/l feet infection  Previously treated for L. hallux OM with L. heel culture growing Proteus mirabilis ESBL, completed 6-week course of avycaz  CT b/l LE with study is severely limited by contracture. Left lower extremity is only partially visualized with exclusion of the left foot.  S/p bedside escharectomy by podiatry  right foot wound cx- ESBL proteus - resistant to cefepime and corynebecaterium and alcalegenes  left foot wound cx- MRSA and Pseudomonas  and klebsiella   Blood cx- neg x 2  PICC line in place 12/11/23   Will continue meropenem and vancomycin until 1/8 per ID.    ECHO EF 55-60%, normal systolic function, and mild mitral regurgitation   Discussed with vascular no plan for AKA. cont local wound care     COPD  chronic hypoxic respiratory failure   Baseline patient is on 5L O2 NC at home   Had an episode of resp distress from fluid overload from IVF- Placed on lasix drip for some time with improvement  Continue PO lasix   ECHO reviewed.   Pulm consulted    cont airway clearance w/ aerobika. cont current bronchodilators.     CT chest : Emphysematous disease with likely concurrent interstitial fibrosis.   Suspected  chronic microaspiration         HTN. controlled.   continue  amlodipine 2.5 mg daily     HLD  continue  atorvastatin 40 mg daily     Bipolar disorder, stable   : PTA quetiapine 100 mg BID and 400 mg qhs,  12/12/2023 valproic qhs been stopped by psych      Hx of opioid dependence  continue methadone maintenance 60mg bid   Pending QTc today.     Chronic pain  with spasms. cont methadone and iv dilaudid prn. watch for oversedation. cont senna and colace. discussed with palliative care service.     GERD:   continue with PPI     Neurogenic bladder (with indwelling Holcomb catheter)   PTA finasteride 5 mg, tamsulosin 0.4 mg    Constipation:   continue with bowel regimen      functional quad-   B/L contracted   supportive care  , frequent repositioning      Rt hip pressure wound, seen by vascular ,   wound recs in place     Mod PCM  nutrition recommendations appreciated     Normocytic anemia , SARAH   no e/o bleeding or bruising   H/H stable   supplement     Preventative Measures  lovenox SQ-dvt ppx  fall, aspiration precautions   HOBE     palliative following, patient is full code       Time spent by me managing the patient including but not limited to reviewing the chart, discussion with the nurse and Family, physical exam and assessment and plan is 54 mins  Gonzalez Stewart is a 53 year old male with PMHx of cervical epidural abscess (s/p decompressive laminectomy 3/2021 c/b b/l leg paralysis), hx of pneumoperitoneum (s/p ex lap, total abdominal colectomy and end ileostomy (on 1/12/23) c/b evisceration and sepsis with MRSA bacteremia postoperatively), hx of hepatitis C, hx of R. buttock abscess, hx of L. foot osteomyelitis, neurogenic bladder (with indwelling Holcomb catheter, last exchanged two days ago), HTN, HLD, bipolar disorder, GERD, hx of opioid dependence, and constipation who presented to the ED on 12/4/23 from [facility] for feet infection and admitted for sepsis secondary to bilateral feet infection.      Sepsis secondary to b/l feet infection  Previously treated for L. hallux OM with L. heel culture growing Proteus mirabilis ESBL, completed 6-week course of avycaz  CT b/l LE with study is severely limited by contracture. Left lower extremity is only partially visualized with exclusion of the left foot.  S/p bedside escharectomy by podiatry  right foot wound cx- ESBL proteus - resistant to cefepime and corynebecaterium and alcalegenes  left foot wound cx- MRSA and Pseudomonas  and klebsiella   Blood cx- neg x 2  PICC line in place 12/11/23   Will continue meropenem and vancomycin until 1/8 per ID.    ECHO EF 55-60%, normal systolic function, and mild mitral regurgitation   Discussed with vascular no plan for AKA. cont local wound care     COPD  chronic hypoxic respiratory failure   Baseline patient is on 5L O2 NC at home   Had an episode of resp distress from fluid overload from IVF- Placed on lasix drip for some time with improvement  Continue PO lasix   ECHO reviewed.   Pulm consulted    cont airway clearance w/ aerobika. cont current bronchodilators.     CT chest : Emphysematous disease with likely concurrent interstitial fibrosis.   Suspected  chronic microaspiration         HTN. controlled.   continue  amlodipine 2.5 mg daily     HLD  continue  atorvastatin 40 mg daily     Bipolar disorder, stable   : PTA quetiapine 100 mg BID and 400 mg qhs,  12/12/2023 valproic qhs been stopped by psych      Hx of opioid dependence  continue methadone maintenance 60mg bid   Pending QTc today.     Chronic pain  with spasms. cont methadone and iv dilaudid prn. watch for oversedation. cont senna and colace. discussed with palliative care service.     GERD:   continue with PPI     Neurogenic bladder (with indwelling Holcomb catheter)   PTA finasteride 5 mg, tamsulosin 0.4 mg    Constipation:   continue with bowel regimen      functional quad-   B/L contracted   supportive care  , frequent repositioning      Rt hip pressure wound, seen by vascular ,   wound recs in place     Mod PCM  nutrition recommendations appreciated     Normocytic anemia , SARAH   no e/o bleeding or bruising   H/H stable   supplement     Preventative Measures  lovenox SQ-dvt ppx  fall, aspiration precautions   HOBE     palliative following, patient is full code       Time spent by me managing the patient including but not limited to reviewing the chart, discussion with the nurse and Family, physical exam and assessment and plan is 54 mins

## 2023-12-27 NOTE — PROGRESS NOTE ADULT - PROBLEM SELECTOR PLAN 6
despite having excellent appetite, BLE muscle wasting and contractures  maintained on Regular diet   1000mL Fluid Restriction   Ensure supplements BID   12/26 Albumin 2.0

## 2023-12-27 NOTE — CHART NOTE - NSCHARTNOTESELECT_GEN_ALL_CORE
Event Note
Event Note
Nutrition Services
Off Service Note
repeat EKG/Event Note
Positive wound cultures/Event Note
Metha/Event Note

## 2023-12-28 PROCEDURE — 99233 SBSQ HOSP IP/OBS HIGH 50: CPT

## 2023-12-28 PROCEDURE — 93010 ELECTROCARDIOGRAM REPORT: CPT

## 2023-12-28 RX ORDER — HYDROMORPHONE HYDROCHLORIDE 2 MG/ML
2 INJECTION INTRAMUSCULAR; INTRAVENOUS; SUBCUTANEOUS EVERY 4 HOURS
Refills: 0 | Status: DISCONTINUED | OUTPATIENT
Start: 2023-12-28 | End: 2024-01-03

## 2023-12-28 RX ADMIN — ATORVASTATIN CALCIUM 40 MILLIGRAM(S): 80 TABLET, FILM COATED ORAL at 21:58

## 2023-12-28 RX ADMIN — MEROPENEM 100 MILLIGRAM(S): 1 INJECTION INTRAVENOUS at 21:59

## 2023-12-28 RX ADMIN — Medication 250 MILLIGRAM(S): at 19:16

## 2023-12-28 RX ADMIN — GABAPENTIN 800 MILLIGRAM(S): 400 CAPSULE ORAL at 21:58

## 2023-12-28 RX ADMIN — HYDROMORPHONE HYDROCHLORIDE 2 MILLIGRAM(S): 2 INJECTION INTRAMUSCULAR; INTRAVENOUS; SUBCUTANEOUS at 13:26

## 2023-12-28 RX ADMIN — NYSTATIN CREAM 1 APPLICATION(S): 100000 CREAM TOPICAL at 18:16

## 2023-12-28 RX ADMIN — NYSTATIN CREAM 1 APPLICATION(S): 100000 CREAM TOPICAL at 05:35

## 2023-12-28 RX ADMIN — CYCLOBENZAPRINE HYDROCHLORIDE 10 MILLIGRAM(S): 10 TABLET, FILM COATED ORAL at 21:58

## 2023-12-28 RX ADMIN — Medication 1 TABLET(S): at 19:15

## 2023-12-28 RX ADMIN — Medication 5 MILLIGRAM(S): at 09:38

## 2023-12-28 RX ADMIN — Medication 600 MILLIGRAM(S): at 05:15

## 2023-12-28 RX ADMIN — QUETIAPINE FUMARATE 400 MILLIGRAM(S): 200 TABLET, FILM COATED ORAL at 21:59

## 2023-12-28 RX ADMIN — HYDROMORPHONE HYDROCHLORIDE 2 MILLIGRAM(S): 2 INJECTION INTRAMUSCULAR; INTRAVENOUS; SUBCUTANEOUS at 12:26

## 2023-12-28 RX ADMIN — GABAPENTIN 800 MILLIGRAM(S): 400 CAPSULE ORAL at 16:05

## 2023-12-28 RX ADMIN — Medication 600 MILLIGRAM(S): at 19:15

## 2023-12-28 RX ADMIN — MEROPENEM 100 MILLIGRAM(S): 1 INJECTION INTRAVENOUS at 16:06

## 2023-12-28 RX ADMIN — CYCLOBENZAPRINE HYDROCHLORIDE 10 MILLIGRAM(S): 10 TABLET, FILM COATED ORAL at 16:01

## 2023-12-28 RX ADMIN — TAMSULOSIN HYDROCHLORIDE 0.4 MILLIGRAM(S): 0.4 CAPSULE ORAL at 21:58

## 2023-12-28 RX ADMIN — CHLORHEXIDINE GLUCONATE 1 APPLICATION(S): 213 SOLUTION TOPICAL at 05:23

## 2023-12-28 RX ADMIN — CYCLOBENZAPRINE HYDROCHLORIDE 10 MILLIGRAM(S): 10 TABLET, FILM COATED ORAL at 05:16

## 2023-12-28 RX ADMIN — HYDROMORPHONE HYDROCHLORIDE 2 MILLIGRAM(S): 2 INJECTION INTRAMUSCULAR; INTRAVENOUS; SUBCUTANEOUS at 18:59

## 2023-12-28 RX ADMIN — GABAPENTIN 800 MILLIGRAM(S): 400 CAPSULE ORAL at 05:16

## 2023-12-28 RX ADMIN — Medication 100 MILLIGRAM(S): at 12:10

## 2023-12-28 RX ADMIN — Medication 5 MILLIGRAM(S): at 21:57

## 2023-12-28 RX ADMIN — Medication 500 MILLIGRAM(S): at 12:09

## 2023-12-28 RX ADMIN — Medication 250 MILLIGRAM(S): at 05:34

## 2023-12-28 RX ADMIN — METHADONE HYDROCHLORIDE 60 MILLIGRAM(S): 40 TABLET ORAL at 05:15

## 2023-12-28 RX ADMIN — QUETIAPINE FUMARATE 100 MILLIGRAM(S): 200 TABLET, FILM COATED ORAL at 19:16

## 2023-12-28 RX ADMIN — BUDESONIDE AND FORMOTEROL FUMARATE DIHYDRATE 2 PUFF(S): 160; 4.5 AEROSOL RESPIRATORY (INHALATION) at 05:17

## 2023-12-28 RX ADMIN — Medication 650 MILLIGRAM(S): at 13:10

## 2023-12-28 RX ADMIN — FINASTERIDE 5 MILLIGRAM(S): 5 TABLET, FILM COATED ORAL at 12:11

## 2023-12-28 RX ADMIN — ENOXAPARIN SODIUM 40 MILLIGRAM(S): 100 INJECTION SUBCUTANEOUS at 21:57

## 2023-12-28 RX ADMIN — QUETIAPINE FUMARATE 100 MILLIGRAM(S): 200 TABLET, FILM COATED ORAL at 09:38

## 2023-12-28 RX ADMIN — Medication 650 MILLIGRAM(S): at 12:10

## 2023-12-28 RX ADMIN — Medication 1 TABLET(S): at 10:05

## 2023-12-28 RX ADMIN — PANTOPRAZOLE SODIUM 40 MILLIGRAM(S): 20 TABLET, DELAYED RELEASE ORAL at 05:16

## 2023-12-28 RX ADMIN — AMLODIPINE BESYLATE 2.5 MILLIGRAM(S): 2.5 TABLET ORAL at 05:16

## 2023-12-28 RX ADMIN — PREGABALIN 1000 MICROGRAM(S): 225 CAPSULE ORAL at 12:10

## 2023-12-28 RX ADMIN — Medication 1 MILLIGRAM(S): at 12:09

## 2023-12-28 RX ADMIN — MEROPENEM 100 MILLIGRAM(S): 1 INJECTION INTRAVENOUS at 05:16

## 2023-12-28 RX ADMIN — ZINC SULFATE TAB 220 MG (50 MG ZINC EQUIVALENT) 220 MILLIGRAM(S): 220 (50 ZN) TAB at 12:10

## 2023-12-28 RX ADMIN — METHADONE HYDROCHLORIDE 60 MILLIGRAM(S): 40 TABLET ORAL at 19:15

## 2023-12-28 RX ADMIN — HYDROMORPHONE HYDROCHLORIDE 2 MILLIGRAM(S): 2 INJECTION INTRAMUSCULAR; INTRAVENOUS; SUBCUTANEOUS at 18:20

## 2023-12-28 RX ADMIN — Medication 3 MILLILITER(S): at 20:56

## 2023-12-28 RX ADMIN — SENNA PLUS 2 TABLET(S): 8.6 TABLET ORAL at 21:57

## 2023-12-28 RX ADMIN — Medication 1 TABLET(S): at 12:12

## 2023-12-28 NOTE — PROGRESS NOTE ADULT - SUBJECTIVE AND OBJECTIVE BOX
CHIEF COMPLAINT: FU of sepsis with foot infection  no fever  no diarrhea   iv dilaudid and ritalin fell off the med list per nurse.       PHYSICAL EXAM:    GENERAL: Moderately built, no acute distress   CHEST/LUNG:  No wheezing, no crackles   HEART: Regular rate and rhythm; No murmurs  ABDOMEN: Soft, Nontender, Nondistended; Bowel sounds present. + west, ileostomy, PICC line.   EXTREMITIES:  No clubbing, cyanosis, or edema. + dressing bilateral feet. + pressure wound right buttock  Psychiatry: AAO x 3, mood is appropriate       OBJECTIVE DATA:       Vital Signs Last 24 Hrs  T(C): 37.4 (28 Dec 2023 11:03), Max: 37.4 (28 Dec 2023 11:03)  T(F): 99.3 (28 Dec 2023 11:03), Max: 99.3 (28 Dec 2023 11:03)  HR: 83 (28 Dec 2023 11:03) (80 - 94)  BP: 107/68 (28 Dec 2023 11:03) (107/68 - 114/74)  BP(mean): --  RR: 18 (28 Dec 2023 11:03) (17 - 18)  SpO2: 92% (28 Dec 2023 11:03) (92% - 96%)    Parameters below as of 28 Dec 2023 11:03  Patient On (Oxygen Delivery Method): nasal cannula               Daily     Daily   LABS:                        10.2   7.24  )-----------( 173      ( 27 Dec 2023 05:18 )             33.8             12-27    141  |  108  |  18  ----------------------------<  87  4.2   |  31  |  0.50    Ca    8.9      27 Dec 2023 05:18  Phos  3.5     12-27  Mg     1.9     12-27                  Urinalysis Basic - ( 27 Dec 2023 05:18 )    Color: x / Appearance: x / SG: x / pH: x  Gluc: 87 mg/dL / Ketone: x  / Bili: x / Urobili: x   Blood: x / Protein: x / Nitrite: x   Leuk Esterase: x / RBC: x / WBC x   Sq Epi: x / Non Sq Epi: x / Bacteria: x      MEDICATIONS  (STANDING):  acetaminophen     Tablet .. 650 milliGRAM(s) Oral daily  amLODIPine   Tablet 2.5 milliGRAM(s) Oral daily  ascorbic acid 500 milliGRAM(s) Oral daily  atorvastatin 40 milliGRAM(s) Oral at bedtime  bacitracin   Ointment 1 Application(s) Topical daily  budesonide 160 MICROgram(s)/formoterol 4.5 MICROgram(s) Inhaler 2 Puff(s) Inhalation two times a day  chlorhexidine 2% Cloths 1 Application(s) Topical <User Schedule>  collagenase Ointment 1 Application(s) Topical daily  cyanocobalamin 1000 MICROGram(s) Oral daily  cyclobenzaprine 10 milliGRAM(s) Oral three times a day  enoxaparin Injectable 40 milliGRAM(s) SubCutaneous every 24 hours  ferrous    sulfate 325 milliGRAM(s) Oral <User Schedule>  finasteride 5 milliGRAM(s) Oral daily  folic acid 1 milliGRAM(s) Oral daily  gabapentin 800 milliGRAM(s) Oral every 8 hours  guaiFENesin  milliGRAM(s) Oral every 12 hours  lactobacillus acidophilus 1 Tablet(s) Oral two times a day with meals  lidocaine   4% Patch 1 Patch Transdermal daily  meropenem  IVPB 1000 milliGRAM(s) IV Intermittent every 8 hours  methadone    Tablet 60 milliGRAM(s) Oral two times a day  methylphenidate 5 milliGRAM(s) Oral <User Schedule>  multivitamin 1 Tablet(s) Oral daily  nystatin Powder 1 Application(s) Topical two times a day  pantoprazole    Tablet 40 milliGRAM(s) Oral before breakfast  polyethylene glycol 3350 17 Gram(s) Oral daily  QUEtiapine 100 milliGRAM(s) Oral <User Schedule>  QUEtiapine 400 milliGRAM(s) Oral at bedtime  senna 2 Tablet(s) Oral at bedtime  tamsulosin 0.4 milliGRAM(s) Oral at bedtime  thiamine 100 milliGRAM(s) Oral daily  vancomycin  IVPB 1000 milliGRAM(s) IV Intermittent every 12 hours  zinc sulfate 220 milliGRAM(s) Oral daily    MEDICATIONS  (PRN):  acetaminophen     Tablet .. 650 milliGRAM(s) Oral every 6 hours PRN Temp greater or equal to 38C (100.4F), Mild Pain (1 - 3)  albuterol/ipratropium for Nebulization 3 milliLiter(s) Nebulizer every 8 hours PRN Shortness of Breath and/or Wheezing  aluminum hydroxide/magnesium hydroxide/simethicone Suspension 30 milliLiter(s) Oral every 4 hours PRN Dyspepsia  HYDROmorphone   Tablet 2 milliGRAM(s) Oral every 4 hours PRN Severe Pain (7 - 10)  melatonin 3 milliGRAM(s) Oral at bedtime PRN Insomnia  sodium chloride 0.9% lock flush 10 milliLiter(s) IV Push every 1 hour PRN Pre/post blood products, medications, blood draw, and to maintain line patency

## 2023-12-28 NOTE — PROGRESS NOTE ADULT - ASSESSMENT
Gonzalez Stewart is a 53 year old male with PMHx of cervical epidural abscess (s/p decompressive laminectomy 3/2021 c/b b/l leg paralysis), hx of pneumoperitoneum (s/p ex lap, total abdominal colectomy and end ileostomy (on 1/12/23) c/b evisceration and sepsis with MRSA bacteremia postoperatively), hx of hepatitis C, hx of R. buttock abscess, hx of L. foot osteomyelitis, neurogenic bladder (with indwelling Holcomb catheter, last exchanged two days ago), HTN, HLD, bipolar disorder, GERD, hx of opioid dependence, and constipation who presented to the ED on 12/4/23 from [facility] for feet infection and admitted for sepsis secondary to bilateral feet infection.      Sepsis secondary to b/l feet infection  Previously treated for L. hallux OM with L. heel culture growing Proteus mirabilis ESBL, completed 6-week course of avycaz  CT b/l LE with study is severely limited by contracture. Left lower extremity is only partially visualized with exclusion of the left foot.  S/p bedside escharectomy by podiatry  right foot wound cx- ESBL proteus - resistant to cefepime and corynebecaterium and alcalegenes  left foot wound cx- MRSA and Pseudomonas  and klebsiella   Blood cx- neg x 2  PICC line in place 12/11/23   Will continue meropenem and vancomycin until 1/8 per ID.  vanco level 13.3   ECHO EF 55-60%, normal systolic function, and mild mitral regurgitation       COPD  chronic hypoxic respiratory failure   Baseline patient is on 5L O2 NC at home   Had an episode of resp distress from fluid overload from IVF- Placed on lasix drip for some time with improvement  Continue PO lasix   ECHO reviewed.   Pulm consulted    cont airway clearance w/ aerobika. cont current bronchodilators.     CT chest : Emphysematous disease with likely concurrent interstitial fibrosis.   Suspected  chronic microaspiration         HTN. controlled.   continue  amlodipine 2.5 mg daily     HLD  continue  atorvastatin 40 mg daily     Bipolar disorder, stable   : PTA quetiapine 100 mg BID and 400 mg qhs,  12/12/2023 valproic qhs been stopped by psych      Hx of opioid dependence  continue methadone maintenance. re-ordered EKG to monitor QTc.       Chronic pain  with spasms. cont methadone. start oral dilaudid prn and cont constipation ppx.     GERD:   continue with PPI     Neurogenic bladder (with indwelling Holcomb catheter)   PTA finasteride 5 mg, tamsulosin 0.4 mg    Constipation:   continue with bowel regimen      functional quad-   B/L contracted   supportive care  , frequent repositioning      Rt hip pressure wound, seen by vascular ,   wound recs in place     Mod PCM  nutrition recommendations appreciated     Normocytic anemia , SARAH   no e/o bleeding or bruising   H/H stable   supplement     Preventative Measures  lovenox SQ-dvt ppx  fall, aspiration precautions   HOBE     palliative following, patient is full code       Time spent by me managing the patient including but not limited to reviewing the chart, discussion with the nurse and Family, physical exam and assessment and plan is 55 mins  Gonzalez Stewart is a 53 year old male with PMHx of cervical epidural abscess (s/p decompressive laminectomy 3/2021 c/b b/l leg paralysis), hx of pneumoperitoneum (s/p ex lap, total abdominal colectomy and end ileostomy (on 1/12/23) c/b evisceration and sepsis with MRSA bacteremia postoperatively), hx of hepatitis C, hx of R. buttock abscess, hx of L. foot osteomyelitis, neurogenic bladder (with indwelling Holcomb catheter, last exchanged two days ago), HTN, HLD, bipolar disorder, GERD, hx of opioid dependence, and constipation who presented to the ED on 12/4/23 from [facility] for feet infection and admitted for sepsis secondary to bilateral feet infection.      Sepsis secondary to b/l feet infection  Previously treated for L. hallux OM with L. heel culture growing Proteus mirabilis ESBL, completed 6-week course of avycaz  CT b/l LE with study is severely limited by contracture. Left lower extremity is only partially visualized with exclusion of the left foot.  S/p bedside escharectomy by podiatry  right foot wound cx- ESBL proteus - resistant to cefepime and corynebecaterium and alcalegenes  left foot wound cx- MRSA and Pseudomonas  and klebsiella   Blood cx- neg x 2  PICC line in place 12/11/23   Will continue meropenem and vancomycin until 1/8 per ID.  vanco level 13.3   ECHO EF 55-60%, normal systolic function, and mild mitral regurgitation       COPD  chronic hypoxic respiratory failure   Baseline patient is on 5L O2 NC at home   Had an episode of resp distress from fluid overload from IVF- Placed on lasix drip for some time with improvement  Continue PO lasix   ECHO reviewed.   Pulm consulted    cont airway clearance w/ aerobika. cont current bronchodilators.     CT chest : Emphysematous disease with likely concurrent interstitial fibrosis.   Suspected  chronic microaspiration         HTN. controlled.   continue  amlodipine 2.5 mg daily     HLD  continue  atorvastatin 40 mg daily     Bipolar disorder, stable   : PTA quetiapine 100 mg BID and 400 mg qhs,  12/12/2023 valproic qhs been stopped by psych      Hx of opioid dependence  continue methadone maintenance. re-ordered EKG to monitor QTc.       Chronic pain  with spasms. cont methadone. start oral dilaudid prn and cont constipation ppx.     GERD:   continue with PPI     Neurogenic bladder (with indwelling Holcomb catheter)   PTA finasteride 5 mg, tamsulosin 0.4 mg    Constipation:   continue with bowel regimen      functional quad-   B/L contracted   supportive care  , frequent repositioning      Rt hip pressure wound, seen by vascular ,   wound recs in place     Mod PCM  nutrition recommendations appreciated     Normocytic anemia , SARAH   no e/o bleeding or bruising   H/H stable   supplement     Preventative Measures  lovenox SQ-dvt ppx  fall, aspiration precautions   HOBE     palliative following, patient is full code       Time spent by me managing the patient including but not limited to reviewing the chart, discussion with the nurse and Family, physical exam and assessment and plan is 55 mins

## 2023-12-29 LAB
GLUCOSE BLDC GLUCOMTR-MCNC: 122 MG/DL — HIGH (ref 70–99)
GLUCOSE BLDC GLUCOMTR-MCNC: 122 MG/DL — HIGH (ref 70–99)
VANCOMYCIN TROUGH SERPL-MCNC: 13.9 UG/ML — SIGNIFICANT CHANGE UP (ref 10–20)
VANCOMYCIN TROUGH SERPL-MCNC: 13.9 UG/ML — SIGNIFICANT CHANGE UP (ref 10–20)

## 2023-12-29 PROCEDURE — 99232 SBSQ HOSP IP/OBS MODERATE 35: CPT

## 2023-12-29 PROCEDURE — 99233 SBSQ HOSP IP/OBS HIGH 50: CPT

## 2023-12-29 RX ORDER — METHYLPHENIDATE HCL 5 MG
5 TABLET ORAL
Refills: 0 | Status: DISCONTINUED | OUTPATIENT
Start: 2023-12-29 | End: 2024-01-03

## 2023-12-29 RX ORDER — FUROSEMIDE 40 MG
20 TABLET ORAL DAILY
Refills: 0 | Status: DISCONTINUED | OUTPATIENT
Start: 2023-12-29 | End: 2024-01-03

## 2023-12-29 RX ORDER — METHYLPHENIDATE HCL 5 MG
5 TABLET ORAL
Refills: 0 | Status: DISCONTINUED | OUTPATIENT
Start: 2023-12-29 | End: 2023-12-29

## 2023-12-29 RX ADMIN — GABAPENTIN 800 MILLIGRAM(S): 400 CAPSULE ORAL at 05:48

## 2023-12-29 RX ADMIN — METHADONE HYDROCHLORIDE 60 MILLIGRAM(S): 40 TABLET ORAL at 05:48

## 2023-12-29 RX ADMIN — METHADONE HYDROCHLORIDE 60 MILLIGRAM(S): 40 TABLET ORAL at 17:21

## 2023-12-29 RX ADMIN — Medication 20 MILLIGRAM(S): at 13:34

## 2023-12-29 RX ADMIN — AMLODIPINE BESYLATE 2.5 MILLIGRAM(S): 2.5 TABLET ORAL at 05:56

## 2023-12-29 RX ADMIN — Medication 250 MILLIGRAM(S): at 06:18

## 2023-12-29 RX ADMIN — CHLORHEXIDINE GLUCONATE 1 APPLICATION(S): 213 SOLUTION TOPICAL at 05:56

## 2023-12-29 RX ADMIN — FINASTERIDE 5 MILLIGRAM(S): 5 TABLET, FILM COATED ORAL at 12:44

## 2023-12-29 RX ADMIN — Medication 650 MILLIGRAM(S): at 12:46

## 2023-12-29 RX ADMIN — CYCLOBENZAPRINE HYDROCHLORIDE 10 MILLIGRAM(S): 10 TABLET, FILM COATED ORAL at 05:48

## 2023-12-29 RX ADMIN — Medication 650 MILLIGRAM(S): at 13:39

## 2023-12-29 RX ADMIN — MEROPENEM 100 MILLIGRAM(S): 1 INJECTION INTRAVENOUS at 22:25

## 2023-12-29 RX ADMIN — MEROPENEM 100 MILLIGRAM(S): 1 INJECTION INTRAVENOUS at 06:18

## 2023-12-29 RX ADMIN — Medication 600 MILLIGRAM(S): at 17:23

## 2023-12-29 RX ADMIN — Medication 5 MILLIGRAM(S): at 09:06

## 2023-12-29 RX ADMIN — PANTOPRAZOLE SODIUM 40 MILLIGRAM(S): 20 TABLET, DELAYED RELEASE ORAL at 08:33

## 2023-12-29 RX ADMIN — Medication 600 MILLIGRAM(S): at 05:49

## 2023-12-29 RX ADMIN — Medication 1 TABLET(S): at 17:22

## 2023-12-29 RX ADMIN — Medication 100 MILLIGRAM(S): at 12:45

## 2023-12-29 RX ADMIN — Medication 1 TABLET(S): at 08:59

## 2023-12-29 RX ADMIN — Medication 500 MILLIGRAM(S): at 12:44

## 2023-12-29 RX ADMIN — NYSTATIN CREAM 1 APPLICATION(S): 100000 CREAM TOPICAL at 05:52

## 2023-12-29 RX ADMIN — CYCLOBENZAPRINE HYDROCHLORIDE 10 MILLIGRAM(S): 10 TABLET, FILM COATED ORAL at 21:12

## 2023-12-29 RX ADMIN — QUETIAPINE FUMARATE 100 MILLIGRAM(S): 200 TABLET, FILM COATED ORAL at 18:34

## 2023-12-29 RX ADMIN — Medication 5 MILLIGRAM(S): at 22:25

## 2023-12-29 RX ADMIN — Medication 250 MILLIGRAM(S): at 19:46

## 2023-12-29 RX ADMIN — ZINC SULFATE TAB 220 MG (50 MG ZINC EQUIVALENT) 220 MILLIGRAM(S): 220 (50 ZN) TAB at 12:48

## 2023-12-29 RX ADMIN — CYCLOBENZAPRINE HYDROCHLORIDE 10 MILLIGRAM(S): 10 TABLET, FILM COATED ORAL at 13:35

## 2023-12-29 RX ADMIN — HYDROMORPHONE HYDROCHLORIDE 2 MILLIGRAM(S): 2 INJECTION INTRAMUSCULAR; INTRAVENOUS; SUBCUTANEOUS at 21:13

## 2023-12-29 RX ADMIN — NYSTATIN CREAM 1 APPLICATION(S): 100000 CREAM TOPICAL at 17:22

## 2023-12-29 RX ADMIN — ATORVASTATIN CALCIUM 40 MILLIGRAM(S): 80 TABLET, FILM COATED ORAL at 21:13

## 2023-12-29 RX ADMIN — MEROPENEM 100 MILLIGRAM(S): 1 INJECTION INTRAVENOUS at 14:05

## 2023-12-29 RX ADMIN — Medication 1 MILLIGRAM(S): at 12:47

## 2023-12-29 RX ADMIN — Medication 325 MILLIGRAM(S): at 12:44

## 2023-12-29 RX ADMIN — BUDESONIDE AND FORMOTEROL FUMARATE DIHYDRATE 2 PUFF(S): 160; 4.5 AEROSOL RESPIRATORY (INHALATION) at 17:28

## 2023-12-29 RX ADMIN — PREGABALIN 1000 MICROGRAM(S): 225 CAPSULE ORAL at 12:45

## 2023-12-29 RX ADMIN — QUETIAPINE FUMARATE 400 MILLIGRAM(S): 200 TABLET, FILM COATED ORAL at 22:28

## 2023-12-29 RX ADMIN — QUETIAPINE FUMARATE 100 MILLIGRAM(S): 200 TABLET, FILM COATED ORAL at 09:09

## 2023-12-29 RX ADMIN — TAMSULOSIN HYDROCHLORIDE 0.4 MILLIGRAM(S): 0.4 CAPSULE ORAL at 21:12

## 2023-12-29 RX ADMIN — GABAPENTIN 800 MILLIGRAM(S): 400 CAPSULE ORAL at 21:12

## 2023-12-29 RX ADMIN — POLYETHYLENE GLYCOL 3350 17 GRAM(S): 17 POWDER, FOR SOLUTION ORAL at 12:46

## 2023-12-29 RX ADMIN — Medication 1 TABLET(S): at 12:44

## 2023-12-29 RX ADMIN — Medication 1 APPLICATION(S): at 12:48

## 2023-12-29 RX ADMIN — GABAPENTIN 800 MILLIGRAM(S): 400 CAPSULE ORAL at 13:35

## 2023-12-29 NOTE — PROGRESS NOTE ADULT - ASSESSMENT
A/P-  53 year old male with PMHx of cervical epidural abscess (s/p decompressive laminectomy 3/2021 c/b b/l leg paralysis), hx of pneumoperitoneum (s/p ex lap, total abdominal colectomy and end ileostomy (on 1/12/23) c/b evisceration and sepsis with MRSA bacteremia postoperatively), hx of hepatitis C, hx of R. buttock abscess, hx of L. foot osteomyelitis, neurogenic bladder (with indwelling Holcomb catheter, last exchanged two days ago), HTN, HLD, bipolar disorder, GERD, hx of opioid dependence, and constipation who presented to the ED on 12/4/23 from [facility] for feet infection.    b/l feet infections and overlying cellulitis  Right heel wound infection to bone and as per xray c/w Om as well.  no report of any gas on either xray or CT      b/l foot infection  Om of right heel wound  + leukocytosis - has resolved  HCV- states was treated for this and was told it was cleared   severe contractures of b/l LE  right foot wound cx- ESBL proteus - resistant to cefepime and corynebecaterium and alcalegenes  left foot wound cx- MRSA and Pseudomonas  and klebsiella   Blood cx- neg x 2  extensive chart search on [facility] by Dr. King and based on pt's latest HCV VL result from 6/2023 detected vl but <1.08   also based on serology from 2017 was positive for hep b sAG and E ag and core IGm ab ( not sure if he was ever treated)  HIV ab/ag negative   UC - VRE, Klebsiella   Hep B and HEp c both viral loads are undetectable.  his hep B serology c/w chronic infection in past not new and VL undetectable .  hep c VL -undetectable ( was treated for this as per his outpatient docs and seems to have cured )    HIV ab/ag- Negative    plan-  Continue Vancomycin IV day #25  monitor vancomycin levels, required (will obtain vanco level tomorrow prior morning dose)  keep vanco trough <15  Ertapenem IV started on 12/8 and as per attending, was changed to meropenem as the pseudomonas and Alcalgenes reported in foot wound cx not covered by ertapenem but meropenem will cover it and will cover the esbl   Continue Meropenem 1 gram IV q 8 hrs - day #16  aggressive wound care   Depakote stopped  12/11  pending surgical follow up  needs consistent wound care       when ready for discharge:  treating right foot possible OM  picc line placed on 12/11  continue Vancomycin IV 1000 mg q 12 hrs - last dose on January 8, 2024   pt will be eventually discharged on Meropenem 1 gram IV q12 dosing instead of q 8 - last dose on January 8rd, 2024  weekly lab work: cbc with diff, cmp, esr, crp, vancomycin trough - pt has his own ID specialist from before and pcp, please fax to pt's own  ID specialist  follow up with pt's ID and PCP in the office   remove picc line upon completion of the course of abx       Kush King MD  Infectious Disease Attending    for any questions please do not hesitate to contact me either via teams or by calling [phone]       A/P-  53 year old male with PMHx of cervical epidural abscess (s/p decompressive laminectomy 3/2021 c/b b/l leg paralysis), hx of pneumoperitoneum (s/p ex lap, total abdominal colectomy and end ileostomy (on 1/12/23) c/b evisceration and sepsis with MRSA bacteremia postoperatively), hx of hepatitis C, hx of R. buttock abscess, hx of L. foot osteomyelitis, neurogenic bladder (with indwelling Holcomb catheter, last exchanged two days ago), HTN, HLD, bipolar disorder, GERD, hx of opioid dependence, and constipation who presented to the ED on 12/4/23 from [facility] for feet infection.    b/l feet infections and overlying cellulitis  Right heel wound infection to bone and as per xray c/w Om as well.  no report of any gas on either xray or CT      b/l foot infection  Om of right heel wound  + leukocytosis - has resolved  HCV- states was treated for this and was told it was cleared   severe contractures of b/l LE  right foot wound cx- ESBL proteus - resistant to cefepime and corynebecaterium and alcalegenes  left foot wound cx- MRSA and Pseudomonas  and klebsiella   Blood cx- neg x 2  extensive chart search on [facility] by Dr. King and based on pt's latest HCV VL result from 6/2023 detected vl but <1.08   also based on serology from 2017 was positive for hep b sAG and E ag and core IGm ab ( not sure if he was ever treated)  HIV ab/ag negative   UC - VRE, Klebsiella   Hep B and HEp c both viral loads are undetectable.  his hep B serology c/w chronic infection in past not new and VL undetectable .  hep c VL -undetectable ( was treated for this as per his outpatient docs and seems to have cured )    HIV ab/ag- Negative    plan-  Continue Vancomycin IV day #25  monitor vancomycin levels, required (will obtain vanco level tomorrow prior morning dose)  keep vanco trough <15  Ertapenem IV started on 12/8 and as per attending, was changed to meropenem as the pseudomonas and Alcalgenes reported in foot wound cx not covered by ertapenem but meropenem will cover it and will cover the esbl   Continue Meropenem 1 gram IV q 8 hrs - day #16  aggressive wound care   Depakote stopped  12/11  pending surgical follow up  needs consistent wound care       when ready for discharge:  treating right foot possible OM  picc line placed on 12/11  continue Vancomycin IV 1000 mg q 12 hrs - last dose on January 8, 2024   pt will be eventually discharged on Meropenem 1 gram IV q12 dosing instead of q 8 - last dose on January 8rd, 2024  weekly lab work: cbc with diff, cmp, esr, crp, vancomycin trough - pt has his own ID specialist from before and pcp, please fax to pt's own  ID specialist  follow up with pt's ID and PCP in the office   remove picc line upon completion of the course of abx       Kush King MD  Infectious Disease Attending    for any questions please do not hesitate to contact me either via teams or by calling [phone]

## 2023-12-29 NOTE — PROGRESS NOTE ADULT - SUBJECTIVE AND OBJECTIVE BOX
CHIEF COMPLAINT: FU of sepsis with foot infection  no fever  no diarrhea   Pain controlled with oral dilaudid per patient.   requested to decrease water restriction      PHYSICAL EXAM:    GENERAL: Moderately built, no acute distress   CHEST/LUNG:  No wheezing, no crackles   HEART: Regular rate and rhythm; No murmurs  ABDOMEN: Soft, Nontender, Nondistended; Bowel sounds present. + west, ileostomy, PICC line.   EXTREMITIES:  No clubbing, cyanosis, or edema. + dressing bilateral feet. + pressure wound right buttock  Psychiatry: AAO x 3, mood is appropriate       OBJECTIVE DATA:       Vital Signs Last 24 Hrs  T(C): 37.5 (29 Dec 2023 11:05), Max: 37.5 (29 Dec 2023 11:05)  T(F): 99.5 (29 Dec 2023 11:05), Max: 99.5 (29 Dec 2023 11:05)  HR: 85 (29 Dec 2023 11:05) (82 - 89)  BP: 110/79 (29 Dec 2023 11:05) (110/79 - 127/77)  BP(mean): --  RR: 18 (29 Dec 2023 11:05) (18 - 18)  SpO2: 93% (29 Dec 2023 11:05) (90% - 94%)    Parameters below as of 29 Dec 2023 11:05  Patient On (Oxygen Delivery Method): nasal cannula    MEDICATIONS  (STANDING):  acetaminophen     Tablet .. 650 milliGRAM(s) Oral daily  amLODIPine   Tablet 2.5 milliGRAM(s) Oral daily  ascorbic acid 500 milliGRAM(s) Oral daily  atorvastatin 40 milliGRAM(s) Oral at bedtime  bacitracin   Ointment 1 Application(s) Topical daily  budesonide 160 MICROgram(s)/formoterol 4.5 MICROgram(s) Inhaler 2 Puff(s) Inhalation two times a day  chlorhexidine 2% Cloths 1 Application(s) Topical <User Schedule>  collagenase Ointment 1 Application(s) Topical daily  cyanocobalamin 1000 MICROGram(s) Oral daily  cyclobenzaprine 10 milliGRAM(s) Oral three times a day  enoxaparin Injectable 40 milliGRAM(s) SubCutaneous every 24 hours  ferrous    sulfate 325 milliGRAM(s) Oral <User Schedule>  finasteride 5 milliGRAM(s) Oral daily  folic acid 1 milliGRAM(s) Oral daily  furosemide    Tablet 20 milliGRAM(s) Oral daily  gabapentin 800 milliGRAM(s) Oral every 8 hours  guaiFENesin  milliGRAM(s) Oral every 12 hours  lactobacillus acidophilus 1 Tablet(s) Oral two times a day with meals  lidocaine   4% Patch 1 Patch Transdermal daily  meropenem  IVPB 1000 milliGRAM(s) IV Intermittent every 8 hours  methadone    Tablet 60 milliGRAM(s) Oral two times a day  methylphenidate 5 milliGRAM(s) Oral <User Schedule>  multivitamin 1 Tablet(s) Oral daily  nystatin Powder 1 Application(s) Topical two times a day  pantoprazole    Tablet 40 milliGRAM(s) Oral before breakfast  polyethylene glycol 3350 17 Gram(s) Oral daily  QUEtiapine 100 milliGRAM(s) Oral <User Schedule>  QUEtiapine 400 milliGRAM(s) Oral at bedtime  senna 2 Tablet(s) Oral at bedtime  tamsulosin 0.4 milliGRAM(s) Oral at bedtime  thiamine 100 milliGRAM(s) Oral daily  vancomycin  IVPB 1000 milliGRAM(s) IV Intermittent every 12 hours  zinc sulfate 220 milliGRAM(s) Oral daily    MEDICATIONS  (PRN):  acetaminophen     Tablet .. 650 milliGRAM(s) Oral every 6 hours PRN Temp greater or equal to 38C (100.4F), Mild Pain (1 - 3)  albuterol/ipratropium for Nebulization 3 milliLiter(s) Nebulizer every 8 hours PRN Shortness of Breath and/or Wheezing  aluminum hydroxide/magnesium hydroxide/simethicone Suspension 30 milliLiter(s) Oral every 4 hours PRN Dyspepsia  HYDROmorphone   Tablet 2 milliGRAM(s) Oral every 4 hours PRN Severe Pain (7 - 10)  melatonin 3 milliGRAM(s) Oral at bedtime PRN Insomnia  sodium chloride 0.9% lock flush 10 milliLiter(s) IV Push every 1 hour PRN Pre/post blood products, medications, blood draw, and to maintain line patency

## 2023-12-29 NOTE — PROGRESS NOTE ADULT - NUTRITIONAL ASSESSMENT
This patient has been assessed with a concern for Malnutrition and has been determined to have a diagnosis/diagnoses of Moderate protein-calorie malnutrition.    This patient is being managed with:   Diet Regular-  1500mL Fluid Restriction ([id number])  Supplement Feeding Modality:  Oral  Ensure Plus High Protein Cans or Servings Per Day:  1       Frequency:  Two Times a day  Entered: Dec 29 2023 12:39PM   This patient has been assessed with a concern for Malnutrition and has been determined to have a diagnosis/diagnoses of Moderate protein-calorie malnutrition.    This patient is being managed with:   Diet Regular-  1500mL Fluid Restriction ([id number])  Supplement Feeding Modality:  Oral  Ensure Plus High Protein Cans or Servings Per Day:  1       Frequency:  Two Times a day  Entered: Dec 29 2023 12:39PM

## 2023-12-29 NOTE — PROGRESS NOTE ADULT - ASSESSMENT
Gonzalez Stewart is a 53 year old male with PMHx of cervical epidural abscess (s/p decompressive laminectomy 3/2021 c/b b/l leg paralysis), hx of pneumoperitoneum (s/p ex lap, total abdominal colectomy and end ileostomy (on 1/12/23) c/b evisceration and sepsis with MRSA bacteremia postoperatively), hx of hepatitis C, hx of R. buttock abscess, hx of L. foot osteomyelitis, neurogenic bladder (with indwelling Holcomb catheter, last exchanged two days ago), HTN, HLD, bipolar disorder, GERD, hx of opioid dependence, and constipation who presented to the ED on 12/4/23 from [facility] for feet infection and admitted for sepsis secondary to bilateral feet infection.      Sepsis secondary to b/l feet infection  Previously treated for L. hallux OM with L. heel culture growing Proteus mirabilis ESBL, completed 6-week course of avycaz  CT b/l LE with study is severely limited by contracture. Left lower extremity is only partially visualized with exclusion of the left foot.  S/p bedside escharectomy by podiatry  right foot wound cx- ESBL proteus - resistant to cefepime and corynebecaterium and alcalegenes  left foot wound cx- MRSA and Pseudomonas  and klebsiella   Blood cx- neg x 2  PICC line in place 12/11/23   Will continue meropenem and vancomycin until 1/8 per ID.   ECHO EF 55-60%, normal systolic function, and mild mitral regurgitation       COPD  chronic hypoxic respiratory failure   Baseline patient is on 5L O2 NC at home   Had an episode of resp distress from fluid overload from IVF- Placed on lasix drip for some time with improvement  Continue PO lasix with water restriction to 1.5 liters a day  ECHO reviewed.   Pulm consulted    cont airway clearance w/ aerobika. cont current bronchodilators.     CT chest : Emphysematous disease with likely concurrent interstitial fibrosis.   Suspected  chronic microaspiration         HTN. controlled.   continue  amlodipine 2.5 mg daily     HLD  continue  atorvastatin 40 mg daily     Bipolar disorder, stable   : PTA quetiapine 100 mg BID and 400 mg qhs,  12/12/2023 valproic qhs been stopped by psych      Hx of opioid dependence  continue methadone maintenance. EKG reviewed and showed Qtc 487. Monitor every 3-4 days x 2.       Chronic pain  with spasms. cont methadone. Cont oral dilaudid prn and cont constipation ppx.  Discussed about it with palliative care service.     GERD:   continue with PPI     Neurogenic bladder (with indwelling Holcomb catheter)   PTA finasteride 5 mg, tamsulosin 0.4 mg    Constipation:   continue with bowel regimen      functional quad-   B/L contracted   supportive care  , frequent repositioning      Rt hip pressure wound, seen by vascular ,   wound recs in place     Mod PCM  nutrition recommendations appreciated     Normocytic anemia , SARAH   no e/o bleeding or bruising   H/H stable   supplement     Preventative Measures  lovenox SQ-dvt ppx  fall, aspiration precautions   HOBE     palliative following, patient is full code       Time spent by me managing the patient including but not limited to reviewing the chart, discussion with the nurse and Family, physical exam and assessment and plan is 56 mins  Gonzalez Stewart is a 53 year old male with PMHx of cervical epidural abscess (s/p decompressive laminectomy 3/2021 c/b b/l leg paralysis), hx of pneumoperitoneum (s/p ex lap, total abdominal colectomy and end ileostomy (on 1/12/23) c/b evisceration and sepsis with MRSA bacteremia postoperatively), hx of hepatitis C, hx of R. buttock abscess, hx of L. foot osteomyelitis, neurogenic bladder (with indwelling Holcomb catheter, last exchanged two days ago), HTN, HLD, bipolar disorder, GERD, hx of opioid dependence, and constipation who presented to the ED on 12/4/23 from [facility] for feet infection and admitted for sepsis secondary to bilateral feet infection.      Sepsis secondary to b/l feet infection  Previously treated for L. hallux OM with L. heel culture growing Proteus mirabilis ESBL, completed 6-week course of avycaz  CT b/l LE with study is severely limited by contracture. Left lower extremity is only partially visualized with exclusion of the left foot.  S/p bedside escharectomy by podiatry  right foot wound cx- ESBL proteus - resistant to cefepime and corynebecaterium and alcalegenes  left foot wound cx- MRSA and Pseudomonas  and klebsiella   Blood cx- neg x 2  PICC line in place 12/11/23   Will continue meropenem and vancomycin until 1/8 per ID.   ECHO EF 55-60%, normal systolic function, and mild mitral regurgitation       COPD  chronic hypoxic respiratory failure   Baseline patient is on 5L O2 NC at home   Had an episode of resp distress from fluid overload from IVF- Placed on lasix drip for some time with improvement  Continue PO lasix with water restriction to 1.5 liters a day  ECHO reviewed.   Pulm consulted    cont airway clearance w/ aerobika. cont current bronchodilators.     CT chest : Emphysematous disease with likely concurrent interstitial fibrosis.   Suspected  chronic microaspiration         HTN. controlled.   continue  amlodipine 2.5 mg daily     HLD  continue  atorvastatin 40 mg daily     Bipolar disorder, stable   : PTA quetiapine 100 mg BID and 400 mg qhs,  12/12/2023 valproic qhs been stopped by psych      Hx of opioid dependence  continue methadone maintenance. EKG reviewed and showed Qtc 487. Monitor every 3-4 days x 2.       Chronic pain  with spasms. cont methadone. Cont oral dilaudid prn and cont constipation ppx.  Discussed about it with palliative care service.     GERD:   continue with PPI     Neurogenic bladder (with indwelling Holcomb catheter)   PTA finasteride 5 mg, tamsulosin 0.4 mg    Constipation:   continue with bowel regimen      functional quad-   B/L contracted   supportive care  , frequent repositioning      Rt hip pressure wound, seen by vascular ,   wound recs in place     Mod PCM  nutrition recommendations appreciated     Normocytic anemia , SARAH   no e/o bleeding or bruising   H/H stable   supplement     Preventative Measures  lovenox SQ-dvt ppx  fall, aspiration precautions   HOBE     palliative following, patient is full code       Time spent by me managing the patient including but not limited to reviewing the chart, discussion with the nurse and Family, physical exam and assessment and plan is 56 mins

## 2023-12-29 NOTE — PROGRESS NOTE ADULT - SUBJECTIVE AND OBJECTIVE BOX
[facility]  INFECTIOUS DISEASES   [address] [place] [address]  Tel: [phone]     Fax: [phone]  ==============================================================================  MD Juan C Cabezas, DO Thelma Alfonso, NP   ==============================================================================      STAN VEGA  MRN-[id number]  53y ([birth year])      Interval History:    ROS:    [ ] Unobtainable because:  [ ] All other systems negative except as noted    Constitutional: no fever, no chills  Head: no trauma  Eyes: no vision changes, no eye pain  ENT:  no sore throat, no rhinorrhea  Cardiovascular:  no chest pain, no palpitation  Respiratory:  no SOB, no cough  GI:  no abd pain, no vomiting, no diarrhea  urinary: no dysuria, no hematuria, no flank pain  musculoskeletal:  no joint pain, no joint swelling  skin:  no rash  neurology:  no headache, no seizure, no change in mental status  psych: no anxiety, no depression         Allergies  NSAIDs (Flushing; Other (Moderate))  Haldol (Anaphylaxis)  Zyprexa (Rash; Dystonic RXN; Hives)  Motrin (Anaphylaxis)  Thorazine (Other (Moderate))  Aleve (Unknown)  Stelazine (Unknown)  Risperdal (Short breath; Rash; Hives)        ANTIMICROBIALS:  meropenem  IVPB 1000 every 8 hours  vancomycin  IVPB 1000 every 12 hours        Physical Exam:  Vital Signs Last 24 Hrs  T(C): 36.9 (29 Dec 2023 04:50), Max: 37.4 (28 Dec 2023 11:03)  T(F): 98.5 (29 Dec 2023 04:50), Max: 99.3 (28 Dec 2023 11:03)  HR: 85 (29 Dec 2023 04:50) (82 - 89)  BP: 122/70 (29 Dec 2023 04:50) (107/68 - 127/77)  BP(mean): --  RR: 18 (29 Dec 2023 04:50) (18 - 18)  SpO2: 93% (29 Dec 2023 04:50) (90% - 94%)    Parameters below as of 29 Dec 2023 04:50  Patient On (Oxygen Delivery Method): nasal cannula        12-28-23 @ 07:01  -  12-29-23 @ 07:00  --------------------------------------------------------  IN: 0 mL / OUT: 1250 mL / NET: -1250 mL      General:    NAD,  non toxic  Head: atraumatic, normocephalic  Eye: normal sclera and conjunctiva  ENT:    no oral lesions, neck supple  Cardio:     regular S1, S2,  no murmur  Respiratory:    clear b/l,    no wheezing  abd:     soft,   BS +,   no tenderness  :   no CVAT,  no suprapubic tenderness,   no  west  Musculoskeletal:   no joint swelling,   no edema  vascular: no central lines, +PIV   Skin:    no rash  Neurologic:     no focal deficit  psych: normal affect    WBC Count: 7.24 K/uL (12-27 @ 05:18)  WBC Count: 8.32 K/uL (12-26 @ 12:20)                        Creatinine Trend: 0.50<--, 0.62<--, 0.52<--, 0.60<--, 0.46<--, 0.60<--      MICROBIOLOGY:  v  Clean Catch Clean Catch (Midstream)  12-04-23   >100,000 CFU/ml Klebsiella pneumoniae  50,000 - 99,000 CFU/mL Enterococcus faecalis (vancomycin resistant)  --  Klebsiella pneumoniae  Enterococcus faecalis (vancomycin resistant)      .Abscess left wound culture  12-04-23   Rare Pseudomonas aeruginosa  Moderate Methicillin Resistant Staphylococcus aureus  Rare Klebsiella pneumoniae  Moderate Bacteroides fragilis "Susceptibilities not performed"  --  Pseudomonas aeruginosa  Methicillin resistant Staphylococcus aureus  Klebsiella pneumoniae      .Blood Blood-Peripheral  12-04-23   No growth at 5 days  --  --      .Blood Blood-Peripheral  12-04-23   No growth at 5 days  --  --                    Ferritin: 81 (12-23)                RADIOLOGY:   [facility]  INFECTIOUS DISEASES   [address] [place] [address]  Tel: [phone]     Fax: [phone]  ==============================================================================  MD Juan C Cabezas, DO Thelma Alfonso, NP   ==============================================================================      STAN VEGA  MRN-[id number]  53y ([birth year])      Interval History:    ROS:    [ ] Unobtainable because:  [ ] All other systems negative except as noted    Constitutional: no fever, no chills  Head: no trauma  Eyes: no vision changes, no eye pain  ENT:  no sore throat, no rhinorrhea  Cardiovascular:  no chest pain, no palpitation  Respiratory:  no SOB, no cough  GI:  no abd pain, no vomiting, no diarrhea  urinary: no dysuria, no hematuria, no flank pain  musculoskeletal:  no joint pain, no joint swelling  skin:  no rash  neurology:  no headache, no seizure, no change in mental status  psych: no anxiety, no depression         Allergies  NSAIDs (Flushing; Other (Moderate))  Haldol (Anaphylaxis)  Zyprexa (Rash; Dystonic RXN; Hives)  Motrin (Anaphylaxis)  Thorazine (Other (Moderate))  Aleve (Unknown)  Stelazine (Unknown)  Risperdal (Short breath; Rash; Hives)        ANTIMICROBIALS:  meropenem  IVPB 1000 every 8 hours  vancomycin  IVPB 1000 every 12 hours        Physical Exam:  Vital Signs Last 24 Hrs  T(C): 36.9 (29 Dec 2023 04:50), Max: 37.4 (28 Dec 2023 11:03)  T(F): 98.5 (29 Dec 2023 04:50), Max: 99.3 (28 Dec 2023 11:03)  HR: 85 (29 Dec 2023 04:50) (82 - 89)  BP: 122/70 (29 Dec 2023 04:50) (107/68 - 127/77)  BP(mean): --  RR: 18 (29 Dec 2023 04:50) (18 - 18)  SpO2: 93% (29 Dec 2023 04:50) (90% - 94%)    Parameters below as of 29 Dec 2023 04:50  Patient On (Oxygen Delivery Method): nasal cannula        12-28-23 @ 07:01  -  12-29-23 @ 07:00  --------------------------------------------------------  IN: 0 mL / OUT: 1250 mL / NET: -1250 mL      General:    NAD,  non toxic  Head: atraumatic, normocephalic  Eye: normal sclera and conjunctiva  ENT:    no oral lesions, neck supple  Cardio:     regular S1, S2,  no murmur  Respiratory:    clear b/l,    no wheezing  abd:     soft,   BS +,   no tenderness  :   no CVAT,  no suprapubic tenderness,   no  west  Musculoskeletal:   no joint swelling,   no edema  vascular: no central lines, +PIV   Skin:    no rash  Neurologic:     no focal deficit  psych: normal affect    WBC Count: 7.24 K/uL (12-27 @ 05:18)  WBC Count: 8.32 K/uL (12-26 @ 12:20)                        Creatinine Trend: 0.50<--, 0.62<--, 0.52<--, 0.60<--, 0.46<--, 0.60<--      MICROBIOLOGY:  v  Clean Catch Clean Catch (Midstream)  12-04-23   >100,000 CFU/ml Klebsiella pneumoniae  50,000 - 99,000 CFU/mL Enterococcus faecalis (vancomycin resistant)  --  Klebsiella pneumoniae  Enterococcus faecalis (vancomycin resistant)      .Abscess left wound culture  12-04-23   Rare Pseudomonas aeruginosa  Moderate Methicillin Resistant Staphylococcus aureus  Rare Klebsiella pneumoniae  Moderate Bacteroides fragilis "Susceptibilities not performed"  --  Pseudomonas aeruginosa  Methicillin resistant Staphylococcus aureus  Klebsiella pneumoniae      .Blood Blood-Peripheral  12-04-23   No growth at 5 days  --  --      .Blood Blood-Peripheral  12-04-23   No growth at 5 days  --  --                    Ferritin: 81 (12-23)                RADIOLOGY:   [facility]  INFECTIOUS DISEASES   [address] [place] [address]  Tel: [phone]     Fax: [phone]  ==============================================================================  MD Juan C Cabezas, DO Thelma Alfonso, NP   ==============================================================================      STAN VEGA  MRN-[id number]  53y ([birth year])      Interval History:    Patient seen today.  he remains afebrile  denies any chills, denies any nausea.      ROS:      Constitutional: no fever, no chills  Head: no trauma  Eyes: no vision changes, no eye pain  ENT:  no sore throat, no rhinorrhea  Cardiovascular:  no chest pain, no palpitation  Respiratory:  no SOB, no cough  GI:  no abd pain, no vomiting, no diarrhea  urinary: no dysuria, no hematuria, no flank pain  neurology:  no headache          Allergies  NSAIDs (Flushing; Other (Moderate))  Haldol (Anaphylaxis)  Zyprexa (Rash; Dystonic RXN; Hives)  Motrin (Anaphylaxis)  Thorazine (Other (Moderate))  Aleve (Unknown)  Stelazine (Unknown)  Risperdal (Short breath; Rash; Hives)        ANTIMICROBIALS:  meropenem  IVPB 1000 every 8 hours  vancomycin  IVPB 1000 every 12 hours        Physical Exam:  Vital Signs Last 24 Hrs  T(C): 36.9 (29 Dec 2023 04:50), Max: 37.4 (28 Dec 2023 11:03)  T(F): 98.5 (29 Dec 2023 04:50), Max: 99.3 (28 Dec 2023 11:03)  HR: 85 (29 Dec 2023 04:50) (82 - 89)  BP: 122/70 (29 Dec 2023 04:50) (107/68 - 127/77)  BP(mean): --  RR: 18 (29 Dec 2023 04:50) (18 - 18)  SpO2: 93% (29 Dec 2023 04:50) (90% - 94%)    Parameters below as of 29 Dec 2023 04:50  Patient On (Oxygen Delivery Method): nasal cannula        12-28-23 @ 07:01  -  12-29-23 @ 07:00  --------------------------------------------------------  IN: 0 mL / OUT: 1250 mL / NET: -1250 mL      Physical Exam:  General:    NAD, non toxic, NC  Head: atraumatic, normocephalic  Eyes: normal sclera and conjunctiva  ENT:  no oropharyngeal lesions, poor dentition, no LAD, neck supple  Cardio:  regular , S1,S2, no murmur  Respiratory:   somewhat diminished to auscultation b/l, no wheezing, no rhonchi   abd:   soft, BS +, not tender, no distention, midline scar healed, right sided colostomy in place  :     no CVAT, no suprapubic tenderness, Holcomb  Musculoskeletal : severely contracted LE b/l, no noted joint swelling   Skin:   b/l feet wounds re-dressed, no significant erythema of surrounding tissues present, minimal  swelling, mildly tender, no pus  vascular:  normal pulses  Neurologic:     no focal deficits      WBC Count: 7.24 K/uL (12-27 @ 05:18)  WBC Count: 8.32 K/uL (12-26 @ 12:20)    Creatinine Trend: 0.50<--, 0.62<--, 0.52<--, 0.60<--, 0.46<--, 0.60<--      MICROBIOLOGY:  v  Clean Catch Clean Catch (Midstream)  12-04-23   >100,000 CFU/ml Klebsiella pneumoniae  50,000 - 99,000 CFU/mL Enterococcus faecalis (vancomycin resistant)  --  Klebsiella pneumoniae  Enterococcus faecalis (vancomycin resistant)      .Abscess left wound culture  12-04-23   Rare Pseudomonas aeruginosa  Moderate Methicillin Resistant Staphylococcus aureus  Rare Klebsiella pneumoniae  Moderate Bacteroides fragilis "Susceptibilities not performed"  --  Pseudomonas aeruginosa  Methicillin resistant Staphylococcus aureus  Klebsiella pneumoniae      .Blood Blood-Peripheral  12-04-23   No growth at 5 days  --  --      .Blood Blood-Peripheral  12-04-23   No growth at 5 days  --  --            Ferritin: 81 (12-23)        RADIOLOGY:   [facility]  INFECTIOUS DISEASES   [address] [place] [address]  Tel: [phone]     Fax: [phone]  ==============================================================================  MD Juan C Cabezas, DO Thelma Alfonso, NP   ==============================================================================      STAN VEGA  MRN-[id number]  53y ([birth year])      Interval History:    Patient seen today.  he remains afebrile  denies any chills, denies any nausea.      ROS:      Constitutional: no fever, no chills  Head: no trauma  Eyes: no vision changes, no eye pain  ENT:  no sore throat, no rhinorrhea  Cardiovascular:  no chest pain, no palpitation  Respiratory:  no SOB, no cough  GI:  no abd pain, no vomiting, no diarrhea  urinary: no dysuria, no hematuria, no flank pain  neurology:  no headache          Allergies  NSAIDs (Flushing; Other (Moderate))  Haldol (Anaphylaxis)  Zyprexa (Rash; Dystonic RXN; Hives)  Motrin (Anaphylaxis)  Thorazine (Other (Moderate))  Aleve (Unknown)  Stelazine (Unknown)  Risperdal (Short breath; Rash; Hives)        ANTIMICROBIALS:  meropenem  IVPB 1000 every 8 hours  vancomycin  IVPB 1000 every 12 hours        Physical Exam:  Vital Signs Last 24 Hrs  T(C): 36.9 (29 Dec 2023 04:50), Max: 37.4 (28 Dec 2023 11:03)  T(F): 98.5 (29 Dec 2023 04:50), Max: 99.3 (28 Dec 2023 11:03)  HR: 85 (29 Dec 2023 04:50) (82 - 89)  BP: 122/70 (29 Dec 2023 04:50) (107/68 - 127/77)  BP(mean): --  RR: 18 (29 Dec 2023 04:50) (18 - 18)  SpO2: 93% (29 Dec 2023 04:50) (90% - 94%)    Parameters below as of 29 Dec 2023 04:50  Patient On (Oxygen Delivery Method): nasal cannula        12-28-23 @ 07:01  -  12-29-23 @ 07:00  --------------------------------------------------------  IN: 0 mL / OUT: 1250 mL / NET: -1250 mL      Physical Exam:  General:    NAD, non toxic, NC  Head: atraumatic, normocephalic  Eyes: normal sclera and conjunctiva  ENT:  no oropharyngeal lesions, poor dentition, no LAD, neck supple  Cardio:  regular , S1,S2, no murmur  Respiratory:   somewhat diminished to auscultation b/l, no wheezing, no rhonchi   abd:   soft, BS +, not tender, no distention, midline scar healed, right sided colostomy in place  :     no CVAT, no suprapubic tenderness, Holcomb  Musculoskeletal : severely contracted LE b/l, no noted joint swelling   Skin:   b/l feet wounds re-dressed, no significant erythema of surrounding tissues present, minimal  swelling, mildly tender, no pus  vascular:  normal pulses  Neurologic:     no focal deficits      WBC Count: 7.24 K/uL (12-27 @ 05:18)  WBC Count: 8.32 K/uL (12-26 @ 12:20)    Creatinine Trend: 0.50<--, 0.62<--, 0.52<--, 0.60<--, 0.46<--, 0.60<--      MICROBIOLOGY:  v  Clean Catch Clean Catch (Midstream)  12-04-23   >100,000 CFU/ml Klebsiella pneumoniae  50,000 - 99,000 CFU/mL Enterococcus faecalis (vancomycin resistant)  --  Klebsiella pneumoniae  Enterococcus faecalis (vancomycin resistant)      .Abscess left wound culture  12-04-23   Rare Pseudomonas aeruginosa  Moderate Methicillin Resistant Staphylococcus aureus  Rare Klebsiella pneumoniae  Moderate Bacteroides fragilis "Susceptibilities not performed"  --  Pseudomonas aeruginosa  Methicillin resistant Staphylococcus aureus  Klebsiella pneumoniae      .Blood Blood-Peripheral  12-04-23   No growth at 5 days  --  --      .Blood Blood-Peripheral  12-04-23   No growth at 5 days  --  --            Ferritin: 81 (12-23)        RADIOLOGY:

## 2023-12-29 NOTE — PROGRESS NOTE ADULT - PROBLEM SELECTOR PLAN 6
despite having excellent appetite, BLE muscle wasting and contractures  maintained on Regular diet   1000mL Fluid Restriction   Ensure supplements BID   12/26 Albumin 2.0.  multiple decubitus ulcers

## 2023-12-29 NOTE — PROGRESS NOTE ADULT - PROBLEM SELECTOR PLAN 1
pt reports some decrease of  leg pain and spasms   Continue Methadone concentrate 60 mg po q 12 hr.   12/27 QTc 487, MD following for weekly levels,   Continue Dilaudid   2 mg IV q 4 hrs prn   Continue Neurontin  800 mg po q 8 hrs  Continue  Flexeril to 10 mg po TID.

## 2023-12-29 NOTE — PROGRESS NOTE ADULT - SUBJECTIVE AND OBJECTIVE BOX
follow up on: symptom management     OVERNIGHT EVENTS  Pt required 3doses B/T Dilaudid over past 24 hrs.  pain with some improvement    No longer with plan for B/L AKA  sleeping more over past 48 hrs   Review of systems:     Pain:  [x ] yes [ ] no  Pt reports lessening of pain and spasms. Well managed with present regimen    Dyspnea:      denies                        Anxiety:         denies                      Depression:   denies  Fatigue:     present                         Nausea:      denies                         Loss of appetite:    denies            Constipation:     denies             Diarrhea:     denies    All other systems reviewed and negative    MEDICATIONS  (STANDING):  acetaminophen     Tablet .. 650 milliGRAM(s) Oral daily  amLODIPine   Tablet 2.5 milliGRAM(s) Oral daily  ascorbic acid 500 milliGRAM(s) Oral daily  atorvastatin 40 milliGRAM(s) Oral at bedtime  bacitracin   Ointment 1 Application(s) Topical daily  budesonide 160 MICROgram(s)/formoterol 4.5 MICROgram(s) Inhaler 2 Puff(s) Inhalation two times a day  chlorhexidine 2% Cloths 1 Application(s) Topical <User Schedule>  collagenase Ointment 1 Application(s) Topical daily  cyanocobalamin 1000 MICROGram(s) Oral daily  cyclobenzaprine 10 milliGRAM(s) Oral three times a day  enoxaparin Injectable 40 milliGRAM(s) SubCutaneous every 24 hours  ferrous    sulfate 325 milliGRAM(s) Oral <User Schedule>  finasteride 5 milliGRAM(s) Oral daily  folic acid 1 milliGRAM(s) Oral daily  furosemide    Tablet 20 milliGRAM(s) Oral daily  gabapentin 800 milliGRAM(s) Oral every 8 hours  guaiFENesin  milliGRAM(s) Oral every 12 hours  lactobacillus acidophilus 1 Tablet(s) Oral two times a day with meals  lidocaine   4% Patch 1 Patch Transdermal daily  meropenem  IVPB 1000 milliGRAM(s) IV Intermittent every 8 hours  methadone    Tablet 60 milliGRAM(s) Oral two times a day  methylphenidate 5 milliGRAM(s) Oral <User Schedule>  multivitamin 1 Tablet(s) Oral daily  nystatin Powder 1 Application(s) Topical two times a day  pantoprazole    Tablet 40 milliGRAM(s) Oral before breakfast  polyethylene glycol 3350 17 Gram(s) Oral daily  QUEtiapine 400 milliGRAM(s) Oral at bedtime  QUEtiapine 100 milliGRAM(s) Oral <User Schedule>  senna 2 Tablet(s) Oral at bedtime  tamsulosin 0.4 milliGRAM(s) Oral at bedtime  thiamine 100 milliGRAM(s) Oral daily  vancomycin  IVPB 1000 milliGRAM(s) IV Intermittent every 12 hours  zinc sulfate 220 milliGRAM(s) Oral daily    MEDICATIONS  (PRN):  acetaminophen     Tablet .. 650 milliGRAM(s) Oral every 6 hours PRN Temp greater or equal to 38C (100.4F), Mild Pain (1 - 3)  albuterol/ipratropium for Nebulization 3 milliLiter(s) Nebulizer every 8 hours PRN Shortness of Breath and/or Wheezing  aluminum hydroxide/magnesium hydroxide/simethicone Suspension 30 milliLiter(s) Oral every 4 hours PRN Dyspepsia  HYDROmorphone   Tablet 2 milliGRAM(s) Oral every 4 hours PRN Severe Pain (7 - 10)  melatonin 3 milliGRAM(s) Oral at bedtime PRN Insomnia  sodium chloride 0.9% lock flush 10 milliLiter(s) IV Push every 1 hour PRN Pre/post blood products, medications, blood draw, and to maintain line patency    PHYSICAL EXAM:  Vital Signs Last 24 Hrs  T(C): 37.5 (29 Dec 2023 11:05), Max: 37.5 (29 Dec 2023 11:05)  T(F): 99.5 (29 Dec 2023 11:05), Max: 99.5 (29 Dec 2023 11:05)  HR: 85 (29 Dec 2023 11:05) (82 - 89)  BP: 110/79 (29 Dec 2023 11:05) (110/79 - 127/77)  BP(mean): --  RR: 18 (29 Dec 2023 11:05) (18 - 18)  SpO2: 93% (29 Dec 2023 11:05) (90% - 94%)    Parameters below as of 29 Dec 2023 11:05  Patient On (Oxygen Delivery Method): nasal cannula      Palliative Performance Scale/Karnofsky Score: 40%     General: ill appearing male in be with c/o pain to legs  HEENT: normocephalic, atraumatic, poor dentition   Neck: supple, no JVD   CVS: S1 S2 RRR, tachycardic, no MRG   Resp: CTAB, no RRW  GI: soft, NT, nor R/G, + ileostomy draining moderate amts pasty stool  : indwelling west in situ   Musc: severe BLE contractures, + L PICC line    Neuro: oriented x 4, non focal, paraplegic  Psych: situationally depressed    Skin: Stage IV pressure ulcer; R heel as per flow sheets  stage IV pressure ulcer; R hip as per flow sheets  Oral intake: excellent    LABS: no labs since 12/27    RADIOLOGY & ADDITIONAL STUDIES: no new radiology studies

## 2023-12-29 NOTE — PROGRESS NOTE ADULT - PROBLEM SELECTOR PLAN 8
Pt medically accepted to [facility] for wound care   Pt and his mother are aware   pending insurance authorization Pt medically accepted to [facility] for wound care   Pt and his mother are aware   pending insurance authorization

## 2023-12-29 NOTE — PROGRESS NOTE ADULT - PROBLEM SELECTOR PLAN 2
Recurrent foot infections, pt was  previously treated for L. hallux OM with L. heel culture growing Proteus mirabilis ESBL, completed 6 week course of avycaz  CT b/l LE  severely limited by contracture. Left lower extremity is only partially visualized with exclusion of the left foot. Skin induration and subcutaneous edema in the leg and ankle.  No soft tissue gas  continue IV ABT course of  Vanc,/ meropenem

## 2023-12-29 NOTE — PROGRESS NOTE ADULT - PROBLEM SELECTOR PLAN 5
contracted and bedbound  SNF resident, requires full care with bathing, dressing and wound care.  multiple decubitus ulcers

## 2023-12-30 PROCEDURE — 99232 SBSQ HOSP IP/OBS MODERATE 35: CPT

## 2023-12-30 RX ADMIN — Medication 1 APPLICATION(S): at 13:13

## 2023-12-30 RX ADMIN — QUETIAPINE FUMARATE 100 MILLIGRAM(S): 200 TABLET, FILM COATED ORAL at 18:04

## 2023-12-30 RX ADMIN — HYDROMORPHONE HYDROCHLORIDE 2 MILLIGRAM(S): 2 INJECTION INTRAMUSCULAR; INTRAVENOUS; SUBCUTANEOUS at 23:29

## 2023-12-30 RX ADMIN — Medication 100 MILLIGRAM(S): at 13:09

## 2023-12-30 RX ADMIN — Medication 250 MILLIGRAM(S): at 18:09

## 2023-12-30 RX ADMIN — GABAPENTIN 800 MILLIGRAM(S): 400 CAPSULE ORAL at 13:12

## 2023-12-30 RX ADMIN — HYDROMORPHONE HYDROCHLORIDE 2 MILLIGRAM(S): 2 INJECTION INTRAMUSCULAR; INTRAVENOUS; SUBCUTANEOUS at 10:18

## 2023-12-30 RX ADMIN — Medication 600 MILLIGRAM(S): at 18:04

## 2023-12-30 RX ADMIN — GABAPENTIN 800 MILLIGRAM(S): 400 CAPSULE ORAL at 06:15

## 2023-12-30 RX ADMIN — PANTOPRAZOLE SODIUM 40 MILLIGRAM(S): 20 TABLET, DELAYED RELEASE ORAL at 07:40

## 2023-12-30 RX ADMIN — HYDROMORPHONE HYDROCHLORIDE 2 MILLIGRAM(S): 2 INJECTION INTRAMUSCULAR; INTRAVENOUS; SUBCUTANEOUS at 16:29

## 2023-12-30 RX ADMIN — Medication 650 MILLIGRAM(S): at 13:11

## 2023-12-30 RX ADMIN — Medication 250 MILLIGRAM(S): at 06:13

## 2023-12-30 RX ADMIN — FINASTERIDE 5 MILLIGRAM(S): 5 TABLET, FILM COATED ORAL at 13:12

## 2023-12-30 RX ADMIN — METHADONE HYDROCHLORIDE 60 MILLIGRAM(S): 40 TABLET ORAL at 18:03

## 2023-12-30 RX ADMIN — Medication 600 MILLIGRAM(S): at 06:15

## 2023-12-30 RX ADMIN — NYSTATIN CREAM 1 APPLICATION(S): 100000 CREAM TOPICAL at 18:19

## 2023-12-30 RX ADMIN — Medication 500 MILLIGRAM(S): at 13:10

## 2023-12-30 RX ADMIN — CYCLOBENZAPRINE HYDROCHLORIDE 10 MILLIGRAM(S): 10 TABLET, FILM COATED ORAL at 06:14

## 2023-12-30 RX ADMIN — ZINC SULFATE TAB 220 MG (50 MG ZINC EQUIVALENT) 220 MILLIGRAM(S): 220 (50 ZN) TAB at 13:09

## 2023-12-30 RX ADMIN — Medication 1 TABLET(S): at 13:09

## 2023-12-30 RX ADMIN — HYDROMORPHONE HYDROCHLORIDE 2 MILLIGRAM(S): 2 INJECTION INTRAMUSCULAR; INTRAVENOUS; SUBCUTANEOUS at 15:51

## 2023-12-30 RX ADMIN — BUDESONIDE AND FORMOTEROL FUMARATE DIHYDRATE 2 PUFF(S): 160; 4.5 AEROSOL RESPIRATORY (INHALATION) at 18:21

## 2023-12-30 RX ADMIN — Medication 1 TABLET(S): at 18:04

## 2023-12-30 RX ADMIN — CYCLOBENZAPRINE HYDROCHLORIDE 10 MILLIGRAM(S): 10 TABLET, FILM COATED ORAL at 21:30

## 2023-12-30 RX ADMIN — HYDROMORPHONE HYDROCHLORIDE 2 MILLIGRAM(S): 2 INJECTION INTRAMUSCULAR; INTRAVENOUS; SUBCUTANEOUS at 22:29

## 2023-12-30 RX ADMIN — QUETIAPINE FUMARATE 100 MILLIGRAM(S): 200 TABLET, FILM COATED ORAL at 09:01

## 2023-12-30 RX ADMIN — MEROPENEM 100 MILLIGRAM(S): 1 INJECTION INTRAVENOUS at 13:26

## 2023-12-30 RX ADMIN — TAMSULOSIN HYDROCHLORIDE 0.4 MILLIGRAM(S): 0.4 CAPSULE ORAL at 21:29

## 2023-12-30 RX ADMIN — QUETIAPINE FUMARATE 400 MILLIGRAM(S): 200 TABLET, FILM COATED ORAL at 21:30

## 2023-12-30 RX ADMIN — CYCLOBENZAPRINE HYDROCHLORIDE 10 MILLIGRAM(S): 10 TABLET, FILM COATED ORAL at 13:12

## 2023-12-30 RX ADMIN — Medication 5 MILLIGRAM(S): at 09:00

## 2023-12-30 RX ADMIN — MEROPENEM 100 MILLIGRAM(S): 1 INJECTION INTRAVENOUS at 21:30

## 2023-12-30 RX ADMIN — Medication 1 MILLIGRAM(S): at 13:09

## 2023-12-30 RX ADMIN — POLYETHYLENE GLYCOL 3350 17 GRAM(S): 17 POWDER, FOR SOLUTION ORAL at 13:13

## 2023-12-30 RX ADMIN — AMLODIPINE BESYLATE 2.5 MILLIGRAM(S): 2.5 TABLET ORAL at 06:15

## 2023-12-30 RX ADMIN — SENNA PLUS 2 TABLET(S): 8.6 TABLET ORAL at 21:30

## 2023-12-30 RX ADMIN — Medication 1 TABLET(S): at 08:44

## 2023-12-30 RX ADMIN — HYDROMORPHONE HYDROCHLORIDE 2 MILLIGRAM(S): 2 INJECTION INTRAMUSCULAR; INTRAVENOUS; SUBCUTANEOUS at 09:05

## 2023-12-30 RX ADMIN — NYSTATIN CREAM 1 APPLICATION(S): 100000 CREAM TOPICAL at 18:51

## 2023-12-30 RX ADMIN — Medication 20 MILLIGRAM(S): at 06:15

## 2023-12-30 RX ADMIN — CHLORHEXIDINE GLUCONATE 1 APPLICATION(S): 213 SOLUTION TOPICAL at 06:16

## 2023-12-30 RX ADMIN — PREGABALIN 1000 MICROGRAM(S): 225 CAPSULE ORAL at 13:09

## 2023-12-30 RX ADMIN — ATORVASTATIN CALCIUM 40 MILLIGRAM(S): 80 TABLET, FILM COATED ORAL at 21:30

## 2023-12-30 RX ADMIN — Medication 5 MILLIGRAM(S): at 21:29

## 2023-12-30 RX ADMIN — Medication 1 APPLICATION(S): at 13:14

## 2023-12-30 RX ADMIN — Medication 650 MILLIGRAM(S): at 14:32

## 2023-12-30 RX ADMIN — GABAPENTIN 800 MILLIGRAM(S): 400 CAPSULE ORAL at 21:30

## 2023-12-30 RX ADMIN — BUDESONIDE AND FORMOTEROL FUMARATE DIHYDRATE 2 PUFF(S): 160; 4.5 AEROSOL RESPIRATORY (INHALATION) at 06:16

## 2023-12-30 RX ADMIN — METHADONE HYDROCHLORIDE 60 MILLIGRAM(S): 40 TABLET ORAL at 06:14

## 2023-12-30 RX ADMIN — MEROPENEM 100 MILLIGRAM(S): 1 INJECTION INTRAVENOUS at 06:12

## 2023-12-30 NOTE — PROGRESS NOTE ADULT - SUBJECTIVE AND OBJECTIVE BOX
CHIEF COMPLAINT: FU of sepsis with foot infection  no fever  no diarrhea   Pain controlled with oral dilaudid per patient.   no new ON events       PHYSICAL EXAM:    GENERAL: Moderately built, no acute distress   CHEST/LUNG:  No wheezing, no crackles   HEART: Regular rate and rhythm; No murmurs  ABDOMEN: Soft, Nontender, Nondistended; Bowel sounds present. + west, ileostomy, PICC line.   EXTREMITIES:  No clubbing, cyanosis, or edema. + dressing bilateral feet. + pressure wound right buttock  Psychiatry: AAO x 3, mood is appropriate       OBJECTIVE DATA:       Vital Signs Last 24 Hrs  T(C): 36.9 (29 Dec 2023 23:45), Max: 37.5 (29 Dec 2023 11:05)  T(F): 98.5 (29 Dec 2023 23:45), Max: 99.5 (29 Dec 2023 11:05)  HR: 87 (29 Dec 2023 23:45) (85 - 89)  BP: 109/63 (29 Dec 2023 23:45) (109/63 - 113/67)  BP(mean): --  RR: 18 (29 Dec 2023 23:45) (18 - 18)  SpO2: 94% (29 Dec 2023 23:45) (93% - 95%)    Parameters below as of 29 Dec 2023 23:45  Patient On (Oxygen Delivery Method): nasal cannula               Daily     Daily   LABS:                                     CAPILLARY BLOOD GLUCOSE      POCT Blood Glucose.: 122 mg/dL (29 Dec 2023 21:53)      MEDICATIONS  (STANDING):  acetaminophen     Tablet .. 650 milliGRAM(s) Oral daily  amLODIPine   Tablet 2.5 milliGRAM(s) Oral daily  ascorbic acid 500 milliGRAM(s) Oral daily  atorvastatin 40 milliGRAM(s) Oral at bedtime  bacitracin   Ointment 1 Application(s) Topical daily  budesonide 160 MICROgram(s)/formoterol 4.5 MICROgram(s) Inhaler 2 Puff(s) Inhalation two times a day  chlorhexidine 2% Cloths 1 Application(s) Topical <User Schedule>  collagenase Ointment 1 Application(s) Topical daily  cyanocobalamin 1000 MICROGram(s) Oral daily  cyclobenzaprine 10 milliGRAM(s) Oral three times a day  enoxaparin Injectable 40 milliGRAM(s) SubCutaneous every 24 hours  ferrous    sulfate 325 milliGRAM(s) Oral <User Schedule>  finasteride 5 milliGRAM(s) Oral daily  folic acid 1 milliGRAM(s) Oral daily  furosemide    Tablet 20 milliGRAM(s) Oral daily  gabapentin 800 milliGRAM(s) Oral every 8 hours  guaiFENesin  milliGRAM(s) Oral every 12 hours  lactobacillus acidophilus 1 Tablet(s) Oral two times a day with meals  lidocaine   4% Patch 1 Patch Transdermal daily  meropenem  IVPB 1000 milliGRAM(s) IV Intermittent every 8 hours  methadone    Tablet 60 milliGRAM(s) Oral two times a day  methylphenidate 5 milliGRAM(s) Oral <User Schedule>  multivitamin 1 Tablet(s) Oral daily  nystatin Powder 1 Application(s) Topical two times a day  pantoprazole    Tablet 40 milliGRAM(s) Oral before breakfast  polyethylene glycol 3350 17 Gram(s) Oral daily  QUEtiapine 400 milliGRAM(s) Oral at bedtime  QUEtiapine 100 milliGRAM(s) Oral <User Schedule>  senna 2 Tablet(s) Oral at bedtime  tamsulosin 0.4 milliGRAM(s) Oral at bedtime  thiamine 100 milliGRAM(s) Oral daily  vancomycin  IVPB 1000 milliGRAM(s) IV Intermittent every 12 hours  zinc sulfate 220 milliGRAM(s) Oral daily    MEDICATIONS  (PRN):  acetaminophen     Tablet .. 650 milliGRAM(s) Oral every 6 hours PRN Temp greater or equal to 38C (100.4F), Mild Pain (1 - 3)  albuterol/ipratropium for Nebulization 3 milliLiter(s) Nebulizer every 8 hours PRN Shortness of Breath and/or Wheezing  aluminum hydroxide/magnesium hydroxide/simethicone Suspension 30 milliLiter(s) Oral every 4 hours PRN Dyspepsia  HYDROmorphone   Tablet 2 milliGRAM(s) Oral every 4 hours PRN Severe Pain (7 - 10)  melatonin 3 milliGRAM(s) Oral at bedtime PRN Insomnia  sodium chloride 0.9% lock flush 10 milliLiter(s) IV Push every 1 hour PRN Pre/post blood products, medications, blood draw, and to maintain line patency

## 2023-12-30 NOTE — PROGRESS NOTE ADULT - NUTRITIONAL ASSESSMENT
This patient has been assessed with a concern for Malnutrition and has been determined to have a diagnosis/diagnoses of Moderate protein-calorie malnutrition.    This patient is being managed with:   Diet Regular-  1500mL Fluid Restriction ([id number])  Supplement Feeding Modality:  Oral  Ensure Plus High Protein Cans or Servings Per Day:  1       Frequency:  Two Times a day  Entered: Dec 29 2023 12:39PM   This patient has been assessed with a concern for Malnutrition and has been determined to have a diagnosis/diagnoses of Moderate protein-calorie malnutrition.    This patient is being managed with:   Diet Regular-  1500mL Fluid Restriction ([id number])  Supplement Feeding Modality:  Oral  Ensure Plus High Protein Cans or Servings Per Day:  1       Frequency:  Two Times a day  Entered: Dec 29 2023 12:39PM

## 2023-12-30 NOTE — PROGRESS NOTE ADULT - ASSESSMENT
Gonzalez Stewart is a 53 year old male with PMHx of cervical epidural abscess (s/p decompressive laminectomy 3/2021 c/b b/l leg paralysis), hx of pneumoperitoneum (s/p ex lap, total abdominal colectomy and end ileostomy (on 1/12/23) c/b evisceration and sepsis with MRSA bacteremia postoperatively), hx of hepatitis C, hx of R. buttock abscess, hx of L. foot osteomyelitis, neurogenic bladder (with indwelling Holcomb catheter, last exchanged two days ago), HTN, HLD, bipolar disorder, GERD, hx of opioid dependence, and constipation who presented to the ED on 12/4/23 from [facility] for feet infection and admitted for sepsis secondary to bilateral feet infection.      Sepsis secondary to b/l feet infection  Previously treated for L. hallux OM with L. heel culture growing Proteus mirabilis ESBL, completed 6-week course of avycaz  CT b/l LE with study is severely limited by contracture. Left lower extremity is only partially visualized with exclusion of the left foot.  S/p bedside escharectomy by podiatry  right foot wound cx- ESBL proteus - resistant to cefepime and corynebecaterium and alcalegenes  left foot wound cx- MRSA and Pseudomonas  and klebsiella   Blood cx- neg x 2  PICC line in place 12/11/23   Continue meropenem and vancomycin until 1/8 per ID.   ECHO EF 55-60%, normal systolic function, and mild mitral regurgitation       COPD  chronic hypoxic respiratory failure   Baseline patient is on 5L O2 NC at home   Had an episode of resp distress from fluid overload from IVF- Placed on lasix drip for some time with improvement  Continue PO lasix with water restriction to 1.5 liters a day  ECHO reviewed.   Pulm consulted    cont airway clearance w/ aerobika. cont current bronchodilators.     CT chest : Emphysematous disease with likely concurrent interstitial fibrosis.   Suspected  chronic microaspiration         HTN. controlled.   continue current management. Monitor.     HLD  continue  atorvastatin 40 mg daily     Bipolar disorder, stable   : PTA quetiapine 100 mg BID and 400 mg qhs,  12/12/2023 valproic qhs been stopped by psych      Hx of opioid dependence  continue methadone maintenance. EKG reviewed and showed Qtc 487. check EKG on Monday if patient stays inpatient till then.       Chronic pain  with spasms. cont methadone. Cont oral dilaudid prn and cont constipation ppx.  Discussed about it with palliative care service.     GERD:   continue with PPI     Neurogenic bladder (with indwelling Holcomb catheter)   PTA finasteride 5 mg, tamsulosin 0.4 mg    Constipation:   continue with bowel regimen      functional quad-   B/L contracted   supportive care  , frequent repositioning      Rt hip pressure wound, seen by vascular ,   wound recs in place     Mod PCM  nutrition recommendations appreciated     Normocytic anemia , SARAH   no e/o bleeding or bruising   H/H stable   supplement     Preventative Measures  lovenox SQ-dvt ppx  fall, aspiration precautions   HOBE     palliative following, patient is full code    Pending insurance auth for [place] palliative care.  Gonzalez Stewart is a 53 year old male with PMHx of cervical epidural abscess (s/p decompressive laminectomy 3/2021 c/b b/l leg paralysis), hx of pneumoperitoneum (s/p ex lap, total abdominal colectomy and end ileostomy (on 1/12/23) c/b evisceration and sepsis with MRSA bacteremia postoperatively), hx of hepatitis C, hx of R. buttock abscess, hx of L. foot osteomyelitis, neurogenic bladder (with indwelling Holcomb catheter, last exchanged two days ago), HTN, HLD, bipolar disorder, GERD, hx of opioid dependence, and constipation who presented to the ED on 12/4/23 from [facility] for feet infection and admitted for sepsis secondary to bilateral feet infection.      Sepsis secondary to b/l feet infection  Previously treated for L. hallux OM with L. heel culture growing Proteus mirabilis ESBL, completed 6-week course of avycaz  CT b/l LE with study is severely limited by contracture. Left lower extremity is only partially visualized with exclusion of the left foot.  S/p bedside escharectomy by podiatry  right foot wound cx- ESBL proteus - resistant to cefepime and corynebecaterium and alcalegenes  left foot wound cx- MRSA and Pseudomonas  and klebsiella   Blood cx- neg x 2  PICC line in place 12/11/23   Continue meropenem and vancomycin until 1/8 per ID.   ECHO EF 55-60%, normal systolic function, and mild mitral regurgitation       COPD  chronic hypoxic respiratory failure   Baseline patient is on 5L O2 NC at home   Had an episode of resp distress from fluid overload from IVF- Placed on lasix drip for some time with improvement  Continue PO lasix with water restriction to 1.5 liters a day  ECHO reviewed.   Pulm consulted    cont airway clearance w/ aerobika. cont current bronchodilators.     CT chest : Emphysematous disease with likely concurrent interstitial fibrosis.   Suspected  chronic microaspiration         HTN. controlled.   continue current management. Monitor.     HLD  continue  atorvastatin 40 mg daily     Bipolar disorder, stable   : PTA quetiapine 100 mg BID and 400 mg qhs,  12/12/2023 valproic qhs been stopped by psych      Hx of opioid dependence  continue methadone maintenance. EKG reviewed and showed Qtc 487. check EKG on Monday if patient stays inpatient till then.       Chronic pain  with spasms. cont methadone. Cont oral dilaudid prn and cont constipation ppx.  Discussed about it with palliative care service.     GERD:   continue with PPI     Neurogenic bladder (with indwelling Holcomb catheter)   PTA finasteride 5 mg, tamsulosin 0.4 mg    Constipation:   continue with bowel regimen      functional quad-   B/L contracted   supportive care  , frequent repositioning      Rt hip pressure wound, seen by vascular ,   wound recs in place     Mod PCM  nutrition recommendations appreciated     Normocytic anemia , SARAH   no e/o bleeding or bruising   H/H stable   supplement     Preventative Measures  lovenox SQ-dvt ppx  fall, aspiration precautions   HOBE     palliative following, patient is full code    Pending insurance auth for [place] palliative care.

## 2023-12-31 PROCEDURE — 99232 SBSQ HOSP IP/OBS MODERATE 35: CPT

## 2023-12-31 RX ADMIN — Medication 650 MILLIGRAM(S): at 13:43

## 2023-12-31 RX ADMIN — HYDROMORPHONE HYDROCHLORIDE 2 MILLIGRAM(S): 2 INJECTION INTRAMUSCULAR; INTRAVENOUS; SUBCUTANEOUS at 22:34

## 2023-12-31 RX ADMIN — GABAPENTIN 800 MILLIGRAM(S): 400 CAPSULE ORAL at 14:49

## 2023-12-31 RX ADMIN — Medication 650 MILLIGRAM(S): at 12:50

## 2023-12-31 RX ADMIN — NYSTATIN CREAM 1 APPLICATION(S): 100000 CREAM TOPICAL at 18:03

## 2023-12-31 RX ADMIN — Medication 1 APPLICATION(S): at 12:50

## 2023-12-31 RX ADMIN — Medication 1 MILLIGRAM(S): at 12:50

## 2023-12-31 RX ADMIN — MEROPENEM 100 MILLIGRAM(S): 1 INJECTION INTRAVENOUS at 06:03

## 2023-12-31 RX ADMIN — QUETIAPINE FUMARATE 100 MILLIGRAM(S): 200 TABLET, FILM COATED ORAL at 10:30

## 2023-12-31 RX ADMIN — AMLODIPINE BESYLATE 2.5 MILLIGRAM(S): 2.5 TABLET ORAL at 06:07

## 2023-12-31 RX ADMIN — CYCLOBENZAPRINE HYDROCHLORIDE 10 MILLIGRAM(S): 10 TABLET, FILM COATED ORAL at 21:34

## 2023-12-31 RX ADMIN — POLYETHYLENE GLYCOL 3350 17 GRAM(S): 17 POWDER, FOR SOLUTION ORAL at 12:50

## 2023-12-31 RX ADMIN — CHLORHEXIDINE GLUCONATE 1 APPLICATION(S): 213 SOLUTION TOPICAL at 06:02

## 2023-12-31 RX ADMIN — Medication 20 MILLIGRAM(S): at 06:02

## 2023-12-31 RX ADMIN — ZINC SULFATE TAB 220 MG (50 MG ZINC EQUIVALENT) 220 MILLIGRAM(S): 220 (50 ZN) TAB at 12:49

## 2023-12-31 RX ADMIN — MEROPENEM 100 MILLIGRAM(S): 1 INJECTION INTRAVENOUS at 21:35

## 2023-12-31 RX ADMIN — ATORVASTATIN CALCIUM 40 MILLIGRAM(S): 80 TABLET, FILM COATED ORAL at 21:35

## 2023-12-31 RX ADMIN — SENNA PLUS 2 TABLET(S): 8.6 TABLET ORAL at 21:41

## 2023-12-31 RX ADMIN — CYCLOBENZAPRINE HYDROCHLORIDE 10 MILLIGRAM(S): 10 TABLET, FILM COATED ORAL at 06:02

## 2023-12-31 RX ADMIN — Medication 500 MILLIGRAM(S): at 12:49

## 2023-12-31 RX ADMIN — QUETIAPINE FUMARATE 400 MILLIGRAM(S): 200 TABLET, FILM COATED ORAL at 21:34

## 2023-12-31 RX ADMIN — FINASTERIDE 5 MILLIGRAM(S): 5 TABLET, FILM COATED ORAL at 12:49

## 2023-12-31 RX ADMIN — HYDROMORPHONE HYDROCHLORIDE 2 MILLIGRAM(S): 2 INJECTION INTRAMUSCULAR; INTRAVENOUS; SUBCUTANEOUS at 12:47

## 2023-12-31 RX ADMIN — BUDESONIDE AND FORMOTEROL FUMARATE DIHYDRATE 2 PUFF(S): 160; 4.5 AEROSOL RESPIRATORY (INHALATION) at 06:38

## 2023-12-31 RX ADMIN — Medication 3 MILLILITER(S): at 17:28

## 2023-12-31 RX ADMIN — Medication 1 TABLET(S): at 12:48

## 2023-12-31 RX ADMIN — CYCLOBENZAPRINE HYDROCHLORIDE 10 MILLIGRAM(S): 10 TABLET, FILM COATED ORAL at 14:49

## 2023-12-31 RX ADMIN — Medication 5 MILLIGRAM(S): at 08:26

## 2023-12-31 RX ADMIN — HYDROMORPHONE HYDROCHLORIDE 2 MILLIGRAM(S): 2 INJECTION INTRAMUSCULAR; INTRAVENOUS; SUBCUTANEOUS at 23:24

## 2023-12-31 RX ADMIN — Medication 600 MILLIGRAM(S): at 06:03

## 2023-12-31 RX ADMIN — METHADONE HYDROCHLORIDE 60 MILLIGRAM(S): 40 TABLET ORAL at 06:02

## 2023-12-31 RX ADMIN — Medication 1 APPLICATION(S): at 13:20

## 2023-12-31 RX ADMIN — PREGABALIN 1000 MICROGRAM(S): 225 CAPSULE ORAL at 12:49

## 2023-12-31 RX ADMIN — Medication 250 MILLIGRAM(S): at 07:13

## 2023-12-31 RX ADMIN — Medication 1 TABLET(S): at 08:21

## 2023-12-31 RX ADMIN — Medication 100 MILLIGRAM(S): at 12:49

## 2023-12-31 RX ADMIN — Medication 5 MILLIGRAM(S): at 21:34

## 2023-12-31 RX ADMIN — HYDROMORPHONE HYDROCHLORIDE 2 MILLIGRAM(S): 2 INJECTION INTRAMUSCULAR; INTRAVENOUS; SUBCUTANEOUS at 13:53

## 2023-12-31 RX ADMIN — Medication 600 MILLIGRAM(S): at 17:58

## 2023-12-31 RX ADMIN — Medication 250 MILLIGRAM(S): at 17:57

## 2023-12-31 RX ADMIN — QUETIAPINE FUMARATE 100 MILLIGRAM(S): 200 TABLET, FILM COATED ORAL at 17:58

## 2023-12-31 RX ADMIN — GABAPENTIN 800 MILLIGRAM(S): 400 CAPSULE ORAL at 21:34

## 2023-12-31 RX ADMIN — Medication 1 TABLET(S): at 17:58

## 2023-12-31 RX ADMIN — GABAPENTIN 800 MILLIGRAM(S): 400 CAPSULE ORAL at 06:02

## 2023-12-31 RX ADMIN — BUDESONIDE AND FORMOTEROL FUMARATE DIHYDRATE 2 PUFF(S): 160; 4.5 AEROSOL RESPIRATORY (INHALATION) at 18:03

## 2023-12-31 RX ADMIN — NYSTATIN CREAM 1 APPLICATION(S): 100000 CREAM TOPICAL at 06:03

## 2023-12-31 RX ADMIN — METHADONE HYDROCHLORIDE 60 MILLIGRAM(S): 40 TABLET ORAL at 17:56

## 2023-12-31 RX ADMIN — PANTOPRAZOLE SODIUM 40 MILLIGRAM(S): 20 TABLET, DELAYED RELEASE ORAL at 07:35

## 2023-12-31 RX ADMIN — TAMSULOSIN HYDROCHLORIDE 0.4 MILLIGRAM(S): 0.4 CAPSULE ORAL at 21:35

## 2023-12-31 RX ADMIN — MEROPENEM 100 MILLIGRAM(S): 1 INJECTION INTRAVENOUS at 14:48

## 2023-12-31 NOTE — PROGRESS NOTE ADULT - ASSESSMENT
Gonzalez Stewart is a 53 year old male with PMHx of cervical epidural abscess (s/p decompressive laminectomy 3/2021 c/b b/l leg paralysis), hx of pneumoperitoneum (s/p ex lap, total abdominal colectomy and end ileostomy (on 1/12/23) c/b evisceration and sepsis with MRSA bacteremia postoperatively), hx of hepatitis C, hx of R. buttock abscess, hx of L. foot osteomyelitis, neurogenic bladder (with indwelling Holcomb catheter, last exchanged two days ago), HTN, HLD, bipolar disorder, GERD, hx of opioid dependence, and constipation who presented to the ED on 12/4/23 from [facility] for feet infection and admitted for sepsis secondary to bilateral feet infection.      Sepsis secondary to b/l feet infection  Previously treated for L. hallux OM with L. heel culture growing Proteus mirabilis ESBL, completed 6-week course of avycaz  CT b/l LE with study is severely limited by contracture. Left lower extremity is only partially visualized with exclusion of the left foot.  S/p bedside escharectomy by podiatry  right foot wound cx- ESBL proteus - resistant to cefepime and corynebecaterium and alcalegenes  left foot wound cx- MRSA and Pseudomonas  and klebsiella   Blood cx- neg x 2  PICC line in place 12/11/23   Continue meropenem and vancomycin until 1/8 per ID.   ECHO EF 55-60%, normal systolic function, and mild mitral regurgitation       COPD  chronic hypoxic respiratory failure   Baseline patient is on 5L O2 NC at home   Had an episode of resp distress from fluid overload from IVF- Placed on lasix drip for some time with improvement  Continue PO lasix with water restriction to 1.5 liters a day  ECHO reviewed.   Pulm consulted    cont airway clearance w/ aerobika. cont current bronchodilators.     CT chest : Emphysematous disease with likely concurrent interstitial fibrosis.   Suspected  chronic microaspiration         HTN. controlled.   continue current management. Monitor.     HLD  continue  atorvastatin 40 mg daily     Bipolar disorder, stable   : PTA quetiapine 100 mg BID and 400 mg qhs,  12/12/2023 valproic qhs been stopped by psych      Hx of opioid dependence  continue methadone maintenance. EKG reviewed and showed Qtc 487. check EKG tomorrow.       Chronic pain  with spasms. cont methadone. Cont oral dilaudid prn and cont constipation ppx.  Discussed about it with palliative care service.     GERD:   continue with PPI     Neurogenic bladder (with indwelling Holcomb catheter)   PTA finasteride 5 mg, tamsulosin 0.4 mg    Constipation:   continue with bowel regimen      functional quad-   B/L contracted   supportive care  , frequent repositioning      Rt hip pressure wound, seen by vascular ,   wound recs in place     Mod PCM  nutrition recommendations appreciated     Normocytic anemia , SARAH   no e/o bleeding or bruising   H/H stable   supplement     Preventative Measures  lovenox SQ-dvt ppx  fall, aspiration precautions   HOBE     palliative following, patient is full code    Pending insurance auth for [place] palliative care. More clinicals requested and sent by care management.   Gonzalez Stewart is a 53 year old male with PMHx of cervical epidural abscess (s/p decompressive laminectomy 3/2021 c/b b/l leg paralysis), hx of pneumoperitoneum (s/p ex lap, total abdominal colectomy and end ileostomy (on 1/12/23) c/b evisceration and sepsis with MRSA bacteremia postoperatively), hx of hepatitis C, hx of R. buttock abscess, hx of L. foot osteomyelitis, neurogenic bladder (with indwelling Holcomb catheter, last exchanged two days ago), HTN, HLD, bipolar disorder, GERD, hx of opioid dependence, and constipation who presented to the ED on 12/4/23 from [facility] for feet infection and admitted for sepsis secondary to bilateral feet infection.      Sepsis secondary to b/l feet infection  Previously treated for L. hallux OM with L. heel culture growing Proteus mirabilis ESBL, completed 6-week course of avycaz  CT b/l LE with study is severely limited by contracture. Left lower extremity is only partially visualized with exclusion of the left foot.  S/p bedside escharectomy by podiatry  right foot wound cx- ESBL proteus - resistant to cefepime and corynebecaterium and alcalegenes  left foot wound cx- MRSA and Pseudomonas  and klebsiella   Blood cx- neg x 2  PICC line in place 12/11/23   Continue meropenem and vancomycin until 1/8 per ID.   ECHO EF 55-60%, normal systolic function, and mild mitral regurgitation       COPD  chronic hypoxic respiratory failure   Baseline patient is on 5L O2 NC at home   Had an episode of resp distress from fluid overload from IVF- Placed on lasix drip for some time with improvement  Continue PO lasix with water restriction to 1.5 liters a day  ECHO reviewed.   Pulm consulted    cont airway clearance w/ aerobika. cont current bronchodilators.     CT chest : Emphysematous disease with likely concurrent interstitial fibrosis.   Suspected  chronic microaspiration         HTN. controlled.   continue current management. Monitor.     HLD  continue  atorvastatin 40 mg daily     Bipolar disorder, stable   : PTA quetiapine 100 mg BID and 400 mg qhs,  12/12/2023 valproic qhs been stopped by psych      Hx of opioid dependence  continue methadone maintenance. EKG reviewed and showed Qtc 487. check EKG tomorrow.       Chronic pain  with spasms. cont methadone. Cont oral dilaudid prn and cont constipation ppx.  Discussed about it with palliative care service.     GERD:   continue with PPI     Neurogenic bladder (with indwelling Holcomb catheter)   PTA finasteride 5 mg, tamsulosin 0.4 mg    Constipation:   continue with bowel regimen      functional quad-   B/L contracted   supportive care  , frequent repositioning      Rt hip pressure wound, seen by vascular ,   wound recs in place     Mod PCM  nutrition recommendations appreciated     Normocytic anemia , SARAH   no e/o bleeding or bruising   H/H stable   supplement     Preventative Measures  lovenox SQ-dvt ppx  fall, aspiration precautions   HOBE     palliative following, patient is full code    Pending insurance auth for [place] palliative care. More clinicals requested and sent by care management.

## 2023-12-31 NOTE — PROGRESS NOTE ADULT - SUBJECTIVE AND OBJECTIVE BOX
CHIEF COMPLAINT: FU of sepsis with foot infection  no fever  no diarrhea   Pain controlled with oral dilaudid per patient.       PHYSICAL EXAM:    GENERAL: Moderately built, no acute distress   CHEST/LUNG:  No wheezing, no crackles   HEART: Regular rate and rhythm; No murmurs  ABDOMEN: Soft, Nontender, Nondistended; Bowel sounds present. + west, ileostomy, PICC line.   EXTREMITIES:  No clubbing, cyanosis, or edema. + dressing bilateral feet. + pressure wound right buttock  Psychiatry: AAO x 3, mood is appropriate       OBJECTIVE DATA:     Vital Signs Last 24 Hrs  T(C): 37 (31 Dec 2023 05:40), Max: 37.2 (30 Dec 2023 12:16)  T(F): 98.6 (31 Dec 2023 05:40), Max: 99 (30 Dec 2023 12:16)  HR: 82 (31 Dec 2023 05:40) (76 - 88)  BP: 113/66 (31 Dec 2023 05:40) (112/68 - 128/81)  BP(mean): --  RR: 18 (31 Dec 2023 05:40) (18 - 18)  SpO2: 94% (31 Dec 2023 05:40) (92% - 98%)    Parameters below as of 31 Dec 2023 05:40  Patient On (Oxygen Delivery Method): nasal cannula      CAPILLARY BLOOD GLUCOSE      POCT Blood Glucose.: 122 mg/dL (29 Dec 2023 21:53)          Interval Radiology studies: reviewed   MEDICATIONS  (STANDING):  acetaminophen     Tablet .. 650 milliGRAM(s) Oral daily  amLODIPine   Tablet 2.5 milliGRAM(s) Oral daily  ascorbic acid 500 milliGRAM(s) Oral daily  atorvastatin 40 milliGRAM(s) Oral at bedtime  bacitracin   Ointment 1 Application(s) Topical daily  budesonide 160 MICROgram(s)/formoterol 4.5 MICROgram(s) Inhaler 2 Puff(s) Inhalation two times a day  chlorhexidine 2% Cloths 1 Application(s) Topical <User Schedule>  collagenase Ointment 1 Application(s) Topical daily  cyanocobalamin 1000 MICROGram(s) Oral daily  cyclobenzaprine 10 milliGRAM(s) Oral three times a day  enoxaparin Injectable 40 milliGRAM(s) SubCutaneous every 24 hours  ferrous    sulfate 325 milliGRAM(s) Oral <User Schedule>  finasteride 5 milliGRAM(s) Oral daily  folic acid 1 milliGRAM(s) Oral daily  furosemide    Tablet 20 milliGRAM(s) Oral daily  gabapentin 800 milliGRAM(s) Oral every 8 hours  guaiFENesin  milliGRAM(s) Oral every 12 hours  lactobacillus acidophilus 1 Tablet(s) Oral two times a day with meals  lidocaine   4% Patch 1 Patch Transdermal daily  meropenem  IVPB 1000 milliGRAM(s) IV Intermittent every 8 hours  methadone    Tablet 60 milliGRAM(s) Oral two times a day  methylphenidate 5 milliGRAM(s) Oral <User Schedule>  multivitamin 1 Tablet(s) Oral daily  nystatin Powder 1 Application(s) Topical two times a day  pantoprazole    Tablet 40 milliGRAM(s) Oral before breakfast  polyethylene glycol 3350 17 Gram(s) Oral daily  QUEtiapine 400 milliGRAM(s) Oral at bedtime  QUEtiapine 100 milliGRAM(s) Oral <User Schedule>  senna 2 Tablet(s) Oral at bedtime  tamsulosin 0.4 milliGRAM(s) Oral at bedtime  thiamine 100 milliGRAM(s) Oral daily  vancomycin  IVPB 1000 milliGRAM(s) IV Intermittent every 12 hours  zinc sulfate 220 milliGRAM(s) Oral daily    MEDICATIONS  (PRN):  acetaminophen     Tablet .. 650 milliGRAM(s) Oral every 6 hours PRN Temp greater or equal to 38C (100.4F), Mild Pain (1 - 3)  albuterol/ipratropium for Nebulization 3 milliLiter(s) Nebulizer every 8 hours PRN Shortness of Breath and/or Wheezing  aluminum hydroxide/magnesium hydroxide/simethicone Suspension 30 milliLiter(s) Oral every 4 hours PRN Dyspepsia  HYDROmorphone   Tablet 2 milliGRAM(s) Oral every 4 hours PRN Severe Pain (7 - 10)  melatonin 3 milliGRAM(s) Oral at bedtime PRN Insomnia  sodium chloride 0.9% lock flush 10 milliLiter(s) IV Push every 1 hour PRN Pre/post blood products, medications, blood draw, and to maintain line patency

## 2023-12-31 NOTE — PROGRESS NOTE ADULT - NUTRITIONAL ASSESSMENT
This patient has been assessed with a concern for Malnutrition and has been determined to have a diagnosis/diagnoses of Moderate protein-calorie malnutrition.    This patient is being managed with:   Diet Regular-  1500mL Fluid Restriction ([id number])  Supplement Feeding Modality:  Oral  Ensure Plus High Protein Cans or Servings Per Day:  1       Frequency:  Two Times a day  Entered: Dec 29 2023 12:39PM   This patient has been assessed with a concern for Malnutrition and has been determined to have a diagnosis/diagnoses of Moderate protein-calorie malnutrition.    This patient is being managed with:   Diet Regular-  1500mL Fluid Restriction ([id number])  Supplement Feeding Modality:  Oral  Ensure Plus High Protein Cans or Servings Per Day:  1       Frequency:  Two Times a day  Entered: Dec 29 2023 12:39PM

## 2024-01-01 LAB
VANCOMYCIN TROUGH SERPL-MCNC: 14.7 UG/ML — SIGNIFICANT CHANGE UP (ref 10–20)
VANCOMYCIN TROUGH SERPL-MCNC: 14.7 UG/ML — SIGNIFICANT CHANGE UP (ref 10–20)

## 2024-01-01 PROCEDURE — 99232 SBSQ HOSP IP/OBS MODERATE 35: CPT

## 2024-01-01 RX ADMIN — PANTOPRAZOLE SODIUM 40 MILLIGRAM(S): 20 TABLET, DELAYED RELEASE ORAL at 06:13

## 2024-01-01 RX ADMIN — BUDESONIDE AND FORMOTEROL FUMARATE DIHYDRATE 2 PUFF(S): 160; 4.5 AEROSOL RESPIRATORY (INHALATION) at 06:16

## 2024-01-01 RX ADMIN — Medication 3 MILLILITER(S): at 17:32

## 2024-01-01 RX ADMIN — Medication 1 TABLET(S): at 11:55

## 2024-01-01 RX ADMIN — FINASTERIDE 5 MILLIGRAM(S): 5 TABLET, FILM COATED ORAL at 11:55

## 2024-01-01 RX ADMIN — AMLODIPINE BESYLATE 2.5 MILLIGRAM(S): 2.5 TABLET ORAL at 06:15

## 2024-01-01 RX ADMIN — Medication 250 MILLIGRAM(S): at 22:44

## 2024-01-01 RX ADMIN — GABAPENTIN 800 MILLIGRAM(S): 400 CAPSULE ORAL at 06:14

## 2024-01-01 RX ADMIN — CHLORHEXIDINE GLUCONATE 1 APPLICATION(S): 213 SOLUTION TOPICAL at 06:14

## 2024-01-01 RX ADMIN — Medication 5 MILLIGRAM(S): at 22:44

## 2024-01-01 RX ADMIN — GABAPENTIN 800 MILLIGRAM(S): 400 CAPSULE ORAL at 22:43

## 2024-01-01 RX ADMIN — NYSTATIN CREAM 1 APPLICATION(S): 100000 CREAM TOPICAL at 06:49

## 2024-01-01 RX ADMIN — Medication 1 MILLIGRAM(S): at 11:55

## 2024-01-01 RX ADMIN — QUETIAPINE FUMARATE 100 MILLIGRAM(S): 200 TABLET, FILM COATED ORAL at 10:56

## 2024-01-01 RX ADMIN — CYCLOBENZAPRINE HYDROCHLORIDE 10 MILLIGRAM(S): 10 TABLET, FILM COATED ORAL at 22:42

## 2024-01-01 RX ADMIN — CYCLOBENZAPRINE HYDROCHLORIDE 10 MILLIGRAM(S): 10 TABLET, FILM COATED ORAL at 15:45

## 2024-01-01 RX ADMIN — QUETIAPINE FUMARATE 100 MILLIGRAM(S): 200 TABLET, FILM COATED ORAL at 18:34

## 2024-01-01 RX ADMIN — METHADONE HYDROCHLORIDE 60 MILLIGRAM(S): 40 TABLET ORAL at 18:33

## 2024-01-01 RX ADMIN — MEROPENEM 100 MILLIGRAM(S): 1 INJECTION INTRAVENOUS at 15:43

## 2024-01-01 RX ADMIN — Medication 1 TABLET(S): at 08:52

## 2024-01-01 RX ADMIN — MEROPENEM 100 MILLIGRAM(S): 1 INJECTION INTRAVENOUS at 06:14

## 2024-01-01 RX ADMIN — CYCLOBENZAPRINE HYDROCHLORIDE 10 MILLIGRAM(S): 10 TABLET, FILM COATED ORAL at 06:14

## 2024-01-01 RX ADMIN — METHADONE HYDROCHLORIDE 60 MILLIGRAM(S): 40 TABLET ORAL at 06:13

## 2024-01-01 RX ADMIN — PREGABALIN 1000 MICROGRAM(S): 225 CAPSULE ORAL at 11:55

## 2024-01-01 RX ADMIN — HYDROMORPHONE HYDROCHLORIDE 2 MILLIGRAM(S): 2 INJECTION INTRAMUSCULAR; INTRAVENOUS; SUBCUTANEOUS at 08:57

## 2024-01-01 RX ADMIN — Medication 1 TABLET(S): at 18:34

## 2024-01-01 RX ADMIN — Medication 500 MILLIGRAM(S): at 11:55

## 2024-01-01 RX ADMIN — Medication 250 MILLIGRAM(S): at 06:15

## 2024-01-01 RX ADMIN — HYDROMORPHONE HYDROCHLORIDE 2 MILLIGRAM(S): 2 INJECTION INTRAMUSCULAR; INTRAVENOUS; SUBCUTANEOUS at 16:07

## 2024-01-01 RX ADMIN — GABAPENTIN 800 MILLIGRAM(S): 400 CAPSULE ORAL at 15:43

## 2024-01-01 RX ADMIN — Medication 600 MILLIGRAM(S): at 18:34

## 2024-01-01 RX ADMIN — NYSTATIN CREAM 1 APPLICATION(S): 100000 CREAM TOPICAL at 18:30

## 2024-01-01 RX ADMIN — MEROPENEM 100 MILLIGRAM(S): 1 INJECTION INTRAVENOUS at 22:43

## 2024-01-01 RX ADMIN — Medication 650 MILLIGRAM(S): at 11:54

## 2024-01-01 RX ADMIN — QUETIAPINE FUMARATE 400 MILLIGRAM(S): 200 TABLET, FILM COATED ORAL at 22:46

## 2024-01-01 RX ADMIN — TAMSULOSIN HYDROCHLORIDE 0.4 MILLIGRAM(S): 0.4 CAPSULE ORAL at 22:43

## 2024-01-01 RX ADMIN — Medication 100 MILLIGRAM(S): at 11:55

## 2024-01-01 RX ADMIN — Medication 325 MILLIGRAM(S): at 11:56

## 2024-01-01 RX ADMIN — HYDROMORPHONE HYDROCHLORIDE 2 MILLIGRAM(S): 2 INJECTION INTRAMUSCULAR; INTRAVENOUS; SUBCUTANEOUS at 09:50

## 2024-01-01 RX ADMIN — HYDROMORPHONE HYDROCHLORIDE 2 MILLIGRAM(S): 2 INJECTION INTRAMUSCULAR; INTRAVENOUS; SUBCUTANEOUS at 15:49

## 2024-01-01 RX ADMIN — Medication 600 MILLIGRAM(S): at 06:14

## 2024-01-01 RX ADMIN — ATORVASTATIN CALCIUM 40 MILLIGRAM(S): 80 TABLET, FILM COATED ORAL at 22:42

## 2024-01-01 RX ADMIN — Medication 20 MILLIGRAM(S): at 06:14

## 2024-01-01 RX ADMIN — Medication 5 MILLIGRAM(S): at 08:52

## 2024-01-01 RX ADMIN — ZINC SULFATE TAB 220 MG (50 MG ZINC EQUIVALENT) 220 MILLIGRAM(S): 220 (50 ZN) TAB at 11:55

## 2024-01-01 NOTE — PROGRESS NOTE ADULT - ASSESSMENT
Gonzalez Stewart is a 53 year old male with PMHx of cervical epidural abscess (s/p decompressive laminectomy 3/2021 c/b b/l leg paralysis), hx of pneumoperitoneum (s/p ex lap, total abdominal colectomy and end ileostomy (on 1/12/23) c/b evisceration and sepsis with MRSA bacteremia postoperatively), hx of hepatitis C, hx of R. buttock abscess, hx of L. foot osteomyelitis, neurogenic bladder (with indwelling Holcomb catheter, last exchanged two days ago), HTN, HLD, bipolar disorder, GERD, hx of opioid dependence, and constipation who presented to the ED on 12/4/23 from [facility] for feet infection and admitted for sepsis secondary to bilateral feet infection.      Sepsis secondary to b/l feet infection  Previously treated for L. hallux OM with L. heel culture growing Proteus mirabilis ESBL, completed 6-week course of avycaz  CT b/l LE with study is severely limited by contracture. Left lower extremity is only partially visualized with exclusion of the left foot.  S/p bedside escharectomy by podiatry  right foot wound cx- ESBL proteus - resistant to cefepime and corynebecaterium and alcalegenes  left foot wound cx- MRSA and Pseudomonas  and klebsiella   Blood cx- neg x 2  PICC line in place 12/11/23   Continue meropenem and vancomycin until 1/8 per ID. reviewed vanco level.   ECHO EF 55-60%, normal systolic function, and mild mitral regurgitation       COPD  chronic hypoxic respiratory failure   Baseline patient is on 5L O2 NC at home   Had an episode of resp distress from fluid overload from IVF- Placed on lasix drip for some time with improvement  Continue PO lasix with water restriction to 1.5 liters a day  ECHO reviewed.   Pulm consulted    cont airway clearance w/ aerobika. cont current bronchodilators.     CT chest : Emphysematous disease with likely concurrent interstitial fibrosis.   Suspected  chronic microaspiration        HTN. controlled.   continue current management. Monitor.     HLD  continue  atorvastatin 40 mg daily     Bipolar disorder, stable   : PTA quetiapine 100 mg BID and 400 mg qhs,  12/12/2023 valproic qhs been stopped by psych      Hx of opioid dependence  continue methadone maintenance. EKG reviewed and showed Qtc 487. check EKG tomorrow.       Chronic pain  with spasms. cont methadone. Cont oral dilaudid prn and cont constipation ppx.  Discussed about it with palliative care service.     GERD:   continue with PPI     Neurogenic bladder (with indwelling Holcomb catheter)   PTA finasteride 5 mg, tamsulosin 0.4 mg    Constipation:   continue with bowel regimen      functional quad-   B/L contracted   supportive care  , frequent repositioning      Rt hip pressure wound, seen by vascular ,   wound recs in place     Mod PCM  nutrition recommendations appreciated     Normocytic anemia , SARAH   no e/o bleeding or bruising   H/H stable   supplement     Preventative Measures  lovenox SQ-dvt ppx  fall, aspiration precautions   HOBE     palliative following, patient is full code    Pending insurance auth for [place] palliative care.  Gonzalez Stewart is a 53 year old male with PMHx of cervical epidural abscess (s/p decompressive laminectomy 3/2021 c/b b/l leg paralysis), hx of pneumoperitoneum (s/p ex lap, total abdominal colectomy and end ileostomy (on 1/12/23) c/b evisceration and sepsis with MRSA bacteremia postoperatively), hx of hepatitis C, hx of R. buttock abscess, hx of L. foot osteomyelitis, neurogenic bladder (with indwelling Holcomb catheter, last exchanged two days ago), HTN, HLD, bipolar disorder, GERD, hx of opioid dependence, and constipation who presented to the ED on 12/4/23 from [facility] for feet infection and admitted for sepsis secondary to bilateral feet infection.      Sepsis secondary to b/l feet infection  Previously treated for L. hallux OM with L. heel culture growing Proteus mirabilis ESBL, completed 6-week course of avycaz  CT b/l LE with study is severely limited by contracture. Left lower extremity is only partially visualized with exclusion of the left foot.  S/p bedside escharectomy by podiatry  right foot wound cx- ESBL proteus - resistant to cefepime and corynebecaterium and alcalegenes  left foot wound cx- MRSA and Pseudomonas  and klebsiella   Blood cx- neg x 2  PICC line in place 12/11/23   Continue meropenem and vancomycin until 1/8 per ID. reviewed vanco level.   ECHO EF 55-60%, normal systolic function, and mild mitral regurgitation       COPD  chronic hypoxic respiratory failure   Baseline patient is on 5L O2 NC at home   Had an episode of resp distress from fluid overload from IVF- Placed on lasix drip for some time with improvement  Continue PO lasix with water restriction to 1.5 liters a day  ECHO reviewed.   Pulm consulted    cont airway clearance w/ aerobika. cont current bronchodilators.     CT chest : Emphysematous disease with likely concurrent interstitial fibrosis.   Suspected  chronic microaspiration        HTN. controlled.   continue current management. Monitor.     HLD  continue  atorvastatin 40 mg daily     Bipolar disorder, stable   : PTA quetiapine 100 mg BID and 400 mg qhs,  12/12/2023 valproic qhs been stopped by psych      Hx of opioid dependence  continue methadone maintenance. EKG reviewed and showed Qtc 487. check EKG tomorrow.       Chronic pain  with spasms. cont methadone. Cont oral dilaudid prn and cont constipation ppx.  Discussed about it with palliative care service.     GERD:   continue with PPI     Neurogenic bladder (with indwelling Holcomb catheter)   PTA finasteride 5 mg, tamsulosin 0.4 mg    Constipation:   continue with bowel regimen      functional quad-   B/L contracted   supportive care  , frequent repositioning      Rt hip pressure wound, seen by vascular ,   wound recs in place     Mod PCM  nutrition recommendations appreciated     Normocytic anemia , SARAH   no e/o bleeding or bruising   H/H stable   supplement     Preventative Measures  lovenox SQ-dvt ppx  fall, aspiration precautions   HOBE     palliative following, patient is full code    Pending insurance auth for [employer] palliative care.

## 2024-01-01 NOTE — PROGRESS NOTE ADULT - SUBJECTIVE AND OBJECTIVE BOX
CHIEF COMPLAINT: FU of sepsis with foot infection  no fever  no diarrhea   no new overnight events       PHYSICAL EXAM:    GENERAL: Moderately built, no acute distress   CHEST/LUNG:  No wheezing, no crackles   HEART: Regular rate and rhythm; No murmurs  ABDOMEN: Soft, Nontender, Nondistended; Bowel sounds present. + west, ileostomy, PICC line.   EXTREMITIES:  No clubbing, cyanosis, or edema. + dressing bilateral feet. + pressure wound right buttock  Psychiatry: AAO x 3, mood is appropriate       OBJECTIVE DATA:     Vital Signs Last 24 Hrs  T(C): 37.1 (01 Jan 2024 06:44), Max: 37.1 (31 Dec 2023 12:00)  T(F): 98.7 (01 Jan 2024 06:44), Max: 98.7 (31 Dec 2023 12:00)  HR: 84 (01 Jan 2024 06:44) (78 - 94)  BP: 104/69 (01 Jan 2024 06:44) (104/67 - 116/76)  BP(mean): --  RR: 17 (01 Jan 2024 06:44) (17 - 18)  SpO2: 93% (01 Jan 2024 06:44) (91% - 100%)    Parameters below as of 01 Jan 2024 08:59  Patient On (Oxygen Delivery Method): nasal cannula               Daily     Daily   LABS:                                     CAPILLARY BLOOD GLUCOSE              Interval Radiology studies: reviewed   MEDICATIONS  (STANDING):  acetaminophen     Tablet .. 650 milliGRAM(s) Oral daily  amLODIPine   Tablet 2.5 milliGRAM(s) Oral daily  ascorbic acid 500 milliGRAM(s) Oral daily  atorvastatin 40 milliGRAM(s) Oral at bedtime  bacitracin   Ointment 1 Application(s) Topical daily  budesonide 160 MICROgram(s)/formoterol 4.5 MICROgram(s) Inhaler 2 Puff(s) Inhalation two times a day  chlorhexidine 2% Cloths 1 Application(s) Topical <User Schedule>  collagenase Ointment 1 Application(s) Topical daily  cyanocobalamin 1000 MICROGram(s) Oral daily  cyclobenzaprine 10 milliGRAM(s) Oral three times a day  enoxaparin Injectable 40 milliGRAM(s) SubCutaneous every 24 hours  ferrous    sulfate 325 milliGRAM(s) Oral <User Schedule>  finasteride 5 milliGRAM(s) Oral daily  folic acid 1 milliGRAM(s) Oral daily  furosemide    Tablet 20 milliGRAM(s) Oral daily  gabapentin 800 milliGRAM(s) Oral every 8 hours  guaiFENesin  milliGRAM(s) Oral every 12 hours  lactobacillus acidophilus 1 Tablet(s) Oral two times a day with meals  lidocaine   4% Patch 1 Patch Transdermal daily  meropenem  IVPB 1000 milliGRAM(s) IV Intermittent every 8 hours  methadone    Tablet 60 milliGRAM(s) Oral two times a day  methylphenidate 5 milliGRAM(s) Oral <User Schedule>  multivitamin 1 Tablet(s) Oral daily  nystatin Powder 1 Application(s) Topical two times a day  pantoprazole    Tablet 40 milliGRAM(s) Oral before breakfast  polyethylene glycol 3350 17 Gram(s) Oral daily  QUEtiapine 100 milliGRAM(s) Oral <User Schedule>  QUEtiapine 400 milliGRAM(s) Oral at bedtime  senna 2 Tablet(s) Oral at bedtime  tamsulosin 0.4 milliGRAM(s) Oral at bedtime  thiamine 100 milliGRAM(s) Oral daily  vancomycin  IVPB 1000 milliGRAM(s) IV Intermittent every 12 hours  zinc sulfate 220 milliGRAM(s) Oral daily    MEDICATIONS  (PRN):  acetaminophen     Tablet .. 650 milliGRAM(s) Oral every 6 hours PRN Temp greater or equal to 38C (100.4F), Mild Pain (1 - 3)  albuterol/ipratropium for Nebulization 3 milliLiter(s) Nebulizer every 8 hours PRN Shortness of Breath and/or Wheezing  aluminum hydroxide/magnesium hydroxide/simethicone Suspension 30 milliLiter(s) Oral every 4 hours PRN Dyspepsia  HYDROmorphone   Tablet 2 milliGRAM(s) Oral every 4 hours PRN Severe Pain (7 - 10)  melatonin 3 milliGRAM(s) Oral at bedtime PRN Insomnia  sodium chloride 0.9% lock flush 10 milliLiter(s) IV Push every 1 hour PRN Pre/post blood products, medications, blood draw, and to maintain line patency

## 2024-01-01 NOTE — PROGRESS NOTE ADULT - NUTRITIONAL ASSESSMENT
This patient has been assessed with a concern for Malnutrition and has been determined to have a diagnosis/diagnoses of Moderate protein-calorie malnutrition.    This patient is being managed with:   Diet Regular-  1500mL Fluid Restriction ([id number])  Supplement Feeding Modality:  Oral  Ensure Plus High Protein Cans or Servings Per Day:  1       Frequency:  Two Times a day  Entered: Dec 29 2023 12:39PM   This patient has been assessed with a concern for Malnutrition and has been determined to have a diagnosis/diagnoses of Moderate protein-calorie malnutrition.    This patient is being managed with:   Diet Regular-  1500mL Fluid Restriction ([id number])  Supplement Feeding Modality:  Oral  Ensure Plus High Protein Cans or Servings Per Day:  1       Frequency:  Two Times a day  Entered: Dec 29 2023 12:39PM

## 2024-01-02 LAB
ANION GAP SERPL CALC-SCNC: 3 MMOL/L — LOW (ref 5–17)
ANION GAP SERPL CALC-SCNC: 3 MMOL/L — LOW (ref 5–17)
BUN SERPL-MCNC: 21 MG/DL — SIGNIFICANT CHANGE UP (ref 7–23)
BUN SERPL-MCNC: 21 MG/DL — SIGNIFICANT CHANGE UP (ref 7–23)
CALCIUM SERPL-MCNC: 9 MG/DL — SIGNIFICANT CHANGE UP (ref 8.5–10.1)
CALCIUM SERPL-MCNC: 9 MG/DL — SIGNIFICANT CHANGE UP (ref 8.5–10.1)
CHLORIDE SERPL-SCNC: 109 MMOL/L — HIGH (ref 96–108)
CHLORIDE SERPL-SCNC: 109 MMOL/L — HIGH (ref 96–108)
CO2 SERPL-SCNC: 30 MMOL/L — SIGNIFICANT CHANGE UP (ref 22–31)
CO2 SERPL-SCNC: 30 MMOL/L — SIGNIFICANT CHANGE UP (ref 22–31)
CREAT SERPL-MCNC: 0.53 MG/DL — SIGNIFICANT CHANGE UP (ref 0.5–1.3)
CREAT SERPL-MCNC: 0.53 MG/DL — SIGNIFICANT CHANGE UP (ref 0.5–1.3)
EGFR: 121 ML/MIN/1.73M2 — SIGNIFICANT CHANGE UP
EGFR: 121 ML/MIN/1.73M2 — SIGNIFICANT CHANGE UP
GLUCOSE SERPL-MCNC: 80 MG/DL — SIGNIFICANT CHANGE UP (ref 70–99)
GLUCOSE SERPL-MCNC: 80 MG/DL — SIGNIFICANT CHANGE UP (ref 70–99)
HCT VFR BLD CALC: 35.2 % — LOW (ref 39–50)
HCT VFR BLD CALC: 35.2 % — LOW (ref 39–50)
HGB BLD-MCNC: 10.4 G/DL — LOW (ref 13–17)
HGB BLD-MCNC: 10.4 G/DL — LOW (ref 13–17)
MCHC RBC-ENTMCNC: 26.7 PG — LOW (ref 27–34)
MCHC RBC-ENTMCNC: 26.7 PG — LOW (ref 27–34)
MCHC RBC-ENTMCNC: 29.5 G/DL — LOW (ref 32–36)
MCHC RBC-ENTMCNC: 29.5 G/DL — LOW (ref 32–36)
MCV RBC AUTO: 90.5 FL — SIGNIFICANT CHANGE UP (ref 80–100)
MCV RBC AUTO: 90.5 FL — SIGNIFICANT CHANGE UP (ref 80–100)
NRBC # BLD: 0 /100 WBCS — SIGNIFICANT CHANGE UP (ref 0–0)
NRBC # BLD: 0 /100 WBCS — SIGNIFICANT CHANGE UP (ref 0–0)
PLATELET # BLD AUTO: 159 K/UL — SIGNIFICANT CHANGE UP (ref 150–400)
PLATELET # BLD AUTO: 159 K/UL — SIGNIFICANT CHANGE UP (ref 150–400)
POTASSIUM SERPL-MCNC: 4 MMOL/L — SIGNIFICANT CHANGE UP (ref 3.5–5.3)
POTASSIUM SERPL-MCNC: 4 MMOL/L — SIGNIFICANT CHANGE UP (ref 3.5–5.3)
POTASSIUM SERPL-SCNC: 4 MMOL/L — SIGNIFICANT CHANGE UP (ref 3.5–5.3)
POTASSIUM SERPL-SCNC: 4 MMOL/L — SIGNIFICANT CHANGE UP (ref 3.5–5.3)
RBC # BLD: 3.89 M/UL — LOW (ref 4.2–5.8)
RBC # BLD: 3.89 M/UL — LOW (ref 4.2–5.8)
RBC # FLD: 16.1 % — HIGH (ref 10.3–14.5)
RBC # FLD: 16.1 % — HIGH (ref 10.3–14.5)
SODIUM SERPL-SCNC: 142 MMOL/L — SIGNIFICANT CHANGE UP (ref 135–145)
SODIUM SERPL-SCNC: 142 MMOL/L — SIGNIFICANT CHANGE UP (ref 135–145)
WBC # BLD: 7.12 K/UL — SIGNIFICANT CHANGE UP (ref 3.8–10.5)
WBC # BLD: 7.12 K/UL — SIGNIFICANT CHANGE UP (ref 3.8–10.5)
WBC # FLD AUTO: 7.12 K/UL — SIGNIFICANT CHANGE UP (ref 3.8–10.5)
WBC # FLD AUTO: 7.12 K/UL — SIGNIFICANT CHANGE UP (ref 3.8–10.5)

## 2024-01-02 PROCEDURE — 99239 HOSP IP/OBS DSCHRG MGMT >30: CPT

## 2024-01-02 PROCEDURE — 99232 SBSQ HOSP IP/OBS MODERATE 35: CPT

## 2024-01-02 RX ORDER — FOLIC ACID 0.8 MG
1 TABLET ORAL
Qty: 0 | Refills: 0 | DISCHARGE
Start: 2024-01-02

## 2024-01-02 RX ORDER — HYDROMORPHONE HYDROCHLORIDE 2 MG/ML
1 INJECTION INTRAMUSCULAR; INTRAVENOUS; SUBCUTANEOUS
Qty: 0 | Refills: 0 | DISCHARGE
Start: 2024-01-02

## 2024-01-02 RX ORDER — FUROSEMIDE 40 MG
1 TABLET ORAL
Qty: 0 | Refills: 0 | DISCHARGE
Start: 2024-01-02

## 2024-01-02 RX ORDER — GABAPENTIN 400 MG/1
2 CAPSULE ORAL
Refills: 0 | DISCHARGE

## 2024-01-02 RX ORDER — METHYLPHENIDATE HCL 5 MG
1 TABLET ORAL
Refills: 0 | DISCHARGE

## 2024-01-02 RX ORDER — OXYCODONE HYDROCHLORIDE 5 MG/1
1 TABLET ORAL
Refills: 0 | DISCHARGE

## 2024-01-02 RX ORDER — VANCOMYCIN HCL 1 G
1 VIAL (EA) INTRAVENOUS
Qty: 0 | Refills: 0 | DISCHARGE
Start: 2024-01-02

## 2024-01-02 RX ORDER — PREGABALIN 225 MG/1
1 CAPSULE ORAL
Qty: 0 | Refills: 0 | DISCHARGE
Start: 2024-01-02

## 2024-01-02 RX ORDER — THIAMINE MONONITRATE (VIT B1) 100 MG
1 TABLET ORAL
Refills: 0 | DISCHARGE

## 2024-01-02 RX ORDER — MEROPENEM 1 G/30ML
1000 INJECTION INTRAVENOUS
Qty: 0 | Refills: 0 | DISCHARGE
Start: 2024-01-02

## 2024-01-02 RX ORDER — CYCLOBENZAPRINE HYDROCHLORIDE 10 MG/1
1 TABLET, FILM COATED ORAL
Refills: 0 | DISCHARGE

## 2024-01-02 RX ORDER — BUDESONIDE AND FORMOTEROL FUMARATE DIHYDRATE 160; 4.5 UG/1; UG/1
1 AEROSOL RESPIRATORY (INHALATION)
Qty: 0 | Refills: 0 | DISCHARGE
Start: 2024-01-02

## 2024-01-02 RX ORDER — FERROUS SULFATE 325(65) MG
1 TABLET ORAL
Qty: 0 | Refills: 0 | DISCHARGE
Start: 2024-01-02

## 2024-01-02 RX ADMIN — Medication 5 MILLIGRAM(S): at 09:14

## 2024-01-02 RX ADMIN — MEROPENEM 100 MILLIGRAM(S): 1 INJECTION INTRAVENOUS at 16:58

## 2024-01-02 RX ADMIN — CYCLOBENZAPRINE HYDROCHLORIDE 10 MILLIGRAM(S): 10 TABLET, FILM COATED ORAL at 05:18

## 2024-01-02 RX ADMIN — QUETIAPINE FUMARATE 400 MILLIGRAM(S): 200 TABLET, FILM COATED ORAL at 21:59

## 2024-01-02 RX ADMIN — BUDESONIDE AND FORMOTEROL FUMARATE DIHYDRATE 2 PUFF(S): 160; 4.5 AEROSOL RESPIRATORY (INHALATION) at 18:09

## 2024-01-02 RX ADMIN — HYDROMORPHONE HYDROCHLORIDE 2 MILLIGRAM(S): 2 INJECTION INTRAMUSCULAR; INTRAVENOUS; SUBCUTANEOUS at 22:10

## 2024-01-02 RX ADMIN — Medication 600 MILLIGRAM(S): at 05:19

## 2024-01-02 RX ADMIN — HYDROMORPHONE HYDROCHLORIDE 2 MILLIGRAM(S): 2 INJECTION INTRAMUSCULAR; INTRAVENOUS; SUBCUTANEOUS at 04:11

## 2024-01-02 RX ADMIN — BUDESONIDE AND FORMOTEROL FUMARATE DIHYDRATE 2 PUFF(S): 160; 4.5 AEROSOL RESPIRATORY (INHALATION) at 05:25

## 2024-01-02 RX ADMIN — Medication 5 MILLIGRAM(S): at 21:59

## 2024-01-02 RX ADMIN — Medication 650 MILLIGRAM(S): at 11:59

## 2024-01-02 RX ADMIN — PREGABALIN 1000 MICROGRAM(S): 225 CAPSULE ORAL at 11:43

## 2024-01-02 RX ADMIN — GABAPENTIN 800 MILLIGRAM(S): 400 CAPSULE ORAL at 21:58

## 2024-01-02 RX ADMIN — Medication 600 MILLIGRAM(S): at 17:24

## 2024-01-02 RX ADMIN — GABAPENTIN 800 MILLIGRAM(S): 400 CAPSULE ORAL at 16:53

## 2024-01-02 RX ADMIN — CYCLOBENZAPRINE HYDROCHLORIDE 10 MILLIGRAM(S): 10 TABLET, FILM COATED ORAL at 16:53

## 2024-01-02 RX ADMIN — HYDROMORPHONE HYDROCHLORIDE 2 MILLIGRAM(S): 2 INJECTION INTRAMUSCULAR; INTRAVENOUS; SUBCUTANEOUS at 23:10

## 2024-01-02 RX ADMIN — METHADONE HYDROCHLORIDE 60 MILLIGRAM(S): 40 TABLET ORAL at 05:18

## 2024-01-02 RX ADMIN — CHLORHEXIDINE GLUCONATE 1 APPLICATION(S): 213 SOLUTION TOPICAL at 07:37

## 2024-01-02 RX ADMIN — MEROPENEM 100 MILLIGRAM(S): 1 INJECTION INTRAVENOUS at 05:18

## 2024-01-02 RX ADMIN — QUETIAPINE FUMARATE 100 MILLIGRAM(S): 200 TABLET, FILM COATED ORAL at 17:26

## 2024-01-02 RX ADMIN — Medication 3 MILLILITER(S): at 05:28

## 2024-01-02 RX ADMIN — Medication 20 MILLIGRAM(S): at 05:18

## 2024-01-02 RX ADMIN — AMLODIPINE BESYLATE 2.5 MILLIGRAM(S): 2.5 TABLET ORAL at 05:18

## 2024-01-02 RX ADMIN — Medication 1 MILLIGRAM(S): at 11:46

## 2024-01-02 RX ADMIN — Medication 1 TABLET(S): at 16:52

## 2024-01-02 RX ADMIN — Medication 500 MILLIGRAM(S): at 11:40

## 2024-01-02 RX ADMIN — NYSTATIN CREAM 1 APPLICATION(S): 100000 CREAM TOPICAL at 05:24

## 2024-01-02 RX ADMIN — Medication 1 TABLET(S): at 09:23

## 2024-01-02 RX ADMIN — QUETIAPINE FUMARATE 100 MILLIGRAM(S): 200 TABLET, FILM COATED ORAL at 11:34

## 2024-01-02 RX ADMIN — Medication 1 TABLET(S): at 11:39

## 2024-01-02 RX ADMIN — HYDROMORPHONE HYDROCHLORIDE 2 MILLIGRAM(S): 2 INJECTION INTRAMUSCULAR; INTRAVENOUS; SUBCUTANEOUS at 12:01

## 2024-01-02 RX ADMIN — PANTOPRAZOLE SODIUM 40 MILLIGRAM(S): 20 TABLET, DELAYED RELEASE ORAL at 09:13

## 2024-01-02 RX ADMIN — MEROPENEM 100 MILLIGRAM(S): 1 INJECTION INTRAVENOUS at 21:59

## 2024-01-02 RX ADMIN — CYCLOBENZAPRINE HYDROCHLORIDE 10 MILLIGRAM(S): 10 TABLET, FILM COATED ORAL at 21:59

## 2024-01-02 RX ADMIN — TAMSULOSIN HYDROCHLORIDE 0.4 MILLIGRAM(S): 0.4 CAPSULE ORAL at 21:59

## 2024-01-02 RX ADMIN — Medication 100 MILLIGRAM(S): at 11:40

## 2024-01-02 RX ADMIN — ZINC SULFATE TAB 220 MG (50 MG ZINC EQUIVALENT) 220 MILLIGRAM(S): 220 (50 ZN) TAB at 11:38

## 2024-01-02 RX ADMIN — SENNA PLUS 2 TABLET(S): 8.6 TABLET ORAL at 21:59

## 2024-01-02 RX ADMIN — METHADONE HYDROCHLORIDE 60 MILLIGRAM(S): 40 TABLET ORAL at 17:30

## 2024-01-02 RX ADMIN — GABAPENTIN 800 MILLIGRAM(S): 400 CAPSULE ORAL at 05:19

## 2024-01-02 RX ADMIN — Medication 650 MILLIGRAM(S): at 13:00

## 2024-01-02 RX ADMIN — FINASTERIDE 5 MILLIGRAM(S): 5 TABLET, FILM COATED ORAL at 11:43

## 2024-01-02 RX ADMIN — ATORVASTATIN CALCIUM 40 MILLIGRAM(S): 80 TABLET, FILM COATED ORAL at 21:59

## 2024-01-02 RX ADMIN — Medication 250 MILLIGRAM(S): at 12:02

## 2024-01-02 NOTE — PROGRESS NOTE ADULT - PROBLEM SELECTOR PROBLEM 6
Palliative care encounter
Moderate protein-calorie malnutrition
Palliative care encounter
Moderate protein-calorie malnutrition
Palliative care encounter
Moderate protein-calorie malnutrition

## 2024-01-02 NOTE — PROGRESS NOTE ADULT - TIME BILLING
- Ordering, reviewing, and interpreting labs, testing, and imaging.  - Independently obtaining a review of systems and performing a physical exam  - Reviewing prior hospitalization and where necessary, outpatient records.  - Reviewing consultant recommendations/communicating with consultants  - Counselling and educating patient and family regarding interpretation of aforementioned items and plan of care.
min spent reviewing patient's chart,  examining patient, discussing plan with patient and family and staff, reviewing consultant recommendations/communicating with consultants, writing progress note and placing orders.
- Ordering, reviewing, and interpreting labs, testing, and imaging.  - Independently obtaining a review of systems and performing a physical exam  - Reviewing prior hospitalization and where necessary, outpatient records.  - Reviewing consultant recommendations/communicating with consultants  - Counselling and educating patient and family regarding interpretation of aforementioned items and plan of care.
- Ordering, reviewing, and interpreting labs, testing, and imaging.  - Independently obtaining a review of systems and performing a physical exam  - Reviewing prior hospitalization and where necessary, outpatient records.  - Reviewing consultant recommendations/communicating with consultants  - Counselling and educating patient and family regarding interpretation of aforementioned items and plan of care.
min spent reviewing patient's chart,  examining patient, discussing plan with patient and family and staff, reviewing consultant recommendations/communicating with consultants, writing progress note and placing orders.
- Ordering, reviewing, and interpreting labs, testing, and imaging.  - Independently obtaining a review of systems and performing a physical exam  - Reviewing prior hospitalization and where necessary, outpatient records.  - Reviewing consultant recommendations/communicating with consultants  - Counselling and educating patient and family regarding interpretation of aforementioned items and plan of care.
- Ordering, reviewing, and interpreting labs, testing, and imaging.  - Independently obtaining a review of systems and performing a physical exam  - Reviewing prior hospitalization and where necessary, outpatient records.  - Reviewing consultant recommendations/communicating with consultants  - Counselling and educating patient and family regarding interpretation of aforementioned items and plan of care.
patient exam, chart review, coordination of care w team, pt
patient exam, chart review, coordination of care w team, pt, family
patient exam, chart review, coordination of care w team, pt

## 2024-01-02 NOTE — PROGRESS NOTE ADULT - PROBLEM SELECTOR PROBLEM 3
Acute respiratory failure with hypoxia
Bipolar disorder
Acute respiratory failure with hypoxia
Bipolar disorder

## 2024-01-02 NOTE — PROGRESS NOTE ADULT - PROBLEM SELECTOR PROBLEM 5
Functional quadriplegia
Debility
Functional quadriplegia

## 2024-01-02 NOTE — PROGRESS NOTE ADULT - PROBLEM SELECTOR PLAN 8
approved for [facility] for wound care.  Pending bed availability approved for [facility] for wound care.  Pending bed availability

## 2024-01-02 NOTE — PROGRESS NOTE ADULT - PROBLEM SELECTOR PLAN 5
contracted and bedbound  SNF resident, requires full care with bathing, dressing and wound care.  multiple decubitus ulcers.

## 2024-01-02 NOTE — PROGRESS NOTE ADULT - PROBLEM SELECTOR PLAN 3
on chronic O2 per pt, worsening resp status on 12/12, with evidence of vol overload, s/p diuresis, O2 currently stable on NC.  Echo P
followed by psych  continue quetiapine 100 mg BID and 400 mg qhs.
continue quetiapine 100 mg BID and 400 mg qhs.
Requires 5L O2 NC at home    CT chest : Emphysematous disease with likely concurrent interstitial fibrosis. Suspected  chronic microaspiration   pulm following Pulm consulted    cont airway clearance w/ aerobika   cont symbicort inhaler  bid.
continue quetiapine 100 mg BID and 400 mg qhs.
Requires 5L O2 NC at home    CT chest : Emphysematous disease with likely concurrent interstitial fibrosis. Suspected  chronic microaspiration   pulm following Pulm consulted    cont airway clearance w/ aerobika   cont symbicort inhaler  bid.
Requires 5L O2 NC at home    CT chest : Emphysematous disease with likely concurrent interstitial fibrosis. Suspected  chronic microaspiration   pulm following Pulm consulted    cont airway clearance w/ aerobika   cont symbicort inhaler  bid.
continue quetiapine 100 mg BID and 400 mg qhs.

## 2024-01-02 NOTE — PROGRESS NOTE ADULT - PROBLEM SELECTOR PLAN 2
Recurrent foot infections, pt was  previously treated for L. hallux OM with L. heel culture growing Proteus mirabilis ESBL, completed 6 week course of avycaz  CT b/l LE  severely limited by contracture. Left lower extremity is only partially visualized with exclusion of the left foot. Skin induration and subcutaneous edema in the leg and ankle.  No soft tissue gas  continue IV ABT course of  Vanc,/ meropenem to completion

## 2024-01-02 NOTE — PROGRESS NOTE ADULT - PROBLEM SELECTOR PLAN 6
em: Moderate protein-calorie malnutrition.   despite having excellent appetite, BLE muscle wasting and contractures  maintained on Regular diet   1000mL Fluid Restriction   Ensure supplements BID   12/26 Albumin 2.0.  multiple decubitus ulcers.

## 2024-01-02 NOTE — PROGRESS NOTE ADULT - SUBJECTIVE AND OBJECTIVE BOX
follow up on:  complex medical decision making related to goals of care    OVERNIGHT EVENTS:    transitioned to lesser dose of Dilaudid 2 mg po (5:1 ratio for equianalgesic dosing)  Pt has received 6 doses over 24 hrs  tolerating well      Review of systems:     Pain:  [ ] yes [ ] no  QOL impact -   Location -                    Aggravating factors -  Quality -  Radiation -  Timing-  Severity (0-10 scale):  Minimal acceptable level (0-10 scale):     Dyspnea:      denies                        Anxiety:         denies                      Depression:   denies  Fatigue:      denies                         Nausea:      denies                         Loss of appetite:    denies            Constipation:     denies             Diarrhea:     denies    All other systems reviewed and negative  Unable to obtain/Limited due to poor mental status    MEDICATIONS  (STANDING):  acetaminophen     Tablet .. 650 milliGRAM(s) Oral daily  amLODIPine   Tablet 2.5 milliGRAM(s) Oral daily  ascorbic acid 500 milliGRAM(s) Oral daily  atorvastatin 40 milliGRAM(s) Oral at bedtime  bacitracin   Ointment 1 Application(s) Topical daily  budesonide 160 MICROgram(s)/formoterol 4.5 MICROgram(s) Inhaler 2 Puff(s) Inhalation two times a day  chlorhexidine 2% Cloths 1 Application(s) Topical <User Schedule>  collagenase Ointment 1 Application(s) Topical daily  cyanocobalamin 1000 MICROGram(s) Oral daily  cyclobenzaprine 10 milliGRAM(s) Oral three times a day  enoxaparin Injectable 40 milliGRAM(s) SubCutaneous every 24 hours  ferrous    sulfate 325 milliGRAM(s) Oral <User Schedule>  finasteride 5 milliGRAM(s) Oral daily  folic acid 1 milliGRAM(s) Oral daily  furosemide    Tablet 20 milliGRAM(s) Oral daily  gabapentin 800 milliGRAM(s) Oral every 8 hours  guaiFENesin  milliGRAM(s) Oral every 12 hours  lactobacillus acidophilus 1 Tablet(s) Oral two times a day with meals  lidocaine   4% Patch 1 Patch Transdermal daily  meropenem  IVPB 1000 milliGRAM(s) IV Intermittent every 8 hours  methadone    Tablet 60 milliGRAM(s) Oral two times a day  methylphenidate 5 milliGRAM(s) Oral <User Schedule>  multivitamin 1 Tablet(s) Oral daily  nystatin Powder 1 Application(s) Topical two times a day  pantoprazole    Tablet 40 milliGRAM(s) Oral before breakfast  polyethylene glycol 3350 17 Gram(s) Oral daily  QUEtiapine 100 milliGRAM(s) Oral <User Schedule>  QUEtiapine 400 milliGRAM(s) Oral at bedtime  senna 2 Tablet(s) Oral at bedtime  tamsulosin 0.4 milliGRAM(s) Oral at bedtime  thiamine 100 milliGRAM(s) Oral daily  vancomycin  IVPB 1000 milliGRAM(s) IV Intermittent every 12 hours  zinc sulfate 220 milliGRAM(s) Oral daily    MEDICATIONS  (PRN):  acetaminophen     Tablet .. 650 milliGRAM(s) Oral every 6 hours PRN Temp greater or equal to 38C (100.4F), Mild Pain (1 - 3)  albuterol/ipratropium for Nebulization 3 milliLiter(s) Nebulizer every 8 hours PRN Shortness of Breath and/or Wheezing  aluminum hydroxide/magnesium hydroxide/simethicone Suspension 30 milliLiter(s) Oral every 4 hours PRN Dyspepsia  HYDROmorphone   Tablet 2 milliGRAM(s) Oral every 4 hours PRN Severe Pain (7 - 10)  melatonin 3 milliGRAM(s) Oral at bedtime PRN Insomnia  sodium chloride 0.9% lock flush 10 milliLiter(s) IV Push every 1 hour PRN Pre/post blood products, medications, blood draw, and to maintain line patency    PHYSICAL EXAM:  Vital Signs Last 24 Hrs  T(C): 37.4 (02 Jan 2024 10:59), Max: 37.4 (01 Jan 2024 23:31)  T(F): 99.3 (02 Jan 2024 10:59), Max: 99.3 (01 Jan 2024 23:31)  HR: 85 (02 Jan 2024 10:59) (75 - 92)  BP: 125/68 (02 Jan 2024 10:59) (111/62 - 125/68)  BP(mean): --  RR: 18 (02 Jan 2024 10:59) (18 - 19)  SpO2: 93% (02 Jan 2024 10:59) (90% - 98%)    Parameters below as of 02 Jan 2024 10:59  Patient On (Oxygen Delivery Method): nasal cannula    Palliative Performance Scale/Karnofsky Score:    General: ill appearing male in NAD  HEENT: normocephalic, atraumatic, poor dentition   Neck: supple, no JVD   CVS: S1 S2 RRR,, no MRG   Resp: CTAB, no RRW  GI: soft, NT, nor R/G, + ileostomy draining moderate amts pasty stool  : indwelling west in situ   Musc: severe BLE contractures, + L PICC line    Neuro: oriented x 4, non focal, paraplegic  Psych: situationally depressed    Skin: Stage IV pressure ulcer; R heel as per flow sheets  stage IV pressure ulcer; R hip as per flow sheets  Oral intake: excellent    LABS:                      10.4   7.12  )-----------( 159      ( 02 Jan 2024 07:10 )             35.2     01-02    142  |  109<H>  |  21  ----------------------------<  80  4.0   |  30  |  0.53    Ca    9.0      02 Jan 2024 07:10      Urinalysis Basic - ( 02 Jan 2024 07:10 )    Color: x / Appearance: x / SG: x / pH: x  Gluc: 80 mg/dL / Ketone: x  / Bili: x / Urobili: x   Blood: x / Protein: x / Nitrite: x   Leuk Esterase: x / RBC: x / WBC x   Sq Epi: x / Non Sq Epi: x / Bacteria: x    RADIOLOGY & ADDITIONAL STUDIES: no new radiological studies

## 2024-01-02 NOTE — PROGRESS NOTE ADULT - PROBLEM SELECTOR PROBLEM 4
Bipolar disorder
Acute respiratory failure with hypoxia
Acute respiratory failure with hypoxia
Bipolar disorder
Acute respiratory failure with hypoxia
Bipolar disorder
Bipolar disorder
Acute respiratory failure with hypoxia

## 2024-01-02 NOTE — PROGRESS NOTE ADULT - PROBLEM SELECTOR PLAN 1
pt transitioned to lesser  dose of po DIlaudid over the weekend  States it takes longer to work but is lasting longer.  reports some decrease of  leg pain and spasms over the past week   Continue Methadone concentrate 60 mg po q 12 hr.   12/27 QTc 487, MD following for weekly levels   Continue Dilaudid  2 mg po  q 4 hrs prn   Continue Neurontin  800 mg po q 8 hrs  Continue  Flexeril to 10 mg po TID.

## 2024-01-02 NOTE — PROGRESS NOTE ADULT - NS ATTEND AMEND GEN_ALL_CORE FT
Fair control on current pain regimen, continue current meds. approved for [facility] for ongoing wound management. Fair control on current pain regimen, continue current meds. approved for [place] [facility] for ongoing wound management.

## 2024-01-03 VITALS
TEMPERATURE: 98 F | SYSTOLIC BLOOD PRESSURE: 113 MMHG | RESPIRATION RATE: 18 BRPM | HEART RATE: 79 BPM | OXYGEN SATURATION: 92 % | DIASTOLIC BLOOD PRESSURE: 72 MMHG

## 2024-01-03 PROCEDURE — 99239 HOSP IP/OBS DSCHRG MGMT >30: CPT

## 2024-01-03 PROCEDURE — 99232 SBSQ HOSP IP/OBS MODERATE 35: CPT

## 2024-01-03 RX ADMIN — PANTOPRAZOLE SODIUM 40 MILLIGRAM(S): 20 TABLET, DELAYED RELEASE ORAL at 05:52

## 2024-01-03 RX ADMIN — ZINC SULFATE TAB 220 MG (50 MG ZINC EQUIVALENT) 220 MILLIGRAM(S): 220 (50 ZN) TAB at 12:32

## 2024-01-03 RX ADMIN — Medication 5 MILLIGRAM(S): at 08:28

## 2024-01-03 RX ADMIN — HYDROMORPHONE HYDROCHLORIDE 2 MILLIGRAM(S): 2 INJECTION INTRAMUSCULAR; INTRAVENOUS; SUBCUTANEOUS at 16:54

## 2024-01-03 RX ADMIN — Medication 250 MILLIGRAM(S): at 00:01

## 2024-01-03 RX ADMIN — HYDROMORPHONE HYDROCHLORIDE 2 MILLIGRAM(S): 2 INJECTION INTRAMUSCULAR; INTRAVENOUS; SUBCUTANEOUS at 12:55

## 2024-01-03 RX ADMIN — Medication 100 MILLIGRAM(S): at 12:32

## 2024-01-03 RX ADMIN — CHLORHEXIDINE GLUCONATE 1 APPLICATION(S): 213 SOLUTION TOPICAL at 05:52

## 2024-01-03 RX ADMIN — QUETIAPINE FUMARATE 100 MILLIGRAM(S): 200 TABLET, FILM COATED ORAL at 10:02

## 2024-01-03 RX ADMIN — BUDESONIDE AND FORMOTEROL FUMARATE DIHYDRATE 2 PUFF(S): 160; 4.5 AEROSOL RESPIRATORY (INHALATION) at 05:52

## 2024-01-03 RX ADMIN — Medication 1 APPLICATION(S): at 12:23

## 2024-01-03 RX ADMIN — MEROPENEM 100 MILLIGRAM(S): 1 INJECTION INTRAVENOUS at 05:51

## 2024-01-03 RX ADMIN — Medication 325 MILLIGRAM(S): at 12:23

## 2024-01-03 RX ADMIN — Medication 1 TABLET(S): at 08:28

## 2024-01-03 RX ADMIN — CYCLOBENZAPRINE HYDROCHLORIDE 10 MILLIGRAM(S): 10 TABLET, FILM COATED ORAL at 05:48

## 2024-01-03 RX ADMIN — FINASTERIDE 5 MILLIGRAM(S): 5 TABLET, FILM COATED ORAL at 12:24

## 2024-01-03 RX ADMIN — Medication 1 MILLIGRAM(S): at 12:33

## 2024-01-03 RX ADMIN — CYCLOBENZAPRINE HYDROCHLORIDE 10 MILLIGRAM(S): 10 TABLET, FILM COATED ORAL at 15:45

## 2024-01-03 RX ADMIN — METHADONE HYDROCHLORIDE 60 MILLIGRAM(S): 40 TABLET ORAL at 05:51

## 2024-01-03 RX ADMIN — Medication 1 TABLET(S): at 12:32

## 2024-01-03 RX ADMIN — Medication 250 MILLIGRAM(S): at 12:31

## 2024-01-03 RX ADMIN — Medication 600 MILLIGRAM(S): at 05:48

## 2024-01-03 RX ADMIN — Medication 1 TABLET(S): at 16:52

## 2024-01-03 RX ADMIN — GABAPENTIN 800 MILLIGRAM(S): 400 CAPSULE ORAL at 05:48

## 2024-01-03 RX ADMIN — NYSTATIN CREAM 1 APPLICATION(S): 100000 CREAM TOPICAL at 05:52

## 2024-01-03 RX ADMIN — MEROPENEM 100 MILLIGRAM(S): 1 INJECTION INTRAVENOUS at 16:10

## 2024-01-03 RX ADMIN — AMLODIPINE BESYLATE 2.5 MILLIGRAM(S): 2.5 TABLET ORAL at 05:48

## 2024-01-03 RX ADMIN — POLYETHYLENE GLYCOL 3350 17 GRAM(S): 17 POWDER, FOR SOLUTION ORAL at 12:31

## 2024-01-03 RX ADMIN — Medication 650 MILLIGRAM(S): at 12:30

## 2024-01-03 RX ADMIN — GABAPENTIN 800 MILLIGRAM(S): 400 CAPSULE ORAL at 15:44

## 2024-01-03 RX ADMIN — Medication 20 MILLIGRAM(S): at 05:51

## 2024-01-03 RX ADMIN — PREGABALIN 1000 MICROGRAM(S): 225 CAPSULE ORAL at 13:33

## 2024-01-03 RX ADMIN — Medication 500 MILLIGRAM(S): at 12:24

## 2024-01-03 NOTE — PROGRESS NOTE ADULT - REASON FOR ADMISSION
Sepsis secondary to b/l foot wounds

## 2024-01-03 NOTE — PROGRESS NOTE ADULT - NS ATTEND OPT1A GEN_ALL_CORE
Medical decision making
History/Exam/Medical decision making
Medical decision making
History/Exam/Medical decision making
Medical decision making
Medical decision making

## 2024-01-03 NOTE — PROGRESS NOTE ADULT - NS ATTEND OPT1 GEN_ALL_CORE
I independently performed the documented:
I attest my time as attending is greater than 50% of the total combined time spent on qualifying patient care activities by the PA/NP and attending.

## 2024-01-03 NOTE — PROGRESS NOTE ADULT - SUBJECTIVE AND OBJECTIVE BOX
STAN VEGA  MRN-[id number]    Follow Up:  OM, multiple wounds    Interval History: the pt was seen and examined earlier, not in acute distress,  no new complaints. Pt is afebrile, NC off his nose, comfortable, no wbc.     PAST MEDICAL & SURGICAL HISTORY:  CAD (coronary artery disease)      HTN (hypertension)      HLD (hyperlipidemia)      Neurogenic bladder      Bipolar disorder      S/P ileostomy      Indwelling Holcomb catheter present      Chronic hepatitis C virus infection      S/P laminectomy      S/P ileostomy          ROS:    [ ] Unobtainable because:  [x ] All other systems negative    Constitutional: no fever, no chills  Head: no trauma  Eyes: no vision changes, no eye pain  ENT:  no sore throat, no rhinorrhea  Cardiovascular:  no chest pain, no palpitation  Respiratory:  no SOB, no cough  GI:  no abd pain, no vomiting, no diarrhea  urinary: no dysuria, no hematuria, no flank pain  musculoskeletal:  continues to complain of pain in his b/l feet  skin:  no rash  neurology:  no headache, no seizure, no change in mental status  psych: no anxiety, no depression         Allergies  NSAIDs (Flushing; Other (Moderate))  Haldol (Anaphylaxis)  Zyprexa (Rash; Dystonic RXN; Hives)  Motrin (Anaphylaxis)  Thorazine (Other (Moderate))  Aleve (Unknown)  Stelazine (Unknown)  Risperdal (Short breath; Rash; Hives)        ANTIMICROBIALS:  meropenem  IVPB 1000 every 8 hours  vancomycin  IVPB 1000 every 12 hours      OTHER MEDS:  acetaminophen     Tablet .. 650 milliGRAM(s) Oral every 6 hours PRN  acetaminophen     Tablet .. 650 milliGRAM(s) Oral daily  albuterol/ipratropium for Nebulization 3 milliLiter(s) Nebulizer every 8 hours PRN  aluminum hydroxide/magnesium hydroxide/simethicone Suspension 30 milliLiter(s) Oral every 4 hours PRN  amLODIPine   Tablet 2.5 milliGRAM(s) Oral daily  ascorbic acid 500 milliGRAM(s) Oral daily  atorvastatin 40 milliGRAM(s) Oral at bedtime  bacitracin   Ointment 1 Application(s) Topical daily  budesonide 160 MICROgram(s)/formoterol 4.5 MICROgram(s) Inhaler 2 Puff(s) Inhalation two times a day  chlorhexidine 2% Cloths 1 Application(s) Topical <User Schedule>  collagenase Ointment 1 Application(s) Topical daily  cyanocobalamin 1000 MICROGram(s) Oral daily  cyclobenzaprine 10 milliGRAM(s) Oral three times a day  enoxaparin Injectable 40 milliGRAM(s) SubCutaneous every 24 hours  ferrous    sulfate 325 milliGRAM(s) Oral <User Schedule>  finasteride 5 milliGRAM(s) Oral daily  folic acid 1 milliGRAM(s) Oral daily  furosemide    Tablet 20 milliGRAM(s) Oral daily  gabapentin 800 milliGRAM(s) Oral every 8 hours  guaiFENesin  milliGRAM(s) Oral every 12 hours  HYDROmorphone   Tablet 2 milliGRAM(s) Oral every 4 hours PRN  lactobacillus acidophilus 1 Tablet(s) Oral two times a day with meals  lidocaine   4% Patch 1 Patch Transdermal daily  melatonin 3 milliGRAM(s) Oral at bedtime PRN  methadone    Tablet 60 milliGRAM(s) Oral two times a day  methylphenidate 5 milliGRAM(s) Oral <User Schedule>  multivitamin 1 Tablet(s) Oral daily  nystatin Powder 1 Application(s) Topical two times a day  pantoprazole    Tablet 40 milliGRAM(s) Oral before breakfast  polyethylene glycol 3350 17 Gram(s) Oral daily  QUEtiapine 400 milliGRAM(s) Oral at bedtime  QUEtiapine 100 milliGRAM(s) Oral <User Schedule>  senna 2 Tablet(s) Oral at bedtime  sodium chloride 0.9% lock flush 10 milliLiter(s) IV Push every 1 hour PRN  tamsulosin 0.4 milliGRAM(s) Oral at bedtime  thiamine 100 milliGRAM(s) Oral daily  zinc sulfate 220 milliGRAM(s) Oral daily      Vital Signs Last 24 Hrs  T(C): 36.9 (03 Jan 2024 05:26), Max: 37.8 (02 Jan 2024 17:00)  T(F): 98.5 (03 Jan 2024 05:26), Max: 100 (02 Jan 2024 17:00)  HR: 84 (03 Jan 2024 05:26) (70 - 85)  BP: 123/76 (03 Jan 2024 05:26) (112/65 - 125/68)  BP(mean): --  RR: 19 (03 Jan 2024 05:26) (18 - 19)  SpO2: 90% (03 Jan 2024 05:26) (90% - 93%)    Parameters below as of 02 Jan 2024 10:59  Patient On (Oxygen Delivery Method): nasal cannula        Physical Exam:  General:    NAD, non toxic, NC is off, comfortable   Head: atraumatic, normocephalic  Eyes: normal sclera and conjunctiva  ENT:  no oropharyngeal lesions, poor dentition, no LAD, neck supple  Cardio:  regular , S1,S2, no murmur  Respiratory:   somewhat diminished to auscultation b/l, no wheezing, no rhonchi   abd:   soft, BS +, not tender, no distention, midline scar healed, right sided colostomy in place  :     no CVAT, no suprapubic tenderness, Holcomb  Musculoskeletal : severely contracted LE b/l, no noted joint swelling   Skin:   b/l feet wounds dressed c/d/i  vascular:  normal pulses  Neurologic:   no focal deficits  Psych: appropriate     WBC Count: 7.12 K/uL (01-02 @ 07:10)                            10.4   7.12  )-----------( 159      ( 02 Jan 2024 07:10 )             35.2       01-02    142  |  109<H>  |  21  ----------------------------<  80  4.0   |  30  |  0.53    Ca    9.0      02 Jan 2024 07:10        Urinalysis Basic - ( 02 Jan 2024 07:10 )    Color: x / Appearance: x / SG: x / pH: x  Gluc: 80 mg/dL / Ketone: x  / Bili: x / Urobili: x   Blood: x / Protein: x / Nitrite: x   Leuk Esterase: x / RBC: x / WBC x   Sq Epi: x / Non Sq Epi: x / Bacteria: x        Creatinine Trend: 0.53<--, 0.50<--, 0.62<--, 0.52<--, 0.60<--, 0.46<--      MICROBIOLOGY:  v  Clean Catch Clean Catch (Midstream)  12-04-23   >100,000 CFU/ml Klebsiella pneumoniae  50,000 - 99,000 CFU/mL Enterococcus faecalis (vancomycin resistant)  --  Klebsiella pneumoniae  Enterococcus faecalis (vancomycin resistant)      .Abscess left wound culture  12-04-23   Rare Pseudomonas aeruginosa  Moderate Methicillin Resistant Staphylococcus aureus  Rare Klebsiella pneumoniae  Moderate Bacteroides fragilis "Susceptibilities not performed"  --  Pseudomonas aeruginosa  Methicillin resistant Staphylococcus aureus  Klebsiella pneumoniae      .Blood Blood-Peripheral  12-04-23   No growth at 5 days  --  --      .Blood Blood-Peripheral  12-04-23   No growth at 5 days  --  --    Ferritin: 81 (12-23)    SARS-CoV-2: NotDetec (11 Dec 2023 20:55)  SARS-CoV-2: NotDetec (04 Dec 2023 17:20)    RADIOLOGY:     STAN VEGA  MRN-[id number]    Follow Up:  OM, multiple wounds    Interval History: the pt was seen and examined earlier, not in acute distress,  no new complaints. Pt is afebrile, NC off his nose, comfortable, no wbc.     PAST MEDICAL & SURGICAL HISTORY:  CAD (coronary artery disease)      HTN (hypertension)      HLD (hyperlipidemia)      Neurogenic bladder      Bipolar disorder      S/P ileostomy      Indwelling Holcomb catheter present      Chronic hepatitis C virus infection      S/P laminectomy      S/P ileostomy          ROS:    [ ] Unobtainable because:  [x ] All other systems negative    Constitutional: no fever, no chills  Head: no trauma  Eyes: no vision changes, no eye pain  ENT:  no sore throat, no rhinorrhea  Cardiovascular:  no chest pain, no palpitation  Respiratory:  no SOB, no cough  GI:  no abd pain, no vomiting, no diarrhea  urinary: no dysuria, no hematuria, no flank pain  musculoskeletal:  continues to complain of pain in his b/l feet  skin:  no rash  neurology:  no headache, no seizure, no change in mental status  psych: no anxiety, no depression         Allergies  NSAIDs (Flushing; Other (Moderate))  Haldol (Anaphylaxis)  Zyprexa (Rash; Dystonic RXN; Hives)  Motrin (Anaphylaxis)  Thorazine (Other (Moderate))  Aleve (Unknown)  Stelazine (Unknown)  Risperdal (Short breath; Rash; Hives)        ANTIMICROBIALS:  meropenem  IVPB 1000 every 8 hours  vancomycin  IVPB 1000 every 12 hours      OTHER MEDS:  acetaminophen     Tablet .. 650 milliGRAM(s) Oral every 6 hours PRN  acetaminophen     Tablet .. 650 milliGRAM(s) Oral daily  albuterol/ipratropium for Nebulization 3 milliLiter(s) Nebulizer every 8 hours PRN  aluminum hydroxide/magnesium hydroxide/simethicone Suspension 30 milliLiter(s) Oral every 4 hours PRN  amLODIPine   Tablet 2.5 milliGRAM(s) Oral daily  ascorbic acid 500 milliGRAM(s) Oral daily  atorvastatin 40 milliGRAM(s) Oral at bedtime  bacitracin   Ointment 1 Application(s) Topical daily  budesonide 160 MICROgram(s)/formoterol 4.5 MICROgram(s) Inhaler 2 Puff(s) Inhalation two times a day  chlorhexidine 2% Cloths 1 Application(s) Topical <User Schedule>  collagenase Ointment 1 Application(s) Topical daily  cyanocobalamin 1000 MICROGram(s) Oral daily  cyclobenzaprine 10 milliGRAM(s) Oral three times a day  enoxaparin Injectable 40 milliGRAM(s) SubCutaneous every 24 hours  ferrous    sulfate 325 milliGRAM(s) Oral <User Schedule>  finasteride 5 milliGRAM(s) Oral daily  folic acid 1 milliGRAM(s) Oral daily  furosemide    Tablet 20 milliGRAM(s) Oral daily  gabapentin 800 milliGRAM(s) Oral every 8 hours  guaiFENesin  milliGRAM(s) Oral every 12 hours  HYDROmorphone   Tablet 2 milliGRAM(s) Oral every 4 hours PRN  lactobacillus acidophilus 1 Tablet(s) Oral two times a day with meals  lidocaine   4% Patch 1 Patch Transdermal daily  melatonin 3 milliGRAM(s) Oral at bedtime PRN  methadone    Tablet 60 milliGRAM(s) Oral two times a day  methylphenidate 5 milliGRAM(s) Oral <User Schedule>  multivitamin 1 Tablet(s) Oral daily  nystatin Powder 1 Application(s) Topical two times a day  pantoprazole    Tablet 40 milliGRAM(s) Oral before breakfast  polyethylene glycol 3350 17 Gram(s) Oral daily  QUEtiapine 400 milliGRAM(s) Oral at bedtime  QUEtiapine 100 milliGRAM(s) Oral <User Schedule>  senna 2 Tablet(s) Oral at bedtime  sodium chloride 0.9% lock flush 10 milliLiter(s) IV Push every 1 hour PRN  tamsulosin 0.4 milliGRAM(s) Oral at bedtime  thiamine 100 milliGRAM(s) Oral daily  zinc sulfate 220 milliGRAM(s) Oral daily      Vital Signs Last 24 Hrs  T(C): 36.9 (03 Jan 2024 05:26), Max: 37.8 (02 Jan 2024 17:00)  T(F): 98.5 (03 Jan 2024 05:26), Max: 100 (02 Jan 2024 17:00)  HR: 84 (03 Jan 2024 05:26) (70 - 85)  BP: 123/76 (03 Jan 2024 05:26) (112/65 - 125/68)  BP(mean): --  RR: 19 (03 Jan 2024 05:26) (18 - 19)  SpO2: 90% (03 Jan 2024 05:26) (90% - 93%)    Parameters below as of 02 Jan 2024 10:59  Patient On (Oxygen Delivery Method): nasal cannula        Physical Exam:  General:    NAD, non toxic, NC is off, comfortable   Head: atraumatic, normocephalic  Eyes: normal sclera and conjunctiva  ENT:  no oropharyngeal lesions, poor dentition, no LAD, neck supple  Cardio:  regular , S1,S2, no murmur  Respiratory:   somewhat diminished to auscultation b/l, no wheezing, no rhonchi   abd:   soft, BS +, not tender, no distention, midline scar healed, right sided colostomy in place  :     no CVAT, no suprapubic tenderness, Holcomb  Musculoskeletal : severely contracted LE b/l, no noted joint swelling   Skin:   b/l feet wounds dressed c/d/i  vascular:  normal pulses  Neurologic:   no focal deficits  Psych: appropriate     WBC Count: 7.12 K/uL (01-02 @ 07:10)                            10.4   7.12  )-----------( 159      ( 02 Jan 2024 07:10 )             35.2       01-02    142  |  109<H>  |  21  ----------------------------<  80  4.0   |  30  |  0.53    Ca    9.0      02 Jan 2024 07:10        Urinalysis Basic - ( 02 Jan 2024 07:10 )    Color: x / Appearance: x / SG: x / pH: x  Gluc: 80 mg/dL / Ketone: x  / Bili: x / Urobili: x   Blood: x / Protein: x / Nitrite: x   Leuk Esterase: x / RBC: x / WBC x   Sq Epi: x / Non Sq Epi: x / Bacteria: x        Creatinine Trend: 0.53<--, 0.50<--, 0.62<--, 0.52<--, 0.60<--, 0.46<--      MICROBIOLOGY:  v  Clean Catch Clean Catch (Midstream)  12-04-23   >100,000 CFU/ml Klebsiella pneumoniae  50,000 - 99,000 CFU/mL Enterococcus faecalis (vancomycin resistant)  --  Klebsiella pneumoniae  Enterococcus faecalis (vancomycin resistant)      .Abscess left wound culture  12-04-23   Rare Pseudomonas aeruginosa  Moderate Methicillin Resistant Staphylococcus aureus  Rare Klebsiella pneumoniae  Moderate Bacteroides fragilis "Susceptibilities not performed"  --  Pseudomonas aeruginosa  Methicillin resistant Staphylococcus aureus  Klebsiella pneumoniae      .Blood Blood-Peripheral  12-04-23   No growth at 5 days  --  --      .Blood Blood-Peripheral  12-04-23   No growth at 5 days  --  --    Ferritin: 81 (12-23)    SARS-CoV-2: NotDetec (11 Dec 2023 20:55)  SARS-CoV-2: NotDetec (04 Dec 2023 17:20)    RADIOLOGY:

## 2024-01-03 NOTE — PROGRESS NOTE ADULT - ASSESSMENT
Gonzalez Stewart is a 53 year old male with PMHx of cervical epidural abscess (s/p decompressive laminectomy 3/2021 c/b b/l leg paralysis), hx of pneumoperitoneum (s/p ex lap, total abdominal colectomy and end ileostomy (on 1/12/23) c/b evisceration and sepsis with MRSA bacteremia postoperatively), hx of hepatitis C, hx of R. buttock abscess, hx of L. foot osteomyelitis, neurogenic bladder (with indwelling Holcomb catheter, last exchanged two days ago), HTN, HLD, bipolar disorder, GERD, hx of opioid dependence, and constipation who presented to the ED on 12/4/23 from [facility] for feet infection and admitted for sepsis secondary to bilateral feet infection.      Sepsis secondary to b/l feet infection  Previously treated for L. hallux OM with L. heel culture growing Proteus mirabilis ESBL, completed 6-week course of avycaz  CT b/l LE with study is severely limited by contracture. Left lower extremity is only partially visualized with exclusion of the left foot.  S/p bedside escharectomy by podiatry  right foot wound cx- ESBL proteus - resistant to cefepime and corynebecaterium and alcalegenes  left foot wound cx- MRSA and Pseudomonas  and klebsiella   Blood cx- neg x 2  PICC line in place 12/11/23   Continue meropenem and vancomycin until 1/8 per ID.   ECHO EF 55-60%, normal systolic function, and mild mitral regurgitation       COPD  chronic hypoxic respiratory failure   Baseline patient is on 5L O2 NC at home   Had an episode of resp distress from fluid overload from IVF- Placed on lasix drip for some time with improvement  Continue PO lasix with water restriction to 1.5 liters a day  ECHO reviewed.   Pulm consulted    cont airway clearance w/ aerobika. cont current bronchodilators.     CT chest : Emphysematous disease with likely concurrent interstitial fibrosis.   Suspected  chronic microaspiration        HTN. controlled.   continue current management. Monitor.     HLD  continue  atorvastatin 40 mg daily     Bipolar disorder, stable   : PTA quetiapine 100 mg BID and 400 mg qhs,  12/12/2023 valproic qhs been stopped by psych      Hx of opioid dependence  continue methadone maintenance. EKG reviewed and showed Qtc 487. check EKG tomorrow.       Chronic pain  with spasms. cont methadone. Cont oral dilaudid prn and cont constipation ppx.  Discussed about it with palliative care service.     GERD:   continue with PPI     Neurogenic bladder (with indwelling Holcomb catheter)   PTA finasteride 5 mg, tamsulosin 0.4 mg    Constipation:   continue with bowel regimen      functional quad-   B/L contracted   supportive care  , frequent repositioning      Rt hip pressure wound, seen by vascular ,   wound recs in place     Mod PCM  nutrition recommendations appreciated     Normocytic anemia , SARAH   no e/o bleeding or bruising   H/H stable   supplement     Preventative Measures  lovenox SQ-dvt ppx  fall, aspiration precautions   HOBE     palliative following, patient is full code     to return to [facility] per SW Gonzalez Stewart is a 53 year old male with PMHx of cervical epidural abscess (s/p decompressive laminectomy 3/2021 c/b b/l leg paralysis), hx of pneumoperitoneum (s/p ex lap, total abdominal colectomy and end ileostomy (on 1/12/23) c/b evisceration and sepsis with MRSA bacteremia postoperatively), hx of hepatitis C, hx of R. buttock abscess, hx of L. foot osteomyelitis, neurogenic bladder (with indwelling Holcomb catheter, last exchanged two days ago), HTN, HLD, bipolar disorder, GERD, hx of opioid dependence, and constipation who presented to the ED on 12/4/23 from [facility] for feet infection and admitted for sepsis secondary to bilateral feet infection.      Sepsis secondary to b/l feet infection  Previously treated for L. hallux OM with L. heel culture growing Proteus mirabilis ESBL, completed 6-week course of avycaz  CT b/l LE with study is severely limited by contracture. Left lower extremity is only partially visualized with exclusion of the left foot.  S/p bedside escharectomy by podiatry  right foot wound cx- ESBL proteus - resistant to cefepime and corynebecaterium and alcalegenes  left foot wound cx- MRSA and Pseudomonas  and klebsiella   Blood cx- neg x 2  PICC line in place 12/11/23   Continue meropenem and vancomycin until 1/8 per ID.   ECHO EF 55-60%, normal systolic function, and mild mitral regurgitation       COPD  chronic hypoxic respiratory failure   Baseline patient is on 5L O2 NC at home   Had an episode of resp distress from fluid overload from IVF- Placed on lasix drip for some time with improvement  Continue PO lasix with water restriction to 1.5 liters a day  ECHO reviewed.   Pulm consulted    cont airway clearance w/ aerobika. cont current bronchodilators.     CT chest : Emphysematous disease with likely concurrent interstitial fibrosis.   Suspected  chronic microaspiration        HTN. controlled.   continue current management. Monitor.     HLD  continue  atorvastatin 40 mg daily     Bipolar disorder, stable   : PTA quetiapine 100 mg BID and 400 mg qhs,  12/12/2023 valproic qhs been stopped by psych      Hx of opioid dependence  continue methadone maintenance. EKG reviewed and showed Qtc 487. check EKG tomorrow.       Chronic pain  with spasms. cont methadone. Cont oral dilaudid prn and cont constipation ppx.  Discussed about it with palliative care service.     GERD:   continue with PPI     Neurogenic bladder (with indwelling Holcomb catheter)   PTA finasteride 5 mg, tamsulosin 0.4 mg    Constipation:   continue with bowel regimen      functional quad-   B/L contracted   supportive care  , frequent repositioning      Rt hip pressure wound, seen by vascular ,   wound recs in place     Mod PCM  nutrition recommendations appreciated     Normocytic anemia , SARAH   no e/o bleeding or bruising   H/H stable   supplement     Preventative Measures  lovenox SQ-dvt ppx  fall, aspiration precautions   HOBE     palliative following, patient is full code     to return to [facility] per SW

## 2024-01-03 NOTE — PROGRESS NOTE ADULT - NUTRITIONAL ASSESSMENT
This patient has been assessed with a concern for Malnutrition and has been determined to have a diagnosis/diagnoses of Moderate protein-calorie malnutrition.    This patient is being managed with:   Diet Regular-  1500mL Fluid Restriction ([id number])  Supplement Feeding Modality:  Oral  Ensure Plus High Protein Cans or Servings Per Day:  1       Frequency:  Two Times a day  Entered: Dec 29 2023 12:39PM   This patient has been assessed with a concern for Malnutrition and has been determined to have a diagnosis/diagnoses of Moderate protein-calorie malnutrition.    This patient is being managed with:   Diet Regular-  1500mL Fluid Restriction ([id number])  Supplement Feeding Modality:  Oral  Ensure Plus High Protein Cans or Servings Per Day:  1       Frequency:  Two Times a day  Entered: Dec 29 2023 12:39PM

## 2024-01-03 NOTE — PROGRESS NOTE ADULT - ASSESSMENT
A/P-  53 year old male with PMHx of cervical epidural abscess (s/p decompressive laminectomy 3/2021 c/b b/l leg paralysis), hx of pneumoperitoneum (s/p ex lap, total abdominal colectomy and end ileostomy (on 1/12/23) c/b evisceration and sepsis with MRSA bacteremia postoperatively), hx of hepatitis C, hx of R. buttock abscess, hx of L. foot osteomyelitis, neurogenic bladder (with indwelling Holcomb catheter, last exchanged two days ago), HTN, HLD, bipolar disorder, GERD, hx of opioid dependence, and constipation who presented to the ED on 12/4/23 from [facility] for feet infection.    b/l feet infections and overlying cellulitis  Right heel wound infection to bone and as per xray c/w Om as well.  no report of any gas on either xray or CT      b/l foot infection  Om of right heel wound  + leukocytosis - has resolved  HCV- states was treated for this and was told it was cleared   severe contractures of b/l LE  right foot wound cx- ESBL proteus - resistant to cefepime and corynebecaterium and alcalegenes  left foot wound cx- MRSA and Pseudomonas  and klebsiella   Blood cx- neg x 2  extensive chart search on HIE by Dr. King and based on pt's latest HCV VL result from 6/2023 detected vl but <1.08   also based on serology from 2017 was positive for hep b sAG and E ag and core IGm ab ( not sure if he was ever treated)  HIV ab/ag negative   UC - VRE, Klebsiella   Hep B and HEp c both viral loads are undetectable.  his hep B serology c/w chronic infection in past not new and VL undetectable .  hep c VL -undetectable ( was treated for this as per his outpatient docs and seems to have cured )    HIV ab/ag- Negative    plan-  Continue Vancomycin IV day #30  monitor vancomycin levels, required (will obtain vanco level tomorrow prior morning dose)  keep vanco trough <15  Ertapenem IV started on 12/8 and as per attending, was changed to meropenem as the pseudomonas and Alcalgenes reported in foot wound cx not covered by ertapenem but meropenem will cover it and will cover the esbl   Continue Meropenem 1 gram IV q 8 hrs - day #21  aggressive wound care   Depakote stopped  12/11  pending surgical follow up  needs consistent wound care   pt was accepted to [facility] for wound care - awaiting for the bed       when ready for discharge:  treating right foot possible OM  picc line placed on 12/11  continue Vancomycin IV 1000 mg q 12 hrs - last dose on January 8, 2024   pt will be eventually discharged on Meropenem 1 gram IV q12 dosing instead of q 8 - last dose on January 8rd, 2024  weekly lab work: cbc with diff, cmp, esr, crp, vancomycin trough - pt has his own ID specialist from before and pcp, please fax to pt's own  ID specialist  follow up with pt's ID and PCP in the office   remove picc line upon completion of the course of abx    will discuss with the attending.  A/P-  53 year old male with PMHx of cervical epidural abscess (s/p decompressive laminectomy 3/2021 c/b b/l leg paralysis), hx of pneumoperitoneum (s/p ex lap, total abdominal colectomy and end ileostomy (on 1/12/23) c/b evisceration and sepsis with MRSA bacteremia postoperatively), hx of hepatitis C, hx of R. buttock abscess, hx of L. foot osteomyelitis, neurogenic bladder (with indwelling Holcomb catheter, last exchanged two days ago), HTN, HLD, bipolar disorder, GERD, hx of opioid dependence, and constipation who presented to the ED on 12/4/23 from [facility] for feet infection.    b/l feet infections and overlying cellulitis  Right heel wound infection to bone and as per xray c/w Om as well.  no report of any gas on either xray or CT      b/l foot infection  Om of right heel wound  + leukocytosis - has resolved  HCV- states was treated for this and was told it was cleared   severe contractures of b/l LE  right foot wound cx- ESBL proteus - resistant to cefepime and corynebecaterium and alcalegenes  left foot wound cx- MRSA and Pseudomonas  and klebsiella   Blood cx- neg x 2  extensive chart search on HIE by Dr. King and based on pt's latest HCV VL result from 6/2023 detected vl but <1.08   also based on serology from 2017 was positive for hep b sAG and E ag and core IGm ab ( not sure if he was ever treated)  HIV ab/ag negative   UC - VRE, Klebsiella   Hep B and HEp c both viral loads are undetectable.  his hep B serology c/w chronic infection in past not new and VL undetectable .  hep c VL -undetectable ( was treated for this as per his outpatient docs and seems to have cured )    HIV ab/ag- Negative    plan-  Continue Vancomycin IV day #30  monitor vancomycin levels, required (will obtain vanco level tomorrow prior morning dose)  keep vanco trough <15  Ertapenem IV started on 12/8 and as per attending, was changed to meropenem as the pseudomonas and Alcalgenes reported in foot wound cx not covered by ertapenem but meropenem will cover it and will cover the esbl   Continue Meropenem 1 gram IV q 8 hrs - day #21  aggressive wound care   Depakote stopped  12/11  pending surgical follow up  needs consistent wound care   pt was accepted to [facility] for wound care - awaiting for the bed       when ready for discharge:  treating right foot possible OM  picc line placed on 12/11  continue Vancomycin IV 1000 mg q 12 hrs - last dose on January 8, 2024   pt will be eventually discharged on Meropenem 1 gram IV q12 dosing instead of q 8 - last dose on January 8rd, 2024  weekly lab work: cbc with diff, cmp, esr, crp, vancomycin trough - pt has his own ID specialist from before and pcp, please fax to pt's own  ID specialist  follow up with pt's ID and PCP in the office   remove picc line upon completion of the course of abx    will discuss with the attending.

## 2024-01-03 NOTE — PROGRESS NOTE ADULT - PROVIDER SPECIALTY LIST ADULT
Hospitalist
Infectious Disease
Podiatry
Vascular Surgery
Hospitalist
Palliative Care
Palliative Care
Vascular Surgery
Hospitalist
Infectious Disease
Infectious Disease
Hospitalist
Podiatry
Vascular Surgery
Hospitalist
Infectious Disease
Hospitalist
Infectious Disease
Hospitalist
Hospitalist
Infectious Disease
Vascular Surgery
Hospitalist
Infectious Disease
Palliative Care
Hospitalist
Palliative Care

## 2024-01-03 NOTE — PROGRESS NOTE ADULT - NS ATTEND AMEND GEN_ALL_CORE FT
All labs and cultures and imaging and pertinent chart notes reviewed by me.    case d/w Np Naty at length and agree with her assessment and plan.    Kush King MD  Infectious Disease Attending    for any questions please do not hesitate to contact me either via teams or by calling [phone] All labs and cultures and imaging and pertinent chart notes reviewed by me.    case d/w Np Naty at length and agree with her assessment and plan.    Kush King MD  Infectious Disease Attending    for any questions please do not hesitate to contact me either via teams or by calling [phone]

## 2024-01-03 NOTE — PROGRESS NOTE ADULT - SUBJECTIVE AND OBJECTIVE BOX
CHIEF COMPLAINT: FU of sepsis with foot infection  no fever  no diarrhea   no new overnight events       MEDICATIONS  (STANDING):  acetaminophen     Tablet .. 650 milliGRAM(s) Oral daily  amLODIPine   Tablet 2.5 milliGRAM(s) Oral daily  ascorbic acid 500 milliGRAM(s) Oral daily  atorvastatin 40 milliGRAM(s) Oral at bedtime  bacitracin   Ointment 1 Application(s) Topical daily  budesonide 160 MICROgram(s)/formoterol 4.5 MICROgram(s) Inhaler 2 Puff(s) Inhalation two times a day  chlorhexidine 2% Cloths 1 Application(s) Topical <User Schedule>  collagenase Ointment 1 Application(s) Topical daily  cyanocobalamin 1000 MICROGram(s) Oral daily  cyclobenzaprine 10 milliGRAM(s) Oral three times a day  enoxaparin Injectable 40 milliGRAM(s) SubCutaneous every 24 hours  ferrous    sulfate 325 milliGRAM(s) Oral <User Schedule>  finasteride 5 milliGRAM(s) Oral daily  folic acid 1 milliGRAM(s) Oral daily  furosemide    Tablet 20 milliGRAM(s) Oral daily  gabapentin 800 milliGRAM(s) Oral every 8 hours  guaiFENesin  milliGRAM(s) Oral every 12 hours  lactobacillus acidophilus 1 Tablet(s) Oral two times a day with meals  lidocaine   4% Patch 1 Patch Transdermal daily  meropenem  IVPB 1000 milliGRAM(s) IV Intermittent every 8 hours  methadone    Tablet 60 milliGRAM(s) Oral two times a day  methylphenidate 5 milliGRAM(s) Oral <User Schedule>  multivitamin 1 Tablet(s) Oral daily  nystatin Powder 1 Application(s) Topical two times a day  pantoprazole    Tablet 40 milliGRAM(s) Oral before breakfast  polyethylene glycol 3350 17 Gram(s) Oral daily  QUEtiapine 400 milliGRAM(s) Oral at bedtime  QUEtiapine 100 milliGRAM(s) Oral <User Schedule>  senna 2 Tablet(s) Oral at bedtime  tamsulosin 0.4 milliGRAM(s) Oral at bedtime  thiamine 100 milliGRAM(s) Oral daily  vancomycin  IVPB 1000 milliGRAM(s) IV Intermittent every 12 hours  zinc sulfate 220 milliGRAM(s) Oral daily    MEDICATIONS  (PRN):  acetaminophen     Tablet .. 650 milliGRAM(s) Oral every 6 hours PRN Temp greater or equal to 38C (100.4F), Mild Pain (1 - 3)  albuterol/ipratropium for Nebulization 3 milliLiter(s) Nebulizer every 8 hours PRN Shortness of Breath and/or Wheezing  aluminum hydroxide/magnesium hydroxide/simethicone Suspension 30 milliLiter(s) Oral every 4 hours PRN Dyspepsia  HYDROmorphone   Tablet 2 milliGRAM(s) Oral every 4 hours PRN Severe Pain (7 - 10)  melatonin 3 milliGRAM(s) Oral at bedtime PRN Insomnia  sodium chloride 0.9% lock flush 10 milliLiter(s) IV Push every 1 hour PRN Pre/post blood products, medications, blood draw, and to maintain line patency    Vital Signs Last 24 Hrs  T(C): 36.9 (03 Jan 2024 05:26), Max: 37.8 (02 Jan 2024 17:00)  T(F): 98.5 (03 Jan 2024 05:26), Max: 100 (02 Jan 2024 17:00)  HR: 84 (03 Jan 2024 05:26) (70 - 85)  BP: 123/76 (03 Jan 2024 05:26) (112/65 - 125/68)  BP(mean): --  RR: 19 (03 Jan 2024 05:26) (18 - 19)  SpO2: 90% (03 Jan 2024 05:26) (90% - 93%)    Parameters below as of 03 Jan 2024 09:08  Patient On (Oxygen Delivery Method): nasal cannula      PHYSICAL EXAM:    GENERAL: Moderately built, no acute distress   CHEST/LUNG:  No wheezing, no crackles   HEART: Regular rate and rhythm; No murmurs  ABDOMEN: Soft, Nontender, Nondistended; Bowel sounds present. + west, ileostomy, PICC line.   EXTREMITIES:  No clubbing, cyanosis, or edema. + dressing bilateral feet. + pressure wound right buttock  Psychiatry: AAO x 3, mood is appropriate

## 2024-01-09 DIAGNOSIS — A41.52 SEPSIS DUE TO PSEUDOMONAS: ICD-10-CM

## 2024-01-09 DIAGNOSIS — I25.10 ATHEROSCLEROTIC HEART DISEASE OF NATIVE CORONARY ARTERY WITHOUT ANGINA PECTORIS: ICD-10-CM

## 2024-01-09 DIAGNOSIS — R53.2 FUNCTIONAL QUADRIPLEGIA: ICD-10-CM

## 2024-01-09 DIAGNOSIS — B18.2 CHRONIC VIRAL HEPATITIS C: ICD-10-CM

## 2024-01-09 DIAGNOSIS — Z87.891 PERSONAL HISTORY OF NICOTINE DEPENDENCE: ICD-10-CM

## 2024-01-09 DIAGNOSIS — E44.0 MODERATE PROTEIN-CALORIE MALNUTRITION: ICD-10-CM

## 2024-01-09 DIAGNOSIS — A41.02 SEPSIS DUE TO METHICILLIN RESISTANT STAPHYLOCOCCUS AUREUS: ICD-10-CM
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
## 2024-01-09 DIAGNOSIS — Z93.2 ILEOSTOMY STATUS: ICD-10-CM

## 2024-01-09 DIAGNOSIS — N31.9 NEUROMUSCULAR DYSFUNCTION OF BLADDER, UNSPECIFIED: ICD-10-CM

## 2024-01-09 DIAGNOSIS — E78.00 PURE HYPERCHOLESTEROLEMIA, UNSPECIFIED: ICD-10-CM

## 2024-01-09 DIAGNOSIS — T50.3X5A ADVERSE EFFECT OF ELECTROLYTIC, CALORIC AND WATER-BALANCE AGENTS, INITIAL ENCOUNTER: ICD-10-CM

## 2024-01-09 DIAGNOSIS — K59.09 OTHER CONSTIPATION: ICD-10-CM

## 2024-01-09 DIAGNOSIS — L03.115 CELLULITIS OF RIGHT LOWER LIMB: ICD-10-CM

## 2024-01-09 DIAGNOSIS — A41.59 OTHER GRAM-NEGATIVE SEPSIS: ICD-10-CM

## 2024-01-09 DIAGNOSIS — K21.9 GASTRO-ESOPHAGEAL REFLUX DISEASE WITHOUT ESOPHAGITIS: ICD-10-CM

## 2024-01-09 DIAGNOSIS — E13.69 OTHER SPECIFIED DIABETES MELLITUS WITH OTHER SPECIFIED COMPLICATION: ICD-10-CM

## 2024-01-09 DIAGNOSIS — I25.2 OLD MYOCARDIAL INFARCTION: ICD-10-CM

## 2024-01-09 DIAGNOSIS — L89.214 PRESSURE ULCER OF RIGHT HIP, STAGE 4: ICD-10-CM

## 2024-01-09 DIAGNOSIS — E87.79 OTHER FLUID OVERLOAD: ICD-10-CM

## 2024-01-09 DIAGNOSIS — Z16.24 RESISTANCE TO MULTIPLE ANTIBIOTICS: ICD-10-CM

## 2024-01-09 DIAGNOSIS — D50.9 IRON DEFICIENCY ANEMIA, UNSPECIFIED: ICD-10-CM

## 2024-01-09 DIAGNOSIS — Z79.82 LONG TERM (CURRENT) USE OF ASPIRIN: ICD-10-CM

## 2024-01-09 DIAGNOSIS — J96.21 ACUTE AND CHRONIC RESPIRATORY FAILURE WITH HYPOXIA: ICD-10-CM

## 2024-01-09 DIAGNOSIS — G89.29 OTHER CHRONIC PAIN: ICD-10-CM

## 2024-01-09 DIAGNOSIS — Z74.01 BED CONFINEMENT STATUS: ICD-10-CM

## 2024-01-09 DIAGNOSIS — M86.8X7 OTHER OSTEOMYELITIS, ANKLE AND FOOT: ICD-10-CM

## 2024-01-09 DIAGNOSIS — L89.614 PRESSURE ULCER OF RIGHT HEEL, STAGE 4: ICD-10-CM

## 2024-01-09 DIAGNOSIS — Z79.891 LONG TERM (CURRENT) USE OF OPIATE ANALGESIC: ICD-10-CM

## 2024-01-09 DIAGNOSIS — Z88.6 ALLERGY STATUS TO ANALGESIC AGENT: ICD-10-CM

## 2024-01-09 DIAGNOSIS — Z16.12 EXTENDED SPECTRUM BETA LACTAMASE (ESBL) RESISTANCE: ICD-10-CM

## 2024-01-09 DIAGNOSIS — Z88.8 ALLERGY STATUS TO OTHER DRUGS, MEDICAMENTS AND BIOLOGICAL SUBSTANCES: ICD-10-CM

## 2024-01-09 DIAGNOSIS — L03.116 CELLULITIS OF LEFT LOWER LIMB: ICD-10-CM

## 2024-01-09 DIAGNOSIS — I34.0 NONRHEUMATIC MITRAL (VALVE) INSUFFICIENCY: ICD-10-CM

## 2024-01-09 DIAGNOSIS — J84.10 PULMONARY FIBROSIS, UNSPECIFIED: ICD-10-CM

## 2024-01-09 DIAGNOSIS — I10 ESSENTIAL (PRIMARY) HYPERTENSION: ICD-10-CM

## 2024-01-09 DIAGNOSIS — J43.9 EMPHYSEMA, UNSPECIFIED: ICD-10-CM

## 2024-01-09 DIAGNOSIS — Z86.19 PERSONAL HISTORY OF OTHER INFECTIOUS AND PARASITIC DISEASES: ICD-10-CM

## 2024-02-12 LAB
MELD SCORE WITH DIALYSIS: SIGNIFICANT CHANGE UP
MELD SCORE WITHOUT DIALYSIS: SIGNIFICANT CHANGE UP POINTS

## 2024-02-23 NOTE — ED PROVIDER NOTE - PSYCHIATRIC LEVEL OF CONSCIOUSNESS
Infectious diseases consult called to  per id line(garrett) patient added to census.    follows commands/alert

## 2024-03-11 NOTE — PATIENT PROFILE ADULT. - NS PRO PT REFERRAL QUES 2 YN
Chart reviewed, no discharge needs noted at this time. Pt from home alone, independent, has insurance, has PCP. Please notify DCP if needs arise. ARLEY Guadalupe    12/18 Writer met briefly with pt, confirmed that pt is aware he can use [employer] for transportation to/from medical appointments. Pt states he does use [employer] for transport. Pt denies any discharge needs.   Drake Renteria RN DCP
medium/normal/dry and intact
no

## 2024-03-28 NOTE — ED CLERICAL - BED REQUESTED
----- Message from Patience Mallory MD sent at 3/28/2024  3:11 PM CDT -----  Can you let her know that her MRI shows a rotator cuff tear. The next best step is PT. A referral was put in a month ago but was not scheduled so she may need the number to call to schedule.  ----- Message -----  From: Cassie Pitts DNP  Sent: 3/22/2024   3:13 PM CDT  To: Patience Mallory MD    I know you referred patient to PT, however I wanted you to view MRI report.     Thanks    
05-Dec-2023 02:29

## 2024-06-08 NOTE — PROGRESS NOTE ADULT - ASSESSMENT
A/P-  53 year old male with PMHx of cervical epidural abscess (s/p decompressive laminectomy 3/2021 c/b b/l leg paralysis), hx of pneumoperitoneum (s/p ex lap, total abdominal colectomy and end ileostomy (on 1/12/23) c/b evisceration and sepsis with MRSA bacteremia postoperatively), hx of hepatitis C, hx of R. buttock abscess, hx of L. foot osteomyelitis, neurogenic bladder (with indwelling Holcomb catheter, last exchanged two days ago), HTN, HLD, bipolar disorder, GERD, hx of opioid dependence, and constipation who presented to the ED on 12/4/23 from [facility] for feet infection.    b/l feet infections and overlying cellulitis  Right heel wound infection to bone and as per xray c/w Om as well.  no report of any gas on either xray or Ct.  patient as per podiatry not a good surgical candidate and they recommend local wound care and antibiotics.  patient himself states he will not be able to do MRI.    b/l foot infection  Om of right heel wound  + leukocytosis - improved   HCV  severe contractures of b/l LE  right foot wound cx- ESBL proteus - resistant to cefepime and corynebecaterium and alcalegenes  left foot wound cx- MRSA and Pseudomonas  and klebsiella   Blood cx- neg x 2  extensive chart search on HIE by Dr. King and based on his latest HCV VL result from 6/2023 detected vl but <1.08  also based on serology from 2017 was positive for hep b sAG and E ag and core IGm ab ( not sure if he was ever treated)  workup in progress   HIV ab/ag negative   UC - VRE, Klebsiella   Hep B and HEp c both viral loads are undetectable.  his hep B serology c/w chronic infection in past not new and VL undetectable .  hep c VL -undetectable ( was treated for this as per his outpatient docs and seems to have cured )    HIV ab/ag- Negative    plan-  Continue Vancomycin IV  monitor vancomycin levels, required  keep vanco trough <15  Avycaz IV stopped on 12/8  Ertapenem IV started on 12/8 evening (day #4)  aggressive wound care   HCV Vl , full hep b panel - reviewed, Hep B immune   depakote stopped yesterday 12/11      when ready for discharge:  picc line placed on 12/11  continue Vancomycin IV 1000 mg q 12 hrs - last dose on January 8, 2024  will need to switch ertapenem to meropenem as the pseudomonas and alcagenes reported in foot wound cx not covered by erta but meropnem will cover it and will cover the esbl.  Hence start meropnem will give 1 gram IV q12 dosing instead of q8 . last dose on January 8rd, 2024  weekly lab work: cbc with diff, cmp, esr, crp, vancomycin trough - pt has his own ID specialist from before  and pcp, please fax to pt's own  ID specialist  follow up with pt's ID and PCP in the office   remove picc line upon completion of the course of abx       Discussed with Dr. King  discussed with RN     A/P-  53 year old male with PMHx of cervical epidural abscess (s/p decompressive laminectomy 3/2021 c/b b/l leg paralysis), hx of pneumoperitoneum (s/p ex lap, total abdominal colectomy and end ileostomy (on 1/12/23) c/b evisceration and sepsis with MRSA bacteremia postoperatively), hx of hepatitis C, hx of R. buttock abscess, hx of L. foot osteomyelitis, neurogenic bladder (with indwelling Holcomb catheter, last exchanged two days ago), HTN, HLD, bipolar disorder, GERD, hx of opioid dependence, and constipation who presented to the ED on 12/4/23 from [facility] for feet infection.    b/l feet infections and overlying cellulitis  Right heel wound infection to bone and as per xray c/w Om as well.  no report of any gas on either xray or Ct.  patient as per podiatry not a good surgical candidate and they recommend local wound care and antibiotics.  patient himself states he will not be able to do MRI.    b/l foot infection  Om of right heel wound  + leukocytosis - improved   HCV  severe contractures of b/l LE  right foot wound cx- ESBL proteus - resistant to cefepime and corynebecaterium and alcalegenes  left foot wound cx- MRSA and Pseudomonas  and klebsiella   Blood cx- neg x 2  extensive chart search on HIE by Dr. King and based on his latest HCV VL result from 6/2023 detected vl but <1.08  also based on serology from 2017 was positive for hep b sAG and E ag and core IGm ab ( not sure if he was ever treated)  workup in progress   HIV ab/ag negative   UC - VRE, Klebsiella   Hep B and HEp c both viral loads are undetectable.  his hep B serology c/w chronic infection in past not new and VL undetectable .  hep c VL -undetectable ( was treated for this as per his outpatient docs and seems to have cured )    HIV ab/ag- Negative    plan-  Continue Vancomycin IV  monitor vancomycin levels, required  keep vanco trough <15  Avycaz IV stopped on 12/8  Ertapenem IV started on 12/8 evening (day #4)  aggressive wound care   HCV Vl , full hep b panel - reviewed, Hep B immune   depakote stopped yesterday 12/11      when ready for discharge:  picc line placed on 12/11  continue Vancomycin IV 1000 mg q 12 hrs - last dose on January 8, 2024  will need to switch ertapenem to meropenem as the pseudomonas and alcagenes reported in foot wound cx not covered by erta but meropnem will cover it and will cover the esbl.  Hence start meropnem will give 1 gram IV q12 dosing instead of q8 . last dose on January 8rd, 2024  weekly lab work: cbc with diff, cmp, esr, crp, vancomycin trough - pt has his own ID specialist from before  and pcp, please fax to pt's own  ID specialist  follow up with pt's ID and PCP in the office   remove picc line upon completion of the course of abx       Discussed with Dr. King  discussed with RN     A/P-  53 year old male with PMHx of cervical epidural abscess (s/p decompressive laminectomy 3/2021 c/b b/l leg paralysis), hx of pneumoperitoneum (s/p ex lap, total abdominal colectomy and end ileostomy (on 1/12/23) c/b evisceration and sepsis with MRSA bacteremia postoperatively), hx of hepatitis C, hx of R. buttock abscess, hx of L. foot osteomyelitis, neurogenic bladder (with indwelling Holcomb catheter, last exchanged two days ago), HTN, HLD, bipolar disorder, GERD, hx of opioid dependence, and constipation who presented to the ED on 12/4/23 from [facility] for feet infection.    b/l feet infections and overlying cellulitis  Right heel wound infection to bone and as per xray c/w Om as well.  no report of any gas on either xray or CT  possible OR planning for left AKA when patient is medically optimized, as per vascular     b/l foot infection  Om of right heel wound  + leukocytosis - improved   HCV  severe contractures of b/l LE  right foot wound cx- ESBL proteus - resistant to cefepime and corynebecaterium and alcalegenes  left foot wound cx- MRSA and Pseudomonas  and klebsiella   Blood cx- neg x 2  extensive chart search on HIE by Dr. King and based on pt's latest HCV VL result from 6/2023 detected vl but <1.08  also based on serology from 2017 was positive for hep b sAG and E ag and core IGm ab ( not sure if he was ever treated)  HIV ab/ag negative   UC - VRE, Klebsiella   Hep B and HEp c both viral loads are undetectable.  his hep B serology c/w chronic infection in past not new and VL undetectable .  hep c VL -undetectable ( was treated for this as per his outpatient docs and seems to have cured )    HIV ab/ag- Negative    plan-  Continue Vancomycin IV  monitor vancomycin levels, required  keep vanco trough <15  Avycaz IV stopped on 12/8  Ertapenem IV started on 12/8 and as per attending, was changed to meropenem as the pseudomonas and Alcalgenes reported in foot wound cx not covered by ertapenem but meropenem will cover it and will cover the esbl   Continue Meropenem 1 gram IV q 8 hrs - day #2  aggressive wound care   HCV Vl , full hep b panel - reviewed, Hep B immune   Depakote stopped  12/11  possible left AKA - pending surgical scheduling and medical optimization       when ready for discharge:  treating right possible OM  picc line placed on 12/11  continue Vancomycin IV 1000 mg q 12 hrs - last dose on January 8, 2024  as per attending, pt will be eventually discharged on Meropenem 1 gram IV q12 dosing instead of q 8 - last dose on January 8rd, 2024  weekly lab work: cbc with diff, cmp, esr, crp, vancomycin trough - pt has his own ID specialist from before and pcp, please fax to pt's own  ID specialist  follow up with pt's ID and PCP in the office   remove picc line upon completion of the course of abx       Discussed with Dr. King  discussed with RN     A/P-  53 year old male with PMHx of cervical epidural abscess (s/p decompressive laminectomy 3/2021 c/b b/l leg paralysis), hx of pneumoperitoneum (s/p ex lap, total abdominal colectomy and end ileostomy (on 1/12/23) c/b evisceration and sepsis with MRSA bacteremia postoperatively), hx of hepatitis C, hx of R. buttock abscess, hx of L. foot osteomyelitis, neurogenic bladder (with indwelling Holcomb catheter, last exchanged two days ago), HTN, HLD, bipolar disorder, GERD, hx of opioid dependence, and constipation who presented to the ED on 12/4/23 from [facility] for feet infection.    b/l feet infections and overlying cellulitis  Right heel wound infection to bone and as per xray c/w Om as well.  no report of any gas on either xray or CT  possible OR planning for left AKA when patient is medically optimized, as per vascular     b/l foot infection  Om of right heel wound  + leukocytosis - improved   HCV  severe contractures of b/l LE  right foot wound cx- ESBL proteus - resistant to cefepime and corynebecaterium and alcalegenes  left foot wound cx- MRSA and Pseudomonas  and klebsiella   Blood cx- neg x 2  extensive chart search on HIE by Dr. King and based on pt's latest HCV VL result from 6/2023 detected vl but <1.08  also based on serology from 2017 was positive for hep b sAG and E ag and core IGm ab ( not sure if he was ever treated)  HIV ab/ag negative   UC - VRE, Klebsiella   Hep B and HEp c both viral loads are undetectable.  his hep B serology c/w chronic infection in past not new and VL undetectable .  hep c VL -undetectable ( was treated for this as per his outpatient docs and seems to have cured )    HIV ab/ag- Negative    plan-  Continue Vancomycin IV  monitor vancomycin levels, required  keep vanco trough <15  Avycaz IV stopped on 12/8  Ertapenem IV started on 12/8 and as per attending, was changed to meropenem as the pseudomonas and Alcalgenes reported in foot wound cx not covered by ertapenem but meropenem will cover it and will cover the esbl   Continue Meropenem 1 gram IV q 8 hrs - day #2  aggressive wound care   HCV Vl , full hep b panel - reviewed, Hep B immune   Depakote stopped  12/11  possible left AKA - pending surgical scheduling and medical optimization       when ready for discharge:  treating right possible OM  picc line placed on 12/11  continue Vancomycin IV 1000 mg q 12 hrs - last dose on January 8, 2024  as per attending, pt will be eventually discharged on Meropenem 1 gram IV q12 dosing instead of q 8 - last dose on January 8rd, 2024  weekly lab work: cbc with diff, cmp, esr, crp, vancomycin trough - pt has his own ID specialist from before and pcp, please fax to pt's own  ID specialist  follow up with pt's ID and PCP in the office   remove picc line upon completion of the course of abx       Discussed with Dr. King  discussed with RN     unknown

## 2024-06-25 ENCOUNTER — INPATIENT (INPATIENT)
Facility: HOSPITAL | Age: 54
LOS: 6 days | Discharge: NURSING, REHAB OR LONG-TERM CARE | End: 2024-07-02
Attending: STUDENT IN AN ORGANIZED HEALTH CARE EDUCATION/TRAINING PROGRAM | Admitting: STUDENT IN AN ORGANIZED HEALTH CARE EDUCATION/TRAINING PROGRAM
Payer: MEDICAID

## 2024-06-25 VITALS
OXYGEN SATURATION: 93 % | HEART RATE: 105 BPM | RESPIRATION RATE: 22 BRPM | DIASTOLIC BLOOD PRESSURE: 72 MMHG | SYSTOLIC BLOOD PRESSURE: 131 MMHG | HEIGHT: 72 IN | TEMPERATURE: 99 F | WEIGHT: 209 LBS

## 2024-06-25 DIAGNOSIS — Z93.2 ILEOSTOMY STATUS: Chronic | ICD-10-CM

## 2024-06-25 DIAGNOSIS — Z98.890 OTHER SPECIFIED POSTPROCEDURAL STATES: Chronic | ICD-10-CM

## 2024-06-25 PROBLEM — Z97.8 PRESENCE OF OTHER SPECIFIED DEVICES: Chronic | Status: ACTIVE | Noted: 2023-12-05

## 2024-06-25 PROBLEM — F31.9 BIPOLAR DISORDER, UNSPECIFIED: Chronic | Status: ACTIVE | Noted: 2023-12-05

## 2024-06-25 PROBLEM — E78.5 HYPERLIPIDEMIA, UNSPECIFIED: Chronic | Status: ACTIVE | Noted: 2023-12-05

## 2024-06-25 PROBLEM — N31.9 NEUROMUSCULAR DYSFUNCTION OF BLADDER, UNSPECIFIED: Chronic | Status: ACTIVE | Noted: 2023-12-05

## 2024-06-25 PROBLEM — B18.2 CHRONIC VIRAL HEPATITIS C: Chronic | Status: ACTIVE | Noted: 2023-12-05

## 2024-06-25 LAB
ALBUMIN SERPL ELPH-MCNC: 2.4 G/DL — LOW (ref 3.3–5)
ALP SERPL-CCNC: 114 U/L — SIGNIFICANT CHANGE UP (ref 40–120)
ALT FLD-CCNC: 12 U/L — SIGNIFICANT CHANGE UP (ref 12–78)
ANION GAP SERPL CALC-SCNC: 0 MMOL/L — LOW (ref 5–17)
APPEARANCE UR: ABNORMAL
APTT BLD: 31.1 SEC — SIGNIFICANT CHANGE UP (ref 24.5–35.6)
AST SERPL-CCNC: 36 U/L — SIGNIFICANT CHANGE UP (ref 15–37)
BACTERIA # UR AUTO: ABNORMAL /HPF
BASE EXCESS BLDA CALC-SCNC: 23.6 MMOL/L — HIGH (ref -2–3)
BASE EXCESS BLDA CALC-SCNC: 24.4 MMOL/L — HIGH (ref -2–3)
BASOPHILS # BLD AUTO: 0.04 K/UL — SIGNIFICANT CHANGE UP (ref 0–0.2)
BASOPHILS NFR BLD AUTO: 0.4 % — SIGNIFICANT CHANGE UP (ref 0–2)
BILIRUB SERPL-MCNC: 0.5 MG/DL — SIGNIFICANT CHANGE UP (ref 0.2–1.2)
BILIRUB UR-MCNC: ABNORMAL
BLOOD GAS COMMENTS ARTERIAL: SIGNIFICANT CHANGE UP
BLOOD GAS COMMENTS ARTERIAL: SIGNIFICANT CHANGE UP
BUN SERPL-MCNC: 34 MG/DL — HIGH (ref 7–23)
CALCIUM SERPL-MCNC: 9.4 MG/DL — SIGNIFICANT CHANGE UP (ref 8.5–10.1)
CHLORIDE SERPL-SCNC: 100 MMOL/L — SIGNIFICANT CHANGE UP (ref 96–108)
CO2 BLDA-SCNC: 59 MMOL/L — HIGH (ref 19–24)
CO2 BLDA-SCNC: 61 MMOL/L — HIGH (ref 19–24)
CO2 SERPL-SCNC: 45 MMOL/L — CRITICAL HIGH (ref 22–31)
COLOR SPEC: ABNORMAL
CREAT SERPL-MCNC: 1.68 MG/DL — HIGH (ref 0.5–1.3)
DIFF PNL FLD: ABNORMAL
EGFR: 48 ML/MIN/1.73M2 — LOW
EOSINOPHIL # BLD AUTO: 0.51 K/UL — HIGH (ref 0–0.5)
EOSINOPHIL NFR BLD AUTO: 4.9 % — SIGNIFICANT CHANGE UP (ref 0–6)
ERYTHROCYTE [SEDIMENTATION RATE] IN BLOOD: 80 MM/HR — HIGH (ref 0–20)
FLUAV AG NPH QL: SIGNIFICANT CHANGE UP
FLUBV AG NPH QL: SIGNIFICANT CHANGE UP
GAS PNL BLDA: SIGNIFICANT CHANGE UP
GAS PNL BLDA: SIGNIFICANT CHANGE UP
GLUCOSE BLDC GLUCOMTR-MCNC: 98 MG/DL — SIGNIFICANT CHANGE UP (ref 70–99)
GLUCOSE SERPL-MCNC: 93 MG/DL — SIGNIFICANT CHANGE UP (ref 70–99)
GLUCOSE UR QL: NEGATIVE MG/DL — SIGNIFICANT CHANGE UP
HCO3 BLDA-SCNC: 56 MMOL/L — CRITICAL HIGH (ref 21–28)
HCO3 BLDA-SCNC: 58 MMOL/L — CRITICAL HIGH (ref 21–28)
HCT VFR BLD CALC: 38.6 % — LOW (ref 39–50)
HGB BLD-MCNC: 11.5 G/DL — LOW (ref 13–17)
HOROWITZ INDEX BLDA+IHG-RTO: 100 — SIGNIFICANT CHANGE UP
HOROWITZ INDEX BLDA+IHG-RTO: 70 — SIGNIFICANT CHANGE UP
IMM GRANULOCYTES NFR BLD AUTO: 1 % — HIGH (ref 0–0.9)
INR BLD: 1.21 RATIO — HIGH (ref 0.85–1.18)
KETONES UR-MCNC: NEGATIVE MG/DL — SIGNIFICANT CHANGE UP
LACTATE SERPL-SCNC: 0.6 MMOL/L — LOW (ref 0.7–2)
LEUKOCYTE ESTERASE UR-ACNC: ABNORMAL
LYMPHOCYTES # BLD AUTO: 2.51 K/UL — SIGNIFICANT CHANGE UP (ref 1–3.3)
LYMPHOCYTES # BLD AUTO: 24.2 % — SIGNIFICANT CHANGE UP (ref 13–44)
MCHC RBC-ENTMCNC: 27.8 PG — SIGNIFICANT CHANGE UP (ref 27–34)
MCHC RBC-ENTMCNC: 29.8 G/DL — LOW (ref 32–36)
MCV RBC AUTO: 93.5 FL — SIGNIFICANT CHANGE UP (ref 80–100)
MONOCYTES # BLD AUTO: 1.28 K/UL — HIGH (ref 0–0.9)
MONOCYTES NFR BLD AUTO: 12.3 % — SIGNIFICANT CHANGE UP (ref 2–14)
NEUTROPHILS # BLD AUTO: 5.95 K/UL — SIGNIFICANT CHANGE UP (ref 1.8–7.4)
NEUTROPHILS NFR BLD AUTO: 57.2 % — SIGNIFICANT CHANGE UP (ref 43–77)
NITRITE UR-MCNC: NEGATIVE — SIGNIFICANT CHANGE UP
NRBC # BLD: 0 /100 WBCS — SIGNIFICANT CHANGE UP (ref 0–0)
PCO2 BLDA: 106 MMHG — CRITICAL HIGH (ref 32–46)
PCO2 BLDA: >112 MMHG — CRITICAL HIGH (ref 32–46)
PH BLDA: 7.3 — LOW (ref 7.35–7.45)
PH BLDA: 7.33 — LOW (ref 7.35–7.45)
PH UR: 6 — SIGNIFICANT CHANGE UP (ref 5–8)
PLATELET # BLD AUTO: 206 K/UL — SIGNIFICANT CHANGE UP (ref 150–400)
PO2 BLDA: 75 MMHG — LOW (ref 83–108)
PO2 BLDA: 86 MMHG — SIGNIFICANT CHANGE UP (ref 83–108)
POTASSIUM SERPL-MCNC: 4.2 MMOL/L — SIGNIFICANT CHANGE UP (ref 3.5–5.3)
POTASSIUM SERPL-SCNC: 4.2 MMOL/L — SIGNIFICANT CHANGE UP (ref 3.5–5.3)
PROT SERPL-MCNC: 8.3 GM/DL — SIGNIFICANT CHANGE UP (ref 6–8.3)
PROT UR-MCNC: 300 MG/DL
PROTHROM AB SERPL-ACNC: 14.4 SEC — HIGH (ref 9.5–13)
RBC # BLD: 4.13 M/UL — LOW (ref 4.2–5.8)
RBC # FLD: 14.9 % — HIGH (ref 10.3–14.5)
RBC CASTS # UR COMP ASSIST: ABNORMAL /HPF
SAO2 % BLDA: 93.7 % — LOW (ref 94–98)
SAO2 % BLDA: 96.2 % — SIGNIFICANT CHANGE UP (ref 94–98)
SARS-COV-2 RNA SPEC QL NAA+PROBE: SIGNIFICANT CHANGE UP
SODIUM SERPL-SCNC: 145 MMOL/L — SIGNIFICANT CHANGE UP (ref 135–145)
SP GR SPEC: 1.02 — SIGNIFICANT CHANGE UP (ref 1–1.03)
UROBILINOGEN FLD QL: 0.2 MG/DL — SIGNIFICANT CHANGE UP (ref 0.2–1)
WBC # BLD: 10.39 K/UL — SIGNIFICANT CHANGE UP (ref 3.8–10.5)
WBC # FLD AUTO: 10.39 K/UL — SIGNIFICANT CHANGE UP (ref 3.8–10.5)
WBC UR QL: 3 /HPF — SIGNIFICANT CHANGE UP (ref 0–5)

## 2024-06-25 PROCEDURE — 71045 X-RAY EXAM CHEST 1 VIEW: CPT | Mod: 26

## 2024-06-25 PROCEDURE — 99291 CRITICAL CARE FIRST HOUR: CPT

## 2024-06-25 PROCEDURE — 99291 CRITICAL CARE FIRST HOUR: CPT | Mod: 25

## 2024-06-25 PROCEDURE — 74176 CT ABD & PELVIS W/O CONTRAST: CPT | Mod: 26

## 2024-06-25 PROCEDURE — 71045 X-RAY EXAM CHEST 1 VIEW: CPT | Mod: 26,77

## 2024-06-25 PROCEDURE — 71250 CT THORAX DX C-: CPT | Mod: 26

## 2024-06-25 PROCEDURE — 93010 ELECTROCARDIOGRAM REPORT: CPT

## 2024-06-25 RX ORDER — QUETIAPINE FUMARATE 200 MG/1
1 TABLET, FILM COATED ORAL
Refills: 0 | DISCHARGE

## 2024-06-25 RX ORDER — ATORVASTATIN CALCIUM 80 MG/1
1 TABLET, FILM COATED ORAL
Refills: 0 | DISCHARGE

## 2024-06-25 RX ORDER — IPRATROPIUM BROMIDE AND ALBUTEROL SULFATE .5; 3 MG/3ML; MG/3ML
3 SOLUTION RESPIRATORY (INHALATION) EVERY 6 HOURS
Refills: 0 | Status: DISCONTINUED | OUTPATIENT
Start: 2024-06-25 | End: 2024-06-28

## 2024-06-25 RX ORDER — HYDROMORPHONE HCL 0.2 MG/ML
2 INJECTION, SOLUTION INTRAVENOUS EVERY 6 HOURS
Refills: 0 | Status: DISCONTINUED | OUTPATIENT
Start: 2024-06-25 | End: 2024-06-26

## 2024-06-25 RX ORDER — HYDROMORPHONE HCL 0.2 MG/ML
1 INJECTION, SOLUTION INTRAVENOUS
Refills: 0 | DISCHARGE

## 2024-06-25 RX ORDER — METHADONE HYDROCHLORIDE 40 MG/1
55 TABLET ORAL
Refills: 0 | DISCHARGE

## 2024-06-25 RX ORDER — DULOXETINE HYDROCHLORIDE 20 MG/1
30 CAPSULE, DELAYED RELEASE ORAL
Refills: 0 | DISCHARGE

## 2024-06-25 RX ORDER — GABAPENTIN
1 POWDER (GRAM) MISCELLANEOUS
Refills: 0 | DISCHARGE

## 2024-06-25 RX ORDER — DULOXETINE HYDROCHLORIDE 30 MG/1
1 CAPSULE, DELAYED RELEASE ORAL
Refills: 0 | DISCHARGE

## 2024-06-25 RX ORDER — SENNA PLUS 8.6 MG/1
2 TABLET ORAL
Refills: 0 | DISCHARGE

## 2024-06-25 RX ORDER — HEPARIN SODIUM 50 [USP'U]/ML
5000 INJECTION, SOLUTION INTRAVENOUS EVERY 12 HOURS
Refills: 0 | Status: DISCONTINUED | OUTPATIENT
Start: 2024-06-25 | End: 2024-06-26

## 2024-06-25 RX ORDER — BUMETANIDE 0.25 MG/ML
2 INJECTION INTRAMUSCULAR; INTRAVENOUS ONCE
Refills: 0 | Status: COMPLETED | OUTPATIENT
Start: 2024-06-25 | End: 2024-06-25

## 2024-06-25 RX ORDER — ASCORBIC ACID 60 MG
1 TABLET,CHEWABLE ORAL
Refills: 0 | DISCHARGE

## 2024-06-25 RX ORDER — ACETAMINOPHEN 500 MG
2 TABLET ORAL
Refills: 0 | DISCHARGE

## 2024-06-25 RX ADMIN — BUMETANIDE 2 MILLIGRAM(S): 0.25 INJECTION INTRAMUSCULAR; INTRAVENOUS at 17:00

## 2024-06-25 RX ADMIN — HYDROMORPHONE HCL 2 MILLIGRAM(S): 0.2 INJECTION, SOLUTION INTRAVENOUS at 23:39

## 2024-06-25 RX ADMIN — HEPARIN SODIUM 5000 UNIT(S): 50 INJECTION, SOLUTION INTRAVENOUS at 20:20

## 2024-06-26 LAB
ANION GAP SERPL CALC-SCNC: 2 MMOL/L — LOW (ref 5–17)
B PERT IGG+IGM PNL SER: CLEAR — SIGNIFICANT CHANGE UP
BUN SERPL-MCNC: 30 MG/DL — HIGH (ref 7–23)
CALCIUM SERPL-MCNC: 9.3 MG/DL — SIGNIFICANT CHANGE UP (ref 8.5–10.1)
CHLORIDE SERPL-SCNC: 100 MMOL/L — SIGNIFICANT CHANGE UP (ref 96–108)
CO2 SERPL-SCNC: 45 MMOL/L — CRITICAL HIGH (ref 22–31)
COLOR FLD: YELLOW — SIGNIFICANT CHANGE UP
CREAT SERPL-MCNC: 1.43 MG/DL — HIGH (ref 0.5–1.3)
CRP SERPL-MCNC: 109 MG/L — HIGH
EGFR: 59 ML/MIN/1.73M2 — LOW
EOSINOPHIL # FLD: 0 % — SIGNIFICANT CHANGE UP
FLUID INTAKE SUBSTANCE CLASS: SIGNIFICANT CHANGE UP
FOLATE+VIT B12 SERBLD-IMP: 0 % — SIGNIFICANT CHANGE UP
GLUCOSE FLD-MCNC: 103 MG/DL — SIGNIFICANT CHANGE UP
GLUCOSE SERPL-MCNC: 102 MG/DL — HIGH (ref 70–99)
GRAM STN FLD: SIGNIFICANT CHANGE UP
HCT VFR BLD CALC: 39.5 % — SIGNIFICANT CHANGE UP (ref 39–50)
HGB BLD-MCNC: 11.8 G/DL — LOW (ref 13–17)
LDH SERPL L TO P-CCNC: 121 U/L — SIGNIFICANT CHANGE UP
LDH SERPL L TO P-CCNC: 283 U/L — HIGH (ref 50–242)
LEGIONELLA AG UR QL: NEGATIVE — SIGNIFICANT CHANGE UP
LYMPHOCYTES # FLD: 63 % — SIGNIFICANT CHANGE UP
MAGNESIUM SERPL-MCNC: 1.3 MG/DL — LOW (ref 1.6–2.6)
MCHC RBC-ENTMCNC: 27.7 PG — SIGNIFICANT CHANGE UP (ref 27–34)
MCHC RBC-ENTMCNC: 29.9 G/DL — LOW (ref 32–36)
MCV RBC AUTO: 92.7 FL — SIGNIFICANT CHANGE UP (ref 80–100)
MESOTHL CELL # FLD: 0 % — SIGNIFICANT CHANGE UP
MONOS+MACROS # FLD: 35 % — SIGNIFICANT CHANGE UP
MRSA PCR RESULT.: DETECTED
NEUTROPHILS-BODY FLUID: 2 % — SIGNIFICANT CHANGE UP
NRBC # BLD: 0 /100 WBCS — SIGNIFICANT CHANGE UP (ref 0–0)
NRBC # FLD: 0 % — SIGNIFICANT CHANGE UP
OTHER CELLS FLD MANUAL: 0 % — SIGNIFICANT CHANGE UP
PH FLD: 8.5 — SIGNIFICANT CHANGE UP
PHOSPHATE SERPL-MCNC: 4.8 MG/DL — HIGH (ref 2.5–4.5)
PLATELET # BLD AUTO: 210 K/UL — SIGNIFICANT CHANGE UP (ref 150–400)
POTASSIUM SERPL-MCNC: 3.7 MMOL/L — SIGNIFICANT CHANGE UP (ref 3.5–5.3)
POTASSIUM SERPL-SCNC: 3.7 MMOL/L — SIGNIFICANT CHANGE UP (ref 3.5–5.3)
PROT FLD-MCNC: 5.6 G/DL — SIGNIFICANT CHANGE UP
RBC # BLD: 4.26 M/UL — SIGNIFICANT CHANGE UP (ref 4.2–5.8)
RBC # FLD: 14.6 % — HIGH (ref 10.3–14.5)
RCV VOL RI: 4000 /UL — HIGH (ref 0–0)
S AUREUS DNA NOSE QL NAA+PROBE: DETECTED
S PNEUM AG UR QL: NEGATIVE — SIGNIFICANT CHANGE UP
SODIUM SERPL-SCNC: 147 MMOL/L — HIGH (ref 135–145)
SPECIMEN SOURCE: SIGNIFICANT CHANGE UP
TOTAL NUCLEATED CELL COUNT, BODY FLUID: 597 /UL — SIGNIFICANT CHANGE UP
TUBE TYPE: SIGNIFICANT CHANGE UP
WBC # BLD: 9.76 K/UL — SIGNIFICANT CHANGE UP (ref 3.8–10.5)
WBC # FLD AUTO: 9.76 K/UL — SIGNIFICANT CHANGE UP (ref 3.8–10.5)

## 2024-06-26 PROCEDURE — 93306 TTE W/DOPPLER COMPLETE: CPT | Mod: 26

## 2024-06-26 PROCEDURE — 99291 CRITICAL CARE FIRST HOUR: CPT

## 2024-06-26 RX ORDER — FERROUS SULFATE 325(65) MG
325 TABLET ORAL DAILY
Refills: 0 | Status: DISCONTINUED | OUTPATIENT
Start: 2024-06-26 | End: 2024-07-02

## 2024-06-26 RX ORDER — MAGNESIUM SULFATE 100 %
2 POWDER (GRAM) MISCELLANEOUS ONCE
Refills: 0 | Status: COMPLETED | OUTPATIENT
Start: 2024-06-26 | End: 2024-06-26

## 2024-06-26 RX ORDER — FUROSEMIDE 10 MG/ML
20 INJECTION, SOLUTION INTRAMUSCULAR; INTRAVENOUS DAILY
Refills: 0 | Status: DISCONTINUED | OUTPATIENT
Start: 2024-06-26 | End: 2024-07-02

## 2024-06-26 RX ORDER — HEPARIN SODIUM 50 [USP'U]/ML
5000 INJECTION, SOLUTION INTRAVENOUS EVERY 8 HOURS
Refills: 0 | Status: DISCONTINUED | OUTPATIENT
Start: 2024-06-26 | End: 2024-07-02

## 2024-06-26 RX ORDER — PANTOPRAZOLE SODIUM 40 MG/10ML
40 INJECTION, POWDER, FOR SOLUTION INTRAVENOUS
Refills: 0 | Status: DISCONTINUED | OUTPATIENT
Start: 2024-06-26 | End: 2024-07-02

## 2024-06-26 RX ORDER — GABAPENTIN
300 POWDER (GRAM) MISCELLANEOUS EVERY 8 HOURS
Refills: 0 | Status: DISCONTINUED | OUTPATIENT
Start: 2024-06-26 | End: 2024-07-02

## 2024-06-26 RX ORDER — FOLIC ACID
1 POWDER (GRAM) MISCELLANEOUS DAILY
Refills: 0 | Status: DISCONTINUED | OUTPATIENT
Start: 2024-06-26 | End: 2024-07-02

## 2024-06-26 RX ORDER — ACETAMINOPHEN 325 MG
650 TABLET ORAL EVERY 6 HOURS
Refills: 0 | Status: DISCONTINUED | OUTPATIENT
Start: 2024-06-26 | End: 2024-07-02

## 2024-06-26 RX ORDER — SODIUM CHLORIDE 0.9 % (FLUSH) 0.9 %
4 SYRINGE (ML) INJECTION EVERY 6 HOURS
Refills: 0 | Status: DISCONTINUED | OUTPATIENT
Start: 2024-06-26 | End: 2024-06-28

## 2024-06-26 RX ORDER — SENNOSIDES 8.6 MG
2 TABLET ORAL AT BEDTIME
Refills: 0 | Status: DISCONTINUED | OUTPATIENT
Start: 2024-06-26 | End: 2024-07-02

## 2024-06-26 RX ORDER — HYDROMORPHONE HCL 0.2 MG/ML
8 INJECTION, SOLUTION INTRAVENOUS EVERY 6 HOURS
Refills: 0 | Status: DISCONTINUED | OUTPATIENT
Start: 2024-06-26 | End: 2024-07-02

## 2024-06-26 RX ORDER — DULOXETINE HYDROCHLORIDE 20 MG/1
30 CAPSULE, DELAYED RELEASE ORAL DAILY
Refills: 0 | Status: DISCONTINUED | OUTPATIENT
Start: 2024-06-26 | End: 2024-07-02

## 2024-06-26 RX ORDER — TAMSULOSIN HYDROCHLORIDE 0.4 MG/1
0.4 CAPSULE ORAL AT BEDTIME
Refills: 0 | Status: DISCONTINUED | OUTPATIENT
Start: 2024-06-26 | End: 2024-07-02

## 2024-06-26 RX ADMIN — IPRATROPIUM BROMIDE AND ALBUTEROL SULFATE 3 MILLILITER(S): .5; 3 SOLUTION RESPIRATORY (INHALATION) at 11:06

## 2024-06-26 RX ADMIN — HYDROMORPHONE HCL 8 MILLIGRAM(S): 0.2 INJECTION, SOLUTION INTRAVENOUS at 18:00

## 2024-06-26 RX ADMIN — Medication 1 MILLIGRAM(S): at 12:04

## 2024-06-26 RX ADMIN — Medication 325 MILLIGRAM(S): at 12:04

## 2024-06-26 RX ADMIN — HYDROMORPHONE HCL 2 MILLIGRAM(S): 0.2 INJECTION, SOLUTION INTRAVENOUS at 05:08

## 2024-06-26 RX ADMIN — Medication 25 GRAM(S): at 05:07

## 2024-06-26 RX ADMIN — Medication 300 MILLIGRAM(S): at 21:37

## 2024-06-26 RX ADMIN — HEPARIN SODIUM 5000 UNIT(S): 50 INJECTION, SOLUTION INTRAVENOUS at 05:08

## 2024-06-26 RX ADMIN — Medication 5 MILLIGRAM(S): at 12:03

## 2024-06-26 RX ADMIN — Medication 4 MILLILITER(S): at 11:05

## 2024-06-26 RX ADMIN — DULOXETINE HYDROCHLORIDE 30 MILLIGRAM(S): 20 CAPSULE, DELAYED RELEASE ORAL at 12:04

## 2024-06-26 RX ADMIN — Medication 1 APPLICATION(S): at 05:08

## 2024-06-26 RX ADMIN — FUROSEMIDE 20 MILLIGRAM(S): 10 INJECTION, SOLUTION INTRAMUSCULAR; INTRAVENOUS at 12:04

## 2024-06-26 RX ADMIN — Medication 400 MILLIGRAM(S): at 21:38

## 2024-06-26 RX ADMIN — Medication 300 MILLIGRAM(S): at 15:18

## 2024-06-26 RX ADMIN — HYDROMORPHONE HCL 2 MILLIGRAM(S): 0.2 INJECTION, SOLUTION INTRAVENOUS at 00:10

## 2024-06-26 RX ADMIN — Medication 1 TABLET(S): at 12:04

## 2024-06-26 RX ADMIN — IPRATROPIUM BROMIDE AND ALBUTEROL SULFATE 3 MILLILITER(S): .5; 3 SOLUTION RESPIRATORY (INHALATION) at 05:43

## 2024-06-26 RX ADMIN — HYDROMORPHONE HCL 2 MILLIGRAM(S): 0.2 INJECTION, SOLUTION INTRAVENOUS at 03:12

## 2024-06-26 RX ADMIN — HYDROMORPHONE HCL 2 MILLIGRAM(S): 0.2 INJECTION, SOLUTION INTRAVENOUS at 02:42

## 2024-06-26 RX ADMIN — PANTOPRAZOLE SODIUM 40 MILLIGRAM(S): 40 INJECTION, POWDER, FOR SOLUTION INTRAVENOUS at 09:18

## 2024-06-26 RX ADMIN — Medication 100 MILLIGRAM(S): at 12:04

## 2024-06-26 RX ADMIN — Medication 4 MILLILITER(S): at 17:17

## 2024-06-26 RX ADMIN — HYDROMORPHONE HCL 8 MILLIGRAM(S): 0.2 INJECTION, SOLUTION INTRAVENOUS at 12:04

## 2024-06-26 RX ADMIN — HEPARIN SODIUM 5000 UNIT(S): 50 INJECTION, SOLUTION INTRAVENOUS at 15:18

## 2024-06-26 RX ADMIN — Medication 110 MILLIGRAM(S): at 12:05

## 2024-06-26 RX ADMIN — HEPARIN SODIUM 5000 UNIT(S): 50 INJECTION, SOLUTION INTRAVENOUS at 21:38

## 2024-06-26 RX ADMIN — Medication 100 MILLIGRAM(S): at 18:02

## 2024-06-26 RX ADMIN — TAMSULOSIN HYDROCHLORIDE 0.4 MILLIGRAM(S): 0.4 CAPSULE ORAL at 21:37

## 2024-06-26 RX ADMIN — IPRATROPIUM BROMIDE AND ALBUTEROL SULFATE 3 MILLILITER(S): .5; 3 SOLUTION RESPIRATORY (INHALATION) at 17:17

## 2024-06-26 RX ADMIN — IPRATROPIUM BROMIDE AND ALBUTEROL SULFATE 3 MILLILITER(S): .5; 3 SOLUTION RESPIRATORY (INHALATION) at 00:55

## 2024-06-26 RX ADMIN — HYDROMORPHONE HCL 8 MILLIGRAM(S): 0.2 INJECTION, SOLUTION INTRAVENOUS at 13:00

## 2024-06-27 LAB
ALBUMIN SERPL ELPH-MCNC: 2.2 G/DL — LOW (ref 3.3–5)
ALP SERPL-CCNC: 112 U/L — SIGNIFICANT CHANGE UP (ref 40–120)
ALT FLD-CCNC: 9 U/L — LOW (ref 12–78)
ANION GAP SERPL CALC-SCNC: 2 MMOL/L — LOW (ref 5–17)
AST SERPL-CCNC: 15 U/L — SIGNIFICANT CHANGE UP (ref 15–37)
BASOPHILS # BLD AUTO: 0.03 K/UL — SIGNIFICANT CHANGE UP (ref 0–0.2)
BASOPHILS NFR BLD AUTO: 0.3 % — SIGNIFICANT CHANGE UP (ref 0–2)
BILIRUB SERPL-MCNC: 0.4 MG/DL — SIGNIFICANT CHANGE UP (ref 0.2–1.2)
BUN SERPL-MCNC: 25 MG/DL — HIGH (ref 7–23)
CALCIUM SERPL-MCNC: 9.3 MG/DL — SIGNIFICANT CHANGE UP (ref 8.5–10.1)
CHLORIDE SERPL-SCNC: 100 MMOL/L — SIGNIFICANT CHANGE UP (ref 96–108)
CO2 SERPL-SCNC: 42 MMOL/L — HIGH (ref 22–31)
CREAT SERPL-MCNC: 1.09 MG/DL — SIGNIFICANT CHANGE UP (ref 0.5–1.3)
CULTURE RESULTS: NO GROWTH — SIGNIFICANT CHANGE UP
EGFR: 81 ML/MIN/1.73M2 — SIGNIFICANT CHANGE UP
EOSINOPHIL # BLD AUTO: 0.51 K/UL — HIGH (ref 0–0.5)
EOSINOPHIL NFR BLD AUTO: 4.7 % — SIGNIFICANT CHANGE UP (ref 0–6)
GLUCOSE SERPL-MCNC: 74 MG/DL — SIGNIFICANT CHANGE UP (ref 70–99)
HCT VFR BLD CALC: 38.4 % — LOW (ref 39–50)
HGB BLD-MCNC: 11.4 G/DL — LOW (ref 13–17)
IMM GRANULOCYTES NFR BLD AUTO: 0.3 % — SIGNIFICANT CHANGE UP (ref 0–0.9)
LYMPHOCYTES # BLD AUTO: 29 % — SIGNIFICANT CHANGE UP (ref 13–44)
LYMPHOCYTES # BLD AUTO: 3.13 K/UL — SIGNIFICANT CHANGE UP (ref 1–3.3)
M PNEUMO IGM SER-ACNC: 1.1 INDEX — HIGH (ref 0–0.9)
MAGNESIUM SERPL-MCNC: 1.5 MG/DL — LOW (ref 1.6–2.6)
MCHC RBC-ENTMCNC: 27.3 PG — SIGNIFICANT CHANGE UP (ref 27–34)
MCHC RBC-ENTMCNC: 29.7 G/DL — LOW (ref 32–36)
MCV RBC AUTO: 92.1 FL — SIGNIFICANT CHANGE UP (ref 80–100)
MONOCYTES # BLD AUTO: 1.16 K/UL — HIGH (ref 0–0.9)
MONOCYTES NFR BLD AUTO: 10.7 % — SIGNIFICANT CHANGE UP (ref 2–14)
MYCOPLASMA AG SPEC QL: POSITIVE
NEUTROPHILS # BLD AUTO: 5.94 K/UL — SIGNIFICANT CHANGE UP (ref 1.8–7.4)
NEUTROPHILS NFR BLD AUTO: 55 % — SIGNIFICANT CHANGE UP (ref 43–77)
NRBC # BLD: 0 /100 WBCS — SIGNIFICANT CHANGE UP (ref 0–0)
NT-PROBNP SERPL-SCNC: 1571 PG/ML — HIGH (ref 0–125)
PHOSPHATE SERPL-MCNC: 2.1 MG/DL — LOW (ref 2.5–4.5)
PLATELET # BLD AUTO: 204 K/UL — SIGNIFICANT CHANGE UP (ref 150–400)
POTASSIUM SERPL-MCNC: 3.1 MMOL/L — LOW (ref 3.5–5.3)
POTASSIUM SERPL-SCNC: 3.1 MMOL/L — LOW (ref 3.5–5.3)
PROT SERPL-MCNC: 7.7 GM/DL — SIGNIFICANT CHANGE UP (ref 6–8.3)
RBC # BLD: 4.17 M/UL — LOW (ref 4.2–5.8)
RBC # FLD: 14.6 % — HIGH (ref 10.3–14.5)
SODIUM SERPL-SCNC: 144 MMOL/L — SIGNIFICANT CHANGE UP (ref 135–145)
SPECIMEN SOURCE: SIGNIFICANT CHANGE UP
WBC # BLD: 10.8 K/UL — HIGH (ref 3.8–10.5)
WBC # FLD AUTO: 10.8 K/UL — HIGH (ref 3.8–10.5)

## 2024-06-27 PROCEDURE — 99232 SBSQ HOSP IP/OBS MODERATE 35: CPT

## 2024-06-27 PROCEDURE — 71250 CT THORAX DX C-: CPT | Mod: 26

## 2024-06-27 PROCEDURE — 99223 1ST HOSP IP/OBS HIGH 75: CPT

## 2024-06-27 RX ORDER — POTASSIUM CHLORIDE 600 MG/1
10 TABLET, FILM COATED, EXTENDED RELEASE ORAL
Refills: 0 | Status: COMPLETED | OUTPATIENT
Start: 2024-06-27 | End: 2024-06-27

## 2024-06-27 RX ORDER — POTASSIUM CHLORIDE 600 MG/1
40 TABLET, FILM COATED, EXTENDED RELEASE ORAL EVERY 4 HOURS
Refills: 0 | Status: COMPLETED | OUTPATIENT
Start: 2024-06-27 | End: 2024-06-27

## 2024-06-27 RX ADMIN — HEPARIN SODIUM 5000 UNIT(S): 50 INJECTION, SOLUTION INTRAVENOUS at 15:14

## 2024-06-27 RX ADMIN — IPRATROPIUM BROMIDE AND ALBUTEROL SULFATE 3 MILLILITER(S): .5; 3 SOLUTION RESPIRATORY (INHALATION) at 11:19

## 2024-06-27 RX ADMIN — Medication 4 MILLILITER(S): at 05:32

## 2024-06-27 RX ADMIN — HYDROMORPHONE HCL 8 MILLIGRAM(S): 0.2 INJECTION, SOLUTION INTRAVENOUS at 11:19

## 2024-06-27 RX ADMIN — TAMSULOSIN HYDROCHLORIDE 0.4 MILLIGRAM(S): 0.4 CAPSULE ORAL at 22:18

## 2024-06-27 RX ADMIN — Medication 1 TABLET(S): at 11:19

## 2024-06-27 RX ADMIN — HYDROMORPHONE HCL 8 MILLIGRAM(S): 0.2 INJECTION, SOLUTION INTRAVENOUS at 17:16

## 2024-06-27 RX ADMIN — HEPARIN SODIUM 5000 UNIT(S): 50 INJECTION, SOLUTION INTRAVENOUS at 22:22

## 2024-06-27 RX ADMIN — IPRATROPIUM BROMIDE AND ALBUTEROL SULFATE 3 MILLILITER(S): .5; 3 SOLUTION RESPIRATORY (INHALATION) at 17:08

## 2024-06-27 RX ADMIN — Medication 5 MILLIGRAM(S): at 11:17

## 2024-06-27 RX ADMIN — POTASSIUM CHLORIDE 100 MILLIEQUIVALENT(S): 600 TABLET, FILM COATED, EXTENDED RELEASE ORAL at 11:29

## 2024-06-27 RX ADMIN — Medication 4 MILLILITER(S): at 11:19

## 2024-06-27 RX ADMIN — Medication 110 MILLIGRAM(S): at 11:25

## 2024-06-27 RX ADMIN — Medication 1 MILLIGRAM(S): at 11:19

## 2024-06-27 RX ADMIN — Medication 300 MILLIGRAM(S): at 06:13

## 2024-06-27 RX ADMIN — PANTOPRAZOLE SODIUM 40 MILLIGRAM(S): 40 INJECTION, POWDER, FOR SOLUTION INTRAVENOUS at 06:13

## 2024-06-27 RX ADMIN — IPRATROPIUM BROMIDE AND ALBUTEROL SULFATE 3 MILLILITER(S): .5; 3 SOLUTION RESPIRATORY (INHALATION) at 23:19

## 2024-06-27 RX ADMIN — HYDROMORPHONE HCL 8 MILLIGRAM(S): 0.2 INJECTION, SOLUTION INTRAVENOUS at 11:49

## 2024-06-27 RX ADMIN — HYDROMORPHONE HCL 8 MILLIGRAM(S): 0.2 INJECTION, SOLUTION INTRAVENOUS at 06:12

## 2024-06-27 RX ADMIN — IPRATROPIUM BROMIDE AND ALBUTEROL SULFATE 3 MILLILITER(S): .5; 3 SOLUTION RESPIRATORY (INHALATION) at 05:31

## 2024-06-27 RX ADMIN — DULOXETINE HYDROCHLORIDE 30 MILLIGRAM(S): 20 CAPSULE, DELAYED RELEASE ORAL at 15:11

## 2024-06-27 RX ADMIN — Medication 300 MILLIGRAM(S): at 22:17

## 2024-06-27 RX ADMIN — Medication 100 MILLIGRAM(S): at 17:16

## 2024-06-27 RX ADMIN — Medication 2 TABLET(S): at 22:17

## 2024-06-27 RX ADMIN — Medication 100 MILLIGRAM(S): at 11:17

## 2024-06-27 RX ADMIN — POTASSIUM CHLORIDE 40 MILLIEQUIVALENT(S): 600 TABLET, FILM COATED, EXTENDED RELEASE ORAL at 17:16

## 2024-06-27 RX ADMIN — Medication 4 MILLILITER(S): at 17:09

## 2024-06-27 RX ADMIN — Medication 1 APPLICATION(S): at 06:21

## 2024-06-27 RX ADMIN — Medication 325 MILLIGRAM(S): at 11:18

## 2024-06-27 RX ADMIN — Medication 4 MILLILITER(S): at 23:25

## 2024-06-27 RX ADMIN — Medication 400 MILLIGRAM(S): at 22:21

## 2024-06-27 RX ADMIN — POTASSIUM CHLORIDE 40 MILLIEQUIVALENT(S): 600 TABLET, FILM COATED, EXTENDED RELEASE ORAL at 15:12

## 2024-06-27 RX ADMIN — Medication 300 MILLIGRAM(S): at 15:12

## 2024-06-27 RX ADMIN — HEPARIN SODIUM 5000 UNIT(S): 50 INJECTION, SOLUTION INTRAVENOUS at 06:13

## 2024-06-27 RX ADMIN — FUROSEMIDE 20 MILLIGRAM(S): 10 INJECTION, SOLUTION INTRAMUSCULAR; INTRAVENOUS at 06:13

## 2024-06-28 ENCOUNTER — TRANSCRIPTION ENCOUNTER (OUTPATIENT)
Age: 54
End: 2024-06-28

## 2024-06-28 PROCEDURE — 93970 EXTREMITY STUDY: CPT | Mod: 26

## 2024-06-28 PROCEDURE — 99233 SBSQ HOSP IP/OBS HIGH 50: CPT

## 2024-06-28 PROCEDURE — 99232 SBSQ HOSP IP/OBS MODERATE 35: CPT

## 2024-06-28 RX ORDER — TIOTROPIUM BROMIDE 18 UG/1
2 CAPSULE ORAL; RESPIRATORY (INHALATION) DAILY
Refills: 0 | Status: DISCONTINUED | OUTPATIENT
Start: 2024-06-28 | End: 2024-07-02

## 2024-06-28 RX ORDER — COLLAGENASE CLOSTRIDIUM HIST. 250 UNIT/G
1 OINTMENT (GRAM) TOPICAL DAILY
Refills: 0 | Status: DISCONTINUED | OUTPATIENT
Start: 2024-06-28 | End: 2024-07-02

## 2024-06-28 RX ORDER — AZITHROMYCIN 250 MG/1
500 TABLET, FILM COATED ORAL DAILY
Refills: 0 | Status: COMPLETED | OUTPATIENT
Start: 2024-06-28 | End: 2024-06-30

## 2024-06-28 RX ORDER — ALBUTEROL 90 MCG
2 AEROSOL REFILL (GRAM) INHALATION EVERY 6 HOURS
Refills: 0 | Status: DISCONTINUED | OUTPATIENT
Start: 2024-06-28 | End: 2024-07-02

## 2024-06-28 RX ADMIN — HYDROMORPHONE HCL 8 MILLIGRAM(S): 0.2 INJECTION, SOLUTION INTRAVENOUS at 00:11

## 2024-06-28 RX ADMIN — AZITHROMYCIN 500 MILLIGRAM(S): 250 TABLET, FILM COATED ORAL at 12:01

## 2024-06-28 RX ADMIN — Medication 1 MILLIGRAM(S): at 12:03

## 2024-06-28 RX ADMIN — HEPARIN SODIUM 5000 UNIT(S): 50 INJECTION, SOLUTION INTRAVENOUS at 22:02

## 2024-06-28 RX ADMIN — Medication 100 MILLIGRAM(S): at 09:37

## 2024-06-28 RX ADMIN — HEPARIN SODIUM 5000 UNIT(S): 50 INJECTION, SOLUTION INTRAVENOUS at 13:34

## 2024-06-28 RX ADMIN — Medication 300 MILLIGRAM(S): at 06:33

## 2024-06-28 RX ADMIN — DULOXETINE HYDROCHLORIDE 30 MILLIGRAM(S): 20 CAPSULE, DELAYED RELEASE ORAL at 12:02

## 2024-06-28 RX ADMIN — Medication 300 MILLIGRAM(S): at 13:34

## 2024-06-28 RX ADMIN — HYDROMORPHONE HCL 8 MILLIGRAM(S): 0.2 INJECTION, SOLUTION INTRAVENOUS at 13:00

## 2024-06-28 RX ADMIN — Medication 400 MILLIGRAM(S): at 22:00

## 2024-06-28 RX ADMIN — Medication 300 MILLIGRAM(S): at 22:01

## 2024-06-28 RX ADMIN — HYDROMORPHONE HCL 8 MILLIGRAM(S): 0.2 INJECTION, SOLUTION INTRAVENOUS at 12:00

## 2024-06-28 RX ADMIN — TAMSULOSIN HYDROCHLORIDE 0.4 MILLIGRAM(S): 0.4 CAPSULE ORAL at 22:00

## 2024-06-28 RX ADMIN — FUROSEMIDE 20 MILLIGRAM(S): 10 INJECTION, SOLUTION INTRAMUSCULAR; INTRAVENOUS at 06:33

## 2024-06-28 RX ADMIN — HYDROMORPHONE HCL 8 MILLIGRAM(S): 0.2 INJECTION, SOLUTION INTRAVENOUS at 01:11

## 2024-06-28 RX ADMIN — Medication 100 MILLIGRAM(S): at 17:22

## 2024-06-28 RX ADMIN — Medication 1 TABLET(S): at 12:02

## 2024-06-28 RX ADMIN — PANTOPRAZOLE SODIUM 40 MILLIGRAM(S): 40 INJECTION, POWDER, FOR SOLUTION INTRAVENOUS at 06:33

## 2024-06-28 RX ADMIN — Medication 325 MILLIGRAM(S): at 12:03

## 2024-06-28 RX ADMIN — TIOTROPIUM BROMIDE 2 PUFF(S): 18 CAPSULE ORAL; RESPIRATORY (INHALATION) at 12:00

## 2024-06-28 RX ADMIN — IPRATROPIUM BROMIDE AND ALBUTEROL SULFATE 3 MILLILITER(S): .5; 3 SOLUTION RESPIRATORY (INHALATION) at 05:07

## 2024-06-28 RX ADMIN — HEPARIN SODIUM 5000 UNIT(S): 50 INJECTION, SOLUTION INTRAVENOUS at 06:33

## 2024-06-28 RX ADMIN — Medication 4 MILLILITER(S): at 05:07

## 2024-06-28 RX ADMIN — Medication 2 TABLET(S): at 22:00

## 2024-06-28 RX ADMIN — Medication 110 MILLIGRAM(S): at 12:01

## 2024-06-28 RX ADMIN — HYDROMORPHONE HCL 8 MILLIGRAM(S): 0.2 INJECTION, SOLUTION INTRAVENOUS at 07:33

## 2024-06-28 RX ADMIN — HYDROMORPHONE HCL 8 MILLIGRAM(S): 0.2 INJECTION, SOLUTION INTRAVENOUS at 06:33

## 2024-06-28 RX ADMIN — HYDROMORPHONE HCL 8 MILLIGRAM(S): 0.2 INJECTION, SOLUTION INTRAVENOUS at 17:22

## 2024-06-28 RX ADMIN — Medication 5 MILLIGRAM(S): at 12:03

## 2024-06-29 LAB
ANION GAP SERPL CALC-SCNC: 3 MMOL/L — LOW (ref 5–17)
BUN SERPL-MCNC: 20 MG/DL — SIGNIFICANT CHANGE UP (ref 7–23)
CALCIUM SERPL-MCNC: 9.3 MG/DL — SIGNIFICANT CHANGE UP (ref 8.5–10.1)
CHLORIDE SERPL-SCNC: 105 MMOL/L — SIGNIFICANT CHANGE UP (ref 96–108)
CO2 SERPL-SCNC: 35 MMOL/L — HIGH (ref 22–31)
CREAT SERPL-MCNC: 0.91 MG/DL — SIGNIFICANT CHANGE UP (ref 0.5–1.3)
EGFR: 101 ML/MIN/1.73M2 — SIGNIFICANT CHANGE UP
GLUCOSE SERPL-MCNC: 84 MG/DL — SIGNIFICANT CHANGE UP (ref 70–99)
GRAM STN FLD: ABNORMAL
HCT VFR BLD CALC: 35.2 % — LOW (ref 39–50)
HGB BLD-MCNC: 10.7 G/DL — LOW (ref 13–17)
MCHC RBC-ENTMCNC: 27.4 PG — SIGNIFICANT CHANGE UP (ref 27–34)
MCHC RBC-ENTMCNC: 30.4 G/DL — LOW (ref 32–36)
MCV RBC AUTO: 90.3 FL — SIGNIFICANT CHANGE UP (ref 80–100)
NRBC # BLD: 0 /100 WBCS — SIGNIFICANT CHANGE UP (ref 0–0)
PLATELET # BLD AUTO: 159 K/UL — SIGNIFICANT CHANGE UP (ref 150–400)
POTASSIUM SERPL-MCNC: 3.6 MMOL/L — SIGNIFICANT CHANGE UP (ref 3.5–5.3)
POTASSIUM SERPL-SCNC: 3.6 MMOL/L — SIGNIFICANT CHANGE UP (ref 3.5–5.3)
RBC # BLD: 3.9 M/UL — LOW (ref 4.2–5.8)
RBC # FLD: 14.7 % — HIGH (ref 10.3–14.5)
SODIUM SERPL-SCNC: 143 MMOL/L — SIGNIFICANT CHANGE UP (ref 135–145)
SPECIMEN SOURCE: SIGNIFICANT CHANGE UP
WBC # BLD: 9.1 K/UL — SIGNIFICANT CHANGE UP (ref 3.8–10.5)
WBC # FLD AUTO: 9.1 K/UL — SIGNIFICANT CHANGE UP (ref 3.8–10.5)

## 2024-06-29 PROCEDURE — 99232 SBSQ HOSP IP/OBS MODERATE 35: CPT

## 2024-06-29 RX ORDER — GUAIFENESIN 100 %
1200 POWDER (GRAM) MISCELLANEOUS EVERY 12 HOURS
Refills: 0 | Status: DISCONTINUED | OUTPATIENT
Start: 2024-06-29 | End: 2024-07-02

## 2024-06-29 RX ADMIN — Medication 1 MILLIGRAM(S): at 12:24

## 2024-06-29 RX ADMIN — HEPARIN SODIUM 5000 UNIT(S): 50 INJECTION, SOLUTION INTRAVENOUS at 21:23

## 2024-06-29 RX ADMIN — Medication 100 MILLIGRAM(S): at 19:17

## 2024-06-29 RX ADMIN — Medication 2 TABLET(S): at 21:21

## 2024-06-29 RX ADMIN — HYDROMORPHONE HCL 8 MILLIGRAM(S): 0.2 INJECTION, SOLUTION INTRAVENOUS at 20:16

## 2024-06-29 RX ADMIN — Medication 110 MILLIGRAM(S): at 12:43

## 2024-06-29 RX ADMIN — PANTOPRAZOLE SODIUM 40 MILLIGRAM(S): 40 INJECTION, POWDER, FOR SOLUTION INTRAVENOUS at 06:54

## 2024-06-29 RX ADMIN — Medication 400 MILLIGRAM(S): at 21:21

## 2024-06-29 RX ADMIN — HYDROMORPHONE HCL 8 MILLIGRAM(S): 0.2 INJECTION, SOLUTION INTRAVENOUS at 23:49

## 2024-06-29 RX ADMIN — HYDROMORPHONE HCL 8 MILLIGRAM(S): 0.2 INJECTION, SOLUTION INTRAVENOUS at 19:16

## 2024-06-29 RX ADMIN — Medication 300 MILLIGRAM(S): at 21:21

## 2024-06-29 RX ADMIN — DULOXETINE HYDROCHLORIDE 30 MILLIGRAM(S): 20 CAPSULE, DELAYED RELEASE ORAL at 12:24

## 2024-06-29 RX ADMIN — TAMSULOSIN HYDROCHLORIDE 0.4 MILLIGRAM(S): 0.4 CAPSULE ORAL at 21:20

## 2024-06-29 RX ADMIN — Medication 1200 MILLIGRAM(S): at 19:17

## 2024-06-29 RX ADMIN — HYDROMORPHONE HCL 8 MILLIGRAM(S): 0.2 INJECTION, SOLUTION INTRAVENOUS at 12:30

## 2024-06-29 RX ADMIN — Medication 300 MILLIGRAM(S): at 06:55

## 2024-06-29 RX ADMIN — HYDROMORPHONE HCL 8 MILLIGRAM(S): 0.2 INJECTION, SOLUTION INTRAVENOUS at 00:13

## 2024-06-29 RX ADMIN — Medication 325 MILLIGRAM(S): at 12:24

## 2024-06-29 RX ADMIN — HYDROMORPHONE HCL 8 MILLIGRAM(S): 0.2 INJECTION, SOLUTION INTRAVENOUS at 01:13

## 2024-06-29 RX ADMIN — Medication 1 TABLET(S): at 12:23

## 2024-06-29 RX ADMIN — TIOTROPIUM BROMIDE 2 PUFF(S): 18 CAPSULE ORAL; RESPIRATORY (INHALATION) at 12:25

## 2024-06-29 RX ADMIN — HEPARIN SODIUM 5000 UNIT(S): 50 INJECTION, SOLUTION INTRAVENOUS at 06:56

## 2024-06-29 RX ADMIN — Medication 5 MILLIGRAM(S): at 12:23

## 2024-06-29 RX ADMIN — HYDROMORPHONE HCL 8 MILLIGRAM(S): 0.2 INJECTION, SOLUTION INTRAVENOUS at 07:54

## 2024-06-29 RX ADMIN — Medication 300 MILLIGRAM(S): at 15:48

## 2024-06-29 RX ADMIN — AZITHROMYCIN 500 MILLIGRAM(S): 250 TABLET, FILM COATED ORAL at 12:25

## 2024-06-29 RX ADMIN — Medication 100 MILLIGRAM(S): at 10:52

## 2024-06-29 RX ADMIN — HYDROMORPHONE HCL 8 MILLIGRAM(S): 0.2 INJECTION, SOLUTION INTRAVENOUS at 13:30

## 2024-06-29 RX ADMIN — HYDROMORPHONE HCL 8 MILLIGRAM(S): 0.2 INJECTION, SOLUTION INTRAVENOUS at 06:54

## 2024-06-29 RX ADMIN — FUROSEMIDE 20 MILLIGRAM(S): 10 INJECTION, SOLUTION INTRAMUSCULAR; INTRAVENOUS at 06:54

## 2024-06-30 LAB
CULTURE RESULTS: ABNORMAL
CULTURE RESULTS: SIGNIFICANT CHANGE UP
CULTURE RESULTS: SIGNIFICANT CHANGE UP
GRAM STN FLD: ABNORMAL
SPECIMEN SOURCE: SIGNIFICANT CHANGE UP

## 2024-06-30 PROCEDURE — 99232 SBSQ HOSP IP/OBS MODERATE 35: CPT

## 2024-06-30 RX ORDER — HYDROMORPHONE HCL 0.2 MG/ML
2 INJECTION, SOLUTION INTRAVENOUS ONCE
Refills: 0 | Status: DISCONTINUED | OUTPATIENT
Start: 2024-06-30 | End: 2024-06-30

## 2024-06-30 RX ADMIN — AZITHROMYCIN 500 MILLIGRAM(S): 250 TABLET, FILM COATED ORAL at 12:59

## 2024-06-30 RX ADMIN — Medication 110 MILLIGRAM(S): at 12:58

## 2024-06-30 RX ADMIN — Medication 5 MILLIGRAM(S): at 13:00

## 2024-06-30 RX ADMIN — TIOTROPIUM BROMIDE 2 PUFF(S): 18 CAPSULE ORAL; RESPIRATORY (INHALATION) at 13:01

## 2024-06-30 RX ADMIN — HEPARIN SODIUM 5000 UNIT(S): 50 INJECTION, SOLUTION INTRAVENOUS at 22:36

## 2024-06-30 RX ADMIN — Medication 325 MILLIGRAM(S): at 13:00

## 2024-06-30 RX ADMIN — Medication 2 TABLET(S): at 22:36

## 2024-06-30 RX ADMIN — HYDROMORPHONE HCL 8 MILLIGRAM(S): 0.2 INJECTION, SOLUTION INTRAVENOUS at 20:00

## 2024-06-30 RX ADMIN — PANTOPRAZOLE SODIUM 40 MILLIGRAM(S): 40 INJECTION, POWDER, FOR SOLUTION INTRAVENOUS at 10:17

## 2024-06-30 RX ADMIN — Medication 1 MILLIGRAM(S): at 13:01

## 2024-06-30 RX ADMIN — Medication 1200 MILLIGRAM(S): at 05:45

## 2024-06-30 RX ADMIN — HYDROMORPHONE HCL 8 MILLIGRAM(S): 0.2 INJECTION, SOLUTION INTRAVENOUS at 00:49

## 2024-06-30 RX ADMIN — HYDROMORPHONE HCL 8 MILLIGRAM(S): 0.2 INJECTION, SOLUTION INTRAVENOUS at 19:00

## 2024-06-30 RX ADMIN — TAMSULOSIN HYDROCHLORIDE 0.4 MILLIGRAM(S): 0.4 CAPSULE ORAL at 22:36

## 2024-06-30 RX ADMIN — HYDROMORPHONE HCL 8 MILLIGRAM(S): 0.2 INJECTION, SOLUTION INTRAVENOUS at 06:44

## 2024-06-30 RX ADMIN — Medication 1200 MILLIGRAM(S): at 19:00

## 2024-06-30 RX ADMIN — HYDROMORPHONE HCL 2 MILLIGRAM(S): 0.2 INJECTION, SOLUTION INTRAVENOUS at 15:59

## 2024-06-30 RX ADMIN — FUROSEMIDE 20 MILLIGRAM(S): 10 INJECTION, SOLUTION INTRAMUSCULAR; INTRAVENOUS at 05:45

## 2024-06-30 RX ADMIN — Medication 300 MILLIGRAM(S): at 05:44

## 2024-06-30 RX ADMIN — HEPARIN SODIUM 5000 UNIT(S): 50 INJECTION, SOLUTION INTRAVENOUS at 05:45

## 2024-06-30 RX ADMIN — Medication 100 MILLIGRAM(S): at 19:00

## 2024-06-30 RX ADMIN — HYDROMORPHONE HCL 8 MILLIGRAM(S): 0.2 INJECTION, SOLUTION INTRAVENOUS at 05:44

## 2024-06-30 RX ADMIN — HYDROMORPHONE HCL 2 MILLIGRAM(S): 0.2 INJECTION, SOLUTION INTRAVENOUS at 16:50

## 2024-06-30 RX ADMIN — Medication 400 MILLIGRAM(S): at 22:35

## 2024-06-30 RX ADMIN — Medication 100 MILLIGRAM(S): at 10:16

## 2024-06-30 RX ADMIN — DULOXETINE HYDROCHLORIDE 30 MILLIGRAM(S): 20 CAPSULE, DELAYED RELEASE ORAL at 13:00

## 2024-06-30 RX ADMIN — HYDROMORPHONE HCL 8 MILLIGRAM(S): 0.2 INJECTION, SOLUTION INTRAVENOUS at 13:45

## 2024-06-30 RX ADMIN — Medication 300 MILLIGRAM(S): at 22:36

## 2024-06-30 RX ADMIN — HYDROMORPHONE HCL 8 MILLIGRAM(S): 0.2 INJECTION, SOLUTION INTRAVENOUS at 12:58

## 2024-06-30 RX ADMIN — Medication 300 MILLIGRAM(S): at 15:11

## 2024-06-30 RX ADMIN — Medication 1 TABLET(S): at 12:59

## 2024-07-01 LAB
CULTURE RESULTS: SIGNIFICANT CHANGE UP
GRAM STN FLD: SIGNIFICANT CHANGE UP
SPECIMEN SOURCE: SIGNIFICANT CHANGE UP

## 2024-07-01 PROCEDURE — 99232 SBSQ HOSP IP/OBS MODERATE 35: CPT

## 2024-07-01 PROCEDURE — 99233 SBSQ HOSP IP/OBS HIGH 50: CPT

## 2024-07-01 RX ADMIN — HYDROMORPHONE HCL 8 MILLIGRAM(S): 0.2 INJECTION, SOLUTION INTRAVENOUS at 14:53

## 2024-07-01 RX ADMIN — Medication 2 TABLET(S): at 21:22

## 2024-07-01 RX ADMIN — Medication 300 MILLIGRAM(S): at 13:54

## 2024-07-01 RX ADMIN — Medication 1200 MILLIGRAM(S): at 06:11

## 2024-07-01 RX ADMIN — HYDROMORPHONE HCL 8 MILLIGRAM(S): 0.2 INJECTION, SOLUTION INTRAVENOUS at 13:53

## 2024-07-01 RX ADMIN — Medication 1 TABLET(S): at 13:53

## 2024-07-01 RX ADMIN — PANTOPRAZOLE SODIUM 40 MILLIGRAM(S): 40 INJECTION, POWDER, FOR SOLUTION INTRAVENOUS at 06:11

## 2024-07-01 RX ADMIN — Medication 300 MILLIGRAM(S): at 21:22

## 2024-07-01 RX ADMIN — HEPARIN SODIUM 5000 UNIT(S): 50 INJECTION, SOLUTION INTRAVENOUS at 21:21

## 2024-07-01 RX ADMIN — HYDROMORPHONE HCL 8 MILLIGRAM(S): 0.2 INJECTION, SOLUTION INTRAVENOUS at 06:12

## 2024-07-01 RX ADMIN — TAMSULOSIN HYDROCHLORIDE 0.4 MILLIGRAM(S): 0.4 CAPSULE ORAL at 21:22

## 2024-07-01 RX ADMIN — HYDROMORPHONE HCL 8 MILLIGRAM(S): 0.2 INJECTION, SOLUTION INTRAVENOUS at 23:55

## 2024-07-01 RX ADMIN — DULOXETINE HYDROCHLORIDE 30 MILLIGRAM(S): 20 CAPSULE, DELAYED RELEASE ORAL at 13:53

## 2024-07-01 RX ADMIN — Medication 400 MILLIGRAM(S): at 21:22

## 2024-07-01 RX ADMIN — HYDROMORPHONE HCL 8 MILLIGRAM(S): 0.2 INJECTION, SOLUTION INTRAVENOUS at 00:34

## 2024-07-01 RX ADMIN — Medication 1 APPLICATION(S): at 13:54

## 2024-07-01 RX ADMIN — HYDROMORPHONE HCL 8 MILLIGRAM(S): 0.2 INJECTION, SOLUTION INTRAVENOUS at 01:34

## 2024-07-01 RX ADMIN — Medication 110 MILLIGRAM(S): at 14:10

## 2024-07-01 RX ADMIN — Medication 100 MILLIGRAM(S): at 11:17

## 2024-07-01 RX ADMIN — Medication 325 MILLIGRAM(S): at 13:53

## 2024-07-01 RX ADMIN — HEPARIN SODIUM 5000 UNIT(S): 50 INJECTION, SOLUTION INTRAVENOUS at 06:12

## 2024-07-01 RX ADMIN — FUROSEMIDE 20 MILLIGRAM(S): 10 INJECTION, SOLUTION INTRAMUSCULAR; INTRAVENOUS at 06:11

## 2024-07-01 RX ADMIN — TIOTROPIUM BROMIDE 2 PUFF(S): 18 CAPSULE ORAL; RESPIRATORY (INHALATION) at 13:52

## 2024-07-01 RX ADMIN — HYDROMORPHONE HCL 8 MILLIGRAM(S): 0.2 INJECTION, SOLUTION INTRAVENOUS at 17:53

## 2024-07-01 RX ADMIN — Medication 1200 MILLIGRAM(S): at 17:51

## 2024-07-01 RX ADMIN — HEPARIN SODIUM 5000 UNIT(S): 50 INJECTION, SOLUTION INTRAVENOUS at 13:53

## 2024-07-01 RX ADMIN — Medication 100 MILLIGRAM(S): at 17:51

## 2024-07-01 RX ADMIN — Medication 300 MILLIGRAM(S): at 06:11

## 2024-07-01 RX ADMIN — Medication 1 MILLIGRAM(S): at 13:54

## 2024-07-01 RX ADMIN — Medication 5 MILLIGRAM(S): at 13:54

## 2024-07-02 ENCOUNTER — TRANSCRIPTION ENCOUNTER (OUTPATIENT)
Age: 54
End: 2024-07-02

## 2024-07-02 VITALS
TEMPERATURE: 99 F | HEART RATE: 79 BPM | RESPIRATION RATE: 17 BRPM | OXYGEN SATURATION: 98 % | SYSTOLIC BLOOD PRESSURE: 112 MMHG | DIASTOLIC BLOOD PRESSURE: 70 MMHG

## 2024-07-02 LAB
ANION GAP SERPL CALC-SCNC: 3 MMOL/L — LOW (ref 5–17)
BASE EXCESS BLDA CALC-SCNC: 7.4 MMOL/L — HIGH (ref -2–3)
BLOOD GAS COMMENTS ARTERIAL: SIGNIFICANT CHANGE UP
BUN SERPL-MCNC: 19 MG/DL — SIGNIFICANT CHANGE UP (ref 7–23)
CALCIUM SERPL-MCNC: 9.2 MG/DL — SIGNIFICANT CHANGE UP (ref 8.5–10.1)
CHLORIDE SERPL-SCNC: 105 MMOL/L — SIGNIFICANT CHANGE UP (ref 96–108)
CO2 BLDA-SCNC: 36 MMOL/L — HIGH (ref 19–24)
CO2 SERPL-SCNC: 32 MMOL/L — HIGH (ref 22–31)
CREAT SERPL-MCNC: 0.84 MG/DL — SIGNIFICANT CHANGE UP (ref 0.5–1.3)
EGFR: 104 ML/MIN/1.73M2 — SIGNIFICANT CHANGE UP
GAS PNL BLDA: SIGNIFICANT CHANGE UP
GLUCOSE SERPL-MCNC: 124 MG/DL — HIGH (ref 70–99)
HCO3 BLDA-SCNC: 35 MMOL/L — HIGH (ref 21–28)
HCT VFR BLD CALC: 34.7 % — LOW (ref 39–50)
HGB BLD-MCNC: 10.6 G/DL — LOW (ref 13–17)
HOROWITZ INDEX BLDA+IHG-RTO: 40 — SIGNIFICANT CHANGE UP
MCHC RBC-ENTMCNC: 27.5 PG — SIGNIFICANT CHANGE UP (ref 27–34)
MCHC RBC-ENTMCNC: 30.5 G/DL — LOW (ref 32–36)
MCV RBC AUTO: 90.1 FL — SIGNIFICANT CHANGE UP (ref 80–100)
NRBC # BLD: 0 /100 WBCS — SIGNIFICANT CHANGE UP (ref 0–0)
PCO2 BLDA: 60 MMHG — HIGH (ref 32–46)
PH BLDA: 7.37 — SIGNIFICANT CHANGE UP (ref 7.35–7.45)
PLATELET # BLD AUTO: 146 K/UL — LOW (ref 150–400)
PO2 BLDA: 100 MMHG — SIGNIFICANT CHANGE UP (ref 83–108)
POTASSIUM SERPL-MCNC: 4 MMOL/L — SIGNIFICANT CHANGE UP (ref 3.5–5.3)
POTASSIUM SERPL-SCNC: 4 MMOL/L — SIGNIFICANT CHANGE UP (ref 3.5–5.3)
RBC # BLD: 3.85 M/UL — LOW (ref 4.2–5.8)
RBC # FLD: 14.5 % — SIGNIFICANT CHANGE UP (ref 10.3–14.5)
SAO2 % BLDA: 98.7 % — HIGH (ref 94–98)
SODIUM SERPL-SCNC: 140 MMOL/L — SIGNIFICANT CHANGE UP (ref 135–145)
WBC # BLD: 8.48 K/UL — SIGNIFICANT CHANGE UP (ref 3.8–10.5)
WBC # FLD AUTO: 8.48 K/UL — SIGNIFICANT CHANGE UP (ref 3.8–10.5)

## 2024-07-02 PROCEDURE — 99232 SBSQ HOSP IP/OBS MODERATE 35: CPT

## 2024-07-02 PROCEDURE — 99239 HOSP IP/OBS DSCHRG MGMT >30: CPT

## 2024-07-02 RX ORDER — NYSTATIN 100000/G
1 POWDER (GRAM) TOPICAL
Refills: 0 | Status: DISCONTINUED | OUTPATIENT
Start: 2024-07-02 | End: 2024-07-02

## 2024-07-02 RX ORDER — LIDOCAINE 4 G/100G
1 CREAM TOPICAL
Refills: 0 | DISCHARGE

## 2024-07-02 RX ADMIN — Medication 1 APPLICATION(S): at 05:41

## 2024-07-02 RX ADMIN — DULOXETINE HYDROCHLORIDE 30 MILLIGRAM(S): 20 CAPSULE, DELAYED RELEASE ORAL at 11:56

## 2024-07-02 RX ADMIN — FUROSEMIDE 20 MILLIGRAM(S): 10 INJECTION, SOLUTION INTRAMUSCULAR; INTRAVENOUS at 05:43

## 2024-07-02 RX ADMIN — TIOTROPIUM BROMIDE 2 PUFF(S): 18 CAPSULE ORAL; RESPIRATORY (INHALATION) at 11:57

## 2024-07-02 RX ADMIN — HYDROMORPHONE HCL 8 MILLIGRAM(S): 0.2 INJECTION, SOLUTION INTRAVENOUS at 11:55

## 2024-07-02 RX ADMIN — HEPARIN SODIUM 5000 UNIT(S): 50 INJECTION, SOLUTION INTRAVENOUS at 13:48

## 2024-07-02 RX ADMIN — PANTOPRAZOLE SODIUM 40 MILLIGRAM(S): 40 INJECTION, POWDER, FOR SOLUTION INTRAVENOUS at 07:56

## 2024-07-02 RX ADMIN — Medication 110 MILLIGRAM(S): at 12:09

## 2024-07-02 RX ADMIN — HEPARIN SODIUM 5000 UNIT(S): 50 INJECTION, SOLUTION INTRAVENOUS at 05:41

## 2024-07-02 RX ADMIN — Medication 1200 MILLIGRAM(S): at 05:43

## 2024-07-02 RX ADMIN — Medication 5 MILLIGRAM(S): at 11:56

## 2024-07-02 RX ADMIN — HYDROMORPHONE HCL 8 MILLIGRAM(S): 0.2 INJECTION, SOLUTION INTRAVENOUS at 01:35

## 2024-07-02 RX ADMIN — Medication 325 MILLIGRAM(S): at 11:55

## 2024-07-02 RX ADMIN — Medication 100 MILLIGRAM(S): at 11:48

## 2024-07-02 RX ADMIN — HYDROMORPHONE HCL 8 MILLIGRAM(S): 0.2 INJECTION, SOLUTION INTRAVENOUS at 12:41

## 2024-07-02 RX ADMIN — Medication 300 MILLIGRAM(S): at 05:43

## 2024-07-02 RX ADMIN — Medication 1 TABLET(S): at 11:56

## 2024-07-02 RX ADMIN — HYDROMORPHONE HCL 8 MILLIGRAM(S): 0.2 INJECTION, SOLUTION INTRAVENOUS at 05:42

## 2024-07-02 RX ADMIN — Medication 1 MILLIGRAM(S): at 11:55

## 2024-07-02 RX ADMIN — Medication 300 MILLIGRAM(S): at 13:48

## 2024-07-09 DIAGNOSIS — N17.9 ACUTE KIDNEY FAILURE, UNSPECIFIED: ICD-10-CM

## 2024-07-09 DIAGNOSIS — J96.21 ACUTE AND CHRONIC RESPIRATORY FAILURE WITH HYPOXIA: ICD-10-CM

## 2024-07-09 DIAGNOSIS — G93.41 METABOLIC ENCEPHALOPATHY: ICD-10-CM

## 2024-07-09 DIAGNOSIS — J96.22 ACUTE AND CHRONIC RESPIRATORY FAILURE WITH HYPERCAPNIA: ICD-10-CM

## 2024-07-09 DIAGNOSIS — J43.9 EMPHYSEMA, UNSPECIFIED: ICD-10-CM

## 2024-07-09 DIAGNOSIS — Z99.81 DEPENDENCE ON SUPPLEMENTAL OXYGEN: ICD-10-CM

## 2024-07-09 DIAGNOSIS — N31.9 NEUROMUSCULAR DYSFUNCTION OF BLADDER, UNSPECIFIED: ICD-10-CM

## 2024-07-09 DIAGNOSIS — F11.20 OPIOID DEPENDENCE, UNCOMPLICATED: ICD-10-CM

## 2024-07-09 DIAGNOSIS — J90 PLEURAL EFFUSION, NOT ELSEWHERE CLASSIFIED: ICD-10-CM

## 2024-07-09 DIAGNOSIS — G83.14 MONOPLEGIA OF LOWER LIMB AFFECTING LEFT NONDOMINANT SIDE: ICD-10-CM

## 2024-07-09 DIAGNOSIS — Z79.51 LONG TERM (CURRENT) USE OF INHALED STEROIDS: ICD-10-CM

## 2024-07-09 DIAGNOSIS — G89.29 OTHER CHRONIC PAIN: ICD-10-CM

## 2024-07-09 DIAGNOSIS — G83.11 MONOPLEGIA OF LOWER LIMB AFFECTING RIGHT DOMINANT SIDE: ICD-10-CM

## 2024-07-09 DIAGNOSIS — N13.30 UNSPECIFIED HYDRONEPHROSIS: ICD-10-CM

## 2024-07-09 DIAGNOSIS — I25.10 ATHEROSCLEROTIC HEART DISEASE OF NATIVE CORONARY ARTERY WITHOUT ANGINA PECTORIS: ICD-10-CM

## 2024-07-09 DIAGNOSIS — B18.2 CHRONIC VIRAL HEPATITIS C: ICD-10-CM

## 2024-07-09 DIAGNOSIS — L97.414 NON-PRESSURE CHRONIC ULCER OF RIGHT HEEL AND MIDFOOT WITH NECROSIS OF BONE: ICD-10-CM

## 2024-07-09 DIAGNOSIS — R33.8 OTHER RETENTION OF URINE: ICD-10-CM

## 2024-07-09 DIAGNOSIS — G62.9 POLYNEUROPATHY, UNSPECIFIED: ICD-10-CM

## 2024-07-09 DIAGNOSIS — Z98.1 ARTHRODESIS STATUS: ICD-10-CM

## 2024-07-09 DIAGNOSIS — E78.5 HYPERLIPIDEMIA, UNSPECIFIED: ICD-10-CM

## 2024-07-09 DIAGNOSIS — Z88.8 ALLERGY STATUS TO OTHER DRUGS, MEDICAMENTS AND BIOLOGICAL SUBSTANCES: ICD-10-CM

## 2024-07-09 DIAGNOSIS — Z86.14 PERSONAL HISTORY OF METHICILLIN RESISTANT STAPHYLOCOCCUS AUREUS INFECTION: ICD-10-CM

## 2024-07-09 DIAGNOSIS — F31.9 BIPOLAR DISORDER, UNSPECIFIED: ICD-10-CM

## 2024-07-09 DIAGNOSIS — Z74.01 BED CONFINEMENT STATUS: ICD-10-CM

## 2024-07-09 DIAGNOSIS — J44.0 CHRONIC OBSTRUCTIVE PULMONARY DISEASE WITH (ACUTE) LOWER RESPIRATORY INFECTION: ICD-10-CM

## 2024-07-09 DIAGNOSIS — Z88.6 ALLERGY STATUS TO ANALGESIC AGENT: ICD-10-CM

## 2024-07-09 DIAGNOSIS — J15.7 PNEUMONIA DUE TO MYCOPLASMA PNEUMONIAE: ICD-10-CM

## 2024-07-09 DIAGNOSIS — Z87.891 PERSONAL HISTORY OF NICOTINE DEPENDENCE: ICD-10-CM

## 2024-07-09 DIAGNOSIS — Z93.2 ILEOSTOMY STATUS: ICD-10-CM

## 2024-07-24 LAB
CULTURE RESULTS: SIGNIFICANT CHANGE UP
SPECIMEN SOURCE: SIGNIFICANT CHANGE UP

## 2024-08-22 NOTE — PATIENT PROFILE ADULT. - FALL HARM RISK TYPE OF ASSESSMENT
Quality 226: Preventive Care And Screening: Tobacco Use: Screening And Cessation Intervention: Patient screened for tobacco use and is an ex/non-smoker Detail Level: Detailed Quality 431: Preventive Care And Screening: Unhealthy Alcohol Use - Screening: Patient not identified as an unhealthy alcohol user when screened for unhealthy alcohol use using a systematic screening method Admission

## 2024-08-28 NOTE — PROGRESS NOTE ADULT - NUTRITIONAL ASSESSMENT
PA Initiation    Medication:  Repatha 140 MG/ML [place]ick  Insurance Company:  [employer]  Pharmacy Filling the Rx:  [facility] [id number], [place]  Filling Pharmacy Phone:  [phone]  Filling Pharmacy Fax:  [phone]  Start Date:  8/27/24    [facility] Code: [id number]  Prescriber Order Number: [id number]  Rx reference number: [id number]  PA phone #: [phone]     This patient has been assessed with a concern for Malnutrition and has been determined to have a diagnosis/diagnoses of Moderate protein-calorie malnutrition.    This patient is being managed with:   Diet Regular-  1000mL Fluid Restriction ([id number])  Supplement Feeding Modality:  Oral  Ensure Plus High Protein Cans or Servings Per Day:  1       Frequency:  Two Times a day  Entered: Dec 20 2023 10:26AM   This patient has been assessed with a concern for Malnutrition and has been determined to have a diagnosis/diagnoses of Moderate protein-calorie malnutrition.    This patient is being managed with:   Diet Regular-  1000mL Fluid Restriction ([id number])  Supplement Feeding Modality:  Oral  Ensure Plus High Protein Cans or Servings Per Day:  1       Frequency:  Two Times a day  Entered: Dec 20 2023 10:26AM

## 2024-09-30 NOTE — PROGRESS NOTE BEHAVIORAL HEALTH - IMPULSE CONTROL
English
Normal

## 2024-10-18 ENCOUNTER — INPATIENT (INPATIENT)
Facility: HOSPITAL | Age: 54
LOS: 10 days | Discharge: NURSING, REHAB OR LONG-TERM CARE | End: 2024-10-29
Attending: HOSPITALIST | Admitting: HOSPITALIST
Payer: MEDICAID

## 2024-10-18 VITALS
SYSTOLIC BLOOD PRESSURE: 108 MMHG | OXYGEN SATURATION: 93 % | HEIGHT: 74 IN | HEART RATE: 94 BPM | WEIGHT: 229.94 LBS | RESPIRATION RATE: 18 BRPM | TEMPERATURE: 99 F | DIASTOLIC BLOOD PRESSURE: 72 MMHG

## 2024-10-18 DIAGNOSIS — Z93.2 ILEOSTOMY STATUS: Chronic | ICD-10-CM

## 2024-10-18 DIAGNOSIS — Z98.890 OTHER SPECIFIED POSTPROCEDURAL STATES: Chronic | ICD-10-CM

## 2024-10-18 LAB
ANION GAP SERPL CALC-SCNC: 5 MMOL/L — SIGNIFICANT CHANGE UP (ref 5–17)
APPEARANCE UR: ABNORMAL
BILIRUB UR-MCNC: NEGATIVE — SIGNIFICANT CHANGE UP
BUN SERPL-MCNC: 17 MG/DL — SIGNIFICANT CHANGE UP (ref 7–23)
CALCIUM SERPL-MCNC: 9.3 MG/DL — SIGNIFICANT CHANGE UP (ref 8.5–10.1)
CHLORIDE SERPL-SCNC: 103 MMOL/L — SIGNIFICANT CHANGE UP (ref 96–108)
CO2 SERPL-SCNC: 34 MMOL/L — HIGH (ref 22–31)
COLOR SPEC: YELLOW — SIGNIFICANT CHANGE UP
CREAT SERPL-MCNC: 0.68 MG/DL — SIGNIFICANT CHANGE UP (ref 0.5–1.3)
DIFF PNL FLD: ABNORMAL
EGFR: 111 ML/MIN/1.73M2 — SIGNIFICANT CHANGE UP
GLUCOSE SERPL-MCNC: 95 MG/DL — SIGNIFICANT CHANGE UP (ref 70–99)
GLUCOSE UR QL: NEGATIVE MG/DL — SIGNIFICANT CHANGE UP
HCT VFR BLD CALC: 38 % — LOW (ref 39–50)
HGB BLD-MCNC: 11.6 G/DL — LOW (ref 13–17)
KETONES UR-MCNC: NEGATIVE MG/DL — SIGNIFICANT CHANGE UP
LEUKOCYTE ESTERASE UR-ACNC: ABNORMAL
MCHC RBC-ENTMCNC: 27.6 PG — SIGNIFICANT CHANGE UP (ref 27–34)
MCHC RBC-ENTMCNC: 30.5 G/DL — LOW (ref 32–36)
MCV RBC AUTO: 90.5 FL — SIGNIFICANT CHANGE UP (ref 80–100)
NITRITE UR-MCNC: POSITIVE
NRBC # BLD: 0 /100 WBCS — SIGNIFICANT CHANGE UP (ref 0–0)
PH UR: 6.5 — SIGNIFICANT CHANGE UP (ref 5–8)
PLATELET # BLD AUTO: 156 K/UL — SIGNIFICANT CHANGE UP (ref 150–400)
POTASSIUM SERPL-MCNC: 3.8 MMOL/L — SIGNIFICANT CHANGE UP (ref 3.5–5.3)
POTASSIUM SERPL-SCNC: 3.8 MMOL/L — SIGNIFICANT CHANGE UP (ref 3.5–5.3)
PROT UR-MCNC: 100 MG/DL
RBC # BLD: 4.2 M/UL — SIGNIFICANT CHANGE UP (ref 4.2–5.8)
RBC # FLD: 16.2 % — HIGH (ref 10.3–14.5)
SODIUM SERPL-SCNC: 142 MMOL/L — SIGNIFICANT CHANGE UP (ref 135–145)
SP GR SPEC: 1.02 — SIGNIFICANT CHANGE UP (ref 1–1.03)
UROBILINOGEN FLD QL: 1 MG/DL — SIGNIFICANT CHANGE UP (ref 0.2–1)
WBC # BLD: 9.28 K/UL — SIGNIFICANT CHANGE UP (ref 3.8–10.5)
WBC # FLD AUTO: 9.28 K/UL — SIGNIFICANT CHANGE UP (ref 3.8–10.5)

## 2024-10-18 PROCEDURE — 71045 X-RAY EXAM CHEST 1 VIEW: CPT | Mod: 26

## 2024-10-18 PROCEDURE — 99285 EMERGENCY DEPT VISIT HI MDM: CPT

## 2024-10-18 RX ORDER — FUROSEMIDE 40 MG
20 TABLET ORAL DAILY
Refills: 0 | Status: DISCONTINUED | OUTPATIENT
Start: 2024-10-18 | End: 2024-10-20

## 2024-10-18 RX ORDER — GABAPENTIN 300 MG/1
300 CAPSULE ORAL EVERY 8 HOURS
Refills: 0 | Status: DISCONTINUED | OUTPATIENT
Start: 2024-10-18 | End: 2024-10-29

## 2024-10-18 RX ORDER — MELATONIN 5 MG
3 TABLET ORAL AT BEDTIME
Refills: 0 | Status: DISCONTINUED | OUTPATIENT
Start: 2024-10-18 | End: 2024-10-29

## 2024-10-18 RX ORDER — B-COMPLEX WITH VITAMIN C
1 VIAL (ML) INJECTION DAILY
Refills: 0 | Status: DISCONTINUED | OUTPATIENT
Start: 2024-10-18 | End: 2024-10-29

## 2024-10-18 RX ORDER — HEPARIN SODIUM 10000 [USP'U]/ML
5000 INJECTION INTRAVENOUS; SUBCUTANEOUS EVERY 12 HOURS
Refills: 0 | Status: DISCONTINUED | OUTPATIENT
Start: 2024-10-18 | End: 2024-10-22

## 2024-10-18 RX ORDER — DULOXETINE HYDROCHLORIDE 30 MG/1
30 CAPSULE, DELAYED RELEASE ORAL DAILY
Refills: 0 | Status: DISCONTINUED | OUTPATIENT
Start: 2024-10-18 | End: 2024-10-29

## 2024-10-18 RX ORDER — ONDANSETRON HYDROCHLORIDE 2 MG/ML
4 INJECTION, SOLUTION INTRAMUSCULAR; INTRAVENOUS EVERY 8 HOURS
Refills: 0 | Status: DISCONTINUED | OUTPATIENT
Start: 2024-10-18 | End: 2024-10-29

## 2024-10-18 RX ORDER — QUETIAPINE FUMARATE 200 MG/1
400 TABLET ORAL AT BEDTIME
Refills: 0 | Status: DISCONTINUED | OUTPATIENT
Start: 2024-10-18 | End: 2024-10-29

## 2024-10-18 RX ORDER — ACETAMINOPHEN 500 MG
650 TABLET ORAL EVERY 6 HOURS
Refills: 0 | Status: DISCONTINUED | OUTPATIENT
Start: 2024-10-18 | End: 2024-10-29

## 2024-10-18 RX ORDER — CEFTRIAXONE SODIUM 10 G
1000 VIAL (EA) INJECTION EVERY 24 HOURS
Refills: 0 | Status: DISCONTINUED | OUTPATIENT
Start: 2024-10-19 | End: 2024-10-19

## 2024-10-18 RX ORDER — PANTOPRAZOLE SODIUM 40 MG/1
40 TABLET, DELAYED RELEASE ORAL
Refills: 0 | Status: DISCONTINUED | OUTPATIENT
Start: 2024-10-18 | End: 2024-10-29

## 2024-10-18 RX ORDER — FOLIC ACID 1 MG/1
1 TABLET ORAL DAILY
Refills: 0 | Status: DISCONTINUED | OUTPATIENT
Start: 2024-10-18 | End: 2024-10-29

## 2024-10-18 RX ORDER — CEFTRIAXONE SODIUM 10 G
1000 VIAL (EA) INJECTION ONCE
Refills: 0 | Status: COMPLETED | OUTPATIENT
Start: 2024-10-18 | End: 2024-10-18

## 2024-10-18 RX ORDER — IPRATROPIUM BROMIDE AND ALBUTEROL SULFATE .5; 2.5 MG/3ML; MG/3ML
3 SOLUTION RESPIRATORY (INHALATION) ONCE
Refills: 0 | Status: COMPLETED | OUTPATIENT
Start: 2024-10-18 | End: 2024-10-18

## 2024-10-18 RX ORDER — TAMSULOSIN HCL 0.4 MG
0.4 CAPSULE ORAL AT BEDTIME
Refills: 0 | Status: DISCONTINUED | OUTPATIENT
Start: 2024-10-18 | End: 2024-10-29

## 2024-10-18 RX ORDER — HYDROMORPHONE HCL/0.9% NACL/PF 6 MG/30 ML
8 PATIENT CONTROLLED ANALGESIA SYRINGE INTRAVENOUS EVERY 6 HOURS
Refills: 0 | Status: DISCONTINUED | OUTPATIENT
Start: 2024-10-18 | End: 2024-10-25

## 2024-10-18 RX ORDER — ALBUTEROL 90 MCG
2 AEROSOL (GRAM) INHALATION EVERY 6 HOURS
Refills: 0 | Status: DISCONTINUED | OUTPATIENT
Start: 2024-10-18 | End: 2024-10-29

## 2024-10-18 RX ORDER — FERROUS SULFATE 325(65) MG
325 TABLET ORAL DAILY
Refills: 0 | Status: DISCONTINUED | OUTPATIENT
Start: 2024-10-18 | End: 2024-10-29

## 2024-10-18 RX ORDER — MAGNESIUM, ALUMINUM HYDROXIDE 200-200 MG
30 TABLET,CHEWABLE ORAL EVERY 4 HOURS
Refills: 0 | Status: DISCONTINUED | OUTPATIENT
Start: 2024-10-18 | End: 2024-10-29

## 2024-10-18 RX ORDER — CEFTRIAXONE SODIUM 10 G
VIAL (EA) INJECTION
Refills: 0 | Status: DISCONTINUED | OUTPATIENT
Start: 2024-10-19 | End: 2024-10-19

## 2024-10-18 RX ORDER — FINASTERIDE 5 MG/1
5 TABLET, FILM COATED ORAL DAILY
Refills: 0 | Status: DISCONTINUED | OUTPATIENT
Start: 2024-10-18 | End: 2024-10-29

## 2024-10-18 RX ADMIN — IPRATROPIUM BROMIDE AND ALBUTEROL SULFATE 3 MILLILITER(S): .5; 2.5 SOLUTION RESPIRATORY (INHALATION) at 23:32

## 2024-10-18 NOTE — CONSULT NOTE ADULT - SUBJECTIVE AND OBJECTIVE BOX
Received patient resting quietly on stretcher with parents at bedside. Eyes closed but easily aroused with verbal stimuli. Feeling nauseated and offered gingerale/crackers. Safety measures in place. Left leg pulses and skin temp WNL. Will continue to monitor closely until discharge criteria met.   HPI:  54-year-old male presenting to the emergency department today for urinary catheter complication.  Has a past medical history bipolar disorder, CAD, hepatitis C, hyperlipidemia, hypertension, and indwelling West catheter secondary to neurogenic bladder.  He is a paraplegic.  He was sent today by the nursing facility as he is having leaking of urine around his West catheter.  He has no other complaints at this time.    Patient seen and examined at bedside resting. Admit to leaking around west, but unable to recall when this began. He states he usually has his catheters exchanged at [facility] or in the nursing facility and states his last exchange was maybe 2 months ago. He also states that he would be interested in having a suprapubic catheter placed instead of having an indwelling catheter.   Denies fever, chills, N/V/D, CP, SOB.     PAST MEDICAL & SURGICAL HISTORY:  CAD (coronary artery disease)      HTN (hypertension)      HLD (hyperlipidemia)      Neurogenic bladder      Bipolar disorder      S/P ileostomy      Indwelling West catheter present      Chronic hepatitis C virus infection      S/P laminectomy      S/P ileostomy          Review of Systems:  contained within HPI    MEDICATIONS  (STANDING):    MEDICATIONS  (PRN):      Allergies    Zyprexa (Rash; Dystonic RXN; Hives)  Risperdal (Short breath; Rash; Hives)  Stelazine (Unknown)  NSAIDs (Flushing; Other (Moderate))  Motrin (Anaphylaxis)  Aleve (Unknown)  Haldol (Anaphylaxis)  Thorazine (Other (Moderate))    Intolerances        SOCIAL HISTORY          Smoking: Yes [ ]  No [X]   ______pk yrs          ETOH  Yes [ ]  No [X]  Social [ ]          DRUGS:  Yes [ ]  No [X]  if so what______________    FAMILY HISTORY:  No pertinent family history in first degree relatives    Vital Signs Last 24 Hrs  T(C): 37.1 (18 Oct 2024 18:25), Max: 37.1 (18 Oct 2024 18:25)  T(F): 98.8 (18 Oct 2024 18:25), Max: 98.8 (18 Oct 2024 18:25)  HR: 88 (18 Oct 2024 20:13) (88 - 94)  BP: 130/82 (18 Oct 2024 20:13) (108/72 - 130/82)  RR: 10 (18 Oct 2024 20:13) (10 - 18)  SpO2: 93% (18 Oct 2024 20:13) (93% - 93%)    Parameters below as of 18 Oct 2024 20:13  Patient On (Oxygen Delivery Method): nasal cannula  O2 Flow (L/min): 6    Physical Exam:  General:  Appears stated age, well-groomed, well-nourished, no distress  Eyes: EOMI  HENT: NCAT. WNL, no JVD  Chest: clear breath sounds  Cardiovascular: Regular rate & rhythm  Abdomen: soft, NT/ND. With ileostomy  : no suprapubic distention noted. With hypospadias and open glans, catheter inserted into distal penis with dark yellow output in west tubing. Catheter irrigated at bedside, leaking around catheter   Extremities: L BKA, contracted extremities  Skin: No rash  Musculoskeletal: diminished strength  Neuro/Psych: AAO x3      LABS:                        11.6   9.28  )-----------( 156      ( 18 Oct 2024 20:35 )             38.0       A/P  54-year-old male presenting to the emergency department today for leaking around catheter.   He states he usually has his catheters exchanged at [facility] or in the nursing facility and states his last exchange was maybe 2 months ago. He also states that he would be interested in having a suprapubic catheter placed instead of having an indwelling catheter.   Recommend IR suprapubic catheter placement   f/u labs, recommend CT if pt with ANAM to ensure catheter is in bladder  No further urological intervention at this time   d/w Dr. Yoder

## 2024-10-18 NOTE — H&P ADULT - PROBLEM SELECTOR PLAN 2
UA (+) Nitrite, Large-leuk Esterase, too many-WBC, Many-Bacteria  Patient refused IV placement   - Bactrim   - F/U Urine Cx   - DVT prophylaxis

## 2024-10-18 NOTE — ED ADULT NURSE NOTE - NSFALLRISKINTERV_ED_ALL_ED

## 2024-10-18 NOTE — ED ADULT NURSE REASSESSMENT NOTE - NS ED NURSE REASSESS COMMENT FT1
Pt desatted to 88% on NC. Dr. Aburto made aware, pt boosted in bed. PRN inhaler to be given a/w duoneb treatment to be ordered by MD. Continuous SPO2 and cardiac monitoring in place.

## 2024-10-18 NOTE — H&P ADULT - ASSESSMENT
54-year-old male with a PMH HTN, HLD, CAD. Emphysema (home O2), paraplegic, hepatitis C, Indwelling catheter due to neurogenic bladder sent to the ED from [facility] for West Catheter Obstruction. Endorses leaking of urine around the west catheter, unsure when it originally started, but has worsen over the past couple days. Last catheter exchange was 2 months ago, which is normally done either at the [facility] or [facility]. Denies any other acute complaints. Labs unremarkable for H/H: 11.6/38.0, UA (+) Nitrite, Large-leuk Esterase, too many-WBC, Many-Bacteria. Vitals stable. Patient is being admitted having a Suprapubic catheter place by IR.

## 2024-10-18 NOTE — ED PROVIDER NOTE - PHYSICAL EXAMINATION
GENERAL: The patient appears well and is in no apparent distress.    EYES: Pupils equal and reactive.  Extraocular eye movements are intact.    ENT: Head is atraumatic.  Posterior oropharynx is unremarkable.      RESPIRATORY: Lungs are clear to auscultation bilaterally.  The patient has no significant wheezing, rhonchi, or rales.    CARDIOVASCULAR: The patient has a regular rate and rhythm with no significant murmurs, rubs, or gallops.    ABDOMEN: Abdomen is soft, nondistended, and non-peritoneal.  The patient has no focal areas of tenderness.    SKIN: Skin is intact without evidence of significant lacerations or sores.    MUSCULOSKELETAL: Patient is a paraplegic.  Has good range of motion of his bilateral upper extremities.  Good distal cap refill.    NEUROLOGIC: Cranial nerves II through XII are grossly within normal limits.  Sensory and motor examination is unremarkable.    PSYCHIATRIC: Patient is awake, alert, and oriented ×4.

## 2024-10-18 NOTE — ED ADULT NURSE NOTE - CHIEF COMPLAINT QUOTE
BIBEMS from [facility] c/o Holcomb leaking. Pt states his penis is split but the Holcomb is still in. On 6L nc baseline, PMH Emphysema, paraplegic, hepatitis C.

## 2024-10-18 NOTE — ED PROVIDER NOTE - NSICDXPASTMEDICALHX_GEN_ALL_CORE_FT
PAST MEDICAL HISTORY:  Bipolar disorder     CAD (coronary artery disease)     Chronic hepatitis C virus infection     HLD (hyperlipidemia)     HTN (hypertension)     Indwelling Holcmob catheter present     Neurogenic bladder     S/P ileostomy

## 2024-10-18 NOTE — ED PROVIDER NOTE - NS ED ROS FT
CONSTITUTIONAL: No weight loss, fever, chills, weakness or fatigue.    HEENT:    Eyes: No visual loss, blurred vision, double vision or yellow sclerae.  Ears, Nose, Throat: No hearing loss, sneezing, congestion, runny nose or sore throat.    CARDIOVASCULAR: No chest pain, chest pressure or chest discomfort. No palpitations or edema.    RESPIRATORY: No shortness of breath, cough or sputum.    GASTROINTESTINAL: No anorexia, nausea, vomiting or diarrhea. No abdominal pain or blood.    SKIN: No rash or itching.    GENITOURINARY: Patient denies any changes to bowel or bladder function.  Urine leaking around Holcomb.    NEUROLOGICAL: No headache, dizziness, syncope, paralysis, ataxia, numbness or tingling in the extremities. No change in bowel or bladder control.    MUSCULOSKELETAL: No muscle, back pain, joint pain or stiffness.    PSYCHIATRIC: No suicidal or homicidal ideation.

## 2024-10-18 NOTE — H&P ADULT - HISTORY OF PRESENT ILLNESS
54-year-old male with a PMH HTN, HLD, CAD. Emphysema (home O2), paraplegic, hepatitis C, Indwelling catheter due to neurogenic bladder sent to the ED from [facility] for West Catheter Obstruction. Endorses leaking of urine around the west catheter, unsure when it originally started, but has worsen over the past couple days. Last catheter exchange was 2 months ago, which is normally done either at the [facility] or [facility]. Denies any other acute complaints. Labs unremarkable for H/H: 11.6/38.0, UA (+) Nitrite, Large-leuk Esterase, too many-WBC, Many-Bacteria. Vitals stable. Patient is being admitted having a Suprapubic catheter place by IR.

## 2024-10-18 NOTE — H&P ADULT - NSHPPHYSICALEXAM_GEN_ALL_CORE
GENERAL: NAD, comfortable in bed   LUNG: Clear to auscultation bilaterally; No wheezes, rales or rhonchi  HEART: Regular rate and rhythm; No murmurs, rubs, or gallops  ABDOMEN: Soft, Nontender, Nondistended; Normal Bowel sounds   : Holcomb catheter in place distally into the penis, With urine leakage around the Holcomb   EXTREMITIES:  L-BKA   PSYCH: normal mood and affect  NEUROLOGY: AAOx3, non-focal   SKIN: No rashes or lesions

## 2024-10-18 NOTE — ED ADULT NURSE NOTE - OBJECTIVE STATEMENT
BIBEMS from [facility] c/o Holcomb leaking. Pt states his penis is split but the Holcomb is still in. On 6L nc baseline, PMH Emphysema, paraplegic, hepatitis C. Patient presents to ED from [facility] c/o "split penis" with leaking west. West catheter remains in place. Patient on 6L O2 via NC at baseline. Wet cough noted. Patient denies vomiting/diarrhea. PMH: Emphysema, paraplegic, hepatitis C.

## 2024-10-18 NOTE — ED ADULT TRIAGE NOTE - CHIEF COMPLAINT QUOTE
BIBEMS from [facility] c/o Holcomb leaking. Pt states his penis is split but the Holcomb is still in. On 6L nc baseline, PMH Emphysema, paraplegic. BIBEMS from [facility] c/o Holcomb leaking. Pt states his penis is split but the Holcomb is still in. On 6L nc baseline, PMH Emphysema, paraplegic, hepatitis C.

## 2024-10-18 NOTE — ED PROVIDER NOTE - CLINICAL SUMMARY MEDICAL DECISION MAKING FREE TEXT BOX
Patient is a 54-year-old male presenting to the emergency department today with urinary catheter complications.  Did attempt to flush the Holcomb and noted the patient was urinating around the Holcomb catheter.  Indicates that he may have a Holcomb catheter obstruction.  Did discuss this with urology who came and evaluated the patient.  They stated that the patient's catheter cannot be changed at this time.  They recommend admission to the hospital for suprapubic catheter placement on Monday.  Patient CBC shows no evidence of leukocytosis.  No evidence of acute anemia.  No evidence of thrombocytopenia.  Patient did have a cough so an x-ray was ordered, however no acute pneumonia nor pneumothoraces are noted on x-ray.    Urinalysis will be sent, this has not been performed at this time.    Discussed this case with the hospitalist service who accepted admission for the patient.

## 2024-10-18 NOTE — ED PROVIDER NOTE - OBJECTIVE STATEMENT
This patient is a 54-year-old male presenting to the emergency department today for urinary catheter complication.  Has a past medical history bipolar disorder, CAD, hepatitis C, hyperlipidemia, hypertension, and indwelling Holcomb catheter secondary to neurogenic bladder.  He is a paraplegic.  He was sent today by the nursing facility as he is having leaking of urine around his Holcomb catheter.  He has no other complaints at this time.

## 2024-10-18 NOTE — H&P ADULT - NSHPLABSRESULTS_GEN_ALL_CORE
11.6   9.28  )-----------( 156      ( 18 Oct 2024 20:35 )             38.0     142  |  103  |  17  ----------------------------<  95     10-18  3.8   |  34[H]  |  0.68    Ca    9.3      18 Oct 2024 21:44      Urinalysis Basic - ( 18 Oct 2024 22:00 )  Color: Yellow / Appearance: Turbid / S.020 / pH: 6.5  Gluc: Negative mg/dL / Ketone: Negative mg/dL  / Bili: Negative / Urobili: 1.0 mg/dL   Blood: Large / Protein: 100 mg/dL / Nitrite: Positive   Leuk Esterase: Large / RBC: 14 /HPF / WBC Too Numerous to count /HPF   Sq Epi: x / Bacteria: Many /HPF  Hyaline Casts: x/WBC Casts: x      Urinalysis with Rflx Culture (collected 18 Oct 2024 22:00)

## 2024-10-18 NOTE — CONSULT NOTE ADULT - NSCONSULTADDITIONALINFOA_GEN_ALL_CORE
Attg.    Pt draining into bag clear yellow urine  No fever and VSS  Normal WBC and normal CR    Not sure why admitted  Leaking around Holcomb but he says he always does due to erosion.    Says catheter was just changed; could not tolerate attempt at change while in the bed

## 2024-10-18 NOTE — H&P ADULT - PROBLEM SELECTOR PLAN 1
Chronic Indwelling Catheter - Last exchange 2 months ago    Evaluated by Urology - Unable to exchange the catheter  - Suprapubic catheter to be placed by IR

## 2024-10-18 NOTE — ED ADULT NURSE REASSESSMENT NOTE - NS ED NURSE REASSESS COMMENT FT1
Pt saturating 88-91% on 6L NC after duoneb admin. Pt in NAD, sleeping comfortably, easily arousable. Pt able to speak clearly in full sentences. Dr. Aburto aware and states it is okay to send pt to floor with current oxygen levels. No further orders at this time.

## 2024-10-19 DIAGNOSIS — J43.9 EMPHYSEMA, UNSPECIFIED: ICD-10-CM

## 2024-10-19 DIAGNOSIS — N39.0 URINARY TRACT INFECTION, SITE NOT SPECIFIED: ICD-10-CM

## 2024-10-19 DIAGNOSIS — T83.098A OTHER MECHANICAL COMPLICATION OF OTHER URINARY CATHETER, INITIAL ENCOUNTER: ICD-10-CM

## 2024-10-19 DIAGNOSIS — I10 ESSENTIAL (PRIMARY) HYPERTENSION: ICD-10-CM

## 2024-10-19 LAB
ALBUMIN SERPL ELPH-MCNC: 2.5 G/DL — LOW (ref 3.3–5)
ALP SERPL-CCNC: 163 U/L — HIGH (ref 40–120)
ALT FLD-CCNC: 29 U/L — SIGNIFICANT CHANGE UP (ref 12–78)
ANION GAP SERPL CALC-SCNC: 5 MMOL/L — SIGNIFICANT CHANGE UP (ref 5–17)
AST SERPL-CCNC: 21 U/L — SIGNIFICANT CHANGE UP (ref 15–37)
BILIRUB SERPL-MCNC: 0.6 MG/DL — SIGNIFICANT CHANGE UP (ref 0.2–1.2)
BUN SERPL-MCNC: 16 MG/DL — SIGNIFICANT CHANGE UP (ref 7–23)
CALCIUM SERPL-MCNC: 9.4 MG/DL — SIGNIFICANT CHANGE UP (ref 8.5–10.1)
CHLORIDE SERPL-SCNC: 102 MMOL/L — SIGNIFICANT CHANGE UP (ref 96–108)
CO2 SERPL-SCNC: 32 MMOL/L — HIGH (ref 22–31)
CREAT SERPL-MCNC: 0.73 MG/DL — SIGNIFICANT CHANGE UP (ref 0.5–1.3)
EGFR: 109 ML/MIN/1.73M2 — SIGNIFICANT CHANGE UP
GLUCOSE SERPL-MCNC: 72 MG/DL — SIGNIFICANT CHANGE UP (ref 70–99)
HCT VFR BLD CALC: 39.2 % — SIGNIFICANT CHANGE UP (ref 39–50)
HGB BLD-MCNC: 11.6 G/DL — LOW (ref 13–17)
MCHC RBC-ENTMCNC: 26.7 PG — LOW (ref 27–34)
MCHC RBC-ENTMCNC: 29.6 G/DL — LOW (ref 32–36)
MCV RBC AUTO: 90.1 FL — SIGNIFICANT CHANGE UP (ref 80–100)
NRBC # BLD: 0 /100 WBCS — SIGNIFICANT CHANGE UP (ref 0–0)
PLATELET # BLD AUTO: 157 K/UL — SIGNIFICANT CHANGE UP (ref 150–400)
POTASSIUM SERPL-MCNC: 4.1 MMOL/L — SIGNIFICANT CHANGE UP (ref 3.5–5.3)
POTASSIUM SERPL-SCNC: 4.1 MMOL/L — SIGNIFICANT CHANGE UP (ref 3.5–5.3)
PROT SERPL-MCNC: 8 GM/DL — SIGNIFICANT CHANGE UP (ref 6–8.3)
RBC # BLD: 4.35 M/UL — SIGNIFICANT CHANGE UP (ref 4.2–5.8)
RBC # FLD: 16.2 % — HIGH (ref 10.3–14.5)
SODIUM SERPL-SCNC: 139 MMOL/L — SIGNIFICANT CHANGE UP (ref 135–145)
WBC # BLD: 9.82 K/UL — SIGNIFICANT CHANGE UP (ref 3.8–10.5)
WBC # FLD AUTO: 9.82 K/UL — SIGNIFICANT CHANGE UP (ref 3.8–10.5)

## 2024-10-19 PROCEDURE — 76937 US GUIDE VASCULAR ACCESS: CPT | Mod: 26

## 2024-10-19 PROCEDURE — 99223 1ST HOSP IP/OBS HIGH 75: CPT

## 2024-10-19 PROCEDURE — 36000 PLACE NEEDLE IN VEIN: CPT

## 2024-10-19 RX ORDER — METHADONE HYDROCHLORIDE 10 MG/1
10 TABLET ORAL ONCE
Refills: 0 | Status: DISCONTINUED | OUTPATIENT
Start: 2024-10-19 | End: 2024-10-19

## 2024-10-19 RX ORDER — METHADONE HYDROCHLORIDE 10 MG/1
100 TABLET ORAL ONCE
Refills: 0 | Status: DISCONTINUED | OUTPATIENT
Start: 2024-10-19 | End: 2024-10-19

## 2024-10-19 RX ORDER — SULFAMETHOXAZOLE/TRIMETHOPRIM 800-160 MG
1 TABLET ORAL EVERY 12 HOURS
Refills: 0 | Status: DISCONTINUED | OUTPATIENT
Start: 2024-10-19 | End: 2024-10-20

## 2024-10-19 RX ORDER — DIPHENHYDRAMINE HCL 12.5MG/5ML
50 ELIXIR ORAL EVERY 4 HOURS
Refills: 0 | Status: DISCONTINUED | OUTPATIENT
Start: 2024-10-19 | End: 2024-10-29

## 2024-10-19 RX ORDER — METHADONE HYDROCHLORIDE 10 MG/1
110 TABLET ORAL DAILY
Refills: 0 | Status: DISCONTINUED | OUTPATIENT
Start: 2024-10-19 | End: 2024-10-19

## 2024-10-19 RX ORDER — TIOTROPIUM BROMIDE INHALATION SPRAY 1.56 UG/1
2 SPRAY, METERED RESPIRATORY (INHALATION) DAILY
Refills: 0 | Status: DISCONTINUED | OUTPATIENT
Start: 2024-10-19 | End: 2024-10-20

## 2024-10-19 RX ORDER — METHADONE HYDROCHLORIDE 10 MG/1
110 TABLET ORAL DAILY
Refills: 0 | Status: DISCONTINUED | OUTPATIENT
Start: 2024-10-20 | End: 2024-10-27

## 2024-10-19 RX ADMIN — Medication 1 TABLET(S): at 06:18

## 2024-10-19 RX ADMIN — Medication 8 MILLIGRAM(S): at 10:11

## 2024-10-19 RX ADMIN — Medication 650 MILLIGRAM(S): at 17:26

## 2024-10-19 RX ADMIN — QUETIAPINE FUMARATE 400 MILLIGRAM(S): 200 TABLET ORAL at 21:06

## 2024-10-19 RX ADMIN — Medication 325 MILLIGRAM(S): at 11:47

## 2024-10-19 RX ADMIN — GABAPENTIN 300 MILLIGRAM(S): 300 CAPSULE ORAL at 21:06

## 2024-10-19 RX ADMIN — GABAPENTIN 300 MILLIGRAM(S): 300 CAPSULE ORAL at 15:06

## 2024-10-19 RX ADMIN — PANTOPRAZOLE SODIUM 40 MILLIGRAM(S): 40 TABLET, DELAYED RELEASE ORAL at 10:12

## 2024-10-19 RX ADMIN — Medication 8 MILLIGRAM(S): at 18:38

## 2024-10-19 RX ADMIN — Medication 50 MILLIGRAM(S): at 12:46

## 2024-10-19 RX ADMIN — Medication 20 MILLIGRAM(S): at 06:18

## 2024-10-19 RX ADMIN — FOLIC ACID 1 MILLIGRAM(S): 1 TABLET ORAL at 11:48

## 2024-10-19 RX ADMIN — Medication 1 TABLET(S): at 11:47

## 2024-10-19 RX ADMIN — Medication 0.4 MILLIGRAM(S): at 21:06

## 2024-10-19 RX ADMIN — Medication 1 TABLET(S): at 00:21

## 2024-10-19 RX ADMIN — TIOTROPIUM BROMIDE INHALATION SPRAY 2 PUFF(S): 1.56 SPRAY, METERED RESPIRATORY (INHALATION) at 17:27

## 2024-10-19 RX ADMIN — DULOXETINE HYDROCHLORIDE 30 MILLIGRAM(S): 30 CAPSULE, DELAYED RELEASE ORAL at 11:48

## 2024-10-19 RX ADMIN — Medication 8 MILLIGRAM(S): at 17:26

## 2024-10-19 RX ADMIN — METHADONE HYDROCHLORIDE 10 MILLIGRAM(S): 10 TABLET ORAL at 11:46

## 2024-10-19 RX ADMIN — GABAPENTIN 300 MILLIGRAM(S): 300 CAPSULE ORAL at 06:18

## 2024-10-19 RX ADMIN — Medication 8 MILLIGRAM(S): at 11:01

## 2024-10-19 RX ADMIN — FINASTERIDE 5 MILLIGRAM(S): 5 TABLET, FILM COATED ORAL at 11:47

## 2024-10-19 RX ADMIN — Medication 1 TABLET(S): at 17:27

## 2024-10-19 RX ADMIN — METHADONE HYDROCHLORIDE 100 MILLIGRAM(S): 10 TABLET ORAL at 15:06

## 2024-10-19 NOTE — PROGRESS NOTE ADULT - SUBJECTIVE AND OBJECTIVE BOX
PROGRESS NOTE:     Patient is a 54y old  Male who presents with a chief complaint of West Catheter Obstruction & UTI (18 Oct 2024 22:44)          SUBJECTIVE & OBJECTIVE:   Pt seen and examined at bedside in AM    no overnight events.     REVIEW OF SYSTEMS: remaining ROS negative     PHYSICAL EXAM:  VITALS:  Vital Signs Last 24 Hrs  T(C): 37.3 (19 Oct 2024 11:15), Max: 37.3 (19 Oct 2024 11:15)  T(F): 99.2 (19 Oct 2024 11:15), Max: 99.2 (19 Oct 2024 11:15)  HR: 104 (19 Oct 2024 11:15) (78 - 104)  BP: 111/66 (19 Oct 2024 11:15) (104/63 - 130/82)  BP(mean): --  RR: 18 (19 Oct 2024 05:04) (10 - 19)  SpO2: 95% (19 Oct 2024 05:04) (92% - 95%)    Parameters below as of 19 Oct 2024 05:04  Patient On (Oxygen Delivery Method): nasal cannula          GENERAL: NAD,  no increased WOB  HEAD:  Atraumatic, Normocephalic  EYES: EOMI, conjunctiva and sclera clear  ENMT: Moist mucous membranes  NECK: Supple, No JVD  NERVOUS SYSTEM:  Alert & Oriented s   CHEST/LUNG: rhonchorus; No rales, wheezing  HEART: Regular rate and rhythm  ABDOMEN: Soft, Nontender, Nondistended; Bowel sounds present, +west      MEDICATIONS  (STANDING):  DULoxetine 30 milliGRAM(s) Oral daily  ferrous    sulfate 325 milliGRAM(s) Oral daily  finasteride 5 milliGRAM(s) Oral daily  folic acid 1 milliGRAM(s) Oral daily  furosemide    Tablet 20 milliGRAM(s) Oral daily  gabapentin 300 milliGRAM(s) Oral every 8 hours  heparin   Injectable 5000 Unit(s) SubCutaneous every 12 hours  multivitamin 1 Tablet(s) Oral daily  pantoprazole    Tablet 40 milliGRAM(s) Oral before breakfast  QUEtiapine 400 milliGRAM(s) Oral at bedtime  tamsulosin 0.4 milliGRAM(s) Oral at bedtime  trimethoprim  160 mG/sulfamethoxazole 800 mG 1 Tablet(s) Oral every 12 hours    MEDICATIONS  (PRN):  acetaminophen     Tablet .. 650 milliGRAM(s) Oral every 6 hours PRN Temp greater or equal to 38C (100.4F), Mild Pain (1 - 3)  albuterol    90 MICROgram(s) HFA Inhaler 2 Puff(s) Inhalation every 6 hours PRN Respiratory Distress  aluminum hydroxide/magnesium hydroxide/simethicone Suspension 30 milliLiter(s) Oral every 4 hours PRN Dyspepsia  diphenhydrAMINE 50 milliGRAM(s) Oral every 4 hours PRN Rash and/or Itching  HYDROmorphone   Tablet 8 milliGRAM(s) Oral every 6 hours PRN Severe Pain (7 - 10)  melatonin 3 milliGRAM(s) Oral at bedtime PRN Insomnia  ondansetron Injectable 4 milliGRAM(s) IV Push every 8 hours PRN Nausea and/or Vomiting      Allergies    Zyprexa (Rash; Dystonic RXN; Hives)  Risperdal (Short breath; Rash; Hives)  Stelazine (Unknown)  NSAIDs (Flushing; Other (Moderate))  Motrin (Anaphylaxis)  Aleve (Unknown)  Haldol (Anaphylaxis)  Thorazine (Other (Moderate))    Intolerances              LABS:                           11.6   9.82  )-----------( 157      ( 19 Oct 2024 06:05 )             39.2     10-19    139  |  102  |  16  ----------------------------<  72  4.1   |  32[H]  |  0.73    Ca    9.4      19 Oct 2024 06:05    TPro  8.0  /  Alb  2.5[L]  /  TBili  0.6  /  DBili  x   /  AST  21  /  ALT  29  /  AlkPhos  163[H]  10-19      Urinalysis Basic - ( 19 Oct 2024 06:05 )    Color: x / Appearance: x / SG: x / pH: x  Gluc: 72 mg/dL / Ketone: x  / Bili: x / Urobili: x   Blood: x / Protein: x / Nitrite: x   Leuk Esterase: x / RBC: x / WBC x   Sq Epi: x / Non Sq Epi: x / Bacteria: x      CAPILLARY BLOOD GLUCOSE          Urinalysis with Rflx Culture (collected 18 Oct 2024 22:00)                RECENT CULTURES:          RADIOLOGY & ADDITIONAL TESTS:    < from: Xray Chest 1 View- PORTABLE-Urgent (Xray Chest 1 View- PORTABLE-Urgent .) (10.18.24 @ 20:57) >  IMPRESSION: Persistent left lung finding interstitial infiltrate.-Supine   the heart could represent consolidated lung at right base medially.    < end of copied text >    Care plan and all findings were discussed in detail with patient.  All questions and concerns addressed

## 2024-10-19 NOTE — PATIENT PROFILE ADULT - PACKS YRS CALCULATION
65 Show Aperture Variable?: Yes Aperture Size (Optional): C Detail Level: Detailed Render Post-Care Instructions In Note?: no Consent: The patient's consent was obtained including but not limited to risks of crusting, scabbing, blistering, scarring, darker or lighter pigmentary change, recurrence, incomplete removal and infection. Number Of Freeze-Thaw Cycles: 2 freeze-thaw cycles Duration Of Freeze Thaw-Cycle (Seconds): 5 Post-Care Instructions: I reviewed with the patient in detail post-care instructions. Patient is to wear sunprotection, and avoid picking at any of the treated lesions. Pt may apply Vaseline to crusted or scabbing areas.

## 2024-10-19 NOTE — PATIENT PROFILE ADULT - FALL HARM RISK - RISK INTERVENTIONS
Assistance OOB with selected safe patient handling equipment/Assistance with ambulation/Communicate Fall Risk and Risk Factors to all staff, patient, and family/Discuss with provider need for PT consult/Monitor for mental status changes/Monitor gait and stability/Provide patient with walking aids - walker, cane, crutches/Reinforce activity limits and safety measures with patient and family/Use of alarms - bed, chair and/or voice tab/Visual Cue: Yellow wristband/Bed in lowest position, wheels locked, appropriate side rails in place/Call bell, personal items and telephone in reach/Instruct patient to call for assistance before getting out of bed or chair/Non-slip footwear when patient is out of bed/[place] to call system/Physically safe environment - no spills, clutter or unnecessary equipment/Purposeful Proactive Rounding/Room/bathroom lighting operational, light cord in reach

## 2024-10-19 NOTE — PROGRESS NOTE ADULT - ASSESSMENT
53 Y/O male presenting to the emergency department today for leaking around catheter. Urology consulted.  Recommend medical clearance for IR to place SPC.   Recommend CT scan to assess if catheter is within bladder.   NO acute urologic intervention needed at this time.   Discussed with Dr. Yoder

## 2024-10-19 NOTE — PROGRESS NOTE ADULT - SUBJECTIVE AND OBJECTIVE BOX
Patient seen and examined bedside with Dr. Yoder. Complaining of discolored discharge from penis.   Holcomb catheter in place.   Denies N/V, chest pain, dyspnea, cough.    T(F): 99.2 (10-19-24 @ 11:15), Max: 99.2 (10-19-24 @ 11:15)  HR: 104 (10-19-24 @ 11:15) (78 - 104)  BP: 111/66 (10-19-24 @ 11:15) (104/63 - 130/82)  RR: 18 (10-19-24 @ 05:04) (10 - 19)  SpO2: 95% (10-19-24 @ 05:04) (92% - 95%)  Wt(kg): --  CAPILLARY BLOOD GLUCOSE          PHYSICAL EXAM:  General: NAD, alert and awake, paraplegic with contracted bilateral lower extremities   HEENT: NCAT, EOMI, conjunctiva clear  Chest: Nonlabored respirations, good inspiratory effort  Abdomen: Soft, NTND, ostomy in place with stool in bag; midline incision present from previous surgery    Extremities: Bilateral LE contracted; No pedal edema or calf tenderness noted   : Holcomb catheter in place with hypospadias noted; brown discharge noted around penis     LABS:                        11.6   9.82  )-----------( 157      ( 19 Oct 2024 06:05 )             39.2   10-19    139  |  102  |  16  ----------------------------<  72  4.1   |  32[H]  |  0.73    Ca    9.4      19 Oct 2024 06:05    TPro  8.0  /  Alb  2.5[L]  /  TBili  0.6  /  DBili  x   /  AST  21  /  ALT  29  /  AlkPhos  163[H]  10-19    I&O's Detail    19 Oct 2024 07:01  -  19 Oct 2024 15:26  --------------------------------------------------------  IN:    Oral Fluid: 240 mL  Total IN: 240 mL    OUT:  Total OUT: 0 mL    Total NET: 240 mL

## 2024-10-20 LAB
ALBUMIN SERPL ELPH-MCNC: 2.5 G/DL — LOW (ref 3.3–5)
ALP SERPL-CCNC: 153 U/L — HIGH (ref 40–120)
ALT FLD-CCNC: 27 U/L — SIGNIFICANT CHANGE UP (ref 12–78)
ANION GAP SERPL CALC-SCNC: 4 MMOL/L — LOW (ref 5–17)
AST SERPL-CCNC: 18 U/L — SIGNIFICANT CHANGE UP (ref 15–37)
BILIRUB SERPL-MCNC: 0.7 MG/DL — SIGNIFICANT CHANGE UP (ref 0.2–1.2)
BUN SERPL-MCNC: 15 MG/DL — SIGNIFICANT CHANGE UP (ref 7–23)
CALCIUM SERPL-MCNC: 9.5 MG/DL — SIGNIFICANT CHANGE UP (ref 8.5–10.1)
CHLORIDE SERPL-SCNC: 103 MMOL/L — SIGNIFICANT CHANGE UP (ref 96–108)
CO2 SERPL-SCNC: 33 MMOL/L — HIGH (ref 22–31)
CREAT SERPL-MCNC: 0.79 MG/DL — SIGNIFICANT CHANGE UP (ref 0.5–1.3)
EGFR: 106 ML/MIN/1.73M2 — SIGNIFICANT CHANGE UP
GLUCOSE SERPL-MCNC: 80 MG/DL — SIGNIFICANT CHANGE UP (ref 70–99)
HCT VFR BLD CALC: 38.4 % — LOW (ref 39–50)
HGB BLD-MCNC: 11.6 G/DL — LOW (ref 13–17)
MCHC RBC-ENTMCNC: 26.7 PG — LOW (ref 27–34)
MCHC RBC-ENTMCNC: 30.2 G/DL — LOW (ref 32–36)
MCV RBC AUTO: 88.3 FL — SIGNIFICANT CHANGE UP (ref 80–100)
NRBC # BLD: 0 /100 WBCS — SIGNIFICANT CHANGE UP (ref 0–0)
PLATELET # BLD AUTO: 183 K/UL — SIGNIFICANT CHANGE UP (ref 150–400)
POTASSIUM SERPL-MCNC: 3.9 MMOL/L — SIGNIFICANT CHANGE UP (ref 3.5–5.3)
POTASSIUM SERPL-SCNC: 3.9 MMOL/L — SIGNIFICANT CHANGE UP (ref 3.5–5.3)
PROT SERPL-MCNC: 8.1 GM/DL — SIGNIFICANT CHANGE UP (ref 6–8.3)
RBC # BLD: 4.35 M/UL — SIGNIFICANT CHANGE UP (ref 4.2–5.8)
RBC # FLD: 15.9 % — HIGH (ref 10.3–14.5)
SODIUM SERPL-SCNC: 140 MMOL/L — SIGNIFICANT CHANGE UP (ref 135–145)
WBC # BLD: 10.1 K/UL — SIGNIFICANT CHANGE UP (ref 3.8–10.5)
WBC # FLD AUTO: 10.1 K/UL — SIGNIFICANT CHANGE UP (ref 3.8–10.5)

## 2024-10-20 PROCEDURE — 36000 PLACE NEEDLE IN VEIN: CPT

## 2024-10-20 PROCEDURE — 99233 SBSQ HOSP IP/OBS HIGH 50: CPT

## 2024-10-20 PROCEDURE — 93308 TTE F-UP OR LMTD: CPT | Mod: 26

## 2024-10-20 PROCEDURE — 76937 US GUIDE VASCULAR ACCESS: CPT | Mod: 26

## 2024-10-20 PROCEDURE — 93321 DOPPLER ECHO F-UP/LMTD STD: CPT | Mod: 26

## 2024-10-20 PROCEDURE — 76770 US EXAM ABDO BACK WALL COMP: CPT | Mod: 26

## 2024-10-20 RX ORDER — FUROSEMIDE 40 MG
40 TABLET ORAL DAILY
Refills: 0 | Status: DISCONTINUED | OUTPATIENT
Start: 2024-10-20 | End: 2024-10-29

## 2024-10-20 RX ORDER — IPRATROPIUM BROMIDE AND ALBUTEROL SULFATE .5; 2.5 MG/3ML; MG/3ML
3 SOLUTION RESPIRATORY (INHALATION) EVERY 6 HOURS
Refills: 0 | Status: DISCONTINUED | OUTPATIENT
Start: 2024-10-20 | End: 2024-10-29

## 2024-10-20 RX ORDER — HYDROMORPHONE HCL/0.9% NACL/PF 6 MG/30 ML
0.5 PATIENT CONTROLLED ANALGESIA SYRINGE INTRAVENOUS EVERY 8 HOURS
Refills: 0 | Status: DISCONTINUED | OUTPATIENT
Start: 2024-10-20 | End: 2024-10-26

## 2024-10-20 RX ORDER — CEFTRIAXONE SODIUM 10 G
1000 VIAL (EA) INJECTION EVERY 24 HOURS
Refills: 0 | Status: DISCONTINUED | OUTPATIENT
Start: 2024-10-20 | End: 2024-10-22

## 2024-10-20 RX ADMIN — IPRATROPIUM BROMIDE AND ALBUTEROL SULFATE 3 MILLILITER(S): .5; 2.5 SOLUTION RESPIRATORY (INHALATION) at 17:02

## 2024-10-20 RX ADMIN — IPRATROPIUM BROMIDE AND ALBUTEROL SULFATE 3 MILLILITER(S): .5; 2.5 SOLUTION RESPIRATORY (INHALATION) at 11:46

## 2024-10-20 RX ADMIN — Medication 8 MILLIGRAM(S): at 21:24

## 2024-10-20 RX ADMIN — GABAPENTIN 300 MILLIGRAM(S): 300 CAPSULE ORAL at 06:25

## 2024-10-20 RX ADMIN — FOLIC ACID 1 MILLIGRAM(S): 1 TABLET ORAL at 12:34

## 2024-10-20 RX ADMIN — Medication 650 MILLIGRAM(S): at 18:47

## 2024-10-20 RX ADMIN — METHADONE HYDROCHLORIDE 110 MILLIGRAM(S): 10 TABLET ORAL at 12:34

## 2024-10-20 RX ADMIN — Medication 8 MILLIGRAM(S): at 13:53

## 2024-10-20 RX ADMIN — GABAPENTIN 300 MILLIGRAM(S): 300 CAPSULE ORAL at 13:53

## 2024-10-20 RX ADMIN — Medication 8 MILLIGRAM(S): at 22:20

## 2024-10-20 RX ADMIN — QUETIAPINE FUMARATE 400 MILLIGRAM(S): 200 TABLET ORAL at 21:25

## 2024-10-20 RX ADMIN — GABAPENTIN 300 MILLIGRAM(S): 300 CAPSULE ORAL at 21:24

## 2024-10-20 RX ADMIN — IPRATROPIUM BROMIDE AND ALBUTEROL SULFATE 3 MILLILITER(S): .5; 2.5 SOLUTION RESPIRATORY (INHALATION) at 23:18

## 2024-10-20 RX ADMIN — PANTOPRAZOLE SODIUM 40 MILLIGRAM(S): 40 TABLET, DELAYED RELEASE ORAL at 06:25

## 2024-10-20 RX ADMIN — Medication 20 MILLIGRAM(S): at 06:25

## 2024-10-20 RX ADMIN — Medication 325 MILLIGRAM(S): at 12:35

## 2024-10-20 RX ADMIN — Medication 8 MILLIGRAM(S): at 04:40

## 2024-10-20 RX ADMIN — Medication 8 MILLIGRAM(S): at 03:46

## 2024-10-20 RX ADMIN — Medication 100 MILLIGRAM(S): at 13:52

## 2024-10-20 RX ADMIN — FINASTERIDE 5 MILLIGRAM(S): 5 TABLET, FILM COATED ORAL at 12:36

## 2024-10-20 RX ADMIN — Medication 0.4 MILLIGRAM(S): at 21:25

## 2024-10-20 RX ADMIN — Medication 1 TABLET(S): at 12:34

## 2024-10-20 RX ADMIN — Medication 8 MILLIGRAM(S): at 14:45

## 2024-10-20 RX ADMIN — Medication 1 TABLET(S): at 06:25

## 2024-10-20 RX ADMIN — DULOXETINE HYDROCHLORIDE 30 MILLIGRAM(S): 30 CAPSULE, DELAYED RELEASE ORAL at 12:35

## 2024-10-20 NOTE — PROGRESS NOTE ADULT - SUBJECTIVE AND OBJECTIVE BOX
PROGRESS NOTE:     Patient is a 54y old  Male who presents with a chief complaint of West Catheter Obstruction & UTI (18 Oct 2024 22:44)          SUBJECTIVE & OBJECTIVE:   Pt seen and examined at bedside in AM    no overnight events.     REVIEW OF SYSTEMS: remaining ROS negative     PHYSICAL EXAM:  VITALS:  Vital Signs Last 24 Hrs  T(C): 36.8 (20 Oct 2024 11:33), Max: 37.8 (19 Oct 2024 17:11)  T(F): 98.2 (20 Oct 2024 11:33), Max: 100.1 (19 Oct 2024 17:11)  HR: 80 (20 Oct 2024 12:01) (63 - 91)  BP: 116/63 (20 Oct 2024 11:33) (93/61 - 116/63)  BP(mean): --  RR: 18 (20 Oct 2024 05:08) (17 - 20)  SpO2: 97% (20 Oct 2024 12:01) (92% - 99%)    Parameters below as of 20 Oct 2024 12:01  Patient On (Oxygen Delivery Method): nasal cannula w/ humidification            GENERAL: NAD,  no increased WOB  HEAD:  Atraumatic, Normocephalic  EYES: EOMI, conjunctiva and sclera clear  ENMT: Moist mucous membranes  NECK: Supple, No JVD  NERVOUS SYSTEM:  Alert & Oriented s   CHEST/LUNG: rhonchorus; No rales, wheezing  HEART: Regular rate and rhythm  ABDOMEN: Soft, Nontender, Nondistended; Bowel sounds present, +west, +ostomy  EXTREMITIES: contracted, LE edema      MEDICATIONS  (STANDING):  albuterol/ipratropium for Nebulization 3 milliLiter(s) Nebulizer every 6 hours  cefTRIAXone   IVPB 1000 milliGRAM(s) IV Intermittent every 24 hours  DULoxetine 30 milliGRAM(s) Oral daily  ferrous    sulfate 325 milliGRAM(s) Oral daily  finasteride 5 milliGRAM(s) Oral daily  folic acid 1 milliGRAM(s) Oral daily  furosemide   Injectable 40 milliGRAM(s) IV Push daily  gabapentin 300 milliGRAM(s) Oral every 8 hours  heparin   Injectable 5000 Unit(s) SubCutaneous every 12 hours  methadone    Tablet 110 milliGRAM(s) Oral daily  multivitamin 1 Tablet(s) Oral daily  pantoprazole    Tablet 40 milliGRAM(s) Oral before breakfast  QUEtiapine 400 milliGRAM(s) Oral at bedtime  tamsulosin 0.4 milliGRAM(s) Oral at bedtime    MEDICATIONS  (PRN):  acetaminophen     Tablet .. 650 milliGRAM(s) Oral every 6 hours PRN Temp greater or equal to 38C (100.4F), Mild Pain (1 - 3)  albuterol    90 MICROgram(s) HFA Inhaler 2 Puff(s) Inhalation every 6 hours PRN Respiratory Distress  aluminum hydroxide/magnesium hydroxide/simethicone Suspension 30 milliLiter(s) Oral every 4 hours PRN Dyspepsia  diphenhydrAMINE 50 milliGRAM(s) Oral every 4 hours PRN Rash and/or Itching  HYDROmorphone   Tablet 8 milliGRAM(s) Oral every 6 hours PRN Severe Pain (7 - 10)  melatonin 3 milliGRAM(s) Oral at bedtime PRN Insomnia  ondansetron Injectable 4 milliGRAM(s) IV Push every 8 hours PRN Nausea and/or Vomiting        Allergies    Zyprexa (Rash; Dystonic RXN; Hives)  Risperdal (Short breath; Rash; Hives)  Stelazine (Unknown)  NSAIDs (Flushing; Other (Moderate))  Motrin (Anaphylaxis)  Aleve (Unknown)  Haldol (Anaphylaxis)  Thorazine (Other (Moderate))    Intolerances              LABS:                                      11.6   10.10 )-----------( 183      ( 20 Oct 2024 08:11 )             38.4     10-20    140  |  103  |  15  ----------------------------<  80  3.9   |  33[H]  |  0.79    Ca    9.5      20 Oct 2024 08:11    TPro  8.1  /  Alb  2.5[L]  /  TBili  0.7  /  DBili  x   /  AST  18  /  ALT  27  /  AlkPhos  153[H]  10-20    LIVER FUNCTIONS - ( 20 Oct 2024 08:11 )  Alb: 2.5 g/dL / Pro: 8.1 gm/dL / ALK PHOS: 153 U/L / ALT: 27 U/L / AST: 18 U/L / GGT: x             Urinalysis Basic - ( 20 Oct 2024 08:11 )    Color: x / Appearance: x / SG: x / pH: x  Gluc: 80 mg/dL / Ketone: x  / Bili: x / Urobili: x   Blood: x / Protein: x / Nitrite: x   Leuk Esterase: x / RBC: x / WBC x   Sq Epi: x / Non Sq Epi: x / Bacteria: x        CAPILLARY BLOOD GLUCOSE                    RECENT CULTURES:    Urinalysis with Rflx Culture (collected 18 Oct 2024 22:00)      RADIOLOGY & ADDITIONAL TESTS:    < from: Xray Chest 1 View- PORTABLE-Urgent (Xray Chest 1 View- PORTABLE-Urgent .) (10.18.24 @ 20:57) >  IMPRESSION: Persistent left lung finding interstitial infiltrate.-Supine   the heart could represent consolidated lung at right base medially.    < end of copied text >    Care plan and all findings were discussed in detail with patient.  All questions and concerns addressed

## 2024-10-20 NOTE — PROGRESS NOTE ADULT - ASSESSMENT
54-year-old male with a PMH HTN, HLD, CAD. Emphysema (home O2), paraplegic, hepatitis C, Indwelling catheter due to neurogenic bladder sent to the ED from [facility] for West Catheter Obstruction. Endorses leaking of urine around the west catheter, unsure when it originally started, but has worsen over the past couple days. Last catheter exchange was 2 months ago, which is normally done either at the [facility] or [facility]. Denies any other acute complaints. Labs unremarkable for H/H: 11.6/38.0, UA (+) Nitrite, Large-leuk Esterase, too many-WBC, Many-Bacteria. Vitals stable. Patient is being admitted having a Suprapubic catheter place by IR.         #Obstruction of urinary catheter  Chronic Indwelling Catheter - Last exchange 2 months ago    Evaluated by Urology - Unable to exchange the catheter  - unable to obtain CT A/P due body habitus/anatomy  - will order US of abdomen  - Suprapubic catheter to be placed by IR  - f/u TTE. will consult pulm for clearance    #UTI  UA (+) Nitrite, Large-leuk Esterase, too many-WBC, Many-Bacteria  Patient refused IV placement intially  - was on bactrim, will switch to rocephin now that pt has peripheral  - F/U Urine Cx   - DVT prophylaxis    #Chronic pain/neuropathy   - c/w gabapentin, cymbalta, dilaudid (home med)    #H/o Opioid abuse  #Bipolar disorder   Methadone dose verified with Ana Hood. Pt is on Methadone 110mg q daily, last fill date 9/27 for 1 month supply. Last intake of med prior to hospitalization was 10/18 AM.   - c/w seroquel, methadone, cymbalta     Neurogenic bladder   - plan for SPC as above  - c/w flomax         DVT ppx - S[facility]

## 2024-10-20 NOTE — PROGRESS NOTE ADULT - SUBJECTIVE AND OBJECTIVE BOX
Patient seen and examined bedside, contracted, resting comfortably  Patient reports he went down for CT scan today but CT was unable to be performed, per patient his contracted legs were unable to fit through the machine  Patient reports leaking urine around his west   West catheter in place, draining clear yellow urine  Denies nausea and vomiting.   Denies chest pain, dyspnea, cough.    T(F): 98.2 (10-20-24 @ 11:33), Max: 100.1 (10-19-24 @ 17:11)  HR: 80 (10-20-24 @ 12:01) (63 - 91)  BP: 116/63 (10-20-24 @ 11:33) (93/61 - 116/63)  RR: 18 (10-20-24 @ 05:08) (17 - 20)  SpO2: 97% (10-20-24 @ 12:01) (92% - 99%)  Wt(kg): --  CAPILLARY BLOOD GLUCOSE          PHYSICAL EXAM:  General: NAD, alert and awake  HEENT: NCAT, EOMI, conjunctiva clear  Chest: nonlabored respirations, good inspiratory effort  Abdomen: soft, NTND.   Extremities: legs contracted. no pedal edema or calf tenderness noted   : West catheter in place, draining clear yellow urine    LABS:                        11.6   10.10 )-----------( 183      ( 20 Oct 2024 08:11 )             38.4   10-20    140  |  103  |  15  ----------------------------<  80  3.9   |  33[H]  |  0.79    Ca    9.5      20 Oct 2024 08:11    TPro  8.1  /  Alb  2.5[L]  /  TBili  0.7  /  DBili  x   /  AST  18  /  ALT  27  /  AlkPhos  153[H]  10-20    I&O's Detail    19 Oct 2024 07:01  -  20 Oct 2024 07:00  --------------------------------------------------------  IN:    Oral Fluid: 240 mL  Total IN: 240 mL    OUT:    Ileostomy (mL): 200 mL    Indwelling Catheter - Urethral (mL): 900 mL  Total OUT: 1100 mL    Total NET: -860 mL      20 Oct 2024 07:01  -  20 Oct 2024 16:05  --------------------------------------------------------  IN:    Oral Fluid: 240 mL  Total IN: 240 mL    OUT:    Indwelling Catheter - Urethral (mL): 300 mL  Total OUT: 300 mL    Total NET: -60 mL          Impression: 54y Male with chronic west catheter 2/2 neurogenic bladder, c/o leaking around west  Medicine: "f/u TTE. will consult pulm for clearance"    Plan as discussed with Dr. Yoder:  - Rec IR consult for SPC placement  - Please document medical optimization  - Continue care per primary team  Patient seen and examined bedside, contracted, resting comfortably  Patient reports he went down for CT scan today but CT was unable to be performed, per patient his contracted legs were unable to fit through the machine  Patient reports leaking urine around his west   West catheter in place, draining clear yellow urine  Denies nausea and vomiting.   Denies chest pain, dyspnea, cough.    T(F): 98.2 (10-20-24 @ 11:33), Max: 100.1 (10-19-24 @ 17:11)  HR: 80 (10-20-24 @ 12:01) (63 - 91)  BP: 116/63 (10-20-24 @ 11:33) (93/61 - 116/63)  RR: 18 (10-20-24 @ 05:08) (17 - 20)  SpO2: 97% (10-20-24 @ 12:01) (92% - 99%)  Wt(kg): --  CAPILLARY BLOOD GLUCOSE          PHYSICAL EXAM:  General: NAD, alert and awake  HEENT: NCAT, EOMI, conjunctiva clear  Chest: nonlabored respirations, good inspiratory effort  Abdomen: soft, NTND. ostomy in place with air and stool present in bag.   Extremities: legs contracted. no pedal edema or calf tenderness noted   : West catheter in place, draining clear yellow urine    LABS:                        11.6   10.10 )-----------( 183      ( 20 Oct 2024 08:11 )             38.4   10-20    140  |  103  |  15  ----------------------------<  80  3.9   |  33[H]  |  0.79    Ca    9.5      20 Oct 2024 08:11    TPro  8.1  /  Alb  2.5[L]  /  TBili  0.7  /  DBili  x   /  AST  18  /  ALT  27  /  AlkPhos  153[H]  10-20    I&O's Detail    19 Oct 2024 07:01  -  20 Oct 2024 07:00  --------------------------------------------------------  IN:    Oral Fluid: 240 mL  Total IN: 240 mL    OUT:    Ileostomy (mL): 200 mL    Indwelling Catheter - Urethral (mL): 900 mL  Total OUT: 1100 mL    Total NET: -860 mL      20 Oct 2024 07:01  -  20 Oct 2024 16:05  --------------------------------------------------------  IN:    Oral Fluid: 240 mL  Total IN: 240 mL    OUT:    Indwelling Catheter - Urethral (mL): 300 mL  Total OUT: 300 mL    Total NET: -60 mL          Impression: 54y Male with chronic west catheter 2/2 neurogenic bladder, c/o leaking around west  Medicine: "f/u TTE. will consult pulm for clearance"    Plan as discussed with Dr. Yoder:  - Rec IR consult for SPC placement  - Please document medical optimization  - Continue care per primary team

## 2024-10-21 LAB
ANION GAP SERPL CALC-SCNC: 5 MMOL/L — SIGNIFICANT CHANGE UP (ref 5–17)
APTT BLD: 32 SEC — SIGNIFICANT CHANGE UP (ref 24.5–35.6)
BUN SERPL-MCNC: 14 MG/DL — SIGNIFICANT CHANGE UP (ref 7–23)
CALCIUM SERPL-MCNC: 8.7 MG/DL — SIGNIFICANT CHANGE UP (ref 8.5–10.1)
CHLORIDE SERPL-SCNC: 102 MMOL/L — SIGNIFICANT CHANGE UP (ref 96–108)
CO2 SERPL-SCNC: 32 MMOL/L — HIGH (ref 22–31)
CREAT SERPL-MCNC: 0.91 MG/DL — SIGNIFICANT CHANGE UP (ref 0.5–1.3)
EGFR: 101 ML/MIN/1.73M2 — SIGNIFICANT CHANGE UP
GLUCOSE SERPL-MCNC: 145 MG/DL — HIGH (ref 70–99)
HCT VFR BLD CALC: 37.1 % — LOW (ref 39–50)
HGB BLD-MCNC: 11.3 G/DL — LOW (ref 13–17)
INR BLD: 1.17 RATIO — HIGH (ref 0.85–1.16)
MCHC RBC-ENTMCNC: 27 PG — SIGNIFICANT CHANGE UP (ref 27–34)
MCHC RBC-ENTMCNC: 30.5 G/DL — LOW (ref 32–36)
MCV RBC AUTO: 88.5 FL — SIGNIFICANT CHANGE UP (ref 80–100)
NRBC # BLD: 0 /100 WBCS — SIGNIFICANT CHANGE UP (ref 0–0)
PLATELET # BLD AUTO: 154 K/UL — SIGNIFICANT CHANGE UP (ref 150–400)
POTASSIUM SERPL-MCNC: 3 MMOL/L — LOW (ref 3.5–5.3)
POTASSIUM SERPL-SCNC: 3 MMOL/L — LOW (ref 3.5–5.3)
PROTHROM AB SERPL-ACNC: 13.6 SEC — HIGH (ref 9.9–13.4)
RBC # BLD: 4.19 M/UL — LOW (ref 4.2–5.8)
RBC # FLD: 15.9 % — HIGH (ref 10.3–14.5)
SODIUM SERPL-SCNC: 139 MMOL/L — SIGNIFICANT CHANGE UP (ref 135–145)
WBC # BLD: 9.03 K/UL — SIGNIFICANT CHANGE UP (ref 3.8–10.5)
WBC # FLD AUTO: 9.03 K/UL — SIGNIFICANT CHANGE UP (ref 3.8–10.5)

## 2024-10-21 PROCEDURE — 76937 US GUIDE VASCULAR ACCESS: CPT | Mod: 26,AS

## 2024-10-21 PROCEDURE — 99232 SBSQ HOSP IP/OBS MODERATE 35: CPT

## 2024-10-21 PROCEDURE — 99223 1ST HOSP IP/OBS HIGH 75: CPT

## 2024-10-21 PROCEDURE — 93010 ELECTROCARDIOGRAM REPORT: CPT

## 2024-10-21 PROCEDURE — 36556 INSERT NON-TUNNEL CV CATH: CPT

## 2024-10-21 RX ORDER — POTASSIUM CHLORIDE 10 MEQ
40 TABLET, EXTENDED RELEASE ORAL ONCE
Refills: 0 | Status: COMPLETED | OUTPATIENT
Start: 2024-10-21 | End: 2024-10-21

## 2024-10-21 RX ORDER — POTASSIUM CHLORIDE 10 MEQ
40 TABLET, EXTENDED RELEASE ORAL ONCE
Refills: 0 | Status: DISCONTINUED | OUTPATIENT
Start: 2024-10-21 | End: 2024-10-21

## 2024-10-21 RX ORDER — COLLAGENASE CLOSTRIDIUM HIST. 250 UNIT/G
1 OINTMENT (GRAM) TOPICAL
Refills: 0 | Status: DISCONTINUED | OUTPATIENT
Start: 2024-10-21 | End: 2024-10-29

## 2024-10-21 RX ORDER — POTASSIUM CHLORIDE 10 MEQ
10 TABLET, EXTENDED RELEASE ORAL
Refills: 0 | Status: DISCONTINUED | OUTPATIENT
Start: 2024-10-21 | End: 2024-10-21

## 2024-10-21 RX ADMIN — Medication 0.4 MILLIGRAM(S): at 22:47

## 2024-10-21 RX ADMIN — METHADONE HYDROCHLORIDE 110 MILLIGRAM(S): 10 TABLET ORAL at 13:46

## 2024-10-21 RX ADMIN — Medication 100 MILLIEQUIVALENT(S): at 13:33

## 2024-10-21 RX ADMIN — Medication 8 MILLIGRAM(S): at 20:10

## 2024-10-21 RX ADMIN — Medication 8 MILLIGRAM(S): at 19:10

## 2024-10-21 RX ADMIN — Medication 325 MILLIGRAM(S): at 13:33

## 2024-10-21 RX ADMIN — IPRATROPIUM BROMIDE AND ALBUTEROL SULFATE 3 MILLILITER(S): .5; 2.5 SOLUTION RESPIRATORY (INHALATION) at 06:04

## 2024-10-21 RX ADMIN — FOLIC ACID 1 MILLIGRAM(S): 1 TABLET ORAL at 13:31

## 2024-10-21 RX ADMIN — Medication 8 MILLIGRAM(S): at 05:50

## 2024-10-21 RX ADMIN — GABAPENTIN 300 MILLIGRAM(S): 300 CAPSULE ORAL at 15:09

## 2024-10-21 RX ADMIN — IPRATROPIUM BROMIDE AND ALBUTEROL SULFATE 3 MILLILITER(S): .5; 2.5 SOLUTION RESPIRATORY (INHALATION) at 11:47

## 2024-10-21 RX ADMIN — Medication 40 MILLIEQUIVALENT(S): at 17:21

## 2024-10-21 RX ADMIN — DULOXETINE HYDROCHLORIDE 30 MILLIGRAM(S): 30 CAPSULE, DELAYED RELEASE ORAL at 13:32

## 2024-10-21 RX ADMIN — PANTOPRAZOLE SODIUM 40 MILLIGRAM(S): 40 TABLET, DELAYED RELEASE ORAL at 05:50

## 2024-10-21 RX ADMIN — Medication 1 TABLET(S): at 13:32

## 2024-10-21 RX ADMIN — Medication 8 MILLIGRAM(S): at 06:38

## 2024-10-21 RX ADMIN — GABAPENTIN 300 MILLIGRAM(S): 300 CAPSULE ORAL at 22:47

## 2024-10-21 RX ADMIN — IPRATROPIUM BROMIDE AND ALBUTEROL SULFATE 3 MILLILITER(S): .5; 2.5 SOLUTION RESPIRATORY (INHALATION) at 17:07

## 2024-10-21 RX ADMIN — FINASTERIDE 5 MILLIGRAM(S): 5 TABLET, FILM COATED ORAL at 13:32

## 2024-10-21 RX ADMIN — QUETIAPINE FUMARATE 400 MILLIGRAM(S): 200 TABLET ORAL at 22:47

## 2024-10-21 RX ADMIN — Medication 40 MILLIGRAM(S): at 05:49

## 2024-10-21 RX ADMIN — GABAPENTIN 300 MILLIGRAM(S): 300 CAPSULE ORAL at 05:49

## 2024-10-21 RX ADMIN — Medication 100 MILLIGRAM(S): at 09:07

## 2024-10-21 NOTE — CONSULT NOTE ADULT - SUBJECTIVE AND OBJECTIVE BOX
Interventional Radiology    Evaluate for Procedure: suprapubic catheter insertion    HPI: 54y Male with a PMH HTN, HLD, CAD. Emphysema (home O2), paraplegic, hepatitis C, Indwelling catheter due to neurogenic bladder sent to the ED from [facility] for West Catheter Obstruction. Endorses leaking of urine around the west catheter, unsure when it originally started, but has worsen over the past couple days. Last catheter exchange was 2 months ago, which is normally done either at the [facility] or [facility]. Unable to exchange West by urology. Unable to perform CT due to patient body habitus. IR consulted for suprapubic catheter insertion due to difficult anatomy.     Allergies: Zyprexa (Rash; Dystonic RXN; Hives)  Risperdal (Short breath; Rash; Hives)  Stelazine (Unknown)  NSAIDs (Flushing; Other (Moderate))  Motrin (Anaphylaxis)  Aleve (Unknown)  Haldol (Anaphylaxis)  Thorazine (Other (Moderate))    Medications (Abx/Cardiac/Anticoagulation/Blood Products)    cefTRIAXone   IVPB: 100 mL/Hr IV Intermittent (10-21 @ 09:07)  furosemide    Tablet: 20 milliGRAM(s) Oral (10-20 @ 06:25)  furosemide   Injectable: 40 milliGRAM(s) IV Push (10-21 @ 05:49)  trimethoprim  160 mG/sulfamethoxazole 800 m Tablet(s) Oral (10-20 @ 06:25)    Data:    T(C): 37  HR: 89  BP: 100/69  RR: 18  SpO2: 95%    -WBC 9.03 / HgB 11.3 / Hct 37.1 / Plt 154  -Na 139 / Cl 102 / BUN 14 / Glucose 145  -K 3.0 / CO2 32 / Cr 0.91  -ALT -- / Alk Phos -- / T.Bili --  -INR 1.17 / PTT 32.0    Radiology: Reviewed    Assessment/Plan:   -54y Male with a PMH HTN, HLD, CAD. Emphysema (home O2), paraplegic, hepatitis C, Indwelling catheter due to neurogenic bladder sent to the ED from [facility] for West Catheter Obstruction. Endorses leaking of urine around the west catheter, unsure when it originally started, but has worsen over the past couple days. Last catheter exchange was 2 months ago, which is normally done either at the [facility] or [facility]. Unable to exchange West by urology. Unable to perform CT due to patient body habitus. IR consulted for suprapubic catheter insertion due to difficult anatomy.       - case and imaging reviewed with Dr. Escobar  - Plan to attempt suprapubic catheter insertion today in IR  - please place IR procedure order under Shawn  - Labs reviewed  - hold  today  - NPO  - d/w primary team, urology      Interventional Radiology  ------------------------------------  For emergent consults, please call [phone], or call or message on TEAMs.   For non-emergent consults, consults after hours or over the weekend, please place IR Consult order.

## 2024-10-21 NOTE — PROCEDURE NOTE - NSPROCDETAILS_GEN_ALL_CORE
supine position/ultrasound guidance
location identified, draped/prepped, sterile technique used/blood seen on insertion/dressing applied/flushes easily/secured in place/sterile technique, catheter placed
location identified, draped/prepped, sterile technique used/blood seen on insertion/dressing applied/flushes easily/secured in place/sterile technique, catheter placed

## 2024-10-21 NOTE — CONSULT NOTE ADULT - ASSESSMENT
54 year old M with PMHx CAD, HLD, neurogenic bladder with chronic west, bipolar disorder, Hep C, ileostomy, COPD on 3L NC at home & bipap for JUVENAL, paraplegia admitted to medical floor for chronic west obstruction. Pulmonary consulted for evaluation prior to SPC placement with urology team.     Dx: Chronic hypoxic respiratory failure, JUVENAL, COPD, paraplegia    Recommendations:   - patient to go for SPC placement with urology.   - At home patient oxygen requirements   - patient uses bipap at night for JUVENAL     NOTE INCOMPLETE.  54 year old M with PMHx CAD, HLD, neurogenic bladder with chronic west, bipolar disorder, Hep C, ileostomy, COPD on 3L NC at home & bipap for JUVENAL, paraplegia admitted to medical floor for chronic west obstruction. Pulmonary consulted for evaluation prior to SPC placement with urology team.     Dx: Chronic hypoxic respiratory failure, JUVENAL, COPD, paraplegia    Recommendations:   - patient to go for SPC placement with urology.   - At home patient oxygen requirements   - patient uses bipap at night for JUVENAL   - cxr on admission with persistent left lung finding interstitial infiltrate.Supine the heart could represent consolidated lung at right base medially    NOTE INCOMPLETE.  54M w/ cervical epidural abscess, b/l LE paralysis, pneumoperitoneum s/p ex-lap w/ ileostomy complicated by MRSA bacteremia and evisceration, Hep C, emphysema on chronic O2, L foot osteomyelitis, bipolar, opioid dependence, HTN admitted to medical floor for chronic west obstruction. Pulmonary consulted for evaluation prior to SPC placement with urology team.       Dx: Chronic hypoxic respiratory failure, JUVENAL, COPD, paraplegia    Recommendations:   - patient to go for SPC placement with urology.   - At home patient oxygen requirements   - patient uses bipap at night for JUVENAL   - cxr on admission with persistent left lung finding interstitial infiltrate. Supine the heart could represent consolidated lung at right base medially  - patient with prior admission in June 2024 for acute on chronic hypercapnic/hypoxic respiratory failure with large left exudative pleural effusion 1.5L drained at the time.     NOTE INCOMPLETE.  54M w/ cervical epidural abscess, b/l LE paralysis, pneumoperitoneum s/p ex-lap w/ ileostomy complicated by MRSA bacteremia and evisceration, Hep C, emphysema on chronic O2, L foot osteomyelitis, bipolar, opioid dependence, HTN admitted to medical floor for chronic west obstruction. Pulmonary consulted for evaluation prior to SPC placement with urology team.       Dx: Chronic hypoxic respiratory failure, JUVENAL, COPD, paraplegia    Recommendations:   - patient to go for SPC placement with IR today.   - At home patient oxygen requirements are 6L with Spo2 92%. He is currently on his baseline O2 requirements and does not complain of any respiratory issues.   - patient uses bipap at night for JUVENAL   - cxr on admission with persistent left lung finding interstitial infiltrate. Supine the heart could represent consolidated lung at right base medially  - patient with prior admission in June 2024 for acute on chronic hypercapnic/hypoxic respiratory failure with large left exudative pleural effusion 1.5L drained at the time.     Discussed with Dr. Mcdaniel

## 2024-10-21 NOTE — CHART NOTE - NSCHARTNOTEFT_GEN_A_CORE
Interventional Radiology    Patient was planned for suprapubic catheter insertion today, seen at bedside this morning around 8:30am, and NPO status endorsed. Presented to IR suite and reported he had a biscuit and some juice around 11am. Case cancelled by anesthesia, patient unable to receive sedation today. Procedure to be rescheduled for Wednesday 10/23. Discussed w/ primary team.     Jarret Kendrick PA-C   Interventional Radiology   Available on TEAMs

## 2024-10-21 NOTE — DIETITIAN INITIAL EVALUATION ADULT - OTHER INFO
Pt resides at LTC facility; diet there per transfer papers is regular consistency c thin liquids; c CAROLANN, NCS, DASH diet restrictions + LPS x 4/day.  When pt was here back in June, this RD spoke to him regarding need for additional protein intake in order to promote wound healing.  Denies any N/V/D or chew/swallowing difficulty; pt reports hx of chronic constipation; on bowel regimen.  Pt presented to ED c/o leaking around [place] site; found c obstruction & UTI; SPC placement by IR pending; urology following.  Pressure ulcers observed, treated, & documented this morning by RN & wound care.

## 2024-10-21 NOTE — DIETITIAN INITIAL EVALUATION ADULT - NSFNSPHYEXAMSKINFT_GEN_A_CORE
Pressure Injury 1: Left:, dorsum of foot, Stage II  Pressure Injury 2: Left:, Lateral Aspects of Toes, Stage II

## 2024-10-21 NOTE — PROGRESS NOTE ADULT - SUBJECTIVE AND OBJECTIVE BOX
PROGRESS NOTE:     Patient is a 54y old  Male who presents with a chief complaint of West Catheter Obstruction & UTI (18 Oct 2024 22:44)          SUBJECTIVE & OBJECTIVE:   Pt seen and examined at bedside in AM    no overnight events.     REVIEW OF SYSTEMS: remaining ROS negative     PHYSICAL EXAM:  VITALS:  Vital Signs Last 24 Hrs  T(C): 37 (21 Oct 2024 11:48), Max: 37.4 (20 Oct 2024 23:57)  T(F): 98.6 (21 Oct 2024 11:48), Max: 99.4 (20 Oct 2024 23:57)  HR: 89 (21 Oct 2024 11:58) (81 - 96)  BP: 100/69 (21 Oct 2024 11:48) (100/61 - 119/73)  BP(mean): --  RR: 18 (21 Oct 2024 11:48) (16 - 19)  SpO2: 95% (21 Oct 2024 11:58) (91% - 97%)    Parameters below as of 21 Oct 2024 11:58  Patient On (Oxygen Delivery Method): nasal cannula w/ humidification            GENERAL: NAD,  no increased WOB  HEAD:  Atraumatic, Normocephalic  EYES: EOMI, conjunctiva and sclera clear  ENMT: Moist mucous membranes  NECK: Supple, No JVD  NERVOUS SYSTEM:  Alert & Oriented    CHEST/LUNG: rhonchi; No rales, wheezing  HEART: Regular rate and rhythm  ABDOMEN: Soft, Nontender, Nondistended; Bowel sounds present, +west, +ostomy  EXTREMITIES: contracted, LE edema      MEDICATIONS  (STANDING):  albuterol/ipratropium for Nebulization 3 milliLiter(s) Nebulizer every 6 hours  cefTRIAXone   IVPB 1000 milliGRAM(s) IV Intermittent every 24 hours  collagenase Ointment 1 Application(s) Topical two times a day  DULoxetine 30 milliGRAM(s) Oral daily  ferrous    sulfate 325 milliGRAM(s) Oral daily  finasteride 5 milliGRAM(s) Oral daily  folic acid 1 milliGRAM(s) Oral daily  furosemide   Injectable 40 milliGRAM(s) IV Push daily  gabapentin 300 milliGRAM(s) Oral every 8 hours  heparin   Injectable 5000 Unit(s) SubCutaneous every 12 hours  methadone    Tablet 110 milliGRAM(s) Oral daily  multivitamin 1 Tablet(s) Oral daily  pantoprazole    Tablet 40 milliGRAM(s) Oral before breakfast  potassium chloride  10 mEq/100 mL IVPB 10 milliEquivalent(s) IV Intermittent every 1 hour  QUEtiapine 400 milliGRAM(s) Oral at bedtime  tamsulosin 0.4 milliGRAM(s) Oral at bedtime    MEDICATIONS  (PRN):  acetaminophen     Tablet .. 650 milliGRAM(s) Oral every 6 hours PRN Temp greater or equal to 38C (100.4F), Mild Pain (1 - 3)  albuterol    90 MICROgram(s) HFA Inhaler 2 Puff(s) Inhalation every 6 hours PRN Respiratory Distress  aluminum hydroxide/magnesium hydroxide/simethicone Suspension 30 milliLiter(s) Oral every 4 hours PRN Dyspepsia  diphenhydrAMINE 50 milliGRAM(s) Oral every 4 hours PRN Rash and/or Itching  HYDROmorphone   Tablet 8 milliGRAM(s) Oral every 6 hours PRN Severe Pain (7 - 10)  HYDROmorphone  Injectable 0.5 milliGRAM(s) IV Push every 8 hours PRN Severe Pain (7 - 10)  melatonin 3 milliGRAM(s) Oral at bedtime PRN Insomnia  ondansetron Injectable 4 milliGRAM(s) IV Push every 8 hours PRN Nausea and/or Vomiting          Allergies    Zyprexa (Rash; Dystonic RXN; Hives)  Risperdal (Short breath; Rash; Hives)  Stelazine (Unknown)  NSAIDs (Flushing; Other (Moderate))  Motrin (Anaphylaxis)  Aleve (Unknown)  Haldol (Anaphylaxis)  Thorazine (Other (Moderate))    Intolerances              LABS:                                      11.3   9.03  )-----------( 154      ( 21 Oct 2024 08:15 )             37.1     10-21    139  |  102  |  14  ----------------------------<  145[H]  3.0[L]   |  32[H]  |  0.91    Ca    8.7      21 Oct 2024 08:15    TPro  8.1  /  Alb  2.5[L]  /  TBili  0.7  /  DBili  x   /  AST  18  /  ALT  27  /  AlkPhos  153[H]  10-20    LIVER FUNCTIONS - ( 20 Oct 2024 08:11 )  Alb: 2.5 g/dL / Pro: 8.1 gm/dL / ALK PHOS: 153 U/L / ALT: 27 U/L / AST: 18 U/L / GGT: x             Urinalysis Basic - ( 21 Oct 2024 08:15 )    Color: x / Appearance: x / SG: x / pH: x  Gluc: 145 mg/dL / Ketone: x  / Bili: x / Urobili: x   Blood: x / Protein: x / Nitrite: x   Leuk Esterase: x / RBC: x / WBC x   Sq Epi: x / Non Sq Epi: x / Bacteria: x            CAPILLARY BLOOD GLUCOSE                    RECENT CULTURES:    CULTURE RESULTS:                10-18-24 @ 22:00  Specimen Source: --  Method Type: --  Gram Stain - RRL: --  Gram Stain - Wound: --  Bacteria: Many  Culture Results: --      Specimen Source:   Method Type:   Gram Stain:   Culture Results:   Bacteria: Bacteria: Many /HPF (10-18-24 @ 22:00)          RADIOLOGY & ADDITIONAL TESTS:    < from: Xray Chest 1 View- PORTABLE-Urgent (Xray Chest 1 View- PORTABLE-Urgent .) (10.18.24 @ 20:57) >  IMPRESSION: Persistent left lung finding interstitial infiltrate.-Supine   the heart could represent consolidated lung at right base medially.    < end of copied text >    < from: US Kidney and Bladder (10.20.24 @ 20:49) >  IMPRESSION:  The lower pole of the right kidney is completely obscured by bowel gas.    No renal mass, hydronephrosis or calculus is visualized sonographically.    The bladder could not be imaged due to the patient's inability to   position his legs.    < end of copied text >      < from: TTE Limited Echo w/o Cont (10.20.24 @ 09:53) >   2. Left ventricular systolic function is moderately decreased with an   ejection fraction visually estimated at 40 to 45 %.    < end of copied text >      Care plan and all findings were discussed in detail with patient.  All questions and concerns addressed

## 2024-10-21 NOTE — PHYSICAL THERAPY INITIAL EVALUATION ADULT - ACTIVE RANGE OF MOTION EXAMINATION, REHAB EVAL
[facility]  part of [facility]    Discharge Summary    Jemal Neal Patient Status:  Inpatient    10/29/1947 MRN [id number]   Location [facility] 3W/SW Attending Ricki Inman HCC)    Mild renal insufficiency    ALIZE ([facility] - [place], [place] - [address] [phone], [phone] [address] [place] [address]      Phone: [phone]   OLANZapine 2.5 MG Tabs         Follow up Visits: Follow-up with PC in 1 week      Devorah Rosado MD  3/3/2024  1:06 PM   patient in B hip/knee flexion with rotation of L foot (dorsum and lateral foot are pressure bearing surfaces)/bilateral upper extremity Active ROM was WFL (within functional limits)/deficits as listed below

## 2024-10-21 NOTE — DIETITIAN INITIAL EVALUATION ADULT - NSFNSGIIOFT_GEN_A_CORE
10-20-24 @ 07:01  -  10-21-24 @ 07:00  --------------------------------------------------------  OUT:    Ileostomy (mL): 150 mL  Total OUT: 150 mL    Total NET: -150 mL

## 2024-10-21 NOTE — DIETITIAN INITIAL EVALUATION ADULT - PERTINENT MEDS FT
Heparin, Rocephin. Lasix, Dilaudid, Methadone, Benadryl, Cymbalta, Iron Sulfate, Proscar, folic acid, Neurontin, Dilaudid, Melatonin, MVI, Zofran, Protonix, Seroquel, Flomax

## 2024-10-21 NOTE — PROCEDURE NOTE - ADDITIONAL PROCEDURE DETAILS
US guided
Compressible vessel confirmed under US, needle access visualized within lumen on short axis. Wire visualized in vessel on long axis, catheter inserted smoothly with brisk blood return

## 2024-10-21 NOTE — PROGRESS NOTE ADULT - SUBJECTIVE AND OBJECTIVE BOX
Patient seen and examined with Dr Mesa  No new events  Awaiting SPC placement by IR.    T(F): 98.6 (10-21-24 @ 11:48), Max: 99.4 (10-20-24 @ 23:57)  HR: 89 (10-21-24 @ 11:58) (81 - 96)  BP: 100/69 (10-21-24 @ 11:48) (100/61 - 119/73)  RR: 18 (10-21-24 @ 11:48) (16 - 19)  SpO2: 95% (10-21-24 @ 11:58) (91% - 97%)  Wt(kg): --        PHYSICAL EXAM:  General: NAD, alert and awake  HEENT: NCAT, EOMI, conjunctiva clear  Chest: nonlabored respirations, good inspiratory effort  Abdomen: soft, NTND.   Extremities: contracted  : west indwelling draining clear yellow urine    LABS:                        11.3   9.03  )-----------( 154      ( 21 Oct 2024 08:15 )             37.1   10-21    139  |  102  |  14  ----------------------------<  145[H]  3.0[L]   |  32[H]  |  0.91    Ca    8.7      21 Oct 2024 08:15    TPro  8.1  /  Alb  2.5[L]  /  TBili  0.7  /  DBili  x   /  AST  18  /  ALT  27  /  AlkPhos  153[H]  10-20  PT/INR - ( 21 Oct 2024 13:50 )   PT: 13.6 sec;   INR: 1.17 ratio         PTT - ( 21 Oct 2024 13:50 )  PTT:32.0 sec  I&O's Detail    20 Oct 2024 07:01  -  21 Oct 2024 07:00  --------------------------------------------------------  IN:    Oral Fluid: 240 mL  Total IN: 240 mL    OUT:    Ileostomy (mL): 150 mL    Indwelling Catheter - Urethral (mL): 850 mL  Total OUT: 1000 mL    Total NET: -760 mL        Impression: 54y Male with chronic west catheter 2/2 neurogenic bladder  prelim UCx 10/18 GNR, continue rocephin  For IR SPC placement with anesthesia  discussed with Dr Mesa  continue management per primary team

## 2024-10-21 NOTE — PROGRESS NOTE ADULT - ASSESSMENT
54-year-old male with a PMH HTN, HLD, CAD. Emphysema (home O2), paraplegic, hepatitis C, Indwelling catheter due to neurogenic bladder sent to the ED from [facility] for West Catheter Obstruction. Endorses leaking of urine around the west catheter, unsure when it originally started, but has worsen over the past couple days. Last catheter exchange was 2 months ago, which is normally done either at the [facility] or [facility]. Denies any other acute complaints. Labs unremarkable for H/H: 11.6/38.0, UA (+) Nitrite, Large-leuk Esterase, too many-WBC, Many-Bacteria. Vitals stable. Patient is being admitted having a Suprapubic catheter place by IR.         #Obstruction of urinary catheter  Chronic Indwelling Catheter - Last exchange 2 months ago    Evaluated by Urology - Unable to exchange the catheter  - unable to obtain CT A/P due body habitus/anatomy  - US abdomen limited, findings as above  - Suprapubic catheter to be placed by IR    #perioperative clearance        #UTI  UA (+) Nitrite, Large-leuk Esterase, too many-WBC, Many-Bacteria  Patient refused IV placement intially  - was on bactrim, will switch to rocephin now that pt has peripheral  - F/U Urine Cx   - DVT prophylaxis    #Chronic pain/neuropathy   - c/w gabapentin, cymbalta, dilaudid (home med)    #H/o Opioid abuse  #Bipolar disorder   Methadone dose verified with Ana Hood. Pt is on Methadone 110mg q daily, last fill date 9/27 for 1 month supply. Last intake of med prior to hospitalization was 10/18 AM.   - c/w seroquel, methadone, cymbalta     Neurogenic bladder   - plan for SPC as above  - c/w flomax         DVT ppx - SQH           54-year-old male with a PMH HTN, HLD, CAD. Emphysema (home O2), paraplegic, hepatitis C, Indwelling catheter due to neurogenic bladder sent to the ED from [facility] for West Catheter Obstruction. Endorses leaking of urine around the west catheter, unsure when it originally started, but has worsen over the past couple days. Last catheter exchange was 2 months ago, which is normally done either at the [facility] or [facility]. Denies any other acute complaints. Labs unremarkable for H/H: 11.6/38.0, UA (+) Nitrite, Large-leuk Esterase, too many-WBC, Many-Bacteria. Vitals stable. Patient is being admitted having a Suprapubic catheter place by IR.         #Obstruction of urinary catheter  Chronic Indwelling Catheter - Last exchange 2 months ago    Evaluated by Urology - Unable to exchange the catheter  - unable to obtain CT A/P due body habitus/anatomy  - US abdomen limited, findings as above  - Suprapubic catheter to be placed by IR    #perioperative clearance  -Reports no symptoms of chest pain or worsening dyspnea at rest or with exertion, orthopnea or palpitations.  - hx of JUVENAL, COPD on home O2. respiratory status stable as pt is on 6L, same as home dose. tolerating BiPAP  - TTE with EF of 40-45%, no significant interval change since previous TTE  - EKG without acute ischemic changes  - at baseline, not on anti-coagulation  - Patient is at moderate risk for low risk procedure given medical co-morbidities. pt is medically optimized.    #Emphysema, JUVENAL  #hx of acute respiratory failure  on home O2 and BiPAP  - stable on home O2 satting  - c/w nocturnal BiPAP  - Pulm following    #UTI  UA (+) Nitrite, Large-leuk Esterase, too many-WBC, Many-Bacteria  Patient refused IV placement intially  - was on bactrim, switched to rocephin now that pt has peripheral  - F/U Urine Cx   - DVT prophylaxis    #Chronic pain/neuropathy   - c/w gabapentin, cymbalta, dilaudid (home med)    #H/o Opioid abuse  #Bipolar disorder   Methadone dose verified with Ana Hood. Pt is on Methadone 110mg q daily, last fill date 9/27 for 1 month supply. Last intake of med prior to hospitalization was 10/18 AM.   - c/w seroquel, methadone, cymbalta     Neurogenic bladder   - plan for SPC as above  - c/w flomax         DVT ppx - SQH

## 2024-10-21 NOTE — DIETITIAN INITIAL EVALUATION ADULT - PERTINENT LABORATORY DATA
10-21    139  |  102  |  14  ----------------------------<  145[H]  3.0[L]   |  32[H]  |  0.91    Ca    8.7      21 Oct 2024 08:15    TPro  8.1  /  Alb  2.5[L]  /  TBili  0.7  /  DBili  x   /  AST  18  /  ALT  27  /  AlkPhos  153[H]  10-20  A1C Result: 5.3 % (12-04-23)

## 2024-10-21 NOTE — CONSULT NOTE ADULT - SUBJECTIVE AND OBJECTIVE BOX
CHIEF COMPLAINT:    HPI: 54 year old M with PMHx CAD, HLD, neurogenic bladder with chronic west, bipolar disorder, Hep C, ileostomy, COPD on 4L NC at home & bipap, paraplegia admitted to medical floor for chronic west obstruction. Pulmonary consulted for evaluation prior to SPC placement with urology team.     Patient seen & examined at bedside today.     PAST MEDICAL & SURGICAL HISTORY:  CAD (coronary artery disease)      HTN (hypertension)      HLD (hyperlipidemia)      Neurogenic bladder      Bipolar disorder      S/P ileostomy      Indwelling West catheter present      Chronic hepatitis C virus infection      S/P laminectomy      S/P ileostomy          FAMILY HISTORY:  No pertinent family history in first degree relatives        SOCIAL HISTORY:  - lives in nursing home   - chronic pain       Allergies    Zyprexa (Rash; Dystonic RXN; Hives)  Risperdal (Short breath; Rash; Hives)  Stelazine (Unknown)  NSAIDs (Flushing; Other (Moderate))  Motrin (Anaphylaxis)  Aleve (Unknown)  Haldol (Anaphylaxis)  Thorazine (Other (Moderate))    Intolerances          REVIEW OF SYSTEMS:  Constitutional: [ ] negative [ ] fevers [ ] chills [ ] weight loss [ ] weight gain  HEENT: [ ] negative [ ] dry eyes [ ] eye irritation [ ] postnasal drip [ ] nasal congestion  CV: [ ] negative  [ ] chest pain [ ] orthopnea [ ] palpitations [ ] murmur  Resp: [ ] negative [ ] cough [ ] shortness of breath [ ] dyspnea [ ] wheezing [ ] sputum [ ] hemoptysis  GI: [ ] negative [ ] nausea [ ] vomiting [ ] diarrhea [ ] constipation [ ] abd pain [ ] dysphagia   : [ ] negative [ ] dysuria [ ] nocturia [ ] hematuria [ ] increased urinary frequency  Musculoskeletal: [ ] negative [ ] back pain [ ] myalgias [ ] arthralgias [ ] fracture  Skin: [ ] negative [ ] rash [ ] itch  Neurological: [ ] negative [ ] headache [ ] dizziness [ ] syncope [ ] weakness [ ] numbness  Psychiatric: [ ] negative [ ] anxiety [ ] depression  Endocrine: [ ] negative [ ] diabetes [ ] thyroid problem  Hematologic/Lymphatic: [ ] negative [ ] anemia [ ] bleeding problem  Allergic/Immunologic: [ ] negative [ ] itchy eyes [ ] nasal discharge [ ] hives [ ] angioedema  [ ] All other systems negative  [ ] Unable to assess ROS because ________    OBJECTIVE:  ICU Vital Signs Last 24 Hrs  T(C): 36.7 (21 Oct 2024 05:10), Max: 37.4 (20 Oct 2024 23:57)  T(F): 98 (21 Oct 2024 05:10), Max: 99.4 (20 Oct 2024 23:57)  HR: 89 (21 Oct 2024 09:08) (80 - 96)  BP: 119/73 (21 Oct 2024 05:10) (100/61 - 119/73)  BP(mean): --  ABP: --  ABP(mean): --  RR: 18 (21 Oct 2024 05:10) (16 - 19)  SpO2: 95% (21 Oct 2024 09:08) (91% - 97%)    O2 Parameters below as of 21 Oct 2024 06:11  Patient On (Oxygen Delivery Method): nasal cannula w/ humidification, [place] High Flow Nasal O2 Cannula               10-20 @ 07:01  -  10-21 @ 07:00  --------------------------------------------------------  IN: 240 mL / OUT: 1000 mL / NET: -760 mL      CAPILLARY BLOOD GLUCOSE          PHYSICAL EXAM:  GENERAL: NAD, lying in bed comfortably  HEAD:  Atraumatic, normocephalic  EYES: EOMI, PERRLA, conjunctiva and sclera clear  NECK: Supple, trachea midline, no JVD  HEART: Regular rate and rhythm, no murmurs, rubs, or gallops  LUNGS: Unlabored respirations.  Clear to auscultation bilaterally, no crackles, wheezing, or rhonchi  ABDOMEN: Soft, nontender, nondistended, +BS  EXTREMITIES: 2+ peripheral pulses bilaterally. No clubbing, cyanosis, or edema  NERVOUS SYSTEM:  A&Ox3, moving all extremities, no focal deficits   SKIN: No rashes or lesions    HOSPITAL MEDICATIONS:  heparin   Injectable 5000 Unit(s) SubCutaneous every 12 hours    cefTRIAXone   IVPB 1000 milliGRAM(s) IV Intermittent every 24 hours    furosemide   Injectable 40 milliGRAM(s) IV Push daily    finasteride 5 milliGRAM(s) Oral daily    albuterol    90 MICROgram(s) HFA Inhaler 2 Puff(s) Inhalation every 6 hours PRN  albuterol/ipratropium for Nebulization 3 milliLiter(s) Nebulizer every 6 hours    acetaminophen     Tablet .. 650 milliGRAM(s) Oral every 6 hours PRN  diphenhydrAMINE 50 milliGRAM(s) Oral every 4 hours PRN  DULoxetine 30 milliGRAM(s) Oral daily  gabapentin 300 milliGRAM(s) Oral every 8 hours  HYDROmorphone   Tablet 8 milliGRAM(s) Oral every 6 hours PRN  HYDROmorphone  Injectable 0.5 milliGRAM(s) IV Push every 8 hours PRN  melatonin 3 milliGRAM(s) Oral at bedtime PRN  methadone    Tablet 110 milliGRAM(s) Oral daily  ondansetron Injectable 4 milliGRAM(s) IV Push every 8 hours PRN  QUEtiapine 400 milliGRAM(s) Oral at bedtime    aluminum hydroxide/magnesium hydroxide/simethicone Suspension 30 milliLiter(s) Oral every 4 hours PRN  pantoprazole    Tablet 40 milliGRAM(s) Oral before breakfast      tamsulosin 0.4 milliGRAM(s) Oral at bedtime    ferrous    sulfate 325 milliGRAM(s) Oral daily  folic acid 1 milliGRAM(s) Oral daily  multivitamin 1 Tablet(s) Oral daily            LABS:                        11.3   9.03  )-----------( 154      ( 21 Oct 2024 08:15 )             37.1     Hgb Trend: 11.3<--, 11.6<--, 11.6<--, 11.6<--  10-21    139  |  102  |  14  ----------------------------<  145[H]  3.0[L]   |  32[H]  |  0.91    Ca    8.7      21 Oct 2024 08:15    TPro  8.1  /  Alb  2.5[L]  /  TBili  0.7  /  DBili  x   /  AST  18  /  ALT  27  /  AlkPhos  153[H]  10-20    Creatinine Trend: 0.91<--, 0.79<--, 0.73<--, 0.68<--    Urinalysis Basic - ( 21 Oct 2024 08:15 )    Color: x / Appearance: x / SG: x / pH: x  Gluc: 145 mg/dL / Ketone: x  / Bili: x / Urobili: x   Blood: x / Protein: x / Nitrite: x   Leuk Esterase: x / RBC: x / WBC x   Sq Epi: x / Non Sq Epi: x / Bacteria: x            MICROBIOLOGY:     RADIOLOGY:  [ ] Reviewed and interpreted by me    PULMONARY FUNCTION TESTS:    EKG: CHIEF COMPLAINT:    HPI : 54M w/ cervical epidural abscess, b/l LE paralysis, pneumoperitoneum s/p ex-lap w/ ileostomy complicated by MRSA bacteremia and evisceration, Hep C, emphysema on chronic O2, L foot osteomyelitis, bipolar, opioid dependence, HTN admitted to medical floor for chronic west obstruction. Pulmonary consulted for evaluation prior to SPC placement with urology team.     Patient seen & examined at bedside today.     PAST MEDICAL & SURGICAL HISTORY:  CAD (coronary artery disease)      HTN (hypertension)      HLD (hyperlipidemia)      Neurogenic bladder      Bipolar disorder      S/P ileostomy      Indwelling West catheter present      Chronic hepatitis C virus infection      S/P laminectomy      S/P ileostomy          FAMILY HISTORY:  No pertinent family history in first degree relatives        SOCIAL HISTORY:  - lives in nursing home   - chronic pain       Allergies    Zyprexa (Rash; Dystonic RXN; Hives)  Risperdal (Short breath; Rash; Hives)  Stelazine (Unknown)  NSAIDs (Flushing; Other (Moderate))  Motrin (Anaphylaxis)  Aleve (Unknown)  Haldol (Anaphylaxis)  Thorazine (Other (Moderate))    Intolerances          REVIEW OF SYSTEMS:  Constitutional: [ ] negative [ ] fevers [ ] chills [ ] weight loss [ ] weight gain  HEENT: [ ] negative [ ] dry eyes [ ] eye irritation [ ] postnasal drip [ ] nasal congestion  CV: [ ] negative  [ ] chest pain [ ] orthopnea [ ] palpitations [ ] murmur  Resp: [ ] negative [ ] cough [ ] shortness of breath [ ] dyspnea [ ] wheezing [ ] sputum [ ] hemoptysis  GI: [ ] negative [ ] nausea [ ] vomiting [ ] diarrhea [ ] constipation [ ] abd pain [ ] dysphagia   : [ ] negative [ ] dysuria [ ] nocturia [ ] hematuria [ ] increased urinary frequency  Musculoskeletal: [ ] negative [ ] back pain [ ] myalgias [ ] arthralgias [ ] fracture  Skin: [ ] negative [ ] rash [ ] itch  Neurological: [ ] negative [ ] headache [ ] dizziness [ ] syncope [ ] weakness [ ] numbness  Psychiatric: [ ] negative [ ] anxiety [ ] depression  Endocrine: [ ] negative [ ] diabetes [ ] thyroid problem  Hematologic/Lymphatic: [ ] negative [ ] anemia [ ] bleeding problem  Allergic/Immunologic: [ ] negative [ ] itchy eyes [ ] nasal discharge [ ] hives [ ] angioedema  [ ] All other systems negative  [ ] Unable to assess ROS because ________    OBJECTIVE:  ICU Vital Signs Last 24 Hrs  T(C): 36.7 (21 Oct 2024 05:10), Max: 37.4 (20 Oct 2024 23:57)  T(F): 98 (21 Oct 2024 05:10), Max: 99.4 (20 Oct 2024 23:57)  HR: 89 (21 Oct 2024 09:08) (80 - 96)  BP: 119/73 (21 Oct 2024 05:10) (100/61 - 119/73)  BP(mean): --  ABP: --  ABP(mean): --  RR: 18 (21 Oct 2024 05:10) (16 - 19)  SpO2: 95% (21 Oct 2024 09:08) (91% - 97%)    O2 Parameters below as of 21 Oct 2024 06:11  Patient On (Oxygen Delivery Method): nasal cannula w/ humidification, [place] High Flow Nasal O2 Cannula               10-20 @ 07:01  -  10-21 @ 07:00  --------------------------------------------------------  IN: 240 mL / OUT: 1000 mL / NET: -760 mL      CAPILLARY BLOOD GLUCOSE          PHYSICAL EXAM:  GENERAL: NAD, lying in bed comfortably  HEAD:  Atraumatic, normocephalic  EYES: EOMI, PERRLA, conjunctiva and sclera clear  NECK: Supple, trachea midline, no JVD  HEART: Regular rate and rhythm, no murmurs, rubs, or gallops  LUNGS: Unlabored respirations.  Clear to auscultation bilaterally, no crackles, wheezing, or rhonchi  ABDOMEN: Soft, nontender, nondistended, +BS  EXTREMITIES: 2+ peripheral pulses bilaterally. No clubbing, cyanosis, or edema  NERVOUS SYSTEM:  A&Ox3, moving all extremities, no focal deficits   SKIN: No rashes or lesions    HOSPITAL MEDICATIONS:  heparin   Injectable 5000 Unit(s) SubCutaneous every 12 hours    cefTRIAXone   IVPB 1000 milliGRAM(s) IV Intermittent every 24 hours    furosemide   Injectable 40 milliGRAM(s) IV Push daily    finasteride 5 milliGRAM(s) Oral daily    albuterol    90 MICROgram(s) HFA Inhaler 2 Puff(s) Inhalation every 6 hours PRN  albuterol/ipratropium for Nebulization 3 milliLiter(s) Nebulizer every 6 hours    acetaminophen     Tablet .. 650 milliGRAM(s) Oral every 6 hours PRN  diphenhydrAMINE 50 milliGRAM(s) Oral every 4 hours PRN  DULoxetine 30 milliGRAM(s) Oral daily  gabapentin 300 milliGRAM(s) Oral every 8 hours  HYDROmorphone   Tablet 8 milliGRAM(s) Oral every 6 hours PRN  HYDROmorphone  Injectable 0.5 milliGRAM(s) IV Push every 8 hours PRN  melatonin 3 milliGRAM(s) Oral at bedtime PRN  methadone    Tablet 110 milliGRAM(s) Oral daily  ondansetron Injectable 4 milliGRAM(s) IV Push every 8 hours PRN  QUEtiapine 400 milliGRAM(s) Oral at bedtime    aluminum hydroxide/magnesium hydroxide/simethicone Suspension 30 milliLiter(s) Oral every 4 hours PRN  pantoprazole    Tablet 40 milliGRAM(s) Oral before breakfast      tamsulosin 0.4 milliGRAM(s) Oral at bedtime    ferrous    sulfate 325 milliGRAM(s) Oral daily  folic acid 1 milliGRAM(s) Oral daily  multivitamin 1 Tablet(s) Oral daily            LABS:                        11.3   9.03  )-----------( 154      ( 21 Oct 2024 08:15 )             37.1     Hgb Trend: 11.3<--, 11.6<--, 11.6<--, 11.6<--  10-21    139  |  102  |  14  ----------------------------<  145[H]  3.0[L]   |  32[H]  |  0.91    Ca    8.7      21 Oct 2024 08:15    TPro  8.1  /  Alb  2.5[L]  /  TBili  0.7  /  DBili  x   /  AST  18  /  ALT  27  /  AlkPhos  153[H]  10-20    Creatinine Trend: 0.91<--, 0.79<--, 0.73<--, 0.68<--    Urinalysis Basic - ( 21 Oct 2024 08:15 )    Color: x / Appearance: x / SG: x / pH: x  Gluc: 145 mg/dL / Ketone: x  / Bili: x / Urobili: x   Blood: x / Protein: x / Nitrite: x   Leuk Esterase: x / RBC: x / WBC x   Sq Epi: x / Non Sq Epi: x / Bacteria: x            MICROBIOLOGY:     RADIOLOGY:  [ ] Reviewed and interpreted by me    PULMONARY FUNCTION TESTS:    EKG: CHIEF COMPLAINT:    HPI : 54M w/ cervical epidural abscess, b/l LE paralysis, pneumoperitoneum s/p ex-lap w/ ileostomy complicated by MRSA bacteremia and evisceration, Hep C, emphysema on chronic O2, L foot osteomyelitis, bipolar, opioid dependence, HTN admitted to medical floor for chronic west obstruction. Pulmonary consulted for evaluation prior to SPC placement with urology team. Patient seen & examined at bedside today. He is awake & alert. States he normally uses 6L NC at home with Spo2 92% which he is currently on at this time. He denies any dyspnea or SOB at this current time.     PAST MEDICAL & SURGICAL HISTORY:  CAD (coronary artery disease)      HTN (hypertension)      HLD (hyperlipidemia)      Neurogenic bladder      Bipolar disorder      S/P ileostomy      Indwelling West catheter present      Chronic hepatitis C virus infection      S/P laminectomy      S/P ileostomy          FAMILY HISTORY:  No pertinent family history in first degree relatives        SOCIAL HISTORY:  - lives in nursing home   - chronic pain       Allergies    Zyprexa (Rash; Dystonic RXN; Hives)  Risperdal (Short breath; Rash; Hives)  Stelazine (Unknown)  NSAIDs (Flushing; Other (Moderate))  Motrin (Anaphylaxis)  Aleve (Unknown)  Haldol (Anaphylaxis)  Thorazine (Other (Moderate))    Intolerances          REVIEW OF SYSTEMS: all negative except hpi    OBJECTIVE:  ICU Vital Signs Last 24 Hrs  T(C): 36.7 (21 Oct 2024 05:10), Max: 37.4 (20 Oct 2024 23:57)  T(F): 98 (21 Oct 2024 05:10), Max: 99.4 (20 Oct 2024 23:57)  HR: 89 (21 Oct 2024 09:08) (80 - 96)  BP: 119/73 (21 Oct 2024 05:10) (100/61 - 119/73)  BP(mean): --  ABP: --  ABP(mean): --  RR: 18 (21 Oct 2024 05:10) (16 - 19)  SpO2: 95% (21 Oct 2024 09:08) (91% - 97%)    O2 Parameters below as of 21 Oct 2024 06:11  Patient On (Oxygen Delivery Method): nasal cannula w/ humidification, [place] High Flow Nasal O2 Cannula               10-20 @ 07:01  -  10-21 @ 07:00  --------------------------------------------------------  IN: 240 mL / OUT: 1000 mL / NET: -760 mL      CAPILLARY BLOOD GLUCOSE          PHYSICAL EXAM:  GENERAL: NAD, lying in bed comfortably  HEAD:  Atraumatic, normocephalic  EYES: EOMI, PERRLA, conjunctiva and sclera clear  NECK: Supple, trachea midline, no JVD  HEART: Regular rate and rhythm, no murmurs, rubs, or gallops  LUNGS: Unlabored respirations.  Clear to auscultation bilaterally, no crackles, wheezing, or rhonchi  ABDOMEN: + ileostomy in place; Soft, nontender, nondistended, +BS  EXTREMITIES: 2+ peripheral pulses bilaterally. No clubbing, cyanosis, or edema  NERVOUS SYSTEM:  A&Ox3, moves b/l  UE, b/l LE [facility] MEDICATIONS:  heparin   Injectable 5000 Unit(s) SubCutaneous every 12 hours    cefTRIAXone   IVPB 1000 milliGRAM(s) IV Intermittent every 24 hours    furosemide   Injectable 40 milliGRAM(s) IV Push daily    finasteride 5 milliGRAM(s) Oral daily    albuterol    90 MICROgram(s) HFA Inhaler 2 Puff(s) Inhalation every 6 hours PRN  albuterol/ipratropium for Nebulization 3 milliLiter(s) Nebulizer every 6 hours    acetaminophen     Tablet .. 650 milliGRAM(s) Oral every 6 hours PRN  diphenhydrAMINE 50 milliGRAM(s) Oral every 4 hours PRN  DULoxetine 30 milliGRAM(s) Oral daily  gabapentin 300 milliGRAM(s) Oral every 8 hours  HYDROmorphone   Tablet 8 milliGRAM(s) Oral every 6 hours PRN  HYDROmorphone  Injectable 0.5 milliGRAM(s) IV Push every 8 hours PRN  melatonin 3 milliGRAM(s) Oral at bedtime PRN  methadone    Tablet 110 milliGRAM(s) Oral daily  ondansetron Injectable 4 milliGRAM(s) IV Push every 8 hours PRN  QUEtiapine 400 milliGRAM(s) Oral at bedtime    aluminum hydroxide/magnesium hydroxide/simethicone Suspension 30 milliLiter(s) Oral every 4 hours PRN  pantoprazole    Tablet 40 milliGRAM(s) Oral before breakfast      tamsulosin 0.4 milliGRAM(s) Oral at bedtime    ferrous    sulfate 325 milliGRAM(s) Oral daily  folic acid 1 milliGRAM(s) Oral daily  multivitamin 1 Tablet(s) Oral daily            LABS:                        11.3   9.03  )-----------( 154      ( 21 Oct 2024 08:15 )             37.1     Hgb Trend: 11.3<--, 11.6<--, 11.6<--, 11.6<--  10-21    139  |  102  |  14  ----------------------------<  145[H]  3.0[L]   |  32[H]  |  0.91    Ca    8.7      21 Oct 2024 08:15    TPro  8.1  /  Alb  2.5[L]  /  TBili  0.7  /  DBili  x   /  AST  18  /  ALT  27  /  AlkPhos  153[H]  10-20    Creatinine Trend: 0.91<--, 0.79<--, 0.73<--, 0.68<--    Urinalysis Basic - ( 21 Oct 2024 08:15 )    Color: x / Appearance: x / SG: x / pH: x  Gluc: 145 mg/dL / Ketone: x  / Bili: x / Urobili: x   Blood: x / Protein: x / Nitrite: x   Leuk Esterase: x / RBC: x / WBC x   Sq Epi: x / Non Sq Epi: x / Bacteria: x

## 2024-10-22 LAB
ANION GAP SERPL CALC-SCNC: 3 MMOL/L — LOW (ref 5–17)
BUN SERPL-MCNC: 13 MG/DL — SIGNIFICANT CHANGE UP (ref 7–23)
CALCIUM SERPL-MCNC: 9.3 MG/DL — SIGNIFICANT CHANGE UP (ref 8.5–10.1)
CHLORIDE SERPL-SCNC: 105 MMOL/L — SIGNIFICANT CHANGE UP (ref 96–108)
CO2 SERPL-SCNC: 32 MMOL/L — HIGH (ref 22–31)
CREAT SERPL-MCNC: 0.75 MG/DL — SIGNIFICANT CHANGE UP (ref 0.5–1.3)
EGFR: 108 ML/MIN/1.73M2 — SIGNIFICANT CHANGE UP
GLUCOSE SERPL-MCNC: 86 MG/DL — SIGNIFICANT CHANGE UP (ref 70–99)
HCT VFR BLD CALC: 38 % — LOW (ref 39–50)
HGB BLD-MCNC: 11.7 G/DL — LOW (ref 13–17)
MCHC RBC-ENTMCNC: 27.2 PG — SIGNIFICANT CHANGE UP (ref 27–34)
MCHC RBC-ENTMCNC: 30.8 G/DL — LOW (ref 32–36)
MCV RBC AUTO: 88.4 FL — SIGNIFICANT CHANGE UP (ref 80–100)
NRBC # BLD: 0 /100 WBCS — SIGNIFICANT CHANGE UP (ref 0–0)
PLATELET # BLD AUTO: 169 K/UL — SIGNIFICANT CHANGE UP (ref 150–400)
POTASSIUM SERPL-MCNC: 4.5 MMOL/L — SIGNIFICANT CHANGE UP (ref 3.5–5.3)
POTASSIUM SERPL-SCNC: 4.5 MMOL/L — SIGNIFICANT CHANGE UP (ref 3.5–5.3)
RBC # BLD: 4.3 M/UL — SIGNIFICANT CHANGE UP (ref 4.2–5.8)
RBC # FLD: 16 % — HIGH (ref 10.3–14.5)
SODIUM SERPL-SCNC: 140 MMOL/L — SIGNIFICANT CHANGE UP (ref 135–145)
WBC # BLD: 8.27 K/UL — SIGNIFICANT CHANGE UP (ref 3.8–10.5)
WBC # FLD AUTO: 8.27 K/UL — SIGNIFICANT CHANGE UP (ref 3.8–10.5)

## 2024-10-22 PROCEDURE — 99232 SBSQ HOSP IP/OBS MODERATE 35: CPT

## 2024-10-22 PROCEDURE — 99233 SBSQ HOSP IP/OBS HIGH 50: CPT

## 2024-10-22 PROCEDURE — 99223 1ST HOSP IP/OBS HIGH 75: CPT

## 2024-10-22 RX ORDER — MEROPENEM 1 G/30ML
1000 INJECTION INTRAVENOUS ONCE
Refills: 0 | Status: COMPLETED | OUTPATIENT
Start: 2024-10-22 | End: 2024-10-22

## 2024-10-22 RX ORDER — MEROPENEM 1 G/30ML
INJECTION INTRAVENOUS
Refills: 0 | Status: DISCONTINUED | OUTPATIENT
Start: 2024-10-22 | End: 2024-10-22

## 2024-10-22 RX ORDER — MEROPENEM 1 G/30ML
1000 INJECTION INTRAVENOUS EVERY 8 HOURS
Refills: 0 | Status: DISCONTINUED | OUTPATIENT
Start: 2024-10-22 | End: 2024-10-22

## 2024-10-22 RX ORDER — MEROPENEM 1 G/30ML
1000 INJECTION INTRAVENOUS EVERY 8 HOURS
Refills: 0 | Status: DISCONTINUED | OUTPATIENT
Start: 2024-10-22 | End: 2024-10-24

## 2024-10-22 RX ADMIN — QUETIAPINE FUMARATE 400 MILLIGRAM(S): 200 TABLET ORAL at 22:17

## 2024-10-22 RX ADMIN — IPRATROPIUM BROMIDE AND ALBUTEROL SULFATE 3 MILLILITER(S): .5; 2.5 SOLUTION RESPIRATORY (INHALATION) at 23:35

## 2024-10-22 RX ADMIN — IPRATROPIUM BROMIDE AND ALBUTEROL SULFATE 3 MILLILITER(S): .5; 2.5 SOLUTION RESPIRATORY (INHALATION) at 12:05

## 2024-10-22 RX ADMIN — FINASTERIDE 5 MILLIGRAM(S): 5 TABLET, FILM COATED ORAL at 11:58

## 2024-10-22 RX ADMIN — Medication 1 APPLICATION(S): at 15:42

## 2024-10-22 RX ADMIN — Medication 40 MILLIGRAM(S): at 06:30

## 2024-10-22 RX ADMIN — PANTOPRAZOLE SODIUM 40 MILLIGRAM(S): 40 TABLET, DELAYED RELEASE ORAL at 06:30

## 2024-10-22 RX ADMIN — IPRATROPIUM BROMIDE AND ALBUTEROL SULFATE 3 MILLILITER(S): .5; 2.5 SOLUTION RESPIRATORY (INHALATION) at 17:09

## 2024-10-22 RX ADMIN — Medication 1 APPLICATION(S): at 06:30

## 2024-10-22 RX ADMIN — MEROPENEM 100 MILLIGRAM(S): 1 INJECTION INTRAVENOUS at 09:51

## 2024-10-22 RX ADMIN — GABAPENTIN 300 MILLIGRAM(S): 300 CAPSULE ORAL at 22:16

## 2024-10-22 RX ADMIN — HEPARIN SODIUM 5000 UNIT(S): 10000 INJECTION INTRAVENOUS; SUBCUTANEOUS at 06:30

## 2024-10-22 RX ADMIN — GABAPENTIN 300 MILLIGRAM(S): 300 CAPSULE ORAL at 13:22

## 2024-10-22 RX ADMIN — Medication 8 MILLIGRAM(S): at 14:15

## 2024-10-22 RX ADMIN — FOLIC ACID 1 MILLIGRAM(S): 1 TABLET ORAL at 11:59

## 2024-10-22 RX ADMIN — METHADONE HYDROCHLORIDE 110 MILLIGRAM(S): 10 TABLET ORAL at 11:54

## 2024-10-22 RX ADMIN — Medication 0.4 MILLIGRAM(S): at 22:16

## 2024-10-22 RX ADMIN — Medication 325 MILLIGRAM(S): at 11:58

## 2024-10-22 RX ADMIN — IPRATROPIUM BROMIDE AND ALBUTEROL SULFATE 3 MILLILITER(S): .5; 2.5 SOLUTION RESPIRATORY (INHALATION) at 05:25

## 2024-10-22 RX ADMIN — IPRATROPIUM BROMIDE AND ALBUTEROL SULFATE 3 MILLILITER(S): .5; 2.5 SOLUTION RESPIRATORY (INHALATION) at 00:16

## 2024-10-22 RX ADMIN — Medication 1 TABLET(S): at 11:57

## 2024-10-22 RX ADMIN — GABAPENTIN 300 MILLIGRAM(S): 300 CAPSULE ORAL at 06:30

## 2024-10-22 RX ADMIN — MEROPENEM 100 MILLIGRAM(S): 1 INJECTION INTRAVENOUS at 18:44

## 2024-10-22 RX ADMIN — Medication 8 MILLIGRAM(S): at 13:19

## 2024-10-22 NOTE — CONSULT NOTE ADULT - SUBJECTIVE AND OBJECTIVE BOX
[facility]  INFECTIOUS DISEASES   [address] [place] [address]  Tel: [phone]     Fax: [phone]  ==============================================================================  DO Kush Cadena MD Sehrish Shahid, MD Alexandra Gutman, NP   ==============================================================================    STAN VEGA  [id number]  Male  54y ([birth year])      Patient is a 54y old  Male who presents with a chief complaint of West Catheter Obstruction & UTI (22 Oct 2024 09:02)      HPI:  The patient is 55 y/o man with a PMH of HTN, HLD, CAD. Emphysema (home O2), paraplegic, hepatitis C, Indwelling catheter due to neurogenic bladder sent to the ED from [facility] for West Catheter Obstruction. Endorses leaking of urine around the west catheter, unsure when it originally started, but has worsen over the past couple days. Last catheter exchange was 2 months ago, which is normally done either at the long term or [facility]. Denies any other acute complaints. Labs unremarkable for H/H: 11.6/38.0, UA (+) Nitrite, Large-leuk Esterase, too many-WBC, Many-Bacteria. Vitals stable. Patient is being admitted having a Suprapubic catheter place by IR.    (18 Oct 2024 22:44)  ID consulted for workup and antibiotic management     PAST MEDICAL & SURGICAL HISTORY:  CAD (coronary artery disease)  HTN (hypertension)  HLD (hyperlipidemia)  Neurogenic bladder  Bipolar disorder  S/P ileostomy  Indwelling West catheter present  Chronic hepatitis C virus infection  S/P laminectomy  S/P ileostomy      Allergies  Zyprexa (Rash; Dystonic RXN; Hives)  Risperdal (Short breath; Rash; Hives)  Stelazine (Unknown)  NSAIDs (Flushing; Other (Moderate))  Motrin (Anaphylaxis)  Aleve (Unknown)  Haldol (Anaphylaxis)  Thorazine (Other (Moderate))        ANTIMICROBIALS:  meropenem  IVPB 1000 every 8 hours  meropenem  IVPB        MEDICATIONS  (STANDING):    cefTRIAXone   IVPB   100 mL/Hr IV Intermittent (10-21-24 @ 09:07)   100 mL/Hr IV Intermittent (10-20-24 @ 13:52)    meropenem  IVPB   100 mL/Hr IV Intermittent (10-22-24 @ 09:51)    trimethoprim  160 mG/sulfamethoxazole 800 mG   1 Tablet(s) Oral (10-20-24 @ 06:25)   1 Tablet(s) Oral (10-19-24 @ 17:27)   1 Tablet(s) Oral (10-19-24 @ 06:18)   1 Tablet(s) Oral (10-19-24 @ 00:21)        OTHER MEDS: MEDICATIONS  (STANDING):  acetaminophen     Tablet .. 650 every 6 hours PRN  albuterol    90 MICROgram(s) HFA Inhaler 2 every 6 hours PRN  albuterol/ipratropium for Nebulization 3 every 6 hours  aluminum hydroxide/magnesium hydroxide/simethicone Suspension 30 every 4 hours PRN  diphenhydrAMINE 50 every 4 hours PRN  DULoxetine 30 daily  finasteride 5 daily  furosemide   Injectable 40 daily  gabapentin 300 every 8 hours  heparin   Injectable 5000 every 12 hours  HYDROmorphone   Tablet 8 every 6 hours PRN  HYDROmorphone  Injectable 0.5 every 8 hours PRN  melatonin 3 at bedtime PRN  methadone    Tablet 110 daily  ondansetron Injectable 4 every 8 hours PRN  pantoprazole    Tablet 40 before breakfast  QUEtiapine 400 at bedtime  tamsulosin 0.4 at bedtime      SOCIAL HISTORY:     Smoking Cigarettes [ ]Active [ ] Former [ ]Denies   ETOH [ ]denies [ ]Former [ ]Current Use denies   Drug Use [ ]Never [ ] Former [ ] Active     FAMILY HISTORY:  No pertinent family history in first degree relatives        REVIEW OF SYSTEMS  [  ] ROS unobtainable because:    [  ] All other systems negative except as noted below:	    Constitutional:  [ ] fever [ ] chills  [ ] weight loss  [ ] weakness  Skin:  [ ] rash [ ] phlebitis	  Eyes: [ ] icterus [ ] pain  [ ] discharge	  ENMT: [ ] sore throat  [ ] thrush [ ] ulcers [ ] exudates  Respiratory: [ ] dyspnea [ ] hemoptysis [ ] cough [ ] sputum	  Cardiovascular:  [ ] chest pain [ ] palpitations [ ] edema	  Gastrointestinal:  [ ] nausea [ ] vomiting [ ] diarrhea [ ] constipation [ ] pain	  Genitourinary:  [ ] dysuria [ ] frequency [ ] hematuria [ ] discharge [ ] flank pain  [ ] incontinence  Musculoskeletal:  [ ] myalgias [ ] arthralgias [ ] arthritis  [ ] back pain  Neurological:  [ ] headache [ ] seizures  [ ] confusion/altered mental status  Psychiatric:  [ ] anxiety [ ] depression	  Hematology/Lymphatics:  [ ] lymphadenopathy  Endocrine:  [ ] adrenal [ ] thyroid  Allergic/Immunologic:	 [ ] transplant [ ] seasonal    Vital Signs Last 24 Hrs  T(F): 98.1 (10-22-24 @ 11:45), Max: 100.1 (10-19-24 @ 17:11)    Vital Signs Last 24 Hrs  HR: 84 (10-22-24 @ 12:26) (81 - 96)  BP: 112/66 (10-22-24 @ 11:45) (98/65 - 134/84)  RR: 18 (10-22-24 @ 11:45)  SpO2: 97% (10-22-24 @ 12:26) (92% - 97%)  Wt(kg): --    PHYSICAL EXAM:  Constitutional: non-toxic, no distress  HEAD/EYES: anicteric, no conjunctival injection  ENT:  supple, no thrush  Cardiovascular:   normal S1, S2, no murmur, no edema  Respiratory:  clear BS bilaterally, no wheezes, no rales  GI:  soft, non-tender, normal bowel sounds  :  no west, no CVA tenderness  Musculoskeletal:  no synovitis, normal ROM  Neurologic: awake and alert, normal strength, no focal findings  Skin:  no rash, no erythema, no phlebitis  Heme/Onc: no lymphadenopathy   Psychiatric:  awake, alert, appropriate mood        WBC  WBC Count: 8.27 K/uL (10-22 @ 08:45)  WBC Count: 9.03 K/uL (10-21 @ 08:15)  WBC Count: 10.10 K/uL (10-20 @ 08:11)  WBC Count: 9.82 K/uL (10-19 @ 06:05)  WBC Count: 9.28 K/uL (10-18 @ 20:35)        CBC                        11.7   8.27  )-----------( 169      ( 22 Oct 2024 08:45 )             38.0     BMP/CMP  10-22    140  |  105  |  13  ----------------------------<  86  4.5   |  32[H]  |  0.75    Ca    9.3      22 Oct 2024 06:47      Creatinine Trend  Creatinine Trend: 0.75<--, 0.91<--, 0.79<--, 0.73<--, 0.68<--      MICROBIOLOGY:  Clean Catch  10-18-24   >100,000 CFU/ml Providencia stuartii  --  --      RADIOLOGY:      ACC: [id number] EXAM:  XR CHEST PORTABLE URGENT 1V   ORDERED BY: WAYNE BURTON     PROCEDURE DATE:  10/18/2024      INTERPRETATION:  AP chest on October 18, 2024 at 8:39 PM. Patient has   cough.    Heart magnified by technique.    There is a persistent fine interstitial infiltrate mainly around the left   hilum and into the left midlung.    Chest is similar to June 25 this year.    Double density behind the heart noted possibly represents consolidated   lung medially which is better seen on CAT scan June 27.    IMPRESSION: Persistent left lung finding interstitial infiltrate.-Supine   the heart could represent consolidated lung at right base medially.    --- End of Report ---    WALKER WASHBURN MD  This document has been electronically signed. Oct 19 2024 11:22AM    ACC: [id number] EXAM:  US KIDNEYS AND BLADDER   ORDERED BY: FLAQUITA SMITH     PROCEDURE DATE:  10/20/2024      INTERPRETATION:  CLINICAL INFORMATION: Obstructed West catheter.    COMPARISON: None available.    TECHNIQUE: Sonography of the kidneys and bladder.    FINDINGS:  Right kidney: Poorly visualized due to body habitus.  The lower pole is   completely obscured by bowel gas.  11.8 cm. No renal mass, hydronephrosis   or12.2 cmi.No renal mass, hydronephrosis or calculi.dronephrosis or   calWithin normal limits.r: Unable to image the bladder due to the   patient's inability to position his legs.    Additional findings: Enlarged spleen measures at least 14.3 cm in length.    IMPRESSION:  The lower pole of the right kidney is completely obscured by bowel gas.  No renal mass, hydronephrosis or calculus is visualized sonographically.  The bladder could not be imaged due to the patient's inability to   position his legs.  Splenomegaly.    --- End of Report ---      JEANNETTE DUMONT MD; Attending Radiologist  This document has been electronically signed. Oct 21 2024  3:08AM      I have personally reviewed the above imaging  [facility]  INFECTIOUS DISEASES   [address] [place] [address]  Tel: [phone]     Fax: [phone]  ==============================================================================  DO Kush Cadena MD Sehrish Shahid, MD Thelma Alfonso, NP   ==============================================================================    STAN VEGA  [id number]  Male  54y ([birth year])      Patient is a 54y old  Male who presents with a chief complaint of West Catheter Obstruction & UTI (22 Oct 2024 09:02)      HPI:  The patient is 55 y/o man with a PMH of HTN, HLD, CAD. Emphysema (home O2), paraplegic, hepatitis C, Indwelling catheter due to neurogenic bladder sent to the ED from [facility] for West Catheter Obstruction. Endorses leaking of urine around the west catheter, unsure when it originally started, but has worsen over the past couple days. Last catheter exchange was 2 months ago, which is normally done either at the skilled nursing or [facility]. Denies any other acute complaints. Labs unremarkable for H/H: 11.6/38.0, UA (+) Nitrite, Large-leuk Esterase, too many-WBC, Many-Bacteria. Vitals stable. Patient is being admitted having a Suprapubic catheter place by IR.    (18 Oct 2024 22:44)  ID consulted for workup and antibiotic management.  Seen and examined at bedside. Afebrile, T-max 100.1 F, on O2 nasal cannula. No leukocytosis. Serum Cr: 0.75. Urine culture grew Providencia stuartii, pending susceptibility. The patient was on Bactrim earlier due to no IV access. He has RUE midline now. He was on ceftriaxone for last 2 days and today is now switched to meropenem. He is scheduled for IR SPC placement likely tomorrow.       PAST MEDICAL & SURGICAL HISTORY:  CAD (coronary artery disease)  HTN (hypertension)  HLD (hyperlipidemia)  Neurogenic bladder  Bipolar disorder  S/P ileostomy  Indwelling West catheter present  Chronic hepatitis C virus infection  S/P laminectomy  S/P ileostomy      Allergies  Zyprexa (Rash; Dystonic RXN; Hives)  Risperdal (Short breath; Rash; Hives)  Stelazine (Unknown)  NSAIDs (Flushing; Other (Moderate))  Motrin (Anaphylaxis)  Aleve (Unknown)  Haldol (Anaphylaxis)  Thorazine (Other (Moderate))        ANTIMICROBIALS:  meropenem  IVPB 1000 every 8 hours  meropenem  IVPB        MEDICATIONS  (STANDING):    cefTRIAXone   IVPB   100 mL/Hr IV Intermittent (10-21-24 @ 09:07)   100 mL/Hr IV Intermittent (10-20-24 @ 13:52)    meropenem  IVPB   100 mL/Hr IV Intermittent (10-22-24 @ 09:51)    trimethoprim  160 mG/sulfamethoxazole 800 mG   1 Tablet(s) Oral (10-20-24 @ 06:25)   1 Tablet(s) Oral (10-19-24 @ 17:27)   1 Tablet(s) Oral (10-19-24 @ 06:18)   1 Tablet(s) Oral (10-19-24 @ 00:21)        OTHER MEDS: MEDICATIONS  (STANDING):  acetaminophen     Tablet .. 650 every 6 hours PRN  albuterol    90 MICROgram(s) HFA Inhaler 2 every 6 hours PRN  albuterol/ipratropium for Nebulization 3 every 6 hours  aluminum hydroxide/magnesium hydroxide/simethicone Suspension 30 every 4 hours PRN  diphenhydrAMINE 50 every 4 hours PRN  DULoxetine 30 daily  finasteride 5 daily  furosemide   Injectable 40 daily  gabapentin 300 every 8 hours  heparin   Injectable 5000 every 12 hours  HYDROmorphone   Tablet 8 every 6 hours PRN  HYDROmorphone  Injectable 0.5 every 8 hours PRN  melatonin 3 at bedtime PRN  methadone    Tablet 110 daily  ondansetron Injectable 4 every 8 hours PRN  pantoprazole    Tablet 40 before breakfast  QUEtiapine 400 at bedtime  tamsulosin 0.4 at bedtime      SOCIAL HISTORY:     Smoking Cigarettes [ ]Active [ ] Former [ ]Denies   ETOH [ ]denies [ ]Former [ ]Current Use denies   Drug Use [ ]Never [ ] Former [ ] Active     FAMILY HISTORY:  No pertinent family history in first degree relatives        REVIEW OF SYSTEMS  As per HPI  [ x ] All other systems negative except as noted below:	    Constitutional:  [ ] fever [ ] chills  [ ] weight loss  [ ] weakness  Skin:  [ ] rash [ ] phlebitis	  Eyes: [ ] icterus [ ] pain  [ ] discharge	  ENMT: [ ] sore throat  [ ] thrush [ ] ulcers [ ] exudates  Respiratory: [ ] dyspnea [ ] hemoptysis [ ] cough [ ] sputum	  Cardiovascular:  [ ] chest pain [ ] palpitations [ ] edema	  Gastrointestinal:  [ ] nausea [ ] vomiting [ ] diarrhea [ ] constipation [ ] pain	  Genitourinary:  [ ] dysuria [ ] frequency [ ] hematuria [ ] discharge [ ] flank pain  [ ] incontinence  Musculoskeletal:  [ ] myalgias [ ] arthralgias [ ] arthritis  [ ] back pain  Neurological:  [ ] headache [ ] seizures  [ ] confusion/altered mental status  Psychiatric:  [ ] anxiety [ ] depression	  Hematology/Lymphatics:  [ ] lymphadenopathy  Endocrine:  [ ] adrenal [ ] thyroid  Allergic/Immunologic:	 [ ] transplant [ ] seasonal    Vital Signs Last 24 Hrs  T(F): 98.1 (10-22-24 @ 11:45), Max: 100.1 (10-19-24 @ 17:11)    Vital Signs Last 24 Hrs  HR: 84 (10-22-24 @ 12:26) (81 - 96)  BP: 112/66 (10-22-24 @ 11:45) (98/65 - 134/84)  RR: 18 (10-22-24 @ 11:45)  SpO2: 97% (10-22-24 @ 12:26) (92% - 97%)  Wt(kg): --    PHYSICAL EXAM:  Constitutional: Middle age man., non-toxic, no distress  HEAD/EYES: anicteric, no conjunctival injection  ENT:  supple, no thrush, on NC  Cardiovascular:   normal S1, S2, no murmur, no edema  Respiratory:  clear BS bilaterally, no wheezes, no rales  GI:  soft, non-tender, + ileostomy, surgical scar marks  :  +west  Musculoskeletal:  RUE midline, skin with small blisters around the line dressing. Contracted LE  Neurologic: awake and alert, paraplegic  Skin:  + rash, no erythema, no phlebitis  Heme/Onc: no lymphadenopathy   Psychiatric:  awake, alert, appropriate mood        WBC  WBC Count: 8.27 K/uL (10-22 @ 08:45)  WBC Count: 9.03 K/uL (10-21 @ 08:15)  WBC Count: 10.10 K/uL (10-20 @ 08:11)  WBC Count: 9.82 K/uL (10-19 @ 06:05)  WBC Count: 9.28 K/uL (10-18 @ 20:35)        CBC                        11.7   8.27  )-----------( 169      ( 22 Oct 2024 08:45 )             38.0     BMP/CMP  10-22    140  |  105  |  13  ----------------------------<  86  4.5   |  32[H]  |  0.75    Ca    9.3      22 Oct 2024 06:47      Creatinine Trend  Creatinine Trend: 0.75<--, 0.91<--, 0.79<--, 0.73<--, 0.68<--      MICROBIOLOGY:  Clean Catch  10-18-24   >100,000 CFU/ml Providencia stuartii  --  --      RADIOLOGY:      ACC: [id number] EXAM:  XR CHEST PORTABLE URGENT 1V   ORDERED BY: WAYNE BURTON     PROCEDURE DATE:  10/18/2024      INTERPRETATION:  AP chest on October 18, 2024 at 8:39 PM. Patient has   cough.    Heart magnified by technique.    There is a persistent fine interstitial infiltrate mainly around the left   hilum and into the left midlung.    Chest is similar to June 25 this year.    Double density behind the heart noted possibly represents consolidated   lung medially which is better seen on CAT scan June 27.    IMPRESSION: Persistent left lung finding interstitial infiltrate.-Supine   the heart could represent consolidated lung at right base medially.    --- End of Report ---    WALKER WASHBURN MD  This document has been electronically signed. Oct 19 2024 11:22AM    ACC: [id number] EXAM:  US KIDNEYS AND BLADDER   ORDERED BY: FLAQUITA SMITH     PROCEDURE DATE:  10/20/2024      INTERPRETATION:  CLINICAL INFORMATION: Obstructed West catheter.    COMPARISON: None available.    TECHNIQUE: Sonography of the kidneys and bladder.    FINDINGS:  Right kidney: Poorly visualized due to body habitus.  The lower pole is   completely obscured by bowel gas.  11.8 cm. No renal mass, hydronephrosis   or12.2 cmi.No renal mass, hydronephrosis or calculi.dronephrosis or   calWithin normal limits.r: Unable to image the bladder due to the   patient's inability to position his legs.    Additional findings: Enlarged spleen measures at least 14.3 cm in length.    IMPRESSION:  The lower pole of the right kidney is completely obscured by bowel gas.  No renal mass, hydronephrosis or calculus is visualized sonographically.  The bladder could not be imaged due to the patient's inability to   position his legs.  Splenomegaly.    --- End of Report ---      JEANNETTE DUMONT MD; Attending Radiologist  This document has been electronically signed. Oct 21 2024  3:08AM      I have personally reviewed the above imaging

## 2024-10-22 NOTE — PROGRESS NOTE ADULT - SUBJECTIVE AND OBJECTIVE BOX
INTERVAL HPI/OVERNIGHT EVENTS: IR procedure cancelled as patient ate   SUBJECTIVE: Patient seen and examined at bedside.   ROS: All negative except as listed above.    VITAL SIGNS:  Vital Signs Last 24 Hrs  T(C): 37.7 (22 Oct 2024 04:57), Max: 37.7 (22 Oct 2024 04:57)  T(F): 99.8 (22 Oct 2024 04:57), Max: 99.8 (22 Oct 2024 04:57)  HR: 87 (22 Oct 2024 05:25) (81 - 96)  BP: 134/84 (22 Oct 2024 04:57) (98/65 - 134/84)  BP(mean): --  ABP: --  ABP(mean): --  RR: 18 (22 Oct 2024 04:57) (16 - 18)  SpO2: 94% (22 Oct 2024 05:25) (93% - 96%)    O2 Parameters below as of 22 Oct 2024 05:25  Patient On (Oxygen Delivery Method): room air            Plateau pressure:   P/F ratio:     10-21 @ 07:01  -  10-22 @ 07:00  --------------------------------------------------------  IN: 0 mL / OUT: 500 mL / NET: -500 mL      CAPILLARY BLOOD GLUCOSE          ECG: reviewed.    PHYSICAL EXAM:  GENERAL: NAD, lying in bed comfortably  HEAD:  Atraumatic, normocephalic  EYES: EOMI, PERRLA, conjunctiva and sclera clear  NECK: Supple, trachea midline, no JVD  HEART: Regular rate and rhythm, no murmurs, rubs, or gallops  LUNGS: Unlabored respirations.  Clear to auscultation bilaterally, no crackles, wheezing, or rhonchi  ABDOMEN: + ileostomy in place; Soft, nontender, nondistended, +BS  EXTREMITIES: 2+ peripheral pulses bilaterally. No clubbing, cyanosis, or edema  NERVOUS SYSTEM:  A&Ox3, moves b/l  UE, b/l LE extremely contracted    MEDICATIONS:  MEDICATIONS  (STANDING):  albuterol/ipratropium for Nebulization 3 milliLiter(s) Nebulizer every 6 hours  collagenase Ointment 1 Application(s) Topical two times a day  DULoxetine 30 milliGRAM(s) Oral daily  ferrous    sulfate 325 milliGRAM(s) Oral daily  finasteride 5 milliGRAM(s) Oral daily  folic acid 1 milliGRAM(s) Oral daily  furosemide   Injectable 40 milliGRAM(s) IV Push daily  gabapentin 300 milliGRAM(s) Oral every 8 hours  heparin   Injectable 5000 Unit(s) SubCutaneous every 12 hours  meropenem  IVPB      methadone    Tablet 110 milliGRAM(s) Oral daily  multivitamin 1 Tablet(s) Oral daily  pantoprazole    Tablet 40 milliGRAM(s) Oral before breakfast  QUEtiapine 400 milliGRAM(s) Oral at bedtime  tamsulosin 0.4 milliGRAM(s) Oral at bedtime    MEDICATIONS  (PRN):  acetaminophen     Tablet .. 650 milliGRAM(s) Oral every 6 hours PRN Temp greater or equal to 38C (100.4F), Mild Pain (1 - 3)  albuterol    90 MICROgram(s) HFA Inhaler 2 Puff(s) Inhalation every 6 hours PRN Respiratory Distress  aluminum hydroxide/magnesium hydroxide/simethicone Suspension 30 milliLiter(s) Oral every 4 hours PRN Dyspepsia  diphenhydrAMINE 50 milliGRAM(s) Oral every 4 hours PRN Rash and/or Itching  HYDROmorphone   Tablet 8 milliGRAM(s) Oral every 6 hours PRN Severe Pain (7 - 10)  HYDROmorphone  Injectable 0.5 milliGRAM(s) IV Push every 8 hours PRN Severe Pain (7 - 10)  melatonin 3 milliGRAM(s) Oral at bedtime PRN Insomnia  ondansetron Injectable 4 milliGRAM(s) IV Push every 8 hours PRN Nausea and/or Vomiting      ALLERGIES:  Allergies    Zyprexa (Rash; Dystonic RXN; Hives)  Risperdal (Short breath; Rash; Hives)  Stelazine (Unknown)  NSAIDs (Flushing; Other (Moderate))  Motrin (Anaphylaxis)  Aleve (Unknown)  Haldol (Anaphylaxis)  Thorazine (Other (Moderate))    Intolerances        LABS:                        11.3   9.03  )-----------( 154      ( 21 Oct 2024 08:15 )             37.1     10-21    139  |  102  |  14  ----------------------------<  145[H]  3.0[L]   |  32[H]  |  0.91    Ca    8.7      21 Oct 2024 08:15      PT/INR - ( 21 Oct 2024 13:50 )   PT: 13.6 sec;   INR: 1.17 ratio         PTT - ( 21 Oct 2024 13:50 )  PTT:32.0 sec  Urinalysis Basic - ( 21 Oct 2024 08:15 )    Color: x / Appearance: x / SG: x / pH: x  Gluc: 145 mg/dL / Ketone: x  / Bili: x / Urobili: x   Blood: x / Protein: x / Nitrite: x   Leuk Esterase: x / RBC: x / WBC x   Sq Epi: x / Non Sq Epi: x / Bacteria: x      ABG:      vBG:    Micro:     Urinalysis with Rflx Culture (collected 10-18-24 @ 22:00)         RADIOLOGY & ADDITIONAL TESTS: Reviewed.

## 2024-10-22 NOTE — PROGRESS NOTE ADULT - ASSESSMENT
54-year-old male with a PMH HTN, HLD, CAD. Emphysema (home O2), paraplegic, hepatitis C, Indwelling catheter due to neurogenic bladder sent to the ED from [facility] for West Catheter Obstruction. Endorses leaking of urine around the west catheter, unsure when it originally started, but has worsen over the past couple days. Last catheter exchange was 2 months ago, which is normally done either at the alf or [facility]. Denies any other acute complaints. Labs unremarkable for H/H: 11.6/38.0, UA (+) Nitrite, Large-leuk Esterase, too many-WBC, Many-Bacteria. Vitals stable. Patient is being admitted having a Suprapubic catheter place by IR.         #Obstruction of urinary catheter  Chronic Indwelling Catheter - Last exchange 2 months ago    Evaluated by Urology - Unable to exchange the catheter  - unable to obtain CT A/P due body habitus/anatomy  - US abdomen limited, findings as above  - Suprapubic catheter to be placed by IR on wednesday 10/23    #perioperative clearance  -Reports no symptoms of chest pain or worsening dyspnea at rest or with exertion, orthopnea or palpitations.  - hx of JUVENAL, COPD on home O2. respiratory status stable as pt is on 6L, same as home dose. tolerating BiPAP  - TTE with EF of 40-45%, no significant interval change since previous TTE  - EKG without acute ischemic changes  - at baseline, not on anti-coagulation  - Patient is at moderate risk for low risk procedure given medical co-morbidities. pt is medically optimized.    #Emphysema, JUVENAL  #hx of acute respiratory failure  on home O2 and BiPAP  - stable on home O2 setting  - c/w nocturnal BiPAP  - Pulm following    #chronic HFrEF  - TTE with EF of 40-45%, no significant interval change since previous TTE  - EKG without acute ischemic changes  - C/w lasix 40mg IV  - GDMT: soft bp. will consider low dose beta blocker if Bp improves    #UTI  UA (+) Nitrite, Large-leuk Esterase, too many-WBC, Many-Bacteria  Ucx: Providencia stuartii  - will switch from Rocephin to meropenem  - F/U Urine Cx sensitivities  - ID following     #Chronic pain/neuropathy   - c/w gabapentin, cymbalta, dilaudid (home med)    #H/o Opioid abuse  #Bipolar disorder   Methadone dose verified with Ana Hood. Pt is on Methadone 110mg q daily, last fill date 9/27 for 1 month supply. Last intake of med prior to hospitalization was 10/18 AM.   - c/w seroquel, methadone, cymbalta     Neurogenic bladder   - plan for SPC as above  - c/w flomax         DVT ppx - SQH- will hold in anticipation for procedure

## 2024-10-22 NOTE — PROGRESS NOTE ADULT - SUBJECTIVE AND OBJECTIVE BOX
Patient seen and examined bedside resting comfortably.  No complaints offered.   Denies nausea vomitin, diarrhea, fevers, chills, abd pain.  Tolerating diet.      T(F): 98.1 (10-22-24 @ 11:45), Max: 99.8 (10-22-24 @ 04:57)  HR: 84 (10-22-24 @ 12:26) (81 - 96)  BP: 112/66 (10-22-24 @ 11:45) (98/65 - 134/84)  RR: 18 (10-22-24 @ 11:45) (16 - 18)  SpO2: 97% (10-22-24 @ 12:26) (92% - 97%)  Wt(kg): --  CAPILLARY BLOOD GLUCOSE          PHYSICAL EXAM:  General: NAD, alert and awake  HEENT: NCAT, EOMI, conjunctiva clear  Chest: nonlabored respirations, good inspiratory effort  Abdomen: soft, NTND. with ileostomy   : no suprapubic distention noted. noted hypospadias and open glans, with catheter in place draining clear yellow urine       LABS:                        11.7   8.27  )-----------( 169      ( 22 Oct 2024 08:45 )             38.0   10-22    140  |  105  |  13  ----------------------------<  86  4.5   |  32[H]  |  0.75    Ca    9.3      22 Oct 2024 06:47    PT/INR - ( 21 Oct 2024 13:50 )   PT: 13.6 sec;   INR: 1.17 ratio         PTT - ( 21 Oct 2024 13:50 )  PTT:32.0 sec  I&O's Detail    21 Oct 2024 07:01  -  22 Oct 2024 07:00  --------------------------------------------------------  IN:  Total IN: 0 mL    OUT:    Indwelling Catheter - Urethral (mL): 500 mL  Total OUT: 500 mL    Total NET: -500 mL      22 Oct 2024 07:01  -  22 Oct 2024 14:17  --------------------------------------------------------  IN:  Total IN: 0 mL    OUT:    Indwelling Catheter - Urethral (mL): 1450 mL  Total OUT: 1450 mL    Total NET: -1450 mL        A/P:  54y paraplegic Male with contractures and chronic west catheter 2/2 neurogenic bladder  prelim UCx 10/18 providenci stuartii antibiotics switched to merem today per ID  IR SPC under anesthesia cancelled monday 2/2 pt eating  -c/w merem per ID, Follow up final cx  -IR SPC tomorrow. NPO @ MN and am labs   -continue care per primary team  -case discussed with Dr Yoder

## 2024-10-22 NOTE — PROGRESS NOTE ADULT - ASSESSMENT
54M w/ cervical epidural abscess, b/l LE paralysis, pneumoperitoneum s/p ex-lap w/ ileostomy complicated by MRSA bacteremia and evisceration, Hep C, emphysema on chronic O2, L foot osteomyelitis, bipolar, opioid dependence, HTN admitted to medical floor for chronic west obstruction. Pulmonary consulted for evaluation prior to SPC placement with urology team.     Dx: Chronic hypoxic respiratory failure, JUVENAL, COPD, paraplegia    Recommendations:   - patient was supposed to go for SPC placement with IR yesterday but cancelled by anesthesia as patient ate; procedure to be rescheduled for Wednesday 10/2  - At home patient oxygen requirements are 6L with Spo2 92%. He is currently on his baseline O2 requirements and does not complain of any respiratory issues with adequate oxygen sats   - Continue home bipap at night for JUVENAL   - Wean o2 as tolrates for goal spo2 > 88%  - TTE with EF of 40-45%, no significant interval change since previous TTE  - patient with prior admission in June 2024 for acute on chronic hypercapnic/hypoxic respiratory failure with large left exudative pleural effusion 1.5L drained at the time.   - At his most optimal respiratory status given he's at his baseline requirements at this time  - rest of care per primary team     Discussed with Dr. Mcdaniel

## 2024-10-22 NOTE — CONSULT NOTE ADULT - ASSESSMENT
The patient is 53 y/o man with extensive PMH of HTN, HLD, CAD, hx of cervical epidural abscess s/p treatment, L foot OM, hx of pneumoperitoneum s/p ileostomy, emphysema (home O2), paraplegia, hepatitis C, Indwelling catheter due to neurogenic bladder, who was sent to the ED from [facility] for west catheter obstruction. Endorsed leaking of urine around the west catheter, unsure when it originally started. Last catheter exchange was 2 months ago, which is normally done either at the [facility] or [facility]. Denies any other acute complaints. Labs unremarkable for H/H: 11.6/38.0, UA (+) Nitrite, Large-leuk Esterase, too many-WBC, Many-Bacteria. Vitals stable. Patient is being admitted having a Suprapubic catheter place by IR.    (18 Oct 2024 22:44)  ID consulted for workup and antibiotic management.    10/22: Afebrile, T-max 100.1 F, on O2 nasal cannula. No leukocytosis. Serum Cr: 0.75. Urine culture grew Providencia stuartii, pending susceptibility. The patient was on Bactrim earlier due to no IV access. He has RUE midline now. He was on ceftriaxone for last 2 days and today is now switched to meropenem. He is scheduled for IR SPC placement likely tomorrow.     Impression:  1. Complicated UTI due to indwelling west's malfunction  2. Extensive PMH as noted above    Recommendations:  - Continue meropenem 1 g IV q8h  - Follow urine culture susceptibility, would modify antibiotics accordingly  - Supra-pubic catheter by IR  - Urology follow up  - Rest of care per primary team    Discussed with medical PA, bedside RN    Loren Haywood MD  Attending Physician  Division of Infectious Diseases   Available via [employer] Teams

## 2024-10-22 NOTE — CONSULT NOTE ADULT - CONSULT REASON
antibiotics management
leaking catheter
suprapubic catheter insertion
Pulm Clearance prior to procedure

## 2024-10-22 NOTE — CONSULT NOTE ADULT - REASON FOR ADMISSION
[place] Catheter Obstruction & UTI

## 2024-10-22 NOTE — PROGRESS NOTE ADULT - SUBJECTIVE AND OBJECTIVE BOX
PROGRESS NOTE:     Patient is a 54y old  Male who presents with a chief complaint of West Catheter Obstruction & UTI (18 Oct 2024 22:44)          SUBJECTIVE & OBJECTIVE:   Pt seen and examined at bedside in AM. unable to have SPC placement as patient ate    no overnight events.     REVIEW OF SYSTEMS: remaining ROS negative     PHYSICAL EXAM:  VITALS:  Vital Signs Last 24 Hrs  T(C): 36.7 (22 Oct 2024 11:45), Max: 37.7 (22 Oct 2024 04:57)  T(F): 98.1 (22 Oct 2024 11:45), Max: 99.8 (22 Oct 2024 04:57)  HR: 84 (22 Oct 2024 12:26) (81 - 96)  BP: 112/66 (22 Oct 2024 11:45) (98/65 - 134/84)  BP(mean): 81 (22 Oct 2024 11:45) (81 - 81)  RR: 18 (22 Oct 2024 11:45) (16 - 18)  SpO2: 97% (22 Oct 2024 12:26) (92% - 97%)    Parameters below as of 22 Oct 2024 12:26  Patient On (Oxygen Delivery Method): nasal cannula w/ humidification            GENERAL: NAD,  no increased WOB  HEAD:  Atraumatic, Normocephalic  EYES: EOMI, conjunctiva and sclera clear  ENMT: Moist mucous membranes  NECK: Supple, No JVD  NERVOUS SYSTEM:  Alert & Oriented    CHEST/LUNG: rhonchi; No rales, wheezing  HEART: Regular rate and rhythm  ABDOMEN: Soft, Nontender, Nondistended; Bowel sounds present, +west, +ostomy  EXTREMITIES: contracted, LE edema      MEDICATIONS  (STANDING):  albuterol/ipratropium for Nebulization 3 milliLiter(s) Nebulizer every 6 hours  collagenase Ointment 1 Application(s) Topical two times a day  DULoxetine 30 milliGRAM(s) Oral daily  ferrous    sulfate 325 milliGRAM(s) Oral daily  finasteride 5 milliGRAM(s) Oral daily  folic acid 1 milliGRAM(s) Oral daily  furosemide   Injectable 40 milliGRAM(s) IV Push daily  gabapentin 300 milliGRAM(s) Oral every 8 hours  heparin   Injectable 5000 Unit(s) SubCutaneous every 12 hours  meropenem  IVPB 1000 milliGRAM(s) IV Intermittent every 8 hours  meropenem  IVPB      methadone    Tablet 110 milliGRAM(s) Oral daily  multivitamin 1 Tablet(s) Oral daily  pantoprazole    Tablet 40 milliGRAM(s) Oral before breakfast  QUEtiapine 400 milliGRAM(s) Oral at bedtime  tamsulosin 0.4 milliGRAM(s) Oral at bedtime    MEDICATIONS  (PRN):  acetaminophen     Tablet .. 650 milliGRAM(s) Oral every 6 hours PRN Temp greater or equal to 38C (100.4F), Mild Pain (1 - 3)  albuterol    90 MICROgram(s) HFA Inhaler 2 Puff(s) Inhalation every 6 hours PRN Respiratory Distress  aluminum hydroxide/magnesium hydroxide/simethicone Suspension 30 milliLiter(s) Oral every 4 hours PRN Dyspepsia  diphenhydrAMINE 50 milliGRAM(s) Oral every 4 hours PRN Rash and/or Itching  HYDROmorphone   Tablet 8 milliGRAM(s) Oral every 6 hours PRN Severe Pain (7 - 10)  HYDROmorphone  Injectable 0.5 milliGRAM(s) IV Push every 8 hours PRN Severe Pain (7 - 10)  melatonin 3 milliGRAM(s) Oral at bedtime PRN Insomnia  ondansetron Injectable 4 milliGRAM(s) IV Push every 8 hours PRN Nausea and/or Vomiting          Allergies    Zyprexa (Rash; Dystonic RXN; Hives)  Risperdal (Short breath; Rash; Hives)  Stelazine (Unknown)  NSAIDs (Flushing; Other (Moderate))  Motrin (Anaphylaxis)  Aleve (Unknown)  Haldol (Anaphylaxis)  Thorazine (Other (Moderate))    Intolerances              LABS:                                                 11.7   8.27  )-----------( 169      ( 22 Oct 2024 08:45 )             38.0     10-22    140  |  105  |  13  ----------------------------<  86  4.5   |  32[H]  |  0.75    Ca    9.3      22 Oct 2024 06:47        PT/INR - ( 21 Oct 2024 13:50 )   PT: 13.6 sec;   INR: 1.17 ratio         PTT - ( 21 Oct 2024 13:50 )  PTT:32.0 sec  Urinalysis Basic - ( 22 Oct 2024 06:47 )    Color: x / Appearance: x / SG: x / pH: x  Gluc: 86 mg/dL / Ketone: x  / Bili: x / Urobili: x   Blood: x / Protein: x / Nitrite: x   Leuk Esterase: x / RBC: x / WBC x   Sq Epi: x / Non Sq Epi: x / Bacteria: x            CAPILLARY BLOOD GLUCOSE                    RECENT CULTURES:    CULTURE RESULTS:                10-18-24 @ 22:00  Specimen Source: --  Method Type: --  Gram Stain - RRL: --  Gram Stain - Wound: --  Bacteria: Many  Culture Results:   >100,000 CFU/ml Providencia stuartii      Specimen Source:   Method Type:   Gram Stain:   Culture Results: Culture Results:   >100,000 CFU/ml Providencia stuartii (10-18-24 @ 22:00)    Bacteria: Bacteria: Many /HPF (10-18-24 @ 22:00)          RADIOLOGY & ADDITIONAL TESTS:    < from: Xray Chest 1 View- PORTABLE-Urgent (Xray Chest 1 View- PORTABLE-Urgent .) (10.18.24 @ 20:57) >  IMPRESSION: Persistent left lung finding interstitial infiltrate.-Supine   the heart could represent consolidated lung at right base medially.    < end of copied text >    < from: US Kidney and Bladder (10.20.24 @ 20:49) >  IMPRESSION:  The lower pole of the right kidney is completely obscured by bowel gas.    No renal mass, hydronephrosis or calculus is visualized sonographically.    The bladder could not be imaged due to the patient's inability to   position his legs.    < end of copied text >      < from: TTE Limited Echo w/o Cont (10.20.24 @ 09:53) >   2. Left ventricular systolic function is moderately decreased with an   ejection fraction visually estimated at 40 to 45 %.    < end of copied text >      Care plan and all findings were discussed in detail with patient.  All questions and concerns addressed

## 2024-10-23 LAB
ALBUMIN SERPL ELPH-MCNC: 2.5 G/DL — LOW (ref 3.3–5)
ALP SERPL-CCNC: 135 U/L — HIGH (ref 40–120)
ALT FLD-CCNC: 26 U/L — SIGNIFICANT CHANGE UP (ref 12–78)
ANION GAP SERPL CALC-SCNC: 5 MMOL/L — SIGNIFICANT CHANGE UP (ref 5–17)
APTT BLD: 30.2 SEC — SIGNIFICANT CHANGE UP (ref 24.5–35.6)
AST SERPL-CCNC: 24 U/L — SIGNIFICANT CHANGE UP (ref 15–37)
BILIRUB SERPL-MCNC: 0.4 MG/DL — SIGNIFICANT CHANGE UP (ref 0.2–1.2)
BUN SERPL-MCNC: 12 MG/DL — SIGNIFICANT CHANGE UP (ref 7–23)
CALCIUM SERPL-MCNC: 9.4 MG/DL — SIGNIFICANT CHANGE UP (ref 8.5–10.1)
CHLORIDE SERPL-SCNC: 104 MMOL/L — SIGNIFICANT CHANGE UP (ref 96–108)
CO2 SERPL-SCNC: 32 MMOL/L — HIGH (ref 22–31)
CREAT SERPL-MCNC: 0.57 MG/DL — SIGNIFICANT CHANGE UP (ref 0.5–1.3)
EGFR: 117 ML/MIN/1.73M2 — SIGNIFICANT CHANGE UP
GLUCOSE SERPL-MCNC: 80 MG/DL — SIGNIFICANT CHANGE UP (ref 70–99)
HCT VFR BLD CALC: 37.4 % — LOW (ref 39–50)
HGB BLD-MCNC: 11.3 G/DL — LOW (ref 13–17)
INR BLD: 1.19 RATIO — HIGH (ref 0.85–1.16)
MCHC RBC-ENTMCNC: 27.1 PG — SIGNIFICANT CHANGE UP (ref 27–34)
MCHC RBC-ENTMCNC: 30.2 G/DL — LOW (ref 32–36)
MCV RBC AUTO: 89.7 FL — SIGNIFICANT CHANGE UP (ref 80–100)
NRBC # BLD: 0 /100 WBCS — SIGNIFICANT CHANGE UP (ref 0–0)
PLATELET # BLD AUTO: 159 K/UL — SIGNIFICANT CHANGE UP (ref 150–400)
POTASSIUM SERPL-MCNC: 3.8 MMOL/L — SIGNIFICANT CHANGE UP (ref 3.5–5.3)
POTASSIUM SERPL-SCNC: 3.8 MMOL/L — SIGNIFICANT CHANGE UP (ref 3.5–5.3)
PROT SERPL-MCNC: 8 GM/DL — SIGNIFICANT CHANGE UP (ref 6–8.3)
PROTHROM AB SERPL-ACNC: 13.4 SEC — SIGNIFICANT CHANGE UP (ref 9.9–13.4)
RBC # BLD: 4.17 M/UL — LOW (ref 4.2–5.8)
RBC # FLD: 15.6 % — HIGH (ref 10.3–14.5)
SODIUM SERPL-SCNC: 141 MMOL/L — SIGNIFICANT CHANGE UP (ref 135–145)
WBC # BLD: 8.09 K/UL — SIGNIFICANT CHANGE UP (ref 3.8–10.5)
WBC # FLD AUTO: 8.09 K/UL — SIGNIFICANT CHANGE UP (ref 3.8–10.5)

## 2024-10-23 PROCEDURE — 99232 SBSQ HOSP IP/OBS MODERATE 35: CPT

## 2024-10-23 PROCEDURE — 51102 DRAIN BL W/CATH INSERTION: CPT

## 2024-10-23 PROCEDURE — 76942 ECHO GUIDE FOR BIOPSY: CPT | Mod: 26

## 2024-10-23 PROCEDURE — 99233 SBSQ HOSP IP/OBS HIGH 50: CPT

## 2024-10-23 RX ORDER — IPRATROPIUM BROMIDE AND ALBUTEROL SULFATE .5; 2.5 MG/3ML; MG/3ML
3 SOLUTION RESPIRATORY (INHALATION) ONCE
Refills: 0 | Status: COMPLETED | OUTPATIENT
Start: 2024-10-23 | End: 2024-10-23

## 2024-10-23 RX ADMIN — Medication 325 MILLIGRAM(S): at 14:07

## 2024-10-23 RX ADMIN — MEROPENEM 100 MILLIGRAM(S): 1 INJECTION INTRAVENOUS at 00:51

## 2024-10-23 RX ADMIN — MEROPENEM 100 MILLIGRAM(S): 1 INJECTION INTRAVENOUS at 17:16

## 2024-10-23 RX ADMIN — FOLIC ACID 1 MILLIGRAM(S): 1 TABLET ORAL at 14:07

## 2024-10-23 RX ADMIN — Medication 40 MILLIGRAM(S): at 05:41

## 2024-10-23 RX ADMIN — Medication 8 MILLIGRAM(S): at 16:32

## 2024-10-23 RX ADMIN — Medication 8 MILLIGRAM(S): at 22:51

## 2024-10-23 RX ADMIN — IPRATROPIUM BROMIDE AND ALBUTEROL SULFATE 3 MILLILITER(S): .5; 2.5 SOLUTION RESPIRATORY (INHALATION) at 17:32

## 2024-10-23 RX ADMIN — Medication 8 MILLIGRAM(S): at 17:30

## 2024-10-23 RX ADMIN — Medication 0.5 MILLIGRAM(S): at 05:41

## 2024-10-23 RX ADMIN — IPRATROPIUM BROMIDE AND ALBUTEROL SULFATE 3 MILLILITER(S): .5; 2.5 SOLUTION RESPIRATORY (INHALATION) at 23:26

## 2024-10-23 RX ADMIN — Medication 0.4 MILLIGRAM(S): at 22:51

## 2024-10-23 RX ADMIN — GABAPENTIN 300 MILLIGRAM(S): 300 CAPSULE ORAL at 14:06

## 2024-10-23 RX ADMIN — IPRATROPIUM BROMIDE AND ALBUTEROL SULFATE 3 MILLILITER(S): .5; 2.5 SOLUTION RESPIRATORY (INHALATION) at 05:38

## 2024-10-23 RX ADMIN — Medication 1 APPLICATION(S): at 17:16

## 2024-10-23 RX ADMIN — IPRATROPIUM BROMIDE AND ALBUTEROL SULFATE 3 MILLILITER(S): .5; 2.5 SOLUTION RESPIRATORY (INHALATION) at 11:11

## 2024-10-23 RX ADMIN — PANTOPRAZOLE SODIUM 40 MILLIGRAM(S): 40 TABLET, DELAYED RELEASE ORAL at 05:40

## 2024-10-23 RX ADMIN — Medication 1 TABLET(S): at 14:07

## 2024-10-23 RX ADMIN — Medication 1 APPLICATION(S): at 05:41

## 2024-10-23 RX ADMIN — METHADONE HYDROCHLORIDE 110 MILLIGRAM(S): 10 TABLET ORAL at 14:06

## 2024-10-23 RX ADMIN — GABAPENTIN 300 MILLIGRAM(S): 300 CAPSULE ORAL at 22:51

## 2024-10-23 RX ADMIN — FINASTERIDE 5 MILLIGRAM(S): 5 TABLET, FILM COATED ORAL at 14:07

## 2024-10-23 RX ADMIN — QUETIAPINE FUMARATE 400 MILLIGRAM(S): 200 TABLET ORAL at 22:51

## 2024-10-23 RX ADMIN — MEROPENEM 100 MILLIGRAM(S): 1 INJECTION INTRAVENOUS at 10:37

## 2024-10-23 RX ADMIN — Medication 8 MILLIGRAM(S): at 23:51

## 2024-10-23 RX ADMIN — Medication 0.5 MILLIGRAM(S): at 04:41

## 2024-10-23 RX ADMIN — GABAPENTIN 300 MILLIGRAM(S): 300 CAPSULE ORAL at 05:41

## 2024-10-23 NOTE — PROGRESS NOTE ADULT - SUBJECTIVE AND OBJECTIVE BOX
Patient seen and examined bedside resting comfortably.  No complaints offered.   Denies nausea vomitin, diarrhea, fevers, chills, abd pain.        T(F): 97.1 (10-23-24 @ 05:23), Max: 99.2 (10-22-24 @ 17:07)  HR: 71 (10-23-24 @ 08:55) (71 - 94)  BP: 129/78 (10-23-24 @ 05:23) (112/66 - 135/84)  RR: 18 (10-23-24 @ 05:23) (16 - 18)  SpO2: 97% (10-23-24 @ 08:55) (92% - 98%)  Wt(kg): --  CAPILLARY BLOOD GLUCOSE          PHYSICAL EXAM:  General: NAD, alert and awake  HEENT: NCAT, EOMI, conjunctiva clear  Chest: nonlabored respirations, good inspiratory effort  Extremities: no pedal edema or calf tenderness noted   : cath in place draining clear yellow urine 1450 cc in 24 hours    LABS:                        11.3   8.09  )-----------( 159      ( 23 Oct 2024 05:45 )             37.4   10-23    141  |  104  |  12  ----------------------------<  80  3.8   |  32[H]  |  0.57    Ca    9.4      23 Oct 2024 05:45    TPro  8.0  /  Alb  2.5[L]  /  TBili  0.4  /  DBili  x   /  AST  24  /  ALT  26  /  AlkPhos  135[H]  10-23  PT/INR - ( 23 Oct 2024 05:45 )   PT: 13.4 sec;   INR: 1.19 ratio         PTT - ( 23 Oct 2024 05:45 )  PTT:30.2 sec  I&O's Detail    22 Oct 2024 07:01  -  23 Oct 2024 07:00  --------------------------------------------------------  IN:    IV PiggyBack: 50 mL  Total IN: 50 mL    OUT:    Ileostomy (mL): 150 mL    Indwelling Catheter - Urethral (mL): 1450 mL  Total OUT: 1600 mL    Total NET: -1550 mL      23 Oct 2024 07:01  -  23 Oct 2024 11:37  --------------------------------------------------------  IN:  Total IN: 0 mL    OUT:    Indwelling Catheter - Urethral (mL): 700 mL  Total OUT: 700 mL    Total NET: -700 mL      A/P:  54y paraplegic Male with contractures and chronic west catheter 2/2 neurogenic bladder  prelim UCx 10/18 providenci stuartii antibiotics   IR SPC under anesthesia cancelled monday 2/2 pt eating  -c/w sally per ID, Follow up final cx  -plan for IR SPC today   -continue care per primary team

## 2024-10-23 NOTE — PROGRESS NOTE ADULT - SUBJECTIVE AND OBJECTIVE BOX
INTERVAL HPI/OVERNIGHT EVENTS: s/p IR procedure for SPC  SUBJECTIVE: Patient seen and examined at bedside.   ROS: All negative except as listed above.    VITAL SIGNS:  Vital Signs Last 24 Hrs  T(C): 37.3 (23 Oct 2024 13:50), Max: 37.3 (22 Oct 2024 17:07)  T(F): 99.1 (23 Oct 2024 13:50), Max: 99.2 (22 Oct 2024 17:07)  HR: 77 (23 Oct 2024 13:50) (71 - 80)  BP: 103/54 (23 Oct 2024 13:50) (103/54 - 135/84)  BP(mean): --  ABP: --  ABP(mean): --  RR: 18 (23 Oct 2024 13:50) (16 - 18)  SpO2: 96% (23 Oct 2024 13:50) (94% - 98%)    O2 Parameters below as of 23 Oct 2024 13:50  Patient On (Oxygen Delivery Method): nasal cannula  O2 Flow (L/min): 6          Plateau pressure:   P/F ratio:     10-22 @ 07:01  -  10-23 @ 07:00  --------------------------------------------------------  IN: 50 mL / OUT: 1600 mL / NET: -1550 mL    10-23 @ 07:01  -  10-23 @ 14:49  --------------------------------------------------------  IN: 0 mL / OUT: 700 mL / NET: -700 mL      CAPILLARY BLOOD GLUCOSE          ECG: reviewed.    PHYSICAL EXAM:  GENERAL: NAD, lying in bed comfortably  HEAD:  Atraumatic, normocephalic  EYES: EOMI, PERRLA, conjunctiva and sclera clear  NECK: Supple, trachea midline, no JVD  HEART: Regular rate and rhythm, no murmurs, rubs, or gallops  LUNGS: Unlabored respirations.  Clear to auscultation bilaterally, no crackles, wheezing, or rhonchi  ABDOMEN: + ileostomy in place; Soft, nontender, nondistended, +BS  EXTREMITIES: 2+ peripheral pulses bilaterally. No clubbing, cyanosis, or edema  NERVOUS SYSTEM:  A&Ox3, moves b/l  UE, b/l LE extremely contracted      MEDICATIONS:  MEDICATIONS  (STANDING):  albuterol/ipratropium for Nebulization 3 milliLiter(s) Nebulizer every 6 hours  collagenase Ointment 1 Application(s) Topical two times a day  DULoxetine 30 milliGRAM(s) Oral daily  ferrous    sulfate 325 milliGRAM(s) Oral daily  finasteride 5 milliGRAM(s) Oral daily  folic acid 1 milliGRAM(s) Oral daily  furosemide   Injectable 40 milliGRAM(s) IV Push daily  gabapentin 300 milliGRAM(s) Oral every 8 hours  meropenem  IVPB 1000 milliGRAM(s) IV Intermittent every 8 hours  methadone    Tablet 110 milliGRAM(s) Oral daily  multivitamin 1 Tablet(s) Oral daily  pantoprazole    Tablet 40 milliGRAM(s) Oral before breakfast  QUEtiapine 400 milliGRAM(s) Oral at bedtime  tamsulosin 0.4 milliGRAM(s) Oral at bedtime    MEDICATIONS  (PRN):  acetaminophen     Tablet .. 650 milliGRAM(s) Oral every 6 hours PRN Temp greater or equal to 38C (100.4F), Mild Pain (1 - 3)  albuterol    90 MICROgram(s) HFA Inhaler 2 Puff(s) Inhalation every 6 hours PRN Respiratory Distress  aluminum hydroxide/magnesium hydroxide/simethicone Suspension 30 milliLiter(s) Oral every 4 hours PRN Dyspepsia  diphenhydrAMINE 50 milliGRAM(s) Oral every 4 hours PRN Rash and/or Itching  HYDROmorphone   Tablet 8 milliGRAM(s) Oral every 6 hours PRN Severe Pain (7 - 10)  HYDROmorphone  Injectable 0.5 milliGRAM(s) IV Push every 8 hours PRN Severe Pain (7 - 10)  melatonin 3 milliGRAM(s) Oral at bedtime PRN Insomnia  ondansetron Injectable 4 milliGRAM(s) IV Push every 8 hours PRN Nausea and/or Vomiting      ALLERGIES:  Allergies    Zyprexa (Rash; Dystonic RXN; Hives)  Risperdal (Short breath; Rash; Hives)  Stelazine (Unknown)  NSAIDs (Flushing; Other (Moderate))  Motrin (Anaphylaxis)  Aleve (Unknown)  Haldol (Anaphylaxis)  Thorazine (Other (Moderate))    Intolerances        LABS:                        11.3   8.09  )-----------( 159      ( 23 Oct 2024 05:45 )             37.4     10-23    141  |  104  |  12  ----------------------------<  80  3.8   |  32[H]  |  0.57    Ca    9.4      23 Oct 2024 05:45    TPro  8.0  /  Alb  2.5[L]  /  TBili  0.4  /  DBili  x   /  AST  24  /  ALT  26  /  AlkPhos  135[H]  10-23    PT/INR - ( 23 Oct 2024 05:45 )   PT: 13.4 sec;   INR: 1.19 ratio         PTT - ( 23 Oct 2024 05:45 )  PTT:30.2 sec  Urinalysis Basic - ( 23 Oct 2024 05:45 )    Color: x / Appearance: x / SG: x / pH: x  Gluc: 80 mg/dL / Ketone: x  / Bili: x / Urobili: x   Blood: x / Protein: x / Nitrite: x   Leuk Esterase: x / RBC: x / WBC x   Sq Epi: x / Non Sq Epi: x / Bacteria: x      ABG:      vBG:    Micro:     Urinalysis with Rflx Culture (collected 10-18-24 @ 22:00)         RADIOLOGY & ADDITIONAL TESTS: Reviewed.

## 2024-10-23 NOTE — PROGRESS NOTE ADULT - SUBJECTIVE AND OBJECTIVE BOX
STAN EVGA  MRN-[id number]  54y (1970)    Follow Up:  West leakage, needs SPC, leukocytosis     Interval History: The pt was seen and examined earlier, not in acute distress, no new complaints, awake and alert, not very talkative. Pt is afebrile,cpap, no WBC.     PAST MEDICAL & SURGICAL HISTORY:  CAD (coronary artery disease)      HTN (hypertension)      HLD (hyperlipidemia)      Neurogenic bladder      Bipolar disorder      S/P ileostomy      Indwelling West catheter present      Chronic hepatitis C virus infection      S/P laminectomy      S/P ileostomy          ROS:    [ ] Unobtainable because:  [x ] All other systems negative    Constitutional: no fever, no chills  Head: no trauma  Eyes: no vision changes, no eye pain  ENT:  no sore throat, no rhinorrhea  Cardiovascular:  no chest pain, no palpitation  Respiratory:  no SOB, no cough  GI:  no abd pain, no vomiting, no diarrhea  urinary: with West, no flank pain  musculoskeletal: contracted  skin:  no rash  neurology:  no headache, no seizure, no change in mental status  psych: no anxiety, no depression         Allergies  Zyprexa (Rash; Dystonic RXN; Hives)  Risperdal (Short breath; Rash; Hives)  Stelazine (Unknown)  NSAIDs (Flushing; Other (Moderate))  Motrin (Anaphylaxis)  Aleve (Unknown)  Haldol (Anaphylaxis)  Thorazine (Other (Moderate))        ANTIMICROBIALS:  meropenem  IVPB 1000 every 8 hours      OTHER MEDS:  acetaminophen     Tablet .. 650 milliGRAM(s) Oral every 6 hours PRN  albuterol    90 MICROgram(s) HFA Inhaler 2 Puff(s) Inhalation every 6 hours PRN  albuterol/ipratropium for Nebulization 3 milliLiter(s) Nebulizer every 6 hours  aluminum hydroxide/magnesium hydroxide/simethicone Suspension 30 milliLiter(s) Oral every 4 hours PRN  collagenase Ointment 1 Application(s) Topical two times a day  diphenhydrAMINE 50 milliGRAM(s) Oral every 4 hours PRN  DULoxetine 30 milliGRAM(s) Oral daily  ferrous    sulfate 325 milliGRAM(s) Oral daily  finasteride 5 milliGRAM(s) Oral daily  folic acid 1 milliGRAM(s) Oral daily  furosemide   Injectable 40 milliGRAM(s) IV Push daily  gabapentin 300 milliGRAM(s) Oral every 8 hours  HYDROmorphone   Tablet 8 milliGRAM(s) Oral every 6 hours PRN  HYDROmorphone  Injectable 0.5 milliGRAM(s) IV Push every 8 hours PRN  melatonin 3 milliGRAM(s) Oral at bedtime PRN  methadone    Tablet 110 milliGRAM(s) Oral daily  multivitamin 1 Tablet(s) Oral daily  ondansetron Injectable 4 milliGRAM(s) IV Push every 8 hours PRN  pantoprazole    Tablet 40 milliGRAM(s) Oral before breakfast  QUEtiapine 400 milliGRAM(s) Oral at bedtime  tamsulosin 0.4 milliGRAM(s) Oral at bedtime      Vital Signs Last 24 Hrs  T(C): 36.2 (23 Oct 2024 05:23), Max: 37.3 (22 Oct 2024 17:07)  T(F): 97.1 (23 Oct 2024 05:23), Max: 99.2 (22 Oct 2024 17:07)  HR: 71 (23 Oct 2024 08:55) (71 - 80)  BP: 129/78 (23 Oct 2024 05:23) (129/78 - 135/84)  BP(mean): --  RR: 18 (23 Oct 2024 05:23) (16 - 18)  SpO2: 97% (23 Oct 2024 08:55) (94% - 98%)    Parameters below as of 23 Oct 2024 06:10  Patient On (Oxygen Delivery Method): BiPAP/CPAP        Physical Exam:  Constitutional: Middle age man., non-toxic, no distress, chronically ill appearing   HEAD/EYES: anicteric, no conjunctival injection  ENT:  supple, no thrush, on cpap   Cardiovascular:   normal S1, S2, no murmur, + edema  Respiratory:  clear BS bilaterally, no wheezes, no rales  GI:  soft, non-tender, + ileostomy, surgical scar marks  :  +west  Musculoskeletal:  RUE midline, skin with small blisters around the line dressing. Contracted LE  Neurologic: awake and alert, paraplegic  Skin:  + rash, no erythema, no phlebitis, multiple tattoos, pt's left foot is dressed c/d/i   Heme/Onc: no lymphadenopathy   Psychiatric:  awake, alert, appropriate mood    WBC Count: 8.09 K/uL (10-23 @ 05:45)  WBC Count: 8.27 K/uL (10-22 @ 08:45)  WBC Count: 9.03 K/uL (10-21 @ 08:15)  WBC Count: 10.10 K/uL (10-20 @ 08:11)  WBC Count: 9.82 K/uL (10-19 @ 06:05)  WBC Count: 9.28 K/uL (10-18 @ 20:35)                            11.3   8.09  )-----------( 159      ( 23 Oct 2024 05:45 )             37.4       10-23    141  |  104  |  12  ----------------------------<  80  3.8   |  32[H]  |  0.57    Ca    9.4      23 Oct 2024 05:45    TPro  8.0  /  Alb  2.5[L]  /  TBili  0.4  /  DBili  x   /  AST  24  /  ALT  26  /  AlkPhos  135[H]  10-23      Urinalysis Basic - ( 23 Oct 2024 05:45 )    Color: x / Appearance: x / SG: x / pH: x  Gluc: 80 mg/dL / Ketone: x  / Bili: x / Urobili: x   Blood: x / Protein: x / Nitrite: x   Leuk Esterase: x / RBC: x / WBC x   Sq Epi: x / Non Sq Epi: x / Bacteria: x        Creatinine Trend: 0.57<--, 0.75<--, 0.91<--, 0.79<--, 0.73<--, 0.68<--      MICROBIOLOGY:  v  Clean Catch  10-18-24   >100,000 CFU/ml Providencia stuartii  --  --    RADIOLOGY:     STAN VEGA  MRN-[id number]  54y (1970)    Follow Up:  West leakage, needs SPC, leukocytosis     Interval History: The pt was seen and examined earlier, not in acute distress, no new complaints, awake and alert, not very talkative. Pt is afebrile,cpap, no WBC.     ROS:    [ ] Unobtainable because:  [x ] All other systems negative    Constitutional: no fever, no chills  Head: no trauma  Eyes: no vision changes, no eye pain  ENT:  no sore throat, no rhinorrhea  Cardiovascular:  no chest pain, no palpitation  Respiratory:  no SOB, no cough  GI:  no abd pain, no vomiting, no diarrhea  urinary: with West, no flank pain  musculoskeletal: contracted  skin:  no rash  neurology:  no headache, no seizure, no change in mental status  psych: no anxiety, no depression     Allergies  Zyprexa (Rash; Dystonic RXN; Hives)  Risperdal (Short breath; Rash; Hives)  Stelazine (Unknown)  NSAIDs (Flushing; Other (Moderate))  Motrin (Anaphylaxis)  Aleve (Unknown)  Haldol (Anaphylaxis)  Thorazine (Other (Moderate))    ANTIMICROBIALS:  meropenem  IVPB 1000 every 8 hours      OTHER MEDS:  acetaminophen     Tablet .. 650 milliGRAM(s) Oral every 6 hours PRN  albuterol    90 MICROgram(s) HFA Inhaler 2 Puff(s) Inhalation every 6 hours PRN  albuterol/ipratropium for Nebulization 3 milliLiter(s) Nebulizer every 6 hours  aluminum hydroxide/magnesium hydroxide/simethicone Suspension 30 milliLiter(s) Oral every 4 hours PRN  collagenase Ointment 1 Application(s) Topical two times a day  diphenhydrAMINE 50 milliGRAM(s) Oral every 4 hours PRN  DULoxetine 30 milliGRAM(s) Oral daily  ferrous    sulfate 325 milliGRAM(s) Oral daily  finasteride 5 milliGRAM(s) Oral daily  folic acid 1 milliGRAM(s) Oral daily  furosemide   Injectable 40 milliGRAM(s) IV Push daily  gabapentin 300 milliGRAM(s) Oral every 8 hours  HYDROmorphone   Tablet 8 milliGRAM(s) Oral every 6 hours PRN  HYDROmorphone  Injectable 0.5 milliGRAM(s) IV Push every 8 hours PRN  melatonin 3 milliGRAM(s) Oral at bedtime PRN  methadone    Tablet 110 milliGRAM(s) Oral daily  multivitamin 1 Tablet(s) Oral daily  ondansetron Injectable 4 milliGRAM(s) IV Push every 8 hours PRN  pantoprazole    Tablet 40 milliGRAM(s) Oral before breakfast  QUEtiapine 400 milliGRAM(s) Oral at bedtime  tamsulosin 0.4 milliGRAM(s) Oral at bedtime      Vital Signs Last 24 Hrs  T(C): 36.2 (23 Oct 2024 05:23), Max: 37.3 (22 Oct 2024 17:07)  T(F): 97.1 (23 Oct 2024 05:23), Max: 99.2 (22 Oct 2024 17:07)  HR: 71 (23 Oct 2024 08:55) (71 - 80)  BP: 129/78 (23 Oct 2024 05:23) (129/78 - 135/84)  BP(mean): --  RR: 18 (23 Oct 2024 05:23) (16 - 18)  SpO2: 97% (23 Oct 2024 08:55) (94% - 98%)    Parameters below as of 23 Oct 2024 06:10  Patient On (Oxygen Delivery Method): BiPAP/CPAP    Physical Exam:  Constitutional: Middle age man., non-toxic, no distress, chronically ill appearing   HEAD/EYES: anicteric, no conjunctival injection  ENT:  supple, no thrush, on cpap   Cardiovascular:   normal S1, S2, no murmur, + edema  Respiratory:  clear BS bilaterally, no wheezes, no rales  GI:  soft, non-tender, + ileostomy, surgical scar marks  :  +west  Musculoskeletal:  RUE midline, skin with small blisters around the line dressing. Contracted LE  Neurologic: awake and alert, paraplegic  Skin:  + rash, no erythema, no phlebitis, multiple tattoos, pt's left foot is dressed c/d/i   Heme/Onc: no lymphadenopathy   Psychiatric:  awake, alert, appropriate mood    WBC Count: 8.09 K/uL (10-23 @ 05:45)  WBC Count: 8.27 K/uL (10-22 @ 08:45)  WBC Count: 9.03 K/uL (10-21 @ 08:15)  WBC Count: 10.10 K/uL (10-20 @ 08:11)  WBC Count: 9.82 K/uL (10-19 @ 06:05)  WBC Count: 9.28 K/uL (10-18 @ 20:35)               11.3   8.09  )-----------( 159      ( 23 Oct 2024 05:45 )             37.4       10-23    141  |  104  |  12  ----------------------------<  80  3.8   |  32[H]  |  0.57    Ca    9.4      23 Oct 2024 05:45    TPro  8.0  /  Alb  2.5[L]  /  TBili  0.4  /  DBili  x   /  AST  24  /  ALT  26  /  AlkPhos  135[H]  10-23      Urinalysis Basic - ( 23 Oct 2024 05:45 )    Color: x / Appearance: x / SG: x / pH: x  Gluc: 80 mg/dL / Ketone: x  / Bili: x / Urobili: x   Blood: x / Protein: x / Nitrite: x   Leuk Esterase: x / RBC: x / WBC x   Sq Epi: x / Non Sq Epi: x / Bacteria: x        Creatinine Trend: 0.57<--, 0.75<--, 0.91<--, 0.79<--, 0.73<--, 0.68<--      MICROBIOLOGY:    Clean Catch  10-18-24   >100,000 CFU/ml Providencia stuartii  --  --    RADIOLOGY:

## 2024-10-23 NOTE — PROGRESS NOTE ADULT - ASSESSMENT
54-year-old male with a PMH HTN, HLD, CAD. Emphysema (home O2), paraplegic, hepatitis C, Indwelling catheter due to neurogenic bladder sent to the ED from [facility] for West Catheter Obstruction. Endorses leaking of urine around the west catheter, unsure when it originally started, but has worsen over the past couple days. Last catheter exchange was 2 months ago, which is normally done either at the care home or [facility]. Denies any other acute complaints. Labs unremarkable for H/H: 11.6/38.0, UA (+) Nitrite, Large-leuk Esterase, too many-WBC, Many-Bacteria. Vitals stable. Patient is being admitted having a Suprapubic catheter place by IR.         #Obstruction of urinary catheter  Chronic Indwelling Catheter - Last exchange 2 months ago    Evaluated by Urology - Unable to exchange the catheter  - unable to obtain CT A/P due body habitus/anatomy  - US abdomen limited, findings as above  - Suprapubic catheter to be placed by IR on wednesday 10/23    #perioperative clearance  -Reports no symptoms of chest pain or worsening dyspnea at rest or with exertion, orthopnea or palpitations.  - hx of JUVENAL, COPD on home O2. respiratory status stable as pt is on 6L, same as home dose. tolerating BiPAP  - TTE with EF of 40-45%, no significant interval change since previous TTE  - EKG without acute ischemic changes  - at baseline, not on anti-coagulation  - Patient is at moderate risk for low risk procedure given medical co-morbidities. pt is medically optimized.    #Emphysema, JUVENAL  #hx of acute respiratory failure  on home O2 and BiPAP  - stable on home O2 setting  - c/w nocturnal BiPAP  - Pulm following    #chronic HFrEF  - TTE with EF of 40-45%, no significant interval change since previous TTE  - EKG without acute ischemic changes  - C/w lasix 40mg IV  - GDMT: soft bp. will consider low dose beta blocker if Bp improves    #UTI  UA (+) Nitrite, Large-leuk Esterase, too many-WBC, Many-Bacteria  Ucx: Providencia stuartii  - will switch from Rocephin to meropenem  - F/U Urine Cx sensitivities  - ID following     #Chronic pain/neuropathy   - c/w gabapentin, cymbalta, dilaudid (home med)    #H/o Opioid abuse  #Bipolar disorder   Methadone dose verified with Ana Hood. Pt is on Methadone 110mg q daily, last fill date 9/27 for 1 month supply. Last intake of med prior to hospitalization was 10/18 AM.   - c/w seroquel, methadone, cymbalta     Neurogenic bladder   - plan for SPC as above  - c/w flomax         DVT ppx - SQH- will hold in anticipation for procedure

## 2024-10-23 NOTE — PROGRESS NOTE ADULT - ASSESSMENT
54M w/ cervical epidural abscess, b/l LE paralysis, pneumoperitoneum s/p ex-lap w/ ileostomy complicated by MRSA bacteremia and evisceration, Hep C, emphysema on chronic O2, L foot osteomyelitis, bipolar, opioid dependence, HTN admitted to medical floor for chronic west obstruction. Pulmonary consulted for evaluation prior to SPC placement with urology team.     Dx: Chronic hypoxic respiratory failure, JUVENAL, COPD, paraplegia    Recommendations:   - patient is s/p SPC placement today with IR, returned back to room at baseline   - At home patient oxygen requirements are 6L with Spo2 92%. He is currently on his baseline O2 requirements and does not complain of any respiratory issues with adequate oxygen sats   - Continue home bipap at night for JUVENAL   - Wean o2 as tolrates for goal spo2 > 88%  - TTE with EF of 40-45%, no significant interval change since previous TTE  - patient with prior admission in June 2024 for acute on chronic hypercapnic/hypoxic respiratory failure with large left exudative pleural effusion 1.5L drained at the time.   - At his most optimal respiratory status given he's at his baseline requirements at this time  - rest of care per primary team     Discussed with Dr. Mcdaniel

## 2024-10-23 NOTE — PROCEDURE NOTE - PROCEDURE FINDINGS AND DETAILS
Successful insertion of 12F MPDC suprapubic catheter using ultrasound and fluoroscopy guidance. Positioning confirmed with contrast injection. Specimen sent for culture. Drain sutured in place and attached to gravity drainage bag. No complications. Full report to follow.

## 2024-10-23 NOTE — PRE PROCEDURE NOTE - PRE PROCEDURE EVALUATION
Interventional Radiology    HPI: 54y Male with a PMH HTN, HLD, CAD. Emphysema (home O2), paraplegic, hepatitis C, Indwelling catheter due to neurogenic bladder sent to the ED from [facility] for West Catheter Obstruction. Endorses leaking of urine around the west catheter, unsure when it originally started, but has worsen over the past couple days. Last catheter exchange was 2 months ago, which is normally done either at the [facility] or [facility]. Unable to exchange West by urology. Unable to perform CT due to patient body habitus. IR consulted for suprapubic catheter insertion due to difficult anatomy.     Allergies: Zyprexa (Rash; Dystonic RXN; Hives)  Risperdal (Short breath; Rash; Hives)  Stelazine (Unknown)  NSAIDs (Flushing; Other (Moderate))  Motrin (Anaphylaxis)  Aleve (Unknown)  Haldol (Anaphylaxis)  Thorazine (Other (Moderate))    Medications (Abx/Cardiac/Anticoagulation/Blood Products)    furosemide   Injectable: 40 milliGRAM(s) IV Push (10-23 @ 05:41)  heparin   Injectable: 5000 Unit(s) SubCutaneous (10-22 @ 06:30)  meropenem  IVPB: 100 mL/Hr IV Intermittent (10-23 @ 10:37)  meropenem  IVPB: 100 mL/Hr IV Intermittent (10-22 @ 09:51)    Data:    T(C): 36.2  HR: 71  BP: 129/78  RR: 18  SpO2: 97%    Exam  General: No acute distress  Chest: Non labored breathing    -WBC 8.09 / HgB 11.3 / Hct 37.4 / Plt 159  -Na 141 / Cl 104 / BUN 12 / Glucose 80  -K 3.8 / CO2 32 / Cr 0.57  -ALT 26 / Alk Phos 135 / T.Bili 0.4  -INR1.19    Imaging:     Plan: 54y Male presents for suprapubic catheter insertion  -Risks/Benefits/alternatives explained with the patient and/or healthcare proxy and witnessed informed consent obtained.

## 2024-10-23 NOTE — PROGRESS NOTE ADULT - ASSESSMENT
The patient is 55 y/o man with extensive PMH of HTN, HLD, CAD, hx of cervical epidural abscess s/p treatment, L foot OM, hx of pneumoperitoneum s/p ileostomy, emphysema (home O2), paraplegia, hepatitis C, Indwelling catheter due to neurogenic bladder, who was sent to the ED from [facility] for west catheter obstruction. Endorsed leaking of urine around the west catheter, unsure when it originally started. Last catheter exchange was 2 months ago, which is normally done either at the [facility] or [facility]. Denies any other acute complaints. Labs unremarkable for H/H: 11.6/38.0, UA (+) Nitrite, Large-leuk Esterase, too many-WBC, Many-Bacteria. Vitals stable. Patient is being admitted having a Suprapubic catheter place by IR.    (18 Oct 2024 22:44)  ID consulted for workup and antibiotic management.    10/22: Afebrile, T-max 100.1 F, on O2 nasal cannula. No leukocytosis. Serum Cr: 0.75. Urine culture grew Providencia stuartii, pending susceptibility. The patient was on Bactrim earlier due to no IV access. He has RUE midline now. He was on ceftriaxone for last 2 days and today is now switched to meropenem. He is scheduled for IR SPC placement likely tomorrow.   10/23: remains afebrile, no wbc, Cr ok, UC grew Providencia - sensitivity is pending, Meropenem IV continued, now s/p IR procedure SPC placement post my visit.     Impression:  1. Complicated UTI due to indwelling west's malfunction  2. Extensive PMH as noted above    Recommendations:  - Continue meropenem 1 g IV q8h  - Follow urine culture susceptibility, would modify antibiotics accordingly - pending   - IR intervention is appreciated   - Urology follow up is appreciated   - Rest of care per primary team    Discussed with Dr. Haywood   Discussed with Dr. Zarate

## 2024-10-24 PROCEDURE — 99232 SBSQ HOSP IP/OBS MODERATE 35: CPT

## 2024-10-24 RX ORDER — CEFPODOXIME PROXETIL 200 MG/1
100 TABLET, FILM COATED ORAL EVERY 12 HOURS
Refills: 0 | Status: DISCONTINUED | OUTPATIENT
Start: 2024-10-24 | End: 2024-10-29

## 2024-10-24 RX ADMIN — Medication 0.5 MILLIGRAM(S): at 12:49

## 2024-10-24 RX ADMIN — Medication 0.5 MILLIGRAM(S): at 13:49

## 2024-10-24 RX ADMIN — Medication 325 MILLIGRAM(S): at 11:38

## 2024-10-24 RX ADMIN — Medication 0.4 MILLIGRAM(S): at 21:04

## 2024-10-24 RX ADMIN — Medication 8 MILLIGRAM(S): at 21:07

## 2024-10-24 RX ADMIN — Medication 1 APPLICATION(S): at 18:34

## 2024-10-24 RX ADMIN — FINASTERIDE 5 MILLIGRAM(S): 5 TABLET, FILM COATED ORAL at 11:37

## 2024-10-24 RX ADMIN — MEROPENEM 100 MILLIGRAM(S): 1 INJECTION INTRAVENOUS at 01:19

## 2024-10-24 RX ADMIN — IPRATROPIUM BROMIDE AND ALBUTEROL SULFATE 3 MILLILITER(S): .5; 2.5 SOLUTION RESPIRATORY (INHALATION) at 17:09

## 2024-10-24 RX ADMIN — Medication 8 MILLIGRAM(S): at 14:06

## 2024-10-24 RX ADMIN — CEFPODOXIME PROXETIL 100 MILLIGRAM(S): 200 TABLET, FILM COATED ORAL at 18:33

## 2024-10-24 RX ADMIN — GABAPENTIN 300 MILLIGRAM(S): 300 CAPSULE ORAL at 21:04

## 2024-10-24 RX ADMIN — Medication 1 TABLET(S): at 11:38

## 2024-10-24 RX ADMIN — GABAPENTIN 300 MILLIGRAM(S): 300 CAPSULE ORAL at 05:58

## 2024-10-24 RX ADMIN — Medication 8 MILLIGRAM(S): at 22:00

## 2024-10-24 RX ADMIN — Medication 8 MILLIGRAM(S): at 05:58

## 2024-10-24 RX ADMIN — IPRATROPIUM BROMIDE AND ALBUTEROL SULFATE 3 MILLILITER(S): .5; 2.5 SOLUTION RESPIRATORY (INHALATION) at 05:22

## 2024-10-24 RX ADMIN — IPRATROPIUM BROMIDE AND ALBUTEROL SULFATE 3 MILLILITER(S): .5; 2.5 SOLUTION RESPIRATORY (INHALATION) at 11:21

## 2024-10-24 RX ADMIN — FOLIC ACID 1 MILLIGRAM(S): 1 TABLET ORAL at 11:37

## 2024-10-24 RX ADMIN — PANTOPRAZOLE SODIUM 40 MILLIGRAM(S): 40 TABLET, DELAYED RELEASE ORAL at 09:06

## 2024-10-24 RX ADMIN — Medication 8 MILLIGRAM(S): at 06:56

## 2024-10-24 RX ADMIN — Medication 8 MILLIGRAM(S): at 15:06

## 2024-10-24 RX ADMIN — METHADONE HYDROCHLORIDE 110 MILLIGRAM(S): 10 TABLET ORAL at 11:42

## 2024-10-24 RX ADMIN — IPRATROPIUM BROMIDE AND ALBUTEROL SULFATE 3 MILLILITER(S): .5; 2.5 SOLUTION RESPIRATORY (INHALATION) at 23:14

## 2024-10-24 RX ADMIN — QUETIAPINE FUMARATE 400 MILLIGRAM(S): 200 TABLET ORAL at 21:04

## 2024-10-24 RX ADMIN — MEROPENEM 100 MILLIGRAM(S): 1 INJECTION INTRAVENOUS at 11:37

## 2024-10-24 NOTE — PROGRESS NOTE ADULT - ASSESSMENT
The patient is 55 y/o man with extensive PMH of HTN, HLD, CAD, hx of cervical epidural abscess s/p treatment, L foot OM, hx of pneumoperitoneum s/p ileostomy, emphysema (home O2), paraplegia, hepatitis C, Indwelling catheter due to neurogenic bladder, who was sent to the ED from [facility] for west catheter obstruction. Endorsed leaking of urine around the west catheter, unsure when it originally started. Last catheter exchange was 2 months ago, which is normally done either at the [facility] or [facility]. Denies any other acute complaints. Labs unremarkable for H/H: 11.6/38.0, UA (+) Nitrite, Large-leuk Esterase, too many-WBC, Many-Bacteria. Vitals stable. Patient is being admitted having a Suprapubic catheter place by IR.    (18 Oct 2024 22:44)  ID consulted for workup and antibiotic management.    10/22: Afebrile, T-max 100.1 F, on O2 nasal cannula. No leukocytosis. Serum Cr: 0.75. Urine culture grew Providencia stuartii, pending susceptibility. The patient was on Bactrim earlier due to no IV access. He has RUE midline now. He was on ceftriaxone for last 2 days and today is now switched to meropenem. He is scheduled for IR SPC placement likely tomorrow.   10/23: Remains afebrile, no wbc, Cr ok, UC grew Providencia - sensitivity is pending, Meropenem IV continued, now s/p IR procedure SPC placement post my visit  10/24: Afebrile. He is s/p IR SPC placement yesterday. Providencia susceptibility reviewed.       Impression:  1. Complicated UTI due to indwelling west's malfunction  2. Extensive PMH as noted above    Recommendations:  - Continue meropenem 1 g IV q8h  - Follow urine culture susceptibility, would modify antibiotics accordingly - pending   - IR intervention is appreciated   - Urology follow up is appreciated   - Rest of care per primary team    Discussed with Dr. Haywood   Discussed with Dr. Zarate  The patient is 55 y/o man with extensive PMH of HTN, HLD, CAD, hx of cervical epidural abscess s/p treatment, L foot OM, hx of pneumoperitoneum s/p ileostomy, emphysema (home O2), paraplegia, hepatitis C, Indwelling catheter due to neurogenic bladder, who was sent to the ED from [facility] for west catheter obstruction. Endorsed leaking of urine around the west catheter, unsure when it originally started. Last catheter exchange was 2 months ago, which is normally done either at the [facility] or [facility]. Denies any other acute complaints. Labs unremarkable for H/H: 11.6/38.0, UA (+) Nitrite, Large-leuk Esterase, too many-WBC, Many-Bacteria. Vitals stable. Patient is being admitted having a Suprapubic catheter place by IR.    (18 Oct 2024 22:44)  ID consulted for workup and antibiotic management.    10/22: Afebrile, T-max 100.1 F, on O2 nasal cannula. No leukocytosis. Serum Cr: 0.75. Urine culture grew Providencia stuartii, pending susceptibility. The patient was on Bactrim earlier due to no IV access. He has RUE midline now. He was on ceftriaxone for last 2 days and today is now switched to meropenem. He is scheduled for IR SPC placement likely tomorrow.   10/23: Remains afebrile, no wbc, Cr ok, UC grew Providencia - sensitivity is pending, Meropenem IV continued, now s/p IR procedure SPC placement post my visit  10/24: Afebrile. He is s/p IR SPC placement yesterday. Providencia susceptibility reviewed.       Impression:  1. Complicated UTI due to indwelling west's malfunction  2. Extensive PMH as noted above    Recommendations:  - DC meropenem  - Start Cefpodoxime 100 mg PO q12h x 7 days  - IR intervention is appreciated   - Urology follow up is appreciated   - Rest of care per primary team    Discussed with Dr. Elliot Haywood MD  Attending Physician  Division of Infectious Diseases   Available via [employer] Teams

## 2024-10-24 NOTE — PROGRESS NOTE ADULT - SUBJECTIVE AND OBJECTIVE BOX
Interventional Radiology Follow-Up Note    This is a 54y Male s/p suprapubic catheter insertion on 10/23 in Interventional Radiology with Dr. Escobar.     S: Patient seen and examined @ bedside. No complaints offered.     Medication:     furosemide   Injectable: (10-23)  meropenem  IVPB: (10-24)    Vitals:   T(F): 98.3, Max: 99.1 (13:50)  HR: 74  BP: 101/61  RR: 16  SpO2: 96%    Physical Exam:  General: Nontoxic, in NAD.  Abdomen: soft, NTND.  Drain Device: Drain intact attached to gravity bag. Dressing clean, dry, intact.    24hr Drain output: 800cc clear yellow    LABS:        Assessment/Plan:  54y Male with a PMH HTN, HLD, CAD. Emphysema (home O2), paraplegic, hepatitis C, Indwelling catheter due to neurogenic bladder sent to the ED from [facility] for West Catheter Obstruction. Endorses leaking of urine around the west catheter, unsure when it originally started, but has worsen over the past couple days. Last catheter exchange was 2 months ago, which is normally done either at the [facility] or [facility]. Unable to exchange West by urology. Unable to perform CT due to patient body habitus.  Pt now s/p suprapubic catheter insertion in IR on 10/23.      -follow culture results, abx per ID  -trend vs/labs  -flush drain with 5cc NS daily forward only; DO NOT aspirate  -change dressing q3 days or when dressing is saturated  -outpatient follow up with urology for exchange/upsize as needed  -they will benefit from VNS service to help with drainage catheter care; they should continue same drainage catheter care as an outpatient.  -continue global management per primary team  -IR to sign off     Please call IR at extension [phone] with any questions, concerns, or issues regarding above.

## 2024-10-24 NOTE — PROGRESS NOTE ADULT - ASSESSMENT
54-year-old male with a PMH HTN, HLD, CAD. Emphysema (home O2), paraplegic, hepatitis C, Indwelling catheter due to neurogenic bladder sent to the ED from [facility] for West Catheter Obstruction. Endorses leaking of urine around the west catheter, unsure when it originally started, but has worsen over the past couple days. Last catheter exchange was 2 months ago, which is normally done either at the shelter or [facility]. Denies any other acute complaints. Labs unremarkable for H/H: 11.6/38.0, UA (+) Nitrite, Large-leuk Esterase, too many-WBC, Many-Bacteria. Vitals stable. Patient is being admitted having a Suprapubic catheter place by IR.         #Obstruction of urinary catheter  Chronic Indwelling Catheter - Last exchange 2 months ago    Evaluated by Urology - Unable to exchange the catheter  - unable to obtain CT A/P due body habitus/anatomy  - US abdomen limited, findings as above  - Suprapubic catheter to be placed by IR on wednesday 10/23    #perioperative clearance  -Reports no symptoms of chest pain or worsening dyspnea at rest or with exertion, orthopnea or palpitations.  - hx of JUVENAL, COPD on home O2. respiratory status stable as pt is on 6L, same as home dose. tolerating BiPAP  - TTE with EF of 40-45%, no significant interval change since previous TTE  - EKG without acute ischemic changes  - at baseline, not on anti-coagulation  - Patient is at moderate risk for low risk procedure given medical co-morbidities. pt is medically optimized.    #Emphysema, JUVENAL  #hx of acute respiratory failure  on home O2 and BiPAP  - stable on home O2 setting  - c/w nocturnal BiPAP  - Pulm following    #chronic HFrEF  - TTE with EF of 40-45%, no significant interval change since previous TTE  - EKG without acute ischemic changes  - C/w lasix 40mg IV  - GDMT: soft bp. will consider low dose beta blocker if Bp improves    #UTI  UA (+) Nitrite, Large-leuk Esterase, too many-WBC, Many-Bacteria  Ucx: Providencia stuartii  - will switch from Rocephin to meropenem  - F/U Urine Cx sensitivities  - ID following     #Chronic pain/neuropathy   - c/w gabapentin, cymbalta, dilaudid (home med)    #H/o Opioid abuse  #Bipolar disorder   Methadone dose verified with Ana Hood. Pt is on Methadone 110mg q daily, last fill date 9/27 for 1 month supply. Last intake of med prior to hospitalization was 10/18 AM.   - c/w seroquel, methadone, cymbalta     Neurogenic bladder   - plan for SPC as above  - c/w flomax         DVT ppx - [facility]- will hold in anticipation for procedure

## 2024-10-24 NOTE — PROGRESS NOTE ADULT - SUBJECTIVE AND OBJECTIVE BOX
Patient seen and examined bedside resting comfortably.  Denies nausea vomitin, diarrhea, fevers, chills    T(F): 98.3 (10-24-24 @ 10:42), Max: 99.1 (10-23-24 @ 13:50)  HR: 79 (10-24-24 @ 10:42) (75 - 79)  BP: 101/61 (10-24-24 @ 10:42) (101/61 - 124/77)  RR: 16 (10-24-24 @ 10:42) (15 - 18)  SpO2: 95% (10-24-24 @ 10:42) (94% - 98%)  Wt(kg): --  CAPILLARY BLOOD GLUCOSE          PHYSICAL EXAM:  General: NAD, alert and awake  HEENT: NCAT, EOMI, conjunctiva clear  Chest: nonlabored respirations, good inspiratory effort  Abdomen: soft, NTND.   Extremities: no pedal edema or calf tenderness noted   : SPC in place draining clear yellow urine     LABS:                        11.3   8.09  )-----------( 159      ( 23 Oct 2024 05:45 )             37.4   10-23    141  |  104  |  12  ----------------------------<  80  3.8   |  32[H]  |  0.57    Ca    9.4      23 Oct 2024 05:45    TPro  8.0  /  Alb  2.5[L]  /  TBili  0.4  /  DBili  x   /  AST  24  /  ALT  26  /  AlkPhos  135[H]  10-23  PT/INR - ( 23 Oct 2024 05:45 )   PT: 13.4 sec;   INR: 1.19 ratio         PTT - ( 23 Oct 2024 05:45 )  PTT:30.2 sec  I&O's Detail    23 Oct 2024 07:01  -  24 Oct 2024 07:00  --------------------------------------------------------  IN:  Total IN: 0 mL    OUT:    Indwelling Catheter - Suprapubic (mL): 800 mL    Indwelling Catheter - Urethral (mL): 700 mL  Total OUT: 1500 mL    Total NET: -1500 mL            A/P:  54y paraplegic Male with contractures and chronic west catheter 2/2 neurogenic bladder now s/p IR SPC on 10/24  prelim UCx 10/18 providenci stuartii antibiotics   -c/w SPC, monitor output  -cleared for d/c from [facility] with outpt Follow up with urology  -continue care per primary team  -case discussed with Dr Rosa, will sign off

## 2024-10-24 NOTE — PROGRESS NOTE ADULT - SUBJECTIVE AND OBJECTIVE BOX
STAN VEGA  MRN-[id number]  54y (1970)    Follow Up:  West leakage, needs SPC, leukocytosis     Interval History: The pt was seen and examined earlier, not in acute distress, no new complaints, awake and alert, not very talkative. Pt is afebrile, cpap, no WBC.     ROS:    [ ] Unobtainable because:  [x ] All other systems negative    Constitutional: no fever, no chills  Head: no trauma  Eyes: no vision changes, no eye pain  ENT:  no sore throat, no rhinorrhea  Cardiovascular:  no chest pain, no palpitation  Respiratory:  no SOB, no cough  GI:  no abd pain, no vomiting, no diarrhea  urinary: with West, no flank pain  musculoskeletal: contracted  skin:  no rash  neurology:  no headache, no seizure, no change in mental status  psych: no anxiety, no depression     Allergies  Zyprexa (Rash; Dystonic RXN; Hives)  Risperdal (Short breath; Rash; Hives)  Stelazine (Unknown)  NSAIDs (Flushing; Other (Moderate))  Motrin (Anaphylaxis)  Aleve (Unknown)  Haldol (Anaphylaxis)  Thorazine (Other (Moderate))    ANTIMICROBIALS:  meropenem  IVPB 1000 every 8 hours      OTHER MEDS:  acetaminophen     Tablet .. 650 milliGRAM(s) Oral every 6 hours PRN  albuterol    90 MICROgram(s) HFA Inhaler 2 Puff(s) Inhalation every 6 hours PRN  albuterol/ipratropium for Nebulization 3 milliLiter(s) Nebulizer every 6 hours  aluminum hydroxide/magnesium hydroxide/simethicone Suspension 30 milliLiter(s) Oral every 4 hours PRN  collagenase Ointment 1 Application(s) Topical two times a day  diphenhydrAMINE 50 milliGRAM(s) Oral every 4 hours PRN  DULoxetine 30 milliGRAM(s) Oral daily  ferrous    sulfate 325 milliGRAM(s) Oral daily  finasteride 5 milliGRAM(s) Oral daily  folic acid 1 milliGRAM(s) Oral daily  furosemide   Injectable 40 milliGRAM(s) IV Push daily  gabapentin 300 milliGRAM(s) Oral every 8 hours  HYDROmorphone   Tablet 8 milliGRAM(s) Oral every 6 hours PRN  HYDROmorphone  Injectable 0.5 milliGRAM(s) IV Push every 8 hours PRN  melatonin 3 milliGRAM(s) Oral at bedtime PRN  methadone    Tablet 110 milliGRAM(s) Oral daily  multivitamin 1 Tablet(s) Oral daily  ondansetron Injectable 4 milliGRAM(s) IV Push every 8 hours PRN  pantoprazole    Tablet 40 milliGRAM(s) Oral before breakfast  QUEtiapine 400 milliGRAM(s) Oral at bedtime  tamsulosin 0.4 milliGRAM(s) Oral at bedtime      Physical Exam:  Vital Signs Last 24 Hrs  T(C): 36.8 (24 Oct 2024 10:42), Max: 37.3 (23 Oct 2024 13:50)  T(F): 98.3 (24 Oct 2024 10:42), Max: 99.1 (23 Oct 2024 13:50)  HR: 79 (24 Oct 2024 10:42) (75 - 79)  BP: 101/61 (24 Oct 2024 10:42) (101/61 - 124/77)  BP(mean): --  RR: 16 (24 Oct 2024 10:42) (15 - 18)  SpO2: 95% (24 Oct 2024 10:42) (94% - 98%)    Parameters below as of 23 Oct 2024 23:35  Patient On (Oxygen Delivery Method): nasal cannula w/ humidification      Constitutional: Middle age man., non-toxic, no distress, chronically ill appearing   HEAD/EYES: anicteric, no conjunctival injection  ENT:  supple, no thrush, on cpap   Cardiovascular:   normal S1, S2, no murmur, + edema  Respiratory:  clear BS bilaterally, no wheezes, no rales  GI:  soft, non-tender, + ileostomy, surgical scar marks  :  +west  Musculoskeletal:  RUE midline, skin with small blisters around the line dressing. Contracted LE  Neurologic: awake and alert, paraplegic  Skin:  + rash, no erythema, no phlebitis, multiple tattoos, pt's left foot is dressed c/d/i   Heme/Onc: no lymphadenopathy   Psychiatric:  awake, alert, appropriate mood    WBC Count: 8.09 K/uL (10-23 @ 05:45)  WBC Count: 8.27 K/uL (10-22 @ 08:45)  WBC Count: 9.03 K/uL (10-21 @ 08:15)  WBC Count: 10.10 K/uL (10-20 @ 08:11)  WBC Count: 9.82 K/uL (10-19 @ 06:05)  WBC Count: 9.28 K/uL (10-18 @ 20:35)                          11.3   8.09  )-----------( 159      ( 23 Oct 2024 05:45 )             37.4     10-23    141  |  104  |  12  ----------------------------<  80  3.8   |  32[H]  |  0.57    Ca    9.4      23 Oct 2024 05:45    TPro  8.0  /  Alb  2.5[L]  /  TBili  0.4  /  DBili  x   /  AST  24  /  ALT  26  /  AlkPhos  135[H]  10-23      Urinalysis Basic - ( 23 Oct 2024 05:45 )    Color: x / Appearance: x / SG: x / pH: x  Gluc: 80 mg/dL / Ketone: x  / Bili: x / Urobili: x   Blood: x / Protein: x / Nitrite: x   Leuk Esterase: x / RBC: x / WBC x   Sq Epi: x / Non Sq Epi: x / Bacteria: x        Creatinine Trend: 0.57<--, 0.75<--, 0.91<--, 0.79<--, 0.73<--, 0.68<--    Urine Microscopic-Add On (NC) (10.18.24 @ 22:00)    Squamous Epithelial Cells: Present   Bacteria: Many /HPF   Red Blood Cell - Urine: 14 /HPF   White Blood Cell - Urine: Too Numerous to count /HPF  Urinalysis with Rflx Culture (10.18.24 @ 22:00)    Urine Appearance: Turbid   Color: Yellow   Specific Gravity: 1.020   pH Urine: 6.5   Protein, Urine: 100 mg/dL   Glucose Qualitative, Urine: Negative mg/dL   Ketone - Urine: Negative mg/dL   Blood, Urine: Large   Bilirubin: Negative   Urobilinogen: 1.0 mg/dL   Leukocyte Esterase Concentration: Large   Nitrite: Positive        MICROBIOLOGY:    Clean Catch  10-18-24   >100,000 CFU/ml Providencia stuartii  --  --  Culture - Urine (10.18.24 @ 22:00)   -  Amoxicillin/Clavulanic Acid: R >16/8   -  Ampicillin: R <=8 These ampicillin results predict results for amoxicillin   -  Ampicillin/Sulbactam: S 8/4   -  Aztreonam: S <=4   -  Trimethoprim/Sulfamethoxazole: S <=0.5/9.5   -  Cefazolin: R >16   -  Cefepime: S <=2   -  Ceftriaxone: S <=1   -  Ertapenem: S <=0.5   -  Ciprofloxacin: I 0.5   -  Levofloxacin: R 2   -  Meropenem: S <=1   -  Nitrofurantoin: R >64 Should not be used to treat pyelonephritis   -  Cefoxitin: S <=8   -  Piperacillin/Tazobactam: S <=8   Specimen Source: Clean Catch   Culture Results:   >100,000 CFU/ml Providencia stuartii   Organism Identification: Providencia stuartii   Organism: Providencia stuartii   Method Type: BHUPINDER          RADIOLOGY:    < from: US Kidney and Bladder (10.20.24 @ 20:49) >    IMPRESSION:  The lower pole of the right kidney is completely obscured by bowel gas.    No renal mass, hydronephrosis or calculus is visualized sonographically.    The bladder could not be imaged due to the patient's inability to   position his legs.    Splenomegaly.    < end of copied text >     STAN VEGA  MRN-[id number]  54y (1970)    Follow Up:  West leakage, needs SPC, leukocytosis     Interval History: The patient was seen and examined today. Afebrile, not in acute distress, no new complaints, awake and alert, not very talkative.    ROS:    [ ] Unobtainable because:  [x ] All other systems negative    Constitutional: no fever, no chills  Head: no trauma  Eyes: no vision changes, no eye pain  ENT:  no sore throat, no rhinorrhea  Cardiovascular:  no chest pain, no palpitation  Respiratory:  no SOB, no cough  GI:  no abd pain, no vomiting, no diarrhea  urinary: with West, no flank pain  musculoskeletal: contracted  skin:  no rash  neurology:  no headache, no seizure, no change in mental status  psych: no anxiety, no depression     Allergies  Zyprexa (Rash; Dystonic RXN; Hives)  Risperdal (Short breath; Rash; Hives)  Stelazine (Unknown)  NSAIDs (Flushing; Other (Moderate))  Motrin (Anaphylaxis)  Aleve (Unknown)  Haldol (Anaphylaxis)  Thorazine (Other (Moderate))    ANTIMICROBIALS:  meropenem  IVPB 1000 every 8 hours      OTHER MEDS:  acetaminophen     Tablet .. 650 milliGRAM(s) Oral every 6 hours PRN  albuterol    90 MICROgram(s) HFA Inhaler 2 Puff(s) Inhalation every 6 hours PRN  albuterol/ipratropium for Nebulization 3 milliLiter(s) Nebulizer every 6 hours  aluminum hydroxide/magnesium hydroxide/simethicone Suspension 30 milliLiter(s) Oral every 4 hours PRN  collagenase Ointment 1 Application(s) Topical two times a day  diphenhydrAMINE 50 milliGRAM(s) Oral every 4 hours PRN  DULoxetine 30 milliGRAM(s) Oral daily  ferrous    sulfate 325 milliGRAM(s) Oral daily  finasteride 5 milliGRAM(s) Oral daily  folic acid 1 milliGRAM(s) Oral daily  furosemide   Injectable 40 milliGRAM(s) IV Push daily  gabapentin 300 milliGRAM(s) Oral every 8 hours  HYDROmorphone   Tablet 8 milliGRAM(s) Oral every 6 hours PRN  HYDROmorphone  Injectable 0.5 milliGRAM(s) IV Push every 8 hours PRN  melatonin 3 milliGRAM(s) Oral at bedtime PRN  methadone    Tablet 110 milliGRAM(s) Oral daily  multivitamin 1 Tablet(s) Oral daily  ondansetron Injectable 4 milliGRAM(s) IV Push every 8 hours PRN  pantoprazole    Tablet 40 milliGRAM(s) Oral before breakfast  QUEtiapine 400 milliGRAM(s) Oral at bedtime  tamsulosin 0.4 milliGRAM(s) Oral at bedtime      Physical Exam:  Vital Signs Last 24 Hrs  T(C): 36.8 (24 Oct 2024 10:42), Max: 37.3 (23 Oct 2024 13:50)  T(F): 98.3 (24 Oct 2024 10:42), Max: 99.1 (23 Oct 2024 13:50)  HR: 79 (24 Oct 2024 10:42) (75 - 79)  BP: 101/61 (24 Oct 2024 10:42) (101/61 - 124/77)  BP(mean): --  RR: 16 (24 Oct 2024 10:42) (15 - 18)  SpO2: 95% (24 Oct 2024 10:42) (94% - 98%)    Parameters below as of 23 Oct 2024 23:35  Patient On (Oxygen Delivery Method): nasal cannula w/ humidification      Constitutional: Middle age man., non-toxic, no distress, chronically ill appearing   HEAD/EYES: anicteric, no conjunctival injection  ENT:  supple, no thrush, on cpap   Cardiovascular:   normal S1, S2, no murmur, + edema  Respiratory:  clear BS bilaterally, no wheezes, no rales  GI:  soft, non-tender, + ileostomy, surgical scar marks  :  + supra pubic west  Musculoskeletal:  RUE midline, skin with small blisters around the line dressing. Contracted LE  Neurologic: awake and alert, paraplegic  Skin:  + rash, no erythema, no phlebitis, multiple tattoos, pt's left foot is dressed c/d/i   Heme/Onc: no lymphadenopathy   Psychiatric:  awake, alert, appropriate mood    WBC Count: 8.09 K/uL (10-23 @ 05:45)  WBC Count: 8.27 K/uL (10-22 @ 08:45)  WBC Count: 9.03 K/uL (10-21 @ 08:15)  WBC Count: 10.10 K/uL (10-20 @ 08:11)  WBC Count: 9.82 K/uL (10-19 @ 06:05)  WBC Count: 9.28 K/uL (10-18 @ 20:35)                          11.3   8.09  )-----------( 159      ( 23 Oct 2024 05:45 )             37.4     10-23    141  |  104  |  12  ----------------------------<  80  3.8   |  32[H]  |  0.57    Ca    9.4      23 Oct 2024 05:45    TPro  8.0  /  Alb  2.5[L]  /  TBili  0.4  /  DBili  x   /  AST  24  /  ALT  26  /  AlkPhos  135[H]  10-23      Urinalysis Basic - ( 23 Oct 2024 05:45 )    Color: x / Appearance: x / SG: x / pH: x  Gluc: 80 mg/dL / Ketone: x  / Bili: x / Urobili: x   Blood: x / Protein: x / Nitrite: x   Leuk Esterase: x / RBC: x / WBC x   Sq Epi: x / Non Sq Epi: x / Bacteria: x        Creatinine Trend: 0.57<--, 0.75<--, 0.91<--, 0.79<--, 0.73<--, 0.68<--    Urine Microscopic-Add On (NC) (10.18.24 @ 22:00)    Squamous Epithelial Cells: Present   Bacteria: Many /HPF   Red Blood Cell - Urine: 14 /HPF   White Blood Cell - Urine: Too Numerous to count /HPF  Urinalysis with Rflx Culture (10.18.24 @ 22:00)    Urine Appearance: Turbid   Color: Yellow   Specific Gravity: 1.020   pH Urine: 6.5   Protein, Urine: 100 mg/dL   Glucose Qualitative, Urine: Negative mg/dL   Ketone - Urine: Negative mg/dL   Blood, Urine: Large   Bilirubin: Negative   Urobilinogen: 1.0 mg/dL   Leukocyte Esterase Concentration: Large   Nitrite: Positive        MICROBIOLOGY:    Clean Catch  10-18-24   >100,000 CFU/ml Providencia stuartii  --  --  Culture - Urine (10.18.24 @ 22:00)   -  Amoxicillin/Clavulanic Acid: R >16/8   -  Ampicillin: R <=8 These ampicillin results predict results for amoxicillin   -  Ampicillin/Sulbactam: S 8/4   -  Aztreonam: S <=4   -  Trimethoprim/Sulfamethoxazole: S <=0.5/9.5   -  Cefazolin: R >16   -  Cefepime: S <=2   -  Ceftriaxone: S <=1   -  Ertapenem: S <=0.5   -  Ciprofloxacin: I 0.5   -  Levofloxacin: R 2   -  Meropenem: S <=1   -  Nitrofurantoin: R >64 Should not be used to treat pyelonephritis   -  Cefoxitin: S <=8   -  Piperacillin/Tazobactam: S <=8   Specimen Source: Clean Catch   Culture Results:   >100,000 CFU/ml Providencia stuartii   Organism Identification: Providencia stuartii   Organism: Providencia stuartii   Method Type: BHUPINDER      RADIOLOGY:    < from: US Kidney and Bladder (10.20.24 @ 20:49) >    IMPRESSION:  The lower pole of the right kidney is completely obscured by bowel gas.    No renal mass, hydronephrosis or calculus is visualized sonographically.    The bladder could not be imaged due to the patient's inability to   position his legs.    Splenomegaly.    < end of copied text >

## 2024-10-25 PROCEDURE — 99232 SBSQ HOSP IP/OBS MODERATE 35: CPT

## 2024-10-25 PROCEDURE — 99233 SBSQ HOSP IP/OBS HIGH 50: CPT

## 2024-10-25 RX ORDER — FIRST AID ANTIBIOTIC 500 [USP'U]/G
1 OINTMENT TOPICAL
Refills: 0 | Status: DISCONTINUED | OUTPATIENT
Start: 2024-10-25 | End: 2024-10-29

## 2024-10-25 RX ADMIN — IPRATROPIUM BROMIDE AND ALBUTEROL SULFATE 3 MILLILITER(S): .5; 2.5 SOLUTION RESPIRATORY (INHALATION) at 11:10

## 2024-10-25 RX ADMIN — Medication 1 TABLET(S): at 12:04

## 2024-10-25 RX ADMIN — Medication 8 MILLIGRAM(S): at 21:20

## 2024-10-25 RX ADMIN — CEFPODOXIME PROXETIL 100 MILLIGRAM(S): 200 TABLET, FILM COATED ORAL at 18:58

## 2024-10-25 RX ADMIN — FIRST AID ANTIBIOTIC 1 APPLICATION(S): 500 OINTMENT TOPICAL at 18:58

## 2024-10-25 RX ADMIN — Medication 0.4 MILLIGRAM(S): at 21:02

## 2024-10-25 RX ADMIN — Medication 325 MILLIGRAM(S): at 12:05

## 2024-10-25 RX ADMIN — FINASTERIDE 5 MILLIGRAM(S): 5 TABLET, FILM COATED ORAL at 12:04

## 2024-10-25 RX ADMIN — IPRATROPIUM BROMIDE AND ALBUTEROL SULFATE 3 MILLILITER(S): .5; 2.5 SOLUTION RESPIRATORY (INHALATION) at 06:06

## 2024-10-25 RX ADMIN — FOLIC ACID 1 MILLIGRAM(S): 1 TABLET ORAL at 12:04

## 2024-10-25 RX ADMIN — QUETIAPINE FUMARATE 400 MILLIGRAM(S): 200 TABLET ORAL at 21:02

## 2024-10-25 RX ADMIN — GABAPENTIN 300 MILLIGRAM(S): 300 CAPSULE ORAL at 21:02

## 2024-10-25 RX ADMIN — PANTOPRAZOLE SODIUM 40 MILLIGRAM(S): 40 TABLET, DELAYED RELEASE ORAL at 05:50

## 2024-10-25 RX ADMIN — Medication 50 MILLIGRAM(S): at 20:30

## 2024-10-25 RX ADMIN — METHADONE HYDROCHLORIDE 110 MILLIGRAM(S): 10 TABLET ORAL at 12:08

## 2024-10-25 RX ADMIN — CEFPODOXIME PROXETIL 100 MILLIGRAM(S): 200 TABLET, FILM COATED ORAL at 05:49

## 2024-10-25 RX ADMIN — IPRATROPIUM BROMIDE AND ALBUTEROL SULFATE 3 MILLILITER(S): .5; 2.5 SOLUTION RESPIRATORY (INHALATION) at 23:48

## 2024-10-25 RX ADMIN — IPRATROPIUM BROMIDE AND ALBUTEROL SULFATE 3 MILLILITER(S): .5; 2.5 SOLUTION RESPIRATORY (INHALATION) at 17:05

## 2024-10-25 RX ADMIN — Medication 8 MILLIGRAM(S): at 12:01

## 2024-10-25 RX ADMIN — GABAPENTIN 300 MILLIGRAM(S): 300 CAPSULE ORAL at 15:13

## 2024-10-25 RX ADMIN — GABAPENTIN 300 MILLIGRAM(S): 300 CAPSULE ORAL at 05:49

## 2024-10-25 RX ADMIN — Medication 40 MILLIGRAM(S): at 05:49

## 2024-10-25 RX ADMIN — Medication 8 MILLIGRAM(S): at 20:26

## 2024-10-25 NOTE — PROGRESS NOTE ADULT - ASSESSMENT
54-year-old male with a PMH HTN, HLD, CAD. Emphysema (home O2), paraplegic, hepatitis C, Indwelling catheter due to neurogenic bladder sent to the ED from [facility] for West Catheter Obstruction. Endorses leaking of urine around the west catheter, unsure when it originally started, but has worsen over the past couple days. Last catheter exchange was 2 months ago, which is normally done either at the detention or [facility]. Denies any other acute complaints. Labs unremarkable for H/H: 11.6/38.0, UA (+) Nitrite, Large-leuk Esterase, too many-WBC, Many-Bacteria. Vitals stable. Patient is being admitted having a Suprapubic catheter place by IR.         #Obstruction of urinary catheter  Chronic Indwelling Catheter - Last exchange 2 months ago    Evaluated by Urology - Unable to exchange the catheter  - unable to obtain CT A/P due body habitus/anatomy  - US abdomen limited, findings as above  - Suprapubic catheter to be placed by IR on wednesday 10/23    #perioperative clearance  -Reports no symptoms of chest pain or worsening dyspnea at rest or with exertion, orthopnea or palpitations.  - hx of JUVENAL, COPD on home O2. respiratory status stable as pt is on 6L, same as home dose. tolerating BiPAP  - TTE with EF of 40-45%, no significant interval change since previous TTE  - EKG without acute ischemic changes  - at baseline, not on anti-coagulation  - Patient is at moderate risk for low risk procedure given medical co-morbidities. pt is medically optimized.    #Emphysema, JUVENAL  #hx of acute respiratory failure  on home O2 and BiPAP  - stable on home O2 setting  - c/w nocturnal BiPAP  - Pulm following    #chronic HFrEF  - TTE with EF of 40-45%, no significant interval change since previous TTE  - EKG without acute ischemic changes  - C/w lasix 40mg IV  - GDMT: soft bp. will consider low dose beta blocker if Bp improves    #UTI  UA (+) Nitrite, Large-leuk Esterase, too many-WBC, Many-Bacteria  Ucx: Providencia stuartii  - will switch from Rocephin to meropenem  - F/U Urine Cx sensitivities  - ID following     #Chronic pain/neuropathy   - c/w gabapentin, cymbalta, dilaudid (home med)    #H/o Opioid abuse  #Bipolar disorder   Methadone dose verified with Ana Hood. Pt is on Methadone 110mg q daily, last fill date 9/27 for 1 month supply. Last intake of med prior to hospitalization was 10/18 AM.   - c/w seroquel, methadone, cymbalta     Neurogenic bladder   - plan for SPC as above  - c/w flomax         DVT ppx - [facility]- will hold in anticipation for procedure

## 2024-10-25 NOTE — PROGRESS NOTE ADULT - SUBJECTIVE AND OBJECTIVE BOX
24 hr events:  no acute events  complaining of productive cough and R chest tightness  sputum reported to be thick and beige in color  reports difficulty expectorating sputum  no SOB, no wheeze  weaned from 6L NC to 4L NC: pt slightly reluctant to titrate down O2 as he states he always uses 6L  using NIV at night  IV antibx switched to po regimen      ROS  no fever, no chills  no HA, no dizziness  no visual changes, no auditory changes  no sore throat, no sinus congestion  no SOB, + cough & chest congestion  no chest pain, no palpitations  no abdominal pain, no N/V/D  no dysuria, no hematuria, + suprapubic catheter  no myalgias, no arthralgias, + lower extremity contracture  no swelling  no rashes, no pruritis    MEDICATIONS  (STANDING):  albuterol/ipratropium for Nebulization 3 milliLiter(s) Nebulizer every 6 hours  cefpodoxime 100 milliGRAM(s) Oral every 12 hours  collagenase Ointment 1 Application(s) Topical two times a day  DULoxetine 30 milliGRAM(s) Oral daily  ferrous    sulfate 325 milliGRAM(s) Oral daily  finasteride 5 milliGRAM(s) Oral daily  folic acid 1 milliGRAM(s) Oral daily  furosemide   Injectable 40 milliGRAM(s) IV Push daily  gabapentin 300 milliGRAM(s) Oral every 8 hours  methadone    Tablet 110 milliGRAM(s) Oral daily  multivitamin 1 Tablet(s) Oral daily  pantoprazole    Tablet 40 milliGRAM(s) Oral before breakfast  QUEtiapine 400 milliGRAM(s) Oral at bedtime  tamsulosin 0.4 milliGRAM(s) Oral at bedtime      MEDICATIONS  (PRN):  acetaminophen     Tablet .. 650 milliGRAM(s) Oral every 6 hours PRN Temp greater or equal to 38C (100.4F), Mild Pain (1 - 3)  albuterol    90 MICROgram(s) HFA Inhaler 2 Puff(s) Inhalation every 6 hours PRN Respiratory Distress  aluminum hydroxide/magnesium hydroxide/simethicone Suspension 30 milliLiter(s) Oral every 4 hours PRN Dyspepsia  diphenhydrAMINE 50 milliGRAM(s) Oral every 4 hours PRN Rash and/or Itching  HYDROmorphone  Injectable 0.5 milliGRAM(s) IV Push every 8 hours PRN Severe Pain (7 - 10)  melatonin 3 milliGRAM(s) Oral at bedtime PRN Insomnia  ondansetron Injectable 4 milliGRAM(s) IV Push every 8 hours PRN Nausea and/or Vomiting      LABS              11.3   8.09  )-----------( 159      ( 23 Oct 2024 05:45 )             37.4     10-23    141  |  104  |  12  ----------------------------<  80  3.8   |  32[H]  |  0.57    Ca    9.4      23 Oct 2024 05:45    TPro  8.0  /  Alb  2.5[L]  /  TBili  0.4  /  DBili  x   /  AST  24  /  ALT  26  /  AlkPhos  135[H]  10-23    PT/INR - ( 23 Oct 2024 05:45 )   PT: 13.4 sec;   INR: 1.19 ratio    PTT - ( 23 Oct 2024 05:45 )  PTT:30.2 sec      MICROBIOLOGY  Culture - Urine (10.23.24 @ 12:38)    Specimen Source: Suprapubic urine culture from super pubic cath   Culture Results: No growth    Culture - Urine (10.18.24 @ 22:00)    -  Piperacillin/Tazobactam: S <=8   -  Trimethoprim/Sulfamethoxazole: S <=0.5/9.5   -  Cefoxitin: S <=8   -  Ceftriaxone: S <=1   -  Ampicillin/Sulbactam: S 8/4   -  Aztreonam: S <=4   -  Cefazolin: R >16   -  Cefepime: S <=2   -  Ertapenem: S <=0.5   -  Ciprofloxacin: I 0.5   -  Levofloxacin: R 2   -  Meropenem: S <=1   -  Nitrofurantoin: R >64 Should not be used to treat pyelonephritis   -  Amoxicillin/Clavulanic Acid: R >16/8   -  Ampicillin: R <=8 These ampicillin results predict results for amoxicillin   Specimen Source: Clean Catch   Culture Results:   >100,000 CFU/ml Providencia stuartii   Organism Identification: Providencia stuartii   Organism: Providencia stuartii   Method Type: BHUPINDER    RADIOLOGY  < from: Xray Chest 1 View- PORTABLE-Urgent (Xray Chest 1 View- PORTABLE-Urgent .) (10.18.24 @ 20:57) >  Heart magnified by technique.    There is a persistent fine interstitial infiltrate mainly around the left   hilum and into the left midlung.    Chest is similar to June 25 this year.    Double density behind the heart noted possibly represents consolidated   lung medially which is better seen on CAT scan June 27.    IMPRESSION: Persistent left lung finding interstitial infiltrate.-Supine   the heart could represent consolidated lung at right base medially.    ---------------------  < from: US Kidney and Bladder (10.20.24 @ 20:49) >  The lower pole of the right kidney is completely obscured by bowel gas.    No renal mass, hydronephrosis or calculus is visualized sonographically.    The bladder could not be imaged due to the patient's inability to   position his legs.    Splenomegaly.           24 hr events:  no acute events  complaining of productive cough and R chest tightness  sputum reported to be thick and beige in color  reports difficulty expectorating sputum  no SOB, no wheeze  weaned from 6L NC to 4L NC: pt slightly reluctant to titrate down O2 as he states he always uses 6L  using NIV at night  IV antibx switched to po regimen      ROS  no fever, no chills  no HA, no dizziness  no visual changes, no auditory changes  no sore throat, no sinus congestion  no SOB, + cough & chest congestion  no chest pain, no palpitations  no abdominal pain, no N/V/D  no dysuria, no hematuria, + suprapubic catheter  no myalgias, no arthralgias, + lower extremity contracture  no swelling  no rashes, no pruritis    MEDICATIONS  (STANDING):  albuterol/ipratropium for Nebulization 3 milliLiter(s) Nebulizer every 6 hours  cefpodoxime 100 milliGRAM(s) Oral every 12 hours  collagenase Ointment 1 Application(s) Topical two times a day  DULoxetine 30 milliGRAM(s) Oral daily  ferrous    sulfate 325 milliGRAM(s) Oral daily  finasteride 5 milliGRAM(s) Oral daily  folic acid 1 milliGRAM(s) Oral daily  furosemide   Injectable 40 milliGRAM(s) IV Push daily  gabapentin 300 milliGRAM(s) Oral every 8 hours  methadone    Tablet 110 milliGRAM(s) Oral daily  multivitamin 1 Tablet(s) Oral daily  pantoprazole    Tablet 40 milliGRAM(s) Oral before breakfast  QUEtiapine 400 milliGRAM(s) Oral at bedtime  tamsulosin 0.4 milliGRAM(s) Oral at bedtime      MEDICATIONS  (PRN):  acetaminophen     Tablet .. 650 milliGRAM(s) Oral every 6 hours PRN Temp greater or equal to 38C (100.4F), Mild Pain (1 - 3)  albuterol    90 MICROgram(s) HFA Inhaler 2 Puff(s) Inhalation every 6 hours PRN Respiratory Distress  aluminum hydroxide/magnesium hydroxide/simethicone Suspension 30 milliLiter(s) Oral every 4 hours PRN Dyspepsia  diphenhydrAMINE 50 milliGRAM(s) Oral every 4 hours PRN Rash and/or Itching  HYDROmorphone  Injectable 0.5 milliGRAM(s) IV Push every 8 hours PRN Severe Pain (7 - 10)  melatonin 3 milliGRAM(s) Oral at bedtime PRN Insomnia  ondansetron Injectable 4 milliGRAM(s) IV Push every 8 hours PRN Nausea and/or Vomiting      LABS              11.3   8.09  )-----------( 159      ( 23 Oct 2024 05:45 )             37.4     10-23    141  |  104  |  12  ----------------------------<  80  3.8   |  32[H]  |  0.57    Ca    9.4      23 Oct 2024 05:45    TPro  8.0  /  Alb  2.5[L]  /  TBili  0.4  /  DBili  x   /  AST  24  /  ALT  26  /  AlkPhos  135[H]  10-23    PT/INR - ( 23 Oct 2024 05:45 )   PT: 13.4 sec;   INR: 1.19 ratio    PTT - ( 23 Oct 2024 05:45 )  PTT:30.2 sec      MICROBIOLOGY  Culture - Urine (10.23.24 @ 12:38)    Specimen Source: Suprapubic urine culture from super pubic cath   Culture Results: No growth    Culture - Urine (10.18.24 @ 22:00)    -  Piperacillin/Tazobactam: S <=8   -  Trimethoprim/Sulfamethoxazole: S <=0.5/9.5   -  Cefoxitin: S <=8   -  Ceftriaxone: S <=1   -  Ampicillin/Sulbactam: S 8/4   -  Aztreonam: S <=4   -  Cefazolin: R >16   -  Cefepime: S <=2   -  Ertapenem: S <=0.5   -  Ciprofloxacin: I 0.5   -  Levofloxacin: R 2   -  Meropenem: S <=1   -  Nitrofurantoin: R >64 Should not be used to treat pyelonephritis   -  Amoxicillin/Clavulanic Acid: R >16/8   -  Ampicillin: R <=8 These ampicillin results predict results for amoxicillin   Specimen Source: Clean Catch   Culture Results:   >100,000 CFU/ml Providencia stuartii   Organism Identification: Providencia stuartii   Organism: Providencia stuartii   Method Type: BHUPINDER    RADIOLOGY  < from: Xray Chest 1 View- PORTABLE-Urgent (Xray Chest 1 View- PORTABLE-Urgent .) (10.18.24 @ 20:57) >  Heart magnified by technique.    There is a persistent fine interstitial infiltrate mainly around the left   hilum and into the left midlung.    Chest is similar to June 25 this year.    Double density behind the heart noted possibly represents consolidated   lung medially which is better seen on CAT scan June 27.    IMPRESSION: Persistent left lung finding interstitial infiltrate.-Supine   the heart could represent consolidated lung at right base medially.    ---------------------  < from: US Kidney and Bladder (10.20.24 @ 20:49) >  The lower pole of the right kidney is completely obscured by bowel gas.    No renal mass, hydronephrosis or calculus is visualized sonographically.    The bladder could not be imaged due to the patient's inability to   position his legs.    Splenomegaly.        VITALS  T 98.8  HR 77  /72  RR 18  O2 sat: 92-95% on 4L NC    PHYSICAL EXAM  GEN: NAD, comfortable in with head phones on  HEENT: NC/AT, EOMI, sclera white, nasal cannula in place  CV: RRR  PULM: CTA bilaterally, no wheeze, no rhonchi  ABD: soft, + healed abdominal scar, + ileostomy, + BS  EXT: contracted bilateral LE, RUE erythema around prior tape site, R upper arm IV in place  NEURO: paraplegia, AAOx3   Detail Level: Detailed Double O-Z Plasty Text: The defect edges were debeveled with a #15 scalpel blade.  Given the location of the defect, shape of the defect and the proximity to free margins a Double O-Z plasty (double transposition flap) was deemed most appropriate.  Using a sterile surgical marker, the appropriate transposition flaps were drawn incorporating the defect and placing the expected incisions within the relaxed skin tension lines where possible. The area thus outlined was incised deep to adipose tissue with a #15 scalpel blade.  The skin margins were undermined to an appropriate distance in all directions utilizing iris scissors.  Hemostasis was achieved with electrocautery.  The flaps were then transposed into place, one clockwise and the other counterclockwise, and anchored with interrupted buried subcutaneous sutures.

## 2024-10-25 NOTE — PROGRESS NOTE ADULT - SUBJECTIVE AND OBJECTIVE BOX
HPI:  54-year-old male with a PMH HTN, HLD, CAD. Emphysema (home O2), paraplegic, hepatitis C, Indwelling catheter due to neurogenic bladder sent to the ED from [facility] for West Catheter Obstruction. Endorses leaking of urine around the west catheter, unsure when it originally started, but has worsen over the past couple days. Last catheter exchange was 2 months ago, which is normally done either at the [facility] or [facility]. Denies any other acute complaints. Labs unremarkable for H/H: 11.6/38.0, UA (+) Nitrite, Large-leuk Esterase, too many-WBC, Many-Bacteria. Vitals stable. Patient is being admitted having a Suprapubic catheter place by IR.    (18 Oct 2024 22:44)  Patient is a 54y old  Male who presents with a chief complaint of West Catheter Obstruction & UTI (25 Oct 2024 18:00)      INTERVAL HPI/OVERNIGHT EVENTS:    MEDICATIONS  (STANDING):  albuterol/ipratropium for Nebulization 3 milliLiter(s) Nebulizer every 6 hours  bacitracin   Ointment 1 Application(s) Topical two times a day  cefpodoxime 100 milliGRAM(s) Oral every 12 hours  collagenase Ointment 1 Application(s) Topical two times a day  DULoxetine 30 milliGRAM(s) Oral daily  ferrous    sulfate 325 milliGRAM(s) Oral daily  finasteride 5 milliGRAM(s) Oral daily  folic acid 1 milliGRAM(s) Oral daily  furosemide   Injectable 40 milliGRAM(s) IV Push daily  gabapentin 300 milliGRAM(s) Oral every 8 hours  methadone    Tablet 110 milliGRAM(s) Oral daily  multivitamin 1 Tablet(s) Oral daily  pantoprazole    Tablet 40 milliGRAM(s) Oral before breakfast  QUEtiapine 400 milliGRAM(s) Oral at bedtime  tamsulosin 0.4 milliGRAM(s) Oral at bedtime    MEDICATIONS  (PRN):  acetaminophen     Tablet .. 650 milliGRAM(s) Oral every 6 hours PRN Temp greater or equal to 38C (100.4F), Mild Pain (1 - 3)  albuterol    90 MICROgram(s) HFA Inhaler 2 Puff(s) Inhalation every 6 hours PRN Respiratory Distress  aluminum hydroxide/magnesium hydroxide/simethicone Suspension 30 milliLiter(s) Oral every 4 hours PRN Dyspepsia  diphenhydrAMINE 50 milliGRAM(s) Oral every 4 hours PRN Rash and/or Itching  HYDROmorphone   Tablet 8 milliGRAM(s) Oral every 8 hours PRN Severe Pain (7 - 10)  melatonin 3 milliGRAM(s) Oral at bedtime PRN Insomnia  ondansetron Injectable 4 milliGRAM(s) IV Push every 8 hours PRN Nausea and/or Vomiting      Allergies    Zyprexa (Rash; Dystonic RXN; Hives)  Risperdal (Short breath; Rash; Hives)  Stelazine (Unknown)  NSAIDs (Flushing; Other (Moderate))  Motrin (Anaphylaxis)  Aleve (Unknown)  Haldol (Anaphylaxis)  Thorazine (Other (Moderate))    Intolerances        REVIEW OF SYSTEMS:  CONSTITUTIONAL: No fever, weight loss, or fatigue  EYES: No eye pain, visual disturbances, or discharge  ENMT:  No difficulty hearing, tinnitus, vertigo; No sinus or throat pain  NECK: No pain or stiffness  BREASTS: No pain, masses, or nipple discharge  RESPIRATORY: No cough, wheezing, chills or hemoptysis; No shortness of breath  CARDIOVASCULAR: No chest pain, palpitations, dizziness, or leg swelling  GASTROINTESTINAL: No abdominal or epigastric pain. No nausea, vomiting, or hematemesis; No diarrhea or constipation. No melena or hematochezia.  GENITOURINARY: No dysuria, frequency, hematuria, or incontinence  NEUROLOGICAL: No headaches, memory loss, loss of strength, numbness, or tremors  SKIN: No itching, burning, rashes, or lesions   LYMPH NODES: No enlarged glands  ENDOCRINE: No heat or cold intolerance; No hair loss  MUSCULOSKELETAL: No joint pain or swelling; No muscle, back, or extremity pain  PSYCHIATRIC: No depression, anxiety, mood swings, or difficulty sleeping  HEME/LYMPH: No easy bruising, or bleeding gums  ALLERGY AND IMMUNOLOGIC: No hives or eczema    Vital Signs Last 24 Hrs  T(C): 37.1 (26 Oct 2024 18:07), Max: 37.4 (26 Oct 2024 13:04)  T(F): 98.7 (26 Oct 2024 18:07), Max: 99.4 (26 Oct 2024 13:04)  HR: 80 (26 Oct 2024 18:07) (72 - 85)  BP: 108/67 (26 Oct 2024 18:07) (108/67 - 119/65)  BP(mean): --  RR: 19 (26 Oct 2024 18:07) (18 - 19)  SpO2: 93% (26 Oct 2024 18:07) (93% - 98%)    Parameters below as of 26 Oct 2024 18:07  Patient On (Oxygen Delivery Method): nasal cannula        PHYSICAL EXAM:  GENERAL: NAD, well-groomed, well-developed  HEAD:  Atraumatic, Normocephalic  EYES: EOMI, PERRLA, conjunctiva and sclera clear  ENMT: No tonsillar erythema, exudates, or enlargement; Moist mucous membranes, Good dentition, No lesions  NECK: Supple, No JVD, Normal thyroid  NERVOUS SYSTEM:  Alert & Oriented X3, Good concentration; Motor Strength 5/5 B/L upper and lower extremities; DTRs 2+ intact and symmetric  CHEST/LUNG: Clear to ascultation  bilaterally; No rales, rhonchi, wheezing, or rubs  HEART: Regular rate and rhythm; No murmurs, rubs, or gallops  ABDOMEN: Soft, Nontender, Nondistended; Bowel sounds present  EXTREMITIES:  2+ Peripheral Pulses, No clubbing, cyanosis, or edema  LYMPH: No lymphadenopathy noted  SKIN: No rashes or lesions    LABS:              CAPILLARY BLOOD GLUCOSE          RADIOLOGY & ADDITIONAL TESTS:    Imaging Personally Reviewed:  [ ] YES  [ ] NO    Consultant(s) Notes Reviewed:  [ ] YES  [ ] NO    Care Discussed with Consultants/Other Providers [ ] YES  [ ] NO

## 2024-10-25 NOTE — PROGRESS NOTE ADULT - NS ATTEST RISK PROBLEM GEN_ALL_CORE FT
UTI, chronic hypoxic/hypercarbic respiratory failure, empysema UTI, neurogenic bladder, chronic hypoxic/hypercarbic respiratory failure, emphysema, paraplegia

## 2024-10-25 NOTE — PROGRESS NOTE ADULT - ASSESSMENT
54M PMH HTN, HLD, CAD, cervical epidural abscess s/p decompressive cervical laminectomy 3/2021 complicated by b/l LE paralysis, pneumoperitoneum s/p ex-lap with colectomy and end ileostomy 1/12/23 complicated by evisceration and sepsis with MRSA bacteremia postoperatively, Hep C, emphysema on chronic O2, hx of R buttock abscess, hx of L foot osteomyelitis, bipolar, opioid dependence, neurogenic bladder with chronic west catheter, admission 6/2024 for acute on chronic hypercarbic respiratory failure with large R pleural effusion s/p thoracentesis with 1.5L exudative fluid drained, cultures negative presents for west catheter obstruction. Admitted with obstruction of west catheter with CAUTI s/p suprapubic catheter placement by IR 10/23. Pulmonary eval requested for clearance for procedure.     DX: CAUTI (present on admission), obstructed west catheter, chronic hypoxic and hypercarbic respiratory failure, emphysema, paraplegia    - stable respiratory status  - complaining of cough with productive sputum that is difficult to expectorate  - cont with duonebs + aerobika  - will get repeat CXR to evaluate  - incentive spirometer  - can trial chest vest q6 hours for secretion mobilization as pt states that has worked for him in the past  - lung exam clear today  - cont antibx for UTI/ CAUTI per ID. switched from meropenem to cefpodoxime   - he has been on antibx for his urinary tract infection which have pulmonary covereage simultaneously. no respiratory complaints previously. no SOB. no clinical suspicion for underlying lung infection  - weaned from 6L NC to 4L NC today  - goal to maintain O2 sat >90%  - tolerating 4L NC this far  - continue nocturnal NIV for chronic hypercarbic respiratory failure  - bacitracin to redness/skin irritation around prior adhesive taped skin area R upper arm  - remainder of care per primary team

## 2024-10-26 PROCEDURE — 99232 SBSQ HOSP IP/OBS MODERATE 35: CPT

## 2024-10-26 RX ORDER — HYDROMORPHONE HCL/0.9% NACL/PF 6 MG/30 ML
8 PATIENT CONTROLLED ANALGESIA SYRINGE INTRAVENOUS EVERY 8 HOURS
Refills: 0 | Status: DISCONTINUED | OUTPATIENT
Start: 2024-10-26 | End: 2024-10-29

## 2024-10-26 RX ADMIN — FINASTERIDE 5 MILLIGRAM(S): 5 TABLET, FILM COATED ORAL at 13:03

## 2024-10-26 RX ADMIN — Medication 0.4 MILLIGRAM(S): at 21:45

## 2024-10-26 RX ADMIN — GABAPENTIN 300 MILLIGRAM(S): 300 CAPSULE ORAL at 15:38

## 2024-10-26 RX ADMIN — Medication 1 TABLET(S): at 13:03

## 2024-10-26 RX ADMIN — FOLIC ACID 1 MILLIGRAM(S): 1 TABLET ORAL at 13:03

## 2024-10-26 RX ADMIN — Medication 8 MILLIGRAM(S): at 14:47

## 2024-10-26 RX ADMIN — Medication 40 MILLIGRAM(S): at 06:17

## 2024-10-26 RX ADMIN — QUETIAPINE FUMARATE 400 MILLIGRAM(S): 200 TABLET ORAL at 22:34

## 2024-10-26 RX ADMIN — Medication 0.5 MILLIGRAM(S): at 06:17

## 2024-10-26 RX ADMIN — FIRST AID ANTIBIOTIC 1 APPLICATION(S): 500 OINTMENT TOPICAL at 17:42

## 2024-10-26 RX ADMIN — IPRATROPIUM BROMIDE AND ALBUTEROL SULFATE 3 MILLILITER(S): .5; 2.5 SOLUTION RESPIRATORY (INHALATION) at 11:03

## 2024-10-26 RX ADMIN — IPRATROPIUM BROMIDE AND ALBUTEROL SULFATE 3 MILLILITER(S): .5; 2.5 SOLUTION RESPIRATORY (INHALATION) at 06:04

## 2024-10-26 RX ADMIN — Medication 8 MILLIGRAM(S): at 22:34

## 2024-10-26 RX ADMIN — Medication 1 APPLICATION(S): at 17:43

## 2024-10-26 RX ADMIN — GABAPENTIN 300 MILLIGRAM(S): 300 CAPSULE ORAL at 21:45

## 2024-10-26 RX ADMIN — Medication 325 MILLIGRAM(S): at 13:04

## 2024-10-26 RX ADMIN — CEFPODOXIME PROXETIL 100 MILLIGRAM(S): 200 TABLET, FILM COATED ORAL at 06:17

## 2024-10-26 RX ADMIN — METHADONE HYDROCHLORIDE 110 MILLIGRAM(S): 10 TABLET ORAL at 13:06

## 2024-10-26 RX ADMIN — GABAPENTIN 300 MILLIGRAM(S): 300 CAPSULE ORAL at 06:17

## 2024-10-26 RX ADMIN — DULOXETINE HYDROCHLORIDE 30 MILLIGRAM(S): 30 CAPSULE, DELAYED RELEASE ORAL at 13:03

## 2024-10-26 RX ADMIN — CEFPODOXIME PROXETIL 100 MILLIGRAM(S): 200 TABLET, FILM COATED ORAL at 18:53

## 2024-10-26 RX ADMIN — Medication 0.5 MILLIGRAM(S): at 07:15

## 2024-10-26 RX ADMIN — Medication 50 MILLIGRAM(S): at 17:42

## 2024-10-26 RX ADMIN — PANTOPRAZOLE SODIUM 40 MILLIGRAM(S): 40 TABLET, DELAYED RELEASE ORAL at 06:17

## 2024-10-26 RX ADMIN — Medication 8 MILLIGRAM(S): at 13:37

## 2024-10-26 RX ADMIN — FIRST AID ANTIBIOTIC 1 APPLICATION(S): 500 OINTMENT TOPICAL at 06:17

## 2024-10-26 RX ADMIN — Medication 8 MILLIGRAM(S): at 21:45

## 2024-10-26 RX ADMIN — IPRATROPIUM BROMIDE AND ALBUTEROL SULFATE 3 MILLILITER(S): .5; 2.5 SOLUTION RESPIRATORY (INHALATION) at 17:45

## 2024-10-26 NOTE — PROGRESS NOTE ADULT - ASSESSMENT
54-year-old male with a PMH HTN, HLD, CAD. Emphysema (home O2), paraplegic, hepatitis C, Indwelling catheter due to neurogenic bladder sent to the ED from [facility] for West Catheter Obstruction. Endorses leaking of urine around the west catheter, unsure when it originally started, but has worsen over the past couple days. Last catheter exchange was 2 months ago, which is normally done either at the long term or [facility]. Denies any other acute complaints. Labs unremarkable for H/H: 11.6/38.0, UA (+) Nitrite, Large-leuk Esterase, too many-WBC, Many-Bacteria. Vitals stable. Patient is being admitted having a Suprapubic catheter place by IR.         #Obstruction of urinary catheter  Chronic Indwelling Catheter - Last exchange 2 months ago    Evaluated by Urology - Unable to exchange the catheter  - unable to obtain CT A/P due body habitus/anatomy  - US abdomen limited, findings as above  - Suprapubic catheter to be placed by IR on wednesday 10/23    #perioperative clearance  -Reports no symptoms of chest pain or worsening dyspnea at rest or with exertion, orthopnea or palpitations.  - hx of JUVENAL, COPD on home O2. respiratory status stable as pt is on 6L, same as home dose. tolerating BiPAP  - TTE with EF of 40-45%, no significant interval change since previous TTE  - EKG without acute ischemic changes  - at baseline, not on anti-coagulation  - Patient is at moderate risk for low risk procedure given medical co-morbidities. pt is medically optimized.    #Emphysema, JUVENAL  #hx of acute respiratory failure  on home O2 and BiPAP  - stable on home O2 setting  - c/w nocturnal BiPAP  - Pulm following    #chronic HFrEF  - TTE with EF of 40-45%, no significant interval change since previous TTE  - EKG without acute ischemic changes  - C/w lasix 40mg IV  - GDMT: soft bp. will consider low dose beta blocker if Bp improves    #UTI  UA (+) Nitrite, Large-leuk Esterase, too many-WBC, Many-Bacteria  Ucx: Providencia stuartii  - will switch from Rocephin to meropenem  - F/U Urine Cx sensitivities  - ID following     #Chronic pain/neuropathy   - c/w gabapentin, cymbalta, dilaudid (home med)    #H/o Opioid abuse  #Bipolar disorder   Methadone dose verified with Ana Hood. Pt is on Methadone 110mg q daily, last fill date 9/27 for 1 month supply. Last intake of med prior to hospitalization was 10/18 AM.   - c/w seroquel, methadone, cymbalta     Neurogenic bladder   - plan for SPC as above  - c/w flomax         DVT ppx - SQH- will hold in anticipation for procedure

## 2024-10-26 NOTE — PROGRESS NOTE ADULT - SUBJECTIVE AND OBJECTIVE BOX
HPI:  54-year-old male with a PMH HTN, HLD, CAD. Emphysema (home O2), paraplegic, hepatitis C, Indwelling catheter due to neurogenic bladder sent to the ED from [facility] for West Catheter Obstruction. Endorses leaking of urine around the west catheter, unsure when it originally started, but has worsen over the past couple days. Last catheter exchange was 2 months ago, which is normally done either at the [facility] or [facility]. Denies any other acute complaints. Labs unremarkable for H/H: 11.6/38.0, UA (+) Nitrite, Large-leuk Esterase, too many-WBC, Many-Bacteria. Vitals stable. Patient is being admitted having a Suprapubic catheter place by IR.    (18 Oct 2024 22:44)  Patient is a 54y old  Male who presents with a chief complaint of West Catheter Obstruction & UTI (25 Oct 2024 18:00)      INTERVAL HPI/OVERNIGHT EVENTS:    MEDICATIONS  (STANDING):  albuterol/ipratropium for Nebulization 3 milliLiter(s) Nebulizer every 6 hours  bacitracin   Ointment 1 Application(s) Topical two times a day  cefpodoxime 100 milliGRAM(s) Oral every 12 hours  collagenase Ointment 1 Application(s) Topical two times a day  DULoxetine 30 milliGRAM(s) Oral daily  ferrous    sulfate 325 milliGRAM(s) Oral daily  finasteride 5 milliGRAM(s) Oral daily  folic acid 1 milliGRAM(s) Oral daily  furosemide   Injectable 40 milliGRAM(s) IV Push daily  gabapentin 300 milliGRAM(s) Oral every 8 hours  methadone    Tablet 110 milliGRAM(s) Oral daily  multivitamin 1 Tablet(s) Oral daily  pantoprazole    Tablet 40 milliGRAM(s) Oral before breakfast  QUEtiapine 400 milliGRAM(s) Oral at bedtime  tamsulosin 0.4 milliGRAM(s) Oral at bedtime    MEDICATIONS  (PRN):  acetaminophen     Tablet .. 650 milliGRAM(s) Oral every 6 hours PRN Temp greater or equal to 38C (100.4F), Mild Pain (1 - 3)  albuterol    90 MICROgram(s) HFA Inhaler 2 Puff(s) Inhalation every 6 hours PRN Respiratory Distress  aluminum hydroxide/magnesium hydroxide/simethicone Suspension 30 milliLiter(s) Oral every 4 hours PRN Dyspepsia  diphenhydrAMINE 50 milliGRAM(s) Oral every 4 hours PRN Rash and/or Itching  HYDROmorphone   Tablet 8 milliGRAM(s) Oral every 8 hours PRN Severe Pain (7 - 10)  melatonin 3 milliGRAM(s) Oral at bedtime PRN Insomnia  ondansetron Injectable 4 milliGRAM(s) IV Push every 8 hours PRN Nausea and/or Vomiting      Allergies    Zyprexa (Rash; Dystonic RXN; Hives)  Risperdal (Short breath; Rash; Hives)  Stelazine (Unknown)  NSAIDs (Flushing; Other (Moderate))  Motrin (Anaphylaxis)  Aleve (Unknown)  Haldol (Anaphylaxis)  Thorazine (Other (Moderate))    Intolerances        REVIEW OF SYSTEMS:  CONSTITUTIONAL: No fever, weight loss, or fatigue  EYES: No eye pain, visual disturbances, or discharge  ENMT:  No difficulty hearing, tinnitus, vertigo; No sinus or throat pain  NECK: No pain or stiffness  BREASTS: No pain, masses, or nipple discharge  RESPIRATORY: No cough, wheezing, chills or hemoptysis; No shortness of breath  CARDIOVASCULAR: No chest pain, palpitations, dizziness, or leg swelling  GASTROINTESTINAL: No abdominal or epigastric pain. No nausea, vomiting, or hematemesis; No diarrhea or constipation. No melena or hematochezia.  GENITOURINARY: No dysuria, frequency, hematuria, or incontinence  NEUROLOGICAL: No headaches, memory loss, loss of strength, numbness, or tremors  SKIN: No itching, burning, rashes, or lesions   LYMPH NODES: No enlarged glands  ENDOCRINE: No heat or cold intolerance; No hair loss  MUSCULOSKELETAL: No joint pain or swelling; No muscle, back, or extremity pain  PSYCHIATRIC: No depression, anxiety, mood swings, or difficulty sleeping  HEME/LYMPH: No easy bruising, or bleeding gums  ALLERGY AND IMMUNOLOGIC: No hives or eczema    Vital Signs Last 24 Hrs  T(C): 37.1 (26 Oct 2024 18:07), Max: 37.4 (26 Oct 2024 13:04)  T(F): 98.7 (26 Oct 2024 18:07), Max: 99.4 (26 Oct 2024 13:04)  HR: 80 (26 Oct 2024 18:07) (72 - 85)  BP: 108/67 (26 Oct 2024 18:07) (108/67 - 119/65)  BP(mean): --  RR: 19 (26 Oct 2024 18:07) (18 - 19)  SpO2: 93% (26 Oct 2024 18:07) (93% - 98%)    Parameters below as of 26 Oct 2024 18:07  Patient On (Oxygen Delivery Method): nasal cannula        PHYSICAL EXAM:  GENERAL: NAD, well-groomed, well-developed  HEAD:  Atraumatic, Normocephalic  EYES: EOMI, PERRLA, conjunctiva and sclera clear  ENMT: No tonsillar erythema, exudates, or enlargement; Moist mucous membranes, Good dentition, No lesions  NECK: Supple, No JVD, Normal thyroid  NERVOUS SYSTEM:  Alert & Oriented X3, Good concentration; Motor Strength 5/5 B/L upper and lower extremities; DTRs 2+ intact and symmetric  CHEST/LUNG: Clear to ascultation  bilaterally; No rales, rhonchi, wheezing, or rubs  HEART: Regular rate and rhythm; No murmurs, rubs, or gallops  ABDOMEN: Soft, Nontender, Nondistended; Bowel sounds present  EXTREMITIES:  2+ Peripheral Pulses, No clubbing, cyanosis, or edema  LYMPH: No lymphadenopathy noted  SKIN: No rashes or lesions    LABS:              CAPILLARY BLOOD GLUCOSE          RADIOLOGY & ADDITIONAL TESTS:    Imaging Personally Reviewed:  [ ] YES  [ ] NO    Consultant(s) Notes Reviewed:  [ ] YES  [ ] NO    Care Discussed with Consultants/Other Providers [ ] YES  [ ] NO

## 2024-10-27 LAB
ALBUMIN SERPL ELPH-MCNC: 2.6 G/DL — LOW (ref 3.3–5)
ALP SERPL-CCNC: 170 U/L — HIGH (ref 40–120)
ALT FLD-CCNC: 51 U/L — SIGNIFICANT CHANGE UP (ref 12–78)
ANION GAP SERPL CALC-SCNC: 5 MMOL/L — SIGNIFICANT CHANGE UP (ref 5–17)
AST SERPL-CCNC: 45 U/L — HIGH (ref 15–37)
BILIRUB SERPL-MCNC: 0.3 MG/DL — SIGNIFICANT CHANGE UP (ref 0.2–1.2)
BUN SERPL-MCNC: 19 MG/DL — SIGNIFICANT CHANGE UP (ref 7–23)
CALCIUM SERPL-MCNC: 9.3 MG/DL — SIGNIFICANT CHANGE UP (ref 8.5–10.1)
CHLORIDE SERPL-SCNC: 103 MMOL/L — SIGNIFICANT CHANGE UP (ref 96–108)
CO2 SERPL-SCNC: 32 MMOL/L — HIGH (ref 22–31)
CREAT SERPL-MCNC: 0.72 MG/DL — SIGNIFICANT CHANGE UP (ref 0.5–1.3)
EGFR: 109 ML/MIN/1.73M2 — SIGNIFICANT CHANGE UP
GLUCOSE SERPL-MCNC: 139 MG/DL — HIGH (ref 70–99)
HCT VFR BLD CALC: 39.5 % — SIGNIFICANT CHANGE UP (ref 39–50)
HGB BLD-MCNC: 12.1 G/DL — LOW (ref 13–17)
MCHC RBC-ENTMCNC: 27.1 PG — SIGNIFICANT CHANGE UP (ref 27–34)
MCHC RBC-ENTMCNC: 30.6 G/DL — LOW (ref 32–36)
MCV RBC AUTO: 88.6 FL — SIGNIFICANT CHANGE UP (ref 80–100)
NRBC # BLD: 0 /100 WBCS — SIGNIFICANT CHANGE UP (ref 0–0)
PLATELET # BLD AUTO: 168 K/UL — SIGNIFICANT CHANGE UP (ref 150–400)
POTASSIUM SERPL-MCNC: 3.6 MMOL/L — SIGNIFICANT CHANGE UP (ref 3.5–5.3)
POTASSIUM SERPL-SCNC: 3.6 MMOL/L — SIGNIFICANT CHANGE UP (ref 3.5–5.3)
PROT SERPL-MCNC: 8.5 GM/DL — HIGH (ref 6–8.3)
RBC # BLD: 4.46 M/UL — SIGNIFICANT CHANGE UP (ref 4.2–5.8)
RBC # FLD: 15.3 % — HIGH (ref 10.3–14.5)
SODIUM SERPL-SCNC: 140 MMOL/L — SIGNIFICANT CHANGE UP (ref 135–145)
WBC # BLD: 8.88 K/UL — SIGNIFICANT CHANGE UP (ref 3.8–10.5)
WBC # FLD AUTO: 8.88 K/UL — SIGNIFICANT CHANGE UP (ref 3.8–10.5)

## 2024-10-27 PROCEDURE — 71045 X-RAY EXAM CHEST 1 VIEW: CPT | Mod: 26

## 2024-10-27 PROCEDURE — 99232 SBSQ HOSP IP/OBS MODERATE 35: CPT

## 2024-10-27 RX ORDER — HYDROMORPHONE HCL/0.9% NACL/PF 6 MG/30 ML
1 PATIENT CONTROLLED ANALGESIA SYRINGE INTRAVENOUS EVERY 4 HOURS
Refills: 0 | Status: DISCONTINUED | OUTPATIENT
Start: 2024-10-27 | End: 2024-10-29

## 2024-10-27 RX ADMIN — FIRST AID ANTIBIOTIC 1 APPLICATION(S): 500 OINTMENT TOPICAL at 06:28

## 2024-10-27 RX ADMIN — GABAPENTIN 300 MILLIGRAM(S): 300 CAPSULE ORAL at 21:08

## 2024-10-27 RX ADMIN — METHADONE HYDROCHLORIDE 110 MILLIGRAM(S): 10 TABLET ORAL at 12:31

## 2024-10-27 RX ADMIN — Medication 8 MILLIGRAM(S): at 15:25

## 2024-10-27 RX ADMIN — Medication 1 MILLIGRAM(S): at 18:33

## 2024-10-27 RX ADMIN — Medication 8 MILLIGRAM(S): at 06:37

## 2024-10-27 RX ADMIN — QUETIAPINE FUMARATE 400 MILLIGRAM(S): 200 TABLET ORAL at 21:08

## 2024-10-27 RX ADMIN — CEFPODOXIME PROXETIL 100 MILLIGRAM(S): 200 TABLET, FILM COATED ORAL at 06:28

## 2024-10-27 RX ADMIN — IPRATROPIUM BROMIDE AND ALBUTEROL SULFATE 3 MILLILITER(S): .5; 2.5 SOLUTION RESPIRATORY (INHALATION) at 00:28

## 2024-10-27 RX ADMIN — Medication 40 MILLIGRAM(S): at 06:27

## 2024-10-27 RX ADMIN — IPRATROPIUM BROMIDE AND ALBUTEROL SULFATE 3 MILLILITER(S): .5; 2.5 SOLUTION RESPIRATORY (INHALATION) at 05:46

## 2024-10-27 RX ADMIN — DULOXETINE HYDROCHLORIDE 30 MILLIGRAM(S): 30 CAPSULE, DELAYED RELEASE ORAL at 12:32

## 2024-10-27 RX ADMIN — GABAPENTIN 300 MILLIGRAM(S): 300 CAPSULE ORAL at 14:05

## 2024-10-27 RX ADMIN — IPRATROPIUM BROMIDE AND ALBUTEROL SULFATE 3 MILLILITER(S): .5; 2.5 SOLUTION RESPIRATORY (INHALATION) at 23:07

## 2024-10-27 RX ADMIN — Medication 1 TABLET(S): at 12:32

## 2024-10-27 RX ADMIN — Medication 50 MILLIGRAM(S): at 18:47

## 2024-10-27 RX ADMIN — IPRATROPIUM BROMIDE AND ALBUTEROL SULFATE 3 MILLILITER(S): .5; 2.5 SOLUTION RESPIRATORY (INHALATION) at 17:33

## 2024-10-27 RX ADMIN — Medication 1 APPLICATION(S): at 06:29

## 2024-10-27 RX ADMIN — FINASTERIDE 5 MILLIGRAM(S): 5 TABLET, FILM COATED ORAL at 12:32

## 2024-10-27 RX ADMIN — Medication 325 MILLIGRAM(S): at 12:32

## 2024-10-27 RX ADMIN — Medication 0.4 MILLIGRAM(S): at 21:08

## 2024-10-27 RX ADMIN — GABAPENTIN 300 MILLIGRAM(S): 300 CAPSULE ORAL at 06:29

## 2024-10-27 RX ADMIN — IPRATROPIUM BROMIDE AND ALBUTEROL SULFATE 3 MILLILITER(S): .5; 2.5 SOLUTION RESPIRATORY (INHALATION) at 11:12

## 2024-10-27 RX ADMIN — Medication 1 MILLIGRAM(S): at 19:30

## 2024-10-27 RX ADMIN — PANTOPRAZOLE SODIUM 40 MILLIGRAM(S): 40 TABLET, DELAYED RELEASE ORAL at 06:29

## 2024-10-27 RX ADMIN — FOLIC ACID 1 MILLIGRAM(S): 1 TABLET ORAL at 12:32

## 2024-10-27 NOTE — PROGRESS NOTE ADULT - SUBJECTIVE AND OBJECTIVE BOX
HPI:  54-year-old male with a PMH HTN, HLD, CAD. Emphysema (home O2), paraplegic, hepatitis C, Indwelling catheter due to neurogenic bladder sent to the ED [facility] for West Catheter Obstruction. Endorses leaking of urine around the west catheter, unsure when it originally started, but has worsen over the past couple days. Last catheter exchange was 2 months ago, which is normally done either at the [facility] or [facility]. Denies any other acute complaints. Labs unremarkable for H/H: 11.6/38.0, UA (+) Nitrite, Large-leuk Esterase, too many-WBC, Many-Bacteria. Vitals stable. Patient is being admitted having a Suprapubic catheter place by IR.    (18 Oct 2024 22:44)  Patient is a 54y old  Male who presents with a chief complaint of West Catheter Obstruction & UTI (25 Oct 2024 18:00)      INTERVAL HPI/OVERNIGHT EVENTS:    MEDICATIONS  (STANDING):  albuterol/ipratropium for Nebulization 3 milliLiter(s) Nebulizer every 6 hours  bacitracin   Ointment 1 Application(s) Topical two times a day  cefpodoxime 100 milliGRAM(s) Oral every 12 hours  collagenase Ointment 1 Application(s) Topical two times a day  DULoxetine 30 milliGRAM(s) Oral daily  ferrous    sulfate 325 milliGRAM(s) Oral daily  finasteride 5 milliGRAM(s) Oral daily  folic acid 1 milliGRAM(s) Oral daily  furosemide   Injectable 40 milliGRAM(s) IV Push daily  gabapentin 300 milliGRAM(s) Oral every 8 hours  methadone    Tablet 110 milliGRAM(s) Oral daily  multivitamin 1 Tablet(s) Oral daily  pantoprazole    Tablet 40 milliGRAM(s) Oral before breakfast  QUEtiapine 400 milliGRAM(s) Oral at bedtime  tamsulosin 0.4 milliGRAM(s) Oral at bedtime    MEDICATIONS  (PRN):  acetaminophen     Tablet .. 650 milliGRAM(s) Oral every 6 hours PRN Temp greater or equal to 38C (100.4F), Mild Pain (1 - 3)  albuterol    90 MICROgram(s) HFA Inhaler 2 Puff(s) Inhalation every 6 hours PRN Respiratory Distress  aluminum hydroxide/magnesium hydroxide/simethicone Suspension 30 milliLiter(s) Oral every 4 hours PRN Dyspepsia  diphenhydrAMINE 50 milliGRAM(s) Oral every 4 hours PRN Rash and/or Itching  HYDROmorphone   Tablet 8 milliGRAM(s) Oral every 8 hours PRN Severe Pain (7 - 10)  melatonin 3 milliGRAM(s) Oral at bedtime PRN Insomnia  ondansetron Injectable 4 milliGRAM(s) IV Push every 8 hours PRN Nausea and/or Vomiting      Allergies    Zyprexa (Rash; Dystonic RXN; Hives)  Risperdal (Short breath; Rash; Hives)  Stelazine (Unknown)  NSAIDs (Flushing; Other (Moderate))  Motrin (Anaphylaxis)  Aleve (Unknown)  Haldol (Anaphylaxis)  Thorazine (Other (Moderate))    Intolerances        REVIEW OF SYSTEMS:  CONSTITUTIONAL: No fever, weight loss, or fatigue  EYES: No eye pain, visual disturbances, or discharge  ENMT:  No difficulty hearing, tinnitus, vertigo; No sinus or throat pain  NECK: No pain or stiffness  BREASTS: No pain, masses, or nipple discharge  RESPIRATORY: No cough, wheezing, chills or hemoptysis; No shortness of breath  CARDIOVASCULAR: No chest pain, palpitations, dizziness, or leg swelling  GASTROINTESTINAL: No abdominal or epigastric pain. No nausea, vomiting, or hematemesis; No diarrhea or constipation. No melena or hematochezia.  GENITOURINARY: No dysuria, frequency, hematuria, or incontinence  NEUROLOGICAL: No headaches, memory loss, loss of strength, numbness, or tremors  SKIN: No itching, burning, rashes, or lesions   LYMPH NODES: No enlarged glands  ENDOCRINE: No heat or cold intolerance; No hair loss  MUSCULOSKELETAL: No joint pain or swelling; No muscle, back, or extremity pain  PSYCHIATRIC: No depression, anxiety, mood swings, or difficulty sleeping  HEME/LYMPH: No easy bruising, or bleeding gums  ALLERGY AND IMMUNOLOGIC: No hives or eczema    Vital Signs Last 24 Hrs  T(C): 37.1 (26 Oct 2024 18:07), Max: 37.4 (26 Oct 2024 13:04)  T(F): 98.7 (26 Oct 2024 18:07), Max: 99.4 (26 Oct 2024 13:04)  HR: 80 (26 Oct 2024 18:07) (72 - 85)  BP: 108/67 (26 Oct 2024 18:07) (108/67 - 119/65)  BP(mean): --  RR: 19 (26 Oct 2024 18:07) (18 - 19)  SpO2: 93% (26 Oct 2024 18:07) (93% - 98%)    Parameters below as of 26 Oct 2024 18:07  Patient On (Oxygen Delivery Method): nasal cannula        PHYSICAL EXAM:  GENERAL: NAD, well-groomed, well-developed  HEAD:  Atraumatic, Normocephalic  EYES: EOMI, PERRLA, conjunctiva and sclera clear  ENMT: No tonsillar erythema, exudates, or enlargement; Moist mucous membranes, Good dentition, No lesions  NECK: Supple, No JVD, Normal thyroid  NERVOUS SYSTEM:  Alert & Oriented X3, Good concentration; Motor Strength 5/5 B/L upper and lower extremities; DTRs 2+ intact and symmetric  CHEST/LUNG: Clear to ascultation  bilaterally; No rales, rhonchi, wheezing, or rubs  HEART: Regular rate and rhythm; No murmurs, rubs, or gallops  ABDOMEN: Soft, Nontender, Nondistended; Bowel sounds present  EXTREMITIES:  2+ Peripheral Pulses, No clubbing, cyanosis, or edema  LYMPH: No lymphadenopathy noted  SKIN: No rashes or lesions    LABS:              CAPILLARY BLOOD GLUCOSE          RADIOLOGY & ADDITIONAL TESTS:    Imaging Personally Reviewed:  [ ] YES  [ ] NO    Consultant(s) Notes Reviewed:  [ ] YES  [ ] NO    Care Discussed with Consultants/Other Providers [ ] YES  [ ] NO

## 2024-10-27 NOTE — PROGRESS NOTE ADULT - ASSESSMENT
54-year-old male with a PMH HTN, HLD, CAD. Emphysema (home O2), paraplegic, hepatitis C, Indwelling catheter due to neurogenic bladder sent to the ED from [facility] for West Catheter Obstruction. Endorses leaking of urine around the west catheter, unsure when it originally started, but has worsen over the past couple days. Last catheter exchange was 2 months ago, which is normally done either at the [facility] or [facility]. Denies any other acute complaints. Labs unremarkable for H/H: 11.6/38.0, UA (+) Nitrite, Large-leuk Esterase, too many-WBC, Many-Bacteria. Vitals stable. Patient is being admitted having a Suprapubic catheter place by IR.         #Obstruction of urinary catheter  Chronic Indwelling Catheter - Last exchange 2 months ago    Evaluated by Urology - Unable to exchange the catheter  - unable to obtain CT A/P due body habitus/anatomy  - US abdomen limited, findings as above  - Suprapubic catheter to be placed by IR on wednesday 10/23    #perioperative clearance  -Reports no symptoms of chest pain or worsening dyspnea at rest or with exertion, orthopnea or palpitations.  - hx of JUVENAL, COPD on home O2. respiratory status stable as pt is on 6L, same as home dose. tolerating BiPAP  - TTE with EF of 40-45%, no significant interval change since previous TTE  - EKG without acute ischemic changes  - at baseline, not on anti-coagulation  - Patient is at moderate risk for low risk procedure given medical co-morbidities. pt is medically optimized.    #Emphysema, JUVENAL  #hx of acute respiratory failure  on home O2 and BiPAP  - stable on home O2 setting  - c/w nocturnal BiPAP  - Pulm following    #chronic HFrEF  - TTE with EF of 40-45%, no significant interval change since previous TTE  - EKG without acute ischemic changes  - C/w lasix 40mg IV  - GDMT: soft bp. will consider low dose beta blocker if Bp improves    #UTI  UA (+) Nitrite, Large-leuk Esterase, too many-WBC, Many-Bacteria  Ucx: Providencia stuartii  - will switch from Rocephin to meropenem  - F/U Urine Cx sensitivities  - ID following     #Chronic pain/neuropathy   - c/w gabapentin, cymbalta, dilaudid (home med)    #H/o Opioid abuse  #Bipolar disorder   Methadone dose verified with Ana Hood. Pt is on Methadone 110mg q daily, last fill date 9/27 for 1 month supply. Last intake of med prior to hospitalization was 10/18 AM.   - c/w seroquel, methadone, cymbalta     Neurogenic bladder   - plan for SPC as above  - c/w flomax         DVT ppx - SQH- will hold in anticipation for procedure

## 2024-10-28 PROCEDURE — 93970 EXTREMITY STUDY: CPT | Mod: 26

## 2024-10-28 PROCEDURE — 99232 SBSQ HOSP IP/OBS MODERATE 35: CPT

## 2024-10-28 RX ORDER — METHADONE HYDROCHLORIDE 10 MG/1
110 TABLET ORAL DAILY
Refills: 0 | Status: DISCONTINUED | OUTPATIENT
Start: 2024-10-28 | End: 2024-10-29

## 2024-10-28 RX ADMIN — FINASTERIDE 5 MILLIGRAM(S): 5 TABLET, FILM COATED ORAL at 13:35

## 2024-10-28 RX ADMIN — Medication 8 MILLIGRAM(S): at 14:58

## 2024-10-28 RX ADMIN — Medication 8 MILLIGRAM(S): at 13:58

## 2024-10-28 RX ADMIN — FIRST AID ANTIBIOTIC 1 APPLICATION(S): 500 OINTMENT TOPICAL at 19:11

## 2024-10-28 RX ADMIN — CEFPODOXIME PROXETIL 100 MILLIGRAM(S): 200 TABLET, FILM COATED ORAL at 19:07

## 2024-10-28 RX ADMIN — CEFPODOXIME PROXETIL 100 MILLIGRAM(S): 200 TABLET, FILM COATED ORAL at 06:17

## 2024-10-28 RX ADMIN — Medication 0.4 MILLIGRAM(S): at 21:52

## 2024-10-28 RX ADMIN — Medication 1 TABLET(S): at 13:35

## 2024-10-28 RX ADMIN — IPRATROPIUM BROMIDE AND ALBUTEROL SULFATE 3 MILLILITER(S): .5; 2.5 SOLUTION RESPIRATORY (INHALATION) at 11:51

## 2024-10-28 RX ADMIN — PANTOPRAZOLE SODIUM 40 MILLIGRAM(S): 40 TABLET, DELAYED RELEASE ORAL at 06:17

## 2024-10-28 RX ADMIN — Medication 8 MILLIGRAM(S): at 22:01

## 2024-10-28 RX ADMIN — IPRATROPIUM BROMIDE AND ALBUTEROL SULFATE 3 MILLILITER(S): .5; 2.5 SOLUTION RESPIRATORY (INHALATION) at 23:49

## 2024-10-28 RX ADMIN — IPRATROPIUM BROMIDE AND ALBUTEROL SULFATE 3 MILLILITER(S): .5; 2.5 SOLUTION RESPIRATORY (INHALATION) at 17:17

## 2024-10-28 RX ADMIN — GABAPENTIN 300 MILLIGRAM(S): 300 CAPSULE ORAL at 06:17

## 2024-10-28 RX ADMIN — FIRST AID ANTIBIOTIC 1 APPLICATION(S): 500 OINTMENT TOPICAL at 06:18

## 2024-10-28 RX ADMIN — GABAPENTIN 300 MILLIGRAM(S): 300 CAPSULE ORAL at 13:59

## 2024-10-28 RX ADMIN — GABAPENTIN 300 MILLIGRAM(S): 300 CAPSULE ORAL at 21:52

## 2024-10-28 RX ADMIN — IPRATROPIUM BROMIDE AND ALBUTEROL SULFATE 3 MILLILITER(S): .5; 2.5 SOLUTION RESPIRATORY (INHALATION) at 05:09

## 2024-10-28 RX ADMIN — QUETIAPINE FUMARATE 400 MILLIGRAM(S): 200 TABLET ORAL at 21:52

## 2024-10-28 RX ADMIN — METHADONE HYDROCHLORIDE 110 MILLIGRAM(S): 10 TABLET ORAL at 19:07

## 2024-10-28 RX ADMIN — Medication 325 MILLIGRAM(S): at 13:35

## 2024-10-28 RX ADMIN — Medication 8 MILLIGRAM(S): at 23:01

## 2024-10-28 RX ADMIN — FOLIC ACID 1 MILLIGRAM(S): 1 TABLET ORAL at 13:35

## 2024-10-28 NOTE — PROGRESS NOTE ADULT - SUBJECTIVE AND OBJECTIVE BOX
STAN VEGA  MRN-[id number]  54y (1970)    Follow Up:  uti    Interval History: The pt was seen and examined earlier, not in acute distress, states "I want both of my legs amputated. I will have better quality of life without them." Pt is afebrile, NC, no wbc.     PAST MEDICAL & SURGICAL HISTORY:  CAD (coronary artery disease)      HTN (hypertension)      HLD (hyperlipidemia)      Neurogenic bladder      Bipolar disorder      S/P ileostomy      Indwelling Holcomb catheter present      Chronic hepatitis C virus infection      S/P laminectomy      S/P ileostomy          ROS:    [ ] Unobtainable because:  [x ] All other systems negative    Constitutional: no fever, no chills  Head: no trauma  Eyes: no vision changes, no eye pain  ENT:  no sore throat, no rhinorrhea  Cardiovascular:  no chest pain, no palpitation  Respiratory:  no SOB, no cough  GI:  no abd pain, no vomiting, no diarrhea  urinary: no dysuria, no hematuria, no flank pain, still occasionally urinates in presence of SPC  musculoskeletal:  no joint pain, no joint swelling  skin:  no rash  neurology:  no headache, no seizure, no change in mental status  psych: no anxiety, no depression         Allergies  Zyprexa (Rash; Dystonic RXN; Hives)  Risperdal (Short breath; Rash; Hives)  Stelazine (Unknown)  NSAIDs (Flushing; Other (Moderate))  Motrin (Anaphylaxis)  Aleve (Unknown)  Haldol (Anaphylaxis)  Thorazine (Other (Moderate))        ANTIMICROBIALS:  cefpodoxime 100 every 12 hours      OTHER MEDS:  acetaminophen     Tablet .. 650 milliGRAM(s) Oral every 6 hours PRN  albuterol    90 MICROgram(s) HFA Inhaler 2 Puff(s) Inhalation every 6 hours PRN  albuterol/ipratropium for Nebulization 3 milliLiter(s) Nebulizer every 6 hours  aluminum hydroxide/magnesium hydroxide/simethicone Suspension 30 milliLiter(s) Oral every 4 hours PRN  bacitracin   Ointment 1 Application(s) Topical two times a day  collagenase Ointment 1 Application(s) Topical two times a day  diphenhydrAMINE 50 milliGRAM(s) Oral every 4 hours PRN  DULoxetine 30 milliGRAM(s) Oral daily  ferrous    sulfate 325 milliGRAM(s) Oral daily  finasteride 5 milliGRAM(s) Oral daily  folic acid 1 milliGRAM(s) Oral daily  furosemide   Injectable 40 milliGRAM(s) IV Push daily  gabapentin 300 milliGRAM(s) Oral every 8 hours  HYDROmorphone   Tablet 8 milliGRAM(s) Oral every 8 hours PRN  HYDROmorphone  Injectable 1 milliGRAM(s) IV Push every 4 hours PRN  melatonin 3 milliGRAM(s) Oral at bedtime PRN  multivitamin 1 Tablet(s) Oral daily  ondansetron Injectable 4 milliGRAM(s) IV Push every 8 hours PRN  pantoprazole    Tablet 40 milliGRAM(s) Oral before breakfast  QUEtiapine 400 milliGRAM(s) Oral at bedtime  tamsulosin 0.4 milliGRAM(s) Oral at bedtime      Vital Signs Last 24 Hrs  T(C): 36.6 (28 Oct 2024 17:08), Max: 37.1 (28 Oct 2024 11:35)  T(F): 97.9 (28 Oct 2024 17:08), Max: 98.8 (28 Oct 2024 11:35)  HR: 84 (28 Oct 2024 17:08) (75 - 88)  BP: 127/69 (28 Oct 2024 17:08) (97/57 - 130/79)  BP(mean): --  RR: 16 (28 Oct 2024 17:08) (16 - 19)  SpO2: 94% (28 Oct 2024 17:08) (93% - 98%)    Parameters below as of 28 Oct 2024 17:08  Patient On (Oxygen Delivery Method): nasal cannula  O2 Flow (L/min): 3      Physical Exam:  Constitutional: Middle age man., non-toxic, no distress, chronically ill appearing   HEAD/EYES: anicteric, no conjunctival injection  ENT:  supple, no thrush, on cpap   Cardiovascular:   normal S1, S2, no murmur, + edema  Respiratory:  clear BS bilaterally, no wheezes, no rales  GI:  soft, non-tender, + ileostomy, surgical scar marks  :  + supra pubic catheter - site not inflamed   Musculoskeletal:  RUE midline, skin with small blisters around the line dressing. Contracted LE  Neurologic: awake and alert, paraplegic  Skin:  + rash, no erythema, no phlebitis, multiple tattoos, pt's left foot is dressed c/d/i   Heme/Onc: no lymphadenopathy   Psychiatric:  awake, alert, appropriate mood    WBC Count: 8.88 K/uL (10-27 @ 11:00)  WBC Count: 8.09 K/uL (10-23 @ 05:45)  WBC Count: 8.27 K/uL (10-22 @ 08:45)                            12.1   8.88  )-----------( 168      ( 27 Oct 2024 11:00 )             39.5       10-27    140  |  103  |  19  ----------------------------<  139[H]  3.6   |  32[H]  |  0.72    Ca    9.3      27 Oct 2024 11:00    TPro  8.5[H]  /  Alb  2.6[L]  /  TBili  0.3  /  DBili  x   /  AST  45[H]  /  ALT  51  /  AlkPhos  170[H]  10-27      Urinalysis Basic - ( 27 Oct 2024 11:00 )    Color: x / Appearance: x / SG: x / pH: x  Gluc: 139 mg/dL / Ketone: x  / Bili: x / Urobili: x   Blood: x / Protein: x / Nitrite: x   Leuk Esterase: x / RBC: x / WBC x   Sq Epi: x / Non Sq Epi: x / Bacteria: x        Creatinine Trend: 0.72<--, 0.57<--, 0.75<--, 0.91<--, 0.79<--, 0.73<--      MICROBIOLOGY:  v  Suprapubic urine culture from super pubic cath  10-23-24   No growth  --  --      Clean Catch  10-18-24   >100,000 CFU/ml Providencia stuartii  --  Providencia stuartii    RADIOLOGY:     STAN VEGA  MRN-[id number]  54y (1970)    Follow Up:  uti    Interval History: The pt was seen and examined earlier, not in acute distress, states "I want both of my legs amputated. I will have better quality of life without them." Pt is afebrile, NC, no wbc.       ROS:    [ ] Unobtainable because:  [x ] All other systems negative    Constitutional: no fever, no chills  Head: no trauma  Eyes: no vision changes, no eye pain  ENT:  no sore throat, no rhinorrhea  Cardiovascular:  no chest pain, no palpitation  Respiratory:  no SOB, no cough  GI:  no abd pain, no vomiting, no diarrhea  urinary: no dysuria, no hematuria, no flank pain, still occasionally urinates in presence of SPC  musculoskeletal:  no joint pain, no joint swelling  skin:  no rash  neurology:  no headache, no seizure, no change in mental status  psych: no anxiety, no depression         Allergies  Zyprexa (Rash; Dystonic RXN; Hives)  Risperdal (Short breath; Rash; Hives)  Stelazine (Unknown)  NSAIDs (Flushing; Other (Moderate))  Motrin (Anaphylaxis)  Aleve (Unknown)  Haldol (Anaphylaxis)  Thorazine (Other (Moderate))    ANTIMICROBIALS:  cefpodoxime 100 every 12 hours      OTHER MEDS:  acetaminophen     Tablet .. 650 milliGRAM(s) Oral every 6 hours PRN  albuterol    90 MICROgram(s) HFA Inhaler 2 Puff(s) Inhalation every 6 hours PRN  albuterol/ipratropium for Nebulization 3 milliLiter(s) Nebulizer every 6 hours  aluminum hydroxide/magnesium hydroxide/simethicone Suspension 30 milliLiter(s) Oral every 4 hours PRN  bacitracin   Ointment 1 Application(s) Topical two times a day  collagenase Ointment 1 Application(s) Topical two times a day  diphenhydrAMINE 50 milliGRAM(s) Oral every 4 hours PRN  DULoxetine 30 milliGRAM(s) Oral daily  ferrous    sulfate 325 milliGRAM(s) Oral daily  finasteride 5 milliGRAM(s) Oral daily  folic acid 1 milliGRAM(s) Oral daily  furosemide   Injectable 40 milliGRAM(s) IV Push daily  gabapentin 300 milliGRAM(s) Oral every 8 hours  HYDROmorphone   Tablet 8 milliGRAM(s) Oral every 8 hours PRN  HYDROmorphone  Injectable 1 milliGRAM(s) IV Push every 4 hours PRN  melatonin 3 milliGRAM(s) Oral at bedtime PRN  multivitamin 1 Tablet(s) Oral daily  ondansetron Injectable 4 milliGRAM(s) IV Push every 8 hours PRN  pantoprazole    Tablet 40 milliGRAM(s) Oral before breakfast  QUEtiapine 400 milliGRAM(s) Oral at bedtime  tamsulosin 0.4 milliGRAM(s) Oral at bedtime      Vital Signs Last 24 Hrs  T(C): 36.6 (28 Oct 2024 17:08), Max: 37.1 (28 Oct 2024 11:35)  T(F): 97.9 (28 Oct 2024 17:08), Max: 98.8 (28 Oct 2024 11:35)  HR: 84 (28 Oct 2024 17:08) (75 - 88)  BP: 127/69 (28 Oct 2024 17:08) (97/57 - 130/79)  BP(mean): --  RR: 16 (28 Oct 2024 17:08) (16 - 19)  SpO2: 94% (28 Oct 2024 17:08) (93% - 98%)    Parameters below as of 28 Oct 2024 17:08  Patient On (Oxygen Delivery Method): nasal cannula  O2 Flow (L/min): 3      Physical Exam:  Constitutional: Middle age man., non-toxic, no distress, chronically ill appearing   HEAD/EYES: anicteric, no conjunctival injection  ENT:  supple, no thrush, on cpap   Cardiovascular:   normal S1, S2, no murmur, + edema  Respiratory:  clear BS bilaterally, no wheezes, no rales  GI:  soft, non-tender, + ileostomy, surgical scar marks  :  + supra pubic catheter - site not inflamed   Musculoskeletal:  RUE midline, skin with small blisters around the line dressing. Contracted LE  Neurologic: awake and alert, paraplegic  Skin:  + rash, no erythema, no phlebitis, multiple tattoos, pt's left foot is dressed c/d/i   Heme/Onc: no lymphadenopathy   Psychiatric:  awake, alert, appropriate mood    WBC Count: 8.88 K/uL (10-27 @ 11:00)  WBC Count: 8.09 K/uL (10-23 @ 05:45)  WBC Count: 8.27 K/uL (10-22 @ 08:45)                        12.1   8.88  )-----------( 168      ( 27 Oct 2024 11:00 )             39.5       10-27    140  |  103  |  19  ----------------------------<  139[H]  3.6   |  32[H]  |  0.72    Ca    9.3      27 Oct 2024 11:00    TPro  8.5[H]  /  Alb  2.6[L]  /  TBili  0.3  /  DBili  x   /  AST  45[H]  /  ALT  51  /  AlkPhos  170[H]  10-27      Urinalysis Basic - ( 27 Oct 2024 11:00 )    Color: x / Appearance: x / SG: x / pH: x  Gluc: 139 mg/dL / Ketone: x  / Bili: x / Urobili: x   Blood: x / Protein: x / Nitrite: x   Leuk Esterase: x / RBC: x / WBC x   Sq Epi: x / Non Sq Epi: x / Bacteria: x      Creatinine Trend: 0.72<--, 0.57<--, 0.75<--, 0.91<--, 0.79<--, 0.73<--      MICROBIOLOGY:  v  Suprapubic urine culture from super pubic cath  10-23-24   No growth  --  --      Clean Catch  10-18-24   >100,000 CFU/ml Providencia stuartii  --  Providencia stuartii    RADIOLOGY:

## 2024-10-28 NOTE — PROGRESS NOTE ADULT - PROBLEM SELECTOR PROBLEM 1
Obstruction of urinary catheter
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
## 2024-10-28 NOTE — PROGRESS NOTE ADULT - ASSESSMENT
The patient is 53 y/o man with extensive PMH of HTN, HLD, CAD, hx of cervical epidural abscess s/p treatment, L foot OM, hx of pneumoperitoneum s/p ileostomy, emphysema (home O2), paraplegia, hepatitis C, Indwelling catheter due to neurogenic bladder, who was sent to the ED from [facility] for west catheter obstruction. Endorsed leaking of urine around the west catheter, unsure when it originally started. Last catheter exchange was 2 months ago, which is normally done either at the [facility] or [facility]. Denies any other acute complaints. Labs unremarkable for H/H: 11.6/38.0, UA (+) Nitrite, Large-leuk Esterase, too many-WBC, Many-Bacteria. Vitals stable. Patient is being admitted having a Suprapubic catheter place by IR.    (18 Oct 2024 22:44)  ID consulted for workup and antibiotic management.    10/22: Afebrile, T-max 100.1 F, on O2 nasal cannula. No leukocytosis. Serum Cr: 0.75. Urine culture grew Providencia stuartii, pending susceptibility. The patient was on Bactrim earlier due to no IV access. He has RUE midline now. He was on ceftriaxone for last 2 days and today is now switched to meropenem. He is scheduled for IR SPC placement likely tomorrow.   10/23: Remains afebrile, no wbc, Cr ok, UC grew Providencia - sensitivity is pending, Meropenem IV continued, now s/p IR procedure SPC placement post my visit  10/24: Afebrile. He is s/p IR SPC placement yesterday. Providencia susceptibility reviewed.   10/28: no fever, NC, no wbc, Cr ok, UC from suprapubic catheter with no growth, pt has been on multiple abx, currently  on cefpodoxime, pt needs 2 more days of abx.       Impression:  1. Complicated UTI due to indwelling west's malfunction  2. Extensive PMH as noted above    Recommendations:  - continue Cefpodoxime 100 mg PO q12h x 7 days (day #5, needs two more days)  - pulmonology follow up is appreciated   - Rest of care per primary team    Discussed with Dr. Haywood  Discussed with Dr. Zarate  Discussed with Dr. Mcdaniel

## 2024-10-28 NOTE — PROGRESS NOTE ADULT - ASSESSMENT
54-year-old male with a PMH HTN, HLD, CAD. Emphysema (home O2), paraplegic, hepatitis C, Indwelling catheter due to neurogenic bladder sent to the ED from [facility] for West Catheter Obstruction. Endorses leaking of urine around the west catheter, unsure when it originally started, but has worsen over the past couple days. Last catheter exchange was 2 months ago, which is normally done either at the [facility] or [facility]. Denies any other acute complaints. Labs unremarkable for H/H: 11.6/38.0, UA (+) Nitrite, Large-leuk Esterase, too many-WBC, Many-Bacteria. Vitals stable. Patient is being admitted having a Suprapubic catheter place by IR.         #Obstruction of urinary catheter  Chronic Indwelling Catheter - Last exchange 2 months ago    Evaluated by Urology - Unable to exchange the catheter  - unable to obtain CT A/P due body habitus/anatomy  - US abdomen limited, findings as above  - Suprapubic catheter to be placed by IR on wednesday 10/23    #perioperative clearance  -Reports no symptoms of chest pain or worsening dyspnea at rest or with exertion, orthopnea or palpitations.  - hx of JUVENAL, COPD on home O2. respiratory status stable as pt is on 6L, same as home dose. tolerating BiPAP  - TTE with EF of 40-45%, no significant interval change since previous TTE  - EKG without acute ischemic changes  - at baseline, not on anti-coagulation  - Patient is at moderate risk for low risk procedure given medical co-morbidities. pt is medically optimized.    #Emphysema, JUVENAL  #hx of acute respiratory failure  on home O2 and BiPAP  - stable on home O2 setting  - c/w nocturnal BiPAP  - Pulm following    #chronic HFrEF  - TTE with EF of 40-45%, no significant interval change since previous TTE  - EKG without acute ischemic changes  - C/w lasix 40mg IV  - GDMT: soft bp. will consider low dose beta blocker if Bp improves    #UTI  UA (+) Nitrite, Large-leuk Esterase, too many-WBC, Many-Bacteria  Ucx: Providencia stuartii  - will switch from Rocephin to meropenem  - F/U Urine Cx sensitivities  - ID following     #Chronic pain/neuropathy   - c/w gabapentin, cymbalta, dilaudid (home med)    #H/o Opioid abuse  #Bipolar disorder   Methadone dose verified with Ana Hood. Pt is on Methadone 110mg q daily, last fill date 9/27 for 1 month supply. Last intake of med prior to hospitalization was 10/18 AM.   - c/w seroquel, methadone, cymbalta     Neurogenic bladder   - plan for SPC as above  - c/w flomax         DVT ppx - SQH- will hold in anticipation for procedure

## 2024-10-28 NOTE — PROGRESS NOTE ADULT - SUBJECTIVE AND OBJECTIVE BOX
INTERVAL HPI/OVERNIGHT EVENTS: weaned to 2L NC and NIV HS   SUBJECTIVE: Patient seen and examined at bedside.   ROS: All negative except as listed above.    VITAL SIGNS:  Vital Signs Last 24 Hrs  T(C): 36.8 (28 Oct 2024 05:44), Max: 37.3 (27 Oct 2024 14:03)  T(F): 98.2 (28 Oct 2024 05:44), Max: 99.2 (27 Oct 2024 14:03)  HR: 85 (28 Oct 2024 05:44) (75 - 96)  BP: 97/57 (28 Oct 2024 05:44) (97/57 - 128/74)  BP(mean): --  ABP: --  ABP(mean): --  RR: 18 (28 Oct 2024 05:44) (18 - 18)  SpO2: 94% (28 Oct 2024 05:44) (94% - 98%)    O2 Parameters below as of 28 Oct 2024 05:44  Patient On (Oxygen Delivery Method): nasal cannula            Plateau pressure:   P/F ratio:     10-27 @ 07:01  -  10-28 @ 07:00  --------------------------------------------------------  IN: 0 mL / OUT: 500 mL / NET: -500 mL      CAPILLARY BLOOD GLUCOSE      ECG: reviewed.    PHYSICAL EXAM:  GEN: NAD, comfortable in with head phones on  HEENT: NC/AT, EOMI, sclera white, nasal cannula in place  CV: RRR  PULM: CTA bilaterally, no wheeze, no rhonchi  ABD: soft, + healed abdominal scar, + ileostomy, + BS  EXT: contracted bilateral LE, RUE erythema around prior tape site, R upper arm IV in place  NEURO: paraplegia, AAOx3    MEDICATIONS:  MEDICATIONS  (STANDING):  albuterol/ipratropium for Nebulization 3 milliLiter(s) Nebulizer every 6 hours  bacitracin   Ointment 1 Application(s) Topical two times a day  cefpodoxime 100 milliGRAM(s) Oral every 12 hours  collagenase Ointment 1 Application(s) Topical two times a day  DULoxetine 30 milliGRAM(s) Oral daily  ferrous    sulfate 325 milliGRAM(s) Oral daily  finasteride 5 milliGRAM(s) Oral daily  folic acid 1 milliGRAM(s) Oral daily  furosemide   Injectable 40 milliGRAM(s) IV Push daily  gabapentin 300 milliGRAM(s) Oral every 8 hours  multivitamin 1 Tablet(s) Oral daily  pantoprazole    Tablet 40 milliGRAM(s) Oral before breakfast  QUEtiapine 400 milliGRAM(s) Oral at bedtime  tamsulosin 0.4 milliGRAM(s) Oral at bedtime    MEDICATIONS  (PRN):  acetaminophen     Tablet .. 650 milliGRAM(s) Oral every 6 hours PRN Temp greater or equal to 38C (100.4F), Mild Pain (1 - 3)  albuterol    90 MICROgram(s) HFA Inhaler 2 Puff(s) Inhalation every 6 hours PRN Respiratory Distress  aluminum hydroxide/magnesium hydroxide/simethicone Suspension 30 milliLiter(s) Oral every 4 hours PRN Dyspepsia  diphenhydrAMINE 50 milliGRAM(s) Oral every 4 hours PRN Rash and/or Itching  HYDROmorphone   Tablet 8 milliGRAM(s) Oral every 8 hours PRN Severe Pain (7 - 10)  HYDROmorphone  Injectable 1 milliGRAM(s) IV Push every 4 hours PRN breakthrough pain  melatonin 3 milliGRAM(s) Oral at bedtime PRN Insomnia  ondansetron Injectable 4 milliGRAM(s) IV Push every 8 hours PRN Nausea and/or Vomiting      ALLERGIES:  Allergies    Zyprexa (Rash; Dystonic RXN; Hives)  Risperdal (Short breath; Rash; Hives)  Stelazine (Unknown)  NSAIDs (Flushing; Other (Moderate))  Motrin (Anaphylaxis)  Aleve (Unknown)  Haldol (Anaphylaxis)  Thorazine (Other (Moderate))    Intolerances        LABS:                        12.1   8.88  )-----------( 168      ( 27 Oct 2024 11:00 )             39.5     10-27    140  |  103  |  19  ----------------------------<  139[H]  3.6   |  32[H]  |  0.72    Ca    9.3      27 Oct 2024 11:00    TPro  8.5[H]  /  Alb  2.6[L]  /  TBili  0.3  /  DBili  x   /  AST  45[H]  /  ALT  51  /  AlkPhos  170[H]  10-27      Urinalysis Basic - ( 27 Oct 2024 11:00 )    Color: x / Appearance: x / SG: x / pH: x  Gluc: 139 mg/dL / Ketone: x  / Bili: x / Urobili: x   Blood: x / Protein: x / Nitrite: x   Leuk Esterase: x / RBC: x / WBC x   Sq Epi: x / Non Sq Epi: x / Bacteria: x      ABG:      vBG:    Micro:     Urinalysis with Rflx Culture (collected 10-18-24 @ 22:00)         RADIOLOGY & ADDITIONAL TESTS: Reviewed.

## 2024-10-28 NOTE — CHART NOTE - NSCHARTNOTEFT_GEN_A_CORE
Pt presented from [facility] facility for Holcomb catheter leak; found c Holcomb obstruction & UTI; SPC placed by IR on 10/23; pt c chronic Holcomb 2/2 neurogenic bladder.    Factors impacting intake: [X ] none [ ] nausea  [ ] vomiting [ ] diarrhea [ ] constipation  [ ]chewing problems [ ] swallowing issues  [ ] other:     Diet Prescription: Diet, DASH/TLC:   Sodium & Cholesterol Restricted  Liquid Protein Supplement     Qty per Day:  2 (10-21-24 @ 13:39)    Intake:  pt consuming 50-75% on average    Current Weight:  110.8 kg (10/19)  % Weight Change:  unable to determine; no wts to trend    Pt presently c 2+ edema b/l legs & feet    Pertinent Medications: MEDICATIONS  (STANDING):  albuterol/ipratropium for Nebulization 3 milliLiter(s) Nebulizer every 6 hours  bacitracin   Ointment 1 Application(s) Topical two times a day  cefpodoxime 100 milliGRAM(s) Oral every 12 hours  collagenase Ointment 1 Application(s) Topical two times a day  DULoxetine 30 milliGRAM(s) Oral daily  ferrous    sulfate 325 milliGRAM(s) Oral daily  finasteride 5 milliGRAM(s) Oral daily  folic acid 1 milliGRAM(s) Oral daily  furosemide   Injectable 40 milliGRAM(s) IV Push daily  gabapentin 300 milliGRAM(s) Oral every 8 hours  multivitamin 1 Tablet(s) Oral daily  pantoprazole    Tablet 40 milliGRAM(s) Oral before breakfast  QUEtiapine 400 milliGRAM(s) Oral at bedtime  tamsulosin 0.4 milliGRAM(s) Oral at bedtime    MEDICATIONS  (PRN):  acetaminophen     Tablet .. 650 milliGRAM(s) Oral every 6 hours PRN Temp greater or equal to 38C (100.4F), Mild Pain (1 - 3)  albuterol    90 MICROgram(s) HFA Inhaler 2 Puff(s) Inhalation every 6 hours PRN Respiratory Distress  aluminum hydroxide/magnesium hydroxide/simethicone Suspension 30 milliLiter(s) Oral every 4 hours PRN Dyspepsia  diphenhydrAMINE 50 milliGRAM(s) Oral every 4 hours PRN Rash and/or Itching  HYDROmorphone   Tablet 8 milliGRAM(s) Oral every 8 hours PRN Severe Pain (7 - 10)  HYDROmorphone  Injectable 1 milliGRAM(s) IV Push every 4 hours PRN breakthrough pain  melatonin 3 milliGRAM(s) Oral at bedtime PRN Insomnia  ondansetron Injectable 4 milliGRAM(s) IV Push every 8 hours PRN Nausea and/or Vomiting    Pertinent Labs: 10-27 Na140 mmol/L Glu 139 mg/dL[H] K+ 3.6 mmol/L Cr  0.72 mg/dL BUN 19 mg/dL 10-27 Alb 2.6 g/dL[L]    Skin:   pt c stage II pressure ulcers on left dorsum foot & left lateral aspects of toes    Estimated Needs:   [ X] no change since previous assessment (10/21)  [ ] recalculated:     Previous Nutrition Diagnosis:   [X ]  Increased Nutrient Needs   Etiology:  Increased demand for protein  Signs & Symptoms:  Presence of 2-stage II pressure ulcers    GOAL:  Pt to consume >75% of calculated protein needs in order to promote wound healing - not met as this time      Nutrition Diagnosis is [X ] ongoing  [ ] resolved [ ] not applicable     New Nutrition Diagnosis: [X ] not applicable      Interventions:   continue current diet rx as noted  Recommend  [ ] Change Diet To:  [ ] Nutrition Supplement  [ ] Nutrition Support  [ ] Other:     Monitoring and Evaluation:   [X ] PO intake [ x ] Tolerance to diet prescription [ x ] weights [ x ] labs[ x ] follow up per protocol  [ ] other:
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
## 2024-10-28 NOTE — PROGRESS NOTE ADULT - SUBJECTIVE AND OBJECTIVE BOX
HPI:  54-year-old male with a PMH HTN, HLD, CAD. Emphysema (home O2), paraplegic, hepatitis C, Indwelling catheter due to neurogenic bladder sent to the ED from [facility] for West Catheter Obstruction. Endorses leaking of urine around the west catheter, unsure when it originally started, but has worsen over the past couple days. Last catheter exchange was 2 months ago, which is normally done either at the [facility] or [facility]. Denies any other acute complaints. Labs unremarkable for H/H: 11.6/38.0, UA (+) Nitrite, Large-leuk Esterase, too many-WBC, Many-Bacteria. Vitals stable. Patient is being admitted having a Suprapubic catheter place by IR.    (18 Oct 2024 22:44)  Patient is a 54y old  Male who presents with a chief complaint of West Catheter Obstruction & UTI (29 Oct 2024 10:30)      INTERVAL HPI/OVERNIGHT EVENTS: no acute events overnight     MEDICATIONS  (STANDING):  albuterol/ipratropium for Nebulization 3 milliLiter(s) Nebulizer every 6 hours  bacitracin   Ointment 1 Application(s) Topical two times a day  cefpodoxime 100 milliGRAM(s) Oral every 12 hours  collagenase Ointment 1 Application(s) Topical two times a day  DULoxetine 30 milliGRAM(s) Oral daily  ferrous    sulfate 325 milliGRAM(s) Oral daily  finasteride 5 milliGRAM(s) Oral daily  folic acid 1 milliGRAM(s) Oral daily  furosemide   Injectable 40 milliGRAM(s) IV Push daily  gabapentin 300 milliGRAM(s) Oral every 8 hours  methadone    Tablet 110 milliGRAM(s) Oral daily  multivitamin 1 Tablet(s) Oral daily  pantoprazole    Tablet 40 milliGRAM(s) Oral before breakfast  QUEtiapine 400 milliGRAM(s) Oral at bedtime  tamsulosin 0.4 milliGRAM(s) Oral at bedtime    MEDICATIONS  (PRN):  acetaminophen     Tablet .. 650 milliGRAM(s) Oral every 6 hours PRN Temp greater or equal to 38C (100.4F), Mild Pain (1 - 3)  albuterol    90 MICROgram(s) HFA Inhaler 2 Puff(s) Inhalation every 6 hours PRN Respiratory Distress  aluminum hydroxide/magnesium hydroxide/simethicone Suspension 30 milliLiter(s) Oral every 4 hours PRN Dyspepsia  diphenhydrAMINE 50 milliGRAM(s) Oral every 4 hours PRN Rash and/or Itching  HYDROmorphone   Tablet 8 milliGRAM(s) Oral every 8 hours PRN Severe Pain (7 - 10)  HYDROmorphone  Injectable 1 milliGRAM(s) IV Push every 4 hours PRN breakthrough pain  melatonin 3 milliGRAM(s) Oral at bedtime PRN Insomnia  ondansetron Injectable 4 milliGRAM(s) IV Push every 8 hours PRN Nausea and/or Vomiting      Allergies    Zyprexa (Rash; Dystonic RXN; Hives)  Risperdal (Short breath; Rash; Hives)  Stelazine (Unknown)  NSAIDs (Flushing; Other (Moderate))  Motrin (Anaphylaxis)  Aleve (Unknown)  Haldol (Anaphylaxis)  Thorazine (Other (Moderate))    Intolerances        REVIEW OF SYSTEMS:  complains that he may just want his legs removed     Vital Signs Last 24 Hrs  T(C): 37 (29 Oct 2024 05:41), Max: 37.1 (28 Oct 2024 11:35)  T(F): 98.6 (29 Oct 2024 05:41), Max: 98.8 (28 Oct 2024 11:35)  HR: 83 (29 Oct 2024 07:59) (75 - 86)  BP: 111/72 (29 Oct 2024 05:41) (111/72 - 131/80)  BP(mean): --  RR: 17 (29 Oct 2024 05:41) (16 - 19)  SpO2: 94% (29 Oct 2024 07:59) (93% - 96%)    Parameters below as of 28 Oct 2024 23:59  Patient On (Oxygen Delivery Method): BiPAP/CPAP        PHYSICAL EXAM:  GENERAL: NAD,  HEAD:  Atraumatic, Normocephalic  EYES: EOMI, PERRLA, conjunctiva and sclera clear  ENMT: No tonsillar erythema, exudates, or enlargement; Moist mucous membranes, Good dentition, No lesions  NECK: Supple, No JVD, Normal thyroid  NERVOUS SYSTEM:  Alert & Oriented X3, GCHEST/LUNG: Clear to ascultation  bilaterally; No rales, rhonchi, wheezing, or rubs  HEART: Regular rate and rhythm; No murmurs, rubs, or gallops  ABDOMEN: Soft, Nontender, Nondistended; Bowel sounds present superpubic   EXTREMITIES: contracted edema   LYMPH: No lymphadenopathy noted  SKIN: No rashes or lesions    LABS:                        12.1   8.88  )-----------( 168      ( 27 Oct 2024 11:00 )             39.5     10-27    140  |  103  |  19  ----------------------------<  139[H]  3.6   |  32[H]  |  0.72    Ca    9.3      27 Oct 2024 11:00    TPro  8.5[H]  /  Alb  2.6[L]  /  TBili  0.3  /  DBili  x   /  AST  45[H]  /  ALT  51  /  AlkPhos  170[H]  10-27      Urinalysis Basic - ( 27 Oct 2024 11:00 )    Color: x / Appearance: x / SG: x / pH: x  Gluc: 139 mg/dL / Ketone: x  / Bili: x / Urobili: x   Blood: x / Protein: x / Nitrite: x   Leuk Esterase: x / RBC: x / WBC x   Sq Epi: x / Non Sq Epi: x / Bacteria: x      CAPILLARY BLOOD GLUCOSE          RADIOLOGY & ADDITIONAL TESTS:    Imaging Personally Reviewed:  [ ] YES  [ ] NO  < from: US Duplex Venous Lower Ext Complete, Bilateral (10.28.24 @ 10:14) >  ACC: [id number] EXAM:  US DPLX LWR EXT VEINS COMPL BI   ORDERED BY: JAILENE MUELLER     PROCEDURE DATE:  10/28/2024          INTERPRETATION:  CLINICAL INFORMATION: Bilateral lower extremity swelling    COMPARISON: A prior examination, dated 6/28/2024, showed no DVT    TECHNIQUE: Duplex sonography of the BILATERAL LOWER extremity veins with   color and spectral Doppler, with and without compression.    FINDINGS:    There is edema within the superficial soft tissues of both lower   extremities.    The patient's posture precluded imaging of the veins of the left lower   extremity, and the above the knee veins on the right.    The right posterior tibial and peroneal veins are patent and free of clot.    The left lower extremity veins were not imaged at all.    IMPRESSION:    This examination showed extensive bilateral lower extremity edema.    Only the right calf veins were imaged and showed no DVT.    < end of copied text >    Consultant(s) Notes Reviewed:  [ ] YES  [ ] NO    Care Discussed with Consultants/Other Providers [ ] YES  [ ] NO

## 2024-10-28 NOTE — PHARMACY COMMUNICATION NOTE - COMMENTS
Spoke to PA and patient has been on this dose during this admission and was confirmed with clinic earlier in this admission.

## 2024-10-29 ENCOUNTER — TRANSCRIPTION ENCOUNTER (OUTPATIENT)
Age: 54
End: 2024-10-29

## 2024-10-29 VITALS
SYSTOLIC BLOOD PRESSURE: 116 MMHG | RESPIRATION RATE: 19 BRPM | HEART RATE: 84 BPM | OXYGEN SATURATION: 92 % | TEMPERATURE: 98 F | DIASTOLIC BLOOD PRESSURE: 73 MMHG

## 2024-10-29 PROCEDURE — 99232 SBSQ HOSP IP/OBS MODERATE 35: CPT

## 2024-10-29 PROCEDURE — 99239 HOSP IP/OBS DSCHRG MGMT >30: CPT

## 2024-10-29 RX ORDER — COLLAGENASE CLOSTRIDIUM HIST. 250 UNIT/G
1 OINTMENT (GRAM) TOPICAL
Qty: 0 | Refills: 0 | DISCHARGE
Start: 2024-10-29

## 2024-10-29 RX ORDER — DULOXETINE HYDROCHLORIDE 30 MG/1
1 CAPSULE, DELAYED RELEASE ORAL
Qty: 0 | Refills: 0 | DISCHARGE
Start: 2024-10-29

## 2024-10-29 RX ORDER — METHADONE HYDROCHLORIDE 10 MG/1
11 TABLET ORAL
Qty: 0 | Refills: 0 | DISCHARGE
Start: 2024-10-29

## 2024-10-29 RX ORDER — QUETIAPINE FUMARATE 200 MG/1
1 TABLET ORAL
Qty: 0 | Refills: 0 | DISCHARGE
Start: 2024-10-29

## 2024-10-29 RX ORDER — ACETAMINOPHEN 500 MG
2 TABLET ORAL
Qty: 0 | Refills: 0 | DISCHARGE
Start: 2024-10-29

## 2024-10-29 RX ORDER — ALBUTEROL 90 MCG
2 AEROSOL (GRAM) INHALATION
Qty: 0 | Refills: 0 | DISCHARGE
Start: 2024-10-29

## 2024-10-29 RX ORDER — FINASTERIDE 5 MG/1
1 TABLET, FILM COATED ORAL
Qty: 0 | Refills: 0 | DISCHARGE
Start: 2024-10-29

## 2024-10-29 RX ORDER — GABAPENTIN 300 MG/1
1 CAPSULE ORAL
Qty: 0 | Refills: 0 | DISCHARGE
Start: 2024-10-29

## 2024-10-29 RX ORDER — TAMSULOSIN HCL 0.4 MG
1 CAPSULE ORAL
Qty: 0 | Refills: 0 | DISCHARGE
Start: 2024-10-29

## 2024-10-29 RX ORDER — FIRST AID ANTIBIOTIC 500 [USP'U]/G
1 OINTMENT TOPICAL
Qty: 0 | Refills: 0 | DISCHARGE
Start: 2024-10-29

## 2024-10-29 RX ORDER — CEFPODOXIME PROXETIL 200 MG/1
1 TABLET, FILM COATED ORAL
Qty: 0 | Refills: 0 | DISCHARGE
Start: 2024-10-29 | End: 2024-10-31

## 2024-10-29 RX ORDER — HYDROMORPHONE HCL/0.9% NACL/PF 6 MG/30 ML
1 PATIENT CONTROLLED ANALGESIA SYRINGE INTRAVENOUS
Qty: 0 | Refills: 0 | DISCHARGE
Start: 2024-10-29

## 2024-10-29 RX ADMIN — Medication 8 MILLIGRAM(S): at 11:12

## 2024-10-29 RX ADMIN — Medication 8 MILLIGRAM(S): at 12:12

## 2024-10-29 RX ADMIN — Medication 1 TABLET(S): at 13:53

## 2024-10-29 RX ADMIN — IPRATROPIUM BROMIDE AND ALBUTEROL SULFATE 3 MILLILITER(S): .5; 2.5 SOLUTION RESPIRATORY (INHALATION) at 05:30

## 2024-10-29 RX ADMIN — FIRST AID ANTIBIOTIC 1 APPLICATION(S): 500 OINTMENT TOPICAL at 06:16

## 2024-10-29 RX ADMIN — Medication 325 MILLIGRAM(S): at 13:54

## 2024-10-29 RX ADMIN — Medication 40 MILLIGRAM(S): at 06:14

## 2024-10-29 RX ADMIN — FOLIC ACID 1 MILLIGRAM(S): 1 TABLET ORAL at 13:54

## 2024-10-29 RX ADMIN — GABAPENTIN 300 MILLIGRAM(S): 300 CAPSULE ORAL at 06:17

## 2024-10-29 RX ADMIN — Medication 1 MILLIGRAM(S): at 06:47

## 2024-10-29 RX ADMIN — Medication 50 MILLIGRAM(S): at 13:54

## 2024-10-29 RX ADMIN — PANTOPRAZOLE SODIUM 40 MILLIGRAM(S): 40 TABLET, DELAYED RELEASE ORAL at 06:14

## 2024-10-29 RX ADMIN — IPRATROPIUM BROMIDE AND ALBUTEROL SULFATE 3 MILLILITER(S): .5; 2.5 SOLUTION RESPIRATORY (INHALATION) at 11:04

## 2024-10-29 RX ADMIN — CEFPODOXIME PROXETIL 100 MILLIGRAM(S): 200 TABLET, FILM COATED ORAL at 06:15

## 2024-10-29 RX ADMIN — METHADONE HYDROCHLORIDE 110 MILLIGRAM(S): 10 TABLET ORAL at 11:08

## 2024-10-29 RX ADMIN — FINASTERIDE 5 MILLIGRAM(S): 5 TABLET, FILM COATED ORAL at 13:54

## 2024-10-29 RX ADMIN — Medication 1 APPLICATION(S): at 06:14

## 2024-10-29 RX ADMIN — GABAPENTIN 300 MILLIGRAM(S): 300 CAPSULE ORAL at 13:53

## 2024-10-29 NOTE — DISCHARGE NOTE NURSING/CASE MANAGEMENT/SOCIAL WORK - PATIENT PORTAL LINK FT
You can access the FollowMyHealth Patient Portal offered by [facility] by registering at the following website: [email or web address]. By joining [employer]’s FollowMyHealth portal, you will also be able to view your health information using other applications (apps) compatible with our system.

## 2024-10-29 NOTE — DISCHARGE NOTE NURSING/CASE MANAGEMENT/SOCIAL WORK - FINANCIAL ASSISTANCE
[facility] provides services at a reduced cost to those who are determined to be eligible through [facility]’s financial assistance program. Information regarding [facility]’s financial assistance program can be found by going to [email or web address] or by calling [phone].

## 2024-10-29 NOTE — PROGRESS NOTE ADULT - REASON FOR ADMISSION
Holcomb Catheter Obstruction & UTI

## 2024-10-29 NOTE — DISCHARGE NOTE PROVIDER - HOSPITAL COURSE
The patient is 55 y/o man with extensive PMH of HTN, HLD, CAD, hx of cervical epidural abscess s/p treatment, L foot OM, hx of pneumoperitoneum s/p ileostomy, emphysema (home O2), paraplegia, hepatitis C, Indwelling catheter due to neurogenic bladder, who was sent to the ED from [facility] for west catheter obstruction. Endorsed leaking of urine around the west catheter, unsure when it originally started. Last catheter exchange was 2 months ago, which is normally done either at the [facility] or [facility]. Denies any other acute complaints. Labs unremarkable for H/H: 11.6/38.0, UA (+) Nitrite, Large-leuk Esterase, too many-WBC, Many-Bacteria. Vitals stable. Patient is being admitted having a Suprapubic catheter place by IR.    (18 Oct 2024 22:44)  ID consulted for workup and antibiotic management.    10/22: Afebrile, T-max 100.1 F, on O2 nasal cannula. No leukocytosis. Serum Cr: 0.75. Urine culture grew Providencia stuartii, pending susceptibility. The patient was on Bactrim earlier due to no IV access. He has RUE midline now. He was on ceftriaxone for last 2 days and today is now switched to meropenem. He is scheduled for IR SPC placement likely tomorrow.   10/23: Remains afebrile, no wbc, Cr ok, UC grew Providencia - sensitivity is pending, Meropenem IV continued, now s/p IR procedure SPC placement post my visit  10/24: Afebrile. He is s/p IR SPC placement yesterday. Providencia susceptibility reviewed.   10/28: no fever, NC, no wbc, Cr ok, UC from suprapubic catheter with no growth, pt has been on multiple abx, currently  on cefpodoxime, pt needs 2 more days of abx.       Impression:  1. Complicated UTI due to indwelling west's malfunction  2. Extensive PMH as noted above    Recommendations:  - continue Cefpodoxime 100 mg PO q12h x 7 days (day #5, needs two more days)  - pulmonology follow up is appreciated   - Rest of care per primary team    Discussed with Dr. Haywood  Discussed with Dr. Zarate

## 2024-10-29 NOTE — PROGRESS NOTE ADULT - ASSESSMENT
The patient is 53 y/o man with extensive PMH of HTN, HLD, CAD, hx of cervical epidural abscess s/p treatment, L foot OM, hx of pneumoperitoneum s/p ileostomy, emphysema (home O2), paraplegia, hepatitis C, Indwelling catheter due to neurogenic bladder, who was sent to the ED from [facility] for west catheter obstruction. Endorsed leaking of urine around the west catheter, unsure when it originally started. Last catheter exchange was 2 months ago, which is normally done either at the [facility] or [facility]. Denies any other acute complaints. Labs unremarkable for H/H: 11.6/38.0, UA (+) Nitrite, Large-leuk Esterase, too many-WBC, Many-Bacteria. Vitals stable. Patient is being admitted having a Suprapubic catheter place by IR.    (18 Oct 2024 22:44)  ID consulted for workup and antibiotic management.    10/22: Afebrile, T-max 100.1 F, on O2 nasal cannula. No leukocytosis. Serum Cr: 0.75. Urine culture grew Providencia stuartii, pending susceptibility. The patient was on Bactrim earlier due to no IV access. He has RUE midline now. He was on ceftriaxone for last 2 days and today is now switched to meropenem. He is scheduled for IR SPC placement likely tomorrow.   10/23: Remains afebrile, no wbc, Cr ok, UC grew Providencia - sensitivity is pending, Meropenem IV continued, now s/p IR procedure SPC placement post my visit.  10/24: Afebrile. He is s/p IR SPC placement yesterday. Providencia susceptibility reviewed. On meropenem since 10/22. Would switch to PO cefpodoxime today.  10/28: no fever, NC, no wbc, Cr ok, UC from suprapubic catheter with no growth, pt has been on multiple abx, currently on cefpodoxime, pt needs 2 more days of abx.   10/29:       Impression:  1. Complicated UTI due to indwelling west's malfunction  2. Extensive PMH as noted above    Recommendations:  - Continue Cefpodoxime 100 mg PO q12h x 7 days (day#6)  - Pulmonology follow up is appreciated   - Rest of care per primary team      Discussed with Dr. Elliot Haywood MD  Attending Physician  Division of Infectious Diseases   Available via [employer] Teams   The patient is 55 y/o man with extensive PMH of HTN, HLD, CAD, hx of cervical epidural abscess s/p treatment, L foot OM, hx of pneumoperitoneum s/p ileostomy, emphysema (home O2), paraplegia, hepatitis C, Indwelling catheter due to neurogenic bladder, who was sent to the ED from [facility] for west catheter obstruction. Endorsed leaking of urine around the west catheter, unsure when it originally started. Last catheter exchange was 2 months ago, which is normally done either at the [facility] or [facility]. Denies any other acute complaints. Labs unremarkable for H/H: 11.6/38.0, UA (+) Nitrite, Large-leuk Esterase, too many-WBC, Many-Bacteria. Vitals stable. Patient is being admitted having a Suprapubic catheter place by IR.    (18 Oct 2024 22:44)  ID consulted for workup and antibiotic management.    10/22: Afebrile, T-max 100.1 F, on O2 nasal cannula. No leukocytosis. Serum Cr: 0.75. Urine culture grew Providencia stuartii, pending susceptibility. The patient was on Bactrim earlier due to no IV access. He has RUE midline now. He was on ceftriaxone for last 2 days and today is now switched to meropenem. He is scheduled for IR SPC placement likely tomorrow.   10/23: Remains afebrile, no wbc, Cr ok, UC grew Providencia - sensitivity is pending, Meropenem IV continued, now s/p IR procedure SPC placement post my visit.  10/24: Afebrile. He is s/p IR SPC placement yesterday. Providencia susceptibility reviewed. On meropenem since 10/22. Would switch to PO cefpodoxime today.  10/28: no fever, NC, no wbc, Cr ok, UC from suprapubic catheter with no growth, pt has been on multiple abx, currently on cefpodoxime, pt needs 2 more days of abx.   10/29: Afebrile, hemodynamically stable. On cefpodoxime for complicated UTI. To be discharged back to his NH today.      Impression:  1. Complicated UTI due to indwelling west's malfunction  2. Extensive PMH as noted above    Recommendations:  - Continue Cefpodoxime 100 mg PO q12h x 7 days (day#6/7)  - Pulmonology follow up is appreciated  - Outpatient urology follow up   - Discharge plan per primary team    Discussed with bedside RN    Loren Haywood MD  Attending Physician  Division of Infectious Diseases   Available via [employer] Teams

## 2024-10-29 NOTE — DISCHARGE NOTE NURSING/CASE MANAGEMENT/SOCIAL WORK - NSDCPEFALRISK_GEN_ALL_CORE
For information on Fall & Injury Prevention, visit: [email or web address] OR  [email or web address] OR  https://www.cdc.gov/steadi/patient.html

## 2024-10-29 NOTE — DISCHARGE NOTE PROVIDER - NSDCMRMEDTOKEN_GEN_ALL_CORE_FT
acetaminophen 325 mg oral tablet: 2 tab(s) orally every 6 hours As needed Temp greater or equal to 38C (100.4F), Mild Pain (1 - 3)  albuterol 90 mcg/inh inhalation aerosol: 2 puff(s) inhaled every 6 hours As needed Respiratory Distress  bacitracin 500 units/g topical ointment: 1 Apply topically to affected area 2 times a day  cefpodoxime 100 mg oral tablet: 1 tab(s) orally every 12 hours  collagenase 250 units/g topical ointment: 1 Apply topically to affected area 2 times a day  DULoxetine 30 mg oral delayed release capsule: 1 cap(s) orally once a day  ferrous sulfate 325 mg (65 mg elemental iron) oral tablet: 1 tab(s) orally once a day  finasteride 5 mg oral tablet: 1 tab(s) orally once a day  folic acid 1 mg oral tablet: 1 tab(s) orally once a day  furosemide 20 mg oral tablet: 1 tab(s) orally once a day  gabapentin 300 mg oral capsule: 1 cap(s) orally every 8 hours  HYDROmorphone 8 mg oral tablet: 1 tab(s) orally every 8 hours As needed Severe Pain (7 - 10)  methadone 10 mg oral tablet: 11 tab(s) orally once a day  Multiple Vitamins oral tablet: 1 tab(s) orally once a day  pantoprazole 40 mg oral delayed release tablet: 1 tab(s) orally once a day  QUEtiapine 400 mg oral tablet: 1 tab(s) orally once a day (at bedtime)  senna (sennosides) 8.6 mg oral tablet: 2 tab(s) orally once a day (at bedtime)  tamsulosin 0.4 mg oral capsule: 1 cap(s) orally once a day (at bedtime)

## 2024-10-29 NOTE — PROGRESS NOTE ADULT - PROVIDER SPECIALTY LIST ADULT
Infectious Disease
Pulmonology
Pulmonology
Urology
Hospitalist
Hospitalist
Infectious Disease
Intervent Radiology
Pulmonology
Pulmonology
Urology
Urology
Hospitalist
Infectious Disease
Urology
Hospitalist
Infectious Disease
Hospitalist

## 2024-10-29 NOTE — PROGRESS NOTE ADULT - SUBJECTIVE AND OBJECTIVE BOX
STAN VEGA  MRN-[id number]  54y (1970)    Follow Up:  uti    Interval History:       ROS:    [ ] Unobtainable because:  [x ] All other systems negative    Constitutional: no fever, no chills  Head: no trauma  Eyes: no vision changes, no eye pain  ENT:  no sore throat, no rhinorrhea  Cardiovascular:  no chest pain, no palpitation  Respiratory:  no SOB, no cough  GI:  no abd pain, no vomiting, no diarrhea  urinary: no dysuria, no hematuria, no flank pain, still occasionally urinates in presence of SPC  musculoskeletal:  no joint pain, no joint swelling  skin:  no rash  neurology:  no headache, no seizure, no change in mental status  psych: no anxiety, no depression       Allergies  Zyprexa (Rash; Dystonic RXN; Hives)  Risperdal (Short breath; Rash; Hives)  Stelazine (Unknown)  NSAIDs (Flushing; Other (Moderate))  Motrin (Anaphylaxis)  Aleve (Unknown)  Haldol (Anaphylaxis)  Thorazine (Other (Moderate))    ANTIMICROBIALS:  cefpodoxime 100 every 12 hours  MEDICATIONS  (STANDING):  cefpodoxime   100 milliGRAM(s) Oral (10-29-24 @ 06:15)   100 milliGRAM(s) Oral (10-28-24 @ 19:07)   100 milliGRAM(s) Oral (10-28-24 @ 06:17)   100 milliGRAM(s) Oral (10-27-24 @ 06:28)   100 milliGRAM(s) Oral (10-26-24 @ 18:53)   100 milliGRAM(s) Oral (10-26-24 @ 06:17)   100 milliGRAM(s) Oral (10-25-24 @ 18:58)   100 milliGRAM(s) Oral (10-25-24 @ 05:49)   100 milliGRAM(s) Oral (10-24-24 @ 18:33)    cefTRIAXone   IVPB   100 mL/Hr IV Intermittent (10-21-24 @ 09:07)   100 mL/Hr IV Intermittent (10-20-24 @ 13:52)    meropenem  IVPB   100 mL/Hr IV Intermittent (10-22-24 @ 09:51)    meropenem  IVPB   100 mL/Hr IV Intermittent (10-24-24 @ 11:37)   100 mL/Hr IV Intermittent (10-24-24 @ 01:19)   100 mL/Hr IV Intermittent (10-23-24 @ 17:16)   100 mL/Hr IV Intermittent (10-23-24 @ 10:37)   100 mL/Hr IV Intermittent (10-23-24 @ 00:51)   100 mL/Hr IV Intermittent (10-22-24 @ 18:44)    trimethoprim  160 mG/sulfamethoxazole 800 mG   1 Tablet(s) Oral (10-20-24 @ 06:25)   1 Tablet(s) Oral (10-19-24 @ 17:27)   1 Tablet(s) Oral (10-19-24 @ 06:18)   1 Tablet(s) Oral (10-19-24 @ 00:21)        OTHER MEDS:  acetaminophen     Tablet .. 650 milliGRAM(s) Oral every 6 hours PRN  albuterol    90 MICROgram(s) HFA Inhaler 2 Puff(s) Inhalation every 6 hours PRN  albuterol/ipratropium for Nebulization 3 milliLiter(s) Nebulizer every 6 hours  aluminum hydroxide/magnesium hydroxide/simethicone Suspension 30 milliLiter(s) Oral every 4 hours PRN  bacitracin   Ointment 1 Application(s) Topical two times a day  collagenase Ointment 1 Application(s) Topical two times a day  diphenhydrAMINE 50 milliGRAM(s) Oral every 4 hours PRN  DULoxetine 30 milliGRAM(s) Oral daily  ferrous    sulfate 325 milliGRAM(s) Oral daily  finasteride 5 milliGRAM(s) Oral daily  folic acid 1 milliGRAM(s) Oral daily  furosemide   Injectable 40 milliGRAM(s) IV Push daily  gabapentin 300 milliGRAM(s) Oral every 8 hours  HYDROmorphone   Tablet 8 milliGRAM(s) Oral every 8 hours PRN  HYDROmorphone  Injectable 1 milliGRAM(s) IV Push every 4 hours PRN  melatonin 3 milliGRAM(s) Oral at bedtime PRN  multivitamin 1 Tablet(s) Oral daily  ondansetron Injectable 4 milliGRAM(s) IV Push every 8 hours PRN  pantoprazole    Tablet 40 milliGRAM(s) Oral before breakfast  QUEtiapine 400 milliGRAM(s) Oral at bedtime  tamsulosin 0.4 milliGRAM(s) Oral at bedtime      Physical Exam:  Vital Signs Last 24 Hrs  T(C): 37 (29 Oct 2024 05:41), Max: 37.1 (28 Oct 2024 11:35)  T(F): 98.6 (29 Oct 2024 05:41), Max: 98.8 (28 Oct 2024 11:35)  HR: 83 (29 Oct 2024 07:59) (75 - 86)  BP: 111/72 (29 Oct 2024 05:41) (111/72 - 131/80)  BP(mean): --  RR: 17 (29 Oct 2024 05:41) (16 - 19)  SpO2: 94% (29 Oct 2024 07:59) (93% - 96%)    Parameters below as of 28 Oct 2024 23:59  Patient On (Oxygen Delivery Method): BiPAP/CPAP    Constitutional: Middle age man., non-toxic, no distress, chronically ill appearing   HEAD/EYES: anicteric, no conjunctival injection  ENT:  supple, no thrush, on cpap   Cardiovascular:   normal S1, S2, no murmur, + edema  Respiratory:  clear BS bilaterally, no wheezes, no rales  GI:  soft, non-tender, + ileostomy, surgical scar marks  :  + supra pubic catheter - site not inflamed   Musculoskeletal:  RUE midline, skin with small blisters around the line dressing. Contracted LE  Neurologic: awake and alert, paraplegic  Skin:  + rash, no erythema, no phlebitis, multiple tattoos, pt's left foot is dressed c/d/i   Heme/Onc: no lymphadenopathy   Psychiatric:  awake, alert, appropriate mood    WBC Count: 8.88 K/uL (10-27 @ 11:00)  WBC Count: 8.09 K/uL (10-23 @ 05:45)  WBC Count: 8.27 K/uL (10-22 @ 08:45)                        12.1   8.88  )-----------( 168      ( 27 Oct 2024 11:00 )             39.5       10-27    140  |  103  |  19  ----------------------------<  139[H]  3.6   |  32[H]  |  0.72    Ca    9.3      27 Oct 2024 11:00    TPro  8.5[H]  /  Alb  2.6[L]  /  TBili  0.3  /  DBili  x   /  AST  45[H]  /  ALT  51  /  AlkPhos  170[H]  10-27      Urinalysis Basic - ( 27 Oct 2024 11:00 )    Color: x / Appearance: x / SG: x / pH: x  Gluc: 139 mg/dL / Ketone: x  / Bili: x / Urobili: x   Blood: x / Protein: x / Nitrite: x   Leuk Esterase: x / RBC: x / WBC x   Sq Epi: x / Non Sq Epi: x / Bacteria: x      Creatinine Trend: 0.72<--, 0.57<--, 0.75<--, 0.91<--, 0.79<--, 0.73<--      MICROBIOLOGY:  Suprapubic urine culture from super pubic cath  10-23-24   No growth  --  --      Clean Catch  10-18-24   >100,000 CFU/ml Providencia stuartii  --  Providencia stuartii    RADIOLOGY:  < from: US Kidney and Bladder (10.20.24 @ 20:49) >  IMPRESSION:  The lower pole of the right kidney is completely obscured by bowel gas.    No renal mass, hydronephrosis or calculus is visualized sonographically.    The bladder could not be imaged due to the patient's inability to   position his legs.    Splenomegaly.    < end of copied text >     STAN VEGA  MRN-[id number]  54y (1970)    Follow Up:  uti    Interval History: Seen and examined at bedside this morning. Afebrile. Says his SPC was pulled when he was being rolled in bed earlier and that it hurts since. No pressure in the abdomen or pelvis. Pe bedside RN he is being discharged today      ROS:    [ ] Unobtainable because:  [x ] All other systems negative    Constitutional: no fever, no chills  Head: no trauma  Eyes: no vision changes, no eye pain  ENT:  no sore throat, no rhinorrhea  Cardiovascular:  no chest pain, no palpitation  Respiratory:  no SOB, no cough  GI:  no abd pain, no vomiting, no diarrhea  urinary: no dysuria, no hematuria, no flank pain, still occasionally urinates in presence of SPC  musculoskeletal:  no joint pain, no joint swelling  skin:  no rash  neurology:  no headache, no seizure, no change in mental status  psych: no anxiety, no depression       Allergies  Zyprexa (Rash; Dystonic RXN; Hives)  Risperdal (Short breath; Rash; Hives)  Stelazine (Unknown)  NSAIDs (Flushing; Other (Moderate))  Motrin (Anaphylaxis)  Aleve (Unknown)  Haldol (Anaphylaxis)  Thorazine (Other (Moderate))      ANTIMICROBIALS:  cefpodoxime 100 every 12 hours  MEDICATIONS  (STANDING):  cefpodoxime   100 milliGRAM(s) Oral (10-29-24 @ 06:15)   100 milliGRAM(s) Oral (10-28-24 @ 19:07)   100 milliGRAM(s) Oral (10-28-24 @ 06:17)   100 milliGRAM(s) Oral (10-27-24 @ 06:28)   100 milliGRAM(s) Oral (10-26-24 @ 18:53)   100 milliGRAM(s) Oral (10-26-24 @ 06:17)   100 milliGRAM(s) Oral (10-25-24 @ 18:58)   100 milliGRAM(s) Oral (10-25-24 @ 05:49)   100 milliGRAM(s) Oral (10-24-24 @ 18:33)    cefTRIAXone   IVPB   100 mL/Hr IV Intermittent (10-21-24 @ 09:07)   100 mL/Hr IV Intermittent (10-20-24 @ 13:52)    meropenem  IVPB   100 mL/Hr IV Intermittent (10-22-24 @ 09:51)    meropenem  IVPB   100 mL/Hr IV Intermittent (10-24-24 @ 11:37)   100 mL/Hr IV Intermittent (10-24-24 @ 01:19)   100 mL/Hr IV Intermittent (10-23-24 @ 17:16)   100 mL/Hr IV Intermittent (10-23-24 @ 10:37)   100 mL/Hr IV Intermittent (10-23-24 @ 00:51)   100 mL/Hr IV Intermittent (10-22-24 @ 18:44)    trimethoprim  160 mG/sulfamethoxazole 800 mG   1 Tablet(s) Oral (10-20-24 @ 06:25)   1 Tablet(s) Oral (10-19-24 @ 17:27)   1 Tablet(s) Oral (10-19-24 @ 06:18)   1 Tablet(s) Oral (10-19-24 @ 00:21)      OTHER MEDS:  acetaminophen     Tablet .. 650 milliGRAM(s) Oral every 6 hours PRN  albuterol    90 MICROgram(s) HFA Inhaler 2 Puff(s) Inhalation every 6 hours PRN  albuterol/ipratropium for Nebulization 3 milliLiter(s) Nebulizer every 6 hours  aluminum hydroxide/magnesium hydroxide/simethicone Suspension 30 milliLiter(s) Oral every 4 hours PRN  bacitracin   Ointment 1 Application(s) Topical two times a day  collagenase Ointment 1 Application(s) Topical two times a day  diphenhydrAMINE 50 milliGRAM(s) Oral every 4 hours PRN  DULoxetine 30 milliGRAM(s) Oral daily  ferrous    sulfate 325 milliGRAM(s) Oral daily  finasteride 5 milliGRAM(s) Oral daily  folic acid 1 milliGRAM(s) Oral daily  furosemide   Injectable 40 milliGRAM(s) IV Push daily  gabapentin 300 milliGRAM(s) Oral every 8 hours  HYDROmorphone   Tablet 8 milliGRAM(s) Oral every 8 hours PRN  HYDROmorphone  Injectable 1 milliGRAM(s) IV Push every 4 hours PRN  melatonin 3 milliGRAM(s) Oral at bedtime PRN  multivitamin 1 Tablet(s) Oral daily  ondansetron Injectable 4 milliGRAM(s) IV Push every 8 hours PRN  pantoprazole    Tablet 40 milliGRAM(s) Oral before breakfast  QUEtiapine 400 milliGRAM(s) Oral at bedtime  tamsulosin 0.4 milliGRAM(s) Oral at bedtime      Physical Exam:  Vital Signs Last 24 Hrs  T(C): 37 (29 Oct 2024 05:41), Max: 37.1 (28 Oct 2024 11:35)  T(F): 98.6 (29 Oct 2024 05:41), Max: 98.8 (28 Oct 2024 11:35)  HR: 83 (29 Oct 2024 07:59) (75 - 86)  BP: 111/72 (29 Oct 2024 05:41) (111/72 - 131/80)  BP(mean): --  RR: 17 (29 Oct 2024 05:41) (16 - 19)  SpO2: 94% (29 Oct 2024 07:59) (93% - 96%)    Parameters below as of 28 Oct 2024 23:59  Patient On (Oxygen Delivery Method): BiPAP/CPAP    Constitutional: Middle age man., non-toxic, no distress, chronically ill appearing   HEAD/EYES: anicteric, no conjunctival injection  ENT:  supple, no thrush, on cpap   Cardiovascular:   normal S1, S2, no murmur, + edema  Respiratory:  clear BS bilaterally, no wheezes, no rales  GI:  soft, non-tender, + ileostomy, surgical scar marks  :  + supra pubic catheter - site not inflamed   Musculoskeletal:  RUE midline, skin with small blisters around the line dressing. Contracted LE  Neurologic: awake and alert, paraplegic  Skin:  + rash, no erythema, no phlebitis, multiple tattoos, pt's left foot is dressed c/d/i   Heme/Onc: no lymphadenopathy   Psychiatric:  awake, alert, appropriate mood    WBC Count: 8.88 K/uL (10-27 @ 11:00)  WBC Count: 8.09 K/uL (10-23 @ 05:45)  WBC Count: 8.27 K/uL (10-22 @ 08:45)                        12.1   8.88  )-----------( 168      ( 27 Oct 2024 11:00 )             39.5       10-27    140  |  103  |  19  ----------------------------<  139[H]  3.6   |  32[H]  |  0.72    Ca    9.3      27 Oct 2024 11:00    TPro  8.5[H]  /  Alb  2.6[L]  /  TBili  0.3  /  DBili  x   /  AST  45[H]  /  ALT  51  /  AlkPhos  170[H]  10-27      Urinalysis Basic - ( 27 Oct 2024 11:00 )    Color: x / Appearance: x / SG: x / pH: x  Gluc: 139 mg/dL / Ketone: x  / Bili: x / Urobili: x   Blood: x / Protein: x / Nitrite: x   Leuk Esterase: x / RBC: x / WBC x   Sq Epi: x / Non Sq Epi: x / Bacteria: x      Creatinine Trend: 0.72<--, 0.57<--, 0.75<--, 0.91<--, 0.79<--, 0.73<--      MICROBIOLOGY:  Suprapubic urine culture from super pubic cath  10-23-24   No growth  --  --      Clean Catch  10-18-24   >100,000 CFU/ml Providencia stuartii  --  Providencia stuartii    RADIOLOGY:  < from: US Kidney and Bladder (10.20.24 @ 20:49) >  IMPRESSION:  The lower pole of the right kidney is completely obscured by bowel gas.    No renal mass, hydronephrosis or calculus is visualized sonographically.    The bladder could not be imaged due to the patient's inability to   position his legs.    Splenomegaly.    < end of copied text >

## 2024-10-29 NOTE — DISCHARGE NOTE PROVIDER - NSDCCPCAREPLAN_GEN_ALL_CORE_FT
Page to clinic from nursing regarding seizure-like activity, unresponsive episode ~4PM.  Pt reportedly does not recall this episode, stable vitals and normal BG after this episode. Pt has since tested COVID positive this afternoon. I have referred pt to the ER for neuro evaluation given hx of seizures and stroke in the setting of COVID related to pro-inflammatory/pro-thrombotic state, it does not sound like he is having any respiratory symptoms at this time. Pt is not on any antiepileptics aside from gabapentin. If pt is medically cleared, I recommend that he not return to skilled unit which is the clean unit, rather to be discharged to EP \"hot unit\". Sign-out to Dr. Estrellita Mcnair (ER attending). Also, I discussed w/ skilled nurse that pt's roommate should be placed under ppx COVID contact precautions.     ~PCR PRINCIPAL DISCHARGE DIAGNOSIS  Diagnosis: Obstruction of urinary catheter  Assessment and Plan of Treatment:       SECONDARY DISCHARGE DIAGNOSES  Diagnosis: Urinary tract infection  Assessment and Plan of Treatment:     Diagnosis: Hypertension  Assessment and Plan of Treatment:

## 2024-10-30 LAB
CULTURE RESULTS: NO GROWTH — SIGNIFICANT CHANGE UP
SPECIMEN SOURCE: SIGNIFICANT CHANGE UP

## 2024-11-01 DIAGNOSIS — J43.9 EMPHYSEMA, UNSPECIFIED: ICD-10-CM

## 2024-11-01 DIAGNOSIS — I25.10 ATHEROSCLEROTIC HEART DISEASE OF NATIVE CORONARY ARTERY WITHOUT ANGINA PECTORIS: ICD-10-CM

## 2024-11-01 DIAGNOSIS — G62.9 POLYNEUROPATHY, UNSPECIFIED: ICD-10-CM

## 2024-11-01 DIAGNOSIS — Z88.6 ALLERGY STATUS TO ANALGESIC AGENT: ICD-10-CM

## 2024-11-01 DIAGNOSIS — L89.892 PRESSURE ULCER OF OTHER SITE, STAGE 2: ICD-10-CM

## 2024-11-01 DIAGNOSIS — Z88.8 ALLERGY STATUS TO OTHER DRUGS, MEDICAMENTS AND BIOLOGICAL SUBSTANCES: ICD-10-CM

## 2024-11-01 DIAGNOSIS — I50.22 CHRONIC SYSTOLIC (CONGESTIVE) HEART FAILURE: ICD-10-CM

## 2024-11-01 DIAGNOSIS — Z99.81 DEPENDENCE ON SUPPLEMENTAL OXYGEN: ICD-10-CM

## 2024-11-01 DIAGNOSIS — N39.0 URINARY TRACT INFECTION, SITE NOT SPECIFIED: ICD-10-CM

## 2024-11-01 DIAGNOSIS — B96.89 OTHER SPECIFIED BACTERIAL AGENTS AS THE CAUSE OF DISEASES CLASSIFIED ELSEWHERE: ICD-10-CM

## 2024-11-01 DIAGNOSIS — N31.9 NEUROMUSCULAR DYSFUNCTION OF BLADDER, UNSPECIFIED: ICD-10-CM

## 2024-11-01 DIAGNOSIS — T83.511A INFECTION AND INFLAMMATORY REACTION DUE TO INDWELLING URETHRAL CATHETER, INITIAL ENCOUNTER: ICD-10-CM

## 2024-11-01 DIAGNOSIS — B18.2 CHRONIC VIRAL HEPATITIS C: ICD-10-CM

## 2024-11-01 DIAGNOSIS — G47.33 OBSTRUCTIVE SLEEP APNEA (ADULT) (PEDIATRIC): ICD-10-CM

## 2024-11-01 DIAGNOSIS — T83.091A OTHER MECHANICAL COMPLICATION OF INDWELLING URETHRAL CATHETER, INITIAL ENCOUNTER: ICD-10-CM

## 2024-11-01 DIAGNOSIS — J96.12 CHRONIC RESPIRATORY FAILURE WITH HYPERCAPNIA: ICD-10-CM

## 2024-11-01 DIAGNOSIS — G82.20 PARAPLEGIA, UNSPECIFIED: ICD-10-CM

## 2024-11-01 DIAGNOSIS — F11.10 OPIOID ABUSE, UNCOMPLICATED: ICD-10-CM
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
## 2024-11-01 DIAGNOSIS — K59.00 CONSTIPATION, UNSPECIFIED: ICD-10-CM
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
## 2024-11-01 DIAGNOSIS — I11.0 HYPERTENSIVE HEART DISEASE WITH HEART FAILURE: ICD-10-CM

## 2024-11-01 DIAGNOSIS — J96.11 CHRONIC RESPIRATORY FAILURE WITH HYPOXIA: ICD-10-CM

## 2024-11-01 DIAGNOSIS — E78.5 HYPERLIPIDEMIA, UNSPECIFIED: ICD-10-CM

## 2024-11-01 DIAGNOSIS — Z79.891 LONG TERM (CURRENT) USE OF OPIATE ANALGESIC: ICD-10-CM

## 2024-12-09 ENCOUNTER — INPATIENT (INPATIENT)
Facility: HOSPITAL | Age: 54
LOS: 21 days | Discharge: NURSING, REHAB OR LONG-TERM CARE | End: 2024-12-31
Attending: HOSPITALIST | Admitting: HOSPITALIST
Payer: MEDICAID

## 2024-12-09 VITALS
RESPIRATION RATE: 18 BRPM | DIASTOLIC BLOOD PRESSURE: 80 MMHG | HEART RATE: 82 BPM | SYSTOLIC BLOOD PRESSURE: 121 MMHG | TEMPERATURE: 99 F | HEIGHT: 74 IN | WEIGHT: 246.04 LBS | OXYGEN SATURATION: 95 %

## 2024-12-09 DIAGNOSIS — Z93.2 ILEOSTOMY STATUS: Chronic | ICD-10-CM

## 2024-12-09 DIAGNOSIS — Z98.890 OTHER SPECIFIED POSTPROCEDURAL STATES: Chronic | ICD-10-CM

## 2024-12-09 LAB
APPEARANCE UR: ABNORMAL
BILIRUB UR-MCNC: ABNORMAL
COLOR SPEC: ABNORMAL
DIFF PNL FLD: ABNORMAL
GLUCOSE UR QL: NEGATIVE MG/DL — SIGNIFICANT CHANGE UP
KETONES UR-MCNC: NEGATIVE MG/DL — SIGNIFICANT CHANGE UP
LEUKOCYTE ESTERASE UR-ACNC: ABNORMAL
NITRITE UR-MCNC: NEGATIVE — SIGNIFICANT CHANGE UP
PH UR: 7 — SIGNIFICANT CHANGE UP (ref 5–8)
PROT UR-MCNC: 300 MG/DL
SP GR SPEC: 1.02 — SIGNIFICANT CHANGE UP (ref 1–1.03)
UROBILINOGEN FLD QL: 1 MG/DL — SIGNIFICANT CHANGE UP (ref 0.2–1)

## 2024-12-09 PROCEDURE — 99285 EMERGENCY DEPT VISIT HI MDM: CPT | Mod: 25

## 2024-12-09 PROCEDURE — 36556 INSERT NON-TUNNEL CV CATH: CPT

## 2024-12-09 RX ORDER — HYDROMORPHONE HCL 4 MG
2 TABLET ORAL ONCE
Refills: 0 | Status: DISCONTINUED | OUTPATIENT
Start: 2024-12-09 | End: 2024-12-10

## 2024-12-09 RX ORDER — ACETAMINOPHEN 80 MG/.8ML
1000 SOLUTION/ DROPS ORAL ONCE
Refills: 0 | Status: COMPLETED | OUTPATIENT
Start: 2024-12-09 | End: 2024-12-10

## 2024-12-09 NOTE — ED PROVIDER NOTE - NEUROLOGICAL, MLM
Alert and oriented x 3, clear speech, moves both arms, is unable to move legs but has sensation in legs.

## 2024-12-09 NOTE — ED ADULT NURSE NOTE - NS ED NURSE LEVEL OF CONSCIOUSNESS AFFECT
Patient wants to reschedule colonoscopy scheduled 7/31/2023 at [facility].  Call at [phone].   Calm

## 2024-12-09 NOTE — ED PROVIDER NOTE - PROGRESS NOTE DETAILS
Surgery recommends admission to medicine patient will need IR to re-adust suprapubic catheter. Received patient signout from Dr. Tenorio.  Patient from nursing home paraplegic with suprapubic catheter  c/o abdominal  pain and hematuria abdominal tenderness on exam.  Urology consulted.  Pending urology dispo, CT, and labs, CT tech not comfortable doing CT scan and patient also refusing CT.  General surgery covering urology made aware.

## 2024-12-09 NOTE — ED PROVIDER NOTE - PHYSICAL EXAMINATION
There is an ostomy in the RLQ and there is a suprapubic catheter in place but the catheter suture is not in place.

## 2024-12-09 NOTE — ED ADULT NURSE REASSESSMENT NOTE - NS ED NURSE REASSESS COMMENT FT1
Pt refusing IV from RN. Pt requesting ultra sound guided IV only. MD aware. Therefore unable to draw labs and give meds at this time. Pt in NAD at this time

## 2024-12-09 NOTE — ED PROVIDER NOTE - CARE PLAN
1 Principal Discharge DX:	Hematuria  Secondary Diagnosis:	UTI (urinary tract infection)  Secondary Diagnosis:	Mechanical complication of suprapubic catheter

## 2024-12-09 NOTE — ED PROVIDER NOTE - CLINICAL SUMMARY MEDICAL DECISION MAKING FREE TEXT BOX
54M c/o lower abdominal pain and hematuria  -cbc, cmp, coags, t&s, ua/culture, lipase  -ct a/p  -analgesia  -urology consult

## 2024-12-09 NOTE — ED PROVIDER NOTE - OBJECTIVE STATEMENT
The patient reports having one week of progressively worsening bilateral lower abdominal pain. He was sent from his nursing home to the ED today for evaluation.

## 2024-12-09 NOTE — ED PROVIDER NOTE - NSICDXPASTMEDICALHX_GEN_ALL_CORE_FT
PAST MEDICAL HISTORY:  Bipolar disorder     CAD (coronary artery disease)     Chronic hepatitis C virus infection     HLD (hyperlipidemia)     HTN (hypertension)     Neurogenic bladder     S/P ileostomy

## 2024-12-09 NOTE — ED ADULT TRIAGE NOTE - CHIEF COMPLAINT QUOTE
Sent by [facility] hematuria, lower abd pain, abcess between legs, NH requesting to change [place] H/O  Neuromuscular dysfunction of bladder, hypoxia wears Oxygen, sepsis  hyperlipidemia attention defecit disorder chronic viral hepatitis , pressure ulcers DM

## 2024-12-09 NOTE — ED ADULT NURSE NOTE - HISTORY OF COVID-19 VACCINATION
Please have neurology or cardiology (hx of [facility]) clear you to start Viagra or Cialis for erectile dysfunction. Please have them send over clearance if ok to start.    Continue flomax for BPH.       Vaccine status unknown

## 2024-12-09 NOTE — ED ADULT NURSE NOTE - CHIEF COMPLAINT QUOTE
Sent by [facility] hematuria, lower abd pain, abcess between legs, NH requesting to change [place] H/O  Neuromuscular dysfunction of bladder, hypoxia wears Oxygen, sepsis  hyperlipidemia attention defecit disorder chronic viral hepatitis , pressure ulcers DM

## 2024-12-09 NOTE — ED ADULT NURSE NOTE - OBJECTIVE STATEMENT
Patient 54 year old male w/ PMH CAD, HLD, chronic viral hepatitis C, HTN, neurogenic bladder, ileostomy presenting for  systems. patient from [facility] presenting for hematuria. States he had suprapubic cathter inserted 1 month ago, and that he has had hematuria for the past week. Also endorses nausea and subjective fevers. Patient lower extremities contracts, redness and weeping noted to bilateral lower ankles. also c/o abscess between his legs. ileostomy also noted. on 5L NC at baseline.

## 2024-12-10 DIAGNOSIS — K21.9 GASTRO-ESOPHAGEAL REFLUX DISEASE WITHOUT ESOPHAGITIS: ICD-10-CM

## 2024-12-10 DIAGNOSIS — R10.30 LOWER ABDOMINAL PAIN, UNSPECIFIED: ICD-10-CM

## 2024-12-10 DIAGNOSIS — T83.511A INFECTION AND INFLAMMATORY REACTION DUE TO INDWELLING URETHRAL CATHETER, INITIAL ENCOUNTER: ICD-10-CM

## 2024-12-10 DIAGNOSIS — F11.20 OPIOID DEPENDENCE, UNCOMPLICATED: ICD-10-CM

## 2024-12-10 DIAGNOSIS — J96.12 CHRONIC RESPIRATORY FAILURE WITH HYPERCAPNIA: ICD-10-CM

## 2024-12-10 DIAGNOSIS — F99 MENTAL DISORDER, NOT OTHERWISE SPECIFIED: ICD-10-CM

## 2024-12-10 DIAGNOSIS — N40.0 BENIGN PROSTATIC HYPERPLASIA WITHOUT LOWER URINARY TRACT SYMPTOMS: ICD-10-CM

## 2024-12-10 PROBLEM — Z97.8 PRESENCE OF OTHER SPECIFIED DEVICES: Chronic | Status: INACTIVE | Noted: 2023-12-05 | Resolved: 2024-12-09

## 2024-12-10 LAB
ALBUMIN SERPL ELPH-MCNC: 2.7 G/DL — LOW (ref 3.3–5)
ALP SERPL-CCNC: 123 U/L — HIGH (ref 40–120)
ALT FLD-CCNC: 23 U/L — SIGNIFICANT CHANGE UP (ref 12–78)
ANION GAP SERPL CALC-SCNC: 2 MMOL/L — LOW (ref 5–17)
APTT BLD: 25.9 SEC — SIGNIFICANT CHANGE UP (ref 24.5–35.6)
AST SERPL-CCNC: 23 U/L — SIGNIFICANT CHANGE UP (ref 15–37)
BASOPHILS # BLD AUTO: 0.02 K/UL — SIGNIFICANT CHANGE UP (ref 0–0.2)
BASOPHILS NFR BLD AUTO: 0.2 % — SIGNIFICANT CHANGE UP (ref 0–2)
BILIRUB SERPL-MCNC: 0.7 MG/DL — SIGNIFICANT CHANGE UP (ref 0.2–1.2)
BUN SERPL-MCNC: 9 MG/DL — SIGNIFICANT CHANGE UP (ref 7–23)
CALCIUM SERPL-MCNC: 9.6 MG/DL — SIGNIFICANT CHANGE UP (ref 8.5–10.1)
CHLORIDE SERPL-SCNC: 104 MMOL/L — SIGNIFICANT CHANGE UP (ref 96–108)
CO2 SERPL-SCNC: 36 MMOL/L — HIGH (ref 22–31)
CREAT SERPL-MCNC: 0.69 MG/DL — SIGNIFICANT CHANGE UP (ref 0.5–1.3)
EGFR: 111 ML/MIN/1.73M2 — SIGNIFICANT CHANGE UP
EOSINOPHIL # BLD AUTO: 0.5 K/UL — SIGNIFICANT CHANGE UP (ref 0–0.5)
EOSINOPHIL NFR BLD AUTO: 4.8 % — SIGNIFICANT CHANGE UP (ref 0–6)
GLUCOSE SERPL-MCNC: 108 MG/DL — HIGH (ref 70–99)
HCT VFR BLD CALC: 40 % — SIGNIFICANT CHANGE UP (ref 39–50)
HGB BLD-MCNC: 12.4 G/DL — LOW (ref 13–17)
IMM GRANULOCYTES NFR BLD AUTO: 0.2 % — SIGNIFICANT CHANGE UP (ref 0–0.9)
INR BLD: 1.17 RATIO — HIGH (ref 0.85–1.16)
LIDOCAIN IGE QN: 44 U/L — SIGNIFICANT CHANGE UP (ref 13–75)
LYMPHOCYTES # BLD AUTO: 2.12 K/UL — SIGNIFICANT CHANGE UP (ref 1–3.3)
LYMPHOCYTES # BLD AUTO: 20.5 % — SIGNIFICANT CHANGE UP (ref 13–44)
MCHC RBC-ENTMCNC: 27.4 PG — SIGNIFICANT CHANGE UP (ref 27–34)
MCHC RBC-ENTMCNC: 31 G/DL — LOW (ref 32–36)
MCV RBC AUTO: 88.5 FL — SIGNIFICANT CHANGE UP (ref 80–100)
MONOCYTES # BLD AUTO: 0.98 K/UL — HIGH (ref 0–0.9)
MONOCYTES NFR BLD AUTO: 9.5 % — SIGNIFICANT CHANGE UP (ref 2–14)
NEUTROPHILS # BLD AUTO: 6.69 K/UL — SIGNIFICANT CHANGE UP (ref 1.8–7.4)
NEUTROPHILS NFR BLD AUTO: 64.8 % — SIGNIFICANT CHANGE UP (ref 43–77)
NRBC # BLD: 0 /100 WBCS — SIGNIFICANT CHANGE UP (ref 0–0)
PLATELET # BLD AUTO: 181 K/UL — SIGNIFICANT CHANGE UP (ref 150–400)
POTASSIUM SERPL-MCNC: 3.9 MMOL/L — SIGNIFICANT CHANGE UP (ref 3.5–5.3)
POTASSIUM SERPL-SCNC: 3.9 MMOL/L — SIGNIFICANT CHANGE UP (ref 3.5–5.3)
PROT SERPL-MCNC: 8.1 GM/DL — SIGNIFICANT CHANGE UP (ref 6–8.3)
PROTHROM AB SERPL-ACNC: 13.5 SEC — HIGH (ref 9.9–13.4)
RBC # BLD: 4.52 M/UL — SIGNIFICANT CHANGE UP (ref 4.2–5.8)
RBC # FLD: 15.1 % — HIGH (ref 10.3–14.5)
SODIUM SERPL-SCNC: 142 MMOL/L — SIGNIFICANT CHANGE UP (ref 135–145)
WBC # BLD: 10.33 K/UL — SIGNIFICANT CHANGE UP (ref 3.8–10.5)
WBC # FLD AUTO: 10.33 K/UL — SIGNIFICANT CHANGE UP (ref 3.8–10.5)

## 2024-12-10 PROCEDURE — 99222 1ST HOSP IP/OBS MODERATE 55: CPT

## 2024-12-10 PROCEDURE — 99254 IP/OBS CNSLTJ NEW/EST MOD 60: CPT

## 2024-12-10 PROCEDURE — 71045 X-RAY EXAM CHEST 1 VIEW: CPT | Mod: 26

## 2024-12-10 RX ORDER — B COMPLEX, C NO.20/FOLIC ACID 1 MG
1 CAPSULE ORAL DAILY
Refills: 0 | Status: DISCONTINUED | OUTPATIENT
Start: 2024-12-10 | End: 2024-12-31

## 2024-12-10 RX ORDER — SENNOSIDES 8.6 MG/1
2 TABLET, FILM COATED ORAL AT BEDTIME
Refills: 0 | Status: DISCONTINUED | OUTPATIENT
Start: 2024-12-10 | End: 2024-12-31

## 2024-12-10 RX ORDER — VITAMIN A 10000 UNIT
1 TABLET ORAL DAILY
Refills: 0 | Status: DISCONTINUED | OUTPATIENT
Start: 2024-12-10 | End: 2024-12-31

## 2024-12-10 RX ORDER — QUETIAPINE FUMARATE 100 MG/1
1 TABLET, FILM COATED ORAL
Refills: 0 | DISCHARGE

## 2024-12-10 RX ORDER — FINASTERIDE 5 MG
5 TABLET ORAL DAILY
Refills: 0 | Status: DISCONTINUED | OUTPATIENT
Start: 2024-12-10 | End: 2024-12-31

## 2024-12-10 RX ORDER — ACETAMINOPHEN 80 MG/.8ML
650 SOLUTION/ DROPS ORAL EVERY 6 HOURS
Refills: 0 | Status: DISCONTINUED | OUTPATIENT
Start: 2024-12-10 | End: 2024-12-20

## 2024-12-10 RX ORDER — METHADONE HYDROCHLORIDE 10 MG/1
110 TABLET ORAL DAILY
Refills: 0 | Status: DISCONTINUED | OUTPATIENT
Start: 2024-12-10 | End: 2024-12-17

## 2024-12-10 RX ORDER — GABAPENTIN 300 MG/1
300 CAPSULE ORAL EVERY 8 HOURS
Refills: 0 | Status: DISCONTINUED | OUTPATIENT
Start: 2024-12-10 | End: 2024-12-31

## 2024-12-10 RX ORDER — FUROSEMIDE 20 MG
20 TABLET ORAL DAILY
Refills: 0 | Status: DISCONTINUED | OUTPATIENT
Start: 2024-12-10 | End: 2024-12-31

## 2024-12-10 RX ORDER — INFLUENZA A VIRUS A/WISCONSIN/588/2019 (H1N1) RECOMBINANT HEMAGGLUTININ ANTIGEN, INFLUENZA A VIRUS A/DARWIN/6/2021 (H3N2) RECOMBINANT HEMAGGLUTININ ANTIGEN, INFLUENZA B VIRUS B/AUSTRIA/1359417/2021 RECOMBINANT HEMAGGLUTININ ANTIGEN, AND INFLUENZA B VIRUS B/PHUKET/3073/2013 RECOMBINANT HEMAGGLUTININ ANTIGEN 45; 45; 45; 45 UG/.5ML; UG/.5ML; UG/.5ML; UG/.5ML
0.5 INJECTION INTRAMUSCULAR ONCE
Refills: 0 | Status: DISCONTINUED | OUTPATIENT
Start: 2024-12-10 | End: 2024-12-31

## 2024-12-10 RX ORDER — QUETIAPINE FUMARATE 100 MG/1
100 TABLET, FILM COATED ORAL
Refills: 0 | Status: DISCONTINUED | OUTPATIENT
Start: 2024-12-10 | End: 2024-12-31

## 2024-12-10 RX ORDER — QUETIAPINE FUMARATE 100 MG/1
400 TABLET, FILM COATED ORAL AT BEDTIME
Refills: 0 | Status: DISCONTINUED | OUTPATIENT
Start: 2024-12-10 | End: 2024-12-31

## 2024-12-10 RX ORDER — ENOXAPARIN SODIUM 60 MG/.6ML
40 INJECTION INTRAVENOUS; SUBCUTANEOUS EVERY 24 HOURS
Refills: 0 | Status: DISCONTINUED | OUTPATIENT
Start: 2024-12-10 | End: 2024-12-31

## 2024-12-10 RX ORDER — IPRATROPIUM BROMIDE AND ALBUTEROL SULFATE .5; 2.5 MG/3ML; MG/3ML
3 SOLUTION RESPIRATORY (INHALATION) EVERY 6 HOURS
Refills: 0 | Status: DISCONTINUED | OUTPATIENT
Start: 2024-12-10 | End: 2024-12-17

## 2024-12-10 RX ORDER — PANTOPRAZOLE 40 MG/1
40 TABLET, DELAYED RELEASE ORAL
Refills: 0 | Status: DISCONTINUED | OUTPATIENT
Start: 2024-12-10 | End: 2024-12-31

## 2024-12-10 RX ORDER — HYDROMORPHONE HCL 4 MG
8 TABLET ORAL EVERY 6 HOURS
Refills: 0 | Status: DISCONTINUED | OUTPATIENT
Start: 2024-12-10 | End: 2024-12-17

## 2024-12-10 RX ORDER — TAMSULOSIN HYDROCHLORIDE 0.4 MG/1
0.4 CAPSULE ORAL AT BEDTIME
Refills: 0 | Status: DISCONTINUED | OUTPATIENT
Start: 2024-12-10 | End: 2024-12-31

## 2024-12-10 RX ORDER — HYDROMORPHONE HCL 4 MG
2 TABLET ORAL EVERY 6 HOURS
Refills: 0 | Status: DISCONTINUED | OUTPATIENT
Start: 2024-12-10 | End: 2024-12-10

## 2024-12-10 RX ORDER — ALBUTEROL SULFATE 90 UG/1
2 INHALANT RESPIRATORY (INHALATION) EVERY 6 HOURS
Refills: 0 | Status: DISCONTINUED | OUTPATIENT
Start: 2024-12-10 | End: 2024-12-10

## 2024-12-10 RX ORDER — HYDROMORPHONE HCL 4 MG
4 TABLET ORAL EVERY 6 HOURS
Refills: 0 | Status: DISCONTINUED | OUTPATIENT
Start: 2024-12-10 | End: 2024-12-17

## 2024-12-10 RX ORDER — CEFTRIAXONE SODIUM 1 G/1
1000 INJECTION, POWDER, FOR SOLUTION INTRAMUSCULAR; INTRAVENOUS EVERY 24 HOURS
Refills: 0 | Status: DISCONTINUED | OUTPATIENT
Start: 2024-12-11 | End: 2024-12-11

## 2024-12-10 RX ORDER — CEFTRIAXONE SODIUM 1 G/1
1000 INJECTION, POWDER, FOR SOLUTION INTRAMUSCULAR; INTRAVENOUS ONCE
Refills: 0 | Status: COMPLETED | OUTPATIENT
Start: 2024-12-10 | End: 2024-12-10

## 2024-12-10 RX ORDER — DULOXETINE HYDROCHLORIDE 30 MG/1
30 CAPSULE, DELAYED RELEASE ORAL DAILY
Refills: 0 | Status: DISCONTINUED | OUTPATIENT
Start: 2024-12-10 | End: 2024-12-31

## 2024-12-10 RX ORDER — FERROUS SULFATE 325(65) MG
325 TABLET ORAL DAILY
Refills: 0 | Status: DISCONTINUED | OUTPATIENT
Start: 2024-12-10 | End: 2024-12-31

## 2024-12-10 RX ORDER — POLYETHYLENE GLYCOL 3350 17 G/DOSE
17 POWDER (GRAM) ORAL DAILY
Refills: 0 | Status: DISCONTINUED | OUTPATIENT
Start: 2024-12-10 | End: 2024-12-31

## 2024-12-10 RX ADMIN — Medication 8 MILLIGRAM(S): at 23:00

## 2024-12-10 RX ADMIN — PANTOPRAZOLE 40 MILLIGRAM(S): 40 TABLET, DELAYED RELEASE ORAL at 12:13

## 2024-12-10 RX ADMIN — TAMSULOSIN HYDROCHLORIDE 0.4 MILLIGRAM(S): 0.4 CAPSULE ORAL at 22:12

## 2024-12-10 RX ADMIN — Medication 1 TABLET(S): at 12:08

## 2024-12-10 RX ADMIN — Medication 325 MILLIGRAM(S): at 12:08

## 2024-12-10 RX ADMIN — Medication 20 MILLIGRAM(S): at 12:13

## 2024-12-10 RX ADMIN — Medication 5 MILLIGRAM(S): at 12:08

## 2024-12-10 RX ADMIN — QUETIAPINE FUMARATE 100 MILLIGRAM(S): 100 TABLET, FILM COATED ORAL at 19:02

## 2024-12-10 RX ADMIN — Medication 2 MILLIGRAM(S): at 02:37

## 2024-12-10 RX ADMIN — ACETAMINOPHEN 400 MILLIGRAM(S): 80 SOLUTION/ DROPS ORAL at 02:34

## 2024-12-10 RX ADMIN — Medication 8 MILLIGRAM(S): at 22:17

## 2024-12-10 RX ADMIN — SENNOSIDES 2 TABLET(S): 8.6 TABLET, FILM COATED ORAL at 22:12

## 2024-12-10 RX ADMIN — Medication 8 MILLIGRAM(S): at 15:20

## 2024-12-10 RX ADMIN — ENOXAPARIN SODIUM 40 MILLIGRAM(S): 60 INJECTION INTRAVENOUS; SUBCUTANEOUS at 12:08

## 2024-12-10 RX ADMIN — GABAPENTIN 300 MILLIGRAM(S): 300 CAPSULE ORAL at 22:12

## 2024-12-10 RX ADMIN — QUETIAPINE FUMARATE 400 MILLIGRAM(S): 100 TABLET, FILM COATED ORAL at 22:15

## 2024-12-10 RX ADMIN — CEFTRIAXONE SODIUM 100 MILLIGRAM(S): 1 INJECTION, POWDER, FOR SOLUTION INTRAMUSCULAR; INTRAVENOUS at 07:58

## 2024-12-10 RX ADMIN — Medication 1 MILLIGRAM(S): at 12:08

## 2024-12-10 RX ADMIN — Medication 8 MILLIGRAM(S): at 16:20

## 2024-12-10 RX ADMIN — METHADONE HYDROCHLORIDE 110 MILLIGRAM(S): 10 TABLET ORAL at 12:07

## 2024-12-10 RX ADMIN — GABAPENTIN 300 MILLIGRAM(S): 300 CAPSULE ORAL at 13:07

## 2024-12-10 NOTE — H&P ADULT - HISTORY OF PRESENT ILLNESS
54 years old male with h/o cervical epidural abscess, b/l LE paralysis, pneumoperitoneum s/p ex-lap w/ ileostomy complicated by MRSA bacteremia and evisceration, Hep C, emphysema on chronic O2, L foot osteomyelitis, bipolar, opioid dependence, HTN, neurogenic bladder s/p IR SPC placement on 10/23/24 present to ED with complain of worsening lower abdominal pain for 5 days and hematuria. SPC was placed \on 10/23/24 by IR, reportedly was pulled slightly since 10/24. Patient reported pain since then but worsened over last 5 days associated with nausea. Denied any fever or chills.   Hemodynamically stable, afebrile, sat well at RA. No leukocytosis, plt 181, Cr 0.69. UA dirty. CXR with no focal consolidation

## 2024-12-10 NOTE — PATIENT PROFILE ADULT - FALL HARM RISK - RISK INTERVENTIONS

## 2024-12-10 NOTE — ED ADULT NURSE REASSESSMENT NOTE - NS ED NURSE REASSESS COMMENT FT1
Verbal orders from Dr. Bridges to wait for xray to verify central line placement prior to giving medications and drawing blood on pt. Pt in NAD at this time

## 2024-12-10 NOTE — H&P ADULT - PROBLEM SELECTOR PLAN 1
worsening lower abdominal pain, hematuria  CXR  ( I personally review) no focal consolidation  UA dirty ( from SPC)  continue ceftriaxone daily, follow up urine culture result  Urology following  IR consulted  Was refusing CT in ED, now seems more agreeable if pain is controlled. Will control pain and reattempt CT abd if patient agrees. Discussed with RN  Difficult IV access. ED team placed left IJ line today (  12/10/24)

## 2024-12-10 NOTE — CONSULT NOTE ADULT - SUBJECTIVE AND OBJECTIVE BOX
UROLOGY CONSULT NOTE    Patient is a 54 year old male who presents with a chief complaint of lower abdominal pain since October.     HPI: 53 y/o man with extensive PMH of Bipolar, HTN, HLD, CAD, hx of cervical epidural abscess s/p treatment, L foot OM, hx of pneumoperitoneum s/p ileostomy, emphysema (home O2), paraplegia, hepatitis C, Indwelling catheter due to neurogenic bladder, IR SPC placement 10/23/24. Pt c/o consistent lower abdominal pain since the SPC was placed (10/23/24). Pt states that it was pulled slightly out the next day on 10/24. Pt states that the SPC has been draining daily and it has been blood tinged urine. Patient denies fever, chills, nausea, vomiting, constipation, diarrhea, melena, hematochezia, dysuria, chest pain, shortness of breath, dizziness, cough.     REVIEW OF SYSTEMS:  CONSTITUTIONAL: No fever, weight loss, or fatigue  EYES: No eye pain, visual disturbances, discharge  ENMT: No difficulty hearing, tinnitus, vertigo; No sinus or throat pain  NECK: No pain or stiffness  BREASTS: No pain, masses, or nipple discharge  RESPIRATORY: No cough, wheezing, chills or hemoptysis; No shortness of breath  CARDIOVASCULAR: No chest pain, palpitations, dizziness, or leg swelling  GASTROINTESTINAL: No abdominal or epigastric pain. No nausea, vomiting, or hematemesis; No diarrhea or constipation. No melena or hematochezia.  GENITOURINARY: No dysuria, frequency, or incontinence  NEUROLOGICAL: No headaches, memory loss, loss of strength, numbness, or tremors  SKIN: No itching, burning, rashes, or lesions   LYMPH NODES: No enlarged glands  ENDOCRINE: No heat or cold intolerance; No hair loss  MUSCULOSKELETAL: No joint pain or swelling; No muscle, back, or extremity pain  PSYCHIATRIC: No depression, anxiety, mood swings, or difficulty sleeping  HEME/LYMPH: No easy bruising, or bleeding gums  ALLERY AND IMMUNOLOGIC: No hives or eczema     PAST MEDICAL & SURGICAL HISTORY:  CAD (coronary artery disease)  HTN (hypertension)  HLD (hyperlipidemia)  Neurogenic bladder  Bipolar disorder  S/P ileostomy  Chronic hepatitis C virus infection  S/P laminectomy  S/P ileostomy    MEDICATIONS: See outpatient medication list.     Allergies    NSAIDs (Flushing; Other (Moderate))  Aleve (Unknown)  Risperdal (Short breath; Rash; Hives)  Stelazine (Unknown)  Haldol (Anaphylaxis)  Motrin (Anaphylaxis)  Thorazine (Other (Moderate))  Zyprexa (Rash; Dystonic RXN; Hives)    SOCIAL HISTORY: On Methadone.          FAMILY HISTORY: No known family hx.     Vital Signs Last 24 Hrs  T(C): 36.9 (10 Dec 2024 03:45), Max: 37.2 (09 Dec 2024 15:44)  T(F): 98.5 (10 Dec 2024 03:45), Max: 98.9 (09 Dec 2024 15:44)  HR: 79 (10 Dec 2024 03:45) (78 - 82)  BP: 117/76 (10 Dec 2024 03:45) (117/76 - 130/79)  RR: 17 (10 Dec 2024 03:45) (16 - 18)  SpO2: 95% (10 Dec 2024 03:45) (92% - 95%)    Parameters below as of 10 Dec 2024 03:45  Patient On (Oxygen Delivery Method): nasal cannula  O2 Flow (L/min): 3    PHYSICAL EXAM:  CONSTITUTIONAL: NAD, well-developed  HEAD: Atraumatic, Normocephalic  EYES: Conjunctiva and sclera clear  ENMT: No tonsillar erythema, exudates, or enlargement; Moist mucous membranes, No lesions  NECK: Supple, No JVD, Normal thyroid  NERVOUS SYSTEM:  Alert & Oriented X3  RESPIRATORY: Clear to auscultation bilaterally; No rales, rhonchi, wheezing  CARDIOVASCULAR: Regular rate and rhythm. S1S2  GASTROINTESTINAL: Nondistended, +BS, soft, nontender, no guarding, no rigidity  : Pt contracted and unable to straighten legs for the exam. SPC and skin around it clean/dry/intact. Delilah urine noted in the SPC bag, draining well. No suprapubic tenderness or bladder distention.   MUSCULOSKELETAL: 2+ Peripheral Pulses, No clubbing, cyanosis, or edema     LABS:                        12.4   10.33 )-----------( 181      ( 10 Dec 2024 02:35 )             40.0     12-10    142  |  104  |  9   ----------------------------<  108[H]  3.9   |  36[H]  |  0.69    Ca    9.6      10 Dec 2024 02:35    TPro  8.1  /  Alb  2.7[L]  /  TBili  0.7  /  DBili  x   /  AST  23  /  ALT  23  /  AlkPhos  123[H]  12-10    PT/INR - ( 10 Dec 2024 02:35 )   PT: 13.5 sec;   INR: 1.17 ratio      PTT - ( 10 Dec 2024 02:35 )  PTT:25.9 sec    Urinalysis Basic - ( 10 Dec 2024 02:35 )    Color: x / Appearance: x / SG: x / pH: x  Gluc: 108 mg/dL / Ketone: x  / Bili: x / Urobili: x   Blood: x / Protein: x / Nitrite: x   Leuk Esterase: x / RBC: x / WBC x   Sq Epi: x / Non Sq Epi: x / Bacteria: x

## 2024-12-10 NOTE — H&P ADULT - PROBLEM SELECTOR PLAN 3
Called Loc -[phone], talked with nurse manage Corie, confirmed that patient is on methadone 110mg daily and last dose in NH was on 12/9/2024  QTc 469  on EKG 10/21/24, check EKG to evaluate for QTc  On dilaudid 6mg q6hr prn for severe pain at NH, continue same  Bowel regimens---> senna and miralax

## 2024-12-10 NOTE — H&P ADULT - NSHPPHYSICALEXAM_GEN_ALL_CORE
CONSTITUTIONAL: alert and cooperative, no acute distress.   EYES: PERRL, no scleral icterus  ENT: Mucosa moist, tongue normal.  NECK: Neck supple, trachea midline, non-tender  CARDIAC: Normal S1 and S2. Regular rate and rhythms. Pedal edema +  LUNGS: Clear to auscultation, equal air entry both lungs. No rales, rhonchi, wheezing. Normal respiratory effort.   ABDOMEN: Soft. Mild lower abdominal tenderness+. No guarding or rebound tenderness. Ileostomy +  MUSCULOSKELETAL: Normocephalic, atraumatic. contracted extremities .  PSYCHIATRIC: A&O x 3, appropriate mood and affect

## 2024-12-10 NOTE — PATIENT PROFILE ADULT - NSPROGENSOURCEINFO_GEN_A_NUR
Patient attended Phase 2 Cardiac Rehab Exercise Session. Further documentation will be scanned into the medical record upon discharge.   patient

## 2024-12-10 NOTE — CONSULT NOTE ADULT - ASSESSMENT
53 y/o man with extensive PMH of Bipolar, HTN, HLD, CAD, hx of cervical epidural abscess s/p treatment, L foot OM, hx of pneumoperitoneum s/p ileostomy, emphysema (home O2), paraplegia, hepatitis C, Indwelling catheter due to neurogenic bladder, IR SPC placement 10/23/24. Pt c/o consistent lower abdominal pain since the SPC was placed (10/23/24). Pt states that it was pulled slightly out the next day on 10/24. Pt states that the SPC has been draining daily and it has been blood tinged urine.    53 y/o man with extensive PMH of Bipolar, HTN, HLD, CAD, hx of cervical epidural abscess s/p treatment, L foot OM, hx of pneumoperitoneum s/p ileostomy, emphysema (home O2), paraplegia, hepatitis C, Indwelling catheter due to neurogenic bladder, IR SPC placement 10/23/24. Pt c/o consistent lower abdominal pain since the SPC was placed (10/23/24). Pt states that it was pulled slightly out the next day on 10/24. Pt states that the SPC has been draining daily and it has been blood tinged urine.     Plan:  Pt refusing CT scan at this time to evaluate for SPC replacement  Recommend IR evaluation for SPC  Abx, Follow up urine culture  Discussed with Dr. Rosa    55 y/o man with extensive PMH of Bipolar, HTN, HLD, CAD, hx of cervical epidural abscess s/p treatment, L foot OM, hx of pneumoperitoneum s/p ileostomy, emphysema (home O2), paraplegia, hepatitis C, Indwelling catheter due to neurogenic bladder, IR SPC placement 10/23/24. Pt c/o consistent lower abdominal pain since the SPC was placed (10/23/24). Pt states that it was pulled slightly out the next day on 10/24. Pt states that the SPC has been draining daily and it has been blood tinged urine.     Plan:  Pt refusing CT scan at this time to evaluate SPC and to rule out any other causes of abdominal pain  Recommend IR evaluation for SPC  Abx, Follow up urine culture  Discussed with Dr. Rosa

## 2024-12-10 NOTE — CONSULT NOTE ADULT - NS PANP COMMENT GEN_ALL_CORE FT
I agree with the statements above I agree with the statements above    Pt seen and examined.  Aware that though it is ?further out, he is draining. Will need contrast in IR to ck position.

## 2024-12-10 NOTE — ED ADULT NURSE REASSESSMENT NOTE - NS ED NURSE REASSESS COMMENT FT1
Central line placed in ED by Dr. Tenorio, assistance by RN. Pt in NAD at this time Central line placed in ED by Dr. Tenorio, assistance by RN. Pt in NAD at this time. Central line is a triple lumen in left side of neck

## 2024-12-10 NOTE — H&P ADULT - PROBLEM SELECTOR PLAN 2
worsening lower abdominal pain   Pain control--> oral dilaudid prn for severe pain, IV dilaudid for breakthrough pain  CT abd/pelvis if patient is agreeable  Urology following  IR consulted

## 2024-12-10 NOTE — ED ADULT NURSE REASSESSMENT NOTE - NS ED NURSE REASSESS COMMENT FT1
pt A&Ox4 able to communicate needs. suprapubic catheter draining cloudy urine, purulent drainage noted at insertion site and draining from groin abscess. left neck central line in place and flushes well, rocephin administered as ordered. Pt admitted to medicine, awaiting transfer to floors. Pt has no complaints at this time.

## 2024-12-11 LAB
ALBUMIN SERPL ELPH-MCNC: 2.6 G/DL — LOW (ref 3.3–5)
ALP SERPL-CCNC: 124 U/L — HIGH (ref 40–120)
ALT FLD-CCNC: 25 U/L — SIGNIFICANT CHANGE UP (ref 12–78)
ANION GAP SERPL CALC-SCNC: 4 MMOL/L — LOW (ref 5–17)
AST SERPL-CCNC: 27 U/L — SIGNIFICANT CHANGE UP (ref 15–37)
BILIRUB SERPL-MCNC: 0.6 MG/DL — SIGNIFICANT CHANGE UP (ref 0.2–1.2)
BUN SERPL-MCNC: 12 MG/DL — SIGNIFICANT CHANGE UP (ref 7–23)
CALCIUM SERPL-MCNC: 9.5 MG/DL — SIGNIFICANT CHANGE UP (ref 8.5–10.1)
CHLORIDE SERPL-SCNC: 103 MMOL/L — SIGNIFICANT CHANGE UP (ref 96–108)
CO2 SERPL-SCNC: 35 MMOL/L — HIGH (ref 22–31)
CREAT SERPL-MCNC: 0.72 MG/DL — SIGNIFICANT CHANGE UP (ref 0.5–1.3)
EGFR: 109 ML/MIN/1.73M2 — SIGNIFICANT CHANGE UP
GLUCOSE SERPL-MCNC: 73 MG/DL — SIGNIFICANT CHANGE UP (ref 70–99)
HCT VFR BLD CALC: 39.7 % — SIGNIFICANT CHANGE UP (ref 39–50)
HGB BLD-MCNC: 12.2 G/DL — LOW (ref 13–17)
MAGNESIUM SERPL-MCNC: 2.1 MG/DL — SIGNIFICANT CHANGE UP (ref 1.6–2.6)
MCHC RBC-ENTMCNC: 27.9 PG — SIGNIFICANT CHANGE UP (ref 27–34)
MCHC RBC-ENTMCNC: 30.7 G/DL — LOW (ref 32–36)
MCV RBC AUTO: 90.8 FL — SIGNIFICANT CHANGE UP (ref 80–100)
NRBC # BLD: 0 /100 WBCS — SIGNIFICANT CHANGE UP (ref 0–0)
PHOSPHATE SERPL-MCNC: 3.6 MG/DL — SIGNIFICANT CHANGE UP (ref 2.5–4.5)
PLATELET # BLD AUTO: 183 K/UL — SIGNIFICANT CHANGE UP (ref 150–400)
POTASSIUM SERPL-MCNC: 3.3 MMOL/L — LOW (ref 3.5–5.3)
POTASSIUM SERPL-SCNC: 3.3 MMOL/L — LOW (ref 3.5–5.3)
PROT SERPL-MCNC: 8 GM/DL — SIGNIFICANT CHANGE UP (ref 6–8.3)
RBC # BLD: 4.37 M/UL — SIGNIFICANT CHANGE UP (ref 4.2–5.8)
RBC # FLD: 15.3 % — HIGH (ref 10.3–14.5)
SODIUM SERPL-SCNC: 142 MMOL/L — SIGNIFICANT CHANGE UP (ref 135–145)
WBC # BLD: 9.41 K/UL — SIGNIFICANT CHANGE UP (ref 3.8–10.5)
WBC # FLD AUTO: 9.41 K/UL — SIGNIFICANT CHANGE UP (ref 3.8–10.5)

## 2024-12-11 PROCEDURE — 99232 SBSQ HOSP IP/OBS MODERATE 35: CPT

## 2024-12-11 PROCEDURE — 75984 XRAY CONTROL CATHETER CHANGE: CPT | Mod: 26

## 2024-12-11 PROCEDURE — 99024 POSTOP FOLLOW-UP VISIT: CPT

## 2024-12-11 PROCEDURE — 36569 INSJ PICC 5 YR+ W/O IMAGING: CPT

## 2024-12-11 PROCEDURE — 76937 US GUIDE VASCULAR ACCESS: CPT | Mod: 26

## 2024-12-11 PROCEDURE — 36481 INSERTION OF CATHETER VEIN: CPT

## 2024-12-11 PROCEDURE — 93010 ELECTROCARDIOGRAM REPORT: CPT

## 2024-12-11 PROCEDURE — 51705 CHANGE OF BLADDER TUBE: CPT

## 2024-12-11 PROCEDURE — 99497 ADVNCD CARE PLAN 30 MIN: CPT

## 2024-12-11 PROCEDURE — 74177 CT ABD & PELVIS W/CONTRAST: CPT | Mod: 26

## 2024-12-11 RX ORDER — PIPERACILLIN AND TAZOBACTAM 3; .375 G/15ML; G/15ML
3.38 INJECTION, POWDER, LYOPHILIZED, FOR SOLUTION INTRAVENOUS ONCE
Refills: 0 | Status: COMPLETED | OUTPATIENT
Start: 2024-12-11 | End: 2024-12-11

## 2024-12-11 RX ORDER — PIPERACILLIN AND TAZOBACTAM 3; .375 G/15ML; G/15ML
3.38 INJECTION, POWDER, LYOPHILIZED, FOR SOLUTION INTRAVENOUS EVERY 8 HOURS
Refills: 0 | Status: DISCONTINUED | OUTPATIENT
Start: 2024-12-11 | End: 2024-12-12

## 2024-12-11 RX ORDER — POTASSIUM CHLORIDE 600 MG/1
40 TABLET, FILM COATED, EXTENDED RELEASE ORAL ONCE
Refills: 0 | Status: COMPLETED | OUTPATIENT
Start: 2024-12-11 | End: 2024-12-11

## 2024-12-11 RX ADMIN — QUETIAPINE FUMARATE 100 MILLIGRAM(S): 100 TABLET, FILM COATED ORAL at 18:07

## 2024-12-11 RX ADMIN — Medication 1 MILLIGRAM(S): at 12:19

## 2024-12-11 RX ADMIN — Medication 8 MILLIGRAM(S): at 22:34

## 2024-12-11 RX ADMIN — Medication 5 MILLIGRAM(S): at 12:44

## 2024-12-11 RX ADMIN — Medication 1 TABLET(S): at 12:19

## 2024-12-11 RX ADMIN — Medication 8 MILLIGRAM(S): at 21:38

## 2024-12-11 RX ADMIN — Medication 4 MILLIGRAM(S): at 12:21

## 2024-12-11 RX ADMIN — QUETIAPINE FUMARATE 400 MILLIGRAM(S): 100 TABLET, FILM COATED ORAL at 22:15

## 2024-12-11 RX ADMIN — METHADONE HYDROCHLORIDE 110 MILLIGRAM(S): 10 TABLET ORAL at 12:20

## 2024-12-11 RX ADMIN — TAMSULOSIN HYDROCHLORIDE 0.4 MILLIGRAM(S): 0.4 CAPSULE ORAL at 21:38

## 2024-12-11 RX ADMIN — Medication 4 MILLIGRAM(S): at 13:21

## 2024-12-11 RX ADMIN — PIPERACILLIN AND TAZOBACTAM 25 GRAM(S): 3; .375 INJECTION, POWDER, LYOPHILIZED, FOR SOLUTION INTRAVENOUS at 18:13

## 2024-12-11 RX ADMIN — Medication 325 MILLIGRAM(S): at 12:18

## 2024-12-11 RX ADMIN — POTASSIUM CHLORIDE 40 MILLIEQUIVALENT(S): 600 TABLET, FILM COATED, EXTENDED RELEASE ORAL at 12:17

## 2024-12-11 RX ADMIN — Medication 8 MILLIGRAM(S): at 06:02

## 2024-12-11 RX ADMIN — PANTOPRAZOLE 40 MILLIGRAM(S): 40 TABLET, DELAYED RELEASE ORAL at 05:59

## 2024-12-11 RX ADMIN — QUETIAPINE FUMARATE 100 MILLIGRAM(S): 100 TABLET, FILM COATED ORAL at 12:18

## 2024-12-11 RX ADMIN — Medication 8 MILLIGRAM(S): at 07:00

## 2024-12-11 RX ADMIN — CEFTRIAXONE SODIUM 100 MILLIGRAM(S): 1 INJECTION, POWDER, FOR SOLUTION INTRAMUSCULAR; INTRAVENOUS at 05:59

## 2024-12-11 RX ADMIN — GABAPENTIN 300 MILLIGRAM(S): 300 CAPSULE ORAL at 21:38

## 2024-12-11 RX ADMIN — Medication 20 MILLIGRAM(S): at 05:59

## 2024-12-11 RX ADMIN — GABAPENTIN 300 MILLIGRAM(S): 300 CAPSULE ORAL at 05:59

## 2024-12-11 RX ADMIN — PIPERACILLIN AND TAZOBACTAM 25 GRAM(S): 3; .375 INJECTION, POWDER, LYOPHILIZED, FOR SOLUTION INTRAVENOUS at 21:38

## 2024-12-11 NOTE — PROGRESS NOTE ADULT - SUBJECTIVE AND OBJECTIVE BOX
Patient is a 54y old  Male who presents with a chief complaint of UTI, hematuria, lower abdominal pain (11 Dec 2024 09:06)      INTERVAL HPI/OVERNIGHT EVENTS: No events overnight.     MEDICATIONS  (STANDING):  DULoxetine 30 milliGRAM(s) Oral daily  enoxaparin Injectable 40 milliGRAM(s) SubCutaneous every 24 hours  ferrous    sulfate 325 milliGRAM(s) Oral daily  finasteride 5 milliGRAM(s) Oral daily  folic acid 1 milliGRAM(s) Oral daily  furosemide    Tablet 20 milliGRAM(s) Oral daily  gabapentin 300 milliGRAM(s) Oral every 8 hours  influenza   Vaccine 0.5 milliLiter(s) IntraMuscular once  methadone    Tablet 110 milliGRAM(s) Oral daily  multivitamin 1 Tablet(s) Oral daily  pantoprazole    Tablet 40 milliGRAM(s) Oral before breakfast  piperacillin/tazobactam IVPB.- 3.375 Gram(s) IV Intermittent once  piperacillin/tazobactam IVPB.. 3.375 Gram(s) IV Intermittent every 8 hours  polyethylene glycol 3350 17 Gram(s) Oral daily  QUEtiapine 100 milliGRAM(s) Oral <User Schedule>  QUEtiapine 400 milliGRAM(s) Oral at bedtime  senna 2 Tablet(s) Oral at bedtime  tamsulosin 0.4 milliGRAM(s) Oral at bedtime    MEDICATIONS  (PRN):  acetaminophen     Tablet .. 650 milliGRAM(s) Oral every 6 hours PRN Mild Pain (1 - 3)  albuterol/ipratropium for Nebulization 3 milliLiter(s) Nebulizer every 6 hours PRN Shortness of Breath and/or Wheezing  HYDROmorphone   Tablet 8 milliGRAM(s) Oral every 6 hours PRN Severe Pain (7 - 10)  HYDROmorphone   Tablet 4 milliGRAM(s) Oral every 6 hours PRN Moderate Pain (4 - 6)  HYDROmorphone  Injectable 2 milliGRAM(s) IV Push every 6 hours PRN breakthrough pain      Allergies    NSAIDs (Flushing; Other (Moderate))  Aleve (Unknown)  Risperdal (Short breath; Rash; Hives)  Stelazine (Unknown)  Haldol (Anaphylaxis)  Motrin (Anaphylaxis)  Thorazine (Other (Moderate))  Zyprexa (Rash; Dystonic RXN; Hives)    Intolerances        REVIEW OF SYSTEMS:  ROS negative unless stated otherwise.    Vital Signs Last 24 Hrs  T(C): 36.7 (11 Dec 2024 15:34), Max: 37.3 (10 Dec 2024 17:26)  T(F): 98 (11 Dec 2024 15:34), Max: 99.2 (10 Dec 2024 17:26)  HR: 73 (11 Dec 2024 15:34) (73 - 86)  BP: 127/75 (11 Dec 2024 15:34) (100/66 - 128/78)  BP(mean): --  RR: 12 (11 Dec 2024 15:34) (12 - 18)  SpO2: 99% (11 Dec 2024 15:34) (93% - 99%)    Parameters below as of 11 Dec 2024 15:34  Patient On (Oxygen Delivery Method): nasal cannula  O2 Flow (L/min): 3      PHYSICAL EXAM:  GENERAL: NAD, ill appearing  HEAD:  Atraumatic   EYES: EOMI   ENMT: Moist mucous membranes  NECK: Supple   NERVOUS SYSTEM:  Awake  CHEST/LUNG: CTAB   HEART: RRR   ABDOMEN: SPC in place  EXTREMITIES: contracted B/L LE with edema     LABS:                        12.2   9.41  )-----------( 183      ( 11 Dec 2024 08:15 )             39.7     12-11    142  |  103  |  12  ----------------------------<  73  3.3[L]   |  35[H]  |  0.72    Ca    9.5      11 Dec 2024 08:15  Phos  3.6     12-11  Mg     2.1     12-11    TPro  8.0  /  Alb  2.6[L]  /  TBili  0.6  /  DBili  x   /  AST  27  /  ALT  25  /  AlkPhos  124[H]  12-11    PT/INR - ( 10 Dec 2024 02:35 )   PT: 13.5 sec;   INR: 1.17 ratio         PTT - ( 10 Dec 2024 02:35 )  PTT:25.9 sec  Urinalysis Basic - ( 11 Dec 2024 08:15 )    Color: x / Appearance: x / SG: x / pH: x  Gluc: 73 mg/dL / Ketone: x  / Bili: x / Urobili: x   Blood: x / Protein: x / Nitrite: x   Leuk Esterase: x / RBC: x / WBC x   Sq Epi: x / Non Sq Epi: x / Bacteria: x      CAPILLARY BLOOD GLUCOSE          RADIOLOGY & ADDITIONAL TESTS:    Imaging Personally Reviewed:  [ X] YES  [ ] NO    Consultant(s) Notes Reviewed:  [ X] YES  [ ] NO    Care Discussed with Consultants/Other Providers [X ] YES  [ ] NO

## 2024-12-11 NOTE — PROGRESS NOTE ADULT - SUBJECTIVE AND OBJECTIVE BOX
UROLOGY DAILY PROGRESS NOTE:     Subjective: Patient seen and examined at bedside. States tired. No overnight events.   Suprapubic catheter in place, draining properly with cloudy yellow colored urine.   Denies hematuria, fever, chills, N/V      Objective:  Vital signs  T(F): , Max: 99.4 (12-10-24 @ 15:30)  HR: 73 (12-11-24 @ 11:00)  BP: 100/66 (12-11-24 @ 11:00)  SpO2: 94% (12-11-24 @ 11:00)  Wt(kg): --    I&O's Summary    10 Dec 2024 07:01  -  11 Dec 2024 07:00  --------------------------------------------------------  IN: 0 mL / OUT: 800 mL / NET: -800 mL        Gen: NAD  Pulm: No respiratory distress, no subcostal retractions  CV: RRR, no JVD  Abd: Soft, NT, ND  : Pt contracted and unable to straighter legs for the exam. SPT in place with bridgette urine in the tubing, draining well. No suprapubic tender.    Labs:  12-11  9.41  / 39.7  /0.72   12-10  10.33 / 40.0  /0.69                           12.2   9.41  )-----------( 183      ( 11 Dec 2024 08:15 )             39.7     12-11    142  |  103  |  12  ----------------------------<  73  3.3[L]   |  35[H]  |  0.72    Ca    9.5      11 Dec 2024 08:15  Phos  3.6     12-11  Mg     2.1     12-11    TPro  8.0  /  Alb  2.6[L]  /  TBili  0.6  /  DBili  x   /  AST  27  /  ALT  25  /  AlkPhos  124[H]  12-11    PT/INR - ( 10 Dec 2024 02:35 )   PT: 13.5 sec;   INR: 1.17 ratio         PTT - ( 10 Dec 2024 02:35 )  PTT:25.9 sec    Urine Cx: Pending

## 2024-12-11 NOTE — PRE PROCEDURE NOTE - PRE PROCEDURE EVALUATION
Interventional Radiology    HPI: 54y Male with extensive PMH of Bipolar, HTN, HLD, CAD, hx of cervical epidural abscess s/p treatment, L foot OM, hx of pneumoperitoneum s/p ileostomy, emphysema (home O2), paraplegia, hepatitis C, Indwelling catheter due to neurogenic bladder, IR SPC placement 10/23/24. Pt c/o consistent lower abdominal pain since the SPC was placed (10/23/24). SPC draining properly with mild cloudy yellow colored urine. urine output 800cc. CT performed today, shows suprapubic catheter in situ. IR consulted for evaluation.       Allergies: NSAIDs (Flushing; Other (Moderate))  Aleve (Unknown)  Risperdal (Short breath; Rash; Hives)  Stelazine (Unknown)  Haldol (Anaphylaxis)  Motrin (Anaphylaxis)  Thorazine (Other (Moderate))  Zyprexa (Rash; Dystonic RXN; Hives)    Medications (Abx/Cardiac/Anticoagulation/Blood Products)  cefTRIAXone   IVPB: 100 mL/Hr IV Intermittent (12-11 @ 05:59)  cefTRIAXone   IVPB: 100 mL/Hr IV Intermittent (12-10 @ 07:58)  enoxaparin Injectable: 40 milliGRAM(s) SubCutaneous (12-10 @ 12:08)  furosemide    Tablet: 20 milliGRAM(s) Oral (12-11 @ 05:59)    Data:    116.6  T(C): 36.6  HR: 83  BP: 124/84  RR: 16  SpO2: 94%    Exam  General: No acute distress  Chest: Non labored breathing  Abdomen: Non-distended  Extremities: No swelling, warm    -WBC 9.41 / HgB 12.2 / Hct 39.7 / Plt 183  -Na 142 / Cl 103 / BUN 12 / Glucose 73  -K 3.3 / CO2 35 / Cr 0.72  -ALT 25 / Alk Phos 124 / T.Bili 0.6  -INR1.17    Imaging: Reviewed by Dr. Escobar    Plan: 54y Male presents for suprapubic exchange and upsize.   -Risks/Benefits/alternatives explained with the patient and/or healthcare proxy and witnessed informed consent obtained.

## 2024-12-11 NOTE — CHART NOTE - NSCHARTNOTEFT_GEN_A_CORE
IR consulted for suprapubic catheter check.   Patient is status post suprapubic catheter placement in IR in October for urinary stricture.     Patient reports abdominal pain and there is new hematuria  from the SPT. Given contrectation, there would be limited information obtained from suprapubic catheter check as images were difficult to obtain at the time of procedure.   Would recommend CT to evaluate for etiology of abdominal pain. Can plan for tube exchange with sedation after CT is reviewed, if clinically warranted.    Discussed with primary team.

## 2024-12-11 NOTE — PROGRESS NOTE ADULT - ASSESSMENT
55 y/o man with extensive PMH of Bipolar, HTN, HLD, CAD, hx of cervical epidural abscess s/p treatment, L foot OM, hx of pneumoperitoneum s/p ileostomy, emphysema (home O2), paraplegia, hepatitis C, Indwelling catheter due to neurogenic bladder, IR SPC placement 10/23/24. Pt c/o consistent lower abdominal pain since the SPC was placed (10/23/24). SPC draining properly with mild cloudy yellow colored urine. Patient refuses CT now.    Plan:   53 y/o man with extensive PMH of Bipolar, HTN, HLD, CAD, hx of cervical epidural abscess s/p treatment, L foot OM, hx of pneumoperitoneum s/p ileostomy, emphysema (home O2), paraplegia, hepatitis C, Indwelling catheter due to neurogenic bladder, IR SPC placement 10/23/24. Pt c/o consistent lower abdominal pain since the SPC was placed (10/23/24). SPC draining properly with mild cloudy yellow colored urine. urine output 800cc. Patient refuses CT now.    Plan:   55 y/o man with extensive PMH of Bipolar, HTN, HLD, CAD, hx of cervical epidural abscess s/p treatment, L foot OM, hx of pneumoperitoneum s/p ileostomy, emphysema (home O2), paraplegia, hepatitis C, Indwelling catheter due to neurogenic bladder, IR SPC placement 10/23/24. Pt c/o consistent lower abdominal pain since the SPC was placed (10/23/24). SPC draining properly with mild cloudy yellow colored urine. urine output 800cc. CT performed today     Plan:   55 y/o man with extensive PMH of Bipolar, HTN, HLD, CAD, hx of cervical epidural abscess s/p treatment, L foot OM, hx of pneumoperitoneum s/p ileostomy, emphysema (home O2), paraplegia, hepatitis C, Indwelling catheter due to neurogenic bladder, IR SPC placement 10/23/24. Pt c/o consistent lower abdominal pain since the SPC was placed (10/23/24). SPC draining properly with mild cloudy yellow colored urine. urine output 800cc. CT performed today and showed the suprapubic tube in situ. IR evaluated and plan to exchange the SPC and upsize.     Plan:   53 y/o man with extensive PMH of Bipolar, HTN, HLD, CAD, hx of cervical epidural abscess s/p treatment, L foot OM, hx of pneumoperitoneum s/p ileostomy, emphysema (home O2), paraplegia, hepatitis C, Indwelling catheter due to neurogenic bladder, IR SPC placement 10/23/24. Pt c/o consistent lower abdominal pain since the SPC was placed (10/23/24). SPC draining properly with mild cloudy yellow colored urine. urine output 800cc. CT performed today and showed the suprapubic tube in situ. IR evaluated and plan to exchange the SPC and upsized     Plan:  - Continue SPC and monitor output  - Continue abx, f/u urine culture final read  -  - Continue care per primary team 55 y/o man with extensive PMH of Bipolar, HTN, HLD, CAD, hx of cervical epidural abscess s/p treatment, L foot OM, hx of pneumoperitoneum s/p ileostomy, emphysema (home O2), paraplegia, hepatitis C, Indwelling catheter due to neurogenic bladder, IR SPC placement 10/23/24. Pt c/o consistent lower abdominal pain since the SPC was placed (10/23/24). SPC draining properly with mild cloudy yellow colored urine. urine output 800cc. CT performed today and showed the suprapubic tube in situ. IR evaluated and plan to exchange the SPC and upsized     Plan:  - Continue SPC and sign off from urology  - Continue abx  - Continue care per primary team  - Patient seen and discussed with Dr. Rosa

## 2024-12-11 NOTE — CHART NOTE - NSCHARTNOTEFT_GEN_A_CORE
Called by IR and RN to discuss patients refusal to a CT Abd/Pelv. When asked by team, patient has been refusing imaging without any given reason. I spoke with patient and discussed at length the purpose and need for the imaging to be done. Discussed with patient that imaging needed to be done prior to replacing the existing SPC by IR. Patient agreed to imaging with pain control. Called by IR and RN to discuss patients refusal to a CT Abd/Pelv. When asked by team, patient has been refusing imaging without any given reason. I spoke with patient and discussed at length the purpose and need for the imaging to be done. Discussed with patient that imaging needed to be done prior to replacing the existing SPC by IR. Patient agreed to imaging with pain control.        35 mins assessing presenting problems of acute illness. Medical decision making including Initiating plan of care, reviewing data, reviewing radiology, discussing with multidisciplinary team, non inclusive of procedures, discussing goals of care with patient/family

## 2024-12-11 NOTE — CHART NOTE - NSCHARTNOTEFT_GEN_A_CORE
PROCEDURE NOTE:  Central Line removal  Site: Left IJ Triple Lumen Catheter placement    Requested by PMD to remove the Central line. Explained the procedure. Placed in Trendelenburg. Catheter removed and tip intact. Pressure applied for greater than 5 mins, no hematoma or bleeding noted. Patient tolerated procedure well. A DSD applied, instructed to keep patient supine for 20-30 mins. RN aware.

## 2024-12-12 LAB
ANION GAP SERPL CALC-SCNC: 5 MMOL/L — SIGNIFICANT CHANGE UP (ref 5–17)
BUN SERPL-MCNC: 12 MG/DL — SIGNIFICANT CHANGE UP (ref 7–23)
CALCIUM SERPL-MCNC: 8.6 MG/DL — SIGNIFICANT CHANGE UP (ref 8.5–10.1)
CHLORIDE SERPL-SCNC: 105 MMOL/L — SIGNIFICANT CHANGE UP (ref 96–108)
CO2 SERPL-SCNC: 33 MMOL/L — HIGH (ref 22–31)
CREAT SERPL-MCNC: 0.67 MG/DL — SIGNIFICANT CHANGE UP (ref 0.5–1.3)
EGFR: 112 ML/MIN/1.73M2 — SIGNIFICANT CHANGE UP
GLUCOSE SERPL-MCNC: 84 MG/DL — SIGNIFICANT CHANGE UP (ref 70–99)
HCT VFR BLD CALC: 39.2 % — SIGNIFICANT CHANGE UP (ref 39–50)
HGB BLD-MCNC: 12.2 G/DL — LOW (ref 13–17)
MCHC RBC-ENTMCNC: 28 PG — SIGNIFICANT CHANGE UP (ref 27–34)
MCHC RBC-ENTMCNC: 31.1 G/DL — LOW (ref 32–36)
MCV RBC AUTO: 90.1 FL — SIGNIFICANT CHANGE UP (ref 80–100)
NRBC # BLD: 0 /100 WBCS — SIGNIFICANT CHANGE UP (ref 0–0)
PLATELET # BLD AUTO: 157 K/UL — SIGNIFICANT CHANGE UP (ref 150–400)
POTASSIUM SERPL-MCNC: 3.4 MMOL/L — LOW (ref 3.5–5.3)
POTASSIUM SERPL-SCNC: 3.4 MMOL/L — LOW (ref 3.5–5.3)
RBC # BLD: 4.35 M/UL — SIGNIFICANT CHANGE UP (ref 4.2–5.8)
RBC # FLD: 15.2 % — HIGH (ref 10.3–14.5)
SODIUM SERPL-SCNC: 143 MMOL/L — SIGNIFICANT CHANGE UP (ref 135–145)
WBC # BLD: 9.31 K/UL — SIGNIFICANT CHANGE UP (ref 3.8–10.5)
WBC # FLD AUTO: 9.31 K/UL — SIGNIFICANT CHANGE UP (ref 3.8–10.5)

## 2024-12-12 PROCEDURE — 99232 SBSQ HOSP IP/OBS MODERATE 35: CPT

## 2024-12-12 RX ORDER — CIPROFLOXACIN HYDROCHLORIDE 500 MG/1
500 TABLET, FILM COATED ORAL EVERY 12 HOURS
Refills: 0 | Status: DISCONTINUED | OUTPATIENT
Start: 2024-12-12 | End: 2024-12-13

## 2024-12-12 RX ADMIN — QUETIAPINE FUMARATE 400 MILLIGRAM(S): 100 TABLET, FILM COATED ORAL at 21:37

## 2024-12-12 RX ADMIN — IPRATROPIUM BROMIDE AND ALBUTEROL SULFATE 3 MILLILITER(S): .5; 2.5 SOLUTION RESPIRATORY (INHALATION) at 20:13

## 2024-12-12 RX ADMIN — Medication 8 MILLIGRAM(S): at 17:04

## 2024-12-12 RX ADMIN — GABAPENTIN 300 MILLIGRAM(S): 300 CAPSULE ORAL at 05:42

## 2024-12-12 RX ADMIN — Medication 1 TABLET(S): at 11:23

## 2024-12-12 RX ADMIN — PIPERACILLIN AND TAZOBACTAM 25 GRAM(S): 3; .375 INJECTION, POWDER, LYOPHILIZED, FOR SOLUTION INTRAVENOUS at 05:42

## 2024-12-12 RX ADMIN — CIPROFLOXACIN HYDROCHLORIDE 500 MILLIGRAM(S): 500 TABLET, FILM COATED ORAL at 17:04

## 2024-12-12 RX ADMIN — QUETIAPINE FUMARATE 100 MILLIGRAM(S): 100 TABLET, FILM COATED ORAL at 10:09

## 2024-12-12 RX ADMIN — GABAPENTIN 300 MILLIGRAM(S): 300 CAPSULE ORAL at 13:14

## 2024-12-12 RX ADMIN — TAMSULOSIN HYDROCHLORIDE 0.4 MILLIGRAM(S): 0.4 CAPSULE ORAL at 21:37

## 2024-12-12 RX ADMIN — Medication 20 MILLIGRAM(S): at 05:43

## 2024-12-12 RX ADMIN — Medication 5 MILLIGRAM(S): at 11:23

## 2024-12-12 RX ADMIN — PANTOPRAZOLE 40 MILLIGRAM(S): 40 TABLET, DELAYED RELEASE ORAL at 05:43

## 2024-12-12 RX ADMIN — Medication 325 MILLIGRAM(S): at 11:23

## 2024-12-12 RX ADMIN — Medication 1 MILLIGRAM(S): at 11:24

## 2024-12-12 RX ADMIN — METHADONE HYDROCHLORIDE 110 MILLIGRAM(S): 10 TABLET ORAL at 11:24

## 2024-12-12 RX ADMIN — QUETIAPINE FUMARATE 100 MILLIGRAM(S): 100 TABLET, FILM COATED ORAL at 17:04

## 2024-12-12 RX ADMIN — PIPERACILLIN AND TAZOBACTAM 25 GRAM(S): 3; .375 INJECTION, POWDER, LYOPHILIZED, FOR SOLUTION INTRAVENOUS at 13:14

## 2024-12-12 RX ADMIN — Medication 8 MILLIGRAM(S): at 05:43

## 2024-12-12 RX ADMIN — GABAPENTIN 300 MILLIGRAM(S): 300 CAPSULE ORAL at 21:37

## 2024-12-12 RX ADMIN — ENOXAPARIN SODIUM 40 MILLIGRAM(S): 60 INJECTION INTRAVENOUS; SUBCUTANEOUS at 11:23

## 2024-12-12 NOTE — PROGRESS NOTE ADULT - SUBJECTIVE AND OBJECTIVE BOX
Patient is a 54y old  Male who presents with a chief complaint of UTI, hematuria, lower abdominal pain (11 Dec 2024 15:38)      INTERVAL HPI/OVERNIGHT EVENTS: Overnight no acute events.     MEDICATIONS  (STANDING):  ciprofloxacin     Tablet 500 milliGRAM(s) Oral every 12 hours  DULoxetine 30 milliGRAM(s) Oral daily  enoxaparin Injectable 40 milliGRAM(s) SubCutaneous every 24 hours  ferrous    sulfate 325 milliGRAM(s) Oral daily  finasteride 5 milliGRAM(s) Oral daily  folic acid 1 milliGRAM(s) Oral daily  furosemide    Tablet 20 milliGRAM(s) Oral daily  gabapentin 300 milliGRAM(s) Oral every 8 hours  influenza   Vaccine 0.5 milliLiter(s) IntraMuscular once  methadone    Tablet 110 milliGRAM(s) Oral daily  multivitamin 1 Tablet(s) Oral daily  pantoprazole    Tablet 40 milliGRAM(s) Oral before breakfast  polyethylene glycol 3350 17 Gram(s) Oral daily  QUEtiapine 100 milliGRAM(s) Oral <User Schedule>  QUEtiapine 400 milliGRAM(s) Oral at bedtime  senna 2 Tablet(s) Oral at bedtime  tamsulosin 0.4 milliGRAM(s) Oral at bedtime    MEDICATIONS  (PRN):  acetaminophen     Tablet .. 650 milliGRAM(s) Oral every 6 hours PRN Mild Pain (1 - 3)  albuterol/ipratropium for Nebulization 3 milliLiter(s) Nebulizer every 6 hours PRN Shortness of Breath and/or Wheezing  HYDROmorphone   Tablet 8 milliGRAM(s) Oral every 6 hours PRN Severe Pain (7 - 10)  HYDROmorphone   Tablet 4 milliGRAM(s) Oral every 6 hours PRN Moderate Pain (4 - 6)      Allergies    NSAIDs (Flushing; Other (Moderate))  Aleve (Unknown)  Risperdal (Short breath; Rash; Hives)  Stelazine (Unknown)  Haldol (Anaphylaxis)  Motrin (Anaphylaxis)  Thorazine (Other (Moderate))  Zyprexa (Rash; Dystonic RXN; Hives)    Intolerances        REVIEW OF SYSTEMS:  ROS negative unless stated otherwise.    Vital Signs Last 24 Hrs  T(C): 37 (12 Dec 2024 10:55), Max: 37.6 (11 Dec 2024 23:46)  T(F): 98.6 (12 Dec 2024 10:55), Max: 99.6 (11 Dec 2024 23:46)  HR: 85 (12 Dec 2024 12:59) (73 - 85)  BP: 102/65 (12 Dec 2024 10:55) (102/65 - 127/75)  BP(mean): --  RR: 17 (12 Dec 2024 10:55) (12 - 18)  SpO2: 95% (12 Dec 2024 12:59) (91% - 99%)    Parameters below as of 12 Dec 2024 08:09  Patient On (Oxygen Delivery Method): BiPAP/CPAP        PHYSICAL EXAM:  GENERAL: NAD, ill appearing  HEAD:  Atraumatic   EYES: EOMI   ENMT: Moist mucous membranes  NECK: Supple   NERVOUS SYSTEM:  Awake  CHEST/LUNG: CTAB   HEART: RRR   ABDOMEN: SPC in place  EXTREMITIES: contracted B/L LE with edema     LABS:                        12.2   9.31  )-----------( 157      ( 12 Dec 2024 06:07 )             39.2     12-12    143  |  105  |  12  ----------------------------<  84  3.4[L]   |  33[H]  |  0.67    Ca    8.6      12 Dec 2024 06:07  Phos  3.6     12-11  Mg     2.1     12-11    TPro  8.0  /  Alb  2.6[L]  /  TBili  0.6  /  DBili  x   /  AST  27  /  ALT  25  /  AlkPhos  124[H]  12-11      Urinalysis Basic - ( 12 Dec 2024 06:07 )    Color: x / Appearance: x / SG: x / pH: x  Gluc: 84 mg/dL / Ketone: x  / Bili: x / Urobili: x   Blood: x / Protein: x / Nitrite: x   Leuk Esterase: x / RBC: x / WBC x   Sq Epi: x / Non Sq Epi: x / Bacteria: x      CAPILLARY BLOOD GLUCOSE          RADIOLOGY & ADDITIONAL TESTS:    Imaging Personally Reviewed:  [ X] YES  [ ] NO    Consultant(s) Notes Reviewed:  [ X] YES  [ ] NO    Care Discussed with Consultants/Other Providers [X ] YES  [ ] NO

## 2024-12-13 PROCEDURE — 99232 SBSQ HOSP IP/OBS MODERATE 35: CPT

## 2024-12-13 PROCEDURE — 99222 1ST HOSP IP/OBS MODERATE 55: CPT

## 2024-12-13 RX ORDER — ONDANSETRON 4 MG/1
4 TABLET ORAL EVERY 6 HOURS
Refills: 0 | Status: DISCONTINUED | OUTPATIENT
Start: 2024-12-13 | End: 2024-12-18

## 2024-12-13 RX ORDER — CEFTOLOZANE AND TAZOBACTAM 1; .5 G/10ML; G/10ML
1500 INJECTION, POWDER, LYOPHILIZED, FOR SOLUTION INTRAVENOUS EVERY 8 HOURS
Refills: 0 | Status: COMPLETED | OUTPATIENT
Start: 2024-12-13 | End: 2024-12-20

## 2024-12-13 RX ADMIN — Medication 8 MILLIGRAM(S): at 12:27

## 2024-12-13 RX ADMIN — Medication 8 MILLIGRAM(S): at 21:00

## 2024-12-13 RX ADMIN — GABAPENTIN 300 MILLIGRAM(S): 300 CAPSULE ORAL at 21:27

## 2024-12-13 RX ADMIN — IPRATROPIUM BROMIDE AND ALBUTEROL SULFATE 3 MILLILITER(S): .5; 2.5 SOLUTION RESPIRATORY (INHALATION) at 16:22

## 2024-12-13 RX ADMIN — ONDANSETRON 4 MILLIGRAM(S): 4 TABLET ORAL at 10:51

## 2024-12-13 RX ADMIN — CIPROFLOXACIN HYDROCHLORIDE 500 MILLIGRAM(S): 500 TABLET, FILM COATED ORAL at 06:08

## 2024-12-13 RX ADMIN — QUETIAPINE FUMARATE 400 MILLIGRAM(S): 100 TABLET, FILM COATED ORAL at 21:26

## 2024-12-13 RX ADMIN — GABAPENTIN 300 MILLIGRAM(S): 300 CAPSULE ORAL at 13:29

## 2024-12-13 RX ADMIN — SENNOSIDES 2 TABLET(S): 8.6 TABLET, FILM COATED ORAL at 21:26

## 2024-12-13 RX ADMIN — CEFTOLOZANE AND TAZOBACTAM 100 MILLIGRAM(S): 1; .5 INJECTION, POWDER, LYOPHILIZED, FOR SOLUTION INTRAVENOUS at 17:01

## 2024-12-13 RX ADMIN — Medication 325 MILLIGRAM(S): at 11:33

## 2024-12-13 RX ADMIN — Medication 20 MILLIGRAM(S): at 06:08

## 2024-12-13 RX ADMIN — IPRATROPIUM BROMIDE AND ALBUTEROL SULFATE 3 MILLILITER(S): .5; 2.5 SOLUTION RESPIRATORY (INHALATION) at 05:30

## 2024-12-13 RX ADMIN — QUETIAPINE FUMARATE 100 MILLIGRAM(S): 100 TABLET, FILM COATED ORAL at 17:01

## 2024-12-13 RX ADMIN — Medication 5 MILLIGRAM(S): at 11:33

## 2024-12-13 RX ADMIN — ENOXAPARIN SODIUM 40 MILLIGRAM(S): 60 INJECTION INTRAVENOUS; SUBCUTANEOUS at 11:34

## 2024-12-13 RX ADMIN — Medication 1 MILLIGRAM(S): at 11:33

## 2024-12-13 RX ADMIN — TAMSULOSIN HYDROCHLORIDE 0.4 MILLIGRAM(S): 0.4 CAPSULE ORAL at 21:26

## 2024-12-13 RX ADMIN — Medication 8 MILLIGRAM(S): at 20:20

## 2024-12-13 RX ADMIN — Medication 8 MILLIGRAM(S): at 11:39

## 2024-12-13 RX ADMIN — GABAPENTIN 300 MILLIGRAM(S): 300 CAPSULE ORAL at 06:07

## 2024-12-13 RX ADMIN — METHADONE HYDROCHLORIDE 110 MILLIGRAM(S): 10 TABLET ORAL at 11:34

## 2024-12-13 RX ADMIN — QUETIAPINE FUMARATE 100 MILLIGRAM(S): 100 TABLET, FILM COATED ORAL at 11:33

## 2024-12-13 RX ADMIN — PANTOPRAZOLE 40 MILLIGRAM(S): 40 TABLET, DELAYED RELEASE ORAL at 06:07

## 2024-12-13 RX ADMIN — Medication 1 TABLET(S): at 11:33

## 2024-12-13 NOTE — CONSULT NOTE ADULT - SUBJECTIVE AND OBJECTIVE BOX
HPI:  54 years old male with h/o cervical epidural abscess, b/l LE paralysis, pneumoperitoneum s/p ex-lap w/ ileostomy complicated by MRSA bacteremia and evisceration, Hep C, emphysema on chronic O2, L foot osteomyelitis, bipolar, opioid dependence, HTN, neurogenic bladder s/p IR SPC placement on 10/23/24 present to ED with complain of worsening lower abdominal pain for 5 days and hematuria. SPC was placed 10/23/24 by IR, reportedly was pulled slightly since 10/24. Patient reported pain since then but worsened over last 5 days associated with nausea. Denied any fever or chills.   Hemodynamically stable, afebrile, sat well at RA. No leukocytosis, plt 181, Cr 0.69. UA dirty. CXR with no focal consolidation (10 Dec 2024 11:42)      PAST MEDICAL & SURGICAL HISTORY:  CAD (coronary artery disease)      HTN (hypertension)      HLD (hyperlipidemia)      Neurogenic bladder      Bipolar disorder      S/P ileostomy      Chronic hepatitis C virus infection      S/P laminectomy      S/P ileostomy          Allergies    NSAIDs (Flushing; Other (Moderate))  Aleve (Unknown)  Risperdal (Short breath; Rash; Hives)  Stelazine (Unknown)  Haldol (Anaphylaxis)  Motrin (Anaphylaxis)  Thorazine (Other (Moderate))  Zyprexa (Rash; Dystonic RXN; Hives)    Intolerances        ANTIMICROBIALS:  ciprofloxacin     Tablet 500 every 12 hours      OTHER MEDS:  acetaminophen     Tablet .. 650 milliGRAM(s) Oral every 6 hours PRN  albuterol/ipratropium for Nebulization 3 milliLiter(s) Nebulizer every 6 hours PRN  DULoxetine 30 milliGRAM(s) Oral daily  enoxaparin Injectable 40 milliGRAM(s) SubCutaneous every 24 hours  ferrous    sulfate 325 milliGRAM(s) Oral daily  finasteride 5 milliGRAM(s) Oral daily  folic acid 1 milliGRAM(s) Oral daily  furosemide    Tablet 20 milliGRAM(s) Oral daily  gabapentin 300 milliGRAM(s) Oral every 8 hours  HYDROmorphone   Tablet 8 milliGRAM(s) Oral every 6 hours PRN  HYDROmorphone   Tablet 4 milliGRAM(s) Oral every 6 hours PRN  influenza   Vaccine 0.5 milliLiter(s) IntraMuscular once  methadone    Tablet 110 milliGRAM(s) Oral daily  multivitamin 1 Tablet(s) Oral daily  ondansetron Injectable 4 milliGRAM(s) IV Push every 6 hours PRN  pantoprazole    Tablet 40 milliGRAM(s) Oral before breakfast  polyethylene glycol 3350 17 Gram(s) Oral daily  QUEtiapine 100 milliGRAM(s) Oral <User Schedule>  QUEtiapine 400 milliGRAM(s) Oral at bedtime  senna 2 Tablet(s) Oral at bedtime  tamsulosin 0.4 milliGRAM(s) Oral at bedtime      SOCIAL HISTORY:    Marital Status:    Occupation:   Lives with:     Substance Use (street drugs):   Tobacco Usage:    Alcohol Usage: Social EtOH    FAMILY HISTORY:      ROS:  Unobtainable because:   All other systems negative     Constitutional: no fever, no chills, no weight loss, no night sweats  Eye: no eye pain, no redness, no vision changes  ENT:  no sore throat, no rhinorrhea  Cardiovascular:  no chest pain, no palpitation  Respiratory:  no SOB, no cough  GI:  no abd pain, no vomiting, no diarrhea  urinary: no dysuria, no hematuria, no flank pain  : no  discharge or bleeding  musculoskeletal:  no joint pain, no joint swelling  skin:  no rash  neurology:  no headache, no seizure, no change in mental status  psych: no anxiety, no depression     Physical Exam:    General:    NAD, non toxic  Head: atraumatic, normocephalic  Eyes: normal sclera and conjunctiva  ENT:   no oropharyngeal lesions, no LAD, neck supple  Cardio:    regular S1,S2, no murmur  Respiratory:   clear to auscultation b/l, no wheezing  abd:   soft, BS +, not tender, no hepatosplenomegaly  :     no CVAT, no suprapubic tenderness, no west  Musculoskeletal : no joint swelling, no edema  Skin:    no rash  vascular:  normal pulses  Neurologic:     no focal deficits  psych: normal affect, no suicidal ideation      Drug Dosing Weight  Height (cm): 188 (09 Dec 2024 15:44)  Weight (kg): 116.6 (10 Dec 2024 15:30)  BMI (kg/m2): 33 (10 Dec 2024 15:30)  BSA (m2): 2.42 (10 Dec 2024 15:30)    Vital Signs Last 24 Hrs  T(F): 98.8 (12-13-24 @ 10:49), Max: 99.7 (12-12-24 @ 23:50)    Vital Signs Last 24 Hrs  HR: 72 (12-13-24 @ 10:49) (72 - 94)  BP: 129/81 (12-13-24 @ 10:49) (110/65 - 129/81)  RR: 18 (12-13-24 @ 10:49)  SpO2: 91% (12-13-24 @ 10:49) (91% - 97%)  Wt(kg): --                          12.2   9.31  )-----------( 157      ( 12 Dec 2024 06:07 )             39.2       12-12    143  |  105  |  12  ----------------------------<  84  3.4[L]   |  33[H]  |  0.67    Ca    8.6      12 Dec 2024 06:07        Urinalysis Basic - ( 12 Dec 2024 06:07 )    Color: x / Appearance: x / SG: x / pH: x  Gluc: 84 mg/dL / Ketone: x  / Bili: x / Urobili: x   Blood: x / Protein: x / Nitrite: x   Leuk Esterase: x / RBC: x / WBC x   Sq Epi: x / Non Sq Epi: x / Bacteria: x        MICROBIOLOGY:  v  Clean Catch  12-09-24   >100,000 CFU/ml Pseudomonas aeruginosa (Carbapenem Resistant)  --  Pseudomonas aeruginosa (Carbapenem Resistant)                  RADIOLOGY:       HPI:  54 years old male with h/o cervical epidural abscess, b/l LE paralysis, pneumoperitoneum s/p ex-lap w/ ileostomy complicated by MRSA bacteremia and evisceration, Hep C, emphysema on chronic O2, L foot osteomyelitis, bipolar, opioid dependence, HTN, neurogenic bladder s/p IR SPC placement on 10/23/24 present to ED with complain of worsening lower abdominal pain for 5 days and hematuria. SPC was placed 10/23/24 by IR, reportedly was pulled slightly since 10/24. Patient reported pain since then but worsened over last 5 days associated with nausea. Denied any fever or chills.   Hemodynamically stable, afebrile, sat well at RA. No leukocytosis, plt 181, Cr 0.69. UA dirty. 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    Patient seen and examined.  he denies any fever or chills but states had some suprapubic abd pian on admission but not now, denies any sob now, denies any chest pain, denies any nausea    patient  is s/p SPC exchange by IR on 12/11/2024        PAST MEDICAL & SURGICAL HISTORY:  CAD (coronary artery disease)      HTN (hypertension)      HLD (hyperlipidemia)      Neurogenic bladder      Bipolar disorder      S/P ileostomy      Chronic hepatitis C virus infection      S/P laminectomy      S/P ileostomy          Allergies    NSAIDs (Flushing; Other (Moderate))  Aleve (Unknown)  Risperdal (Short breath; Rash; Hives)  Stelazine (Unknown)  Haldol (Anaphylaxis)  Motrin (Anaphylaxis)  Thorazine (Other (Moderate))  Zyprexa (Rash; Dystonic RXN; Hives)    Intolerances        ANTIMICROBIALS:  ciprofloxacin     Tablet 500 every 12 hours      OTHER MEDS:  acetaminophen     Tablet .. 650 milliGRAM(s) Oral every 6 hours PRN  albuterol/ipratropium for Nebulization 3 milliLiter(s) Nebulizer every 6 hours PRN  DULoxetine 30 milliGRAM(s) Oral daily  enoxaparin Injectable 40 milliGRAM(s) SubCutaneous every 24 hours  ferrous    sulfate 325 milliGRAM(s) Oral daily  finasteride 5 milliGRAM(s) Oral daily  folic acid 1 milliGRAM(s) Oral daily  furosemide    Tablet 20 milliGRAM(s) Oral daily  gabapentin 300 milliGRAM(s) Oral every 8 hours  HYDROmorphone   Tablet 8 milliGRAM(s) Oral every 6 hours PRN  HYDROmorphone   Tablet 4 milliGRAM(s) Oral every 6 hours PRN  influenza   Vaccine 0.5 milliLiter(s) IntraMuscular once  methadone    Tablet 110 milliGRAM(s) Oral daily  multivitamin 1 Tablet(s) Oral daily  ondansetron Injectable 4 milliGRAM(s) IV Push every 6 hours PRN  pantoprazole    Tablet 40 milliGRAM(s) Oral before breakfast  polyethylene glycol 3350 17 Gram(s) Oral daily  QUEtiapine 100 milliGRAM(s) Oral <User Schedule>  QUEtiapine 400 milliGRAM(s) Oral at bedtime  senna 2 Tablet(s) Oral at bedtime  tamsulosin 0.4 milliGRAM(s) Oral at bedtime      SOCIAL HISTORY:    Lives with: at nursing home        ROS:  Unobtainable because:   All other systems negative     Constitutional: no fever, no chills, no weight loss, no night sweats  Eye: no eye pain, no redness, no vision changes  ENT:  no sore throat, no rhinorrhea  Cardiovascular:  no chest pain, no palpitation  Respiratory:  no SOB, no cough  GI:  no abd pain, no vomiting, no diarrhea  urinary: no dysuria, no hematuria, no flank pain  has SPC   : no  discharge or bleeding  musculoskeletal:  chronically contracted LE  skin:  no rash  neurology:  no headache, no seizure, no change in mental status    Physical Exam:    General:    NAD, non toxic  Head: atraumatic, normocephalic  Eyes: normal sclera and conjunctiva  ENT:   no oropharyngeal lesions, no LAD, neck supple  Cardio:    regular S1,S2  Respiratory:   no wheezing, no rales  abd:   soft, BS +, not tender, no distention  colostomy bag present half full with brown stool  :     no CVAT, no suprapubic tenderness, SPC present,   no erythema around spc , yellow urine in bag  Musculoskeletal : contracted LE, can't move straighten the LE and is in flexed position  Skin:  does have some fungal rash on the fold of lower abd  and thigh region ( as per patient he always gets them due to his contracted state)  Neurologic:     AAO x 3  psych: calm      Drug Dosing Weight  Height (cm): 188 (09 Dec 2024 15:44)  Weight (kg): 116.6 (10 Dec 2024 15:30)  BMI (kg/m2): 33 (10 Dec 2024 15:30)  BSA (m2): 2.42 (10 Dec 2024 15:30)    Vital Signs Last 24 Hrs  T(F): 98.8 (12-13-24 @ 10:49), Max: 99.7 (12-12-24 @ 23:50)    Vital Signs Last 24 Hrs  HR: 72 (12-13-24 @ 10:49) (72 - 94)  BP: 129/81 (12-13-24 @ 10:49) (110/65 - 129/81)  RR: 18 (12-13-24 @ 10:49)  SpO2: 91% (12-13-24 @ 10:49) (91% - 97%)  Wt(kg): --                          12.2   9.31  )-----------( 157      ( 12 Dec 2024 06:07 )             39.2       12-12    143  |  105  |  12  ----------------------------<  84  3.4[L]   |  33[H]  |  0.67    Ca    8.6      12 Dec 2024 06:07        Urinalysis Basic - ( 12 Dec 2024 06:07 )    Color: x / Appearance: x / SG: x / pH: x  Gluc: 84 mg/dL / Ketone: x  / Bili: x / Urobili: x   Blood: x / Protein: x / Nitrite: x   Leuk Esterase: x / RBC: x / WBC x   Sq Epi: x / Non Sq Epi: x / Bacteria: x        MICROBIOLOGY:    Clean Catch  12-09-24   >100,000 CFU/ml Pseudomonas aeruginosa (Carbapenem Resistant)  --  Pseudomonas aeruginosa (Carbapenem Resistant)      RADIOLOGY:    < from: CT Abdomen and Pelvis w/ IV Cont (12.11.24 @ 13:08) >    ACC: [id number] EXAM:  CT ABDOMEN AND PELVIS IC   ORDERED BY: JEIMY ESTRELLA     PROCEDURE DATE:  12/11/2024          INTERPRETATION:  CLINICAL INFORMATION: bilateral lower abd pain and ttp,   s/p suprapubic cath placement which is partially out    COMPARISON: 6/25/2024.    CONTRAST/COMPLICATIONS:  IV Contrast: Omnipaque 350  99 cc administered   1 cc discarded  Oral Contrast: NONE  .    PROCEDURE:  CT of the Abdomen and Pelvis was performed.  Sagittal and coronal reformats were performed.    FINDINGS:  LOWER CHEST: Small right base atelectasis and effusion, left base small   effusion and pleural plaque.    LIVER: Within normal limits.  BILE DUCTS: Normal caliber.  GALLBLADDER: Within normal limits.  SPLEEN: Within normal limits.  PANCREAS: Within normal limits.  ADRENALS: Within normal limits.  KIDNEYS/URETERS: Within normal limits.    BLADDER: Decompressed with suprapubic catheter  REPRODUCTIVE ORGANS: Prostate within normal limits.    BOWEL: No bowel obstruction. Colectomy with right lower quadrant ileostomy  PERITONEUM/RETROPERITONEUM: Within normal limits.  VESSELS: Within normal limits.  LYMPH NODES: No lymphadenopathy.  ABDOMINAL WALL: Right lower quadrant ileostomy, fluid adjacent to sacrum   posteriorly which may be related to ulcers  BONES: Left hip likely chronic dislocation and septic arthritis    IMPRESSION:  No significant change from prior study.  Postoperative changes and findings which may be related to decubitus   ulcers and chronic dislocation and septic arthritis of the left hip.        --- End of Report --      SHARLA KELLY MD; Attending Radiologist  This document has been electronically signed. Dec 11 2024  8:58PM    < end of copied text >      < from: Xray Chest 1 View- PORTABLE-Urgent (Xray Chest 1 View- PORTABLE-Urgent .) (12.10.24 @ 02:10) >    ACC: [id number] EXAM:  XR CHEST PORTABLE URGENT 1V   ORDERED BY: LIGIA HOFF     PROCEDURE DATE:  12/10/2024          INTERPRETATION:  An AP chest radiograph was performed for left central   line placement.    Comparison is made to 10/27/2024.    Aleft IJ central line terminates at the junction of the innominate vein   and superior vena cava. Chronic interstitial opacities are again seen   predominantly on the left side and greatest in the left upper lobe,   unchanged. Linear scarring versus subsegmental atelectasis near the   cardiophrenic angle is present, better appreciated on the current exam in   light of the shallow inspiration and low lung volumes previously. No   focal lobar consolidations are present. There are no alveolar   infiltrates. There is no pneumothorax. There is no pleural fluid.   Bilateral apical pleural thickening is unchanged. The hilar, mediastinal   structures and cardiac silhouette remains stable with no CHF. The bony   thorax is grossly unchanged.    IMPRESSION:  1. Left-sided central line terminates at the junction of the innominate   vein and superior vena cava without pneumothorax.  2. Chronic interstitial changes on the left side, greatest in the left   upper lobe are unchanged.  3. Linear atelectasisversus scarring in the cardiophrenic angle on the   right, better visualized on the current exam however unchanged in   retrospect.  4. Chronic bilateral apical pleural thickening is unchanged.    --- End of Report ---            HAWA CORBIN MD  This document has been electronically signed. Dec 10 2024  1:42PM    < end of copied text >

## 2024-12-13 NOTE — CONSULT NOTE ADULT - ASSESSMENT
54 years old male with h/o cervical epidural abscess, b/l LE paralysis, pneumoperitoneum s/p ex-lap w/ ileostomy complicated by MRSA bacteremia and evisceration, Hep C ( VL UD based on labs from 1/2024), emphysema on chronic O2, L foot osteomyelitis,-treated    bipolar, opioid dependence, HTN, neurogenic bladder s/p IR SPC placement on 10/23/24 present to ED with complain of worsening lower abdominal pain for 5 days and hematuria. SPC was placed 10/23/24 by IR, reportedly was pulled slightly since 10/24. Patient reported pain since then but worsened over last 5 days associated with nausea. Denied any fever or chills.   Hemodynamically stable, afebrile, sat well at RA. No leukocytosis, plt 181, Cr 0.69. UA dirty. 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    patient  is s/p SPC exchange by IR on 12/11/2024    Afebrile  no leukocytosis  UA- turbid and pos LE, neg -nitrates  no blood cx.  previous urine cx from 10/2024-[facility] ( was treated by my colleague  with IV abx and d/c on po cefpodoxime )    patient is prone to MDR  organisms  in the urine in light of chronic west and chronic SPC secondary to neurogenic bladder.  he has had extensive h/o infections in heels and foot as well ( all treated in past)    in order to help mitigate these UTIs  would advise to have SPC exchange every 3-4 weeks       Impression-  MDR UTI in patient with chronic SPC   neuregenic bladder  bed-bound  colostomy present      plan-  advise to start zerbaxa to treat the carbapenem resistant pseudomonas  would advise short course of 5-7 days  advise frequent off-loading  advise to monitor for aspiration precautions   will need every 3-4 weeks SPC exchange   appreciate urology eval.  advise to apply nystatin ointment to skin folds with the fungal rash BID for 5 days.      All labs and imaging and chart notes reviewed.     All above discussed with patient and patient verbalizes full understanding of all above and agrees with above plan of care.    Thank you for this consultation.    d/w Hospitalist /Drew.    Kush King MD  Infectious Disease Attending    for any questions please do not hesitate to contact me either via teams or by calling [phone]

## 2024-12-13 NOTE — PROGRESS NOTE ADULT - SUBJECTIVE AND OBJECTIVE BOX
Patient is a 54y old  Male who presents with a chief complaint of UTI, hematuria, lower abdominal pain (13 Dec 2024 14:56)      INTERVAL HPI/OVERNIGHT EVENTS: No events overnight. Feeling nauseous today likely from PO abx.     MEDICATIONS  (STANDING):  ceftolozane/tazobactam IVPB 1500 milliGRAM(s) IV Intermittent every 8 hours  DULoxetine 30 milliGRAM(s) Oral daily  enoxaparin Injectable 40 milliGRAM(s) SubCutaneous every 24 hours  ferrous    sulfate 325 milliGRAM(s) Oral daily  finasteride 5 milliGRAM(s) Oral daily  folic acid 1 milliGRAM(s) Oral daily  furosemide    Tablet 20 milliGRAM(s) Oral daily  gabapentin 300 milliGRAM(s) Oral every 8 hours  influenza   Vaccine 0.5 milliLiter(s) IntraMuscular once  methadone    Tablet 110 milliGRAM(s) Oral daily  multivitamin 1 Tablet(s) Oral daily  pantoprazole    Tablet 40 milliGRAM(s) Oral before breakfast  polyethylene glycol 3350 17 Gram(s) Oral daily  QUEtiapine 100 milliGRAM(s) Oral <User Schedule>  QUEtiapine 400 milliGRAM(s) Oral at bedtime  senna 2 Tablet(s) Oral at bedtime  tamsulosin 0.4 milliGRAM(s) Oral at bedtime    MEDICATIONS  (PRN):  acetaminophen     Tablet .. 650 milliGRAM(s) Oral every 6 hours PRN Mild Pain (1 - 3)  albuterol/ipratropium for Nebulization 3 milliLiter(s) Nebulizer every 6 hours PRN Shortness of Breath and/or Wheezing  HYDROmorphone   Tablet 8 milliGRAM(s) Oral every 6 hours PRN Severe Pain (7 - 10)  HYDROmorphone   Tablet 4 milliGRAM(s) Oral every 6 hours PRN Moderate Pain (4 - 6)  ondansetron Injectable 4 milliGRAM(s) IV Push every 6 hours PRN Nausea and/or Vomiting      Allergies    NSAIDs (Flushing; Other (Moderate))  Aleve (Unknown)  Risperdal (Short breath; Rash; Hives)  Stelazine (Unknown)  Haldol (Anaphylaxis)  Motrin (Anaphylaxis)  Thorazine (Other (Moderate))  Zyprexa (Rash; Dystonic RXN; Hives)    Intolerances        REVIEW OF SYSTEMS:  ROS negative unless stated otherwise.    Vital Signs Last 24 Hrs  T(C): 37.1 (13 Dec 2024 10:49), Max: 37.6 (12 Dec 2024 23:50)  T(F): 98.8 (13 Dec 2024 10:49), Max: 99.7 (12 Dec 2024 23:50)  HR: 72 (13 Dec 2024 10:49) (72 - 94)  BP: 129/81 (13 Dec 2024 10:49) (110/65 - 129/81)  BP(mean): --  RR: 18 (13 Dec 2024 10:49) (18 - 18)  SpO2: 91% (13 Dec 2024 10:49) (91% - 97%)    Parameters below as of 13 Dec 2024 05:12  Patient On (Oxygen Delivery Method): BiPAP/CPAP        PHYSICAL EXAM:  GENERAL: NAD, ill appearing  HEAD:  Atraumatic   EYES: EOMI   ENMT: Moist mucous membranes  NECK: Supple   NERVOUS SYSTEM:  Awake  CHEST/LUNG: CTAB   HEART: RRR   ABDOMEN: SPC in place  EXTREMITIES: contracted B/L LE with edema     LABS:                        12.2   9.31  )-----------( 157      ( 12 Dec 2024 06:07 )             39.2     12-12    143  |  105  |  12  ----------------------------<  84  3.4[L]   |  33[H]  |  0.67    Ca    8.6      12 Dec 2024 06:07        Urinalysis Basic - ( 12 Dec 2024 06:07 )    Color: x / Appearance: x / SG: x / pH: x  Gluc: 84 mg/dL / Ketone: x  / Bili: x / Urobili: x   Blood: x / Protein: x / Nitrite: x   Leuk Esterase: x / RBC: x / WBC x   Sq Epi: x / Non Sq Epi: x / Bacteria: x      CAPILLARY BLOOD GLUCOSE          RADIOLOGY & ADDITIONAL TESTS:    Imaging Personally Reviewed:  [ X] YES  [ ] NO    Consultant(s) Notes Reviewed:  [ X] YES  [ ] NO    Care Discussed with Consultants/Other Providers [X ] YES  [ ] NO

## 2024-12-14 LAB
ANION GAP SERPL CALC-SCNC: 4 MMOL/L — LOW (ref 5–17)
BUN SERPL-MCNC: 7 MG/DL — SIGNIFICANT CHANGE UP (ref 7–23)
CALCIUM SERPL-MCNC: 8.8 MG/DL — SIGNIFICANT CHANGE UP (ref 8.5–10.1)
CHLORIDE SERPL-SCNC: 106 MMOL/L — SIGNIFICANT CHANGE UP (ref 96–108)
CO2 SERPL-SCNC: 30 MMOL/L — SIGNIFICANT CHANGE UP (ref 22–31)
CREAT SERPL-MCNC: 0.6 MG/DL — SIGNIFICANT CHANGE UP (ref 0.5–1.3)
EGFR: 115 ML/MIN/1.73M2 — SIGNIFICANT CHANGE UP
GLUCOSE SERPL-MCNC: 118 MG/DL — HIGH (ref 70–99)
HCT VFR BLD CALC: 38.5 % — LOW (ref 39–50)
HGB BLD-MCNC: 11.8 G/DL — LOW (ref 13–17)
MCHC RBC-ENTMCNC: 27.9 PG — SIGNIFICANT CHANGE UP (ref 27–34)
MCHC RBC-ENTMCNC: 30.6 G/DL — LOW (ref 32–36)
MCV RBC AUTO: 91 FL — SIGNIFICANT CHANGE UP (ref 80–100)
NRBC # BLD: 0 /100 WBCS — SIGNIFICANT CHANGE UP (ref 0–0)
PLATELET # BLD AUTO: 129 K/UL — LOW (ref 150–400)
POTASSIUM SERPL-MCNC: 3.1 MMOL/L — LOW (ref 3.5–5.3)
POTASSIUM SERPL-SCNC: 3.1 MMOL/L — LOW (ref 3.5–5.3)
RBC # BLD: 4.23 M/UL — SIGNIFICANT CHANGE UP (ref 4.2–5.8)
RBC # FLD: 14.9 % — HIGH (ref 10.3–14.5)
SODIUM SERPL-SCNC: 140 MMOL/L — SIGNIFICANT CHANGE UP (ref 135–145)
WBC # BLD: 9.54 K/UL — SIGNIFICANT CHANGE UP (ref 3.8–10.5)
WBC # FLD AUTO: 9.54 K/UL — SIGNIFICANT CHANGE UP (ref 3.8–10.5)

## 2024-12-14 PROCEDURE — 99232 SBSQ HOSP IP/OBS MODERATE 35: CPT

## 2024-12-14 RX ORDER — POTASSIUM CHLORIDE 600 MG/1
40 TABLET, FILM COATED, EXTENDED RELEASE ORAL EVERY 4 HOURS
Refills: 0 | Status: COMPLETED | OUTPATIENT
Start: 2024-12-14 | End: 2024-12-14

## 2024-12-14 RX ORDER — HYDROMORPHONE HCL 4 MG
1 TABLET ORAL EVERY 8 HOURS
Refills: 0 | Status: DISCONTINUED | OUTPATIENT
Start: 2024-12-14 | End: 2024-12-20

## 2024-12-14 RX ADMIN — IPRATROPIUM BROMIDE AND ALBUTEROL SULFATE 3 MILLILITER(S): .5; 2.5 SOLUTION RESPIRATORY (INHALATION) at 18:33

## 2024-12-14 RX ADMIN — Medication 8 MILLIGRAM(S): at 19:13

## 2024-12-14 RX ADMIN — Medication 8 MILLIGRAM(S): at 05:10

## 2024-12-14 RX ADMIN — CEFTOLOZANE AND TAZOBACTAM 100 MILLIGRAM(S): 1; .5 INJECTION, POWDER, LYOPHILIZED, FOR SOLUTION INTRAVENOUS at 09:42

## 2024-12-14 RX ADMIN — ONDANSETRON 4 MILLIGRAM(S): 4 TABLET ORAL at 11:02

## 2024-12-14 RX ADMIN — Medication 1 TABLET(S): at 11:42

## 2024-12-14 RX ADMIN — POTASSIUM CHLORIDE 40 MILLIEQUIVALENT(S): 600 TABLET, FILM COATED, EXTENDED RELEASE ORAL at 22:15

## 2024-12-14 RX ADMIN — Medication 8 MILLIGRAM(S): at 11:45

## 2024-12-14 RX ADMIN — Medication 1 MILLIGRAM(S): at 11:42

## 2024-12-14 RX ADMIN — QUETIAPINE FUMARATE 100 MILLIGRAM(S): 100 TABLET, FILM COATED ORAL at 17:32

## 2024-12-14 RX ADMIN — CEFTOLOZANE AND TAZOBACTAM 100 MILLIGRAM(S): 1; .5 INJECTION, POWDER, LYOPHILIZED, FOR SOLUTION INTRAVENOUS at 03:43

## 2024-12-14 RX ADMIN — QUETIAPINE FUMARATE 100 MILLIGRAM(S): 100 TABLET, FILM COATED ORAL at 09:43

## 2024-12-14 RX ADMIN — Medication 5 MILLIGRAM(S): at 11:43

## 2024-12-14 RX ADMIN — METHADONE HYDROCHLORIDE 110 MILLIGRAM(S): 10 TABLET ORAL at 11:39

## 2024-12-14 RX ADMIN — Medication 8 MILLIGRAM(S): at 12:45

## 2024-12-14 RX ADMIN — Medication 325 MILLIGRAM(S): at 11:42

## 2024-12-14 RX ADMIN — GABAPENTIN 300 MILLIGRAM(S): 300 CAPSULE ORAL at 05:49

## 2024-12-14 RX ADMIN — IPRATROPIUM BROMIDE AND ALBUTEROL SULFATE 3 MILLILITER(S): .5; 2.5 SOLUTION RESPIRATORY (INHALATION) at 11:26

## 2024-12-14 RX ADMIN — POTASSIUM CHLORIDE 40 MILLIEQUIVALENT(S): 600 TABLET, FILM COATED, EXTENDED RELEASE ORAL at 17:31

## 2024-12-14 RX ADMIN — TAMSULOSIN HYDROCHLORIDE 0.4 MILLIGRAM(S): 0.4 CAPSULE ORAL at 22:15

## 2024-12-14 RX ADMIN — CEFTOLOZANE AND TAZOBACTAM 100 MILLIGRAM(S): 1; .5 INJECTION, POWDER, LYOPHILIZED, FOR SOLUTION INTRAVENOUS at 18:00

## 2024-12-14 RX ADMIN — QUETIAPINE FUMARATE 400 MILLIGRAM(S): 100 TABLET, FILM COATED ORAL at 22:16

## 2024-12-14 RX ADMIN — GABAPENTIN 300 MILLIGRAM(S): 300 CAPSULE ORAL at 22:16

## 2024-12-14 RX ADMIN — GABAPENTIN 300 MILLIGRAM(S): 300 CAPSULE ORAL at 14:16

## 2024-12-14 RX ADMIN — PANTOPRAZOLE 40 MILLIGRAM(S): 40 TABLET, DELAYED RELEASE ORAL at 05:49

## 2024-12-14 RX ADMIN — Medication 8 MILLIGRAM(S): at 19:56

## 2024-12-14 RX ADMIN — Medication 20 MILLIGRAM(S): at 05:49

## 2024-12-14 RX ADMIN — Medication 8 MILLIGRAM(S): at 03:47

## 2024-12-14 NOTE — PROGRESS NOTE ADULT - SUBJECTIVE AND OBJECTIVE BOX
Patient is a 54y old  Male who presents with a chief complaint of UTI, hematuria, lower abdominal pain (13 Dec 2024 15:40)      INTERVAL HPI/OVERNIGHT EVENTS: Overnight no acute events.     MEDICATIONS  (STANDING):  ceftolozane/tazobactam IVPB 1500 milliGRAM(s) IV Intermittent every 8 hours  DULoxetine 30 milliGRAM(s) Oral daily  enoxaparin Injectable 40 milliGRAM(s) SubCutaneous every 24 hours  ferrous    sulfate 325 milliGRAM(s) Oral daily  finasteride 5 milliGRAM(s) Oral daily  folic acid 1 milliGRAM(s) Oral daily  furosemide    Tablet 20 milliGRAM(s) Oral daily  gabapentin 300 milliGRAM(s) Oral every 8 hours  influenza   Vaccine 0.5 milliLiter(s) IntraMuscular once  methadone    Tablet 110 milliGRAM(s) Oral daily  multivitamin 1 Tablet(s) Oral daily  pantoprazole    Tablet 40 milliGRAM(s) Oral before breakfast  polyethylene glycol 3350 17 Gram(s) Oral daily  QUEtiapine 100 milliGRAM(s) Oral <User Schedule>  QUEtiapine 400 milliGRAM(s) Oral at bedtime  senna 2 Tablet(s) Oral at bedtime  tamsulosin 0.4 milliGRAM(s) Oral at bedtime    MEDICATIONS  (PRN):  acetaminophen     Tablet .. 650 milliGRAM(s) Oral every 6 hours PRN Mild Pain (1 - 3)  albuterol/ipratropium for Nebulization 3 milliLiter(s) Nebulizer every 6 hours PRN Shortness of Breath and/or Wheezing  HYDROmorphone   Tablet 8 milliGRAM(s) Oral every 6 hours PRN Severe Pain (7 - 10)  HYDROmorphone   Tablet 4 milliGRAM(s) Oral every 6 hours PRN Moderate Pain (4 - 6)  HYDROmorphone  Injectable 1 milliGRAM(s) IV Push every 8 hours PRN Breakthrough pain  ondansetron Injectable 4 milliGRAM(s) IV Push every 6 hours PRN Nausea and/or Vomiting      Allergies    NSAIDs (Flushing; Other (Moderate))  Aleve (Unknown)  Risperdal (Short breath; Rash; Hives)  Stelazine (Unknown)  Haldol (Anaphylaxis)  Motrin (Anaphylaxis)  Thorazine (Other (Moderate))  Zyprexa (Rash; Dystonic RXN; Hives)    Intolerances        REVIEW OF SYSTEMS:  ROS negative unless stated otherwise.    Vital Signs Last 24 Hrs  T(C): 36.9 (14 Dec 2024 10:30), Max: 37.3 (13 Dec 2024 23:59)  T(F): 98.4 (14 Dec 2024 10:30), Max: 99.2 (13 Dec 2024 23:59)  HR: 80 (14 Dec 2024 11:37) (70 - 85)  BP: 113/74 (14 Dec 2024 10:30) (100/61 - 136/81)  BP(mean): --  RR: 18 (14 Dec 2024 10:30) (18 - 18)  SpO2: 94% (14 Dec 2024 11:37) (92% - 100%)    Parameters below as of 14 Dec 2024 11:37  Patient On (Oxygen Delivery Method): nasal cannula, 3L        PHYSICAL EXAM:  GENERAL: NAD, ill appearing  HEAD:  Atraumatic   EYES: EOMI   ENMT: Moist mucous membranes  NECK: Supple   NERVOUS SYSTEM:  Awake  CHEST/LUNG: CTAB   HEART: RRR   ABDOMEN: SPC in place  EXTREMITIES: contracted B/L LE with edema     LABS:                        11.8   9.54  )-----------( 129      ( 14 Dec 2024 06:40 )             38.5     12-14    140  |  106  |  7   ----------------------------<  118[H]  3.1[L]   |  30  |  0.60    Ca    8.8      14 Dec 2024 06:40        Urinalysis Basic - ( 14 Dec 2024 06:40 )    Color: x / Appearance: x / SG: x / pH: x  Gluc: 118 mg/dL / Ketone: x  / Bili: x / Urobili: x   Blood: x / Protein: x / Nitrite: x   Leuk Esterase: x / RBC: x / WBC x   Sq Epi: x / Non Sq Epi: x / Bacteria: x      CAPILLARY BLOOD GLUCOSE          RADIOLOGY & ADDITIONAL TESTS:    Imaging Personally Reviewed:  [ X] YES  [ ] NO    Consultant(s) Notes Reviewed:  [ X] YES  [ ] NO    Care Discussed with Consultants/Other Providers [X ] YES  [ ] NO

## 2024-12-15 LAB
ANION GAP SERPL CALC-SCNC: 2 MMOL/L — LOW (ref 5–17)
BUN SERPL-MCNC: 7 MG/DL — SIGNIFICANT CHANGE UP (ref 7–23)
CALCIUM SERPL-MCNC: 9.1 MG/DL — SIGNIFICANT CHANGE UP (ref 8.5–10.1)
CHLORIDE SERPL-SCNC: 106 MMOL/L — SIGNIFICANT CHANGE UP (ref 96–108)
CO2 SERPL-SCNC: 32 MMOL/L — HIGH (ref 22–31)
CREAT SERPL-MCNC: 0.6 MG/DL — SIGNIFICANT CHANGE UP (ref 0.5–1.3)
EGFR: 115 ML/MIN/1.73M2 — SIGNIFICANT CHANGE UP
GLUCOSE SERPL-MCNC: 108 MG/DL — HIGH (ref 70–99)
POTASSIUM SERPL-MCNC: 4 MMOL/L — SIGNIFICANT CHANGE UP (ref 3.5–5.3)
POTASSIUM SERPL-SCNC: 4 MMOL/L — SIGNIFICANT CHANGE UP (ref 3.5–5.3)
SODIUM SERPL-SCNC: 140 MMOL/L — SIGNIFICANT CHANGE UP (ref 135–145)

## 2024-12-15 PROCEDURE — 99232 SBSQ HOSP IP/OBS MODERATE 35: CPT

## 2024-12-15 RX ADMIN — QUETIAPINE FUMARATE 400 MILLIGRAM(S): 100 TABLET, FILM COATED ORAL at 22:59

## 2024-12-15 RX ADMIN — Medication 20 MILLIGRAM(S): at 06:11

## 2024-12-15 RX ADMIN — Medication 8 MILLIGRAM(S): at 23:59

## 2024-12-15 RX ADMIN — TAMSULOSIN HYDROCHLORIDE 0.4 MILLIGRAM(S): 0.4 CAPSULE ORAL at 23:00

## 2024-12-15 RX ADMIN — QUETIAPINE FUMARATE 100 MILLIGRAM(S): 100 TABLET, FILM COATED ORAL at 10:21

## 2024-12-15 RX ADMIN — Medication 8 MILLIGRAM(S): at 06:11

## 2024-12-15 RX ADMIN — ENOXAPARIN SODIUM 40 MILLIGRAM(S): 60 INJECTION INTRAVENOUS; SUBCUTANEOUS at 11:51

## 2024-12-15 RX ADMIN — CEFTOLOZANE AND TAZOBACTAM 100 MILLIGRAM(S): 1; .5 INJECTION, POWDER, LYOPHILIZED, FOR SOLUTION INTRAVENOUS at 17:12

## 2024-12-15 RX ADMIN — Medication 1 MILLIGRAM(S): at 11:51

## 2024-12-15 RX ADMIN — GABAPENTIN 300 MILLIGRAM(S): 300 CAPSULE ORAL at 22:59

## 2024-12-15 RX ADMIN — Medication 5 MILLIGRAM(S): at 11:51

## 2024-12-15 RX ADMIN — IPRATROPIUM BROMIDE AND ALBUTEROL SULFATE 3 MILLILITER(S): .5; 2.5 SOLUTION RESPIRATORY (INHALATION) at 14:28

## 2024-12-15 RX ADMIN — QUETIAPINE FUMARATE 100 MILLIGRAM(S): 100 TABLET, FILM COATED ORAL at 17:13

## 2024-12-15 RX ADMIN — Medication 325 MILLIGRAM(S): at 11:50

## 2024-12-15 RX ADMIN — Medication 1 TABLET(S): at 11:50

## 2024-12-15 RX ADMIN — CEFTOLOZANE AND TAZOBACTAM 100 MILLIGRAM(S): 1; .5 INJECTION, POWDER, LYOPHILIZED, FOR SOLUTION INTRAVENOUS at 10:21

## 2024-12-15 RX ADMIN — Medication 8 MILLIGRAM(S): at 07:11

## 2024-12-15 RX ADMIN — Medication 1 MILLIGRAM(S): at 11:46

## 2024-12-15 RX ADMIN — PANTOPRAZOLE 40 MILLIGRAM(S): 40 TABLET, DELAYED RELEASE ORAL at 06:12

## 2024-12-15 RX ADMIN — Medication 1 MILLIGRAM(S): at 12:46

## 2024-12-15 RX ADMIN — Medication 8 MILLIGRAM(S): at 17:59

## 2024-12-15 RX ADMIN — METHADONE HYDROCHLORIDE 110 MILLIGRAM(S): 10 TABLET ORAL at 12:30

## 2024-12-15 RX ADMIN — Medication 8 MILLIGRAM(S): at 16:59

## 2024-12-15 RX ADMIN — GABAPENTIN 300 MILLIGRAM(S): 300 CAPSULE ORAL at 13:54

## 2024-12-15 RX ADMIN — GABAPENTIN 300 MILLIGRAM(S): 300 CAPSULE ORAL at 06:11

## 2024-12-15 RX ADMIN — IPRATROPIUM BROMIDE AND ALBUTEROL SULFATE 3 MILLILITER(S): .5; 2.5 SOLUTION RESPIRATORY (INHALATION) at 08:31

## 2024-12-15 RX ADMIN — CEFTOLOZANE AND TAZOBACTAM 100 MILLIGRAM(S): 1; .5 INJECTION, POWDER, LYOPHILIZED, FOR SOLUTION INTRAVENOUS at 03:11

## 2024-12-15 RX ADMIN — Medication 8 MILLIGRAM(S): at 22:59

## 2024-12-15 NOTE — PROGRESS NOTE ADULT - SUBJECTIVE AND OBJECTIVE BOX
Patient is a 54y old  Male who presents with a chief complaint of UTI, hematuria, lower abdominal pain (14 Dec 2024 14:47)      INTERVAL HPI/OVERNIGHT EVENTS: Overnight no acute events.     MEDICATIONS  (STANDING):  ceftolozane/tazobactam IVPB 1500 milliGRAM(s) IV Intermittent every 8 hours  DULoxetine 30 milliGRAM(s) Oral daily  enoxaparin Injectable 40 milliGRAM(s) SubCutaneous every 24 hours  ferrous    sulfate 325 milliGRAM(s) Oral daily  finasteride 5 milliGRAM(s) Oral daily  folic acid 1 milliGRAM(s) Oral daily  furosemide    Tablet 20 milliGRAM(s) Oral daily  gabapentin 300 milliGRAM(s) Oral every 8 hours  influenza   Vaccine 0.5 milliLiter(s) IntraMuscular once  methadone    Tablet 110 milliGRAM(s) Oral daily  multivitamin 1 Tablet(s) Oral daily  pantoprazole    Tablet 40 milliGRAM(s) Oral before breakfast  polyethylene glycol 3350 17 Gram(s) Oral daily  QUEtiapine 100 milliGRAM(s) Oral <User Schedule>  QUEtiapine 400 milliGRAM(s) Oral at bedtime  senna 2 Tablet(s) Oral at bedtime  tamsulosin 0.4 milliGRAM(s) Oral at bedtime    MEDICATIONS  (PRN):  acetaminophen     Tablet .. 650 milliGRAM(s) Oral every 6 hours PRN Mild Pain (1 - 3)  albuterol/ipratropium for Nebulization 3 milliLiter(s) Nebulizer every 6 hours PRN Shortness of Breath and/or Wheezing  HYDROmorphone   Tablet 8 milliGRAM(s) Oral every 6 hours PRN Severe Pain (7 - 10)  HYDROmorphone   Tablet 4 milliGRAM(s) Oral every 6 hours PRN Moderate Pain (4 - 6)  HYDROmorphone  Injectable 1 milliGRAM(s) IV Push every 8 hours PRN Breakthrough pain  ondansetron Injectable 4 milliGRAM(s) IV Push every 6 hours PRN Nausea and/or Vomiting      Allergies    NSAIDs (Flushing; Other (Moderate))  Aleve (Unknown)  Risperdal (Short breath; Rash; Hives)  Stelazine (Unknown)  Haldol (Anaphylaxis)  Motrin (Anaphylaxis)  Thorazine (Other (Moderate))  Zyprexa (Rash; Dystonic RXN; Hives)    Intolerances        REVIEW OF SYSTEMS:  ROS negative unless stated otherwise.    Vital Signs Last 24 Hrs  T(C): 36.4 (15 Dec 2024 10:27), Max: 37.1 (15 Dec 2024 00:04)  T(F): 97.5 (15 Dec 2024 10:27), Max: 98.8 (15 Dec 2024 00:04)  HR: 85 (15 Dec 2024 10:27) (72 - 85)  BP: 126/77 (15 Dec 2024 10:27) (117/76 - 133/77)  BP(mean): --  RR: 18 (15 Dec 2024 10:27) (18 - 18)  SpO2: 94% (15 Dec 2024 10:27) (94% - 98%)    Parameters below as of 15 Dec 2024 10:27  Patient On (Oxygen Delivery Method): room air        PHYSICAL EXAM:  GENERAL: NAD, ill appearing  HEAD:  Atraumatic   EYES: EOMI   ENMT: Moist mucous membranes  NECK: Supple   NERVOUS SYSTEM:  Awake  CHEST/LUNG: CTAB   HEART: RRR   ABDOMEN: SPC in place  EXTREMITIES: contracted B/L LE with edema     LABS:                        11.8   9.54  )-----------( 129      ( 14 Dec 2024 06:40 )             38.5     12-15    140  |  106  |  7   ----------------------------<  108[H]  4.0   |  32[H]  |  0.60    Ca    9.1      15 Dec 2024 06:12        Urinalysis Basic - ( 15 Dec 2024 06:12 )    Color: x / Appearance: x / SG: x / pH: x  Gluc: 108 mg/dL / Ketone: x  / Bili: x / Urobili: x   Blood: x / Protein: x / Nitrite: x   Leuk Esterase: x / RBC: x / WBC x   Sq Epi: x / Non Sq Epi: x / Bacteria: x      CAPILLARY BLOOD GLUCOSE          RADIOLOGY & ADDITIONAL TESTS:    Imaging Personally Reviewed:  [ X] YES  [ ] NO    Consultant(s) Notes Reviewed:  [ X] YES  [ ] NO    Care Discussed with Consultants/Other Providers [X ] YES  [ ] NO

## 2024-12-16 LAB
ANION GAP SERPL CALC-SCNC: 5 MMOL/L — SIGNIFICANT CHANGE UP (ref 5–17)
BUN SERPL-MCNC: 9 MG/DL — SIGNIFICANT CHANGE UP (ref 7–23)
CALCIUM SERPL-MCNC: 9.3 MG/DL — SIGNIFICANT CHANGE UP (ref 8.5–10.1)
CHLORIDE SERPL-SCNC: 109 MMOL/L — HIGH (ref 96–108)
CO2 SERPL-SCNC: 29 MMOL/L — SIGNIFICANT CHANGE UP (ref 22–31)
CREAT SERPL-MCNC: 0.59 MG/DL — SIGNIFICANT CHANGE UP (ref 0.5–1.3)
EGFR: 116 ML/MIN/1.73M2 — SIGNIFICANT CHANGE UP
GLUCOSE SERPL-MCNC: 104 MG/DL — HIGH (ref 70–99)
HCT VFR BLD CALC: 38.3 % — LOW (ref 39–50)
HGB BLD-MCNC: 11.8 G/DL — LOW (ref 13–17)
MCHC RBC-ENTMCNC: 27.8 PG — SIGNIFICANT CHANGE UP (ref 27–34)
MCHC RBC-ENTMCNC: 30.8 G/DL — LOW (ref 32–36)
MCV RBC AUTO: 90.3 FL — SIGNIFICANT CHANGE UP (ref 80–100)
NRBC # BLD: 0 /100 WBCS — SIGNIFICANT CHANGE UP (ref 0–0)
PLATELET # BLD AUTO: 149 K/UL — LOW (ref 150–400)
POTASSIUM SERPL-MCNC: 3.8 MMOL/L — SIGNIFICANT CHANGE UP (ref 3.5–5.3)
POTASSIUM SERPL-SCNC: 3.8 MMOL/L — SIGNIFICANT CHANGE UP (ref 3.5–5.3)
RBC # BLD: 4.24 M/UL — SIGNIFICANT CHANGE UP (ref 4.2–5.8)
RBC # FLD: 15 % — HIGH (ref 10.3–14.5)
SODIUM SERPL-SCNC: 143 MMOL/L — SIGNIFICANT CHANGE UP (ref 135–145)
WBC # BLD: 7.84 K/UL — SIGNIFICANT CHANGE UP (ref 3.8–10.5)
WBC # FLD AUTO: 7.84 K/UL — SIGNIFICANT CHANGE UP (ref 3.8–10.5)

## 2024-12-16 PROCEDURE — 99232 SBSQ HOSP IP/OBS MODERATE 35: CPT

## 2024-12-16 RX ORDER — GUAIFENESIN/DEXTROMETHORPHAN 200-10MG/5
10 LIQUID (ML) ORAL EVERY 4 HOURS
Refills: 0 | Status: DISCONTINUED | OUTPATIENT
Start: 2024-12-16 | End: 2024-12-22

## 2024-12-16 RX ORDER — BENZOCAINE AND MENTHOL 15; 3.6 MG/1; MG/1
1 LOZENGE ORAL EVERY 4 HOURS
Refills: 0 | Status: DISCONTINUED | OUTPATIENT
Start: 2024-12-16 | End: 2024-12-31

## 2024-12-16 RX ADMIN — IPRATROPIUM BROMIDE AND ALBUTEROL SULFATE 3 MILLILITER(S): .5; 2.5 SOLUTION RESPIRATORY (INHALATION) at 09:26

## 2024-12-16 RX ADMIN — PANTOPRAZOLE 40 MILLIGRAM(S): 40 TABLET, DELAYED RELEASE ORAL at 06:33

## 2024-12-16 RX ADMIN — Medication 4 MILLIGRAM(S): at 12:49

## 2024-12-16 RX ADMIN — Medication 20 MILLIGRAM(S): at 06:33

## 2024-12-16 RX ADMIN — Medication 4 MILLIGRAM(S): at 11:29

## 2024-12-16 RX ADMIN — QUETIAPINE FUMARATE 100 MILLIGRAM(S): 100 TABLET, FILM COATED ORAL at 09:41

## 2024-12-16 RX ADMIN — CEFTOLOZANE AND TAZOBACTAM 100 MILLIGRAM(S): 1; .5 INJECTION, POWDER, LYOPHILIZED, FOR SOLUTION INTRAVENOUS at 17:44

## 2024-12-16 RX ADMIN — SENNOSIDES 2 TABLET(S): 8.6 TABLET, FILM COATED ORAL at 22:15

## 2024-12-16 RX ADMIN — Medication 8 MILLIGRAM(S): at 06:33

## 2024-12-16 RX ADMIN — GABAPENTIN 300 MILLIGRAM(S): 300 CAPSULE ORAL at 06:34

## 2024-12-16 RX ADMIN — GABAPENTIN 300 MILLIGRAM(S): 300 CAPSULE ORAL at 22:20

## 2024-12-16 RX ADMIN — Medication 1 MILLIGRAM(S): at 04:45

## 2024-12-16 RX ADMIN — Medication 1 TABLET(S): at 11:29

## 2024-12-16 RX ADMIN — TAMSULOSIN HYDROCHLORIDE 0.4 MILLIGRAM(S): 0.4 CAPSULE ORAL at 22:15

## 2024-12-16 RX ADMIN — Medication 8 MILLIGRAM(S): at 22:49

## 2024-12-16 RX ADMIN — Medication 8 MILLIGRAM(S): at 07:33

## 2024-12-16 RX ADMIN — GABAPENTIN 300 MILLIGRAM(S): 300 CAPSULE ORAL at 13:18

## 2024-12-16 RX ADMIN — QUETIAPINE FUMARATE 400 MILLIGRAM(S): 100 TABLET, FILM COATED ORAL at 22:16

## 2024-12-16 RX ADMIN — Medication 1 MILLIGRAM(S): at 03:45

## 2024-12-16 RX ADMIN — METHADONE HYDROCHLORIDE 110 MILLIGRAM(S): 10 TABLET ORAL at 11:29

## 2024-12-16 RX ADMIN — Medication 8 MILLIGRAM(S): at 22:19

## 2024-12-16 RX ADMIN — ENOXAPARIN SODIUM 40 MILLIGRAM(S): 60 INJECTION INTRAVENOUS; SUBCUTANEOUS at 11:30

## 2024-12-16 RX ADMIN — CEFTOLOZANE AND TAZOBACTAM 100 MILLIGRAM(S): 1; .5 INJECTION, POWDER, LYOPHILIZED, FOR SOLUTION INTRAVENOUS at 09:40

## 2024-12-16 RX ADMIN — Medication 1 MILLIGRAM(S): at 11:28

## 2024-12-16 RX ADMIN — CEFTOLOZANE AND TAZOBACTAM 100 MILLIGRAM(S): 1; .5 INJECTION, POWDER, LYOPHILIZED, FOR SOLUTION INTRAVENOUS at 02:34

## 2024-12-16 RX ADMIN — Medication 5 MILLIGRAM(S): at 11:29

## 2024-12-16 RX ADMIN — Medication 325 MILLIGRAM(S): at 11:29

## 2024-12-16 RX ADMIN — QUETIAPINE FUMARATE 100 MILLIGRAM(S): 100 TABLET, FILM COATED ORAL at 17:44

## 2024-12-16 NOTE — PROGRESS NOTE ADULT - SUBJECTIVE AND OBJECTIVE BOX
Patient is a 54y old  Male who presents with a chief complaint of UTI, hematuria, lower abdominal pain (15 Dec 2024 14:28)      INTERVAL HPI/OVERNIGHT EVENTS: Overnight no acute events. c/o cough.     MEDICATIONS  (STANDING):  ceftolozane/tazobactam IVPB 1500 milliGRAM(s) IV Intermittent every 8 hours  DULoxetine 30 milliGRAM(s) Oral daily  enoxaparin Injectable 40 milliGRAM(s) SubCutaneous every 24 hours  ferrous    sulfate 325 milliGRAM(s) Oral daily  finasteride 5 milliGRAM(s) Oral daily  folic acid 1 milliGRAM(s) Oral daily  furosemide    Tablet 20 milliGRAM(s) Oral daily  gabapentin 300 milliGRAM(s) Oral every 8 hours  influenza   Vaccine 0.5 milliLiter(s) IntraMuscular once  methadone    Tablet 110 milliGRAM(s) Oral daily  multivitamin 1 Tablet(s) Oral daily  pantoprazole    Tablet 40 milliGRAM(s) Oral before breakfast  polyethylene glycol 3350 17 Gram(s) Oral daily  QUEtiapine 100 milliGRAM(s) Oral <User Schedule>  QUEtiapine 400 milliGRAM(s) Oral at bedtime  senna 2 Tablet(s) Oral at bedtime  tamsulosin 0.4 milliGRAM(s) Oral at bedtime    MEDICATIONS  (PRN):  acetaminophen     Tablet .. 650 milliGRAM(s) Oral every 6 hours PRN Mild Pain (1 - 3)  albuterol/ipratropium for Nebulization 3 milliLiter(s) Nebulizer every 6 hours PRN Shortness of Breath and/or Wheezing  benzocaine/menthol Lozenge 1 Lozenge Oral every 4 hours PRN Sore Throat  guaifenesin/dextromethorphan Oral Liquid 10 milliLiter(s) Oral every 4 hours PRN Cough  HYDROmorphone   Tablet 8 milliGRAM(s) Oral every 6 hours PRN Severe Pain (7 - 10)  HYDROmorphone   Tablet 4 milliGRAM(s) Oral every 6 hours PRN Moderate Pain (4 - 6)  HYDROmorphone  Injectable 1 milliGRAM(s) IV Push every 8 hours PRN Breakthrough pain  ondansetron Injectable 4 milliGRAM(s) IV Push every 6 hours PRN Nausea and/or Vomiting      Allergies    NSAIDs (Flushing; Other (Moderate))  Aleve (Unknown)  Risperdal (Short breath; Rash; Hives)  Stelazine (Unknown)  Haldol (Anaphylaxis)  Motrin (Anaphylaxis)  Thorazine (Other (Moderate))  Zyprexa (Rash; Dystonic RXN; Hives)    Intolerances        REVIEW OF SYSTEMS:  ROS negative unless stated otherwise.    Vital Signs Last 24 Hrs  T(C): 36.5 (16 Dec 2024 12:42), Max: 37.2 (15 Dec 2024 17:19)  T(F): 97.7 (16 Dec 2024 12:42), Max: 99 (15 Dec 2024 17:19)  HR: 90 (16 Dec 2024 12:42) (77 - 93)  BP: 113/70 (16 Dec 2024 12:42) (113/70 - 138/88)  BP(mean): --  RR: 18 (16 Dec 2024 12:42) (18 - 18)  SpO2: 95% (16 Dec 2024 12:42) (91% - 97%)    Parameters below as of 16 Dec 2024 12:42  Patient On (Oxygen Delivery Method): room air        PHYSICAL EXAM:  GENERAL: NAD, ill appearing  HEAD:  Atraumatic   EYES: EOMI   ENMT: Moist mucous membranes  NECK: Supple   NERVOUS SYSTEM:  Awake  CHEST/LUNG: CTAB   HEART: RRR   ABDOMEN: SPC in place  EXTREMITIES: contracted B/L LE with edema     LABS:                        11.8   7.84  )-----------( 149      ( 16 Dec 2024 06:31 )             38.3     12-16    143  |  109[H]  |  9   ----------------------------<  104[H]  3.8   |  29  |  0.59    Ca    9.3      16 Dec 2024 06:31        Urinalysis Basic - ( 16 Dec 2024 06:31 )    Color: x / Appearance: x / SG: x / pH: x  Gluc: 104 mg/dL / Ketone: x  / Bili: x / Urobili: x   Blood: x / Protein: x / Nitrite: x   Leuk Esterase: x / RBC: x / WBC x   Sq Epi: x / Non Sq Epi: x / Bacteria: x      CAPILLARY BLOOD GLUCOSE          RADIOLOGY & ADDITIONAL TESTS:    Imaging Personally Reviewed:  [ X] YES  [ ] NO    Consultant(s) Notes Reviewed:  [ X] YES  [ ] NO    Care Discussed with Consultants/Other Providers [X ] YES  [ ] NO

## 2024-12-16 NOTE — PROGRESS NOTE ADULT - PROBLEM SELECTOR PLAN 2
worsening lower abdominal pain   Pain control--> oral dilaudid prn for severe pain, IV dilaudid for breakthrough pain  Urology following  IR consulted  See above

## 2024-12-16 NOTE — PROGRESS NOTE ADULT - PROBLEM SELECTOR PLAN 7
on Seroquel 100mg 10AM, 100mg 6PM and 400mg hs per [facility] paper  Continue duloxetine 30mg daily
on Seroquel 100mg 10AM, 100mg 6PM and 400mg hs per [facility] paper  Continue duloxetine 30mg daily    Functional quadriplegia   Bedbound with bilateral lower extremity paralysis  Wound care ordered for lower extremity pressure wounds
on Seroquel 100mg 10AM, 100mg 6PM and 400mg hs per NH paper  Continue duloxetine 30mg daily
on Seroquel 100mg 10AM, 100mg 6PM and 400mg hs per NH paper  Continue duloxetine 30mg daily    Functional quadriplegia   Bedbound with bilateral lower extremity paralysis  Wound care ordered for lower extremity pressure wounds

## 2024-12-16 NOTE — PROGRESS NOTE ADULT - PROBLEM SELECTOR PROBLEM 4
Chronic respiratory failure with hypercapnia

## 2024-12-16 NOTE — PROGRESS NOTE ADULT - PROBLEM SELECTOR PROBLEM 3
Methadone dependence

## 2024-12-16 NOTE — PROGRESS NOTE ADULT - PROBLEM SELECTOR PLAN 3
Called Loc [phone], talked with nurse manage Corie, confirmed that patient is on methadone 110mg daily and last dose in NH was on 12/9/2024  QTc 469  on EKG 10/21/24, check EKG to evaluate for QTc  On dilaudid 6mg q6hr prn for severe pain at NH, continue same  Bowel regimens---> senna and miralax
Called Loc [phone], talked with nurse manage Corie, confirmed that patient is on methadone 110mg daily and last dose in NH was on 12/9/2024  QTc 469  on EKG 10/21/24, check EKG to evaluate for QTc  On dilaudid 6mg q6hr prn for severe pain at NH, continue same  Bowel regimens---> senna and miralax
Called Loc [phone], talked with nurse manage Corie, confirmed that patient is on methadone 110mg daily and last dose in NH was on 12/9/2024  QTc 469  on EKG 10/21/24, check EKG to evaluate for QTc  On dilaudid 6mg q6hr prn for severe pain at NH, continue same  Bowel regimens---> senna and miralax
Called Loc [phone], talked with nurse manage Corie, confirmed that patient is on methadone 110mg daily and last dose in NH was on 12/9/2024  QTc 469  on EKG 10/21/24, check EKG to evaluate for QTc  On dilaudid 6mg q6hr prn for severe pain at NH, continue same  Bowel regimens---> senna and miralax
Called Loc [phone], talked with nurse manage Corie, confirmed that patient is on methadone 110mg daily and last dose in NH was on 12/9/2024  QTc 469  on EKG 10/21/24, check EKG to evaluate for QTc  On dilaudid 6mg q6hr prn for severe pain at NH, continue same  Bowel regimens---> senna and miralax
Called Loc [phone], talked with nurse manage Corie, confirmed that patient is on methadone 110mg daily and last dose in NH was on 12/9/2024  QTc 469  on EKG 10/21/24, check EKG to evaluate for QTc  On dilaudid 6mg q6hr prn for severe pain at NH, continue same  Bowel regimens---> senna and miralax

## 2024-12-16 NOTE — PROGRESS NOTE ADULT - PROBLEM SELECTOR PROBLEM 1
Urinary tract infection associated with cystostomy catheter

## 2024-12-16 NOTE — PROGRESS NOTE ADULT - PROBLEM SELECTOR PLAN 6
on finasteride and flomax

## 2024-12-16 NOTE — PROGRESS NOTE ADULT - SUBJECTIVE AND OBJECTIVE BOX
STAN VEGA  MRN-[id number]  54y (1970)    Follow Up:  wounds, carbapenem resistant pseudomonas uti    Interval History: The pt was seen and examined earlier, not in acute distress, awake and alert, appropriate. Pt is afebrile, NC is out at the time of my exam, appears comfortable, no leukocytosis.     PAST MEDICAL & SURGICAL HISTORY:  CAD (coronary artery disease)      HTN (hypertension)      HLD (hyperlipidemia)      Neurogenic bladder      Bipolar disorder      S/P ileostomy      Chronic hepatitis C virus infection      S/P laminectomy      S/P ileostomy          ROS:    [ ] Unobtainable because:  [x ] All other systems negative    Constitutional: no fever, no chills  Head: no trauma  Eyes: no vision changes, no eye pain  ENT:  no sore throat, no rhinorrhea  Cardiovascular:  no chest pain, no palpitation  Respiratory:  no SOB, no cough  GI:  mild abd discomfort, + Flatulance, burping no vomiting, no diarrhea  urinary: no dysuria, no hematuria, no flank pain  musculoskeletal: contracted   skin:  no rash  neurology:  no headache, no seizure, no change in mental status  psych: no anxiety, no depression         Allergies  NSAIDs (Flushing; Other (Moderate))  Aleve (Unknown)  Risperdal (Short breath; Rash; Hives)  Stelazine (Unknown)  Haldol (Anaphylaxis)  Motrin (Anaphylaxis)  Thorazine (Other (Moderate))  Zyprexa (Rash; Dystonic RXN; Hives)        ANTIMICROBIALS:  ceftolozane/tazobactam IVPB 1500 every 8 hours      OTHER MEDS:  acetaminophen     Tablet .. 650 milliGRAM(s) Oral every 6 hours PRN  albuterol/ipratropium for Nebulization 3 milliLiter(s) Nebulizer every 6 hours PRN  benzocaine/menthol Lozenge 1 Lozenge Oral every 4 hours PRN  DULoxetine 30 milliGRAM(s) Oral daily  enoxaparin Injectable 40 milliGRAM(s) SubCutaneous every 24 hours  ferrous    sulfate 325 milliGRAM(s) Oral daily  finasteride 5 milliGRAM(s) Oral daily  folic acid 1 milliGRAM(s) Oral daily  furosemide    Tablet 20 milliGRAM(s) Oral daily  gabapentin 300 milliGRAM(s) Oral every 8 hours  guaifenesin/dextromethorphan Oral Liquid 10 milliLiter(s) Oral every 4 hours PRN  HYDROmorphone   Tablet 8 milliGRAM(s) Oral every 6 hours PRN  HYDROmorphone   Tablet 4 milliGRAM(s) Oral every 6 hours PRN  HYDROmorphone  Injectable 1 milliGRAM(s) IV Push every 8 hours PRN  influenza   Vaccine 0.5 milliLiter(s) IntraMuscular once  methadone    Tablet 110 milliGRAM(s) Oral daily  multivitamin 1 Tablet(s) Oral daily  ondansetron Injectable 4 milliGRAM(s) IV Push every 6 hours PRN  pantoprazole    Tablet 40 milliGRAM(s) Oral before breakfast  polyethylene glycol 3350 17 Gram(s) Oral daily  QUEtiapine 100 milliGRAM(s) Oral <User Schedule>  QUEtiapine 400 milliGRAM(s) Oral at bedtime  senna 2 Tablet(s) Oral at bedtime  tamsulosin 0.4 milliGRAM(s) Oral at bedtime      Vital Signs Last 24 Hrs  T(C): 36.5 (16 Dec 2024 12:42), Max: 37.2 (15 Dec 2024 17:19)  T(F): 97.7 (16 Dec 2024 12:42), Max: 99 (15 Dec 2024 17:19)  HR: 90 (16 Dec 2024 12:42) (77 - 93)  BP: 113/70 (16 Dec 2024 12:42) (113/70 - 138/88)  BP(mean): --  RR: 18 (16 Dec 2024 12:42) (18 - 18)  SpO2: 95% (16 Dec 2024 12:42) (91% - 95%)    Parameters below as of 16 Dec 2024 12:42  Patient On (Oxygen Delivery Method): room air        Physical Exam:  General:    NAD, non toxic  Head: atraumatic, normocephalic  Eyes: normal sclera and conjunctiva  ENT:   no oropharyngeal lesions, no LAD, neck supple  Cardio:    regular S1,S2  Respiratory:   no wheezing, no rales  abd:  appears distended, BS +, mildly tender, colostomy bag present half full with brown soft stool  :     no CVAT, mild suprapubic tenderness, SPC present, no erythema around spc   Musculoskeletal : contracted LE, can't move straighten the LE and is in flexed position, + edema of b/l LEs  Skin:  does have some fungal rash on the fold of lower abd  and thigh region ( as per patient he always gets them due to his contracted state)  Neurologic:     AAO x 3  psych: calm    WBC Count: 7.84 K/uL (12-16 @ 06:31)  WBC Count: 9.54 K/uL (12-14 @ 06:40)  WBC Count: 9.31 K/uL (12-12 @ 06:07)  WBC Count: 9.41 K/uL (12-11 @ 08:15)  WBC Count: 10.33 K/uL (12-10 @ 02:35)                            11.8   7.84  )-----------( 149      ( 16 Dec 2024 06:31 )             38.3       12-16    143  |  109[H]  |  9   ----------------------------<  104[H]  3.8   |  29  |  0.59    Ca    9.3      16 Dec 2024 06:31        Urinalysis Basic - ( 16 Dec 2024 06:31 )    Color: x / Appearance: x / SG: x / pH: x  Gluc: 104 mg/dL / Ketone: x  / Bili: x / Urobili: x   Blood: x / Protein: x / Nitrite: x   Leuk Esterase: x / RBC: x / WBC x   Sq Epi: x / Non Sq Epi: x / Bacteria: x        Creatinine Trend: 0.59<--, 0.60<--, 0.60<--, 0.67<--, 0.72<--, 0.69<--      MICROBIOLOGY:  v  Clean Catch  12-09-24   >100,000 CFU/ml Pseudomonas aeruginosa (Carbapenem Resistant)  --  Pseudomonas aeruginosa (Carbapenem Resistant)    RADIOLOGY:     STAN VEGA  MRN-[id number]  54y (1970)    Follow Up:  wounds, carbapenem resistant pseudomonas uti    Interval History: The pt was seen and examined earlier, not in acute distress, awake and alert, appropriate. Pt is afebrile, NC is out at the time of my exam, appears comfortable, no leukocytosis.     PAST MEDICAL & SURGICAL HISTORY:  CAD (coronary artery disease)      HTN (hypertension)      HLD (hyperlipidemia)      Neurogenic bladder      Bipolar disorder      S/P ileostomy      Chronic hepatitis C virus infection      S/P laminectomy      S/P ileostomy          ROS:    [ ] Unobtainable because:  [x ] All other systems negative    Constitutional: no fever, no chills  Head: no trauma  Eyes: no vision changes, no eye pain  ENT:  no sore throat, no rhinorrhea  Cardiovascular:  no chest pain, no palpitation  Respiratory:  no SOB, no cough  GI:  mild abd discomfort, + Flatulance, burping no vomiting, no diarrhea  urinary: no dysuria, no hematuria, no flank pain  musculoskeletal: contracted   skin:  no rash  neurology:  no headache, no seizure, no change in mental status  psych: no anxiety, no depression         Allergies  NSAIDs (Flushing; Other (Moderate))  Aleve (Unknown)  Risperdal (Short breath; Rash; Hives)  Stelazine (Unknown)  Haldol (Anaphylaxis)  Motrin (Anaphylaxis)  Thorazine (Other (Moderate))  Zyprexa (Rash; Dystonic RXN; Hives)        ANTIMICROBIALS:  ceftolozane/tazobactam IVPB 1500 every 8 hours      OTHER MEDS:  acetaminophen     Tablet .. 650 milliGRAM(s) Oral every 6 hours PRN  albuterol/ipratropium for Nebulization 3 milliLiter(s) Nebulizer every 6 hours PRN  benzocaine/menthol Lozenge 1 Lozenge Oral every 4 hours PRN  DULoxetine 30 milliGRAM(s) Oral daily  enoxaparin Injectable 40 milliGRAM(s) SubCutaneous every 24 hours  ferrous    sulfate 325 milliGRAM(s) Oral daily  finasteride 5 milliGRAM(s) Oral daily  folic acid 1 milliGRAM(s) Oral daily  furosemide    Tablet 20 milliGRAM(s) Oral daily  gabapentin 300 milliGRAM(s) Oral every 8 hours  guaifenesin/dextromethorphan Oral Liquid 10 milliLiter(s) Oral every 4 hours PRN  HYDROmorphone   Tablet 8 milliGRAM(s) Oral every 6 hours PRN  HYDROmorphone   Tablet 4 milliGRAM(s) Oral every 6 hours PRN  HYDROmorphone  Injectable 1 milliGRAM(s) IV Push every 8 hours PRN  influenza   Vaccine 0.5 milliLiter(s) IntraMuscular once  methadone    Tablet 110 milliGRAM(s) Oral daily  multivitamin 1 Tablet(s) Oral daily  ondansetron Injectable 4 milliGRAM(s) IV Push every 6 hours PRN  pantoprazole    Tablet 40 milliGRAM(s) Oral before breakfast  polyethylene glycol 3350 17 Gram(s) Oral daily  QUEtiapine 100 milliGRAM(s) Oral <User Schedule>  QUEtiapine 400 milliGRAM(s) Oral at bedtime  senna 2 Tablet(s) Oral at bedtime  tamsulosin 0.4 milliGRAM(s) Oral at bedtime      Vital Signs Last 24 Hrs  T(C): 36.5 (16 Dec 2024 12:42), Max: 37.2 (15 Dec 2024 17:19)  T(F): 97.7 (16 Dec 2024 12:42), Max: 99 (15 Dec 2024 17:19)  HR: 90 (16 Dec 2024 12:42) (77 - 93)  BP: 113/70 (16 Dec 2024 12:42) (113/70 - 138/88)  BP(mean): --  RR: 18 (16 Dec 2024 12:42) (18 - 18)  SpO2: 95% (16 Dec 2024 12:42) (91% - 95%)    Parameters below as of 16 Dec 2024 12:42  Patient On (Oxygen Delivery Method): room air        Physical Exam:  General:    NAD, non toxic  Head: atraumatic, normocephalic  Eyes: normal sclera and conjunctiva  ENT:   no oropharyngeal lesions, no LAD, neck supple  Cardio:    regular S1,S2  Respiratory:   no wheezing, no rales  abd:  appears distended, BS +, mildly tender, colostomy bag present half full with brown soft stool  :     no CVAT, mild suprapubic tenderness, SPC present, no erythema around spc   Musculoskeletal : contracted LE, can't move straighten the LE and is in flexed position, + edema of b/l LEs  Skin:  does have some fungal rash on the fold of lower abd  and thigh region ( as per patient he always gets them due to his contracted state)  Neurologic:     AAO x 3  psych: calm    WBC Count: 7.84 K/uL (12-16 @ 06:31)  WBC Count: 9.54 K/uL (12-14 @ 06:40)  WBC Count: 9.31 K/uL (12-12 @ 06:07)  WBC Count: 9.41 K/uL (12-11 @ 08:15)  WBC Count: 10.33 K/uL (12-10 @ 02:35)                            11.8   7.84  )-----------( 149      ( 16 Dec 2024 06:31 )             38.3       12-16    143  |  109[H]  |  9   ----------------------------<  104[H]  3.8   |  29  |  0.59    Ca    9.3      16 Dec 2024 06:31        Urinalysis Basic - ( 16 Dec 2024 06:31 )    Color: x / Appearance: x / SG: x / pH: x  Gluc: 104 mg/dL / Ketone: x  / Bili: x / Urobili: x   Blood: x / Protein: x / Nitrite: x   Leuk Esterase: x / RBC: x / WBC x   Sq Epi: x / Non Sq Epi: x / Bacteria: x        Creatinine Trend: 0.59<--, 0.60<--, 0.60<--, 0.67<--, 0.72<--, 0.69<--      MICROBIOLOGY:    Clean Catch  12-09-24   >100,000 CFU/ml Pseudomonas aeruginosa (Carbapenem Resistant)  --  Pseudomonas aeruginosa (Carbapenem Resistant)    RADIOLOGY:

## 2024-12-16 NOTE — PROGRESS NOTE ADULT - PROBLEM SELECTOR PLAN 1
worsening lower abdominal pain, hematuria  CXR  ( I personally review) no focal consolidation  UA dirty ( from SPC)  CTX switched to zosyn- dc as urine cx + carbepenem resistant pseudamonas  Started on zerbaxa -- needs at least 5 days   ID consulted for abx management.   IR consulted- patient underwent CT and exchange of SPC  Urology following
worsening lower abdominal pain, hematuria  CXR  ( I personally review) no focal consolidation  UA dirty ( from SPC)  continue ceftriaxone daily, follow up urine culture result  Urology following  IR consulted- patient underwent CT and exchange of SPC   Difficult IV access. ED team placed left IJ line - removed today, patient has midline
worsening lower abdominal pain, hematuria  CXR  ( I personally review) no focal consolidation  UA dirty ( from SPC)  CTX switched to zosyn- dc as urine cx + carbepenem resistant pseudamonas  Started on zerbaxa -- needs at least 5 days   ID consulted for abx management.   IR consulted- patient underwent CT and exchange of SPC  Urology following
worsening lower abdominal pain, hematuria  CXR  ( I personally review) no focal consolidation  UA dirty ( from SPC)  CTX switched to zosyn- dc as urine cx + carbepenem resistant pseudamonas  Susceptible to cipro-- start 500mg BID x 7 days ()  IR consulted- patient underwent CT and exchange of SPC  Urology following
worsening lower abdominal pain, hematuria  CXR  ( I personally review) no focal consolidation  UA dirty ( from SPC)  CTX switched to zosyn- dc as urine cx + carbepenem resistant pseudamonas  Started on zerbaxa -- needs at least 5 days   ID consulted for abx management.   IR consulted- patient underwent CT and exchange of SPC  Urology following
worsening lower abdominal pain, hematuria  CXR  ( I personally review) no focal consolidation  UA dirty ( from SPC)  CTX switched to zosyn- dc as urine cx + carbepenem resistant pseudamonas  Susceptible to cipro-- start 500mg BID x 7 days ()-- however received message from pharmacy rec zerbaxa  as resistent to levaquin so rec not to use quinolone. ID consulted for abx management.   IR consulted- patient underwent CT and exchange of SPC  Urology following

## 2024-12-17 LAB
ANION GAP SERPL CALC-SCNC: 2 MMOL/L — LOW (ref 5–17)
BUN SERPL-MCNC: 13 MG/DL — SIGNIFICANT CHANGE UP (ref 7–23)
CALCIUM SERPL-MCNC: 9.3 MG/DL — SIGNIFICANT CHANGE UP (ref 8.5–10.1)
CHLORIDE SERPL-SCNC: 108 MMOL/L — SIGNIFICANT CHANGE UP (ref 96–108)
CO2 SERPL-SCNC: 31 MMOL/L — SIGNIFICANT CHANGE UP (ref 22–31)
CREAT SERPL-MCNC: 0.73 MG/DL — SIGNIFICANT CHANGE UP (ref 0.5–1.3)
EGFR: 109 ML/MIN/1.73M2 — SIGNIFICANT CHANGE UP
GLUCOSE SERPL-MCNC: 118 MG/DL — HIGH (ref 70–99)
HCT VFR BLD CALC: 40.1 % — SIGNIFICANT CHANGE UP (ref 39–50)
HGB BLD-MCNC: 12.4 G/DL — LOW (ref 13–17)
MCHC RBC-ENTMCNC: 27.7 PG — SIGNIFICANT CHANGE UP (ref 27–34)
MCHC RBC-ENTMCNC: 30.9 G/DL — LOW (ref 32–36)
MCV RBC AUTO: 89.5 FL — SIGNIFICANT CHANGE UP (ref 80–100)
NRBC # BLD: 0 /100 WBCS — SIGNIFICANT CHANGE UP (ref 0–0)
PLATELET # BLD AUTO: 179 K/UL — SIGNIFICANT CHANGE UP (ref 150–400)
POTASSIUM SERPL-MCNC: 3.6 MMOL/L — SIGNIFICANT CHANGE UP (ref 3.5–5.3)
POTASSIUM SERPL-SCNC: 3.6 MMOL/L — SIGNIFICANT CHANGE UP (ref 3.5–5.3)
RBC # BLD: 4.48 M/UL — SIGNIFICANT CHANGE UP (ref 4.2–5.8)
RBC # FLD: 15 % — HIGH (ref 10.3–14.5)
SODIUM SERPL-SCNC: 141 MMOL/L — SIGNIFICANT CHANGE UP (ref 135–145)
WBC # BLD: 8.02 K/UL — SIGNIFICANT CHANGE UP (ref 3.8–10.5)
WBC # FLD AUTO: 8.02 K/UL — SIGNIFICANT CHANGE UP (ref 3.8–10.5)

## 2024-12-17 RX ORDER — METHADONE HYDROCHLORIDE 10 MG/1
110 TABLET ORAL DAILY
Refills: 0 | Status: DISCONTINUED | OUTPATIENT
Start: 2024-12-18 | End: 2024-12-25

## 2024-12-17 RX ORDER — IPRATROPIUM BROMIDE AND ALBUTEROL SULFATE .5; 2.5 MG/3ML; MG/3ML
3 SOLUTION RESPIRATORY (INHALATION) EVERY 6 HOURS
Refills: 0 | Status: DISCONTINUED | OUTPATIENT
Start: 2024-12-17 | End: 2024-12-31

## 2024-12-17 RX ORDER — HYDROMORPHONE HCL 4 MG
4 TABLET ORAL EVERY 6 HOURS
Refills: 0 | Status: DISCONTINUED | OUTPATIENT
Start: 2024-12-17 | End: 2024-12-24

## 2024-12-17 RX ORDER — HYDROMORPHONE HCL 4 MG
8 TABLET ORAL EVERY 6 HOURS
Refills: 0 | Status: DISCONTINUED | OUTPATIENT
Start: 2024-12-17 | End: 2024-12-24

## 2024-12-17 RX ADMIN — Medication 1 MILLIGRAM(S): at 03:01

## 2024-12-17 RX ADMIN — QUETIAPINE FUMARATE 100 MILLIGRAM(S): 100 TABLET, FILM COATED ORAL at 17:58

## 2024-12-17 RX ADMIN — Medication 1 MILLIGRAM(S): at 02:31

## 2024-12-17 RX ADMIN — Medication 4 MILLIGRAM(S): at 22:32

## 2024-12-17 RX ADMIN — CEFTOLOZANE AND TAZOBACTAM 100 MILLIGRAM(S): 1; .5 INJECTION, POWDER, LYOPHILIZED, FOR SOLUTION INTRAVENOUS at 02:24

## 2024-12-17 RX ADMIN — IPRATROPIUM BROMIDE AND ALBUTEROL SULFATE 3 MILLILITER(S): .5; 2.5 SOLUTION RESPIRATORY (INHALATION) at 23:28

## 2024-12-17 RX ADMIN — Medication 20 MILLIGRAM(S): at 05:35

## 2024-12-17 RX ADMIN — CEFTOLOZANE AND TAZOBACTAM 100 MILLIGRAM(S): 1; .5 INJECTION, POWDER, LYOPHILIZED, FOR SOLUTION INTRAVENOUS at 10:59

## 2024-12-17 RX ADMIN — Medication 325 MILLIGRAM(S): at 11:17

## 2024-12-17 RX ADMIN — QUETIAPINE FUMARATE 100 MILLIGRAM(S): 100 TABLET, FILM COATED ORAL at 10:07

## 2024-12-17 RX ADMIN — QUETIAPINE FUMARATE 400 MILLIGRAM(S): 100 TABLET, FILM COATED ORAL at 22:24

## 2024-12-17 RX ADMIN — Medication 8 MILLIGRAM(S): at 11:18

## 2024-12-17 RX ADMIN — GABAPENTIN 300 MILLIGRAM(S): 300 CAPSULE ORAL at 22:24

## 2024-12-17 RX ADMIN — Medication 5 MILLIGRAM(S): at 11:17

## 2024-12-17 RX ADMIN — ENOXAPARIN SODIUM 40 MILLIGRAM(S): 60 INJECTION INTRAVENOUS; SUBCUTANEOUS at 11:18

## 2024-12-17 RX ADMIN — Medication 4 MILLIGRAM(S): at 23:30

## 2024-12-17 RX ADMIN — Medication 8 MILLIGRAM(S): at 12:35

## 2024-12-17 RX ADMIN — Medication 1 TABLET(S): at 11:17

## 2024-12-17 RX ADMIN — CEFTOLOZANE AND TAZOBACTAM 100 MILLIGRAM(S): 1; .5 INJECTION, POWDER, LYOPHILIZED, FOR SOLUTION INTRAVENOUS at 17:59

## 2024-12-17 RX ADMIN — PANTOPRAZOLE 40 MILLIGRAM(S): 40 TABLET, DELAYED RELEASE ORAL at 06:20

## 2024-12-17 RX ADMIN — METHADONE HYDROCHLORIDE 110 MILLIGRAM(S): 10 TABLET ORAL at 11:18

## 2024-12-17 RX ADMIN — IPRATROPIUM BROMIDE AND ALBUTEROL SULFATE 3 MILLILITER(S): .5; 2.5 SOLUTION RESPIRATORY (INHALATION) at 17:46

## 2024-12-17 RX ADMIN — Medication 8 MILLIGRAM(S): at 18:34

## 2024-12-17 RX ADMIN — GABAPENTIN 300 MILLIGRAM(S): 300 CAPSULE ORAL at 13:32

## 2024-12-17 RX ADMIN — Medication 1 MILLIGRAM(S): at 11:17

## 2024-12-17 RX ADMIN — TAMSULOSIN HYDROCHLORIDE 0.4 MILLIGRAM(S): 0.4 CAPSULE ORAL at 22:23

## 2024-12-17 RX ADMIN — GABAPENTIN 300 MILLIGRAM(S): 300 CAPSULE ORAL at 05:37

## 2024-12-17 NOTE — DIETITIAN INITIAL EVALUATION ADULT - CONTINUE CURRENT NUTRITION CARE PLAN
+ add Ensure Plus High Protein x 2/day (provides 700 kcal, 40 g protein); prefers strawberry flavor/yes

## 2024-12-17 NOTE — DIETITIAN INITIAL EVALUATION ADULT - PROBLEM SELECTOR PLAN 3
Called Loc [phone], talked with nurse manage Corie, confirmed that patient is on methadone 110mg daily and last dose in NH was on 12/9/2024  QTc 469  on EKG 10/21/24, check EKG to evaluate for QTc  On dilaudid 6mg q6hr prn for severe pain at NH, continue same  Bowel regimens---> senna and miralax

## 2024-12-17 NOTE — DIETITIAN INITIAL EVALUATION ADULT - NSFNSGIIOFT_GEN_A_CORE
12-16-24 @ 07:01  -  12-17-24 @ 07:00  --------------------------------------------------------  OUT:    Ileostomy (mL): 1 mL  Total OUT: 1 mL    Total NET: -1 mL

## 2024-12-17 NOTE — DIETITIAN INITIAL EVALUATION ADULT - PERTINENT MEDS FT
MEDICATIONS  (STANDING):  albuterol/ipratropium for Nebulization 3 milliLiter(s) Nebulizer every 6 hours  ceftolozane/tazobactam IVPB 1500 milliGRAM(s) IV Intermittent every 8 hours  DULoxetine 30 milliGRAM(s) Oral daily  enoxaparin Injectable 40 milliGRAM(s) SubCutaneous every 24 hours  ferrous    sulfate 325 milliGRAM(s) Oral daily  finasteride 5 milliGRAM(s) Oral daily  folic acid 1 milliGRAM(s) Oral daily  furosemide    Tablet 20 milliGRAM(s) Oral daily  gabapentin 300 milliGRAM(s) Oral every 8 hours  influenza   Vaccine 0.5 milliLiter(s) IntraMuscular once  multivitamin 1 Tablet(s) Oral daily  pantoprazole    Tablet 40 milliGRAM(s) Oral before breakfast  polyethylene glycol 3350 17 Gram(s) Oral daily  QUEtiapine 100 milliGRAM(s) Oral <User Schedule>  QUEtiapine 400 milliGRAM(s) Oral at bedtime  senna 2 Tablet(s) Oral at bedtime  tamsulosin 0.4 milliGRAM(s) Oral at bedtime    MEDICATIONS  (PRN):  acetaminophen     Tablet .. 650 milliGRAM(s) Oral every 6 hours PRN Mild Pain (1 - 3)  benzocaine/menthol Lozenge 1 Lozenge Oral every 4 hours PRN Sore Throat  guaifenesin/dextromethorphan Oral Liquid 10 milliLiter(s) Oral every 4 hours PRN Cough  HYDROmorphone   Tablet 4 milliGRAM(s) Oral every 6 hours PRN Moderate Pain (4 - 6)  HYDROmorphone   Tablet 8 milliGRAM(s) Oral every 6 hours PRN Severe Pain (7 - 10)  HYDROmorphone  Injectable 1 milliGRAM(s) IV Push every 8 hours PRN Breakthrough pain  ondansetron Injectable 4 milliGRAM(s) IV Push every 6 hours PRN Nausea and/or Vomiting

## 2024-12-17 NOTE — DIETITIAN INITIAL EVALUATION ADULT - PERTINENT LABORATORY DATA
12-17    141  |  108  |  13  ----------------------------<  118[H]  3.6   |  31  |  0.73    Ca    9.3      17 Dec 2024 07:40

## 2024-12-18 LAB
ANION GAP SERPL CALC-SCNC: 3 MMOL/L — LOW (ref 5–17)
BUN SERPL-MCNC: 13 MG/DL — SIGNIFICANT CHANGE UP (ref 7–23)
CALCIUM SERPL-MCNC: 9.4 MG/DL — SIGNIFICANT CHANGE UP (ref 8.5–10.1)
CHLORIDE SERPL-SCNC: 106 MMOL/L — SIGNIFICANT CHANGE UP (ref 96–108)
CO2 SERPL-SCNC: 31 MMOL/L — SIGNIFICANT CHANGE UP (ref 22–31)
CREAT SERPL-MCNC: 0.55 MG/DL — SIGNIFICANT CHANGE UP (ref 0.5–1.3)
EGFR: 118 ML/MIN/1.73M2 — SIGNIFICANT CHANGE UP
GLUCOSE SERPL-MCNC: 142 MG/DL — HIGH (ref 70–99)
HCT VFR BLD CALC: 41.4 % — SIGNIFICANT CHANGE UP (ref 39–50)
HGB BLD-MCNC: 13 G/DL — SIGNIFICANT CHANGE UP (ref 13–17)
MCHC RBC-ENTMCNC: 27.5 PG — SIGNIFICANT CHANGE UP (ref 27–34)
MCHC RBC-ENTMCNC: 31.4 G/DL — LOW (ref 32–36)
MCV RBC AUTO: 87.5 FL — SIGNIFICANT CHANGE UP (ref 80–100)
NRBC # BLD: 0 /100 WBCS — SIGNIFICANT CHANGE UP (ref 0–0)
PLATELET # BLD AUTO: 182 K/UL — SIGNIFICANT CHANGE UP (ref 150–400)
POTASSIUM SERPL-MCNC: 4 MMOL/L — SIGNIFICANT CHANGE UP (ref 3.5–5.3)
POTASSIUM SERPL-SCNC: 4 MMOL/L — SIGNIFICANT CHANGE UP (ref 3.5–5.3)
RBC # BLD: 4.73 M/UL — SIGNIFICANT CHANGE UP (ref 4.2–5.8)
RBC # FLD: 15 % — HIGH (ref 10.3–14.5)
SODIUM SERPL-SCNC: 140 MMOL/L — SIGNIFICANT CHANGE UP (ref 135–145)
WBC # BLD: 7.3 K/UL — SIGNIFICANT CHANGE UP (ref 3.8–10.5)
WBC # FLD AUTO: 7.3 K/UL — SIGNIFICANT CHANGE UP (ref 3.8–10.5)

## 2024-12-18 PROCEDURE — 99232 SBSQ HOSP IP/OBS MODERATE 35: CPT

## 2024-12-18 RX ORDER — ONDANSETRON 4 MG/1
4 TABLET ORAL EVERY 6 HOURS
Refills: 0 | Status: DISCONTINUED | OUTPATIENT
Start: 2024-12-18 | End: 2024-12-31

## 2024-12-18 RX ADMIN — GABAPENTIN 300 MILLIGRAM(S): 300 CAPSULE ORAL at 05:38

## 2024-12-18 RX ADMIN — QUETIAPINE FUMARATE 400 MILLIGRAM(S): 100 TABLET, FILM COATED ORAL at 21:57

## 2024-12-18 RX ADMIN — CEFTOLOZANE AND TAZOBACTAM 100 MILLIGRAM(S): 1; .5 INJECTION, POWDER, LYOPHILIZED, FOR SOLUTION INTRAVENOUS at 01:53

## 2024-12-18 RX ADMIN — Medication 8 MILLIGRAM(S): at 23:02

## 2024-12-18 RX ADMIN — IPRATROPIUM BROMIDE AND ALBUTEROL SULFATE 3 MILLILITER(S): .5; 2.5 SOLUTION RESPIRATORY (INHALATION) at 17:08

## 2024-12-18 RX ADMIN — QUETIAPINE FUMARATE 100 MILLIGRAM(S): 100 TABLET, FILM COATED ORAL at 17:48

## 2024-12-18 RX ADMIN — CEFTOLOZANE AND TAZOBACTAM 100 MILLIGRAM(S): 1; .5 INJECTION, POWDER, LYOPHILIZED, FOR SOLUTION INTRAVENOUS at 18:11

## 2024-12-18 RX ADMIN — SENNOSIDES 2 TABLET(S): 8.6 TABLET, FILM COATED ORAL at 21:48

## 2024-12-18 RX ADMIN — Medication 325 MILLIGRAM(S): at 12:04

## 2024-12-18 RX ADMIN — Medication 8 MILLIGRAM(S): at 12:10

## 2024-12-18 RX ADMIN — Medication 8 MILLIGRAM(S): at 22:02

## 2024-12-18 RX ADMIN — GABAPENTIN 300 MILLIGRAM(S): 300 CAPSULE ORAL at 13:31

## 2024-12-18 RX ADMIN — Medication 8 MILLIGRAM(S): at 06:30

## 2024-12-18 RX ADMIN — IPRATROPIUM BROMIDE AND ALBUTEROL SULFATE 3 MILLILITER(S): .5; 2.5 SOLUTION RESPIRATORY (INHALATION) at 05:21

## 2024-12-18 RX ADMIN — Medication 8 MILLIGRAM(S): at 11:25

## 2024-12-18 RX ADMIN — Medication 20 MILLIGRAM(S): at 05:38

## 2024-12-18 RX ADMIN — METHADONE HYDROCHLORIDE 110 MILLIGRAM(S): 10 TABLET ORAL at 12:05

## 2024-12-18 RX ADMIN — Medication 8 MILLIGRAM(S): at 05:41

## 2024-12-18 RX ADMIN — ONDANSETRON 4 MILLIGRAM(S): 4 TABLET ORAL at 19:09

## 2024-12-18 RX ADMIN — GABAPENTIN 300 MILLIGRAM(S): 300 CAPSULE ORAL at 21:50

## 2024-12-18 RX ADMIN — Medication 1 MILLIGRAM(S): at 12:04

## 2024-12-18 RX ADMIN — Medication 5 MILLIGRAM(S): at 12:04

## 2024-12-18 RX ADMIN — QUETIAPINE FUMARATE 100 MILLIGRAM(S): 100 TABLET, FILM COATED ORAL at 11:25

## 2024-12-18 RX ADMIN — CEFTOLOZANE AND TAZOBACTAM 100 MILLIGRAM(S): 1; .5 INJECTION, POWDER, LYOPHILIZED, FOR SOLUTION INTRAVENOUS at 11:15

## 2024-12-18 RX ADMIN — Medication 1 TABLET(S): at 12:04

## 2024-12-18 RX ADMIN — IPRATROPIUM BROMIDE AND ALBUTEROL SULFATE 3 MILLILITER(S): .5; 2.5 SOLUTION RESPIRATORY (INHALATION) at 11:00

## 2024-12-18 RX ADMIN — TAMSULOSIN HYDROCHLORIDE 0.4 MILLIGRAM(S): 0.4 CAPSULE ORAL at 21:48

## 2024-12-18 RX ADMIN — PANTOPRAZOLE 40 MILLIGRAM(S): 40 TABLET, DELAYED RELEASE ORAL at 05:38

## 2024-12-18 NOTE — PHYSICAL THERAPY INITIAL EVALUATION ADULT - SKIN INTEGRITY
Please see flowsheet
PT wound care initial evaluation performed with all wounds documented in flowsheet 2 6.0 A&I.

## 2024-12-18 NOTE — PROGRESS NOTE ADULT - NS ATTEND AMEND GEN_ALL_CORE FT
All labs and cultures and imaging and pertinent chart notes reviewed by me.    case d/w Np Naty at length and agree with her assessment and plan.    remains afebrile, no leukocytosis.  2 more days of zerbaxa remaining to complete total of 7 days for Carbapenem resistant Pseudomonas UTI.    Kush King MD  Infectious Disease Attending    for any questions please do not hesitate to contact me either via teams or by calling [phone]

## 2024-12-18 NOTE — PROGRESS NOTE ADULT - SUBJECTIVE AND OBJECTIVE BOX
Patient: STAN VEGA [id number] 54y Male                            Hospitalist Attending Note    Pain controlled.  No fever / chills / complaints.      ____________________PHYSICAL EXAM:  GENERAL:  NAD Alert and Oriented x 3   HEENT: NCAT  CARDIOVASCULAR:  S1, S2  LUNGS: CTAB  : SPC in place  ABDOMEN:  soft, (-) tenderness, (-) distension, (+) bowel sounds, (-) guarding, (-) rebound (-) rigidity.  Ileostomy  EXTREMITIES:  no cyanosis / clubbing / edema. R heel stage II pressure injury 2cm x 2cm x .1 cm, L dorsum of foot stage II pressure injury 3.5cm x 4.5cm x .1 cm  NEURO: b/l LE paraplegia.    ____________________     VITALS:  Vital Signs Last 24 Hrs  T(C): 36.8 (18 Dec 2024 17:17), Max: 36.8 (18 Dec 2024 00:09)  T(F): 98.2 (18 Dec 2024 17:17), Max: 98.3 (18 Dec 2024 00:09)  HR: 83 (18 Dec 2024 17:20) (82 - 90)  BP: 127/77 (18 Dec 2024 17:17) (114/75 - 133/77)  BP(mean): --  RR: 18 (18 Dec 2024 17:17) (17 - 18)  SpO2: 95% (18 Dec 2024 17:20) (91% - 98%)    Parameters below as of 18 Dec 2024 17:20  Patient On (Oxygen Delivery Method): nasal cannula     Daily     Daily   CAPILLARY BLOOD GLUCOSE        I&O's Summary    17 Dec 2024 07:01  -  18 Dec 2024 07:00  --------------------------------------------------------  IN: 1620 mL / OUT: 1175 mL / NET: 445 mL        HISTORY:  PAST MEDICAL & SURGICAL HISTORY:  CAD (coronary artery disease)      HTN (hypertension)      HLD (hyperlipidemia)      Neurogenic bladder      Bipolar disorder      S/P ileostomy      Chronic hepatitis C virus infection      S/P laminectomy      S/P ileostomy      Allergies    NSAIDs (Flushing; Other (Moderate))  Aleve (Unknown)  Risperdal (Short breath; Rash; Hives)  Stelazine (Unknown)  Haldol (Anaphylaxis)  Motrin (Anaphylaxis)  Thorazine (Other (Moderate))  Zyprexa (Rash; Dystonic RXN; Hives)    Intolerances       LABS:                        13.0   7.30  )-----------( 182      ( 18 Dec 2024 08:20 )             41.4     12-18    140  |  106  |  13  ----------------------------<  142[H]  4.0   |  31  |  0.55    Ca    9.4      18 Dec 2024 08:20          Urinalysis Basic - ( 18 Dec 2024 08:20 )    Color: x / Appearance: x / SG: x / pH: x  Gluc: 142 mg/dL / Ketone: x  / Bili: x / Urobili: x   Blood: x / Protein: x / Nitrite: x   Leuk Esterase: x / RBC: x / WBC x   Sq Epi: x / Non Sq Epi: x / Bacteria: x              MEDICATIONS:  MEDICATIONS  (STANDING):  albuterol/ipratropium for Nebulization 3 milliLiter(s) Nebulizer every 6 hours  ceftolozane/tazobactam IVPB 1500 milliGRAM(s) IV Intermittent every 8 hours  DULoxetine 30 milliGRAM(s) Oral daily  enoxaparin Injectable 40 milliGRAM(s) SubCutaneous every 24 hours  ferrous    sulfate 325 milliGRAM(s) Oral daily  finasteride 5 milliGRAM(s) Oral daily  folic acid 1 milliGRAM(s) Oral daily  furosemide    Tablet 20 milliGRAM(s) Oral daily  gabapentin 300 milliGRAM(s) Oral every 8 hours  influenza   Vaccine 0.5 milliLiter(s) IntraMuscular once  methadone    Tablet 110 milliGRAM(s) Oral daily  multivitamin 1 Tablet(s) Oral daily  pantoprazole    Tablet 40 milliGRAM(s) Oral before breakfast  polyethylene glycol 3350 17 Gram(s) Oral daily  QUEtiapine 100 milliGRAM(s) Oral <User Schedule>  QUEtiapine 400 milliGRAM(s) Oral at bedtime  senna 2 Tablet(s) Oral at bedtime  tamsulosin 0.4 milliGRAM(s) Oral at bedtime    MEDICATIONS  (PRN):  acetaminophen     Tablet .. 650 milliGRAM(s) Oral every 6 hours PRN Mild Pain (1 - 3)  benzocaine/menthol Lozenge 1 Lozenge Oral every 4 hours PRN Sore Throat  guaifenesin/dextromethorphan Oral Liquid 10 milliLiter(s) Oral every 4 hours PRN Cough  HYDROmorphone   Tablet 8 milliGRAM(s) Oral every 6 hours PRN Severe Pain (7 - 10)  HYDROmorphone   Tablet 4 milliGRAM(s) Oral every 6 hours PRN Moderate Pain (4 - 6)  HYDROmorphone  Injectable 1 milliGRAM(s) IV Push every 8 hours PRN Breakthrough pain  ondansetron Injectable 4 milliGRAM(s) IV Push every 6 hours PRN Nausea and/or Vomiting

## 2024-12-18 NOTE — PROGRESS NOTE ADULT - SUBJECTIVE AND OBJECTIVE BOX
STAN VEGA  MRN-[id number]  54y (1970)    Follow Up:   pseudomonas UTI    Interval History: The pt was seen and examined earlier, not in acute distress, continues to complain of abdominal distention and mild discomfort. Pt is afebrile, RA, no leukocytosis.     PAST MEDICAL & SURGICAL HISTORY:  CAD (coronary artery disease)      HTN (hypertension)      HLD (hyperlipidemia)      Neurogenic bladder      Bipolar disorder      S/P ileostomy      Chronic hepatitis C virus infection      S/P laminectomy      S/P ileostomy          ROS:    [ ] Unobtainable because:  [x ] All other systems negative    Constitutional: no fever, no chills  Head: no trauma  Eyes: no vision changes, no eye pain  ENT:  no sore throat, no rhinorrhea  Cardiovascular:  no chest pain, no palpitation  Respiratory:  no SOB, no cough  GI:  + abd pain, no vomiting, no diarrhea  urinary: no dysuria, no hematuria, no flank pain  musculoskeletal:  no joint pain, no joint swelling  skin:  no rash  neurology:  no headache, no seizure, no change in mental status  psych: no anxiety, no depression         Allergies  NSAIDs (Flushing; Other (Moderate))  Aleve (Unknown)  Risperdal (Short breath; Rash; Hives)  Stelazine (Unknown)  Haldol (Anaphylaxis)  Motrin (Anaphylaxis)  Thorazine (Other (Moderate))  Zyprexa (Rash; Dystonic RXN; Hives)        ANTIMICROBIALS:  ceftolozane/tazobactam IVPB 1500 every 8 hours      OTHER MEDS:  acetaminophen     Tablet .. 650 milliGRAM(s) Oral every 6 hours PRN  albuterol/ipratropium for Nebulization 3 milliLiter(s) Nebulizer every 6 hours  benzocaine/menthol Lozenge 1 Lozenge Oral every 4 hours PRN  DULoxetine 30 milliGRAM(s) Oral daily  enoxaparin Injectable 40 milliGRAM(s) SubCutaneous every 24 hours  ferrous    sulfate 325 milliGRAM(s) Oral daily  finasteride 5 milliGRAM(s) Oral daily  folic acid 1 milliGRAM(s) Oral daily  furosemide    Tablet 20 milliGRAM(s) Oral daily  gabapentin 300 milliGRAM(s) Oral every 8 hours  guaifenesin/dextromethorphan Oral Liquid 10 milliLiter(s) Oral every 4 hours PRN  HYDROmorphone   Tablet 8 milliGRAM(s) Oral every 6 hours PRN  HYDROmorphone   Tablet 4 milliGRAM(s) Oral every 6 hours PRN  HYDROmorphone  Injectable 1 milliGRAM(s) IV Push every 8 hours PRN  influenza   Vaccine 0.5 milliLiter(s) IntraMuscular once  methadone    Tablet 110 milliGRAM(s) Oral daily  multivitamin 1 Tablet(s) Oral daily  ondansetron Injectable 4 milliGRAM(s) IV Push every 6 hours PRN  pantoprazole    Tablet 40 milliGRAM(s) Oral before breakfast  polyethylene glycol 3350 17 Gram(s) Oral daily  QUEtiapine 100 milliGRAM(s) Oral <User Schedule>  QUEtiapine 400 milliGRAM(s) Oral at bedtime  senna 2 Tablet(s) Oral at bedtime  tamsulosin 0.4 milliGRAM(s) Oral at bedtime      Vital Signs Last 24 Hrs  T(C): 36.7 (18 Dec 2024 10:44), Max: 37.1 (17 Dec 2024 17:59)  T(F): 98.1 (18 Dec 2024 10:44), Max: 98.7 (17 Dec 2024 17:59)  HR: 84 (18 Dec 2024 11:13) (82 - 88)  BP: 114/75 (18 Dec 2024 10:44) (114/75 - 133/77)  BP(mean): --  RR: 17 (18 Dec 2024 10:44) (17 - 18)  SpO2: 94% (18 Dec 2024 11:13) (91% - 98%)    Parameters below as of 18 Dec 2024 11:13  Patient On (Oxygen Delivery Method): nasal cannula        Physical Exam:  General:    NAD, non toxic, RA  Head: atraumatic, normocephalic  Eyes: normal sclera and conjunctiva  ENT:   no oropharyngeal lesions, no LAD, neck supple  Cardio:    regular S1,S2  Respiratory:   no wheezing, no rales  abd:  appears distended, BS +, mildly tender, colostomy bag present half full with brown soft stool and gas  :     no CVAT, mild suprapubic tenderness, SPC present, no erythema around spc   Musculoskeletal : contracted LE, can't move straighten the LE and is in flexed position, + edema of b/l LEs  Skin:  does have some fungal rash on the fold of lower abd  and thigh region ( as per patient he always gets them due to his contracted state)  Neurologic:     AAO x 3  psych: calm    WBC Count: 7.30 K/uL (12-18 @ 08:20)  WBC Count: 8.02 K/uL (12-17 @ 07:40)  WBC Count: 7.84 K/uL (12-16 @ 06:31)  WBC Count: 9.54 K/uL (12-14 @ 06:40)  WBC Count: 9.31 K/uL (12-12 @ 06:07)                            13.0   7.30  )-----------( 182      ( 18 Dec 2024 08:20 )             41.4       12-18    140  |  106  |  13  ----------------------------<  142[H]  4.0   |  31  |  0.55    Ca    9.4      18 Dec 2024 08:20        Urinalysis Basic - ( 18 Dec 2024 08:20 )    Color: x / Appearance: x / SG: x / pH: x  Gluc: 142 mg/dL / Ketone: x  / Bili: x / Urobili: x   Blood: x / Protein: x / Nitrite: x   Leuk Esterase: x / RBC: x / WBC x   Sq Epi: x / Non Sq Epi: x / Bacteria: x        Creatinine Trend: 0.55<--, 0.73<--, 0.59<--, 0.60<--, 0.60<--, 0.67<--      MICROBIOLOGY:  v  Clean Catch  12-09-24   >100,000 CFU/ml Pseudomonas aeruginosa (Carbapenem Resistant)  --  Pseudomonas aeruginosa (Carbapenem Resistant)      RADIOLOGY:     STAN VEGA  MRN-[id number]  54y (1970)    Follow Up:   pseudomonas UTI    Interval History: The pt was seen and examined earlier, not in acute distress, continues to complain of abdominal distention and mild discomfort. Pt is afebrile, RA, no leukocytosis.     PAST MEDICAL & SURGICAL HISTORY:  CAD (coronary artery disease)      HTN (hypertension)      HLD (hyperlipidemia)      Neurogenic bladder      Bipolar disorder      S/P ileostomy      Chronic hepatitis C virus infection      S/P laminectomy      S/P ileostomy          ROS:    [ ] Unobtainable because:  [x ] All other systems negative    Constitutional: no fever, no chills  Head: no trauma  Eyes: no vision changes, no eye pain  ENT:  no sore throat, no rhinorrhea  Cardiovascular:  no chest pain, no palpitation  Respiratory:  no SOB, no cough  GI:  + abd pain, no vomiting, no diarrhea  urinary: no dysuria, no hematuria, no flank pain  musculoskeletal:  no joint pain, no joint swelling  skin:  no rash  neurology:  no headache, no seizure, no change in mental status  psych: no anxiety, no depression         Allergies  NSAIDs (Flushing; Other (Moderate))  Aleve (Unknown)  Risperdal (Short breath; Rash; Hives)  Stelazine (Unknown)  Haldol (Anaphylaxis)  Motrin (Anaphylaxis)  Thorazine (Other (Moderate))  Zyprexa (Rash; Dystonic RXN; Hives)        ANTIMICROBIALS:  ceftolozane/tazobactam IVPB 1500 every 8 hours      OTHER MEDS:  acetaminophen     Tablet .. 650 milliGRAM(s) Oral every 6 hours PRN  albuterol/ipratropium for Nebulization 3 milliLiter(s) Nebulizer every 6 hours  benzocaine/menthol Lozenge 1 Lozenge Oral every 4 hours PRN  DULoxetine 30 milliGRAM(s) Oral daily  enoxaparin Injectable 40 milliGRAM(s) SubCutaneous every 24 hours  ferrous    sulfate 325 milliGRAM(s) Oral daily  finasteride 5 milliGRAM(s) Oral daily  folic acid 1 milliGRAM(s) Oral daily  furosemide    Tablet 20 milliGRAM(s) Oral daily  gabapentin 300 milliGRAM(s) Oral every 8 hours  guaifenesin/dextromethorphan Oral Liquid 10 milliLiter(s) Oral every 4 hours PRN  HYDROmorphone   Tablet 8 milliGRAM(s) Oral every 6 hours PRN  HYDROmorphone   Tablet 4 milliGRAM(s) Oral every 6 hours PRN  HYDROmorphone  Injectable 1 milliGRAM(s) IV Push every 8 hours PRN  influenza   Vaccine 0.5 milliLiter(s) IntraMuscular once  methadone    Tablet 110 milliGRAM(s) Oral daily  multivitamin 1 Tablet(s) Oral daily  ondansetron Injectable 4 milliGRAM(s) IV Push every 6 hours PRN  pantoprazole    Tablet 40 milliGRAM(s) Oral before breakfast  polyethylene glycol 3350 17 Gram(s) Oral daily  QUEtiapine 100 milliGRAM(s) Oral <User Schedule>  QUEtiapine 400 milliGRAM(s) Oral at bedtime  senna 2 Tablet(s) Oral at bedtime  tamsulosin 0.4 milliGRAM(s) Oral at bedtime      Vital Signs Last 24 Hrs  T(C): 36.7 (18 Dec 2024 10:44), Max: 37.1 (17 Dec 2024 17:59)  T(F): 98.1 (18 Dec 2024 10:44), Max: 98.7 (17 Dec 2024 17:59)  HR: 84 (18 Dec 2024 11:13) (82 - 88)  BP: 114/75 (18 Dec 2024 10:44) (114/75 - 133/77)  BP(mean): --  RR: 17 (18 Dec 2024 10:44) (17 - 18)  SpO2: 94% (18 Dec 2024 11:13) (91% - 98%)    Parameters below as of 18 Dec 2024 11:13  Patient On (Oxygen Delivery Method): nasal cannula        Physical Exam:  General:    NAD, non toxic, RA  Head: atraumatic, normocephalic  Eyes: normal sclera and conjunctiva  ENT:   no oropharyngeal lesions, no LAD, neck supple  Cardio:    regular S1,S2  Respiratory:   no wheezing, no rales  abd:  appears distended, BS +, mildly tender, colostomy bag present half full with brown soft stool   :     no CVAT, mild suprapubic tenderness, SPC present, no erythema around spc   Musculoskeletal : contracted LE, can't move straighten the LE and is in flexed position, + edema of b/l LEs  Skin:  does have some fungal rash on the fold of lower abd  and thigh region ( as per patient he always gets them due to his contracted state)  Neurologic:     AAO x 3  psych: calm    WBC Count: 7.30 K/uL (12-18 @ 08:20)  WBC Count: 8.02 K/uL (12-17 @ 07:40)  WBC Count: 7.84 K/uL (12-16 @ 06:31)  WBC Count: 9.54 K/uL (12-14 @ 06:40)  WBC Count: 9.31 K/uL (12-12 @ 06:07)                            13.0   7.30  )-----------( 182      ( 18 Dec 2024 08:20 )             41.4       12-18    140  |  106  |  13  ----------------------------<  142[H]  4.0   |  31  |  0.55    Ca    9.4      18 Dec 2024 08:20        Urinalysis Basic - ( 18 Dec 2024 08:20 )    Color: x / Appearance: x / SG: x / pH: x  Gluc: 142 mg/dL / Ketone: x  / Bili: x / Urobili: x   Blood: x / Protein: x / Nitrite: x   Leuk Esterase: x / RBC: x / WBC x   Sq Epi: x / Non Sq Epi: x / Bacteria: x        Creatinine Trend: 0.55<--, 0.73<--, 0.59<--, 0.60<--, 0.60<--, 0.67<--      MICROBIOLOGY:    Clean Catch  12-09-24   >100,000 CFU/ml Pseudomonas aeruginosa (Carbapenem Resistant)  --  Pseudomonas aeruginosa (Carbapenem Resistant)      RADIOLOGY:

## 2024-12-18 NOTE — PROGRESS NOTE ADULT - ASSESSMENT
54 years old male with h/o cervical epidural abscess, b/l LE paralysis, pneumoperitoneum s/p ex-lap w/ ileostomy complicated by MRSA bacteremia and evisceration, Hep C ( VL UD based on labs from 1/2024), emphysema on chronic O2, L foot osteomyelitis,-treated    bipolar, opioid dependence, HTN, neurogenic bladder s/p IR SPC placement on 10/23/24 present to ED with complain of worsening lower abdominal pain for 5 days and hematuria. SPC was placed 10/23/24 by IR, reportedly was pulled slightly since 10/24. Patient reported pain since then but worsened over last 5 days associated with nausea. Denied any fever or chills.   Hemodynamically stable, afebrile, sat well at RA. No leukocytosis, plt 181, Cr 0.69. UA dirty. 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    patient  is s/p SPC exchange by IR on 12/11/2024    Afebrile  no leukocytosis  UA- turbid and pos LE, neg -nitrates  no blood cx.  previous urine cx from 10/2024-[facility] ( was treated by my colleague  with IV abx and d/c on po cefpodoxime )    patient is prone to MDR  organisms  in the urine in light of chronic west and chronic SPC secondary to neurogenic bladder.  he has had extensive h/o infections in heels and foot as well ( all treated in past)    in order to help mitigate these UTIs  would advise to have SPC exchange every 3-4 weeks     12/16: no fever, on RA at the time of my exam and appears to be comfortable, no leukocytosis, Cr ok, UC with carbapenem resistant pseudomonas, Zerbaxa continued (day #4). Pt with mild abdominal discomfort today, no vomiting, having bowel movements, will monitor.   12/17: afebrile, RA, no leukocytosis, Cr ok, no BCs available, UC with  pseudomonas, today is day #5 of abx Zerbaxa, pt will need two more days to complete 7 days total.     Impression-  MDR UTI in patient with chronic SPC   neuregenic bladder  bed-bound  colostomy present      plan-  continue zerbaxa to treat the carbapenem resistant pseudomonas (day #5) - needs two more days   please address abdominal pain / distention   advise frequent off-loading  advise to monitor for aspiration precautions   will need every 3-4 weeks SPC exchange   advise to apply nystatin ointment to skin folds with the fungal rash BID for 5 days.      discussed with Dr. King  discussed with Dr. Hernandez 54 years old male with h/o cervical epidural abscess, b/l LE paralysis, pneumoperitoneum s/p ex-lap w/ ileostomy complicated by MRSA bacteremia and evisceration, Hep C ( VL UD based on labs from 1/2024), emphysema on chronic O2, L foot osteomyelitis,-treated    bipolar, opioid dependence, HTN, neurogenic bladder s/p IR SPC placement on 10/23/24 present to ED with complain of worsening lower abdominal pain for 5 days and hematuria. SPC was placed 10/23/24 by IR, reportedly was pulled slightly since 10/24. Patient reported pain since then but worsened over last 5 days associated with nausea. Denied any fever or chills.   Hemodynamically stable, afebrile, sat well at RA. No leukocytosis, plt 181, Cr 0.69. UA dirty. 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    patient  is s/p SPC exchange by IR on 12/11/2024    Afebrile  no leukocytosis  UA- turbid and pos LE, neg -nitrates  no blood cx.  previous urine cx from 10/2024-[facility] ( was treated by my colleague  with IV abx and d/c on po cefpodoxime )    patient is prone to MDR  organisms  in the urine in light of chronic west and chronic SPC secondary to neurogenic bladder.  he has had extensive h/o infections in heels and foot as well ( all treated in past)    in order to help mitigate these UTIs  would advise to have SPC exchange every 3-4 weeks     12/16: no fever, on RA at the time of my exam and appears to be comfortable, no leukocytosis, Cr ok, UC with carbapenem resistant pseudomonas, Zerbaxa continued (day #4). Pt with mild abdominal discomfort today, no vomiting, having bowel movements, will monitor.   12/18: afebrile, RA, no leukocytosis, Cr ok, no BCs available, UC with  pseudomonas, today is day #5 of abx Zerbaxa, pt will need two more days to complete 7 days total.     Impression-  MDR UTI in patient with chronic SPC   neuregenic bladder  bed-bound  colostomy present      plan-  continue zerbaxa to treat the carbapenem resistant pseudomonas (day #5) - needs two more days   medicine team to  address abdominal pain / distention -   advise frequent off-loading  advise to monitor for aspiration precautions   will need every 3-4 weeks SPC exchange   advise to apply nystatin ointment to skin folds with the fungal rash BID for 5 days.      discussed with Dr. King  discussed with Dr. Hernandez

## 2024-12-18 NOTE — PROGRESS NOTE ADULT - ASSESSMENT
54 years old male with h/o cervical epidural abscess, b/l LE paralysis, pneumoperitoneum s/p ex-lap w/ ileostomy complicated by MRSA bacteremia and evisceration, Hep C, emphysema on chronic O2, L foot osteomyelitis, bipolar, opioid dependence, HTN, neurogenic bladder s/p IR SPC placement on 10/23/24 present to ED with complain of worsening lower abdominal pain for 5 days and hematuria. SPC was placed \on 10/23/24 by IR, reportedly was pulled slightly since 10/24. Patient reported pain since then but worsened over last 5 days associated with nausea. Denied any fever or chills.      Problem/Plan - 1:  ·  Problem: Urinary tract infection associated with cystostomy catheter - due to carbapenem resistant Pseudomonas.    Continue Zerbaxa - plan 7 day course.    ID following.    IR consulted- patient underwent CT and exchange of SPC  Seen by HÉCTOR     Problem/Plan - 2:  ·  Problem: Lower abdominal pain.   ·  Plan: worsening lower abdominal pain   Pain control--> oral dilaudid prn for severe pain, IV dilaudid for breakthrough pain  Urology following  IR consulted  See above.  Will deescalate Dilaudid in next 24h.       Problem/Plan - 3:  ·  Problem: Methadone dependence.   ·  Plan: Previous hospitalists have Called [facility] [phone], talked with nurse manage Corie, confirmed that patient is on methadone 110mg daily and last dose in NH was on 12/9/2024  QTc 469  on EKG 10/21/24, check EKG to evaluate for QTc  On dilaudid 6mg q6hr prn for severe pain at NH, continue same  Bowel regimens---> senna and miralax.     Problem/Plan - 4:  ·  Problem: Chronic respiratory failure with hypercapnia.   ·  Plan: nocturnal NIPPV.     Problem/Plan - 5:  ·  Problem: GERD (gastroesophageal reflux disease).   ·  Plan: on oral PPI.     Problem/Plan - 6:  ·  Problem: BPH (benign prostatic hyperplasia).   ·  Plan: on finasteride and flomax.     Problem/Plan - 7:  ·  Problem: Psychiatric disorder.   ·  Plan: on Seroquel 100mg 10AM, 100mg 6PM and 400mg hs per NH paper  Continue duloxetine 30mg daily    Functional quadriplegia   Bedbound with bilateral lower extremity paralysis  Wound care ordered for lower extremity pressure wound  Probable d/c to SNF 12/20 after Abx course completed.   54 years old male with h/o cervical epidural abscess, b/l LE paralysis, pneumoperitoneum s/p ex-lap w/ ileostomy complicated by MRSA bacteremia and evisceration, Hep C, emphysema on chronic O2, L foot osteomyelitis, bipolar, opioid dependence, HTN, neurogenic bladder s/p IR SPC placement on 10/23/24 present to ED with complain of worsening lower abdominal pain for 5 days and hematuria. SPC was placed \on 10/23/24 by IR, reportedly was pulled slightly since 10/24. Patient reported pain since then but worsened over last 5 days associated with nausea. Denied any fever or chills.      Problem/Plan - 1:  ·  Problem: Urinary tract infection associated with cystostomy catheter - due to carbapenem resistant Pseudomonas.    Continue Zerbaxa - plan 7 day course.    ID following.    IR consulted- patient underwent CT and exchange of SPC  Seen by HÉCTOR     Problem/Plan - 2:  ·  Problem: Lower abdominal pain.   ·  Plan: worsening lower abdominal pain   Pain control--> oral dilaudid prn for severe pain, IV dilaudid for breakthrough pain  Urology following  IR consulted  See above.  Will deescalate Dilaudid in next 24h.       Problem/Plan - 3:  ·  Problem: Methadone dependence.   ·  Plan: Previous hospitalists have Called [facility] [phone], talked with nurse manage Corie, confirmed that patient is on methadone 110mg daily and last dose in NH was on 12/9/2024  QTc 469  on EKG 10/21/24, check EKG to evaluate for QTc  On dilaudid 6mg q6hr prn for severe pain at NH, continue same  Bowel regimens---> senna and miralax.     Problem/Plan - 4:  ·  Problem: Chronic respiratory failure with hypercapnia.   ·  Plan: nocturnal NIPPV.     Problem/Plan - 5:  ·  Problem: GERD (gastroesophageal reflux disease).   ·  Plan: on oral PPI.     Problem/Plan - 6:  ·  Problem: BPH (benign prostatic hyperplasia).   ·  Plan: on finasteride and flomax.     Problem/Plan - 7:  ·  Problem: Psychiatric disorder.   ·  Plan: on Seroquel 100mg 10AM, 100mg 6PM and 400mg hs per NH paper  Continue duloxetine 30mg daily    Functional quadriplegia   Bedbound with bilateral lower extremity paralysis  Wound care ordered for lower extremity pressure wound  Probable d/c to SNF 12/19 after Abx course completed.

## 2024-12-19 PROCEDURE — 99232 SBSQ HOSP IP/OBS MODERATE 35: CPT

## 2024-12-19 RX ORDER — DIPHENHYDRAMINE HCL 25 MG
25 TABLET ORAL ONCE
Refills: 0 | Status: COMPLETED | OUTPATIENT
Start: 2024-12-19 | End: 2024-12-21

## 2024-12-19 RX ADMIN — GABAPENTIN 300 MILLIGRAM(S): 300 CAPSULE ORAL at 13:10

## 2024-12-19 RX ADMIN — GABAPENTIN 300 MILLIGRAM(S): 300 CAPSULE ORAL at 05:22

## 2024-12-19 RX ADMIN — IPRATROPIUM BROMIDE AND ALBUTEROL SULFATE 3 MILLILITER(S): .5; 2.5 SOLUTION RESPIRATORY (INHALATION) at 05:26

## 2024-12-19 RX ADMIN — QUETIAPINE FUMARATE 100 MILLIGRAM(S): 100 TABLET, FILM COATED ORAL at 10:21

## 2024-12-19 RX ADMIN — QUETIAPINE FUMARATE 400 MILLIGRAM(S): 100 TABLET, FILM COATED ORAL at 21:22

## 2024-12-19 RX ADMIN — CEFTOLOZANE AND TAZOBACTAM 100 MILLIGRAM(S): 1; .5 INJECTION, POWDER, LYOPHILIZED, FOR SOLUTION INTRAVENOUS at 17:36

## 2024-12-19 RX ADMIN — IPRATROPIUM BROMIDE AND ALBUTEROL SULFATE 3 MILLILITER(S): .5; 2.5 SOLUTION RESPIRATORY (INHALATION) at 11:06

## 2024-12-19 RX ADMIN — IPRATROPIUM BROMIDE AND ALBUTEROL SULFATE 3 MILLILITER(S): .5; 2.5 SOLUTION RESPIRATORY (INHALATION) at 23:02

## 2024-12-19 RX ADMIN — GABAPENTIN 300 MILLIGRAM(S): 300 CAPSULE ORAL at 21:22

## 2024-12-19 RX ADMIN — Medication 1 MILLIGRAM(S): at 11:26

## 2024-12-19 RX ADMIN — IPRATROPIUM BROMIDE AND ALBUTEROL SULFATE 3 MILLILITER(S): .5; 2.5 SOLUTION RESPIRATORY (INHALATION) at 17:08

## 2024-12-19 RX ADMIN — Medication 8 MILLIGRAM(S): at 09:00

## 2024-12-19 RX ADMIN — Medication 5 MILLIGRAM(S): at 11:30

## 2024-12-19 RX ADMIN — Medication 4 MILLIGRAM(S): at 05:21

## 2024-12-19 RX ADMIN — TAMSULOSIN HYDROCHLORIDE 0.4 MILLIGRAM(S): 0.4 CAPSULE ORAL at 21:22

## 2024-12-19 RX ADMIN — Medication 325 MILLIGRAM(S): at 11:27

## 2024-12-19 RX ADMIN — Medication 1 TABLET(S): at 11:26

## 2024-12-19 RX ADMIN — Medication 8 MILLIGRAM(S): at 08:02

## 2024-12-19 RX ADMIN — Medication 20 MILLIGRAM(S): at 05:21

## 2024-12-19 RX ADMIN — METHADONE HYDROCHLORIDE 110 MILLIGRAM(S): 10 TABLET ORAL at 11:26

## 2024-12-19 RX ADMIN — Medication 4 MILLIGRAM(S): at 06:21

## 2024-12-19 RX ADMIN — PANTOPRAZOLE 40 MILLIGRAM(S): 40 TABLET, DELAYED RELEASE ORAL at 10:14

## 2024-12-19 RX ADMIN — Medication 8 MILLIGRAM(S): at 20:48

## 2024-12-19 RX ADMIN — IPRATROPIUM BROMIDE AND ALBUTEROL SULFATE 3 MILLILITER(S): .5; 2.5 SOLUTION RESPIRATORY (INHALATION) at 00:09

## 2024-12-19 RX ADMIN — QUETIAPINE FUMARATE 100 MILLIGRAM(S): 100 TABLET, FILM COATED ORAL at 17:37

## 2024-12-19 RX ADMIN — Medication 8 MILLIGRAM(S): at 22:24

## 2024-12-19 RX ADMIN — CEFTOLOZANE AND TAZOBACTAM 100 MILLIGRAM(S): 1; .5 INJECTION, POWDER, LYOPHILIZED, FOR SOLUTION INTRAVENOUS at 01:53

## 2024-12-19 RX ADMIN — CEFTOLOZANE AND TAZOBACTAM 100 MILLIGRAM(S): 1; .5 INJECTION, POWDER, LYOPHILIZED, FOR SOLUTION INTRAVENOUS at 10:14

## 2024-12-19 NOTE — PROGRESS NOTE ADULT - SUBJECTIVE AND OBJECTIVE BOX
Patient: STAN VEGA [id number] 54y Male                            Hospitalist Attending Note    Pain controlled.  No fever / chills / complaints.      ____________________PHYSICAL EXAM:  GENERAL:  NAD Alert and Oriented x 3   HEENT: NCAT  CARDIOVASCULAR:  S1, S2  LUNGS: CTAB  : SPC in place  ABDOMEN:  soft, (-) tenderness, (-) distension, (+) bowel sounds, (-) guarding, (-) rebound (-) rigidity.  Ileostomy  EXTREMITIES:  no cyanosis / clubbing / edema. R heel stage II pressure injury 2cm x 2cm x .1 cm, L dorsum of foot stage II pressure injury 3.5cm x 4.5cm x .1 cm  NEURO: b/l LE paraplegia.    ____________________      VITALS:  Vital Signs Last 24 Hrs  T(C): 37 (19 Dec 2024 17:18), Max: 37 (19 Dec 2024 17:18)  T(F): 98.6 (19 Dec 2024 17:18), Max: 98.6 (19 Dec 2024 17:18)  HR: 86 (19 Dec 2024 17:18) (73 - 91)  BP: 131/81 (19 Dec 2024 17:18) (107/66 - 131/81)  BP(mean): --  RR: 18 (19 Dec 2024 17:18) (17 - 18)  SpO2: 93% (19 Dec 2024 17:18) (90% - 98%)    Parameters below as of 19 Dec 2024 17:18  Patient On (Oxygen Delivery Method): nasal cannula     Daily     Daily Weight in k.3 (19 Dec 2024 05:12)  CAPILLARY BLOOD GLUCOSE        I&O's Summary    18 Dec 2024 07:01  -  19 Dec 2024 07:00  --------------------------------------------------------  IN: 720 mL / OUT: 1500 mL / NET: -780 mL    19 Dec 2024 07:01  -  19 Dec 2024 18:14  --------------------------------------------------------  IN: 0 mL / OUT: 650 mL / NET: -650 mL        LABS:                        13.0   7.30  )-----------( 182      ( 18 Dec 2024 08:20 )             41.4         140  |  106  |  13  ----------------------------<  142[H]  4.0   |  31  |  0.55    Ca    9.4      18 Dec 2024 08:20          Urinalysis Basic - ( 18 Dec 2024 08:20 )    Color: x / Appearance: x / SG: x / pH: x  Gluc: 142 mg/dL / Ketone: x  / Bili: x / Urobili: x   Blood: x / Protein: x / Nitrite: x   Leuk Esterase: x / RBC: x / WBC x   Sq Epi: x / Non Sq Epi: x / Bacteria: x              MEDICATIONS:  acetaminophen     Tablet .. 650 milliGRAM(s) Oral every 6 hours PRN  albuterol/ipratropium for Nebulization 3 milliLiter(s) Nebulizer every 6 hours  benzocaine/menthol Lozenge 1 Lozenge Oral every 4 hours PRN  ceftolozane/tazobactam IVPB 1500 milliGRAM(s) IV Intermittent every 8 hours  diphenhydrAMINE 25 milliGRAM(s) Oral once PRN  DULoxetine 30 milliGRAM(s) Oral daily  enoxaparin Injectable 40 milliGRAM(s) SubCutaneous every 24 hours  ferrous    sulfate 325 milliGRAM(s) Oral daily  finasteride 5 milliGRAM(s) Oral daily  folic acid 1 milliGRAM(s) Oral daily  furosemide    Tablet 20 milliGRAM(s) Oral daily  gabapentin 300 milliGRAM(s) Oral every 8 hours  guaifenesin/dextromethorphan Oral Liquid 10 milliLiter(s) Oral every 4 hours PRN  HYDROmorphone   Tablet 8 milliGRAM(s) Oral every 6 hours PRN  HYDROmorphone   Tablet 4 milliGRAM(s) Oral every 6 hours PRN  HYDROmorphone  Injectable 1 milliGRAM(s) IV Push every 8 hours PRN  influenza   Vaccine 0.5 milliLiter(s) IntraMuscular once  methadone    Tablet 110 milliGRAM(s) Oral daily  multivitamin 1 Tablet(s) Oral daily  ondansetron Injectable 4 milliGRAM(s) IV Push every 6 hours PRN  pantoprazole    Tablet 40 milliGRAM(s) Oral before breakfast  polyethylene glycol 3350 17 Gram(s) Oral daily  QUEtiapine 400 milliGRAM(s) Oral at bedtime  QUEtiapine 100 milliGRAM(s) Oral <User Schedule>  senna 2 Tablet(s) Oral at bedtime  tamsulosin 0.4 milliGRAM(s) Oral at bedtime

## 2024-12-19 NOTE — PROGRESS NOTE ADULT - ASSESSMENT
54 years old male with h/o cervical epidural abscess, b/l LE paralysis, pneumoperitoneum s/p ex-lap w/ ileostomy complicated by MRSA bacteremia and evisceration, Hep C, emphysema on chronic O2, L foot osteomyelitis, bipolar, opioid dependence, HTN, neurogenic bladder s/p IR SPC placement on 10/23/24 present to ED with complain of worsening lower abdominal pain for 5 days and hematuria. SPC was placed \on 10/23/24 by IR, reportedly was pulled slightly since 10/24. Patient reported pain since then but worsened over last 5 days associated with nausea. Denied any fever or chills.      Problem/Plan - 1:  ·  Problem: Urinary tract infection associated with cystostomy catheter - due to carbapenem resistant Pseudomonas.    Continue Zerbaxa - to complete 7 day course.    ID following.    IR consulted- patient underwent CT and exchange of SPC  Seen by      Problem/Plan - 2:  ·  Problem: Lower abdominal pain.   ·  Plan: worsening lower abdominal pain   Pain control--> oral dilaudid prn for severe pain, IV dilaudid for breakthrough pain  Urology following  IR has replaced SPC.    Deescalate Dilaudid on discharge.      Problem/Plan - 3:  ·  Problem: Methadone dependence.   ·  Plan: Previous hospitalists have Called [facility] [phone], talked with nurse manage Corie, confirmed that patient is on methadone 110mg daily and last dose in NH was on 12/9/2024  QTc 469  on EKG 10/21/24, check EKG to evaluate for QTc  On dilaudid 6mg q6hr prn for severe pain at NH, continue same  Bowel regimens---> senna and miralax.     Problem/Plan - 4:  ·  Problem: Chronic respiratory failure with hypercapnia.   ·  Plan: nocturnal NIPPV.     Problem/Plan - 5:  ·  Problem: GERD (gastroesophageal reflux disease).   ·  Plan: on oral PPI.     Problem/Plan - 6:  ·  Problem: BPH (benign prostatic hyperplasia).   ·  Plan: on finasteride and flomax.     Problem/Plan - 7:  ·  Problem: Psychiatric disorder.   ·  Plan: on Seroquel 100mg 10AM, 100mg 6PM and 400mg hs per NH paper  Continue duloxetine 30mg daily    Functional quadriplegia   Bedbound with bilateral lower extremity paralysis  Wound care ordered for lower extremity pressure wound  Probable d/c to SNF 12/20 after Abx course completed.

## 2024-12-20 LAB
ANION GAP SERPL CALC-SCNC: 4 MMOL/L — LOW (ref 5–17)
APPEARANCE UR: ABNORMAL
BACTERIA # UR AUTO: ABNORMAL /HPF
BILIRUB UR-MCNC: ABNORMAL
BUN SERPL-MCNC: 13 MG/DL — SIGNIFICANT CHANGE UP (ref 7–23)
CALCIUM SERPL-MCNC: 9.3 MG/DL — SIGNIFICANT CHANGE UP (ref 8.5–10.1)
CHLORIDE SERPL-SCNC: 107 MMOL/L — SIGNIFICANT CHANGE UP (ref 96–108)
CO2 SERPL-SCNC: 29 MMOL/L — SIGNIFICANT CHANGE UP (ref 22–31)
COLOR SPEC: SIGNIFICANT CHANGE UP
CREAT SERPL-MCNC: 0.66 MG/DL — SIGNIFICANT CHANGE UP (ref 0.5–1.3)
CRP SERPL-MCNC: 20 MG/L — HIGH
DIFF PNL FLD: ABNORMAL
EGFR: 112 ML/MIN/1.73M2 — SIGNIFICANT CHANGE UP
EPI CELLS # UR: PRESENT
ERYTHROCYTE [SEDIMENTATION RATE] IN BLOOD: 58 MM/HR — HIGH (ref 0–20)
GLUCOSE SERPL-MCNC: 102 MG/DL — HIGH (ref 70–99)
GLUCOSE UR QL: NEGATIVE MG/DL — SIGNIFICANT CHANGE UP
HCT VFR BLD CALC: 40.4 % — SIGNIFICANT CHANGE UP (ref 39–50)
HCT VFR BLD CALC: 40.9 % — SIGNIFICANT CHANGE UP (ref 39–50)
HGB BLD-MCNC: 12.5 G/DL — LOW (ref 13–17)
HGB BLD-MCNC: 12.6 G/DL — LOW (ref 13–17)
KETONES UR-MCNC: ABNORMAL MG/DL
LACTATE SERPL-SCNC: 1.8 MMOL/L — SIGNIFICANT CHANGE UP (ref 0.7–2)
LEUKOCYTE ESTERASE UR-ACNC: ABNORMAL
MCHC RBC-ENTMCNC: 27.7 PG — SIGNIFICANT CHANGE UP (ref 27–34)
MCHC RBC-ENTMCNC: 27.9 PG — SIGNIFICANT CHANGE UP (ref 27–34)
MCHC RBC-ENTMCNC: 30.6 G/DL — LOW (ref 32–36)
MCHC RBC-ENTMCNC: 31.2 G/DL — LOW (ref 32–36)
MCV RBC AUTO: 89.6 FL — SIGNIFICANT CHANGE UP (ref 80–100)
MCV RBC AUTO: 90.5 FL — SIGNIFICANT CHANGE UP (ref 80–100)
NITRITE UR-MCNC: NEGATIVE — SIGNIFICANT CHANGE UP
NRBC # BLD: 0 /100 WBCS — SIGNIFICANT CHANGE UP (ref 0–0)
NRBC # BLD: 0 /100 WBCS — SIGNIFICANT CHANGE UP (ref 0–0)
PH UR: 6.5 — SIGNIFICANT CHANGE UP (ref 5–8)
PLATELET # BLD AUTO: 168 K/UL — SIGNIFICANT CHANGE UP (ref 150–400)
PLATELET # BLD AUTO: 190 K/UL — SIGNIFICANT CHANGE UP (ref 150–400)
POTASSIUM SERPL-MCNC: 3.9 MMOL/L — SIGNIFICANT CHANGE UP (ref 3.5–5.3)
POTASSIUM SERPL-SCNC: 3.9 MMOL/L — SIGNIFICANT CHANGE UP (ref 3.5–5.3)
PROT UR-MCNC: 100 MG/DL
RBC # BLD: 4.51 M/UL — SIGNIFICANT CHANGE UP (ref 4.2–5.8)
RBC # BLD: 4.52 M/UL — SIGNIFICANT CHANGE UP (ref 4.2–5.8)
RBC # FLD: 15.1 % — HIGH (ref 10.3–14.5)
RBC # FLD: 15.2 % — HIGH (ref 10.3–14.5)
RBC CASTS # UR COMP ASSIST: ABNORMAL /HPF
SODIUM SERPL-SCNC: 140 MMOL/L — SIGNIFICANT CHANGE UP (ref 135–145)
SP GR SPEC: 1.03 — SIGNIFICANT CHANGE UP (ref 1–1.03)
UROBILINOGEN FLD QL: 1 MG/DL — SIGNIFICANT CHANGE UP (ref 0.2–1)
WBC # BLD: 16.39 K/UL — HIGH (ref 3.8–10.5)
WBC # BLD: 9.94 K/UL — SIGNIFICANT CHANGE UP (ref 3.8–10.5)
WBC # FLD AUTO: 16.39 K/UL — HIGH (ref 3.8–10.5)
WBC # FLD AUTO: 9.94 K/UL — SIGNIFICANT CHANGE UP (ref 3.8–10.5)
WBC UR QL: ABNORMAL /HPF (ref 0–5)

## 2024-12-20 PROCEDURE — 99254 IP/OBS CNSLTJ NEW/EST MOD 60: CPT

## 2024-12-20 PROCEDURE — 99232 SBSQ HOSP IP/OBS MODERATE 35: CPT

## 2024-12-20 PROCEDURE — 99233 SBSQ HOSP IP/OBS HIGH 50: CPT

## 2024-12-20 PROCEDURE — 99254 IP/OBS CNSLTJ NEW/EST MOD 60: CPT | Mod: GC

## 2024-12-20 RX ORDER — POVIDONE IODINE USP, 10% W/W 10 MG/ML
1 SWAB TOPICAL DAILY
Refills: 0 | Status: DISCONTINUED | OUTPATIENT
Start: 2024-12-20 | End: 2024-12-31

## 2024-12-20 RX ORDER — FAMOTIDINE 20 MG/1
20 TABLET, FILM COATED ORAL ONCE
Refills: 0 | Status: COMPLETED | OUTPATIENT
Start: 2024-12-20 | End: 2024-12-20

## 2024-12-20 RX ORDER — ACETAMINOPHEN 80 MG/.8ML
650 SOLUTION/ DROPS ORAL EVERY 6 HOURS
Refills: 0 | Status: DISCONTINUED | OUTPATIENT
Start: 2024-12-20 | End: 2024-12-31

## 2024-12-20 RX ADMIN — QUETIAPINE FUMARATE 400 MILLIGRAM(S): 100 TABLET, FILM COATED ORAL at 22:26

## 2024-12-20 RX ADMIN — METHADONE HYDROCHLORIDE 110 MILLIGRAM(S): 10 TABLET ORAL at 11:41

## 2024-12-20 RX ADMIN — IPRATROPIUM BROMIDE AND ALBUTEROL SULFATE 3 MILLILITER(S): .5; 2.5 SOLUTION RESPIRATORY (INHALATION) at 17:32

## 2024-12-20 RX ADMIN — Medication 1 TABLET(S): at 11:42

## 2024-12-20 RX ADMIN — QUETIAPINE FUMARATE 100 MILLIGRAM(S): 100 TABLET, FILM COATED ORAL at 17:43

## 2024-12-20 RX ADMIN — ENOXAPARIN SODIUM 40 MILLIGRAM(S): 60 INJECTION INTRAVENOUS; SUBCUTANEOUS at 11:43

## 2024-12-20 RX ADMIN — GABAPENTIN 300 MILLIGRAM(S): 300 CAPSULE ORAL at 22:27

## 2024-12-20 RX ADMIN — CEFTOLOZANE AND TAZOBACTAM 100 MILLIGRAM(S): 1; .5 INJECTION, POWDER, LYOPHILIZED, FOR SOLUTION INTRAVENOUS at 12:36

## 2024-12-20 RX ADMIN — ACETAMINOPHEN 650 MILLIGRAM(S): 80 SOLUTION/ DROPS ORAL at 16:41

## 2024-12-20 RX ADMIN — QUETIAPINE FUMARATE 100 MILLIGRAM(S): 100 TABLET, FILM COATED ORAL at 10:42

## 2024-12-20 RX ADMIN — TAMSULOSIN HYDROCHLORIDE 0.4 MILLIGRAM(S): 0.4 CAPSULE ORAL at 22:27

## 2024-12-20 RX ADMIN — CEFTOLOZANE AND TAZOBACTAM 100 MILLIGRAM(S): 1; .5 INJECTION, POWDER, LYOPHILIZED, FOR SOLUTION INTRAVENOUS at 05:21

## 2024-12-20 RX ADMIN — Medication 8 MILLIGRAM(S): at 16:41

## 2024-12-20 RX ADMIN — IPRATROPIUM BROMIDE AND ALBUTEROL SULFATE 3 MILLILITER(S): .5; 2.5 SOLUTION RESPIRATORY (INHALATION) at 05:04

## 2024-12-20 RX ADMIN — IPRATROPIUM BROMIDE AND ALBUTEROL SULFATE 3 MILLILITER(S): .5; 2.5 SOLUTION RESPIRATORY (INHALATION) at 11:12

## 2024-12-20 RX ADMIN — Medication 325 MILLIGRAM(S): at 11:42

## 2024-12-20 RX ADMIN — Medication 8 MILLIGRAM(S): at 17:41

## 2024-12-20 RX ADMIN — IPRATROPIUM BROMIDE AND ALBUTEROL SULFATE 3 MILLILITER(S): .5; 2.5 SOLUTION RESPIRATORY (INHALATION) at 23:16

## 2024-12-20 RX ADMIN — GABAPENTIN 300 MILLIGRAM(S): 300 CAPSULE ORAL at 14:57

## 2024-12-20 RX ADMIN — Medication 5 MILLIGRAM(S): at 12:36

## 2024-12-20 RX ADMIN — FAMOTIDINE 20 MILLIGRAM(S): 20 TABLET, FILM COATED ORAL at 15:35

## 2024-12-20 RX ADMIN — PANTOPRAZOLE 40 MILLIGRAM(S): 40 TABLET, DELAYED RELEASE ORAL at 08:31

## 2024-12-20 RX ADMIN — Medication 20 MILLIGRAM(S): at 05:21

## 2024-12-20 RX ADMIN — GABAPENTIN 300 MILLIGRAM(S): 300 CAPSULE ORAL at 05:20

## 2024-12-20 RX ADMIN — ACETAMINOPHEN 650 MILLIGRAM(S): 80 SOLUTION/ DROPS ORAL at 17:41

## 2024-12-20 RX ADMIN — Medication 8 MILLIGRAM(S): at 22:49

## 2024-12-20 RX ADMIN — Medication 1 MILLIGRAM(S): at 11:42

## 2024-12-20 NOTE — PROGRESS NOTE ADULT - SUBJECTIVE AND OBJECTIVE BOX
Patient: STAN VEGA [id number] 54y Male                            Hospitalist Attending Note    No fever / chills.  Still c/o vague lower body pain requiring Dilaudid.    T 100.      ____________________PHYSICAL EXAM:  GENERAL:  NAD Alert and Oriented x 3   HEENT: NCAT  CARDIOVASCULAR:  S1, S2  LUNGS: CTAB  : SPC in place  ABDOMEN:  soft, (-) tenderness, (-) distension, (+) bowel sounds, (-) guarding, (-) rebound (-) rigidity.  Ileostomy  EXTREMITIES:  no cyanosis / clubbing / edema. R heel stage II pressure injury 2cm x 2cm x .1 cm, L dorsum of foot stage II pressure injury 3.5cm x 4.5cm x .1 cm  NEURO: b/l LE paraplegia.    ____________________    VITALS:  Vital Signs Last 24 Hrs  T(C): 37.8 (20 Dec 2024 10:35), Max: 37.8 (20 Dec 2024 10:35)  T(F): 100 (20 Dec 2024 10:35), Max: 100 (20 Dec 2024 10:35)  HR: 95 (20 Dec 2024 11:12) (73 - 99)  BP: 136/73 (20 Dec 2024 10:35) (110/72 - 136/73)  BP(mean): --  RR: 17 (20 Dec 2024 10:35) (16 - 18)  SpO2: 95% (20 Dec 2024 11:12) (93% - 97%)    Parameters below as of 20 Dec 2024 11:12  Patient On (Oxygen Delivery Method): nasal cannula     Daily     Daily   CAPILLARY BLOOD GLUCOSE        I&O's Summary    19 Dec 2024 07:01  -  20 Dec 2024 07:00  --------------------------------------------------------  IN: 200 mL / OUT: 1226 mL / NET: -1026 mL        LABS:                        12.5   9.94  )-----------( 168      ( 20 Dec 2024 06:39 )             40.9     12-20    140  |  107  |  13  ----------------------------<  102[H]  3.9   |  29  |  0.66    Ca    9.3      20 Dec 2024 06:39          Urinalysis Basic - ( 20 Dec 2024 06:39 )    Color: x / Appearance: x / SG: x / pH: x  Gluc: 102 mg/dL / Ketone: x  / Bili: x / Urobili: x   Blood: x / Protein: x / Nitrite: x   Leuk Esterase: x / RBC: x / WBC x   Sq Epi: x / Non Sq Epi: x / Bacteria: x              MEDICATIONS:  acetaminophen     Tablet .. 650 milliGRAM(s) Oral every 6 hours PRN  albuterol/ipratropium for Nebulization 3 milliLiter(s) Nebulizer every 6 hours  benzocaine/menthol Lozenge 1 Lozenge Oral every 4 hours PRN  ceftolozane/tazobactam IVPB 1500 milliGRAM(s) IV Intermittent every 8 hours  diphenhydrAMINE 25 milliGRAM(s) Oral once PRN  DULoxetine 30 milliGRAM(s) Oral daily  enoxaparin Injectable 40 milliGRAM(s) SubCutaneous every 24 hours  ferrous    sulfate 325 milliGRAM(s) Oral daily  finasteride 5 milliGRAM(s) Oral daily  folic acid 1 milliGRAM(s) Oral daily  furosemide    Tablet 20 milliGRAM(s) Oral daily  gabapentin 300 milliGRAM(s) Oral every 8 hours  guaifenesin/dextromethorphan Oral Liquid 10 milliLiter(s) Oral every 4 hours PRN  HYDROmorphone   Tablet 8 milliGRAM(s) Oral every 6 hours PRN  HYDROmorphone   Tablet 4 milliGRAM(s) Oral every 6 hours PRN  influenza   Vaccine 0.5 milliLiter(s) IntraMuscular once  methadone    Tablet 110 milliGRAM(s) Oral daily  multivitamin 1 Tablet(s) Oral daily  ondansetron Injectable 4 milliGRAM(s) IV Push every 6 hours PRN  pantoprazole    Tablet 40 milliGRAM(s) Oral before breakfast  polyethylene glycol 3350 17 Gram(s) Oral daily  QUEtiapine 400 milliGRAM(s) Oral at bedtime  QUEtiapine 100 milliGRAM(s) Oral <User Schedule>  senna 2 Tablet(s) Oral at bedtime  tamsulosin 0.4 milliGRAM(s) Oral at bedtime                               Patient: STAN VEGA [id number] 54y Male                            Hospitalist Attending Note    No fever / chills.  Still c/o vague lower body pain requiring Dilaudid.    T 100.      ____________________PHYSICAL EXAM:  GENERAL:  NAD Alert and Oriented x 3   HEENT: NCAT  CARDIOVASCULAR:  S1, S2  LUNGS: CTAB  : SPC in place  ABDOMEN:  soft, (-) tenderness, (-) distension, (+) bowel sounds, (-) guarding, (-) rebound (-) rigidity.  Ileostomy  EXTREMITIES:  no cyanosis / clubbing / edema. R heel stage II pressure injury 2cm x 2cm x .1 cm, L dorsum of foot stage II pressure injury 3.5cm x 4.5cm x .1 cm  NEURO: b/l LE paraplegia.    SKIN: R heel Stage II 2x2x0.1cm ulcer  L dorsal foot Stage II 3.5x4.5x0.1cm ulcer  L 2nd toe Stage II 2x2cm ulcer  L 4th toe Stage II 2x2cm ulcer  L 5th toe Stage II 1x2cm ulcer  ____________________    VITALS:  Vital Signs Last 24 Hrs  T(C): 37.8 (20 Dec 2024 10:35), Max: 37.8 (20 Dec 2024 10:35)  T(F): 100 (20 Dec 2024 10:35), Max: 100 (20 Dec 2024 10:35)  HR: 95 (20 Dec 2024 11:12) (73 - 99)  BP: 136/73 (20 Dec 2024 10:35) (110/72 - 136/73)  BP(mean): --  RR: 17 (20 Dec 2024 10:35) (16 - 18)  SpO2: 95% (20 Dec 2024 11:12) (93% - 97%)    Parameters below as of 20 Dec 2024 11:12  Patient On (Oxygen Delivery Method): nasal cannula     Daily     Daily   CAPILLARY BLOOD GLUCOSE        I&O's Summary    19 Dec 2024 07:01  -  20 Dec 2024 07:00  --------------------------------------------------------  IN: 200 mL / OUT: 1226 mL / NET: -1026 mL        LABS:                        12.5   9.94  )-----------( 168      ( 20 Dec 2024 06:39 )             40.9     12-20    140  |  107  |  13  ----------------------------<  102[H]  3.9   |  29  |  0.66    Ca    9.3      20 Dec 2024 06:39          Urinalysis Basic - ( 20 Dec 2024 06:39 )    Color: x / Appearance: x / SG: x / pH: x  Gluc: 102 mg/dL / Ketone: x  / Bili: x / Urobili: x   Blood: x / Protein: x / Nitrite: x   Leuk Esterase: x / RBC: x / WBC x   Sq Epi: x / Non Sq Epi: x / Bacteria: x              MEDICATIONS:  acetaminophen     Tablet .. 650 milliGRAM(s) Oral every 6 hours PRN  albuterol/ipratropium for Nebulization 3 milliLiter(s) Nebulizer every 6 hours  benzocaine/menthol Lozenge 1 Lozenge Oral every 4 hours PRN  ceftolozane/tazobactam IVPB 1500 milliGRAM(s) IV Intermittent every 8 hours  diphenhydrAMINE 25 milliGRAM(s) Oral once PRN  DULoxetine 30 milliGRAM(s) Oral daily  enoxaparin Injectable 40 milliGRAM(s) SubCutaneous every 24 hours  ferrous    sulfate 325 milliGRAM(s) Oral daily  finasteride 5 milliGRAM(s) Oral daily  folic acid 1 milliGRAM(s) Oral daily  furosemide    Tablet 20 milliGRAM(s) Oral daily  gabapentin 300 milliGRAM(s) Oral every 8 hours  guaifenesin/dextromethorphan Oral Liquid 10 milliLiter(s) Oral every 4 hours PRN  HYDROmorphone   Tablet 8 milliGRAM(s) Oral every 6 hours PRN  HYDROmorphone   Tablet 4 milliGRAM(s) Oral every 6 hours PRN  influenza   Vaccine 0.5 milliLiter(s) IntraMuscular once  methadone    Tablet 110 milliGRAM(s) Oral daily  multivitamin 1 Tablet(s) Oral daily  ondansetron Injectable 4 milliGRAM(s) IV Push every 6 hours PRN  pantoprazole    Tablet 40 milliGRAM(s) Oral before breakfast  polyethylene glycol 3350 17 Gram(s) Oral daily  QUEtiapine 400 milliGRAM(s) Oral at bedtime  QUEtiapine 100 milliGRAM(s) Oral <User Schedule>  senna 2 Tablet(s) Oral at bedtime  tamsulosin 0.4 milliGRAM(s) Oral at bedtime                               Patient: STAN VEGA [id number] 54y Male                            Hospitalist Attending Note    No fever / chills.  Still c/o vague lower body pain requiring Dilaudid.    T 100.    Seen with ID     ____________________PHYSICAL EXAM:  GENERAL:  NAD Alert and Oriented x 3   HEENT: NCAT  CARDIOVASCULAR:  S1, S2  LUNGS: CTAB  : SPC in place  ABDOMEN:  soft, (-) tenderness, (-) distension, (+) bowel sounds, (-) guarding, (-) rebound (-) rigidity.  Ileostomy  EXTREMITIES:  no cyanosis / clubbing / edema. R heel stage II pressure injury 2cm x 2cm x .1 cm, L dorsum of foot stage II pressure injury 3.5cm x 4.5cm x .1 cm  NEURO: b/l LE paraplegia.    SKIN: R heel Stage II 2x2x0.1cm ulcer  L dorsal foot Stage II 3.5x4.5x0.1cm ulcer  L 2nd toe Stage II 2x2cm ulcer  L 4th toe Stage II 2x2cm ulcer  L 5th toe Stage II 1x2cm ulcer  ____________________    VITALS:  Vital Signs Last 24 Hrs  T(C): 37.8 (20 Dec 2024 10:35), Max: 37.8 (20 Dec 2024 10:35)  T(F): 100 (20 Dec 2024 10:35), Max: 100 (20 Dec 2024 10:35)  HR: 95 (20 Dec 2024 11:12) (73 - 99)  BP: 136/73 (20 Dec 2024 10:35) (110/72 - 136/73)  BP(mean): --  RR: 17 (20 Dec 2024 10:35) (16 - 18)  SpO2: 95% (20 Dec 2024 11:12) (93% - 97%)    Parameters below as of 20 Dec 2024 11:12  Patient On (Oxygen Delivery Method): nasal cannula     Daily     Daily   CAPILLARY BLOOD GLUCOSE        I&O's Summary    19 Dec 2024 07:01  -  20 Dec 2024 07:00  --------------------------------------------------------  IN: 200 mL / OUT: 1226 mL / NET: -1026 mL        LABS:                        12.5   9.94  )-----------( 168      ( 20 Dec 2024 06:39 )             40.9     12-20    140  |  107  |  13  ----------------------------<  102[H]  3.9   |  29  |  0.66    Ca    9.3      20 Dec 2024 06:39          Urinalysis Basic - ( 20 Dec 2024 06:39 )    Color: x / Appearance: x / SG: x / pH: x  Gluc: 102 mg/dL / Ketone: x  / Bili: x / Urobili: x   Blood: x / Protein: x / Nitrite: x   Leuk Esterase: x / RBC: x / WBC x   Sq Epi: x / Non Sq Epi: x / Bacteria: x              MEDICATIONS:  acetaminophen     Tablet .. 650 milliGRAM(s) Oral every 6 hours PRN  albuterol/ipratropium for Nebulization 3 milliLiter(s) Nebulizer every 6 hours  benzocaine/menthol Lozenge 1 Lozenge Oral every 4 hours PRN  ceftolozane/tazobactam IVPB 1500 milliGRAM(s) IV Intermittent every 8 hours  diphenhydrAMINE 25 milliGRAM(s) Oral once PRN  DULoxetine 30 milliGRAM(s) Oral daily  enoxaparin Injectable 40 milliGRAM(s) SubCutaneous every 24 hours  ferrous    sulfate 325 milliGRAM(s) Oral daily  finasteride 5 milliGRAM(s) Oral daily  folic acid 1 milliGRAM(s) Oral daily  furosemide    Tablet 20 milliGRAM(s) Oral daily  gabapentin 300 milliGRAM(s) Oral every 8 hours  guaifenesin/dextromethorphan Oral Liquid 10 milliLiter(s) Oral every 4 hours PRN  HYDROmorphone   Tablet 8 milliGRAM(s) Oral every 6 hours PRN  HYDROmorphone   Tablet 4 milliGRAM(s) Oral every 6 hours PRN  influenza   Vaccine 0.5 milliLiter(s) IntraMuscular once  methadone    Tablet 110 milliGRAM(s) Oral daily  multivitamin 1 Tablet(s) Oral daily  ondansetron Injectable 4 milliGRAM(s) IV Push every 6 hours PRN  pantoprazole    Tablet 40 milliGRAM(s) Oral before breakfast  polyethylene glycol 3350 17 Gram(s) Oral daily  QUEtiapine 400 milliGRAM(s) Oral at bedtime  QUEtiapine 100 milliGRAM(s) Oral <User Schedule>  senna 2 Tablet(s) Oral at bedtime  tamsulosin 0.4 milliGRAM(s) Oral at bedtime

## 2024-12-20 NOTE — CONSULT NOTE ADULT - SUBJECTIVE AND OBJECTIVE BOX
HPI:  54 years old male with h/o cervical epidural abscess, b/l LE paralysis, pneumoperitoneum s/p ex-lap w/ ileostomy complicated by MRSA bacteremia and evisceration, Hep C, emphysema on chronic O2, L foot osteomyelitis, bipolar, opioid dependence, HTN, neurogenic bladder s/p IR SPC placement on 10/23/24 present to ED with complain of worsening lower abdominal pain for 5 days and hematuria. SPC was placed 10/23/24 by IR, reportedly was pulled slightly since 10/24. Patient reported pain since then but worsened over last 5 days associated with nausea. Denied any fever or chills.   Hemodynamically stable, afebrile, sat well at RA. No leukocytosis, plt 181, Cr 0.69. UA dirty. CXR with no focal consolidation (10 Dec 2024 11:42)  Febrile LEft dorsal pressure ulcer  PAtient thinks infection coming from hip or abdomin    PAST MEDICAL & SURGICAL HISTORY:  CAD (coronary artery disease)      HTN (hypertension)      HLD (hyperlipidemia)      Neurogenic bladder      Bipolar disorder      S/P ileostomy      Chronic hepatitis C virus infection      S/P laminectomy      S/P ileostomy            MEDICATIONS  (STANDING):  albuterol/ipratropium for Nebulization 3 milliLiter(s) Nebulizer every 6 hours  DULoxetine 30 milliGRAM(s) Oral daily  enoxaparin Injectable 40 milliGRAM(s) SubCutaneous every 24 hours  ferrous    sulfate 325 milliGRAM(s) Oral daily  finasteride 5 milliGRAM(s) Oral daily  folic acid 1 milliGRAM(s) Oral daily  furosemide    Tablet 20 milliGRAM(s) Oral daily  gabapentin 300 milliGRAM(s) Oral every 8 hours  influenza   Vaccine 0.5 milliLiter(s) IntraMuscular once  methadone    Tablet 110 milliGRAM(s) Oral daily  multivitamin 1 Tablet(s) Oral daily  pantoprazole    Tablet 40 milliGRAM(s) Oral before breakfast  polyethylene glycol 3350 17 Gram(s) Oral daily  QUEtiapine 400 milliGRAM(s) Oral at bedtime  QUEtiapine 100 milliGRAM(s) Oral <User Schedule>  senna 2 Tablet(s) Oral at bedtime  tamsulosin 0.4 milliGRAM(s) Oral at bedtime    MEDICATIONS  (PRN):  acetaminophen     Tablet .. 650 milliGRAM(s) Oral every 6 hours PRN Mild Pain (1 - 3)  benzocaine/menthol Lozenge 1 Lozenge Oral every 4 hours PRN Sore Throat  diphenhydrAMINE 25 milliGRAM(s) Oral once PRN Rash and/or Itching  guaifenesin/dextromethorphan Oral Liquid 10 milliLiter(s) Oral every 4 hours PRN Cough  HYDROmorphone   Tablet 8 milliGRAM(s) Oral every 6 hours PRN Severe Pain (7 - 10)  HYDROmorphone   Tablet 4 milliGRAM(s) Oral every 6 hours PRN Moderate Pain (4 - 6)  ondansetron Injectable 4 milliGRAM(s) IV Push every 6 hours PRN Nausea and/or Vomiting      Allergies    NSAIDs (Flushing; Other (Moderate))  Aleve (Unknown)  Risperdal (Short breath; Rash; Hives)  Stelazine (Unknown)  Haldol (Anaphylaxis)  Motrin (Anaphylaxis)  Thorazine (Other (Moderate))  Zyprexa (Rash; Dystonic RXN; Hives)    Intolerances        SOCIAL HISTORY:Smoking history  Alcohol history  Drug history    FAMILY HISTORY:      Vital Signs Last 24 Hrs  T(C): 37.9 (20 Dec 2024 11:50), Max: 37.9 (20 Dec 2024 11:50)  T(F): 100.3 (20 Dec 2024 11:50), Max: 100.3 (20 Dec 2024 11:50)  HR: 95 (20 Dec 2024 11:12) (73 - 99)  BP: 136/73 (20 Dec 2024 10:35) (110/72 - 136/73)  BP(mean): --  RR: 17 (20 Dec 2024 10:35) (16 - 18)  SpO2: 95% (20 Dec 2024 11:12) (93% - 97%)    Parameters below as of 20 Dec 2024 11:12  Patient On (Oxygen Delivery Method): nasal cannula        PHYSICAL EXAM:    GENERAL: Severly contracted both legs (rigid) white male  NERVOUS SYSTEM:  Alert & Oriented X3, Good concentration  ; Motor Strength 05 B/L lower extremities; DTRs0 act and symmetric   Sensorium  absent  EXTREMITIES:  non palpral Pulses,   No clubbing, cyanosis, or edema   Vascularity Skin appearance hair absent   LYMPH: No lymphadenopathy noted  ORTHOPEDIC: foot deformities both legs rigidly contracted Both feet in rigid plantarflexed position( unable to straighten  ULCER site: dorsum of left foot   3cm x 2cm x .2cm  Serosaguious drainage No cellulitis from foot and not mal oderous but mal oder detected in the room  (patient states maloder from abdominal wound)    9.94  )-----------( 168      ( 20 Dec 2024 06:39 )             40.9     12-20    140  |  107  |  13  ----------------------------<  102[H]  3.9   |  29  |  0.66    Ca    9.3      20 Dec 2024 06:39        Urinalysis Basic - ( 20 Dec 2024 06:39 )    Color: x / Appearance: x / SG: x / pH: x  Gluc: 102 mg/dL / Ketone: x  / Bili: x / Urobili: x   Blood: x / Protein: x / Nitrite: x   Leuk Esterase: x / RBC: x / WBC x   Sq Epi: x / Non Sq Epi: x / Bacteria: x            RADIOLOGY & ADDITIONAL STUDIES:

## 2024-12-20 NOTE — PROGRESS NOTE ADULT - ASSESSMENT
54 years old male with h/o cervical epidural abscess, b/l LE paralysis, pneumoperitoneum s/p ex-lap w/ ileostomy complicated by MRSA bacteremia and evisceration, Hep C ( VL UD based on labs from 1/2024), emphysema on chronic O2, L foot osteomyelitis,-treated    bipolar, opioid dependence, HTN, neurogenic bladder s/p IR SPC placement on 10/23/24 present to ED with complain of worsening lower abdominal pain for 5 days and hematuria. SPC was placed 10/23/24 by IR, reportedly was pulled slightly since 10/24. Patient reported pain since then but worsened over last 5 days associated with nausea. Denied any fever or chills.   Hemodynamically stable, afebrile, sat well at RA. No leukocytosis, plt 181, Cr 0.69. UA dirty. CXR with no focal consolidation (10 Dec 2024 11:42)    Infectious Disease consult requested today 12/13/2024 to help with antibiotic management for carbapenem resistant pseudomonas in urine cx.    patient  is s/p SPC exchange by IR on 12/11/2024    Afebrile  no leukocytosis  UA- turbid and pos LE, neg -nitrates  no blood cx.  previous urine cx from 10/2024-[facility] ( was treated by my colleague  with IV abx and d/c on po cefpodoxime )    patient is prone to MDR  organisms  in the urine in light of chronic west and chronic SPC secondary to neurogenic bladder.  he has had extensive h/o infections in heels and foot as well ( all treated in past)    in order to help mitigate these UTIs  would advise to have SPC exchange every 3-4 weeks     12/16: no fever, on RA at the time of my exam and appears to be comfortable, no leukocytosis, Cr ok, UC with carbapenem resistant pseudomonas, Zerbaxa continued (day #4). Pt with mild abdominal discomfort today, no vomiting, having bowel movements, will monitor.   12/18: afebrile, RA, no leukocytosis, Cr ok, no BCs available, UC with  pseudomonas, today is day #5 of abx Zerbaxa, pt will need two more days to complete 7 days total.   12/20: complaining of a lot of pain in his hip, has a healed wound, foot presses up against lower hip causing pressure injury both to the foot and the hip, no obvious external tract but there is significant pain with movement of the hip and knee. Would recommend podiatry and ortho consults as well as imaging of the left hip and knee. should stay off antibiotics as he compleated the antibiotics for the UTI and would like to see if there are s/s or worsening infection from his hip or foot.      Impression-  Chronic septic arthritis of left hip   pain in extremities contracted   MDR UTI in patient with chronic SPC   neurogenic bladder  bed-bound  colostomy present      plan-  completed zerbaxa to treat the carbapenem resistant pseudomonas   frequent turns, offloading, and nutrition optimization to avoid bedsores-consider protective heel/leg protectors   advise to monitor for aspiration precautions   will need every 3-4 weeks SPC exchange   advise to apply nystatin ointment to skin folds with the fungal rash BID for 5 days.  ortho consult   podiatry consult   CT left hip/ lower extremity and consider repeat CT a/p   next week possible IR aspiration of hip off antibiotics   tomorrow send 2 sets of blood cultures     Discussed plan with Dr Mary Granados, DO  Chief, Infectious Disease at [facility]  Reachable via [employer] Teams or [place] office: [phone]  Weekdays: After 5pm, please call [phone] for all inquiries and new consults  Weekends: Message on-call infectious disease physician via [email or web address] ([email or web address])

## 2024-12-20 NOTE — PROGRESS NOTE ADULT - ASSESSMENT
54 years old male with h/o cervical epidural abscess, b/l LE paralysis, pneumoperitoneum s/p ex-lap w/ ileostomy complicated by MRSA bacteremia and evisceration, Hep C, emphysema on chronic O2, L foot osteomyelitis, bipolar, opioid dependence, HTN, neurogenic bladder s/p IR SPC placement on 10/23/24 present to ED with complain of worsening lower abdominal pain for 5 days and hematuria. SPC was placed \on 10/23/24 by IR, reportedly was pulled slightly since 10/24. Patient reported pain since then but worsened over last 5 days associated with nausea. Denied any fever or chills.      Problem/Plan - 1:  ·  Problem: Urinary tract infection associated with cystostomy catheter - due to carbapenem resistant Pseudomonas.    Continue Zerbaxa - to complete 7 day course.    ID following.    IR consulted- patient underwent CT and exchange of SPC  Seen by   In setting of elevated temp, will discuss abx course with ID.      # Abnormal CT A/P- CT from this admission showed No significant change from prior study.  Postoperative changes and findings which may be related to decubitus ulcers and chronic dislocation and septic arthritis of the left hip.  Prior CT from 7/2024.  I discussed findings with ortho - referred to general surgery as ? collection near L hip appears to originate from decubitus ulcer.       Problem/Plan - 2:  ·  Problem: Lower abdominal pain.   ·  Plan: worsening lower abdominal pain   Pain control--> oral dilaudid prn for severe pain, IV dilaudid for breakthrough pain  Urology following  IR has replaced SPC.    Deescalate Dilaudid      Problem/Plan - 3:  ·  Problem: Methadone dependence.   ·  Plan: Previous hospitalists have Called [facility] [phone], talked with nurse manage Corie, confirmed that patient is on methadone 110mg daily and last dose in NH was on 12/9/2024  QTc 469  on EKG 10/21/24, check EKG to evaluate for QTc  On dilaudid 6mg q6hr prn for severe pain at NH, continue same  Bowel regimens---> senna and miralax.     Problem/Plan - 4:  ·  Problem: Chronic respiratory failure with hypercapnia.   ·  Plan: nocturnal NIPPV.     Problem/Plan - 5:  ·  Problem: GERD (gastroesophageal reflux disease).   ·  Plan: on oral PPI.     Problem/Plan - 6:  ·  Problem: BPH (benign prostatic hyperplasia).   ·  Plan: on finasteride and flomax.     Problem/Plan - 7:  ·  Problem: Psychiatric disorder.   ·  Plan: on Seroquel 100mg 10AM, 100mg 6PM and 400mg hs per NH paper  Continue duloxetine 30mg daily    Functional quadriplegia   Bedbound with bilateral lower extremity paralysis  Wound care ordered for lower extremity pressure wound  Probable d/c to SNF 12/20 after Abx course completed.   54 years old male with h/o cervical epidural abscess, b/l LE paralysis, pneumoperitoneum s/p ex-lap w/ ileostomy complicated by MRSA bacteremia and evisceration, Hep C, emphysema on chronic O2, L foot osteomyelitis, bipolar, opioid dependence, HTN, neurogenic bladder s/p IR SPC placement on 10/23/24 present to ED with complain of worsening lower abdominal pain for 5 days and hematuria. SPC was placed \on 10/23/24 by IR, reportedly was pulled slightly since 10/24. Patient reported pain since then but worsened over last 5 days associated with nausea. Denied any fever or chills.      Problem/Plan - 1:  ·  Problem: Urinary tract infection associated with cystostomy catheter - due to carbapenem resistant Pseudomonas.    Continue Zerbaxa - to complete 7 day course.    ID following.    IR consulted- patient underwent CT and exchange of SPC  Seen by   In setting of elevated temp, will discuss abx course with ID.      # Abnormal CT A/P- CT from this admission showed No significant change from prior study.  Postoperative changes and findings which may be related to decubitus ulcers and chronic dislocation and septic arthritis of the left hip.  Prior CT from 7/2024.  I discussed findings with ortho - referred to general surgery as ? collection near L hip appears to originate from decubitus ulcer.    # L dorsal Foot Ulcer - podiatry input discussed with Dr. Figueroa.     Problem/Plan - 2:  ·  Problem: Lower abdominal pain.   ·  Plan: worsening lower abdominal pain   Pain control--> oral dilaudid prn for severe pain, IV dilaudid for breakthrough pain  Urology following  IR has replaced SPC.    Deescalate Dilaudid      Problem/Plan - 3:  ·  Problem: Methadone dependence.   ·  Plan: Previous hospitalists have Called [facility] [phone], talked with nurse manage Corie, confirmed that patient is on methadone 110mg daily and last dose in NH was on 12/9/2024  QTc 469  on EKG 10/21/24, check EKG to evaluate for QTc  On dilaudid 6mg q6hr prn for severe pain at NH, continue same  Bowel regimens---> senna and miralax.     Problem/Plan - 4:  ·  Problem: Chronic respiratory failure with hypercapnia.   ·  Plan: nocturnal NIPPV.     Problem/Plan - 5:  ·  Problem: GERD (gastroesophageal reflux disease).   ·  Plan: on oral PPI.     Problem/Plan - 6:  ·  Problem: BPH (benign prostatic hyperplasia).   ·  Plan: on finasteride and flomax.     Problem/Plan - 7:  ·  Problem: Psychiatric disorder.   ·  Plan: on Seroquel 100mg 10AM, 100mg 6PM and 400mg hs per NH paper  Continue duloxetine 30mg daily    Functional quadriplegia   Bedbound with bilateral lower extremity paralysis  Wound care ordered for lower extremity pressure wound    Plan of care discussed with ortho, podiatry, Vascular, ID.  Time spent by me managing the patient including but not limited to reviewing the chart, discussion with consultants, the IDR team (nurse//care manager/ACP), physical exam and assessment and plan is 75 mins

## 2024-12-20 NOTE — PHARMACOTHERAPY INTERVENTION NOTE - COMMENTS
Recommended to add stop date for 7-day total duration of LORAINE-TWAN. 
Recommended to switch ceftriaxone to pip-tazo as urine culture is growing pseudomonas.

## 2024-12-20 NOTE — CONSULT NOTE ADULT - SUBJECTIVE AND OBJECTIVE BOX
Pt is a 54y Male who was admitted to the hospital for UTI, orthopaedic surgery team consulted for CT imaging findings of chronic L hip dislocation, possible septic arthritis. Pt is a functional paraplegic, bedbound since 2021. Has chronic history of decubitus ulcers. Prior CT imaging from 6/25/24 shows "Large fluid track behind left hip consistent with decubitus ulcer and dislocation of left hip which is likely chronic and likely involves septic arthritis." Since current hospital admission, pt has been afebrile with normal WBCs and finished a 7 day course of Zerbaxa for his complicated UTI today. Denies fevers, chills, CP, SOB, N/V/D.    Vital Signs (24 Hrs):  T(C): 37.9 (12-20-24 @ 11:50), Max: 37.9 (12-20-24 @ 11:50)  HR: 95 (12-20-24 @ 11:12) (73 - 99)  BP: 136/73 (12-20-24 @ 10:35) (110/72 - 136/73)  RR: 17 (12-20-24 @ 10:35) (16 - 18)  SpO2: 95% (12-20-24 @ 11:12) (93% - 97%)    LABS:                        12.5   9.94  )-----------( 168      ( 20 Dec 2024 06:39 )             40.9     12-20    140  |  107  |  13  ----------------------------<  102[H]  3.9   |  29  |  0.66    Ca    9.3      20 Dec 2024 06:39      Physical Exam:  General: NAD, pt laying in bed comfortably    LLE:  ~5cm pressure wound on posterior aspect of upper thigh, flexion contracture noted  Compartments soft, compressible  No motor/sensory function  +DP    Imaging:  CT Abd/Pelvis:  IMPRESSION:  No significant change from prior study.  Postoperative changes and findings which may be related to decubitus   ulcers and chronic dislocation and septic arthritis of the left hip.    A/P:  54y Male who presents with chronic L hip dislocation, likely chronic septic arthritis  Fluid tract behind left hip likely from decubitus ulcer, findings are chronic in nature and unchanged from June 2024. Would defer to Wound Care, general surgery team for wound management.    If pt develops symptoms concerning for worsening septic arthritis and sepsis, would restart antibiotics and consult IR for possible L hip aspiration  WBAT  PT/OT  Analgesics  DVT PPx per primary team  No acute orthopaedic interventions at this time  Stable for discharge from orthopaedic standpoint   Follow up with Dr. Bean in office for further management  Ortho to sign off, can reconsult with any changes in clinical course  Discussed plan with Dr. Bean who agrees with above

## 2024-12-20 NOTE — PROGRESS NOTE ADULT - SUBJECTIVE AND OBJECTIVE BOX
[facility]  INFECTIOUS DISEASES   [address] [place] [address]  Tel: [phone]     Fax: [phone]  ==============================================================================  DO Kush Cadena MD Alexandra Gutman, NP   ==============================================================================      STAN VEGA  MRN-[id number]  54y ([birth year])      Interval History:    ROS:    [ ] Unobtainable because:  [ ] All other systems negative except as noted    Constitutional: no fever, no chills  Head: no trauma  Eyes: no vision changes, no eye pain  ENT:  no sore throat, no rhinorrhea  Cardiovascular:  no chest pain, no palpitation  Respiratory:  no SOB, no cough  GI:  no abd pain, no vomiting, no diarrhea  urinary: no dysuria, no hematuria, no flank pain  musculoskeletal:  no joint pain, no joint swelling  skin:  no rash  neurology:  no headache, no seizure, no change in mental status  psych: no anxiety, no depression         Allergies  NSAIDs (Flushing; Other (Moderate))  Aleve (Unknown)  Risperdal (Short breath; Rash; Hives)  Stelazine (Unknown)  Haldol (Anaphylaxis)  Motrin (Anaphylaxis)  Thorazine (Other (Moderate))  Zyprexa (Rash; Dystonic RXN; Hives)        ANTIMICROBIALS:      OTHER MEDS:  acetaminophen     Tablet .. 650 milliGRAM(s) Oral every 6 hours PRN  albuterol/ipratropium for Nebulization 3 milliLiter(s) Nebulizer every 6 hours  benzocaine/menthol Lozenge 1 Lozenge Oral every 4 hours PRN  diphenhydrAMINE 25 milliGRAM(s) Oral once PRN  DULoxetine 30 milliGRAM(s) Oral daily  enoxaparin Injectable 40 milliGRAM(s) SubCutaneous every 24 hours  ferrous    sulfate 325 milliGRAM(s) Oral daily  finasteride 5 milliGRAM(s) Oral daily  folic acid 1 milliGRAM(s) Oral daily  furosemide    Tablet 20 milliGRAM(s) Oral daily  gabapentin 300 milliGRAM(s) Oral every 8 hours  guaifenesin/dextromethorphan Oral Liquid 10 milliLiter(s) Oral every 4 hours PRN  HYDROmorphone   Tablet 8 milliGRAM(s) Oral every 6 hours PRN  HYDROmorphone   Tablet 4 milliGRAM(s) Oral every 6 hours PRN  influenza   Vaccine 0.5 milliLiter(s) IntraMuscular once  methadone    Tablet 110 milliGRAM(s) Oral daily  multivitamin 1 Tablet(s) Oral daily  ondansetron Injectable 4 milliGRAM(s) IV Push every 6 hours PRN  pantoprazole    Tablet 40 milliGRAM(s) Oral before breakfast  polyethylene glycol 3350 17 Gram(s) Oral daily  povidone iodine 10% Solution 1 Application(s) Topical daily  QUEtiapine 400 milliGRAM(s) Oral at bedtime  QUEtiapine 100 milliGRAM(s) Oral <User Schedule>  senna 2 Tablet(s) Oral at bedtime  tamsulosin 0.4 milliGRAM(s) Oral at bedtime      Physical Exam:  Vital Signs Last 24 Hrs  T(C): 37.9 (20 Dec 2024 11:50), Max: 37.9 (20 Dec 2024 11:50)  T(F): 100.3 (20 Dec 2024 11:50), Max: 100.3 (20 Dec 2024 11:50)  HR: 95 (20 Dec 2024 11:12) (73 - 99)  BP: 136/73 (20 Dec 2024 10:35) (110/72 - 136/73)  BP(mean): --  RR: 17 (20 Dec 2024 10:35) (16 - 18)  SpO2: 95% (20 Dec 2024 11:12) (93% - 97%)    Parameters below as of 20 Dec 2024 11:12  Patient On (Oxygen Delivery Method): nasal cannula        Physical Exam:  General:    NAD, non toxic, RA  Head: atraumatic, normocephalic  Eyes: normal sclera and conjunctiva  ENT:   no oropharyngeal lesions, no LAD, neck supple  Cardio:    regular S1,S2  Respiratory:   no wheezing, no rales  abd:  appears distended, BS +, mildly tender, colostomy bag present half full with brown soft stool   :     no CVAT, mild suprapubic tenderness, SPC present, no erythema around spc   Musculoskeletal : contracted LE, can't move straighten the LE and is in flexed position, + edema of b/l LEs  Skin:  does have some fungal rash on the fold of lower abd  and thigh region ( as per patient he always gets them due to his contracted state)  Neurologic:     AAO x 3  psych: calm  WBC Count: 9.94 K/uL (12-20 @ 06:39)  WBC Count: 7.30 K/uL (12-18 @ 08:20)  WBC Count: 8.02 K/uL (12-17 @ 07:40)  WBC Count: 7.84 K/uL (12-16 @ 06:31)  WBC Count: 9.54 K/uL (12-14 @ 06:40)                            12.5   9.94  )-----------( 168      ( 20 Dec 2024 06:39 )             40.9       12-20    140  |  107  |  13  ----------------------------<  102[H]  3.9   |  29  |  0.66    Ca    9.3      20 Dec 2024 06:39        Urinalysis Basic - ( 20 Dec 2024 06:39 )    Color: x / Appearance: x / SG: x / pH: x  Gluc: 102 mg/dL / Ketone: x  / Bili: x / Urobili: x   Blood: x / Protein: x / Nitrite: x   Leuk Esterase: x / RBC: x / WBC x   Sq Epi: x / Non Sq Epi: x / Bacteria: x        Creatinine Trend: 0.66<--, 0.55<--, 0.73<--, 0.59<--, 0.60<--, 0.60<--      MICROBIOLOGY:  v  Clean Catch  12-09-24   >100,000 CFU/ml Pseudomonas aeruginosa (Carbapenem Resistant)  --  Pseudomonas aeruginosa (Carbapenem Resistant)      RADIOLOGY:  < from: CT Abdomen and Pelvis w/ IV Cont (12.11.24 @ 13:08) >  IMPRESSION:  No significant change from prior study.  Postoperative changes and findings which may be related to decubitus   ulcers and chronic dislocation and septic arthritis of the left hip.    < end of copied text >

## 2024-12-20 NOTE — CONSULT NOTE ADULT - ATTENDING COMMENTS
Recommending Betadine to wound and cover with alodine wound  All is complicated from severe contraction
Patient with chronic left hip pain  History of paraplegia from epidural abscess in part  Imaging shows arthritis and partial subluxation with concern for sinus tract  Obtain image guided hip aspiration  Continue antibiotics

## 2024-12-20 NOTE — CONSULT NOTE ADULT - SUBJECTIVE AND OBJECTIVE BOX
Chief Complaint:  Patient is a 54y old  Male who presents with a chief complaint of UTI, hematuria, lower abdominal pain (20 Dec 2024 15:55)      HPI:  54 years old male with h/o cervical epidural abscess, b/l LE paralysis, pneumoperitoneum s/p ex-lap w/ ileostomy complicated by MRSA bacteremia and evisceration, Hep C, emphysema on chronic O2, L foot osteomyelitis, bipolar, opioid dependence, HTN, neurogenic bladder s/p IR SPC placement on 10/23/24 present to ED with complain of worsening lower abdominal pain for 5 days and hematuria. SPC was placed 10/23/24 by IR, reportedly was pulled slightly since 10/24. Patient reported pain since then but worsened over last 5 days associated with nausea. Denied any fever or chills.   Hemodynamically stable, afebrile, sat well at RA. No leukocytosis, plt 181, Cr 0.69. UA dirty. CXR with no focal consolidation (10 Dec 2024 11:42)      PMH/PSH:PAST MEDICAL & SURGICAL HISTORY:  CAD (coronary artery disease)      HTN (hypertension)      HLD (hyperlipidemia)      Neurogenic bladder      Bipolar disorder      S/P ileostomy      Chronic hepatitis C virus infection      S/P laminectomy      S/P ileostomy          Allergies:  NSAIDs (Flushing; Other (Moderate))  Aleve (Unknown)  Risperdal (Short breath; Rash; Hives)  Stelazine (Unknown)  Haldol (Anaphylaxis)  Motrin (Anaphylaxis)  Thorazine (Other (Moderate))  Zyprexa (Rash; Dystonic RXN; Hives)      Medications:  acetaminophen     Tablet .. 650 milliGRAM(s) Oral every 6 hours PRN  albuterol/ipratropium for Nebulization 3 milliLiter(s) Nebulizer every 6 hours  benzocaine/menthol Lozenge 1 Lozenge Oral every 4 hours PRN  diphenhydrAMINE 25 milliGRAM(s) Oral once PRN  DULoxetine 30 milliGRAM(s) Oral daily  enoxaparin Injectable 40 milliGRAM(s) SubCutaneous every 24 hours  ferrous    sulfate 325 milliGRAM(s) Oral daily  finasteride 5 milliGRAM(s) Oral daily  folic acid 1 milliGRAM(s) Oral daily  furosemide    Tablet 20 milliGRAM(s) Oral daily  gabapentin 300 milliGRAM(s) Oral every 8 hours  guaifenesin/dextromethorphan Oral Liquid 10 milliLiter(s) Oral every 4 hours PRN  HYDROmorphone   Tablet 8 milliGRAM(s) Oral every 6 hours PRN  HYDROmorphone   Tablet 4 milliGRAM(s) Oral every 6 hours PRN  influenza   Vaccine 0.5 milliLiter(s) IntraMuscular once  methadone    Tablet 110 milliGRAM(s) Oral daily  multivitamin 1 Tablet(s) Oral daily  ondansetron Injectable 4 milliGRAM(s) IV Push every 6 hours PRN  pantoprazole    Tablet 40 milliGRAM(s) Oral before breakfast  polyethylene glycol 3350 17 Gram(s) Oral daily  povidone iodine 10% Solution 1 Application(s) Topical daily  QUEtiapine 400 milliGRAM(s) Oral at bedtime  QUEtiapine 100 milliGRAM(s) Oral <User Schedule>  senna 2 Tablet(s) Oral at bedtime  tamsulosin 0.4 milliGRAM(s) Oral at bedtime      Review of Systems:  Negative except for HPI    Relevant Family History:   FAMILY HISTORY:      Relevant Social History: Alcohol ( -) , Tobacco ( -) , Illicit drugs (- )     Physical Exam:    Vital Signs:  Vital Signs Last 24 Hrs  T(C): 37.9 (20 Dec 2024 11:50), Max: 37.9 (20 Dec 2024 11:50)  T(F): 100.3 (20 Dec 2024 11:50), Max: 100.3 (20 Dec 2024 11:50)  HR: 95 (20 Dec 2024 11:12) (73 - 99)  BP: 136/73 (20 Dec 2024 10:35) (110/72 - 136/73)  BP(mean): --  RR: 17 (20 Dec 2024 10:35) (16 - 18)  SpO2: 95% (20 Dec 2024 11:12) (93% - 97%)    Parameters below as of 20 Dec 2024 11:12  Patient On (Oxygen Delivery Method): nasal cannula      Daily     Daily     General:  Appears stated age, no distress  HEENT:  NC/AT,  conjunctivae clear and pink  Chest:  Full & symmetric excursion, no increased effort  Cardiovascular:  Regular rhythm  Abdomen:  Soft, non tender, non distended, midline laparotomy scar from prior procedure, colostomy in place with stool in bag   Extremities: Bilateral contractures  Skin: Left hip wound with white, pasty discharge; sacral wound with eschar in place    Neuro/Psych:  Alert, oriented    Laboratory:                          12.5   9.94  )-----------( 168      ( 20 Dec 2024 06:39 )             40.9     12-20    140  |  107  |  13  ----------------------------<  102[H]  3.9   |  29  |  0.66    Ca    9.3      20 Dec 2024 06:39          Urinalysis Basic - ( 20 Dec 2024 06:39 )    Color: x / Appearance: x / SG: x / pH: x  Gluc: 102 mg/dL / Ketone: x  / Bili: x / Urobili: x   Blood: x / Protein: x / Nitrite: x   Leuk Esterase: x / RBC: x / WBC x   Sq Epi: x / Non Sq Epi: x / Bacteria: x          Intake and Output    12-19-24 @ 07:01  -  12-20-24 @ 07:00  --------------------------------------------------------  IN: 200 mL / OUT: 1226 mL / NET: -1026 mL        Imaging:    < from: CT Abdomen and Pelvis w/ IV Cont (12.11.24 @ 13:08) >    ACC: [id number] EXAM:  CT ABDOMEN AND PELVIS IC   ORDERED BY: JEIMY ESTRELLA     PROCEDURE DATE:  12/11/2024          INTERPRETATION:  CLINICAL INFORMATION: bilateral lower abd pain and ttp,   s/p suprapubic cath placement which is partially out    COMPARISON: 6/25/2024.    CONTRAST/COMPLICATIONS:  IV Contrast: Omnipaque 350  99 cc administered   1 cc discarded  Oral Contrast: NONE  .    PROCEDURE:  CT of the Abdomen and Pelvis was performed.  Sagittal and coronal reformats were performed.    FINDINGS:  LOWER CHEST: Small right base atelectasis and effusion, left base small   effusion and pleural plaque.    LIVER: Within normal limits.  BILE DUCTS: Normal caliber.  GALLBLADDER: Within normal limits.  SPLEEN: Within normal limits.  PANCREAS: Within normal limits.  ADRENALS: Within normal limits.  KIDNEYS/URETERS: Within normal limits.    BLADDER: Decompressed with suprapubic catheter  REPRODUCTIVE ORGANS: Prostate within normal limits.    BOWEL: No bowel obstruction. Colectomy with right lower quadrant ileostomy  PERITONEUM/RETROPERITONEUM: Within normal limits.  VESSELS: Within normal limits.  LYMPH NODES: No lymphadenopathy.  ABDOMINAL WALL: Right lower quadrant ileostomy, fluid adjacent to sacrum   posteriorly which may be related to ulcers  BONES: Left hip likely chronic dislocation and septic arthritis    IMPRESSION:  No significant change from prior study.  Postoperative changes and findings which may be related to decubitus   ulcers and chronic dislocation and septic arthritis of the left hip.        --- End of Report ---            SHARLA KELLY MD; Attending Radiologist  This document has been electronically signed. Dec 11 2024  8:58PM    < end of copied text >

## 2024-12-20 NOTE — PROGRESS NOTE ADULT - SUBJECTIVE AND OBJECTIVE BOX
Chief Complaint:  Patient is a 54y old  Male who presents with a chief complaint of UTI, hematuria, lower abdominal pain (20 Dec 2024 14:55)      HPI:  54 years old male with h/o cervical epidural abscess, b/l LE paralysis, pneumoperitoneum s/p ex-lap w/ ileostomy complicated by MRSA bacteremia and evisceration, Hep C, emphysema on chronic O2, L foot osteomyelitis, bipolar, opioid dependence, HTN, neurogenic bladder s/p IR SPC placement on 10/23/24 present to ED with complain of worsening lower abdominal pain for 5 days and hematuria. SPC was placed 10/23/24 by IR, reportedly was pulled slightly since 10/24. Patient reported pain since then but worsened over last 5 days associated with nausea. Denied any fever or chills.   Hemodynamically stable, afebrile, sat well at [facility]. No leukocytosis, plt 181, Cr 0.69. UA dirty. CXR with no focal consolidation (10 Dec 2024 11:42)      PMH/PSH:PAST MEDICAL & SURGICAL HISTORY:  CAD (coronary artery disease)      HTN (hypertension)      HLD (hyperlipidemia)      Neurogenic bladder      Bipolar disorder      S/P ileostomy      Chronic hepatitis C virus infection      S/P laminectomy      S/P ileostomy          Allergies:  NSAIDs (Flushing; Other (Moderate))  Aleve (Unknown)  Risperdal (Short breath; Rash; Hives)  Stelazine (Unknown)  Haldol (Anaphylaxis)  Motrin (Anaphylaxis)  Thorazine (Other (Moderate))  Zyprexa (Rash; Dystonic RXN; Hives)      Medications:  acetaminophen     Tablet .. 650 milliGRAM(s) Oral every 6 hours PRN  albuterol/ipratropium for Nebulization 3 milliLiter(s) Nebulizer every 6 hours  benzocaine/menthol Lozenge 1 Lozenge Oral every 4 hours PRN  diphenhydrAMINE 25 milliGRAM(s) Oral once PRN  DULoxetine 30 milliGRAM(s) Oral daily  enoxaparin Injectable 40 milliGRAM(s) SubCutaneous every 24 hours  ferrous    sulfate 325 milliGRAM(s) Oral daily  finasteride 5 milliGRAM(s) Oral daily  folic acid 1 milliGRAM(s) Oral daily  furosemide    Tablet 20 milliGRAM(s) Oral daily  gabapentin 300 milliGRAM(s) Oral every 8 hours  guaifenesin/dextromethorphan Oral Liquid 10 milliLiter(s) Oral every 4 hours PRN  HYDROmorphone   Tablet 8 milliGRAM(s) Oral every 6 hours PRN  HYDROmorphone   Tablet 4 milliGRAM(s) Oral every 6 hours PRN  influenza   Vaccine 0.5 milliLiter(s) IntraMuscular once  methadone    Tablet 110 milliGRAM(s) Oral daily  multivitamin 1 Tablet(s) Oral daily  ondansetron Injectable 4 milliGRAM(s) IV Push every 6 hours PRN  pantoprazole    Tablet 40 milliGRAM(s) Oral before breakfast  polyethylene glycol 3350 17 Gram(s) Oral daily  povidone iodine 10% Solution 1 Application(s) Topical daily  QUEtiapine 400 milliGRAM(s) Oral at bedtime  QUEtiapine 100 milliGRAM(s) Oral <User Schedule>  senna 2 Tablet(s) Oral at bedtime  tamsulosin 0.4 milliGRAM(s) Oral at bedtime      Review of Systems:  Negative except for HPI    Relevant Family History:   FAMILY HISTORY:      Relevant Social History: Alcohol ( -) , Tobacco ( -) , Illicit drugs (- )     Physical Exam:    Vital Signs:  Vital Signs Last 24 Hrs  T(C): 37.9 (20 Dec 2024 11:50), Max: 37.9 (20 Dec 2024 11:50)  T(F): 100.3 (20 Dec 2024 11:50), Max: 100.3 (20 Dec 2024 11:50)  HR: 95 (20 Dec 2024 11:12) (73 - 99)  BP: 136/73 (20 Dec 2024 10:35) (110/72 - 136/73)  BP(mean): --  RR: 17 (20 Dec 2024 10:35) (16 - 18)  SpO2: 95% (20 Dec 2024 11:12) (93% - 97%)    Parameters below as of 20 Dec 2024 11:12  Patient On (Oxygen Delivery Method): nasal cannula      Daily     Daily     General:  Appears stated age, no distress  HEENT:  NC/AT,  conjunctivae clear and pink  Chest:  Full & symmetric excursion, no increased effort  Cardiovascular:  Regular rhythm  Abdomen:  Soft, non tender, non distended, midline laparotomy scar from prior procedure, colostomy in place with stool in bag   Extremities: Bilateral contractures  Skin:  Left hip wound with white, pasty discharge; sacral wound with eschar in place   Neuro/Psych:  Alert, oriented    Laboratory:                          12.5   9.94  )-----------( 168      ( 20 Dec 2024 06:39 )             40.9     12-20    140  |  107  |  13  ----------------------------<  102[H]  3.9   |  29  |  0.66    Ca    9.3      20 Dec 2024 06:39          Urinalysis Basic - ( 20 Dec 2024 06:39 )    Color: x / Appearance: x / SG: x / pH: x  Gluc: 102 mg/dL / Ketone: x  / Bili: x / Urobili: x   Blood: x / Protein: x / Nitrite: x   Leuk Esterase: x / RBC: x / WBC x   Sq Epi: x / Non Sq Epi: x / Bacteria: x          Intake and Output    12-19-24 @ 07:01  -  12-20-24 @ 07:00  --------------------------------------------------------  IN: 200 mL / OUT: 1226 mL / NET: -1026 mL        Imaging:    < from: CT Abdomen and Pelvis w/ IV Cont (12.11.24 @ 13:08) >    ACC: [id number] EXAM:  CT ABDOMEN AND PELVIS IC   ORDERED BY: JEIMY ESTRELLA     PROCEDURE DATE:  12/11/2024          INTERPRETATION:  CLINICAL INFORMATION: bilateral lower abd pain and ttp,   s/p suprapubic cath placement which is partially out    COMPARISON: 6/25/2024.    CONTRAST/COMPLICATIONS:  IV Contrast: Omnipaque 350  99 cc administered   1 cc discarded  Oral Contrast: NONE  .    PROCEDURE:  CT of the Abdomen and Pelvis was performed.  Sagittal and coronal reformats were performed.    FINDINGS:  LOWER CHEST: Small right base atelectasis and effusion, left base small   effusion and pleural plaque.    LIVER: Within normal limits.  BILE DUCTS: Normal caliber.  GALLBLADDER: Within normal limits.  SPLEEN: Within normal limits.  PANCREAS: Within normal limits.  ADRENALS: Within normal limits.  KIDNEYS/URETERS: Within normal limits.    BLADDER: Decompressed with suprapubic catheter  REPRODUCTIVE ORGANS: Prostate within normal limits.    BOWEL: No bowel obstruction. Colectomy with right lower quadrant ileostomy  PERITONEUM/RETROPERITONEUM: Within normal limits.  VESSELS: Within normal limits.  LYMPH NODES: No lymphadenopathy.  ABDOMINAL WALL: Right lower quadrant ileostomy, fluid adjacent to sacrum   posteriorly which may be related to ulcers  BONES: Left hip likely chronic dislocation and septic arthritis    IMPRESSION:  No significant change from prior study.  Postoperative changes and findings which may be related to decubitus   ulcers and chronic dislocation and septic arthritis of the left hip.        --- End of Report ---            SHARLA KELLY MD; Attending Radiologist  This document has been electronically signed. Dec 11 2024  8:58PM    < end of copied text >

## 2024-12-20 NOTE — CONSULT NOTE ADULT - ASSESSMENT
53 Y/O male with PMHx of cervical epidural abscess, b/l LE paralysis, pneumoperitoneum s/p ex-lap w/ ileostomy complicated by MRSA bacteremia and evisceration, Hep C, emphysema on chronic O2, L foot osteomyelitis, bipolar, opioid dependence, HTN, neurogenic bladder s/p IR SPC placement on 10/23/24 present admitted with hematuria and UTI. Wound care consulted to evaluate left hip and sacral wound. CT from 12/11 reveals chronic left hip dislocation with septic arthritis. Sacral wound with eschar, no need for debridement at this time. Would defer further management to orthopedic team who recommends possible left hip aspiration to be performed by IR.       PLAN:     - NO acute wound care intervention needed at this time; will sign off   - Recommend orthopedics for septic arthritis   - Continue antibiotics   - Continue care per primary team   - Discussed with Dr. Ramos

## 2024-12-20 NOTE — PROGRESS NOTE ADULT - ASSESSMENT
55 Y/O male with PMHx of cervical epidural abscess, b/l LE paralysis, pneumoperitoneum s/p ex-lap w/ ileostomy complicated by MRSA bacteremia and evisceration, Hep C, emphysema on chronic O2, L foot osteomyelitis, bipolar, opioid dependence, HTN, neurogenic bladder s/p IR SPC placement on 10/23/24 present admitted with hematuria and UTI. Wound care consulted to evaluate left hip and sacral wound. CT from 12/11 reveals chronic left hip dislocation with septic arthritis. Sacral wound with eschar, no need for debridement at this time. Would defer further management to orthopedic team who recommends possible left hip aspiration to be performed by IR.       PLAN:     - NO acute wound care intervention needed at this time; will sign off   - Recommend orthopedics for septic arthritis   - Continue antibiotics   - Continue care per primary team   - Discussed with Dr. Ramos

## 2024-12-21 PROCEDURE — 99232 SBSQ HOSP IP/OBS MODERATE 35: CPT

## 2024-12-21 PROCEDURE — 71045 X-RAY EXAM CHEST 1 VIEW: CPT | Mod: 26

## 2024-12-21 PROCEDURE — 99232 SBSQ HOSP IP/OBS MODERATE 35: CPT | Mod: GC

## 2024-12-21 RX ADMIN — Medication 8 MILLIGRAM(S): at 23:10

## 2024-12-21 RX ADMIN — Medication 4 MILLIGRAM(S): at 14:09

## 2024-12-21 RX ADMIN — Medication 325 MILLIGRAM(S): at 11:30

## 2024-12-21 RX ADMIN — Medication 8 MILLIGRAM(S): at 07:51

## 2024-12-21 RX ADMIN — IPRATROPIUM BROMIDE AND ALBUTEROL SULFATE 3 MILLILITER(S): .5; 2.5 SOLUTION RESPIRATORY (INHALATION) at 05:09

## 2024-12-21 RX ADMIN — QUETIAPINE FUMARATE 400 MILLIGRAM(S): 100 TABLET, FILM COATED ORAL at 22:17

## 2024-12-21 RX ADMIN — GABAPENTIN 300 MILLIGRAM(S): 300 CAPSULE ORAL at 13:14

## 2024-12-21 RX ADMIN — IPRATROPIUM BROMIDE AND ALBUTEROL SULFATE 3 MILLILITER(S): .5; 2.5 SOLUTION RESPIRATORY (INHALATION) at 17:33

## 2024-12-21 RX ADMIN — Medication 8 MILLIGRAM(S): at 22:17

## 2024-12-21 RX ADMIN — Medication 8 MILLIGRAM(S): at 01:12

## 2024-12-21 RX ADMIN — Medication 5 MILLIGRAM(S): at 11:30

## 2024-12-21 RX ADMIN — IPRATROPIUM BROMIDE AND ALBUTEROL SULFATE 3 MILLILITER(S): .5; 2.5 SOLUTION RESPIRATORY (INHALATION) at 12:05

## 2024-12-21 RX ADMIN — Medication 4 MILLIGRAM(S): at 13:14

## 2024-12-21 RX ADMIN — Medication 1 TABLET(S): at 11:30

## 2024-12-21 RX ADMIN — Medication 8 MILLIGRAM(S): at 05:18

## 2024-12-21 RX ADMIN — Medication 25 MILLIGRAM(S): at 17:16

## 2024-12-21 RX ADMIN — PANTOPRAZOLE 40 MILLIGRAM(S): 40 TABLET, DELAYED RELEASE ORAL at 08:01

## 2024-12-21 RX ADMIN — Medication 20 MILLIGRAM(S): at 05:18

## 2024-12-21 RX ADMIN — TAMSULOSIN HYDROCHLORIDE 0.4 MILLIGRAM(S): 0.4 CAPSULE ORAL at 22:18

## 2024-12-21 RX ADMIN — POVIDONE IODINE USP, 10% W/W 1 APPLICATION(S): 10 SWAB TOPICAL at 11:35

## 2024-12-21 RX ADMIN — QUETIAPINE FUMARATE 100 MILLIGRAM(S): 100 TABLET, FILM COATED ORAL at 17:13

## 2024-12-21 RX ADMIN — QUETIAPINE FUMARATE 100 MILLIGRAM(S): 100 TABLET, FILM COATED ORAL at 11:33

## 2024-12-21 RX ADMIN — Medication 1 MILLIGRAM(S): at 11:30

## 2024-12-21 RX ADMIN — ENOXAPARIN SODIUM 40 MILLIGRAM(S): 60 INJECTION INTRAVENOUS; SUBCUTANEOUS at 11:31

## 2024-12-21 RX ADMIN — GABAPENTIN 300 MILLIGRAM(S): 300 CAPSULE ORAL at 05:18

## 2024-12-21 RX ADMIN — GABAPENTIN 300 MILLIGRAM(S): 300 CAPSULE ORAL at 22:18

## 2024-12-21 RX ADMIN — METHADONE HYDROCHLORIDE 110 MILLIGRAM(S): 10 TABLET ORAL at 11:31

## 2024-12-21 NOTE — PROGRESS NOTE ADULT - ASSESSMENT
54 years old male with h/o cervical epidural abscess, b/l LE paralysis, pneumoperitoneum s/p ex-lap w/ ileostomy complicated by MRSA bacteremia and evisceration, Hep C, emphysema on chronic O2, L foot osteomyelitis, bipolar, opioid dependence, HTN, neurogenic bladder s/p IR SPC placement on 10/23/24 present to ED with complain of worsening lower abdominal pain for 5 days and hematuria. SPC was placed \on 10/23/24 by IR, reportedly was pulled slightly since 10/24. Patient reported pain since then but worsened over last 5 days associated with nausea. Denied any fever or chills.     # Fever - s/p UTI due to Suprapubic Catheter, Carbapenem resistant Pseudomonas -  Completed 7d course of Zerbaxa.  SPC has been exchanged by IR.  Per ID recommendations, consider exchanging catheter every 2-3 weeks.  Recurrence of fever noted.  WBC elevated.  CT from admission showing possible septic arthritis of L hip.  IR consulted for drainage.  Prior CT from 7/2024 similar findings.  Ortho, surgery input appreciated.  IR consultation requested for drainage.      # L dorsal Foot Ulcer - podiatry input appreciated - Dr. Figueroa.  # Lower abdominal pain - may be due to septic arthritis.  Pain appears to be controlled on po Dilaudid, Methadone.  Discussed titration of po Dilaudid with pt.  # Methadone dependence. ·  Plan: Previous hospitalists have Called [facility] [phone], talked with nurse manage Corie, confirmed that patient is on methadone 110mg daily and last dose in NH was on 12/9/2024  QTc 469  on EKG 10/21/24, check EKG to evaluate for QTc  On dilaudid 6mg q6hr prn for severe pain at NH, continue same  Bowel regimens---> senna and miralax.  # Chronic respiratory failure with hypercapnia. ·  Plan: nocturnal NIPPV.  # GERD (gastroesophageal reflux disease). ·  Plan: on oral PPI.  # BPH (benign prostatic hyperplasia).  ·  Plan: on finasteride and flomax.  # Psychiatric disorder.  ·  Plan: on Seroquel 100mg 10AM, 100mg 6PM and 400mg hs per NH paper Continue duloxetine 30mg daily  # Functional quadriplegia  Bedbound with bilateral lower extremity paralysis Wound care ordered for lower extremity pressure wound  # Inpatient DVT Prophylaxis - Lovenox subcut

## 2024-12-21 NOTE — PROGRESS NOTE ADULT - SUBJECTIVE AND OBJECTIVE BOX
Patient: STAN VEGA [id number] 54y Male                            Hospitalist Attending Note    No fever / chills.  Pain controlled.  ? radiating from L hip.    Tm 102.6 Afebrile presently.        ____________________PHYSICAL EXAM:  GENERAL:  NAD Alert and Oriented x 3   HEENT: NCAT  CARDIOVASCULAR:  S1, S2  LUNGS: CTAB  : SPC in place  ABDOMEN:  soft, (-) tenderness, (-) distension, (+) bowel sounds, (-) guarding, (-) rebound (-) rigidity.  Ileostomy  EXTREMITIES:  no cyanosis / clubbing / edema. R heel stage II pressure injury 2cm x 2cm x .1 cm, L dorsum of foot stage II pressure injury 3.5cm x 4.5cm x .1 cm  NEURO: b/l LE paraplegia.    ____________________    VITALS:  Vital Signs Last 24 Hrs  T(C): 37.3 (21 Dec 2024 12:19), Max: 39.2 (20 Dec 2024 17:23)  T(F): 99.2 (21 Dec 2024 12:19), Max: 102.6 (20 Dec 2024 17:23)  HR: 94 (21 Dec 2024 12:19) (75 - 99)  BP: 100/63 (21 Dec 2024 12:19) (100/63 - 146/70)  BP(mean): --  RR: 16 (21 Dec 2024 12:19) (16 - 18)  SpO2: 92% (21 Dec 2024 12:19) (90% - 96%)    Parameters below as of 21 Dec 2024 12:19  Patient On (Oxygen Delivery Method): room air     Daily     Daily   CAPILLARY BLOOD GLUCOSE        I&O's Summary    20 Dec 2024 07:01  -  21 Dec 2024 07:00  --------------------------------------------------------  IN: 650 mL / OUT: 1400 mL / NET: -750 mL    21 Dec 2024 07:01  -  21 Dec 2024 15:46  --------------------------------------------------------  IN: 820 mL / OUT: 900 mL / NET: -80 mL        LABS:                        12.6   16.39 )-----------( 190      ( 20 Dec 2024 18:25 )             40.4     12-20    140  |  107  |  13  ----------------------------<  102[H]  3.9   |  29  |  0.66    Ca    9.3      20 Dec 2024 06:39          Urinalysis Basic - ( 20 Dec 2024 17:30 )    Color: Dark Yellow / Appearance: Cloudy / S.026 / pH: x  Gluc: x / Ketone: Trace mg/dL  / Bili: Small / Urobili: 1.0 mg/dL   Blood: x / Protein: 100 mg/dL / Nitrite: Negative   Leuk Esterase: Large / RBC: Too Numerous to count /HPF / WBC Too Numerous to count /HPF   Sq Epi: x / Non Sq Epi: x / Bacteria: Many /HPF              MEDICATIONS:  acetaminophen     Tablet .. 650 milliGRAM(s) Oral every 6 hours PRN  albuterol/ipratropium for Nebulization 3 milliLiter(s) Nebulizer every 6 hours  benzocaine/menthol Lozenge 1 Lozenge Oral every 4 hours PRN  diphenhydrAMINE 25 milliGRAM(s) Oral once PRN  DULoxetine 30 milliGRAM(s) Oral daily  enoxaparin Injectable 40 milliGRAM(s) SubCutaneous every 24 hours  ferrous    sulfate 325 milliGRAM(s) Oral daily  finasteride 5 milliGRAM(s) Oral daily  folic acid 1 milliGRAM(s) Oral daily  furosemide    Tablet 20 milliGRAM(s) Oral daily  gabapentin 300 milliGRAM(s) Oral every 8 hours  guaifenesin/dextromethorphan Oral Liquid 10 milliLiter(s) Oral every 4 hours PRN  HYDROmorphone   Tablet 8 milliGRAM(s) Oral every 6 hours PRN  HYDROmorphone   Tablet 4 milliGRAM(s) Oral every 6 hours PRN  influenza   Vaccine 0.5 milliLiter(s) IntraMuscular once  methadone    Tablet 110 milliGRAM(s) Oral daily  multivitamin 1 Tablet(s) Oral daily  ondansetron Injectable 4 milliGRAM(s) IV Push every 6 hours PRN  pantoprazole    Tablet 40 milliGRAM(s) Oral before breakfast  polyethylene glycol 3350 17 Gram(s) Oral daily  povidone iodine 10% Solution 1 Application(s) Topical daily  QUEtiapine 100 milliGRAM(s) Oral <User Schedule>  QUEtiapine 400 milliGRAM(s) Oral at bedtime  senna 2 Tablet(s) Oral at bedtime  tamsulosin 0.4 milliGRAM(s) Oral at bedtime

## 2024-12-21 NOTE — PROGRESS NOTE ADULT - ATTENDING COMMENTS
chronic left hip pain due to arthritis and possible chronic septic arthritis  obtain hip aspiration by IR  If patient unable to tolerate with IR then patient will need general sedation in OR

## 2024-12-21 NOTE — PROGRESS NOTE ADULT - SUBJECTIVE AND OBJECTIVE BOX
Pt seen and re-examined at bedside this AM after spiking fever 102.6F yesterday off antibiotics. No acute complaints, still continuing to have abdominal/hip pain. Denies new numbness or tingling, chills, CP, SOB, N/V/D.    Vital Signs (24 Hrs):  T(C): 37 (12-21-24 @ 05:13), Max: 39.2 (12-20-24 @ 17:23)  HR: 86 (12-21-24 @ 05:18) (75 - 99)  BP: 112/66 (12-21-24 @ 05:13) (112/66 - 146/70)  RR: 18 (12-21-24 @ 05:13) (17 - 18)  SpO2: 95% (12-21-24 @ 05:18) (90% - 96%)  Wt(kg): --    LABS:                          12.6   16.39 )-----------( 190      ( 20 Dec 2024 18:25 )             40.4     12-20    140  |  107  |  13  ----------------------------<  102[H]  3.9   |  29  |  0.66    Ca    9.3      20 Dec 2024 06:39    Physical Exam:  General: NAD, pt laying in bed comfortably    LLE:  ~5cm pressure wound on posterior aspect of upper thigh, flexion contracture noted  Compartments soft, compressible  No motor/sensory function  +DP    Imaging:  CT Abd/Pelvis:  IMPRESSION:  No significant change from prior study.  Postoperative changes and findings which may be related to decubitus   ulcers and chronic dislocation and septic arthritis of the left hip.    A/P:  54y Male who presents with chronic L hip dislocation, likely chronic septic arthritis    Recommend Wound Care, general surgery team for wound management of his ulcers  Recommend IR aspiration for L hip  WBAT  PT/OT  Analgesics  DVT PPx per primary team  Further orthopaedic plan pending L hip aspiration  Discussed plan with Dr. Bean who agrees with above

## 2024-12-22 LAB
ANION GAP SERPL CALC-SCNC: 5 MMOL/L — SIGNIFICANT CHANGE UP (ref 5–17)
BUN SERPL-MCNC: 10 MG/DL — SIGNIFICANT CHANGE UP (ref 7–23)
CALCIUM SERPL-MCNC: 9.3 MG/DL — SIGNIFICANT CHANGE UP (ref 8.5–10.1)
CHLORIDE SERPL-SCNC: 104 MMOL/L — SIGNIFICANT CHANGE UP (ref 96–108)
CO2 SERPL-SCNC: 30 MMOL/L — SIGNIFICANT CHANGE UP (ref 22–31)
CREAT SERPL-MCNC: 0.69 MG/DL — SIGNIFICANT CHANGE UP (ref 0.5–1.3)
EGFR: 111 ML/MIN/1.73M2 — SIGNIFICANT CHANGE UP
GLUCOSE SERPL-MCNC: 91 MG/DL — SIGNIFICANT CHANGE UP (ref 70–99)
HCT VFR BLD CALC: 40.1 % — SIGNIFICANT CHANGE UP (ref 39–50)
HGB BLD-MCNC: 12.4 G/DL — LOW (ref 13–17)
MCHC RBC-ENTMCNC: 27.8 PG — SIGNIFICANT CHANGE UP (ref 27–34)
MCHC RBC-ENTMCNC: 30.9 G/DL — LOW (ref 32–36)
MCV RBC AUTO: 89.9 FL — SIGNIFICANT CHANGE UP (ref 80–100)
NRBC # BLD: 0 /100 WBCS — SIGNIFICANT CHANGE UP (ref 0–0)
PLATELET # BLD AUTO: 177 K/UL — SIGNIFICANT CHANGE UP (ref 150–400)
POTASSIUM SERPL-MCNC: 3.6 MMOL/L — SIGNIFICANT CHANGE UP (ref 3.5–5.3)
POTASSIUM SERPL-SCNC: 3.6 MMOL/L — SIGNIFICANT CHANGE UP (ref 3.5–5.3)
RBC # BLD: 4.46 M/UL — SIGNIFICANT CHANGE UP (ref 4.2–5.8)
RBC # FLD: 15.2 % — HIGH (ref 10.3–14.5)
SODIUM SERPL-SCNC: 139 MMOL/L — SIGNIFICANT CHANGE UP (ref 135–145)
WBC # BLD: 13.4 K/UL — HIGH (ref 3.8–10.5)
WBC # FLD AUTO: 13.4 K/UL — HIGH (ref 3.8–10.5)

## 2024-12-22 PROCEDURE — 99232 SBSQ HOSP IP/OBS MODERATE 35: CPT

## 2024-12-22 RX ORDER — SIMETHICONE 125 MG/1
80 CAPSULE, LIQUID FILLED ORAL EVERY 6 HOURS
Refills: 0 | Status: DISCONTINUED | OUTPATIENT
Start: 2024-12-22 | End: 2024-12-31

## 2024-12-22 RX ORDER — IPRATROPIUM BROMIDE AND ALBUTEROL SULFATE .5; 2.5 MG/3ML; MG/3ML
3 SOLUTION RESPIRATORY (INHALATION) ONCE
Refills: 0 | Status: COMPLETED | OUTPATIENT
Start: 2024-12-22 | End: 2024-12-22

## 2024-12-22 RX ORDER — GUAIFENESIN 100 MG/5ML
200 SYRUP ORAL EVERY 6 HOURS
Refills: 0 | Status: DISCONTINUED | OUTPATIENT
Start: 2024-12-22 | End: 2024-12-31

## 2024-12-22 RX ADMIN — QUETIAPINE FUMARATE 100 MILLIGRAM(S): 100 TABLET, FILM COATED ORAL at 19:00

## 2024-12-22 RX ADMIN — QUETIAPINE FUMARATE 100 MILLIGRAM(S): 100 TABLET, FILM COATED ORAL at 12:16

## 2024-12-22 RX ADMIN — Medication 4 MILLIGRAM(S): at 14:29

## 2024-12-22 RX ADMIN — Medication 200 MILLIGRAM(S): at 18:59

## 2024-12-22 RX ADMIN — GABAPENTIN 300 MILLIGRAM(S): 300 CAPSULE ORAL at 14:29

## 2024-12-22 RX ADMIN — Medication 4 MILLIGRAM(S): at 06:05

## 2024-12-22 RX ADMIN — Medication 1 TABLET(S): at 12:16

## 2024-12-22 RX ADMIN — POVIDONE IODINE USP, 10% W/W 1 APPLICATION(S): 10 SWAB TOPICAL at 12:17

## 2024-12-22 RX ADMIN — METHADONE HYDROCHLORIDE 110 MILLIGRAM(S): 10 TABLET ORAL at 12:24

## 2024-12-22 RX ADMIN — ENOXAPARIN SODIUM 40 MILLIGRAM(S): 60 INJECTION INTRAVENOUS; SUBCUTANEOUS at 12:20

## 2024-12-22 RX ADMIN — GABAPENTIN 300 MILLIGRAM(S): 300 CAPSULE ORAL at 22:27

## 2024-12-22 RX ADMIN — Medication 20 MILLIGRAM(S): at 06:05

## 2024-12-22 RX ADMIN — Medication 5 MILLIGRAM(S): at 18:59

## 2024-12-22 RX ADMIN — IPRATROPIUM BROMIDE AND ALBUTEROL SULFATE 3 MILLILITER(S): .5; 2.5 SOLUTION RESPIRATORY (INHALATION) at 00:46

## 2024-12-22 RX ADMIN — IPRATROPIUM BROMIDE AND ALBUTEROL SULFATE 3 MILLILITER(S): .5; 2.5 SOLUTION RESPIRATORY (INHALATION) at 09:26

## 2024-12-22 RX ADMIN — IPRATROPIUM BROMIDE AND ALBUTEROL SULFATE 3 MILLILITER(S): .5; 2.5 SOLUTION RESPIRATORY (INHALATION) at 05:14

## 2024-12-22 RX ADMIN — IPRATROPIUM BROMIDE AND ALBUTEROL SULFATE 3 MILLILITER(S): .5; 2.5 SOLUTION RESPIRATORY (INHALATION) at 12:35

## 2024-12-22 RX ADMIN — Medication 4 MILLIGRAM(S): at 21:30

## 2024-12-22 RX ADMIN — QUETIAPINE FUMARATE 400 MILLIGRAM(S): 100 TABLET, FILM COATED ORAL at 22:31

## 2024-12-22 RX ADMIN — Medication 4 MILLIGRAM(S): at 07:00

## 2024-12-22 RX ADMIN — IPRATROPIUM BROMIDE AND ALBUTEROL SULFATE 3 MILLILITER(S): .5; 2.5 SOLUTION RESPIRATORY (INHALATION) at 23:18

## 2024-12-22 RX ADMIN — SIMETHICONE 80 MILLIGRAM(S): 125 CAPSULE, LIQUID FILLED ORAL at 14:29

## 2024-12-22 RX ADMIN — Medication 4 MILLIGRAM(S): at 20:35

## 2024-12-22 RX ADMIN — IPRATROPIUM BROMIDE AND ALBUTEROL SULFATE 3 MILLILITER(S): .5; 2.5 SOLUTION RESPIRATORY (INHALATION) at 17:44

## 2024-12-22 RX ADMIN — Medication 325 MILLIGRAM(S): at 12:17

## 2024-12-22 RX ADMIN — PANTOPRAZOLE 40 MILLIGRAM(S): 40 TABLET, DELAYED RELEASE ORAL at 06:05

## 2024-12-22 RX ADMIN — Medication 1 MILLIGRAM(S): at 12:17

## 2024-12-22 RX ADMIN — TAMSULOSIN HYDROCHLORIDE 0.4 MILLIGRAM(S): 0.4 CAPSULE ORAL at 22:31

## 2024-12-22 RX ADMIN — GABAPENTIN 300 MILLIGRAM(S): 300 CAPSULE ORAL at 06:05

## 2024-12-22 RX ADMIN — Medication 4 MILLIGRAM(S): at 15:20

## 2024-12-22 NOTE — PROGRESS NOTE ADULT - SUBJECTIVE AND OBJECTIVE BOX
Pt seen and re-examined at bedside this AM, remained afebrile overnight off antibiotics. No acute complaints, still continuing to have abdominal/hip pain. Denies new numbness or tingling, chills, CP, SOB, N/V/D.    LABS:                        12.4   13.40 )-----------( 177      ( 22 Dec 2024 06:10 )             40.1       Urinalysis Basic - ( 20 Dec 2024 17:30 )    Color: Dark Yellow / Appearance: Cloudy / S.026 / pH: x  Gluc: x / Ketone: Trace mg/dL  / Bili: Small / Urobili: 1.0 mg/dL   Blood: x / Protein: 100 mg/dL / Nitrite: Negative   Leuk Esterase: Large / RBC: Too Numerous to count /HPF / WBC Too Numerous to count /HPF   Sq Epi: x / Non Sq Epi: x / Bacteria: Many /HPF        VITAL SIGNS:  T(C): 37 (24 @ 05:04), Max: 37.6 (24 @ 17:20)  HR: 77 (24 @ 05:14) (70 - 96)  BP: 122/77 (24 @ 05:04) (100/63 - 122/77)  RR: 18 (24 @ 05:04) (16 - 18)  SpO2: 93% (24 @ 05:14) (87% - 95%)    Physical Exam:  General: NAD, pt laying in bed comfortably    LLE:  ~5cm pressure wound on posterior aspect of upper thigh, flexion contracture noted  Compartments soft, compressible  No motor/sensory function  +DP    Imaging:  CT Abd/Pelvis:  IMPRESSION:  No significant change from prior study.  Postoperative changes and findings which may be related to decubitus   ulcers and chronic dislocation and septic arthritis of the left hip.    A/P:  54y Male who presents with chronic L hip dislocation, likely chronic septic arthritis    Recommend Wound Care, general surgery team for wound management of his ulcers  Recommend IR aspiration for L hip, IR team consulted will follow up recommendations  WBAT  PT/OT  Analgesics  DVT PPx per primary team  Further orthopaedic plan pending L hip aspiration  Discussed plan with Dr. Bean who agrees with above

## 2024-12-22 NOTE — PROGRESS NOTE ADULT - SUBJECTIVE AND OBJECTIVE BOX
Patient: STAN VEGA [id number] 54y Male                            Hospitalist Attending Note    No fever / chills.  Pain controlled.   Afebrile.    Mild Cough.  On O2.        ____________________PHYSICAL EXAM:  GENERAL:  NAD Alert and Oriented x 3   HEENT: NCAT  CARDIOVASCULAR:  S1, S2  LUNGS: coarse BS b/l.   : SPC in place  ABDOMEN:  soft, (-) tenderness, (-) distension, (+) bowel sounds, (-) guarding, (-) rebound (-) rigidity.  Ileostomy  EXTREMITIES:  no cyanosis / clubbing / edema. R heel stage II pressure injury 2cm x 2cm x .1 cm, L dorsum of foot stage II pressure injury 3.5cm x 4.5cm x .1 cm  NEURO: b/l LE paraplegia.    ____________________    VITALS:  Vital Signs Last 24 Hrs  T(C): 37.1 (22 Dec 2024 12:29), Max: 37.6 (21 Dec 2024 17:20)  T(F): 98.7 (22 Dec 2024 12:29), Max: 99.6 (21 Dec 2024 17:20)  HR: 105 (22 Dec 2024 12:45) (70 - 105)  BP: 116/69 (22 Dec 2024 12:29) (116/69 - 122/77)  BP(mean): --  RR: 16 (22 Dec 2024 12:29) (16 - 18)  SpO2: 92% (22 Dec 2024 12:45) (87% - 95%)    Parameters below as of 22 Dec 2024 12:42  Patient On (Oxygen Delivery Method): nasal cannula     Daily     Daily   CAPILLARY BLOOD GLUCOSE        I&O's Summary    21 Dec 2024 07:01  -  22 Dec 2024 07:00  --------------------------------------------------------  IN: 1500 mL / OUT: 1370 mL / NET: 130 mL    22 Dec 2024 07:01  -  22 Dec 2024 15:37  --------------------------------------------------------  IN: 0 mL / OUT: 500 mL / NET: -500 mL        LABS:                        12.4   13.40 )-----------( 177      ( 22 Dec 2024 06:10 )             40.1     12-22    139  |  104  |  10  ----------------------------<  91  3.6   |  30  |  0.69    Ca    9.3      22 Dec 2024 06:10          Urinalysis Basic - ( 22 Dec 2024 06:10 )    Color: x / Appearance: x / SG: x / pH: x  Gluc: 91 mg/dL / Ketone: x  / Bili: x / Urobili: x   Blood: x / Protein: x / Nitrite: x   Leuk Esterase: x / RBC: x / WBC x   Sq Epi: x / Non Sq Epi: x / Bacteria: x            Culture - Blood (collected 20 Dec 2024 18:25)  Source: .Blood BLOOD  Preliminary Report (22 Dec 2024 01:01):    No growth at 24 hours    Culture - Blood (collected 20 Dec 2024 18:10)  Source: .Blood BLOOD  Preliminary Report (22 Dec 2024 01:01):    No growth at 24 hours        MEDICATIONS:  acetaminophen     Tablet .. 650 milliGRAM(s) Oral every 6 hours PRN  albuterol/ipratropium for Nebulization 3 milliLiter(s) Nebulizer every 6 hours  benzocaine/menthol Lozenge 1 Lozenge Oral every 4 hours PRN  DULoxetine 30 milliGRAM(s) Oral daily  enoxaparin Injectable 40 milliGRAM(s) SubCutaneous every 24 hours  ferrous    sulfate 325 milliGRAM(s) Oral daily  finasteride 5 milliGRAM(s) Oral daily  folic acid 1 milliGRAM(s) Oral daily  furosemide    Tablet 20 milliGRAM(s) Oral daily  gabapentin 300 milliGRAM(s) Oral every 8 hours  guaiFENesin Oral Liquid (Sugar-Free) 200 milliGRAM(s) Oral every 6 hours  HYDROmorphone   Tablet 8 milliGRAM(s) Oral every 6 hours PRN  HYDROmorphone   Tablet 4 milliGRAM(s) Oral every 6 hours PRN  influenza   Vaccine 0.5 milliLiter(s) IntraMuscular once  methadone    Tablet 110 milliGRAM(s) Oral daily  multivitamin 1 Tablet(s) Oral daily  ondansetron Injectable 4 milliGRAM(s) IV Push every 6 hours PRN  pantoprazole    Tablet 40 milliGRAM(s) Oral before breakfast  polyethylene glycol 3350 17 Gram(s) Oral daily  povidone iodine 10% Solution 1 Application(s) Topical daily  QUEtiapine 100 milliGRAM(s) Oral <User Schedule>  QUEtiapine 400 milliGRAM(s) Oral at bedtime  senna 2 Tablet(s) Oral at bedtime  simethicone 80 milliGRAM(s) Chew every 6 hours PRN  tamsulosin 0.4 milliGRAM(s) Oral at bedtime

## 2024-12-22 NOTE — PROGRESS NOTE ADULT - ASSESSMENT
54 years old male with h/o cervical epidural abscess, b/l LE paralysis, pneumoperitoneum s/p ex-lap w/ ileostomy complicated by MRSA bacteremia and evisceration, Hep C, emphysema on chronic O2, L foot osteomyelitis, bipolar, opioid dependence, HTN, neurogenic bladder s/p IR SPC placement on 10/23/24 present to ED with complain of worsening lower abdominal pain for 5 days and hematuria. SPC was placed \on 10/23/24 by IR, reportedly was pulled slightly since 10/24. Patient reported pain since then but worsened over last 5 days associated with nausea. Denied any fever or chills.     # Fever - s/p UTI due to Suprapubic Catheter, Carbapenem resistant Pseudomonas -  Completed 7d course of Zerbaxa.  SPC has been exchanged by IR.  Per ID recommendations, consider exchanging catheter every 2-3 weeks.    Recurrence of fever noted.  WBC elevated.  - T improved.  WBC trending down.    CT from admission showing possible septic arthritis of L hip.  IR consulted for drainage.  Prior CT from 7/2024 similar findings.  Ortho, surgery input appreciated.  IR consultation requested for drainage.      # L dorsal Foot Ulcer - podiatry input appreciated - Dr. Figueroa.  # Lower abdominal pain - may be due to septic arthritis.  Pain appears to be controlled on po Dilaudid, Methadone.  Discussed titration of po Dilaudid with pt.  # Methadone dependence. ·  Plan: Previous hospitalists have Called [facility] [phone], talked with nurse manage Corie, confirmed that patient is on methadone 110mg daily and last dose in NH was on 12/9/2024  QTc 469  on EKG 10/21/24, check EKG to evaluate for QTc  On dilaudid 6mg q6hr prn for severe pain at NH, continue same  Bowel regimens---> senna and miralax.  # Chronic respiratory failure with hypercapnia. ·  Plan: nocturnal NIPPV.  # GERD (gastroesophageal reflux disease). ·  Plan: on oral PPI.  # BPH (benign prostatic hyperplasia).  ·  Plan: on finasteride and flomax.  # Psychiatric disorder.  ·  Plan: on Seroquel 100mg 10AM, 100mg 6PM and 400mg hs per NH paper Continue duloxetine 30mg daily  # Functional quadriplegia  Bedbound with bilateral lower extremity paralysis Wound care ordered for lower extremity pressure wound  # Inpatient DVT Prophylaxis - Lovenox subcut   54 years old male with h/o cervical epidural abscess, b/l LE paralysis, pneumoperitoneum s/p ex-lap w/ ileostomy complicated by MRSA bacteremia and evisceration, Hep C, emphysema on chronic O2, L foot osteomyelitis, bipolar, opioid dependence, HTN, neurogenic bladder s/p IR SPC placement on 10/23/24 present to ED with complain of worsening lower abdominal pain for 5 days and hematuria. SPC was placed \on 10/23/24 by IR, reportedly was pulled slightly since 10/24. Patient reported pain since then but worsened over last 5 days associated with nausea. Denied any fever or chills.     # Fever - s/p UTI due to Suprapubic Catheter, Carbapenem resistant Pseudomonas -  Completed 7d course of Zerbaxa.  SPC has been exchanged by IR.  Per ID recommendations, consider exchanging catheter every 2-3 weeks.    Recurrence of fever noted.  WBC elevated.  - T improved.  WBC trending down.    CT from admission showing possible septic arthritis of L hip.  IR consulted for drainage.  Prior CT from 7/2024 similar findings.  Ortho, surgery input appreciated.  IR consultation requested for drainage.      # L dorsal Foot Ulcer - podiatry input appreciated - Dr. Figueroa.  # Lower abdominal pain - may be due to septic arthritis.  Pain appears to be controlled on po Dilaudid, Methadone.  Discussed titration of po Dilaudid with pt.  # Methadone dependence. ·  Plan: Previous hospitalists have Called Loc [phone], talked with nurse manage Corie, confirmed that patient is on methadone 110mg daily and last dose in NH was on 12/9/2024  QTc 469  on EKG 10/21/24.  Pt counselled to minimize use of Dilaudid if possible for pain control.    Bowel regimens---> senna and miralax.  # Chronic respiratory failure with hypercapnia. ·  Plan: nocturnal NIPPV.  # GERD (gastroesophageal reflux disease). ·  Plan: on oral PPI.  # BPH (benign prostatic hyperplasia).  ·  Plan: on finasteride and flomax.  # Psychiatric disorder.  ·  Plan: on Seroquel 100mg 10AM, 100mg 6PM and 400mg hs per NH paper Continue duloxetine 30mg daily  # Functional quadriplegia  Bedbound with bilateral lower extremity paralysis Wound care ordered for lower extremity pressure wound  # Inpatient DVT Prophylaxis - Lovenox subcut

## 2024-12-23 LAB
GRAM STN FLD: SIGNIFICANT CHANGE UP
RAPID RVP RESULT: SIGNIFICANT CHANGE UP
SARS-COV-2 RNA SPEC QL NAA+PROBE: SIGNIFICANT CHANGE UP
SPECIMEN SOURCE: SIGNIFICANT CHANGE UP

## 2024-12-23 PROCEDURE — 99232 SBSQ HOSP IP/OBS MODERATE 35: CPT

## 2024-12-23 PROCEDURE — 20611 DRAIN/INJ JOINT/BURSA W/US: CPT | Mod: LT

## 2024-12-23 RX ORDER — NYSTATIN TOPICAL POWDER 100000 U/G
1 POWDER TOPICAL
Refills: 0 | Status: DISCONTINUED | OUTPATIENT
Start: 2024-12-23 | End: 2024-12-31

## 2024-12-23 RX ADMIN — Medication 20 MILLIGRAM(S): at 05:18

## 2024-12-23 RX ADMIN — PANTOPRAZOLE 40 MILLIGRAM(S): 40 TABLET, DELAYED RELEASE ORAL at 05:18

## 2024-12-23 RX ADMIN — GABAPENTIN 300 MILLIGRAM(S): 300 CAPSULE ORAL at 21:53

## 2024-12-23 RX ADMIN — Medication 325 MILLIGRAM(S): at 11:49

## 2024-12-23 RX ADMIN — Medication 200 MILLIGRAM(S): at 05:17

## 2024-12-23 RX ADMIN — IPRATROPIUM BROMIDE AND ALBUTEROL SULFATE 3 MILLILITER(S): .5; 2.5 SOLUTION RESPIRATORY (INHALATION) at 23:23

## 2024-12-23 RX ADMIN — Medication 1 TABLET(S): at 11:49

## 2024-12-23 RX ADMIN — Medication 1 MILLIGRAM(S): at 11:50

## 2024-12-23 RX ADMIN — IPRATROPIUM BROMIDE AND ALBUTEROL SULFATE 3 MILLILITER(S): .5; 2.5 SOLUTION RESPIRATORY (INHALATION) at 06:02

## 2024-12-23 RX ADMIN — Medication 4 MILLIGRAM(S): at 05:17

## 2024-12-23 RX ADMIN — ENOXAPARIN SODIUM 40 MILLIGRAM(S): 60 INJECTION INTRAVENOUS; SUBCUTANEOUS at 11:50

## 2024-12-23 RX ADMIN — TAMSULOSIN HYDROCHLORIDE 0.4 MILLIGRAM(S): 0.4 CAPSULE ORAL at 21:53

## 2024-12-23 RX ADMIN — Medication 200 MILLIGRAM(S): at 14:06

## 2024-12-23 RX ADMIN — Medication 4 MILLIGRAM(S): at 06:10

## 2024-12-23 RX ADMIN — NYSTATIN TOPICAL POWDER 1 APPLICATION(S): 100000 POWDER TOPICAL at 18:09

## 2024-12-23 RX ADMIN — Medication 5 MILLIGRAM(S): at 11:49

## 2024-12-23 RX ADMIN — IPRATROPIUM BROMIDE AND ALBUTEROL SULFATE 3 MILLILITER(S): .5; 2.5 SOLUTION RESPIRATORY (INHALATION) at 17:03

## 2024-12-23 RX ADMIN — Medication 4 MILLIGRAM(S): at 21:53

## 2024-12-23 RX ADMIN — QUETIAPINE FUMARATE 400 MILLIGRAM(S): 100 TABLET, FILM COATED ORAL at 21:53

## 2024-12-23 RX ADMIN — Medication 8 MILLIGRAM(S): at 15:06

## 2024-12-23 RX ADMIN — POVIDONE IODINE USP, 10% W/W 1 APPLICATION(S): 10 SWAB TOPICAL at 11:51

## 2024-12-23 RX ADMIN — Medication 8 MILLIGRAM(S): at 14:06

## 2024-12-23 RX ADMIN — Medication 200 MILLIGRAM(S): at 18:10

## 2024-12-23 RX ADMIN — Medication 4 MILLIGRAM(S): at 23:04

## 2024-12-23 RX ADMIN — METHADONE HYDROCHLORIDE 110 MILLIGRAM(S): 10 TABLET ORAL at 11:48

## 2024-12-23 RX ADMIN — GABAPENTIN 300 MILLIGRAM(S): 300 CAPSULE ORAL at 05:18

## 2024-12-23 RX ADMIN — IPRATROPIUM BROMIDE AND ALBUTEROL SULFATE 3 MILLILITER(S): .5; 2.5 SOLUTION RESPIRATORY (INHALATION) at 11:01

## 2024-12-23 RX ADMIN — Medication 8 MILLIGRAM(S): at 06:16

## 2024-12-23 RX ADMIN — GABAPENTIN 300 MILLIGRAM(S): 300 CAPSULE ORAL at 14:06

## 2024-12-23 RX ADMIN — QUETIAPINE FUMARATE 100 MILLIGRAM(S): 100 TABLET, FILM COATED ORAL at 18:10

## 2024-12-23 RX ADMIN — QUETIAPINE FUMARATE 100 MILLIGRAM(S): 100 TABLET, FILM COATED ORAL at 10:34

## 2024-12-23 NOTE — PROGRESS NOTE ADULT - ASSESSMENT
54 years old male with h/o cervical epidural abscess, b/l LE paralysis, pneumoperitoneum s/p ex-lap w/ ileostomy complicated by MRSA bacteremia and evisceration, Hep C, emphysema on chronic O2, L foot osteomyelitis, bipolar, opioid dependence, HTN, neurogenic bladder s/p IR SPC placement on 10/23/24 present to ED with complain of worsening lower abdominal pain for 5 days and hematuria. SPC was placed \on 10/23/24 by IR, reportedly was pulled slightly since 10/24. Patient reported pain since then but worsened over last 5 days associated with nausea. Denied any fever or chills.     # Fever - s/p UTI due to Suprapubic Catheter, Carbapenem resistant Pseudomonas -  Completed 7d course of Zerbaxa.  SPC has been exchanged by IR.  Per ID recommendations, consider exchanging catheter every 2-3 weeks.    Recurrence of fever noted.  WBC elevated.  - T improved.  WBC trending down.    CT from admission showing possible septic arthritis of L hip.  Prior CT from 7/2024 similar findings.  Ortho, surgery input appreciated.   S/p IR drainage 12/23 with minimal yield < 1ml.      # L dorsal Foot Ulcer - podiatry input appreciated - Dr. Figueroa.  # Lower abdominal pain - may be due to septic arthritis.  Pain appears to be controlled on po Dilaudid, Methadone.  Discussed titration of po Dilaudid with pt.  # Methadone dependence. ·  Plan: Previous hospitalists have Called Loc [phone], talked with nurse manage Corie, confirmed that patient is on methadone 110mg daily and last dose in NH was on 12/9/2024  QTc 469  on EKG 10/21/24.  Pt counselled to minimize use of Dilaudid if possible for pain control.    Bowel regimens---> senna and miralax.  # Chronic respiratory failure with hypercapnia. ·  Plan: nocturnal NIPPV.  # GERD (gastroesophageal reflux disease). ·  Plan: on oral PPI.  # BPH (benign prostatic hyperplasia).  ·  Plan: on finasteride and flomax.  # Psychiatric disorder.  ·  Plan: on Seroquel 100mg 10AM, 100mg 6PM and 400mg hs per NH paper Continue duloxetine 30mg daily  # Functional quadriplegia  Bedbound with bilateral lower extremity paralysis Wound care ordered for lower extremity pressure wound  # Inpatient DVT Prophylaxis - Lovenox subcut

## 2024-12-23 NOTE — CONSULT NOTE ADULT - SUBJECTIVE AND OBJECTIVE BOX
Interventional Radiology    Evaluate for Procedure: left hip aspiration    HPI: 54y Male with h/o cervical epidural abscess, b/l LE paralysis, pneumoperitoneum s/p ex-lap w/ ileostomy complicated by MRSA bacteremia and evisceration, Hep C, emphysema on chronic O2, L foot osteomyelitis, bipolar, opioid dependence, HTN, neurogenic bladder s/p IR SPC placement on 10/23/24 present to ED with complain of worsening lower abdominal pain for 5 days and hematuria. s/p SPC exchange on 12/11. Pt chronic left hip pain due to arthritis and possible chronic septic arthritis. IR consulted for left hip aspiration.     Allergies: NSAIDs (Flushing; Other (Moderate))  Aleve (Unknown)  Risperdal (Short breath; Rash; Hives)  Stelazine (Unknown)  Haldol (Anaphylaxis)  Motrin (Anaphylaxis)  Thorazine (Other (Moderate))  Zyprexa (Rash; Dystonic RXN; Hives)    Medications (Abx/Cardiac/Anticoagulation/Blood Products)    enoxaparin Injectable: 40 milliGRAM(s) SubCutaneous (12-23 @ 11:50)  furosemide    Tablet: 20 milliGRAM(s) Oral (12-23 @ 05:18)    Data:    T(C): 37.6  HR: 95  BP: 113/64  RR: 18  SpO2: 95%    -WBC 13.40 / HgB 12.4 / Hct 40.1 / Plt 177  -Na 139 / Cl 104 / BUN 10 / Glucose 91  -K 3.6 / CO2 30 / Cr 0.69  -ALT -- / Alk Phos -- / T.Bili --  -INR 1.17 / PTT 25.9    Radiology: Reviewed by Dr. Escobar    Assessment/Plan:   -54y Male with h/o cervical epidural abscess, b/l LE paralysis, pneumoperitoneum s/p ex-lap w/ ileostomy complicated by MRSA bacteremia and evisceration, Hep C, emphysema on chronic O2, L foot osteomyelitis, bipolar, opioid dependence, HTN, neurogenic bladder s/p IR SPC placement on 10/23/24 present to ED with complain of worsening lower abdominal pain for 5 days and hematuria. s/p SPC exchange on 12/11. Pt chronic left hip pain due to arthritis and possible chronic septic arthritis. IR consulted for left hip aspiration.       - case and imaging reviewed by Dr. Escobar   - Will plan for left hip aspiration today  - please place IR procedure order under Escobar  - Labs reviewed   - on lovenox  - does not need to be NPO.   - d/w primary team      Interventional Radiology  ------------------------------------  For emergent consults, please call [phone], or call or message on TEAMs.   For non-emergent consults, consults after hours or over the weekend, please place IR Consult order.

## 2024-12-23 NOTE — PROGRESS NOTE ADULT - NS ATTEND AMEND GEN_ALL_CORE FT
All labs and cultures and imaging and pertinent chart notes reviewed by me.    case d/w Np Naty at length and agree with her assessment and plan.    12/23:   had fever of 102 on  12/20,   no fevers since then  Leukocytosis is better 13.4  CXR - chronic stable changes,   BCs from 12/20 NGTD, UA with no nitrates, WBC TNTC, Bacteria many   The pt was seen by ortho, recommended Left Hip aspiration, the pt had IR aspiration today. Would obtain CT hip and a/p.       Impression-  possible Chronic arthritis of left hip , possible chronic septic arthritis of left hip   pain in extremities contracted   MDR UTI in patient with chronic SPC - treated  neurogenic bladder  bed-bound  colostomy present      plan-  completed zerbaxa to treat the carbapenem resistant pseudomonas last week.  has been off abx for 3 days.  follow hip joint fluid cell culture that was done via IR as per IR not sufficient fluid for cell count  frequent turns, offloading, and nutrition optimization to avoid bedsores-consider protective heel/leg protectors   advise to monitor for aspiration precautions   will need every 3-4 weeks SPC exchange to avoid MDR UTI  ortho consult is appreciated  obtain CT left hip/ lower extremity  obtain repeat CT a/p     All labs and imaging and chart notes reviewed.     All above discussed with patient and patient verbalizes full understanding of all above and agrees with above plan of care.      Kush King MD  Infectious Disease Attending    for any questions please do not hesitate to contact me either via teams or by calling [phone] All labs and cultures and imaging and pertinent chart notes reviewed by me.    case d/w Np Naty at length and agree with her assessment and plan.    patient seen and examined personally by me as well today.  left hip no edema seen, has small stage 1-2 wound, no open wound seen( however patient himself states he had clear oozing from region before)      had fever of 102 on  12/20,   no fevers since then  Leukocytosis is better 13.4  CXR - chronic stable changes,  Blood cx  from 12/20 NGTD, UA with no nitrates, WBC TNTC, Bacteria many  The pt was seen by ortho and they recommended Left Hip aspiration via IR and  the pt had IR aspiration today.   Would obtain CT hip and a/p.       Impression-  possible Chronic arthritis of left hip , possible chronic septic arthritis of left hip   pain in extremities contracted   MDR UTI in patient with chronic SPC - treated  neurogenic bladder  bed-bound  colostomy present      plan-  completed zerbaxa to treat the carbapenem resistant pseudomonas last week.  has been off abx for 3 days.  follow hip joint fluid cell culture that was done via IR as per IR not sufficient fluid for cell count  frequent turns, offloading, and nutrition optimization to avoid bedsores-consider protective heel/leg protectors   advise to monitor for aspiration precautions   will need every 3-4 weeks SPC exchange to avoid MDR UTI  ortho consult is appreciated  obtain CT left hip/ lower extremity  obtain repeat CT a/p     All labs and imaging and chart notes reviewed.     All above discussed with patient and patient verbalizes full understanding of all above and agrees with above plan of care.      Kush King MD  Infectious Disease Attending    for any questions please do not hesitate to contact me either via teams or by calling [phone]

## 2024-12-23 NOTE — PROGRESS NOTE ADULT - ASSESSMENT
54 years old male with h/o cervical epidural abscess, b/l LE paralysis, pneumoperitoneum s/p ex-lap w/ ileostomy complicated by MRSA bacteremia and evisceration, Hep C ( VL UD based on labs from 1/2024), emphysema on chronic O2, L foot osteomyelitis,-treated    bipolar, opioid dependence, HTN, neurogenic bladder s/p IR SPC placement on 10/23/24 present to ED with complain of worsening lower abdominal pain for 5 days and hematuria. SPC was placed 10/23/24 by IR, reportedly was pulled slightly since 10/24. Patient reported pain since then but worsened over last 5 days associated with nausea. Denied any fever or chills.   Hemodynamically stable, afebrile, sat well at RA. No leukocytosis, plt 181, Cr 0.69. UA dirty. CXR with no focal consolidation (10 Dec 2024 11:42)    Infectious Disease consult requested today 12/13/2024 to help with antibiotic management for carbapenem resistant pseudomonas in urine cx.    patient  is s/p SPC exchange by IR on 12/11/2024    Afebrile  no leukocytosis  UA- turbid and pos LE, neg -nitrates  no blood cx.  previous urine cx from 10/2024-[facility] ( was treated by my colleague  with IV abx and d/c on po cefpodoxime )    patient is prone to MDR  organisms  in the urine in light of chronic west and chronic SPC secondary to neurogenic bladder.  he has had extensive h/o infections in heels and foot as well ( all treated in past)    in order to help mitigate these UTIs  would advise to have SPC exchange every 3-4 weeks     12/16: no fever, on RA at the time of my exam and appears to be comfortable, no leukocytosis, Cr ok, UC with carbapenem resistant pseudomonas, Zerbaxa continued (day #4). Pt with mild abdominal discomfort today, no vomiting, having bowel movements, will monitor.   12/18: afebrile, RA, no leukocytosis, Cr ok, no BCs available, UC with  pseudomonas, today is day #5 of abx Zerbaxa, pt will need two more days to complete 7 days total.   12/20: complaining of a lot of pain in his hip, has a healed wound, foot presses up against lower hip causing pressure injury both to the foot and the hip, no obvious external tract but there is significant pain with movement of the hip and knee. Would recommend podiatry and ortho consults as well as imaging of the left hip and knee. should stay off antibiotics as he compleated the antibiotics for the UTI and would like to see if there are s/s or worsening infection from his hip or foot.    12/23: had a fever on 12/20, no fevers since then, NC, leukocytosis is better 13.4, Cr ok, CXR - chronic stable changes, BCs from 12/20 NGTD, UA with no nitrates, WBC TNTC, Bacteria many. The pt was seen by ortho, recommended Left Hip aspiration, the pt had IR aspiration today. Would obtain CT hip and a/p.   The pt was seen by podiatry for elongated nails.     Impression-  Chronic septic arthritis of left hip   pain in extremities contracted   MDR UTI in patient with chronic SPC   neurogenic bladder  bed-bound  colostomy present      plan-  completed zerbaxa to treat the carbapenem resistant pseudomonas   frequent turns, offloading, and nutrition optimization to avoid bedsores-consider protective heel/leg protectors   advise to monitor for aspiration precautions   will need every 3-4 weeks SPC exchange   advise to apply nystatin ointment to skin folds with the fungal rash BID for 5 days.  ortho consult is appreciated  IR consult and aspiration is appreciated, awaiting for findings   podiatry consult is appreciated   obtain CT left hip/ lower extremity  obtain repeat CT a/p   follow all culture data       Discussed with Dr. King  Discussed with Dr. Hernandez  Discussed with Dr. Figueroa

## 2024-12-23 NOTE — PROGRESS NOTE ADULT - SUBJECTIVE AND OBJECTIVE BOX
Patient: STAN VEGA [id number] 54y Male                            Hospitalist Attending Note    s/p IR drainage of hip ~ < 1ml of fluid.   No fever / chills.  Pain controlled.   Afebrile.    No SOB.  No additional complaints.       ____________________PHYSICAL EXAM:  GENERAL:  NAD Alert and Oriented x 3   HEENT: NCAT  CARDIOVASCULAR:  S1, S2  LUNGS: coarse BS b/l.   : SPC in place  ABDOMEN:  soft, (-) tenderness, (-) distension, (+) bowel sounds, (-) guarding, (-) rebound (-) rigidity.  Ileostomy  EXTREMITIES:  no cyanosis / clubbing / edema. R heel stage II pressure injury 2cm x 2cm x .1 cm, L dorsum of foot stage II pressure injury 3.5cm x 4.5cm x .1 cm  NEURO: b/l LE paraplegia.    ____________________    VITALS:  Vital Signs Last 24 Hrs  T(C): 37.2 (23 Dec 2024 16:50), Max: 37.7 (22 Dec 2024 17:43)  T(F): 98.9 (23 Dec 2024 16:50), Max: 99.8 (22 Dec 2024 17:43)  HR: 91 (23 Dec 2024 16:50) (71 - 102)  BP: 132/72 (23 Dec 2024 16:50) (113/64 - 132/72)  BP(mean): --  RR: 18 (23 Dec 2024 16:50) (17 - 18)  SpO2: 93% (23 Dec 2024 16:50) (89% - 95%)    Parameters below as of 23 Dec 2024 16:50  Patient On (Oxygen Delivery Method): room air     Daily     Daily   CAPILLARY BLOOD GLUCOSE        I&O's Summary    22 Dec 2024 07:01  -  23 Dec 2024 07:00  --------------------------------------------------------  IN: 720 mL / OUT: 1850 mL / NET: -1130 mL        LABS:                        12.4   13.40 )-----------( 177      ( 22 Dec 2024 06:10 )             40.1     12-22    139  |  104  |  10  ----------------------------<  91  3.6   |  30  |  0.69    Ca    9.3      22 Dec 2024 06:10          Urinalysis Basic - ( 22 Dec 2024 06:10 )    Color: x / Appearance: x / SG: x / pH: x  Gluc: 91 mg/dL / Ketone: x  / Bili: x / Urobili: x   Blood: x / Protein: x / Nitrite: x   Leuk Esterase: x / RBC: x / WBC x   Sq Epi: x / Non Sq Epi: x / Bacteria: x            Culture - Blood (collected 20 Dec 2024 18:25)  Source: .Blood BLOOD  Preliminary Report (23 Dec 2024 01:02):    No growth at 48 Hours    Culture - Blood (collected 20 Dec 2024 18:10)  Source: .Blood BLOOD  Preliminary Report (23 Dec 2024 01:02):    No growth at 48 Hours        MEDICATIONS:  acetaminophen     Tablet .. 650 milliGRAM(s) Oral every 6 hours PRN  albuterol/ipratropium for Nebulization 3 milliLiter(s) Nebulizer every 6 hours  benzocaine/menthol Lozenge 1 Lozenge Oral every 4 hours PRN  DULoxetine 30 milliGRAM(s) Oral daily  enoxaparin Injectable 40 milliGRAM(s) SubCutaneous every 24 hours  ferrous    sulfate 325 milliGRAM(s) Oral daily  finasteride 5 milliGRAM(s) Oral daily  folic acid 1 milliGRAM(s) Oral daily  furosemide    Tablet 20 milliGRAM(s) Oral daily  gabapentin 300 milliGRAM(s) Oral every 8 hours  guaiFENesin Oral Liquid (Sugar-Free) 200 milliGRAM(s) Oral every 6 hours  HYDROmorphone   Tablet 8 milliGRAM(s) Oral every 6 hours PRN  HYDROmorphone   Tablet 4 milliGRAM(s) Oral every 6 hours PRN  influenza   Vaccine 0.5 milliLiter(s) IntraMuscular once  methadone    Tablet 110 milliGRAM(s) Oral daily  multivitamin 1 Tablet(s) Oral daily  nystatin Powder 1 Application(s) Topical two times a day  ondansetron Injectable 4 milliGRAM(s) IV Push every 6 hours PRN  pantoprazole    Tablet 40 milliGRAM(s) Oral before breakfast  polyethylene glycol 3350 17 Gram(s) Oral daily  povidone iodine 10% Solution 1 Application(s) Topical daily  QUEtiapine 400 milliGRAM(s) Oral at bedtime  QUEtiapine 100 milliGRAM(s) Oral <User Schedule>  senna 2 Tablet(s) Oral at bedtime  simethicone 80 milliGRAM(s) Chew every 6 hours PRN  tamsulosin 0.4 milliGRAM(s) Oral at bedtime

## 2024-12-23 NOTE — CHART NOTE - NSCHARTNOTEFT_GEN_A_CORE
Per chart, pt with PMH of HTN, emphysema chronic O2, cervical epidural abscess, b/l LE paralysis, chronic pressure ulcers, pneumoperitoneum s/p ex lap with ileostomy complicated by MRSA bacteremia and evisceration, hep C, emphysema on chronic O2, L foot OM, bipolar disorder, opioid dependence, neurogenic bladder s/p IR SPC placement(10/2024). Pt presented with worsening lower abdominal pain and hematuria; with fever, UTI due to suprapubic catheter, s/p abx. Also with L dorsal foot ulcer, lower abdominal pain may be due to septic arthritis.     Factors impacting intake: [x] none [ ] nausea  [ ] vomiting [ ] diarrhea [ ] constipation  [ ]chewing problems [ ] swallowing issues  [ ] other:    Diet Prescription: Diet, DASH/TLC:   Sodium & Cholesterol Restricted  Supplement Feeding Modality:  Oral  Ensure Plus High Protein Cans or Servings Per Day:  1       Frequency:  Two Times a day (12-17-24 @ 14:50)    Intake: % for documented meals and supplements as per flow sheets    Current Weight: 117.3(12/19), 116.6kg(12/10)  % Weight Change: 0.6% increase x 1 week  edema 3+ b/l feet, legs, ankles  as per flow sheets    Pertinent Medications: MEDICATIONS  (STANDING):  albuterol/ipratropium for Nebulization 3 milliLiter(s) Nebulizer every 6 hours  DULoxetine 30 milliGRAM(s) Oral daily  enoxaparin Injectable 40 milliGRAM(s) SubCutaneous every 24 hours  ferrous    sulfate 325 milliGRAM(s) Oral daily  finasteride 5 milliGRAM(s) Oral daily  folic acid 1 milliGRAM(s) Oral daily  furosemide    Tablet 20 milliGRAM(s) Oral daily  gabapentin 300 milliGRAM(s) Oral every 8 hours  guaiFENesin Oral Liquid (Sugar-Free) 200 milliGRAM(s) Oral every 6 hours  influenza   Vaccine 0.5 milliLiter(s) IntraMuscular once  methadone    Tablet 110 milliGRAM(s) Oral daily  multivitamin 1 Tablet(s) Oral daily  pantoprazole    Tablet 40 milliGRAM(s) Oral before breakfast  polyethylene glycol 3350 17 Gram(s) Oral daily  povidone iodine 10% Solution 1 Application(s) Topical daily  QUEtiapine 400 milliGRAM(s) Oral at bedtime  QUEtiapine 100 milliGRAM(s) Oral <User Schedule>  senna 2 Tablet(s) Oral at bedtime  tamsulosin 0.4 milliGRAM(s) Oral at bedtime    MEDICATIONS  (PRN):  acetaminophen     Tablet .. 650 milliGRAM(s) Oral every 6 hours PRN Temp greater or equal to 38C (100.4F)  benzocaine/menthol Lozenge 1 Lozenge Oral every 4 hours PRN Sore Throat  HYDROmorphone   Tablet 8 milliGRAM(s) Oral every 6 hours PRN Severe Pain (7 - 10)  HYDROmorphone   Tablet 4 milliGRAM(s) Oral every 6 hours PRN Moderate Pain (4 - 6)  ondansetron Injectable 4 milliGRAM(s) IV Push every 6 hours PRN Nausea and/or Vomiting  simethicone 80 milliGRAM(s) Chew every 6 hours PRN Heartburn    Pertinent Labs: 12-22 Na139 mmol/L Glu 91 mg/dL K+ 3.6 mmol/L Cr  0.69 mg/dL BUN 10 mg/dL    Skin: Pt with multiple stage II or > pressure ulcers noted as per flow sheets    Estimated Needs:   [x] no change since previous assessment on 12/17  [ ] recalculated:     Previous Nutrition Diagnosis:   [x] Increased Nutrient Needs... Protein  Etiology: Increased demand for protein  Signs/Symptoms: pt with multiple stage II or > pressure ulcers    Goal: Pt to meet >75% of protein needs via meals/supplements to promote wound healing - met    Nutrition Diagnosis is [x] ongoing  [ ] resolved [ ] not applicable     New Nutrition Diagnosis: [x] not applicable       Interventions:   Recommend  [x] Continue with current diet rx as ordered  [ ] Change Diet To:  [ ] Nutrition Supplement  [ ] Nutrition Support  [ ] Other:     Monitoring and Evaluation:   [ x ] PO intake [ x ] Tolerance to diet prescription [ x ] weights [ x ] labs[ x ] follow up per protocol  [ ] other: Per chart, pt with PMH of HTN, emphysema chronic O2, cervical epidural abscess, b/l LE paralysis, chronic pressure ulcers, pneumoperitoneum s/p ex lap with ileostomy complicated by MRSA bacteremia and evisceration, hep C, emphysema on chronic O2, L foot OM, bipolar disorder, opioid dependence, neurogenic bladder s/p IR SPC placement(10/2024). Pt presented with worsening lower abdominal pain and hematuria; with fever, UTI due to suprapubic catheter, s/p abx. Also with L dorsal foot ulcer, as well as lower abdominal pain, which may be due to septic arthritis.    Factors impacting intake: [x] none [ ] nausea  [ ] vomiting [ ] diarrhea [ ] constipation  [ ]chewing problems [ ] swallowing issues  [ ] other:    Diet Prescription: Diet, DASH/TLC:   Sodium & Cholesterol Restricted  Supplement Feeding Modality:  Oral  Ensure Plus High Protein Cans or Servings Per Day:  1       Frequency:  Two Times a day (12-17-24 @ 14:50)    Intake: % for documented meals and supplements as per flow sheets    Current Weight: 117.3(12/19), 116.6kg(12/10)  % Weight Change: 0.6% increase x 1 week  edema 3+ b/l feet, legs, ankles  as per flow sheets    Pertinent Medications: MEDICATIONS  (STANDING):  albuterol/ipratropium for Nebulization 3 milliLiter(s) Nebulizer every 6 hours  DULoxetine 30 milliGRAM(s) Oral daily  enoxaparin Injectable 40 milliGRAM(s) SubCutaneous every 24 hours  ferrous    sulfate 325 milliGRAM(s) Oral daily  finasteride 5 milliGRAM(s) Oral daily  folic acid 1 milliGRAM(s) Oral daily  furosemide    Tablet 20 milliGRAM(s) Oral daily  gabapentin 300 milliGRAM(s) Oral every 8 hours  guaiFENesin Oral Liquid (Sugar-Free) 200 milliGRAM(s) Oral every 6 hours  influenza   Vaccine 0.5 milliLiter(s) IntraMuscular once  methadone    Tablet 110 milliGRAM(s) Oral daily  multivitamin 1 Tablet(s) Oral daily  pantoprazole    Tablet 40 milliGRAM(s) Oral before breakfast  polyethylene glycol 3350 17 Gram(s) Oral daily  povidone iodine 10% Solution 1 Application(s) Topical daily  QUEtiapine 400 milliGRAM(s) Oral at bedtime  QUEtiapine 100 milliGRAM(s) Oral <User Schedule>  senna 2 Tablet(s) Oral at bedtime  tamsulosin 0.4 milliGRAM(s) Oral at bedtime    MEDICATIONS  (PRN):  acetaminophen     Tablet .. 650 milliGRAM(s) Oral every 6 hours PRN Temp greater or equal to 38C (100.4F)  benzocaine/menthol Lozenge 1 Lozenge Oral every 4 hours PRN Sore Throat  HYDROmorphone   Tablet 8 milliGRAM(s) Oral every 6 hours PRN Severe Pain (7 - 10)  HYDROmorphone   Tablet 4 milliGRAM(s) Oral every 6 hours PRN Moderate Pain (4 - 6)  ondansetron Injectable 4 milliGRAM(s) IV Push every 6 hours PRN Nausea and/or Vomiting  simethicone 80 milliGRAM(s) Chew every 6 hours PRN Heartburn    Pertinent Labs: 12-22 Na139 mmol/L Glu 91 mg/dL K+ 3.6 mmol/L Cr  0.69 mg/dL BUN 10 mg/dL    Skin: Pt with multiple stage II or > pressure ulcers noted as per flow sheets    Estimated Needs:   [x] no change since previous assessment on 12/17  [ ] recalculated:     Previous Nutrition Diagnosis:   [x] Increased Nutrient Needs... Protein  Etiology: Increased demand for protein  Signs/Symptoms: pt with multiple stage II or > pressure ulcers    Goal: Pt to meet >75% of protein needs via meals/supplements to promote wound healing - met    Nutrition Diagnosis is [x] ongoing  [ ] resolved [ ] not applicable     New Nutrition Diagnosis: [x] not applicable       Interventions:   Recommend  [x] Continue with current diet rx as ordered  [ ] Change Diet To:  [ ] Nutrition Supplement  [ ] Nutrition Support  [ ] Other:     Monitoring and Evaluation:   [ x ] PO intake [ x ] Tolerance to diet prescription [ x ] weights [ x ] labs[ x ] follow up per protocol  [ ] other:

## 2024-12-23 NOTE — PROGRESS NOTE ADULT - SUBJECTIVE AND OBJECTIVE BOX
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 Pt is afebrile since 12/20, NC, leukocytosis is better.     PAST MEDICAL & SURGICAL HISTORY:  CAD (coronary artery disease)      HTN (hypertension)      HLD (hyperlipidemia)      Neurogenic bladder      Bipolar disorder      S/P ileostomy      Chronic hepatitis C virus infection      S/P laminectomy      S/P ileostomy          ROS:    [ ] Unobtainable because:  [x ] All other systems negative    Constitutional: no fever, no chills  Head: no trauma  Eyes: no vision changes, no eye pain  ENT:  no sore throat, no rhinorrhea  Cardiovascular:  no chest pain, no palpitation  Respiratory:  no SOB, no cough  GI: mild abdominal discomfort thinks due to hip, no vomiting, no diarrhea  urinary: no dysuria, no hematuria, no flank pain  musculoskeletal:  contracted, hip discomfort   skin:  no rash  neurology:  no headache, no seizure, no change in mental status  psych: no anxiety, no depression         Allergies  NSAIDs (Flushing; Other (Moderate))  Aleve (Unknown)  Risperdal (Short breath; Rash; Hives)  Stelazine (Unknown)  Haldol (Anaphylaxis)  Motrin (Anaphylaxis)  Thorazine (Other (Moderate))  Zyprexa (Rash; Dystonic RXN; Hives)        ANTIMICROBIALS:      OTHER MEDS:  acetaminophen     Tablet .. 650 milliGRAM(s) Oral every 6 hours PRN  albuterol/ipratropium for Nebulization 3 milliLiter(s) Nebulizer every 6 hours  benzocaine/menthol Lozenge 1 Lozenge Oral every 4 hours PRN  DULoxetine 30 milliGRAM(s) Oral daily  enoxaparin Injectable 40 milliGRAM(s) SubCutaneous every 24 hours  ferrous    sulfate 325 milliGRAM(s) Oral daily  finasteride 5 milliGRAM(s) Oral daily  folic acid 1 milliGRAM(s) Oral daily  furosemide    Tablet 20 milliGRAM(s) Oral daily  gabapentin 300 milliGRAM(s) Oral every 8 hours  guaiFENesin Oral Liquid (Sugar-Free) 200 milliGRAM(s) Oral every 6 hours  HYDROmorphone   Tablet 8 milliGRAM(s) Oral every 6 hours PRN  HYDROmorphone   Tablet 4 milliGRAM(s) Oral every 6 hours PRN  influenza   Vaccine 0.5 milliLiter(s) IntraMuscular once  methadone    Tablet 110 milliGRAM(s) Oral daily  multivitamin 1 Tablet(s) Oral daily  ondansetron Injectable 4 milliGRAM(s) IV Push every 6 hours PRN  pantoprazole    Tablet 40 milliGRAM(s) Oral before breakfast  polyethylene glycol 3350 17 Gram(s) Oral daily  povidone iodine 10% Solution 1 Application(s) Topical daily  QUEtiapine 400 milliGRAM(s) Oral at bedtime  QUEtiapine 100 milliGRAM(s) Oral <User Schedule>  senna 2 Tablet(s) Oral at bedtime  simethicone 80 milliGRAM(s) Chew every 6 hours PRN  tamsulosin 0.4 milliGRAM(s) Oral at bedtime      Vital Signs Last 24 Hrs  T(C): 37.2 (23 Dec 2024 12:38), Max: 37.7 (22 Dec 2024 17:43)  T(F): 98.9 (23 Dec 2024 12:38), Max: 99.8 (22 Dec 2024 17:43)  HR: 94 (23 Dec 2024 12:38) (71 - 102)  BP: 132/65 (23 Dec 2024 12:38) (113/64 - 132/65)  BP(mean): --  RR: 17 (23 Dec 2024 12:38) (17 - 18)  SpO2: 93% (23 Dec 2024 12:38) (89% - 95%)    Parameters below as of 23 Dec 2024 11:01  Patient On (Oxygen Delivery Method): nasal cannula        Physical Exam:  General:    NAD, non toxic, NC  Head: atraumatic, normocephalic  Eyes: normal sclera and conjunctiva  ENT:   no oropharyngeal lesions, no LAD, neck supple  Cardio:    regular S1,S2  Respiratory:   no wheezing, no rales  abd:  appears distended, BS +, mildly tender, colostomy bag present half full with brown soft stool   :     no CVAT, mild suprapubic tenderness, SPC present, no erythema around spc   Musculoskeletal : contracted LE, can't move straighten the LE and is in flexed position, + edema of b/l LEs  Skin:  does have some fungal rash on the fold of lower abd  and thigh region ( as per patient he always gets them due to his contracted state), elongated toe nails   Neurologic:     AAO x 3  psych: calm    WBC Count: 13.40 K/uL (12-22 @ 06:10)  WBC Count: 16.39 K/uL (12-20 @ 18:25)  WBC Count: 9.94 K/uL (12-20 @ 06:39)  WBC Count: 7.30 K/uL (12-18 @ 08:20)  WBC Count: 8.02 K/uL (12-17 @ 07:40)                            12.4   13.40 )-----------( 177      ( 22 Dec 2024 06:10 )             40.1       12-22    139  |  104  |  10  ----------------------------<  91  3.6   |  30  |  0.69    Ca    9.3      22 Dec 2024 06:10        Urinalysis Basic - ( 22 Dec 2024 06:10 )    Color: x / Appearance: x / SG: x / pH: x  Gluc: 91 mg/dL / Ketone: x  / Bili: x / Urobili: x   Blood: x / Protein: x / Nitrite: x   Leuk Esterase: x / RBC: x / WBC x   Sq Epi: x / Non Sq Epi: x / Bacteria: x        Creatinine Trend: 0.69<--, 0.66<--, 0.55<--, 0.73<--, 0.59<--, 0.60<--      MICROBIOLOGY:  v  .Blood BLOOD  12-20-24   No growth at 48 Hours  --  --      .Blood BLOOD  12-20-24   No growth at 48 Hours  --  --      Clean Catch  12-09-24   >100,000 CFU/ml Pseudomonas aeruginosa (Carbapenem Resistant)  --  Pseudomonas aeruginosa (Carbapenem Resistant)          Rapid RVP Result: NotDetec (12-23 @ 11:05)        C-Reactive Protein: 20 (12-20)            SARS-CoV-2: NotDetec (12-23-24 @ 11:05)  Rapid RVP Result: NotDetec (12-23-24 @ 11:05)    SARS-CoV-2: NotDetec (23 Dec 2024 11:05)    RADIOLOGY:     STAN VEGA  MRN-[id number]  54y (1970)    Follow Up:  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 Pt is afebrile since 12/20, NC, leukocytosis is better.     PAST MEDICAL & SURGICAL HISTORY:  CAD (coronary artery disease)      HTN (hypertension)      HLD (hyperlipidemia)      Neurogenic bladder      Bipolar disorder      S/P ileostomy      Chronic hepatitis C virus infection      S/P laminectomy      S/P ileostomy          ROS:    [ ] Unobtainable because:  [x ] All other systems negative    Constitutional: no fever, no chills  Head: no trauma  Eyes: no vision changes, no eye pain  ENT:  no sore throat, no rhinorrhea  Cardiovascular:  no chest pain, no palpitation  Respiratory:  no SOB, no cough  GI: mild abdominal discomfort thinks due to hip, no vomiting, no diarrhea  urinary: no dysuria, no hematuria, no flank pain  musculoskeletal:  contracted, hip discomfort   skin:  no rash  neurology:  no headache, no seizure, no change in mental status  psych: no anxiety, no depression         Allergies  NSAIDs (Flushing; Other (Moderate))  Aleve (Unknown)  Risperdal (Short breath; Rash; Hives)  Stelazine (Unknown)  Haldol (Anaphylaxis)  Motrin (Anaphylaxis)  Thorazine (Other (Moderate))  Zyprexa (Rash; Dystonic RXN; Hives)        ANTIMICROBIALS:      OTHER MEDS:  acetaminophen     Tablet .. 650 milliGRAM(s) Oral every 6 hours PRN  albuterol/ipratropium for Nebulization 3 milliLiter(s) Nebulizer every 6 hours  benzocaine/menthol Lozenge 1 Lozenge Oral every 4 hours PRN  DULoxetine 30 milliGRAM(s) Oral daily  enoxaparin Injectable 40 milliGRAM(s) SubCutaneous every 24 hours  ferrous    sulfate 325 milliGRAM(s) Oral daily  finasteride 5 milliGRAM(s) Oral daily  folic acid 1 milliGRAM(s) Oral daily  furosemide    Tablet 20 milliGRAM(s) Oral daily  gabapentin 300 milliGRAM(s) Oral every 8 hours  guaiFENesin Oral Liquid (Sugar-Free) 200 milliGRAM(s) Oral every 6 hours  HYDROmorphone   Tablet 8 milliGRAM(s) Oral every 6 hours PRN  HYDROmorphone   Tablet 4 milliGRAM(s) Oral every 6 hours PRN  influenza   Vaccine 0.5 milliLiter(s) IntraMuscular once  methadone    Tablet 110 milliGRAM(s) Oral daily  multivitamin 1 Tablet(s) Oral daily  ondansetron Injectable 4 milliGRAM(s) IV Push every 6 hours PRN  pantoprazole    Tablet 40 milliGRAM(s) Oral before breakfast  polyethylene glycol 3350 17 Gram(s) Oral daily  povidone iodine 10% Solution 1 Application(s) Topical daily  QUEtiapine 400 milliGRAM(s) Oral at bedtime  QUEtiapine 100 milliGRAM(s) Oral <User Schedule>  senna 2 Tablet(s) Oral at bedtime  simethicone 80 milliGRAM(s) Chew every 6 hours PRN  tamsulosin 0.4 milliGRAM(s) Oral at bedtime      Vital Signs Last 24 Hrs  T(C): 37.2 (23 Dec 2024 12:38), Max: 37.7 (22 Dec 2024 17:43)  T(F): 98.9 (23 Dec 2024 12:38), Max: 99.8 (22 Dec 2024 17:43)  HR: 94 (23 Dec 2024 12:38) (71 - 102)  BP: 132/65 (23 Dec 2024 12:38) (113/64 - 132/65)  BP(mean): --  RR: 17 (23 Dec 2024 12:38) (17 - 18)  SpO2: 93% (23 Dec 2024 12:38) (89% - 95%)    Parameters below as of 23 Dec 2024 11:01  Patient On (Oxygen Delivery Method): nasal cannula        Physical Exam:  General:    NAD, non toxic, NC  Head: atraumatic, normocephalic  Eyes: normal sclera and conjunctiva  ENT:   no oropharyngeal lesions, no LAD, neck supple  Cardio:    regular S1,S2  Respiratory:   no wheezing, no rales  abd:  appears distended, BS +, mildly tender, colostomy bag present half full with brown soft stool   :     no CVAT, mild suprapubic tenderness, SPC present, no erythema around spc   Musculoskeletal : contracted LE, can't move straighten the LE and is in flexed position, + edema of b/l LEs  Skin:  does have some fungal rash on the fold of lower abd  and thigh region ( as per patient he always gets them due to his contracted state), elongated toe nails   Neurologic:     AAO x 3  psych: calm    WBC Count: 13.40 K/uL (12-22 @ 06:10)  WBC Count: 16.39 K/uL (12-20 @ 18:25)  WBC Count: 9.94 K/uL (12-20 @ 06:39)  WBC Count: 7.30 K/uL (12-18 @ 08:20)  WBC Count: 8.02 K/uL (12-17 @ 07:40)                            12.4   13.40 )-----------( 177      ( 22 Dec 2024 06:10 )             40.1       12-22    139  |  104  |  10  ----------------------------<  91  3.6   |  30  |  0.69    Ca    9.3      22 Dec 2024 06:10        Urinalysis Basic - ( 22 Dec 2024 06:10 )    Color: x / Appearance: x / SG: x / pH: x  Gluc: 91 mg/dL / Ketone: x  / Bili: x / Urobili: x   Blood: x / Protein: x / Nitrite: x   Leuk Esterase: x / RBC: x / WBC x   Sq Epi: x / Non Sq Epi: x / Bacteria: x        Creatinine Trend: 0.69<--, 0.66<--, 0.55<--, 0.73<--, 0.59<--, 0.60<--      MICROBIOLOGY:    .Blood BLOOD  12-20-24   No growth at 48 Hours  --  --      .Blood BLOOD  12-20-24   No growth at 48 Hours  --  --      Clean Catch  12-09-24   >100,000 CFU/ml Pseudomonas aeruginosa (Carbapenem Resistant)  --  Pseudomonas aeruginosa (Carbapenem Resistant)          Rapid RVP Result: NotDetec (12-23 @ 11:05)        C-Reactive Protein: 20 (12-20)            SARS-CoV-2: NotDetec (12-23-24 @ 11:05)  Rapid RVP Result: NotDetec (12-23-24 @ 11:05)    SARS-CoV-2: NotDetec (23 Dec 2024 11:05)    RADIOLOGY:

## 2024-12-23 NOTE — PROGRESS NOTE ADULT - SUBJECTIVE AND OBJECTIVE BOX
Pt seen at bedside this AM. No new complaints, continues to have abdominal/hip pain. Denies new numbness or tingling, fevers, chills, CP, SOB, N/V/D.    LABS:                        12.4   13.40 )-----------( 177      ( 22 Dec 2024 06:10 )             40.1       Urinalysis Basic - ( 20 Dec 2024 17:30 )    Color: Dark Yellow / Appearance: Cloudy / S.026 / pH: x  Gluc: x / Ketone: Trace mg/dL  / Bili: Small / Urobili: 1.0 mg/dL   Blood: x / Protein: 100 mg/dL / Nitrite: Negative   Leuk Esterase: Large / RBC: Too Numerous to count /HPF / WBC Too Numerous to count /HPF   Sq Epi: x / Non Sq Epi: x / Bacteria: Many /HPF        VITAL SIGNS:  T(C): 37 (24 @ 05:04), Max: 37.6 (24 @ 17:20)  HR: 77 (24 @ 05:14) (70 - 96)  BP: 122/77 (24 @ 05:04) (100/63 - 122/77)  RR: 18 (24 @ 05:04) (16 - 18)  SpO2: 93% (24 @ 05:14) (87% - 95%)    Physical Exam:  General: NAD, pt laying in bed comfortably    LLE:  ~5cm pressure wound on posterior aspect of upper thigh, flexion contracture noted  Compartments soft, compressible  No motor/sensory function  +DP    Imaging:  CT Abd/Pelvis:  IMPRESSION:  No significant change from prior study.  Postoperative changes and findings which may be related to decubitus   ulcers and chronic dislocation and septic arthritis of the left hip.    A/P:  54y Male who presents with chronic L hip dislocation, likely chronic septic arthritis    Recommend Wound Care, general surgery team for wound management of his ulcers  Recommend IR aspiration for L hip, IR team consulted will follow up recommendations  WBAT  PT/OT  Analgesics  DVT PPx per primary team  Further orthopaedic plan pending L hip aspiration  Discussed plan with Dr. Bean who agrees with above       Pt seen at bedside this AM. No new complaints, continues to have abdominal/hip pain. Denies new numbness or tingling, fevers, chills, CP, SOB, N/V/D.    Vital Signs (24 Hrs):  T(C): 37.6 (12-23-24 @ 05:00), Max: 37.7 (12-22-24 @ 17:43)  HR: 95 (12-23-24 @ 06:10) (70 - 105)  BP: 113/64 (12-23-24 @ 05:00) (113/64 - 121/69)  RR: 18 (12-23-24 @ 05:00) (16 - 18)  SpO2: 95% (12-23-24 @ 06:10) (89% - 95%)  Wt(kg): --    LABS:                          12.4   13.40 )-----------( 177      ( 22 Dec 2024 06:10 )             40.1     12-22    139  |  104  |  10  ----------------------------<  91  3.6   |  30  |  0.69    Ca    9.3      22 Dec 2024 06:10            Physical Exam:  General: NAD, pt laying in bed comfortably    LLE:  ~5cm pressure wound on posterior aspect of upper thigh, flexion contracture noted  Compartments soft, compressible  No motor/sensory function  +DP    Imaging:  CT Abd/Pelvis:  IMPRESSION:  No significant change from prior study.  Postoperative changes and findings which may be related to decubitus   ulcers and chronic dislocation and septic arthritis of the left hip.    A/P:  54y Male who presents with chronic L hip dislocation, likely chronic septic arthritis    Recommend Wound Care, general surgery team for wound management of his ulcers  Recommend IR aspiration for L hip, IR team consulted will follow up recommendations  WBAT  PT/OT  Analgesics  DVT PPx per primary team  Further orthopaedic plan pending L hip aspiration  Discussed plan with Dr. Bean who agrees with above

## 2024-12-23 NOTE — PROCEDURE NOTE - PLAN
- Continue to monitor output  - Remainder of care per primary team/urology
- back to floor  - follow up results of culture  - remainder of care per primary team

## 2024-12-24 LAB
ANION GAP SERPL CALC-SCNC: 6 MMOL/L — SIGNIFICANT CHANGE UP (ref 5–17)
BUN SERPL-MCNC: 11 MG/DL — SIGNIFICANT CHANGE UP (ref 7–23)
CALCIUM SERPL-MCNC: 9.1 MG/DL — SIGNIFICANT CHANGE UP (ref 8.5–10.1)
CHLORIDE SERPL-SCNC: 107 MMOL/L — SIGNIFICANT CHANGE UP (ref 96–108)
CO2 SERPL-SCNC: 29 MMOL/L — SIGNIFICANT CHANGE UP (ref 22–31)
CREAT SERPL-MCNC: 0.63 MG/DL — SIGNIFICANT CHANGE UP (ref 0.5–1.3)
EGFR: 114 ML/MIN/1.73M2 — SIGNIFICANT CHANGE UP
GLUCOSE SERPL-MCNC: 144 MG/DL — HIGH (ref 70–99)
HCT VFR BLD CALC: 35.7 % — LOW (ref 39–50)
HGB BLD-MCNC: 11.2 G/DL — LOW (ref 13–17)
MCHC RBC-ENTMCNC: 28.1 PG — SIGNIFICANT CHANGE UP (ref 27–34)
MCHC RBC-ENTMCNC: 31.4 G/DL — LOW (ref 32–36)
MCV RBC AUTO: 89.5 FL — SIGNIFICANT CHANGE UP (ref 80–100)
NRBC # BLD: 0 /100 WBCS — SIGNIFICANT CHANGE UP (ref 0–0)
PLATELET # BLD AUTO: 170 K/UL — SIGNIFICANT CHANGE UP (ref 150–400)
POTASSIUM SERPL-MCNC: 3.3 MMOL/L — LOW (ref 3.5–5.3)
POTASSIUM SERPL-SCNC: 3.3 MMOL/L — LOW (ref 3.5–5.3)
RBC # BLD: 3.99 M/UL — LOW (ref 4.2–5.8)
RBC # FLD: 15.1 % — HIGH (ref 10.3–14.5)
SODIUM SERPL-SCNC: 142 MMOL/L — SIGNIFICANT CHANGE UP (ref 135–145)
WBC # BLD: 8.99 K/UL — SIGNIFICANT CHANGE UP (ref 3.8–10.5)
WBC # FLD AUTO: 8.99 K/UL — SIGNIFICANT CHANGE UP (ref 3.8–10.5)

## 2024-12-24 PROCEDURE — 99232 SBSQ HOSP IP/OBS MODERATE 35: CPT

## 2024-12-24 PROCEDURE — 72192 CT PELVIS W/O DYE: CPT | Mod: 26

## 2024-12-24 RX ORDER — POTASSIUM CHLORIDE 600 MG/1
40 TABLET, FILM COATED, EXTENDED RELEASE ORAL ONCE
Refills: 0 | Status: COMPLETED | OUTPATIENT
Start: 2024-12-24 | End: 2024-12-24

## 2024-12-24 RX ADMIN — QUETIAPINE FUMARATE 100 MILLIGRAM(S): 100 TABLET, FILM COATED ORAL at 10:24

## 2024-12-24 RX ADMIN — Medication 4 MILLIGRAM(S): at 22:22

## 2024-12-24 RX ADMIN — Medication 4 MILLIGRAM(S): at 23:22

## 2024-12-24 RX ADMIN — Medication 5 MILLIGRAM(S): at 13:21

## 2024-12-24 RX ADMIN — IPRATROPIUM BROMIDE AND ALBUTEROL SULFATE 3 MILLILITER(S): .5; 2.5 SOLUTION RESPIRATORY (INHALATION) at 17:08

## 2024-12-24 RX ADMIN — GABAPENTIN 300 MILLIGRAM(S): 300 CAPSULE ORAL at 05:18

## 2024-12-24 RX ADMIN — Medication 8 MILLIGRAM(S): at 14:19

## 2024-12-24 RX ADMIN — METHADONE HYDROCHLORIDE 110 MILLIGRAM(S): 10 TABLET ORAL at 13:19

## 2024-12-24 RX ADMIN — POTASSIUM CHLORIDE 40 MILLIEQUIVALENT(S): 600 TABLET, FILM COATED, EXTENDED RELEASE ORAL at 16:58

## 2024-12-24 RX ADMIN — QUETIAPINE FUMARATE 100 MILLIGRAM(S): 100 TABLET, FILM COATED ORAL at 19:08

## 2024-12-24 RX ADMIN — Medication 1 TABLET(S): at 13:21

## 2024-12-24 RX ADMIN — NYSTATIN TOPICAL POWDER 1 APPLICATION(S): 100000 POWDER TOPICAL at 05:21

## 2024-12-24 RX ADMIN — Medication 1 MILLIGRAM(S): at 13:21

## 2024-12-24 RX ADMIN — GABAPENTIN 300 MILLIGRAM(S): 300 CAPSULE ORAL at 22:22

## 2024-12-24 RX ADMIN — GABAPENTIN 300 MILLIGRAM(S): 300 CAPSULE ORAL at 13:19

## 2024-12-24 RX ADMIN — IPRATROPIUM BROMIDE AND ALBUTEROL SULFATE 3 MILLILITER(S): .5; 2.5 SOLUTION RESPIRATORY (INHALATION) at 05:22

## 2024-12-24 RX ADMIN — Medication 325 MILLIGRAM(S): at 13:21

## 2024-12-24 RX ADMIN — Medication 8 MILLIGRAM(S): at 13:19

## 2024-12-24 RX ADMIN — QUETIAPINE FUMARATE 400 MILLIGRAM(S): 100 TABLET, FILM COATED ORAL at 22:22

## 2024-12-24 RX ADMIN — TAMSULOSIN HYDROCHLORIDE 0.4 MILLIGRAM(S): 0.4 CAPSULE ORAL at 22:23

## 2024-12-24 RX ADMIN — Medication 20 MILLIGRAM(S): at 05:18

## 2024-12-24 RX ADMIN — POVIDONE IODINE USP, 10% W/W 1 APPLICATION(S): 10 SWAB TOPICAL at 16:39

## 2024-12-24 RX ADMIN — IPRATROPIUM BROMIDE AND ALBUTEROL SULFATE 3 MILLILITER(S): .5; 2.5 SOLUTION RESPIRATORY (INHALATION) at 11:01

## 2024-12-24 RX ADMIN — PANTOPRAZOLE 40 MILLIGRAM(S): 40 TABLET, DELAYED RELEASE ORAL at 08:14

## 2024-12-24 NOTE — PROGRESS NOTE ADULT - NS ATTEND AMEND GEN_ALL_CORE FT
All labs and cultures and imaging and pertinent chart notes reviewed by me.    case d/w Np Naty at length and agree with her assessment and plan.    12/24:   no fever   no new cbc   BCs remain with no growth to date,   RVP negative,   s/p hip aspiration yesterday, 1 cc of fluid obtained, smear negative, awaiting for culture and CT of left hip. The pt was seen by ortho, no further intervention planned at the moment.     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  advise to apply nystatin ointment to skin folds with the fungal rash BID for 5 days.  ortho follow up is appreciated  awaiting for hip aspirate culture, smear with no organisms seen   check wbc in am.  obtain CT left hip - pending       Kush King MD  Infectious Disease Attending    for any questions please do not hesitate to contact me either via teams or by calling [phone] All labs and cultures and imaging and pertinent chart notes reviewed by me.    case d/w Np Naty at length and agree with her assessment and plan.    12/24:   no fever   no new cbc   BCs remain with no growth to date,   RVP negative,   s/p hip aspiration yesterday, 1 cc of fluid obtained, smear negative, awaiting for culture and CT of left hip. The pt was seen by ortho, no further intervention planned at the moment.     Impression-  ? Chronic  arthritis of left hip vs ? chronic septic arthritis of left hip  pain in extremities contracted   MDR UTI in patient with chronic SPC   neurogenic bladder  bed-bound  colostomy present      plan-    completed zerbaxa to treat the carbapenem resistant pseudomonas   frequent turns, offloading, and nutrition optimization to avoid bedsores-consider protective heel/leg protectors   advise to monitor for aspiration precautions   will need every 3-4 weeks SPC exchange   advise to continue to observe off abx.  advise to apply nystatin ointment to skin folds with the fungal rash BID for 5 days.  ortho follow up is appreciated  awaiting for hip aspirate culture, smear with no organisms seen   check wbc in am.  obtain CT left hip - pending       Kush King MD  Infectious Disease Attending    for any questions please do not hesitate to contact me either via teams or by calling [phone]

## 2024-12-24 NOTE — PROGRESS NOTE ADULT - ASSESSMENT
54 years old male with h/o cervical epidural abscess, b/l LE paralysis, pneumoperitoneum s/p ex-lap w/ ileostomy complicated by MRSA bacteremia and evisceration, Hep C ( VL UD based on labs from 1/2024), emphysema on chronic O2, L foot osteomyelitis,-treated    bipolar, opioid dependence, HTN, neurogenic bladder s/p IR SPC placement on 10/23/24 present to ED with complain of worsening lower abdominal pain for 5 days and hematuria. SPC was placed 10/23/24 by IR, reportedly was pulled slightly since 10/24. Patient reported pain since then but worsened over last 5 days associated with nausea. Denied any fever or chills.   Hemodynamically stable, afebrile, sat well at RA. No leukocytosis, plt 181, Cr 0.69. UA dirty. CXR with no focal consolidation (10 Dec 2024 11:42)    Infectious Disease consult requested today 12/13/2024 to help with antibiotic management for carbapenem resistant pseudomonas in urine cx.    patient  is s/p SPC exchange by IR on 12/11/2024    Afebrile  no leukocytosis  UA- turbid and pos LE, neg -nitrates  no blood cx.  previous urine cx from 10/2024-[facility] ( was treated by my colleague  with IV abx and d/c on po cefpodoxime )    patient is prone to MDR  organisms  in the urine in light of chronic west and chronic SPC secondary to neurogenic bladder.  he has had extensive h/o infections in heels and foot as well ( all treated in past)    in order to help mitigate these UTIs  would advise to have SPC exchange every 3-4 weeks     12/16: no fever, on RA at the time of my exam and appears to be comfortable, no leukocytosis, Cr ok, UC with carbapenem resistant pseudomonas, Zerbaxa continued (day #4). Pt with mild abdominal discomfort today, no vomiting, having bowel movements, will monitor.   12/18: afebrile, RA, no leukocytosis, Cr ok, no BCs available, UC with  pseudomonas, today is day #5 of abx Zerbaxa, pt will need two more days to complete 7 days total.   12/20: complaining of a lot of pain in his hip, has a healed wound, foot presses up against lower hip causing pressure injury both to the foot and the hip, no obvious external tract but there is significant pain with movement of the hip and knee. Would recommend podiatry and ortho consults as well as imaging of the left hip and knee. should stay off antibiotics as he compleated the antibiotics for the UTI and would like to see if there are s/s or worsening infection from his hip or foot.    12/23: had a fever on 12/20, no fevers since then, NC, leukocytosis is better 13.4, Cr ok, CXR - chronic stable changes, BCs from 12/20 NGTD, UA with no nitrates, WBC TNTC, Bacteria many. The pt was seen by ortho, recommended Left Hip aspiration, the pt had IR aspiration today. Would obtain CT hip and a/p.   The pt was seen by podiatry for elongated nails.   12/24: no fever, NC, no new cbc, BCs remain with no growth to date, RVP negative, s/p hip aspiration yesterday, 1 cc of fluid obtained, smear negative, awaiting for culture and CT of left hip. The pt was seen by ortho, no further intervention planned at the moment.     Impression-  Chronic septic arthritis of left hip   pain in extremities contracted   MDR UTI in patient with chronic SPC   neurogenic bladder  bed-bound  colostomy present      plan-  completed zerbaxa to treat the carbapenem resistant pseudomonas   frequent turns, offloading, and nutrition optimization to avoid bedsores-consider protective heel/leg protectors   advise to monitor for aspiration precautions   will need every 3-4 weeks SPC exchange   advise to apply nystatin ointment to skin folds with the fungal rash BID for 5 days.  ortho follow up is appreciated  awaiting for hip aspirate culture, smear with no organisms seen   podiatry consult is appreciated   obtain CT left hip - pending   obtain repeat CT a/p   follow all culture data    54 years old male with h/o cervical epidural abscess, b/l LE paralysis, pneumoperitoneum s/p ex-lap w/ ileostomy complicated by MRSA bacteremia and evisceration, Hep C ( VL UD based on labs from 1/2024), emphysema on chronic O2, L foot osteomyelitis,-treated    bipolar, opioid dependence, HTN, neurogenic bladder s/p IR SPC placement on 10/23/24 present to ED with complain of worsening lower abdominal pain for 5 days and hematuria. SPC was placed 10/23/24 by IR, reportedly was pulled slightly since 10/24. Patient reported pain since then but worsened over last 5 days associated with nausea. Denied any fever or chills.   Hemodynamically stable, afebrile, sat well at RA. No leukocytosis, plt 181, Cr 0.69. UA dirty. CXR with no focal consolidation (10 Dec 2024 11:42)    Infectious Disease consult requested today 12/13/2024 to help with antibiotic management for carbapenem resistant pseudomonas in urine cx.    patient  is s/p SPC exchange by IR on 12/11/2024    Afebrile  no leukocytosis  UA- turbid and pos LE, neg -nitrates  no blood cx.  previous urine cx from 10/2024-[facility] ( was treated by my colleague  with IV abx and d/c on po cefpodoxime )    patient is prone to MDR  organisms  in the urine in light of chronic west and chronic SPC secondary to neurogenic bladder.  he has had extensive h/o infections in heels and foot as well ( all treated in past)    in order to help mitigate these UTIs  would advise to have SPC exchange every 3-4 weeks     12/16: no fever, on RA at the time of my exam and appears to be comfortable, no leukocytosis, Cr ok, UC with carbapenem resistant pseudomonas, Zerbaxa continued (day #4). Pt with mild abdominal discomfort today, no vomiting, having bowel movements, will monitor.   12/18: afebrile, RA, no leukocytosis, Cr ok, no BCs available, UC with  pseudomonas, today is day #5 of abx Zerbaxa, pt will need two more days to complete 7 days total.   12/20: complaining of a lot of pain in his hip, has a healed wound, foot presses up against lower hip causing pressure injury both to the foot and the hip, no obvious external tract but there is significant pain with movement of the hip and knee. Would recommend podiatry and ortho consults as well as imaging of the left hip and knee. should stay off antibiotics as he compleated the antibiotics for the UTI and would like to see if there are s/s or worsening infection from his hip or foot.    12/23: had a fever on 12/20, no fevers since then, NC, leukocytosis is better 13.4, Cr ok, CXR - chronic stable changes, BCs from 12/20 NGTD, UA with no nitrates, WBC TNTC, Bacteria many. The pt was seen by ortho, recommended Left Hip aspiration, the pt had IR aspiration today. Would obtain CT hip and a/p.   The pt was seen by podiatry for elongated nails.   12/24: no fever, NC, no new cbc, BCs remain with no growth to date, RVP negative, s/p hip aspiration yesterday, 1 cc of fluid obtained, smear negative, awaiting for culture and CT of left hip. The pt was seen by ortho, no further intervention planned at the moment.     Impression-  ? Chronic  arthritis of left hip vs ? chronic septic arthritis of left hip  pain in extremities contracted   MDR UTI in patient with chronic SPC   neurogenic bladder  bed-bound  colostomy present      plan-  completed zerbaxa to treat the carbapenem resistant pseudomonas   frequent turns, offloading, and nutrition optimization to avoid bedsores-consider protective heel/leg protectors   advise to monitor for aspiration precautions   will need every 3-4 weeks SPC exchange   advise to apply nystatin ointment to skin folds with the fungal rash BID for 5 days.  ortho follow up is appreciated  awaiting for hip aspirate culture, smear with no organisms seen   podiatry consult is appreciated   obtain CT left hip - pending   obtain repeat CT a/p   follow all culture data

## 2024-12-24 NOTE — PROGRESS NOTE ADULT - SUBJECTIVE AND OBJECTIVE BOX
Patient: STAN VEGA [id number] 54y Male                            Hospitalist Attending Note    No fever / chills.  Pain controlled.   Afebrile.    No SOB.  No additional complaints.       ____________________PHYSICAL EXAM:  GENERAL:  NAD Alert and Oriented x 3   HEENT: NCAT  CARDIOVASCULAR:  S1, S2  LUNGS: coarse BS b/l.   : SPC in place  ABDOMEN:  soft, (-) tenderness, (-) distension, (+) bowel sounds, (-) guarding, (-) rebound (-) rigidity.  Ileostomy  EXTREMITIES:  no cyanosis / clubbing / edema. R heel stage II pressure injury 2cm x 2cm x .1 cm, L dorsum of foot stage II pressure injury 3.5cm x 4.5cm x .1 cm  NEURO: b/l LE paraplegia.    ____________________    VITALS:  Vital Signs Last 24 Hrs  T(C): 36.8 (24 Dec 2024 14:01), Max: 37.2 (23 Dec 2024 16:50)  T(F): 98.3 (24 Dec 2024 14:01), Max: 98.9 (23 Dec 2024 16:50)  HR: 76 (24 Dec 2024 14:01) (76 - 96)  BP: 112/69 (24 Dec 2024 14:01) (112/69 - 132/72)  BP(mean): --  RR: 18 (24 Dec 2024 14:01) (17 - 18)  SpO2: 99% (24 Dec 2024 14:01) (93% - 99%)    Parameters below as of 24 Dec 2024 11:05  Patient On (Oxygen Delivery Method): nasal cannula, 5L     Daily     Daily   CAPILLARY BLOOD GLUCOSE        I&O's Summary    23 Dec 2024 07:01  -  24 Dec 2024 07:00  --------------------------------------------------------  IN: 0 mL / OUT: 651 mL / NET: -651 mL        LABS:                        11.2   8.99  )-----------( 170      ( 24 Dec 2024 14:30 )             35.7                       Culture - Joint (collected 23 Dec 2024 13:00)  Source: Hip  Gram Stain (prelim) (24 Dec 2024 14:43):    No polymorphonuclear cells seen per low power field    No organisms seen per oil power field  Preliminary Report (24 Dec 2024 14:43):    No growth to date        MEDICATIONS:  acetaminophen     Tablet .. 650 milliGRAM(s) Oral every 6 hours PRN  albuterol/ipratropium for Nebulization 3 milliLiter(s) Nebulizer every 6 hours  benzocaine/menthol Lozenge 1 Lozenge Oral every 4 hours PRN  DULoxetine 30 milliGRAM(s) Oral daily  enoxaparin Injectable 40 milliGRAM(s) SubCutaneous every 24 hours  ferrous    sulfate 325 milliGRAM(s) Oral daily  finasteride 5 milliGRAM(s) Oral daily  folic acid 1 milliGRAM(s) Oral daily  furosemide    Tablet 20 milliGRAM(s) Oral daily  gabapentin 300 milliGRAM(s) Oral every 8 hours  guaiFENesin Oral Liquid (Sugar-Free) 200 milliGRAM(s) Oral every 6 hours  HYDROmorphone   Tablet 8 milliGRAM(s) Oral every 6 hours PRN  HYDROmorphone   Tablet 4 milliGRAM(s) Oral every 6 hours PRN  influenza   Vaccine 0.5 milliLiter(s) IntraMuscular once  methadone    Tablet 110 milliGRAM(s) Oral daily  multivitamin 1 Tablet(s) Oral daily  nystatin Powder 1 Application(s) Topical two times a day  ondansetron Injectable 4 milliGRAM(s) IV Push every 6 hours PRN  pantoprazole    Tablet 40 milliGRAM(s) Oral before breakfast  polyethylene glycol 3350 17 Gram(s) Oral daily  povidone iodine 10% Solution 1 Application(s) Topical daily  QUEtiapine 400 milliGRAM(s) Oral at bedtime  QUEtiapine 100 milliGRAM(s) Oral <User Schedule>  senna 2 Tablet(s) Oral at bedtime  simethicone 80 milliGRAM(s) Chew every 6 hours PRN  tamsulosin 0.4 milliGRAM(s) Oral at bedtime

## 2024-12-24 NOTE — PROGRESS NOTE ADULT - ASSESSMENT
54 years old male with h/o cervical epidural abscess, b/l LE paralysis, pneumoperitoneum s/p ex-lap w/ ileostomy complicated by MRSA bacteremia and evisceration, Hep C, emphysema on chronic O2, L foot osteomyelitis, bipolar, opioid dependence, HTN, neurogenic bladder s/p IR SPC placement on 10/23/24 present to ED with complain of worsening lower abdominal pain for 5 days and hematuria. SPC was placed \on 10/23/24 by IR, reportedly was pulled slightly since 10/24. Patient reported pain since then but worsened over last 5 days associated with nausea. Denied any fever or chills.     # Fever - s/p UTI due to Suprapubic Catheter, Carbapenem resistant Pseudomonas -  Completed 7d course of Zerbaxa.  SPC has been exchanged by IR.  Per ID recommendations, consider exchanging catheter every 3-4 weeks.    Recurrence of fever noted.  WBC elevated.  - T improved.  WBC trending down.  CT from admission showing possible septic arthritis of L hip.  Prior CT from 7/2024 similar findings.  Ortho, surgery input appreciated.   S/p IR drainage 12/23 with minimal yield < 1ml.  Culture, gram stain negative to date.    # Abnormal CT - Hip CT concerning for marrow changes, ? septic joint, ? neoplastic process.  If infection workup unremarkable, could consider MRI / PET CT   IMPRESSION:  1.  Posterior superior subluxation of the left femoral head with concave remodeling and moderate erosive changes of the acetabulum and femoral head.  2.  Small to moderate left hip joint effusion/synovial thickening also appears to be present. There are no surrounding fluid collections.3.  Changes are concerning for septic joint. Correlation with recent aspiration is suggested.  4.  Marrow changes suggest a marrow replacing process with endosteal scalloping noted involving both femoral diaphyses. Serous atrophy, neoplastic process, or other marrow disorders may be considered. Clinical and laboratory correlation is suggested. Consider follow-up imaging such as MRI or PET CT and/or histological correlation as warranted.    # L dorsal Foot Ulcer - podiatry input appreciated - Dr. Figueroa.  # Lower abdominal pain - may be due to septic arthritis.  Pain appears to be controlled on po Dilaudid, Methadone.  Discussed titration of po Dilaudid with pt.  # Methadone dependence. ·  Plan: Previous hospitalists have Called Loc [phone], talked with nurse manage Corie, confirmed that patient is on methadone 110mg daily and last dose in NH was on 12/9/2024  QTc 469  on EKG 10/21/24.  Pt counselled to minimize use of Dilaudid if possible for pain control.    Bowel regimens---> senna and miralax.  # Chronic respiratory failure with hypercapnia. ·  Plan: nocturnal NIPPV.  # GERD (gastroesophageal reflux disease). ·  Plan: on oral PPI.  # BPH (benign prostatic hyperplasia).  ·  Plan: on finasteride and flomax.  # Psychiatric disorder.  ·  Plan: on Seroquel 100mg 10AM, 100mg 6PM and 400mg hs per NH paper Continue duloxetine 30mg daily  # Functional quadriplegia  Bedbound with bilateral lower extremity paralysis Wound care ordered for lower extremity pressure wound  # Inpatient DVT Prophylaxis - Lovenox subcut

## 2024-12-24 NOTE — PROGRESS NOTE ADULT - SUBJECTIVE AND OBJECTIVE BOX
STAN VEGA  MRN-[id number]  54y (1970)    Follow Up:  s/p uti treatment,  pseudomonas, concern for hip infection, fever    Interval History: The pt was seen and examined earlier, not in acute distress, no new complaints, awake and alert, appropriate. Pt is afebrile since 12/20, NC, no new cbc.     CAD (coronary artery disease)      HTN (hypertension)      HLD (hyperlipidemia)      Neurogenic bladder      Bipolar disorder      S/P ileostomy      Chronic hepatitis C virus infection      S/P laminectomy      S/P ileostomy          ROS:    [ ] Unobtainable because:  [x ] All other systems negative    Constitutional: no fever, no chills  Head: no trauma  Eyes: no vision changes, no eye pain  ENT:  no sore throat, no rhinorrhea  Cardiovascular:  no chest pain, no palpitation  Respiratory:  no SOB, no cough  GI:  no abd pain, no vomiting, no diarrhea  urinary: no dysuria, no hematuria, no flank pain  musculoskeletal: hip discomfort   skin:  no rash  neurology:  no headache, no seizure, no change in mental status  psych: no anxiety, no depression         Allergies  NSAIDs (Flushing; Other (Moderate))  Aleve (Unknown)  Risperdal (Short breath; Rash; Hives)  Stelazine (Unknown)  Haldol (Anaphylaxis)  Motrin (Anaphylaxis)  Thorazine (Other (Moderate))  Zyprexa (Rash; Dystonic RXN; Hives)        ANTIMICROBIALS:      OTHER MEDS:  acetaminophen     Tablet .. 650 milliGRAM(s) Oral every 6 hours PRN  albuterol/ipratropium for Nebulization 3 milliLiter(s) Nebulizer every 6 hours  benzocaine/menthol Lozenge 1 Lozenge Oral every 4 hours PRN  DULoxetine 30 milliGRAM(s) Oral daily  enoxaparin Injectable 40 milliGRAM(s) SubCutaneous every 24 hours  ferrous    sulfate 325 milliGRAM(s) Oral daily  finasteride 5 milliGRAM(s) Oral daily  folic acid 1 milliGRAM(s) Oral daily  furosemide    Tablet 20 milliGRAM(s) Oral daily  gabapentin 300 milliGRAM(s) Oral every 8 hours  guaiFENesin Oral Liquid (Sugar-Free) 200 milliGRAM(s) Oral every 6 hours  HYDROmorphone   Tablet 8 milliGRAM(s) Oral every 6 hours PRN  HYDROmorphone   Tablet 4 milliGRAM(s) Oral every 6 hours PRN  influenza   Vaccine 0.5 milliLiter(s) IntraMuscular once  methadone    Tablet 110 milliGRAM(s) Oral daily  multivitamin 1 Tablet(s) Oral daily  nystatin Powder 1 Application(s) Topical two times a day  ondansetron Injectable 4 milliGRAM(s) IV Push every 6 hours PRN  pantoprazole    Tablet 40 milliGRAM(s) Oral before breakfast  polyethylene glycol 3350 17 Gram(s) Oral daily  povidone iodine 10% Solution 1 Application(s) Topical daily  QUEtiapine 400 milliGRAM(s) Oral at bedtime  QUEtiapine 100 milliGRAM(s) Oral <User Schedule>  senna 2 Tablet(s) Oral at bedtime  simethicone 80 milliGRAM(s) Chew every 6 hours PRN  tamsulosin 0.4 milliGRAM(s) Oral at bedtime      Vital Signs Last 24 Hrs  T(C): 36.6 (24 Dec 2024 05:13), Max: 37.2 (23 Dec 2024 12:38)  T(F): 97.8 (24 Dec 2024 05:13), Max: 98.9 (23 Dec 2024 12:38)  HR: 78 (24 Dec 2024 08:30) (71 - 96)  BP: 129/86 (24 Dec 2024 05:13) (124/79 - 132/72)  BP(mean): --  RR: 17 (24 Dec 2024 05:13) (17 - 18)  SpO2: 94% (24 Dec 2024 08:30) (93% - 95%)    Parameters below as of 24 Dec 2024 05:22  Patient On (Oxygen Delivery Method): BiPAP/CPAP        Physical Exam:  General:    NAD, non toxic, NC  Head: atraumatic, normocephalic  Eyes: normal sclera and conjunctiva  ENT:   no oropharyngeal lesions, no LAD, neck supple  Cardio:    regular S1,S2  Respiratory:   no wheezing, no rales  abd:  softer on today's exam, BS +, not tender, colostomy in place  :     no CVAT, mild suprapubic tenderness, SPC present, no erythema around spc   Musculoskeletal : contracted LE, can't move straighten the LE and is in flexed position, + edema of b/l LEs  Skin:  does have some fungal rash on the fold of lower abd  and thigh region ( as per patient he always gets them due to his contracted state), elongated toe nails   Neurologic:     AAO x 3  psych: calm    WBC Count: 13.40 K/uL (12-22 @ 06:10)  WBC Count: 16.39 K/uL (12-20 @ 18:25)  WBC Count: 9.94 K/uL (12-20 @ 06:39)  WBC Count: 7.30 K/uL (12-18 @ 08:20)    Creatinine Trend: 0.69<--, 0.66<--, 0.55<--, 0.73<--, 0.59<--, 0.60<--      MICROBIOLOGY:  v  Hip  12-23-24 --  --    No polymorphonuclear cells seen per low power field  No organisms seen per oil power field      .Blood BLOOD  12-20-24   No growth at 72 Hours  --  --      .Blood BLOOD  12-20-24   No growth at 72 Hours  --  --      Clean Catch  12-09-24   >100,000 CFU/ml Pseudomonas aeruginosa (Carbapenem Resistant)  --  Pseudomonas aeruginosa (Carbapenem Resistant)          Rapid RVP Result: NotDetec (12-23 @ 11:05)        C-Reactive Protein: 20 (12-20)            SARS-CoV-2: NotDetec (12-23-24 @ 11:05)  Rapid RVP Result: NotDetec (12-23-24 @ 11:05)    SARS-CoV-2: NotDetec (23 Dec 2024 11:05)    RADIOLOGY:     STAN VEGA  MRN-[id number]  54y (1970)    Follow Up:  s/p uti treatment,  pseudomonas, concern for hip infection, fever    Interval History: The pt was seen and examined earlier, not in acute distress, no new complaints, awake and alert, appropriate. Pt is afebrile since 12/20, NC, no new cbc.     CAD (coronary artery disease)      HTN (hypertension)      HLD (hyperlipidemia)      Neurogenic bladder      Bipolar disorder      S/P ileostomy      Chronic hepatitis C virus infection      S/P laminectomy      S/P ileostomy          ROS:    [ ] Unobtainable because:  [x ] All other systems negative    Constitutional: no fever, no chills  Head: no trauma  Eyes: no vision changes, no eye pain  ENT:  no sore throat, no rhinorrhea  Cardiovascular:  no chest pain, no palpitation  Respiratory:  no SOB, no cough  GI:  no abd pain, no vomiting, no diarrhea  urinary: no dysuria, no hematuria, no flank pain  musculoskeletal: hip discomfort   skin:  no rash  neurology:  no headache, no seizure, no change in mental status  psych: no anxiety, no depression         Allergies  NSAIDs (Flushing; Other (Moderate))  Aleve (Unknown)  Risperdal (Short breath; Rash; Hives)  Stelazine (Unknown)  Haldol (Anaphylaxis)  Motrin (Anaphylaxis)  Thorazine (Other (Moderate))  Zyprexa (Rash; Dystonic RXN; Hives)        ANTIMICROBIALS:      OTHER MEDS:  acetaminophen     Tablet .. 650 milliGRAM(s) Oral every 6 hours PRN  albuterol/ipratropium for Nebulization 3 milliLiter(s) Nebulizer every 6 hours  benzocaine/menthol Lozenge 1 Lozenge Oral every 4 hours PRN  DULoxetine 30 milliGRAM(s) Oral daily  enoxaparin Injectable 40 milliGRAM(s) SubCutaneous every 24 hours  ferrous    sulfate 325 milliGRAM(s) Oral daily  finasteride 5 milliGRAM(s) Oral daily  folic acid 1 milliGRAM(s) Oral daily  furosemide    Tablet 20 milliGRAM(s) Oral daily  gabapentin 300 milliGRAM(s) Oral every 8 hours  guaiFENesin Oral Liquid (Sugar-Free) 200 milliGRAM(s) Oral every 6 hours  HYDROmorphone   Tablet 8 milliGRAM(s) Oral every 6 hours PRN  HYDROmorphone   Tablet 4 milliGRAM(s) Oral every 6 hours PRN  influenza   Vaccine 0.5 milliLiter(s) IntraMuscular once  methadone    Tablet 110 milliGRAM(s) Oral daily  multivitamin 1 Tablet(s) Oral daily  nystatin Powder 1 Application(s) Topical two times a day  ondansetron Injectable 4 milliGRAM(s) IV Push every 6 hours PRN  pantoprazole    Tablet 40 milliGRAM(s) Oral before breakfast  polyethylene glycol 3350 17 Gram(s) Oral daily  povidone iodine 10% Solution 1 Application(s) Topical daily  QUEtiapine 400 milliGRAM(s) Oral at bedtime  QUEtiapine 100 milliGRAM(s) Oral <User Schedule>  senna 2 Tablet(s) Oral at bedtime  simethicone 80 milliGRAM(s) Chew every 6 hours PRN  tamsulosin 0.4 milliGRAM(s) Oral at bedtime      Vital Signs Last 24 Hrs  T(C): 36.6 (24 Dec 2024 05:13), Max: 37.2 (23 Dec 2024 12:38)  T(F): 97.8 (24 Dec 2024 05:13), Max: 98.9 (23 Dec 2024 12:38)  HR: 78 (24 Dec 2024 08:30) (71 - 96)  BP: 129/86 (24 Dec 2024 05:13) (124/79 - 132/72)  BP(mean): --  RR: 17 (24 Dec 2024 05:13) (17 - 18)  SpO2: 94% (24 Dec 2024 08:30) (93% - 95%)    Parameters below as of 24 Dec 2024 05:22  Patient On (Oxygen Delivery Method): BiPAP/CPAP        Physical Exam:  General:    NAD, non toxic, NC  Head: atraumatic, normocephalic  Eyes: normal sclera and conjunctiva  ENT:   no oropharyngeal lesions, no LAD, neck supple  Cardio:    regular S1,S2  Respiratory:   no wheezing, no rales  abd:  softer on today's exam, BS +, not tender, colostomy in place  :     no CVAT, mild suprapubic tenderness, SPC present, no erythema around spc   Musculoskeletal : contracted LE, can't move straighten the LE and is in flexed position, + edema of b/l LEs  left hip - no open wounds , stage 1-2 possible decubiti, no erythema   Neurologic:     AAO x 3  psych: calm    WBC Count: 13.40 K/uL (12-22 @ 06:10)  WBC Count: 16.39 K/uL (12-20 @ 18:25)  WBC Count: 9.94 K/uL (12-20 @ 06:39)  WBC Count: 7.30 K/uL (12-18 @ 08:20)    Creatinine Trend: 0.69<--, 0.66<--, 0.55<--, 0.73<--, 0.59<--, 0.60<--      MICROBIOLOGY:    Hip  12-23-24 --  --    No polymorphonuclear cells seen per low power field  No organisms seen per oil power field      .Blood BLOOD  12-20-24   No growth at 72 Hours  --  --      .Blood BLOOD  12-20-24   No growth at 72 Hours  --  --      Clean Catch  12-09-24   >100,000 CFU/ml Pseudomonas aeruginosa (Carbapenem Resistant)  --  Pseudomonas aeruginosa (Carbapenem Resistant)          Rapid RVP Result: NotDetec (12-23 @ 11:05)        C-Reactive Protein: 20 (12-20)            SARS-CoV-2: NotDetec (12-23-24 @ 11:05)  Rapid RVP Result: NotDetec (12-23-24 @ 11:05)    SARS-CoV-2: NotDetec (23 Dec 2024 11:05)    RADIOLOGY:

## 2024-12-24 NOTE — PROGRESS NOTE ADULT - SUBJECTIVE AND OBJECTIVE BOX
Pt seen at bedside this AM. No new complaints, continues to have abdominal/hip pain. Denies new numbness or tingling, fevers, chills, CP, SOB, N/V/D.    Vital Signs (24 Hrs):  T(C): 36.6 (12-24-24 @ 05:13), Max: 37.2 (12-23-24 @ 12:38)  HR: 88 (12-24-24 @ 05:22) (71 - 96)  BP: 129/86 (12-24-24 @ 05:13) (124/79 - 132/72)  RR: 17 (12-24-24 @ 05:13) (17 - 18)  SpO2: 95% (12-24-24 @ 05:22) (93% - 95%)  Wt(kg): --    LABS:          Physical Exam:  General: NAD, pt laying in bed comfortably    LLE:  ~5cm pressure wound on posterior aspect of upper thigh, flexion contracture noted  Compartments soft, compressible  No motor/sensory function  +DP    Imaging:  CT Abd/Pelvis:  IMPRESSION:  No significant change from prior study.  Postoperative changes and findings which may be related to decubitus   ulcers and chronic dislocation and septic arthritis of the left hip.    Aspiration via IR 12/23: 1cc of fluid, Cx Negative.     A/P:  54y Male who presents with chronic L hip dislocation, likely chronic septic arthritis; likely not actively/acutely infected.     Recommend Wound Care, general surgery team for wound management of his ulcers  WBAT  PT/OT  Analgesics  DVT PPx per primary team  Stable for ortho DC  Discussed plan with Dr. Bean who agrees with above

## 2024-12-25 LAB
ANION GAP SERPL CALC-SCNC: 4 MMOL/L — LOW (ref 5–17)
BUN SERPL-MCNC: 10 MG/DL — SIGNIFICANT CHANGE UP (ref 7–23)
CALCIUM SERPL-MCNC: 9.4 MG/DL — SIGNIFICANT CHANGE UP (ref 8.5–10.1)
CHLORIDE SERPL-SCNC: 108 MMOL/L — SIGNIFICANT CHANGE UP (ref 96–108)
CO2 SERPL-SCNC: 29 MMOL/L — SIGNIFICANT CHANGE UP (ref 22–31)
CREAT SERPL-MCNC: 0.55 MG/DL — SIGNIFICANT CHANGE UP (ref 0.5–1.3)
EGFR: 118 ML/MIN/1.73M2 — SIGNIFICANT CHANGE UP
GLUCOSE SERPL-MCNC: 92 MG/DL — SIGNIFICANT CHANGE UP (ref 70–99)
HCT VFR BLD CALC: 37.1 % — LOW (ref 39–50)
HGB BLD-MCNC: 11.3 G/DL — LOW (ref 13–17)
MCHC RBC-ENTMCNC: 27.6 PG — SIGNIFICANT CHANGE UP (ref 27–34)
MCHC RBC-ENTMCNC: 30.5 G/DL — LOW (ref 32–36)
MCV RBC AUTO: 90.7 FL — SIGNIFICANT CHANGE UP (ref 80–100)
NRBC # BLD: 0 /100 WBCS — SIGNIFICANT CHANGE UP (ref 0–0)
PLATELET # BLD AUTO: 185 K/UL — SIGNIFICANT CHANGE UP (ref 150–400)
POTASSIUM SERPL-MCNC: 4 MMOL/L — SIGNIFICANT CHANGE UP (ref 3.5–5.3)
POTASSIUM SERPL-SCNC: 4 MMOL/L — SIGNIFICANT CHANGE UP (ref 3.5–5.3)
RBC # BLD: 4.09 M/UL — LOW (ref 4.2–5.8)
RBC # FLD: 15.1 % — HIGH (ref 10.3–14.5)
SODIUM SERPL-SCNC: 141 MMOL/L — SIGNIFICANT CHANGE UP (ref 135–145)
WBC # BLD: 8.84 K/UL — SIGNIFICANT CHANGE UP (ref 3.8–10.5)
WBC # FLD AUTO: 8.84 K/UL — SIGNIFICANT CHANGE UP (ref 3.8–10.5)

## 2024-12-25 PROCEDURE — 99232 SBSQ HOSP IP/OBS MODERATE 35: CPT

## 2024-12-25 RX ORDER — HYDROMORPHONE HCL 4 MG
4 TABLET ORAL EVERY 6 HOURS
Refills: 0 | Status: DISCONTINUED | OUTPATIENT
Start: 2024-12-25 | End: 2024-12-31

## 2024-12-25 RX ORDER — HYDROMORPHONE HCL 4 MG
8 TABLET ORAL EVERY 6 HOURS
Refills: 0 | Status: DISCONTINUED | OUTPATIENT
Start: 2024-12-25 | End: 2024-12-31

## 2024-12-25 RX ADMIN — METHADONE HYDROCHLORIDE 110 MILLIGRAM(S): 10 TABLET ORAL at 12:00

## 2024-12-25 RX ADMIN — Medication 325 MILLIGRAM(S): at 12:03

## 2024-12-25 RX ADMIN — PANTOPRAZOLE 40 MILLIGRAM(S): 40 TABLET, DELAYED RELEASE ORAL at 06:49

## 2024-12-25 RX ADMIN — IPRATROPIUM BROMIDE AND ALBUTEROL SULFATE 3 MILLILITER(S): .5; 2.5 SOLUTION RESPIRATORY (INHALATION) at 23:44

## 2024-12-25 RX ADMIN — GABAPENTIN 300 MILLIGRAM(S): 300 CAPSULE ORAL at 13:50

## 2024-12-25 RX ADMIN — IPRATROPIUM BROMIDE AND ALBUTEROL SULFATE 3 MILLILITER(S): .5; 2.5 SOLUTION RESPIRATORY (INHALATION) at 17:06

## 2024-12-25 RX ADMIN — Medication 8 MILLIGRAM(S): at 11:59

## 2024-12-25 RX ADMIN — Medication 8 MILLIGRAM(S): at 12:45

## 2024-12-25 RX ADMIN — IPRATROPIUM BROMIDE AND ALBUTEROL SULFATE 3 MILLILITER(S): .5; 2.5 SOLUTION RESPIRATORY (INHALATION) at 06:09

## 2024-12-25 RX ADMIN — Medication 8 MILLIGRAM(S): at 23:06

## 2024-12-25 RX ADMIN — Medication 1 TABLET(S): at 12:01

## 2024-12-25 RX ADMIN — Medication 4 MILLIGRAM(S): at 07:49

## 2024-12-25 RX ADMIN — TAMSULOSIN HYDROCHLORIDE 0.4 MILLIGRAM(S): 0.4 CAPSULE ORAL at 23:06

## 2024-12-25 RX ADMIN — GABAPENTIN 300 MILLIGRAM(S): 300 CAPSULE ORAL at 06:49

## 2024-12-25 RX ADMIN — Medication 4 MILLIGRAM(S): at 18:34

## 2024-12-25 RX ADMIN — QUETIAPINE FUMARATE 100 MILLIGRAM(S): 100 TABLET, FILM COATED ORAL at 17:44

## 2024-12-25 RX ADMIN — IPRATROPIUM BROMIDE AND ALBUTEROL SULFATE 3 MILLILITER(S): .5; 2.5 SOLUTION RESPIRATORY (INHALATION) at 11:11

## 2024-12-25 RX ADMIN — Medication 1 MILLIGRAM(S): at 12:02

## 2024-12-25 RX ADMIN — Medication 4 MILLIGRAM(S): at 06:49

## 2024-12-25 RX ADMIN — BENZOCAINE AND MENTHOL 1 LOZENGE: 15; 3.6 LOZENGE ORAL at 06:49

## 2024-12-25 RX ADMIN — POVIDONE IODINE USP, 10% W/W 1 APPLICATION(S): 10 SWAB TOPICAL at 17:30

## 2024-12-25 RX ADMIN — IPRATROPIUM BROMIDE AND ALBUTEROL SULFATE 3 MILLILITER(S): .5; 2.5 SOLUTION RESPIRATORY (INHALATION) at 00:17

## 2024-12-25 RX ADMIN — Medication 4 MILLIGRAM(S): at 17:44

## 2024-12-25 RX ADMIN — Medication 5 MILLIGRAM(S): at 12:02

## 2024-12-25 RX ADMIN — QUETIAPINE FUMARATE 100 MILLIGRAM(S): 100 TABLET, FILM COATED ORAL at 12:02

## 2024-12-25 RX ADMIN — GABAPENTIN 300 MILLIGRAM(S): 300 CAPSULE ORAL at 23:06

## 2024-12-25 RX ADMIN — QUETIAPINE FUMARATE 400 MILLIGRAM(S): 100 TABLET, FILM COATED ORAL at 23:05

## 2024-12-25 RX ADMIN — Medication 20 MILLIGRAM(S): at 06:52

## 2024-12-25 NOTE — PROGRESS NOTE ADULT - ASSESSMENT
54 years old male with h/o cervical epidural abscess, b/l LE paralysis, pneumoperitoneum s/p ex-lap w/ ileostomy complicated by MRSA bacteremia and evisceration, Hep C, emphysema on chronic O2, L foot osteomyelitis, bipolar, opioid dependence, HTN, neurogenic bladder s/p IR SPC placement on 10/23/24 present to ED with complain of worsening lower abdominal pain for 5 days and hematuria. SPC was placed \on 10/23/24 by IR, reportedly was pulled slightly since 10/24. Patient reported pain since then but worsened over last 5 days associated with nausea. Denied any fever or chills.     # Fever - due to UTI due to Suprapubic Catheter, Carbapenem resistant Pseudomonas -  Completed 7d course of Zerbaxa.  SPC has been exchanged by IR.  Per ID recommendations, consider exchanging catheter every 3-4 weeks.     CT from admission showing possible septic arthritis of L hip.  Prior CT from 7/2024 similar findings.  Ortho, surgery input appreciated.   S/p IR drainage 12/23 with minimal yield < 1ml.  Culture, gram stain negative to date.  Discussed with ID today, not likely active infection. Patient afebrile >48 hours, WBC normalized.   # Abnormal CT - Hip CT concerning for marrow changes, ? septic joint, ? neoplastic process.  If infection workup unremarkable, could consider MRI / PET CT however patient unsure if he can position himself for MRI  IMPRESSION:  1.  Posterior superior subluxation of the left femoral head with concave remodeling and moderate erosive changes of the acetabulum and femoral head.  2.  Small to moderate left hip joint effusion/synovial thickening also appears to be present. There are no surrounding fluid collections.3.  Changes are concerning for septic joint. Correlation with recent aspiration is suggested.  4.  Marrow changes suggest a marrow replacing process with endosteal scalloping noted involving both femoral diaphyses. Serous atrophy, neoplastic process, or other marrow disorders may be considered. Clinical and laboratory correlation is suggested. Consider follow-up imaging such as MRI or PET CT and/or histological correlation as warranted.    # L dorsal Foot Ulcer - podiatry input appreciated - Dr. Figueroa.    # Lower abdominal pain  #R hip pain   - Per ID R hip likely not acutely infected. Suspect abdominal pain is referred hip pain. Pain appears to be controlled on po Dilaudid, Methadone. Cont to wean pain meds as able     # Methadone dependence. ·  Plan: Previous hospitalists have Called Loc [phone], talked with nurse manage Corie, confirmed that patient is on methadone 110mg daily and last dose in NH was on 12/9/2024  QTc 469  on EKG 10/21/24.  Pt counselled to minimize use of Dilaudid if possible for pain control.    Bowel regimens---> senna and miralax.    # Chronic respiratory failure with hypercapnia. ·  Plan: nocturnal NIPPV.    # GERD (gastroesophageal reflux disease). ·  Plan: on oral PPI.    # BPH (benign prostatic hyperplasia).  ·  Plan: on finasteride and flomax.    # Psychiatric disorder.  ·  Plan: on Seroquel 100mg 10AM, 100mg 6PM and 400mg hs per NH paper Continue duloxetine 30mg daily    # Functional quadriplegia  Bedbound with bilateral lower extremity paralysis Wound care ordered for lower extremity pressure wound    # Inpatient DVT Prophylaxis - Lovenox subcut

## 2024-12-25 NOTE — PROGRESS NOTE ADULT - SUBJECTIVE AND OBJECTIVE BOX
Patient is a 54y old  Male who presents with a chief complaint of UTI, hematuria, lower abdominal pain (24 Dec 2024 15:15)      INTERVAL HPI/OVERNIGHT EVENTS:  -afebrile overnight no acute events   -seen and examined at bedside, states that he is trying to take 4mg dilaudid because he does not want to have too high of an opioid tolerance. Still wants 8mg available just in case but states he has not needed them  -does was PO pain medication at least 1 hour prior to any dressing changes or re-positioning     MEDICATIONS  (STANDING):  albuterol/ipratropium for Nebulization 3 milliLiter(s) Nebulizer every 6 hours  DULoxetine 30 milliGRAM(s) Oral daily  enoxaparin Injectable 40 milliGRAM(s) SubCutaneous every 24 hours  ferrous    sulfate 325 milliGRAM(s) Oral daily  finasteride 5 milliGRAM(s) Oral daily  folic acid 1 milliGRAM(s) Oral daily  furosemide    Tablet 20 milliGRAM(s) Oral daily  gabapentin 300 milliGRAM(s) Oral every 8 hours  guaiFENesin Oral Liquid (Sugar-Free) 200 milliGRAM(s) Oral every 6 hours  influenza   Vaccine 0.5 milliLiter(s) IntraMuscular once  multivitamin 1 Tablet(s) Oral daily  nystatin Powder 1 Application(s) Topical two times a day  pantoprazole    Tablet 40 milliGRAM(s) Oral before breakfast  polyethylene glycol 3350 17 Gram(s) Oral daily  povidone iodine 10% Solution 1 Application(s) Topical daily  QUEtiapine 100 milliGRAM(s) Oral <User Schedule>  QUEtiapine 400 milliGRAM(s) Oral at bedtime  senna 2 Tablet(s) Oral at bedtime  tamsulosin 0.4 milliGRAM(s) Oral at bedtime    MEDICATIONS  (PRN):  acetaminophen     Tablet .. 650 milliGRAM(s) Oral every 6 hours PRN Temp greater or equal to 38C (100.4F)  benzocaine/menthol Lozenge 1 Lozenge Oral every 4 hours PRN Sore Throat  HYDROmorphone   Tablet 8 milliGRAM(s) Oral every 6 hours PRN Severe Pain (7 - 10)  HYDROmorphone   Tablet 4 milliGRAM(s) Oral every 6 hours PRN Moderate Pain (4 - 6)  ondansetron Injectable 4 milliGRAM(s) IV Push every 6 hours PRN Nausea and/or Vomiting  simethicone 80 milliGRAM(s) Chew every 6 hours PRN Heartburn      Allergies    NSAIDs (Flushing; Other (Moderate))  Aleve (Unknown)  Risperdal (Short breath; Rash; Hives)  Stelazine (Unknown)  Haldol (Anaphylaxis)  Motrin (Anaphylaxis)  Thorazine (Other (Moderate))  Zyprexa (Rash; Dystonic RXN; Hives)    Intolerances        REVIEW OF SYSTEMS:  chronic R hip and b/l knee and ankle pain  no SOB abdominal pain CP    Vital Signs Last 24 Hrs  T(C): 36.8 (25 Dec 2024 11:56), Max: 37 (24 Dec 2024 17:21)  T(F): 98.3 (25 Dec 2024 11:56), Max: 98.6 (24 Dec 2024 17:21)  HR: 88 (25 Dec 2024 11:56) (75 - 94)  BP: 112/73 (25 Dec 2024 11:56) (112/69 - 134/74)  BP(mean): --  RR: 19 (25 Dec 2024 11:56) (17 - 19)  SpO2: 92% (25 Dec 2024 11:56) (91% - 99%)    Parameters below as of 25 Dec 2024 11:56  Patient On (Oxygen Delivery Method): nasal cannula        PHYSICAL EXAM:  GENERAL: NAD, well-groomed, well-developed  HEAD:  Atraumatic, Normocephalic  EYES: EOMI, sclera non-icteric  ENMT:  Moist mucous membranes,  NERVOUS SYSTEM:  Alert & Oriented X3, Good concentration  CHEST/LUNG: Clear to percussion bilaterally; No rales, rhonchi, wheezing, or rubs  HEART: Regular rate and rhythm; No murmurs, rubs, or gallops  ABDOMEN: Soft, Nontender, Nondistended; Bowel sounds present +ostomy present  EXTREMITIES: +BLE contractures  SKIN: warm    LABS:                        11.3   8.84  )-----------( 185      ( 25 Dec 2024 06:45 )             37.1     12-25    141  |  108  |  10  ----------------------------<  92  4.0   |  29  |  0.55    Ca    9.4      25 Dec 2024 06:45        Urinalysis Basic - ( 25 Dec 2024 06:45 )    Color: x / Appearance: x / SG: x / pH: x  Gluc: 92 mg/dL / Ketone: x  / Bili: x / Urobili: x   Blood: x / Protein: x / Nitrite: x   Leuk Esterase: x / RBC: x / WBC x   Sq Epi: x / Non Sq Epi: x / Bacteria: x      CAPILLARY BLOOD GLUCOSE          RADIOLOGY & ADDITIONAL TESTS:    Imaging Personally Reviewed:  [ X] YES  [ ] NO    Consultant(s) Notes Reviewed:  [ X] YES  [ ] NO    Care Discussed with Consultants/Other Providers [X ] YES  [ ] NO

## 2024-12-26 LAB
ANION GAP SERPL CALC-SCNC: 3 MMOL/L — LOW (ref 5–17)
BUN SERPL-MCNC: 10 MG/DL — SIGNIFICANT CHANGE UP (ref 7–23)
CALCIUM SERPL-MCNC: 9.3 MG/DL — SIGNIFICANT CHANGE UP (ref 8.5–10.1)
CHLORIDE SERPL-SCNC: 106 MMOL/L — SIGNIFICANT CHANGE UP (ref 96–108)
CO2 SERPL-SCNC: 34 MMOL/L — HIGH (ref 22–31)
CREAT SERPL-MCNC: 0.6 MG/DL — SIGNIFICANT CHANGE UP (ref 0.5–1.3)
CULTURE RESULTS: SIGNIFICANT CHANGE UP
CULTURE RESULTS: SIGNIFICANT CHANGE UP
EGFR: 115 ML/MIN/1.73M2 — SIGNIFICANT CHANGE UP
GLUCOSE SERPL-MCNC: 95 MG/DL — SIGNIFICANT CHANGE UP (ref 70–99)
HCT VFR BLD CALC: 38 % — LOW (ref 39–50)
HGB BLD-MCNC: 11.8 G/DL — LOW (ref 13–17)
MAGNESIUM SERPL-MCNC: 2 MG/DL — SIGNIFICANT CHANGE UP (ref 1.6–2.6)
MCHC RBC-ENTMCNC: 28.1 PG — SIGNIFICANT CHANGE UP (ref 27–34)
MCHC RBC-ENTMCNC: 31.1 G/DL — LOW (ref 32–36)
MCV RBC AUTO: 90.5 FL — SIGNIFICANT CHANGE UP (ref 80–100)
NRBC # BLD: 0 /100 WBCS — SIGNIFICANT CHANGE UP (ref 0–0)
PHOSPHATE SERPL-MCNC: 3.7 MG/DL — SIGNIFICANT CHANGE UP (ref 2.5–4.5)
PLATELET # BLD AUTO: 205 K/UL — SIGNIFICANT CHANGE UP (ref 150–400)
POTASSIUM SERPL-MCNC: 3.7 MMOL/L — SIGNIFICANT CHANGE UP (ref 3.5–5.3)
POTASSIUM SERPL-SCNC: 3.7 MMOL/L — SIGNIFICANT CHANGE UP (ref 3.5–5.3)
RBC # BLD: 4.2 M/UL — SIGNIFICANT CHANGE UP (ref 4.2–5.8)
RBC # FLD: 15 % — HIGH (ref 10.3–14.5)
SODIUM SERPL-SCNC: 143 MMOL/L — SIGNIFICANT CHANGE UP (ref 135–145)
SPECIMEN SOURCE: SIGNIFICANT CHANGE UP
SPECIMEN SOURCE: SIGNIFICANT CHANGE UP
WBC # BLD: 8.85 K/UL — SIGNIFICANT CHANGE UP (ref 3.8–10.5)
WBC # FLD AUTO: 8.85 K/UL — SIGNIFICANT CHANGE UP (ref 3.8–10.5)

## 2024-12-26 PROCEDURE — 99233 SBSQ HOSP IP/OBS HIGH 50: CPT

## 2024-12-26 RX ORDER — METHADONE HYDROCHLORIDE 10 MG/1
110 TABLET ORAL DAILY
Refills: 0 | Status: DISCONTINUED | OUTPATIENT
Start: 2024-12-26 | End: 2024-12-31

## 2024-12-26 RX ADMIN — Medication 325 MILLIGRAM(S): at 11:42

## 2024-12-26 RX ADMIN — IPRATROPIUM BROMIDE AND ALBUTEROL SULFATE 3 MILLILITER(S): .5; 2.5 SOLUTION RESPIRATORY (INHALATION) at 23:44

## 2024-12-26 RX ADMIN — Medication 20 MILLIGRAM(S): at 05:28

## 2024-12-26 RX ADMIN — Medication 1 MILLIGRAM(S): at 11:42

## 2024-12-26 RX ADMIN — IPRATROPIUM BROMIDE AND ALBUTEROL SULFATE 3 MILLILITER(S): .5; 2.5 SOLUTION RESPIRATORY (INHALATION) at 11:11

## 2024-12-26 RX ADMIN — METHADONE HYDROCHLORIDE 110 MILLIGRAM(S): 10 TABLET ORAL at 11:54

## 2024-12-26 RX ADMIN — Medication 8 MILLIGRAM(S): at 23:30

## 2024-12-26 RX ADMIN — ENOXAPARIN SODIUM 40 MILLIGRAM(S): 60 INJECTION INTRAVENOUS; SUBCUTANEOUS at 11:42

## 2024-12-26 RX ADMIN — IPRATROPIUM BROMIDE AND ALBUTEROL SULFATE 3 MILLILITER(S): .5; 2.5 SOLUTION RESPIRATORY (INHALATION) at 17:00

## 2024-12-26 RX ADMIN — Medication 8 MILLIGRAM(S): at 00:06

## 2024-12-26 RX ADMIN — QUETIAPINE FUMARATE 100 MILLIGRAM(S): 100 TABLET, FILM COATED ORAL at 09:03

## 2024-12-26 RX ADMIN — Medication 5 MILLIGRAM(S): at 11:42

## 2024-12-26 RX ADMIN — PANTOPRAZOLE 40 MILLIGRAM(S): 40 TABLET, DELAYED RELEASE ORAL at 06:20

## 2024-12-26 RX ADMIN — QUETIAPINE FUMARATE 400 MILLIGRAM(S): 100 TABLET, FILM COATED ORAL at 22:31

## 2024-12-26 RX ADMIN — Medication 4 MILLIGRAM(S): at 13:04

## 2024-12-26 RX ADMIN — GABAPENTIN 300 MILLIGRAM(S): 300 CAPSULE ORAL at 05:28

## 2024-12-26 RX ADMIN — GABAPENTIN 300 MILLIGRAM(S): 300 CAPSULE ORAL at 13:15

## 2024-12-26 RX ADMIN — Medication 4 MILLIGRAM(S): at 06:20

## 2024-12-26 RX ADMIN — Medication 8 MILLIGRAM(S): at 22:31

## 2024-12-26 RX ADMIN — QUETIAPINE FUMARATE 100 MILLIGRAM(S): 100 TABLET, FILM COATED ORAL at 17:05

## 2024-12-26 RX ADMIN — IPRATROPIUM BROMIDE AND ALBUTEROL SULFATE 3 MILLILITER(S): .5; 2.5 SOLUTION RESPIRATORY (INHALATION) at 05:10

## 2024-12-26 RX ADMIN — Medication 1 TABLET(S): at 11:42

## 2024-12-26 RX ADMIN — Medication 4 MILLIGRAM(S): at 12:45

## 2024-12-26 RX ADMIN — Medication 200 MILLIGRAM(S): at 17:06

## 2024-12-26 RX ADMIN — GABAPENTIN 300 MILLIGRAM(S): 300 CAPSULE ORAL at 22:31

## 2024-12-26 NOTE — PROGRESS NOTE ADULT - SUBJECTIVE AND OBJECTIVE BOX
Patient is a 54y old  Male who presents with a chief complaint of UTI, hematuria, lower abdominal pain (25 Dec 2024 13:45)      INTERVAL HPI/OVERNIGHT EVENTS:  -nothing acute overnight   -seen at bedside, plan discussed with aunt and mother at bedside   -will need SPC exchanged every 3-4 weeks. CM spoke with nursing facility and it needs to be done by IR not urology. Working to coordinate outpatient catheter exchange.     MEDICATIONS  (STANDING):  albuterol/ipratropium for Nebulization 3 milliLiter(s) Nebulizer every 6 hours  DULoxetine 30 milliGRAM(s) Oral daily  enoxaparin Injectable 40 milliGRAM(s) SubCutaneous every 24 hours  ferrous    sulfate 325 milliGRAM(s) Oral daily  finasteride 5 milliGRAM(s) Oral daily  folic acid 1 milliGRAM(s) Oral daily  furosemide    Tablet 20 milliGRAM(s) Oral daily  gabapentin 300 milliGRAM(s) Oral every 8 hours  guaiFENesin Oral Liquid (Sugar-Free) 200 milliGRAM(s) Oral every 6 hours  influenza   Vaccine 0.5 milliLiter(s) IntraMuscular once  methadone    Tablet 110 milliGRAM(s) Oral daily  multivitamin 1 Tablet(s) Oral daily  nystatin Powder 1 Application(s) Topical two times a day  pantoprazole    Tablet 40 milliGRAM(s) Oral before breakfast  polyethylene glycol 3350 17 Gram(s) Oral daily  povidone iodine 10% Solution 1 Application(s) Topical daily  QUEtiapine 100 milliGRAM(s) Oral <User Schedule>  QUEtiapine 400 milliGRAM(s) Oral at bedtime  senna 2 Tablet(s) Oral at bedtime  tamsulosin 0.4 milliGRAM(s) Oral at bedtime    MEDICATIONS  (PRN):  acetaminophen     Tablet .. 650 milliGRAM(s) Oral every 6 hours PRN Temp greater or equal to 38C (100.4F)  benzocaine/menthol Lozenge 1 Lozenge Oral every 4 hours PRN Sore Throat  HYDROmorphone   Tablet 8 milliGRAM(s) Oral every 6 hours PRN Severe Pain (7 - 10)  HYDROmorphone   Tablet 4 milliGRAM(s) Oral every 6 hours PRN Moderate Pain (4 - 6)  ondansetron Injectable 4 milliGRAM(s) IV Push every 6 hours PRN Nausea and/or Vomiting  simethicone 80 milliGRAM(s) Chew every 6 hours PRN Heartburn      Allergies    NSAIDs (Flushing; Other (Moderate))  Aleve (Unknown)  Risperdal (Short breath; Rash; Hives)  Stelazine (Unknown)  Haldol (Anaphylaxis)  Motrin (Anaphylaxis)  Thorazine (Other (Moderate))  Zyprexa (Rash; Dystonic RXN; Hives)    Intolerances        REVIEW OF SYSTEMS:  +hip and abdominal pain, stable from yesterday   no SOB CP     Vital Signs Last 24 Hrs  T(C): 36.7 (26 Dec 2024 16:59), Max: 37.1 (25 Dec 2024 23:55)  T(F): 98 (26 Dec 2024 16:59), Max: 98.8 (25 Dec 2024 23:55)  HR: 84 (26 Dec 2024 17:00) (75 - 89)  BP: 115/66 (26 Dec 2024 16:59) (115/66 - 135/83)  BP(mean): --  RR: 17 (26 Dec 2024 16:59) (17 - 18)  SpO2: 96% (26 Dec 2024 17:00) (94% - 98%)    Parameters below as of 26 Dec 2024 17:00  Patient On (Oxygen Delivery Method): nasal cannula        PHYSICAL EXAM:  GENERAL: NAD, well-groomed, well-developed  HEAD:  Atraumatic, Normocephalic  EYES: EOMI, sclera non-icteric  ENMT:  Moist mucous membranes,  NERVOUS SYSTEM:  Alert & Oriented X3, Good concentration  CHEST/LUNG: Clear to percussion bilaterally; No rales, rhonchi, wheezing, or rubs  HEART: Regular rate and rhythm; No murmurs, rubs, or gallops  ABDOMEN: Soft, Nontender, Nondistended; Bowel sounds present +ostomy present  EXTREMITIES: +BLE contractures    LABS:                        11.8   8.85  )-----------( 205      ( 26 Dec 2024 09:10 )             38.0     12-26    143  |  106  |  10  ----------------------------<  95  3.7   |  34[H]  |  0.60    Ca    9.3      26 Dec 2024 09:10  Phos  3.7     12-26  Mg     2.0     12-26        Urinalysis Basic - ( 26 Dec 2024 09:10 )    Color: x / Appearance: x / SG: x / pH: x  Gluc: 95 mg/dL / Ketone: x  / Bili: x / Urobili: x   Blood: x / Protein: x / Nitrite: x   Leuk Esterase: x / RBC: x / WBC x   Sq Epi: x / Non Sq Epi: x / Bacteria: x      CAPILLARY BLOOD GLUCOSE          RADIOLOGY & ADDITIONAL TESTS:    Imaging Personally Reviewed:  [ X] YES  [ ] NO    Consultant(s) Notes Reviewed:  [ X] YES  [ ] NO    Care Discussed with Consultants/Other Providers [X ] YES  [ ] NO

## 2024-12-26 NOTE — PROGRESS NOTE ADULT - ASSESSMENT
54 years old male with h/o cervical epidural abscess, b/l LE paralysis, pneumoperitoneum s/p ex-lap w/ ileostomy complicated by MRSA bacteremia and evisceration, Hep C, emphysema on chronic O2, L foot osteomyelitis, bipolar, opioid dependence, HTN, neurogenic bladder s/p IR SPC placement on 10/23/24 present to ED with complain of worsening lower abdominal pain for 5 days and hematuria. SPC was placed \on 10/23/24 by IR, reportedly was pulled slightly since 10/24. Patient reported pain since then but worsened over last 5 days associated with nausea. Denied any fever or chills.     # Fever  #CAUTI  due to UTI due to Suprapubic Catheter, Carbapenem resistant Pseudomonas -  Completed 7d course of Zerbaxa.  SPC has been exchanged by IR.  Per ID recommendations, consider exchanging catheter every 3-4 weeks.    -Will need to have SPC exchanged by IR, CM spoke with nursing facility and urology cannot exchange it as it needs to be fluroscopy guided. CM working to coordinate possible IR outpatient exchange in 4 weeks.   -CT from admission showing possible septic arthritis of L hip.  Prior CT from 7/2024 similar findings.  Ortho, surgery input appreciated.   S/p IR drainage 12/23 with minimal yield < 1ml.  Culture, gram stain negative to date.  Discussed with ID today, not likely active infection. Patient afebrile >48 hours, WBC normalized.     # Abnormal CT - Hip CT concerning for marrow changes, ? septic joint, ? neoplastic process.  If infection workup unremarkable, could consider MRI / PET CT however patient unsure if he can position himself for MRI  IMPRESSION:  1.  Posterior superior subluxation of the left femoral head with concave remodeling and moderate erosive changes of the acetabulum and femoral head.  2.  Small to moderate left hip joint effusion/synovial thickening also appears to be present. There are no surrounding fluid collections.3.  Changes are concerning for septic joint. Correlation with recent aspiration is suggested.  4.  Marrow changes suggest a marrow replacing process with endosteal scalloping noted involving both femoral diaphyses. Serous atrophy, neoplastic process, or other marrow disorders may be considered. Clinical and laboratory correlation is suggested. Consider follow-up imaging such as MRI or PET CT and/or histological correlation as warranted.  -if patient remains admitted while trying to coordinate SPC exchange can attempt MRI this admission, otherwise may need outpatient MRI in open MRI machine given limitations due to positioning     # L dorsal Foot Ulcer - podiatry input appreciated - Dr. Figueroa.    # Lower abdominal pain  #R hip pain   - Per ID R hip likely not acutely infected. Suspect abdominal pain is referred hip pain. Pain appears to be controlled on po Dilaudid, Methadone. Cont to wean pain meds as able     # Methadone dependence. ·  Plan: Previous hospitalists have Called Loc [phone], talked with nurse manage Corie, confirmed that patient is on methadone 110mg daily and last dose in NH was on 12/9/2024  QTc 469  on EKG 10/21/24.  Pt counselled to minimize use of Dilaudid if possible for pain control.    Bowel regimens---> senna and miralax.    # Chronic respiratory failure with hypercapnia. ·  Plan: nocturnal NIPPV.  -on 5L O2 at baseline per patient, currently on baseline O2     # GERD (gastroesophageal reflux disease). ·  Plan: on oral PPI.    # BPH (benign prostatic hyperplasia).  ·  Plan: on finasteride and flomax.    # Psychiatric disorder.  ·  Plan: on Seroquel 100mg 10AM, 100mg 6PM and 400mg hs per NH paper Continue duloxetine 30mg daily    # Functional quadriplegia  Bedbound with bilateral lower extremity paralysis Wound care ordered for lower extremity pressure wound    # Inpatient DVT Prophylaxis - Lovenox subcut

## 2024-12-27 LAB
ANION GAP SERPL CALC-SCNC: 4 MMOL/L — LOW (ref 5–17)
BUN SERPL-MCNC: 9 MG/DL — SIGNIFICANT CHANGE UP (ref 7–23)
CALCIUM SERPL-MCNC: 9.4 MG/DL — SIGNIFICANT CHANGE UP (ref 8.5–10.1)
CHLORIDE SERPL-SCNC: 105 MMOL/L — SIGNIFICANT CHANGE UP (ref 96–108)
CO2 SERPL-SCNC: 31 MMOL/L — SIGNIFICANT CHANGE UP (ref 22–31)
CREAT SERPL-MCNC: 0.55 MG/DL — SIGNIFICANT CHANGE UP (ref 0.5–1.3)
EGFR: 118 ML/MIN/1.73M2 — SIGNIFICANT CHANGE UP
GLUCOSE SERPL-MCNC: 85 MG/DL — SIGNIFICANT CHANGE UP (ref 70–99)
HCT VFR BLD CALC: 37.4 % — LOW (ref 39–50)
HGB BLD-MCNC: 11.5 G/DL — LOW (ref 13–17)
MAGNESIUM SERPL-MCNC: 2 MG/DL — SIGNIFICANT CHANGE UP (ref 1.6–2.6)
MCHC RBC-ENTMCNC: 27.9 PG — SIGNIFICANT CHANGE UP (ref 27–34)
MCHC RBC-ENTMCNC: 30.7 G/DL — LOW (ref 32–36)
MCV RBC AUTO: 90.8 FL — SIGNIFICANT CHANGE UP (ref 80–100)
NRBC # BLD: 0 /100 WBCS — SIGNIFICANT CHANGE UP (ref 0–0)
PHOSPHATE SERPL-MCNC: 3.6 MG/DL — SIGNIFICANT CHANGE UP (ref 2.5–4.5)
PLATELET # BLD AUTO: 188 K/UL — SIGNIFICANT CHANGE UP (ref 150–400)
POTASSIUM SERPL-MCNC: 4.1 MMOL/L — SIGNIFICANT CHANGE UP (ref 3.5–5.3)
POTASSIUM SERPL-SCNC: 4.1 MMOL/L — SIGNIFICANT CHANGE UP (ref 3.5–5.3)
RBC # BLD: 4.12 M/UL — LOW (ref 4.2–5.8)
RBC # FLD: 14.9 % — HIGH (ref 10.3–14.5)
SODIUM SERPL-SCNC: 140 MMOL/L — SIGNIFICANT CHANGE UP (ref 135–145)
WBC # BLD: 8.33 K/UL — SIGNIFICANT CHANGE UP (ref 3.8–10.5)
WBC # FLD AUTO: 8.33 K/UL — SIGNIFICANT CHANGE UP (ref 3.8–10.5)

## 2024-12-27 PROCEDURE — 99232 SBSQ HOSP IP/OBS MODERATE 35: CPT

## 2024-12-27 RX ADMIN — Medication 20 MILLIGRAM(S): at 06:02

## 2024-12-27 RX ADMIN — PANTOPRAZOLE 40 MILLIGRAM(S): 40 TABLET, DELAYED RELEASE ORAL at 06:02

## 2024-12-27 RX ADMIN — Medication 4 MILLIGRAM(S): at 22:46

## 2024-12-27 RX ADMIN — SENNOSIDES 2 TABLET(S): 8.6 TABLET, FILM COATED ORAL at 22:38

## 2024-12-27 RX ADMIN — Medication 1 MILLIGRAM(S): at 12:07

## 2024-12-27 RX ADMIN — IPRATROPIUM BROMIDE AND ALBUTEROL SULFATE 3 MILLILITER(S): .5; 2.5 SOLUTION RESPIRATORY (INHALATION) at 17:11

## 2024-12-27 RX ADMIN — GABAPENTIN 300 MILLIGRAM(S): 300 CAPSULE ORAL at 14:00

## 2024-12-27 RX ADMIN — Medication 8 MILLIGRAM(S): at 14:00

## 2024-12-27 RX ADMIN — NYSTATIN TOPICAL POWDER 1 APPLICATION(S): 100000 POWDER TOPICAL at 22:43

## 2024-12-27 RX ADMIN — GABAPENTIN 300 MILLIGRAM(S): 300 CAPSULE ORAL at 22:38

## 2024-12-27 RX ADMIN — GABAPENTIN 300 MILLIGRAM(S): 300 CAPSULE ORAL at 06:02

## 2024-12-27 RX ADMIN — Medication 325 MILLIGRAM(S): at 12:07

## 2024-12-27 RX ADMIN — Medication 200 MILLIGRAM(S): at 06:01

## 2024-12-27 RX ADMIN — Medication 200 MILLIGRAM(S): at 01:28

## 2024-12-27 RX ADMIN — QUETIAPINE FUMARATE 100 MILLIGRAM(S): 100 TABLET, FILM COATED ORAL at 17:34

## 2024-12-27 RX ADMIN — IPRATROPIUM BROMIDE AND ALBUTEROL SULFATE 3 MILLILITER(S): .5; 2.5 SOLUTION RESPIRATORY (INHALATION) at 05:08

## 2024-12-27 RX ADMIN — Medication 8 MILLIGRAM(S): at 15:00

## 2024-12-27 RX ADMIN — NYSTATIN TOPICAL POWDER 1 APPLICATION(S): 100000 POWDER TOPICAL at 12:10

## 2024-12-27 RX ADMIN — IPRATROPIUM BROMIDE AND ALBUTEROL SULFATE 3 MILLILITER(S): .5; 2.5 SOLUTION RESPIRATORY (INHALATION) at 23:27

## 2024-12-27 RX ADMIN — Medication 1 TABLET(S): at 12:08

## 2024-12-27 RX ADMIN — QUETIAPINE FUMARATE 100 MILLIGRAM(S): 100 TABLET, FILM COATED ORAL at 12:11

## 2024-12-27 RX ADMIN — Medication 4 MILLIGRAM(S): at 06:02

## 2024-12-27 RX ADMIN — METHADONE HYDROCHLORIDE 110 MILLIGRAM(S): 10 TABLET ORAL at 12:08

## 2024-12-27 RX ADMIN — IPRATROPIUM BROMIDE AND ALBUTEROL SULFATE 3 MILLILITER(S): .5; 2.5 SOLUTION RESPIRATORY (INHALATION) at 11:06

## 2024-12-27 RX ADMIN — QUETIAPINE FUMARATE 400 MILLIGRAM(S): 100 TABLET, FILM COATED ORAL at 22:41

## 2024-12-27 RX ADMIN — TAMSULOSIN HYDROCHLORIDE 0.4 MILLIGRAM(S): 0.4 CAPSULE ORAL at 22:38

## 2024-12-27 RX ADMIN — Medication 200 MILLIGRAM(S): at 12:08

## 2024-12-27 NOTE — PROGRESS NOTE ADULT - NS ATTEND AMEND GEN_ALL_CORE FT
All labs and cultures and imaging and pertinent chart notes reviewed by me.    case d/w Np Naty at length and agree with her assessment and plan.    12/27:   has remained afebrile  no leukocytosis  BCs NGTD, hip aspirate culture with no growth to date, pt is being monitored off abx. Awaiting for possible SPC exchange.   CT pelvis performed 12/24  : IMPRESSION:  1.  Posterior superior subluxation of the left femoral head with concave remodeling and moderate erosive changes of the acetabulum and femoral head.  2.  Small to moderate left hip joint effusion/synovial thickening also appears to be present. There are no surrounding fluid collections.  3.  Changes are concerning for septic joint. Correlation with recent aspiration is suggested.  4.  Marrow changes suggest a marrow replacing process with endosteal scalloping noted involving both femoral diaphyses. Serous atrophy, neoplastic process, or other marrow disorders may be considered. Clinical and laboratory correlation is suggested. Consider follow-up imaging such as MRI or PET CT and/or histological correlation as warranted.      Impression-  ? Chronic  arthritis of left hip vs ? chronic septic arthritis of left hip- hip aspirate culture- No Growth  pain in extremities contracted   MDR UTI in patient with chronic SPC   neurogenic bladder  bed-bound  colostomy present      plan-  monitored off abx and has remained afebrile with no leukocytosis  completed zerbaxa to treat the carbapenem resistant pseudomonas   frequent turns, offloading, and nutrition optimization to avoid bedsores-consider protective heel/leg protectors   advise to monitor for aspiration precautions   will need every 3-4 weeks SPC exchange   continue nystatin ointment to skin folds with the fungal rash BID for 5 days.  hip aspirate culture- negative to date, no growth  consider workup for CT a/p findings in or outpatient, would need MRI or PET scan - to be managed by medicine  team/ortho was consulted.        Kush King MD  Infectious Disease Attending    for any questions please do not hesitate to contact me either via teams or by calling [phone]

## 2024-12-27 NOTE — PROGRESS NOTE ADULT - SUBJECTIVE AND OBJECTIVE BOX
STAN VEGA  MRN-[id number]  54y (1970)    Follow Up:   UTI, s/p treatment     Interval History: The pt was seen and examined earlier, not in acute distress, no new complaints, awake, alert, appropriate. Pt is afebrile since 12/20, NC, no leukocytosis.     PAST MEDICAL & SURGICAL HISTORY:  CAD (coronary artery disease)      HTN (hypertension)      HLD (hyperlipidemia)      Neurogenic bladder      Bipolar disorder      S/P ileostomy      Chronic hepatitis C virus infection      S/P laminectomy      S/P ileostomy          ROS:    [ ] Unobtainable because:  [x] All other systems negative    Constitutional: no fever, no chills  Head: no trauma  Eyes: no vision changes, no eye pain  ENT:  no sore throat, no rhinorrhea  Cardiovascular:  no chest pain, no palpitation  Respiratory:  no SOB, no cough  GI:  no abd pain, no vomiting, no diarrhea  urinary: no dysuria, no hematuria, no flank pain  musculoskeletal:  no joint pain, no joint swelling  skin:  no rash  neurology:  no headache, no seizure, no change in mental status  psych: no anxiety, no depression         Allergies  NSAIDs (Flushing; Other (Moderate))  Aleve (Unknown)  Risperdal (Short breath; Rash; Hives)  Stelazine (Unknown)  Haldol (Anaphylaxis)  Motrin (Anaphylaxis)  Thorazine (Other (Moderate))  Zyprexa (Rash; Dystonic RXN; Hives)        ANTIMICROBIALS:      OTHER MEDS:  acetaminophen     Tablet .. 650 milliGRAM(s) Oral every 6 hours PRN  albuterol/ipratropium for Nebulization 3 milliLiter(s) Nebulizer every 6 hours  benzocaine/menthol Lozenge 1 Lozenge Oral every 4 hours PRN  DULoxetine 30 milliGRAM(s) Oral daily  enoxaparin Injectable 40 milliGRAM(s) SubCutaneous every 24 hours  ferrous    sulfate 325 milliGRAM(s) Oral daily  finasteride 5 milliGRAM(s) Oral daily  folic acid 1 milliGRAM(s) Oral daily  furosemide    Tablet 20 milliGRAM(s) Oral daily  gabapentin 300 milliGRAM(s) Oral every 8 hours  guaiFENesin Oral Liquid (Sugar-Free) 200 milliGRAM(s) Oral every 6 hours  HYDROmorphone   Tablet 8 milliGRAM(s) Oral every 6 hours PRN  HYDROmorphone   Tablet 4 milliGRAM(s) Oral every 6 hours PRN  influenza   Vaccine 0.5 milliLiter(s) IntraMuscular once  methadone    Tablet 110 milliGRAM(s) Oral daily  multivitamin 1 Tablet(s) Oral daily  nystatin Powder 1 Application(s) Topical two times a day  ondansetron Injectable 4 milliGRAM(s) IV Push every 6 hours PRN  pantoprazole    Tablet 40 milliGRAM(s) Oral before breakfast  polyethylene glycol 3350 17 Gram(s) Oral daily  povidone iodine 10% Solution 1 Application(s) Topical daily  QUEtiapine 100 milliGRAM(s) Oral <User Schedule>  QUEtiapine 400 milliGRAM(s) Oral at bedtime  senna 2 Tablet(s) Oral at bedtime  simethicone 80 milliGRAM(s) Chew every 6 hours PRN  tamsulosin 0.4 milliGRAM(s) Oral at bedtime      Vital Signs Last 24 Hrs  T(C): 37.2 (27 Dec 2024 13:55), Max: 37.2 (27 Dec 2024 13:55)  T(F): 98.9 (27 Dec 2024 13:55), Max: 98.9 (27 Dec 2024 13:55)  HR: 93 (27 Dec 2024 13:55) (79 - 93)  BP: 108/67 (27 Dec 2024 13:55) (108/67 - 136/73)  BP(mean): --  RR: 18 (27 Dec 2024 13:55) (17 - 18)  SpO2: 93% (27 Dec 2024 13:55) (93% - 98%)    Parameters below as of 27 Dec 2024 13:55  Patient On (Oxygen Delivery Method): nasal cannula, 5L        Physical Exam:  General:    NAD, non toxic, NC  Head: atraumatic, normocephalic  Eyes: normal sclera and conjunctiva  ENT:   no oropharyngeal lesions, no LAD, neck supple  Cardio:    regular S1,S2  Respiratory:   no wheezing, no rales  abd:  mildly distended, BS +, not tender, colostomy in place  :     no CVAT, mild suprapubic tenderness, SPC present, no erythema around spc   Musculoskeletal : contracted LE, can't move straighten the LE and is in flexed position, + edema of b/l LEs  left hip - no open wounds , stage 1-2 possible decubiti, no erythema   Neurologic:     AAO x 3  psych: calm    WBC Count: 8.33 K/uL (12-27 @ 06:20)  WBC Count: 8.85 K/uL (12-26 @ 09:10)  WBC Count: 8.84 K/uL (12-25 @ 06:45)  WBC Count: 8.99 K/uL (12-24 @ 14:30)  WBC Count: 13.40 K/uL (12-22 @ 06:10)  WBC Count: 16.39 K/uL (12-20 @ 18:25)                            11.5   8.33  )-----------( 188      ( 27 Dec 2024 06:20 )             37.4       12-27    140  |  105  |  9   ----------------------------<  85  4.1   |  31  |  0.55    Ca    9.4      27 Dec 2024 06:20  Phos  3.6     12-27  Mg     2.0     12-27        Urinalysis Basic - ( 27 Dec 2024 06:20 )    Color: x / Appearance: x / SG: x / pH: x  Gluc: 85 mg/dL / Ketone: x  / Bili: x / Urobili: x   Blood: x / Protein: x / Nitrite: x   Leuk Esterase: x / RBC: x / WBC x   Sq Epi: x / Non Sq Epi: x / Bacteria: x        Creatinine Trend: 0.55<--, 0.60<--, 0.55<--, 0.63<--, 0.69<--, 0.66<--      MICROBIOLOGY:  v  Hip  12-23-24   No growth to date  --    No polymorphonuclear cells seen per low power field  No organisms seen per oil power field      .Blood BLOOD  12-20-24   No growth at 5 days  --  --      .Blood BLOOD  12-20-24   No growth at 5 days  --  --      Clean Catch  12-09-24   >100,000 CFU/ml Pseudomonas aeruginosa (Carbapenem Resistant)  --  Pseudomonas aeruginosa (Carbapenem Resistant)    Rapid RVP Result: NotDetec (12-23 @ 11:05)    C-Reactive Protein: 20 (12-20)    SARS-CoV-2: NotDetec (12-23-24 @ 11:05)  Rapid RVP Result: NotDetec (12-23-24 @ 11:05)    SARS-CoV-2: NotDetec (23 Dec 2024 11:05)    RADIOLOGY:  < from: CT Pelvis No Cont (12.24.24 @ 12:32) >  ACC: [id number] EXAM:  CT PELVIS ONLY   ORDERED BY: IDANIA MARQUIS     PROCEDURE DATE:  12/24/2024          INTERPRETATION:  CT PELVIS    HISTORY: Evaluate for left hip collection. Left hip aspiration dated   12/23/2024. Paraplegia.    TECHNIQUE: Contiguous axial imaging was performed through the pelvis   without contrast.  Coronal and sagittal reformatting was utilized.    COMPARISON: CT abdomen and pelvis dated 12/11/2024.    FINDINGS:    OSSEOUS STRUCTURES    Fractures:  None.    Marrow:  Patientappears osteopenic with somewhat homogeneous   appearance of the marrow that could reflect a marrow replacement process.   There is endosteal scalloping of the visualized femoral diaphyses.    VISUALIZED SPINE  Moderate lower lumbar degenerative discdisease is present.    PELVIC JOINTS    Sacroiliac Joints:  Mild arthrosis is present bilaterally.    Symphysis Pubis:  Mild arthrosis is present.    RIGHT HIP JOINT    Avascular Necrosis:  None.    Joint Space:  There is mild joint space narrowing.    Effusion/Synovitis:  None.    LEFT HIP JOINT    Avascular Necrosis:  None.    Joint Space:  There is posterosuperior subluxation of the femoral head   with concave remodeling of the posterosuperior femoral head. Moderate   erosive changes involve the acetabulum and femoral head.    Effusion/Synovitis:  Small to moderate effusion/synovial thickening   appears to be present.    SOFT TISSUES    Neurovascular:  Slight atherosclerotic calcifications are present.    Pelvis/Abdomen:  Suprapubic Holcomb catheter is present within the   bladder. Sigmoid anastomotic surgical material is noted. Right   periumbilical ostomy is noted. Rectus diastasis is present.    Musculature:  Moderate to severe muscle atrophy is present.    Subcutaneous Tissues:  Mild-to-moderate scattered edema is present with   subcutaneous induration overlying the sacrum suggesting decubitus wounds   and/or scarring.    IMPRESSION:  1.  Posterior superior subluxation of the left femoral head with concave   remodeling and moderate erosive changes of the acetabulum and femoral   head.  2.  Small to moderate left hip joint effusion/synovial thickening also   appears to be present. There are no surrounding fluid collections.  3.  Changes are concerning for septic joint. Correlation with recent   aspiration is suggested.  4.  Marrow changes suggest a marrow replacing process with endosteal   scalloping noted involving both femoral diaphyses. Serous atrophy,   neoplastic process, or other marrow disorders may be considered. Clinical   and laboratory correlation is suggested. Consider follow-up imaging such   as MRI or PET CT and/or histological correlation as warranted.    --- End of Report ---            WINSOME DIAZ MD  This document has been electronically signed. Dec 24 2024  1:32PM    < end of copied text >     STAN VEGA  MRN-[id number]  54y (1970)    Follow Up:   UTI, s/p treatment     Interval History: The pt was seen and examined earlier, not in acute distress, no new complaints, awake, alert, appropriate. Pt is afebrile since 12/20, NC, no leukocytosis.     PAST MEDICAL & SURGICAL HISTORY:  CAD (coronary artery disease)      HTN (hypertension)      HLD (hyperlipidemia)      Neurogenic bladder      Bipolar disorder      S/P ileostomy      Chronic hepatitis C virus infection      S/P laminectomy      S/P ileostomy          ROS:    [ ] Unobtainable because:  [x] All other systems negative    Constitutional: no fever, no chills  Head: no trauma  Eyes: no vision changes, no eye pain  ENT:  no sore throat, no rhinorrhea  Cardiovascular:  no chest pain, no palpitation  Respiratory:  no SOB, no cough  GI:  no abd pain, no vomiting, no diarrhea  urinary: no dysuria, no hematuria, no flank pain  musculoskeletal:  no joint pain, no joint swelling  skin:  no rash  neurology:  no headache, no seizure, no change in mental status  psych: no anxiety, no depression         Allergies  NSAIDs (Flushing; Other (Moderate))  Aleve (Unknown)  Risperdal (Short breath; Rash; Hives)  Stelazine (Unknown)  Haldol (Anaphylaxis)  Motrin (Anaphylaxis)  Thorazine (Other (Moderate))  Zyprexa (Rash; Dystonic RXN; Hives)        ANTIMICROBIALS:      OTHER MEDS:  acetaminophen     Tablet .. 650 milliGRAM(s) Oral every 6 hours PRN  albuterol/ipratropium for Nebulization 3 milliLiter(s) Nebulizer every 6 hours  benzocaine/menthol Lozenge 1 Lozenge Oral every 4 hours PRN  DULoxetine 30 milliGRAM(s) Oral daily  enoxaparin Injectable 40 milliGRAM(s) SubCutaneous every 24 hours  ferrous    sulfate 325 milliGRAM(s) Oral daily  finasteride 5 milliGRAM(s) Oral daily  folic acid 1 milliGRAM(s) Oral daily  furosemide    Tablet 20 milliGRAM(s) Oral daily  gabapentin 300 milliGRAM(s) Oral every 8 hours  guaiFENesin Oral Liquid (Sugar-Free) 200 milliGRAM(s) Oral every 6 hours  HYDROmorphone   Tablet 8 milliGRAM(s) Oral every 6 hours PRN  HYDROmorphone   Tablet 4 milliGRAM(s) Oral every 6 hours PRN  influenza   Vaccine 0.5 milliLiter(s) IntraMuscular once  methadone    Tablet 110 milliGRAM(s) Oral daily  multivitamin 1 Tablet(s) Oral daily  nystatin Powder 1 Application(s) Topical two times a day  ondansetron Injectable 4 milliGRAM(s) IV Push every 6 hours PRN  pantoprazole    Tablet 40 milliGRAM(s) Oral before breakfast  polyethylene glycol 3350 17 Gram(s) Oral daily  povidone iodine 10% Solution 1 Application(s) Topical daily  QUEtiapine 100 milliGRAM(s) Oral <User Schedule>  QUEtiapine 400 milliGRAM(s) Oral at bedtime  senna 2 Tablet(s) Oral at bedtime  simethicone 80 milliGRAM(s) Chew every 6 hours PRN  tamsulosin 0.4 milliGRAM(s) Oral at bedtime      Vital Signs Last 24 Hrs  T(C): 37.2 (27 Dec 2024 13:55), Max: 37.2 (27 Dec 2024 13:55)  T(F): 98.9 (27 Dec 2024 13:55), Max: 98.9 (27 Dec 2024 13:55)  HR: 93 (27 Dec 2024 13:55) (79 - 93)  BP: 108/67 (27 Dec 2024 13:55) (108/67 - 136/73)  BP(mean): --  RR: 18 (27 Dec 2024 13:55) (17 - 18)  SpO2: 93% (27 Dec 2024 13:55) (93% - 98%)    Parameters below as of 27 Dec 2024 13:55  Patient On (Oxygen Delivery Method): nasal cannula, 5L        Physical Exam:  General:    NAD, non toxic, NC  Head: atraumatic, normocephalic  Eyes: normal sclera and conjunctiva  ENT:   no oropharyngeal lesions, no LAD, neck supple  Cardio:    regular S1,S2  Respiratory:   no wheezing, no rales  abd:  mildly distended, BS +, not tender, colostomy in place  :     no CVAT, mild suprapubic tenderness, SPC present, no erythema around spc   Musculoskeletal : contracted LE, can't move straighten the LE and is in flexed position, + edema of b/l LEs  left hip - no open wounds , stage 1-2 possible decubiti, no erythema   Neurologic:     AAO x 3  psych: calm    WBC Count: 8.33 K/uL (12-27 @ 06:20)  WBC Count: 8.85 K/uL (12-26 @ 09:10)  WBC Count: 8.84 K/uL (12-25 @ 06:45)  WBC Count: 8.99 K/uL (12-24 @ 14:30)  WBC Count: 13.40 K/uL (12-22 @ 06:10)  WBC Count: 16.39 K/uL (12-20 @ 18:25)                            11.5   8.33  )-----------( 188      ( 27 Dec 2024 06:20 )             37.4       12-27    140  |  105  |  9   ----------------------------<  85  4.1   |  31  |  0.55    Ca    9.4      27 Dec 2024 06:20  Phos  3.6     12-27  Mg     2.0     12-27        Urinalysis Basic - ( 27 Dec 2024 06:20 )    Color: x / Appearance: x / SG: x / pH: x  Gluc: 85 mg/dL / Ketone: x  / Bili: x / Urobili: x   Blood: x / Protein: x / Nitrite: x   Leuk Esterase: x / RBC: x / WBC x   Sq Epi: x / Non Sq Epi: x / Bacteria: x        Creatinine Trend: 0.55<--, 0.60<--, 0.55<--, 0.63<--, 0.69<--, 0.66<--      MICROBIOLOGY:    Hip  12-23-24   No growth to date  --    No polymorphonuclear cells seen per low power field  No organisms seen per oil power field      .Blood BLOOD  12-20-24   No growth at 5 days  --  --      .Blood BLOOD  12-20-24   No growth at 5 days  --  --      Clean Catch  12-09-24   >100,000 CFU/ml Pseudomonas aeruginosa (Carbapenem Resistant)  --  Pseudomonas aeruginosa (Carbapenem Resistant)    Rapid RVP Result: NotDetec (12-23 @ 11:05)    C-Reactive Protein: 20 (12-20)    SARS-CoV-2: NotDetec (12-23-24 @ 11:05)  Rapid RVP Result: NotDetec (12-23-24 @ 11:05)    SARS-CoV-2: NotDetec (23 Dec 2024 11:05)    RADIOLOGY:  < from: CT Pelvis No Cont (12.24.24 @ 12:32) >  ACC: [id number] EXAM:  CT PELVIS ONLY   ORDERED BY: IDANIA MARQUIS     PROCEDURE DATE:  12/24/2024          INTERPRETATION:  CT PELVIS    HISTORY: Evaluate for left hip collection. Left hip aspiration dated   12/23/2024. Paraplegia.    TECHNIQUE: Contiguous axial imaging was performed through the pelvis   without contrast.  Coronal and sagittal reformatting was utilized.    COMPARISON: CT abdomen and pelvis dated 12/11/2024.    FINDINGS:    OSSEOUS STRUCTURES    Fractures:  None.    Marrow:  Patientappears osteopenic with somewhat homogeneous   appearance of the marrow that could reflect a marrow replacement process.   There is endosteal scalloping of the visualized femoral diaphyses.    VISUALIZED SPINE  Moderate lower lumbar degenerative discdisease is present.    PELVIC JOINTS    Sacroiliac Joints:  Mild arthrosis is present bilaterally.    Symphysis Pubis:  Mild arthrosis is present.    RIGHT HIP JOINT    Avascular Necrosis:  None.    Joint Space:  There is mild joint space narrowing.    Effusion/Synovitis:  None.    LEFT HIP JOINT    Avascular Necrosis:  None.    Joint Space:  There is posterosuperior subluxation of the femoral head   with concave remodeling of the posterosuperior femoral head. Moderate   erosive changes involve the acetabulum and femoral head.    Effusion/Synovitis:  Small to moderate effusion/synovial thickening   appears to be present.    SOFT TISSUES    Neurovascular:  Slight atherosclerotic calcifications are present.    Pelvis/Abdomen:  Suprapubic Holcomb catheter is present within the   bladder. Sigmoid anastomotic surgical material is noted. Right   periumbilical ostomy is noted. Rectus diastasis is present.    Musculature:  Moderate to severe muscle atrophy is present.    Subcutaneous Tissues:  Mild-to-moderate scattered edema is present with   subcutaneous induration overlying the sacrum suggesting decubitus wounds   and/or scarring.    IMPRESSION:  1.  Posterior superior subluxation of the left femoral head with concave   remodeling and moderate erosive changes of the acetabulum and femoral   head.  2.  Small to moderate left hip joint effusion/synovial thickening also   appears to be present. There are no surrounding fluid collections.  3.  Changes are concerning for septic joint. Correlation with recent   aspiration is suggested.  4.  Marrow changes suggest a marrow replacing process with endosteal   scalloping noted involving both femoral diaphyses. Serous atrophy,   neoplastic process, or other marrow disorders may be considered. Clinical   and laboratory correlation is suggested. Consider follow-up imaging such   as MRI or PET CT and/or histological correlation as warranted.    --- End of Report ---            WINSOME DIAZ MD  This document has been electronically signed. Dec 24 2024  1:32PM    < end of copied text >

## 2024-12-27 NOTE — PROGRESS NOTE ADULT - SUBJECTIVE AND OBJECTIVE BOX
Patient is a 54y old  Male who presents with a chief complaint of UTI, hematuria, lower abdominal pain (27 Dec 2024 13:59)      INTERVAL HPI/OVERNIGHT EVENTS:  -nothing acute overnight   -seen and examined at bedside without acute complaints    MEDICATIONS  (STANDING):  albuterol/ipratropium for Nebulization 3 milliLiter(s) Nebulizer every 6 hours  DULoxetine 30 milliGRAM(s) Oral daily  enoxaparin Injectable 40 milliGRAM(s) SubCutaneous every 24 hours  ferrous    sulfate 325 milliGRAM(s) Oral daily  finasteride 5 milliGRAM(s) Oral daily  folic acid 1 milliGRAM(s) Oral daily  furosemide    Tablet 20 milliGRAM(s) Oral daily  gabapentin 300 milliGRAM(s) Oral every 8 hours  guaiFENesin Oral Liquid (Sugar-Free) 200 milliGRAM(s) Oral every 6 hours  influenza   Vaccine 0.5 milliLiter(s) IntraMuscular once  methadone    Tablet 110 milliGRAM(s) Oral daily  multivitamin 1 Tablet(s) Oral daily  nystatin Powder 1 Application(s) Topical two times a day  pantoprazole    Tablet 40 milliGRAM(s) Oral before breakfast  polyethylene glycol 3350 17 Gram(s) Oral daily  povidone iodine 10% Solution 1 Application(s) Topical daily  QUEtiapine 400 milliGRAM(s) Oral at bedtime  QUEtiapine 100 milliGRAM(s) Oral <User Schedule>  senna 2 Tablet(s) Oral at bedtime  tamsulosin 0.4 milliGRAM(s) Oral at bedtime    MEDICATIONS  (PRN):  acetaminophen     Tablet .. 650 milliGRAM(s) Oral every 6 hours PRN Temp greater or equal to 38C (100.4F)  benzocaine/menthol Lozenge 1 Lozenge Oral every 4 hours PRN Sore Throat  HYDROmorphone   Tablet 8 milliGRAM(s) Oral every 6 hours PRN Severe Pain (7 - 10)  HYDROmorphone   Tablet 4 milliGRAM(s) Oral every 6 hours PRN Moderate Pain (4 - 6)  ondansetron Injectable 4 milliGRAM(s) IV Push every 6 hours PRN Nausea and/or Vomiting  simethicone 80 milliGRAM(s) Chew every 6 hours PRN Heartburn      Allergies    NSAIDs (Flushing; Other (Moderate))  Aleve (Unknown)  Risperdal (Short breath; Rash; Hives)  Stelazine (Unknown)  Haldol (Anaphylaxis)  Motrin (Anaphylaxis)  Thorazine (Other (Moderate))  Zyprexa (Rash; Dystonic RXN; Hives)    Intolerances        REVIEW OF SYSTEMS:  no LE edema CP SOB abdominal pain    Vital Signs Last 24 Hrs  T(C): 36.8 (27 Dec 2024 17:44), Max: 37.2 (27 Dec 2024 13:55)  T(F): 98.2 (27 Dec 2024 17:44), Max: 98.9 (27 Dec 2024 13:55)  HR: 85 (27 Dec 2024 17:44) (79 - 93)  BP: 135/83 (27 Dec 2024 17:44) (108/67 - 136/73)  BP(mean): --  RR: 18 (27 Dec 2024 17:44) (17 - 18)  SpO2: 98% (27 Dec 2024 17:44) (93% - 98%)    Parameters below as of 27 Dec 2024 17:44  Patient On (Oxygen Delivery Method): nasal cannula        PHYSICAL EXAM:  GENERAL: NAD, well-groomed, well-developed  HEAD:  Atraumatic, Normocephalic  EYES: EOMI, sclera non-icteric  ENMT:  Moist mucous membranes,  NERVOUS SYSTEM:  Alert & Oriented X3, Good concentration  CHEST/LUNG: Clear to percussion bilaterally; No rales, rhonchi, wheezing, or rubs  HEART: Regular rate and rhythm; No murmurs, rubs, or gallops  ABDOMEN: Soft, Nontender, Nondistended; Bowel sounds present +ostomy present  EXTREMITIES: +BLE contractures    LABS:                        11.5   8.33  )-----------( 188      ( 27 Dec 2024 06:20 )             37.4     12-27    140  |  105  |  9   ----------------------------<  85  4.1   |  31  |  0.55    Ca    9.4      27 Dec 2024 06:20  Phos  3.6     12-27  Mg     2.0     12-27        Urinalysis Basic - ( 27 Dec 2024 06:20 )    Color: x / Appearance: x / SG: x / pH: x  Gluc: 85 mg/dL / Ketone: x  / Bili: x / Urobili: x   Blood: x / Protein: x / Nitrite: x   Leuk Esterase: x / RBC: x / WBC x   Sq Epi: x / Non Sq Epi: x / Bacteria: x      CAPILLARY BLOOD GLUCOSE          RADIOLOGY & ADDITIONAL TESTS:    Imaging Personally Reviewed:  [ X] YES  [ ] NO    Consultant(s) Notes Reviewed:  [ X] YES  [ ] NO    Care Discussed with Consultants/Other Providers [X ] YES  [ ] NO

## 2024-12-27 NOTE — PROGRESS NOTE ADULT - ASSESSMENT
54 years old male with h/o cervical epidural abscess, b/l LE paralysis, pneumoperitoneum s/p ex-lap w/ ileostomy complicated by MRSA bacteremia and evisceration, Hep C, emphysema on chronic O2, L foot osteomyelitis, bipolar, opioid dependence, HTN, neurogenic bladder s/p IR SPC placement on 10/23/24 present to ED with complain of worsening lower abdominal pain for 5 days and hematuria. SPC was placed \on 10/23/24 by IR, reportedly was pulled slightly since 10/24. Patient reported pain since then but worsened over last 5 days associated with nausea. Denied any fever or chills.     # Fever  #CAUTI  due to UTI due to Suprapubic Catheter, Carbapenem resistant Pseudomonas -  Completed 7d course of Zerbaxa.  SPC has been exchanged by IR.  Per ID recommendations, consider exchanging catheter every 3-4 weeks.    -Will need to have SPC exchanged by IR, CM spoke with nursing facility and urology cannot exchange it as it needs to be fluroscopy guided. CM working to coordinate possible IR outpatient exchange in 4 weeks.   -CT from admission showing possible septic arthritis of L hip.  Prior CT from 7/2024 similar findings.  Ortho, surgery input appreciated.   S/p IR drainage 12/23 with minimal yield < 1ml.  Culture, gram stain negative to date.  Discussed with ID today, not likely active infection. Patient afebrile >48 hours, WBC normalized.   -CM spoke with IR, patient does not need fluoroscopy guided SPC exchange and urology can exchange it. CM spoke with nursing facility who can accommodate SPC exchange.     # Abnormal CT - Hip CT concerning for marrow changes, ? septic joint, ? neoplastic process.  If infection workup unremarkable, could consider MRI / PET CT however patient unsure if he can position himself for MRI  IMPRESSION:  1.  Posterior superior subluxation of the left femoral head with concave remodeling and moderate erosive changes of the acetabulum and femoral head.  2.  Small to moderate left hip joint effusion/synovial thickening also appears to be present. There are no surrounding fluid collections.3.  Changes are concerning for septic joint. Correlation with recent aspiration is suggested.  4.  Marrow changes suggest a marrow replacing process with endosteal scalloping noted involving both femoral diaphyses. Serous atrophy, neoplastic process, or other marrow disorders may be considered. Clinical and laboratory correlation is suggested. Consider follow-up imaging such as MRI or PET CT and/or histological correlation as warranted.  -will defer MRI and work up as an outpatient    # L dorsal Foot Ulcer - podiatry input appreciated - Dr. Figueroa.    # Lower abdominal pain  #R hip pain   - Per ID R hip likely not acutely infected. Suspect abdominal pain is referred hip pain. Pain appears to be controlled on po Dilaudid, Methadone. Cont to wean pain meds as able     # Methadone dependence. ·  Plan: Previous hospitalists have Called Loc [phone], talked with nurse manage Corie, confirmed that patient is on methadone 110mg daily and last dose in NH was on 12/9/2024  QTc 469  on EKG 10/21/24.  Pt counselled to minimize use of Dilaudid if possible for pain control.    Bowel regimens---> senna and miralax.    # Chronic respiratory failure with hypercapnia. ·  Plan: nocturnal NIPPV.  -on 5L O2 at baseline per patient, currently on baseline O2     # GERD (gastroesophageal reflux disease). ·  Plan: on oral PPI.    # BPH (benign prostatic hyperplasia).  ·  Plan: on finasteride and flomax.    # Psychiatric disorder.  ·  Plan: on Seroquel 100mg 10AM, 100mg 6PM and 400mg hs per NH paper Continue duloxetine 30mg daily    # Functional quadriplegia  Bedbound with bilateral lower extremity paralysis Wound care ordered for lower extremity pressure wound    # Inpatient DVT Prophylaxis - Lovenox subcut

## 2024-12-27 NOTE — PROGRESS NOTE ADULT - NS ATTEND OPT1A GEN_ALL_CORE
History/Exam/Medical decision making
History/Medical decision making

## 2024-12-27 NOTE — PROGRESS NOTE ADULT - ASSESSMENT
54 years old male with h/o cervical epidural abscess, b/l LE paralysis, pneumoperitoneum s/p ex-lap w/ ileostomy complicated by MRSA bacteremia and evisceration, Hep C ( VL UD based on labs from 1/2024), emphysema on chronic O2, L foot osteomyelitis,-treated    bipolar, opioid dependence, HTN, neurogenic bladder s/p IR SPC placement on 10/23/24 present to ED with complain of worsening lower abdominal pain for 5 days and hematuria. SPC was placed 10/23/24 by IR, reportedly was pulled slightly since 10/24. Patient reported pain since then but worsened over last 5 days associated with nausea. Denied any fever or chills.   Hemodynamically stable, afebrile, sat well at RA. No leukocytosis, plt 181, Cr 0.69. UA dirty. CXR with no focal consolidation (10 Dec 2024 11:42)    Infectious Disease consult requested today 12/13/2024 to help with antibiotic management for carbapenem resistant pseudomonas in urine cx.    patient  is s/p SPC exchange by IR on 12/11/2024    Afebrile  no leukocytosis  UA- turbid and pos LE, neg -nitrates  no blood cx.  previous urine cx from 10/2024-[facility] ( was treated by my colleague  with IV abx and d/c on po cefpodoxime )    patient is prone to MDR  organisms  in the urine in light of chronic west and chronic SPC secondary to neurogenic bladder.  he has had extensive h/o infections in heels and foot as well ( all treated in past)    in order to help mitigate these UTIs  would advise to have SPC exchange every 3-4 weeks     12/16: no fever, on RA at the time of my exam and appears to be comfortable, no leukocytosis, Cr ok, UC with carbapenem resistant pseudomonas, Zerbaxa continued (day #4). Pt with mild abdominal discomfort today, no vomiting, having bowel movements, will monitor.   12/18: afebrile, RA, no leukocytosis, Cr ok, no BCs available, UC with  pseudomonas, today is day #5 of abx Zerbaxa, pt will need two more days to complete 7 days total.   12/20: complaining of a lot of pain in his hip, has a healed wound, foot presses up against lower hip causing pressure injury both to the foot and the hip, no obvious external tract but there is significant pain with movement of the hip and knee. Would recommend podiatry and ortho consults as well as imaging of the left hip and knee. should stay off antibiotics as he compleated the antibiotics for the UTI and would like to see if there are s/s or worsening infection from his hip or foot.    12/23: had a fever on 12/20, no fevers since then, NC, leukocytosis is better 13.4, Cr ok, CXR - chronic stable changes, BCs from 12/20 NGTD, UA with no nitrates, WBC TNTC, Bacteria many. The pt was seen by ortho, recommended Left Hip aspiration, the pt had IR aspiration today. Would obtain CT hip and a/p.   The pt was seen by podiatry for elongated nails.   12/24: no fever, NC, no new cbc, BCs remain with no growth to date, RVP negative, s/p hip aspiration yesterday, 1 cc of fluid obtained, smear negative, awaiting for culture and CT of left hip. The pt was seen by ortho, no further intervention planned at the moment.   12/27: afebrile, NC, no leukocytosis, Cr ok, BCs NGTD, hip aspirate culture with no growth to date, pt is being monitored off abx. Awaiting for possible SPC exchange.   CT pelvis performed 12/26: IMPRESSION:  1.  Posterior superior subluxation of the left femoral head with concave remodeling and moderate erosive changes of the acetabulum and femoral head.  2.  Small to moderate left hip joint effusion/synovial thickening also appears to be present. There are no surrounding fluid collections.  3.  Changes are concerning for septic joint. Correlation with recent aspiration is suggested.  4.  Marrow changes suggest a marrow replacing process with endosteal scalloping noted involving both femoral diaphyses. Serous atrophy, neoplastic process, or other marrow disorders may be considered. Clinical and laboratory correlation is suggested. Consider follow-up imaging such as MRI or PET CT and/or histological correlation as warranted.      Impression-  ? Chronic  arthritis of left hip vs ? chronic septic arthritis of left hip  pain in extremities contracted   MDR UTI in patient with chronic SPC   neurogenic bladder  bed-bound  colostomy present      plan-  monitored off abx   completed zerbaxa to treat the carbapenem resistant pseudomonas   frequent turns, offloading, and nutrition optimization to avoid bedsores-consider protective heel/leg protectors   advise to monitor for aspiration precautions   will need every 3-4 weeks SPC exchange   continue nystatin ointment to skin folds with the fungal rash BID for 5 days.  follow all culture data   consider workup for CT a/p findings in or outpatient, would need MRI or PET scan     discussed with Dr. Cartwright  discussed with the   54 years old male with h/o cervical epidural abscess, b/l LE paralysis, pneumoperitoneum s/p ex-lap w/ ileostomy complicated by MRSA bacteremia and evisceration, Hep C ( VL UD based on labs from 1/2024), emphysema on chronic O2, L foot osteomyelitis,-treated    bipolar, opioid dependence, HTN, neurogenic bladder s/p IR SPC placement on 10/23/24 present to ED with complain of worsening lower abdominal pain for 5 days and hematuria. SPC was placed 10/23/24 by IR, reportedly was pulled slightly since 10/24. Patient reported pain since then but worsened over last 5 days associated with nausea. Denied any fever or chills.   Hemodynamically stable, afebrile, sat well at RA. No leukocytosis, plt 181, Cr 0.69. UA dirty. CXR with no focal consolidation (10 Dec 2024 11:42)    Infectious Disease consult requested today 12/13/2024 to help with antibiotic management for carbapenem resistant pseudomonas in urine cx.    patient  is s/p SPC exchange by IR on 12/11/2024    Afebrile  no leukocytosis  UA- turbid and pos LE, neg -nitrates  no blood cx.  previous urine cx from 10/2024-[facility] ( was treated by my colleague  with IV abx and d/c on po cefpodoxime )    patient is prone to MDR  organisms  in the urine in light of chronic west and chronic SPC secondary to neurogenic bladder.  he has had extensive h/o infections in heels and foot as well ( all treated in past)    in order to help mitigate these UTIs  would advise to have SPC exchange every 3-4 weeks     12/16: no fever, on RA at the time of my exam and appears to be comfortable, no leukocytosis, Cr ok, UC with carbapenem resistant pseudomonas, Zerbaxa continued (day #4). Pt with mild abdominal discomfort today, no vomiting, having bowel movements, will monitor.   12/18: afebrile, RA, no leukocytosis, Cr ok, no BCs available, UC with  pseudomonas, today is day #5 of abx Zerbaxa, pt will need two more days to complete 7 days total.   12/20: complaining of a lot of pain in his hip, has a healed wound, foot presses up against lower hip causing pressure injury both to the foot and the hip, no obvious external tract but there is significant pain with movement of the hip and knee. Would recommend podiatry and ortho consults as well as imaging of the left hip and knee. should stay off antibiotics as he compleated the antibiotics for the UTI and would like to see if there are s/s or worsening infection from his hip or foot.    12/23: had a fever on 12/20, no fevers since then, NC, leukocytosis is better 13.4, Cr ok, CXR - chronic stable changes, BCs from 12/20 NGTD, UA with no nitrates, WBC TNTC, Bacteria many. The pt was seen by ortho, recommended Left Hip aspiration, the pt had IR aspiration today. Would obtain CT hip and a/p.   The pt was seen by podiatry for elongated nails.   12/24: no fever, NC, no new cbc, BCs remain with no growth to date, RVP negative, s/p hip aspiration yesterday, 1 cc of fluid obtained, smear negative, awaiting for culture and CT of left hip. The pt was seen by ortho, no further intervention planned at the moment.   12/27: afebrile, NC, no leukocytosis, Cr ok, BCs NGTD, hip aspirate culture with no growth to date, pt is being monitored off abx. Awaiting for possible SPC exchange.   CT pelvis performed 12/26: IMPRESSION:  1.  Posterior superior subluxation of the left femoral head with concave remodeling and moderate erosive changes of the acetabulum and femoral head.  2.  Small to moderate left hip joint effusion/synovial thickening also appears to be present. There are no surrounding fluid collections.  3.  Changes are concerning for septic joint. Correlation with recent aspiration is suggested.  4.  Marrow changes suggest a marrow replacing process with endosteal scalloping noted involving both femoral diaphyses. Serous atrophy, neoplastic process, or other marrow disorders may be considered. Clinical and laboratory correlation is suggested. Consider follow-up imaging such as MRI or PET CT and/or histological correlation as warranted.      Impression-  ? Chronic  arthritis of left hip vs ? chronic septic arthritis of left hip  pain in extremities contracted   MDR UTI in patient with chronic SPC   neurogenic bladder  bed-bound  colostomy present      plan-  monitored off abx   completed zerbaxa to treat the carbapenem resistant pseudomonas   frequent turns, offloading, and nutrition optimization to avoid bedsores-consider protective heel/leg protectors   advise to monitor for aspiration precautions   will need every 3-4 weeks SPC exchange   continue nystatin ointment to skin folds with the fungal rash BID for 5 days.  follow all culture data   consider workup for CT a/p findings in or outpatient, would need MRI or PET scan - to be managed by [facility] team    discussed with Dr. Cartwright  discussed with the

## 2024-12-28 PROCEDURE — 99232 SBSQ HOSP IP/OBS MODERATE 35: CPT

## 2024-12-28 RX ADMIN — Medication 20 MILLIGRAM(S): at 06:10

## 2024-12-28 RX ADMIN — QUETIAPINE FUMARATE 400 MILLIGRAM(S): 100 TABLET, FILM COATED ORAL at 21:59

## 2024-12-28 RX ADMIN — Medication 1 MILLIGRAM(S): at 11:39

## 2024-12-28 RX ADMIN — IPRATROPIUM BROMIDE AND ALBUTEROL SULFATE 3 MILLILITER(S): .5; 2.5 SOLUTION RESPIRATORY (INHALATION) at 11:08

## 2024-12-28 RX ADMIN — NYSTATIN TOPICAL POWDER 1 APPLICATION(S): 100000 POWDER TOPICAL at 06:10

## 2024-12-28 RX ADMIN — PANTOPRAZOLE 40 MILLIGRAM(S): 40 TABLET, DELAYED RELEASE ORAL at 06:11

## 2024-12-28 RX ADMIN — Medication 5 MILLIGRAM(S): at 11:47

## 2024-12-28 RX ADMIN — GABAPENTIN 300 MILLIGRAM(S): 300 CAPSULE ORAL at 06:10

## 2024-12-28 RX ADMIN — IPRATROPIUM BROMIDE AND ALBUTEROL SULFATE 3 MILLILITER(S): .5; 2.5 SOLUTION RESPIRATORY (INHALATION) at 17:12

## 2024-12-28 RX ADMIN — IPRATROPIUM BROMIDE AND ALBUTEROL SULFATE 3 MILLILITER(S): .5; 2.5 SOLUTION RESPIRATORY (INHALATION) at 05:18

## 2024-12-28 RX ADMIN — Medication 8 MILLIGRAM(S): at 20:44

## 2024-12-28 RX ADMIN — Medication 1 TABLET(S): at 11:39

## 2024-12-28 RX ADMIN — Medication 325 MILLIGRAM(S): at 11:39

## 2024-12-28 RX ADMIN — Medication 4 MILLIGRAM(S): at 17:39

## 2024-12-28 RX ADMIN — Medication 4 MILLIGRAM(S): at 23:39

## 2024-12-28 RX ADMIN — Medication 8 MILLIGRAM(S): at 19:44

## 2024-12-28 RX ADMIN — Medication 4 MILLIGRAM(S): at 18:15

## 2024-12-28 RX ADMIN — Medication 8 MILLIGRAM(S): at 11:36

## 2024-12-28 RX ADMIN — GABAPENTIN 300 MILLIGRAM(S): 300 CAPSULE ORAL at 21:59

## 2024-12-28 RX ADMIN — IPRATROPIUM BROMIDE AND ALBUTEROL SULFATE 3 MILLILITER(S): .5; 2.5 SOLUTION RESPIRATORY (INHALATION) at 23:50

## 2024-12-28 RX ADMIN — QUETIAPINE FUMARATE 100 MILLIGRAM(S): 100 TABLET, FILM COATED ORAL at 17:38

## 2024-12-28 RX ADMIN — TAMSULOSIN HYDROCHLORIDE 0.4 MILLIGRAM(S): 0.4 CAPSULE ORAL at 21:59

## 2024-12-28 RX ADMIN — METHADONE HYDROCHLORIDE 110 MILLIGRAM(S): 10 TABLET ORAL at 11:35

## 2024-12-28 RX ADMIN — GABAPENTIN 300 MILLIGRAM(S): 300 CAPSULE ORAL at 14:06

## 2024-12-28 RX ADMIN — QUETIAPINE FUMARATE 100 MILLIGRAM(S): 100 TABLET, FILM COATED ORAL at 11:47

## 2024-12-28 RX ADMIN — Medication 4 MILLIGRAM(S): at 06:15

## 2024-12-28 RX ADMIN — Medication 4 MILLIGRAM(S): at 00:14

## 2024-12-28 RX ADMIN — Medication 8 MILLIGRAM(S): at 12:20
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
## 2024-12-29 PROCEDURE — 99232 SBSQ HOSP IP/OBS MODERATE 35: CPT

## 2024-12-29 RX ADMIN — GABAPENTIN 300 MILLIGRAM(S): 300 CAPSULE ORAL at 05:54

## 2024-12-29 RX ADMIN — Medication 1 MILLIGRAM(S): at 11:38

## 2024-12-29 RX ADMIN — Medication 4 MILLIGRAM(S): at 00:39

## 2024-12-29 RX ADMIN — Medication 8 MILLIGRAM(S): at 09:15

## 2024-12-29 RX ADMIN — Medication 4 MILLIGRAM(S): at 22:02

## 2024-12-29 RX ADMIN — Medication 8 MILLIGRAM(S): at 08:40

## 2024-12-29 RX ADMIN — GABAPENTIN 300 MILLIGRAM(S): 300 CAPSULE ORAL at 14:22

## 2024-12-29 RX ADMIN — IPRATROPIUM BROMIDE AND ALBUTEROL SULFATE 3 MILLILITER(S): .5; 2.5 SOLUTION RESPIRATORY (INHALATION) at 05:09

## 2024-12-29 RX ADMIN — METHADONE HYDROCHLORIDE 110 MILLIGRAM(S): 10 TABLET ORAL at 11:37

## 2024-12-29 RX ADMIN — Medication 4 MILLIGRAM(S): at 13:15

## 2024-12-29 RX ADMIN — PANTOPRAZOLE 40 MILLIGRAM(S): 40 TABLET, DELAYED RELEASE ORAL at 11:37

## 2024-12-29 RX ADMIN — Medication 4 MILLIGRAM(S): at 12:36

## 2024-12-29 RX ADMIN — IPRATROPIUM BROMIDE AND ALBUTEROL SULFATE 3 MILLILITER(S): .5; 2.5 SOLUTION RESPIRATORY (INHALATION) at 11:20

## 2024-12-29 RX ADMIN — Medication 8 MILLIGRAM(S): at 18:11

## 2024-12-29 RX ADMIN — Medication 325 MILLIGRAM(S): at 11:38

## 2024-12-29 RX ADMIN — Medication 4 MILLIGRAM(S): at 05:54

## 2024-12-29 RX ADMIN — QUETIAPINE FUMARATE 100 MILLIGRAM(S): 100 TABLET, FILM COATED ORAL at 11:38

## 2024-12-29 RX ADMIN — QUETIAPINE FUMARATE 400 MILLIGRAM(S): 100 TABLET, FILM COATED ORAL at 22:02

## 2024-12-29 RX ADMIN — Medication 1 TABLET(S): at 11:38

## 2024-12-29 RX ADMIN — Medication 200 MILLIGRAM(S): at 05:54

## 2024-12-29 RX ADMIN — Medication 4 MILLIGRAM(S): at 06:54

## 2024-12-29 RX ADMIN — Medication 5 MILLIGRAM(S): at 11:38

## 2024-12-29 RX ADMIN — Medication 4 MILLIGRAM(S): at 23:02

## 2024-12-29 RX ADMIN — Medication 8 MILLIGRAM(S): at 01:44

## 2024-12-29 RX ADMIN — Medication 8 MILLIGRAM(S): at 02:44

## 2024-12-29 RX ADMIN — IPRATROPIUM BROMIDE AND ALBUTEROL SULFATE 3 MILLILITER(S): .5; 2.5 SOLUTION RESPIRATORY (INHALATION) at 23:19

## 2024-12-29 RX ADMIN — Medication 8 MILLIGRAM(S): at 19:00

## 2024-12-29 RX ADMIN — TAMSULOSIN HYDROCHLORIDE 0.4 MILLIGRAM(S): 0.4 CAPSULE ORAL at 22:02

## 2024-12-29 RX ADMIN — IPRATROPIUM BROMIDE AND ALBUTEROL SULFATE 3 MILLILITER(S): .5; 2.5 SOLUTION RESPIRATORY (INHALATION) at 17:32

## 2024-12-29 RX ADMIN — Medication 20 MILLIGRAM(S): at 05:56

## 2024-12-29 RX ADMIN — QUETIAPINE FUMARATE 100 MILLIGRAM(S): 100 TABLET, FILM COATED ORAL at 18:08

## 2024-12-29 RX ADMIN — GABAPENTIN 300 MILLIGRAM(S): 300 CAPSULE ORAL at 22:03
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
## 2024-12-29 NOTE — PROGRESS NOTE ADULT - SUBJECTIVE AND OBJECTIVE BOX
Patient is a 54y old  Male who presents with a chief complaint of UTI, hematuria, lower abdominal pain (28 Dec 2024 14:25)      INTERVAL HPI/OVERNIGHT EVENTS:  -nothing acute overnight     MEDICATIONS  (STANDING):  albuterol/ipratropium for Nebulization 3 milliLiter(s) Nebulizer every 6 hours  DULoxetine 30 milliGRAM(s) Oral daily  enoxaparin Injectable 40 milliGRAM(s) SubCutaneous every 24 hours  ferrous    sulfate 325 milliGRAM(s) Oral daily  finasteride 5 milliGRAM(s) Oral daily  folic acid 1 milliGRAM(s) Oral daily  furosemide    Tablet 20 milliGRAM(s) Oral daily  gabapentin 300 milliGRAM(s) Oral every 8 hours  guaiFENesin Oral Liquid (Sugar-Free) 200 milliGRAM(s) Oral every 6 hours  influenza   Vaccine 0.5 milliLiter(s) IntraMuscular once  methadone    Tablet 110 milliGRAM(s) Oral daily  multivitamin 1 Tablet(s) Oral daily  nystatin Powder 1 Application(s) Topical two times a day  pantoprazole    Tablet 40 milliGRAM(s) Oral before breakfast  polyethylene glycol 3350 17 Gram(s) Oral daily  povidone iodine 10% Solution 1 Application(s) Topical daily  QUEtiapine 100 milliGRAM(s) Oral <User Schedule>  QUEtiapine 400 milliGRAM(s) Oral at bedtime  senna 2 Tablet(s) Oral at bedtime  tamsulosin 0.4 milliGRAM(s) Oral at bedtime    MEDICATIONS  (PRN):  acetaminophen     Tablet .. 650 milliGRAM(s) Oral every 6 hours PRN Temp greater or equal to 38C (100.4F)  benzocaine/menthol Lozenge 1 Lozenge Oral every 4 hours PRN Sore Throat  HYDROmorphone   Tablet 8 milliGRAM(s) Oral every 6 hours PRN Severe Pain (7 - 10)  HYDROmorphone   Tablet 4 milliGRAM(s) Oral every 6 hours PRN Moderate Pain (4 - 6)  ondansetron Injectable 4 milliGRAM(s) IV Push every 6 hours PRN Nausea and/or Vomiting  simethicone 80 milliGRAM(s) Chew every 6 hours PRN Heartburn      Allergies    NSAIDs (Flushing; Other (Moderate))  Aleve (Unknown)  Risperdal (Short breath; Rash; Hives)  Stelazine (Unknown)  Haldol (Anaphylaxis)  Motrin (Anaphylaxis)  Thorazine (Other (Moderate))  Zyprexa (Rash; Dystonic RXN; Hives)    Intolerances        REVIEW OF SYSTEMS:  CONSTITUTIONAL: No fever, weight loss, or fatigue  EYES: No eye pain, visual disturbances, or discharge  ENMT:  No difficulty hearing, tinnitus, vertigo; No sinus or throat pain  NECK: No pain or stiffness  BREASTS: No pain, masses, or nipple discharge  RESPIRATORY: No cough, wheezing, chills or hemoptysis; No shortness of breath  CARDIOVASCULAR: No chest pain, palpitations, dizziness, or leg swelling  GASTROINTESTINAL: No abdominal or epigastric pain. No nausea, vomiting, or hematemesis; No diarrhea or constipation. No melena or hematochezia.  GENITOURINARY: No dysuria, frequency, hematuria, or incontinence  NEUROLOGICAL: No headaches, memory loss, loss of strength, numbness, or tremors  SKIN: No itching, burning, rashes, or lesions   LYMPH NODES: No enlarged glands  ENDOCRINE: No heat or cold intolerance; No hair loss  MUSCULOSKELETAL: No joint pain or swelling; No muscle, back, or extremity pain  PSYCHIATRIC: No depression, anxiety, mood swings, or difficulty sleeping  HEME/LYMPH: No easy bruising, or bleeding gums  ALLERGY AND IMMUNOLOGIC: No hives or eczema    Vital Signs Last 24 Hrs  T(C): 36.4 (29 Dec 2024 05:43), Max: 36.9 (28 Dec 2024 11:29)  T(F): 97.5 (29 Dec 2024 05:43), Max: 98.4 (28 Dec 2024 11:29)  HR: 90 (29 Dec 2024 05:43) (72 - 106)  BP: 117/72 (29 Dec 2024 05:43) (116/76 - 122/81)  BP(mean): --  RR: 18 (29 Dec 2024 05:43) (17 - 18)  SpO2: 97% (29 Dec 2024 05:43) (93% - 98%)    Parameters below as of 29 Dec 2024 05:33  Patient On (Oxygen Delivery Method): BiPAP/CPAP        PHYSICAL EXAM:  GENERAL: NAD, well-groomed, well-developed  HEAD:  Atraumatic, Normocephalic  EYES: EOMI, sclera non-icteric  ENMT:  Moist mucous membranes,  NERVOUS SYSTEM:  Alert & Oriented X3, Good concentration  CHEST/LUNG: Clear to percussion bilaterally; No rales, rhonchi, wheezing, or rubs  HEART: Regular rate and rhythm; No murmurs, rubs, or gallops  ABDOMEN: Soft, Nontender, Nondistended; Bowel sounds present +ostomy present  EXTREMITIES: +BLE contractures    LABS:              CAPILLARY BLOOD GLUCOSE          RADIOLOGY & ADDITIONAL TESTS:    Imaging Personally Reviewed:  [ X] YES  [ ] NO    Consultant(s) Notes Reviewed:  [ X] YES  [ ] NO    Care Discussed with Consultants/Other Providers [X ] YES  [ ] NO Patient is a 54y old  Male who presents with a chief complaint of UTI, hematuria, lower abdominal pain (28 Dec 2024 14:25)      INTERVAL HPI/OVERNIGHT EVENTS:  -nothing acute overnight   -seen and examined at bedside     MEDICATIONS  (STANDING):  albuterol/ipratropium for Nebulization 3 milliLiter(s) Nebulizer every 6 hours  DULoxetine 30 milliGRAM(s) Oral daily  enoxaparin Injectable 40 milliGRAM(s) SubCutaneous every 24 hours  ferrous    sulfate 325 milliGRAM(s) Oral daily  finasteride 5 milliGRAM(s) Oral daily  folic acid 1 milliGRAM(s) Oral daily  furosemide    Tablet 20 milliGRAM(s) Oral daily  gabapentin 300 milliGRAM(s) Oral every 8 hours  guaiFENesin Oral Liquid (Sugar-Free) 200 milliGRAM(s) Oral every 6 hours  influenza   Vaccine 0.5 milliLiter(s) IntraMuscular once  methadone    Tablet 110 milliGRAM(s) Oral daily  multivitamin 1 Tablet(s) Oral daily  nystatin Powder 1 Application(s) Topical two times a day  pantoprazole    Tablet 40 milliGRAM(s) Oral before breakfast  polyethylene glycol 3350 17 Gram(s) Oral daily  povidone iodine 10% Solution 1 Application(s) Topical daily  QUEtiapine 100 milliGRAM(s) Oral <User Schedule>  QUEtiapine 400 milliGRAM(s) Oral at bedtime  senna 2 Tablet(s) Oral at bedtime  tamsulosin 0.4 milliGRAM(s) Oral at bedtime    MEDICATIONS  (PRN):  acetaminophen     Tablet .. 650 milliGRAM(s) Oral every 6 hours PRN Temp greater or equal to 38C (100.4F)  benzocaine/menthol Lozenge 1 Lozenge Oral every 4 hours PRN Sore Throat  HYDROmorphone   Tablet 8 milliGRAM(s) Oral every 6 hours PRN Severe Pain (7 - 10)  HYDROmorphone   Tablet 4 milliGRAM(s) Oral every 6 hours PRN Moderate Pain (4 - 6)  ondansetron Injectable 4 milliGRAM(s) IV Push every 6 hours PRN Nausea and/or Vomiting  simethicone 80 milliGRAM(s) Chew every 6 hours PRN Heartburn      Allergies    NSAIDs (Flushing; Other (Moderate))  Aleve (Unknown)  Risperdal (Short breath; Rash; Hives)  Stelazine (Unknown)  Haldol (Anaphylaxis)  Motrin (Anaphylaxis)  Thorazine (Other (Moderate))  Zyprexa (Rash; Dystonic RXN; Hives)    Intolerances        REVIEW OF SYSTEMS:  +hip and knee pain improving   no CP SOB abdominal pain     Vital Signs Last 24 Hrs  T(C): 36.4 (29 Dec 2024 05:43), Max: 36.9 (28 Dec 2024 11:29)  T(F): 97.5 (29 Dec 2024 05:43), Max: 98.4 (28 Dec 2024 11:29)  HR: 90 (29 Dec 2024 05:43) (72 - 106)  BP: 117/72 (29 Dec 2024 05:43) (116/76 - 122/81)  BP(mean): --  RR: 18 (29 Dec 2024 05:43) (17 - 18)  SpO2: 97% (29 Dec 2024 05:43) (93% - 98%)    Parameters below as of 29 Dec 2024 05:33  Patient On (Oxygen Delivery Method): BiPAP/CPAP        PHYSICAL EXAM:  GENERAL: NAD, well-groomed, well-developed  HEAD:  Atraumatic, Normocephalic  EYES: EOMI, sclera non-icteric  ENMT:  Moist mucous membranes,  NERVOUS SYSTEM:  Alert & Oriented X3, Good concentration  CHEST/LUNG: Clear to percussion bilaterally; No rales, rhonchi, wheezing, or rubs  HEART: Regular rate and rhythm; No murmurs, rubs, or gallops  ABDOMEN: Soft, Nontender, Nondistended; Bowel sounds present +ostomy present  EXTREMITIES: +BLE contractures    LABS:              CAPILLARY BLOOD GLUCOSE          RADIOLOGY & ADDITIONAL TESTS:    Imaging Personally Reviewed:  [ X] YES  [ ] NO    Consultant(s) Notes Reviewed:  [ X] YES  [ ] NO    Care Discussed with Consultants/Other Providers [X ] YES  [ ] NO

## 2024-12-30 PROCEDURE — 99232 SBSQ HOSP IP/OBS MODERATE 35: CPT

## 2024-12-30 PROCEDURE — 51705 CHANGE OF BLADDER TUBE: CPT

## 2024-12-30 PROCEDURE — 75984 XRAY CONTROL CATHETER CHANGE: CPT | Mod: 26

## 2024-12-30 RX ORDER — HYDROMORPHONE HCL 4 MG
1 TABLET ORAL ONCE
Refills: 0 | Status: DISCONTINUED | OUTPATIENT
Start: 2024-12-30 | End: 2024-12-30

## 2024-12-30 RX ADMIN — Medication 4 MILLIGRAM(S): at 12:46

## 2024-12-30 RX ADMIN — GABAPENTIN 300 MILLIGRAM(S): 300 CAPSULE ORAL at 22:32

## 2024-12-30 RX ADMIN — GABAPENTIN 300 MILLIGRAM(S): 300 CAPSULE ORAL at 06:06

## 2024-12-30 RX ADMIN — IPRATROPIUM BROMIDE AND ALBUTEROL SULFATE 3 MILLILITER(S): .5; 2.5 SOLUTION RESPIRATORY (INHALATION) at 05:47

## 2024-12-30 RX ADMIN — Medication 1 MILLIGRAM(S): at 17:21

## 2024-12-30 RX ADMIN — Medication 8 MILLIGRAM(S): at 06:05

## 2024-12-30 RX ADMIN — IPRATROPIUM BROMIDE AND ALBUTEROL SULFATE 3 MILLILITER(S): .5; 2.5 SOLUTION RESPIRATORY (INHALATION) at 17:32

## 2024-12-30 RX ADMIN — ENOXAPARIN SODIUM 40 MILLIGRAM(S): 60 INJECTION INTRAVENOUS; SUBCUTANEOUS at 11:29

## 2024-12-30 RX ADMIN — Medication 1 TABLET(S): at 11:28

## 2024-12-30 RX ADMIN — Medication 4 MILLIGRAM(S): at 11:28

## 2024-12-30 RX ADMIN — Medication 5 MILLIGRAM(S): at 11:28

## 2024-12-30 RX ADMIN — Medication 1 MILLIGRAM(S): at 11:28

## 2024-12-30 RX ADMIN — QUETIAPINE FUMARATE 100 MILLIGRAM(S): 100 TABLET, FILM COATED ORAL at 09:31

## 2024-12-30 RX ADMIN — Medication 8 MILLIGRAM(S): at 07:05

## 2024-12-30 RX ADMIN — TAMSULOSIN HYDROCHLORIDE 0.4 MILLIGRAM(S): 0.4 CAPSULE ORAL at 22:33

## 2024-12-30 RX ADMIN — Medication 1 MILLIGRAM(S): at 18:55

## 2024-12-30 RX ADMIN — Medication 8 MILLIGRAM(S): at 20:36

## 2024-12-30 RX ADMIN — Medication 20 MILLIGRAM(S): at 06:05

## 2024-12-30 RX ADMIN — PANTOPRAZOLE 40 MILLIGRAM(S): 40 TABLET, DELAYED RELEASE ORAL at 06:05

## 2024-12-30 RX ADMIN — QUETIAPINE FUMARATE 100 MILLIGRAM(S): 100 TABLET, FILM COATED ORAL at 17:21

## 2024-12-30 RX ADMIN — IPRATROPIUM BROMIDE AND ALBUTEROL SULFATE 3 MILLILITER(S): .5; 2.5 SOLUTION RESPIRATORY (INHALATION) at 23:19

## 2024-12-30 RX ADMIN — IPRATROPIUM BROMIDE AND ALBUTEROL SULFATE 3 MILLILITER(S): .5; 2.5 SOLUTION RESPIRATORY (INHALATION) at 11:29

## 2024-12-30 RX ADMIN — QUETIAPINE FUMARATE 400 MILLIGRAM(S): 100 TABLET, FILM COATED ORAL at 22:33

## 2024-12-30 RX ADMIN — GABAPENTIN 300 MILLIGRAM(S): 300 CAPSULE ORAL at 13:25

## 2024-12-30 RX ADMIN — Medication 325 MILLIGRAM(S): at 11:28

## 2024-12-30 RX ADMIN — METHADONE HYDROCHLORIDE 110 MILLIGRAM(S): 10 TABLET ORAL at 11:28

## 2024-12-30 NOTE — PHYSICAL THERAPY INITIAL EVALUATION ADULT - ADDITIONAL COMMENTS
Pt presents from a nursing home, completely dependent with all ADL's and mobility.

## 2024-12-30 NOTE — PHYSICAL THERAPY INITIAL EVALUATION ADULT - GENERAL OBSERVATIONS, REHAB EVAL
Pt found semi supine in bed in NAD, +hep lock, b/l LE flexion contractures, +ostomy, agreeable to PT FROYLAN Davis and RN Laury aware.
Pt was seen in semi-supine c alert and Ox4. Pt was medicated by RN,sharla  prior to PT session. Pt presents c general weakness and flexion/adduction contracture of [place] LE. pt is totally dependent in all functional mobility. P.T. wound care  evaluation performed. L groin c moisture associated dermatitis and no open wound was noted.  Skin was cleaned and moisture barier cream and interdry were applied. It was documented in flowsheet A&I  and c recommendation in flowsheet plan of care.
Pt encountered semi-fowlers in bed w/ NAD, AxOx4,  b/l LE contracted, +dressings to numerous locations, +colostomy, unkempt; reports 0/10 pain.
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
## 2024-12-30 NOTE — PHYSICAL THERAPY INITIAL EVALUATION ADULT - NSPTDISCHREC_GEN_A_CORE
wound care recommendation in place./No skilled PT needs
WC RN/No skilled PT needs
Wound Care RN/No skilled PT needs

## 2024-12-30 NOTE — PHYSICAL THERAPY INITIAL EVALUATION ADULT - PERTINENT HX OF CURRENT PROBLEM, REHAB EVAL
54 years old male with h/o cervical epidural abscess, b/l LE paralysis, pneumoperitoneum s/p ex-lap w/ ileostomy complicated by MRSA bacteremia and evisceration, Hep C, emphysema on chronic O2, L foot osteomyelitis, bipolar, opioid dependence, HTN, neurogenic bladder s/p IR SPC placement on 10/23/24 present to ED with complain of worsening lower abdominal pain for 5 days and hematuria. SPC was placed \on 10/23/24 by IR, reportedly was pulled slightly since 10/24. Patient reported pain since then but worsened over last 5 days associated with nausea.
54 years old male with h/o cervical epidural abscess, b/l LE paralysis, pneumoperitoneum s/p ex-lap w/ ileostomy complicated by MRSA bacteremia and evisceration, Hep C, emphysema on chronic O2, L foot osteomyelitis, bipolar, opioid dependence, HTN, neurogenic bladder s/p IR SPC placement on 10/23/24 present to ED with complain of worsening lower abdominal pain for 5 days and hematuria. SPC was placed \on 10/23/24 by IR, reportedly was pulled slightly since 10/24. Patient reported pain since then but worsened over last 5 days associated with nausea.
54 years old male with h/o cervical epidural abscess, b/l LE paralysis, pneumoperitoneum s/p ex-lap w/ ileostomy complicated by MRSA bacteremia and evisceration, Hep C, emphysema on chronic O2, L foot osteomyelitis, bipolar, opioid dependence, HTN, neurogenic bladder s/p IR SPC placement on 10/23/24 present to ED with complain of worsening lower abdominal pain for 5 days and hematuria. SPC was placed \on 10/23/24 by IR, reportedly was pulled slightly since 10/24. Patient reported pain since then but worsened over last 5 days associated with nausea. Denied any fever or chills.

## 2024-12-30 NOTE — PROCEDURE NOTE - PROCEDURE FINDINGS AND DETAILS
Successful replacement of 16F MPDC suprapubic catheter using fluoroscopy guidance. Positioning confirmed with contrast injection. Drain attached to gravity bag. No complications. Full report to follow.
Contrast injection demonstrated suprapubic catheter in bladder. Successful exchange and upsize to 16F MPDC suprapubic catheter over wire, using fluoroscopy guidance. Positioning confirmed with contrast injection. Sutured in place and reattached to gravity bag. No complications. Full report to follow.
Left hip identified with ultrasound. Aspiration performed with return of minimal fluid (<1cc)  Sample sent for culture. Not sufficient quantity to send for cell count.

## 2024-12-30 NOTE — PHYSICAL THERAPY INITIAL EVALUATION ADULT - MODALITIES TREATMENT COMMENTS
L Foot (dorsum) Stage II 3.5x2.25x0.2, L Prox Guevara Stage II 3.5x1.2x0.2
R heel stage II pressure injury 2cm x 2cm x .1 cm, L dorsum of foot stage II pressure injury 3.5cm x 4.5cm x .1 cm
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
## 2024-12-30 NOTE — CONSULT NOTE ADULT - CONSULT REQUESTED DATE/TIME
20-Dec-2024 16:10
10-Dec-2024 05:49
20-Dec-2024 14:55
23-Dec-2024 12:06
13-Dec-2024 14:57
30-Dec-2024 14:32
20-Dec-2024 13:36

## 2024-12-30 NOTE — PROGRESS NOTE ADULT - SUBJECTIVE AND OBJECTIVE BOX
Patient is a 54y old  Male who presents with a chief complaint of UTI, hematuria, lower abdominal pain (30 Dec 2024 14:31)      INTERVAL HPI/OVERNIGHT EVENTS:  -afebrile vitals stable overnight   -SPC dislodged this AM replaced by IR today   -seen after SPC replaced, having some pain will give 1 dose    MEDICATIONS  (STANDING):  albuterol/ipratropium for Nebulization 3 milliLiter(s) Nebulizer every 6 hours  DULoxetine 30 milliGRAM(s) Oral daily  enoxaparin Injectable 40 milliGRAM(s) SubCutaneous every 24 hours  ferrous    sulfate 325 milliGRAM(s) Oral daily  finasteride 5 milliGRAM(s) Oral daily  folic acid 1 milliGRAM(s) Oral daily  furosemide    Tablet 20 milliGRAM(s) Oral daily  gabapentin 300 milliGRAM(s) Oral every 8 hours  guaiFENesin Oral Liquid (Sugar-Free) 200 milliGRAM(s) Oral every 6 hours  influenza   Vaccine 0.5 milliLiter(s) IntraMuscular once  methadone    Tablet 110 milliGRAM(s) Oral daily  multivitamin 1 Tablet(s) Oral daily  nystatin Powder 1 Application(s) Topical two times a day  pantoprazole    Tablet 40 milliGRAM(s) Oral before breakfast  polyethylene glycol 3350 17 Gram(s) Oral daily  povidone iodine 10% Solution 1 Application(s) Topical daily  QUEtiapine 100 milliGRAM(s) Oral <User Schedule>  QUEtiapine 400 milliGRAM(s) Oral at bedtime  senna 2 Tablet(s) Oral at bedtime  tamsulosin 0.4 milliGRAM(s) Oral at bedtime    MEDICATIONS  (PRN):  acetaminophen     Tablet .. 650 milliGRAM(s) Oral every 6 hours PRN Temp greater or equal to 38C (100.4F)  benzocaine/menthol Lozenge 1 Lozenge Oral every 4 hours PRN Sore Throat  HYDROmorphone   Tablet 8 milliGRAM(s) Oral every 6 hours PRN Severe Pain (7 - 10)  HYDROmorphone   Tablet 4 milliGRAM(s) Oral every 6 hours PRN Moderate Pain (4 - 6)  ondansetron Injectable 4 milliGRAM(s) IV Push every 6 hours PRN Nausea and/or Vomiting  simethicone 80 milliGRAM(s) Chew every 6 hours PRN Heartburn      Allergies    NSAIDs (Flushing; Other (Moderate))  Aleve (Unknown)  Risperdal (Short breath; Rash; Hives)  Stelazine (Unknown)  Haldol (Anaphylaxis)  Motrin (Anaphylaxis)  Thorazine (Other (Moderate))  Zyprexa (Rash; Dystonic RXN; Hives)    Intolerances        REVIEW OF SYSTEMS:  CONSTITUTIONAL: No fever, weight loss, or fatigue  EYES: No eye pain, visual disturbances, or discharge  ENMT:  No difficulty hearing, tinnitus, vertigo; No sinus or throat pain  NECK: No pain or stiffness  BREASTS: No pain, masses, or nipple discharge  RESPIRATORY: No cough, wheezing, chills or hemoptysis; No shortness of breath  CARDIOVASCULAR: No chest pain, palpitations, dizziness, or leg swelling  GASTROINTESTINAL: No abdominal or epigastric pain. No nausea, vomiting, or hematemesis; No diarrhea or constipation. No melena or hematochezia.  GENITOURINARY: No dysuria, frequency, hematuria, or incontinence  NEUROLOGICAL: No headaches, memory loss, loss of strength, numbness, or tremors  SKIN: No itching, burning, rashes, or lesions   LYMPH NODES: No enlarged glands  ENDOCRINE: No heat or cold intolerance; No hair loss  MUSCULOSKELETAL: No joint pain or swelling; No muscle, back, or extremity pain  PSYCHIATRIC: No depression, anxiety, mood swings, or difficulty sleeping  HEME/LYMPH: No easy bruising, or bleeding gums  ALLERGY AND IMMUNOLOGIC: No hives or eczema    Vital Signs Last 24 Hrs  T(C): 37.6 (30 Dec 2024 11:34), Max: 37.6 (30 Dec 2024 11:34)  T(F): 99.6 (30 Dec 2024 11:34), Max: 99.6 (30 Dec 2024 11:34)  HR: 79 (30 Dec 2024 15:10) (59 - 98)  BP: 124/76 (30 Dec 2024 15:10) (119/77 - 146/83)  BP(mean): --  RR: 18 (30 Dec 2024 15:10) (17 - 18)  SpO2: 95% (30 Dec 2024 15:10) (93% - 98%)    Parameters below as of 30 Dec 2024 15:10  Patient On (Oxygen Delivery Method): nasal cannula  O2 Flow (L/min): 5      PHYSICAL EXAM:  GENERAL: NAD, well-groomed, well-developed  HEAD:  Atraumatic, Normocephalic  EYES: EOMI, PERRLA, conjunctiva and sclera clear  ENMT: No tonsillar erythema, exudates, or enlargement; Moist mucous membranes, Good dentition, No lesions  NECK: Supple, No JVD, Normal thyroid  NERVOUS SYSTEM:  Alert & Oriented X3, Good concentration; Motor Strength 5/5 B/L upper and lower extremities; DTRs 2+ intact and symmetric  CHEST/LUNG: Clear to percussion bilaterally; No rales, rhonchi, wheezing, or rubs  HEART: Regular rate and rhythm; No murmurs, rubs, or gallops  ABDOMEN: Soft, Nontender, Nondistended; Bowel sounds present  EXTREMITIES:  2+ Peripheral Pulses, No clubbing, cyanosis, or edema  LYMPH: No lymphadenopathy noted  SKIN: No rashes or lesions    LABS:              CAPILLARY BLOOD GLUCOSE          RADIOLOGY & ADDITIONAL TESTS:    Imaging Personally Reviewed:  [ X] YES  [ ] NO    Consultant(s) Notes Reviewed:  [ X] YES  [ ] NO    Care Discussed with Consultants/Other Providers [X ] YES  [ ] NO Patient is a 54y old  Male who presents with a chief complaint of UTI, hematuria, lower abdominal pain (30 Dec 2024 14:31)      INTERVAL HPI/OVERNIGHT EVENTS:  -afebrile vitals stable overnight   -SPC dislodged this AM replaced by IR today   -seen after SPC replaced, having some pain will give 1 dose of IV dilaudid for post procedural pain    MEDICATIONS  (STANDING):  albuterol/ipratropium for Nebulization 3 milliLiter(s) Nebulizer every 6 hours  DULoxetine 30 milliGRAM(s) Oral daily  enoxaparin Injectable 40 milliGRAM(s) SubCutaneous every 24 hours  ferrous    sulfate 325 milliGRAM(s) Oral daily  finasteride 5 milliGRAM(s) Oral daily  folic acid 1 milliGRAM(s) Oral daily  furosemide    Tablet 20 milliGRAM(s) Oral daily  gabapentin 300 milliGRAM(s) Oral every 8 hours  guaiFENesin Oral Liquid (Sugar-Free) 200 milliGRAM(s) Oral every 6 hours  influenza   Vaccine 0.5 milliLiter(s) IntraMuscular once  methadone    Tablet 110 milliGRAM(s) Oral daily  multivitamin 1 Tablet(s) Oral daily  nystatin Powder 1 Application(s) Topical two times a day  pantoprazole    Tablet 40 milliGRAM(s) Oral before breakfast  polyethylene glycol 3350 17 Gram(s) Oral daily  povidone iodine 10% Solution 1 Application(s) Topical daily  QUEtiapine 100 milliGRAM(s) Oral <User Schedule>  QUEtiapine 400 milliGRAM(s) Oral at bedtime  senna 2 Tablet(s) Oral at bedtime  tamsulosin 0.4 milliGRAM(s) Oral at bedtime    MEDICATIONS  (PRN):  acetaminophen     Tablet .. 650 milliGRAM(s) Oral every 6 hours PRN Temp greater or equal to 38C (100.4F)  benzocaine/menthol Lozenge 1 Lozenge Oral every 4 hours PRN Sore Throat  HYDROmorphone   Tablet 8 milliGRAM(s) Oral every 6 hours PRN Severe Pain (7 - 10)  HYDROmorphone   Tablet 4 milliGRAM(s) Oral every 6 hours PRN Moderate Pain (4 - 6)  ondansetron Injectable 4 milliGRAM(s) IV Push every 6 hours PRN Nausea and/or Vomiting  simethicone 80 milliGRAM(s) Chew every 6 hours PRN Heartburn      Allergies    NSAIDs (Flushing; Other (Moderate))  Aleve (Unknown)  Risperdal (Short breath; Rash; Hives)  Stelazine (Unknown)  Haldol (Anaphylaxis)  Motrin (Anaphylaxis)  Thorazine (Other (Moderate))  Zyprexa (Rash; Dystonic RXN; Hives)    Intolerances        REVIEW OF SYSTEMS:  +pain at site of SPC   no CP SOB    Vital Signs Last 24 Hrs  T(C): 37.6 (30 Dec 2024 11:34), Max: 37.6 (30 Dec 2024 11:34)  T(F): 99.6 (30 Dec 2024 11:34), Max: 99.6 (30 Dec 2024 11:34)  HR: 79 (30 Dec 2024 15:10) (59 - 98)  BP: 124/76 (30 Dec 2024 15:10) (119/77 - 146/83)  BP(mean): --  RR: 18 (30 Dec 2024 15:10) (17 - 18)  SpO2: 95% (30 Dec 2024 15:10) (93% - 98%)    Parameters below as of 30 Dec 2024 15:10  Patient On (Oxygen Delivery Method): nasal cannula  O2 Flow (L/min): 5      PHYSICAL EXAM:  GENERAL: NAD, well-groomed, well-developed  HEAD:  Atraumatic, Normocephalic  EYES: EOMI, sclera non-icteric  ENMT:  Moist mucous membranes,  NERVOUS SYSTEM:  Alert & Oriented X3, Good concentration  CHEST/LUNG: Clear to percussion bilaterally; No rales, rhonchi, wheezing, or rubs  HEART: Regular rate and rhythm; No murmurs, rubs, or gallops  ABDOMEN: Soft, Nontender, Nondistended; Bowel sounds present +ostomy present  EXTREMITIES: +BLE contractures    LABS:              CAPILLARY BLOOD GLUCOSE          RADIOLOGY & ADDITIONAL TESTS:    Imaging Personally Reviewed:  [ X] YES  [ ] NO    Consultant(s) Notes Reviewed:  [ X] YES  [ ] NO    Care Discussed with Consultants/Other Providers [X ] YES  [ ] NO

## 2024-12-30 NOTE — PHYSICAL THERAPY INITIAL EVALUATION ADULT - PRECAUTIONS/LIMITATIONS, REHAB EVAL
impaired skin integrity/fall precautions
contact/isolation precautions
pressure ulcer precaution. Contact precaution./fall precautions

## 2024-12-30 NOTE — CONSULT NOTE ADULT - CONSULT REASON
Chronic L hip dislocation, chronic septic arthritis
suprapubic catheter replacement.
left hip aspiration
R/o SPC catheter displacement
carbapenem resistant UTI
left hip wound
Possible infection dorsum of left foot

## 2024-12-30 NOTE — CONSULT NOTE ADULT - SUBJECTIVE AND OBJECTIVE BOX
Interventional Radiology    Evaluate for Procedure: suprapubic catheter replacement.     HPI: 54y Male with h/o cervical epidural abscess, b/l LE paralysis, pneumoperitoneum s/p ex-lap w/ ileostomy complicated by MRSA bacteremia and evisceration, Hep C, emphysema on chronic O2, L foot osteomyelitis, bipolar, opioid dependence, HTN, neurogenic bladder s/p IR SPC placement on 10/23/24 present to ED with complain of worsening lower abdominal pain for 5 days and hematuria. s/p SPC exchange on 12/11. Suprapubic catheter noted to have fallen out on AM rounds today, IR consulted for replacement.     Allergies: NSAIDs (Flushing; Other (Moderate))  Aleve (Unknown)  Risperdal (Short breath; Rash; Hives)  Stelazine (Unknown)  Haldol (Anaphylaxis)  Motrin (Anaphylaxis)  Thorazine (Other (Moderate))  Zyprexa (Rash; Dystonic RXN; Hives)    Medications (Abx/Cardiac/Anticoagulation/Blood Products)    enoxaparin Injectable: 40 milliGRAM(s) SubCutaneous (12-30 @ 11:29)  furosemide    Tablet: 20 milliGRAM(s) Oral (12-30 @ 06:05)    Data:    T(C): 37.6  HR: 91  BP: 129/85  RR: 17  SpO2: 93%    -WBC 8.33 / HgB 11.5 / Hct 37.4 / Plt 188  -Na 140 / Cl 105 / BUN 9 / Glucose 85  -K 4.1 / CO2 31 / Cr 0.55  -ALT -- / Alk Phos -- / T.Bili --  -INR 1.17 / PTT 25.9    Radiology: Reviewed     Assessment/Plan:   -54y Male with h/o cervical epidural abscess, b/l LE paralysis, pneumoperitoneum s/p ex-lap w/ ileostomy complicated by MRSA bacteremia and evisceration, Hep C, emphysema on chronic O2, L foot osteomyelitis, bipolar, opioid dependence, HTN, neurogenic bladder s/p IR SPC placement on 10/23/24 present to ED with complain of worsening lower abdominal pain for 5 days and hematuria. s/p SPC exchange on 12/11. Suprapubic catheter noted to have fallen out on AM rounds today, IR consulted for replacement.         - case reviewed with Dr. Escobar   - Will attempt suprapubic catheter reinsertion today 12/30  - please place IR procedure order under Escobar  - Labs reviewed   - not NPO   - d/w primary team, urology.       Interventional Radiology  ------------------------------------  For emergent consults, please call [phone], or call or message on TEAMs.   For non-emergent consults, consults after hours or over the weekend, please place IR Consult order.

## 2024-12-30 NOTE — PHYSICAL THERAPY INITIAL EVALUATION ADULT - DIAGNOSIS, PT EVAL
impaired skin integrity
R 53.81 Chronic debility ;  impaired integumentary integrity
impaired skin integrity

## 2024-12-30 NOTE — PROCEDURE NOTE - GENERAL PROCEDURE NAME
Suprapubic catheter insertion
Suprapubic catheter replacement
Ultrasound guided left hip aspiration.

## 2024-12-31 ENCOUNTER — TRANSCRIPTION ENCOUNTER (OUTPATIENT)
Age: 54
End: 2024-12-31

## 2024-12-31 PROCEDURE — 99239 HOSP IP/OBS DSCHRG MGMT >30: CPT

## 2024-12-31 RX ORDER — ACETAMINOPHEN 80 MG/.8ML
2 SOLUTION/ DROPS ORAL
Qty: 0 | Refills: 0 | DISCHARGE
Start: 2024-12-31

## 2024-12-31 RX ORDER — TAMSULOSIN HYDROCHLORIDE 0.4 MG/1
1 CAPSULE ORAL
Qty: 0 | Refills: 0 | DISCHARGE
Start: 2024-12-31

## 2024-12-31 RX ORDER — FINASTERIDE 5 MG
1 TABLET ORAL
Qty: 0 | Refills: 0 | DISCHARGE
Start: 2024-12-31

## 2024-12-31 RX ADMIN — Medication 8 MILLIGRAM(S): at 11:56

## 2024-12-31 RX ADMIN — Medication 325 MILLIGRAM(S): at 11:23

## 2024-12-31 RX ADMIN — QUETIAPINE FUMARATE 100 MILLIGRAM(S): 100 TABLET, FILM COATED ORAL at 09:48

## 2024-12-31 RX ADMIN — Medication 1 MILLIGRAM(S): at 11:23

## 2024-12-31 RX ADMIN — GABAPENTIN 300 MILLIGRAM(S): 300 CAPSULE ORAL at 05:47

## 2024-12-31 RX ADMIN — ENOXAPARIN SODIUM 40 MILLIGRAM(S): 60 INJECTION INTRAVENOUS; SUBCUTANEOUS at 11:23

## 2024-12-31 RX ADMIN — IPRATROPIUM BROMIDE AND ALBUTEROL SULFATE 3 MILLILITER(S): .5; 2.5 SOLUTION RESPIRATORY (INHALATION) at 05:30

## 2024-12-31 RX ADMIN — Medication 4 MILLIGRAM(S): at 17:56

## 2024-12-31 RX ADMIN — Medication 8 MILLIGRAM(S): at 12:09

## 2024-12-31 RX ADMIN — METHADONE HYDROCHLORIDE 110 MILLIGRAM(S): 10 TABLET ORAL at 11:42

## 2024-12-31 RX ADMIN — IPRATROPIUM BROMIDE AND ALBUTEROL SULFATE 3 MILLILITER(S): .5; 2.5 SOLUTION RESPIRATORY (INHALATION) at 11:22

## 2024-12-31 RX ADMIN — Medication 1 TABLET(S): at 11:22

## 2024-12-31 RX ADMIN — Medication 5 MILLIGRAM(S): at 11:23

## 2024-12-31 RX ADMIN — Medication 8 MILLIGRAM(S): at 05:46

## 2024-12-31 RX ADMIN — Medication 4 MILLIGRAM(S): at 17:05

## 2024-12-31 RX ADMIN — Medication 20 MILLIGRAM(S): at 05:47

## 2024-12-31 RX ADMIN — GABAPENTIN 300 MILLIGRAM(S): 300 CAPSULE ORAL at 13:08

## 2024-12-31 RX ADMIN — PANTOPRAZOLE 40 MILLIGRAM(S): 40 TABLET, DELAYED RELEASE ORAL at 05:47

## 2024-12-31 RX ADMIN — Medication 200 MILLIGRAM(S): at 05:46

## 2024-12-31 RX ADMIN — IPRATROPIUM BROMIDE AND ALBUTEROL SULFATE 3 MILLILITER(S): .5; 2.5 SOLUTION RESPIRATORY (INHALATION) at 17:41

## 2024-12-31 RX ADMIN — NYSTATIN TOPICAL POWDER 1 APPLICATION(S): 100000 POWDER TOPICAL at 05:47

## 2024-12-31 RX ADMIN — QUETIAPINE FUMARATE 100 MILLIGRAM(S): 100 TABLET, FILM COATED ORAL at 17:05

## 2024-12-31 NOTE — DISCHARGE NOTE NURSING/CASE MANAGEMENT/SOCIAL WORK - NSDCPEFALRISK_GEN_ALL_CORE
For information on Fall & Injury Prevention, visit: [email or web address] OR  [email or web address] OR  https://www.cdc.gov/steadi/patient.html

## 2024-12-31 NOTE — DISCHARGE NOTE NURSING/CASE MANAGEMENT/SOCIAL WORK - FINANCIAL ASSISTANCE
Health system provides services at a reduced cost to those who are determined to be eligible through Health system’s financial assistance program. Information regarding Health system’s financial assistance program can be found by going to [email or web address] or by calling [phone].

## 2024-12-31 NOTE — PROGRESS NOTE ADULT - SUBJECTIVE AND OBJECTIVE BOX
Patient is a 54y old  Male who presents with a chief complaint of UTI, hematuria, lower abdominal pain (30 Dec 2024 16:42)      INTERVAL HPI/OVERNIGHT EVENTS:  -nothing acute overnight   -seen and examined at bedside       MEDICATIONS  (STANDING):  albuterol/ipratropium for Nebulization 3 milliLiter(s) Nebulizer every 6 hours  DULoxetine 30 milliGRAM(s) Oral daily  enoxaparin Injectable 40 milliGRAM(s) SubCutaneous every 24 hours  ferrous    sulfate 325 milliGRAM(s) Oral daily  finasteride 5 milliGRAM(s) Oral daily  folic acid 1 milliGRAM(s) Oral daily  furosemide    Tablet 20 milliGRAM(s) Oral daily  gabapentin 300 milliGRAM(s) Oral every 8 hours  guaiFENesin Oral Liquid (Sugar-Free) 200 milliGRAM(s) Oral every 6 hours  influenza   Vaccine 0.5 milliLiter(s) IntraMuscular once  methadone    Tablet 110 milliGRAM(s) Oral daily  multivitamin 1 Tablet(s) Oral daily  nystatin Powder 1 Application(s) Topical two times a day  pantoprazole    Tablet 40 milliGRAM(s) Oral before breakfast  polyethylene glycol 3350 17 Gram(s) Oral daily  povidone iodine 10% Solution 1 Application(s) Topical daily  QUEtiapine 400 milliGRAM(s) Oral at bedtime  QUEtiapine 100 milliGRAM(s) Oral <User Schedule>  senna 2 Tablet(s) Oral at bedtime  tamsulosin 0.4 milliGRAM(s) Oral at bedtime    MEDICATIONS  (PRN):  acetaminophen     Tablet .. 650 milliGRAM(s) Oral every 6 hours PRN Temp greater or equal to 38C (100.4F)  benzocaine/menthol Lozenge 1 Lozenge Oral every 4 hours PRN Sore Throat  HYDROmorphone   Tablet 8 milliGRAM(s) Oral every 6 hours PRN Severe Pain (7 - 10)  HYDROmorphone   Tablet 4 milliGRAM(s) Oral every 6 hours PRN Moderate Pain (4 - 6)  ondansetron Injectable 4 milliGRAM(s) IV Push every 6 hours PRN Nausea and/or Vomiting  simethicone 80 milliGRAM(s) Chew every 6 hours PRN Heartburn      Allergies    NSAIDs (Flushing; Other (Moderate))  Aleve (Unknown)  Risperdal (Short breath; Rash; Hives)  Stelazine (Unknown)  Haldol (Anaphylaxis)  Motrin (Anaphylaxis)  Thorazine (Other (Moderate))  Zyprexa (Rash; Dystonic RXN; Hives)    Intolerances        REVIEW OF SYSTEMS:  +chronic hip and knee pain   no SOB CP nausea     Vital Signs Last 24 Hrs  T(C): 37.2 (31 Dec 2024 10:47), Max: 37.2 (31 Dec 2024 05:28)  T(F): 98.9 (31 Dec 2024 10:47), Max: 99 (31 Dec 2024 05:28)  HR: 89 (31 Dec 2024 13:10) (79 - 93)  BP: 119/77 (31 Dec 2024 10:47) (119/77 - 139/84)  BP(mean): --  RR: 17 (31 Dec 2024 10:47) (17 - 18)  SpO2: 97% (31 Dec 2024 13:10) (90% - 98%)    Parameters below as of 31 Dec 2024 10:47  Patient On (Oxygen Delivery Method): nasal cannula        PHYSICAL EXAM:  GENERAL: NAD, well-groomed, well-developed  HEAD:  Atraumatic, Normocephalic  EYES: EOMI, sclera non-icteric  ENMT:  Moist mucous membranes,  NERVOUS SYSTEM:  Alert & Oriented X3, Good concentration  CHEST/LUNG: Clear to percussion bilaterally; No rales, rhonchi, wheezing, or rubs  HEART: Regular rate and rhythm; No murmurs, rubs, or gallops  ABDOMEN: Soft, Nontender, Nondistended; Bowel sounds present +ostomy present  EXTREMITIES: +BLE contractures    LABS:              CAPILLARY BLOOD GLUCOSE          RADIOLOGY & ADDITIONAL TESTS:    Imaging Personally Reviewed:  [ X] YES  [ ] NO    Consultant(s) Notes Reviewed:  [ X] YES  [ ] NO    Care Discussed with Consultants/Other Providers [X ] YES  [ ] NO

## 2024-12-31 NOTE — PROGRESS NOTE ADULT - PROVIDER SPECIALTY LIST ADULT
Hospitalist
Infectious Disease
Infectious Disease
Orthopedics
Hospitalist
Infectious Disease
Orthopedics
Urology
Hospitalist
Infectious Disease
Wound Care
Hospitalist
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
## 2024-12-31 NOTE — PROGRESS NOTE ADULT - TIME BILLING
Lab test review, Radiology Review, Vitals review, Consultant review and discussion, Physical examination, IDR, Assessment and plan; Plan discussion with patient and family
Lab test review, Radiology Review, Vitals review, Consultant review and discussion, Physical examination, IDR, Assessment and plan; Plan discussion with patient and family
Lab test review, Radiology Review, Vitals review, Consultant review and discussion, Physical examination, IDR, Assessment and plan; Plan discussion with patient and family     Plan discussed at length with mother at bedside
Lab test review, Radiology Review, Vitals review, Consultant review and discussion, Physical examination, IDR, Assessment and plan; Plan discussion with patient and family

## 2024-12-31 NOTE — CHART NOTE - NSCHARTNOTESELECT_GEN_ALL_CORE
Central Line Removal/Event Note
Event Note
Nutrition Services
Nutrition Services
IR follow up note/Event Note

## 2024-12-31 NOTE — PROGRESS NOTE ADULT - REASON FOR ADMISSION
UTI, hematuria, lower abdominal pain

## 2024-12-31 NOTE — DISCHARGE NOTE PROVIDER - NSDCMRMEDTOKEN_GEN_ALL_CORE_FT
acetaminophen 325 mg oral tablet: 2 tab(s) orally every 6 hours As needed Temp greater or equal to 38C (100.4F)  albuterol 90 mcg/inh inhalation aerosol: 2 puff(s) inhaled every 6 hours As needed Respiratory Distress  bacitracin 500 units/g topical ointment: 1 Apply topically to affected area 2 times a day  collagenase 250 units/g topical ointment: 1 Apply topically to affected area 2 times a day  DULoxetine 30 mg oral delayed release capsule: 1 cap(s) orally once a day  ferrous sulfate 325 mg (65 mg elemental iron) oral tablet: 1 tab(s) orally once a day  finasteride 5 mg oral tablet: 1 tab(s) orally once a day  folic acid 1 mg oral tablet: 1 tab(s) orally once a day  furosemide 20 mg oral tablet: 1 tab(s) orally once a day  gabapentin 300 mg oral capsule: 1 cap(s) orally every 8 hours  HYDROmorphone 8 mg oral tablet: 1 tab(s) orally every 8 hours As needed Severe Pain (7 - 10)  methadone 10 mg oral tablet: 11 tab(s) orally once a day  Multiple Vitamins oral tablet: 1 tab(s) orally once a day  pantoprazole 40 mg oral delayed release tablet: 1 tab(s) orally once a day  QUEtiapine 400 mg oral tablet: 1 tab(s) orally once a day (at bedtime)  senna (sennosides) 8.6 mg oral tablet: 2 tab(s) orally once a day (at bedtime)  Seroquel 100 mg oral tablet: 1 tab(s) orally 2 times a day 100mg 10AM, 100mg 6pm and 400mg hs  tamsulosin 0.4 mg oral capsule: 1 cap(s) orally once a day (at bedtime)

## 2024-12-31 NOTE — DISCHARGE NOTE NURSING/CASE MANAGEMENT/SOCIAL WORK - PATIENT PORTAL LINK FT
You can access the FollowMyHealth Patient Portal offered by [facility] by registering at the following website: [email or web address]. By joining [employer]’s FollowMyHealth portal, you will also be able to view your health information using other applications (apps) compatible with our system.
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
## 2024-12-31 NOTE — DISCHARGE NOTE PROVIDER - ATTENDING DISCHARGE PHYSICAL EXAMINATION:
Vital Signs Last 24 Hrs  T(C): 37.2 (31 Dec 2024 10:47), Max: 37.2 (31 Dec 2024 05:28)  T(F): 98.9 (31 Dec 2024 10:47), Max: 99 (31 Dec 2024 05:28)  HR: 89 (31 Dec 2024 13:10) (84 - 93)  BP: 119/77 (31 Dec 2024 10:47) (119/77 - 139/84)  BP(mean): --  RR: 17 (31 Dec 2024 10:47) (17 - 18)  SpO2: 97% (31 Dec 2024 13:10) (90% - 98%)    Parameters below as of 31 Dec 2024 10:47  Patient On (Oxygen Delivery Method): nasal cannula    GENERAL: NAD, well-groomed, well-developed  HEAD:  Atraumatic, Normocephalic  EYES: EOMI, sclera non-icteric  ENMT:  Moist mucous membranes,  NERVOUS SYSTEM:  Alert & Oriented X3, Good concentration  CHEST/LUNG: Clear to percussion bilaterally; No rales, rhonchi, wheezing, or rubs  HEART: Regular rate and rhythm; No murmurs, rubs, or gallops  ABDOMEN: Soft, Nontender, Nondistended; Bowel sounds present +ostomy present  EXTREMITIES: +BLE contractures

## 2024-12-31 NOTE — CHART NOTE - NSCHARTNOTEFT_GEN_A_CORE
Per chart, pt with PMH of HTN, emphysema chronic O2, cervical epidural abscess, b/l LE paralysis, chronic pressure ulcers, pneumoperitoneum s/p ex lap with ileostomy complicated by MRSA bacteremia and evisceration, hep C, L foot OM, bipolar disorder, opioid dependence, neurogenic bladder s/p IR SPC placement(10/2024). Pt presented with worsening lower abdominal pain and hematuria; with fever, UTI due to suprapubic catheter, s/p abx. Also with L dorsal foot ulcer, as well as lower abdominal pain and R hip pain, likely due to septic arthritis.    Factors impacting intake: [ ] none [ ] nausea  [ ] vomiting [ ] diarrhea [ ] constipation  [ ]chewing problems [ ] swallowing issues  [x] other: variable appetite    Diet Prescription: Diet, DASH/TLC:   Sodium & Cholesterol Restricted  Supplement Feeding Modality:  Oral  Ensure Plus High Protein Cans or Servings Per Day:  1       Frequency:  Two Times a day (12-17-24 @ 14:50)    Intake: varies, % for documented meals as per flow sheets; pt drinking Ensure supplements    Current Weight: No wt x 12 days; request current and daily weights  % Weight Change: N/A  2+ edema generalized, b/l feet as per flow sheets    Pertinent Medications: MEDICATIONS  (STANDING):  albuterol/ipratropium for Nebulization 3 milliLiter(s) Nebulizer every 6 hours  DULoxetine 30 milliGRAM(s) Oral daily  enoxaparin Injectable 40 milliGRAM(s) SubCutaneous every 24 hours  ferrous    sulfate 325 milliGRAM(s) Oral daily  finasteride 5 milliGRAM(s) Oral daily  folic acid 1 milliGRAM(s) Oral daily  furosemide    Tablet 20 milliGRAM(s) Oral daily  gabapentin 300 milliGRAM(s) Oral every 8 hours  guaiFENesin Oral Liquid (Sugar-Free) 200 milliGRAM(s) Oral every 6 hours  influenza   Vaccine 0.5 milliLiter(s) IntraMuscular once  methadone    Tablet 110 milliGRAM(s) Oral daily  multivitamin 1 Tablet(s) Oral daily  nystatin Powder 1 Application(s) Topical two times a day  pantoprazole    Tablet 40 milliGRAM(s) Oral before breakfast  polyethylene glycol 3350 17 Gram(s) Oral daily  povidone iodine 10% Solution 1 Application(s) Topical daily  QUEtiapine 100 milliGRAM(s) Oral <User Schedule>  QUEtiapine 400 milliGRAM(s) Oral at bedtime  senna 2 Tablet(s) Oral at bedtime  tamsulosin 0.4 milliGRAM(s) Oral at bedtime    MEDICATIONS  (PRN):  acetaminophen     Tablet .. 650 milliGRAM(s) Oral every 6 hours PRN Temp greater or equal to 38C (100.4F)  benzocaine/menthol Lozenge 1 Lozenge Oral every 4 hours PRN Sore Throat  HYDROmorphone   Tablet 8 milliGRAM(s) Oral every 6 hours PRN Severe Pain (7 - 10)  HYDROmorphone   Tablet 4 milliGRAM(s) Oral every 6 hours PRN Moderate Pain (4 - 6)  ondansetron Injectable 4 milliGRAM(s) IV Push every 6 hours PRN Nausea and/or Vomiting  simethicone 80 milliGRAM(s) Chew every 6 hours PRN Heartburn    Pertinent Labs:  12-27 Phos 3.6 mg/dL    Skin: Pt with multiple stage II or > pressure ulcers noted as per flow sheets    Estimated Needs:   [x] no change since previous assessment on 12/17  [ ] recalculated:     Previous Nutrition Diagnosis:   [x] Increased Nutrient Needs... Protein  Etiology: Increased demand for protein  Signs/Symptoms: pt with multiple stage II or > pressure ulcers    Goal: Pt to meet >75% of protein needs via meals/supplements to promote wound healing - not consistently met    Nutrition Diagnosis is [x] ongoing  [ ] resolved [ ] not applicable     New Nutrition Diagnosis: [x] not applicable       Interventions:   Recommend  [x] Continue with current diet rx as ordered  [ ] Change Diet To:  [ ] Nutrition Supplement  [ ] Nutrition Support  [ ] Other:     Monitoring and Evaluation:   [ x ] PO intake [ x ] Tolerance to diet prescription [ x ] weights [ x ] labs[ x ] follow up per protocol  [ ] other:

## 2025-01-01 VITALS
RESPIRATION RATE: 17 BRPM | SYSTOLIC BLOOD PRESSURE: 138 MMHG | TEMPERATURE: 98 F | DIASTOLIC BLOOD PRESSURE: 82 MMHG | HEART RATE: 100 BPM | OXYGEN SATURATION: 96 %

## 2025-01-03 DIAGNOSIS — N39.0 URINARY TRACT INFECTION, SITE NOT SPECIFIED: ICD-10-CM

## 2025-01-03 DIAGNOSIS — N40.0 BENIGN PROSTATIC HYPERPLASIA WITHOUT LOWER URINARY TRACT SYMPTOMS: ICD-10-CM

## 2025-01-03 DIAGNOSIS — Z79.83 LONG TERM (CURRENT) USE OF BISPHOSPHONATES: ICD-10-CM

## 2025-01-03 DIAGNOSIS — B96.5 PSEUDOMONAS (AERUGINOSA) (MALLEI) (PSEUDOMALLEI) AS THE CAUSE OF DISEASES CLASSIFIED ELSEWHERE: ICD-10-CM

## 2025-01-03 DIAGNOSIS — Z99.81 DEPENDENCE ON SUPPLEMENTAL OXYGEN: ICD-10-CM

## 2025-01-03 DIAGNOSIS — R53.2 FUNCTIONAL QUADRIPLEGIA: ICD-10-CM

## 2025-01-03 DIAGNOSIS — K21.9 GASTRO-ESOPHAGEAL REFLUX DISEASE WITHOUT ESOPHAGITIS: ICD-10-CM

## 2025-01-03 DIAGNOSIS — B18.2 CHRONIC VIRAL HEPATITIS C: ICD-10-CM

## 2025-01-03 DIAGNOSIS — G82.20 PARAPLEGIA, UNSPECIFIED: ICD-10-CM

## 2025-01-03 DIAGNOSIS — R31.9 HEMATURIA, UNSPECIFIED: ICD-10-CM

## 2025-01-03 DIAGNOSIS — N31.9 NEUROMUSCULAR DYSFUNCTION OF BLADDER, UNSPECIFIED: ICD-10-CM
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
## 2025-01-03 DIAGNOSIS — J43.9 EMPHYSEMA, UNSPECIFIED: ICD-10-CM

## 2025-01-03 DIAGNOSIS — F11.20 OPIOID DEPENDENCE, UNCOMPLICATED: ICD-10-CM

## 2025-01-03 DIAGNOSIS — J96.12 CHRONIC RESPIRATORY FAILURE WITH HYPERCAPNIA: ICD-10-CM

## 2025-01-03 DIAGNOSIS — Z16.12 EXTENDED SPECTRUM BETA LACTAMASE (ESBL) RESISTANCE: ICD-10-CM

## 2025-01-03 DIAGNOSIS — Z16.24 RESISTANCE TO MULTIPLE ANTIBIOTICS: ICD-10-CM

## 2025-01-03 DIAGNOSIS — I25.10 ATHEROSCLEROTIC HEART DISEASE OF NATIVE CORONARY ARTERY WITHOUT ANGINA PECTORIS: ICD-10-CM

## 2025-01-03 DIAGNOSIS — I10 ESSENTIAL (PRIMARY) HYPERTENSION: ICD-10-CM

## 2025-01-03 DIAGNOSIS — Z79.899 OTHER LONG TERM (CURRENT) DRUG THERAPY: ICD-10-CM

## 2025-01-03 DIAGNOSIS — L89.892 PRESSURE ULCER OF OTHER SITE, STAGE 2: ICD-10-CM

## 2025-01-03 DIAGNOSIS — F99 MENTAL DISORDER, NOT OTHERWISE SPECIFIED: ICD-10-CM

## 2025-01-03 DIAGNOSIS — Z16.13 RESISTANCE TO CARBAPENEM: ICD-10-CM

## 2025-01-03 DIAGNOSIS — G47.33 OBSTRUCTIVE SLEEP APNEA (ADULT) (PEDIATRIC): ICD-10-CM

## 2025-01-03 DIAGNOSIS — M24.452 RECURRENT DISLOCATION, LEFT HIP: ICD-10-CM

## 2025-01-03 DIAGNOSIS — Z74.01 BED CONFINEMENT STATUS: ICD-10-CM

## 2025-01-03 DIAGNOSIS — R53.81 OTHER MALAISE: ICD-10-CM

## 2025-01-03 DIAGNOSIS — L97.529 NON-PRESSURE CHRONIC ULCER OF OTHER PART OF LEFT FOOT WITH UNSPECIFIED SEVERITY: ICD-10-CM

## 2025-01-28 NOTE — PROGRESS NOTE ADULT - NUTRITIONAL ASSESSMENT
This patient has been assessed with a concern for Malnutrition and has been determined to have a diagnosis/diagnoses of Moderate protein-calorie malnutrition.    This patient is being managed with:   Diet Regular-  1000mL Fluid Restriction ([id number])  Supplement Feeding Modality:  Oral  Ensure Plus High Protein Cans or Servings Per Day:  1       Frequency:  Two Times a day  Entered: Dec 20 2023 10:26AM   This patient has been assessed with a concern for Malnutrition and has been determined to have a diagnosis/diagnoses of Moderate protein-calorie malnutrition.    This patient is being managed with:   Diet Regular-  1000mL Fluid Restriction ([id number])  Supplement Feeding Modality:  Oral  Ensure Plus High Protein Cans or Servings Per Day:  1       Frequency:  Two Times a day  Entered: Dec 20 2023 10:26AM   n/a

## 2025-02-20 NOTE — DIETITIAN INITIAL EVALUATION ADULT - OTHER INFO
20-Feb-2025 02:46
Pt resides at LTC facility; diet there per transfer papers is regular consistency with thin liquids; with CAROLANN, NCS, fat and cholesterol restricted diets + LPS x 4/day. Pt with multiple stage II or > pressure ulcers noted. Pt aware of need for additional protein intake in order to promote wound healing. Denies any chewing or swallowing difficulty or any N/V/D; pt reports hx of chronic constipation; on bowel regimen. PO intake % for documented meals as per flow sheets. Pt requests strawberry Ensure; will oblige to help optimize intake and provide additional protein/nutrition to promote wound healing. Reports wt fluctuates between 240-260 lbs.; current wt within typical wt range and no physical signs of malnutrition noted.

## 2025-05-16 NOTE — CONSULT NOTE ADULT - SUBJECTIVE AND OBJECTIVE BOX
patient is doing well S/P PCIOL OU. Uses OTC readers and is happy with vision. · HPI Objective Statement: 53 years old male by ems from [facility] c/o infected wound to the right hip. bilateral feet. Pt however is alert and oriented x 3 sts he has been bed bound with bilateral legs contractured after the cervical surgery years ago. Pt has a hx of diabetes. Pt denies headache, dizziness, blurred visions, light sensitivities, focal/distal weakness or numbness, neck/back pain/ calfs pain, cough, sob, chest pain, nausea vomiting, abd pain. Pt came in with west catheter with straw color of urine in the bag. according to transfer papers pt has had unhealed wounds for months. according to the transfer paper the diabetes box is checked.  · Chief Complaint: The patient is a 53y Male complaining of wound check.        PAST MEDICAL & SURGICAL HISTORY:  High cholesterol      Anxiety      Back injury      CAD (coronary artery disease)      Acid reflux      Mitral valve prolapse      COPD (chronic obstructive pulmonary disease)      Hep C w/o coma, chronic      HTN (hypertension)      CAD (coronary artery disease)      Chronic back pain      MI (myocardial infarction)      Chronic pain      Pain management      CAD (coronary artery disease)      Mitral valve disorder      Abscess of arm      No significant past surgical history          MEDICATIONS  (STANDING):  albuterol/ipratropium for Nebulization. 3 milliLiter(s) Nebulizer once    MEDICATIONS  (PRN):      Allergies    NSAIDs (Flushing; Other (Moderate))  Haldol (Anaphylaxis)  Zyprexa (Rash; Dystonic RXN; Hives)  Motrin (Anaphylaxis)  Thorazine (Other (Moderate))  Aleve (Unknown)  Stelazine (Unknown)  Risperdal (Short breath; Rash; Hives)    Intolerances        VITALS:    Vital Signs Last 24 Hrs  T(C): 38.3 (04 Dec 2023 16:55), Max: 38.3 (04 Dec 2023 16:55)  T(F): 101 (04 Dec 2023 16:55), Max: 101 (04 Dec 2023 16:55)  HR: 87 (04 Dec 2023 16:18) (87 - 87)  BP: 105/59 (04 Dec 2023 16:18) (105/59 - 105/59)  BP(mean): --  RR: 20 (04 Dec 2023 16:18) (20 - 20)  SpO2: 95% (04 Dec 2023 16:18) (95% - 95%)    Parameters below as of 04 Dec 2023 16:18  Patient On (Oxygen Delivery Method): nasal cannula  O2 Flow (L/min): 3      LABS:                          10.5   15.25 )-----------( 206      ( 04 Dec 2023 17:20 )             35.2       12-04    140  |  104  |  13  ----------------------------<  118<H>  4.2   |  34<H>  |  0.67    Ca    9.1      04 Dec 2023 17:20    TPro  8.0  /  Alb  1.8<L>  /  TBili  0.4  /  DBili  x   /  AST  33  /  ALT  44  /  AlkPhos  227<H>  12-04      CAPILLARY BLOOD GLUCOSE          PT/INR - ( 04 Dec 2023 17:20 )   PT: 13.8 sec;   INR: 1.16 ratio         PTT - ( 04 Dec 2023 17:20 )  PTT:33.4 sec    LOWER EXTREMITY PHYSICAL EXAM:    Vascular: DP/PT 2/4, B/L, CFT <3 seconds B/L, Temperature gradient warm to warm , B/L.   Neuro: Epicritic sensation absent to the level of digits, B/L.  Musculoskeletal/Ortho: severe contracted b/l lower extremities  Skin: R foot heel wound to bone, necrotic soft, mild malodor, minimal serosanguinous drainage. DTI along the medial malleolus, eschar along distal tibia, periwound erythema, L Foot dorsal eschar, periwound extensive erythema, mild malodor, distal dorsal 5th DTI, b/l +3 pitting edema extend from the toes to the level of ankle          RADIOLOGY & ADDITIONAL STUDIES:     · HPI Objective Statement: 53 years old male by ems from [facility] c/o infected wound to the right hip. bilateral feet. Pt however is alert and oriented x 3 sts he has been bed bound with bilateral legs contractured after the cervical surgery years ago. Pt has a hx of diabetes. Pt denies headache, dizziness, blurred visions, light sensitivities, focal/distal weakness or numbness, neck/back pain/ calfs pain, cough, sob, chest pain, nausea vomiting, abd pain. Pt came in with west catheter with straw color of urine in the bag. according to transfer papers pt has had unhealed wounds for months. according to the transfer paper the diabetes box is checked.  · Chief Complaint: The patient is a 53y Male complaining of wound check.        PAST MEDICAL & SURGICAL HISTORY:  High cholesterol      Anxiety      Back injury      CAD (coronary artery disease)      Acid reflux      Mitral valve prolapse      COPD (chronic obstructive pulmonary disease)      Hep C w/o coma, chronic      HTN (hypertension)      CAD (coronary artery disease)      Chronic back pain      MI (myocardial infarction)      Chronic pain      Pain management      CAD (coronary artery disease)      Mitral valve disorder      Abscess of arm      No significant past surgical history          MEDICATIONS  (STANDING):  albuterol/ipratropium for Nebulization. 3 milliLiter(s) Nebulizer once    MEDICATIONS  (PRN):      Allergies    NSAIDs (Flushing; Other (Moderate))  Haldol (Anaphylaxis)  Zyprexa (Rash; Dystonic RXN; Hives)  Motrin (Anaphylaxis)  Thorazine (Other (Moderate))  Aleve (Unknown)  Stelazine (Unknown)  Risperdal (Short breath; Rash; Hives)    Intolerances        VITALS:    Vital Signs Last 24 Hrs  T(C): 38.3 (04 Dec 2023 16:55), Max: 38.3 (04 Dec 2023 16:55)  T(F): 101 (04 Dec 2023 16:55), Max: 101 (04 Dec 2023 16:55)  HR: 87 (04 Dec 2023 16:18) (87 - 87)  BP: 105/59 (04 Dec 2023 16:18) (105/59 - 105/59)  BP(mean): --  RR: 20 (04 Dec 2023 16:18) (20 - 20)  SpO2: 95% (04 Dec 2023 16:18) (95% - 95%)    Parameters below as of 04 Dec 2023 16:18  Patient On (Oxygen Delivery Method): nasal cannula  O2 Flow (L/min): 3      LABS:                          10.5   15.25 )-----------( 206      ( 04 Dec 2023 17:20 )             35.2       12-04    140  |  104  |  13  ----------------------------<  118<H>  4.2   |  34<H>  |  0.67    Ca    9.1      04 Dec 2023 17:20    TPro  8.0  /  Alb  1.8<L>  /  TBili  0.4  /  DBili  x   /  AST  33  /  ALT  44  /  AlkPhos  227<H>  12-04      CAPILLARY BLOOD GLUCOSE          PT/INR - ( 04 Dec 2023 17:20 )   PT: 13.8 sec;   INR: 1.16 ratio         PTT - ( 04 Dec 2023 17:20 )  PTT:33.4 sec    LOWER EXTREMITY PHYSICAL EXAM:    Vascular: DP/PT 2/4, B/L, CFT <3 seconds B/L, Temperature gradient warm to warm , B/L.   Neuro: Epicritic sensation absent to the level of digits, B/L.  Musculoskeletal/Ortho: severe contracted b/l lower extremities  Skin: R foot heel wound to bone, necrotic soft, mild malodor, minimal serosanguinous drainage. DTI along the medial malleolus, eschar along distal tibia, periwound erythema, L Foot dorsal eschar, periwound extensive erythema, mild malodor, distal dorsal 5th DTI, b/l +3 pitting edema extend from the toes to the level of ankle          RADIOLOGY & ADDITIONAL STUDIES:     Previously Declined (within the last year)